# Patient Record
Sex: FEMALE | Race: WHITE | Employment: OTHER | URBAN - NONMETROPOLITAN AREA
[De-identification: names, ages, dates, MRNs, and addresses within clinical notes are randomized per-mention and may not be internally consistent; named-entity substitution may affect disease eponyms.]

---

## 2018-05-11 ENCOUNTER — HOSPITAL ENCOUNTER (OUTPATIENT)
Dept: GENERAL RADIOLOGY | Age: 83
Discharge: HOME | End: 2018-05-11
Payer: MEDICARE

## 2018-05-11 ENCOUNTER — HOSPITAL ENCOUNTER (OUTPATIENT)
Age: 83
Discharge: HOME | End: 2018-05-11
Payer: MEDICARE

## 2018-05-11 DIAGNOSIS — M54.5 LOW BACK PAIN, UNSPECIFIED BACK PAIN LATERALITY, UNSPECIFIED CHRONICITY, WITH SCIATICA PRESENCE UNSPECIFIED: ICD-10-CM

## 2018-05-11 DIAGNOSIS — M99.02 SEGMENTAL AND SOMATIC DYSFUNCTION OF THORACIC REGION: ICD-10-CM

## 2018-05-11 DIAGNOSIS — M99.01 CERVICAL (NECK) REGION SOMATIC DYSFUNCTION: ICD-10-CM

## 2018-05-11 PROCEDURE — 72072 X-RAY EXAM THORAC SPINE 3VWS: CPT

## 2018-05-11 PROCEDURE — 72040 X-RAY EXAM NECK SPINE 2-3 VW: CPT

## 2018-05-11 PROCEDURE — 72100 X-RAY EXAM L-S SPINE 2/3 VWS: CPT

## 2019-07-18 ENCOUNTER — HOSPITAL ENCOUNTER (OUTPATIENT)
Age: 84
Setting detail: SPECIMEN
Discharge: HOME | End: 2019-07-18
Payer: MEDICARE

## 2019-07-18 LAB
ABSOLUTE EOS #: 0.26 K/UL (ref 0–0.44)
ABSOLUTE IMMATURE GRANULOCYTE: <0.03 K/UL (ref 0–0.3)
ABSOLUTE LYMPH #: 1.54 K/UL (ref 1.1–3.7)
ABSOLUTE MONO #: 0.31 K/UL (ref 0.1–1.2)
ALBUMIN SERPL-MCNC: 4.3 G/DL (ref 3.5–5.2)
ALBUMIN/GLOBULIN RATIO: 1.4 (ref 1–2.5)
ALP BLD-CCNC: 47 U/L (ref 35–104)
ALT SERPL-CCNC: 9 U/L (ref 5–33)
ANION GAP SERPL CALCULATED.3IONS-SCNC: 16 MMOL/L (ref 9–17)
AST SERPL-CCNC: 20 U/L
BASOPHILS # BLD: 2 % (ref 0–2)
BASOPHILS ABSOLUTE: 0.07 K/UL (ref 0–0.2)
BILIRUB SERPL-MCNC: 0.61 MG/DL (ref 0.3–1.2)
BUN BLDV-MCNC: 23 MG/DL (ref 8–23)
BUN/CREAT BLD: ABNORMAL (ref 9–20)
CALCIUM SERPL-MCNC: 9.6 MG/DL (ref 8.6–10.4)
CHLORIDE BLD-SCNC: 106 MMOL/L (ref 98–107)
CHOLESTEROL, FASTING: 158 MG/DL
CHOLESTEROL/HDL RATIO: 3.4
CO2: 24 MMOL/L (ref 20–31)
CREAT SERPL-MCNC: 0.64 MG/DL (ref 0.5–0.9)
DIFFERENTIAL TYPE: ABNORMAL
EOSINOPHILS RELATIVE PERCENT: 6 % (ref 1–4)
GFR AFRICAN AMERICAN: >60 ML/MIN
GFR NON-AFRICAN AMERICAN: >60 ML/MIN
GFR SERPL CREATININE-BSD FRML MDRD: ABNORMAL ML/MIN/{1.73_M2}
GFR SERPL CREATININE-BSD FRML MDRD: ABNORMAL ML/MIN/{1.73_M2}
GLUCOSE BLD-MCNC: 63 MG/DL (ref 70–99)
HCT VFR BLD CALC: 47.4 % (ref 36.3–47.1)
HDLC SERPL-MCNC: 47 MG/DL
HEMOGLOBIN: 14.8 G/DL (ref 11.9–15.1)
IMMATURE GRANULOCYTES: 1 %
LDL CHOLESTEROL: 95 MG/DL (ref 0–130)
LYMPHOCYTES # BLD: 37 % (ref 24–43)
MCH RBC QN AUTO: 31 PG (ref 25.2–33.5)
MCHC RBC AUTO-ENTMCNC: 31.2 G/DL (ref 28.4–34.8)
MCV RBC AUTO: 99.4 FL (ref 82.6–102.9)
MONOCYTES # BLD: 7 % (ref 3–12)
NRBC AUTOMATED: 0 PER 100 WBC
PDW BLD-RTO: 14.2 % (ref 11.8–14.4)
PLATELET # BLD: 189 K/UL (ref 138–453)
PLATELET ESTIMATE: ABNORMAL
PMV BLD AUTO: 11.8 FL (ref 8.1–13.5)
POTASSIUM SERPL-SCNC: 4.7 MMOL/L (ref 3.7–5.3)
RBC # BLD: 4.77 M/UL (ref 3.95–5.11)
RBC # BLD: ABNORMAL 10*6/UL
SEG NEUTROPHILS: 47 % (ref 36–65)
SEGMENTED NEUTROPHILS ABSOLUTE COUNT: 2.01 K/UL (ref 1.5–8.1)
SODIUM BLD-SCNC: 146 MMOL/L (ref 135–144)
TOTAL PROTEIN: 7.4 G/DL (ref 6.4–8.3)
TRIGLYCERIDE, FASTING: 79 MG/DL
TSH SERPL DL<=0.05 MIU/L-ACNC: 3.9 MIU/L (ref 0.3–5)
VLDLC SERPL CALC-MCNC: NORMAL MG/DL (ref 1–30)
WBC # BLD: 4.2 K/UL (ref 3.5–11.3)
WBC # BLD: ABNORMAL 10*3/UL

## 2019-08-01 ENCOUNTER — HOSPITAL ENCOUNTER (OUTPATIENT)
Dept: WOMENS IMAGING | Age: 84
Discharge: HOME | End: 2019-08-01
Payer: MEDICARE

## 2019-08-01 DIAGNOSIS — Z12.31 VISIT FOR SCREENING MAMMOGRAM: ICD-10-CM

## 2019-08-01 DIAGNOSIS — N95.1 SYMPTOMATIC MENOPAUSAL OR FEMALE CLIMACTERIC STATES: ICD-10-CM

## 2019-08-01 PROCEDURE — 77080 DXA BONE DENSITY AXIAL: CPT

## 2019-08-01 PROCEDURE — 77063 BREAST TOMOSYNTHESIS BI: CPT

## 2019-09-05 ENCOUNTER — HOSPITAL ENCOUNTER (OUTPATIENT)
Dept: WOMENS IMAGING | Age: 84
Discharge: HOME | End: 2019-09-05
Payer: MEDICARE

## 2019-09-05 DIAGNOSIS — R92.1 CALCIFICATION OF RIGHT BREAST: ICD-10-CM

## 2019-09-05 PROCEDURE — G0279 TOMOSYNTHESIS, MAMMO: HCPCS

## 2020-08-06 ENCOUNTER — HOSPITAL ENCOUNTER (OUTPATIENT)
Dept: WOMENS IMAGING | Age: 85
Discharge: HOME | End: 2020-08-06
Payer: MEDICARE

## 2020-08-06 PROCEDURE — 77067 SCR MAMMO BI INCL CAD: CPT

## 2020-08-25 ENCOUNTER — HOSPITAL ENCOUNTER (OUTPATIENT)
Dept: WOMENS IMAGING | Age: 85
Discharge: HOME | End: 2020-08-25
Payer: MEDICARE

## 2020-08-25 PROCEDURE — 77065 DX MAMMO INCL CAD UNI: CPT

## 2021-01-23 ENCOUNTER — HOSPITAL ENCOUNTER (OUTPATIENT)
Dept: GENERAL RADIOLOGY | Age: 86
Discharge: HOME | End: 2021-01-23
Payer: MEDICARE

## 2021-01-23 ENCOUNTER — HOSPITAL ENCOUNTER (OUTPATIENT)
Age: 86
Discharge: HOME | End: 2021-01-23
Payer: MEDICARE

## 2021-01-23 DIAGNOSIS — L03.011 CELLULITIS OF RIGHT FINGER: ICD-10-CM

## 2021-01-23 PROCEDURE — 73130 X-RAY EXAM OF HAND: CPT

## 2021-07-14 ENCOUNTER — HOSPITAL ENCOUNTER (OUTPATIENT)
Dept: NON INVASIVE DIAGNOSTICS | Age: 86
Discharge: HOME | End: 2021-07-14
Payer: MEDICARE

## 2021-07-14 DIAGNOSIS — I48.0 PAROXYSMAL ATRIAL FIBRILLATION (HCC): ICD-10-CM

## 2021-07-14 PROCEDURE — 93226 XTRNL ECG REC<48 HR SCAN A/R: CPT

## 2021-07-14 PROCEDURE — 93225 XTRNL ECG REC<48 HRS REC: CPT

## 2021-07-19 LAB
ACQUISITION DURATION: NORMAL S
AVERAGE HEART RATE: 89 BPM
HOOKUP DATE: NORMAL
HOOKUP TIME: NORMAL
MAX HEART RATE TIME/DATE: NORMAL
MAX HEART RATE: 118 BPM
MIN HEART RATE TIME/DATE: NORMAL
MIN HEART RATE: 70 BPM
NUMBER OF QRS COMPLEXES: NORMAL
NUMBER OF SUPRAVENTRICULAR COUPLETS: 124
NUMBER OF SUPRAVENTRICULAR ECTOPICS: NORMAL
NUMBER OF SUPRAVENTRICULAR ISOLATED BEATS: NORMAL
NUMBER OF VENTRICULAR BIGEMINAL CYCLES: 25
NUMBER OF VENTRICULAR COUPLETS: 41
NUMBER OF VENTRICULAR ECTOPICS: 3596

## 2021-08-25 ENCOUNTER — HOSPITAL ENCOUNTER (OUTPATIENT)
Dept: WOMENS IMAGING | Age: 86
Discharge: HOME | End: 2021-08-25
Payer: MEDICARE

## 2021-08-25 DIAGNOSIS — Z12.31 VISIT FOR SCREENING MAMMOGRAM: ICD-10-CM

## 2021-08-25 PROCEDURE — 77063 BREAST TOMOSYNTHESIS BI: CPT

## 2021-09-08 ENCOUNTER — TELEPHONE (OUTPATIENT)
Dept: CARDIOLOGY CLINIC | Age: 86
End: 2021-09-08

## 2021-09-17 ENCOUNTER — HOSPITAL ENCOUNTER (OUTPATIENT)
Dept: INTERVENTIONAL RADIOLOGY/VASCULAR | Age: 86
Discharge: HOME | End: 2021-09-17
Payer: MEDICARE

## 2021-09-17 DIAGNOSIS — R52 PAIN: ICD-10-CM

## 2021-09-17 PROCEDURE — 93971 EXTREMITY STUDY: CPT

## 2021-10-05 ENCOUNTER — NURSE ONLY (OUTPATIENT)
Dept: FAMILY MEDICINE CLINIC | Age: 86
End: 2021-10-05
Payer: MEDICARE

## 2021-10-05 DIAGNOSIS — R06.02 SOB (SHORTNESS OF BREATH): ICD-10-CM

## 2021-10-05 DIAGNOSIS — R05.9 COUGH: Primary | ICD-10-CM

## 2021-10-05 LAB
Lab: NORMAL
PERFORMING INSTRUMENT: NORMAL
QC PASS/FAIL: NORMAL
SARS-COV-2, POC: NORMAL

## 2021-10-05 PROCEDURE — 87426 SARSCOV CORONAVIRUS AG IA: CPT | Performed by: FAMILY MEDICINE

## 2021-10-06 ENCOUNTER — TELEPHONE (OUTPATIENT)
Dept: CARDIOLOGY CLINIC | Age: 86
End: 2021-10-06

## 2021-10-06 NOTE — TELEPHONE ENCOUNTER
Patient's daughter Sergio Issa called about Kourtney's new pt appt on 10/07/2021. She said that she just took Madelinegarry Wilderer to urgent care and she has an upper respiratory infection.  No available appts until January with Gavin Weinberg, please advise  [phone]

## 2021-10-07 ENCOUNTER — OFFICE VISIT (OUTPATIENT)
Dept: CARDIOLOGY CLINIC | Age: 86
End: 2021-10-07
Payer: MEDICARE

## 2021-10-07 VITALS
HEIGHT: 60 IN | WEIGHT: 125 LBS | HEART RATE: 122 BPM | DIASTOLIC BLOOD PRESSURE: 80 MMHG | SYSTOLIC BLOOD PRESSURE: 126 MMHG | BODY MASS INDEX: 24.54 KG/M2

## 2021-10-07 DIAGNOSIS — R00.2 PALPITATIONS: Primary | ICD-10-CM

## 2021-10-07 DIAGNOSIS — I49.3 PVC (PREMATURE VENTRICULAR CONTRACTION): ICD-10-CM

## 2021-10-07 DIAGNOSIS — R06.02 SOB (SHORTNESS OF BREATH): ICD-10-CM

## 2021-10-07 LAB
SARS-COV-2: DETECTED
SOURCE: ABNORMAL

## 2021-10-07 PROCEDURE — 1036F TOBACCO NON-USER: CPT | Performed by: NUCLEAR MEDICINE

## 2021-10-07 PROCEDURE — 1090F PRES/ABSN URINE INCON ASSESS: CPT | Performed by: NUCLEAR MEDICINE

## 2021-10-07 PROCEDURE — 4040F PNEUMOC VAC/ADMIN/RCVD: CPT | Performed by: NUCLEAR MEDICINE

## 2021-10-07 PROCEDURE — G8420 CALC BMI NORM PARAMETERS: HCPCS | Performed by: NUCLEAR MEDICINE

## 2021-10-07 PROCEDURE — 93000 ELECTROCARDIOGRAM COMPLETE: CPT | Performed by: NUCLEAR MEDICINE

## 2021-10-07 PROCEDURE — 99204 OFFICE O/P NEW MOD 45 MIN: CPT | Performed by: NUCLEAR MEDICINE

## 2021-10-07 PROCEDURE — 1123F ACP DISCUSS/DSCN MKR DOCD: CPT | Performed by: NUCLEAR MEDICINE

## 2021-10-07 PROCEDURE — G8427 DOCREV CUR MEDS BY ELIG CLIN: HCPCS | Performed by: NUCLEAR MEDICINE

## 2021-10-07 PROCEDURE — G8484 FLU IMMUNIZE NO ADMIN: HCPCS | Performed by: NUCLEAR MEDICINE

## 2021-10-07 RX ORDER — METOPROLOL TARTRATE 50 MG/1
50 TABLET, FILM COATED ORAL 2 TIMES DAILY
Qty: 180 TABLET | Refills: 3 | Status: ON HOLD | OUTPATIENT
Start: 2021-10-07 | End: 2021-12-20 | Stop reason: DRUGHIGH

## 2021-10-07 RX ORDER — HYDROCHLOROTHIAZIDE 12.5 MG/1
12.5 TABLET ORAL DAILY
Status: ON HOLD | COMMUNITY
Start: 2021-09-10 | End: 2021-12-20

## 2021-10-07 RX ORDER — ASPIRIN 325 MG
325 TABLET ORAL DAILY
Status: ON HOLD | COMMUNITY
End: 2021-12-20

## 2021-10-07 RX ORDER — CETIRIZINE HYDROCHLORIDE 10 MG/1
TABLET ORAL
Status: ON HOLD | COMMUNITY
Start: 2021-09-16 | End: 2021-12-20

## 2021-10-07 ASSESSMENT — ENCOUNTER SYMPTOMS
BLOOD IN STOOL: 0
CHEST TIGHTNESS: 1
ABDOMINAL PAIN: 0
ANAL BLEEDING: 0
SHORTNESS OF BREATH: 1
DIARRHEA: 0
PHOTOPHOBIA: 0
RECTAL PAIN: 0
COLOR CHANGE: 0
BACK PAIN: 1
ABDOMINAL DISTENTION: 0
NAUSEA: 0
CONSTIPATION: 0
VOMITING: 0

## 2021-10-07 NOTE — PROGRESS NOTES
[address] [facility] .  [address] [address]  Dept: [phone]  Dept Fax: [phone]  Loc: [phone]    Visit Date: 10/7/2021    Raven Zayas is a 80 y.o. female who presents todayfor:  Chief Complaint   Patient presents with    New Patient     EKG done today    Establish Cardiologist    Palpitations    Tachycardia   here for the first time  Dealing with URI infection   Has had palpitation for few months  Intermittent in nature  holter with frequent PACs and PVCs  No obvious A fib   No known A fib before  No known CAD before  Does have baseline dyspnea  Some chest heaviness at times  Intermittent in nature  Exertional   No documented A fib before  Does have borderline HTN   No known Dm   No know hyperlipidemia  No smoking   Family history of CAD       HPI:  HPI  Past Medical History:   Diagnosis Date    Anxiety     Arthritis     Bronchitis     Diverticulitis of colon     Hypertension       Past Surgical History:   Procedure Laterality Date    APPENDECTOMY      CHOLECYSTECTOMY       History reviewed. No pertinent family history.   Social History     Tobacco Use    Smoking status: Former Smoker     Packs/day: 1.00     Years: 15.00     Pack years: 15.00     Types: Cigarettes     Quit date: 12/10/1977     Years since quittin.8    Smokeless tobacco: Never Used   Substance Use Topics    Alcohol use: No      Current Outpatient Medications   Medication Sig Dispense Refill    hydroCHLOROthiazide (HYDRODIURIL) 12.5 MG tablet Take 12.5 mg by mouth daily       NONFORMULARY Focus Factor      NONFORMULARY Drenamin      GLUCOSAMINE-CHONDROITIN PO Take by mouth      cetirizine (ZYRTEC) 10 MG tablet TAKE 1 TABLET BY MOUTH ONCE DAILY      HAWTHORN PO Take 425 mg by mouth daily      NONFORMULARY Allerplex      OLIVE LEAF EXTRACT PO Take by mouth      aspirin 325 MG tablet Take 325 mg by mouth daily      Fexofenadine-Pseudoephedrine (ALLEGRA-D 12 HOUR PO) Take by mouth       No current facility-administered medications for this visit. Allergies   Allergen Reactions    Sulfa Antibiotics      Health Maintenance   Topic Date Due    Pneumococcal 65+ years Vaccine (1 of 1 - PPSV23) Never done    Potassium monitoring  07/18/2020    Creatinine monitoring  07/18/2020    Flu vaccine (1) 09/01/2021    Annual Wellness Visit (AWV)  Never done    DTaP/Tdap/Td vaccine (2 - Td or Tdap) 01/23/2031    Shingles Vaccine  Completed    COVID-19 Vaccine  Completed    Hepatitis A vaccine  Aged Out    Hepatitis B vaccine  Aged Out    Hib vaccine  Aged Out    Meningococcal (ACWY) vaccine  Aged Out       Subjective:  Review of Systems   Constitutional: Positive for fatigue. Negative for chills and diaphoresis. HENT: Negative for ear pain and nosebleeds. Eyes: Negative for photophobia. Respiratory: Positive for chest tightness and shortness of breath. Cardiovascular: Positive for chest pain. Gastrointestinal: Negative for abdominal distention, abdominal pain, anal bleeding, blood in stool, constipation, diarrhea, nausea, rectal pain and vomiting. Endocrine: Negative for polyphagia. Genitourinary: Negative for dysuria, hematuria and urgency. Musculoskeletal: Positive for arthralgias and back pain. Negative for gait problem, joint swelling, myalgias, neck pain and neck stiffness. Skin: Negative for color change, pallor, rash and wound. Allergic/Immunologic: Negative for food allergies. Neurological: Positive for dizziness and light-headedness. Negative for syncope. Psychiatric/Behavioral: Negative for confusion, decreased concentration, dysphoric mood and hallucinations. The patient is not nervous/anxious and is not hyperactive. Objective:  Physical Exam  HENT:      Head: Normocephalic.       Right Ear: Tympanic membrane normal.      Nose: Nose normal.      Mouth/Throat:      Mouth: Mucous membranes are moist.   Eyes:      Pupils: Pupils are equal, round, and reactive to light. Cardiovascular:      Rate and Rhythm: Normal rate and regular rhythm. Heart sounds: Murmur heard. No gallop. Pulmonary:      Effort: No respiratory distress. Breath sounds: No stridor. No wheezing, rhonchi or rales. Chest:      Chest wall: No tenderness. Abdominal:      General: There is no distension. Palpations: There is no mass. Tenderness: There is no abdominal tenderness. There is no right CVA tenderness, left CVA tenderness, guarding or rebound. Hernia: No hernia is present. Musculoskeletal:         General: No swelling, tenderness, deformity or signs of injury. Cervical back: Normal range of motion. Right lower leg: No edema. Left lower leg: No edema. Skin:     Coloration: Skin is not jaundiced or pale. Findings: No bruising, erythema (.ty), lesion or rash. Neurological:      Mental Status: She is alert and oriented to person, place, and time. Cranial Nerves: No cranial nerve deficit. Sensory: No sensory deficit. Motor: No weakness. Coordination: Coordination normal.      Gait: Gait normal.      Deep Tendon Reflexes: Reflexes normal.   Psychiatric:         Mood and Affect: Mood normal.         Thought Content: Thought content normal.       /80   Pulse 122   Ht 5' (1.524 m)   Wt 125 lb (56.7 kg)   BMI 24.41 kg/m²     Assessment:      Diagnosis Orders   1. Palpitations  EKG 12 Lead   2. PVC (premature ventricular contraction)     I suspect A fib   Not documented   Risk for CAD  Symptoms as above  Now with URI   ECG in office was done today. I reviewed the ECG. No acute findings, PACs and PVcs       Plan:  No follow-ups on file. Start beta blockers  Non invasive cardiac testing will be ordered to further evaluate for any ischemic or structural heart disease as a cause of the patient symptoms.  We will proceed with a Stress Cardiolite test and echo soon. Consider event monitor   Continue risk factor modification and medical management  . helen    Orders Placed:  Orders Placed This Encounter   Procedures    EKG 12 Lead     Order Specific Question:   Reason for Exam?     Answer: Other       Medications Prescribed:  No orders of the defined types were placed in this encounter. Discussed use, benefit, and side effects of prescribed medications. All patient questions answered. Pt voicedunderstanding. Instructed to continue current medications, diet and exercise. Continue risk factor modification and medical management. Patient agreed with treatment plan. Follow up as directed.     Electronically signedby Demetrio Colon MD on 10/7/2021 at 9:59 AM

## 2021-10-11 ENCOUNTER — APPOINTMENT (OUTPATIENT)
Dept: GENERAL RADIOLOGY | Age: 86
DRG: 004 | End: 2021-10-11
Payer: MEDICARE

## 2021-10-11 ENCOUNTER — HOSPITAL ENCOUNTER (INPATIENT)
Age: 86
LOS: 38 days | Discharge: NURSING, REHAB OR LONG-TERM CARE | DRG: 004 | End: 2021-11-18
Attending: EMERGENCY MEDICINE | Admitting: INTERNAL MEDICINE
Payer: MEDICARE

## 2021-10-11 DIAGNOSIS — U07.1 COVID-19: Primary | ICD-10-CM

## 2021-10-11 DIAGNOSIS — I50.9 ACUTE CONGESTIVE HEART FAILURE, UNSPECIFIED HEART FAILURE TYPE (HCC): ICD-10-CM

## 2021-10-11 DIAGNOSIS — J96.01 ACUTE RESPIRATORY FAILURE WITH HYPOXIA (HCC): ICD-10-CM

## 2021-10-11 LAB
ALBUMIN SERPL-MCNC: 4 G/DL (ref 3.5–5.1)
ALP BLD-CCNC: 65 U/L (ref 38–126)
ALT SERPL-CCNC: 49 U/L (ref 11–66)
ANION GAP SERPL CALCULATED.3IONS-SCNC: 14 MEQ/L (ref 8–16)
AST SERPL-CCNC: 81 U/L (ref 5–40)
BASOPHILS # BLD: 0.4 %
BASOPHILS ABSOLUTE: 0 THOU/MM3 (ref 0–0.1)
BILIRUB SERPL-MCNC: 0.8 MG/DL (ref 0.3–1.2)
BILIRUBIN DIRECT: 0.3 MG/DL (ref 0–0.3)
BUN BLDV-MCNC: 29 MG/DL (ref 7–22)
CALCIUM SERPL-MCNC: 9.3 MG/DL (ref 8.5–10.5)
CHLORIDE BLD-SCNC: 94 MEQ/L (ref 98–111)
CO2: 26 MEQ/L (ref 23–33)
CREAT SERPL-MCNC: 0.9 MG/DL (ref 0.4–1.2)
EKG ATRIAL RATE: 111 BPM
EKG ATRIAL RATE: 122 BPM
EKG ATRIAL RATE: 174 BPM
EKG P AXIS: 59 DEGREES
EKG P AXIS: 64 DEGREES
EKG P-R INTERVAL: 170 MS
EKG P-R INTERVAL: 180 MS
EKG Q-T INTERVAL: 272 MS
EKG Q-T INTERVAL: 306 MS
EKG Q-T INTERVAL: 348 MS
EKG QRS DURATION: 146 MS
EKG QRS DURATION: 150 MS
EKG QRS DURATION: 152 MS
EKG QTC CALCULATION (BAZETT): 436 MS
EKG QTC CALCULATION (BAZETT): 466 MS
EKG QTC CALCULATION (BAZETT): 473 MS
EKG R AXIS: -49 DEGREES
EKG R AXIS: -49 DEGREES
EKG R AXIS: -51 DEGREES
EKG T AXIS: 83 DEGREES
EKG T AXIS: 86 DEGREES
EKG T AXIS: 89 DEGREES
EKG VENTRICULAR RATE: 111 BPM
EKG VENTRICULAR RATE: 122 BPM
EKG VENTRICULAR RATE: 177 BPM
EOSINOPHIL # BLD: 0.1 %
EOSINOPHILS ABSOLUTE: 0 THOU/MM3 (ref 0–0.4)
ERYTHROCYTE [DISTWIDTH] IN BLOOD BY AUTOMATED COUNT: 15.2 % (ref 11.5–14.5)
ERYTHROCYTE [DISTWIDTH] IN BLOOD BY AUTOMATED COUNT: 54.3 FL (ref 35–45)
FERRITIN: 406 NG/ML (ref 10–291)
GFR SERPL CREATININE-BSD FRML MDRD: 59 ML/MIN/1.73M2
GLUCOSE BLD-MCNC: 114 MG/DL (ref 70–108)
HCT VFR BLD CALC: 43.1 % (ref 37–47)
HEMOGLOBIN: 13.5 GM/DL (ref 12–16)
IMMATURE GRANS (ABS): 0.08 THOU/MM3 (ref 0–0.07)
IMMATURE GRANULOCYTES: 1.2 %
LACTIC ACID, SEPSIS: 1.2 MMOL/L (ref 0.5–1.9)
LACTIC ACID: 1.7 MMOL/L (ref 0.5–2)
LD: 312 U/L (ref 100–190)
LYMPHOCYTES # BLD: 6.6 %
LYMPHOCYTES ABSOLUTE: 0.4 THOU/MM3 (ref 1–4.8)
MAGNESIUM: 1.7 MG/DL (ref 1.6–2.4)
MCH RBC QN AUTO: 30.8 PG (ref 26–33)
MCHC RBC AUTO-ENTMCNC: 31.3 GM/DL (ref 32.2–35.5)
MCV RBC AUTO: 98.2 FL (ref 81–99)
MONOCYTES # BLD: 13.6 %
MONOCYTES ABSOLUTE: 0.9 THOU/MM3 (ref 0.4–1.3)
NUCLEATED RED BLOOD CELLS: 0 /100 WBC
OSMOLALITY CALCULATION: 274.9 MOSMOL/KG (ref 275–300)
PLATELET # BLD: 172 THOU/MM3 (ref 130–400)
PMV BLD AUTO: 12.7 FL (ref 9.4–12.4)
POTASSIUM REFLEX MAGNESIUM: 5 MEQ/L (ref 3.5–5.2)
PRO-BNP: ABNORMAL PG/ML (ref 0–1800)
PROCALCITONIN: 0.14 NG/ML (ref 0.01–0.09)
RBC # BLD: 4.39 MILL/MM3 (ref 4.2–5.4)
SEG NEUTROPHILS: 78.1 %
SEGMENTED NEUTROPHILS ABSOLUTE COUNT: 5.2 THOU/MM3 (ref 1.8–7.7)
SODIUM BLD-SCNC: 134 MEQ/L (ref 135–145)
TOTAL PROTEIN: 6.6 G/DL (ref 6.1–8)
TROPONIN T: 0.01 NG/ML
TROPONIN T: < 0.01 NG/ML
TSH SERPL DL<=0.05 MIU/L-ACNC: 2.52 UIU/ML (ref 0.4–4.2)
WBC # BLD: 6.7 THOU/MM3 (ref 4.8–10.8)

## 2021-10-11 PROCEDURE — 1200000000 HC SEMI PRIVATE

## 2021-10-11 PROCEDURE — 83735 ASSAY OF MAGNESIUM: CPT

## 2021-10-11 PROCEDURE — 96374 THER/PROPH/DIAG INJ IV PUSH: CPT

## 2021-10-11 PROCEDURE — 93010 ELECTROCARDIOGRAM REPORT: CPT | Performed by: INTERNAL MEDICINE

## 2021-10-11 PROCEDURE — 87040 BLOOD CULTURE FOR BACTERIA: CPT

## 2021-10-11 PROCEDURE — 84145 PROCALCITONIN (PCT): CPT

## 2021-10-11 PROCEDURE — 71045 X-RAY EXAM CHEST 1 VIEW: CPT

## 2021-10-11 PROCEDURE — 93005 ELECTROCARDIOGRAM TRACING: CPT | Performed by: STUDENT IN AN ORGANIZED HEALTH CARE EDUCATION/TRAINING PROGRAM

## 2021-10-11 PROCEDURE — 6360000002 HC RX W HCPCS: Performed by: STUDENT IN AN ORGANIZED HEALTH CARE EDUCATION/TRAINING PROGRAM

## 2021-10-11 PROCEDURE — 2500000003 HC RX 250 WO HCPCS: Performed by: NURSE PRACTITIONER

## 2021-10-11 PROCEDURE — 83605 ASSAY OF LACTIC ACID: CPT

## 2021-10-11 PROCEDURE — 84443 ASSAY THYROID STIM HORMONE: CPT

## 2021-10-11 PROCEDURE — 82728 ASSAY OF FERRITIN: CPT

## 2021-10-11 PROCEDURE — 83615 LACTATE (LD) (LDH) ENZYME: CPT

## 2021-10-11 PROCEDURE — 2580000003 HC RX 258: Performed by: STUDENT IN AN ORGANIZED HEALTH CARE EDUCATION/TRAINING PROGRAM

## 2021-10-11 PROCEDURE — 1200000003 HC TELEMETRY R&B

## 2021-10-11 PROCEDURE — 6360000002 HC RX W HCPCS: Performed by: PHYSICIAN ASSISTANT

## 2021-10-11 PROCEDURE — 84484 ASSAY OF TROPONIN QUANT: CPT

## 2021-10-11 PROCEDURE — 81003 URINALYSIS AUTO W/O SCOPE: CPT

## 2021-10-11 PROCEDURE — 99223 1ST HOSP IP/OBS HIGH 75: CPT | Performed by: HOSPITALIST

## 2021-10-11 PROCEDURE — 99284 EMERGENCY DEPT VISIT MOD MDM: CPT

## 2021-10-11 PROCEDURE — 96375 TX/PRO/DX INJ NEW DRUG ADDON: CPT

## 2021-10-11 PROCEDURE — 2580000003 HC RX 258: Performed by: PHYSICIAN ASSISTANT

## 2021-10-11 PROCEDURE — 2500000003 HC RX 250 WO HCPCS: Performed by: PHYSICIAN ASSISTANT

## 2021-10-11 PROCEDURE — 80076 HEPATIC FUNCTION PANEL: CPT

## 2021-10-11 PROCEDURE — 93005 ELECTROCARDIOGRAM TRACING: CPT | Performed by: PHYSICIAN ASSISTANT

## 2021-10-11 PROCEDURE — 85025 COMPLETE CBC W/AUTO DIFF WBC: CPT

## 2021-10-11 PROCEDURE — 6370000000 HC RX 637 (ALT 250 FOR IP): Performed by: STUDENT IN AN ORGANIZED HEALTH CARE EDUCATION/TRAINING PROGRAM

## 2021-10-11 PROCEDURE — 80048 BASIC METABOLIC PNL TOTAL CA: CPT

## 2021-10-11 PROCEDURE — 83880 ASSAY OF NATRIURETIC PEPTIDE: CPT

## 2021-10-11 PROCEDURE — 2500000003 HC RX 250 WO HCPCS: Performed by: STUDENT IN AN ORGANIZED HEALTH CARE EDUCATION/TRAINING PROGRAM

## 2021-10-11 PROCEDURE — 36415 COLL VENOUS BLD VENIPUNCTURE: CPT

## 2021-10-11 RX ORDER — METOPROLOL TARTRATE 50 MG/1
50 TABLET, FILM COATED ORAL 2 TIMES DAILY
Status: CANCELLED | OUTPATIENT
Start: 2021-10-11

## 2021-10-11 RX ORDER — DEXAMETHASONE 4 MG/1
6 TABLET ORAL DAILY
Status: CANCELLED | OUTPATIENT
Start: 2021-10-11 | End: 2021-10-21

## 2021-10-11 RX ORDER — ACETAMINOPHEN 325 MG/1
650 TABLET ORAL EVERY 6 HOURS PRN
Status: DISCONTINUED | OUTPATIENT
Start: 2021-10-11 | End: 2021-10-18 | Stop reason: SDUPTHER

## 2021-10-11 RX ORDER — 0.9 % SODIUM CHLORIDE 0.9 %
500 INTRAVENOUS SOLUTION INTRAVENOUS ONCE
Status: COMPLETED | OUTPATIENT
Start: 2021-10-11 | End: 2021-10-11

## 2021-10-11 RX ORDER — SODIUM CHLORIDE 9 MG/ML
25 INJECTION, SOLUTION INTRAVENOUS PRN
Status: DISCONTINUED | OUTPATIENT
Start: 2021-10-11 | End: 2021-11-09

## 2021-10-11 RX ORDER — ACETAMINOPHEN 325 MG/1
650 TABLET ORAL EVERY 6 HOURS PRN
Status: DISCONTINUED | OUTPATIENT
Start: 2021-10-11 | End: 2021-11-18 | Stop reason: HOSPADM

## 2021-10-11 RX ORDER — SODIUM CHLORIDE 0.9 % (FLUSH) 0.9 %
5-40 SYRINGE (ML) INJECTION PRN
Status: DISCONTINUED | OUTPATIENT
Start: 2021-10-11 | End: 2021-11-09

## 2021-10-11 RX ORDER — MAGNESIUM SULFATE IN WATER 40 MG/ML
2000 INJECTION, SOLUTION INTRAVENOUS ONCE
Status: COMPLETED | OUTPATIENT
Start: 2021-10-11 | End: 2021-10-12

## 2021-10-11 RX ORDER — DEXAMETHASONE SODIUM PHOSPHATE 4 MG/ML
10 INJECTION, SOLUTION INTRA-ARTICULAR; INTRALESIONAL; INTRAMUSCULAR; INTRAVENOUS; SOFT TISSUE ONCE
Status: COMPLETED | OUTPATIENT
Start: 2021-10-11 | End: 2021-10-11

## 2021-10-11 RX ORDER — SCOLOPAMINE TRANSDERMAL SYSTEM 1 MG/1
1 PATCH, EXTENDED RELEASE TRANSDERMAL
Status: DISCONTINUED | OUTPATIENT
Start: 2021-10-11 | End: 2021-10-19

## 2021-10-11 RX ORDER — ACETAMINOPHEN 650 MG/1
650 SUPPOSITORY RECTAL EVERY 6 HOURS PRN
Status: DISCONTINUED | OUTPATIENT
Start: 2021-10-11 | End: 2021-10-18 | Stop reason: SDUPTHER

## 2021-10-11 RX ORDER — BUMETANIDE 0.25 MG/ML
1 INJECTION, SOLUTION INTRAMUSCULAR; INTRAVENOUS ONCE
Status: COMPLETED | OUTPATIENT
Start: 2021-10-11 | End: 2021-10-11

## 2021-10-11 RX ORDER — DOCUSATE SODIUM 100 MG/1
100 CAPSULE, LIQUID FILLED ORAL DAILY PRN
Status: DISCONTINUED | OUTPATIENT
Start: 2021-10-11 | End: 2021-11-18 | Stop reason: HOSPADM

## 2021-10-11 RX ORDER — ZINC SULFATE 50(220)MG
50 CAPSULE ORAL DAILY
Status: DISCONTINUED | OUTPATIENT
Start: 2021-10-11 | End: 2021-10-20

## 2021-10-11 RX ORDER — ASCORBIC ACID 500 MG
500 TABLET ORAL DAILY
Status: DISCONTINUED | OUTPATIENT
Start: 2021-10-11 | End: 2021-10-20

## 2021-10-11 RX ORDER — POLYETHYLENE GLYCOL 3350 17 G/17G
17 POWDER, FOR SOLUTION ORAL DAILY PRN
Status: DISCONTINUED | OUTPATIENT
Start: 2021-10-11 | End: 2021-11-18 | Stop reason: HOSPADM

## 2021-10-11 RX ORDER — VITAMIN B COMPLEX
2000 TABLET ORAL DAILY
Status: DISCONTINUED | OUTPATIENT
Start: 2021-10-11 | End: 2021-10-20

## 2021-10-11 RX ORDER — BUMETANIDE 0.25 MG/ML
1 INJECTION, SOLUTION INTRAMUSCULAR; INTRAVENOUS 2 TIMES DAILY
Status: DISCONTINUED | OUTPATIENT
Start: 2021-10-11 | End: 2021-10-12

## 2021-10-11 RX ORDER — ONDANSETRON 2 MG/ML
4 INJECTION INTRAMUSCULAR; INTRAVENOUS ONCE
Status: COMPLETED | OUTPATIENT
Start: 2021-10-11 | End: 2021-10-11

## 2021-10-11 RX ORDER — SODIUM CHLORIDE 0.9 % (FLUSH) 0.9 %
5-40 SYRINGE (ML) INJECTION EVERY 12 HOURS SCHEDULED
Status: DISCONTINUED | OUTPATIENT
Start: 2021-10-11 | End: 2021-11-09

## 2021-10-11 RX ORDER — METOPROLOL TARTRATE 5 MG/5ML
INJECTION INTRAVENOUS
Status: DISCONTINUED
Start: 2021-10-11 | End: 2021-10-12

## 2021-10-11 RX ORDER — ACETAMINOPHEN 650 MG/1
650 SUPPOSITORY RECTAL EVERY 6 HOURS PRN
Status: DISCONTINUED | OUTPATIENT
Start: 2021-10-11 | End: 2021-11-18 | Stop reason: HOSPADM

## 2021-10-11 RX ORDER — ZINC GLUCONATE 50 MG
50 TABLET ORAL DAILY
Status: DISCONTINUED | OUTPATIENT
Start: 2021-10-11 | End: 2021-10-11

## 2021-10-11 RX ORDER — METOPROLOL TARTRATE 5 MG/5ML
5 INJECTION INTRAVENOUS ONCE
Status: COMPLETED | OUTPATIENT
Start: 2021-10-11 | End: 2021-10-11

## 2021-10-11 RX ADMIN — ONDANSETRON 4 MG: 2 INJECTION INTRAMUSCULAR; INTRAVENOUS at 13:26

## 2021-10-11 RX ADMIN — SODIUM CHLORIDE 500 ML: 9 INJECTION, SOLUTION INTRAVENOUS at 21:05

## 2021-10-11 RX ADMIN — Medication 2000 UNITS: at 21:09

## 2021-10-11 RX ADMIN — OXYCODONE HYDROCHLORIDE AND ACETAMINOPHEN 500 MG: 500 TABLET ORAL at 21:20

## 2021-10-11 RX ADMIN — MAGNESIUM SULFATE HEPTAHYDRATE 2000 MG: 40 INJECTION, SOLUTION INTRAVENOUS at 21:40

## 2021-10-11 RX ADMIN — ENOXAPARIN SODIUM 30 MG: 30 INJECTION SUBCUTANEOUS at 21:13

## 2021-10-11 RX ADMIN — METOPROLOL TARTRATE 5 MG: 5 INJECTION INTRAVENOUS at 21:08

## 2021-10-11 RX ADMIN — Medication 50 MG: at 21:16

## 2021-10-11 RX ADMIN — DEXAMETHASONE SODIUM PHOSPHATE 10 MG: 4 INJECTION, SOLUTION INTRAMUSCULAR; INTRAVENOUS at 13:27

## 2021-10-11 RX ADMIN — AMIODARONE HYDROCHLORIDE 150 MG: 1.5 INJECTION, SOLUTION INTRAVENOUS at 21:20

## 2021-10-11 RX ADMIN — BUMETANIDE 1 MG: 0.25 INJECTION, SOLUTION INTRAMUSCULAR; INTRAVENOUS at 13:27

## 2021-10-11 RX ADMIN — SODIUM CHLORIDE, PRESERVATIVE FREE 10 ML: 5 INJECTION INTRAVENOUS at 21:09

## 2021-10-11 ASSESSMENT — ENCOUNTER SYMPTOMS
SORE THROAT: 0
DIARRHEA: 1
SINUS PAIN: 0
BACK PAIN: 0
SHORTNESS OF BREATH: 1
COUGH: 1
ABDOMINAL PAIN: 0
NAUSEA: 0
EYE REDNESS: 0
VOMITING: 0
RHINORRHEA: 1

## 2021-10-11 ASSESSMENT — PAIN DESCRIPTION - DESCRIPTORS: DESCRIPTORS: ACHING;SHARP;PRESSURE

## 2021-10-11 ASSESSMENT — PAIN DESCRIPTION - PROGRESSION: CLINICAL_PROGRESSION: NOT CHANGED

## 2021-10-11 ASSESSMENT — PAIN DESCRIPTION - ONSET: ONSET: ON-GOING

## 2021-10-11 ASSESSMENT — PAIN DESCRIPTION - LOCATION: LOCATION: CHEST

## 2021-10-11 ASSESSMENT — PAIN SCALES - GENERAL: PAINLEVEL_OUTOF10: 10

## 2021-10-11 ASSESSMENT — PAIN DESCRIPTION - ORIENTATION: ORIENTATION: LEFT

## 2021-10-11 ASSESSMENT — PAIN - FUNCTIONAL ASSESSMENT: PAIN_FUNCTIONAL_ASSESSMENT: ACTIVITIES ARE NOT PREVENTED

## 2021-10-11 ASSESSMENT — PAIN DESCRIPTION - FREQUENCY: FREQUENCY: CONTINUOUS

## 2021-10-11 ASSESSMENT — PAIN DESCRIPTION - PAIN TYPE: TYPE: ACUTE PAIN

## 2021-10-11 NOTE — FLOWSHEET NOTE
Admission assessment complete. Pt complaining of nausea, team notified and meds pending. VSS. O2 between 1-2L. EKG complete and rhythm similar to ED rhythm.     Winston Coy, RN

## 2021-10-11 NOTE — Clinical Note
Patient Class: Inpatient [101]   REQUIRED: Diagnosis: Acute respiratory failure with hypoxia ([facility] [address] [639570]   Estimated Length of Stay: Estimated stay of more than 2 midnights   Admitting Provider: Regina Covarrubias [[id number]]   Telemetry/Cardiac Monitoring Required?: Yes

## 2021-10-11 NOTE — ED NOTES
Patient to ED for increased SOB and fatigued. Patient diagnosed with covid last Monday. Patient states she keeps getting more and more weak.      Susan Rich RN  10/11/21 4184

## 2021-10-11 NOTE — ED NOTES
Ice chips given. Denies pain or other needs at this time.   No urine noted     Geronimo Reed, ROLF  10/11/21 4679

## 2021-10-11 NOTE — ED NOTES
Updated plan of care &voices understanding.   Pure wick applied d/t bumex administered & PT voices understanding      Genevieve Ovalles RN  10/11/21 7220

## 2021-10-11 NOTE — ED NOTES
ED to inpatient nurses report    Chief Complaint   Patient presents with    Shortness of Breath    Fatigue      Present to ED from Home  LOC: alert and orientated to name, place, date  Vital signs   Vitals:    10/11/21 1101 10/11/21 1207 10/11/21 1331 10/11/21 1420   BP:  135/83 134/74 127/73   Pulse:  124 110 118   Resp: (!) 33 (!) 31 23 24   Temp:       TempSrc:       SpO2: 97% 94% 96% 94%   Weight:       Height:          Oxygen Baseline none    Current needs required NC3LPM Bipap/Cpap No  LDAs:   Peripheral IV 10/11/21 Left Wrist (Active)   Site Assessment Clean;Dry; Intact 10/11/21 1137   Line Status Capped;Flushed 10/11/21 1137   Dressing Status Clean;Dry; Intact 10/11/21 1137   Dressing Intervention New 10/11/21 1137     Mobility: Requires assistance * 1  Pending ED orders: none  Present condition: stable    Electronically signed by Linda Jimenez RN on 10/11/2021 at 2:23 PM     Linda Jimenez RN  10/11/21 1422

## 2021-10-11 NOTE — LETTER
Beneficiary Notification Letter  [employer]     Your Doctor or [address],    We wanted to let you know that your health care provider, Yumiko Olivera, has volunteered to take part in our Centers for Medicare & Medicaid Services (CMS) Bundled Payments for [facility] ([facility]). This doesnt change your Medicare rights or benefits and you dont need to do anything. What are bundled payments? A bundled payment combines, or bundles together, payments that Medicare makes to your health care providers for the many different kinds of medical services you might get in a specific time period. In [employer], this time period could include a hospital inpatient stay or outpatient procedure, plus 90 days. Why would Medicare bundle payments? Bundled payments are thought of as a value-based way to pay because health care providers are responsible for both the quality and cost of medical care they give. This is a relatively new way of paying health care providers compared to thefee-for-service way Medicare has traditionally paid, where providers are paid separately for each service they provide. Bundled payments encourage these providers to work together to provide better, more coordinated care during your hospital stay, or outpatient procedure, and through your recovery. What does BPCI Advance mean for me? Youre more likely to get even better care when hospitals, doctors, and other health care providers work together. In [employer], hospitals, doctors, and other health care providers may be rewarded for providing better, more coordinated health care. Medicare will watch B[employer] participants closely to make sure that you and other patients keep getting efficient, high quality care. What do I need to know about [employer]?   Whats most important for you to know is that a grouping of medical conditions or diagnoses that are included in the [id number] [place] [address] Avenue.

## 2021-10-11 NOTE — ED PROVIDER NOTES
Benjamin ENCOUNTER          Pt Name: Janet Irwin  MRN: [id number]  [place] 1934  Date of evaluation: 10/11/2021  Treating Resident Physician: Jabari Plata MD  Supervising Physician: Dr iQ Díaz       Chief Complaint   Patient presents with    Shortness of Breath    Fatigue     History obtained from the patient. HISTORY OF PRESENT ILLNESS    HPI  Janet Irwin is a 80 y.o. female with past medical history of anxiety, arthritis, hypertension who presents to the emergency department for evaluation of congestion rhinorrhea shortness of breath dyspnea on exertion fatigue over the past 7 days. Patient has a positive COVID-19, 7 days ago. She is Covid vaccinated with a 2 shot vaccine unsure the name at this time. She states her symptoms have worsened over the past few days. The patient has no other acute complaints at this time. REVIEW OF SYSTEMS   Review of Systems   Constitutional: Positive for chills and fatigue. Negative for fever. HENT: Positive for congestion and rhinorrhea. Negative for sinus pain and sore throat. Eyes: Negative for redness. Respiratory: Positive for cough and shortness of breath. Cardiovascular: Positive for chest pain. Gastrointestinal: Positive for diarrhea. Negative for abdominal pain, nausea and vomiting. Genitourinary: Negative for dysuria. Musculoskeletal: Negative for back pain. Skin: Negative for rash. Neurological: Negative for light-headedness and headaches. Psychiatric/Behavioral: Negative for agitation.          PAST MEDICAL AND SURGICAL HISTORY     Past Medical History:   Diagnosis Date    Anxiety     Arthritis     Bronchitis     Diverticulitis of colon     Hypertension      Past Surgical History:   Procedure Laterality Date    APPENDECTOMY      CHOLECYSTECTOMY           MEDICATIONS     Current Facility-Administered Medications:    Dexamethasone Sodium Phosphate injection 10 mg, 10 mg, IntraVENous, Once, Fabienne Reza MD    ondansetron [facility]) injection 4 mg, 4 mg, IntraVENous, Once, Fabienne Reza MD    [facility]) injection 1 mg, 1 mg, IntraVENous, Once, Fabienne Reza MD    Current Outpatient Medications:     hydroCHLOROthiazide (HYDRODIURIL) 12.5 MG tablet, Take 12.5 mg by mouth daily , Disp: , Rfl:     NONFORMULARY, Focus Factor, Disp: , Rfl:     GLUCOSAMINE-CHONDROITIN PO, Take by mouth, Disp: , Rfl:     cetirizine (ZYRTEC) 10 MG tablet, TAKE 1 TABLET BY MOUTH ONCE DAILY, Disp: , Rfl:     HAWTHORN PO, Take 425 mg by mouth daily, Disp: , Rfl:     OLIVE LEAF EXTRACT PO, Take by mouth, Disp: , Rfl:     Fexofenadine-Pseudoephedrine (ALLEGRA-D 12 HOUR PO), Take by mouth, Disp: , Rfl:     NONFORMULARY, Drenamin, Disp: , Rfl:     NONFORMULARY, Allerplex, Disp: , Rfl:     aspirin 325 MG tablet, Take 325 mg by mouth daily, Disp: , Rfl:     metoprolol tartrate (LOPRESSOR) 50 MG tablet, Take 1 tablet by mouth 2 times daily, Disp: 180 tablet, Rfl: 3      SOCIAL HISTORY     Social History     Social History Narrative    Not on file     Social History     Tobacco Use    Smoking status: Former Smoker     Packs/day: 1.00     Years: 15.00     Pack years: 15.00     Types: Cigarettes     Quit date: 12/10/1977     Years since quittin.8    Smokeless tobacco: Never Used   Substance Use Topics    Alcohol use: No    Drug use: No         ALLERGIES     Allergies   Allergen Reactions    Sulfa Antibiotics          FAMILY HISTORY   No family history on file. PREVIOUS RECORDS   Previous records reviewed: Patient was seen here on 3/15/2015 for diverticulitis.       PHYSICAL EXAM     ED Triage Vitals   BP Temp Temp src Pulse Resp SpO2 Height Weight   -- -- -- -- -- -- -- --   Blood pressure 126/82, pulse 119, respiratory rate 33, pulse ox 83% on room air, temperature 9 9  Initial vital signs and nursing assessment reviewed and abnormal from Tachypnea, tachycardia. Pulsoximetry is abnormal per my interpretation. Additional Vital Signs:  Vitals:    10/11/21 1207   BP: 135/83   Pulse: 124   Resp: (!) 31   Temp:    SpO2: 94%       Physical Exam  Vitals and nursing note reviewed. Constitutional:       General: She is in acute distress. Appearance: Normal appearance. She is ill-appearing. She is not toxic-appearing. HENT:      Head: Normocephalic and atraumatic. Right Ear: External ear normal.      Left Ear: External ear normal.      Nose: Nose normal.      Mouth/Throat:      Mouth: Mucous membranes are moist.      Pharynx: Oropharynx is clear. Eyes:      General: No scleral icterus. Conjunctiva/sclera: Conjunctivae normal.   Cardiovascular:      Rate and Rhythm: Regular rhythm. Tachycardia present. Pulses: Normal pulses. Heart sounds: Normal heart sounds. No murmur heard. Pulmonary:      Effort: Respiratory distress present. Comments: Tachypnea, diminished breath sounds bilaterally. Chest:      Chest wall: No tenderness. Abdominal:      General: Abdomen is flat. There is no distension. Palpations: Abdomen is soft. Tenderness: There is no abdominal tenderness. There is no guarding or rebound. Musculoskeletal:         General: Normal range of motion. Cervical back: Normal range of motion and neck supple. No rigidity. No muscular tenderness. Right lower leg: Edema present. Left lower leg: Edema present. Lymphadenopathy:      Cervical: No cervical adenopathy. Skin:     General: Skin is warm and dry. Capillary Refill: Capillary refill takes less than 2 seconds. Coloration: Skin is not jaundiced. Neurological:      General: No focal deficit present. Mental Status: She is alert and oriented to person, place, and time.    Psychiatric:         Mood and Affect: Mood normal.         Behavior: Behavior normal.         MEDICAL DECISION MAKING Initial Assessment: This is a 68-year-old female with past medical history of arthritis, hypertension who is vaccinated against COVID-19 with a 2 shot vaccine who was diagnosed with COVID-19, 7 days ago. Her symptoms have been progressively worsening with worsening dyspnea on exertion, shortness of breath, cough that is intermittently productive, congestion rhinorrhea. She was hypoxic upon arrival with a respiratory rate of 40 and pulse oximetry in the low 80s on room air. Differential Diagnosis Included but not limited to: COVID-19, superimposed back to pneumonia, viral illness, ACS, electrolyte treatment, pleural effusions    MDM:   As are consistent with COVID-19 infection however she also has some new pulmonary edema that is concerning for new onset CHF with this consistent with her new lower extremity edema. She will be given 1 mg Bumex she also received extra here as well. The hospitalist was contacted for admission.         ED RESULTS   Laboratory results:  Labs Reviewed   CBC WITH AUTO DIFFERENTIAL - Abnormal; Notable for the following components:       Result Value    MCHC 31.3 (*)     RDW-CV 15.2 (*)     RDW-SD 54.3 (*)     MPV 12.7 (*)     Lymphocytes Absolute 0.4 (*)     Immature Grans (Abs) 0.08 (*)     All other components within normal limits   BASIC METABOLIC PANEL W/ REFLEX TO MG FOR LOW K - Abnormal; Notable for the following components:    Sodium 134 (*)     Chloride 94 (*)     Glucose 114 (*)     BUN 29 (*)     All other components within normal limits   PROCALCITONIN - Abnormal; Notable for the following components:    Procalcitonin 0.14 (*)     All other components within normal limits   FERRITIN - Abnormal; Notable for the following components:    Ferritin 406 (*)     All other components within normal limits   LACTATE DEHYDROGENASE - Abnormal; Notable for the following components:     (*)     All other components within normal limits   HEPATIC FUNCTION PANEL - Abnormal; Notable for the following components:    AST 81 (*)     All other components within normal limits   BRAIN NATRIURETIC PEPTIDE - Abnormal; Notable for the following components:    Pro-BNP 95198.0 (*)     All other components within normal limits   GLOMERULAR FILTRATION RATE, ESTIMATED - Abnormal; Notable for the following components:    Est, Glom Filt Rate 59 (*)     All other components within normal limits   OSMOLALITY - Abnormal; Notable for the following components:    Osmolality Calc 274.9 (*)     All other components within normal limits   CULTURE, BLOOD 2   CULTURE, BLOOD 1   TROPONIN   LACTATE, SEPSIS   ANION GAP   URINE RT REFLEX TO CULTURE       Radiologic studies results:  XR CHEST PORTABLE   Final Result      1. Diffuse groundglass opacities in both lungs along with cardiomegaly. Findings likely suggest acute exacerbation of congestive heart failure versus pneumonia in the right clinical setting. Clinical correlation is recommended      2. Small left pleural effusion. 3. Other findings as described above. **This report has been created using voice recognition software. It may contain minor errors which are inherent in voice recognition technology. **      Final report electronically signed by Dr Chivo Harp on 10/11/2021 11:56 AM          ED Medications administered this visit:   Medications   Dexamethasone Sodium Phosphate injection 10 mg (has no administration in time range)   ondansetron (ZOFRAN) injection 4 mg (has no administration in time range)   bumetanide (BUMEX) injection 1 mg (has no administration in time range)         ED COURSE     ED Course as of Oct 11 1247   Mon Oct 11, 2021   1238 Osmolality Calc(!): 274.9 [AL]   1238 Anion Gap: 14.0 [AL]   1238 Est, Glom Filt Rate(!): 59 [AL]   1238 LD(!): 312 [AL]   1238 Elevated AST, the remainder is within normal limits.    Hepatic Function Panel(!):    Albumin 4.0   Bilirubin 0.8   Bilirubin, Direct 0.3   Alk Phos 65   AST 81(!)   ALT 49 Total Protein 6.6 [AL]   1238 Sodium(!): 134 [AL]   1238 Chloride(!): 94 [AL]   1238 Glucose(!): 114 [AL]   1238 BUN(!): 29 [AL]   1238 Pro-BNP(!): 73965.0 [AL]   1238 Ferritin(!): 406 [AL]   1238 Troponin T: < 0.010 [AL]   1238 Lactic Acid, Sepsis: 1.2 [AL]   1238 WBC: 6.7 [AL]   1238 Hemoglobin Quant: 13.5 [AL]   1238 Hematocrit: 43.1 [AL]   1238 Platelet Count: 979 [AL]   9138 \"IMPRESSION:     1. Diffuse groundglass opacities in both lungs along with cardiomegaly. Findings likely suggest acute exacerbation of congestive heart failure versus pneumonia in the right clinical setting. Clinical correlation is recommended     2. Small left pleural effusion.     3. Other findings as described above\"   XR CHEST PORTABLE [AL]      ED Course User Index  [AL] Peg Tan MD       MEDICATION CHANGES     New Prescriptions    No medications on file         FINAL DISPOSITION     Final diagnoses:   COVID-19   Acute congestive heart failure, unspecified heart failure type ([facility] [address]   Acute respiratory failure with hypoxia (HCC)     Condition: condition: fair  Dispo: Admit to telemetry      This transcription was electronically signed. Parts of this transcriptions may have been dictated by use of voice recognition software and electronically transcribed, and parts may have been transcribed with the assistance of an ED scribe. The transcription may contain errors not detected in proofreading. Please refer to my supervising physician's documentation if my documentation differs.     Electronically Signed: Peg Tan MD, 10/11/21, 12:47 PM         Peg Tan MD  Resident  10/11/21 8632

## 2021-10-11 NOTE — ACP (ADVANCE CARE PLANNING)
Advance Care Planning     Advance Care Planning Activator (Inpatient)  Conversation Note      Date of ACP Conversation: 10/11/2021     Conversation Conducted with: Patient with Decision Making Capacity    ACP Activator: Sisi Forde RN, BSN, [facility]    Health Care Decision Maker:     Current Designated Health Care Decision Maker: Rebecca Chapin, daughter, [phone]    Today we discussed the importance of Advance Directives. Patient wants Manda Zapien to be her HCPOA, but reluctant to complete a document, afraid of a fee. Care Preferences    Ventilation: \"If you were in your present state of health and suddenly became very ill and were unable to breathe on your own, what would your preference be about the use of a ventilator (breathing machine) if it were available to you? \"      Would the patient desire the use of ventilator (breathing machine)?: Unsure, she wishes to have her daughter present. Unable to reach her. \"If your health worsens and it becomes clear that your chance of recovery is unlikely, what would your preference be about the use of a ventilator (breathing machine) if it were available to you? \"     Would the patient desire the use of ventilator (breathing machine)?: Not answered      Resuscitation  \"CPR works best to restart the heart when there is a sudden event, like a heart attack, in someone who is otherwise healthy. Unfortunately, CPR does not typically restart the heart for people who have serious health conditions or who are very sick. \"    \"In the event your heart stopped as a result of an underlying serious health condition, would you want attempts to be made to restart your heart (answer \"yes\" for attempt to resuscitate) or would you prefer a natural death (answer \"no\" for do not attempt to resuscitate)? \" Not answered. Wishes to have her daughter Manda Zapien present.        [x] Yes   [] No   Educated Patient / Charley Woo regarding differences between Advance Directives and portable DNR orders. Length of ACP Conversation in minutes:  13  Conversation Outcomes:  [x] ACP discussion completed  [x] Existing advance directive reviewed with patient; no changes to patient's previously recorded wishes  [] New Advance Directive completed  [] Portable Do Not Rescitate prepared for Provider review and signature  [] POLST/POST/MOLST/MOST prepared for Provider review and signature      Follow-up plan:    [] Schedule follow-up conversation to continue planning  [] Referred individual to Provider for additional questions/concerns   [] Advised patient/agent/surrogate to review completed ACP document and update if needed with changes in condition, patient preferences or care setting    [] This note routed to one or more involved healthcare providers     Patient alert and oriented. She indicates she believes she has a Living Will but not a HCPOA. She only wants Audrey to be her agent, does have another child listed as emergency contact. I indicated that completing a document would help facilitate this. She is unwilling to do so without Audrey present. I attempted to call Rubio Andrade but had to leave a message. I left my work cell number. Patient in observation and concerned about cost of that. I expressed that we will not be charging her a fee to complete a document, as she asked that several times.

## 2021-10-11 NOTE — ED NOTES
Daughter states Mom has not started on any meds Dr Demetrio Woods prescribed her     Lyle Patel, RN  10/11/21 [id number]

## 2021-10-11 NOTE — H&P
Hospitalist Progress Note       Patient:  Aftab Dupree  YOB: 1934    MRN: [id number]     Acct: [de-identified]    PCP: SERGE Vergara CNP    Date of Admission: 10/11/2021    Date of Service: Pt seen/examined on 10/11/21  and Admitted to Inpatient with expected LOS greater than two midnights due to medical therapy. Chief Complaint: Shortness of breath    Assessment and Plan:    1.)  Acute hypoxic respiratory failure 2/2 new onset CHF: Patient with SPO2 83% on room air on admission. Requiring nasal cannula in order to maintain good oxygen saturation. Her current symptoms of fatigue, orthopnea, shortness of breath, and lower limb swelling as well as her wildly elevated BNP heavily suggestive CHF and not COVID-19 causing her current symptoms. Patient does not recall how long the symptoms have been occurring for however she does know that they have gotten spontaneously worse within the past 7 days.  -Patient received 1 mg Bumex in ED  -Continue with 1 mg Bumex twice daily IV  -Monitor strict I's and O's  -Complete echo ordered, patient does not have any prior echoes to compare  -Holding patient's homeopathic medications due to unknown side effect profile  -Continue to monitor O2 status and aggressively titrate down as able    2.)  COVID-19 infection: Patient states she started having symptoms on 10/4/2021 however was not diagnosed until after 10/7. Other than her current symptoms of shortness of breath, fatigue, and lower limb swelling she denies other symptoms of Covid. She denies loss of taste, smell, headaches, or insomnia. Her CXR does demonstrate groundglass opacities bilaterally. -Given Solu-Medrol in ED  -COVID-19 infection likely playing a minor role in patient's current symptomatology  -Continue to monitor with aggressive diuresis of CHF    3.)  Prolonged QTc interval: Unknown etiology, seen on telemetry strip in the ED.   On chart review, patient has a severely prolonged QTC from her EKG at her cardiologist office.  -Keep patient on telemetry due to prolonged QTc  -Rerunning EKG  -Avoid QTC prolonging medications such as Zofran  -Keep K >4 and Mg >2    4.)  Mild hyponatremia 2/2 acute CHF: Sodium 134, chloride 94, increased BUN to creatinine ratio. Continue to monitor with daily BMP.    5.)  Elevated AST 2/2 COVID-19 infection: AST elevated at 91. No known previous history of elevation. Most likely from COVID-19 infection however consider patient takes several homeopathic remedies for chronic conditions with an unknown side effect profile. 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 -Recheck LFTs prior to discharge    6.)  Hx of hypertension: Patient reports taking all of leaf extract outpatient for her hypertension. Reportedly she has not taken metoprolol or HCTZ yet though she was prescribed these medications by Dr. Uriel Hall.  -Start patient on metoprolol 25 mg twice daily with hold parameters  -Switching out HCTZ for Bumex IV at this point  -We will transition to oral HCTZ prior to discharge    7.)  Hx of arthritis: Patient takes glucosamine chondroitin at home. Holding inpatient due to unknown side effect profile. DVT Prophylaxis: Lovenox, dosed at 40 mg once a day. History Of Present Illness:       Ms. Grace Nixon is an 59-year-old female with a past medical history of anxiety, hypertension, arthritis, and a potentially new diagnosis of CHF. She states that she was vaccinated for Covid. She tested positive for Covid on 10/4/2021. Her shortness of breath has gotten progressively worse along with her fatigue. She states that in the past week she has noticed increased shortness of breath, increased swelling in her lower limbs, increased fatigue, and increasing orthopnea.   She states that especially for the past 4 days she has had to sit up in bed trying to catch her breath after she lays backward. She states that when she is standing upright or when the bed is an incline she does not have this issue. When asked, she states that she does not recall these issues months ago. In fact she states that she has been relatively active and still performs most of her ADLs as well as housework. She does state that she does not often go to a doctor but does see a chiropractor who does prescribe her homeopathic remedies. She states that her chiropractor was the first 1 to notice the limb swelling and prescribed her Dannie. She did recently see Dr. Garrett Armas on 10/7 who was concern for new onset CHF. He had ordered an echo and a stress test however neither of these have been performed prior to this admission. He did also prescribe her hydrochlorothiazide which she has not taken. She states no other symptoms such as chest pain, nausea, vomiting, abdominal pain, or palpitations. She does state that she has had frequent palpitations in the past however she takes all of leaf extract which she states helps. In the ED, /82, , temperature 99.0 °F, RR 33, SPO2 83% on room air. Sodium 134, chloride 94, creatinine 0.9, lactic acid 1.2, procalcitonin 0.14, , BNP 15,778.0, troponin <0.010, LFTs mostly normal aside from AST 81, WBC 6.7, Hgb 13.5, . Ferritin 406. Chest x-ray shows bilateral groundglass opacities with cardiomegaly. An outside EKG performed 4 days ago shows a prolonged QT as well as a bifascicular block. Past Medical History:          Diagnosis Date    Anxiety     Arthritis     Bronchitis     Diverticulitis of colon     Hypertension        Past Surgical History:          Procedure Laterality Date    APPENDECTOMY      CHOLECYSTECTOMY         Medications Prior to Admission:      Prior to Admission medications    Medication Sig Start Date End Date Taking?  Authorizing Provider   hydroCHLOROthiazide (HYDRODIURIL) 12.5 MG tablet Take 12.5 mg by mouth daily  9/10/21  Yes Historical Provider, MD   NONFORMULARY Focus Factor   Yes Historical Provider, MD   GLUCOSAMINE-CHONDROITIN PO Take by mouth   Yes Historical Provider, MD   cetirizine (ZYRTEC) 10 MG tablet TAKE 1 TABLET BY MOUTH ONCE DAILY 9/16/21  Yes Historical Provider, MD   HAWTHORN PO Take 425 mg by mouth daily   Yes Historical Provider, MD   OLIVE LEAF EXTRACT PO Take by mouth   Yes Historical Provider, MD   Fexofenadine-Pseudoephedrine (ALLEGRA-D 12 HOUR PO) Take by mouth   Yes Historical Provider, MD   NONFORMULARY Drenamin    Historical Provider, MD   NONFORMULARY Allerplex    Historical Provider, MD   aspirin 325 MG tablet Take 325 mg by mouth daily    Historical Provider, MD   metoprolol tartrate (LOPRESSOR) 50 MG tablet Take 1 tablet by mouth 2 times daily 10/7/21   Bonnie Garcia MD       Allergies:  Sulfa antibiotics    Social History:      The patient currently lives at home alone. 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  She denies any needle sticks that occurred. TOBACCO:   reports that she quit smoking about 43 years ago. Her smoking use included cigarettes. She has a 15.00 pack-year smoking history. She has never used smokeless tobacco.  ETOH:   reports no history of alcohol use. Family History:      Reviewed in detail and negative for DM, CAD, Cancer, CVA. Positive as follows:    No family history on file. Diet:  No diet orders on file    REVIEW OF SYSTEMS:   Pertinent positives as noted in the HPI. All other systems reviewed and negative. PHYSICAL EXAM:    /83   Pulse 124   Temp 99 °F (37.2 °C) (Oral)   Resp (!) 31   Ht 5' (1.524 m)   Wt 120 lb (54.4 kg)   SpO2 94%   BMI 23.44 kg/m²     General appearance:  No apparent distress but does appear to be tired, appears stated age and cooperative. HEENT:  Normal cephalic, atraumatic without obvious deformity.  Pupils equal, round, and reactive to light. Extra ocular muscles intact. Conjunctivae/corneas clear. Neck: Supple, with full range of motion. No jugular venous distention. Trachea midline. Respiratory: Patient currently on nasal cannula oxygen, no accessory muscle use noted. Lungs do have crackles and wheezes especially in the lower lobes. Patient is no longer tachypneic. Cardiovascular: Tachycardic with regular rhythm with normal S1/S2 without murmurs, rubs or gallops. Abdomen: Soft, non-tender, non-distended with normal bowel sounds, mild tenderness to palpation in the right lower quadrant (patient states his normal for her diverticulosis). Musculoskeletal:  No clubbing or cyanosis. Patient does have +2 pitting edema in bilateral lower limbs, limbs also are cool. Full range of motion without deformity. Skin: Skin color, texture, turgor normal.  No rashes or lesions. Neurologic:  Neurovascularly intact without any focal sensory/motor deficits. Cranial nerves: II-XII intact, grossly non-focal.  Psychiatric:  Alert and oriented, thought content appropriate, normal insight  Capillary Refill: Brisk,< 3 seconds   Peripheral Pulses: +2 palpable, equal bilaterally     Labs:     Recent Labs     10/11/21  1129   WBC 6.7   HGB 13.5   HCT 43.1        Recent Labs     10/11/21  1129   *   K 5.0   CL 94*   CO2 26   BUN 29*   CREATININE 0.9   CALCIUM 9.3     Recent Labs     10/11/21  1129   AST 81*   ALT 49   BILIDIR 0.3   BILITOT 0.8   ALKPHOS 65     No results for input(s): INR in the last 72 hours. No results for input(s): Paul Shackle in the last 72 hours. Urinalysis:      Lab Results   Component Value Date    NITRU NEGATIVE 03/15/2015    WBCUA 0-2 03/15/2015    BACTERIA NONE 03/15/2015    RBCUA 0-2 03/15/2015    BLOODU TRACE 03/15/2015    SPECGRAV 1.012 12/10/2012    GLUCOSEU NEGATIVE 03/15/2015       Intake & Output:  No intake/output data recorded. No intake/output data recorded.       Radiology: CXR: I have reviewed the CXR with the following interpretation: Cardiomegaly, groundglass appearances and diffuse bilateral lungs  EKG:  I have reviewed the EKG with the following interpretation: Prolonged QTC with bifascicular block    Code Status: Prior    PT/OT Eval Status: Not consulted    Disposition: To be determined on clinical course. There are no active hospital problems to display for this patient. Thank you SERGE Azar CNP for the opportunity to be involved in this patient's care.     Electronically signed by Kaylan Dominguez DO on 10/11/2021 at 1:22 PM

## 2021-10-12 LAB
ALBUMIN SERPL-MCNC: 3.7 G/DL (ref 3.5–5.1)
ALP BLD-CCNC: 63 U/L (ref 38–126)
ALT SERPL-CCNC: 46 U/L (ref 11–66)
ANION GAP SERPL CALCULATED.3IONS-SCNC: 19 MEQ/L (ref 8–16)
AST SERPL-CCNC: 70 U/L (ref 5–40)
BASOPHILS # BLD: 0 %
BASOPHILS ABSOLUTE: 0 THOU/MM3 (ref 0–0.1)
BILIRUB SERPL-MCNC: 0.5 MG/DL (ref 0.3–1.2)
BILIRUBIN URINE: NEGATIVE
BLOOD, URINE: NEGATIVE
BUN BLDV-MCNC: 36 MG/DL (ref 7–22)
CALCIUM SERPL-MCNC: 9.5 MG/DL (ref 8.5–10.5)
CHARACTER, URINE: CLEAR
CHLORIDE BLD-SCNC: 94 MEQ/L (ref 98–111)
CO2: 21 MEQ/L (ref 23–33)
COLOR: YELLOW
CREAT SERPL-MCNC: 1.3 MG/DL (ref 0.4–1.2)
DIFFERENTIAL, MANUAL: NORMAL
EOSINOPHIL # BLD: 2 %
EOSINOPHILS ABSOLUTE: 0.1 THOU/MM3 (ref 0–0.4)
ERYTHROCYTE [DISTWIDTH] IN BLOOD BY AUTOMATED COUNT: 15.6 % (ref 11.5–14.5)
ERYTHROCYTE [DISTWIDTH] IN BLOOD BY AUTOMATED COUNT: 57.1 FL (ref 35–45)
GFR SERPL CREATININE-BSD FRML MDRD: 39 ML/MIN/1.73M2
GLUCOSE BLD-MCNC: 118 MG/DL (ref 70–108)
GLUCOSE URINE: NEGATIVE MG/DL
HCT VFR BLD CALC: 43.1 % (ref 37–47)
HEMOGLOBIN: 13.2 GM/DL (ref 12–16)
KETONES, URINE: NEGATIVE
LEUKOCYTE ESTERASE, URINE: NEGATIVE
LV EF: 28 %
LVEF MODALITY: NORMAL
LYMPHOCYTES # BLD: 13 %
LYMPHOCYTES ABSOLUTE: 0.7 THOU/MM3 (ref 1–4.8)
MCH RBC QN AUTO: 30.6 PG (ref 26–33)
MCHC RBC AUTO-ENTMCNC: 30.6 GM/DL (ref 32.2–35.5)
MCV RBC AUTO: 100 FL (ref 81–99)
METAMYELOCYTES: 1 %
MONOCYTES # BLD: 9 %
MONOCYTES ABSOLUTE: 0.5 THOU/MM3 (ref 0.4–1.3)
NITRITE, URINE: NEGATIVE
NUCLEATED RED BLOOD CELLS: 0 /100 WBC
PH UA: 5 (ref 5–9)
PLATELET # BLD: 185 THOU/MM3 (ref 130–400)
PMV BLD AUTO: 12 FL (ref 9.4–12.4)
POTASSIUM REFLEX MAGNESIUM: 5.4 MEQ/L (ref 3.5–5.2)
PROTEIN UA: NEGATIVE
RBC # BLD: 4.31 MILL/MM3 (ref 4.2–5.4)
SCAN OF BLOOD SMEAR: NORMAL
SEG NEUTROPHILS: 75 %
SEGMENTED NEUTROPHILS ABSOLUTE COUNT: 4 THOU/MM3 (ref 1.8–7.7)
SODIUM BLD-SCNC: 134 MEQ/L (ref 135–145)
SPECIFIC GRAVITY, URINE: 1.01 (ref 1–1.03)
TOTAL PROTEIN: 6.3 G/DL (ref 6.1–8)
UROBILINOGEN, URINE: 0.2 EU/DL (ref 0–1)
WBC # BLD: 5.3 THOU/MM3 (ref 4.8–10.8)

## 2021-10-12 PROCEDURE — 85025 COMPLETE CBC W/AUTO DIFF WBC: CPT

## 2021-10-12 PROCEDURE — 2580000003 HC RX 258: Performed by: STUDENT IN AN ORGANIZED HEALTH CARE EDUCATION/TRAINING PROGRAM

## 2021-10-12 PROCEDURE — 80053 COMPREHEN METABOLIC PANEL: CPT

## 2021-10-12 PROCEDURE — 94669 MECHANICAL CHEST WALL OSCILL: CPT

## 2021-10-12 PROCEDURE — 97530 THERAPEUTIC ACTIVITIES: CPT

## 2021-10-12 PROCEDURE — 6360000002 HC RX W HCPCS: Performed by: PHYSICIAN ASSISTANT

## 2021-10-12 PROCEDURE — 6370000000 HC RX 637 (ALT 250 FOR IP): Performed by: STUDENT IN AN ORGANIZED HEALTH CARE EDUCATION/TRAINING PROGRAM

## 2021-10-12 PROCEDURE — 93306 TTE W/DOPPLER COMPLETE: CPT

## 2021-10-12 PROCEDURE — 1200000003 HC TELEMETRY R&B

## 2021-10-12 PROCEDURE — 97166 OT EVAL MOD COMPLEX 45 MIN: CPT

## 2021-10-12 PROCEDURE — 6370000000 HC RX 637 (ALT 250 FOR IP): Performed by: HOSPITALIST

## 2021-10-12 PROCEDURE — 36415 COLL VENOUS BLD VENIPUNCTURE: CPT

## 2021-10-12 PROCEDURE — 2500000003 HC RX 250 WO HCPCS: Performed by: HOSPITALIST

## 2021-10-12 PROCEDURE — 6360000002 HC RX W HCPCS: Performed by: STUDENT IN AN ORGANIZED HEALTH CARE EDUCATION/TRAINING PROGRAM

## 2021-10-12 PROCEDURE — 99233 SBSQ HOSP IP/OBS HIGH 50: CPT | Performed by: HOSPITALIST

## 2021-10-12 PROCEDURE — 97162 PT EVAL MOD COMPLEX 30 MIN: CPT

## 2021-10-12 PROCEDURE — 2500000003 HC RX 250 WO HCPCS: Performed by: PHYSICIAN ASSISTANT

## 2021-10-12 RX ORDER — DEXAMETHASONE 4 MG/1
6 TABLET ORAL DAILY
Status: DISCONTINUED | OUTPATIENT
Start: 2021-10-12 | End: 2021-10-12

## 2021-10-12 RX ORDER — BUMETANIDE 1 MG/1
1 TABLET ORAL DAILY
Status: DISCONTINUED | OUTPATIENT
Start: 2021-10-12 | End: 2021-10-15

## 2021-10-12 RX ADMIN — Medication 50 MG: at 08:42

## 2021-10-12 RX ADMIN — ENOXAPARIN SODIUM 30 MG: 30 INJECTION SUBCUTANEOUS at 20:27

## 2021-10-12 RX ADMIN — Medication 2000 UNITS: at 08:41

## 2021-10-12 RX ADMIN — AMIODARONE HYDROCHLORIDE 1 MG/MIN: 1.8 INJECTION, SOLUTION INTRAVENOUS at 00:30

## 2021-10-12 RX ADMIN — OXYCODONE HYDROCHLORIDE AND ACETAMINOPHEN 500 MG: 500 TABLET ORAL at 08:40

## 2021-10-12 RX ADMIN — ACETAMINOPHEN 650 MG: 325 TABLET ORAL at 20:28

## 2021-10-12 RX ADMIN — ENOXAPARIN SODIUM 30 MG: 30 INJECTION SUBCUTANEOUS at 08:31

## 2021-10-12 RX ADMIN — AMIODARONE HYDROCHLORIDE 0.5 MG/MIN: 1.8 INJECTION, SOLUTION INTRAVENOUS at 06:32

## 2021-10-12 RX ADMIN — METOPROLOL TARTRATE 25 MG: 25 TABLET, FILM COATED ORAL at 20:27

## 2021-10-12 RX ADMIN — DEXAMETHASONE 6 MG: 4 TABLET ORAL at 09:05

## 2021-10-12 RX ADMIN — SODIUM CHLORIDE, PRESERVATIVE FREE 10 ML: 5 INJECTION INTRAVENOUS at 08:42

## 2021-10-12 RX ADMIN — SODIUM CHLORIDE, PRESERVATIVE FREE 10 ML: 5 INJECTION INTRAVENOUS at 20:27

## 2021-10-12 ASSESSMENT — PAIN SCALES - GENERAL
PAINLEVEL_OUTOF10: 0
PAINLEVEL_OUTOF10: 0
PAINLEVEL_OUTOF10: 1
PAINLEVEL_OUTOF10: 0

## 2021-10-12 ASSESSMENT — PAIN DESCRIPTION - PAIN TYPE: TYPE: ACUTE PAIN

## 2021-10-12 ASSESSMENT — PAIN DESCRIPTION - LOCATION: LOCATION: GENERALIZED

## 2021-10-12 ASSESSMENT — PAIN DESCRIPTION - FREQUENCY: FREQUENCY: INTERMITTENT

## 2021-10-12 ASSESSMENT — PAIN DESCRIPTION - PROGRESSION
CLINICAL_PROGRESSION: NOT CHANGED
CLINICAL_PROGRESSION: NOT CHANGED

## 2021-10-12 ASSESSMENT — PAIN - FUNCTIONAL ASSESSMENT: PAIN_FUNCTIONAL_ASSESSMENT: ACTIVITIES ARE NOT PREVENTED

## 2021-10-12 ASSESSMENT — PAIN DESCRIPTION - ONSET: ONSET: ON-GOING

## 2021-10-12 ASSESSMENT — PAIN DESCRIPTION - DESCRIPTORS: DESCRIPTORS: ACHING

## 2021-10-12 NOTE — PROGRESS NOTES
[address] [id number]  INPATIENT PHYSICAL THERAPY  EVALUATION  STR MED SURG 8B - 8B-34/034-A    Time In: 8187  Time Out: 1300  Timed Code Treatment Minutes: 23 Minutes  Minutes: 32          Date: 10/12/2021  Patient Name: Malaika Rodriguez,  Gender:  female        MRN: [id number]  : 1934  (80 y.o.)      Referring Practitioner: Leta Montoya DO  Diagnosis: Acute respiratory failure with hypoxia  Additional Pertinent Hx: Per chart review: Ms. Jad Kemp is an 80-year-old female with a past medical history of anxiety, hypertension, arthritis, and a potentially new diagnosis of CHF. She states that she was vaccinated for Covid. She tested positive for Covid on 10/4/2021. Her shortness of breath has gotten progressively worse along with her fatigue. She states that in the past week she has noticed increased shortness of breath, increased swelling in her lower limbs, increased fatigue, and increasing orthopnea. Chest x-ray shows bilateral groundglass opacities with cardiomegaly. RR on 10/12 due to elevated HR with amnio started. Restrictions/Precautions:  Restrictions/Precautions: Fall Risk, Isolation, Contact Precautions  Position Activity Restriction  Other position/activity restrictions: droplet +    Subjective:  Chart Reviewed: Yes  Patient assessed for rehabilitation services?: Yes  Subjective: RN approved session. Pt pleasant and agreeable throughout. Pt requiring increased verbal cues for safe technique throughout, with increased verbal and manual feedback durign session for improved carryover.     General:  Overall Orientation Status: Within Functional Limits  Follows Commands: Within Functional Limits         Hearing: Within functional limits         Pain: denies    Vitals: Oxygen: 91% baseline, drop to 86% following coughing episode, quickly improved to 93%  Heart Rate: 80 bpm baseline,    *Pt on 1 L NC    Social/Functional History:    Lives With: Daughter  Type of Home: House  Home Layout: One level  Home Equipment: Cane     Bathroom Shower/Tub: Tub/Shower unit  Bathroom Toilet: Standard  Bathroom Equipment: Shower chair    Receives Help From: Family  ADL Assistance: Independent  Homemaking Assistance: Independent  Ambulation Assistance: Independent  Transfer Assistance: Independent          Additional Comments: Pt reported would use cane for mobility. Pt reported being very active prior, exercises daily. OBJECTIVE:  Range of Motion:  Bilateral Lower Extremity: WFL    Strength:  Bilateral Lower Extremity: WFL    Balance:  Static Sitting Balance:  Supervision  Static Standing Balance: Contact Guard Assistance  Dynamic Standing Balance: Contact Guard Assistance, Minimal Assistance   *Min A to recover one loss in stability with Tinetti    Bed Mobility:  Not Tested    Transfers:  Sit to Stand: [employer], cues for hand placement  Stand to Sit:Contact Guard Assistance, cues for hand placement   *Seated rest break between trials to recover any shortness in breath or decrease in O2    Ambulation:  Contact Guard Assistance, Minimal Assistance  Distance: 10 fwd, 10 retro  Surface: Level Tile  Device:Rolling Walker  Gait Deviations: Forward Flexed Posture, Decreased Step Length Bilaterally, Decreased Heel Strike Bilaterally and Mild Path Deviations    *Pt observed to have increased difficulty with change in direction, did not understand cues to turn to return to chair, so retro steps to return. Verbal cues to maintain RW close with ambulation. Minor report of nausea following ambulation that resolved with seated rest break.      Functional Outcome Measures: Completed  Balance Score: 8  Gait Score: 7  Tinetti Total Score: 15/28 with high fall risk  AM-PAC Inpatient Mobility Raw Score : 17  AM-PAC Inpatient T-Scale Score : 42.13    Risk Indicators:  Less than/equal to 18 = high risk  19-23 Moderate risk  Greater than/equal to 24 = low risk      ASSESSMENT:  Activity Tolerance:  Patient tolerance of  treatment: fair. One minor drop in O2 quickly recovered with rest break. Pt requiring mod-max cues for orientation to task at times. Treatment Initiated: Treatment and education initiated within context of evaluation. Evaluation time included review of current medical information, gathering information related to past medical, social and functional history, completion of standardized testing, formal and informal observation of tasks, assessment of data and development of plan of care and goals. Treatment time included skilled education and facilitation of tasks to increase safety and independence with functional mobility for improved independence and quality of life. Assessment: Body structures, Functions, Activity limitations: Decreased functional mobility , Decreased safe awareness, Decreased balance, Decreased coordination, Decreased endurance, Decreased strength, Decreased posture  Assessment: This patient is a 80 y.o. who presents with deconditioning and COVID-19. This is a decline from the patient's baseline status of ambulating with cane and living with her daughter. The patient is observed to have deficits in safety awareness, activity tolerance, strength, and balance and would benefit from skilled PT services to progress functional mobility, safety awareness, and to decrease overall risk of falls.      Prognosis: Good    REQUIRES PT FOLLOW UP: Yes    Discharge Recommendations:  Discharge Recommendations: Continue to assess pending progress, 24 hour supervision or assist, Patient would benefit from continued therapy after discharge    Patient Education:  PT Education: Goals, Transfer Training, Functional Mobility Training, PT Role, Plan of Care, Gait Training, General Safety   *Deep breathing     Equipment Recommendations:  Equipment Needed: Yes (continue to assess RW needs)    Plan:  Times per week: 5 x C  Current Treatment Recommendations: Strengthening, Transfer Training, Endurance Training, Neuromuscular Re-education, Patient/Caregiver Education & Training, Equipment Evaluation, Education, & procurement, Home Exercise Program, Gait Training, Balance Training, Functional Mobility Training, Stair training, Safety Education & Training    Goals:  Patient goals : To get home as soon as possible  Short term goals  Time Frame for Short term goals: By hospital d/c  Short term goal 1: Pt to demonstrate supine <-> sit transfer mod I for ease with getting out of bed. Short term goal 2: Pt to complete sit <->stand transfer mod I with LRAD for ability to transfer from surface to surface  Short term goal 3: Pt to ambulate 30' with LRAD and SBA to progress towards within home ambulation  Short term goal 4: Pt to increase Tinetti to a min of >=19/28  Long term goals  Time Frame for Long term goals : NA due to short ELOS    Following session, patient left in safe position with all fall risk precautions in place.

## 2021-10-12 NOTE — CARE COORDINATION
10/12/21, 7:42 AM EDT  DISCHARGE PLANNING EVALUATION:    Camille Erickson       Admitted: 10/11/2021/ [facility] day: 1   Location: [address] [place] [address] Reason for admit: Acute respiratory failure with hypoxia ([facility] [address] [J96.01]  Acute congestive heart failure, unspecified heart failure type ([facility] [address] [I50.9]  COVID-19 [U07.1]   PMH:  has a past medical history of Anxiety, Arthritis, Bronchitis, Diverticulitis of colon, and Hypertension. Procedure:   10/11 CXR:   Right lower lobe airspace infiltrates. Severe cardiomegaly.         Barriers to Discharge:  Presented to ED with sob, + covid 1 week ago. Currently on 1L NC. Amiodarone gtt for afib rvr. IV bumex. Vit C, D, zinc. Incentive spirometer. Acapella. PT/OT. Echo ordered. PCP: Geeta Ross, APRN - CNP  Readmission Risk Score: 11%    Patient Goals/Plan/Treatment Preferences: Lukas Beaulieu lives at home with her daughter, uses a cane and has shower chair per [facility]. Await therapy evals. Did reach out to daughter to discuss needs, no answer, will attempt to speak with family during visiting hours or attempt to call again. Transportation/Food Security/Housekeeping Addressed:  No issues identified.

## 2021-10-12 NOTE — PROGRESS NOTES
Subha Hogue 60  INPATIENT OCCUPATIONAL THERAPY  [facility] MED SURG 8B  EVALUATION    Time:   Time In: 0840  Time Out: 5825  Timed Code Treatment Minutes: 12 Minutes  Minutes: 27          Date: 10/12/2021  Patient Name: Silvestre Theodore,   Gender: female      MRN: [id number]  : 1934  (80 y.o.)  Referring Practitioner: Jami Ruiz MD  Diagnosis: acute respiratory failure with hypoxia  Additional Pertinent Hx: Per H&P:Ms. Demetra Copeland is an 30-year-old female with a past medical history of anxiety, hypertension, arthritis, and a potentially new diagnosis of CHF. She states that she was vaccinated for Covid. She tested positive for Covid on 10/4/2021. Her shortness of breath has gotten progressively worse along with her fatigue. She states that in the past week she has noticed increased shortness of breath, increased swelling in her lower limbs, increased fatigue, and increasing orthopnea. Chest x-ray shows bilateral groundglass opacities with cardiomegaly. RR on 10/12 due to elevated HR with amnio started. Restrictions/Precautions:  Restrictions/Precautions: General Precautions, Fall Risk, Isolation  Position Activity Restriction  Other position/activity restrictions: droplet +    Subjective  Chart Reviewed: Yes, Orders, Progress Notes, History and Physical  Patient assessed for rehabilitation services?: Yes  Family / Caregiver Present: No    Subjective: Pt seated in bed upon arrival, agreeable to OT session stating \"I'll do what I can. \"  Comments: RN ok'd session, reporting pt remains on amnio drip although HR has been stable since initiation. Pt c/o nausea X1 during session although quickly subsided. Pain:  Pain Assessment  Patient Currently in Pain: Denies    Vitals: Oxygen: 87-88% on 1L On arrival; Increased to 2L prior to activity with Sp02 remaining at 92-93%;  Once seated in chair, titrated back down 1L and remained at 91-92%  Heart Rate: 80 bpm on arrival, remained stable in 80s throughout    Social/Functional History:  Lives With: Daughter  Type of Home: House  Home Layout: One level  Home Equipment: Cane   Bathroom Shower/Tub: Tub/Shower unit  Bathroom Toilet: Standard  Bathroom Equipment: Shower chair       ADL Assistance: Independent  Homemaking Assistance: Independent  Ambulation Assistance: Independent  Transfer Assistance: Independent          Additional Comments: Pt reported would use cane for mobility. Pt reported being very active prior, exercises daily. VISION:WFL    HEARING:  WFL    COGNITION: WFL    RANGE OF MOTION:  Bilateral Upper Extremity:  WFL    STRENGTH:  Bilateral Upper Extremity:  Impaired - grossly deconditioned    ADL:   No ADL's completed this session. Nora Leung BALANCE:  Sitting Balance:  Stand By Assistance. Standing Balance: Contact Guard Assistance. BED MOBILITY:  Supine to Sit: Minimal Assistance    Scooting: Stand By Assistance ; advancing hips forward to EOB and scooting back into chair    TRANSFERS:  Sit to Stand:  [employer], with increased time for completion, cues for hand placement. Stand to Sit: [employer], with increased time for completion, cues for hand placement. FUNCTIONAL MOBILITY:  Assistive Device: hand held assist  Assist Level:  Contact Guard Assistance. Distance: EOB>bedside chair     Activity Tolerance:  Patient tolerance of  treatment: fair. Assessment:  Assessment: Pt presents requiring increased assistance for ADLs, transfers, and functional mobility compared to PLOF. Pt will continue to benefit from OT services to improve independence with these tasks, in addition to overall strength/endurance to facilitate return to PLOF.      Performance deficits / Impairments: Decreased functional mobility , Decreased ADL status, Decreased strength, Decreased endurance, Decreased high-level IADLs, Decreased balance  Prognosis: Fair  REQUIRES OT FOLLOW UP: Yes  Decision Making: Medium Complexity    Treatment Initiated: Treatment and education initiated within context of evaluation. Evaluation time included review of current medical information, gathering information related to past medical, social and functional history, completion of standardized testing, formal and informal observation of tasks, assessment of data and development of plan of care and goals. Treatment time included skilled education and facilitation of tasks to increase safety and independence with ADL's for improved functional independence and quality of life. Discharge Recommendations:  Home with assist PRN, Home with Home health OT, Continue to assess pending progress    Patient Education:  OT Education: OT Role, Plan of Care, Transfer Training (importance of increasing activity)    Equipment Recommendations: Other: will continue to assess pending progress    Plan:  Times per week: 5x  Current Treatment Recommendations: Strengthening, Balance Training, Functional Mobility Training, Endurance Training, Patient/Caregiver Education & Training, Self-Care / ADL, Home Management Training. See long-term goal time frame for expected duration of plan of care. If no long-term goals established, a short length of stay is anticipated. Goals:     Short term goals  Time Frame for Short term goals: by discharge  Short term goal 1: Pt will increase activity tolerance for functional mobility to/from bathroom and household distances with supervision in prep for ADL completion. Short term goal 2: Pt will complete BADL/light IADL tasks with supervision to increase independence with self care/homemaking tasks. Short term goal 3: Pt will tolerate dynamic standing >5 minutes with supervision in prep for sinkside grooming tasks. Following session, patient left in safe position with all fall risk precautions in place.

## 2021-10-12 NOTE — PROGRESS NOTES
Pt HR began to increase into 170's at 1930. Terri was notified and orders to check Rectal Temp and EKG were done. Rapid Response called at 2000. [employer]	[place] present at that time. 500ml bolus of . 9NS, & 5 mg of metoprolol ordered and given by 2040. Labs drawn, Chest Xray performed. Amio gtt ordered and started. Pt and Vitals stable.

## 2021-10-12 NOTE — PROGRESS NOTES
Called and updated patients daughter Christopher Chua. All questions and concerns answered and addressed.

## 2021-10-12 NOTE — PROGRESS NOTES
Hospitalist Progress Note      Patient:  Brianna Bal    Unit/Bed:8B-34/034-A  YOB: 1934  MRN: [id number]   Acct: [de-identified]     PCP: SERGE Coulter CNP  Date of Admission: 10/11/2021     Date of Service: Pt seen/examined on 10/12/21  and Admitted to Inpatient with expected LOS greater than two midnights due to medical therapy. Chief Complaint:  Shortness of Breath    Assessment and Plan:    1.)  Acute hypoxic respiratory failure 2/2 acute systolic/diastolic heart failure (EF 25 to 30% 10/2021), moderate mitral regurgitation: Patient with SPO2 83% on room air on admission. Requiring nasal cannula in order to maintain good oxygen saturation. Her current symptoms of fatigue, orthopnea, shortness of breath, and lower limb swelling as well as her wildly elevated BNP heavily suggestive CHF and not COVID-19 causing her current symptoms.   -Patient transition from IV Bumex 1 mg to p.o. Bumex 1 mg  -Monitor strict I's and O's  -Consult cardiology for discussion on benefits of left heart cath as well as LifeVest versus AICD placement in the future  -Holding patient's homeopathic medications due to unknown side effect profile  -Continue to monitor O2 status and aggressively titrate down as able  -Patient will need medical optimization including beta-blocker, spironolactone, and and ARNI at discharge    2.)  HERIBERTO 2/2 relative hypotension overnight: Patient had an episode of tachycardia/A. fib overnight on 10/12 which resulted in relative hypotension for the patient. Creatinine 1.3 from 0.9. Continue to monitor with BMP daily. 3.)  Mild metabolic anion gap acidosis 2/2 relative hypotension and HERIBERTO: CO2 21, anion gap 19 elevated from admission. Continue to monitor with daily BMP.     4.)  COVID-19 infection: Patient states she started having symptoms on 10/4/2021 however was not diagnosed until after 10/7.   Other than her current symptoms of shortness of breath, fatigue, and lower limb swelling she denies other symptoms of Covid. She denies loss of taste, smell, headaches, or insomnia. Her CXR does demonstrate groundglass opacities bilaterally. -Given Solu-Medrol in ED  -COVID-19 infection likely playing a minor role in patient's current symptomatology  -Continue to monitor with aggressive diuresis of CHF  -Holding Decadron     5.)  Prolonged QTc interval, improving: Unknown etiology, seen on telemetry strip in the ED. On chart review, patient has a severely prolonged QTC from her EKG at her cardiologist office.  -Keep patient on telemetry due to prolonged QTc  -Rerunning EKG  -EKG continues to show RBBB with bifascicular block highly concerning for ischemia affecting the bundle branch systems bilaterally  -Avoid QTC prolonging medications such as Zofran  -Keep K >4 and Mg >2     6.)  Mild hyponatremia 2/2 acute CHF: Sodium 134, chloride 94, increased BUN to creatinine ratio. Continue to monitor with daily BMP.    7.)  Mild hyperkalemia 2/2 HERIBERTO: Potassium 5.4 on recheck this morning. Patient is on Bumex which theoretically will continue to lower the potassium. Continue with Bumex. Recheck with BMP in a.m.    8.)  Macrocytosis without anemia, high RDW: Noted on redraw of CBC, RDW is exceptionally high. Continue to monitor with daily CBC. Likely due to vitamin deficiencies. Running iron studies as well as B12/folate.     9.)  Elevated AST 2/2 COVID-19 infection: AST elevated at 91. No known previous history of elevation. Most likely from COVID-19 infection however consider patient takes several homeopathic remedies for chronic conditions with an unknown side effect profile. 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 -Recheck LFTs prior to discharge     10. )  Hx of hypertension: Patient reports taking all of leaf extract outpatient for her hypertension.   Reportedly she has not taken metoprolol or HCTZ yet though she was prescribed these medications by Dr. Stephie Suresh.  -Start patient on metoprolol 25 mg twice daily with hold parameters  -Patient on Bumex 1 mg daily  -At discharge, consider adding spironolactone or switching to spironolactone due to mortality benefits in reduced ejection fraction heart failure     11. )  Hx of arthritis: Patient takes glucosamine chondroitin at home. Holding inpatient due to unknown side effect profile. Disposition Plan: Patient will need to be evaluated by cardiology prior to discharge. COVID-19 is likely noncontributory at this point to her symptoms however she does have systolic heart failure. History Of Present Illness:                  Ms. Tacho Guevara is an 51-year-old female with a past medical history of anxiety, hypertension, arthritis, and a potentially new diagnosis of CHF. She states that she was vaccinated for Covid. She tested positive for Covid on 10/4/2021. Her shortness of breath has gotten progressively worse along with her fatigue. She states that in the past week she has noticed increased shortness of breath, increased swelling in her lower limbs, increased fatigue, and increasing orthopnea. She states that especially for the past 4 days she has had to sit up in bed trying to catch her breath after she lays backward. She states that when she is standing upright or when the bed is an incline she does not have this issue. When asked, she states that she does not recall these issues months ago. In fact she states that she has been relatively active and still performs most of her ADLs as well as housework. She does state that she does not often go to a doctor but does see a chiropractor who does prescribe her homeopathic remedies. She states that her chiropractor was the first 1 to notice the limb swelling and prescribed her Whitt. She did recently see Dr. Stephie Suresh on 10/7 who was concern for new onset CHF.   He had ordered an echo and a stress test however neither of these have been performed prior to this admission. He did also prescribe her hydrochlorothiazide which she has not taken. She states no other symptoms such as chest pain, nausea, vomiting, abdominal pain, or palpitations. She does state that she has had frequent palpitations in the past however she takes all of leaf extract which she states helps. 10/12: Patient states no acute events overnight states she feels better than admission. She has been weaned down on oxygen to approximately 1 L. She denies chest pain, shortness of breath, fever, chills, diarrhea, nausea, vomiting, or pain in her lower limbs. Her lower limbs are still very swollen. Echo performed today showed an ejection fraction of 25 to 30%. Consulting cardiology. Past Medical History, Past Surgical History, Allergies, Medications, Social History, Family History reviewed in H&P and remain unchanged from admission. Diet:  ADULT DIET; Regular; Low Sodium (2 gm)    Review of Systems:   Pertinent positives as noted in the HPI. All other systems reviewed and negative. Physical exam:    /64   Pulse 88   Temp 98 °F (36.7 °C) (Oral)   Resp 20   Ht 5' (1.524 m)   Wt 124 lb (56.2 kg)   SpO2 95%   BMI 24.22 kg/m²     General appearance:  No apparent distress but does appear to be tired, appears stated age and cooperative. HEENT:  Normal cephalic, atraumatic without obvious deformity. Pupils equal, round, and reactive to light. Extra ocular muscles intact. Conjunctivae/corneas clear. Neck: Supple, with full range of motion. No jugular venous distention. Trachea midline. Respiratory: Patient currently on 1 L nasal cannula oxygen, no accessory muscle use noted. Crackles in lower lobes significantly improved from prior. Patient is no longer tachypneic. Cardiovascular: Tachycardic with regular rhythm with normal S1/S2 without murmurs, rubs or gallops.   Abdomen: Soft, non-tender, non-distended with normal bowel sounds, no tenderness on palpation  Musculoskeletal:  No clubbing or cyanosis. Patient does have +2 pitting edema in bilateral lower limbs, limbs also are cool. Full range of motion without deformity. Skin: Skin color, texture, turgor normal.  No rashes or lesions. Neurologic:  Neurovascularly intact without any focal sensory/motor deficits. Cranial nerves: II-XII intact, grossly non-focal.  Psychiatric:  Alert and oriented, thought content appropriate, normal insight  Capillary Refill: Brisk,< 3 seconds   Peripheral Pulses: +2 palpable, equal bilaterally     Labs:     Recent Labs     10/11/21  1129   WBC 6.7   HGB 13.5   HCT 43.1        Recent Labs     10/11/21  1129 10/12/21  0704   * 134*   K 5.0 5.4*   CL 94* 94*   CO2 26 21*   BUN 29* 36*   CREATININE 0.9 1.3*   CALCIUM 9.3 9.5     Recent Labs     10/11/21  1129 10/12/21  0704   AST 81* 70*   ALT 49 46   BILIDIR 0.3  --    BILITOT 0.8 0.5   ALKPHOS 65 63     No results for input(s): INR in the last 72 hours. No results for input(s): Adi Jakes in the last 72 hours. Urinalysis:      Lab Results   Component Value Date    NITRU NEGATIVE 10/11/2021    WBCUA 0-2 03/15/2015    BACTERIA NONE 03/15/2015    RBCUA 0-2 03/15/2015    BLOODU NEGATIVE 10/11/2021    SPECGRAV 1.012 12/10/2012    GLUCOSEU NEGATIVE 10/11/2021       Intake & Output:  I/O last 3 completed shifts: In: 200 [P.O.:200]  Out: 100 [Urine:100]  No intake/output data recorded.       Radiology:     CXR: I have reviewed the CXR with the following interpretation: Cardiomegaly, groundglass appearances and diffuse bilateral lungs  EKG:  I have reviewed the EKG with the following interpretation: Prolonged QTC with bifascicular block  Echo: I reviewed the echo with the following interpretation: EF 25 to 30%, moderately increased left ventricular size, myxomatous degeneration of mitral valve with moderate mitral regurgitation    XR CHEST PORTABLE Final Result   Right lower lobe airspace infiltrates. Severe cardiomegaly. **This report has been created using voice recognition software. It may contain minor errors which are inherent in voice recognition technology. **      Final report electronically signed by Dr. Joleen Arce on 10/11/2021 8:59 PM      XR CHEST PORTABLE   Final Result      1. Diffuse groundglass opacities in both lungs along with cardiomegaly. Findings likely suggest acute exacerbation of congestive heart failure versus pneumonia in the right clinical setting. Clinical correlation is recommended      2. Small left pleural effusion. 3. Other findings as described above. **This report has been created using voice recognition software. It may contain minor errors which are inherent in voice recognition technology. **      Final report electronically signed by Dr Reyes Panning on 10/11/2021 11:56 AM           DVT prophylaxis: Lovenox    Code Status: Full Code    PT/OT Eval Status: [address] [facility] [address] [facility] [address] Problems    Diagnosis Date Noted    Acute respiratory failure with hypoxia ([address] [J96.01] 10/11/2021       Thank you SERGE Huynh - JASEN for the opportunity to be involved in this patient's care.     Electronically signed by Desiree Gan DO on 10/12/2021 at 8:32 AM

## 2021-10-12 NOTE — FLOWSHEET NOTE
Pt is an 86y.o. female, propped up in bed in 8B-34.  was called to room due to a Rapid Response alert. Kourtney's heart rate was quite high; personnel were able to get it back to normal rhythm.  provided presence for staff primarily in the nurse's station outside the room. 10/11/21 2033   Encounter Summary   Services provided to: Patient not available   Referral/Consult From: Nursing Supervisor/Manager   Support System Unknown   Continue Visiting Yes  (10/11)   Complexity of Encounter Moderate   Length of Encounter 30 minutes   Crisis   Type Rapid response   Assessment Coping;Calm; Approachable   Intervention Sustaining presence/ Ministry of presence   Outcome Did not respond

## 2021-10-12 NOTE — PROCEDURES
EKG was handed to THE [facility] APRN-CNP.  Rapid Response

## 2021-10-13 LAB
ANION GAP SERPL CALCULATED.3IONS-SCNC: 13 MEQ/L (ref 8–16)
ANION GAP SERPL CALCULATED.3IONS-SCNC: 17 MEQ/L (ref 8–16)
BACTERIA: ABNORMAL
BILIRUBIN URINE: NEGATIVE
BLOOD, URINE: NEGATIVE
BUN BLDV-MCNC: 50 MG/DL (ref 7–22)
BUN BLDV-MCNC: 54 MG/DL (ref 7–22)
CALCIUM SERPL-MCNC: 10.3 MG/DL (ref 8.5–10.5)
CALCIUM SERPL-MCNC: 9.8 MG/DL (ref 8.5–10.5)
CASTS: ABNORMAL /LPF
CASTS: ABNORMAL /LPF
CHARACTER, URINE: ABNORMAL
CHLORIDE BLD-SCNC: 92 MEQ/L (ref 98–111)
CHLORIDE BLD-SCNC: 92 MEQ/L (ref 98–111)
CO2: 25 MEQ/L (ref 23–33)
CO2: 29 MEQ/L (ref 23–33)
COLOR: YELLOW
CREAT SERPL-MCNC: 1.9 MG/DL (ref 0.4–1.2)
CREAT SERPL-MCNC: 1.9 MG/DL (ref 0.4–1.2)
CREATININE URINE: 134.2 MG/DL
CRYSTALS: ABNORMAL
EKG Q-T INTERVAL: 366 MS
EKG QRS DURATION: 156 MS
EKG QTC CALCULATION (BAZETT): 479 MS
EKG R AXIS: -55 DEGREES
EKG T AXIS: 98 DEGREES
EKG VENTRICULAR RATE: 103 BPM
EPITHELIAL CELLS, UA: ABNORMAL /HPF
ERYTHROCYTE [DISTWIDTH] IN BLOOD BY AUTOMATED COUNT: 15.5 % (ref 11.5–14.5)
ERYTHROCYTE [DISTWIDTH] IN BLOOD BY AUTOMATED COUNT: 57.2 FL (ref 35–45)
FOLATE: 19 NG/ML (ref 4.8–24.2)
GFR SERPL CREATININE-BSD FRML MDRD: 25 ML/MIN/1.73M2
GFR SERPL CREATININE-BSD FRML MDRD: 25 ML/MIN/1.73M2
GLUCOSE BLD-MCNC: 100 MG/DL (ref 70–108)
GLUCOSE BLD-MCNC: 79 MG/DL (ref 70–108)
GLUCOSE, URINE: NEGATIVE MG/DL
HCT VFR BLD CALC: 44.1 % (ref 37–47)
HEMOGLOBIN: 13.8 GM/DL (ref 12–16)
IRON SATURATION: 14 % (ref 20–50)
IRON: 35 UG/DL (ref 50–170)
KETONES, URINE: NEGATIVE
LEUKOCYTE ESTERASE, URINE: NEGATIVE
MAGNESIUM: 2.5 MG/DL (ref 1.6–2.4)
MCH RBC QN AUTO: 31.3 PG (ref 26–33)
MCHC RBC AUTO-ENTMCNC: 31.3 GM/DL (ref 32.2–35.5)
MCV RBC AUTO: 100 FL (ref 81–99)
MISCELLANEOUS LAB TEST RESULT: ABNORMAL
NITRITE, URINE: NEGATIVE
OSMOLALITY URINE: 470 MOSMOL/KG (ref 250–750)
PH UA: 5 (ref 5–9)
PLATELET # BLD: 186 THOU/MM3 (ref 130–400)
PMV BLD AUTO: 12.3 FL (ref 9.4–12.4)
POTASSIUM SERPL-SCNC: 5.6 MEQ/L (ref 3.5–5.2)
POTASSIUM SERPL-SCNC: 6.1 MEQ/L (ref 3.5–5.2)
POTASSIUM SERPL-SCNC: 6.3 MEQ/L (ref 3.5–5.2)
PROTEIN UA: 30 MG/DL
RBC # BLD: 4.41 MILL/MM3 (ref 4.2–5.4)
RBC URINE: ABNORMAL /HPF
REASON FOR REJECTION: NORMAL
REJECTED TEST: NORMAL
RENAL EPITHELIAL, UA: ABNORMAL
SODIUM BLD-SCNC: 134 MEQ/L (ref 135–145)
SODIUM BLD-SCNC: 134 MEQ/L (ref 135–145)
SODIUM URINE: < 20 MEQ/L
SPECIFIC GRAVITY UA: 1.02 (ref 1–1.03)
TOTAL IRON BINDING CAPACITY: 255 UG/DL (ref 171–450)
UROBILINOGEN, URINE: 1 EU/DL (ref 0–1)
VITAMIN B-12: 1753 PG/ML (ref 211–911)
WBC # BLD: 7.3 THOU/MM3 (ref 4.8–10.8)
WBC UA: ABNORMAL /HPF
YEAST: ABNORMAL

## 2021-10-13 PROCEDURE — 84300 ASSAY OF URINE SODIUM: CPT

## 2021-10-13 PROCEDURE — 2580000003 HC RX 258: Performed by: STUDENT IN AN ORGANIZED HEALTH CARE EDUCATION/TRAINING PROGRAM

## 2021-10-13 PROCEDURE — 84132 ASSAY OF SERUM POTASSIUM: CPT

## 2021-10-13 PROCEDURE — 93005 ELECTROCARDIOGRAM TRACING: CPT | Performed by: PHYSICIAN ASSISTANT

## 2021-10-13 PROCEDURE — 83935 ASSAY OF URINE OSMOLALITY: CPT

## 2021-10-13 PROCEDURE — 36415 COLL VENOUS BLD VENIPUNCTURE: CPT

## 2021-10-13 PROCEDURE — 83550 IRON BINDING TEST: CPT

## 2021-10-13 PROCEDURE — 6370000000 HC RX 637 (ALT 250 FOR IP): Performed by: STUDENT IN AN ORGANIZED HEALTH CARE EDUCATION/TRAINING PROGRAM

## 2021-10-13 PROCEDURE — 83540 ASSAY OF IRON: CPT

## 2021-10-13 PROCEDURE — 82570 ASSAY OF URINE CREATININE: CPT

## 2021-10-13 PROCEDURE — 80048 BASIC METABOLIC PNL TOTAL CA: CPT

## 2021-10-13 PROCEDURE — 99233 SBSQ HOSP IP/OBS HIGH 50: CPT | Performed by: HOSPITALIST

## 2021-10-13 PROCEDURE — 97110 THERAPEUTIC EXERCISES: CPT

## 2021-10-13 PROCEDURE — 1200000003 HC TELEMETRY R&B

## 2021-10-13 PROCEDURE — 97535 SELF CARE MNGMENT TRAINING: CPT

## 2021-10-13 PROCEDURE — 51798 US URINE CAPACITY MEASURE: CPT

## 2021-10-13 PROCEDURE — 6360000002 HC RX W HCPCS: Performed by: HOSPITALIST

## 2021-10-13 PROCEDURE — 82607 VITAMIN B-12: CPT

## 2021-10-13 PROCEDURE — 85027 COMPLETE CBC AUTOMATED: CPT

## 2021-10-13 PROCEDURE — 6360000002 HC RX W HCPCS: Performed by: PHYSICIAN ASSISTANT

## 2021-10-13 PROCEDURE — 6370000000 HC RX 637 (ALT 250 FOR IP): Performed by: HOSPITALIST

## 2021-10-13 PROCEDURE — 94669 MECHANICAL CHEST WALL OSCILL: CPT

## 2021-10-13 PROCEDURE — 83735 ASSAY OF MAGNESIUM: CPT

## 2021-10-13 PROCEDURE — 97530 THERAPEUTIC ACTIVITIES: CPT

## 2021-10-13 PROCEDURE — 2580000003 HC RX 258: Performed by: HOSPITALIST

## 2021-10-13 PROCEDURE — 93010 ELECTROCARDIOGRAM REPORT: CPT | Performed by: INTERNAL MEDICINE

## 2021-10-13 PROCEDURE — 99223 1ST HOSP IP/OBS HIGH 75: CPT | Performed by: NUCLEAR MEDICINE

## 2021-10-13 PROCEDURE — 97116 GAIT TRAINING THERAPY: CPT

## 2021-10-13 PROCEDURE — 2500000003 HC RX 250 WO HCPCS: Performed by: HOSPITALIST

## 2021-10-13 PROCEDURE — 82746 ASSAY OF FOLIC ACID SERUM: CPT

## 2021-10-13 PROCEDURE — 81001 URINALYSIS AUTO W/SCOPE: CPT

## 2021-10-13 RX ORDER — DEXTROSE MONOHYDRATE 25 G/50ML
25 INJECTION, SOLUTION INTRAVENOUS ONCE
Status: COMPLETED | OUTPATIENT
Start: 2021-10-13 | End: 2021-10-13

## 2021-10-13 RX ORDER — SODIUM POLYSTYRENE SULFONATE 15 G/60ML
15 SUSPENSION ORAL; RECTAL ONCE
Status: COMPLETED | OUTPATIENT
Start: 2021-10-13 | End: 2021-10-13

## 2021-10-13 RX ORDER — METOPROLOL SUCCINATE 25 MG/1
25 TABLET, EXTENDED RELEASE ORAL DAILY
Status: DISCONTINUED | OUTPATIENT
Start: 2021-10-13 | End: 2021-10-13

## 2021-10-13 RX ORDER — MAGNESIUM SULFATE IN WATER 40 MG/ML
2000 INJECTION, SOLUTION INTRAVENOUS ONCE
Status: DISCONTINUED | OUTPATIENT
Start: 2021-10-13 | End: 2021-10-13

## 2021-10-13 RX ORDER — METOPROLOL SUCCINATE 50 MG/1
50 TABLET, EXTENDED RELEASE ORAL DAILY
Status: DISCONTINUED | OUTPATIENT
Start: 2021-10-13 | End: 2021-10-14

## 2021-10-13 RX ORDER — SODIUM CHLORIDE 9 MG/ML
INJECTION, SOLUTION INTRAVENOUS CONTINUOUS
Status: ACTIVE | OUTPATIENT
Start: 2021-10-13 | End: 2021-10-14

## 2021-10-13 RX ADMIN — DEXTROSE 50 % IN WATER (D50W) INTRAVENOUS SYRINGE 25 G: at 12:22

## 2021-10-13 RX ADMIN — SODIUM CHLORIDE, PRESERVATIVE FREE 10 ML: 5 INJECTION INTRAVENOUS at 09:32

## 2021-10-13 RX ADMIN — INSULIN HUMAN 10 UNITS: 100 INJECTION, SOLUTION PARENTERAL at 12:22

## 2021-10-13 RX ADMIN — Medication 50 MG: at 09:32

## 2021-10-13 RX ADMIN — SODIUM POLYSTYRENE SULFONATE 15 G: 15 SUSPENSION ORAL; RECTAL at 21:43

## 2021-10-13 RX ADMIN — METOPROLOL SUCCINATE 50 MG: 50 TABLET, FILM COATED, EXTENDED RELEASE ORAL at 16:30

## 2021-10-13 RX ADMIN — Medication 2000 UNITS: at 09:32

## 2021-10-13 RX ADMIN — SODIUM CHLORIDE: 9 INJECTION, SOLUTION INTRAVENOUS at 18:29

## 2021-10-13 RX ADMIN — OXYCODONE HYDROCHLORIDE AND ACETAMINOPHEN 500 MG: 500 TABLET ORAL at 09:32

## 2021-10-13 RX ADMIN — DEXTROSE MONOHYDRATE 25 G: 25 INJECTION, SOLUTION INTRAVENOUS at 18:47

## 2021-10-13 RX ADMIN — ENOXAPARIN SODIUM 30 MG: 30 INJECTION SUBCUTANEOUS at 16:30

## 2021-10-13 RX ADMIN — METOPROLOL TARTRATE 25 MG: 25 TABLET, FILM COATED ORAL at 06:42

## 2021-10-13 RX ADMIN — INSULIN HUMAN 10 UNITS: 100 INJECTION, SOLUTION PARENTERAL at 18:47

## 2021-10-13 RX ADMIN — ENOXAPARIN SODIUM 30 MG: 30 INJECTION SUBCUTANEOUS at 09:32

## 2021-10-13 ASSESSMENT — PAIN SCALES - GENERAL
PAINLEVEL_OUTOF10: 0
PAINLEVEL_OUTOF10: 0

## 2021-10-13 ASSESSMENT — PAIN DESCRIPTION - PROGRESSION
CLINICAL_PROGRESSION: NOT CHANGED
CLINICAL_PROGRESSION: NOT CHANGED

## 2021-10-13 NOTE — PROGRESS NOTES
and Difficulty Following Commands    ADL:   Grooming: Stand By Assistance. seated in chair to wash face  Bathing: Moderate Assistance. during sponge bathing in chair with max cues for sequencing of steps and to wash all parts as pt started to redo several areas and miss areas. Upper Extremity Dressing: Maximum Assistance. to don gown  Lower Extremity Dressing: Maximum Assistance. to don socks  Toileting: Stand By Assistance. for hygiene after command given to complete   Toilet Transfer: Minimal Assistance. form standard height toilet with cues for safety . BALANCE:  Standing Balance: Contact Guard Assistance. with bilatral UE support on walker    BED MOBILITY:  Sit to Supine: Stand By Assistance with cues for proper positioning    TRANSFERS:  Sit to Stand:  Minimal Assistance. from bedside chair with max cues for safety  Stand to Sit: Minimal Assistance. onto eOB with max cues for sfety    FUNCTIONAL MOBILITY:  Assistive Device: Rolling Walker  Assist Level:  Minimal Assistance. Distance: To and from bathroom  With max cues for safety including awareness of O2 tubing, awareness of objects in path, staying inside of walker during mobility and hand placement of walker        ASSESSMENT:     Activity Tolerance:  Patient tolerance of  treatment: fair. Pt reporting having increased nausea when returning to bed      Discharge Recommendations: Home with assist PRN, Home with Home health OT, Continue to assess pending progress    Equipment Recommendations:  Other: will continue to assess pending progress  Plan: Times per week: 5x  Current Treatment Recommendations: Strengthening, Balance Training, Functional Mobility Training, Endurance Training, Patient/Caregiver Education & Training, Self-Care / ADL, Home Management Training    Patient Education  Patient Education: safeyt with mobility     Goals  Short term goals  Time Frame for Short term goals: by discharge  Short term goal 1: Pt will increase activity tolerance for functional mobility to/from bathroom and household distances with supervision in prep for ADL completion. Short term goal 2: Pt will complete BADL/light IADL tasks with supervision to increase independence with self care/homemaking tasks. Short term goal 3: Pt will tolerate dynamic standing >5 minutes with supervision in prep for sinkside grooming tasks. Following session, patient left in safe position with all fall risk precautions in place.

## 2021-10-13 NOTE — CARE COORDINATION
DISCHARGE PLANNING UPDATE    Oxygen 1L/min sats 94%, hold diuretics creatinine 1.9, K+ 6.1. Repeat K+ 5.6 Vit C, D Zinc. Cardiology to see. IS. Mateo. Ace wrap to lars LE. Prolonged QTc on EKG. PT and OT. SpO2 remained between 85-89% throughout entire therapy session. From home with daughter; following for needs/HH and DME. (check if pt needs walker) and home O2.

## 2021-10-13 NOTE — PROGRESS NOTES
Hospitalist Progress Note      Patient:  Lisbet Fisher    Unit/Bed:8B-34/034-A  YOB: 1934  MRN: [id number]   Acct: [de-identified]     PCP: SERGE Bhakta CNP  Date of Admission: 10/11/2021     Date of Service: Pt seen/examined on 10/13/21  and Admitted to Inpatient with expected LOS greater than two midnights due to medical therapy. Chief Complaint:  Shortness of Breath    Assessment and Plan:    1.)  Acute hypoxic respiratory failure 2/2 acute systolic/diastolic heart failure (EF 25 to 30% 10/2021), moderate mitral regurgitation: Patient with SPO2 83% on room air on admission. Requiring nasal cannula in order to maintain good oxygen saturation. Her current symptoms of fatigue, orthopnea, shortness of breath, and lower limb swelling as well as her wildly elevated BNP heavily suggestive CHF and not COVID-19 causing her current symptoms.     - Improved; barely on 1 lpm via NC now; however, Cr trending up likely 2/2 aggressive diuresis and poor PO intake; held bumetanide for today; urine lytes/Osc sent; may consider gentle hydration if Cr further trending up. Avoid nephrotoxins.   -Monitor strict I's and O's  -Consult cardiology for discussion/consideration fo C as well as LifeVest versus AICD placement in the future  -Holding patient's homeopathic medications due to unknown side effect profile  -Continue to monitor O2 status and aggressively titrate down as able  - on BB, Patient will need medical optimization as indicated and possible at discharge    2.)  HERIBERTO likely multifactorial 2/2 aggressive diuresis; poor PO inatke and mild hypotension: Creatinine 1.9 rom 1.3 yesterday; held diuretics; W/U sent; bladder scan to r/o retention (pt has been incontinent); will consider gentle hydration from 0.9. Continue to monitor with BMP daily.     A fib: new-diagnosis; no RVR; stable; further adjust BB dose w/ hold parameters; also switched LMWH dose to therapeutic from BID SOC for COVID    4.) COVID-19 infection: Patient states she started having symptoms on 10/4/2021 however was not diagnosed until after 10/7. Other than her current symptoms of shortness of breath, fatigue, and lower limb swelling she denies other symptoms of Covid. She denies loss of taste, smell, headaches, or insomnia. Her CXR does demonstrate groundglass opacities bilaterally. -Given Solu-Medrol in ED  -COVID-19 infection likely playing a minor role in patient's current symptomatology  -Continue to monitor with aggressive diuresis of CHF  -Holding on further upstream COVID medical management     5.)  Prolonged QTc interval: likely 2/2 HFrEF;, seen on telemetry strip in the ED. Mag on admission 1.7. Recheck and if below 2, will giev Mg Sulfate 2 gr once and trend Mg levels  -Avoid QTC prolonging medications such as Zofran  -Keep K >4 and Mg >2     6.)  Mild hyponatremia likely 2/2 acute CHF +/- hypovolemia : Sodium 134, stable. trend    7.)  Hyperkalemia 2/2 HERIBERTO: Potassium 5.6. D50W and Insulin R 10 IV once and recheck. Avoid K supplements    8.)  Macrocytosis without anemia, high RDW: B12/folate WNLs. Iron studies c/w ELVA. Will start on iron at discharge     9.)  Elevated AST 2/2 COVID-19 infection: AST elevated at 91. No known previous history of elevation. trending down     10. )  Hx of hypertension: Patient reports taking all of leaf extract outpatient for her hypertension. Reportedly she has not taken metoprolol or HCTZ yet though   Controlled. CCM and monitor   11.)  Hx of arthritis: Patient takes glucosamine chondroitin at home. Holding inpatient     12. AMS: likely delirium superimposed on Dementia? Delirium precautions; frequent re-orientation. Avoid benzo, narcotics, Ach, antiemetics, antihistamines, antipsychotics as much as possible. Code status: Full code; palliative care to see re: code status and also goals of care    Disposition Plan: Patient will need to be evaluated by cardiology prior to discharge. COVID-19 is likely noncontributory at this point to her symptoms however she does have systolic heart failure. History Of Present Illness:      Admitted for management of Acute hypoxic respiratory failure secondary to acute decompensated CHF. Pt disengaged in conversation and confused. Past Medical History, Past Surgical History, Allergies, Medications, Social History, Family History reviewed in H&P and remain unchanged from admission. Diet:  ADULT DIET; Regular; Low Sodium (2 gm)    Review of Systems:   Pertinent positives as noted in the HPI. All other systems reviewed and negative. Physical exam:    /60   Pulse 99   Temp 98 °F (36.7 °C) (Oral)   Resp 19   Ht 5' (1.524 m)   Wt 124 lb (56.2 kg)   SpO2 93%   BMI 24.22 kg/m²     General appearance:  confused. No apparent distress but does appear to be tired, appears stated age and cooperative. HEENT:  Normal cephalic, atraumatic without obvious deformity. Pupils equal, round, and reactive to light. Extra ocular muscles intact. Conjunctivae/corneas clear. Neck: Supple, with full range of motion. No jugular venous distention. Trachea midline. Respiratory: Patient currently on 1 L nasal cannula oxygen, no accessory muscle use noted. Crackles in lower lobes significantly improved from prior. Patient is no longer tachypneic. Cardiovascular: Tachycardic with regular rhythm with normal S1/S2 without murmurs, rubs or gallops. Abdomen: Soft, non-tender, non-distended with normal bowel sounds, no tenderness on palpation  Musculoskeletal:  No clubbing or cyanosis. Patient does have +2 pitting edema in bilateral lower limbs, limbs also are cool. Full range of motion without deformity. Skin: Skin color, texture, turgor normal.  No rashes or lesions. ++ edema bilat LEs. Neurologic:  Neurovascularly intact without any focal sensory/motor deficits.  Cranial nerves: II-XII intact, grossly non-focal.  Psychiatric:  Alert and oriented, thought content appropriate, normal insight  Capillary Refill: Brisk,< 3 seconds   Peripheral Pulses: +2 palpable, equal bilaterally     Labs:     Recent Labs     10/11/21  1129 10/12/21  0704 10/13/21  0641   WBC 6.7 5.3 7.3   HGB 13.5 13.2 13.8   HCT 43.1 43.1 44.1    185 186     Recent Labs     10/11/21  1129 10/12/21  0704 10/13/21  0641   * 134* 134*   K 5.0 5.4* 6.1*   CL 94* 94* 92*   CO2 26 21* 25   BUN 29* 36* 50*   CREATININE 0.9 1.3* 1.9*   CALCIUM 9.3 9.5 9.8     Recent Labs     10/11/21  1129 10/12/21  0704   AST 81* 70*   ALT 49 46   BILIDIR 0.3  --    BILITOT 0.8 0.5   ALKPHOS 65 63     No results for input(s): INR in the last 72 hours. No results for input(s): Tosha Prost in the last 72 hours. Urinalysis:      Lab Results   Component Value Date    NITRU NEGATIVE 10/11/2021    WBCUA 0-2 03/15/2015    BACTERIA NONE 03/15/2015    RBCUA 0-2 03/15/2015    BLOODU NEGATIVE 10/11/2021    SPECGRAV 1.012 12/10/2012    GLUCOSEU NEGATIVE 10/11/2021       Intake & Output:  No intake/output data recorded. No intake/output data recorded. Radiology:     CXR: I have reviewed the CXR with the following interpretation: Cardiomegaly, groundglass appearances and diffuse bilateral lungs  EKG:  I have reviewed the EKG with the following interpretation: Prolonged QTC with bifascicular block  Echo: I reviewed the echo with the following interpretation: EF 25 to 30%, moderately increased left ventricular size, myxomatous degeneration of mitral valve with moderate mitral regurgitation    XR CHEST PORTABLE   Final Result   Right lower lobe airspace infiltrates. Severe cardiomegaly. **This report has been created using voice recognition software. It may contain minor errors which are inherent in voice recognition technology. **      Final report electronically signed by Dr. Kimo Reich on 10/11/2021 8:59 PM      XR CHEST PORTABLE   Final Result      1.  Diffuse groundglass opacities in both lungs along with cardiomegaly. Findings likely suggest acute exacerbation of congestive heart failure versus pneumonia in the right clinical setting. Clinical correlation is recommended      2. Small left pleural effusion. 3. Other findings as described above. **This report has been created using voice recognition software. It may contain minor errors which are inherent in voice recognition technology. **      Final report electronically signed by Dr Romana Romney on 10/11/2021 11:56 AM           DVT prophylaxis: Lovenox    Code Status: Full Code    PT/OT Eval Status: [address] [employer] Problems    Diagnosis Date Noted    Acute respiratory failure with hypoxia ([facility] [address] [J96.01] 10/11/2021       Thank you SERGE Tee CNP for the opportunity to be involved in this patient's care. With RN in room, patient was updated about and agreed upon the treatment plan, all the questions and concerns were addressed. Patient was seen in PPE (PERSONAL PROTECTIVE EQUIPMENT). Patient was interviewed in Strict Airborne and Droplet Precautions.     Electronically signed by Bo Edmondson MD on 10/13/2021 at 8:21 AM

## 2021-10-13 NOTE — PROGRESS NOTES
[facility]           Progress Note    Patient Name:  Sangeeta Young  Medical Record Number:  [id number]  YOB: 1934    Date:  10/13/2021  Time:  3:22 PM  Completed By:  Nathan Muñoz, RN, RN    Reason for Palliative Care Evaluation:  Code status    Advance Directives:none on file  [] [facility] DNR Form  [] Living Will  [] Medical POA    Current Code Status  [x] Full Resuscitation  [] DNR-Comfort Care-Arrest  [] DNR-Comfort Care  [] Limited   [] No CPR   [] No shock   [] No ET intubation/reintubation   [] No resuscitative medications   [] Other limitation:      Family/Patient Discussion:  Per nursing staff, patient is confused. Per Advance Care Planning note on 10/11/21, patient wants Aly Castillo, her daughter to make her medical decisions for her if she is unable. Patient did not give any clear answers regarding her wishes if resuscitative measures were needed. Plan/Follow-Up:  Phone call made to Aly Castillo at [phone]. Palliative care introduced. Discussion about code status levels and what each level entails. Discussed complications of rib fractures, brain and organ damage associated with resuscitative measures. Aly Land indicates that the patient would want all measures done to keep her alive. Did discuss that with covid, if intubated the likelihood of a very long recovery is anticipated. Aly Castillo indicates that she is a  and aware of this. Emotional support provided. Patient remains a full code after discussion with her daughter Aly Castillo. Please call palliative care if further needs arise.     Nathan Muñoz, RN, RN  10/13/2021,  3:22 PM

## 2021-10-13 NOTE — PROGRESS NOTES
Called and updated patients daughter Sharon Martinez. All questions and concerns answered and addressed.

## 2021-10-13 NOTE — PROGRESS NOTES
Discussed with primary RN and patient is confused. Phone call made to patient's daughter, Terese Juan and she is currently not available at the phone number provided. The person on the other end of the phone states that Terese Juan will be returning to the hospital.  Updated primary RN, Yanick Macias and she will call when family arrives.

## 2021-10-13 NOTE — PROGRESS NOTES
[address] [address]  INPATIENT PHYSICAL THERAPY  DAILY NOTE  [facility] SURG 8B - 8B-34/034-A    Time In: 1010  Time Out: 1038  Timed Code Treatment Minutes: 28 Minutes  Minutes: 28          Date: 10/13/2021  Patient Name: Raven Zayas,  Gender:  female        MRN: [id number]  : 1934  (80 y.o.)     Referring Practitioner: Lily Pyle DO  Diagnosis: Acute respiratory failure with hypoxia  Additional Pertinent Hx: Per chart review: Ms. Stuart Ridley is an 70-year-old female with a past medical history of anxiety, hypertension, arthritis, and a potentially new diagnosis of CHF. She states that she was vaccinated for Covid. She tested positive for Covid on 10/4/2021. Her shortness of breath has gotten progressively worse along with her fatigue. She states that in the past week she has noticed increased shortness of breath, increased swelling in her lower limbs, increased fatigue, and increasing orthopnea. Chest x-ray shows bilateral groundglass opacities with cardiomegaly. RR on 10/12 due to elevated HR with amnio started. Prior Level of Function:  Lives With: Daughter  Type of Home: House  Home Layout: One level  Home Equipment: Cane   Bathroom Shower/Tub: Tub/Shower unit  Bathroom Toilet: Standard  Bathroom Equipment: Shower chair    Receives Help From: Family  ADL Assistance: Independent  Homemaking Assistance: Independent  Ambulation Assistance: Independent  Transfer Assistance: Independent  Additional Comments: Pt reported would use cane for mobility. Pt reported being very active prior, exercises daily. Restrictions/Precautions:  Restrictions/Precautions: Fall Risk, Isolation, Contact Precautions  Position Activity Restriction  Other position/activity restrictions: droplet +; Hard of hearing     SUBJECTIVE: Patient awake in bed upon arrival, agreeable to therapy. RN approved session. Patient is on 1L Nasal cannula. PAIN: Denies.     Vitals: Oxygen:1L O2 nasal Cannula throughout session. SpO2 remained between 85-89% throughout entire therapy session, until final read sitting in bedside chair SpO2 90%. RN made aware. OBJECTIVE:  Bed Mobility:  Supine to Sit: Contact Guard Assistance   Maximal verbal cues required for completion of supine > sit transfer as patient was easily confused and extremely hard of hearing. Once seated upright on bed tactile cues and demonstration to patient were provided to scoot hips towrds the edge. Transfers:  Sit to Stand: [address], cues for hand placement  Stand to [facility], cues for hand placement    Ambulation:  Contact Guard Assistance, with cues for safety, with verbal cues , with increased time for completion  Distance: 5ft  Surface: Level Tile  Device:Rolling Walker  Gait Deviations: Forward Flexed Posture, Decreased Step Length Bilaterally, Decreased Gait Speed, Decreased Heel Strike Bilaterally and Mild Path Deviations  Patient very unsteady with ambulation, requiring maximal verbal cues for direction and AD management. Ambulated from edge of bed to bedside chair. Balance:  Dynamic Sitting Balance: Stand By Assistance- Patient attempted to stand multiple times while seated edge of bed for attempted exercises, therefore assistance required for safety to patient to return to surface. Exercise:   Exercises were attempted for increased independence with functional mobility, however patient unable to follow verbal and tactile cues for a good amount of time so exercises were terminated this session- while trial exercises again next session if patient demonstrates increased ability to follow verbal and/or tactile cues. Functional Outcome Measures: Completed  AM-PAC Inpatient Mobility Raw Score : 17  AM-PAC Inpatient T-Scale Score : 42.13    ASSESSMENT:  Assessment: Patient progressing toward established goals.  and Patient unable to follow simple one step commands 50% of the time, and requires maximal verbal and tactile cueing to complete functional activity. Patient would still benefit from continued therapy to improve overall strength, endurance, and functional independence. Activity Tolerance:  Patient tolerance of  treatment: fair. Equipment Recommendations:Equipment Needed: Yes (continue to assess RW needs)  Discharge Recommendations:  Continue to assess pending progress, 24 hour supervision or assist, Patient would benefit from continued therapy after discharge    Plan: Times per week: 5 x C  Current Treatment Recommendations: Strengthening, Transfer Training, Endurance Training, Neuromuscular Re-education, Patient/Caregiver Education & Training, Equipment Evaluation, Education, & procurement, Home Exercise Program, Gait Training, Balance Training, Functional Mobility Training, Stair training, Safety Education & Training    Patient Education  Patient Education: Plan of Care, [employer] Mobility, Transfers, Gait    Goals:  Patient goals : To get home as soon as possible  Short term goals  Time Frame for Short term goals: By hospital d/c  Short term goal 1: Pt to demonstrate supine <-> sit transfer mod I for ease with getting out of bed. Short term goal 2: Pt to complete sit <->stand transfer mod I with LRAD for ability to transfer from surface to surface  Short term goal 3: Pt to ambulate 30' with LRAD and SBA to progress towards within home ambulation  Short term goal 4: Pt to increase Tinetti to a min of >=19/28  Long term goals  Time Frame for Long term goals : NA due to short ELOS    Following session, patient left in safe position with all fall risk precautions in place.

## 2021-10-14 LAB
ANION GAP SERPL CALCULATED.3IONS-SCNC: 11 MEQ/L (ref 8–16)
ANION GAP SERPL CALCULATED.3IONS-SCNC: 12 MEQ/L (ref 8–16)
BUN BLDV-MCNC: 58 MG/DL (ref 7–22)
BUN BLDV-MCNC: 60 MG/DL (ref 7–22)
CALCIUM SERPL-MCNC: 9.6 MG/DL (ref 8.5–10.5)
CALCIUM SERPL-MCNC: 9.8 MG/DL (ref 8.5–10.5)
CHLORIDE BLD-SCNC: 95 MEQ/L (ref 98–111)
CHLORIDE BLD-SCNC: 95 MEQ/L (ref 98–111)
CO2: 25 MEQ/L (ref 23–33)
CO2: 30 MEQ/L (ref 23–33)
CREAT SERPL-MCNC: 1.8 MG/DL (ref 0.4–1.2)
CREAT SERPL-MCNC: 2 MG/DL (ref 0.4–1.2)
GFR SERPL CREATININE-BSD FRML MDRD: 24 ML/MIN/1.73M2
GFR SERPL CREATININE-BSD FRML MDRD: 27 ML/MIN/1.73M2
GLUCOSE BLD-MCNC: 110 MG/DL (ref 70–108)
GLUCOSE BLD-MCNC: 84 MG/DL (ref 70–108)
MAGNESIUM: 2.5 MG/DL (ref 1.6–2.4)
POTASSIUM SERPL-SCNC: 5.1 MEQ/L (ref 3.5–5.2)
POTASSIUM SERPL-SCNC: 5.3 MEQ/L (ref 3.5–5.2)
POTASSIUM SERPL-SCNC: 5.6 MEQ/L (ref 3.5–5.2)
SODIUM BLD-SCNC: 132 MEQ/L (ref 135–145)
SODIUM BLD-SCNC: 136 MEQ/L (ref 135–145)

## 2021-10-14 PROCEDURE — 6370000000 HC RX 637 (ALT 250 FOR IP): Performed by: STUDENT IN AN ORGANIZED HEALTH CARE EDUCATION/TRAINING PROGRAM

## 2021-10-14 PROCEDURE — 80048 BASIC METABOLIC PNL TOTAL CA: CPT

## 2021-10-14 PROCEDURE — 83735 ASSAY OF MAGNESIUM: CPT

## 2021-10-14 PROCEDURE — 36415 COLL VENOUS BLD VENIPUNCTURE: CPT

## 2021-10-14 PROCEDURE — 6370000000 HC RX 637 (ALT 250 FOR IP): Performed by: HOSPITALIST

## 2021-10-14 PROCEDURE — 97535 SELF CARE MNGMENT TRAINING: CPT

## 2021-10-14 PROCEDURE — 6360000002 HC RX W HCPCS: Performed by: HOSPITALIST

## 2021-10-14 PROCEDURE — 97530 THERAPEUTIC ACTIVITIES: CPT

## 2021-10-14 PROCEDURE — 1200000003 HC TELEMETRY R&B

## 2021-10-14 PROCEDURE — 99233 SBSQ HOSP IP/OBS HIGH 50: CPT | Performed by: HOSPITALIST

## 2021-10-14 PROCEDURE — 97110 THERAPEUTIC EXERCISES: CPT

## 2021-10-14 PROCEDURE — 84132 ASSAY OF SERUM POTASSIUM: CPT

## 2021-10-14 PROCEDURE — 2500000003 HC RX 250 WO HCPCS: Performed by: HOSPITALIST

## 2021-10-14 PROCEDURE — 97116 GAIT TRAINING THERAPY: CPT

## 2021-10-14 PROCEDURE — 2580000003 HC RX 258: Performed by: HOSPITALIST

## 2021-10-14 RX ORDER — METOPROLOL TARTRATE 50 MG/1
50 TABLET, FILM COATED ORAL 2 TIMES DAILY
Status: DISCONTINUED | OUTPATIENT
Start: 2021-10-14 | End: 2021-10-15

## 2021-10-14 RX ORDER — DEXTROSE MONOHYDRATE 25 G/50ML
25 INJECTION, SOLUTION INTRAVENOUS ONCE
Status: COMPLETED | OUTPATIENT
Start: 2021-10-14 | End: 2021-10-14

## 2021-10-14 RX ORDER — SODIUM CHLORIDE 9 MG/ML
INJECTION, SOLUTION INTRAVENOUS CONTINUOUS
Status: ACTIVE | OUTPATIENT
Start: 2021-10-14 | End: 2021-10-14

## 2021-10-14 RX ADMIN — Medication 2000 UNITS: at 07:56

## 2021-10-14 RX ADMIN — ENOXAPARIN SODIUM 60 MG: 60 INJECTION SUBCUTANEOUS at 08:04

## 2021-10-14 RX ADMIN — METOPROLOL TARTRATE 50 MG: 50 TABLET, FILM COATED ORAL at 14:04

## 2021-10-14 RX ADMIN — SODIUM CHLORIDE: 9 INJECTION, SOLUTION INTRAVENOUS at 12:06

## 2021-10-14 RX ADMIN — METOPROLOL TARTRATE 50 MG: 50 TABLET, FILM COATED ORAL at 20:29

## 2021-10-14 RX ADMIN — APIXABAN 2.5 MG: 2.5 TABLET, FILM COATED ORAL at 20:29

## 2021-10-14 RX ADMIN — APIXABAN 2.5 MG: 2.5 TABLET, FILM COATED ORAL at 14:04

## 2021-10-14 RX ADMIN — METOPROLOL SUCCINATE 50 MG: 50 TABLET, FILM COATED, EXTENDED RELEASE ORAL at 07:56

## 2021-10-14 RX ADMIN — Medication 50 MG: at 07:56

## 2021-10-14 RX ADMIN — SODIUM CHLORIDE: 9 INJECTION, SOLUTION INTRAVENOUS at 09:11

## 2021-10-14 RX ADMIN — OXYCODONE HYDROCHLORIDE AND ACETAMINOPHEN 500 MG: 500 TABLET ORAL at 07:56

## 2021-10-14 RX ADMIN — DEXTROSE MONOHYDRATE 25 G: 25 INJECTION, SOLUTION INTRAVENOUS at 20:30

## 2021-10-14 RX ADMIN — INSULIN HUMAN 10 UNITS: 100 INJECTION, SOLUTION PARENTERAL at 21:00

## 2021-10-14 ASSESSMENT — PAIN DESCRIPTION - PROGRESSION: CLINICAL_PROGRESSION: NOT CHANGED

## 2021-10-14 NOTE — PROGRESS NOTES
[facility]  INPATIENT PHYSICAL THERAPY  DAILY NOTE  [facility] MED SURG 8B - 8B-34/034-A      Time In: 0544  Time Out: 9542  Timed Code Treatment Minutes: 27 Minutes  Minutes: 27          Date: 10/14/2021  Patient Name: Erich Martinez,  Gender:  female        MRN: [id number]  : 1934  (80 y.o.)     Referring Practitioner: Thomas Harrison DO  Diagnosis: Acute respiratory failure with hypoxia  Additional Pertinent Hx: Per chart review: Ms. Gonzalo Campbell is an 66-year-old female with a past medical history of anxiety, hypertension, arthritis, and a potentially new diagnosis of CHF. She states that she was vaccinated for Covid. She tested positive for Covid on 10/4/2021. Her shortness of breath has gotten progressively worse along with her fatigue. She states that in the past week she has noticed increased shortness of breath, increased swelling in her lower limbs, increased fatigue, and increasing orthopnea. Chest x-ray shows bilateral groundglass opacities with cardiomegaly. RR on 10/12 due to elevated HR with amnio started. Prior Level of Function:  Lives With: Daughter  Type of Home: House  Home Layout: One level  Home Equipment: Cane   Bathroom Shower/Tub: Tub/Shower unit  Bathroom Toilet: Standard  Bathroom Equipment: Shower chair    Receives Help From: Family  ADL Assistance: Independent  Homemaking Assistance: Independent  Ambulation Assistance: Independent  Transfer Assistance: Independent  Additional Comments: Pt reported would use cane for mobility. Pt reported being very active prior, exercises daily.     Restrictions/Precautions:  Restrictions/Precautions: Fall Risk, Isolation, Contact Precautions  Position Activity Restriction  Other position/activity restrictions: droplet +; Hard of hearing       SUBJECTIVE: pt in bed on arrival - she was confused and hard to understand and redirect - she needed several verbal and tactile cues to follow along and education to keep her O2 in place throughout session     PAIN: 0/10:       Vitals: Oxygen: pt on 3L O2 on arrival she was 88% did bump to 4L with activity and was above 90% with mobility and at end bumped back down to 3L and was 91%    OBJECTIVE:  Bed Mobility:  Supine to Sit: Minimal Assistance, with extra time and HOB elevated- she lacked initiation to sit up   Sit to Supine: Moderate Assistance     Transfers:  Sit to Stand: Minimal Assistance from bed and mod assist from toilet with multi attempts to stand   Stand to [address]  With cues for hand placement and noted poor carryover   Ambulation:  Minimal Assistance  Distance: 10x2  Surface: Level Tile  Device:Rolling Walker  Gait Deviations:  Pt unable to guide the walker needing assist to push it forward and to prevent it from bumping into objects, noted decreased step length and heel strike- she needed max assist for line management and awareness she had IV and O2 lines     Balance:  standing to complete toileting task with CGA for balance however she was dependent for patel care and she didn't make an effort to assist with clothing management     Exercise:  Patient was guided in 1 set(s) 10 reps of exercise to both lower extremities. Seated marches, Long arc quads, Seated abduction/adduction and she needed demonstration to follow along with proper tedhnique of execises and CGA for balance while doing ex . Exercises were completed for increased independence with functional mobility. Functional Outcome Measures: Completed  -PAC Inpatient Mobility Raw Score : 17  AM-PAC Inpatient T-Scale Score : 42.13    ASSESSMENT:  Assessment: Pt demonstrated decreased safety awareness during session and required hands on assist with mobility transfers and gait. Pt would benefit from cont skilled therapy. Activity Tolerance:  Patient tolerance of  treatment: fair.         Equipment Recommendations:Equipment Needed: Yes (continue to assess RW needs- may need to check with family if pt uses a walker at home)  Discharge Recommendations:    Continue to assess pending progress, 24 hour hands on assist, Patient would benefit from continued therapy after discharge    Plan: Times per week: 5 x C  Current Treatment Recommendations: Strengthening, Transfer Training, Endurance Training, Neuromuscular Re-education, Patient/Caregiver Education & Training, Equipment Evaluation, Education, & procurement, Home Exercise Program, Gait Training, Balance Training, Functional Mobility Training, Stair training, Safety Education & Training    Patient Education  Patient Education: Plan of Care    Goals:  Patient goals : To get home as soon as possible  Short term goals  Time Frame for Short term goals: By hospital d/c  Short term goal 1: Pt to demonstrate supine <-> sit transfer mod I for ease with getting out of bed. Short term goal 2: Pt to complete sit <->stand transfer mod I with LRAD for ability to transfer from surface to surface  Short term goal 3: Pt to ambulate 30' with LRAD and SBA to progress towards within home ambulation  Short term goal 4: Pt to increase Tinetti to a min of >=19/28  Long term goals  Time Frame for Long term goals : NA due to short ELOS    Following session, patient left in safe position with all fall risk precautions in place.

## 2021-10-14 NOTE — CARE COORDINATION
Discharge Planning Update:    Continues on 3L NC, sats 90-93%. K+ 6.3, kaexylate given, down to 5.1. Acapella. Incentive spirometer. Spoke with daughter, Napoleon Massey is for Denise Gray to return home with her. She has a cane, does not have a walker. She has tried using a walker in past and had difficulties. Explained therapy is recommending and we can arrange if she still needs at discharge. Discussed HH, agreeable to RN visits. CM will continue to follow.

## 2021-10-14 NOTE — CONSULTS
[address] [place] [address]                                  CONSULTATION    PATIENT NAME: Timothy Reza                    :        1934  MED REC NO:   [id number]                           ROOM:       0034  ACCOUNT NO:   [de-identified]                           ADMIT DATE: 10/11/2021  PROVIDER:     Jai Maria M.D.    Sherry Noyola:  10/13/2021    CARDIOLOGY CONSULTATION    REASON FOR CONSULTATION:  Cardiomyopathy. REQUESTING PROVIDER:  Hospitalist Service. HISTORY OF PRESENT ILLNESS:  This is a pleasant 80year-old patient who  is a very poor historian, who was in my office few days ago complaining  of palpitation and some upper respiratory type symptoms, who  unfortunately ended up being positive for COVID-19 infection and is  admitted to the [facility] unit. The patient is being treated, was found to  have low ejection low ejection fraction when an echocardiogram was  ordered which is supposedly new for the patient. As mentioned above,  the patient is a very poor historian; however, she does have symptoms of  palpitation and has had some symptoms of shortness of breath which is  relatively chronic with denying any chest discomfort. She seems to be  recovering nicely from her infection and no obvious or acute congestive  heart failure. REVIEW OF SYSTEMS:  No obvious fever or chills. No hematuria or  dysuria. No abdominal pain, nausea, vomiting, or diarrhea. No obvious  suicidal ideation. The patient seems to have some underlying dementia. No skin rashes. No obvious dizziness, lightheadedness or loss of  consciousness. No recent trauma. No bleeding problem. No  HEENT-related problem. PAST MEDICAL HISTORY:  1. Hypertension. 2.  Palpitation. 3.  No known history of coronary artery disease. ALLERGIES:  GLUTEN, SULFA and METRONIDAZOLE. SOCIAL HISTORY:  No tobacco, no alcohol.   Previous history of smoking. FAMILY HISTORY:  Significant for hypertension. PHYSICAL EXAMINATION:  VITAL SIGNS:  Showed a blood pressure 130/70, heart rate of 70. GENERAL APPEARANCE:  Pleasant lady, elderly, in no acute distress. EYES AND EARS:  No discharge. NECK:  No JVD. No bruits. No masses. LUNGS:  Decreased air entry. No crackles. No wheezes. HEART:  Normal S1, S2. Systolic murmur grade 2/6. ABDOMEN:  Soft, nontender. Positive bowel sounds. No organomegaly. EXTREMITIES:  No significant edema. NEUROLOGIC:  Grossly intact. Awake and alert. No focal deficits. PSYCH:  No evidence of active psychosis. SKIN:  No rashes. LABORATORY DATA:  Showed sodium 134, potassium 6.1, BUN 50, creatinine  1.9. White count 7.3, hemoglobin 13.8, hematocrit 44.1, platelets 404. Troponin 0.014. EKG showed sinus rhythm with diffuse nonspecific ST-T  wave changes. IMPRESSION:  This is a patient who comes in with COVID-19 infection, who  has LV dysfunction which is unclear whether it is related to her  infection or if she has had it before given the fact that she has not  had any recent cardiac evaluation. RECOMMENDATIONS:  At this point, recommendation as follows:  1. Treat the COVID-19 infection as per standard therapy. 2.  Treat the cardiomyopathy with beta blockers and ACE inhibitors if  kidney function allows and blood pressure allows. 3.  The patient will have ischemia workup done after her COVID-19  infection recovers which would most likely require cardiac  catheterization depending on the overall progress of the patient. Thank you for allowing me to participate in care of this patient.           Leesa Rivera M.D.    D: 10/13/2021 17:20:49       T: 10/13/2021 17:23:19     [email or web address]  Job#: [id number]     Doc#: [id number]    CC:

## 2021-10-14 NOTE — PROGRESS NOTES
Hospitalist Progress Note      Patient:  Jose Raul Conroy    Unit/Bed:8B-34/034-A  YOB: 1934  MRN: [id number]   Acct: [de-identified]     PCP: SERGE Abdullahi CNP  Date of Admission: 10/11/2021     Date of Service: Pt seen/examined on 10/14/21  and Admitted to Inpatient with expected LOS greater than two midnights due to medical therapy. Chief Complaint:  Shortness of Breath    Assessment and Plan:    1.)  Acute hypoxic respiratory failure 2/2 acute systolic/diastolic heart failure (EF 25 to 30% 10/2021), moderate mitral regurgitation: Patient with SPO2 83% on room air on admission. Requiring nasal cannula in order to maintain good oxygen saturation. Her current symptoms of fatigue, orthopnea, shortness of breath, and lower limb swelling as well as her wildly elevated BNP heavily suggestive CHF and not COVID-19 causing her current symptoms.     - Improved; barely on 1 lpm via NC now; however, Cr trending up likely 2/2 aggressive diuresis and poor PO intake; held bumetanide for today; urine lytes/Osc sent; may consider gentle hydration if Cr further trending up. Avoid nephrotoxins.   -Monitor strict I's and O's  -Consult cardiology for discussion/consideration fo LHC as well as LifeVest versus AICD placement in the future  -Holding patient's homeopathic medications due to unknown side effect profile  -Continue to monitor O2 status and aggressively titrate down as able  - on BB, Patient will need medical optimization as indicated and possible at discharge    10/14: stable. Ccm. Seen by cardio w/ planned [facility] OP? Pt will need life vest; BB only for now given HERIBERTO    2.)  HERIBERTO likely multifactorial 2/2 aggressive diuresis; poor PO inatke and mild hypotension: Creatinine 1.9 rom 1.3 yesterday; held diuretics; W/U sent; bladder scan to r/o retention (pt has been incontinent); will consider gentle hydration from 0.9. Continue to monitor with BMP daily.     10/14: Cr stable at 2.0. urine Na c/w pre-renal etiology; likely severely intravascularly volume depleted 9U Na below 20 despite being on diuretics); d/t poor PO intake and previous diuretics; resumed NS at 75 cc.hr while monitoring volume status and BMP closely    A fib: new-diagnosis; no RVR; stable; further adjust BB dose w/ hold parameters; also switched LMWH dose to therapeutic from BID SOC for COVID    10/14: in Afib w/ VR in 90s; further optimized BB dose (consider switching t XL only when rate adequately controlled on well-titrated dose of IR); in lieu of no planned procedure; will place on NOAC    4.)  COVID-19 infection: Patient states she started having symptoms on 10/4/2021 however was not diagnosed until after 10/7. Other than her current symptoms of shortness of breath, fatigue, and lower limb swelling she denies other symptoms of Covid. She denies loss of taste, smell, headaches, or insomnia. Her CXR does demonstrate groundglass opacities bilaterally. -Given Solu-Medrol in ED  -COVID-19 infection likely playing a minor role in patient's current symptomatology  -Continue to monitor with aggressive diuresis of CHF  -Holding on further upstream COVID medical management     5.)  Prolonged QTc interval: likely 2/2 HFrEF;, seen on telemetry strip in the ED. Mag on admission 1.7. Recheck and if below 2, will giev Mg Sulfate 2 gr once and trend Mg levels  -Avoid QTC prolonging medications such as Zofran  -Keep K >4 and Mg >2     6.)  Mild hyponatremia likely 2/2 acute CHF +/- hypovolemia :resolved w/ IVF. 136, stable. trend    7.)  Hyperkalemia 2/2 HERIBERTO: Potassium 5.6. D50W and Insulin R 10 IV once and recheck. Avoid K supplements  10/14: resolved; K 5.1. will trend    8.)  Macrocytosis without anemia, high RDW: B12/folate WNLs. Iron studies c/w ELVA. Will start on iron at discharge     9.)  Elevated AST 2/2 COVID-19 infection: AST elevated at 91. No known previous history of elevation. trending down     10. )  Hx of hypertension: Patient reports taking all of leaf extract outpatient for her hypertension. Reportedly she has not taken metoprolol or HCTZ yet though   Controlled. CCM and monitor   11.)  Hx of arthritis: Patient takes glucosamine chondroitin at home. Holding inpatient     12. AMS: likely delirium superimposed on Dementia? Delirium precautions; frequent re-orientation. Avoid benzo, narcotics, Ach, antiemetics, antihistamines, antipsychotics as much as possible. Code status: Full code; per discussion between palliative care and daughters, pt remains Full code  Disposition Plan: Patient will need to be evaluated by cardiology prior to discharge. COVID-19 is likely noncontributory at this point to her symptoms however she does have systolic heart failure. History Of Present Illness:      Admitted for management of Acute hypoxic respiratory failure secondary to acute decompensated CHF. Pt disengaged in conversation and confused. Past Medical History, Past Surgical History, Allergies, Medications, Social History, Family History reviewed in H&P and remain unchanged from admission. Diet:  ADULT DIET; Regular; Low Sodium (2 gm)    Review of Systems:   Pertinent positives as noted in the HPI. All other systems reviewed and negative. Physical exam:    BP (!) 103/43   Pulse 94   Temp 98.2 °F (36.8 °C) (Oral)   Resp 18   Ht 5' (1.524 m)   Wt 124 lb (56.2 kg)   SpO2 90%   BMI 24.22 kg/m²     General appearance:  confused. No apparent distress but does appear to be tired, appears stated age and cooperative. HEENT:  Normal cephalic, atraumatic without obvious deformity. Pupils equal, round, and reactive to light. Extra ocular muscles intact. Conjunctivae/corneas clear. Neck: Supple, with full range of motion. No jugular venous distention. Trachea midline. Respiratory: Patient currently on 1 L nasal cannula oxygen, no accessory muscle use noted. Crackles in lower lobes significantly improved from prior.  Patient is no longer tachypneic. Cardiovascular: Tachycardic with regular rhythm with normal S1/S2 without murmurs, rubs or gallops. Abdomen: Soft, non-tender, non-distended with normal bowel sounds, no tenderness on palpation  Musculoskeletal:  No clubbing or cyanosis. Patient does have +2 pitting edema in bilateral lower limbs, limbs also are cool. Full range of motion without deformity. Skin: Skin color, texture, turgor normal.  No rashes or lesions. ++ edema bilat LEs. Neurologic:  Neurovascularly intact without any focal sensory/motor deficits. Cranial nerves: II-XII intact, grossly non-focal.  Psychiatric:  Alert and oriented, thought content appropriate, normal insight  Capillary Refill: Brisk,< 3 seconds   Peripheral Pulses: +2 palpable, equal bilaterally     Labs:     Recent Labs     10/11/21  1129 10/12/21  0704 10/13/21  0641   WBC 6.7 5.3 7.3   HGB 13.5 13.2 13.8   HCT 43.1 43.1 44.1    185 186     Recent Labs     10/13/21  0641 10/13/21  0641 10/13/21  0902 10/13/21  1651 10/14/21  0231   *  --   --  134* 136   K 6.1*   < > 5.6* 6.3* 5.1   CL 92*  --   --  92* 95*   CO2 25  --   --  29 30   BUN 50*  --   --  54* 58*   CREATININE 1.9*  --   --  1.9* 2.0*   CALCIUM 9.8  --   --  10.3 9.8    < > = values in this interval not displayed. Recent Labs     10/11/21  1129 10/12/21  0704   AST 81* 70*   ALT 49 46   BILIDIR 0.3  --    BILITOT 0.8 0.5   ALKPHOS 65 63     No results for input(s): INR in the last 72 hours. No results for input(s): Paul Shackle in the last 72 hours. Urinalysis:      Lab Results   Component Value Date    NITRU NEGATIVE 10/13/2021    WBCUA 0-2 10/13/2021    BACTERIA NONE SEEN 10/13/2021    RBCUA NONE SEEN 10/13/2021    BLOODU NEGATIVE 10/13/2021    SPECGRAV 1.019 10/13/2021    GLUCOSEU NEGATIVE 10/11/2021       Intake & Output:  I/O last 3 completed shifts: In: 674 [I.V.:674]  Out: -   No intake/output data recorded.       Radiology:     CXR: I have reviewed the CXR and Droplet Precautions.     Electronically signed by Edgar Browne MD on 10/14/2021 at 8:45 AM

## 2021-10-14 NOTE — FLOWSHEET NOTE
10/14/21 0745   Vital Signs   Temp 98.2 °F (36.8 °C)   Temp Source Oral   Pulse 94   Heart Rate Source Monitor   Resp 18   BP (!) 103/43   BP Location Right upper arm   MAP (mmHg) (!) 59   Patient Position Semi fowlers   Level of Consciousness Alert (0)   MEWS Score 1   Patient Currently in Pain Denies   Oxygen Therapy   SpO2 90 %   Pulse Oximeter Device Mode Intermittent   Pulse Oximeter Device Location Finger   O2 Device Nasal cannula   O2 Flow Rate (L/min) 3 L/min     Patient had removed oxygen and was stating 67% on room air. Nasal cannula applied at 3L and patient did come up to 90%.

## 2021-10-14 NOTE — PROGRESS NOTES
ADLs and homemaking tasks. ASSESSMENT:     Activity Tolerance:  Patient tolerance of  treatment: fair. Limited by confusion       Discharge Recommendations: Home with assist PRN, Home with Home health OT, Continue to assess pending progress   Equipment Recommendations: Other: will continue to assess pending progress  Plan: Times per week: 5x  Current Treatment Recommendations: Strengthening, Balance Training, Functional Mobility Training, Endurance Training, Patient/Caregiver Education & Training, Self-Care / ADL, Home Management Training    Patient Education  Patient Education: ADL's and Home Exercise Program    Goals  Short term goals  Time Frame for Short term goals: by discharge  Short term goal 1: Pt will increase activity tolerance for functional mobility to/from bathroom and household distances with supervision in prep for ADL completion. Short term goal 2: Pt will complete BADL/light IADL tasks with supervision to increase independence with self care/homemaking tasks. Short term goal 3: Pt will tolerate dynamic standing >5 minutes with supervision in prep for sinkside grooming tasks. Following session, patient left in safe position with all fall risk precautions in place.

## 2021-10-15 ENCOUNTER — APPOINTMENT (OUTPATIENT)
Dept: CT IMAGING | Age: 86
DRG: 004 | End: 2021-10-15
Payer: MEDICARE

## 2021-10-15 ENCOUNTER — APPOINTMENT (OUTPATIENT)
Dept: GENERAL RADIOLOGY | Age: 86
DRG: 004 | End: 2021-10-15
Payer: MEDICARE

## 2021-10-15 LAB
ALLEN TEST: POSITIVE
ANION GAP SERPL CALCULATED.3IONS-SCNC: 13 MEQ/L (ref 8–16)
BASE EXCESS (CALCULATED): -2.1 MMOL/L (ref -2.5–2.5)
BUN BLDV-MCNC: 70 MG/DL (ref 7–22)
CALCIUM SERPL-MCNC: 9.1 MG/DL (ref 8.5–10.5)
CHLORIDE BLD-SCNC: 95 MEQ/L (ref 98–111)
CO2: 28 MEQ/L (ref 23–33)
COLLECTED BY:: ABNORMAL
CREAT SERPL-MCNC: 2.4 MG/DL (ref 0.4–1.2)
DEVICE: ABNORMAL
GFR SERPL CREATININE-BSD FRML MDRD: 19 ML/MIN/1.73M2
GLUCOSE BLD-MCNC: 109 MG/DL (ref 70–108)
GLUCOSE BLD-MCNC: 86 MG/DL (ref 70–108)
GLUCOSE BLD-MCNC: 90 MG/DL (ref 70–108)
HCO3: 31 MMOL/L (ref 23–28)
IFIO2: 3
MAGNESIUM: 2.5 MG/DL (ref 1.6–2.4)
O2 SATURATION: 92 %
PCO2: 96 MMHG (ref 35–45)
PH BLOOD GAS: 7.11 (ref 7.35–7.45)
PO2: 88 MMHG (ref 71–104)
POTASSIUM SERPL-SCNC: 5.3 MEQ/L (ref 3.5–5.2)
SODIUM BLD-SCNC: 136 MEQ/L (ref 135–145)
SOURCE, BLOOD GAS: ABNORMAL

## 2021-10-15 PROCEDURE — 31500 INSERT EMERGENCY AIRWAY: CPT | Performed by: NURSE PRACTITIONER

## 2021-10-15 PROCEDURE — 2100000000 HC CCU R&B

## 2021-10-15 PROCEDURE — 2580000003 HC RX 258: Performed by: NURSE PRACTITIONER

## 2021-10-15 PROCEDURE — 6360000002 HC RX W HCPCS: Performed by: STUDENT IN AN ORGANIZED HEALTH CARE EDUCATION/TRAINING PROGRAM

## 2021-10-15 PROCEDURE — 2580000003 HC RX 258: Performed by: STUDENT IN AN ORGANIZED HEALTH CARE EDUCATION/TRAINING PROGRAM

## 2021-10-15 PROCEDURE — 0BH18EZ INSERTION OF ENDOTRACHEAL AIRWAY INTO TRACHEA, VIA NATURAL OR ARTIFICIAL OPENING ENDOSCOPIC: ICD-10-PCS | Performed by: STUDENT IN AN ORGANIZED HEALTH CARE EDUCATION/TRAINING PROGRAM

## 2021-10-15 PROCEDURE — 83605 ASSAY OF LACTIC ACID: CPT

## 2021-10-15 PROCEDURE — 83615 LACTATE (LD) (LDH) ENZYME: CPT

## 2021-10-15 PROCEDURE — 6370000000 HC RX 637 (ALT 250 FOR IP): Performed by: HOSPITALIST

## 2021-10-15 PROCEDURE — 94761 N-INVAS EAR/PLS OXIMETRY MLT: CPT

## 2021-10-15 PROCEDURE — 80053 COMPREHEN METABOLIC PANEL: CPT

## 2021-10-15 PROCEDURE — 2140000000 HC CCU INTERMEDIATE R&B

## 2021-10-15 PROCEDURE — 87500 VANOMYCIN DNA AMP PROBE: CPT

## 2021-10-15 PROCEDURE — 94660 CPAP INITIATION&MGMT: CPT

## 2021-10-15 PROCEDURE — 99222 1ST HOSP IP/OBS MODERATE 55: CPT | Performed by: NURSE PRACTITIONER

## 2021-10-15 PROCEDURE — 82803 BLOOD GASES ANY COMBINATION: CPT

## 2021-10-15 PROCEDURE — 86140 C-REACTIVE PROTEIN: CPT

## 2021-10-15 PROCEDURE — 36415 COLL VENOUS BLD VENIPUNCTURE: CPT

## 2021-10-15 PROCEDURE — 6370000000 HC RX 637 (ALT 250 FOR IP)

## 2021-10-15 PROCEDURE — 97530 THERAPEUTIC ACTIVITIES: CPT

## 2021-10-15 PROCEDURE — 5A1955Z RESPIRATORY VENTILATION, GREATER THAN 96 CONSECUTIVE HOURS: ICD-10-PCS | Performed by: STUDENT IN AN ORGANIZED HEALTH CARE EDUCATION/TRAINING PROGRAM

## 2021-10-15 PROCEDURE — 71045 X-RAY EXAM CHEST 1 VIEW: CPT

## 2021-10-15 PROCEDURE — 6360000002 HC RX W HCPCS: Performed by: NURSE PRACTITIONER

## 2021-10-15 PROCEDURE — 94002 VENT MGMT INPAT INIT DAY: CPT

## 2021-10-15 PROCEDURE — 87798 DETECT AGENT NOS DNA AMP: CPT

## 2021-10-15 PROCEDURE — 85025 COMPLETE CBC W/AUTO DIFF WBC: CPT

## 2021-10-15 PROCEDURE — 2500000003 HC RX 250 WO HCPCS

## 2021-10-15 PROCEDURE — 87641 MR-STAPH DNA AMP PROBE: CPT

## 2021-10-15 PROCEDURE — 82728 ASSAY OF FERRITIN: CPT

## 2021-10-15 PROCEDURE — 87581 M.PNEUMON DNA AMP PROBE: CPT

## 2021-10-15 PROCEDURE — 6360000002 HC RX W HCPCS

## 2021-10-15 PROCEDURE — 87631 RESP VIRUS 3-5 TARGETS: CPT

## 2021-10-15 PROCEDURE — 82948 REAGENT STRIP/BLOOD GLUCOSE: CPT

## 2021-10-15 PROCEDURE — 87541 LEGION PNEUMO DNA AMP PROB: CPT

## 2021-10-15 PROCEDURE — 83735 ASSAY OF MAGNESIUM: CPT

## 2021-10-15 PROCEDURE — 99291 CRITICAL CARE FIRST HOUR: CPT | Performed by: INTERNAL MEDICINE

## 2021-10-15 PROCEDURE — 80048 BASIC METABOLIC PNL TOTAL CA: CPT

## 2021-10-15 PROCEDURE — 87486 CHLMYD PNEUM DNA AMP PROBE: CPT

## 2021-10-15 PROCEDURE — 99233 SBSQ HOSP IP/OBS HIGH 50: CPT | Performed by: INTERNAL MEDICINE

## 2021-10-15 PROCEDURE — 6370000000 HC RX 637 (ALT 250 FOR IP): Performed by: STUDENT IN AN ORGANIZED HEALTH CARE EDUCATION/TRAINING PROGRAM

## 2021-10-15 PROCEDURE — 2700000000 HC OXYGEN THERAPY PER DAY

## 2021-10-15 PROCEDURE — 87081 CULTURE SCREEN ONLY: CPT

## 2021-10-15 PROCEDURE — 2500000003 HC RX 250 WO HCPCS: Performed by: INTERNAL MEDICINE

## 2021-10-15 PROCEDURE — 36600 WITHDRAWAL OF ARTERIAL BLOOD: CPT

## 2021-10-15 RX ORDER — DEXAMETHASONE SODIUM PHOSPHATE 10 MG/ML
10 INJECTION, EMULSION INTRAMUSCULAR; INTRAVENOUS DAILY
Status: DISCONTINUED | OUTPATIENT
Start: 2021-10-20 | End: 2021-10-20

## 2021-10-15 RX ORDER — PROPOFOL 10 MG/ML
INJECTION, EMULSION INTRAVENOUS
Status: COMPLETED
Start: 2021-10-15 | End: 2021-10-15

## 2021-10-15 RX ORDER — KETAMINE HCL IN NACL, ISO-OSM 100MG/10ML
SYRINGE (ML) INJECTION
Status: COMPLETED
Start: 2021-10-15 | End: 2021-10-15

## 2021-10-15 RX ORDER — KETAMINE HCL IN NACL, ISO-OSM 100MG/10ML
100 SYRINGE (ML) INJECTION ONCE
Status: COMPLETED | OUTPATIENT
Start: 2021-10-15 | End: 2021-10-15

## 2021-10-15 RX ORDER — SODIUM CHLORIDE 9 MG/ML
INJECTION, SOLUTION INTRAVENOUS CONTINUOUS
Status: DISCONTINUED | OUTPATIENT
Start: 2021-10-15 | End: 2021-11-18 | Stop reason: HOSPADM

## 2021-10-15 RX ORDER — DEXAMETHASONE SODIUM PHOSPHATE 4 MG/ML
10 INJECTION, SOLUTION INTRA-ARTICULAR; INTRALESIONAL; INTRAMUSCULAR; INTRAVENOUS; SOFT TISSUE EVERY 6 HOURS
Status: DISCONTINUED | OUTPATIENT
Start: 2021-10-15 | End: 2021-10-15 | Stop reason: DRUGHIGH

## 2021-10-15 RX ORDER — PROPOFOL 10 MG/ML
5-50 INJECTION, EMULSION INTRAVENOUS
Status: DISCONTINUED | OUTPATIENT
Start: 2021-10-15 | End: 2021-10-24

## 2021-10-15 RX ADMIN — SODIUM BICARBONATE 50 MEQ: 84 INJECTION INTRAVENOUS at 14:00

## 2021-10-15 RX ADMIN — SODIUM CHLORIDE: 9 INJECTION, SOLUTION INTRAVENOUS at 13:10

## 2021-10-15 RX ADMIN — SODIUM CHLORIDE: 9 INJECTION, SOLUTION INTRAVENOUS at 17:31

## 2021-10-15 RX ADMIN — PROPOFOL 40 MCG/KG/MIN: 10 INJECTION, EMULSION INTRAVENOUS at 14:57

## 2021-10-15 RX ADMIN — IRON SUCROSE 300 MG: 20 INJECTION, SOLUTION INTRAVENOUS at 17:31

## 2021-10-15 RX ADMIN — PROPOFOL 40 MCG/KG/MIN: 10 INJECTION, EMULSION INTRAVENOUS at 19:44

## 2021-10-15 RX ADMIN — CALCIUM GLUCONATE 1000 MG: 98 INJECTION, SOLUTION INTRAVENOUS at 18:23

## 2021-10-15 RX ADMIN — SODIUM CHLORIDE, PRESERVATIVE FREE 10 ML: 5 INJECTION INTRAVENOUS at 00:13

## 2021-10-15 RX ADMIN — MIDAZOLAM 2 MG/HR: 5 INJECTION INTRAMUSCULAR; INTRAVENOUS at 18:24

## 2021-10-15 RX ADMIN — BARICITINIB 4 MG: 2 TABLET, FILM COATED ORAL at 20:52

## 2021-10-15 RX ADMIN — Medication 100 MG: at 14:56

## 2021-10-15 RX ADMIN — APIXABAN 2.5 MG: 2.5 TABLET, FILM COATED ORAL at 20:14

## 2021-10-15 RX ADMIN — SODIUM ZIRCONIUM CYCLOSILICATE 5 G: 5 POWDER, FOR SUSPENSION ORAL at 20:15

## 2021-10-15 RX ADMIN — DEXAMETHASONE SODIUM PHOSPHATE 20 MG: 4 INJECTION, SOLUTION INTRAMUSCULAR; INTRAVENOUS at 20:15

## 2021-10-15 RX ADMIN — SODIUM CHLORIDE, PRESERVATIVE FREE 5 ML: 5 INJECTION INTRAVENOUS at 08:30

## 2021-10-15 ASSESSMENT — PULMONARY FUNCTION TESTS: PIF_VALUE: 29

## 2021-10-15 NOTE — PROGRESS NOTES
[address] [address]  INPATIENT PHYSICAL THERAPY  DAILY NOTE  [facility] MED SURG 8B - 8B-34/034-A      Time In: 1004  Time Out: 1020  Timed Code Treatment Minutes: 16 Minutes  Minutes: 16          Date: 10/15/2021  Patient Name: Silvestre Theodore,  Gender:  female        MRN: [id number]  : 1934  (80 y.o.)     Referring Practitioner: Edith Wiggins DO  Diagnosis: Acute respiratory failure with hypoxia  Additional Pertinent Hx: Per chart review: Ms. Demetra Coepland is an 51-year-old female with a past medical history of anxiety, hypertension, arthritis, and a potentially new diagnosis of CHF. She states that she was vaccinated for Covid. She tested positive for Covid on 10/4/2021. Her shortness of breath has gotten progressively worse along with her fatigue. She states that in the past week she has noticed increased shortness of breath, increased swelling in her lower limbs, increased fatigue, and increasing orthopnea. Chest x-ray shows bilateral groundglass opacities with cardiomegaly. RR on 10/12 due to elevated HR with amnio started. Prior Level of Function:  Lives With: Daughter  Type of Home: House  Home Layout: One level  Home Equipment: Cane   Bathroom Shower/Tub: Tub/Shower unit  Bathroom Toilet: Standard  Bathroom Equipment: Shower chair    Receives Help From: Family  ADL Assistance: Independent  Homemaking Assistance: Independent  Ambulation Assistance: Independent  Transfer Assistance: Independent  Additional Comments: Pt reported would use cane for mobility. Pt reported being very active prior, exercises daily.     Restrictions/Precautions:  Restrictions/Precautions: Fall Risk, Isolation, Contact Precautions  Position Activity Restriction  Other position/activity restrictions: droplet +; Hard of hearing       SUBJECTIVE: nursing ok'd to try therapy however stated that she hasn't been able to be aroused this date- pt kept eyes closed and not waking up did take a wash cloth to her face and sternal jeffrey garber pu rx/showed dl/hydrocodone-acetaminophen(norco0 5-325 mg per tablet [id number]   rub she would open eyes briefly but just with a gaze she didn't scan to look at me- pt needed repositioned in bed and nursing assisted with this     PAIN: pt didn't grimace at all during session     Vitals: Oxygen: Pt on 2L O2 - O2 sats 90 and greater     OBJECTIVE:  Bed Mobility:  Rolling to Left: Dependent, X 2, pt made no effort to assist    Rolling to Right: Dependent, X 2 pt made no effort to assist    Scooting: Dependent, X 2- after placing a lift sheet under her she was scooted up in bed   Placed pt on her opp side for pressure relief     Functional Outcome Measures: Completed  AM-PAC Inpatient Mobility Raw Score : 6  AM-PAC Inpatient T-Scale Score : 23.55    ASSESSMENT:  Assessment: Unable to wake pt up this date- she was dependent for repositioning and bed mobility this date- nursing aware of change in status   Activity Tolerance:  Patient tolerance of  treatment: poor. Equipment Recommendations:Equipment Needed: Yes (continue to assess RW needs)  Discharge Recommendations:    Continue to assess pending progress, 24 hour supervision or assist, Patient would benefit from continued therapy after discharge she may need a therapy stay prior to home going     Plan: Times per week: 5 x C  Current Treatment Recommendations: Strengthening, Transfer Training, Endurance Training, Neuromuscular Re-education, Patient/Caregiver Education & Training, Equipment Evaluation, Education, & procurement, Home Exercise Program, Gait Training, Balance Training, Functional Mobility Training, Stair training, Safety Education & Training    Patient Education  Patient Education: Plan of Care    Goals:  Patient goals : To get home as soon as possible  Short term goals  Time Frame for Short term goals: By hospital d/c  Short term goal 1: Pt to demonstrate supine <-> sit transfer mod I for ease with getting out of bed.   Short term goal 2: Pt to complete sit <->stand transfer mod I with LRAD for ability to transfer from surface to surface  Short term goal 3: Pt to ambulate 27' with LRAD and SBA to progress towards within home ambulation  Short term goal 4: Pt to increase Tinetti to a min of >=19/28  Long term goals  Time Frame for Long term goals : NA due to short ELOS    Following session, patient left in safe position with all fall risk precautions in place.

## 2021-10-15 NOTE — CARE COORDINATION
Discharge Planning Update:    Patient transferred to  from . K 5.3, Creatinine 2.4. Nephrology following. Patient was on NC and now on high flow oxygen at 100% FiO2-pulse ox 87%- they are getting ready to intubate her. ABG's drawn, chest x-ray, IV fluids, Eliquis, Venofer, Vitamin C, D, and Zinc, Propofol drip to start, BMP and CBC in a.m., daily Magnesium, CT of head, NPO, ace wrap to bilateral lower extremities, droplet plus isolation, acapella, incentive spirometry, telemetry, up with assistance.

## 2021-10-15 NOTE — PROGRESS NOTES
STAT Ct in for change in mentation at 1115. At 1pm CT calls to update there are 2 patients ahead of her.

## 2021-10-15 NOTE — PROGRESS NOTES
Report called to Dorothy Hernandez RN updated on status. Questions answered and verbalized understanding. Informed him of family wishes.

## 2021-10-15 NOTE — PROGRESS NOTES
Comprehensive Nutrition Assessment    Type and Reason for Visit:  Initial (vent; TF recommendations)    Nutrition Recommendations/Plan:   If remains on vent - recommend initiate TF. Suggest Nepro carb steady - start at 10 ml/hr; increase by 10 ml every 6 hours, as tolerated, to goal rate of 40 ml/hr. Free water flush per MD.  Will adjust goal rate as diprovan allows. Nutrition Assessment:    Pt. nutritionally compromised AEB NPO status d/t intubation this afternoon, poor po intake since admission. At risk for further nutrition compromise r/t COVID, new CHF, advanced age and underlying medical condition (hx diverticulitis, HTN). Nutrition recommendations/interventions as per above. Malnutrition Assessment:  Malnutrition Status:  Insufficient data    Context:  Acute Illness     Findings of the 6 clinical characteristics of malnutrition:  Energy Intake:  7 - 50% or less of estimated energy requirements for 5 or more days  Weight Loss:  No significant weight loss (however difficult to evaluate with edema)     Body Fat Loss:  Unable to assess (transfering for intubation)     Muscle Mass Loss:  Unable to assess    Fluid Accumulation:  Unable to assess     Strength:  Not Performed    Estimated Daily Nutrient Needs:  Energy (kcal):  0618-6347 kcals (25-35); Weight Used for Energy Requirements:   (56 kgm)     Protein (g):   gm (1.2-2) as renal function allows; Weight Used for Protein Requirements:   (56 kgm)        Fluid (ml/day):  per MD; Method Used for Fluid Requirements:  Other (Comment)      Nutrition Related Findings:   Pt. Transferring from 8B to 3A for intubation; +OGT; COVID+ (diagnosed 10/5); po 1-25% per graphics prior to intubation; 10/15: Glucose 86, BUN 70 Cr 2.4, Potassium 5.3;  Rx includes Colace, Glycolax, Vitamin C, D, Zinc; was noted to be lethargic and non-verbal today, not following commands; Diprovan started    Wounds:  None       Current Nutrition Therapies:    Diet NPO    Anthropometric Measures:  · Height: 5' (152.4 cm)  · Current Body Weight: 124 lb (56.2 kg) (10/11 +2 edema)   · Admission Body Weight: 124 lb (56.2 kg) (10/11 +2 edema)    · Usual Body Weight:  (per EMR: 10/7/21: 125#)     · Ideal Body Weight: 100 lbs;    · BMI: 24.2  · BMI Categories: Normal Weight (BMI 22.0 to 24.9) age over 72       Nutrition Diagnosis:   · Inadequate oral intake related to impaired respiratory function as evidenced by intubation      Nutrition Interventions:   Food and/or Nutrient Delivery:  Continue NPO  Nutrition Education/Counseling:  No recommendation at this time   Coordination of Nutrition Care:  Continue to monitor while inpatient    Goals:  TF to provide 75% or more of estimated nutrition needs while intubated. Nutrition Monitoring and Evaluation:   Behavioral-Environmental Outcomes:  None Identified   Food/Nutrient Intake Outcomes:  Diet Advancement/Tolerance, Enteral Nutrition Intake/Tolerance  Physical Signs/Symptoms Outcomes:  Biochemical Data, Chewing or Swallowing, GI Status, Fluid Status or Edema, Hemodynamic Status, Nutrition Focused Physical Findings, Skin, Weight     Discharge Planning:     Too soon to determine     Electronically signed by Melina Pozo RD, LD on 10/15/21 at 3:37 PM EDT    Contact: [phone]

## 2021-10-15 NOTE — PROGRESS NOTES
Pt is hard to arouse, opens eyes momentarily. Is not awake enough to answer questions to complete assessment. Held medications related to the fact she is unable to follow command. Will contact attending.

## 2021-10-15 NOTE — FLOWSHEET NOTE
10/15/21 1322   Provider Notification   Reason for Communication Evaluate; Review case   Provider Name Marsu    Provider Notification Physician   Method of Communication Secure Message   Response Waiting for response   Notification Time [id number]   provider updated on patient

## 2021-10-15 NOTE — PROGRESS NOTES
Hospitalist Progress Note      Patient:  Brianna Bal    Unit/Bed:3A-09/009-A  YOB: 1934  MRN: [id number]   Acct: [de-identified]     PCP: SERGE Coulter CNP  Date of Admission: 10/11/2021     Date of Service: Pt seen/examined on 10/15/21, earlier today   On 8B x 3 due to change in status. and Admitted to Inpatient with expected LOS greater than two midnights due to medical therapy. Chief Complaint:  Shortness of Breath    Assessment and Plan:    1.)  Acute hypoxic respiratory failure 2/2 acute systolic/diastolic heart failure (EF 25 to 30% 10/2021), moderate mitral regurgitation: Patient with SPO2 83% on room air on admission. Requiring nasal cannula in order to maintain good oxygen saturation. Her current symptoms of fatigue, orthopnea, shortness of breath, and lower limb swelling as well as her wildly elevated BNP heavily suggestive CHF and not COVID-19 causing her current symptoms.     - Improved; barely on 1 lpm via NC now; however, Cr trending up likely 2/2 aggressive diuresis and poor PO intake; held bumetanide for today; urine lytes/Osc sent; may consider gentle hydration if Cr further trending up. Avoid nephrotoxins.   -Monitor strict I's and O's  -Consult cardiology for discussion/consideration fo LHC as well as LifeVest versus AICD placement in the future  -Holding patient's homeopathic medications due to unknown side effect profile  -Continue to monitor O2 status and aggressively titrate down as able  - on BB, Patient will need medical optimization as indicated and possible at discharge    10/14: stable. Ccm. Seen by cardio w/ planned [facility] OP? Pt will need life vest; BB only for now given HERIBERTO    10/15  Was oxygenating well on NC on my initial evaluation in   Am, later status changed became hypoxic and increased   From 3 to 5/6 L NC oxygen. , and needs to be Tx to ICU ,   Called and spoke with dr. [place], and the daughter on the floor  Re the change in status.      2.)  HERIBERTO likely multifactorial 2/2 aggressive diuresis; poor PO inatke and mild hypotension: Creatinine 1.9 rom 1.3 yesterday; held diuretics; W/U sent; bladder scan to r/o retention (pt has been incontinent); will consider gentle hydration from 0.9. Continue to monitor with BMP daily. 10/14: Cr stable at 2.0. urine Na c/w pre-renal etiology; likely severely intravascularly volume depleted 9U Na below 20 despite being on diuretics); d/t poor PO intake and previous diuretics; resumed NS at 75 cc.hr while monitoring volume status and BMP closely    10/15 due to renal and overall change in status, neprhology was   Consulted , and appreciate their input. A fib: new-diagnosis; no RVR; stable; further adjust BB dose w/ hold parameters; also switched LMWH dose to therapeutic from BID SOC for COVID    10/14: in Afib w/ VR in 90s; further optimized BB dose (consider switching t XL only when rate adequately controlled on well-titrated dose of IR); in lieu of no planned procedure; will place on NOAC    4.)  COVID-19 infection: Patient states she started having symptoms on 10/4/2021 however was not diagnosed until after 10/7. Other than her current symptoms of shortness of breath, fatigue, and lower limb swelling she denies other symptoms of Covid. She denies loss of taste, smell, headaches, or insomnia. Her CXR does demonstrate groundglass opacities bilaterally. -Given Solu-Medrol in ED  -COVID-19 infection likely playing a minor role in patient's current symptomatology  -Continue to monitor with aggressive diuresis of CHF  -Holding on further upstream COVID medical management     5.)  Prolonged QTc interval: likely 2/2 HFrEF;, seen on telemetry strip in the ED. Mag on admission 1.7. Recheck and if below 2, will giev Mg Sulfate 2 gr once and trend Mg levels  -Avoid QTC prolonging medications such as Zofran  -Keep K >4 and Mg >2     6.)  Mild hyponatremia likely 2/2 acute CHF +/- hypovolemia :resolved w/ IVF. 136, stable. trend    7.)  Hyperkalemia 2/2 HERIBERTO: Potassium 5.6. D50W and Insulin R 10 IV once and recheck. Avoid K supplements  10/14: resolved; K 5.1. will trend    8.)  Macrocytosis without anemia, high RDW: B12/folate WNLs. Iron studies c/w ELVA. Will start on iron at discharge     9.)  Elevated AST 2/2 COVID-19 infection: AST elevated at 91. No known previous history of elevation. trending down     10. )  Hx of hypertension: Patient reports taking all of leaf extract outpatient for her hypertension. Reportedly she has not taken metoprolol or HCTZ yet though   Controlled. CCM and monitor   11.)  Hx of arthritis: Patient takes glucosamine chondroitin at home. Holding inpatient     12. AMS: likely delirium superimposed on Dementia? Delirium precautions; Avoid benzo, narcotics, Ach, antiemetics, antihistamines, antipsychotics as much as possible. This is day 1 for me with this patient, and due to worsening AMS   I requested an CT head and later added ABG due to hypoxemia. ABG grossly abnormal with Ph of 7.11 . So spoke with her daughter  Mar Amaya, requested her to be a full code, despite the co morbidities,   And I called and spoke with dr. Tabby Bello, from ICU/ Critical care, who was  Kind to come up to 8B and spoke with her daughters , one via phone. And they consented her to be a FULL CODE. SO Tx to ICU for intubation and close monitoring. Extensive time was spend today   Due to change in status, and d/w pt's daughter etc.     Code status: Full code; per discussion between palliative care and daughters, pt remains Full code  Disposition Plan: Patient will need to be evaluated by cardiology prior to discharge. COVID-19 is likely noncontributory at this point to her symptoms however she does have systolic heart failure. History Of Present Illness:      Admitted for management of Acute hypoxic respiratory failure secondary to acute decompensated CHF. Pt disengaged in conversation and confused.     Past Medical History, BILIDIR, BILITOT, ALKPHOS in the last 72 hours. No results for input(s): INR in the last 72 hours. No results for input(s): Delaney Risk in the last 72 hours. Urinalysis:      Lab Results   Component Value Date    NITRU NEGATIVE 10/13/2021    WBCUA 0-2 10/13/2021    BACTERIA NONE SEEN 10/13/2021    RBCUA NONE SEEN 10/13/2021    BLOODU NEGATIVE 10/13/2021    SPECGRAV 1.019 10/13/2021    GLUCOSEU NEGATIVE 10/11/2021       Intake & Output:  No intake/output data recorded. No intake/output data recorded. Radiology:     CXR: I have reviewed the CXR with the following interpretation: Cardiomegaly, groundglass appearances and diffuse bilateral lungs  EKG:  I have reviewed the EKG with the following interpretation: Prolonged QTC with bifascicular block  Echo: I reviewed the echo with the following interpretation: EF 25 to 30%, moderately increased left ventricular size, myxomatous degeneration of mitral valve with moderate mitral regurgitation    XR CHEST PORTABLE   Final Result   Somewhat low position of endotracheal tube 1.2 cm above the claire but improved aeration of the upper lungs. **This report has been created using voice recognition software. It may contain minor errors which are inherent in voice recognition technology. **      Final report electronically signed by Dr. Ktaelynn Gracia on 10/15/2021 3:34 PM      XR CHEST PORTABLE   Final Result   1. Bilateral pneumonia. 2. Small bilateral pleural effusions. 3. Stable cardiomegaly. **This report has been created using voice recognition software. It may contain minor errors which are inherent in voice recognition technology. **      Final report electronically signed by Dr. Herrera Luque on 10/15/2021 4:14 PM      XR CHEST PORTABLE   Final Result   Right lower lobe airspace infiltrates. Severe cardiomegaly. **This report has been created using voice recognition software.   It may contain minor errors which are inherent in voice recognition technology. **      Final report electronically signed by Dr. Nivia Braden on 10/11/2021 8:59 PM      XR CHEST PORTABLE   Final Result      1. Diffuse groundglass opacities in both lungs along with cardiomegaly. Findings likely suggest acute exacerbation of congestive heart failure versus pneumonia in the right clinical setting. Clinical correlation is recommended      2. Small left pleural effusion. 3. Other findings as described above. **This report has been created using voice recognition software. It may contain minor errors which are inherent in voice recognition technology. **      Final report electronically signed by Dr Jenni Shepherd on 10/11/2021 11:56 AM           DVT prophylaxis: Lovenox    Code Status: Full Code    PT/OT Eval Status: [address] [facility] [address] Problems    Diagnosis Date Noted    Acute congestive heart failure ([facility] [address] [address] [I50.9]      Priority: High    Acute respiratory failure with hypoxia ([facility] [J96.01] 10/11/2021       Pt was Transferred today  ICU/CCU due to acute respiratory failure  And worsening AMS. Thank you SERGE Leyva CNP for the opportunity to be involved in this patient's care. With RN in room, patient was updated about and agreed upon the treatment plan, all the questions and concerns were addressed. Patient was seen in PPE (PERSONAL PROTECTIVE EQUIPMENT). Patient was interviewed in Strict Airborne and Droplet Precautions.     Electronically signed by Maddy Markham MD on 10/15/2021 at 5:07 PM

## 2021-10-15 NOTE — PROGRESS NOTES
Hendrix placed pt did not respond, to stimulus during placement. 300mL immediate return dark yellow urine.

## 2021-10-15 NOTE — CONSULTS
Nephrology Consult Note    Patient - Marilu Swift   MRN -  [id number]      - 1934   80 y.o. Date of Admission -  10/11/2021 10:28 AM        Date of evaluation -  10/15/2021 11:20 AM    Reason for Consult  Renal insufficiency with CHF   Requesting Physician:  Dorma Collet, MD  History Information Obtained From: Nursing staff, Electronic medical records    [facility] / HPI:   The patient is a 80 y.o. female with past medical history of Atrial fib who presented to Emergency Department on 10/11/2021 with c/o of gradually worsening shortness of breath associated with fatigue that began few days prior to admission. She was unable to lie in bed due to shortness of breath, Per H&P, the pt was living at home and able to perform her ADLs and housework. She tested Positive for COVID 19 on 10/5. ECHO showed EF 25%. She was initially Diuresed with Bumex 1 mg IV 2x daily and transitioned to PO. Last dose 10/13  BP running as low as 83/53, generally running 91/-107/  She is afebrile  No SOB noted on 2 lpm  UOP not measured  Cr 0.9 on admission and increased to 2.4 today. BUN 29 on admission and up to 70 today. She has been hyperkalemic, up to 6.3. K 5.3 today. Baseline creatinine 1.0  Pt is lethargic and non verbal    REVIEW OF SYSTEMS:   Not available from pt as she is lethargic     EXAM:  VITALS:  BP (!) 112/55   Pulse 90   Temp 98.3 °F (36.8 °C) (Axillary)   Resp 18   Ht 5' (1.524 m)   Wt 124 lb (56.2 kg)   SpO2 92%   BMI 24.22 kg/m²      Constitutional:  No acute distress. Lethargic  Skin: No rash on exposed surfaces, No significant bruises  Heent:  Head is normocephalic, Extraocular movement intact. Non verbal  Neck: no jugular venous distention noted, Neck is supple  Cardiovascular:  S1, S2  regular rate and rhythm. Respiratory: Clear to ausculation bilaterally. Equal breath sounds, No crackles, No wheezes.  No shortness of breath at rest on 2 LPM.  Abdomen: Soft, apparently non tender  Ext: Distal lower extremity temp is warm, 2+ lower extremity edema. Musculoskeletal: Movement is coordinated. Psychiatric: mood and affect lethargic  Neurological: Patient is oriented to person. Recent and remote   memory is not intact,    Home Medications:  Medications Prior to Admission: hydroCHLOROthiazide (HYDRODIURIL) 12.5 MG tablet, Take 12.5 mg by mouth daily   NONFORMULARY, Focus Factor  NONFORMULARY, Drenamin  GLUCOSAMINE-CHONDROITIN PO, Take by mouth  cetirizine (ZYRTEC) 10 MG tablet, TAKE 1 TABLET BY MOUTH ONCE DAILY  HAWTHORN PO, Take 425 mg by mouth daily  NONFORMULARY, Allerplex  OLIVE LEAF EXTRACT PO, Take by mouth  Fexofenadine-Pseudoephedrine (ALLEGRA-D 12 HOUR PO), Take by mouth  aspirin 325 MG tablet, Take 325 mg by mouth daily  metoprolol tartrate (LOPRESSOR) 50 MG tablet, Take 1 tablet by mouth 2 times daily  Current Medications:     metoprolol tartrate  50 mg Oral BID    apixaban  2.5 mg Oral BID    [Held by provider] bumetanide  1 mg Oral Daily    Vitamin D  2,000 Units Oral Daily    ascorbic acid  500 mg Oral Daily    sodium chloride flush  5-40 mL IntraVENous 2 times per day    scopolamine  1 patch TransDERmal Q72H    zinc sulfate  50 mg Oral Daily     Continuous Infusions:   sodium chloride       Med and Labs reviewed  24HR INTAKE/OUTPUT:  No intake or output data in the 24 hours ending 10/15/21 1120    No intake/output data recorded. Patient Vitals for the past 96 hrs (Last 3 readings):   Weight   10/11/21 1810 124 lb (56.2 kg)     Body mass index is 24.22 kg/m². BP Readings from Last 5 Encounters:   10/15/21 (!) 112/55   10/07/21 126/80       Allergies:  Gluten meal, Milk-related compounds, Sulfa antibiotics, and Metronidazole    Past Medical, Family, Social History:   has a past medical history of Anxiety, Arthritis, Bronchitis, Diverticulitis of colon, and Hypertension. has a past surgical history that includes Appendectomy and Cholecystectomy.     family history is not on file.     reports that she quit smoking about 43 years ago. Her smoking use included cigarettes. She has a 15.00 pack-year smoking history. She has never used smokeless tobacco. She reports that she does not drink alcohol and does not use drugs. Diagnostic Data:  Recent Labs     10/14/21  0231 10/14/21  0231 10/14/21  1503 10/14/21  1503 10/14/21  2035 10/15/21  0642     --  132*  --   --  136   K 5.1   < > 5.6*  --  5.3* 5.3*   CL 95*  --  95*  --   --  95*   CO2 30  --  25  --   --  28   BUN 58*  --  60*  --   --  70*   CREATININE 2.0*  --  1.8*  --   --  2.4*   LABGLOM 24*   < > 27*   < >  --  19*   GLUCOSE 84  --  110*  --   --  86   CALCIUM 9.8  --  9.6  --   --  9.1    < > = values in this interval not displayed. Recent Labs     10/13/21  1446 10/14/21  0231 10/15/21  0642   MG 2.5* 2.5* 2.5*     Recent Labs     10/13/21  0641   WBC 7.3   RBC 4.41   HGB 13.8   HCT 44.1         XR CHEST PORTABLE  Result Date: 10/11/2021  Right lower lobe airspace infiltrates. Severe cardiomegaly. Summary 10/12/2021   Ejection fraction is visually estimated at 25-30%. There was severe global hypokinesis of the left ventricle. Left Ventricular size is Moderately increased . Aortic valve appears tricuspid. Aortic valve leaflets are somewhat thickened. Aortic valve leaflets are Mildly calcified. Trivial aortic regurgitation is noted. Myxomatotic degeneration of mitral valve. Mild to Moderate mitral regurgitation is present. Small circumferential pericardial effusion with no tamponade physiology. ASSESSMENT  1. Acute Kidney Injury 2nd to renal hypoperfusion from hypotension and volume depletion. Start NS cautiously at 50/hr. Place alaniz for accurate UOP. BMP in AM  2. Hypotension 2nd to volume depletion, Decrease Metoprolol from 50 to 25 mg 2x/d.   3. Hyperkalemia 2nd to Acute Kidney Injury and volume depletion with subsequent decreased Na delivery to distal tubule to facilitate ionic exchange with K.   4. Acute on chronic HFrEF 25-30%  5. Recent COVID 19 infection, 10/5/2021  6. Atrial fib  7. Iron deficiency anemia. Will give Venofer 300 mg IVPB q48 heart rate x 3.   8. Altered Mental Status, lethargy, TSH ok, Liver studies ok. Check ABGs    Active Problems:    Acute congestive heart failure (HCC)    Acute respiratory failure with hypoxia (HCC)  Resolved Problems:    * No resolved hospital problems. *    Discussed with Dr. Bere Braga. Patient was advised about impression and plan. Patient verbalizes understanding and agrees with plan of care.      Thank you Gege Watson MD  for allowing us to participate in care of [facility], APRN - CNP 10/15/2021 11:20 AM    CC: SERGE Pak CNP

## 2021-10-15 NOTE — H&P
CRITICAL CARE PROGRESS NOTE      Patient:  Felipeil Staff    Unit/Bed:3A-09/009-A  YOB: 1934  MRN: [id number]   PCP: SERGE Espino CNP  Date of Admission: 10/11/2021  Chief Complaint: Acute hypoxic respiratory failure High Flow    Assessment and Plan:    Acute hypoxic respiratory failure: Admitted on 10/11 and started HF. Progressive worsening hypoxemia. Intubation on 10/15. Current vent setting Pcontrol 20, PEEP 6, FiO2 100 %. Sedative with Propofol, Verse. Plan: Lung protective strategy with Peak < 35. PEEP < 30    ARDS:  Patient received oxygen with FiO2 100 with very low TV (250ml) on ventilation. Plan: ferritin, CRP, Lactic dehydrogenase. Start Decadron, Baricitinib     HFrEF: Echo (10/11): LV EF 25-30%, LV severe global hypokinesis. LV size moderate increase. Patient is hypotension with BP 97/50. Plan:     Hx of Prolong QTc interval:  On telemetry strip in ED. Seen by cardiogy for Prolong QTC. Plan: tele monitor, avoid QTC prolong medications, Keep K +> 4 and Mg > 2    New on set Afib without RVR: Cont Eliquis    HERIBERTO: BUN/Cr 70/2.4. Base line BUN/Cr 29/0.9. Likely secondary to Cardiorenal syndrome. Plan: avoid nephrotoxicity, cont gentle fluid hydration 50ml/hr. Nephrologist follow the case    Hyperkalemia: K+ 5.3. Plan: calcium gluconate, Lokelma    AMS: likely secondary to hypercapnic hypoxemia. Plan: CT head when patient is stable. Iron deficiency anemia: Iron replacement      INITIAL H AND P AND ICU COURSE:  Jonh Lr is 80years old female who presented in [address] [place] [address] for worsening SOB on 10/11. Patient reported received COVID vaccine. She was test positive for COVID in 10/4/21. Patient SOB progressive worsening that patient visit ED. In ED found to have O2 Sat 83% in room air with BNP 15.7k. Patient was admitted and manage for acute hypoxic respiratory failure likely due to combine CHF and COVID. Patient was placed on NC, Bumex, Dexamethasone.  On 10/15, patient was transferred to ICU and intubated due to progressive hypoxic respirator failure on 100% FiO2 on high flow.     Past Medical History      Diagnosis Date    Anxiety     Arthritis     Bronchitis     Diverticulitis of colon     Hypertension         Past Surgical History        Procedure Laterality Date    APPENDECTOMY      CHOLECYSTECTOMY         Current Medications    metoprolol tartrate  25 mg Oral BID    iron sucrose  300 mg IntraVENous Q48H    ketamine        propofol        apixaban  2.5 mg Oral BID    Vitamin D  2,000 Units Oral Daily    ascorbic acid  500 mg Oral Daily    sodium chloride flush  5-40 mL IntraVENous 2 times per day    scopolamine  1 patch TransDERmal Q72H    zinc sulfate  50 mg Oral Daily     acetaminophen **OR** acetaminophen, docusate sodium, sodium chloride flush, sodium chloride, polyethylene glycol, acetaminophen **OR** acetaminophen    IV Drips/Infusions   sodium chloride 50 mL/hr at 10/15/21 1310    norepinephrine      midazolam      sodium chloride         Home Medications  Medications Prior to Admission: hydroCHLOROthiazide (HYDRODIURIL) 12.5 MG tablet, Take 12.5 mg by mouth daily   NONFORMULARY, Focus Factor  NONFORMULARY, Drenamin  GLUCOSAMINE-CHONDROITIN PO, Take by mouth  cetirizine (ZYRTEC) 10 MG tablet, TAKE 1 TABLET BY MOUTH ONCE DAILY  HAWTHORN PO, Take 425 mg by mouth daily  NONFORMULARY, Allerplex  OLIVE LEAF EXTRACT PO, Take by mouth  Fexofenadine-Pseudoephedrine (ALLEGRA-D 12 HOUR PO), Take by mouth  aspirin 325 MG tablet, Take 325 mg by mouth daily  metoprolol tartrate (LOPRESSOR) 50 MG tablet, Take 1 tablet by mouth 2 times daily  ALLERGIES: Gluten meal, Milk-related compounds, Sulfa antibiotics, and Metronidazole    ROS   Can't obtain due to patient sedative on vent    Scheduled Meds:   metoprolol tartrate  25 mg Oral BID    iron sucrose  300 mg IntraVENous Q48H    ketamine        propofol        apixaban  2.5 mg Oral BID    Vitamin D  2,000 Units Oral Daily ascorbic acid  500 mg Oral Daily    sodium chloride flush  5-40 mL IntraVENous 2 times per day    scopolamine  1 patch TransDERmal Q72H    zinc sulfate  50 mg Oral Daily     Continuous Infusions:   sodium chloride 50 mL/hr at 10/15/21 1310    norepinephrine      midazolam      sodium chloride         PHYSICAL EXAMINATION:  T:  97.7.  P:  86. RR:  17. B/P:  97/59. FiO2: 100%. O2 Sat: 64.  I/O:  +774  Body mass index is 24.22 kg/m². GCS:   64  General: sedative on vent  HEENT:  normocephalic and atraumatic. No scleral icterus. PERR  Neck: supple. No Thyromegaly. Lungs: clear to auscultation. No retractions  Cardiac: RRR. No JVD. Abdomen: soft. Nontender. Extremities:  No clubbing, cyanosis, or edema x 4. Vasculature: capillary refill < 3 seconds. Palpable dorsalis pedis pulses. Skin:  warm and dry. Psych:  Alert and oriented x3. Affect appropriate  Lymph:  No supraclavicular adenopathy. Neurologic:  No focal deficit. No seizures. Data: (All radiographs, tracings, PFTs, and imaging are personally viewed and interpreted unless otherwise noted). Sodium 136, Potassium 5.3, Chloride 94, Bicarb 21, BUN/Cr 36/1.3  Last CBC done in 10/13: WBC 7.3, Hg 13.8, Hct 44.1, Platelet 111  Telemetry shows normal sinus rhythm          Seen with multidisciplinary ICU team.  Meets Continued ICU Level Care Criteria:    [x] Yes   [] No - Transfer Planned to listed location:  [] HOSPITALIST CONTACTED-      Case and plan discussed with Dr. Jenaro Borjas. Electronically signed by Florencio Angelucci, DO  Internal Medicine Resident Physician  Patient seen by me. Case discussed with family in detail. Case discussed with resident physician. Patient with hypoxemic hypercarbic respiratory failure. She will need pulmonary lavage for culture sensitivity and PCR. Patient was unresponsive prior to intubation and is sedated on mechanical ventilator. Documentation of alert and oriented x3 by the resident is inaccurate.   Italicized font represents changes to the note made by me. CC time 35 minutes. Time was discontiguous. Time does not include procedures. Time does include my direct assessment of the patient and coordination of care.   Electronically signed by Corry Grey MD on 10/16/2021 at 6:30 AM

## 2021-10-15 NOTE — PROGRESS NOTES
1445 To 3A 09. Report received from [facility]. Plan to intubate. Patient currently on BIPAP, minimally responsive. Does open eyes to verbal stimuli and moves all extremities but not following commands. 5 Confirmed with Dr. Ceci Toribio that he spoke with family. Will proceed with intubation. 1456 100 mg Ketamine given by Jade Wang CNP. [address]. Adrian Saul at bedside to intubate with assistance from Jade Wang CNP. 1457 Propofol gtt IV started at 40 mcg/kg/min by Jade Wang CNP.    1500 Patient intubated 7.5 mm at  24 at lips. Bilateral diminished breath sounds. 1505 OG placed at 65.     1530 patient Fighting/coughing on vent, restless, moving all extremities but nothing to command. Increased propofol to 50 mcg/min    1536 Patient following commands remains restless on ventilator. Message sent to Dr. Adrian Saul updating on patient mentation and restless state. BP also low at 81/56 was reported. Orders for Levophed. Head CT was discontinued.

## 2021-10-15 NOTE — PROCEDURES
ICU PROCEDURE - ENDOTRACHEAL INTUBATION    Roberta Cha     MRN#: [id number]  10/15/21      Accjuan luis [email or web address]     : 1934      INDICATION: Acute respiratory failure with hypoxia ([address]    TIME OUT: taken    Permission obtained, risks/benefits reviewed:    ANESTHESIA:   [x]Ketamine  []Ativan  [] Morphine  [x]Propofol  []Other medications:      ESTIMATED BLOOD LOSS:  None. COMPLICATIONS:  [x]N/A  [] Other:    LARYNGOSCOPIC AIRWAY GRADE (CORMACK-LEHANE):[]1  [x]2a  []2b []3  []4        INTUBATION EQUIPMENT USED:  [x] Direct laryngoscope only    OUTCOME: Successful placement of # 7.5  Taperguard Evac endotracheal via   [x]Oral route    INSERTION DEPTH:  24 cm from   [x]lip           CONFIRMATION OF TUBE POSITION:   [x]Capnography - Strong & repeatable exhaled CO2 detection   [x]Multiple point auscultation   [x]SpO2 response   [x]STAT X-ray   []Bronchoscopic assessment    UNUSUAL FINDINGS:    PROCEDURE:     Using direct laryngoscopy, the vocal cords were visualized and the endotracheal tube was placed through the cords under direct vision. Good breath sounds were auscultated bilaterally without sounds over abdomen. Appropriate strong & repeatable exhaled CO2 detection was confirmed.        Electronically signed by Tiny Bach DO on 10/15/2021 at 5:09 PM

## 2021-10-15 NOTE — PROGRESS NOTES
Called placed to family. Doc Saul, updated on current condition, family states she is on her way in.

## 2021-10-15 NOTE — PLAN OF CARE
[address]  OCCUPATIONAL THERAPY MISSED TREATMENT NOTE  [facility] SURG 8B  [id number]      Date: 10/15/2021  Patient Name: Griffin Jacobs        CSN: [id number]   : 1934  (80 y.o.)  Gender: female   Referring Practitioner: Artemio Johansen MD  Diagnosis: acute respiratory failure with hypoxia         REASON FOR MISSED TREATMENT: Patient Refused  Pt was asleep and unable to be awakened at this time. Nursing notified. ATTN x 2- pt had been transferred to intensive unit, per nursing. Will follow when pt is able to participate.

## 2021-10-15 NOTE — PROGRESS NOTES
Pulse ox alarming patient oxygen saturation at 85% on 1L. Patient increased to 3L and oxygen sats up to 93%. Patient non-responsive RN performed pain stimuli and patient had slight movement of arms. Primary RN notified. Primary physician updated. This RN perfectserve Dr. Marah Jarquin patient not responding to pain stimuli she is a full code I guess yesterday she was walking to bathroom and now she is hardly responsive. we just called ct to get that scan done now can we get an order to check an abg. she was 85% on 1L we bumped her up to 3L and she came up  ABG order placed. See bicarb results and updated Dr. Rachid Norton with results. Dr. Rachid Norton up to see patient.

## 2021-10-16 ENCOUNTER — APPOINTMENT (OUTPATIENT)
Dept: GENERAL RADIOLOGY | Age: 86
DRG: 004 | End: 2021-10-16
Payer: MEDICARE

## 2021-10-16 LAB
ACINETOBACTER CALCOACETICUS-BAUMANNII BY PCR: NOT DETECTED
ADENOVIRUS BY PCR: NOT DETECTED
ALBUMIN SERPL-MCNC: 2.7 G/DL (ref 3.5–5.1)
ALLEN TEST: ABNORMAL
ALP BLD-CCNC: 49 U/L (ref 38–126)
ALT SERPL-CCNC: 19 U/L (ref 11–66)
ANION GAP SERPL CALCULATED.3IONS-SCNC: 18 MEQ/L (ref 8–16)
ANION GAP SERPL CALCULATED.3IONS-SCNC: 19 MEQ/L (ref 8–16)
AST SERPL-CCNC: 30 U/L (ref 5–40)
BASE EXCESS (CALCULATED): 0 MMOL/L (ref -2.5–2.5)
BASOPHILIA: ABNORMAL
BASOPHILS # BLD: 0.1 %
BASOPHILS # BLD: 0.4 %
BASOPHILS ABSOLUTE: 0 THOU/MM3 (ref 0–0.1)
BASOPHILS ABSOLUTE: 0 THOU/MM3 (ref 0–0.1)
BILIRUB SERPL-MCNC: 1 MG/DL (ref 0.3–1.2)
BUN BLDV-MCNC: 73 MG/DL (ref 7–22)
BUN BLDV-MCNC: 75 MG/DL (ref 7–22)
C-REACTIVE PROTEIN: 5.03 MG/DL (ref 0–1)
CALCIUM SERPL-MCNC: 8.7 MG/DL (ref 8.5–10.5)
CALCIUM SERPL-MCNC: 9 MG/DL (ref 8.5–10.5)
CHLAMYDIA PNEUMONIAE BY PCR: NOT DETECTED
CHLORIDE BLD-SCNC: 97 MEQ/L (ref 98–111)
CHLORIDE BLD-SCNC: 98 MEQ/L (ref 98–111)
CO2: 22 MEQ/L (ref 23–33)
CO2: 25 MEQ/L (ref 23–33)
COLLECTED BY:: ABNORMAL
CREAT SERPL-MCNC: 2.3 MG/DL (ref 0.4–1.2)
CREAT SERPL-MCNC: 2.4 MG/DL (ref 0.4–1.2)
DEVICE: ABNORMAL
ENTEROBACTER CLOACAE COMPLEX BY PCR: NOT DETECTED
EOSINOPHIL # BLD: 0 %
EOSINOPHIL # BLD: 0 %
EOSINOPHILS ABSOLUTE: 0 THOU/MM3 (ref 0–0.4)
EOSINOPHILS ABSOLUTE: 0 THOU/MM3 (ref 0–0.4)
ERYTHROCYTE [DISTWIDTH] IN BLOOD BY AUTOMATED COUNT: 15.6 % (ref 11.5–14.5)
ERYTHROCYTE [DISTWIDTH] IN BLOOD BY AUTOMATED COUNT: 15.8 % (ref 11.5–14.5)
ERYTHROCYTE [DISTWIDTH] IN BLOOD BY AUTOMATED COUNT: 53.1 FL (ref 35–45)
ERYTHROCYTE [DISTWIDTH] IN BLOOD BY AUTOMATED COUNT: 55.9 FL (ref 35–45)
ESCHERICHIA COLI BY PCR: NOT DETECTED
FERRITIN: 604 NG/ML (ref 10–291)
GFR SERPL CREATININE-BSD FRML MDRD: 19 ML/MIN/1.73M2
GFR SERPL CREATININE-BSD FRML MDRD: 20 ML/MIN/1.73M2
GLUCOSE BLD-MCNC: 110 MG/DL (ref 70–108)
GLUCOSE BLD-MCNC: 113 MG/DL (ref 70–108)
GLUCOSE BLD-MCNC: 133 MG/DL (ref 70–108)
GLUCOSE BLD-MCNC: 88 MG/DL (ref 70–108)
GLUCOSE BLD-MCNC: 99 MG/DL (ref 70–108)
HAEMOPHILUS INFLUENZAE BY PCR: NOT DETECTED
HCO3: 23 MMOL/L (ref 23–28)
HCT VFR BLD CALC: 37.2 % (ref 37–47)
HCT VFR BLD CALC: 38.3 % (ref 37–47)
HEMOGLOBIN: 12.2 GM/DL (ref 12–16)
HEMOGLOBIN: 12.2 GM/DL (ref 12–16)
IFIO2: 50
IMMATURE GRANS (ABS): 0.36 THOU/MM3 (ref 0–0.07)
IMMATURE GRANS (ABS): 0.39 THOU/MM3 (ref 0–0.07)
IMMATURE GRANULOCYTES: 4.9 %
IMMATURE GRANULOCYTES: 5.1 %
INFLUENZA A BY PCR: NOT DETECTED
INFLUENZA B BY PCR: NOT DETECTED
KLEBSIELLA AEROGENES BY PCR: NOT DETECTED
KLEBSIELLA OXYTOCA BY PCR: NOT DETECTED
KLEBSIELLA PNEUMONIAE GROUP BY PCR: NOT DETECTED
LACTIC ACID: 1.8 MMOL/L (ref 0.5–2)
LD: 404 U/L (ref 100–190)
LEGIONELLA PNEUMOPHILIA BY PCR: NOT DETECTED
LYMPHOCYTES # BLD: 5.1 %
LYMPHOCYTES # BLD: 5.7 %
LYMPHOCYTES ABSOLUTE: 0.4 THOU/MM3 (ref 1–4.8)
LYMPHOCYTES ABSOLUTE: 0.4 THOU/MM3 (ref 1–4.8)
MAGNESIUM: 2.3 MG/DL (ref 1.6–2.4)
MCH RBC QN AUTO: 30.7 PG (ref 26–33)
MCH RBC QN AUTO: 31 PG (ref 26–33)
MCHC RBC AUTO-ENTMCNC: 31.9 GM/DL (ref 32.2–35.5)
MCHC RBC AUTO-ENTMCNC: 32.8 GM/DL (ref 32.2–35.5)
MCV RBC AUTO: 93.5 FL (ref 81–99)
MCV RBC AUTO: 97.5 FL (ref 81–99)
METAPNEUMOVIRUS BY PCR: NOT DETECTED
MODE: ABNORMAL
MONOCYTES # BLD: 3.4 %
MONOCYTES # BLD: 6.5 %
MONOCYTES ABSOLUTE: 0.3 THOU/MM3 (ref 0.4–1.3)
MONOCYTES ABSOLUTE: 0.5 THOU/MM3 (ref 0.4–1.3)
MORAXELLA CATARRHALIS BY PCR: NOT DETECTED
MRSA SCREEN RT-PCR: NEGATIVE
MYCOPLASMA PNEUMONIAE BY PCR: NOT DETECTED
NON-SARS CORONAVIRUS: NOT DETECTED
NUCLEATED RED BLOOD CELLS: 1 /100 WBC
NUCLEATED RED BLOOD CELLS: 1 /100 WBC
O2 SATURATION: 89 %
PARAINFLUENZA VIRUS BY PCR: NOT DETECTED
PCO2: 33 MMHG (ref 35–45)
PH BLOOD GAS: 7.46 (ref 7.35–7.45)
PLATELET # BLD: 119 THOU/MM3 (ref 130–400)
PLATELET # BLD: 167 THOU/MM3 (ref 130–400)
PLATELET ESTIMATE: ABNORMAL
PMV BLD AUTO: 12 FL (ref 9.4–12.4)
PMV BLD AUTO: 12.1 FL (ref 9.4–12.4)
PO2: 53 MMHG (ref 71–104)
POTASSIUM SERPL-SCNC: 4.6 MEQ/L (ref 3.5–5.2)
POTASSIUM SERPL-SCNC: 6 MEQ/L (ref 3.5–5.2)
PROTEUS SPECIES BY PCR: NOT DETECTED
PSEUDOMONAS AERUGINOSA BY PCR: NOT DETECTED
RBC # BLD: 3.93 MILL/MM3 (ref 4.2–5.4)
RBC # BLD: 3.98 MILL/MM3 (ref 4.2–5.4)
RESISTANT GENE CTX-M BY PCR: NORMAL
RESISTANT GENE IMP BY PCR: NORMAL
RESISTANT GENE KPC BY PCR: NORMAL
RESISTANT GENE MECA/C & MREJ BY PCR: NORMAL
RESISTANT GENE NDM BY PCR: NORMAL
RESISTANT GENE OXA-48-LIKE BY PCR: NORMAL
RESISTANT GENE VIM BY PCR: NORMAL
RESPIRATORY SYNCYTIAL VIRUS BY PCR: NOT DETECTED
RHINOVIRUS ENTEROVIRUS PCR: NOT DETECTED
SCAN OF BLOOD SMEAR: NORMAL
SEG NEUTROPHILS: 82.3 %
SEG NEUTROPHILS: 86.5 %
SEGMENTED NEUTROPHILS ABSOLUTE COUNT: 6.3 THOU/MM3 (ref 1.8–7.7)
SEGMENTED NEUTROPHILS ABSOLUTE COUNT: 6.4 THOU/MM3 (ref 1.8–7.7)
SERRATIA MARCESCENS BY PCR: NOT DETECTED
SET PEEP: 6 MMHG
SET PRESS SUPP: 30 CMH2O
SET RESPIRATORY RATE: 16 BPM
SODIUM BLD-SCNC: 139 MEQ/L (ref 135–145)
SODIUM BLD-SCNC: 140 MEQ/L (ref 135–145)
SOURCE, BLOOD GAS: ABNORMAL
SOURCE: NORMAL
SPECIMEN ACCEPTABILITY: NORMAL
STAPH AUREUS BY PCR: NOT DETECTED
STREP AGALACTIAE BY PCR: NOT DETECTED
STREP PNEUMONIAE BY PCR: NOT DETECTED
STREP PYOGENES BY PCR: NOT DETECTED
TOTAL PROTEIN: 4.9 G/DL (ref 6.1–8)
VANCOMYCIN RESISTANT ENTEROCOCCUS: NEGATIVE
WBC # BLD: 7.4 THOU/MM3 (ref 4.8–10.8)
WBC # BLD: 7.7 THOU/MM3 (ref 4.8–10.8)

## 2021-10-16 PROCEDURE — 85025 COMPLETE CBC W/AUTO DIFF WBC: CPT

## 2021-10-16 PROCEDURE — 6370000000 HC RX 637 (ALT 250 FOR IP): Performed by: HOSPITALIST

## 2021-10-16 PROCEDURE — 02HV33Z INSERTION OF INFUSION DEVICE INTO SUPERIOR VENA CAVA, PERCUTANEOUS APPROACH: ICD-10-PCS | Performed by: INTERNAL MEDICINE

## 2021-10-16 PROCEDURE — 82948 REAGENT STRIP/BLOOD GLUCOSE: CPT

## 2021-10-16 PROCEDURE — 99232 SBSQ HOSP IP/OBS MODERATE 35: CPT | Performed by: INTERNAL MEDICINE

## 2021-10-16 PROCEDURE — 2580000003 HC RX 258: Performed by: STUDENT IN AN ORGANIZED HEALTH CARE EDUCATION/TRAINING PROGRAM

## 2021-10-16 PROCEDURE — 2580000003 HC RX 258: Performed by: NURSE PRACTITIONER

## 2021-10-16 PROCEDURE — 94003 VENT MGMT INPAT SUBQ DAY: CPT

## 2021-10-16 PROCEDURE — 2100000000 HC CCU R&B

## 2021-10-16 PROCEDURE — 80048 BASIC METABOLIC PNL TOTAL CA: CPT

## 2021-10-16 PROCEDURE — 6360000002 HC RX W HCPCS: Performed by: STUDENT IN AN ORGANIZED HEALTH CARE EDUCATION/TRAINING PROGRAM

## 2021-10-16 PROCEDURE — 2500000003 HC RX 250 WO HCPCS: Performed by: STUDENT IN AN ORGANIZED HEALTH CARE EDUCATION/TRAINING PROGRAM

## 2021-10-16 PROCEDURE — 83735 ASSAY OF MAGNESIUM: CPT

## 2021-10-16 PROCEDURE — 36556 INSERT NON-TUNNEL CV CATH: CPT

## 2021-10-16 PROCEDURE — 71045 X-RAY EXAM CHEST 1 VIEW: CPT

## 2021-10-16 PROCEDURE — 36556 INSERT NON-TUNNEL CV CATH: CPT | Performed by: NURSE PRACTITIONER

## 2021-10-16 PROCEDURE — 36600 WITHDRAWAL OF ARTERIAL BLOOD: CPT

## 2021-10-16 PROCEDURE — 6370000000 HC RX 637 (ALT 250 FOR IP): Performed by: INTERNAL MEDICINE

## 2021-10-16 PROCEDURE — 99291 CRITICAL CARE FIRST HOUR: CPT | Performed by: INTERNAL MEDICINE

## 2021-10-16 PROCEDURE — 6370000000 HC RX 637 (ALT 250 FOR IP): Performed by: STUDENT IN AN ORGANIZED HEALTH CARE EDUCATION/TRAINING PROGRAM

## 2021-10-16 PROCEDURE — 36415 COLL VENOUS BLD VENIPUNCTURE: CPT

## 2021-10-16 PROCEDURE — 82803 BLOOD GASES ANY COMBINATION: CPT

## 2021-10-16 PROCEDURE — 2140000000 HC CCU INTERMEDIATE R&B

## 2021-10-16 RX ADMIN — PROPOFOL 15 MCG/KG/MIN: 10 INJECTION, EMULSION INTRAVENOUS at 20:21

## 2021-10-16 RX ADMIN — METOPROLOL TARTRATE 25 MG: 25 TABLET, FILM COATED ORAL at 08:01

## 2021-10-16 RX ADMIN — SODIUM CHLORIDE: 9 INJECTION, SOLUTION INTRAVENOUS at 15:37

## 2021-10-16 RX ADMIN — SODIUM CHLORIDE, PRESERVATIVE FREE 20 ML: 5 INJECTION INTRAVENOUS at 09:49

## 2021-10-16 RX ADMIN — BARICITINIB 1 MG: 2 TABLET, FILM COATED ORAL at 08:16

## 2021-10-16 RX ADMIN — PROPOFOL 20 MCG/KG/MIN: 10 INJECTION, EMULSION INTRAVENOUS at 05:33

## 2021-10-16 RX ADMIN — Medication 50 MG: at 08:01

## 2021-10-16 RX ADMIN — OXYCODONE HYDROCHLORIDE AND ACETAMINOPHEN 500 MG: 500 TABLET ORAL at 08:01

## 2021-10-16 RX ADMIN — DEXAMETHASONE SODIUM PHOSPHATE 20 MG: 4 INJECTION, SOLUTION INTRAMUSCULAR; INTRAVENOUS at 09:38

## 2021-10-16 RX ADMIN — Medication 2000 UNITS: at 08:01

## 2021-10-16 RX ADMIN — APIXABAN 2.5 MG: 2.5 TABLET, FILM COATED ORAL at 08:01

## 2021-10-16 RX ADMIN — APIXABAN 2.5 MG: 2.5 TABLET, FILM COATED ORAL at 20:20

## 2021-10-16 RX ADMIN — Medication 5 MCG/MIN: at 00:31

## 2021-10-16 RX ADMIN — SODIUM CHLORIDE, PRESERVATIVE FREE 10 ML: 5 INJECTION INTRAVENOUS at 20:20

## 2021-10-16 ASSESSMENT — PULMONARY FUNCTION TESTS
PIF_VALUE: 29
PIF_VALUE: 28
PIF_VALUE: 30
PIF_VALUE: 28
PIF_VALUE: 28
PIF_VALUE: 31

## 2021-10-16 ASSESSMENT — PAIN SCALES - GENERAL: PAINLEVEL_OUTOF10: 0

## 2021-10-16 NOTE — PROGRESS NOTES
Renal Progress Note    Assessment and Plan:   1. Acute kidney injury mostly prerenal azotemia with a serum creatinine very slightly improved today from yesterday. 2. SARS-CoV-2 pneumonia  3. Hypoxic respiratory failure mechanical support  4. Metabolic acidosis mild  5. Hypotension improved  6. BUN is out of proportion to serum creatinine may be due to steroid. PLAN:  1. Labs reviewed  2. Medications reviewed  3. No changes  4. Continue current intravenous fluid  5. Labs in the morning  6.  We will follow    Patient Active Problem List:     Sigmoid diverticulitis     Acute respiratory failure with hypoxia ([facility] [address]     COVID-19     Acute congestive heart failure ([facility] [address]      Subjective:   Admit Date: 10/11/2021    Interval History:   Seen for acute kidney injury  Remains on mechanical support  Updated by the staff  No new issues  Blood pressure is low but stable  Urine output is okay      Medications:   Scheduled Meds:   baricitinib  1 mg Oral Daily    metoprolol tartrate  25 mg Oral BID    iron sucrose  300 mg IntraVENous Q48H    dexamethasone (DECADRON) IVPB  20 mg IntraVENous Daily    Followed by   Verner Forest ON 10/20/2021] dexamethasone (PF)  10 mg IntraVENous Daily    apixaban  2.5 mg Oral BID    Vitamin D  2,000 Units Oral Daily    ascorbic acid  500 mg Oral Daily    sodium chloride flush  5-40 mL IntraVENous 2 times per day    scopolamine  1 patch TransDERmal Q72H    zinc sulfate  50 mg Oral Daily     Continuous Infusions:   sodium chloride 50 mL/hr at 10/15/21 1731    norepinephrine 1 mcg/min (10/16/21 0505)    midazolam 2 mg/hr (10/16/21 0038)    propofol 20 mcg/kg/min (10/16/21 0533)    sodium chloride         CBC:   Recent Labs     10/15/21  2341 10/16/21  0400   WBC 7.7 7.4   HGB 12.2 12.2   * 167     CMP:    Recent Labs     10/15/21  0642 10/15/21  2300 10/16/21  0400    140 139   K 5.3* 6.0* 4.6   CL 95* 97* 98   CO2 28 25 22*   BUN 70* 75* 73*   CREATININE 2.4* 2.4* 2.3* GLUCOSE 86 88 110*   CALCIUM 9.1 9.0 8.7   LABGLOM 19* 19* 20*     Troponin: No results for input(s): TROPONINI in the last 72 hours. BNP: No results for input(s): BNP in the last 72 hours. INR: No results for input(s): INR in the last 72 hours. Lipids: No results for input(s): CHOL, LDLDIRECT, TRIG, HDL, AMYLASE, LIPASE in the last 72 hours. Liver:   Recent Labs     10/15/21  2300   AST 30   ALT 19   ALKPHOS 49   PROT 4.9*   LABALBU 2.7*   BILITOT 1.0     Iron:    Recent Labs     10/15/21  2300   FERRITIN 604*     XR CHEST PORTABLE   Final Result   Impression:   1. Cardiomegaly with improvement of passive congestive changes and better    aeration of both lungs compared to the prior study. 2. The focal bilateral alveolar consolidations consistent with infectious    pulmonary disease are unchanged. Retrocardiac consolidation and small LEFT    pleural effusion obscuring the LEFT diaphragm is unchanged. This document has been electronically signed by: Vaishnavi Rankin MD on    10/16/2021 03:18 AM      XR CHEST PORTABLE   Final Result   Somewhat low position of endotracheal tube 1.2 cm above the claire but improved aeration of the upper lungs. **This report has been created using voice recognition software. It may contain minor errors which are inherent in voice recognition technology. **      Final report electronically signed by Dr. Dwayne Reynoso on 10/15/2021 3:34 PM      XR CHEST PORTABLE   Final Result   1. Bilateral pneumonia. 2. Small bilateral pleural effusions. 3. Stable cardiomegaly. **This report has been created using voice recognition software. It may contain minor errors which are inherent in voice recognition technology. **      Final report electronically signed by Dr. Salbador Kwok on 10/15/2021 4:14 PM      XR CHEST PORTABLE   Final Result   Right lower lobe airspace infiltrates. Severe cardiomegaly.             **This report has been created using voice recognition recognition technology. **

## 2021-10-16 NOTE — PROGRESS NOTES
Intensive Care Unit  Intensivist Progress Note    Patient: Sangeeta Young  : 1934  MRN#: [id number]  10/16/2021 3:33 PM  ADMISSION DAY:  10/11/2021 10:28 AM     Subjective:  Oxygenation improved, FiO2 weaned down from 100% to 55%. No fever or chills. Tolerated mechanical ventilation well. INITIAL H AND P AND ICU COURSE:  Gilma Tillman is 80years old female who presented in [address] [place] [address] for worsening SOB on 10/11. Patient reported received COVID vaccine. She was test positive for COVID in 10/4/21. Patient SOB progressive worsening that patient visit ED. In ED found to have O2 Sat 83% in room air with BNP 15.7k. Patient was admitted and manage for acute hypoxic respiratory failure likely due to combine CHF and COVID. Patient was placed on NC, Bumex, Dexamethasone. On 10/15, patient was transferred to ICU and intubated due to progressive hypoxic respirator failure on 100% FiO2 on high flow. Patient Vitals for the past 8 hrs:   BP Temp Temp src Pulse Resp SpO2   10/16/21 1400 (!) 110/57 -- -- 70 16 95 %   10/16/21 1300 106/60 -- -- 71 16 95 %   10/16/21 1200 (!) 107/57 -- -- 71 16 94 %   10/16/21 1140 -- -- -- -- -- 94 %   10/16/21 1000 113/65 -- -- 72 16 96 %   10/16/21 0945 -- 97.7 °F (36.5 °C) Axillary -- -- --   10/16/21 0930 113/61 -- -- 74 16 96 %   10/16/21 0900 114/64 -- -- 74 16 96 %   10/16/21 0847 -- -- -- -- -- 96 %   10/16/21 0830 116/67 -- -- 76 16 97 %   10/16/21 0800 117/69 -- -- 74 16 96 %       EXAM:  General: sedated on vent  HEENT:  normocephalic and atraumatic. No scleral icterus. PERR  Neck: supple. No Thyromegaly. Lungs:  Bilateral crackles rhonchi. Cardiac: RRR. No JVD. Abdomen: soft. Nontender. Extremities:  No clubbing, cyanosis, or edema x 4. Vasculature: capillary refill < 3 seconds. Palpable dorsalis pedis pulses. Skin:  warm and dry.   Lymph:  No supraclavicular adenopathy    Intake/Output Summary (Last 24 hours) at 10/16/2021 1533  Last data filed at 10/16/2021 1300  Gross per 24 hour   Intake 1552.64 ml   Output 500 ml   Net 1052.64 ml     I/O last 3 completed shifts: In: 1552.6 [I.V.:1482. 6; NG/GT:70]  Out: 500 [Urine:500]   Date 10/16/21 0000 - 10/16/21 2359   Shift 8404-7510 4485-0246 4690-6144 24 Hour Total   INTAKE   I.V.(mL/kg) 613. 4(10.2)   613. 4(10.2)   NG/GT(mL/kg)  20(0.3)  20(0.3)   Shift Total(mL/kg) 613. 4(10.2) 20(0.3)  633. 4(10.5)   OUTPUT   Urine(mL/kg/hr) 50(0.1)   50   Shift Total(mL/kg) 50(0.8)   50(0.8)   Weight (kg) 60.2 60.2 60.2 60.2     Wt Readings from Last 3 Encounters:   10/16/21 132 lb 11.5 oz (60.2 kg)   10/07/21 125 lb (56.7 kg)      Body mass index is 25.92 kg/m².          Scheduled Meds:   baricitinib  1 mg Oral Daily    metoprolol tartrate  25 mg Oral BID    iron sucrose  300 mg IntraVENous Q48H    dexamethasone (DECADRON) IVPB  20 mg IntraVENous Daily    Followed by   Avni Tobias ON 10/20/2021] dexamethasone (PF)  10 mg IntraVENous Daily    apixaban  2.5 mg Oral BID    Vitamin D  2,000 Units Oral Daily    ascorbic acid  500 mg Oral Daily    sodium chloride flush  5-40 mL IntraVENous 2 times per day    scopolamine  1 patch TransDERmal Q72H    zinc sulfate  50 mg Oral Daily     Continuous Infusions:   sodium chloride 50 mL/hr at 10/15/21 1731    norepinephrine Stopped (10/16/21 1114)    midazolam 2 mg/hr (10/16/21 0038)    propofol 15 mcg/kg/min (10/16/21 1023)    sodium chloride       PRN Meds:acetaminophen, 650 mg, Q6H PRN   Or  acetaminophen, 650 mg, Q6H PRN  docusate sodium, 100 mg, Daily PRN  sodium chloride flush, 5-40 mL, PRN  sodium chloride, 25 mL, PRN  polyethylene glycol, 17 g, Daily PRN  acetaminophen, 650 mg, Q6H PRN   Or  acetaminophen, 650 mg, Q6H PRN    NUTRITION SUPPORT:    SUPPORT DEVICES:    Oxygen Delivery - O2 Flow Rate (L/min): 15 L/min  VENT SETTINGS (Comprehensive)  Vent Information  $Ventilation: $Subsequent Day  Skin Assessment: Clean, dry, & intact  Suction Catheter Diameter: 14  Equipment ID: [id number]  Vent Type: C2G5 [place]  Vent Mode: PCV+  Pressure Ordered: 30 (p control 24)  Rate Set: 16 bmp  Peak Flow: 47 L/min  FiO2 : (S) 55 % (inceased post ABG results)  SpO2: 95 %  SpO2/FiO2 ratio: 188  Sensitivity: 3  PEEP/CPAP: (S) 8 (increased post ABG results)  I Time/ I Time %: 1 s  Humidification Source:  (BLOWBY)  Nitric Oxide/Epoprostenol In Use?: No  Mask Type: Full face mask  Mask Size: Medium  Additional Respiratory  Assessments  Pulse: 70  Resp: 16  SpO2: 95 %  Position: Semi-Boone's  Humidification Source:  (BLOWBY)  Oral Care: Lip moisturizer applied, Mouth swabbed, Mouth suctioned, Suction toothette  Subglottic Suction Done?: Yes  Cuff Pressure (cm H2O): 24 cm H2O      DATA:    CBC:   Recent Labs     10/15/21  2341 10/16/21  0400   WBC 7.7 7.4   RBC 3.98* 3.93*   HGB 12.2 12.2   HCT 37.2 38.3   MCV 93.5 97.5   MCH 30.7 31.0   MCHC 32.8 31.9*   * 167   MPV 12.1 12.0      BMP/CMP:   Recent Labs     10/15/21  0642 10/15/21  2300 10/16/21  0400    140 139   K 5.3* 6.0* 4.6   CL 95* 97* 98   CO2 28 25 22*   ANIONGAP 13.0 18.0* 19.0*   BUN 70* 75* 73*   CREATININE 2.4* 2.4* 2.3*   GLUCOSE 86 88 110*   CALCIUM 9.1 9.0 8.7   PROT  --  4.9*  --    LABALBU  --  2.7*  --    BILITOT  --  1.0  --    ALKPHOS  --  49  --    AST  --  30  --    ALT  --  19  --       Other Electrolytes:   Recent Labs     10/14/21  0231 10/15/21  0642 10/16/21  0400   MG 2.5* 2.5* 2.3     Serum Osmolality  No results for input(s): OSMOMEASER in the last 72 hours. Procalcitonin:  No results for input(s): PROCAL in the last 72 hours. Cardiac: No results for input(s): TROPONINT in the last 72 hours. Lipids: No results for input(s): CHOL, HDL in the last 72 hours. Invalid input(s): LDLCALCU  Coagulation: No results for input(s): INR in the last 72 hours.   Lactic Acid:   Recent Labs     10/15/21  2341   LACTA 1.8      ABGs:   Lab Results   Component Value Date    PH 7.46 10/16/2021    PCO2 33 10/16/2021    PO2 53 Bilateral pneumonia. 2. Small bilateral pleural effusions. 3. Stable cardiomegaly. **This report has been created using voice recognition software. It may contain minor errors which are inherent in voice recognition technology. **     Final report electronically signed by Dr. Rain Bates on 10/15/2021 4:14 PM     XR CHEST PORTABLE [[id number]] Resulted: 10/15/21 1534     Order Status: Completed Updated: 10/15/21 1536     Narrative:       PROCEDURE: XR CHEST PORTABLE     CLINICAL INFORMATION: Intubation, OG placement. .     TECHNIQUE: Portable supine     COMPARISON: 10/15/2021       FINDINGS: Endotracheal tube is 1.2 cm above the claire. OG tube is well the stomach. There is slightly improved aeration of the upper lungs. There are extensive bilateral infiltrates. Effusions are possible. Heart size is difficult to assess but is likely enlarged.      Impression:       Somewhat low position of endotracheal tube 1.2 cm above the claire but improved aeration of the upper lungs. **This report has been created using voice recognition software.  It may contain minor errors which are inherent in voice recognition technology. **     Final report electronically signed by Dr. Kimo Reich on 10/15/2021 3:34 PM     CT HEAD WO CONTRAST [[id number]]      Order Status: Canceled      XR CHEST PORTABLE [[id number]] Resulted: 10/11/21 2059     Order Status: Completed Updated: 10/11/21 2102     Narrative:       PROCEDURE: XR CHEST PORTABLE     CLINICAL INFORMATION: shortness of breath . TECHNIQUE: Portable upright     COMPARISON: 10/11/2021 at 1138       FINDINGS: Rather massive cardiomegaly. Right lower lobe airspace infiltrates. Pulmonary vessels are not congested. Left pleural effusion is not excludable. Central venous catheter from the left femoral vein approach tip in the right atrium     Impression:       Right lower lobe airspace infiltrates. Severe cardiomegaly.          **This report has been created using voice recognition software.  It may contain minor errors which are inherent in voice recognition technology. **     Final report electronically signed by Dr. Laxmi Rai on 10/11/2021 8:59 PM     XR CHEST PORTABLE [[id number]] Resulted: 10/11/21 1156     Order Status: Completed Updated: 10/11/21 1158     Narrative:       PROCEDURE: XR CHEST PORTABLE     CLINICAL INFORMATION: sob     COMPARISON: Chest radiograph 10/14/2020     TECHNIQUE: AP portable chest radiograph performed. FINDINGS:     Cardiac silhouette is enlarged. There is diffuse groundglass opacities in both lungs. Possible small left pleural effusion. Aortic arch calcifications. Left retrocardiac opacity is also seen. No pneumothorax. No acute bony abnormality. Mild dextroscoliosis.      Impression:         1. Diffuse groundglass opacities in both lungs along with cardiomegaly. Findings likely suggest acute exacerbation of congestive heart failure versus pneumonia in the right clinical setting. Clinical correlation is recommended     2. Small left pleural effusion. 3. Other findings as described above. **This report has been created using voice recognition software.  It may contain minor errors which are inherent in voice recognition technology. **     Final report electronically signed by Dr Shama Miller on 10/11/2021 11:56 AM           Patient Active Problem List   Diagnosis    Sigmoid diverticulitis    Acute respiratory failure with hypoxia (HCC)    COVID-19    Acute congestive heart failure ([facility] [address]       Assessment and Plan:  1. Acute hypoxic respiratory failure: Admitted on 10/11 Progressive worsening hypoxemia. Intubation on 10/15. FiO2 was weaned down to 55%. Sedative with Propofol and Versed. Continue lung protective strategy with Peak < 35. PEEP < 30     2. COVID-19 pneumonia:   Continue low tidal volume ventilation.   Requirement of oxygen improved significantly since yesterday, FiO2 weaned down from 1/2% to 55%. Decadron, Baricitinib      3. HFrEF: Echo (10/11): LV EF 25-30%, LV severe global hypokinesis. LV size moderate increase.       4. Hx of Prolong QTc interval:  On telemetry strip in ED. Seen by cardiogy for Prolong QTC. Plan: tele monitor, avoid QTC prolong medications, Keep K +> 4 and Mg > 2     5. New on set Afib without RVR: Cont Eliquis     6. HERIBERTO: BUN/Cr 70/2.4. Base line BUN/Cr 29/0.9. Likely secondary to Cardiorenal syndrome. Plan: avoid nephrotoxicity, cont gentle fluid hydration 50ml/hr. Nephrologist follow the case     7. Hyperkalemia: K+ 5.3. Plan: calcium gluconate, Lokelma     8. AMS: likely secondary to hypercapnic hypoxemia. Plan: CT head when patient is stable.     9. Iron deficiency anemia: Iron replacement      I spent over 30 mins of critical time involved in this patient care.       Alexia Brannon MD, MD

## 2021-10-16 NOTE — PROCEDURES
[facility]    Patient:  Malaika Rodriguez  YOB: 1934    MRN: [id number]   Acct:  [de-identified]      10/16/21  0200    PERFORMED BY: SERGE Donald CNP    PRE PROCEDURE DIAGNOSES: Acute respiratory failure,  Hypoxia, Severe COVID 19, ARDS, Acute kidney injury    INDICATIONS:vascular access, poor peripheral access, central venous monitoring and need for frequent blood draws. SITE: right  internal jugular vein    CONSENT:  Consent obtained from patient and/or next of kin after explaining indication/risks    TIME OUT: Completed. Hand washing completed prior to procedure. STERILE PREP: Full maximum sterile field/barrier technique was followed (with cap and mask, gloves and sterile gown, as well as broad field sterile drapes). Local disinfection was performed with broad field application of orange dyed chloraprep solution, which was allowed to dry fully prior to the initiation of the procedure. LOCAL ANESTHETIC: Numbed with 1 percent lidocaine, total estimated 5 ml. ESTIMATED BLOOD LOSS:  3 ml. PROCEDURE:  internal jugular vein  easily accessed with a 23-gauge needle and with an 18-gauge needle. Guidewire was passed without difficulty or resistance. Both needles removed, dilator over guidewire with skin snip, dilator removed. 3 lumen 16 CVC catheter advanced over the guidewire without difficulty or resistance to full extent. The guidewire was withdrawn and discarded and good return of dark venous blood. Positive blood aspiration from all lumens and flushed easily. Sutured at 15 cm, Biopatch applied. Chest x-ray is ordered. Chest x-ray confirms position, line in SVC and ready for use. Pneumothorax No, other complications No.      Procedure well tolerated.             Electronically signed by SERGE Donald CNP on 10/16/2021 at 2:58 AM

## 2021-10-16 NOTE — PROGRESS NOTES
Daughter Ashwin Taylor called for morning update. Plan of care and vitals discussed. All questions answered and daughter expecting update on day shift.

## 2021-10-17 LAB
ALLEN TEST: NORMAL
ANION GAP SERPL CALCULATED.3IONS-SCNC: 10 MEQ/L (ref 8–16)
ANION GAP SERPL CALCULATED.3IONS-SCNC: 10 MEQ/L (ref 8–16)
BASE EXCESS (CALCULATED): 1.6 MMOL/L (ref -2.5–2.5)
BASOPHILS # BLD: 0.3 %
BASOPHILS ABSOLUTE: 0 THOU/MM3 (ref 0–0.1)
BLOOD CULTURE, ROUTINE: NORMAL
BLOOD CULTURE, ROUTINE: NORMAL
BUN BLDV-MCNC: 72 MG/DL (ref 7–22)
BUN BLDV-MCNC: 74 MG/DL (ref 7–22)
CALCIUM SERPL-MCNC: 8.6 MG/DL (ref 8.5–10.5)
CALCIUM SERPL-MCNC: 8.7 MG/DL (ref 8.5–10.5)
CHLORIDE BLD-SCNC: 102 MEQ/L (ref 98–111)
CHLORIDE BLD-SCNC: 102 MEQ/L (ref 98–111)
CO2: 26 MEQ/L (ref 23–33)
CO2: 27 MEQ/L (ref 23–33)
COLLECTED BY:: NORMAL
CREAT SERPL-MCNC: 1.9 MG/DL (ref 0.4–1.2)
CREAT SERPL-MCNC: 2 MG/DL (ref 0.4–1.2)
DEVICE: NORMAL
EOSINOPHIL # BLD: 0 %
EOSINOPHILS ABSOLUTE: 0 THOU/MM3 (ref 0–0.4)
ERYTHROCYTE [DISTWIDTH] IN BLOOD BY AUTOMATED COUNT: 15.4 % (ref 11.5–14.5)
ERYTHROCYTE [DISTWIDTH] IN BLOOD BY AUTOMATED COUNT: 51.8 FL (ref 35–45)
GFR SERPL CREATININE-BSD FRML MDRD: 24 ML/MIN/1.73M2
GFR SERPL CREATININE-BSD FRML MDRD: 25 ML/MIN/1.73M2
GLUCOSE BLD-MCNC: 133 MG/DL (ref 70–108)
GLUCOSE BLD-MCNC: 152 MG/DL (ref 70–108)
GLUCOSE BLD-MCNC: 172 MG/DL (ref 70–108)
GLUCOSE BLD-MCNC: 174 MG/DL (ref 70–108)
GLUCOSE BLD-MCNC: 184 MG/DL (ref 70–108)
GLUCOSE BLD-MCNC: 221 MG/DL (ref 70–108)
HCO3: 26 MMOL/L (ref 23–28)
HCT VFR BLD CALC: 36.2 % (ref 37–47)
HEMOGLOBIN: 12.8 GM/DL (ref 12–16)
IFIO2: 55
IMMATURE GRANS (ABS): 0.22 THOU/MM3 (ref 0–0.07)
IMMATURE GRANULOCYTES: 3.4 %
LYMPHOCYTES # BLD: 4.7 %
LYMPHOCYTES ABSOLUTE: 0.3 THOU/MM3 (ref 1–4.8)
MAGNESIUM: 2.2 MG/DL (ref 1.6–2.4)
MCH RBC QN AUTO: 32.8 PG (ref 26–33)
MCHC RBC AUTO-ENTMCNC: 35.4 GM/DL (ref 32.2–35.5)
MCV RBC AUTO: 92.8 FL (ref 81–99)
MODE: NORMAL
MONOCYTES # BLD: 7.5 %
MONOCYTES ABSOLUTE: 0.5 THOU/MM3 (ref 0.4–1.3)
MRSA SCREEN: NORMAL
NUCLEATED RED BLOOD CELLS: 0 /100 WBC
O2 SATURATION: 98 %
PCO2: 40 MMHG (ref 35–45)
PH BLOOD GAS: 7.42 (ref 7.35–7.45)
PLATELET # BLD: 183 THOU/MM3 (ref 130–400)
PMV BLD AUTO: 11.6 FL (ref 9.4–12.4)
PO2: 95 MMHG (ref 71–104)
POTASSIUM REFLEX MAGNESIUM: 3.8 MEQ/L (ref 3.5–5.2)
POTASSIUM REFLEX MAGNESIUM: 4.4 MEQ/L (ref 3.5–5.2)
POTASSIUM SERPL-SCNC: 3.8 MEQ/L (ref 3.5–5.2)
RBC # BLD: 3.9 MILL/MM3 (ref 4.2–5.4)
SEG NEUTROPHILS: 84.1 %
SEGMENTED NEUTROPHILS ABSOLUTE COUNT: 5.4 THOU/MM3 (ref 1.8–7.7)
SET PEEP: 8 MMHG
SET PRESS SUPP: 32 CMH2O
SET RESPIRATORY RATE: 16 BPM
SODIUM BLD-SCNC: 138 MEQ/L (ref 135–145)
SODIUM BLD-SCNC: 139 MEQ/L (ref 135–145)
SOURCE, BLOOD GAS: NORMAL
WBC # BLD: 6.4 THOU/MM3 (ref 4.8–10.8)

## 2021-10-17 PROCEDURE — 94003 VENT MGMT INPAT SUBQ DAY: CPT

## 2021-10-17 PROCEDURE — 85025 COMPLETE CBC W/AUTO DIFF WBC: CPT

## 2021-10-17 PROCEDURE — 2100000000 HC CCU R&B

## 2021-10-17 PROCEDURE — 6370000000 HC RX 637 (ALT 250 FOR IP): Performed by: STUDENT IN AN ORGANIZED HEALTH CARE EDUCATION/TRAINING PROGRAM

## 2021-10-17 PROCEDURE — 2580000003 HC RX 258: Performed by: STUDENT IN AN ORGANIZED HEALTH CARE EDUCATION/TRAINING PROGRAM

## 2021-10-17 PROCEDURE — 36415 COLL VENOUS BLD VENIPUNCTURE: CPT

## 2021-10-17 PROCEDURE — 6360000002 HC RX W HCPCS: Performed by: STUDENT IN AN ORGANIZED HEALTH CARE EDUCATION/TRAINING PROGRAM

## 2021-10-17 PROCEDURE — 2580000003 HC RX 258: Performed by: NURSE PRACTITIONER

## 2021-10-17 PROCEDURE — 99291 CRITICAL CARE FIRST HOUR: CPT | Performed by: INTERNAL MEDICINE

## 2021-10-17 PROCEDURE — 99232 SBSQ HOSP IP/OBS MODERATE 35: CPT | Performed by: INTERNAL MEDICINE

## 2021-10-17 PROCEDURE — 83735 ASSAY OF MAGNESIUM: CPT

## 2021-10-17 PROCEDURE — 36600 WITHDRAWAL OF ARTERIAL BLOOD: CPT

## 2021-10-17 PROCEDURE — 82948 REAGENT STRIP/BLOOD GLUCOSE: CPT

## 2021-10-17 PROCEDURE — 6370000000 HC RX 637 (ALT 250 FOR IP): Performed by: INTERNAL MEDICINE

## 2021-10-17 PROCEDURE — 80048 BASIC METABOLIC PNL TOTAL CA: CPT

## 2021-10-17 PROCEDURE — 6370000000 HC RX 637 (ALT 250 FOR IP): Performed by: HOSPITALIST

## 2021-10-17 PROCEDURE — 82803 BLOOD GASES ANY COMBINATION: CPT

## 2021-10-17 PROCEDURE — 6360000002 HC RX W HCPCS: Performed by: NURSE PRACTITIONER

## 2021-10-17 RX ORDER — POTASSIUM CHLORIDE 7.45 MG/ML
10 INJECTION INTRAVENOUS
Status: COMPLETED | OUTPATIENT
Start: 2021-10-17 | End: 2021-10-17

## 2021-10-17 RX ORDER — POTASSIUM CHLORIDE 7.45 MG/ML
20 INJECTION INTRAVENOUS
Status: ACTIVE | OUTPATIENT
Start: 2021-10-17 | End: 2021-10-17

## 2021-10-17 RX ADMIN — APIXABAN 2.5 MG: 2.5 TABLET, FILM COATED ORAL at 07:57

## 2021-10-17 RX ADMIN — DEXAMETHASONE SODIUM PHOSPHATE 20 MG: 4 INJECTION, SOLUTION INTRAMUSCULAR; INTRAVENOUS at 07:58

## 2021-10-17 RX ADMIN — METOPROLOL TARTRATE 25 MG: 25 TABLET, FILM COATED ORAL at 07:57

## 2021-10-17 RX ADMIN — MIDAZOLAM 2 MG/HR: 5 INJECTION INTRAMUSCULAR; INTRAVENOUS at 04:43

## 2021-10-17 RX ADMIN — IRON SUCROSE 300 MG: 20 INJECTION, SOLUTION INTRAVENOUS at 14:12

## 2021-10-17 RX ADMIN — APIXABAN 2.5 MG: 2.5 TABLET, FILM COATED ORAL at 21:17

## 2021-10-17 RX ADMIN — Medication 2000 UNITS: at 07:57

## 2021-10-17 RX ADMIN — POTASSIUM CHLORIDE 10 MEQ: 7.46 INJECTION, SOLUTION INTRAVENOUS at 08:27

## 2021-10-17 RX ADMIN — Medication 50 MG: at 07:57

## 2021-10-17 RX ADMIN — SODIUM CHLORIDE, PRESERVATIVE FREE 20 ML: 5 INJECTION INTRAVENOUS at 09:04

## 2021-10-17 RX ADMIN — METOPROLOL TARTRATE 25 MG: 25 TABLET, FILM COATED ORAL at 21:16

## 2021-10-17 RX ADMIN — BARICITINIB 1 MG: 2 TABLET, FILM COATED ORAL at 07:57

## 2021-10-17 RX ADMIN — PROPOFOL 15 MCG/KG/MIN: 10 INJECTION, EMULSION INTRAVENOUS at 14:11

## 2021-10-17 RX ADMIN — OXYCODONE HYDROCHLORIDE AND ACETAMINOPHEN 500 MG: 500 TABLET ORAL at 07:57

## 2021-10-17 RX ADMIN — SODIUM CHLORIDE, PRESERVATIVE FREE 10 ML: 5 INJECTION INTRAVENOUS at 21:21

## 2021-10-17 RX ADMIN — POTASSIUM CHLORIDE 10 MEQ: 7.46 INJECTION, SOLUTION INTRAVENOUS at 09:30

## 2021-10-17 ASSESSMENT — PULMONARY FUNCTION TESTS
PIF_VALUE: 30
PIF_VALUE: 30
PIF_VALUE: 31
PIF_VALUE: 30
PIF_VALUE: 31

## 2021-10-17 ASSESSMENT — PAIN SCALES - GENERAL
PAINLEVEL_OUTOF10: 0
PAINLEVEL_OUTOF10: 0

## 2021-10-17 NOTE — PROGRESS NOTES
Renal Progress Note    Assessment and Plan:   1. Acute kidney injury mostly prerenal azotemia   2. SARS-CoV-2 pneumonia  3. Hypoxic respiratory failure mechanical support  4. Metabolic acidosis resolved  5. Hypotension stable  6. Deconditioning    PLAN:  1. Labs reviewed  2. Serum creatinine is improved today  3. Medications reviewed  4. No changes  5. Continue current intravenous fluid 0.9 saline 50 mill per hour. 6. Labs in the morning  7.  We will follow    Patient Active Problem List:     Sigmoid diverticulitis     Acute respiratory failure with hypoxia ([facility] [address]     COVID-19     Acute congestive heart failure ([facility] [address]      Subjective:   Admit Date: 10/11/2021    Interval History:   Seen for acute kidney injury  Remains on mechanical support  Updated by the staff  No new issues of concern today  Blood pressure is  stable  Okay urine output      Medications:   Scheduled Meds:   potassium chloride  10 mEq IntraVENous Q1H    baricitinib  1 mg Oral Daily    metoprolol tartrate  25 mg Oral BID    iron sucrose  300 mg IntraVENous Q48H    dexamethasone (DECADRON) IVPB  20 mg IntraVENous Daily    Followed by   Brayan Trent ON 10/20/2021] dexamethasone (PF)  10 mg IntraVENous Daily    apixaban  2.5 mg Oral BID    Vitamin D  2,000 Units Oral Daily    ascorbic acid  500 mg Oral Daily    sodium chloride flush  5-40 mL IntraVENous 2 times per day    scopolamine  1 patch TransDERmal Q72H    zinc sulfate  50 mg Oral Daily     Continuous Infusions:   sodium chloride 50 mL/hr at 10/16/21 1537    norepinephrine Stopped (10/16/21 1114)    midazolam 1 mg/hr (10/17/21 0839)    propofol 15 mcg/kg/min (10/16/21 2021)    sodium chloride         CBC:   Recent Labs     10/15/21  2341 10/16/21  0400 10/17/21  0450   WBC 7.7 7.4 6.4   HGB 12.2 12.2 12.8   * 167 183     CMP:    Recent Labs     10/15/21  2300 10/15/21  2300 10/16/21  0400 10/17/21  0450     --  139 139   K 6.0*   < > 4.6 3.8  3.8   CL 97*  --  98 102   CO2 25  --  22* 27   BUN 75*  --  73* 74*   CREATININE 2.4*  --  2.3* 1.9*   GLUCOSE 88  --  110* 174*   CALCIUM 9.0  --  8.7 8.7   LABGLOM 19*  --  20* 25*    < > = values in this interval not displayed. Troponin: No results for input(s): TROPONINI in the last 72 hours. BNP: No results for input(s): BNP in the last 72 hours. INR: No results for input(s): INR in the last 72 hours. Lipids: No results for input(s): CHOL, LDLDIRECT, TRIG, HDL, AMYLASE, LIPASE in the last 72 hours. Liver:   Recent Labs     10/15/21  2300   AST 30   ALT 19   ALKPHOS 49   PROT 4.9*   LABALBU 2.7*   BILITOT 1.0     Iron:    Recent Labs     10/15/21  2300   FERRITIN 604*     XR CHEST PORTABLE   Final Result   Impression:   1. Cardiomegaly with improvement of passive congestive changes and better    aeration of both lungs compared to the prior study. 2. The focal bilateral alveolar consolidations consistent with infectious    pulmonary disease are unchanged. Retrocardiac consolidation and small LEFT    pleural effusion obscuring the LEFT diaphragm is unchanged. This document has been electronically signed by: Bertram Salmon MD on    10/16/2021 03:18 AM      XR CHEST PORTABLE   Final Result   Somewhat low position of endotracheal tube 1.2 cm above the claire but improved aeration of the upper lungs. **This report has been created using voice recognition software. It may contain minor errors which are inherent in voice recognition technology. **      Final report electronically signed by Dr. Rica Trotter on 10/15/2021 3:34 PM      XR CHEST PORTABLE   Final Result   1. Bilateral pneumonia. 2. Small bilateral pleural effusions. 3. Stable cardiomegaly. **This report has been created using voice recognition software. It may contain minor errors which are inherent in voice recognition technology. **      Final report electronically signed by Dr. Zainab Quevedo on 10/15/2021 4:14 PM      XR CHEST PORTABLE Final Result   Right lower lobe airspace infiltrates. Severe cardiomegaly. **This report has been created using voice recognition software. It may contain minor errors which are inherent in voice recognition technology. **      Final report electronically signed by Dr. Nivia Braden on 10/11/2021 8:59 PM      XR CHEST PORTABLE   Final Result      1. Diffuse groundglass opacities in both lungs along with cardiomegaly. Findings likely suggest acute exacerbation of congestive heart failure versus pneumonia in the right clinical setting. Clinical correlation is recommended      2. Small left pleural effusion. 3. Other findings as described above. **This report has been created using voice recognition software. It may contain minor errors which are inherent in voice recognition technology. **      Final report electronically signed by Dr Jenni Shepherd on 10/11/2021 11:56 AM            Objective:   Vitals: /60   Pulse 70   Temp 97.7 °F (36.5 °C) (Axillary)   Resp 16   Ht 5' (1.524 m)   Wt 133 lb 9.6 oz (60.6 kg)   SpO2 99%   BMI 26.09 kg/m²    Wt Readings from Last 3 Encounters:   10/17/21 133 lb 9.6 oz (60.6 kg)   10/07/21 125 lb (56.7 kg)      24HR INTAKE/OUTPUT:      Intake/Output Summary (Last 24 hours) at 10/17/2021 0940  Last data filed at 10/17/2021 0300  Gross per 24 hour   Intake 1337.4 ml   Output 800 ml   Net 537.4 ml       Constitutional: Well-developed elderly lady on mechanical support  no apparent distress   Skin:normal with no rash or lesions. HEENT:Pupils are reactive. Endotracheal tube is noted. Orogastric tube is noted. .Oral mucosa is moist   Neck:supple with no  carotid bruit  Cardiovascular:  S1, S2 without murmur or rubs   Respiratory:  Clear to ausculation without wheezes, rhonchi or rales. Abdomen: Soft. Good bowel sounds.   Ext: 1+ bilateral LE edema  Musculoskeletal:Intact  Neuro: Deferred      Electronically signed by Lydia Huber MD on 10/17/2021 at 9:40 AM  **This report has been created using voice recognition software. It maycontain minor  errors which are inherent in voice recognition technology. **

## 2021-10-17 NOTE — PROGRESS NOTES
Patient's daughters called in for update, plan of care, and morning labs discussed. Family asked if clergy could accompany one visitor and I ensured them that was allowed.

## 2021-10-17 NOTE — PROGRESS NOTES
Spoke with daughter, Luci Chung at bedside. Pt weaned off versed, still at 15 mcg of propofol. ABG was normal today. Pt oxygen needs stable today.

## 2021-10-17 NOTE — PROGRESS NOTES
CRITICAL CARE PROGRESS NOTE      Patient:  Keshia Harper    Unit/Bed:3A-09/009-A  YOB: 1934  MRN: [id number]   PCP: Duane Hockey, APRN - CNP  Date of Admission: 10/11/2021  Chief Complaint:- worsening SOB    Assessment and Plan:    1. Acute hypoxic respiratory failure: Admitted on 10/11 and started HF. Progressive worsening hypoxemia. Intubation on 10/15. Current vent setting Pcontrol 20, PEEP 6, FiO2 100 %. Sedative with Propofol, Verse. Plan: Lung protective strategy with Peak < 35. PEEP < 30  10/17: current vent setting Pcontrol 24, PEEP 8, FiO2 55%. Sedative with Midazolam. CXR (10/16) improved passive congestive changed. Patient is afebrile over last 48 hrs. Plan: wean off O2 as SpO2 >90%     2. ARDS:  Patient received oxygen with FiO2 100 with very low TV (250ml) on ventilation. Plan: ferritin, CRP, Lactic dehydrogenase. Start Decadron, Baricitinib      3. HFrEF: Echo (10/11): LV EF 25-30%, LV severe global hypokinesis. LV size moderate increase. Patient is hypotension with BP 97/50.   10/17: pitting 1 edema bilateral lower extremities edema. Weight gain 13 lbs since admission. Plan: cont      4. Hx of Prolong QTc interval:  On telemetry strip in ED. Seen by cardiogy for Prolong QTC. Plan: tele monitor, avoid QTC prolong medications, Keep K +> 4 and Mg > 2     5. New on set Afib without RVR: Cont Eliquis     6. HERIBERTO: BUN/Cr 74/1.9 <-- 70/2.4. Base line BUN/Cr 29/0.9. Likely secondary to Cardiorenal syndrome. Plan: avoid nephrotoxicity, cont gentle fluid hydration 50ml/hr. Nephrologist follow the case.      7. Hyperkalemia: Resolve. K+ 5.3. Plan: calcium gluconate, Lokelma     8. AMS: likely secondary to hypercapnic hypoxemia. Plan: CT head when patient is stable.     9. Iron deficiency anemia: Iron replacement    INITIAL H AND P AND ICU COURSE:  Yue Hampton is 80years old female who presented in [facility]. [place] [id number] for worsening SOB on 10/11. Patient reported received COVID vaccine. She was test positive for COVID in 10/4/21. Patient SOB progressive worsening that patient visit ED. In ED found to have O2 Sat 83% in room air with BNP 15.7k. Patient was admitted and manage for acute hypoxic respiratory failure likely due to combine CHF and COVID. Patient was placed on NC, Bumex, Dexamethasone. On 10/15, patient was transferred to ICU and intubated due to progressive hypoxic respirator failure on 100% FiO2 on high flow. Repeated CXR on 10/26 show improved vascular congestion. Patient has been afebrile since 10/15.       Past Medical History      Diagnosis Date    Anxiety     Arthritis     Bronchitis     Diverticulitis of colon     Hypertension         Past Surgical History        Procedure Laterality Date    APPENDECTOMY      CHOLECYSTECTOMY         Current Medications    potassium chloride  20 mEq IntraVENous Once    baricitinib  1 mg Oral Daily    metoprolol tartrate  25 mg Oral BID    iron sucrose  300 mg IntraVENous Q48H    dexamethasone (DECADRON) IVPB  20 mg IntraVENous Daily    Followed by   Grecia Hardy ON 10/20/2021] dexamethasone (PF)  10 mg IntraVENous Daily    apixaban  2.5 mg Oral BID    Vitamin D  2,000 Units Oral Daily    ascorbic acid  500 mg Oral Daily    sodium chloride flush  5-40 mL IntraVENous 2 times per day    scopolamine  1 patch TransDERmal Q72H    zinc sulfate  50 mg Oral Daily     acetaminophen **OR** acetaminophen, docusate sodium, sodium chloride flush, sodium chloride, polyethylene glycol, acetaminophen **OR** acetaminophen    IV Drips/Infusions   sodium chloride 50 mL/hr at 10/16/21 1537    norepinephrine Stopped (10/16/21 1114)    midazolam 2 mg/hr (10/17/21 0443)    propofol 15 mcg/kg/min (10/16/21 2021)    sodium chloride         Home Medications  Medications Prior to Admission: hydroCHLOROthiazide (HYDRODIURIL) 12.5 MG tablet, Take 12.5 mg by mouth daily   NONFORMULARY, Focus Factor  NONFORMULARY, Drenamin  GLUCOSAMINE-CHONDROITIN PO, Take by tracings, PFTs, and imaging are personally viewed and interpreted unless otherwise noted).  Sodium 139, Potassium 3.8, Chloride 102, Bicarb 27, BUN/Cr 74/1.9   WBC 6.4, Hg 12.8, Hct 36.2, Platelet 073   Telemetry shows normal sinus rhythm   Blood culture 10/11 no growth x 2, Pneumonia panel 10/15 no growth  CXR (10/16): Improved vascular congestion with better aeration of both lung compared to CXR on 10/15                                    Seen with multidisciplinary ICU team.  Meets Continued ICU Level Care Criteria:    [x] Yes   [] No - Transfer Planned to listed location:  [] HOSPITALIST CONTACTED-      Case and plan discussed with Dr. Brett Jones.         Electronically signed by Tyrese Enriquez DO  Internal Medicine Resident Physician

## 2021-10-18 ENCOUNTER — APPOINTMENT (OUTPATIENT)
Dept: GENERAL RADIOLOGY | Age: 86
DRG: 004 | End: 2021-10-18
Payer: MEDICARE

## 2021-10-18 LAB
ANION GAP SERPL CALCULATED.3IONS-SCNC: 6 MEQ/L (ref 8–16)
BASOPHILS # BLD: 0.3 %
BASOPHILS ABSOLUTE: 0 THOU/MM3 (ref 0–0.1)
BUN BLDV-MCNC: 77 MG/DL (ref 7–22)
CALCIUM SERPL-MCNC: 9 MG/DL (ref 8.5–10.5)
CHLORIDE BLD-SCNC: 103 MEQ/L (ref 98–111)
CO2: 30 MEQ/L (ref 23–33)
CREAT SERPL-MCNC: 1.8 MG/DL (ref 0.4–1.2)
EOSINOPHIL # BLD: 0 %
EOSINOPHILS ABSOLUTE: 0 THOU/MM3 (ref 0–0.4)
ERYTHROCYTE [DISTWIDTH] IN BLOOD BY AUTOMATED COUNT: 15.7 % (ref 11.5–14.5)
ERYTHROCYTE [DISTWIDTH] IN BLOOD BY AUTOMATED COUNT: 56.5 FL (ref 35–45)
GFR SERPL CREATININE-BSD FRML MDRD: 27 ML/MIN/1.73M2
GLUCOSE BLD-MCNC: 109 MG/DL (ref 70–108)
GLUCOSE BLD-MCNC: 124 MG/DL (ref 70–108)
GLUCOSE BLD-MCNC: 142 MG/DL (ref 70–108)
GLUCOSE BLD-MCNC: 142 MG/DL (ref 70–108)
HCT VFR BLD CALC: 40.4 % (ref 37–47)
HEMOGLOBIN: 12.4 GM/DL (ref 12–16)
IMMATURE GRANS (ABS): 0.37 THOU/MM3 (ref 0–0.07)
IMMATURE GRANULOCYTES: 5.3 %
LYMPHOCYTES # BLD: 4 %
LYMPHOCYTES ABSOLUTE: 0.3 THOU/MM3 (ref 1–4.8)
MAGNESIUM: 2.1 MG/DL (ref 1.6–2.4)
MCH RBC QN AUTO: 30.3 PG (ref 26–33)
MCHC RBC AUTO-ENTMCNC: 30.7 GM/DL (ref 32.2–35.5)
MCV RBC AUTO: 98.8 FL (ref 81–99)
MONOCYTES # BLD: 9.1 %
MONOCYTES ABSOLUTE: 0.6 THOU/MM3 (ref 0.4–1.3)
NUCLEATED RED BLOOD CELLS: 0 /100 WBC
PLATELET # BLD: 167 THOU/MM3 (ref 130–400)
PMV BLD AUTO: 11.5 FL (ref 9.4–12.4)
POTASSIUM SERPL-SCNC: 4.6 MEQ/L (ref 3.5–5.2)
RBC # BLD: 4.09 MILL/MM3 (ref 4.2–5.4)
SEG NEUTROPHILS: 81.3 %
SEGMENTED NEUTROPHILS ABSOLUTE COUNT: 5.7 THOU/MM3 (ref 1.8–7.7)
SODIUM BLD-SCNC: 139 MEQ/L (ref 135–145)
WBC # BLD: 7 THOU/MM3 (ref 4.8–10.8)

## 2021-10-18 PROCEDURE — 6370000000 HC RX 637 (ALT 250 FOR IP): Performed by: STUDENT IN AN ORGANIZED HEALTH CARE EDUCATION/TRAINING PROGRAM

## 2021-10-18 PROCEDURE — 2100000000 HC CCU R&B

## 2021-10-18 PROCEDURE — 83735 ASSAY OF MAGNESIUM: CPT

## 2021-10-18 PROCEDURE — 36415 COLL VENOUS BLD VENIPUNCTURE: CPT

## 2021-10-18 PROCEDURE — 6370000000 HC RX 637 (ALT 250 FOR IP): Performed by: INTERNAL MEDICINE

## 2021-10-18 PROCEDURE — 6370000000 HC RX 637 (ALT 250 FOR IP): Performed by: HOSPITALIST

## 2021-10-18 PROCEDURE — 2580000003 HC RX 258: Performed by: STUDENT IN AN ORGANIZED HEALTH CARE EDUCATION/TRAINING PROGRAM

## 2021-10-18 PROCEDURE — 94003 VENT MGMT INPAT SUBQ DAY: CPT

## 2021-10-18 PROCEDURE — 71045 X-RAY EXAM CHEST 1 VIEW: CPT

## 2021-10-18 PROCEDURE — 2580000003 HC RX 258: Performed by: NURSE PRACTITIONER

## 2021-10-18 PROCEDURE — 6360000002 HC RX W HCPCS: Performed by: STUDENT IN AN ORGANIZED HEALTH CARE EDUCATION/TRAINING PROGRAM

## 2021-10-18 PROCEDURE — 80048 BASIC METABOLIC PNL TOTAL CA: CPT

## 2021-10-18 PROCEDURE — 85025 COMPLETE CBC W/AUTO DIFF WBC: CPT

## 2021-10-18 PROCEDURE — 2580000003 HC RX 258: Performed by: INTERNAL MEDICINE

## 2021-10-18 PROCEDURE — 99232 SBSQ HOSP IP/OBS MODERATE 35: CPT | Performed by: INTERNAL MEDICINE

## 2021-10-18 PROCEDURE — 82948 REAGENT STRIP/BLOOD GLUCOSE: CPT

## 2021-10-18 PROCEDURE — 99291 CRITICAL CARE FIRST HOUR: CPT | Performed by: INTERNAL MEDICINE

## 2021-10-18 RX ORDER — MIDODRINE HYDROCHLORIDE 2.5 MG/1
2.5 TABLET ORAL
Status: DISCONTINUED | OUTPATIENT
Start: 2021-10-18 | End: 2021-10-19

## 2021-10-18 RX ORDER — 0.9 % SODIUM CHLORIDE 0.9 %
500 INTRAVENOUS SOLUTION INTRAVENOUS ONCE
Status: COMPLETED | OUTPATIENT
Start: 2021-10-18 | End: 2021-10-18

## 2021-10-18 RX ADMIN — DEXAMETHASONE SODIUM PHOSPHATE 20 MG: 4 INJECTION, SOLUTION INTRAMUSCULAR; INTRAVENOUS at 08:19

## 2021-10-18 RX ADMIN — METOPROLOL TARTRATE 25 MG: 25 TABLET, FILM COATED ORAL at 19:50

## 2021-10-18 RX ADMIN — SODIUM CHLORIDE, PRESERVATIVE FREE 10 ML: 5 INJECTION INTRAVENOUS at 08:20

## 2021-10-18 RX ADMIN — OXYCODONE HYDROCHLORIDE AND ACETAMINOPHEN 500 MG: 500 TABLET ORAL at 08:19

## 2021-10-18 RX ADMIN — SODIUM CHLORIDE, PRESERVATIVE FREE 30 ML: 5 INJECTION INTRAVENOUS at 19:53

## 2021-10-18 RX ADMIN — BARICITINIB 1 MG: 2 TABLET, FILM COATED ORAL at 08:20

## 2021-10-18 RX ADMIN — POLYETHYLENE GLYCOL 3350 17 G: 17 POWDER, FOR SOLUTION ORAL at 15:46

## 2021-10-18 RX ADMIN — MIDODRINE HYDROCHLORIDE 2.5 MG: 2.5 TABLET ORAL at 15:45

## 2021-10-18 RX ADMIN — SODIUM CHLORIDE: 9 INJECTION, SOLUTION INTRAVENOUS at 19:43

## 2021-10-18 RX ADMIN — METOPROLOL TARTRATE 25 MG: 25 TABLET, FILM COATED ORAL at 08:19

## 2021-10-18 RX ADMIN — PROPOFOL 10 MCG/KG/MIN: 10 INJECTION, EMULSION INTRAVENOUS at 07:31

## 2021-10-18 RX ADMIN — APIXABAN 2.5 MG: 2.5 TABLET, FILM COATED ORAL at 19:50

## 2021-10-18 RX ADMIN — DOCUSATE SODIUM 100 MG: 100 CAPSULE ORAL at 15:45

## 2021-10-18 RX ADMIN — Medication 50 MG: at 08:19

## 2021-10-18 RX ADMIN — Medication 2000 UNITS: at 08:19

## 2021-10-18 RX ADMIN — APIXABAN 2.5 MG: 2.5 TABLET, FILM COATED ORAL at 08:19

## 2021-10-18 RX ADMIN — SODIUM CHLORIDE 500 ML: 9 INJECTION, SOLUTION INTRAVENOUS at 06:24

## 2021-10-18 ASSESSMENT — PULMONARY FUNCTION TESTS
PIF_VALUE: 30
PIF_VALUE: 34
PIF_VALUE: 35
PIF_VALUE: 30
PIF_VALUE: 35
PIF_VALUE: 35

## 2021-10-18 ASSESSMENT — PAIN SCALES - GENERAL
PAINLEVEL_OUTOF10: 0

## 2021-10-18 NOTE — PROGRESS NOTES
CRITICAL CARE PROGRESS NOTE      Patient:  [facility]    Unit/Bed:3A-09/009-A  YOB: 1934  MRN: [id number]   PCP: SERGE Walker CNP  Date of Admission: 10/11/2021  Chief Complaint:- worsening SOB    Assessment and Plan:    Acute hypoxic respiratory failure: Admitted on 10/11 and started HF. Progressive worsening hypoxemia. Intubation on 10/15. Current vent setting Pcontrol 20, PEEP 6, FiO2 100 %. Sedative with Propofol, Verse. Plan: Lung protective strategy with Peak < 35. PEEP < 30  10/17: current vent setting Pcontrol 24, PEEP 8, FiO2 55%, RR 28 with . Sedative with Midazolam. CXR (10/16) improved passive congestive changed. Patient is afebrile over last 48 hrs. Plan: wean off O2 as SpO2 >90%  10/18: Increase in FiO2 from 55 to 70% with current vent setting pressure control 26 PEEP 8, respiration rate 16, tidal volume 210s. P/F ratio 135. Plan: Prone with 8/16, cont wean off FiO2 as tolerate. ARDS:  Patient received oxygen with FiO2 100 with very low TV (250ml) on ventilation. Ferritin, CRP, Lactic dehydrogenase elevated. Start Decadron, Baricitinib      HFrEF: Echo (10/11): LV EF 25-30%, LV severe global hypokinesis. LV size moderate increase. Patient is hypotension with BP 97/50.   10/17: pitting 1 edema bilateral lower extremities edema. Weight gain 13 lbs since admission. 10/18: Net I/O last 25 hrs 1.6L. Weight gain 13 lbs since admission. Plan: Started Midodrine 10mg TID. Hx of Prolong QTc interval:  On telemetry strip in ED. Seen by cardiogy for Prolong QTC. Plan: tele monitor, avoid QTC prolong medications, Keep K +> 4 and Mg > 2     New on set Afib without RVR: Cont Eliquis     HERIBERTO: BUN/Cr 74/1.9 <-- 70/2.4. Base line BUN/Cr 29/0.9. Likely secondary to Cardiorenal syndrome. Plan: avoid nephrotoxicity, cont gentle fluid hydration 50ml/hr. Nephrologist follow the case. Hyperkalemia: Resolve. K+ 5.3.  Plan: calcium gluconate, Lokelma     AMS: likely secondary to hypercapnic hypoxemia. Plan: CT head when patient is stable. Iron deficiency anemia: Iron replacement    INITIAL H AND P AND ICU COURSE:  Tacho Guevara is 80years old female who [facility] SOB on 10/11. Patient reported received COVID vaccine. She was test positive for COVID in 10/4/21. Patient SOB progressive worsening that patient visit ED. In ED found to have O2 Sat 83% in room air with BNP 15.7k. Patient was admitted and manage for acute hypoxic respiratory failure likely due to combine CHF and COVID. Patient was placed on NC, Bumex, Dexamethasone. On 10/15, patient was transferred to ICU and intubated due to progressive hypoxic respirator failure on 100% FiO2 on high flow. Repeated CXR on 10/26 show improved vascular congestion. Patient has been afebrile since 10/15.       Past Medical History      Diagnosis Date    Anxiety     Arthritis     Bronchitis     Diverticulitis of colon     Hypertension         Past Surgical History        Procedure Laterality Date    APPENDECTOMY      CHOLECYSTECTOMY         Current Medications    baricitinib  1 mg Oral Daily    metoprolol tartrate  25 mg Oral BID    iron sucrose  300 mg IntraVENous Q48H    dexamethasone (DECADRON) IVPB  20 mg IntraVENous Daily    Followed by    Mariela Cisneros ON 10/20/2021] dexamethasone (PF)  10 mg IntraVENous Daily    apixaban  2.5 mg Oral BID    Vitamin D  2,000 Units Oral Daily    ascorbic acid  500 mg Oral Daily    sodium chloride flush  5-40 mL IntraVENous 2 times per day    scopolamine  1 patch TransDERmal Q72H    zinc sulfate  50 mg Oral Daily     acetaminophen **OR** acetaminophen, docusate sodium, sodium chloride flush, sodium chloride, polyethylene glycol, acetaminophen **OR** acetaminophen    IV Drips/Infusions   sodium chloride 50 mL/hr at 10/16/21 1537    norepinephrine Stopped (10/16/21 1114)    midazolam Stopped (10/17/21 1412)    propofol 14.828 mcg/kg/min (10/18/21 0731)    sodium chloride Home Medications  Medications Prior to Admission: hydroCHLOROthiazide (HYDRODIURIL) 12.5 MG tablet, Take 12.5 mg by mouth daily   NONFORMULARY, Focus Factor  NONFORMULARY, Drenamin  GLUCOSAMINE-CHONDROITIN PO, Take by mouth  cetirizine (ZYRTEC) 10 MG tablet, TAKE 1 TABLET BY MOUTH ONCE DAILY  HAWTHORN PO, Take 425 mg by mouth daily  NONFORMULARY, Allerplex  OLIVE LEAF EXTRACT PO, Take by mouth  Fexofenadine-Pseudoephedrine (ALLEGRA-D 12 HOUR PO), Take by mouth  aspirin 325 MG tablet, Take 325 mg by mouth daily  metoprolol tartrate (LOPRESSOR) 50 MG tablet, Take 1 tablet by mouth 2 times daily  ALLERGIES: Sulfa antibiotics, Gluten meal, Metronidazole, and Milk-related compounds    ROS   Can't obtain due to patient sedative on vent    Scheduled Meds:   baricitinib  1 mg Oral Daily    metoprolol tartrate  25 mg Oral BID    iron sucrose  300 mg IntraVENous Q48H    dexamethasone (DECADRON) IVPB  20 mg IntraVENous Daily    Followed by    Mariela Cisneros ON 10/20/2021] dexamethasone (PF)  10 mg IntraVENous Daily    apixaban  2.5 mg Oral BID    Vitamin D  2,000 Units Oral Daily    ascorbic acid  500 mg Oral Daily    sodium chloride flush  5-40 mL IntraVENous 2 times per day    scopolamine  1 patch TransDERmal Q72H    zinc sulfate  50 mg Oral Daily     Continuous Infusions:   sodium chloride 50 mL/hr at 10/16/21 1537    norepinephrine Stopped (10/16/21 1114)    midazolam Stopped (10/17/21 1412)    propofol 14.828 mcg/kg/min (10/18/21 0731)    sodium chloride         PHYSICAL EXAMINATION:  T:  97.9. P:  79. RR:  17. B/P:  119/64. FiO2: 70% . O2 Sat:  91.  I/O:  +2.6 L  Body mass index is 26.09 kg/m². GCS:   6  General: sedative on vent  HEENT:  normocephalic and atraumatic. No scleral icterus. PERR  Neck: supple. No Thyromegaly. Lungs: clear to auscultation. No retractions  Cardiac: RRR. No JVD. Abdomen: soft. Nontender. Extremities:  No clubbing, cyanosis, or edema x 4. Vasculature: capillary refill < 3 seconds. Palpable dorsalis pedis pulses. Skin:  warm and dry. Lymph:  No supraclavicular adenopathy. Neurologic:  No focal deficit. No seizures. Data: (All radiographs, tracings, PFTs, and imaging are personally viewed and interpreted unless otherwise noted). Sodium 139, Potassium 4.6, Chloride 103, Bicarb 30, BUN/Cr 77/1.8  WBC 7.0, Hg 12.4, Hct 40.4, Platelet 740  Telemetry shows normal sinus rhythm  Blood culture 10/11 no growth x 2, Pneumonia panel 10/15 no growth  CXR (10/16): Improved vascular congestion with better aeration of both lung compared to CXR on 10/15                                    Seen with multidisciplinary ICU team.  Meets Continued ICU Level Care Criteria:    [x] Yes   [] No - Transfer Planned to listed location:  [] HOSPITALIST CONTACTED-      Case and plan discussed with Dr. Savita Chamberlain. Electronically signed by Marco Javier DO  Internal Medicine Resident Physician  Patient seen by me. Case discussed with resident physician. Unable to wean from mechanical ventilator secondary to respiratory fatigue and PEEP requirements. Persistently critically ill. Italicized font represents changes to the note made by me. CC time 35 minutes. Time was discontiguous. Time does not include procedures. Time does include my direct assessment of the patient and coordination of care.   Electronically signed by Nati Sepulveda MD on 10/18/2021 at 6:39 PM

## 2021-10-18 NOTE — FLOWSHEET NOTE
10/18/21 2104   Encounter Summary   Services provided to: Family   Continue Visiting Yes  (10/18 Attempted to contact family x1)   Complexity of Encounter Low   Length of Encounter 15 minutes   The  attempted to contact the pts family x1 via telephone. There was no answer so I left my contact information.

## 2021-10-18 NOTE — PROGRESS NOTES
Renal Progress Note    Assessment and Plan:   1. Acute kidney injury with serum creatinine improved today. 2. SARS-CoV-2 pneumonia  3. Hypoxic respiratory failure on mechanical support  4. Hypotension improved  5. Deconditioning  6. Decreased urine output likely due to volume contraction intravascularly    PLAN:  1. Labs reviewed  2. Serum creatinine is improved again today  3. Medications reviewed  4. 0.9 saline bolus 500 ml IVPB once   5. Labs in the morning  6. We will follow    Patient Active Problem List:     Sigmoid diverticulitis     Acute respiratory failure with hypoxia ([facility] [address]     COVID-19     Acute congestive heart failure ([facility] [address]      Subjective:   Admit Date: 10/11/2021    Interval History:   Seen for acute kidney injury  Remains on mechanical support  Updated by the staff  Urine output is declining  Blood pressure is improved  Urine appears concentrated.       Medications:   Scheduled Meds:   baricitinib  1 mg Oral Daily    metoprolol tartrate  25 mg Oral BID    iron sucrose  300 mg IntraVENous Q48H    dexamethasone (DECADRON) IVPB  20 mg IntraVENous Daily    Followed by   Sigrid Wright ON 10/20/2021] dexamethasone (PF)  10 mg IntraVENous Daily    apixaban  2.5 mg Oral BID    Vitamin D  2,000 Units Oral Daily    ascorbic acid  500 mg Oral Daily    sodium chloride flush  5-40 mL IntraVENous 2 times per day    scopolamine  1 patch TransDERmal Q72H    zinc sulfate  50 mg Oral Daily     Continuous Infusions:   sodium chloride 50 mL/hr at 10/16/21 1537    norepinephrine Stopped (10/16/21 1114)    midazolam Stopped (10/17/21 1412)    propofol 10 mcg/kg/min (10/18/21 0731)    sodium chloride         CBC:   Recent Labs     10/16/21  0400 10/17/21  0450 10/18/21  0530   WBC 7.4 6.4 7.0   HGB 12.2 12.8 12.4    183 167     CMP:    Recent Labs     10/17/21  0450 10/17/21  1220 10/18/21  0530    138 139   K 3.8  3.8 4.4 4.6    102 103   CO2 27 26 30   BUN 74* 72* 77*   CREATININE 1.9* 2.0* 1.8*   GLUCOSE 174* 184* 142*   CALCIUM 8.7 8.6 9.0   LABGLOM 25* 24* 27*     Troponin: No results for input(s): TROPONINI in the last 72 hours. BNP: No results for input(s): BNP in the last 72 hours. INR: No results for input(s): INR in the last 72 hours. Lipids: No results for input(s): CHOL, LDLDIRECT, TRIG, HDL, AMYLASE, LIPASE in the last 72 hours. Liver:   Recent Labs     10/15/21  2300   AST 30   ALT 19   ALKPHOS 49   PROT 4.9*   LABALBU 2.7*   BILITOT 1.0     Iron:    Recent Labs     10/15/21  2300   FERRITIN 604*     XR CHEST PORTABLE   Final Result   1. Increased density in the left lung base. This can indicate partial left lower lobe collapse. 2. Persistent patchy bilateral pulmonary opacities. **This report has been created using voice recognition software. It may contain minor errors which are inherent in voice recognition technology. **      Final report electronically signed by Dr. Nima Lennon on 10/18/2021 7:58 AM      XR CHEST PORTABLE   Final Result   Impression:   1. Cardiomegaly with improvement of passive congestive changes and better    aeration of both lungs compared to the prior study. 2. The focal bilateral alveolar consolidations consistent with infectious    pulmonary disease are unchanged. Retrocardiac consolidation and small LEFT    pleural effusion obscuring the LEFT diaphragm is unchanged. This document has been electronically signed by: Vibha Ornelas MD on    10/16/2021 03:18 AM      XR CHEST PORTABLE   Final Result   Somewhat low position of endotracheal tube 1.2 cm above the claire but improved aeration of the upper lungs. **This report has been created using voice recognition software. It may contain minor errors which are inherent in voice recognition technology. **      Final report electronically signed by Dr. Laxmi Rai on 10/15/2021 3:34 PM      XR CHEST PORTABLE   Final Result   1. Bilateral pneumonia.    2. Small bilateral pleural effusions. 3. Stable cardiomegaly. **This report has been created using voice recognition software. It may contain minor errors which are inherent in voice recognition technology. **      Final report electronically signed by Dr. Heena Casarez on 10/15/2021 4:14 PM      XR CHEST PORTABLE   Final Result   Right lower lobe airspace infiltrates. Severe cardiomegaly. **This report has been created using voice recognition software. It may contain minor errors which are inherent in voice recognition technology. **      Final report electronically signed by Dr. Nivia Braden on 10/11/2021 8:59 PM      XR CHEST PORTABLE   Final Result      1. Diffuse groundglass opacities in both lungs along with cardiomegaly. Findings likely suggest acute exacerbation of congestive heart failure versus pneumonia in the right clinical setting. Clinical correlation is recommended      2. Small left pleural effusion. 3. Other findings as described above. **This report has been created using voice recognition software. It may contain minor errors which are inherent in voice recognition technology. **      Final report electronically signed by Dr Jenni Shepherd on 10/11/2021 11:56 AM            Objective:   Vitals: /63   Pulse 80   Temp 97.2 °F (36.2 °C) (Axillary)   Resp 10   Ht 5' (1.524 m)   Wt 133 lb 9.6 oz (60.6 kg)   SpO2 93%   BMI 26.09 kg/m²    Wt Readings from Last 3 Encounters:   10/17/21 133 lb 9.6 oz (60.6 kg)   10/07/21 125 lb (56.7 kg)      24HR INTAKE/OUTPUT:      Intake/Output Summary (Last 24 hours) at 10/18/2021 1110  Last data filed at 10/18/2021 0901  Gross per 24 hour   Intake 3276 ml   Output 700 ml   Net 2576 ml       Constitutional: Well-developed elderly lady on mechanical support  no apparent distress   Skin:normal with no rash or lesions. HEENT:Pupils are reactive. Endotracheal tube is noted. Orogastric tube is noted. .Oral mucosa is moist   Neck:supple with no carotid bruit  Cardiovascular:  S1, S2 without murmur or rubs   Respiratory:  Clear to ausculation without wheezes, rhonchi or rales. Abdomen: Soft. Good bowel sounds. Ext: 1+ bilateral LE edema  Musculoskeletal:Intact  Neuro: Deferred      Electronically signed by Vamsi Khalil MD on 10/18/2021 at 11:10 AM  **This report has been created using voice recognition software. It maycontain minor  errors which are inherent in voice recognition technology. **

## 2021-10-18 NOTE — CARE COORDINATION
10/18/21, 3:39 PM EDT    DISCHARGE ON GOING EVALUATION    [facility] day: 7  Location: 3A-09/009-A Reason for admit: Acute respiratory failure with hypoxia ([facility] [address] [J96.01]  Acute congestive heart failure, unspecified heart failure type ([facility] [address] [I50.9]  COVID-19 [U07.1]   Procedure:   10/12 Echo with EF 25-30%; Small circumferential pericardial effusion with no tamponade physiology; Myxomatotic degeneration of mitral valve;  Mild to Moderate mitral regurgitation is present  10/15 Intubated  10/15 CVC RIJ  10/18 CXR:   1. Increased density in the left lung base. This can indicate partial left lower lobe collapse. 2. Persistent patchy bilateral pulmonary opacities. Barriers to Discharge: Transferred to ICU on 10/15 and intubated d/t acute hypoxic respiratory failure. CVC placed. Low UO this morning and given 1L fld bolus. Required increased O2 and PS today. Pronation therapy continues. Remains on vent w/ETT on PCV, peep 10, PC 26, FIO2 60%, sats 96%. Afebrile. NSR. Unable to follow commands. Intensivist and Nephrology following. Palliative Care and Dietitian following. Telemetry, CVC, OG w/TF, alaniz. IVF, diprivan @ 10 mcg/kg/min, eliquis, vit C, baricitinib, IV decadron, IV venofer, lopressor, midodrine, scopolamine patch, vit D, zinc. BUN 77, Creat 1.8. PCP: SERGE Bhakta CNP  Readmission Risk Score: 15%  Patient Goals/Plan/Treatment Preferences: From home with daughter. Plan pending clinical course. Had agreed to minimum of [facility] at discharge. Has cane, may need new walker.

## 2021-10-19 LAB
ANION GAP SERPL CALCULATED.3IONS-SCNC: 7 MEQ/L (ref 8–16)
BACTERIA: ABNORMAL
BASOPHILS # BLD: 0.3 %
BASOPHILS ABSOLUTE: 0 THOU/MM3 (ref 0–0.1)
BILIRUBIN URINE: NEGATIVE
BLOOD, URINE: ABNORMAL
BUN BLDV-MCNC: 75 MG/DL (ref 7–22)
CALCIUM SERPL-MCNC: 9.4 MG/DL (ref 8.5–10.5)
CASTS: ABNORMAL /LPF
CASTS: ABNORMAL /LPF
CHARACTER, URINE: ABNORMAL
CHLORIDE BLD-SCNC: 104 MEQ/L (ref 98–111)
CO2: 29 MEQ/L (ref 23–33)
COLOR: YELLOW
CREAT SERPL-MCNC: 1.6 MG/DL (ref 0.4–1.2)
CRYSTALS: ABNORMAL
EKG ATRIAL RATE: 77 BPM
EKG P AXIS: 54 DEGREES
EKG P-R INTERVAL: 190 MS
EKG Q-T INTERVAL: 412 MS
EKG QRS DURATION: 152 MS
EKG QTC CALCULATION (BAZETT): 466 MS
EKG R AXIS: -43 DEGREES
EKG T AXIS: 76 DEGREES
EKG VENTRICULAR RATE: 77 BPM
EOSINOPHIL # BLD: 0 %
EOSINOPHILS ABSOLUTE: 0 THOU/MM3 (ref 0–0.4)
EPITHELIAL CELLS, UA: ABNORMAL /HPF
ERYTHROCYTE [DISTWIDTH] IN BLOOD BY AUTOMATED COUNT: 15.6 % (ref 11.5–14.5)
ERYTHROCYTE [DISTWIDTH] IN BLOOD BY AUTOMATED COUNT: 56.9 FL (ref 35–45)
GFR SERPL CREATININE-BSD FRML MDRD: 30 ML/MIN/1.73M2
GLUCOSE BLD-MCNC: 102 MG/DL (ref 70–108)
GLUCOSE BLD-MCNC: 86 MG/DL (ref 70–108)
GLUCOSE BLD-MCNC: 88 MG/DL (ref 70–108)
GLUCOSE BLD-MCNC: 89 MG/DL (ref 70–108)
GLUCOSE BLD-MCNC: 91 MG/DL (ref 70–108)
GLUCOSE, URINE: NEGATIVE MG/DL
HCT VFR BLD CALC: 39.3 % (ref 37–47)
HEMOGLOBIN: 12.3 GM/DL (ref 12–16)
IMMATURE GRANS (ABS): 0.54 THOU/MM3 (ref 0–0.07)
IMMATURE GRANULOCYTES: 4.5 %
KETONES, URINE: NEGATIVE
LEUKOCYTE ESTERASE, URINE: ABNORMAL
LYMPHOCYTES # BLD: 1.9 %
LYMPHOCYTES ABSOLUTE: 0.2 THOU/MM3 (ref 1–4.8)
MAGNESIUM: 2.2 MG/DL (ref 1.6–2.4)
MCH RBC QN AUTO: 31.1 PG (ref 26–33)
MCHC RBC AUTO-ENTMCNC: 31.3 GM/DL (ref 32.2–35.5)
MCV RBC AUTO: 99.5 FL (ref 81–99)
MISCELLANEOUS LAB TEST RESULT: ABNORMAL
MONOCYTES # BLD: 8.7 %
MONOCYTES ABSOLUTE: 1 THOU/MM3 (ref 0.4–1.3)
NITRITE, URINE: NEGATIVE
NUCLEATED RED BLOOD CELLS: 0 /100 WBC
PH UA: 6 (ref 5–9)
PLATELET # BLD: 149 THOU/MM3 (ref 130–400)
PLATELET ESTIMATE: ADEQUATE
PMV BLD AUTO: 10.9 FL (ref 9.4–12.4)
POTASSIUM REFLEX MAGNESIUM: 4.6 MEQ/L (ref 3.5–5.2)
POTASSIUM SERPL-SCNC: 4.6 MEQ/L (ref 3.5–5.2)
PROTEIN UA: 100 MG/DL
RBC # BLD: 3.95 MILL/MM3 (ref 4.2–5.4)
RBC URINE: ABNORMAL /HPF
RENAL EPITHELIAL, UA: ABNORMAL
SCAN OF BLOOD SMEAR: NORMAL
SEG NEUTROPHILS: 84.6 %
SEGMENTED NEUTROPHILS ABSOLUTE COUNT: 10.2 THOU/MM3 (ref 1.8–7.7)
SODIUM BLD-SCNC: 140 MEQ/L (ref 135–145)
SPECIFIC GRAVITY UA: 1.02 (ref 1–1.03)
TRIGL SERPL-MCNC: 211 MG/DL (ref 0–199)
UROBILINOGEN, URINE: 0.2 EU/DL (ref 0–1)
WBC # BLD: 12 THOU/MM3 (ref 4.8–10.8)
WBC UA: > 200 /HPF
YEAST: ABNORMAL

## 2021-10-19 PROCEDURE — 93005 ELECTROCARDIOGRAM TRACING: CPT | Performed by: INTERNAL MEDICINE

## 2021-10-19 PROCEDURE — 36415 COLL VENOUS BLD VENIPUNCTURE: CPT

## 2021-10-19 PROCEDURE — 83735 ASSAY OF MAGNESIUM: CPT

## 2021-10-19 PROCEDURE — 2580000003 HC RX 258: Performed by: STUDENT IN AN ORGANIZED HEALTH CARE EDUCATION/TRAINING PROGRAM

## 2021-10-19 PROCEDURE — 94003 VENT MGMT INPAT SUBQ DAY: CPT

## 2021-10-19 PROCEDURE — 80048 BASIC METABOLIC PNL TOTAL CA: CPT

## 2021-10-19 PROCEDURE — 6370000000 HC RX 637 (ALT 250 FOR IP): Performed by: INTERNAL MEDICINE

## 2021-10-19 PROCEDURE — 6360000002 HC RX W HCPCS: Performed by: NURSE PRACTITIONER

## 2021-10-19 PROCEDURE — 82948 REAGENT STRIP/BLOOD GLUCOSE: CPT

## 2021-10-19 PROCEDURE — 87205 SMEAR GRAM STAIN: CPT

## 2021-10-19 PROCEDURE — 2700000000 HC OXYGEN THERAPY PER DAY

## 2021-10-19 PROCEDURE — 84478 ASSAY OF TRIGLYCERIDES: CPT

## 2021-10-19 PROCEDURE — 6370000000 HC RX 637 (ALT 250 FOR IP): Performed by: STUDENT IN AN ORGANIZED HEALTH CARE EDUCATION/TRAINING PROGRAM

## 2021-10-19 PROCEDURE — 81001 URINALYSIS AUTO W/SCOPE: CPT

## 2021-10-19 PROCEDURE — 85025 COMPLETE CBC W/AUTO DIFF WBC: CPT

## 2021-10-19 PROCEDURE — 99291 CRITICAL CARE FIRST HOUR: CPT | Performed by: INTERNAL MEDICINE

## 2021-10-19 PROCEDURE — 87077 CULTURE AEROBIC IDENTIFY: CPT

## 2021-10-19 PROCEDURE — 2580000003 HC RX 258: Performed by: NURSE PRACTITIONER

## 2021-10-19 PROCEDURE — 89220 SPUTUM SPECIMEN COLLECTION: CPT

## 2021-10-19 PROCEDURE — 2500000003 HC RX 250 WO HCPCS: Performed by: STUDENT IN AN ORGANIZED HEALTH CARE EDUCATION/TRAINING PROGRAM

## 2021-10-19 PROCEDURE — 87070 CULTURE OTHR SPECIMN AEROBIC: CPT

## 2021-10-19 PROCEDURE — 6370000000 HC RX 637 (ALT 250 FOR IP): Performed by: HOSPITALIST

## 2021-10-19 PROCEDURE — 94761 N-INVAS EAR/PLS OXIMETRY MLT: CPT

## 2021-10-19 PROCEDURE — 99232 SBSQ HOSP IP/OBS MODERATE 35: CPT | Performed by: INTERNAL MEDICINE

## 2021-10-19 PROCEDURE — 6360000002 HC RX W HCPCS: Performed by: STUDENT IN AN ORGANIZED HEALTH CARE EDUCATION/TRAINING PROGRAM

## 2021-10-19 PROCEDURE — 93010 ELECTROCARDIOGRAM REPORT: CPT | Performed by: NUCLEAR MEDICINE

## 2021-10-19 PROCEDURE — 2100000000 HC CCU R&B

## 2021-10-19 RX ORDER — BUMETANIDE 1 MG/1
2 TABLET ORAL DAILY
Status: DISCONTINUED | OUTPATIENT
Start: 2021-10-19 | End: 2021-10-19

## 2021-10-19 RX ORDER — BUMETANIDE 0.25 MG/ML
2 INJECTION, SOLUTION INTRAMUSCULAR; INTRAVENOUS ONCE
Status: COMPLETED | OUTPATIENT
Start: 2021-10-19 | End: 2021-10-19

## 2021-10-19 RX ORDER — LACTULOSE 10 G/15ML
20 SOLUTION ORAL 3 TIMES DAILY
Status: DISCONTINUED | OUTPATIENT
Start: 2021-10-19 | End: 2021-11-14

## 2021-10-19 RX ADMIN — LACTULOSE 20 G: 20 SOLUTION ORAL at 09:07

## 2021-10-19 RX ADMIN — METOPROLOL TARTRATE 25 MG: 25 TABLET, FILM COATED ORAL at 09:06

## 2021-10-19 RX ADMIN — SODIUM CHLORIDE, PRESERVATIVE FREE 10 ML: 5 INJECTION INTRAVENOUS at 09:08

## 2021-10-19 RX ADMIN — APIXABAN 2.5 MG: 2.5 TABLET, FILM COATED ORAL at 20:03

## 2021-10-19 RX ADMIN — DEXAMETHASONE SODIUM PHOSPHATE 20 MG: 4 INJECTION, SOLUTION INTRAMUSCULAR; INTRAVENOUS at 10:48

## 2021-10-19 RX ADMIN — LACTULOSE 20 G: 20 SOLUTION ORAL at 20:14

## 2021-10-19 RX ADMIN — IRON SUCROSE 300 MG: 20 INJECTION, SOLUTION INTRAVENOUS at 15:26

## 2021-10-19 RX ADMIN — CEFTRIAXONE SODIUM 1000 MG: 1 INJECTION, POWDER, FOR SOLUTION INTRAMUSCULAR; INTRAVENOUS at 19:27

## 2021-10-19 RX ADMIN — BARICITINIB 1 MG: 2 TABLET, FILM COATED ORAL at 09:07

## 2021-10-19 RX ADMIN — Medication 50 MG: at 09:06

## 2021-10-19 RX ADMIN — BUMETANIDE 2 MG: 0.25 INJECTION, SOLUTION INTRAMUSCULAR; INTRAVENOUS at 20:02

## 2021-10-19 RX ADMIN — PROPOFOL 15 MCG/KG/MIN: 10 INJECTION, EMULSION INTRAVENOUS at 03:33

## 2021-10-19 RX ADMIN — OXYCODONE HYDROCHLORIDE AND ACETAMINOPHEN 500 MG: 500 TABLET ORAL at 09:06

## 2021-10-19 RX ADMIN — LACTULOSE 20 G: 20 SOLUTION ORAL at 15:26

## 2021-10-19 RX ADMIN — Medication 2000 UNITS: at 09:05

## 2021-10-19 RX ADMIN — APIXABAN 2.5 MG: 2.5 TABLET, FILM COATED ORAL at 09:06

## 2021-10-19 RX ADMIN — SODIUM CHLORIDE, PRESERVATIVE FREE 30 ML: 5 INJECTION INTRAVENOUS at 20:04

## 2021-10-19 ASSESSMENT — PULMONARY FUNCTION TESTS
PIF_VALUE: 33
PIF_VALUE: 33
PIF_VALUE: 32
PIF_VALUE: 32
PIF_VALUE: 33
PIF_VALUE: 32

## 2021-10-19 ASSESSMENT — PAIN SCALES - GENERAL: PAINLEVEL_OUTOF10: 0

## 2021-10-19 NOTE — PROGRESS NOTES
[address]  PHYSICAL THERAPY MISSED TREATMENT NOTE  [facility] 3A    Date: 10/19/2021  Patient Name: Doreen Taylor        MRN: [id number]   : 1934  (80 y.o.)  Gender: female   Referring Practitioner: Halima Richards DO  Diagnosis: Acute respiratory failure with hypoxia         REASON FOR MISSED TREATMENT:  Missed Treat. Pt intubated and not stable for early mobility. PT will sign off.

## 2021-10-19 NOTE — PROGRESS NOTES
[address] [facility] [address] THERAPY MISSED TREATMENT NOTE  [facility] CCU 3A  3A-/009-A      Date: 10/19/2021  Patient Name: Doraine Heimlich        CSN: [id number]   : 1934  (80 y.o.)  Gender: female   Referring Practitioner: Edgar Browne MD  Diagnosis: acute respiratory failure with hypoxia         REASON FOR MISSED TREATMENT: Patient Unavailable patient not appropriate as patient was reintubated

## 2021-10-19 NOTE — CARE COORDINATION
10/19/21, 2:28 PM EDT    DISCHARGE ON GOING EVALUATION    [facility] day: 8  Location: 3A-09/009-A Reason for admit: Acute respiratory failure with hypoxia ([facility] [address] [J96.01]  Acute congestive heart failure, unspecified heart failure type ([facility] [address] [I50.9]  COVID-19 [U07.1]   Procedure:   10/12 Echo with EF 25-30%; Small circumferential pericardial effusion with no tamponade physiology; Myxomatotic degeneration of mitral valve;  Mild to Moderate mitral regurgitation is present  10/15 Intubated  10/15 CVC RIJ    Barriers to Discharge: Did not tolerate TF overnight, had >300 ml residual, hooked to LIWS and had another 600 ml out. Also had run of VTach overnight. Still having low UO. Remains on vent w/ETT on PCV, peep 8, PC 26, FIO2 40%, sats 93%. Afebrile. NSR. Follows commands. Intensivist and Nephrology following. Palliative Care and Dietitian following. Telemetry, CVC, OG to corbin DUMONT. IVF, diprivan @ 10 mcg/kg/min, eliquis, vit C, baricitinib, IV decadron, IV venofer, lactulose, lopressor, vit D, zinc. BUN 75, Creat 1.6, triglycerides 211, wbc 12. PCP: Duane Hockey, APRN - CNP  Readmission Risk Score: 16%  Patient Goals/Plan/Treatment Preferences: From home with daughter. Plan pending clinical course. Had agreed to minimum of [place] at discharge. Has cane, may need new walker.

## 2021-10-19 NOTE — PROGRESS NOTES
Renal Progress Note    Assessment and Plan:   1. Acute kidney injury nonoliguric improving  2. SARS-CoV-2 pneumonia  3. Hypoxic respiratory failure mechanical support  4. Possible UTI  5. Deconditioning  6. Constipation    PLAN:  1. Labs reviewed  2. Medications reviewed  3. No changes  4. UA with culture sensitivity  5. Lactulose 20 g 3 times a day for constipation  6. Labs in the morning  7. We will follow     Patient Active Problem List:     Sigmoid diverticulitis     Acute respiratory failure with hypoxia ([address]     COVID-19     Acute congestive heart failure (HCC)      Subjective:   Admit Date: 10/11/2021    Interval History:   Seen for acute kidney injury  Awake and alert this morning actually  Updated by the staff  No new issues of concern except the urine appears malodorous. Blood pressure is stable  Urine output is okay. Tube feedings on hold due to high residuals. No bowel movement in 2 days.                 Medications:   Scheduled Meds:   lactulose  20 g Oral TID    midodrine  2.5 mg Oral TID WC    baricitinib  1 mg Oral Daily    metoprolol tartrate  25 mg Oral BID    iron sucrose  300 mg IntraVENous Q48H    dexamethasone (DECADRON) IVPB  20 mg IntraVENous Daily    Followed by   Kranthi Raman ON 10/20/2021] dexamethasone (PF)  10 mg IntraVENous Daily    apixaban  2.5 mg Oral BID    Vitamin D  2,000 Units Oral Daily    ascorbic acid  500 mg Oral Daily    sodium chloride flush  5-40 mL IntraVENous 2 times per day    scopolamine  1 patch TransDERmal Q72H    zinc sulfate  50 mg Oral Daily     Continuous Infusions:   sodium chloride 50 mL/hr at 10/18/21 1943    norepinephrine Stopped (10/16/21 1114)    midazolam Stopped (10/17/21 1412)    propofol 10 mcg/kg/min (10/19/21 0417)    sodium chloride         CBC:   Recent Labs     10/17/21  0450 10/18/21  0530 10/19/21  0340   WBC 6.4 7.0 12.0*   HGB 12.8 12.4 12.3    167 149     CMP:    Recent Labs     10/17/21  0450 10/17/21  1220 10/18/21  0530 10/19/21  0340   NA  --  138 139 140   K   < > 4.4 4.6 4.6  4.6   CL  --  102 103 104   CO2  --  26 30 29   BUN  --  72* 77* 75*   CREATININE  --  2.0* 1.8* 1.6*   GLUCOSE  --  184* 142* 88   CALCIUM  --  8.6 9.0 9.4   LABGLOM  --  24* 27* 30*    < > = values in this interval not displayed. Troponin: No results for input(s): TROPONINI in the last 72 hours. BNP: No results for input(s): BNP in the last 72 hours. INR: No results for input(s): INR in the last 72 hours. Lipids:   Recent Labs     10/19/21  0340   TRIG 211*     Liver: No results for input(s): AST, ALT, ALKPHOS, PROT, LABALBU, BILITOT in the last 72 hours. Invalid input(s): BILDIR  Iron:  No results for input(s): IRONS, FERRITIN in the last 72 hours. Invalid input(s): LABIRONS  XR CHEST PORTABLE   Final Result   1. Increased density in the left lung base. This can indicate partial left lower lobe collapse. 2. Persistent patchy bilateral pulmonary opacities. **This report has been created using voice recognition software. It may contain minor errors which are inherent in voice recognition technology. **      Final report electronically signed by Dr. Felton Salvador on 10/18/2021 7:58 AM      XR CHEST PORTABLE   Final Result   Impression:   1. Cardiomegaly with improvement of passive congestive changes and better    aeration of both lungs compared to the prior study. 2. The focal bilateral alveolar consolidations consistent with infectious    pulmonary disease are unchanged. Retrocardiac consolidation and small LEFT    pleural effusion obscuring the LEFT diaphragm is unchanged. This document has been electronically signed by: Bertram Salmon MD on    10/16/2021 03:18 AM      XR CHEST PORTABLE   Final Result   Somewhat low position of endotracheal tube 1.2 cm above the claire but improved aeration of the upper lungs. **This report has been created using voice recognition software.   It may contain minor errors which are inherent in voice recognition technology. **      Final report electronically signed by Dr. Birgit Samaniego on 10/15/2021 3:34 PM      XR CHEST PORTABLE   Final Result   1. Bilateral pneumonia. 2. Small bilateral pleural effusions. 3. Stable cardiomegaly. **This report has been created using voice recognition software. It may contain minor errors which are inherent in voice recognition technology. **      Final report electronically signed by Dr. Kelsy Romeo on 10/15/2021 4:14 PM      XR CHEST PORTABLE   Final Result   Right lower lobe airspace infiltrates. Severe cardiomegaly. **This report has been created using voice recognition software. It may contain minor errors which are inherent in voice recognition technology. **      Final report electronically signed by Dr. Birgit Samaniego on 10/11/2021 8:59 PM      XR CHEST PORTABLE   Final Result      1. Diffuse groundglass opacities in both lungs along with cardiomegaly. Findings likely suggest acute exacerbation of congestive heart failure versus pneumonia in the right clinical setting. Clinical correlation is recommended      2. Small left pleural effusion. 3. Other findings as described above. **This report has been created using voice recognition software. It may contain minor errors which are inherent in voice recognition technology. **      Final report electronically signed by Dr Gerard Shen on 10/11/2021 11:56 AM            Objective:   Vitals: BP (!) 114/58   Pulse 66   Temp 98.7 °F (37.1 °C) (Axillary)   Resp 16   Ht 5' (1.524 m)   Wt 138 lb 14.2 oz (63 kg)   SpO2 93%   BMI 27.13 kg/m²    Wt Readings from Last 3 Encounters:   10/19/21 138 lb 14.2 oz (63 kg)   10/07/21 125 lb (56.7 kg)      24HR INTAKE/OUTPUT:      Intake/Output Summary (Last 24 hours) at 10/19/2021 0842  Last data filed at 10/19/2021 0330  Gross per 24 hour   Intake 1943 ml   Output 1150 ml   Net 793 ml Constitutional: Well-developed elderly lady on mechanical support alert, awake, no apparent distress   Skin:normal with no rash or lesions. HEENT:Pupils are reactive . Throat is clear . Oral mucosa is moist.  Endotracheal tube is noted. Orogastric tube is intact. Neck:supple with no thyromegaly or carotid bruit  Cardiovascular:  S1, S2 without murmur or rubs   Respiratory:  Clear to ausculation without wheezes, rhonchi or rales  In the anterior  Abdomen: Soft. Decreased bowel sounds. Ext: No LE edema  Musculoskeletal:Intact  Neuro: Awake and alert. Obeys commands. Electronically signed by Lynn Salguero MD on 10/19/2021 at 8:42 AM  **This report has been created using voice recognition software. It maycontain minor  errors which are inherent in voice recognition technology. **

## 2021-10-19 NOTE — PROGRESS NOTES
Comprehensive Nutrition Assessment    Type and Reason for Visit:  Reassess (TF management)    Nutrition Recommendations/Plan:   As overall status allows recommend trophic EN. Suggest Nepro carb steady - start at 10 ml/hr; increase by 10 ml every 6 hours, as tolerated, to goal rate of 40 ml/hr. Free water flush per MD.  No BM x8d - lactulose started per MD    Nutrition Assessment:    Pt. nutritionally compromised AEB NPO status d/t intubation 10/15, poor po intake since admission, LOS day 8, constipation, OGT to LIWS d/t residuals over 600ml with TF at 10ml/hour. .  At risk for further nutrition compromise r/t COVID Dx 10/5, new CHF, advanced age and underlying medical condition (hx diverticulitis, HTN). Nutrition recommendations/interventions as per above.     Malnutrition Assessment:  Malnutrition Status:  Insufficient data    Context:  Acute Illness     Findings of the 6 clinical characteristics of malnutrition:  Energy Intake:  7 - 50% or less of estimated energy requirements for 5 or more days  Weight Loss:  No significant weight loss (however difficult to evaluate with edema)     Body Fat Loss:  Unable to assess (transfering for intubation)     Muscle Mass Loss:  Unable to assess    Fluid Accumulation:  Unable to assess     Strength:  Not Performed    Estimated Daily Nutrient Needs:  Energy (kcal):  2906-2726 (25-35/kgm) late phase; Weight Used for Energy Requirements:   (56 kgm)     Protein (g):   gm (1.2-2) as renal function allows; Weight Used for Protein Requirements:   (56 kgm)        Fluid (ml/day):  per MD;     Nutrition Related Findings:     Intubated; OGT to LIWS d/t residuals of 300 and 600ml with TF at only 10ml/hour; po 1-25% per graphics prior to intubation; Glucose 86, BUN 75 Cr 1.6, Potassium 4.6  MAp 81  Triglycerides 211; Rx includes Vitamin C, D, Zinc, venofer, lactulose, decadron, diprivan; allergies noted; Vtach and needs CC per RN; urine has foul odor per RN    Wounds: Pressure Injury, Stage I (sacrum 10/15)       Current Nutrition Therapies:    ADULT TUBE FEEDING; Nasogastric; Renal Formula; Continuous; 10; Yes; 10; Q 6 hours; 40; 40; Q 4 hours - on hold today  Additional Calorie Sources:   diprivan at 3.4ml/hour provides 90 lipid kcals    Anthropometric Measures:  · Height: 5' (152.4 cm)  · Current Body Weight: 138 lb (62.6 kg) (10/19 with +1 edema)   · Admission Body Weight: 124 lb (56.2 kg) (10/11 +2 edema)    · Usual Body Weight:  (per EMR: 10/7/21: 125#)     · Ideal Body Weight: 100 lbs;    · BMI: 27  · Adjusted Body Weight:  ; No Adjustment   · BMI Categories: Overweight (BMI 25.0-29. 9)       Nutrition Diagnosis:   · Inadequate oral intake related to impaired respiratory function as evidenced by NPO or clear liquid status due to medical condition, intubation, nutrition support - enteral nutrition      Nutrition Interventions:   Food and/or Nutrient Delivery:  Continue NPO (resume EN as status allows)  Nutrition Education/Counseling:  No recommendation at this time   Coordination of Nutrition Care:  Continue to monitor while inpatient    Goals:  TF to provide 75% or more of estimated nutrition needs while intubated. Nutrition Monitoring and Evaluation:   Behavioral-Environmental Outcomes:  None Identified   Food/Nutrient Intake Outcomes:  Diet Advancement/Tolerance, Enteral Nutrition Intake/Tolerance  Physical Signs/Symptoms Outcomes:  Biochemical Data, GI Status, Fluid Status or Edema, Hemodynamic Status, Skin, Weight     Discharge Planning:     Too soon to determine     Electronically signed by Coty Diaz RD, LD on 10/19/21 at 3:49 PM EDT    Contact: [phone]

## 2021-10-19 NOTE — PROGRESS NOTES
CRITICAL CARE PROGRESS NOTE      Patient:  Jose Raul Conroy    Unit/Bed:3A-09/009-A  YOB: 1934  MRN: [id number]   PCP: SERGE Abdullahi CNP  Date of Admission: 10/11/2021  Chief Complaint:- worsening SOB    Assessment and Plan:    Acute hypoxic respiratory failure: Admitted on 10/11 and started HF. Progressive worsening hypoxemia. Intubation on 10/15. Current vent setting Pcontrol 20, PEEP 6, FiO2 100 %. Sedative with Propofol, Verse. Plan: Lung protective strategy with Peak < 35. PEEP < 30  10/17: current vent setting Pcontrol 24, PEEP 8, FiO2 55%, RR 28 with . Sedative with Midazolam. CXR (10/16) improved passive congestive changed. Patient is afebrile over last 48 hrs. Plan: wean off O2 as SpO2 >90%  10/18: Increase in FiO2 from 55 to 70% with current vent setting pressure control 26 PEEP 8, respiration rate 16, tidal volume 210s. P/F ratio 135. Plan: Prone with 8/16, cont wean off FiO2 as tolerate. ARDS:  Patient received oxygen with FiO2 100 with very low TV (250ml) on ventilation. Ferritin, CRP, Lactic dehydrogenase elevated. Start Decadron, Baricitinib      HFrEF: Echo (10/11): LV EF 25-30%, LV severe global hypokinesis. LV size moderate increase. Patient is hypotension with BP 97/50.   10/17: pitting 1 edema bilateral lower extremities edema. Weight gain 13 lbs since admission. 10/18: Net I/O last 25 hrs 1.6L. Weight gain 13 (120--> 133) lbs since admission. Plan: Started Midodrine 10mg TID.  10/19: Net I/O last 24 hrs +0.7L. Weight gain 18 (120-138) since admission. Patient currently hypotension with /57. Plan: started BUMEX and Norepinephrine      Hx of Prolong QTc interval:  On telemetry strip in ED. Seen by cardiogy for Prolong QTC. Plan: tele monitor, avoid QTC prolong medications, Keep K +> 4 and Mg > 2     New on set Afib without RVR: Cont Eliquis     HERIBERTO: BUN/Cr 74/1.9 <-- 70/2.4. Base line BUN/Cr 29/0.9. Likely secondary to Cardiorenal syndrome.  Plan: avoid nephrotoxicity, cont gentle fluid hydration 50ml/hr. Nephrologist follow the case. Hyperkalemia: Resolve. K+ 5.3. Plan: calcium gluconate, Lokelma    UTI: UA with microscopic (10/18) positive leukocyte, and bacterial. Plan: Start Ceftriaxone     AMS: likely secondary to hypercapnic hypoxemia. Plan: CT head when patient is stable. Iron deficiency anemia: Iron replacement    INITIAL H AND P AND ICU COURSE:  Brian Maldonado is 80years old female who presented in [address] [place] [address] for worsening SOB on 10/11. Patient reported received COVID vaccine. She was test positive for COVID in 10/4/21. Patient SOB progressive worsening that patient visit ED. In ED found to have O2 Sat 83% in room air with BNP 15.7k. Patient was admitted and manage for acute hypoxic respiratory failure likely due to combine CHF and COVID. Patient was placed on NC, Bumex, Dexamethasone. On 10/15, patient was transferred to ICU and intubated due to progressive hypoxic respirator failure on 100% FiO2 on high flow. Repeated CXR on 10/26 show improved vascular congestion. Patient has been afebrile since 10/15.       Past Medical History      Diagnosis Date    Anxiety     Arthritis     Bronchitis     Diverticulitis of colon     Hypertension         Past Surgical History        Procedure Laterality Date    APPENDECTOMY      CHOLECYSTECTOMY         Current Medications    lactulose  20 g Oral TID    midodrine  2.5 mg Oral TID WC    baricitinib  1 mg Oral Daily    metoprolol tartrate  25 mg Oral BID    iron sucrose  300 mg IntraVENous Q48H    dexamethasone (DECADRON) IVPB  20 mg IntraVENous Daily    Followed by    Magdiel Glasgow ON 10/20/2021] dexamethasone (PF)  10 mg IntraVENous Daily    apixaban  2.5 mg Oral BID    Vitamin D  2,000 Units Oral Daily    ascorbic acid  500 mg Oral Daily    sodium chloride flush  5-40 mL IntraVENous 2 times per day    scopolamine  1 patch TransDERmal Q72H    zinc sulfate  50 mg Oral Daily     acetaminophen **OR** acetaminophen, docusate sodium, sodium chloride flush, sodium chloride, polyethylene glycol    IV Drips/Infusions   sodium chloride 50 mL/hr at 10/18/21 1943    norepinephrine Stopped (10/16/21 1114)    midazolam Stopped (10/17/21 1412)    propofol 10 mcg/kg/min (10/19/21 0417)    sodium chloride         Home Medications  Medications Prior to Admission: hydroCHLOROthiazide (HYDRODIURIL) 12.5 MG tablet, Take 12.5 mg by mouth daily   NONFORMULARY, Focus Factor  NONFORMULARY, Drenamin  GLUCOSAMINE-CHONDROITIN PO, Take by mouth  cetirizine (ZYRTEC) 10 MG tablet, TAKE 1 TABLET BY MOUTH ONCE DAILY  HAWTHORN PO, Take 425 mg by mouth daily  NONFORMULARY, Allerplex  OLIVE LEAF EXTRACT PO, Take by mouth  Fexofenadine-Pseudoephedrine (ALLEGRA-D 12 HOUR PO), Take by mouth  aspirin 325 MG tablet, Take 325 mg by mouth daily  metoprolol tartrate (LOPRESSOR) 50 MG tablet, Take 1 tablet by mouth 2 times daily  ALLERGIES: Sulfa antibiotics, Gluten meal, Metronidazole, and Milk-related compounds    ROS   Can't obtain due to patient sedative on vent    Scheduled Meds:   lactulose  20 g Oral TID    midodrine  2.5 mg Oral TID WC    baricitinib  1 mg Oral Daily    metoprolol tartrate  25 mg Oral BID    iron sucrose  300 mg IntraVENous Q48H    dexamethasone (DECADRON) IVPB  20 mg IntraVENous Daily    Followed by    Wilian Fink ON 10/20/2021] dexamethasone (PF)  10 mg IntraVENous Daily    apixaban  2.5 mg Oral BID    Vitamin D  2,000 Units Oral Daily    ascorbic acid  500 mg Oral Daily    sodium chloride flush  5-40 mL IntraVENous 2 times per day    scopolamine  1 patch TransDERmal Q72H    zinc sulfate  50 mg Oral Daily     Continuous Infusions:   sodium chloride 50 mL/hr at 10/18/21 1943    norepinephrine Stopped (10/16/21 1114)    midazolam Stopped (10/17/21 1412)    propofol 10 mcg/kg/min (10/19/21 0417)    sodium chloride         PHYSICAL EXAMINATION:  T:  96.9.  P:  59. RR:  18. B/P:  105/57. FiO2: 40% .  O2 Sat:  95.  I/O:  +0.8 L  Body patient and coordination of care.   Electronically signed by Lula Justin MD on 10/20/2021 at 6:54 AM

## 2021-10-20 LAB
ANION GAP SERPL CALCULATED.3IONS-SCNC: 8 MEQ/L (ref 8–16)
BASOPHILS # BLD: 0.4 %
BASOPHILS ABSOLUTE: 0.1 THOU/MM3 (ref 0–0.1)
BUN BLDV-MCNC: 76 MG/DL (ref 7–22)
CALCIUM SERPL-MCNC: 9.2 MG/DL (ref 8.5–10.5)
CHLORIDE BLD-SCNC: 104 MEQ/L (ref 98–111)
CO2: 30 MEQ/L (ref 23–33)
CREAT SERPL-MCNC: 1.7 MG/DL (ref 0.4–1.2)
EKG ATRIAL RATE: 60 BPM
EKG P AXIS: 19 DEGREES
EKG P-R INTERVAL: 178 MS
EKG Q-T INTERVAL: 462 MS
EKG QRS DURATION: 144 MS
EKG QTC CALCULATION (BAZETT): 462 MS
EKG R AXIS: -46 DEGREES
EKG T AXIS: -39 DEGREES
EKG VENTRICULAR RATE: 60 BPM
EOSINOPHIL # BLD: 0 %
EOSINOPHILS ABSOLUTE: 0 THOU/MM3 (ref 0–0.4)
ERYTHROCYTE [DISTWIDTH] IN BLOOD BY AUTOMATED COUNT: 15.3 % (ref 11.5–14.5)
ERYTHROCYTE [DISTWIDTH] IN BLOOD BY AUTOMATED COUNT: 54 FL (ref 35–45)
GFR SERPL CREATININE-BSD FRML MDRD: 28 ML/MIN/1.73M2
GLUCOSE BLD-MCNC: 81 MG/DL (ref 70–108)
GLUCOSE BLD-MCNC: 85 MG/DL (ref 70–108)
GLUCOSE BLD-MCNC: 85 MG/DL (ref 70–108)
GLUCOSE BLD-MCNC: 91 MG/DL (ref 70–108)
HCT VFR BLD CALC: 37.6 % (ref 37–47)
HEMOGLOBIN: 12.1 GM/DL (ref 12–16)
IMMATURE GRANS (ABS): 0.68 THOU/MM3 (ref 0–0.07)
IMMATURE GRANULOCYTES: 5.1 %
LYMPHOCYTES # BLD: 2.5 %
LYMPHOCYTES ABSOLUTE: 0.3 THOU/MM3 (ref 1–4.8)
MAGNESIUM: 1.9 MG/DL (ref 1.6–2.4)
MCH RBC QN AUTO: 31.2 PG (ref 26–33)
MCHC RBC AUTO-ENTMCNC: 32.2 GM/DL (ref 32.2–35.5)
MCV RBC AUTO: 96.9 FL (ref 81–99)
MONOCYTES # BLD: 9.1 %
MONOCYTES ABSOLUTE: 1.2 THOU/MM3 (ref 0.4–1.3)
NUCLEATED RED BLOOD CELLS: 0 /100 WBC
PLATELET # BLD: 140 THOU/MM3 (ref 130–400)
PMV BLD AUTO: 11.2 FL (ref 9.4–12.4)
POTASSIUM SERPL-SCNC: 4.4 MEQ/L (ref 3.5–5.2)
RBC # BLD: 3.88 MILL/MM3 (ref 4.2–5.4)
SEG NEUTROPHILS: 82.9 %
SEGMENTED NEUTROPHILS ABSOLUTE COUNT: 11.1 THOU/MM3 (ref 1.8–7.7)
SODIUM BLD-SCNC: 142 MEQ/L (ref 135–145)
WBC # BLD: 13.4 THOU/MM3 (ref 4.8–10.8)

## 2021-10-20 PROCEDURE — 99232 SBSQ HOSP IP/OBS MODERATE 35: CPT | Performed by: INTERNAL MEDICINE

## 2021-10-20 PROCEDURE — 94003 VENT MGMT INPAT SUBQ DAY: CPT

## 2021-10-20 PROCEDURE — 85025 COMPLETE CBC W/AUTO DIFF WBC: CPT

## 2021-10-20 PROCEDURE — 2580000003 HC RX 258: Performed by: INTERNAL MEDICINE

## 2021-10-20 PROCEDURE — 83735 ASSAY OF MAGNESIUM: CPT

## 2021-10-20 PROCEDURE — 93010 ELECTROCARDIOGRAM REPORT: CPT | Performed by: NUCLEAR MEDICINE

## 2021-10-20 PROCEDURE — 6370000000 HC RX 637 (ALT 250 FOR IP): Performed by: STUDENT IN AN ORGANIZED HEALTH CARE EDUCATION/TRAINING PROGRAM

## 2021-10-20 PROCEDURE — 6360000002 HC RX W HCPCS: Performed by: INTERNAL MEDICINE

## 2021-10-20 PROCEDURE — 2500000003 HC RX 250 WO HCPCS: Performed by: INTERNAL MEDICINE

## 2021-10-20 PROCEDURE — 82948 REAGENT STRIP/BLOOD GLUCOSE: CPT

## 2021-10-20 PROCEDURE — 2100000000 HC CCU R&B

## 2021-10-20 PROCEDURE — 2580000003 HC RX 258: Performed by: STUDENT IN AN ORGANIZED HEALTH CARE EDUCATION/TRAINING PROGRAM

## 2021-10-20 PROCEDURE — 6370000000 HC RX 637 (ALT 250 FOR IP): Performed by: INTERNAL MEDICINE

## 2021-10-20 PROCEDURE — 6370000000 HC RX 637 (ALT 250 FOR IP): Performed by: HOSPITALIST

## 2021-10-20 PROCEDURE — 80048 BASIC METABOLIC PNL TOTAL CA: CPT

## 2021-10-20 PROCEDURE — 6360000002 HC RX W HCPCS: Performed by: STUDENT IN AN ORGANIZED HEALTH CARE EDUCATION/TRAINING PROGRAM

## 2021-10-20 PROCEDURE — 99291 CRITICAL CARE FIRST HOUR: CPT | Performed by: INTERNAL MEDICINE

## 2021-10-20 PROCEDURE — 36415 COLL VENOUS BLD VENIPUNCTURE: CPT

## 2021-10-20 RX ORDER — SENNOSIDES 8.8 MG/5ML
10 LIQUID ORAL NIGHTLY
Status: DISCONTINUED | OUTPATIENT
Start: 2021-10-20 | End: 2021-11-18 | Stop reason: HOSPADM

## 2021-10-20 RX ORDER — BUMETANIDE 0.25 MG/ML
1 INJECTION, SOLUTION INTRAMUSCULAR; INTRAVENOUS EVERY 12 HOURS
Status: DISCONTINUED | OUTPATIENT
Start: 2021-10-20 | End: 2021-11-06

## 2021-10-20 RX ADMIN — Medication 17.6 MG: at 20:11

## 2021-10-20 RX ADMIN — METOPROLOL TARTRATE 25 MG: 25 TABLET, FILM COATED ORAL at 18:48

## 2021-10-20 RX ADMIN — LACTULOSE 20 G: 20 SOLUTION ORAL at 21:47

## 2021-10-20 RX ADMIN — MILRINONE LACTATE 0.12 MCG/KG/MIN: 1 INJECTION, SOLUTION INTRAVENOUS at 08:11

## 2021-10-20 RX ADMIN — APIXABAN 2.5 MG: 2.5 TABLET, FILM COATED ORAL at 08:19

## 2021-10-20 RX ADMIN — LACTULOSE 20 G: 20 SOLUTION ORAL at 14:36

## 2021-10-20 RX ADMIN — PROPOFOL 10 MCG/KG/MIN: 10 INJECTION, EMULSION INTRAVENOUS at 03:13

## 2021-10-20 RX ADMIN — APIXABAN 2.5 MG: 2.5 TABLET, FILM COATED ORAL at 21:47

## 2021-10-20 RX ADMIN — PROPOFOL 10 MCG/KG/MIN: 10 INJECTION, EMULSION INTRAVENOUS at 17:52

## 2021-10-20 RX ADMIN — SODIUM CHLORIDE, PRESERVATIVE FREE 10 ML: 5 INJECTION INTRAVENOUS at 20:19

## 2021-10-20 RX ADMIN — BUMETANIDE 1 MG: 0.25 INJECTION, SOLUTION INTRAMUSCULAR; INTRAVENOUS at 20:46

## 2021-10-20 RX ADMIN — CEFTRIAXONE SODIUM 1000 MG: 1 INJECTION, POWDER, FOR SOLUTION INTRAMUSCULAR; INTRAVENOUS at 14:36

## 2021-10-20 RX ADMIN — LACTULOSE 20 G: 20 SOLUTION ORAL at 09:29

## 2021-10-20 RX ADMIN — BARICITINIB 1 MG: 2 TABLET, FILM COATED ORAL at 09:29

## 2021-10-20 RX ADMIN — SODIUM CHLORIDE, PRESERVATIVE FREE 20 ML: 5 INJECTION INTRAVENOUS at 09:00

## 2021-10-20 RX ADMIN — BUMETANIDE 1 MG: 0.25 INJECTION, SOLUTION INTRAMUSCULAR; INTRAVENOUS at 09:51

## 2021-10-20 ASSESSMENT — PULMONARY FUNCTION TESTS
PIF_VALUE: 30
PIF_VALUE: 30
PIF_VALUE: 32
PIF_VALUE: 24
PIF_VALUE: 30
PIF_VALUE: 30

## 2021-10-20 NOTE — PROGRESS NOTES
CRITICAL CARE PROGRESS NOTE      Patient:  Adry Newsome    Unit/Bed:3A-09/009-A  YOB: 1934  MRN: [id number]   PCP: SERGE North CNP  Date of Admission: 10/11/2021  Chief Complaint:- worsening SOB    Assessment and Plan:    Acute hypoxic respiratory failure: Admitted on 10/11 and started HF. Progressive worsening hypoxemia. Intubation on 10/15. Still require high FiO2. Current vent setting Pcontrol 24, PEEP 8, FiO2 50, RR 14, . Sedative with Propofol, Verse. Plan: Lung protective strategy with Peak < 35. PEEP < 30, STB daily     ARDS:  Patient received oxygen with FiO2 100 with very low TV (250ml) on ventilation. Ferritin, CRP, Lactic dehydrogenase elevated. Start Decadron, Baricitinib      HFrEF: Echo (10/11): LV EF 25-30%, LV severe global hypokinesis. LV size moderate increase. Patient is hypotension with BP 97/50.   10/17: pitting 1 edema bilateral lower extremities edema. Weight gain 13 lbs since admission. 10/18: Net I/O last 25 hrs 1.6L. Weight gain 13 (120--> 133) lbs since admission. Plan: Started Midodrine 10mg TID.  10/19: Net I/O last 24 hrs +0.7L. Weight gain 18 (120-138) since admission. Patient currently hypotension with /57. Plan: started [address] and Norepinephrine   10/20: Net I/O last 24 hrs -2.4L. Weight 132 lbs (reduce 6 lbs since yesterday)     Hx of Prolong QTc interval:  On telemetry strip in ED. Seen by cardiogy for Prolong QTC. Plan: tele monitor, avoid QTC prolong medications, Keep K +> 4 and Mg > 2     New on set Afib without RVR: Cont Eliquis     HERIBERTO: BUN/Cr 74/1.9 <-- 70/2.4. Base line BUN/Cr 29/0.9. Likely secondary to Cardiorenal syndrome. Plan: avoid nephrotoxicity, cont gentle fluid hydration 50ml/hr. Nephrologist follow the case. Hyperkalemia: Resolve. K+ 5.3.  Plan: calcium gluconate, Lokelma    UTI: UA with microscopic (10/18) positive leukocyte, > 200 WBC and bacterial. Plan: Start Ceftriaxone     AMS: likely secondary to hypercapnic hypoxemia. Plan: CT head when patient is stable. Iron deficiency anemia: Iron replacement    INITIAL H AND P AND ICU COURSE:  India Vang is 80years old female who presented in [facility] for worsening SOB on 10/11. Patient reported received COVID vaccine. She was test positive for COVID in 10/4/21. Patient SOB progressive worsening that patient visit ED. In ED found to have O2 Sat 83% in room air with BNP 15.7k. Patient was admitted and manage for acute hypoxic respiratory failure likely due to combine CHF and COVID. Patient was placed on NC, Bumex, Dexamethasone. On 10/15, patient was transferred to ICU and intubated due to progressive hypoxic respirator failure on 100% FiO2 on high flow. Repeated CXR on 10/26 show improved vascular congestion. Patient has been afebrile since 10/15.  UA on 10/18 positive leukocyte with >200 WBC and many bacterial. Patient was started on Ceftriaxone on 10/19    Past Medical History      Diagnosis Date    Anxiety     Arthritis     Bronchitis     Diverticulitis of colon     Hypertension         Past Surgical History        Procedure Laterality Date    APPENDECTOMY      CHOLECYSTECTOMY         Current Medications    bumetanide  1 mg IntraVENous Q12H    lactulose  20 g Oral TID    baricitinib  1 mg Oral Daily    metoprolol tartrate  25 mg Oral BID    apixaban  2.5 mg Oral BID    sodium chloride flush  5-40 mL IntraVENous 2 times per day     acetaminophen **OR** acetaminophen, docusate sodium, sodium chloride flush, sodium chloride, polyethylene glycol    IV Drips/Infusions   milrinone 0.125 mcg/kg/min (10/20/21 0811)    [Held by provider] sodium chloride Stopped (10/19/21 1900)    propofol 10 mcg/kg/min (10/20/21 1030)    sodium chloride         Home Medications  Medications Prior to Admission: hydroCHLOROthiazide (HYDRODIURIL) 12.5 MG tablet, Take 12.5 mg by mouth daily   NONFORMULARY, Focus Factor  NONFORMULARY, Drenamin  GLUCOSAMINE-CHONDROITIN PO, Take by mouth  cetirizine (ZYRTEC) 10 MG tablet, TAKE 1 TABLET BY MOUTH ONCE DAILY  HAWTHORN PO, Take 425 mg by mouth daily  NONFORMULARY, Allerplex  OLIVE LEAF EXTRACT PO, Take by mouth  Fexofenadine-Pseudoephedrine (ALLEGRA-D 12 HOUR PO), Take by mouth  aspirin 325 MG tablet, Take 325 mg by mouth daily  metoprolol tartrate (LOPRESSOR) 50 MG tablet, Take 1 tablet by mouth 2 times daily  ALLERGIES: Sulfa antibiotics, Gluten meal, Metronidazole, and Milk-related compounds    ROS   Can't obtain due to patient sedative on vent    Scheduled Meds:   bumetanide  1 mg IntraVENous Q12H    lactulose  20 g Oral TID    baricitinib  1 mg Oral Daily    metoprolol tartrate  25 mg Oral BID    apixaban  2.5 mg Oral BID    sodium chloride flush  5-40 mL IntraVENous 2 times per day     Continuous Infusions:   milrinone 0.125 mcg/kg/min (10/20/21 0811)    [Held by provider] sodium chloride Stopped (10/19/21 1900)    propofol 10 mcg/kg/min (10/20/21 1030)    sodium chloride         PHYSICAL EXAMINATION:  T:  98.3.  P:  66. RR:  14. B/P:  97/54. FiO2: 50% . O2 Sat:  93.  I/O:  -2.4L L  Body mass index is 25.79 kg/m². GCS:   11  General: sedative on vent  HEENT:  normocephalic and atraumatic. No scleral icterus. PERR  Neck: supple. No Thyromegaly. Lungs: clear to auscultation. No retractions  Cardiac: RRR. No JVD. Abdomen: soft. Nontender. Extremities:  No clubbing, cyanosis, or edema x 4. Vasculature: capillary refill < 3 seconds. Palpable dorsalis pedis pulses. Skin:  warm and dry. Lymph:  No supraclavicular adenopathy. Neurologic:  No focal deficit. No seizures. Data: (All radiographs, tracings, PFTs, and imaging are personally viewed and interpreted unless otherwise noted). Sodium 142, Potassium 4.4, Chloride 104, Bicarb 30, BUN/Cr 76/1.7  WBC 13.4, Hg 12.1, Hct 37.6, Platelet 385  Telemetry shows normal sinus rhythm  Blood culture 10/11 no growth x 2, Pneumonia panel 10/15 no growth  CXR (10/18):  Increase density in left ling base (partial left lower lobe collapse) and persistent patchy bilateral pulmonary opacities                              Seen with multidisciplinary ICU team.  Meets Continued ICU Level Care Criteria:    [x] Yes   [] No - Transfer Planned to listed location:  [] HOSPITALIST CONTACTED-      Case and plan discussed with Dr. Figueroa Londono. Electronically signed by Tyrese Enriquez DO  Internal Medicine Resident Physician  Patient seen by me. Case discussed with resident physician. Patient started on diuretic and milrinone for progressive heart failure. Unable to wean mechanical ventilator secondary to poor tidal volumes despite adequate pressure control. .  Italicized font represents changes to the note made by me. CC time 35 minutes. Time was discontiguous. Time does not include procedures. Time does include my direct assessment of the patient and coordination of care.   Electronically signed by Bessie Gaytan MD on 10/22/2021 at 6:25 AM

## 2021-10-20 NOTE — PROGRESS NOTES
Renal Progress Note    Assessment and Plan:   1. Acute kidney injury pre-renal mostly with serum creatinine slightly increased today from diuretic given last night  2. Respiratory failure on vent  3. SARS-CoV-2 pneumonia  4. Leukocytosis mild    PLAN:  1. Labs reviewed  2. Serum creatinine very slightly increased today due to diuretic from last night most likely  3. Medications reviewed  4. No changes  5. We will stop intravenous fluid however due to lower extremity edema  6. Labs in the morning  7. We will follow    Patient Active Problem List:     Sigmoid diverticulitis     Acute respiratory failure with hypoxia ([facility] [address]     COVID-19     Acute congestive heart failure ([facility] [address]      Subjective:   Admit Date: 10/11/2021    Interval History:   Seen for acute kidney injury  Remains on mechanical support  Updated by the staff  No issues of concern  Blood pressure is good  Urine output is good      Medications:   Scheduled Meds:   bumetanide  1 mg IntraVENous Q12H    cefTRIAXone (ROCEPHIN) IV  1,000 mg IntraVENous Q24H    lactulose  20 g Oral TID    baricitinib  1 mg Oral Daily    metoprolol tartrate  25 mg Oral BID    apixaban  2.5 mg Oral BID    sodium chloride flush  5-40 mL IntraVENous 2 times per day     Continuous Infusions:   milrinone 0.125 mcg/kg/min (10/20/21 0811)    [Held by provider] sodium chloride Stopped (10/19/21 1900)    propofol 10 mcg/kg/min (10/20/21 1030)    sodium chloride         CBC:   Recent Labs     10/18/21  0530 10/19/21  0340 10/20/21  0445   WBC 7.0 12.0* 13.4*   HGB 12.4 12.3 12.1    149 140     CMP:    Recent Labs     10/18/21  0530 10/19/21  0340 10/20/21  0445    140 142   K 4.6 4.6  4.6 4.4    104 104   CO2 30 29 30   BUN 77* 75* 76*   CREATININE 1.8* 1.6* 1.7*   GLUCOSE 142* 88 85   CALCIUM 9.0 9.4 9.2   LABGLOM 27* 30* 28*     Troponin: No results for input(s): TROPONINI in the last 72 hours. BNP: No results for input(s): BNP in the last 72 hours.   INR: No results for input(s): INR in the last 72 hours. Lipids:   Recent Labs     10/19/21  0340   TRIG 211*     Liver: No results for input(s): AST, ALT, ALKPHOS, PROT, LABALBU, BILITOT in the last 72 hours. Invalid input(s): BILDIR  Iron:  No results for input(s): IRONS, FERRITIN in the last 72 hours. Invalid input(s): LABIRONS  XR CHEST PORTABLE   Final Result   1. Increased density in the left lung base. This can indicate partial left lower lobe collapse. 2. Persistent patchy bilateral pulmonary opacities. **This report has been created using voice recognition software. It may contain minor errors which are inherent in voice recognition technology. **      Final report electronically signed by Dr. Twin Ulloa on 10/18/2021 7:58 AM      XR CHEST PORTABLE   Final Result   Impression:   1. Cardiomegaly with improvement of passive congestive changes and better    aeration of both lungs compared to the prior study. 2. The focal bilateral alveolar consolidations consistent with infectious    pulmonary disease are unchanged. Retrocardiac consolidation and small LEFT    pleural effusion obscuring the LEFT diaphragm is unchanged. This document has been electronically signed by: Karine Felix MD on    10/16/2021 03:18 AM      XR CHEST PORTABLE   Final Result   Somewhat low position of endotracheal tube 1.2 cm above the claire but improved aeration of the upper lungs. **This report has been created using voice recognition software. It may contain minor errors which are inherent in voice recognition technology. **      Final report electronically signed by Dr. Ramone Shaw on 10/15/2021 3:34 PM      XR CHEST PORTABLE   Final Result   1. Bilateral pneumonia. 2. Small bilateral pleural effusions. 3. Stable cardiomegaly. **This report has been created using voice recognition software. It may contain minor errors which are inherent in voice recognition technology. **      Final report electronically signed by Dr. Cassidy Dumont on 10/15/2021 4:14 PM      XR CHEST PORTABLE   Final Result   Right lower lobe airspace infiltrates. Severe cardiomegaly. **This report has been created using voice recognition software. It may contain minor errors which are inherent in voice recognition technology. **      Final report electronically signed by Dr. Shaneka Bauer on 10/11/2021 8:59 PM      XR CHEST PORTABLE   Final Result      1. Diffuse groundglass opacities in both lungs along with cardiomegaly. Findings likely suggest acute exacerbation of congestive heart failure versus pneumonia in the right clinical setting. Clinical correlation is recommended      2. Small left pleural effusion. 3. Other findings as described above. **This report has been created using voice recognition software. It may contain minor errors which are inherent in voice recognition technology. **      Final report electronically signed by Dr Sang Cartagena on 10/11/2021 11:56 AM            Objective:   Vitals: BP (!) 101/58   Pulse 71   Temp 98.3 °F (36.8 °C) (Axillary)   Resp 14   Ht 5' (1.524 m)   Wt 132 lb 0.9 oz (59.9 kg)   SpO2 90%   BMI 25.79 kg/m²    Wt Readings from Last 3 Encounters:   10/20/21 132 lb 0.9 oz (59.9 kg)   10/07/21 125 lb (56.7 kg)      24HR INTAKE/OUTPUT:      Intake/Output Summary (Last 24 hours) at 10/20/2021 1352  Last data filed at 10/20/2021 1335  Gross per 24 hour   Intake 1549.29 ml   Output 4730 ml   Net -3180.71 ml       Constitutional: Well-developed elderly lady on mechanical support , no apparent distress   Skin:normal with no rash or lesions. HEENT:Pupils are reactive. Endotracheal tube is intact. Orogastric tube is noted. .Oral mucosa is moist   Neck:supple with no adenopathy or carotid bruit  Cardiovascular: Regular sinus rhythm  Respiratory:  Clear to ausculation without wheezes, rhonchi or rales in the anterior  Abdomen: Soft. Good bowel sounds.   Ext: No LE edema  Musculoskeletal:Intact  Neuro:Deferred      Electronically signed by Emmie Hay MD on 10/20/2021 at 1:52 PM  **This report has been created using voice recognition software. It maycontain minor  errors which are inherent in voice recognition technology. **

## 2021-10-20 NOTE — CARE COORDINATION
Patient/family seen: Yes       Informed patient/family of BPCI-A Medical Bundle Program with potential outreach by either Care Transitions Team or [facility]Health Team based on hospital admission and location. BPCI-A Notification Letter given: Yes; letter placed on counter in patient's room. Called and spoke with Kourtney's daughter, Tea Held [phone], informed of letter and where this CM left it in patient's room. Current discharge plan: From home with daughter. Plan pending clinical course as patient still on ventilator. Will likely need IPR vs SNF vs LTACH.

## 2021-10-20 NOTE — PROGRESS NOTES
Discussed case with primary RN, Sonya Odonnell. Phone call made to patient's daughter, Lian Fernandes. Did discuss concerns regarding the patient's heart. Lian Fernandes remains optimistic regarding her mother's diagnosis. Did re-address wishes if  the patient's heart were to stop and Lian Fernandes reiterates that full code is still desired. Did discuss likelihood of rib fractures, brain and organ damage associated with such measures. Elian Archibaldman understanding and states that she will discuss with her sister. Palliative care will remain available if needs arise. Please call prn.

## 2021-10-20 NOTE — CARE COORDINATION
10/20/21, 3:01 PM EDT    DISCHARGE ON GOING EVALUATION    [facility] day: 9  Location: 3A-09/009-A Reason for admit: Acute respiratory failure with hypoxia ([facility] [address] [J96.01]  Acute congestive heart failure, unspecified heart failure type ([facility] [address] [I50.9]  COVID-19 [U07.1]   Procedure:   10/12 Echo with EF 25-30%; Small circumferential pericardial effusion with no tamponade physiology; Myxomatotic degeneration of mitral valve;  Mild to Moderate mitral regurgitation is present  10/15 Intubated  10/15 CVC RIJ    Barriers to Discharge: Failed SBT with low tidal volumes, desat, and high HR. Opens eyes spontaneously today. Better UO. Primacor started. Needs BM. Remains on vent w/ETT on PCV, peep 8, PC 24, FIO2 50%, sats 93%. Afebrile. Junctional rhythym. Follows commands. Intensivist and Nephrology following. Palliative Care and Dietitian following. Telemetry, CVC, OG w/TF restarted @ 10 ml/hr, alaniz. Primacor @ 0.125 mcg/kg/min, diprivan @ 10 mcg/kg/min, eliquis, baricitinib, IV rocephin, lactulose, lopressor. Received 2 mg IV bumex x1 last evening. BUN 76, Creat 1.7, wbc 13. 4. PCP: SERGE Walker CNP  Readmission Risk Score: 18.2%  Patient Goals/Plan/Treatment Preferences: From home with daughter. Plan pending clinical course. Had agreed to minimum of [place] at discharge. Has cane, may need new walker.

## 2021-10-21 ENCOUNTER — APPOINTMENT (OUTPATIENT)
Dept: GENERAL RADIOLOGY | Age: 86
DRG: 004 | End: 2021-10-21
Payer: MEDICARE

## 2021-10-21 LAB
ANION GAP SERPL CALCULATED.3IONS-SCNC: 8 MEQ/L (ref 8–16)
BUN BLDV-MCNC: 75 MG/DL (ref 7–22)
CALCIUM SERPL-MCNC: 9.2 MG/DL (ref 8.5–10.5)
CHLORIDE BLD-SCNC: 102 MEQ/L (ref 98–111)
CO2: 33 MEQ/L (ref 23–33)
CREAT SERPL-MCNC: 1.9 MG/DL (ref 0.4–1.2)
ERYTHROCYTE [DISTWIDTH] IN BLOOD BY AUTOMATED COUNT: 15.5 % (ref 11.5–14.5)
ERYTHROCYTE [DISTWIDTH] IN BLOOD BY AUTOMATED COUNT: 56.1 FL (ref 35–45)
GFR SERPL CREATININE-BSD FRML MDRD: 25 ML/MIN/1.73M2
GLUCOSE BLD-MCNC: 100 MG/DL (ref 70–108)
GLUCOSE BLD-MCNC: 100 MG/DL (ref 70–108)
GLUCOSE BLD-MCNC: 111 MG/DL (ref 70–108)
GLUCOSE BLD-MCNC: 95 MG/DL (ref 70–108)
HCT VFR BLD CALC: 37.7 % (ref 37–47)
HEMOGLOBIN: 11.6 GM/DL (ref 12–16)
MAGNESIUM: 1.9 MG/DL (ref 1.6–2.4)
MCH RBC QN AUTO: 30.5 PG (ref 26–33)
MCHC RBC AUTO-ENTMCNC: 30.8 GM/DL (ref 32.2–35.5)
MCV RBC AUTO: 99.2 FL (ref 81–99)
PLATELET # BLD: 163 THOU/MM3 (ref 130–400)
PMV BLD AUTO: 12 FL (ref 9.4–12.4)
POTASSIUM REFLEX MAGNESIUM: 3.2 MEQ/L (ref 3.5–5.2)
RBC # BLD: 3.8 MILL/MM3 (ref 4.2–5.4)
SODIUM BLD-SCNC: 143 MEQ/L (ref 135–145)
WBC # BLD: 14.1 THOU/MM3 (ref 4.8–10.8)

## 2021-10-21 PROCEDURE — 6370000000 HC RX 637 (ALT 250 FOR IP): Performed by: STUDENT IN AN ORGANIZED HEALTH CARE EDUCATION/TRAINING PROGRAM

## 2021-10-21 PROCEDURE — 2100000000 HC CCU R&B

## 2021-10-21 PROCEDURE — 6370000000 HC RX 637 (ALT 250 FOR IP): Performed by: INTERNAL MEDICINE

## 2021-10-21 PROCEDURE — 99291 CRITICAL CARE FIRST HOUR: CPT | Performed by: INTERNAL MEDICINE

## 2021-10-21 PROCEDURE — 6370000000 HC RX 637 (ALT 250 FOR IP): Performed by: HOSPITALIST

## 2021-10-21 PROCEDURE — 2580000003 HC RX 258: Performed by: STUDENT IN AN ORGANIZED HEALTH CARE EDUCATION/TRAINING PROGRAM

## 2021-10-21 PROCEDURE — 80048 BASIC METABOLIC PNL TOTAL CA: CPT

## 2021-10-21 PROCEDURE — 71045 X-RAY EXAM CHEST 1 VIEW: CPT

## 2021-10-21 PROCEDURE — 2500000003 HC RX 250 WO HCPCS

## 2021-10-21 PROCEDURE — 6360000002 HC RX W HCPCS: Performed by: STUDENT IN AN ORGANIZED HEALTH CARE EDUCATION/TRAINING PROGRAM

## 2021-10-21 PROCEDURE — 99232 SBSQ HOSP IP/OBS MODERATE 35: CPT | Performed by: INTERNAL MEDICINE

## 2021-10-21 PROCEDURE — 83516 IMMUNOASSAY NONANTIBODY: CPT

## 2021-10-21 PROCEDURE — 83735 ASSAY OF MAGNESIUM: CPT

## 2021-10-21 PROCEDURE — 85027 COMPLETE CBC AUTOMATED: CPT

## 2021-10-21 PROCEDURE — 84238 ASSAY NONENDOCRINE RECEPTOR: CPT

## 2021-10-21 PROCEDURE — 82948 REAGENT STRIP/BLOOD GLUCOSE: CPT

## 2021-10-21 PROCEDURE — 36415 COLL VENOUS BLD VENIPUNCTURE: CPT

## 2021-10-21 PROCEDURE — 94003 VENT MGMT INPAT SUBQ DAY: CPT

## 2021-10-21 RX ORDER — NOREPINEPHRINE BIT/0.9 % NACL 16MG/250ML
2-100 INFUSION BOTTLE (ML) INTRAVENOUS CONTINUOUS
Status: DISCONTINUED | OUTPATIENT
Start: 2021-10-21 | End: 2021-11-14

## 2021-10-21 RX ORDER — NOREPINEPHRINE BIT/0.9 % NACL 16MG/250ML
INFUSION BOTTLE (ML) INTRAVENOUS
Status: COMPLETED
Start: 2021-10-21 | End: 2021-10-21

## 2021-10-21 RX ORDER — POTASSIUM CHLORIDE 7.45 MG/ML
10 INJECTION INTRAVENOUS
Status: COMPLETED | OUTPATIENT
Start: 2021-10-21 | End: 2021-10-21

## 2021-10-21 RX ADMIN — Medication 2 MCG/MIN: at 10:16

## 2021-10-21 RX ADMIN — APIXABAN 2.5 MG: 2.5 TABLET, FILM COATED ORAL at 09:14

## 2021-10-21 RX ADMIN — POTASSIUM CHLORIDE 10 MEQ: 7.46 INJECTION, SOLUTION INTRAVENOUS at 10:05

## 2021-10-21 RX ADMIN — PROPOFOL 30 MCG/KG/MIN: 10 INJECTION, EMULSION INTRAVENOUS at 11:06

## 2021-10-21 RX ADMIN — CEFTRIAXONE SODIUM 1000 MG: 1 INJECTION, POWDER, FOR SOLUTION INTRAMUSCULAR; INTRAVENOUS at 16:34

## 2021-10-21 RX ADMIN — LACTULOSE 20 G: 20 SOLUTION ORAL at 09:14

## 2021-10-21 RX ADMIN — SODIUM CHLORIDE, PRESERVATIVE FREE 10 ML: 5 INJECTION INTRAVENOUS at 20:13

## 2021-10-21 RX ADMIN — PROPOFOL 35 MCG/KG/MIN: 10 INJECTION, EMULSION INTRAVENOUS at 00:53

## 2021-10-21 RX ADMIN — SODIUM CHLORIDE, PRESERVATIVE FREE 10 ML: 5 INJECTION INTRAVENOUS at 09:14

## 2021-10-21 RX ADMIN — POTASSIUM CHLORIDE 10 MEQ: 7.46 INJECTION, SOLUTION INTRAVENOUS at 11:05

## 2021-10-21 RX ADMIN — BARICITINIB 1 MG: 2 TABLET, FILM COATED ORAL at 09:14

## 2021-10-21 RX ADMIN — POTASSIUM CHLORIDE 10 MEQ: 7.46 INJECTION, SOLUTION INTRAVENOUS at 09:14

## 2021-10-21 RX ADMIN — APIXABAN 2.5 MG: 2.5 TABLET, FILM COATED ORAL at 20:00

## 2021-10-21 RX ADMIN — PROPOFOL 30 MCG/KG/MIN: 10 INJECTION, EMULSION INTRAVENOUS at 21:22

## 2021-10-21 ASSESSMENT — PULMONARY FUNCTION TESTS
PIF_VALUE: 25
PIF_VALUE: 24

## 2021-10-21 ASSESSMENT — PAIN SCALES - GENERAL
PAINLEVEL_OUTOF10: 0
PAINLEVEL_OUTOF10: 0

## 2021-10-21 NOTE — CARE COORDINATION
10/21/21, 12:51 PM EDT    DISCHARGE ON GOING EVALUATION    [facility] day: 10  Location: 3A-09/009-A Reason for admit: Acute respiratory failure with hypoxia ([facility] [address] [J96.01]  Acute congestive heart failure, unspecified heart failure type ([facility] [address] [I50.9]  COVID-19 [U07.1]   Procedure:   10/12 Echo with EF 25-30%; Small circumferential pericardial effusion with no tamponade physiology; Myxomatotic degeneration of mitral valve;  Mild to Moderate mitral regurgitation is present  10/15 Intubated  10/15 CVC RIJ  10/21 CXR: Persistent diffuse patchy opacities but with improved aeration at the lung bases    Barriers to Discharge: Remains on vent w/ETT on PCV, peep 8, PC 24, FIO2 50%, sats 93%. Tmax 99.1. Afib 110's. Follows commands. PT/OT - early mobility. Intensivist and Nephrology following. Palliative Care and Dietitian following. Telemetry, CVC, OG w/TF, alaniz. Primacor @ 0.125 mcg/kg/min, diprivan @ 30 mcg/kg/min, eliquis, baricitinib, IV rocephin, lactulose, lopressor. K+ 3.2, BUN 75, Creat up to 1.9, wbc 14.1, hgb 11.6. PCP: SERGE Coulter CNP  Readmission Risk Score: 18.7%  Patient Goals/Plan/Treatment Preferences: From home with daughter. Plan pending clinical course. Had agreed to minimum of [facility] at discharge. Has cane, may need new walker.

## 2021-10-21 NOTE — PROGRESS NOTES
Renal Progress Note    Assessment and Plan:   1. Acute kidney injury with serum creatinine worsening. This is likely due to hypotension as well as diuretic  2. Respiratory failure on vent  3. SARS-CoV-2 pneumonia  4. Leukocytosis mild  5. Atrial fibrillation with controlled ventricular rate  6. Hypokalemia  7. Leukocytosis    PLAN:  1. Labs reviewed  2. Serum creatinine is increased again today  3. Medications reviewed  4. Okay to continue to hold bumetanide  5. Potassium replacement per protocol  6. Labs in the morning  7.  We will follow    Patient Active Problem List:     Sigmoid diverticulitis     Acute respiratory failure with hypoxia ([facility] [address]     COVID-19     Acute congestive heart failure ([facility] [address]      Subjective:   Admit Date: 10/11/2021    Interval History:   Seen for acute kidney injury  Remains on mechanical support  Updated by the staff  Went into atrial fibrillation last night  Blood pressure is low  Urine output is good      Medications:   Scheduled Meds:   [Held by provider] bumetanide  1 mg IntraVENous Q12H    cefTRIAXone (ROCEPHIN) IV  1,000 mg IntraVENous Q24H    senna  10 mL Per NG tube Nightly    lactulose  20 g Oral TID    baricitinib  1 mg Oral Daily    metoprolol tartrate  25 mg Oral BID    apixaban  2.5 mg Oral BID    sodium chloride flush  5-40 mL IntraVENous 2 times per day     Continuous Infusions:   norepinephrine Stopped (10/21/21 1047)    milrinone 0.125 mcg/kg/min (10/20/21 0811)    [Held by provider] sodium chloride Stopped (10/19/21 1900)    propofol 30 mcg/kg/min (10/21/21 0333)    sodium chloride         CBC:   Recent Labs     10/19/21  0340 10/20/21  0445   WBC 12.0* 13.4*   HGB 12.3 12.1    140     CMP:    Recent Labs     10/19/21  0340 10/20/21  0445 10/21/21  0422    142 143   K 4.6  4.6 4.4 3.2*    104 102   CO2 29 30 33   BUN 75* 76* 75*   CREATININE 1.6* 1.7* 1.9*   GLUCOSE 88 85 100   CALCIUM 9.4 9.2 9.2   LABGLOM 30* 28* 25*     Troponin: No results for input(s): TROPONINI in the last 72 hours. BNP: No results for input(s): BNP in the last 72 hours. INR: No results for input(s): INR in the last 72 hours. Lipids:   Recent Labs     10/19/21  0340   TRIG 211*     Liver: No results for input(s): AST, ALT, ALKPHOS, PROT, LABALBU, BILITOT in the last 72 hours. Invalid input(s): BILDIR  Iron:  No results for input(s): IRONS, FERRITIN in the last 72 hours. Invalid input(s): LABIRONS  XR CHEST PORTABLE   Final Result   Persistent diffuse patchy opacities but with improved aeration at the lung bases. **This report has been created using voice recognition software. It may contain minor errors which are inherent in voice recognition technology. **      Final report electronically signed by Dr. Jerris Gottron, MD on 10/21/2021 10:48 AM      XR CHEST PORTABLE   Final Result   1. Increased density in the left lung base. This can indicate partial left lower lobe collapse. 2. Persistent patchy bilateral pulmonary opacities. **This report has been created using voice recognition software. It may contain minor errors which are inherent in voice recognition technology. **      Final report electronically signed by Dr. Nima Lennon on 10/18/2021 7:58 AM      XR CHEST PORTABLE   Final Result   Impression:   1. Cardiomegaly with improvement of passive congestive changes and better    aeration of both lungs compared to the prior study. 2. The focal bilateral alveolar consolidations consistent with infectious    pulmonary disease are unchanged. Retrocardiac consolidation and small LEFT    pleural effusion obscuring the LEFT diaphragm is unchanged. This document has been electronically signed by: Vibha Ornelas MD on    10/16/2021 03:18 AM      XR CHEST PORTABLE   Final Result   Somewhat low position of endotracheal tube 1.2 cm above the claire but improved aeration of the upper lungs.             **This report has been created using voice recognition software. It may contain minor errors which are inherent in voice recognition technology. **      Final report electronically signed by Dr. Kimo Reich on 10/15/2021 3:34 PM      XR CHEST PORTABLE   Final Result   1. Bilateral pneumonia. 2. Small bilateral pleural effusions. 3. Stable cardiomegaly. **This report has been created using voice recognition software. It may contain minor errors which are inherent in voice recognition technology. **      Final report electronically signed by Dr. Rain Bates on 10/15/2021 4:14 PM      XR CHEST PORTABLE   Final Result   Right lower lobe airspace infiltrates. Severe cardiomegaly. **This report has been created using voice recognition software. It may contain minor errors which are inherent in voice recognition technology. **      Final report electronically signed by Dr. Kimo Reich on 10/11/2021 8:59 PM      XR CHEST PORTABLE   Final Result      1. Diffuse groundglass opacities in both lungs along with cardiomegaly. Findings likely suggest acute exacerbation of congestive heart failure versus pneumonia in the right clinical setting. Clinical correlation is recommended      2. Small left pleural effusion. 3. Other findings as described above. **This report has been created using voice recognition software. It may contain minor errors which are inherent in voice recognition technology. **      Final report electronically signed by Dr Dae Phelps on 10/11/2021 11:56 AM            Objective:   Vitals: BP (!) 105/47   Pulse 111   Temp 98.8 °F (37.1 °C) (Bladder)   Resp 22   Ht 5' (1.524 m)   Wt 127 lb 13.9 oz (58 kg)   SpO2 94%   BMI 24.97 kg/m²    Wt Readings from Last 3 Encounters:   10/21/21 127 lb 13.9 oz (58 kg)   10/07/21 125 lb (56.7 kg)      24HR INTAKE/OUTPUT:      Intake/Output Summary (Last 24 hours) at 10/21/2021 1102  Last data filed at 10/21/2021 0857  Gross per 24 hour   Intake 1284.41 ml Output 4150 ml   Net -2865.59 ml       Constitutional: Well-developed elderly lady on mechanical support , no apparent distress   Skin:normal with no rash or lesions. HEENT:Pupils are reactive. Endotracheal tube is intact. Orogastric tube is noted. .Oral mucosa is moist   Neck:supple with no adenopathy or carotid bruit  Cardiovascular:  irregular heart rhythm  Respiratory:  Clear to ausculation without wheezes, rhonchi or rales in the anterior  Abdomen: Soft. Good bowel sounds. Ext: 1+ bilateral LE edema  Musculoskeletal:Intact  Neuro:Deferred      Electronically signed by Sonya Candelaria MD on 10/21/2021 at 11:02 AM  **This report has been created using voice recognition software. It maycontain minor  errors which are inherent in voice recognition technology. **

## 2021-10-21 NOTE — PROGRESS NOTES
CRITICAL CARE PROGRESS NOTE      Patient:  Doreen Taylor    Unit/Bed:3A-09/009-A  YOB: 1934  MRN: [id number]   PCP: SERGE Luis CNP  Date of Admission: 10/11/2021  Chief Complaint:- worsening SOB    Assessment and Plan:    1. Acute hypoxic respiratory failure: Admitted on 10/11 and started HF. Progressive worsening hypoxemia. Intubation on 10/15. On 10/20, Family was discussed about trach and family could like to have few days for final decision on trach. Still require high FiO2. Current vent setting Pcontrol 18, PEEP 8, FiO2 40, RR 14, . Sedative with Propofol 30mg. Plan: Lung protective strategy with Peak < 35. PEEP < 30, STB daily     2. ARDS:  Patient received oxygen with FiO2 100 with very low TV (250ml) on ventilation. Ferritin, CRP, Lactic dehydrogenase elevated. Start Decadron, Baricitinib (10/12)     3. Possible Respiratory failure related to Neuromuscular disease: Persistent low tidal volume after diuretic. Patient received Milrinone and diuresis. Repeated CXR (10/21) show Persistent diffuse patchy opacities but with improved aeration at the lung bases. DTR in tact bilateral lower extremities. Less likely [place]. Plan: Acethycholine receptor clocking, modulating     4. HFrEF: Echo (10/11): LV EF 25-30%, LV severe global hypokinesis. LV size moderate increase. Patient is hypotension with BP 97/50. Weight on admission 120 lsb  10/20: Started Milrinone lactate 1/9ml/hr  10/21: Current weight 127 lbs. Net I/O over last 24 hrs -3.7 L.      4. Hx of Prolong QTc interval:  On telemetry strip in ED. Seen by cardiogy for Prolong QTC. Plan: tele monitor, avoid QTC prolong medications, Keep K +> 4 and Mg > 2     5. New on set Afib without RVR: Cont Eliquis     6. HERIBERTO: Improve. BUN/Cr  71/1.7< -- 74/1.9 <-- 70/2.4. Base line BUN/Cr 29/0.9. Likely secondary to Cardiorenal syndrome. Plan: avoid nephrotoxicity, cont gentle fluid hydration 50ml/hr. Nephrologist follow the case. 10/21: Good urine output Net I/O last 24 hrs -3.7L. Response to Bumex. Plan: cont avoid nephrotoxicity      7. Hyperkalemia: Resolve. K+ 5.3. Plan: calcium gluconate, Lokelma    8. UTI: UA with microscopic (10/18) positive leukocyte, > 200 WBC and bacterial. Plan: Start Ceftriaxone (10/19)     8. AMS: likely secondary to hypercapnic hypoxemia. Plan: CT head when patient is stable. 9. Iron deficiency anemia: Iron replacement    INITIAL H AND P AND ICU COURSE:  Moise Caballero is 80years old female who presented in [facility] for worsening SOB on 10/11. Patient reported received COVID vaccine. She was test positive for COVID in 10/4/21. Patient SOB progressive worsening that patient visit ED. In ED found to have O2 Sat 83% in room air with BNP 15.7k. Patient was admitted and manage for acute hypoxic respiratory failure likely due to combine CHF and COVID. Patient was placed on NC, Bumex, Dexamethasone. On 10/15, patient was transferred to ICU and intubated due to progressive hypoxic respirator failure on 100% FiO2 on high flow. Repeated CXR on 10/26 show improved vascular congestion. Patient has been afebrile since 10/15. UA on 10/18 positive leukocyte with >200 WBC and many bacterial. Patient was started on Ceftriaxone on 10/19 for UTI. Patient also started Milrinone for her HFrEF.  Patient remain significant low Tidal volume regardless diuretic and Milrinone      Past Medical History      Diagnosis Date    Anxiety     Arthritis     Bronchitis     Diverticulitis of colon     Hypertension         Past Surgical History        Procedure Laterality Date    APPENDECTOMY      CHOLECYSTECTOMY         Current Medications    potassium chloride  10 mEq IntraVENous Q1H    bumetanide  1 mg IntraVENous Q12H    cefTRIAXone (ROCEPHIN) IV  1,000 mg IntraVENous Q24H    senna  10 mL Per NG tube Nightly    lactulose  20 g Oral TID    baricitinib  1 mg Oral Daily    metoprolol tartrate  25 mg Oral BID    apixaban  2.5 mg Oral BID    sodium chloride flush  5-40 mL IntraVENous 2 times per day     acetaminophen **OR** acetaminophen, docusate sodium, sodium chloride flush, sodium chloride, polyethylene glycol    IV Drips/Infusions   milrinone 0.125 mcg/kg/min (10/20/21 0811)    [Held by provider] sodium chloride Stopped (10/19/21 1900)    propofol 30 mcg/kg/min (10/21/21 0333)    sodium chloride         Home Medications  Medications Prior to Admission: hydroCHLOROthiazide (HYDRODIURIL) 12.5 MG tablet, Take 12.5 mg by mouth daily   NONFORMULARY, Focus Factor  NONFORMULARY, Drenamin  GLUCOSAMINE-CHONDROITIN PO, Take by mouth  cetirizine (ZYRTEC) 10 MG tablet, TAKE 1 TABLET BY MOUTH ONCE DAILY  HAWTHORN PO, Take 425 mg by mouth daily  NONFORMULARY, Allerplex  OLIVE LEAF EXTRACT PO, Take by mouth  Fexofenadine-Pseudoephedrine (ALLEGRA-D 12 HOUR PO), Take by mouth  aspirin 325 MG tablet, Take 325 mg by mouth daily  metoprolol tartrate (LOPRESSOR) 50 MG tablet, Take 1 tablet by mouth 2 times daily  ALLERGIES: Sulfa antibiotics, Gluten meal, Metronidazole, and Milk-related compounds    ROS   Can't obtain due to patient sedative on vent    Scheduled Meds:   potassium chloride  10 mEq IntraVENous Q1H    bumetanide  1 mg IntraVENous Q12H    cefTRIAXone (ROCEPHIN) IV  1,000 mg IntraVENous Q24H    senna  10 mL Per NG tube Nightly    lactulose  20 g Oral TID    baricitinib  1 mg Oral Daily    metoprolol tartrate  25 mg Oral BID    apixaban  2.5 mg Oral BID    sodium chloride flush  5-40 mL IntraVENous 2 times per day     Continuous Infusions:   milrinone 0.125 mcg/kg/min (10/20/21 0811)    [Held by provider] sodium chloride Stopped (10/19/21 1900)    propofol 30 mcg/kg/min (10/21/21 0333)    sodium chloride         PHYSICAL EXAMINATION:  T:  99.  P:  91. RR:  19. B/P:  134/66. FiO2: 40% . O2 Sat:  97.  I/O:  +0.05 L  Body mass index is 25.79 kg/m².    GCS:   11  General: sedative on vent  HEENT:  normocephalic and atraumatic. No scleral icterus. PERR  Neck: supple. No Thyromegaly. Lungs: clear to auscultation. No retractions  Cardiac: RRR. No JVD. Abdomen: soft. Nontender. Extremities:  No clubbing, cyanosis, or edema x 4. Vasculature: capillary refill < 3 seconds. Palpable dorsalis pedis pulses. Skin:  warm and dry. Lymph:  No supraclavicular adenopathy. Neurologic:  No focal deficit. No seizures. Data: (All radiographs, tracings, PFTs, and imaging are personally viewed and interpreted unless otherwise noted).  Sodium 142, Potassium 3.4, Chloride 102, Bicarb 32, BUN/Cr 71/1.7   WBC 13.8, Hg 11.5, Hct 36.9, Platelet 396   Telemetry shows normal sinus rhythm    Blood culture 10/11 no growth x 2, Pneumonia panel 10/15 no growth   CXR (10/21): Persistent diffuse patchy opacities but with improved aeration at the lung bases. Seen with multidisciplinary ICU team.  Meets Continued ICU Level Care Criteria:    [x] Yes   [] No - Transfer Planned to listed location:  [] HOSPITALIST CONTACTED-      Case and plan discussed with Dr. Jenaro Borjas. Electronically signed by Florencio Angelucci, DO  Internal Medicine Resident Physician  Patient seen by me. Case discussed with resident physician. Concern for neuromuscular disease causing hypercarbia and respiratory failure. Patient has deep tendon reflexes which would suggest no [place] syndrome. Unable to wean from mechanical ventilator secondary to poor tidal volumes. Waiting work-up for possible myasthenia gravis. .  Italicized font represents changes to the note made by me. CC time 35 minutes. Time was discontiguous. Time does not include procedures. Time does include my direct assessment of the patient and coordination of care.   Electronically signed by Genesis Dickey MD on 10/22/2021 at 3:47 PM

## 2021-10-22 LAB
ANION GAP SERPL CALCULATED.3IONS-SCNC: 8 MEQ/L (ref 8–16)
BUN BLDV-MCNC: 71 MG/DL (ref 7–22)
CALCIUM SERPL-MCNC: 9.2 MG/DL (ref 8.5–10.5)
CHLORIDE BLD-SCNC: 102 MEQ/L (ref 98–111)
CO2: 32 MEQ/L (ref 23–33)
CREAT SERPL-MCNC: 1.7 MG/DL (ref 0.4–1.2)
ERYTHROCYTE [DISTWIDTH] IN BLOOD BY AUTOMATED COUNT: 15.4 % (ref 11.5–14.5)
ERYTHROCYTE [DISTWIDTH] IN BLOOD BY AUTOMATED COUNT: 56.4 FL (ref 35–45)
GFR SERPL CREATININE-BSD FRML MDRD: 28 ML/MIN/1.73M2
GLUCOSE BLD-MCNC: 101 MG/DL (ref 70–108)
GLUCOSE BLD-MCNC: 103 MG/DL (ref 70–108)
GLUCOSE BLD-MCNC: 116 MG/DL (ref 70–108)
HCT VFR BLD CALC: 36.9 % (ref 37–47)
HEMOGLOBIN: 11.5 GM/DL (ref 12–16)
MAGNESIUM: 1.9 MG/DL (ref 1.6–2.4)
MCH RBC QN AUTO: 31.1 PG (ref 26–33)
MCHC RBC AUTO-ENTMCNC: 31.2 GM/DL (ref 32.2–35.5)
MCV RBC AUTO: 99.7 FL (ref 81–99)
PLATELET # BLD: 163 THOU/MM3 (ref 130–400)
PMV BLD AUTO: 12.2 FL (ref 9.4–12.4)
POTASSIUM REFLEX MAGNESIUM: 3.4 MEQ/L (ref 3.5–5.2)
POTASSIUM SERPL-SCNC: 3.4 MEQ/L (ref 3.5–5.2)
RBC # BLD: 3.7 MILL/MM3 (ref 4.2–5.4)
SODIUM BLD-SCNC: 142 MEQ/L (ref 135–145)
TRIGL SERPL-MCNC: 173 MG/DL (ref 0–199)
WBC # BLD: 13.8 THOU/MM3 (ref 4.8–10.8)

## 2021-10-22 PROCEDURE — 99291 CRITICAL CARE FIRST HOUR: CPT | Performed by: INTERNAL MEDICINE

## 2021-10-22 PROCEDURE — 6370000000 HC RX 637 (ALT 250 FOR IP): Performed by: STUDENT IN AN ORGANIZED HEALTH CARE EDUCATION/TRAINING PROGRAM

## 2021-10-22 PROCEDURE — 80048 BASIC METABOLIC PNL TOTAL CA: CPT

## 2021-10-22 PROCEDURE — 6370000000 HC RX 637 (ALT 250 FOR IP): Performed by: HOSPITALIST

## 2021-10-22 PROCEDURE — 83735 ASSAY OF MAGNESIUM: CPT

## 2021-10-22 PROCEDURE — 36415 COLL VENOUS BLD VENIPUNCTURE: CPT

## 2021-10-22 PROCEDURE — 84478 ASSAY OF TRIGLYCERIDES: CPT

## 2021-10-22 PROCEDURE — 6360000002 HC RX W HCPCS: Performed by: STUDENT IN AN ORGANIZED HEALTH CARE EDUCATION/TRAINING PROGRAM

## 2021-10-22 PROCEDURE — 82948 REAGENT STRIP/BLOOD GLUCOSE: CPT

## 2021-10-22 PROCEDURE — 6370000000 HC RX 637 (ALT 250 FOR IP): Performed by: INTERNAL MEDICINE

## 2021-10-22 PROCEDURE — 2580000003 HC RX 258: Performed by: STUDENT IN AN ORGANIZED HEALTH CARE EDUCATION/TRAINING PROGRAM

## 2021-10-22 PROCEDURE — 2100000000 HC CCU R&B

## 2021-10-22 PROCEDURE — 6360000002 HC RX W HCPCS: Performed by: INTERNAL MEDICINE

## 2021-10-22 PROCEDURE — 94003 VENT MGMT INPAT SUBQ DAY: CPT

## 2021-10-22 PROCEDURE — 99232 SBSQ HOSP IP/OBS MODERATE 35: CPT | Performed by: INTERNAL MEDICINE

## 2021-10-22 PROCEDURE — 85027 COMPLETE CBC AUTOMATED: CPT

## 2021-10-22 PROCEDURE — 6370000000 HC RX 637 (ALT 250 FOR IP)

## 2021-10-22 RX ORDER — POTASSIUM CHLORIDE 29.8 MG/ML
20 INJECTION INTRAVENOUS PRN
Status: DISCONTINUED | OUTPATIENT
Start: 2021-10-22 | End: 2021-11-14

## 2021-10-22 RX ORDER — POTASSIUM CHLORIDE 20 MEQ/1
TABLET, EXTENDED RELEASE ORAL
Status: COMPLETED
Start: 2021-10-22 | End: 2021-10-22

## 2021-10-22 RX ADMIN — APIXABAN 2.5 MG: 2.5 TABLET, FILM COATED ORAL at 09:17

## 2021-10-22 RX ADMIN — POTASSIUM CHLORIDE 40 MEQ: 1500 TABLET, EXTENDED RELEASE ORAL at 06:37

## 2021-10-22 RX ADMIN — ACETAMINOPHEN 650 MG: 325 TABLET ORAL at 21:23

## 2021-10-22 RX ADMIN — SODIUM CHLORIDE, PRESERVATIVE FREE 10 ML: 5 INJECTION INTRAVENOUS at 21:23

## 2021-10-22 RX ADMIN — BARICITINIB 1 MG: 2 TABLET, FILM COATED ORAL at 09:17

## 2021-10-22 RX ADMIN — PROPOFOL 30 MCG/KG/MIN: 10 INJECTION, EMULSION INTRAVENOUS at 06:41

## 2021-10-22 RX ADMIN — POTASSIUM CHLORIDE 20 MEQ: 400 INJECTION, SOLUTION INTRAVENOUS at 09:17

## 2021-10-22 RX ADMIN — Medication 17.6 MG: at 21:22

## 2021-10-22 RX ADMIN — POTASSIUM CHLORIDE 20 MEQ: 400 INJECTION, SOLUTION INTRAVENOUS at 10:22

## 2021-10-22 RX ADMIN — PROPOFOL 35 MCG/KG/MIN: 10 INJECTION, EMULSION INTRAVENOUS at 17:51

## 2021-10-22 RX ADMIN — ACETAMINOPHEN 650 MG: 325 TABLET ORAL at 03:49

## 2021-10-22 RX ADMIN — APIXABAN 2.5 MG: 2.5 TABLET, FILM COATED ORAL at 21:23

## 2021-10-22 RX ADMIN — METOPROLOL TARTRATE 25 MG: 25 TABLET, FILM COATED ORAL at 09:17

## 2021-10-22 RX ADMIN — CEFTRIAXONE SODIUM 1000 MG: 1 INJECTION, POWDER, FOR SOLUTION INTRAMUSCULAR; INTRAVENOUS at 14:35

## 2021-10-22 ASSESSMENT — PAIN SCALES - GENERAL: PAINLEVEL_OUTOF10: 0

## 2021-10-22 ASSESSMENT — PULMONARY FUNCTION TESTS
PIF_VALUE: 19
PIF_VALUE: 23
PIF_VALUE: 21
PIF_VALUE: 23
PIF_VALUE: 24

## 2021-10-22 NOTE — PLAN OF CARE
[address] THERAPY MISSED TREATMENT NOTE  [facility] CCU 3A  3A-009-A      Date: 10/22/2021  Patient Name: Aftab Dupree        CSN: [id number]   : 1934  (80 y.o.)  Gender: female   Referring Practitioner: Lalita Winchester MD  Diagnosis: acute respiratory failure with hypoxia         REASON FOR MISSED TREATMENT: Pt held for therapy today. See pulmonologist note as pt having difficulty with tidal volumes on adequate pressure control while on the ventilator. Will assess when pt is able to tolerate therapy assessment.

## 2021-10-22 NOTE — SIGNIFICANT EVENT
Patient seen by me. Case discussed with resident physician. Patient continues to have very low tidal volumes despite adequate pressure control. Suspect patient has neuromuscular disease causing respiratory failure and inability to wean from mechanical ventilator. Awaiting work-up for myasthenia gravis. Family will have to decide regarding tracheostomy tube and PEG tube versus withdrawal of care. .  Italicized font represents changes to the note made by me. CC time 35 minutes. Time was discontiguous. Time does not include procedures. Time does include my direct assessment of the patient and coordination of care.   Electronically signed by Elisha Cantrell MD on 10/22/2021 at 3:49 PM

## 2021-10-22 NOTE — PROGRESS NOTES
Comprehensive Nutrition Assessment    Type and Reason for Visit:  Reassess (TF management)    Nutrition Recommendations/Plan:   Continue Nepro at goal rate of 40 ml/hr. Free water flush per MD.  Recommend culturelle as now with flexiseal after having no BM x8d and needed lactulose  Monitoring labs, status for EN adjustments as appropriate    Nutrition Assessment:    Pt. improving nutritionally AEB tolerating TF at goal now although has had OGT to LIWS d/t residuals over 600ml this admit, poor po intake first 5d of admit, intubated 10/15, LOS day 11.  At risk for further nutrition compromise r/t COVID Dx 10/5, new CHF, advanced age and underlying medical condition (hx diverticulitis, HTN). Malnutrition Assessment:  Malnutrition Status:   At risk for malnutrition (Comment)    Context:  Acute Illness     Findings of the 6 clinical characteristics of malnutrition:  Energy Intake:  7 - 50% or less of estimated energy requirements for 5 or more days  Weight Loss:  No significant weight loss (however difficult to evaluate with edema)     Body Fat Loss:  No significant body fat loss     Muscle Mass Loss:  No significant muscle mass loss    Fluid Accumulation:  Unable to assess     Strength:  Not Performed    Estimated Daily Nutrient Needs:  Energy (kcal):  8598-8839 (25-35/kgm) late phase; Weight Used for Energy Requirements:   (56 kgm)     Protein (g):   gm (1.2-2) as renal function allows; Weight Used for Protein Requirements:   (56 kgm)        Fluid (ml/day):  per MD;     Nutrition Related Findings:    Intubated; tolerating TF at 40ml/hour this am - improved from 2d ago when was at [facility] d/t residuals of 600ml - was constipated but after lactulose now with flexiseal with 200ml output; per RN abd soft; no proning; bumex held now Glucose 116, BUN 71 Cr 1.7 Potassium 3.4  MAP 76  Triglycerides 173; meds include ATB, senna, diprivan; allergies noted; Vtach and needs CC per RN    Wounds:  Pressure Injury, Stage II (sacrum 10/15)       Current Nutrition Therapies:    Current Tube Feeding (TF) Orders:  · Feeding Route: Orogastric  · Formula: Renal Formula (Nepro)  · Schedule: Continuous (10/22 at goal of 40ml/hour)  · Water Flushes: per physician - 100ml every 8h  · Goal TF & Flush Orders Provides:  Nepro 40ml/hour provides 1699 kcals (1876 with diprivan), 78gms protein, 154gms cho, 24gms fiber, 998 ml free H20 (698ml Nepro, 300 MD flush) in 1260ml total volume (960 Nepro, 300 MD flush)/24h    Additional Calorie Sources:   diprivan at 6.7ml/hour provides 177 lipid kcals    Anthropometric Measures:  · Height: 5' (152.4 cm)  · Current Body Weight: 127 lb (57.6 kg) (10/21 with +1 edema)   · Admission Body Weight: 124 lb (56.2 kg) (10/11 +2 edema)    · Usual Body Weight:  (per EMR: 10/7/21: 125#)     · Ideal Body Weight: 100 lbs;   · BMI: 24.8  · Adjusted Body Weight:  ; No Adjustment   · BMI Categories: Normal Weight (BMI 22.0 to 24.9) age over 72       Nutrition Diagnosis:   · Inadequate oral intake related to impaired respiratory function as evidenced by NPO or clear liquid status due to medical condition, intubation, nutrition support - enteral nutrition      Nutrition Interventions:   Food and/or Nutrient Delivery:  Continue NPO, Continue Current Tube Feeding  Nutrition Education/Counseling:  No recommendation at this time   Coordination of Nutrition Care:  Continue to monitor while inpatient    Goals:  EN to provide % nutrient needs while intubated for covid recovery process       Nutrition Monitoring and Evaluation:   Behavioral-Environmental Outcomes:  None Identified   Food/Nutrient Intake Outcomes:  Diet Advancement/Tolerance, Enteral Nutrition Intake/Tolerance  Physical Signs/Symptoms Outcomes:  Biochemical Data, GI Status, Fluid Status or Edema, Hemodynamic Status, Nutrition Focused Physical Findings, Skin, Weight     Discharge Planning:     Too soon to determine     Electronically signed by Anjelica Vila TACOS Collins, LD on 10/22/21 at 2:26 PM EDT    Contact: [phone]

## 2021-10-22 NOTE — PROGRESS NOTES
Renal Progress Note    Assessment and Plan:   1. Acute kidney injury mostly prerenal and improving  2. Hypokalemia  3. SARS-CoV-2 pneumonia  4. Respiratory failure mechanical support  5. Macrocytic anemia   6. Leukocytosis improved    PLAN:  1. Labs reviewed  2. Serum creatinine slightly improved today  3. Potassium replacement therapy  4. Labs in the morning  5. We'll follow    Patient Active Problem List:     Sigmoid diverticulitis     Acute respiratory failure with hypoxia ([facility] [address]     COVID-19     Acute congestive heart failure ([facility] [address]      Subjective:   Admit Date: 10/11/2021    Interval History:   Seen for acute kidney injury  Remains on mechanical support  Updated by the staff  No new issues  Stable blood pressure  Urine output is good      Medications:   Scheduled Meds:   potassium (CARDIAC) replacement protocol   Other RX Placeholder    [Held by provider] bumetanide  1 mg IntraVENous Q12H    cefTRIAXone (ROCEPHIN) IV  1,000 mg IntraVENous Q24H    senna  10 mL Per NG tube Nightly    [Held by provider] lactulose  20 g Oral TID    baricitinib  1 mg Oral Daily    metoprolol tartrate  25 mg Oral BID    apixaban  2.5 mg Oral BID    sodium chloride flush  5-40 mL IntraVENous 2 times per day     Continuous Infusions:   norepinephrine 0 mcg/min (10/22/21 0000)    milrinone 0.125 mcg/kg/min (10/20/21 0811)    [Held by provider] sodium chloride Stopped (10/19/21 1900)    propofol 30 mcg/kg/min (10/22/21 0641)    sodium chloride         CBC:   Recent Labs     10/20/21  0445 10/21/21  1115 10/22/21  0415   WBC 13.4* 14.1* 13.8*   HGB 12.1 11.6* 11.5*    163 163     CMP:    Recent Labs     10/20/21  0445 10/21/21  0422 10/22/21  0415    143 142   K 4.4 3.2* 3.4*  3.4*    102 102   CO2 30 33 32   BUN 76* 75* 71*   CREATININE 1.7* 1.9* 1.7*   GLUCOSE 85 100 116*   CALCIUM 9.2 9.2 9.2   LABGLOM 28* 25* 28*     Troponin: No results for input(s): TROPONINI in the last 72 hours.   BNP: No results for input(s): BNP in the last 72 hours. INR: No results for input(s): INR in the last 72 hours. Lipids:   Recent Labs     10/22/21  0415   TRIG 173     Liver: No results for input(s): AST, ALT, ALKPHOS, PROT, LABALBU, BILITOT in the last 72 hours. Invalid input(s): BILDIR  Iron:  No results for input(s): IRONS, FERRITIN in the last 72 hours. Invalid input(s): LABIRONS  XR CHEST PORTABLE   Final Result   Persistent diffuse patchy opacities but with improved aeration at the lung bases. **This report has been created using voice recognition software. It may contain minor errors which are inherent in voice recognition technology. **      Final report electronically signed by Dr. Madeleine Leahy MD on 10/21/2021 10:48 AM      XR CHEST PORTABLE   Final Result   1. Increased density in the left lung base. This can indicate partial left lower lobe collapse. 2. Persistent patchy bilateral pulmonary opacities. **This report has been created using voice recognition software. It may contain minor errors which are inherent in voice recognition technology. **      Final report electronically signed by Dr. Tiarra Ac on 10/18/2021 7:58 AM      XR CHEST PORTABLE   Final Result   Impression:   1. Cardiomegaly with improvement of passive congestive changes and better    aeration of both lungs compared to the prior study. 2. The focal bilateral alveolar consolidations consistent with infectious    pulmonary disease are unchanged. Retrocardiac consolidation and small LEFT    pleural effusion obscuring the LEFT diaphragm is unchanged. This document has been electronically signed by: Lila Roberto MD on    10/16/2021 03:18 AM      XR CHEST PORTABLE   Final Result   Somewhat low position of endotracheal tube 1.2 cm above the claire but improved aeration of the upper lungs. **This report has been created using voice recognition software.   It may contain minor errors which are inherent in voice recognition technology. **      Final report electronically signed by Dr. Kirti Mancilla on 10/15/2021 3:34 PM      XR CHEST PORTABLE   Final Result   1. Bilateral pneumonia. 2. Small bilateral pleural effusions. 3. Stable cardiomegaly. **This report has been created using voice recognition software. It may contain minor errors which are inherent in voice recognition technology. **      Final report electronically signed by  [facility] on 10/15/2021 4:14 PM      XR CHEST PORTABLE   Final Result   Right lower lobe airspace infiltrates. Severe cardiomegaly. **This report has been created using voice recognition software. It may contain minor errors which are inherent in voice recognition technology. **      Final report electronically signed by Dr. Kirti Mancilla on 10/11/2021 8:59 PM      XR CHEST PORTABLE   Final Result      1. Diffuse groundglass opacities in both lungs along with cardiomegaly. Findings likely suggest acute exacerbation of congestive heart failure versus pneumonia in the right clinical setting. Clinical correlation is recommended      2. Small left pleural effusion. 3. Other findings as described above. **This report has been created using voice recognition software. It may contain minor errors which are inherent in voice recognition technology. **      Final report electronically signed by Dr Chelsie Floyd on 10/11/2021 11:56 AM            Objective:   Vitals: /66   Pulse 91   Temp 99 °F (37.2 °C) (Bladder)   Resp 19   Ht 5' (1.524 m)   Wt 127 lb 13.9 oz (58 kg)   SpO2 97%   BMI 24.97 kg/m²    Wt Readings from Last 3 Encounters:   10/21/21 127 lb 13.9 oz (58 kg)   10/07/21 125 lb (56.7 kg)      24HR INTAKE/OUTPUT:      Intake/Output Summary (Last 24 hours) at 10/22/2021 1201  Last data filed at 10/22/2021 0917  Gross per 24 hour   Intake 948.38 ml   Output 775 ml   Net 173.38 ml       Constitutional: Well developed elderly lady on vent  no apparent distress   Skin:normal with no rash or lesions. HEENT:Pupils are reactive. Endotracheal tube is noted. Orogastric tube is noted as well. .Oral mucosa is moist   Neck:supple with no  carotid bruit  Cardiovascular:  S1, S2 without murmur or rubs   Respiratory:  Clear to ausculation without wheezes, rhonchi or rales in the anterior  Abdomen: Soft. Good bowel sounds. Ext: Trace bilateral LE edema  Musculoskeletal:Intact  Neuro: Deferred      Electronically signed by Luanne Francisco MD on 10/22/2021 at 12:01 PM  **This report has been created using voice recognition software. It maycontain minor  errors which are inherent in voice recognition technology. **

## 2021-10-22 NOTE — CARE COORDINATION
10/22/21, 2:32 PM EDT    DISCHARGE ON GOING EVALUATION    [facility] day: 11  Location: 3A-09/009-A Reason for admit: Acute respiratory failure with hypoxia ([facility] [address] [J96.01]  Acute congestive heart failure, unspecified heart failure type ([facility] [address] [I50.9]  COVID-19 [U07.1]   Procedure:   10/12 Echo with EF 25-30%; Small circumferential pericardial effusion with no tamponade physiology; Myxomatotic degeneration of mitral valve;  Mild to Moderate mitral regurgitation is present  10/15 Intubated  10/15 CVC RIJ    Barriers to Discharge: Remains on vent w/ETT on PCV, peep 6, PC 16, FIO2 40%, sats 95%. Tmax 99.3. Afib 90's. Follows commands. PT/OT - early mobility. Intensivist and Nephrology following. Palliative Care and Dietitian following. Telemetry, CVC, OG w/TF, flexiseal, alaniz. Primacor @ 0.125 mcg/kg/min, levo @ 1 mcg/min, diprivan @ 25 mcg/kg/min, eliquis, baricitinib, IV rocephin, lopressor, Electrolyte replacement protocols. PCP: SERGE Gifford CNP  Readmission Risk Score: 18.3%  Patient Goals/Plan/Treatment Preferences: From home with daughter. Plan pending clinical course. Had agreed to minimum of [facility] at discharge. Has cane, may need new walker.

## 2021-10-23 ENCOUNTER — APPOINTMENT (OUTPATIENT)
Dept: GENERAL RADIOLOGY | Age: 86
DRG: 004 | End: 2021-10-23
Payer: MEDICARE

## 2021-10-23 LAB
ANION GAP SERPL CALCULATED.3IONS-SCNC: 7 MEQ/L (ref 8–16)
BUN BLDV-MCNC: 68 MG/DL (ref 7–22)
CALCIUM SERPL-MCNC: 10 MG/DL (ref 8.5–10.5)
CHLORIDE BLD-SCNC: 103 MEQ/L (ref 98–111)
CO2: 31 MEQ/L (ref 23–33)
CREAT SERPL-MCNC: 1.6 MG/DL (ref 0.4–1.2)
ERYTHROCYTE [DISTWIDTH] IN BLOOD BY AUTOMATED COUNT: 15.4 % (ref 11.5–14.5)
ERYTHROCYTE [DISTWIDTH] IN BLOOD BY AUTOMATED COUNT: 57 FL (ref 35–45)
GFR SERPL CREATININE-BSD FRML MDRD: 30 ML/MIN/1.73M2
GLUCOSE BLD-MCNC: 103 MG/DL (ref 70–108)
GLUCOSE BLD-MCNC: 106 MG/DL (ref 70–108)
GLUCOSE BLD-MCNC: 111 MG/DL (ref 70–108)
GLUCOSE BLD-MCNC: 98 MG/DL (ref 70–108)
GRAM STAIN RESULT: ABNORMAL
HCT VFR BLD CALC: 39.1 % (ref 37–47)
HEMOGLOBIN: 12 GM/DL (ref 12–16)
MAGNESIUM: 1.9 MG/DL (ref 1.6–2.4)
MCH RBC QN AUTO: 31.1 PG (ref 26–33)
MCHC RBC AUTO-ENTMCNC: 30.7 GM/DL (ref 32.2–35.5)
MCV RBC AUTO: 101.3 FL (ref 81–99)
ORGANISM: ABNORMAL
PLATELET # BLD: 196 THOU/MM3 (ref 130–400)
PMV BLD AUTO: 11.8 FL (ref 9.4–12.4)
POTASSIUM REFLEX MAGNESIUM: 4.3 MEQ/L (ref 3.5–5.2)
POTASSIUM SERPL-SCNC: 4.3 MEQ/L (ref 3.5–5.2)
PROCALCITONIN: 0.27 NG/ML (ref 0.01–0.09)
RBC # BLD: 3.86 MILL/MM3 (ref 4.2–5.4)
RESPIRATORY CULTURE: ABNORMAL
RESPIRATORY CULTURE: ABNORMAL
SODIUM BLD-SCNC: 141 MEQ/L (ref 135–145)
WBC # BLD: 14.9 THOU/MM3 (ref 4.8–10.8)

## 2021-10-23 PROCEDURE — 36415 COLL VENOUS BLD VENIPUNCTURE: CPT

## 2021-10-23 PROCEDURE — 94003 VENT MGMT INPAT SUBQ DAY: CPT

## 2021-10-23 PROCEDURE — 6370000000 HC RX 637 (ALT 250 FOR IP): Performed by: HOSPITALIST

## 2021-10-23 PROCEDURE — 84145 PROCALCITONIN (PCT): CPT

## 2021-10-23 PROCEDURE — 99232 SBSQ HOSP IP/OBS MODERATE 35: CPT | Performed by: INTERNAL MEDICINE

## 2021-10-23 PROCEDURE — 71045 X-RAY EXAM CHEST 1 VIEW: CPT

## 2021-10-23 PROCEDURE — 2580000003 HC RX 258: Performed by: STUDENT IN AN ORGANIZED HEALTH CARE EDUCATION/TRAINING PROGRAM

## 2021-10-23 PROCEDURE — 99291 CRITICAL CARE FIRST HOUR: CPT | Performed by: INTERNAL MEDICINE

## 2021-10-23 PROCEDURE — 6370000000 HC RX 637 (ALT 250 FOR IP): Performed by: STUDENT IN AN ORGANIZED HEALTH CARE EDUCATION/TRAINING PROGRAM

## 2021-10-23 PROCEDURE — 6360000002 HC RX W HCPCS: Performed by: STUDENT IN AN ORGANIZED HEALTH CARE EDUCATION/TRAINING PROGRAM

## 2021-10-23 PROCEDURE — 82948 REAGENT STRIP/BLOOD GLUCOSE: CPT

## 2021-10-23 PROCEDURE — 6370000000 HC RX 637 (ALT 250 FOR IP): Performed by: INTERNAL MEDICINE

## 2021-10-23 PROCEDURE — 83735 ASSAY OF MAGNESIUM: CPT

## 2021-10-23 PROCEDURE — 2100000000 HC CCU R&B

## 2021-10-23 PROCEDURE — 80048 BASIC METABOLIC PNL TOTAL CA: CPT

## 2021-10-23 PROCEDURE — 85027 COMPLETE CBC AUTOMATED: CPT

## 2021-10-23 RX ADMIN — ACETAMINOPHEN 650 MG: 325 TABLET ORAL at 08:49

## 2021-10-23 RX ADMIN — APIXABAN 2.5 MG: 2.5 TABLET, FILM COATED ORAL at 08:49

## 2021-10-23 RX ADMIN — PROPOFOL 35 MCG/KG/MIN: 10 INJECTION, EMULSION INTRAVENOUS at 08:49

## 2021-10-23 RX ADMIN — BARICITINIB 1 MG: 2 TABLET, FILM COATED ORAL at 08:49

## 2021-10-23 RX ADMIN — METOPROLOL TARTRATE 25 MG: 25 TABLET, FILM COATED ORAL at 20:07

## 2021-10-23 RX ADMIN — PROPOFOL 35 MCG/KG/MIN: 10 INJECTION, EMULSION INTRAVENOUS at 18:10

## 2021-10-23 RX ADMIN — PROPOFOL 30 MCG/KG/MIN: 10 INJECTION, EMULSION INTRAVENOUS at 02:10

## 2021-10-23 RX ADMIN — ACETAMINOPHEN 650 MG: 325 TABLET ORAL at 20:07

## 2021-10-23 RX ADMIN — SODIUM CHLORIDE, PRESERVATIVE FREE 10 ML: 5 INJECTION INTRAVENOUS at 20:17

## 2021-10-23 RX ADMIN — APIXABAN 2.5 MG: 2.5 TABLET, FILM COATED ORAL at 20:07

## 2021-10-23 RX ADMIN — CEFTRIAXONE SODIUM 1000 MG: 1 INJECTION, POWDER, FOR SOLUTION INTRAMUSCULAR; INTRAVENOUS at 14:39

## 2021-10-23 RX ADMIN — SODIUM CHLORIDE, PRESERVATIVE FREE 10 ML: 5 INJECTION INTRAVENOUS at 08:50

## 2021-10-23 ASSESSMENT — PULMONARY FUNCTION TESTS
PIF_VALUE: 23
PIF_VALUE: 29
PIF_VALUE: 26
PIF_VALUE: 28

## 2021-10-23 ASSESSMENT — PAIN SCALES - GENERAL
PAINLEVEL_OUTOF10: 0

## 2021-10-23 NOTE — PROGRESS NOTES
Renal Progress Note  [facility]    Patient :  Lowell Ruiz; 80 y.o. MRN# [id number]  Location:  6G-44/421-W  Attending:  Keturah Moritz, MD  Admit Date:  10/11/2021   Hospital Day: 12      Subjective:     Nephrology is following the patient for HERIBERTO. Patient seen and examined. On vent @ 55% FiO2. Urine output 900 ml/24 hours. Had some lower Bps noted throughout the night. Currently on Milrinone drip.     Outpatient Medications:     Medications Prior to Admission: hydroCHLOROthiazide (HYDRODIURIL) 12.5 MG tablet, Take 12.5 mg by mouth daily   NONFORMULARY, Focus Factor  NONFORMULARY, Drenamin  GLUCOSAMINE-CHONDROITIN PO, Take by mouth  cetirizine (ZYRTEC) 10 MG tablet, TAKE 1 TABLET BY MOUTH ONCE DAILY  HAWTHORN PO, Take 425 mg by mouth daily  NONFORMULARY, Allerplex  OLIVE LEAF EXTRACT PO, Take by mouth  Fexofenadine-Pseudoephedrine (ALLEGRA-D 12 HOUR PO), Take by mouth  aspirin 325 MG tablet, Take 325 mg by mouth daily  metoprolol tartrate (LOPRESSOR) 50 MG tablet, Take 1 tablet by mouth 2 times daily    Current Medications:     Scheduled Meds:    potassium (CARDIAC) replacement protocol   Other RX Placeholder    [Held by provider] bumetanide  1 mg IntraVENous Q12H    cefTRIAXone (ROCEPHIN) IV  1,000 mg IntraVENous Q24H    senna  10 mL Per NG tube Nightly    [Held by provider] lactulose  20 g Oral TID    baricitinib  1 mg Oral Daily    metoprolol tartrate  25 mg Oral BID    apixaban  2.5 mg Oral BID    sodium chloride flush  5-40 mL IntraVENous 2 times per day     Continuous Infusions:    norepinephrine Stopped (10/22/21 1446)    milrinone 0.125 mcg/kg/min (10/20/21 0811)    [Held by provider] sodium chloride Stopped (10/19/21 1900)    propofol 35 mcg/kg/min (10/23/21 0657)    sodium chloride       PRN Meds:  potassium chloride, acetaminophen **OR** acetaminophen, docusate sodium, sodium chloride flush, sodium chloride, polyethylene glycol    Input/Output:       I/O last 3 completed shifts: In: 1516.7 [I.V.:488.7; NG/GT:1028]  Out: 1075 [Urine:900; Stool:175]. Patient Vitals for the past 96 hrs (Last 3 readings):   Weight   10/21/21 0600 127 lb 13.9 oz (58 kg)   10/20/21 0400 132 lb 0.9 oz (59.9 kg)       Vital Signs:   Temperature:  Temp: 99.1 °F (37.3 °C)  TMax:   Temp (24hrs), Av °F (37.2 °C), Min:98.8 °F (37.1 °C), Max:99.1 °F (37.3 °C)    Respirations:  Resp: 17  Pulse:   Pulse: 103  BP:    BP: (!) 110/91  BP Range: Systolic (76ETP), MDP:730 , Min:76 , EIH:538       Diastolic (27FHP), TYE:65, Min:48, Max:91      Physical Examination:     General:  Intubated, sedated  HEENT: NC/AT/ MMM +endotracheal tube  Chest:               Diminished breath sounds  Cardiac:  S1 S2 tachycardic  Abdomen: Soft, non-tender,  Neuro:  sedated  SKIN:  No rashes  Extremities:  1+ LE edema B/L    Labs:       Recent Labs     10/21/21  1115 10/22/21  0415 10/23/21  0450   WBC 14.1* 13.8* 14.9*   RBC 3.80* 3.70* 3.86*   HGB 11.6* 11.5* 12.0   HCT 37.7 36.9* 39.1   MCV 99.2* 99.7* 101.3*   MCH 30.5 31.1 31.1   MCHC 30.8* 31.2* 30.7*    163 196   MPV 12.0 12.2 11.8      BMP:   Recent Labs     10/21/21  0422 10/21/21  0422 10/22/21  0415 10/23/21  0450     --  142 141   K 3.2*   < > 3.4*  3.4* 4.3  4.3     --  102 103   CO2 33  --  32 31   BUN 75*  --  71* 68*   CREATININE 1.9*  --  1.7* 1.6*   GLUCOSE 100  --  116* 106   CALCIUM 9.2  --  9.2 10.0    < > = values in this interval not displayed. Phosphorus:   No results for input(s): PHOS in the last 72 hours. Magnesium:    Recent Labs     10/21/21  0422 10/22/21  0415 10/23/21  0450   MG 1.9 1.9 1.9     Albumin:  No results for input(s): LABALBU in the last 72 hours.   BNP:    No results found for: BNP  SHMUEL:    No results found for: SHMUEL  SPEP:  Lab Results   Component Value Date    PROT 4.9 10/15/2021     UPEP:   No results found for: LABPE  C3:   No results found for: C3  C4:   No results found for: C4  MPO ANCA:   No results found for: MPO  PR3 ANCA:   No results found for: PR3  Anti-GBM:   No results found for: GBMABIGG  Hep BsAg:       No results found for: HEPBSAG  Hep C AB:        No results found for: HEPCAB    Urinalysis/Chemistries:      Lab Results   Component Value Date    NITRU NEGATIVE 10/19/2021    COLORU YELLOW 10/19/2021    PHUR 6.0 10/19/2021    LABCAST 8-15 HYALINE 10/19/2021    LABCAST 4-8 FINE GRAN 10/19/2021    WBCUA > 200 10/19/2021    RBCUA 15-25 10/19/2021    YEAST NONE SEEN 10/19/2021    BACTERIA MANY 10/19/2021    SPECGRAV 1.018 10/19/2021    LEUKOCYTESUR LARGE 10/19/2021    UROBILINOGEN 0.2 10/19/2021    BILIRUBINUR NEGATIVE 10/19/2021    BILIRUBINUR NEGATIVE 08/09/2011    BLOODU LARGE 10/19/2021    GLUCOSEU NEGATIVE 10/11/2021    KETUA NEGATIVE 10/19/2021     Urine Sodium:   No results found for: TAMIA  Urine Potassium:  No results found for: KUR  Urine Chloride:  No results found for: CLUR  Urine Osmolarity:   Lab Results   Component Value Date    OSMOU 470 10/13/2021     Urine Protein:   No components found for: TOTALPROTEIN, URINE   Urine Creatinine:   No results found for: LABCREA  Urine Eosinophils:  No components found for: UEOS        Impression and Plan:  1. HERIBERTO: overall improved. Urine output marginal.  Bps borderline low overnight but improved now. Maintain MAP > 65, add levophed if needed. Avoid nephrotoxic agents. Diuretics PRN  2. Lytes: stable  3. Acute hypoxic resp failure  4. COVID-19 pneumonia  5. Cardiomyopathy with low EF. On Milrinone drip  6. Leukocytosis        Please don't hesitate to call with any questions.   Electronically signed by Donna Granados DO on 10/23/2021 at 7:54 AM

## 2021-10-23 NOTE — PROGRESS NOTES
Daughter, Dalila De La Rosa, called with update, all questions answered at this time. Fatoumata updated on plan of care as well.

## 2021-10-23 NOTE — PROGRESS NOTES
CRITICAL CARE PROGRESS NOTE      Patient:  Cuate Guthrie    Unit/Bed:3A-09/009-A  YOB: 1934  MRN: [id number]   PCP: SERGE Longoria CNP  Date of Admission: 10/11/2021  Chief Complaint: SOB    Assessment:   1. Acute hypoxic respiratory failure: secondary to covid ARDS s/o intubation 10/15 on MV.      2. COVID 19 ARDS s/p  Decadron, on Baricitinib (10/12)     3. Septic and Cardiogenic shock: on Milrinone currently     4. Acute on chronic systolic CHF : EF 28%  45/84: Started Milrinone lactate 1/9ml/hr     5. New on set Afib without RVR: on Eliquis     6. HERIBERTO: with ATN, secondary to sepsis      7. Hyperkalemia: Resolve. K+ 5.3. Plan: calcium gluconate, Lokelma    8. UTI: UA with microscopic (10/18) positive leukocyte, > 200 WBC and bacterial. Plan: Start Ceftriaxone (10/19)       Plan:  Continue vent support, currently on PEEP of 6 and FiO2 55%, adequate plateau and peak pressures. Continue inotropic agent and pressor support currently on milrinone at 0.125 and Levophed as needed to keep MAP more than 60 mmHg. Avoid nephrotoxic, monitor in and out, diuresis as needed. Eliquis, GI prophylaxis add pepcid   Check procalcitonin. Subjective:  No acute events overnight continue on mechanical ventilation. On milrinone at 0.125. Blood pressure soft. Adequate urine output. Kidney function stable. Chest x-ray with worsening bilateral airspace disease.     Current Medications    potassium (CARDIAC) replacement protocol   Other RX Placeholder    [Held by provider] bumetanide  1 mg IntraVENous Q12H    cefTRIAXone (ROCEPHIN) IV  1,000 mg IntraVENous Q24H    senna  10 mL Per NG tube Nightly    [Held by provider] lactulose  20 g Oral TID    baricitinib  1 mg Oral Daily    metoprolol tartrate  25 mg Oral BID    apixaban  2.5 mg Oral BID    sodium chloride flush  5-40 mL IntraVENous 2 times per day     potassium chloride, acetaminophen **OR** acetaminophen, docusate sodium, sodium chloride flush, sodium chloride, polyethylene glycol    IV Drips/Infusions   norepinephrine 2 mcg/min (10/23/21 0921)    milrinone 0.125 mcg/kg/min (10/20/21 0811)    [Held by provider] sodium chloride Stopped (10/19/21 1900)    propofol 35 mcg/kg/min (10/23/21 0849)    sodium chloride         Home Medications  Medications Prior to Admission: hydroCHLOROthiazide (HYDRODIURIL) 12.5 MG tablet, Take 12.5 mg by mouth daily   NONFORMULARY, Focus Factor  NONFORMULARY, Drenamin  GLUCOSAMINE-CHONDROITIN PO, Take by mouth  cetirizine (ZYRTEC) 10 MG tablet, TAKE 1 TABLET BY MOUTH ONCE DAILY  HAWTHORN PO, Take 425 mg by mouth daily  NONFORMULARY, Allerplex  OLIVE LEAF EXTRACT PO, Take by mouth  Fexofenadine-Pseudoephedrine (ALLEGRA-D 12 HOUR PO), Take by mouth  aspirin 325 MG tablet, Take 325 mg by mouth daily  metoprolol tartrate (LOPRESSOR) 50 MG tablet, Take 1 tablet by mouth 2 times daily  ALLERGIES: Sulfa antibiotics, Gluten meal, Metronidazole, and Milk-related compounds    ROS   Can't obtain due to patient sedative on vent    Scheduled Meds:   potassium (CARDIAC) replacement protocol   Other RX Placeholder    [Held by provider] bumetanide  1 mg IntraVENous Q12H    cefTRIAXone (ROCEPHIN) IV  1,000 mg IntraVENous Q24H    senna  10 mL Per NG tube Nightly    [Held by provider] lactulose  20 g Oral TID    baricitinib  1 mg Oral Daily    metoprolol tartrate  25 mg Oral BID    apixaban  2.5 mg Oral BID    sodium chloride flush  5-40 mL IntraVENous 2 times per day     Continuous Infusions:   norepinephrine 2 mcg/min (10/23/21 0921)    milrinone 0.125 mcg/kg/min (10/20/21 0811)    [Held by provider] sodium chloride Stopped (10/19/21 1900)    propofol 35 mcg/kg/min (10/23/21 0849)    sodium chloride         PHYSICAL EXAMINATION:  Vitals:    10/23/21 0851   BP: 108/70   Pulse: 99   Resp:    Temp:    SpO2:        Gen:  Moderate acute distress, sedated on MV  Eyes: PERRL. Anicteric sclera. No conjunctival injection.    ENT: No discharge. Posterior oropharynx clear. External appearance of ears and nose normal.  ET tube in place  Neck: Trachea midline. No mass   Resp: Diminished breath sounds bilaterally with scattered rhonchi no wheezing. CV: Regular rate. Regular rhythm. No murmur or rub. No edema. GI: Soft, Non-tender. Non-distended. +BS  Skin: Warm, dry, w/o erythema. Lymph: No cervical or supraclavicular LAD. M/S: No cyanosis. No clubbing. Neuro: Sedated on mechanical ventilation no focal deficit. Data: (All radiographs, tracings, PFTs, and imaging are personally viewed and interpreted unless otherwise noted).  Sodium 141, Potassium 4.3, Chloride 103, Bicarb 31, BUN/Cr 68/1.6   WBC 15.9, Hg 12.0, Hct 39.1, Platelet 984   Blood culture 10/11 no growth x 2, Pneumonia panel 10/15 no growth   CXR (10/23): Worsening bilateral airspace disease with left lower lobe atelectasis/consolidation. .         Seen with multidisciplinary ICU team.  Meets Continued ICU Level Care Criteria:    [x] Yes   [] No - Transfer Planned to listed location:  [] HOSPITALIST CONTACTED- DR         Electronically signed by Judi Carreon MD  Internal Medicine Resident Physician  Patient seen by me. Case discussed with resident physician. Concern for neuromuscular disease causing hypercarbia and respiratory failure. 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 Unable to wean from mechanical ventilator secondary to poor tidal volumes. Waiting work-up for possible myasthenia gravis. .  Italicized font represents changes to the note made by me. CC time 35 minutes. Time was discontiguous. Time does not include procedures. Time does include my direct assessment of the patient and coordination of care.   Electronically signed by Judi Carreon MD on 10/23/2021 at 10:34 AM

## 2021-10-24 ENCOUNTER — APPOINTMENT (OUTPATIENT)
Dept: GENERAL RADIOLOGY | Age: 86
DRG: 004 | End: 2021-10-24
Payer: MEDICARE

## 2021-10-24 LAB
ACETYLCHOLINE RECEPTOR: NORMAL
ANION GAP SERPL CALCULATED.3IONS-SCNC: 7 MEQ/L (ref 8–16)
BASE EXCESS (CALCULATED): 3.8 MMOL/L (ref -2.5–2.5)
BUN BLDV-MCNC: 74 MG/DL (ref 7–22)
CALCIUM SERPL-MCNC: 10.1 MG/DL (ref 8.5–10.5)
CHLORIDE BLD-SCNC: 103 MEQ/L (ref 98–111)
CO2: 31 MEQ/L (ref 23–33)
COLLECTED BY:: ABNORMAL
CREAT SERPL-MCNC: 1.7 MG/DL (ref 0.4–1.2)
DEVICE: ABNORMAL
EKG ATRIAL RATE: 71 BPM
EKG P AXIS: 44 DEGREES
EKG P-R INTERVAL: 158 MS
EKG Q-T INTERVAL: 374 MS
EKG Q-T INTERVAL: 386 MS
EKG QRS DURATION: 100 MS
EKG QRS DURATION: 138 MS
EKG QTC CALCULATION (BAZETT): 412 MS
EKG QTC CALCULATION (BAZETT): 419 MS
EKG R AXIS: -46 DEGREES
EKG R AXIS: 53 DEGREES
EKG T AXIS: -59 DEGREES
EKG T AXIS: 43 DEGREES
EKG VENTRICULAR RATE: 71 BPM
EKG VENTRICULAR RATE: 73 BPM
ERYTHROCYTE [DISTWIDTH] IN BLOOD BY AUTOMATED COUNT: 15.2 % (ref 11.5–14.5)
ERYTHROCYTE [DISTWIDTH] IN BLOOD BY AUTOMATED COUNT: 55.8 FL (ref 35–45)
GFR SERPL CREATININE-BSD FRML MDRD: 28 ML/MIN/1.73M2
GLUCOSE BLD-MCNC: 102 MG/DL (ref 70–108)
GLUCOSE BLD-MCNC: 105 MG/DL (ref 70–108)
GLUCOSE BLD-MCNC: 106 MG/DL (ref 70–108)
GLUCOSE BLD-MCNC: 116 MG/DL (ref 70–108)
HCO3: 29 MMOL/L (ref 23–28)
HCT VFR BLD CALC: 37.4 % (ref 37–47)
HEMOGLOBIN: 11.4 GM/DL (ref 12–16)
IFIO2: 35
MAGNESIUM: 1.8 MG/DL (ref 1.6–2.4)
MCH RBC QN AUTO: 30.7 PG (ref 26–33)
MCHC RBC AUTO-ENTMCNC: 30.5 GM/DL (ref 32.2–35.5)
MCV RBC AUTO: 100.8 FL (ref 81–99)
MODE: ABNORMAL
O2 SATURATION: 92 %
PCO2: 46 MMHG (ref 35–45)
PH BLOOD GAS: 7.41 (ref 7.35–7.45)
PLATELET # BLD: 226 THOU/MM3 (ref 130–400)
PMV BLD AUTO: 12 FL (ref 9.4–12.4)
PO2: 62 MMHG (ref 71–104)
POTASSIUM SERPL-SCNC: 4.4 MEQ/L (ref 3.5–5.2)
RBC # BLD: 3.71 MILL/MM3 (ref 4.2–5.4)
SET PEEP: 6 MMHG
SET PRESS SUPP: 24 CMH2O
SET RESPIRATORY RATE: 14 BPM
SODIUM BLD-SCNC: 141 MEQ/L (ref 135–145)
SOURCE, BLOOD GAS: ABNORMAL
TRIGL SERPL-MCNC: 102 MG/DL (ref 0–199)
TROPONIN T: 0.33 NG/ML
TROPONIN T: 0.35 NG/ML
TROPONIN T: 0.35 NG/ML
TROPONIN T: 0.37 NG/ML
WBC # BLD: 15.3 THOU/MM3 (ref 4.8–10.8)

## 2021-10-24 PROCEDURE — 6370000000 HC RX 637 (ALT 250 FOR IP): Performed by: STUDENT IN AN ORGANIZED HEALTH CARE EDUCATION/TRAINING PROGRAM

## 2021-10-24 PROCEDURE — 2580000003 HC RX 258: Performed by: STUDENT IN AN ORGANIZED HEALTH CARE EDUCATION/TRAINING PROGRAM

## 2021-10-24 PROCEDURE — 2500000003 HC RX 250 WO HCPCS: Performed by: STUDENT IN AN ORGANIZED HEALTH CARE EDUCATION/TRAINING PROGRAM

## 2021-10-24 PROCEDURE — 36415 COLL VENOUS BLD VENIPUNCTURE: CPT

## 2021-10-24 PROCEDURE — 84478 ASSAY OF TRIGLYCERIDES: CPT

## 2021-10-24 PROCEDURE — 84484 ASSAY OF TROPONIN QUANT: CPT

## 2021-10-24 PROCEDURE — 6360000002 HC RX W HCPCS: Performed by: STUDENT IN AN ORGANIZED HEALTH CARE EDUCATION/TRAINING PROGRAM

## 2021-10-24 PROCEDURE — 80048 BASIC METABOLIC PNL TOTAL CA: CPT

## 2021-10-24 PROCEDURE — 36600 WITHDRAWAL OF ARTERIAL BLOOD: CPT

## 2021-10-24 PROCEDURE — 99291 CRITICAL CARE FIRST HOUR: CPT | Performed by: INTERNAL MEDICINE

## 2021-10-24 PROCEDURE — 94003 VENT MGMT INPAT SUBQ DAY: CPT

## 2021-10-24 PROCEDURE — 93005 ELECTROCARDIOGRAM TRACING: CPT | Performed by: STUDENT IN AN ORGANIZED HEALTH CARE EDUCATION/TRAINING PROGRAM

## 2021-10-24 PROCEDURE — 82948 REAGENT STRIP/BLOOD GLUCOSE: CPT

## 2021-10-24 PROCEDURE — 83735 ASSAY OF MAGNESIUM: CPT

## 2021-10-24 PROCEDURE — 6370000000 HC RX 637 (ALT 250 FOR IP): Performed by: HOSPITALIST

## 2021-10-24 PROCEDURE — 2100000000 HC CCU R&B

## 2021-10-24 PROCEDURE — 99231 SBSQ HOSP IP/OBS SF/LOW 25: CPT | Performed by: INTERNAL MEDICINE

## 2021-10-24 PROCEDURE — 2580000003 HC RX 258: Performed by: INTERNAL MEDICINE

## 2021-10-24 PROCEDURE — 71045 X-RAY EXAM CHEST 1 VIEW: CPT

## 2021-10-24 PROCEDURE — 82803 BLOOD GASES ANY COMBINATION: CPT

## 2021-10-24 PROCEDURE — 93010 ELECTROCARDIOGRAM REPORT: CPT | Performed by: NUCLEAR MEDICINE

## 2021-10-24 PROCEDURE — 85027 COMPLETE CBC AUTOMATED: CPT

## 2021-10-24 PROCEDURE — 6360000002 HC RX W HCPCS: Performed by: INTERNAL MEDICINE

## 2021-10-24 RX ADMIN — Medication 1250 MG: at 09:05

## 2021-10-24 RX ADMIN — ACETAMINOPHEN 650 MG: 325 TABLET ORAL at 09:04

## 2021-10-24 RX ADMIN — MILRINONE LACTATE 0.12 MCG/KG/MIN: 1 INJECTION, SOLUTION INTRAVENOUS at 16:22

## 2021-10-24 RX ADMIN — PROPOFOL 35 MCG/KG/MIN: 10 INJECTION, EMULSION INTRAVENOUS at 01:31

## 2021-10-24 RX ADMIN — SODIUM CHLORIDE 0.2 MCG/KG/HR: 9 INJECTION, SOLUTION INTRAVENOUS at 23:00

## 2021-10-24 RX ADMIN — ACETAMINOPHEN 650 MG: 325 TABLET ORAL at 19:00

## 2021-10-24 RX ADMIN — PROPOFOL 40.04 MCG/KG/MIN: 10 INJECTION, EMULSION INTRAVENOUS at 08:17

## 2021-10-24 RX ADMIN — BARICITINIB 1 MG: 2 TABLET, FILM COATED ORAL at 09:06

## 2021-10-24 RX ADMIN — APIXABAN 2.5 MG: 2.5 TABLET, FILM COATED ORAL at 21:18

## 2021-10-24 RX ADMIN — VASOPRESSIN 0.01 UNITS/MIN: 20 INJECTION INTRAVENOUS at 15:14

## 2021-10-24 RX ADMIN — CEFTRIAXONE SODIUM 1000 MG: 1 INJECTION, POWDER, FOR SOLUTION INTRAMUSCULAR; INTRAVENOUS at 15:14

## 2021-10-24 RX ADMIN — SODIUM CHLORIDE, PRESERVATIVE FREE 10 ML: 5 INJECTION INTRAVENOUS at 20:19

## 2021-10-24 RX ADMIN — PROPOFOL 20 MCG/KG/MIN: 10 INJECTION, EMULSION INTRAVENOUS at 22:10

## 2021-10-24 RX ADMIN — APIXABAN 2.5 MG: 2.5 TABLET, FILM COATED ORAL at 09:05

## 2021-10-24 RX ADMIN — METOPROLOL TARTRATE 25 MG: 25 TABLET, FILM COATED ORAL at 09:04

## 2021-10-24 RX ADMIN — Medication 1 MCG/MIN: at 19:01

## 2021-10-24 RX ADMIN — SODIUM CHLORIDE, PRESERVATIVE FREE 10 ML: 5 INJECTION INTRAVENOUS at 09:05

## 2021-10-24 RX ADMIN — PROPOFOL 40 MCG/KG/MIN: 10 INJECTION, EMULSION INTRAVENOUS at 15:14

## 2021-10-24 ASSESSMENT — PAIN SCALES - GENERAL
PAINLEVEL_OUTOF10: 0

## 2021-10-24 ASSESSMENT — PULMONARY FUNCTION TESTS
PIF_VALUE: 27
PIF_VALUE: 28
PIF_VALUE: 25
PIF_VALUE: 29

## 2021-10-24 NOTE — PROGRESS NOTES
Renal Progress Note  [facility]    Patient :  Silvestre Theodore; 80 y.o. MRN# [id number]  Location:  [facility] [place]  Attending:  Osiris Briggs MD  Admit Date:  10/11/2021   Hospital Day: 15      Subjective:     Nephrology is following the patient for HERIBERTO. Patient seen and examined. FiO2 down to 35%. Remains on Milrinone drip. Urine output 850 ml/24 hours. Outpatient Medications:     Medications Prior to Admission: hydroCHLOROthiazide (HYDRODIURIL) 12.5 MG tablet, Take 12.5 mg by mouth daily   NONFORMULARY, Focus Factor  NONFORMULARY, Drenamin  GLUCOSAMINE-CHONDROITIN PO, Take by mouth  cetirizine (ZYRTEC) 10 MG tablet, TAKE 1 TABLET BY MOUTH ONCE DAILY  HAWTHORN PO, Take 425 mg by mouth daily  NONFORMULARY, Allerplex  OLIVE LEAF EXTRACT PO, Take by mouth  Fexofenadine-Pseudoephedrine (ALLEGRA-D 12 HOUR PO), Take by mouth  aspirin 325 MG tablet, Take 325 mg by mouth daily  metoprolol tartrate (LOPRESSOR) 50 MG tablet, Take 1 tablet by mouth 2 times daily    Current Medications:     Scheduled Meds:    potassium (CARDIAC) replacement protocol   Other RX Placeholder    [Held by provider] bumetanide  1 mg IntraVENous Q12H    cefTRIAXone (ROCEPHIN) IV  1,000 mg IntraVENous Q24H    senna  10 mL Per NG tube Nightly    [Held by provider] lactulose  20 g Oral TID    baricitinib  1 mg Oral Daily    metoprolol tartrate  25 mg Oral BID    apixaban  2.5 mg Oral BID    sodium chloride flush  5-40 mL IntraVENous 2 times per day     Continuous Infusions:    norepinephrine 2 mcg/min (10/24/21 0330)    milrinone 0.125 mcg/kg/min (10/20/21 0811)    [Held by provider] sodium chloride Stopped (10/19/21 1900)    propofol 40 mcg/kg/min (10/24/21 0132)    sodium chloride       PRN Meds:  potassium chloride, acetaminophen **OR** acetaminophen, docusate sodium, sodium chloride flush, sodium chloride, polyethylene glycol    Input/Output:       I/O last 3 completed shifts:   In: 1249.7 [I.V.:313.7; NG/GT:886; IV Piggyback:50]  Out: 850 [Urine:850]. Patient Vitals for the past 96 hrs (Last 3 readings):   Weight   10/24/21 0400 142 lb 13.7 oz (64.8 kg)   10/21/21 0600 127 lb 13.9 oz (58 kg)       Vital Signs:   Temperature:  Temp: 99.7 °F (37.6 °C)  TMax:   Temp (24hrs), Av.6 °F (37.6 °C), Min:99.1 °F (37.3 °C), Max:100.2 °F (37.9 °C)    Respirations:  Resp: 14  Pulse:   Pulse: 83  BP:    BP: (!) 109/59  BP Range: Systolic (23FQD), WJS:962 , Min:75 , WON:109       Diastolic (76PQY), ZKF:91, Min:44, Max:87      Physical Examination:     General:  Intubated, sedated  HEENT: NC/AT/ MMM +endotracheal tube  Chest:               Diminished breath sounds  Cardiac:  S1 S2  Abdomen: Soft, non-tender,  Neuro:  sedated  SKIN:  No rashes  Extremities:  No significant LE edema    Labs:       Recent Labs     10/22/21  0415 10/23/21  0450 10/24/21  0420   WBC 13.8* 14.9* 15.3*   RBC 3.70* 3.86* 3.71*   HGB 11.5* 12.0 11.4*   HCT 36.9* 39.1 37.4   MCV 99.7* 101.3* 100.8*   MCH 31.1 31.1 30.7   MCHC 31.2* 30.7* 30.5*    196 226   MPV 12.2 11.8 12.0      BMP:   Recent Labs     10/22/21  0415 10/22/21  0415 10/23/21  0450 10/24/21  042     --  141 141   K 3.4*  3.4*   < > 4.3  4.3 4.4     --  103 103   CO2 32  --  31 31   BUN 71*  --  68* 74*   CREATININE 1.7*  --  1.6* 1.7*   GLUCOSE 116*  --  106 105   CALCIUM 9.2  --  10.0 10.1    < > = values in this interval not displayed. Phosphorus:   No results for input(s): PHOS in the last 72 hours. Magnesium:    Recent Labs     10/22/21  0415 10/23/21  0450 10/24/21  0420   MG 1.9 1.9 1.8     Albumin:  No results for input(s): LABALBU in the last 72 hours.   BNP:    No results found for: BNP  SHMUEL:    No results found for: SHMUEL  SPEP:  Lab Results   Component Value Date    PROT 4.9 10/15/2021     UPEP:   No results found for: LABPE  C3:   No results found for: C3  C4:   No results found for: C4  MPO ANCA:   No results found for: MPO  PR3 ANCA:   No results found for: PR3  Anti-GBM:   No results found for: GBMABIGG  Hep BsAg:       No results found for: HEPBSAG  Hep C AB:        No results found for: HEPCAB    Urinalysis/Chemistries:      Lab Results   Component Value Date    NITRU NEGATIVE 10/19/2021    COLORU YELLOW 10/19/2021    PHUR 6.0 10/19/2021    LABCAST 8-15 HYALINE 10/19/2021    LABCAST 4-8 FINE GRAN 10/19/2021    WBCUA > 200 10/19/2021    RBCUA 15-25 10/19/2021    YEAST NONE SEEN 10/19/2021    BACTERIA MANY 10/19/2021    SPECGRAV 1.018 10/19/2021    LEUKOCYTESUR LARGE 10/19/2021    UROBILINOGEN 0.2 10/19/2021    BILIRUBINUR NEGATIVE 10/19/2021    BILIRUBINUR NEGATIVE 08/09/2011    BLOODU LARGE 10/19/2021    GLUCOSEU NEGATIVE 10/11/2021    KETUA NEGATIVE 10/19/2021     Urine Sodium:   No results found for: TAMIA  Urine Potassium:  No results found for: KUR  Urine Chloride:  No results found for: CLUR  Urine Osmolarity:   Lab Results   Component Value Date    OSMOU 470 10/13/2021     Urine Protein:   No components found for: TOTALPROTEIN, URINE   Urine Creatinine:   No results found for: LABCREA  Urine Eosinophils:  No components found for: UEOS        Impression and Plan:  1. HERIBERTO: overall improved. Avoid nephrotoxic agents, Maintain MAP > 65. Diuretics as needed  2. Lytes: stable  3. Acute hypoxic resp failure. Oxygenation requirements improving  4. COVID-19 pneumonia  5. Cardiomyopathy with low EF. On Milrinone drip  6. Leukocytosis        Please don't hesitate to call with any questions.   Electronically signed by Gerard Cristina DO on 10/24/2021 at 7:38 AM

## 2021-10-24 NOTE — PROGRESS NOTES
CRITICAL CARE PROGRESS NOTE      Patient:  Nelson Richmond    Unit/Bed:3A-09/009-A  YOB: 1934  MRN: [id number]   PCP: SERGE Davis CNP  Date of Admission: 10/11/2021  Chief Complaint:- worsening SOB    Assessment and Plan:    1. Acute hypoxic respiratory failure: Admitted on 10/11 and started HF. Progressive worsening hypoxemia. Intubation on 10/15. On 10/20, Family was discussed about trach and family could like to have few days for final decision on trach. Still require significant vent support with Pcontrol 22, PEEP 6, FiO2 40, RR 14, . Sedative with Propofol 40mg. Plan: Lung protective strategy with Peak < 35. PEEP < 30, STB daily  10/24: family meeting today will discuss about the plan of care and possible Trach tube. Family refuse trach tube for now. 2. ARDS:  Patient received oxygen with FiO2 100 with very low TV (250ml) on ventilation. Ferritin, CRP, Lactic dehydrogenase elevated. Start Decadron, Baricitinib (10/12)     3. Possible Respiratory failure related to Neuromuscular disease: Persistent low tidal volume after diuretic. Patient received Milrinone and diuresis. Repeated CXR (10/21) show Persistent diffuse patchy opacities but with improved aeration at the lung bases. DTR intact bilateral lower extremities which less likely Migel Heimlich. Plan: Acethycholine receptor blocking, modulating     4. HFrEF: Echo (10/11): LV EF 25-30%, LV severe global hypokinesis. LV size moderate increase. Patient is hypotension with BP 97/50. Weight on admission 120 lsb  10/20: Started Milrinone lactate 1/9ml/hr  10/24: Current weight 142 lbs. Gain 4 lbs since admission. Net I/O since admission (10/24): -4.6L. Tele showed A fib with RVR. EKG was ordered show A. Fib with premature ventricular conduction complex. Plan: Cont Milrinone, monitor I/O     4. Hx of Prolong QTc interval:  On telemetry strip in ED. Seen by cardiogy for Prolong QTC.  Plan: tele monitor, avoid QTC prolong medications, Keep K +> 4 and Mg > 2     5. New on set Afib without RVR: Cont Eliquis, Metoprolol 25 mg BID (Can't take Amiodarone due to QTC prolongation)     6. HERIBERTO: Improve. BUN/Cr  74/1.7 <-- 70/2.4. Base line BUN/Cr 29/0.9. Likely secondary to Cardiorenal syndrome. Plan: avoid nephrotoxicity, cont gentle fluid hydration 50ml/hr. Nephrologist follow the case. 10/24: Good urine output 850 ml last 24 hrs. Response to Bumex. Plan: cont avoid nephrotoxicity      7. Hyperkalemia: Resolve. K+ 5.3. Plan: calcium gluconate, Lokelma    8. UTI: UA with microscopic (10/18) positive leukocyte, > 200 WBC and bacterial. Plan: Start Ceftriaxone (10/19)     8. AMS: likely secondary to hypercapnic hypoxemia. Plan: CT head when patient is stable. 9. Iron deficiency anemia: Iron replacement    INITIAL H AND P AND ICU COURSE:  Yue Hampton is 80years old female who presented in [address] [place] [address] for worsening SOB on 10/11. Patient reported received COVID vaccine. She was test positive for COVID in 10/4/21. Patient SOB progressive worsening that patient visit ED. In ED found to have O2 Sat 83% in room air with BNP 15.7k. Patient was admitted and manage for acute hypoxic respiratory failure likely due to combine CHF and COVID. Patient was placed on NC, Bumex, Dexamethasone. On 10/15, patient was transferred to ICU and intubated due to progressive hypoxic respirator failure on 100% FiO2 on high flow. Repeated CXR on 10/26 show improved vascular congestion. Patient has been afebrile since 10/15. UA on 10/18 positive leukocyte with >200 WBC and many bacterial. Patient was started on Ceftriaxone on 10/19 for UTI. Patient also started Milrinone for her HFrEF. Patient remain significant low Tidal volume regardless diuretic and Milrinone. On October 24, eliminating well. Patient family were explained about patient heart failure condition, respiratory failure, acute kidney injury.   Patient family were explained that patient will need a trach tube due to severe respiratory muscle weakness. However patient will increase risk with pneumonia, hospitalization if family decides to pursue way trach tube. Family aware and would like to discuss with Dr. Halina Lema cardiologist possible pacemaker.      Past Medical History      Diagnosis Date    Anxiety     Arthritis     Bronchitis     Diverticulitis of colon     Hypertension         Past Surgical History        Procedure Laterality Date    APPENDECTOMY      CHOLECYSTECTOMY         Current Medications    potassium (CARDIAC) replacement protocol   Other RX Placeholder    [Held by provider] bumetanide  1 mg IntraVENous Q12H    cefTRIAXone (ROCEPHIN) IV  1,000 mg IntraVENous Q24H    senna  10 mL Per NG tube Nightly    [Held by provider] lactulose  20 g Oral TID    baricitinib  1 mg Oral Daily    metoprolol tartrate  25 mg Oral BID    apixaban  2.5 mg Oral BID    sodium chloride flush  5-40 mL IntraVENous 2 times per day     potassium chloride, acetaminophen **OR** acetaminophen, docusate sodium, sodium chloride flush, sodium chloride, polyethylene glycol    IV Drips/Infusions   norepinephrine 2 mcg/min (10/24/21 0330)    milrinone 0.125 mcg/kg/min (10/20/21 0811)    [Held by provider] sodium chloride Stopped (10/19/21 1900)    propofol 40 mcg/kg/min (10/24/21 0132)    sodium chloride         Home Medications  Medications Prior to Admission: hydroCHLOROthiazide (HYDRODIURIL) 12.5 MG tablet, Take 12.5 mg by mouth daily   NONFORMULARY, Focus Factor  NONFORMULARY, Drenamin  GLUCOSAMINE-CHONDROITIN PO, Take by mouth  cetirizine (ZYRTEC) 10 MG tablet, TAKE 1 TABLET BY MOUTH ONCE DAILY  HAWTHORN PO, Take 425 mg by mouth daily  NONFORMULARY, Allerplex  OLIVE LEAF EXTRACT PO, Take by mouth  Fexofenadine-Pseudoephedrine (ALLEGRA-D 12 HOUR PO), Take by mouth  aspirin 325 MG tablet, Take 325 mg by mouth daily  metoprolol tartrate (LOPRESSOR) 50 MG tablet, Take 1 tablet by mouth 2 times daily  ALLERGIES: Sulfa antibiotics, Gluten meal, Metronidazole, and Milk-related compounds    ROS   Can't obtain due to patient sedative on vent    Scheduled Meds:   potassium (CARDIAC) replacement protocol   Other RX Placeholder    [Held by provider] bumetanide  1 mg IntraVENous Q12H    cefTRIAXone (ROCEPHIN) IV  1,000 mg IntraVENous Q24H    senna  10 mL Per NG tube Nightly    [Held by provider] lactulose  20 g Oral TID    baricitinib  1 mg Oral Daily    metoprolol tartrate  25 mg Oral BID    apixaban  2.5 mg Oral BID    sodium chloride flush  5-40 mL IntraVENous 2 times per day     Continuous Infusions:   norepinephrine 2 mcg/min (10/24/21 0330)    milrinone 0.125 mcg/kg/min (10/20/21 0811)    [Held by provider] sodium chloride Stopped (10/19/21 1900)    propofol 40 mcg/kg/min (10/24/21 0132)    sodium chloride         PHYSICAL EXAMINATION:  T:  99.3.  P:  81. RR:  20. B/P:  101/82. FiO2: 40% . O2 Sat:  96.  I/O:  +0.4 L  Body mass index is 27.9 kg/m². GCS:   9  General: sedative on vent  HEENT:  normocephalic and atraumatic. No scleral icterus. PERR  Neck: supple. No Thyromegaly. Lungs: clear to auscultation. No retractions  Cardiac: RRR. No JVD. Abdomen: soft. Nontender. Extremities:  No clubbing, cyanosis, or edema x 4. Vasculature: capillary refill < 3 seconds. Palpable dorsalis pedis pulses. Skin:  warm and dry. Lymph:  No supraclavicular adenopathy. Neurologic:  No focal deficit. No seizures. Data: (All radiographs, tracings, PFTs, and imaging are personally viewed and interpreted unless otherwise noted).  Sodium 141, Potassium 4.4, Chloride 103, Bicarb 31, BUN/Cr 74/1.7   WBC 15.3, Hg 11.4, Hct 37.4, Platelet 437   Telemetry shows normal sinus rhythm    Blood culture 10/11 no growth x 2,    Pneumonia culture (10/19): positive Corynebacterium on 10/23 - Start Vancomycin   CXR (10/21): Persistent diffuse patchy opacities but with improved aeration at the lung bases. Seen with multidisciplinary ICU team.  Meets Continued ICU Level Care Criteria:    [x] Yes   [] No - Transfer Planned to listed location:  [] HOSPITALIST CONTACTED-      Case and plan discussed with Dr. Dave Dejesus        Electronically signed by Robyn Ozuna DO  Internal Medicine Resident Physician    Attending Supervising Physician's Attestation Statement  I performed a history and physical examination on the patient and discussed the management with the resident physician. I reviewed and agree with the findings and plan as documented in her note .   Vent setting reviewed, pressures adequate, cuff leak noted positional,   Continue current vent settings, ABG adequate  Titrate O2 to keep sat > 92%  Overall guarded prognosis,   Family meeting today  CC time 35 min     Electronically signed by Omkar Tejeda MD on  10/24/2021 at 4:00 PM

## 2021-10-24 NOTE — PROGRESS NOTES
Call placed to patients daughter Yossi Vargas. Updated on patient status and current plan of care, all questions answered at this time.

## 2021-10-24 NOTE — PROGRESS NOTES
Patient had multiple episodes of rapid heart rate this shift. Dr. Gem Kumar notified along the way. EKG early in the day showed changes. Family arrived at 56 for meeting with provider and was made aware of patient statues. Order to stop levo and start vasopressin and also troponins every two hours. The first one was 0.333 and the second was 0.352. Dr. Gem Kumar aware. NNO. Secure message with Alejandro Hard PA-cardio  This nurse [phone]: Family here for a meeting with critical care and have some heart related questions. Are you here? This nurse: For whatever reason, this patient went tachy so Dr. Gem Kumar ordered an EKG and it was changed from previous. So she ordered, troponins q 2 hrs. Karina: [id number]: Not there anymore. If any concerns for stemi then that needs sent to interventionalist     Dr. Gem Kumar aware that cardiology was not here.      Troponin 1800 0.369 called  No answer

## 2021-10-24 NOTE — PROGRESS NOTES
Pharmacy Note  Vancomycin Consult    Erich Martinez is a 80 y.o. female started on Vancomycin for HAP; consult received from Dr. Ailyn Anderson to manage therapy. Also receiving the following antibiotics: ceftriaxone. Patient Active Problem List   Diagnosis    Sigmoid diverticulitis    Acute respiratory failure with hypoxia (HCC)    COVID-19    Acute congestive heart failure (HCC)     Allergies:  Sulfa antibiotics, Gluten meal, Metronidazole, and Milk-related compounds     Temp max: 100.2    Recent Labs     10/23/21  0450 10/24/21  0420   BUN 68* 74*   CREATININE 1.6* 1.7*   WBC 14.9* 15.3*       Intake/Output Summary (Last 24 hours) at 10/24/2021 0735  Last data filed at 10/24/2021 0400  Gross per 24 hour   Intake 1249.66 ml   Output 850 ml   Net 399.66 ml     Culture Date Source Results   10/19/23 respiratory corynebacterium   10/11/21 Blood x 2 ngtd              Ht Readings from Last 1 Encounters:   10/17/21 5' (1.524 m)        Wt Readings from Last 1 Encounters:   10/24/21 142 lb 13.7 oz (64.8 kg)       Body mass index is 27.9 kg/m². Estimated Creatinine Clearance: 20 mL/min (A) (based on SCr of 1.7 mg/dL (H)). Goal trough: 10-20 mcg/mL    Assessment/Plan:  Will initiate Vancomycin with a one time loading dose of 1250 mg x1. Patient admitted with HERIBERTO, although improving. Will order a random level tomorrow AM.    Thank you for the consult. Will continue to follow.     Elver Caicedo, BhavnaD, BCPS  10/24/2021  8:10 AM

## 2021-10-25 LAB
ACETYLCHOL MODUL AB: NORMAL
ANION GAP SERPL CALCULATED.3IONS-SCNC: 8 MEQ/L (ref 8–16)
ANION GAP SERPL CALCULATED.3IONS-SCNC: 9 MEQ/L (ref 8–16)
BACTERIA: ABNORMAL /HPF
BILIRUBIN URINE: NEGATIVE
BLOOD, URINE: ABNORMAL
BUN BLDV-MCNC: 76 MG/DL (ref 7–22)
BUN BLDV-MCNC: 76 MG/DL (ref 7–22)
CALCIUM SERPL-MCNC: 10 MG/DL (ref 8.5–10.5)
CALCIUM SERPL-MCNC: 10 MG/DL (ref 8.5–10.5)
CASTS 2: ABNORMAL /LPF
CASTS UA: ABNORMAL /LPF
CHARACTER, URINE: ABNORMAL
CHLORIDE BLD-SCNC: 101 MEQ/L (ref 98–111)
CHLORIDE BLD-SCNC: 101 MEQ/L (ref 98–111)
CO2: 29 MEQ/L (ref 23–33)
CO2: 29 MEQ/L (ref 23–33)
COLOR: ABNORMAL
CREAT SERPL-MCNC: 1.5 MG/DL (ref 0.4–1.2)
CREAT SERPL-MCNC: 1.5 MG/DL (ref 0.4–1.2)
CRYSTALS, UA: ABNORMAL
EPITHELIAL CELLS, UA: ABNORMAL /HPF
ERYTHROCYTE [DISTWIDTH] IN BLOOD BY AUTOMATED COUNT: 14.9 % (ref 11.5–14.5)
ERYTHROCYTE [DISTWIDTH] IN BLOOD BY AUTOMATED COUNT: 54.3 FL (ref 35–45)
GFR SERPL CREATININE-BSD FRML MDRD: 33 ML/MIN/1.73M2
GFR SERPL CREATININE-BSD FRML MDRD: 33 ML/MIN/1.73M2
GLUCOSE BLD-MCNC: 141 MG/DL (ref 70–108)
GLUCOSE BLD-MCNC: 172 MG/DL (ref 70–108)
GLUCOSE URINE: 100 MG/DL
HCT VFR BLD CALC: 37.4 % (ref 37–47)
HEMOGLOBIN: 11.2 GM/DL (ref 12–16)
KETONES, URINE: NEGATIVE
LACTIC ACID: 1.5 MMOL/L (ref 0.5–2)
LEUKOCYTE ESTERASE, URINE: ABNORMAL
MAGNESIUM: 1.8 MG/DL (ref 1.6–2.4)
MAGNESIUM: 1.8 MG/DL (ref 1.6–2.4)
MCH RBC QN AUTO: 30.1 PG (ref 26–33)
MCHC RBC AUTO-ENTMCNC: 29.9 GM/DL (ref 32.2–35.5)
MCV RBC AUTO: 100.5 FL (ref 81–99)
MISCELLANEOUS 2: ABNORMAL
NITRITE, URINE: NEGATIVE
PH UA: 5 (ref 5–9)
PHOSPHORUS: 3.2 MG/DL (ref 2.4–4.7)
PLATELET # BLD: 214 THOU/MM3 (ref 130–400)
PMV BLD AUTO: 11.9 FL (ref 9.4–12.4)
POTASSIUM SERPL-SCNC: 3.9 MEQ/L (ref 3.5–5.2)
POTASSIUM SERPL-SCNC: 4.4 MEQ/L (ref 3.5–5.2)
PROCALCITONIN: 0.27 NG/ML (ref 0.01–0.09)
PROTEIN UA: 100
RBC # BLD: 3.72 MILL/MM3 (ref 4.2–5.4)
RBC URINE: ABNORMAL /HPF
RENAL EPITHELIAL, UA: ABNORMAL
SODIUM BLD-SCNC: 138 MEQ/L (ref 135–145)
SODIUM BLD-SCNC: 139 MEQ/L (ref 135–145)
SPECIFIC GRAVITY, URINE: 1.02 (ref 1–1.03)
UROBILINOGEN, URINE: 0.2 EU/DL (ref 0–1)
VANCOMYCIN RANDOM: 11.8 UG/ML (ref 0.1–39.9)
WBC # BLD: 16.3 THOU/MM3 (ref 4.8–10.8)
WBC UA: ABNORMAL /HPF
YEAST: ABNORMAL

## 2021-10-25 PROCEDURE — 97110 THERAPEUTIC EXERCISES: CPT

## 2021-10-25 PROCEDURE — 99291 CRITICAL CARE FIRST HOUR: CPT | Performed by: INTERNAL MEDICINE

## 2021-10-25 PROCEDURE — 80202 ASSAY OF VANCOMYCIN: CPT

## 2021-10-25 PROCEDURE — 80048 BASIC METABOLIC PNL TOTAL CA: CPT

## 2021-10-25 PROCEDURE — 83605 ASSAY OF LACTIC ACID: CPT

## 2021-10-25 PROCEDURE — 84100 ASSAY OF PHOSPHORUS: CPT

## 2021-10-25 PROCEDURE — 97164 PT RE-EVAL EST PLAN CARE: CPT

## 2021-10-25 PROCEDURE — 6370000000 HC RX 637 (ALT 250 FOR IP): Performed by: STUDENT IN AN ORGANIZED HEALTH CARE EDUCATION/TRAINING PROGRAM

## 2021-10-25 PROCEDURE — 97530 THERAPEUTIC ACTIVITIES: CPT

## 2021-10-25 PROCEDURE — 6360000002 HC RX W HCPCS: Performed by: PHARMACIST

## 2021-10-25 PROCEDURE — 2580000003 HC RX 258: Performed by: STUDENT IN AN ORGANIZED HEALTH CARE EDUCATION/TRAINING PROGRAM

## 2021-10-25 PROCEDURE — 36415 COLL VENOUS BLD VENIPUNCTURE: CPT

## 2021-10-25 PROCEDURE — 6370000000 HC RX 637 (ALT 250 FOR IP): Performed by: HOSPITALIST

## 2021-10-25 PROCEDURE — 2500000003 HC RX 250 WO HCPCS: Performed by: STUDENT IN AN ORGANIZED HEALTH CARE EDUCATION/TRAINING PROGRAM

## 2021-10-25 PROCEDURE — 99232 SBSQ HOSP IP/OBS MODERATE 35: CPT | Performed by: INTERNAL MEDICINE

## 2021-10-25 PROCEDURE — 2580000003 HC RX 258: Performed by: PHARMACIST

## 2021-10-25 PROCEDURE — 93005 ELECTROCARDIOGRAM TRACING: CPT | Performed by: NURSE PRACTITIONER

## 2021-10-25 PROCEDURE — 83735 ASSAY OF MAGNESIUM: CPT

## 2021-10-25 PROCEDURE — 6360000002 HC RX W HCPCS: Performed by: STUDENT IN AN ORGANIZED HEALTH CARE EDUCATION/TRAINING PROGRAM

## 2021-10-25 PROCEDURE — 2100000000 HC CCU R&B

## 2021-10-25 PROCEDURE — 81001 URINALYSIS AUTO W/SCOPE: CPT

## 2021-10-25 PROCEDURE — 84145 PROCALCITONIN (PCT): CPT

## 2021-10-25 PROCEDURE — 87086 URINE CULTURE/COLONY COUNT: CPT

## 2021-10-25 PROCEDURE — 97168 OT RE-EVAL EST PLAN CARE: CPT

## 2021-10-25 PROCEDURE — 94003 VENT MGMT INPAT SUBQ DAY: CPT

## 2021-10-25 PROCEDURE — 85027 COMPLETE CBC AUTOMATED: CPT

## 2021-10-25 RX ORDER — POTASSIUM CHLORIDE 29.8 MG/ML
20 INJECTION INTRAVENOUS ONCE
Status: COMPLETED | OUTPATIENT
Start: 2021-10-25 | End: 2021-10-26

## 2021-10-25 RX ADMIN — SODIUM CHLORIDE 0.8 MCG/KG/HR: 9 INJECTION, SOLUTION INTRAVENOUS at 18:05

## 2021-10-25 RX ADMIN — APIXABAN 2.5 MG: 2.5 TABLET, FILM COATED ORAL at 21:30

## 2021-10-25 RX ADMIN — SODIUM CHLORIDE, PRESERVATIVE FREE 10 ML: 5 INJECTION INTRAVENOUS at 21:00

## 2021-10-25 RX ADMIN — BARICITINIB 1 MG: 2 TABLET, FILM COATED ORAL at 07:33

## 2021-10-25 RX ADMIN — VANCOMYCIN HYDROCHLORIDE 750 MG: 1 INJECTION, POWDER, LYOPHILIZED, FOR SOLUTION INTRAVENOUS at 11:23

## 2021-10-25 RX ADMIN — METOPROLOL TARTRATE 25 MG: 25 TABLET, FILM COATED ORAL at 07:33

## 2021-10-25 RX ADMIN — APIXABAN 2.5 MG: 2.5 TABLET, FILM COATED ORAL at 07:33

## 2021-10-25 RX ADMIN — ACETAMINOPHEN 650 MG: 325 TABLET ORAL at 21:00

## 2021-10-25 RX ADMIN — VASOPRESSIN 0.03 UNITS/MIN: 20 INJECTION INTRAVENOUS at 01:30

## 2021-10-25 RX ADMIN — VASOPRESSIN 0.03 UNITS/MIN: 20 INJECTION INTRAVENOUS at 14:11

## 2021-10-25 RX ADMIN — SODIUM CHLORIDE, PRESERVATIVE FREE 10 ML: 5 INJECTION INTRAVENOUS at 07:33

## 2021-10-25 RX ADMIN — CEFTRIAXONE SODIUM 1000 MG: 1 INJECTION, POWDER, FOR SOLUTION INTRAMUSCULAR; INTRAVENOUS at 14:11

## 2021-10-25 RX ADMIN — SODIUM CHLORIDE 0.8 MCG/KG/HR: 9 INJECTION, SOLUTION INTRAVENOUS at 07:35

## 2021-10-25 ASSESSMENT — PAIN SCALES - GENERAL
PAINLEVEL_OUTOF10: 0

## 2021-10-25 ASSESSMENT — PULMONARY FUNCTION TESTS
PIF_VALUE: 23
PIF_VALUE: 24
PIF_VALUE: 23
PIF_VALUE: 25
PIF_VALUE: 23
PIF_VALUE: 23

## 2021-10-25 NOTE — CARE COORDINATION
10/25/21, 1:17 PM EDT    DISCHARGE ON GOING EVALUATION    [facility] day: 14  Location: 3A-09/009-A Reason for admit: Acute respiratory failure with hypoxia ([facility] [address] [J96.01]  Acute congestive heart failure, unspecified heart failure type ([facility] [address] [I50.9]  COVID-19 [U07.1]   Procedure:   10/12 Echo with EF 25-30%; Small circumferential pericardial effusion with no tamponade physiology; Myxomatotic degeneration of mitral valve;  Mild to Moderate mitral regurgitation is present  10/15 Intubated  10/15 CVC RIJ  10/25 CXR: There has been no significant interval change in the radiographic appearance of the chest  from 10/23/2021 including diffuse airspace disease    Barriers to Discharge:  Family meeting yesterday, discussed plan of care and possible trach. Family refused trach for now. Remains on vent w/ETT on PCV, peep 6, PC 18, FIO2 35%, sats 95%. Tmax 100.6. NSR. Follows commands. PT/OT - early mobility. Intensivist and Nephrology following. Palliative Care and Dietitian following. Telemetry, CVC, OG w/TF, flexiseal, alaniz. Precedex @ 0.7 mcg/kg/hr, Primacor @ 0.125 mcg/kg/min, levo @ 1 mcg/min, vasopressin @ 0.03 units/min, eliquis, baricitinib, IV rocephin, lopressor, IV vancomycin, Electrolyte replacement protocols.   PCP: SERGE Bartlett CNP  Readmission Risk Score: 18.7%  Patient Goals/Plan/Treatment Preferences: From home with daughter. Plan pending clinical course. Had agreed to minimum of [place] at discharge.

## 2021-10-25 NOTE — PROGRESS NOTES
CRITICAL CARE PROGRESS NOTE      Patient:  Doreen Taylor    Unit/Bed:3A-09/009-A  YOB: 1934  MRN: [id number]   PCP: SERGE Luis CNP  Date of Admission: 10/11/2021  Chief Complaint:- worsening SOB    Assessment and Plan:    Acute hypoxic respiratory failure: Admitted on 10/11 and started HF. Progressive worsening hypoxemia. Intubation on 10/15. On 10/20, Family was discussed about trach and family could like to have few days for final decision on trach. Still require significant vent support with Pcontrol 22, PEEP 6, FiO2 40, RR 14, . Sedative with Propofol 40mg. Plan: Lung protective strategy with Peak < 35. PEEP < 30, STB daily  10/24: family meeting today will discuss about the plan of care and possible Trach tube. Family refuse trach tube for now. ARDS:  Patient received oxygen with FiO2 100 with very low TV (250ml) on ventilation. Ferritin, CRP, Lactic dehydrogenase elevated. Start Decadron, Baricitinib (10/12)     Possible Respiratory failure related to Neuromuscular disease: Persistent low tidal volume after diuretic. Patient received Milrinone and diuresis. Repeated CXR (10/21) show Persistent diffuse patchy opacities but with improved aeration at the lung bases. DTR intact bilateral lower extremities which less likely [place]. Plan: Acethycholine receptor blocking, modulating     HFrEF: Echo (10/11): LV EF 25-30%, LV severe global hypokinesis. LV size moderate increase. Patient is hypotension with BP 97/50. Weight on admission 120 lsb  10/20: Started Milrinone lactate 1/9ml/hr  10/24: Current weight 142 lbs. Gain 4 lbs since admission. Net I/O since admission (10/24): -4.6L. Tele showed A fib with RVR. EKG was ordered show A. Fib with premature ventricular conduction complex. Plan: Cont Milrinone, monitor I/O   10/25: discuss with Hazel Patel that patient will be consider for AICD at this time.  Can consider lifevest if patient able to operate Hx of Prolong QTc interval:  On telemetry strip in ED. Seen by cardiogy for Prolong QTC. Plan: tele monitor, avoid QTC prolong medications, Keep K +> 4 and Mg > 2     New on set Afib without RVR: Cont Eliquis, Metoprolol 25 mg BID (Can't take Amiodarone due to QTC prolongation)     HERIBERTO: Improve. BUN/Cr  74/1.7 <-- 70/2.4. Base line BUN/Cr 29/0.9. Likely secondary to Cardiorenal syndrome. Plan: avoid nephrotoxicity, cont gentle fluid hydration 50ml/hr. Nephrologist follow the case. 10/24: Good urine output 850 ml last 24 hrs. Response to Bumex. Plan: cont avoid nephrotoxicity      Hyperkalemia: Resolve. K+ 5.3. Plan: calcium gluconate, Lokelma    UTI: UA with microscopic (10/18) positive leukocyte, > 200 WBC and bacterial. Plan: Start Ceftriaxone (10/19)     AMS: likely secondary to hypercapnic hypoxemia. Plan: CT head when patient is stable. Iron deficiency anemia: Iron replacement    INITIAL H AND P AND ICU COURSE:  Gonzalo Campbell is 80years old female who presented in [facility] for worsening SOB on 10/11. Patient reported received COVID vaccine. She was test positive for COVID in 10/4/21. Patient SOB progressive worsening that patient visit ED. In ED found to have O2 Sat 83% in room air with BNP 15.7k. Patient was admitted and manage for acute hypoxic respiratory failure likely due to combine CHF and COVID. Patient was placed on NC, Bumex, Dexamethasone. On 10/15, patient was transferred to ICU and intubated due to progressive hypoxic respirator failure on 100% FiO2 on high flow. Repeated CXR on 10/26 show improved vascular congestion. Patient has been afebrile since 10/15. UA on 10/18 positive leukocyte with >200 WBC and many bacterial. Patient was started on Ceftriaxone on 10/19 for UTI. Patient also started Milrinone for her HFrEF. Patient remain significant low Tidal volume regardless diuretic and Milrinone. On October 24, eliminating well.   Patient family were explained about patient heart failure condition, respiratory failure, acute kidney injury. Patient family were explained that patient will need a trach tube due to severe respiratory muscle weakness. However patient will increase risk with pneumonia, hospitalization if family decides to pursue way trach tube. Family aware and would like to discuss with Dr. Loc Hdz cardiologist possible AICD.  Family will be inform about the plan of care moving forward    Past Medical History      Diagnosis Date    Anxiety     Arthritis     Bronchitis     Diverticulitis of colon     Hypertension         Past Surgical History        Procedure Laterality Date    APPENDECTOMY      CHOLECYSTECTOMY         Current Medications    vancomycin (VANCOCIN) intermittent dosing (placeholder)   Other RX Placeholder    potassium (CARDIAC) replacement protocol   Other RX Placeholder    [Held by provider] bumetanide  1 mg IntraVENous Q12H    cefTRIAXone (ROCEPHIN) IV  1,000 mg IntraVENous Q24H    senna  10 mL Per NG tube Nightly    [Held by provider] lactulose  20 g Oral TID    baricitinib  1 mg Oral Daily    metoprolol tartrate  25 mg Oral BID    apixaban  2.5 mg Oral BID    sodium chloride flush  5-40 mL IntraVENous 2 times per day     potassium chloride, acetaminophen **OR** acetaminophen, docusate sodium, sodium chloride flush, sodium chloride, polyethylene glycol    IV Drips/Infusions   vasopressin (Septic Shock) infusion 0.03 Units/min (10/24/21 1620)    dexmedetomidine 0.8 mcg/kg/hr (10/25/21 0047)    norepinephrine 1 mcg/min (10/24/21 1901)    milrinone 0.125 mcg/kg/min (10/24/21 1622)    [Held by provider] sodium chloride Stopped (10/19/21 1900)    sodium chloride         Home Medications  Medications Prior to Admission: hydroCHLOROthiazide (HYDRODIURIL) 12.5 MG tablet, Take 12.5 mg by mouth daily   NONFORMULARY, Focus Factor  NONFORMULARY, Drenamin  GLUCOSAMINE-CHONDROITIN PO, Take by mouth  cetirizine (ZYRTEC) 10 MG tablet, TAKE 1 TABLET BY MOUTH ONCE DAILY  HAWTHORN PO, Take 425 mg by mouth daily  NONFORMULARY, Allerplex  OLIVE LEAF EXTRACT PO, Take by mouth  Fexofenadine-Pseudoephedrine (ALLEGRA-D 12 HOUR PO), Take by mouth  aspirin 325 MG tablet, Take 325 mg by mouth daily  metoprolol tartrate (LOPRESSOR) 50 MG tablet, Take 1 tablet by mouth 2 times daily  ALLERGIES: Sulfa antibiotics, Gluten meal, Metronidazole, and Milk-related compounds    ROS   Can't obtain due to patient sedative on vent    Scheduled Meds:   vancomycin (VANCOCIN) intermittent dosing (placeholder)   Other RX Placeholder    potassium (CARDIAC) replacement protocol   Other RX Placeholder    [Held by provider] bumetanide  1 mg IntraVENous Q12H    cefTRIAXone (ROCEPHIN) IV  1,000 mg IntraVENous Q24H    senna  10 mL Per NG tube Nightly    [Held by provider] lactulose  20 g Oral TID    baricitinib  1 mg Oral Daily    metoprolol tartrate  25 mg Oral BID    apixaban  2.5 mg Oral BID    sodium chloride flush  5-40 mL IntraVENous 2 times per day     Continuous Infusions:   vasopressin (Septic Shock) infusion 0.03 Units/min (10/24/21 1620)    dexmedetomidine 0.8 mcg/kg/hr (10/25/21 0047)    norepinephrine 1 mcg/min (10/24/21 1901)    milrinone 0.125 mcg/kg/min (10/24/21 1622)    [Held by provider] sodium chloride Stopped (10/19/21 1900)    sodium chloride         PHYSICAL EXAMINATION:  T:  99.3. P:  75. RR:  23. B/P:  111/60. FiO2: 35% . O2 Sat:  95.  I/O:  -0.4 L  Body mass index is 27.9 kg/m². GCS: 10  General: sedative on vent  HEENT:  normocephalic and atraumatic. No scleral icterus. PERR  Neck: supple. No Thyromegaly. Lungs: clear to auscultation. No retractions  Cardiac: RRR. No JVD. Abdomen: soft. Nontender. Extremities:  No clubbing, cyanosis, or edema x 4. Vasculature: capillary refill < 3 seconds. Palpable dorsalis pedis pulses. Skin:  warm and dry. Lymph:  No supraclavicular adenopathy. Neurologic:  No focal deficit. No seizures.     Data: (All radiographs, tracings, PFTs,

## 2021-10-25 NOTE — PROGRESS NOTES
Renal Progress Note    Assessment and Plan:   1. Acute kidney injury much improved  2. Hypoxic respiratory failure mechanical support  3. SARS-CoV-2 pneumonia  4. Deconditioned state  5. Leukocytosis  6. Macrocytic anemia    PLAN:  1. Labs reviewed  2. Serum creatinine is progressively improving  3. Medications reviewed  4. No changes. 5.  labs in the morning  6. We will follow    Patient Active Problem List:     Sigmoid diverticulitis     Acute respiratory failure with hypoxia ([facility] [address]     COVID-19     Acute congestive heart failure (HCC)      Subjective:   Admit Date: 10/11/2021    Interval History:   Seen for acute kidney injury  Awake and alert this morning  Updated by the staff  No new issues  Remains on mechanical support. Blood pressure is mostly stable  Urine output is good.         Medications:   Scheduled Meds:   vancomycin  750 mg IntraVENous Once    vancomycin (VANCOCIN) intermittent dosing (placeholder)   Other RX Placeholder    potassium (CARDIAC) replacement protocol   Other RX Placeholder    [Held by provider] bumetanide  1 mg IntraVENous Q12H    cefTRIAXone (ROCEPHIN) IV  1,000 mg IntraVENous Q24H    senna  10 mL Per NG tube Nightly    [Held by provider] lactulose  20 g Oral TID    baricitinib  1 mg Oral Daily    metoprolol tartrate  25 mg Oral BID    apixaban  2.5 mg Oral BID    sodium chloride flush  5-40 mL IntraVENous 2 times per day     Continuous Infusions:   vasopressin (Septic Shock) infusion 0.03 Units/min (10/24/21 1620)    dexmedetomidine 0.8 mcg/kg/hr (10/25/21 0735)    norepinephrine 1 mcg/min (10/24/21 1901)    milrinone 0.125 mcg/kg/min (10/24/21 1622)    [Held by provider] sodium chloride Stopped (10/19/21 1900)    sodium chloride         CBC:   Recent Labs     10/23/21  0450 10/24/21  0420 10/25/21  0405   WBC 14.9* 15.3* 16.3*   HGB 12.0 11.4* 11.2*    226 214     CMP:    Recent Labs     10/23/21  0450 10/24/21  0420 10/25/21  0405    141 139   K 4.3 PORTABLE   Final Result   1. Increased density in the left lung base. This can indicate partial left lower lobe collapse. 2. Persistent patchy bilateral pulmonary opacities. **This report has been created using voice recognition software. It may contain minor errors which are inherent in voice recognition technology. **      Final report electronically signed by Dr. Kendall Martinez on 10/18/2021 7:58 AM      XR CHEST PORTABLE   Final Result   Impression:   1. Cardiomegaly with improvement of passive congestive changes and better    aeration of both lungs compared to the prior study. 2. The focal bilateral alveolar consolidations consistent with infectious    pulmonary disease are unchanged. Retrocardiac consolidation and small LEFT    pleural effusion obscuring the LEFT diaphragm is unchanged. This document has been electronically signed by: Wes Mejia MD on    10/16/2021 03:18 AM      XR CHEST PORTABLE   Final Result   Somewhat low position of endotracheal tube 1.2 cm above the claire but improved aeration of the upper lungs. **This report has been created using voice recognition software. It may contain minor errors which are inherent in voice recognition technology. **      Final report electronically signed by Dr. Tyler Johnson on 10/15/2021 3:34 PM      XR CHEST PORTABLE   Final Result   1. Bilateral pneumonia. 2. Small bilateral pleural effusions. 3. Stable cardiomegaly. **This report has been created using voice recognition software. It may contain minor errors which are inherent in voice recognition technology. **      Final report electronically signed by Dr. Alex Levi on 10/15/2021 4:14 PM      XR CHEST PORTABLE   Final Result   Right lower lobe airspace infiltrates. Severe cardiomegaly. **This report has been created using voice recognition software. It may contain minor errors which are inherent in voice recognition technology. ** Final report electronically signed by Dr. Lety Blunt on 10/11/2021 8:59 PM      XR CHEST PORTABLE   Final Result      1. Diffuse groundglass opacities in both lungs along with cardiomegaly. Findings likely suggest acute exacerbation of congestive heart failure versus pneumonia in the right clinical setting. Clinical correlation is recommended      2. Small left pleural effusion. 3. Other findings as described above. **This report has been created using voice recognition software. It may contain minor errors which are inherent in voice recognition technology. **      Final report electronically signed by Dr Kee Sorenson on 10/11/2021 11:56 AM            Objective:   Vitals: /67   Pulse 75   Temp 98.4 °F (36.9 °C) (Bladder)   Resp 22   Ht 5' (1.524 m)   Wt 142 lb 13.7 oz (64.8 kg)   SpO2 96%   BMI 27.90 kg/m²    Wt Readings from Last 3 Encounters:   10/24/21 142 lb 13.7 oz (64.8 kg)   10/07/21 125 lb (56.7 kg)      24HR INTAKE/OUTPUT:      Intake/Output Summary (Last 24 hours) at 10/25/2021 0815  Last data filed at 10/25/2021 [id number]  Gross per 24 hour   Intake 1308.47 ml   Output 1725 ml   Net -416.53 ml       Constitutional: Well-developed elderly lady on mechanical support but awake alert,, no apparent distress   Skin:normal with no rash or lesions. HEENT:Pupils are reactive . Throat is clear . Oral mucosa is moist.  Endotracheal tube is noted. Orogastric tube is noted. Neck:supple with no  carotid bruit  Cardiovascular: Regular sinus rhythm without any murmur or rubs. Respiratory:  Clear to ausculation without wheezes, rhonchi or rales in the anterior  Abdomen: Soft. Good bowel sounds. Ext: Trace bilateral LE edema  Musculoskeletal:Intact  Neuro: Awake and alert. Obeys commands. Electronically signed by Maria D Khanna MD on 10/25/2021 at 8:15 AM  **This report has been created using voice recognition software.  It maycontain minor  errors which are inherent in voice

## 2021-10-25 NOTE — PROGRESS NOTES
Patient's daughter at bedside. Current plan of care and patient status discussed with daughter. She expressed concerns, regarding cardiologys input, which was also at this time discussed with Dr. Anitra Weller during rounding. All questions answered at this time.

## 2021-10-25 NOTE — PROGRESS NOTES
[address] 60  INPATIENT OCCUPATIONAL THERAPY  [facility] CCU 3A  Reassessment    Time:   Time In: 1125  Time Out: 1153  Timed Code Treatment Minutes: 13 Minutes  Minutes: 28   CoTx with PT secondary to medically complex pt and able to work on her goals for PT and OT with session. Date: 10/25/2021  Patient Name: Cuate Guthrie,   Gender: female      MRN: [id number]  : 1934  (80 y.o.)  Referring Practitioner: Ac Wiggins MD  Diagnosis: acute respiratory failure with hypoxia  Additional Pertinent Hx: Per H&P:Ms. India Vang is an 79-year-old female with a past medical history of anxiety, hypertension, arthritis, and a potentially new diagnosis of CHF. She states that she was vaccinated for Covid. She tested positive for Covid on 10/4/2021. Her shortness of breath has gotten progressively worse along with her fatigue. She states that in the past week she has noticed increased shortness of breath, increased swelling in her lower limbs, increased fatigue, and increasing orthopnea. Chest x-ray shows bilateral groundglass opacities with cardiomegaly. RR on 10/12 due to elevated HR with amnio started. Restrictions/Precautions:  Restrictions/Precautions: Fall Risk, Isolation, Contact Precautions  Position Activity Restriction  Other position/activity restrictions: droplet +; Hard of hearing    Subjective  Chart Reviewed: Yes, Orders, Progress Notes, History and Physical  Patient assessed for rehabilitation services?: Yes  Family / Caregiver Present: No    Subjective: Pleasant. Willing to work with therapy. Comments: RN ok'd session as pt is a candidate for early mobility. She was weaned off of sedation. Nursing and RT available to assist throughout sitting trial. She is intubated and nodded her head or attempted to mouth words to communicate.   Nursing and RT available to assist throughout sitting trial.     Pain:  Pain Assessment  Patient Currently in Pain: Denies  Response to Pain completed to increase pt's strength or endurance for ease of doing self care and functional mobility. Tolerated fair. Activity Tolerance:  Patient tolerance of  treatment: fair. Pt sat at the edge of bed for 9-10 minutes. She was getting progressively more tired. She did participate in leg exercises and also used her upper body to help with keeping her balance. Cues needed for posture as she had a forward head and slouched back with posterior pelvic tilt while in sitting. Assessment:  Assessment: Pt is progressing slowly secondary to the need for ventilator support. She participated in sitting activities at the edge of bed at this time with assist needed for balance. Transfers were not attempted. Pt needed assist of 2 for bed mobility and for cleaning her perineum after some leakage from around the rectal tube. Pt also has a alaniz catheter in place. She showed good following of commands with delayed responses noted. Performance deficits / Impairments: Decreased functional mobility , Decreased ADL status, Decreased strength, Decreased endurance, Decreased high-level IADLs, Decreased balance, Decreased cognition, Decreased posture  Prognosis: Fair  REQUIRES OT FOLLOW UP: No  Decision Making: High Complexity    Treatment Initiated: Treatment and education initiated within context of evaluation. Evaluation time included review of current medical information, gathering information related to past medical, social and functional history, completion of standardized testing, formal and informal observation of tasks, assessment of data and development of plan of care and goals. Treatment time included skilled education and facilitation of tasks to increase safety and independence with ADL's for improved functional independence and quality of life. Reviewed with pt her current situation. She was motivated to participate in therapy.   Encouraged pt to take it a day at a time so that she keeps getting better. She nodded her head. Discharge Recommendations:  Continue to assess pending progress, Subacute/Skilled Nursing Facility, Patient would benefit from continued therapy after discharge    Patient Education:  OT Education: OT Role, Plan of Care, Orientation  Patient Education: Yulissa Osei of going slowly and making dialy progress toward her larger goal    Equipment Recommendations: Other: will continue to assess pending progress    Plan:  Times per week: 5x  Current Treatment Recommendations: Strengthening, Balance Training, Functional Mobility Training, Endurance Training, Patient/Caregiver Education & Training, Self-Care / ADL, Home Management Training, Safety Education & Training, Cognitive Reorientation  Plan Comment: Pt would benefit from continued skilled OT services when medically stable and discharged from Acute. OT recommended while pt is at [facility]. Specific instructions for Next Treatment: Functional transfers as able; ADLs and sitting balance; UE ROM exercises and endurance training; edema control. See long-term goal time frame for expected duration of plan of care. If no long-term goals established, a short length of stay is anticipated. Goals:  Patient goals : Get better so she can go home. Pt unable to verbalized but pt agreed. Short term goals  Time Frame for Short term goals: by discharge  Short term goal 1: Pt will demonstrate functional transfers with OTR in prep for ADL completion while out of bed. Rebecca Crumb term goal 2: Pt will complete upper body ADL tasks with MIN A to increase independence with self care. Short term goal 3: Pt will tolerate dynamic sitting >10 minutes with MIN A to increase her endurnace for ease of doing self care. Short term goal 4: Pt will be oriented to the day her goals and progress daily with cues as needed to increase her safety and facilitate progress.          Following session, patient left in safe position with all fall risk precautions in

## 2021-10-25 NOTE — PROGRESS NOTES
Pharmacy Vancomycin Consult     Vancomycin Day: 2  Current Dosing: intermittent, due to HERIBERTO. Received 1250 mg 10/24 0905  Current indication: HAP     Temp max:  100.6    Recent Labs     10/24/21  0420 10/25/21  0405   BUN 74* 76*   CREATININE 1.7* 1.5*   WBC 15.3* 16.3*       Intake/Output Summary (Last 24 hours) at 10/25/2021 0731  Last data filed at 10/25/2021 [id number]  Gross per 24 hour   Intake 1308.47 ml   Output 1725 ml   Net -416.53 ml     Cultures/Sensitivities:  Date Source Result   10/19/2021 Sputum  Corynebacterium pseudodiptheriticum     Ht Readings from Last 1 Encounters:   10/17/21 5' (1.524 m)        Wt Readings from Last 1 Encounters:   10/24/21 142 lb 13.7 oz (64.8 kg)       Body mass index is 27.9 kg/m². Estimated Creatinine Clearance: 23 mL/min (A) (based on SCr of 1.5 mg/dL (H)). Random vancomycin level: 11.8 mcg/ml 10/25/2021 0405    Assessment/Plan:  Rocephin started 10/19, day #7   Stable sCr and urine output   Vancomycin 750 mg (11.5 mg/kg) IV X 1   Check random level 10/26/21 to assess if Q24H appropriate.        Asia Galaviz PharmD 10/25/2021 7:48 AM

## 2021-10-25 NOTE — PROGRESS NOTES
[address] [address]  INPATIENT PHYSICAL THERAPY  Re-Assessment NOTE  [facility] CCU 3A - [id number]     Time In: 1120  Time Out: 1152  Timed Code Treatment Minutes: 16 Minutes  Minutes: 28      Co-treatment with OT for early mobility due to pt being medically complex including oral intubation and requires +2 assistance    Date: 10/25/2021  Patient Name: Rona Beyer,  Gender:  female        MRN: [id number]  : 1934  (80 y.o.)     Referring Practitioner: Fabio Llamas DO  Diagnosis: Acute respiratory failure with hypoxia  Additional Pertinent Hx: Per chart review: Ms. Ezequiel Mckeon is an 41-year-old female with a past medical history of anxiety, hypertension, arthritis, and a potentially new diagnosis of CHF. She states that she was vaccinated for Covid. She tested positive for Covid on 10/4/2021. Her shortness of breath has gotten progressively worse along with her fatigue. She states that in the past week she has noticed increased shortness of breath, increased swelling in her lower limbs, increased fatigue, and increasing orthopnea. Chest x-ray shows bilateral groundglass opacities with cardiomegaly. RR on 10/12 due to elevated HR with amnio started. Prior Level of Function:  Lives With: Daughter  Type of Home: House  Home Layout: One level  Home Equipment: Cane   Bathroom Shower/Tub: Tub/Shower unit  Bathroom Toilet: Standard  Bathroom Equipment: Shower chair    Receives Help From: Family  ADL Assistance: Independent  Homemaking Assistance: Independent  Ambulation Assistance: Independent  Transfer Assistance: Independent  Additional Comments: Pt reported would use cane for mobility. Pt reported being very active prior, exercises daily.     Restrictions/Precautions:  Restrictions/Precautions: Fall Risk, Isolation, Contact Precautions  Position Activity Restriction  Other position/activity restrictions: droplet +; Hard of hearing      SUBJECTIVE: Pt awakened from sedation and she agreed to try sitting up with therapy. PAIN: not reported      Vitals: generally stable througout per ICU style monitors. O2 sats >91% throughout while on ventilator. FiO2 35% and PEEP 6    OBJECTIVE:  Bed Mobility:  Rolling to Left: Maximum Assistance, X 2   Rolling to Right: Maximum Assistance, X 2   Supine to Sit: Maximum Assistance, X 2, with head of bed flat, without rail, with verbal cues   Sit to Supine: Maximum Assistance, X 2, with head of bed flat, without rail, with verbal cues      Transfers:  Not Tested    Ambulation:  Not tested    Balance:  Static Sitting Balance:  Contact Guard Assistance, to Mod A due to posterior lean and required verbal and tactile cues to engage core muscles at times. Dynamic Sitting Balance: Contact Guard Assistance, to Mod A as noted with static sitting     Pt sat for about 9-10 mins total at edge of bed with cues for posture and completion of LE activity. Exercise:  Patient was guided in 1 set(s) 3-5 reps of exercise to both lower extremities. Ankle pumps, Heelslides, Seated heel/toe raises and Long arc quads. Exercises were completed for increased independence with functional mobility. Functional Outcome Measures: Completed  AM-PAC Inpatient Mobility Raw Score : 6  AM-PAC Inpatient T-Scale Score : 23.55    ASSESSMENT:  Assessment: Patient progressing toward established goals. and participated well with edge of bed sitting activity. Pt did have some delayed responses throughout. Pt fatigues easily and would not likely tolerate separate sessions at this time. Activity Tolerance:  Patient tolerance of  treatment: good.  minus     Equipment Recommendations:Equipment Needed: Yes (continue to assess RW needs)  Discharge Recommendations: Continue to assess pending progress and Patient would benefit from continued PT at discharge  Plan: Times per week: decrease to 3x C  Current Treatment Recommendations: Strengthening, Transfer Training, Endurance Training, Neuromuscular Re-education, Patient/Caregiver Education & Training, Equipment Evaluation, Education, & procurement, Home Exercise Program, Gait Training, Balance Training, Functional Mobility Training, Stair training, Safety Education & Training    Patient Education  Patient Education: Plan of Care, Home Exercise Program    Goals:  Patient goals : To get home as soon as possible  Short term goals  See modified goals due to decline in status since these goals were prepared. Time Frame for Short term goals: By hospital d/c  Short term goal 1: Pt to demonstrate supine <-> sit transfer mod I for ease with getting out of bed. Short term goal 2: Pt to complete sit <->stand transfer mod I with LRAD for ability to transfer from surface to surface  Short term goal 3: Pt to ambulate 30' with LRAD and SBA to progress towards within home ambulation  Short term goal 4: Pt to increase Tinetti to a min of >=19/28  Revised Short-Term Goals:    Short term goals  Time Frame for Short term goals: By hospital d/c  Short term goal 1: Pt to demonstrate supine <-> sit transfer min A for ease with getting out of bed. Short term goal 2: Pt to sit edge of bed with SBA for > 5 min for static and dynamic activity  Short term goal 3: Transfers and ambulation to be reassessed when appropriate. Short term goal 4: Pt to increase Tinetti to a min of >=19/28      Long term goals  Time Frame for Long term goals : NA due to short ELOS    Following session, patient left in safe position with all fall risk precautions in place. Guera Bishop.  Almaz Ortega, [place] [address]

## 2021-10-26 LAB
ACINETOBACTER CALCOACETICUS-BAUMANNII BY PCR: NOT DETECTED
ADENOVIRUS BY PCR: NOT DETECTED
ANION GAP SERPL CALCULATED.3IONS-SCNC: 6 MEQ/L (ref 8–16)
BUN BLDV-MCNC: 74 MG/DL (ref 7–22)
C3 COMPLEMENT: 122 MG/DL (ref 90–180)
CALCIUM SERPL-MCNC: 9.8 MG/DL (ref 8.5–10.5)
CHLAMYDIA PNEUMONIAE BY PCR: NOT DETECTED
CHLORIDE BLD-SCNC: 101 MEQ/L (ref 98–111)
CO2: 30 MEQ/L (ref 23–33)
CREAT SERPL-MCNC: 1.4 MG/DL (ref 0.4–1.2)
EKG ATRIAL RATE: 79 BPM
EKG ATRIAL RATE: 84 BPM
EKG P AXIS: 3 DEGREES
EKG P AXIS: 7 DEGREES
EKG P-R INTERVAL: 220 MS
EKG P-R INTERVAL: 222 MS
EKG Q-T INTERVAL: 380 MS
EKG Q-T INTERVAL: 408 MS
EKG QRS DURATION: 152 MS
EKG QRS DURATION: 152 MS
EKG QTC CALCULATION (BAZETT): 435 MS
EKG QTC CALCULATION (BAZETT): 482 MS
EKG R AXIS: -43 DEGREES
EKG R AXIS: -45 DEGREES
EKG T AXIS: -39 DEGREES
EKG T AXIS: -53 DEGREES
EKG VENTRICULAR RATE: 79 BPM
EKG VENTRICULAR RATE: 84 BPM
ENTEROBACTER CLOACAE COMPLEX BY PCR: NOT DETECTED
ERYTHROCYTE [DISTWIDTH] IN BLOOD BY AUTOMATED COUNT: 14.7 % (ref 11.5–14.5)
ERYTHROCYTE [DISTWIDTH] IN BLOOD BY AUTOMATED COUNT: 53.4 FL (ref 35–45)
ESCHERICHIA COLI BY PCR: NOT DETECTED
GFR SERPL CREATININE-BSD FRML MDRD: 36 ML/MIN/1.73M2
GLUCOSE BLD-MCNC: 121 MG/DL (ref 70–108)
GLUCOSE BLD-MCNC: 134 MG/DL (ref 70–108)
GLUCOSE BLD-MCNC: 153 MG/DL (ref 70–108)
GLUCOSE BLD-MCNC: 181 MG/DL (ref 70–108)
GLUCOSE BLD-MCNC: 60 MG/DL (ref 70–108)
HAEMOPHILUS INFLUENZAE BY PCR: NOT DETECTED
HCT VFR BLD CALC: 31.7 % (ref 37–47)
HEMOGLOBIN: 9.9 GM/DL (ref 12–16)
INFLUENZA A BY PCR: NOT DETECTED
INFLUENZA B BY PCR: NOT DETECTED
KLEBSIELLA AEROGENES BY PCR: NOT DETECTED
KLEBSIELLA OXYTOCA BY PCR: NOT DETECTED
KLEBSIELLA PNEUMONIAE GROUP BY PCR: NOT DETECTED
LACTIC ACID: 1.2 MMOL/L (ref 0.5–2)
LEGIONELLA PNEUMOPHILIA BY PCR: NOT DETECTED
MAGNESIUM: 2.1 MG/DL (ref 1.6–2.4)
MCH RBC QN AUTO: 31.1 PG (ref 26–33)
MCHC RBC AUTO-ENTMCNC: 31.2 GM/DL (ref 32.2–35.5)
MCV RBC AUTO: 99.7 FL (ref 81–99)
METAPNEUMOVIRUS BY PCR: NOT DETECTED
MORAXELLA CATARRHALIS BY PCR: NOT DETECTED
MYCOPLASMA PNEUMONIAE BY PCR: NOT DETECTED
NON-SARS CORONAVIRUS: NOT DETECTED
PARAINFLUENZA VIRUS BY PCR: NOT DETECTED
PLATELET # BLD: 199 THOU/MM3 (ref 130–400)
PMV BLD AUTO: 11.7 FL (ref 9.4–12.4)
POTASSIUM SERPL-SCNC: 4.1 MEQ/L (ref 3.5–5.2)
PROCALCITONIN: 0.28 NG/ML (ref 0.01–0.09)
PROTEUS SPECIES BY PCR: NOT DETECTED
PSEUDOMONAS AERUGINOSA BY PCR: NOT DETECTED
RBC # BLD: 3.18 MILL/MM3 (ref 4.2–5.4)
RESISTANT GENE CTX-M BY PCR: NORMAL
RESISTANT GENE IMP BY PCR: NORMAL
RESISTANT GENE KPC BY PCR: NORMAL
RESISTANT GENE MECA/C & MREJ BY PCR: NORMAL
RESISTANT GENE NDM BY PCR: NORMAL
RESISTANT GENE OXA-48-LIKE BY PCR: NORMAL
RESISTANT GENE VIM BY PCR: NORMAL
RESPIRATORY SYNCYTIAL VIRUS BY PCR: NOT DETECTED
RHINOVIRUS ENTEROVIRUS PCR: NOT DETECTED
SERRATIA MARCESCENS BY PCR: NOT DETECTED
SODIUM BLD-SCNC: 137 MEQ/L (ref 135–145)
SOURCE: NORMAL
SPECIMEN ACCEPTABILITY: NORMAL
STAPH AUREUS BY PCR: NOT DETECTED
STREP AGALACTIAE BY PCR: NOT DETECTED
STREP PNEUMONIAE BY PCR: NOT DETECTED
STREP PYOGENES BY PCR: NOT DETECTED
VANCOMYCIN RANDOM: 12.3 UG/ML (ref 0.1–39.9)
WBC # BLD: 9.4 THOU/MM3 (ref 4.8–10.8)

## 2021-10-26 PROCEDURE — 6370000000 HC RX 637 (ALT 250 FOR IP): Performed by: HOSPITALIST

## 2021-10-26 PROCEDURE — 84145 PROCALCITONIN (PCT): CPT

## 2021-10-26 PROCEDURE — 83605 ASSAY OF LACTIC ACID: CPT

## 2021-10-26 PROCEDURE — 89220 SPUTUM SPECIMEN COLLECTION: CPT

## 2021-10-26 PROCEDURE — 87631 RESP VIRUS 3-5 TARGETS: CPT

## 2021-10-26 PROCEDURE — 87541 LEGION PNEUMO DNA AMP PROB: CPT

## 2021-10-26 PROCEDURE — 83735 ASSAY OF MAGNESIUM: CPT

## 2021-10-26 PROCEDURE — 36415 COLL VENOUS BLD VENIPUNCTURE: CPT

## 2021-10-26 PROCEDURE — 87486 CHLMYD PNEUM DNA AMP PROBE: CPT

## 2021-10-26 PROCEDURE — 87798 DETECT AGENT NOS DNA AMP: CPT

## 2021-10-26 PROCEDURE — 99232 SBSQ HOSP IP/OBS MODERATE 35: CPT | Performed by: INTERNAL MEDICINE

## 2021-10-26 PROCEDURE — 99291 CRITICAL CARE FIRST HOUR: CPT | Performed by: INTERNAL MEDICINE

## 2021-10-26 PROCEDURE — 94003 VENT MGMT INPAT SUBQ DAY: CPT

## 2021-10-26 PROCEDURE — 2500000003 HC RX 250 WO HCPCS: Performed by: STUDENT IN AN ORGANIZED HEALTH CARE EDUCATION/TRAINING PROGRAM

## 2021-10-26 PROCEDURE — 85027 COMPLETE CBC AUTOMATED: CPT

## 2021-10-26 PROCEDURE — 2580000003 HC RX 258: Performed by: STUDENT IN AN ORGANIZED HEALTH CARE EDUCATION/TRAINING PROGRAM

## 2021-10-26 PROCEDURE — 6370000000 HC RX 637 (ALT 250 FOR IP): Performed by: STUDENT IN AN ORGANIZED HEALTH CARE EDUCATION/TRAINING PROGRAM

## 2021-10-26 PROCEDURE — APPNB30 APP NON BILLABLE TIME 0-30 MINS: Performed by: STUDENT IN AN ORGANIZED HEALTH CARE EDUCATION/TRAINING PROGRAM

## 2021-10-26 PROCEDURE — 82948 REAGENT STRIP/BLOOD GLUCOSE: CPT

## 2021-10-26 PROCEDURE — 80048 BASIC METABOLIC PNL TOTAL CA: CPT

## 2021-10-26 PROCEDURE — 6370000000 HC RX 637 (ALT 250 FOR IP): Performed by: INTERNAL MEDICINE

## 2021-10-26 PROCEDURE — 6360000002 HC RX W HCPCS: Performed by: STUDENT IN AN ORGANIZED HEALTH CARE EDUCATION/TRAINING PROGRAM

## 2021-10-26 PROCEDURE — 2100000000 HC CCU R&B

## 2021-10-26 PROCEDURE — 80202 ASSAY OF VANCOMYCIN: CPT

## 2021-10-26 PROCEDURE — 87581 M.PNEUMON DNA AMP PROBE: CPT

## 2021-10-26 PROCEDURE — 6360000002 HC RX W HCPCS: Performed by: FAMILY MEDICINE

## 2021-10-26 PROCEDURE — 86160 COMPLEMENT ANTIGEN: CPT

## 2021-10-26 RX ORDER — DEXTROSE MONOHYDRATE 50 MG/ML
100 INJECTION, SOLUTION INTRAVENOUS PRN
Status: DISCONTINUED | OUTPATIENT
Start: 2021-10-26 | End: 2021-11-18 | Stop reason: HOSPADM

## 2021-10-26 RX ORDER — NICOTINE POLACRILEX 4 MG
15 LOZENGE BUCCAL PRN
Status: DISCONTINUED | OUTPATIENT
Start: 2021-10-26 | End: 2021-11-18 | Stop reason: HOSPADM

## 2021-10-26 RX ORDER — DEXTROSE MONOHYDRATE 25 G/50ML
12.5 INJECTION, SOLUTION INTRAVENOUS PRN
Status: DISCONTINUED | OUTPATIENT
Start: 2021-10-26 | End: 2021-11-18 | Stop reason: HOSPADM

## 2021-10-26 RX ORDER — MAGNESIUM SULFATE 1 G/100ML
1000 INJECTION INTRAVENOUS ONCE
Status: COMPLETED | OUTPATIENT
Start: 2021-10-26 | End: 2021-10-26

## 2021-10-26 RX ADMIN — CEFTRIAXONE SODIUM 1000 MG: 1 INJECTION, POWDER, FOR SOLUTION INTRAMUSCULAR; INTRAVENOUS at 14:32

## 2021-10-26 RX ADMIN — SODIUM CHLORIDE 1.1 MCG/KG/HR: 9 INJECTION, SOLUTION INTRAVENOUS at 13:28

## 2021-10-26 RX ADMIN — BARICITINIB 1 MG: 2 TABLET, FILM COATED ORAL at 08:25

## 2021-10-26 RX ADMIN — APIXABAN 2.5 MG: 2.5 TABLET, FILM COATED ORAL at 08:27

## 2021-10-26 RX ADMIN — SODIUM CHLORIDE, PRESERVATIVE FREE 30 ML: 5 INJECTION INTRAVENOUS at 19:54

## 2021-10-26 RX ADMIN — VASOPRESSIN 0.03 UNITS/MIN: 20 INJECTION INTRAVENOUS at 16:40

## 2021-10-26 RX ADMIN — SODIUM CHLORIDE 0.8 MCG/KG/HR: 9 INJECTION, SOLUTION INTRAVENOUS at 03:43

## 2021-10-26 RX ADMIN — DEXTROSE MONOHYDRATE 100 ML/HR: 50 INJECTION, SOLUTION INTRAVENOUS at 12:53

## 2021-10-26 RX ADMIN — Medication 17.6 MG: at 19:54

## 2021-10-26 RX ADMIN — POTASSIUM CHLORIDE 20 MEQ: 400 INJECTION, SOLUTION INTRAVENOUS at 00:12

## 2021-10-26 RX ADMIN — METOPROLOL TARTRATE 25 MG: 25 TABLET, FILM COATED ORAL at 19:54

## 2021-10-26 RX ADMIN — SODIUM CHLORIDE 1.3 MCG/KG/HR: 9 INJECTION, SOLUTION INTRAVENOUS at 21:42

## 2021-10-26 RX ADMIN — VASOPRESSIN 0.03 UNITS/MIN: 20 INJECTION INTRAVENOUS at 02:27

## 2021-10-26 RX ADMIN — APIXABAN 2.5 MG: 2.5 TABLET, FILM COATED ORAL at 19:54

## 2021-10-26 RX ADMIN — MAGNESIUM SULFATE HEPTAHYDRATE 1000 MG: 1 INJECTION, SOLUTION INTRAVENOUS at 01:25

## 2021-10-26 RX ADMIN — VANCOMYCIN HYDROCHLORIDE 750 MG: 1 INJECTION, POWDER, LYOPHILIZED, FOR SOLUTION INTRAVENOUS at 18:00

## 2021-10-26 RX ADMIN — DEXTROSE MONOHYDRATE 12.5 G: 25 INJECTION, SOLUTION INTRAVENOUS at 12:48

## 2021-10-26 ASSESSMENT — PULMONARY FUNCTION TESTS
PIF_VALUE: 23

## 2021-10-26 NOTE — PROGRESS NOTES
CRITICAL CARE PROGRESS NOTE      Patient:  [facility]    Unit/Bed:3A-09/009-A  YOB: 1934  MRN: [id number]   PCP: SERGE Torres CNP  Date of Admission: 10/11/2021  Chief Complaint:- worsening SOB    Assessment and Plan:    Acute hypoxic respiratory failure: Admitted on 10/11 and started HF. Progressive worsening hypoxemia. Intubation on 10/15. On 10/20, Family was discussed about trach and family could like to have few days for final decision on trach. Patient remain critical ill and require significant vent support. On 10/24, family meeting was held and family deferred trach tube for now. Family is aware that to be able to survive, patient required a trach and a peg. 10/26: Pcontrol 18, PEEP 6 FiO2 35, , RR 20. Plan: dicusse with family again about final decision for trach and ALTEC     ARDS: Patient received oxygen with FiO2 100 with very low TV (250ml) on ventilation. Ferritin, CRP, Lactic dehydrogenase elevated. Start Decadron, Baricitinib (10/12)     Possible Respiratory failure related to Neuromuscular disease: Persistent low tidal volume after diuretic. Patient received Milrinone and diuresis. Repeated CXR (10/21) show Persistent diffuse patchy opacities but with improved aeration at the lung bases. DTR intact bilateral lower extremities which less likely Mike Paxton. Plan: Acethycholine receptor blocking, modulating     HFrEF: Echo (10/11): LV EF 25-30%, LV severe global hypokinesis. LV size moderate increase. Patient is hypotension with BP 97/50. Weight on admission 120 lsb  10/20: Started Milrinone lactate 1/9ml/hr  10/24: Current weight 142 lbs. Gain 4 lbs since admission. Net I/O since admission (10/24): -4.6L. Tele showed A fib with RVR. EKG was ordered show A. Fib with premature ventricular conduction complex. Plan: Cont Milrinone, monitor I/O   10/25: discuss with Hazel Graham that patient will be consider for AICD at this time.  Can consider lifevest if patient able to operate. 10/26: discuss with family last night about AICD vs Melvena Favors in which patient may not be a candidate for AICD. Family would like to discuss with Card directly. COVID pneumonia: Respiratory culture (10/19) Positive Corynebacterium - Vancomycin started 10/24    Hx of Prolong QTc interval:  On telemetry strip in ED. Seen by cardiogy for Prolong QTC. Plan: tele monitor, avoid QTC prolong medications, Keep K +> 4 and Mg > 2     New on set Afib without RVR: Cont Eliquis, Metoprolol 25 mg BID (Can't take Amiodarone due to QTC prolongation)     HERIBERTO: Improve. BUN/Cr  74/1.7 <-- 70/2.4. Base line BUN/Cr 29/0.9. Likely secondary to Cardiorenal syndrome. Plan: avoid nephrotoxicity, cont gentle fluid hydration 50ml/hr. Nephrologist follow the case. 10/24: Good urine output 850 ml last 24 hrs. Response to Bumex. Plan: cont avoid nephrotoxicity      Hyperkalemia: Resolve. K+ 5.3. Plan: calcium gluconate, Lokelma    UTI: UA with microscopic (10/18) positive leukocyte, > 200 WBC and bacterial. Plan: Start Ceftriaxone (10/19)  10/26: UA (10/25): positive leuko and moderate bacterial, protein, glucose. Plan: cont Ceftriaxone, C3, C4, 24 hrs urine protein     AMS: likely secondary to hypercapnic hypoxemia. Plan: CT head when patient is stable. Iron deficiency anemia: Iron replacement    INITIAL H AND P AND ICU COURSE:  Mariam Serna is 80years old female who presented in [address] [place] [address] for worsening SOB on 10/11. Patient reported received COVID vaccine. She was test positive for COVID in 10/4/21. Patient SOB progressive worsening that patient visit ED. In ED found to have O2 Sat 83% in room air with BNP 15.7k. Patient was admitted and manage for acute hypoxic respiratory failure likely due to combine CHF and COVID. Patient was placed on NC, Bumex, Dexamethasone.  On 10/15, patient was transferred to ICU and intubated due to progressive hypoxic respirator failure on 100% FiO2 on high flow. Repeated CXR on 10/26 show improved vascular congestion. Patient has been afebrile since 10/15. UA on 10/18 positive leukocyte with >200 WBC and many bacterial. Patient was started on Ceftriaxone on 10/19 for UTI. Patient also started Milrinone for her HFrEF. Patient remain significant low Tidal volume regardless diuretic and Milrinone. On October 24, eliminating well. Patient family were explained about patient heart failure condition, respiratory failure, acute kidney injury. Patient family were explained that patient will need a trach tube due to severe respiratory muscle weakness. However patient will increase risk with pneumonia, hospitalization if family decides to pursue way trach tube. Family aware and would like to discuss with Dr. Gabi Maria cardiologist possible AICD. Family will be inform about the plan of care moving forward. On 10/25 Card team was contacted with plan for her HFrEF in which AICD will be placed when patient is clinically improved and stable. At now, patient wouldn't be candidate for the procedure. Thus LifeVest will be a option. Family is aware that to be able to survive patient need a Trach and a PEG in which it my again patient will per patient daughter POA Cordie Riedel). Family need time before final decision will be make.     Past Medical History      Diagnosis Date    Anxiety     Arthritis     Bronchitis     Diverticulitis of colon     Hypertension         Past Surgical History        Procedure Laterality Date    APPENDECTOMY      CHOLECYSTECTOMY         Current Medications    vancomycin (VANCOCIN) intermittent dosing (placeholder)   Other RX Placeholder    potassium (CARDIAC) replacement protocol   Other RX Placeholder    [Held by provider] bumetanide  1 mg IntraVENous Q12H    cefTRIAXone (ROCEPHIN) IV  1,000 mg IntraVENous Q24H    senna  10 mL Per NG tube Nightly    [Held by provider] lactulose  20 g Oral TID    baricitinib  1 mg Oral Daily    metoprolol tartrate  25 mg Oral BID    apixaban  2.5 mg Oral BID    sodium chloride flush  5-40 mL IntraVENous 2 times per day     potassium chloride, acetaminophen **OR** acetaminophen, docusate sodium, sodium chloride flush, sodium chloride, polyethylene glycol    IV Drips/Infusions   vasopressin (Septic Shock) infusion 0.03 Units/min (10/26/21 0227)    dexmedetomidine 0.8 mcg/kg/hr (10/26/21 0343)    norepinephrine 0.96 mcg/min (10/25/21 2200)    milrinone 0.125 mcg/kg/min (10/25/21 2200)    [Held by provider] sodium chloride Stopped (10/19/21 1900)    sodium chloride         Home Medications  Medications Prior to Admission: hydroCHLOROthiazide (HYDRODIURIL) 12.5 MG tablet, Take 12.5 mg by mouth daily   NONFORMULARY, Focus Factor  NONFORMULARY, Drenamin  GLUCOSAMINE-CHONDROITIN PO, Take by mouth  cetirizine (ZYRTEC) 10 MG tablet, TAKE 1 TABLET BY MOUTH ONCE DAILY  HAWTHORN PO, Take 425 mg by mouth daily  NONFORMULARY, Allerplex  OLIVE LEAF EXTRACT PO, Take by mouth  Fexofenadine-Pseudoephedrine (ALLEGRA-D 12 HOUR PO), Take by mouth  aspirin 325 MG tablet, Take 325 mg by mouth daily  metoprolol tartrate (LOPRESSOR) 50 MG tablet, Take 1 tablet by mouth 2 times daily  ALLERGIES: Sulfa antibiotics, Gluten meal, Metronidazole, and Milk-related compounds    ROS   Can't obtain due to patient sedative on vent    Scheduled Meds:   vancomycin (VANCOCIN) intermittent dosing (placeholder)   Other RX Placeholder    potassium (CARDIAC) replacement protocol   Other RX Placeholder    [Held by provider] bumetanide  1 mg IntraVENous Q12H    cefTRIAXone (ROCEPHIN) IV  1,000 mg IntraVENous Q24H    senna  10 mL Per NG tube Nightly    [Held by provider] lactulose  20 g Oral TID    baricitinib  1 mg Oral Daily    metoprolol tartrate  25 mg Oral BID    apixaban  2.5 mg Oral BID    sodium chloride flush  5-40 mL IntraVENous 2 times per day     Continuous Infusions:   vasopressin (Septic Shock) infusion 0.03 Units/min (10/26/21 0227)    dexmedetomidine 0.8 mcg/kg/hr (10/26/21 0343)    norepinephrine 0.96 mcg/min (10/25/21 2200)    milrinone 0.125 mcg/kg/min (10/25/21 2200)    [Held by provider] sodium chloride Stopped (10/19/21 1900)    sodium chloride         PHYSICAL EXAMINATION:  T:  98.6. P:  67. RR:  20. B/P:  108/63. FiO2: 35% . O2 Sat:  96.  I/O:  + 56ml  Body mass index is 28.42 kg/m². GCS: 10  General: sedative on vent  HEENT:  normocephalic and atraumatic. No scleral icterus. PERR  Neck: supple. No Thyromegaly. Lungs: clear to auscultation. No retractions  Cardiac: RRR. No JVD. Abdomen: soft. Nontender. Extremities:  No clubbing, cyanosis, or edema x 4. Vasculature: capillary refill < 3 seconds. Palpable dorsalis pedis pulses. Skin:  warm and dry. Lymph:  No supraclavicular adenopathy. Neurologic:  No focal deficit. No seizures. Data: (All radiographs, tracings, PFTs, and imaging are personally viewed and interpreted unless otherwise noted). Sodium 137, Potassium 4.1, Chloride 101, Bicarb 30, BUN/Cr 74/1.4  WBC 9.4, Hg 9.9, Hct 31.7, Platelet 070  Telemetry shows normal sinus rhythm   Blood culture 10/11 no growth x 2,   Pneumonia culture (10/19): positive Corynebacterium on 10/23 - Start Vancomycin  Pnuemonia panel (10/  CXR (10/24): No significant interval change with patchy consolidative opacities in both lung. Cardiac silhouette appears enlarged. Seen with multidisciplinary ICU team.  Meets Continued ICU Level Care Criteria:    [x] Yes   [] No - Transfer Planned to listed location:  [] HOSPITALIST CONTACTED-      Case and plan discussed with Dr. Tabby Bello        Electronically signed by Moni Bills DO  Internal Medicine Resident Physician  Patient seen by me. Case discussed with resident physician. Patient persistently critically ill. Unable to generate tidal volumes adequate to maintain adequate ventilation and oxygenation. Patient will require time to recover.   It is unclear if the patient can recover sufficiently to remove tracheostomy tube. Suspect tracheostomy tube would be permanent. Patient will also require a feeding tube. Awaiting family decision regarding parameters of care. Italicized font represents changes to the note made by me. CC time 35 minutes. Time was discontiguous. Time does not include procedures. Time does include my direct assessment of the patient and coordination of care.   Electronically signed by Genesis Dickey MD on 10/26/2021 at 5:22 PM

## 2021-10-26 NOTE — PROGRESS NOTES
Comprehensive Nutrition Assessment    Type and Reason for Visit:  Reassess (TF management)    Nutrition Recommendations/Plan:   Continue Nepro at goal rate of 40 ml/hr. Free water flush per MD.  Lachelle Harrison with PharmD - recommend culturelle as continues with flexiseal after having no BM x8d and needed lactulose  Monitoring labs, wts., GI status for EN adjustments as appropriate    Nutrition Assessment:    Pt. improving nutritionally AEB tolerating TF at goal now although has had OGT to LIWS d/t residuals over 600ml this admit, poor po intake first 5d of admit, intubated 10/15, LOS day 15.  At risk for further nutrition compromise r/t COVID Dx 10/5, new CHF, advanced age and underlying medical condition (hx diverticulitis, HTN). Malnutrition Assessment:  Malnutrition Status:   At risk for malnutrition (Comment)    Context:  Acute Illness     Findings of the 6 clinical characteristics of malnutrition:  Energy Intake:  7 - 50% or less of estimated energy requirements for 5 or more days  Weight Loss:  No significant weight loss (however difficult to evaluate with edema)     Body Fat Loss:  No significant body fat loss     Muscle Mass Loss:  No significant muscle mass loss    Fluid Accumulation:  Unable to assess     Strength:  Not Performed    Estimated Daily Nutrient Needs:  Energy (kcal):  8576-3617 (25-35/kgm) late phase; Weight Used for Energy Requirements:   (56 kgm)     Protein (g):   gm (1.2-2) as renal function allows; Weight Used for Protein Requirements:   (56 kgm)        Fluid (ml/day):  per MD;     Nutrition Related Findings:    Intubated; tolerating TF at 40ml/hour this am; no further residual issues since having (+) BMs; UO 1975ml; flexiseal 300ml; Glucose 134, BUN 74 Cr 1.4 Potassium 4.1  Phosphorus 3.2  MAP 80; meds include ATB, precedex, levophed, vasopresin;  allergies noted    Wounds:  Pressure Injury, Stage II (sacrum 10/15)       Current Nutrition Therapies:    Current Tube Feeding (TF) Orders:  · Feeding Route: Orogastric  · Formula: Renal Formula (Nepro)  · Schedule: Continuous (10/26 at goal of 40ml/hour)  · Water Flushes: per physician - 100ml every 8h  · Goal TF & Flush Orders Provides:   Nepro 40ml/hour provides 1699 kcals, 78gms protein, 154gms cho, 24gms fiber, 998 ml free H20 (698ml Nepro, 300 MD flush) in 1260ml total volume (960 Nepro, 300 MD flush)/24h    Additional Calorie Sources:   10/26 diprivan discontinued    Anthropometric Measures:  · Height: 5' (152.4 cm)  · Current Body Weight: 145 lb (65.8 kg) (10/26 with +2 edema - increase of 18# in 4d - ? accuracy vs edema)   · Admission Body Weight: 124 lb (56.2 kg) (10/11 +2 edema)    · Usual Body Weight:  (per EMR: 10/7/21: 125#)     · Ideal Body Weight: 100 lbs;   · BMI: 28.3  · Adjusted Body Weight:  ; No Adjustment   · BMI Categories: Normal Weight (BMI 22.0 to 24.9) age over 72       Nutrition Diagnosis:   · Inadequate oral intake related to impaired respiratory function as evidenced by NPO or clear liquid status due to medical condition, intubation, nutrition support - enteral nutrition      Nutrition Interventions:   Food and/or Nutrient Delivery:  Continue NPO, Continue Current Tube Feeding  Nutrition Education/Counseling:  No recommendation at this time   Coordination of Nutrition Care:  Continue to monitor while inpatient    Goals:  EN to provide % nutrient needs while intubated for covid recovery process       Nutrition Monitoring and Evaluation:   Behavioral-Environmental Outcomes:  None Identified   Food/Nutrient Intake Outcomes:  Diet Advancement/Tolerance, Enteral Nutrition Intake/Tolerance  Physical Signs/Symptoms Outcomes:  Biochemical Data, GI Status, Fluid Status or Edema, Hemodynamic Status, Nutrition Focused Physical Findings, Skin, Weight     Discharge Planning:     Too soon to determine     Electronically signed by Alber Rolle RD, LD on 10/26/21 at 3:26 PM EDT    Contact: [phone]

## 2021-10-26 NOTE — PROGRESS NOTES
Renal Progress Note    Assessment and Plan:   1. Acute kidney injury much improved  2. SARS-CoV-2 pneumonia  3. Hypoxic respiratory failure on mechanical support  4. Macrocytic anemia  5. Deconditioning    PLAN:  1. Labs reviewed  2. Serum creatinine slightly improved today from yesterday  3. Medications reviewed  4. No changes  5. Will still not give diuretic despite lower extremity edema as she appears to be mostly prerenal  6. Labs in the morning  7.  We will follow    Patient Active Problem List:     Sigmoid diverticulitis     Acute respiratory failure with hypoxia ([facility] [address]     COVID-19     Acute congestive heart failure ([facility] [address]      Subjective:   Admit Date: 10/11/2021    Interval History:   Seen for acute kidney injury  Awake and alert this morning despite mechanical support  Updated by the staff  No issues  Blood pressure is mostly good  Urine output is good      Medications:   Scheduled Meds:   vancomycin (VANCOCIN) intermittent dosing (placeholder)   Other RX Placeholder    potassium (CARDIAC) replacement protocol   Other RX Placeholder    [Held by provider] bumetanide  1 mg IntraVENous Q12H    cefTRIAXone (ROCEPHIN) IV  1,000 mg IntraVENous Q24H    senna  10 mL Per NG tube Nightly    [Held by provider] lactulose  20 g Oral TID    baricitinib  1 mg Oral Daily    metoprolol tartrate  25 mg Oral BID    apixaban  2.5 mg Oral BID    sodium chloride flush  5-40 mL IntraVENous 2 times per day     Continuous Infusions:   vasopressin (Septic Shock) infusion 0.03 Units/min (10/26/21 0227)    dexmedetomidine 0.8 mcg/kg/hr (10/26/21 0343)    norepinephrine 0.96 mcg/min (10/25/21 2200)    milrinone 0.125 mcg/kg/min (10/25/21 2200)    [Held by provider] sodium chloride Stopped (10/19/21 1900)    sodium chloride         CBC:   Recent Labs     10/24/21  0420 10/25/21  0405 10/26/21  0415   WBC 15.3* 16.3* 9.4   HGB 11.4* 11.2* 9.9*    214 199     CMP:    Recent Labs     10/25/21  0405 10/25/21  2215 10/26/21  0415    138 137   K 4.4 3.9 4.1    101 101   CO2 29 29 30   BUN 76* 76* 74*   CREATININE 1.5* 1.5* 1.4*   GLUCOSE 172* 141* 134*   CALCIUM 10.0 10.0 9.8   LABGLOM 33* 33* 36*     Troponin: No results for input(s): TROPONINI in the last 72 hours. BNP: No results for input(s): BNP in the last 72 hours. INR: No results for input(s): INR in the last 72 hours. Lipids:   Recent Labs     10/24/21  0420   TRIG 102     Liver: No results for input(s): AST, ALT, ALKPHOS, PROT, LABALBU, BILITOT in the last 72 hours. Invalid input(s): BILDIR  Iron:  No results for input(s): IRONS, FERRITIN in the last 72 hours. Invalid input(s): LABIRONS  XR CHEST PORTABLE   Final Result   1. There has been no significant interval change in the radiographic appearance of the chest  from 10/23/2021 including diffuse airspace disease. .            **This report has been created using voice recognition software. It may contain minor errors which are inherent in voice recognition technology. **      Final report electronically signed by Dr Princess Man on 10/24/2021 8:54 PM      XR CHEST PORTABLE   Final Result   1. Support lines and tubes in unchanged position when compared to prior. 2. Interval worsening of parenchymal densities within the right    central/right lower lobe. Additionally, worsening of confluent    consolidation within the left midlung with development of left basilar    pleural/parenchymal opacities likely consistent with moderate left pleural    effusion. This document has been electronically signed by: Marie Dennis DO on    10/23/2021 05:00 AM      XR CHEST PORTABLE   Final Result   Persistent diffuse patchy opacities but with improved aeration at the lung bases. **This report has been created using voice recognition software. It may contain minor errors which are inherent in voice recognition technology. **      Final report electronically signed by Dr. Gavi Naqvi MD on recognition technology. **      Final report electronically signed by Dr. Laxmi Rai on 10/11/2021 8:59 PM      XR CHEST PORTABLE   Final Result      1. Diffuse groundglass opacities in both lungs along with cardiomegaly. Findings likely suggest acute exacerbation of congestive heart failure versus pneumonia in the right clinical setting. Clinical correlation is recommended      2. Small left pleural effusion. 3. Other findings as described above. **This report has been created using voice recognition software. It may contain minor errors which are inherent in voice recognition technology. **      Final report electronically signed by Dr Shama Miller on 10/11/2021 11:56 AM            Objective:   Vitals: BP (!) 111/55   Pulse 63   Temp 98.6 °F (37 °C) (Bladder)   Resp 21   Ht 5' (1.524 m)   Wt 145 lb 8.1 oz (66 kg)   SpO2 95%   BMI 28.42 kg/m²    Wt Readings from Last 3 Encounters:   10/26/21 145 lb 8.1 oz (66 kg)   10/07/21 125 lb (56.7 kg)      24HR INTAKE/OUTPUT:      Intake/Output Summary (Last 24 hours) at 10/26/2021 6523  Last data filed at 10/26/2021 0600  Gross per 24 hour   Intake 4187.06 ml   Output 2275 ml   Net 1912.06 ml       Constitutional: Well-developed elderly lady on mechanical support alert, awake, no apparent distress   Skin:normal with no rash or lesions. HEENT:Pupils are reactive . Throat is clear . Oral mucosa is moist.  Endotracheal tube is noted. Orogastric tube is noted. Neck:supple with no adenopathy or carotid bruit  Cardiovascular: Regular sinus rhythm without murmur or rubs  Respiratory:  Clear to ausculation without wheezes, rhonchi or rales in the anterior  Abdomen: Soft. Good bowel sounds. Ext: 1+ bilateral LE edema  Musculoskeletal:Intact  Neuro:Alert and awake. Obeying commands. Electronically signed by Parul Walker MD on 10/26/2021 at 7:13 AM  **This report has been created using voice recognition software.  It maycontain minor  errors which are inherent in voice recognition technology. **

## 2021-10-26 NOTE — PROGRESS NOTES
Baricitinib Renal Dosing Follow-Up    Current Dose: 1 mg once daily    Recent Labs     10/24/21  0420 10/25/21  0405 10/26/21  0415   WBC 15.3* 16.3* 9.4     eGFR: 36 mL/min/1.73m2     Plan:  Increase baricitinib to 2 mg once daily    EUA Dosing Reference for COVID-19 (FDA 2021):  eGFR ? 60: No dosage adjustment necessary. eGFR 30 to < 60: 2 mg once daily. eGFR 15 to < 30: 1 mg once daily. eGFR < 15: Use is not recommended.     Consider stopping baricitinib if:  Absolute lymphocyte count (ALC) is < 200   Absolute neutrophil count (ANC) is < 500  ALT > 5 x ULN  AST > 10 x ULN  Acute DVT or PE

## 2021-10-26 NOTE — PROGRESS NOTES
Phone call made to patient's daughter, Savanah Bishop. Savanah Bishop indicates that she and her sister are wishing to speak with the patient's cardiologist (Dr. Kamini Francisco) prior to making any decision regarding trach. Savanah Bishop denies any questions regarding trach/PEG at this time. Encouraged Savanah Bishop and her sister to always keep the patient's wishes at the forefront of any decision made as based on any previous discussions and/or patient's personality/traits. Much emotional support provided. Please call palliative care if further needs arise.

## 2021-10-26 NOTE — PROGRESS NOTES
Pharmacy Vancomycin Consult     Vancomycin Day: 3  Current Dosing: intermittent  Current indication: HAP    Temp max:  98.6    Recent Labs     10/25/21  0405 10/25/21  0405 10/25/21  2215 10/26/21  0415   BUN 76*   < > 76* 74*   CREATININE 1.5*   < > 1.5* 1.4*   WBC 16.3*  --   --  9.4    < > = values in this interval not displayed. Intake/Output Summary (Last 24 hours) at 10/26/2021 1300  Last data filed at 10/26/2021 0820  Gross per 24 hour   Intake 4242.7 ml   Output 2275 ml   Net 1967.7 ml     Cultures/Sensitivities:  Date Source Result   10/26/2021 Pneumonia Panel Sent   10/19/2021 Sputum  Corynebacterium pseudodiptheriticum       Ht Readings from Last 1 Encounters:   10/17/21 5' (1.524 m)        Wt Readings from Last 1 Encounters:   10/26/21 145 lb 8.1 oz (66 kg)       Body mass index is 28.42 kg/m². Estimated Creatinine Clearance: 24 mL/min (A) (based on SCr of 1.4 mg/dL (H)). Trough: 12.3    Assessment/Plan:  Will continue with vancomycin 750 mg IV every 24 hours at this time. If serum creatinine starts to trend up will need to re-evaluate dosing. No levels ordered at this time.     Cirilo Lizama, BhavnaD, BCPS  10/26/2021  1:03 PM

## 2021-10-26 NOTE — FLOWSHEET NOTE
Pt was unresponsive but was blessed.       10/26/21 1516   Encounter Summary   Services provided to: Patient   Referral/Consult From: Rounding   Continue Visiting Yes  (10/26 NR)   Complexity of Encounter Low   Length of Encounter 15 minutes   Routine   Type Follow up   Assessment Unable to respond   Intervention [place]

## 2021-10-26 NOTE — CARE COORDINATION
10/26/21, 2:44 PM EDT    DISCHARGE ON GOING EVALUATION    [facility] day: 15  Location: 3A-09/009-A Reason for admit: Acute respiratory failure with hypoxia ([facility] [address] [J96.01]  Acute congestive heart failure, unspecified heart failure type ([facility] [address] [I50.9]  COVID-19 [U07.1]   Procedure:   10/12 Echo with EF 25-30%; Small circumferential pericardial effusion with no tamponade physiology; Myxomatotic degeneration of mitral valve;  Mild to Moderate mitral regurgitation is present  10/15 Intubated  10/15 CVC RIJ    Barriers to Discharge: Physician planning to talk to family again about final decision for trach and PEG. Remains on vent w/ETT on PCV, peep 6, PC 18, FIO2 35%, sats 96%. Tmax 100.4. NSR. Follows commands. PT/OT - early mobility. Intensivist and Nephrology following. Palliative Care and Dietitian following. Telemetry, CVC, OG w/TF, flexiseal, alaniz. Precedex @ 1.1 mcg/kg/hr, D5 @ 100 ml/hr, Primacor @ 0.125 mcg/kg/min, vasopressin @ 0.03 units/min, baricitinib, eliquis, IV rocephin, lopressor, IV vancomycin, Electrolyte replacement protocols.   PCP: SERGE Boyer CNP  Readmission Risk Score: 19.3%  Patient Goals/Plan/Treatment Preferences: From home with daughter. Plan pending clinical course. Had agreed to minimum of [place] at discharge.

## 2021-10-27 ENCOUNTER — APPOINTMENT (OUTPATIENT)
Dept: GENERAL RADIOLOGY | Age: 86
DRG: 004 | End: 2021-10-27
Payer: MEDICARE

## 2021-10-27 LAB
ACETYLCHOLINE BLOCKING AB: 26 % (ref 0–26)
ANION GAP SERPL CALCULATED.3IONS-SCNC: 9 MEQ/L (ref 8–16)
BUN BLDV-MCNC: 71 MG/DL (ref 7–22)
CALCIUM SERPL-MCNC: 9.8 MG/DL (ref 8.5–10.5)
CHLORIDE BLD-SCNC: 101 MEQ/L (ref 98–111)
CO2: 28 MEQ/L (ref 23–33)
COMPLEMENT C4: 20 MG/DL (ref 10–40)
CREAT SERPL-MCNC: 1.3 MG/DL (ref 0.4–1.2)
ERYTHROCYTE [DISTWIDTH] IN BLOOD BY AUTOMATED COUNT: 14.8 % (ref 11.5–14.5)
ERYTHROCYTE [DISTWIDTH] IN BLOOD BY AUTOMATED COUNT: 53.6 FL (ref 35–45)
GFR SERPL CREATININE-BSD FRML MDRD: 39 ML/MIN/1.73M2
GLUCOSE BLD-MCNC: 103 MG/DL (ref 70–108)
GLUCOSE BLD-MCNC: 115 MG/DL (ref 70–108)
HCT VFR BLD CALC: 32.1 % (ref 37–47)
HEMOGLOBIN: 9.8 GM/DL (ref 12–16)
MAGNESIUM: 1.8 MG/DL (ref 1.6–2.4)
MCH RBC QN AUTO: 30.1 PG (ref 26–33)
MCHC RBC AUTO-ENTMCNC: 30.5 GM/DL (ref 32.2–35.5)
MCV RBC AUTO: 98.5 FL (ref 81–99)
PLATELET # BLD: 205 THOU/MM3 (ref 130–400)
PMV BLD AUTO: 12.2 FL (ref 9.4–12.4)
POTASSIUM SERPL-SCNC: 3.6 MEQ/L (ref 3.5–5.2)
RBC # BLD: 3.26 MILL/MM3 (ref 4.2–5.4)
SODIUM BLD-SCNC: 138 MEQ/L (ref 135–145)
URINE CULTURE REFLEX: NORMAL
WBC # BLD: 10.4 THOU/MM3 (ref 4.8–10.8)

## 2021-10-27 PROCEDURE — 85027 COMPLETE CBC AUTOMATED: CPT

## 2021-10-27 PROCEDURE — 2100000000 HC CCU R&B

## 2021-10-27 PROCEDURE — 99232 SBSQ HOSP IP/OBS MODERATE 35: CPT | Performed by: INTERNAL MEDICINE

## 2021-10-27 PROCEDURE — 82948 REAGENT STRIP/BLOOD GLUCOSE: CPT

## 2021-10-27 PROCEDURE — 80048 BASIC METABOLIC PNL TOTAL CA: CPT

## 2021-10-27 PROCEDURE — 6370000000 HC RX 637 (ALT 250 FOR IP): Performed by: HOSPITALIST

## 2021-10-27 PROCEDURE — 6360000002 HC RX W HCPCS: Performed by: STUDENT IN AN ORGANIZED HEALTH CARE EDUCATION/TRAINING PROGRAM

## 2021-10-27 PROCEDURE — 2580000003 HC RX 258: Performed by: STUDENT IN AN ORGANIZED HEALTH CARE EDUCATION/TRAINING PROGRAM

## 2021-10-27 PROCEDURE — 2500000003 HC RX 250 WO HCPCS: Performed by: STUDENT IN AN ORGANIZED HEALTH CARE EDUCATION/TRAINING PROGRAM

## 2021-10-27 PROCEDURE — 99291 CRITICAL CARE FIRST HOUR: CPT | Performed by: INTERNAL MEDICINE

## 2021-10-27 PROCEDURE — 36415 COLL VENOUS BLD VENIPUNCTURE: CPT

## 2021-10-27 PROCEDURE — 94761 N-INVAS EAR/PLS OXIMETRY MLT: CPT

## 2021-10-27 PROCEDURE — 83735 ASSAY OF MAGNESIUM: CPT

## 2021-10-27 PROCEDURE — 74018 RADEX ABDOMEN 1 VIEW: CPT

## 2021-10-27 PROCEDURE — 86160 COMPLEMENT ANTIGEN: CPT

## 2021-10-27 PROCEDURE — 6360000002 HC RX W HCPCS: Performed by: FAMILY MEDICINE

## 2021-10-27 PROCEDURE — 94003 VENT MGMT INPAT SUBQ DAY: CPT

## 2021-10-27 PROCEDURE — 2700000000 HC OXYGEN THERAPY PER DAY

## 2021-10-27 PROCEDURE — 6370000000 HC RX 637 (ALT 250 FOR IP): Performed by: STUDENT IN AN ORGANIZED HEALTH CARE EDUCATION/TRAINING PROGRAM

## 2021-10-27 PROCEDURE — 6370000000 HC RX 637 (ALT 250 FOR IP): Performed by: INTERNAL MEDICINE

## 2021-10-27 RX ORDER — LORAZEPAM 2 MG/ML
1 INJECTION INTRAMUSCULAR EVERY 4 HOURS PRN
Status: DISCONTINUED | OUTPATIENT
Start: 2021-10-27 | End: 2021-11-18 | Stop reason: HOSPADM

## 2021-10-27 RX ADMIN — SODIUM CHLORIDE 1.4 MCG/KG/HR: 9 INJECTION, SOLUTION INTRAVENOUS at 08:18

## 2021-10-27 RX ADMIN — SODIUM CHLORIDE 1.4 MCG/KG/HR: 9 INJECTION, SOLUTION INTRAVENOUS at 14:01

## 2021-10-27 RX ADMIN — APIXABAN 2.5 MG: 2.5 TABLET, FILM COATED ORAL at 08:03

## 2021-10-27 RX ADMIN — VASOPRESSIN 0.02 UNITS/MIN: 20 INJECTION INTRAVENOUS at 02:09

## 2021-10-27 RX ADMIN — SODIUM CHLORIDE, PRESERVATIVE FREE 10 ML: 5 INJECTION INTRAVENOUS at 07:58

## 2021-10-27 RX ADMIN — CEFTRIAXONE SODIUM 1000 MG: 1 INJECTION, POWDER, FOR SOLUTION INTRAMUSCULAR; INTRAVENOUS at 16:59

## 2021-10-27 RX ADMIN — APIXABAN 2.5 MG: 2.5 TABLET, FILM COATED ORAL at 20:12

## 2021-10-27 RX ADMIN — VANCOMYCIN HYDROCHLORIDE 750 MG: 1 INJECTION, POWDER, LYOPHILIZED, FOR SOLUTION INTRAVENOUS at 14:33

## 2021-10-27 RX ADMIN — SODIUM CHLORIDE, PRESERVATIVE FREE 10 ML: 5 INJECTION INTRAVENOUS at 20:12

## 2021-10-27 RX ADMIN — BARICITINIB 2 MG: 2 TABLET, FILM COATED ORAL at 07:58

## 2021-10-27 RX ADMIN — VASOPRESSIN 0.02 UNITS/MIN: 20 INJECTION INTRAVENOUS at 21:32

## 2021-10-27 RX ADMIN — SODIUM CHLORIDE 1.4 MCG/KG/HR: 9 INJECTION, SOLUTION INTRAVENOUS at 20:03

## 2021-10-27 RX ADMIN — Medication 17.6 MG: at 20:12

## 2021-10-27 RX ADMIN — LORAZEPAM 1 MG: 2 INJECTION INTRAMUSCULAR; INTRAVENOUS at 21:12

## 2021-10-27 ASSESSMENT — PULMONARY FUNCTION TESTS
PIF_VALUE: 23
PIF_VALUE: 24
PIF_VALUE: 25

## 2021-10-27 NOTE — CARE COORDINATION
10/27/21, 2:38 PM EDT    DISCHARGE ON GOING EVALUATION    [facility] day: 16  Location: -09/009-A Reason for admit: Acute respiratory failure with hypoxia ([facility] [address] [J96.01]  Acute congestive heart failure, unspecified heart failure type ([facility] [address] [I50.9]  COVID-19 [U07.1]   Procedure:   10/12 Echo with EF 25-30%; Small circumferential pericardial effusion with no tamponade physiology; Myxomatotic degeneration of mitral valve;  Mild to Moderate mitral regurgitation is present  10/15 Intubated  10/15 CVC RIJ    Barriers to Discharge: Awaiting family decision about trach/peg. Remains on vent w/ETT on PCV, peep 6, PC 20, FIO2 30%, sats 93%. Tmax 99.9. NSR. Follows commands. PT/OT - early mobility. Intensivist and Nephrology following. Palliative Care and Dietitian following. Telemetry, CVC, OG w/TF, flexiseal, alaniz. Precedex @ 1.4 mcg/kg/hr, Primacor @ 0.125 mcg/kg/min, vasopressin @ 0.02 units/min, baricitinib, eliquis, IV rocephin, IV vancomycin, Electrolyte replacement protocols.  BUN 71, Creat 1.3, hgb 9.8. PCP: SERGE Coulter CNP  Readmission Risk Score: 19.3%  Patient Goals/Plan/Treatment Preferences: From home with daughter. Plan pending clinical course.  Had agreed to minimum of [place] at discharge

## 2021-10-27 NOTE — PROGRESS NOTES
CRITICAL CARE PROGRESS NOTE      Patient:  Rona Beyer    Unit/Bed:3A-09/009-A  YOB: 1934  MRN: [id number]   PCP: SERGE Hu CNP  Date of Admission: 10/11/2021  Chief Complaint:- worsening SOB    Assessment and Plan:    Acute hypoxic respiratory failure: Admitted on 10/11 and started HF. Progressive worsening hypoxemia. Intubation on 10/15. On 10/20, Family was discussed about trach and family could like to have few days for final decision on trach. Patient remain critical ill and require significant vent support. On 10/24, family meeting was held and family deferred trach tube for now. Family is aware that to be able to survive, patient required a trach and a peg. 10/27: Pcontrol 18, PEEP 6 FiO2 35, , RR 24. Plan: dicusse with family again about final decision for trach and ALTEC vs hospice care. I discuss with patient's daughter Cleaster Moment about family meeting which is possible plan for tomorrow in which all patient daughters will see patient and sedation will be reduce so that patient can communicate with her daughters for whether patient want to proceed with trach. ARDS: Patient received oxygen with FiO2 100 with very low TV (250ml) on ventilation. Ferritin, CRP, Lactic dehydrogenase elevated. Start Decadron, Baricitinib (10/12)     Possible Respiratory failure related to Neuromuscular disease: Persistent low tidal volume after diuretic. Patient received Milrinone and diuresis. Repeated CXR (10/21) show Persistent diffuse patchy opacities but with improved aeration at the lung bases. DTR intact bilateral lower extremities which less likely Giuseppe Ron. Plan: Acethycholine receptor blocking, modulating     HFrEF: Echo (10/11): LV EF 25-30%, LV severe global hypokinesis. LV size moderate increase. Patient is hypotension with BP 97/50. Weight on admission 120 lsb  10/20: Started Milrinone lactate 1/9ml/hr  10/24: Current weight 142 lbs. Gain 4 lbs since admission.  Net I/O since admission (10/24): -4.6L. Tele showed A fib with RVR. EKG was ordered show A. Fib with premature ventricular conduction complex. Plan: Cont Milrinone, monitor I/O   10/25: discuss with Hazel Cary that patient will be consider for AICD at this time. Can consider lifevest if patient able to operate. 10/26: discuss with family last night about AICD vs Davie Naranjo in which patient may not be a candidate for AICD. Family would like to discuss with Hazel directly. 10/27: Cardiologist consult appreciated - discussed with patient family which addressed that no obvious cardiac cause reduced heart function. Thus cardiology will wait for recovery prior to considering any intervention such as LifeVest versus AICD. Patient also required to be on appropriate medication for 2 to 3 months prior to recheck ORI and making decision about AICD. For now patient is not a candidate for LifeVest since she is sedated and intubated. Therefore she cannot operate LifeVest by herself with a general requirement. COVID pneumonia: Respiratory culture (10/19) Positive Corynebacterium - Vancomycin started 10/24  10/27: Pro-calcitonin has been stable 0.28, afebrile last 24-hour, no increased mucus color secretion    Hx of Prolong QTc interval:  On telemetry strip in ED. Seen by cardiogy for Prolong QTC. Plan: tele monitor, avoid QTC prolong medications, Keep K +> 4 and Mg > 2     New on set Afib without RVR: Cont Eliquis, Metoprolol 25 mg BID (Can't take Amiodarone due to QTC prolongation)   10/27: Patient develop sinus bradycardia. Plan: hold Metoprolol      HERIBERTO: Improve. BUN/Cr  74/1.7 <-- 70/2.4. Base line BUN/Cr 29/0.9. Likely secondary to Cardiorenal syndrome. Plan: avoid nephrotoxicity, cont gentle fluid hydration 50ml/hr. Nephrologist follow the case. 10/24: Good urine output 850 ml last 24 hrs. Response to Bumex. Plan: cont avoid nephrotoxicity      Hyperkalemia: Resolve. K+ 5.3.  Plan: calcium gluconate, HFrEF in which AICD will be placed when patient is clinically improved and stable. At now, patient wouldn't be candidate for the procedure. Thus LifeVest will be a option. Family is aware that to be able to survive patient need a Trach and a PEG in which is may again patient wish per patient daughter Gene Herrera. Family need time before final decision will be make.     Past Medical History      Diagnosis Date    Anxiety     Arthritis     Bronchitis     Diverticulitis of colon     Hypertension         Past Surgical History        Procedure Laterality Date    APPENDECTOMY      CHOLECYSTECTOMY         Current Medications    vancomycin  750 mg IntraVENous Q24H    baricitinib  2 mg Oral Daily    vancomycin (VANCOCIN) intermittent dosing (placeholder)   Other RX Placeholder    potassium (CARDIAC) replacement protocol   Other RX Placeholder    [Held by provider] bumetanide  1 mg IntraVENous Q12H    cefTRIAXone (ROCEPHIN) IV  1,000 mg IntraVENous Q24H    senna  10 mL Per NG tube Nightly    [Held by provider] lactulose  20 g Oral TID    metoprolol tartrate  25 mg Oral BID    apixaban  2.5 mg Oral BID    sodium chloride flush  5-40 mL IntraVENous 2 times per day     glucose, dextrose, glucagon (rDNA), dextrose, potassium chloride, acetaminophen **OR** acetaminophen, docusate sodium, sodium chloride flush, sodium chloride, polyethylene glycol    IV Drips/Infusions   dextrose 100 mL/hr (10/26/21 1253)    vasopressin (Septic Shock) infusion 0.02 Units/min (10/27/21 0209)    dexmedetomidine 1.4 mcg/kg/hr (10/26/21 2320)    norepinephrine Stopped (10/26/21 0820)    milrinone 0.125 mcg/kg/min (10/25/21 2200)    [Held by provider] sodium chloride Stopped (10/19/21 1900)    sodium chloride         Home Medications  Medications Prior to Admission: hydroCHLOROthiazide (HYDRODIURIL) 12.5 MG tablet, Take 12.5 mg by mouth daily   NONFORMULARY, Focus Factor  NONFORMULARY, Drenamin  GLUCOSAMINE-CHONDROITIN PO, Take by mouth  cetirizine (ZYRTEC) 10 MG tablet, TAKE 1 TABLET BY MOUTH ONCE DAILY  HAWTHORN PO, Take 425 mg by mouth daily  NONFORMULARY, Allerplex  OLIVE LEAF EXTRACT PO, Take by mouth  Fexofenadine-Pseudoephedrine (ALLEGRA-D 12 HOUR PO), Take by mouth  aspirin 325 MG tablet, Take 325 mg by mouth daily  metoprolol tartrate (LOPRESSOR) 50 MG tablet, Take 1 tablet by mouth 2 times daily  ALLERGIES: Sulfa antibiotics, Gluten meal, Metronidazole, and Milk-related compounds    ROS   Can't obtain due to patient sedative on vent    Scheduled Meds:   vancomycin  750 mg IntraVENous Q24H    baricitinib  2 mg Oral Daily    vancomycin (VANCOCIN) intermittent dosing (placeholder)   Other RX Placeholder    potassium (CARDIAC) replacement protocol   Other RX Placeholder    [Held by provider] bumetanide  1 mg IntraVENous Q12H    cefTRIAXone (ROCEPHIN) IV  1,000 mg IntraVENous Q24H    senna  10 mL Per NG tube Nightly    [Held by provider] lactulose  20 g Oral TID    metoprolol tartrate  25 mg Oral BID    apixaban  2.5 mg Oral BID    sodium chloride flush  5-40 mL IntraVENous 2 times per day     Continuous Infusions:   dextrose 100 mL/hr (10/26/21 1253)    vasopressin (Septic Shock) infusion 0.02 Units/min (10/27/21 0209)    dexmedetomidine 1.4 mcg/kg/hr (10/26/21 2320)    norepinephrine Stopped (10/26/21 0820)    milrinone 0.125 mcg/kg/min (10/25/21 2200)    [Held by provider] sodium chloride Stopped (10/19/21 1900)    sodium chloride         PHYSICAL EXAMINATION:  T:  99.1.  P:  68. RR:  23. B/P:  112/58. FiO2: 30% . O2 Sat:  97.  I/O:  + 1.2L  Body mass index is 27.21 kg/m². GCS: 11  General: sedative on vent  HEENT:  normocephalic and atraumatic. No scleral icterus. PERR  Neck: supple. No Thyromegaly. Lungs: clear to auscultation. No retractions  Cardiac: RRR. No JVD. Abdomen: soft. Nontender. Extremities:  No clubbing, cyanosis, or edema x 4. Vasculature: capillary refill < 3 seconds. Palpable dorsalis pedis pulses.   Skin:  warm and dry.  Lymph:  No supraclavicular adenopathy. Neurologic:  No focal deficit. No seizures. Data: (All radiographs, tracings, PFTs, and imaging are personally viewed and interpreted unless otherwise noted). Sodium 138, Potassium 3.6, Chloride 101, Bicarb 28, BUN/Cr 71/1.3  WBC 10.4, Hg 9.8, Hct 32.1, Platelet 344  Telemetry shows normal sinus rhythm   Blood culture 10/11 no growth x 2,   Pneumonia culture (10/19): positive Corynebacterium on 10/23 - Start Vancomycin 10/24  Pnuemonia panel (10/26): Negative finding  CXR (10/24): No significant interval change with patchy consolidative opacities in both lung. Cardiac silhouette appears enlarged. Seen with multidisciplinary ICU team.  Meets Continued ICU Level Care Criteria:    [x] Yes   [] No - Transfer Planned to listed location:  [] HOSPITALIST CONTACTED-      Case and plan discussed with Dr. Parvez Zamudio        Electronically signed by Mendel Newton DO  Internal Medicine Resident Physician  Patient seen by me. Case discussed with resident physician. Unable to wean due to neuromuscular weakness and inability of patient to generate adequate tidal volumes. Awaiting decision on tracheostomy vs hospice. Italicized font represents changes to the note made by me. CC time 35 minutes. Time was discontiguous. Time does not include procedures. Time does include my direct assessment of the patient and coordination of care.   Electronically signed by Home Overton MD on 10/28/2021 at 5:45 AM

## 2021-10-27 NOTE — PROGRESS NOTES
Renal Progress Note    Assessment and Plan:   1. Acute kidney injury improved  2. SARS-CoV-2 pneumonia  3. Hypoxic respiratory failure mechanical support  4. Normocytic anemia    PLAN:  1. Labs reviewed  2. Serum creatinine is progressively improving  3. Medications reviewed  4. No changes   5. labs in the morning.   6. We will continue to follow    Patient Active Problem List:     Sigmoid diverticulitis     Acute respiratory failure with hypoxia ([facility] [address]     COVID-19     Acute congestive heart failure ([facility] [address]      Subjective:   Admit Date: 10/11/2021    Interval History:   Seen for acute kidney injury  Awake and alert this morning  Updated by the staff  Blood pressure is mostly good  Urine output is okay        Medications:   Scheduled Meds:   vancomycin  750 mg IntraVENous Q24H    baricitinib  2 mg Oral Daily    vancomycin (VANCOCIN) intermittent dosing (placeholder)   Other RX Placeholder    potassium (CARDIAC) replacement protocol   Other RX Placeholder    [Held by provider] bumetanide  1 mg IntraVENous Q12H    cefTRIAXone (ROCEPHIN) IV  1,000 mg IntraVENous Q24H    senna  10 mL Per NG tube Nightly    [Held by provider] lactulose  20 g Oral TID    [Held by provider] metoprolol tartrate  25 mg Oral BID    apixaban  2.5 mg Oral BID    sodium chloride flush  5-40 mL IntraVENous 2 times per day     Continuous Infusions:   dextrose 100 mL/hr (10/26/21 1253)    vasopressin (Septic Shock) infusion Stopped (10/27/21 0918)    dexmedetomidine 1.4 mcg/kg/hr (10/27/21 0818)    norepinephrine Stopped (10/26/21 0820)    milrinone 0.125 mcg/kg/min (10/25/21 2200)    [Held by provider] sodium chloride Stopped (10/19/21 1900)    sodium chloride         CBC:   Recent Labs     10/25/21  0405 10/26/21  0415 10/27/21  0439   WBC 16.3* 9.4 10.4   HGB 11.2* 9.9* 9.8*    199 205     CMP:    Recent Labs     10/25/21  2215 10/26/21  0415 10/27/21  0439    137 138   K 3.9 4.1 3.6    101 101   CO2 29 30 28 BUN 76* 74* 71*   CREATININE 1.5* 1.4* 1.3*   GLUCOSE 141* 134* 115*   CALCIUM 10.0 9.8 9.8   LABGLOM 33* 36* 39*     Troponin: No results for input(s): TROPONINI in the last 72 hours. BNP: No results for input(s): BNP in the last 72 hours. INR: No results for input(s): INR in the last 72 hours. Lipids: No results for input(s): CHOL, LDLDIRECT, TRIG, HDL, AMYLASE, LIPASE in the last 72 hours. Liver: No results for input(s): AST, ALT, ALKPHOS, PROT, LABALBU, BILITOT in the last 72 hours. Invalid input(s): BILDIR  Iron:  No results for input(s): IRONS, FERRITIN in the last 72 hours. Invalid input(s): LABIRONS  XR CHEST PORTABLE   Final Result   1. There has been no significant interval change in the radiographic appearance of the chest  from 10/23/2021 including diffuse airspace disease. .            **This report has been created using voice recognition software. It may contain minor errors which are inherent in voice recognition technology. **      Final report electronically signed by Dr Marcela Farris on 10/24/2021 8:54 PM      XR CHEST PORTABLE   Final Result   1. Support lines and tubes in unchanged position when compared to prior. 2. Interval worsening of parenchymal densities within the right    central/right lower lobe. Additionally, worsening of confluent    consolidation within the left midlung with development of left basilar    pleural/parenchymal opacities likely consistent with moderate left pleural    effusion. This document has been electronically signed by: Sd Peña DO on    10/23/2021 05:00 AM      XR CHEST PORTABLE   Final Result   Persistent diffuse patchy opacities but with improved aeration at the lung bases. **This report has been created using voice recognition software. It may contain minor errors which are inherent in voice recognition technology. **      Final report electronically signed by Dr. Katie Cabezas MD on 10/21/2021 10:48 AM      XR CHEST PORTABLE   Final Result   1. Increased density in the left lung base. This can indicate partial left lower lobe collapse. 2. Persistent patchy bilateral pulmonary opacities. **This report has been created using voice recognition software. It may contain minor errors which are inherent in voice recognition technology. **      Final report electronically signed by Dr. Link Been on 10/18/2021 7:58 AM      XR CHEST PORTABLE   Final Result   Impression:   1. Cardiomegaly with improvement of passive congestive changes and better    aeration of both lungs compared to the prior study. 2. The focal bilateral alveolar consolidations consistent with infectious    pulmonary disease are unchanged. Retrocardiac consolidation and small LEFT    pleural effusion obscuring the LEFT diaphragm is unchanged. This document has been electronically signed by: Kari Ormond, MD on    10/16/2021 03:18 AM      XR CHEST PORTABLE   Final Result   Somewhat low position of endotracheal tube 1.2 cm above the claire but improved aeration of the upper lungs. **This report has been created using voice recognition software. It may contain minor errors which are inherent in voice recognition technology. **      Final report electronically signed by Dr. Birgit Samaniego on 10/15/2021 3:34 PM      XR CHEST PORTABLE   Final Result   1. Bilateral pneumonia. 2. Small bilateral pleural effusions. 3. Stable cardiomegaly. **This report has been created using voice recognition software. It may contain minor errors which are inherent in voice recognition technology. **      Final report electronically signed by Dr. Kelsy Romeo on 10/15/2021 4:14 PM      XR CHEST PORTABLE   Final Result   Right lower lobe airspace infiltrates. Severe cardiomegaly. **This report has been created using voice recognition software. It may contain minor errors which are inherent in voice recognition technology. ** Final report electronically signed by Dr. Gail Rdz on 10/11/2021 8:59 PM      XR CHEST PORTABLE   Final Result      1. Diffuse groundglass opacities in both lungs along with cardiomegaly. Findings likely suggest acute exacerbation of congestive heart failure versus pneumonia in the right clinical setting. Clinical correlation is recommended      2. Small left pleural effusion. 3. Other findings as described above. **This report has been created using voice recognition software. It may contain minor errors which are inherent in voice recognition technology. **      Final report electronically signed by Dr Everardo Smart on 10/11/2021 11:56 AM            Objective:   Vitals: BP (!) 115/58   Pulse 75   Temp 99.1 °F (37.3 °C) (Bladder)   Resp 30   Ht 5' (1.524 m)   Wt 139 lb 5.3 oz (63.2 kg)   SpO2 91%   BMI 27.21 kg/m²    Wt Readings from Last 3 Encounters:   10/27/21 139 lb 5.3 oz (63.2 kg)   10/07/21 125 lb (56.7 kg)      24HR INTAKE/OUTPUT:      Intake/Output Summary (Last 24 hours) at 10/27/2021 1015  Last data filed at 10/27/2021 0415  Gross per 24 hour   Intake 2288.12 ml   Output 1050 ml   Net 1238.12 ml       Constitutional: Well-developed elderly lady on mechanical support alert, awake, no apparent distress   Skin:normal with no rash or lesions  HEENT:Pupils are reactive . Throat is clear . Oral mucosa is moist.  Endotracheal tube is noted. Orogastric tube is noted. Neck:supple with no  carotid bruit  Cardiovascular:  S1, S2 without murmur or rubs   Respiratory:  Clear to ausculation without wheezes, rhonchi or rales. Abdomen: +bs, soft, no tenderness to palpation and no palpable mass. No abdominal bruit   Ext: 1+ bilateral LE edema  Musculoskeletal:Intact  Neuro:Alert and awake. Obeys commands. Electronically signed by Orville Leslie MD on 10/27/2021 at 10:15 AM  **This report has been created using voice recognition software.  It maycontain minor  errors which are inherent in voice recognition technology. **

## 2021-10-27 NOTE — PROGRESS NOTES
Palliative care to unit per floor request to meet with pt's family to review goals of care. Writer met with pt's family, Luis M Loja and on the phone, Sang Ortega. DR Rex Kumar gave pt's family full update on pt condition, including medical indication for trach and PEG tube. Family had multiple questions regarding options for care if decision is made to pursue trach, as well as options for care if they would decide to transition care to focus on comfort only and allow natural death. Risks and benefits of each options explained by Dr Rex Kumar. Time spent answering questions and discussing different scenarios based on what family decides. Family states full understanding of issues discussed and no further questions. Family requests to discuss overnight, family will return tomorrow and request pt's sedation be lightened so that they may discuss with her. Emotional support and encouragement given. Pt's nurse,ROLF Sandoval was in conference room during discussion. Palliative care will continue to follow.

## 2021-10-27 NOTE — PROGRESS NOTES
Physician Progress Note      Jacob Welch  CSN #:                  [id number]  :                       1934  ADMIT DATE:       10/11/2021 10:28 AM  DISCH DATE:  RESPONDING  PROVIDER #:        Mushtaq Polk          QUERY TEXT:    Pt admitted with Covid and acute respiratory failure. Pt noted to have change   in mental status 10/15. If possible, please document in the progress notes and   discharge summary if you are evaluating and / or treating any of the   following: The medical record reflects the following:    Risk Factors: hypercapnia  Clinical Indicators: on 10/15 patient had change in mentation, hard to arouse,   unable to communicate or follow commands, ABG done pH 7.11, CO2 96, PO2 88,   HCO3 31  Treatment: transferred to ICU and intubated, ventilator support    Thank you! Mesha Roman, ALEA MccoyR  RN Clinical   P: [phone]  Options provided:  -- Metabolic encephalopathy  -- Toxic encephalopathy  -- Other - I will add my own diagnosis  -- Disagree - Not applicable / Not valid  -- Disagree - Clinically unable to determine / Unknown  -- Refer to Clinical Documentation Reviewer    PROVIDER RESPONSE TEXT:    Provider is clinically unable to determine a response to this query.     Query created by: Salbador Chen on 10/27/2021 12:07 PM      Electronically signed by:  Ginny Anderson 10/27/2021 12:22 PM

## 2021-10-27 NOTE — PROGRESS NOTES
Was asked to talk to family from cardiology standpoint for patient's current plan. Did discuss with 2 of patient's daughters, 1 in person and 1 over the phone. Discussed that, at present, patient is not a candidate for a lifevest while being sedated/intubated, as she cannot operate Lifevest by herself which is generally required. We also must be on appropriate medications for 2-3 months prior to rechecking a TTE and making a decision about an ICD, which means patient would need to make a meaningful recovery from her current medical condition. Without obvious cardiac causes of reduced heart function, cardiology will await meaningful recovery prior to considering any intervention. All of the family's questions were answered from cardiology standpoint and the family expressed understanding of the key points of the conversation and plan.      Aung Augustin PA-C  Cardiology

## 2021-10-28 ENCOUNTER — APPOINTMENT (OUTPATIENT)
Dept: ULTRASOUND IMAGING | Age: 86
DRG: 004 | End: 2021-10-28
Payer: MEDICARE

## 2021-10-28 LAB
ANION GAP SERPL CALCULATED.3IONS-SCNC: 10 MEQ/L (ref 8–16)
BACTERIA: ABNORMAL
BILIRUBIN URINE: NEGATIVE
BLOOD, URINE: ABNORMAL
BUN BLDV-MCNC: 71 MG/DL (ref 7–22)
CALCIUM SERPL-MCNC: 8.9 MG/DL (ref 8.5–10.5)
CASTS: ABNORMAL /LPF
CHARACTER, URINE: ABNORMAL
CHLORIDE BLD-SCNC: 99 MEQ/L (ref 98–111)
CO2: 26 MEQ/L (ref 23–33)
COLOR: ABNORMAL
CREAT SERPL-MCNC: 1.2 MG/DL (ref 0.4–1.2)
CRYSTALS: ABNORMAL
EPITHELIAL CELLS, UA: ABNORMAL /HPF
ERYTHROCYTE [DISTWIDTH] IN BLOOD BY AUTOMATED COUNT: 15 % (ref 11.5–14.5)
ERYTHROCYTE [DISTWIDTH] IN BLOOD BY AUTOMATED COUNT: 53 FL (ref 35–45)
GFR SERPL CREATININE-BSD FRML MDRD: 43 ML/MIN/1.73M2
GLUCOSE BLD-MCNC: 96 MG/DL (ref 70–108)
GLUCOSE, URINE: 100 MG/DL
HCT VFR BLD CALC: 26.7 % (ref 37–47)
HCT VFR BLD CALC: 27.6 % (ref 37–47)
HEMOGLOBIN: 8.4 GM/DL (ref 12–16)
HEMOGLOBIN: 9 GM/DL (ref 12–16)
KETONES, URINE: NEGATIVE
LEUKOCYTE ESTERASE, URINE: ABNORMAL
MAGNESIUM: 1.9 MG/DL (ref 1.6–2.4)
MCH RBC QN AUTO: 30.9 PG (ref 26–33)
MCHC RBC AUTO-ENTMCNC: 31.5 GM/DL (ref 32.2–35.5)
MCV RBC AUTO: 98.2 FL (ref 81–99)
NITRITE, URINE: NEGATIVE
PH UA: 5 (ref 5–9)
PLATELET # BLD: 174 THOU/MM3 (ref 130–400)
PMV BLD AUTO: 12.1 FL (ref 9.4–12.4)
POTASSIUM REFLEX MAGNESIUM: 3.4 MEQ/L (ref 3.5–5.2)
POTASSIUM SERPL-SCNC: 3.4 MEQ/L (ref 3.5–5.2)
PROTEIN UA: 300 MG/DL
RBC # BLD: 2.72 MILL/MM3 (ref 4.2–5.4)
RBC URINE: > 200 /HPF
SODIUM BLD-SCNC: 135 MEQ/L (ref 135–145)
SPECIFIC GRAVITY UA: 1.02 (ref 1–1.03)
UROBILINOGEN, URINE: 0.2 EU/DL (ref 0–1)
WBC # BLD: 6.2 THOU/MM3 (ref 4.8–10.8)
WBC UA: ABNORMAL /HPF

## 2021-10-28 PROCEDURE — 2500000003 HC RX 250 WO HCPCS: Performed by: STUDENT IN AN ORGANIZED HEALTH CARE EDUCATION/TRAINING PROGRAM

## 2021-10-28 PROCEDURE — 94003 VENT MGMT INPAT SUBQ DAY: CPT

## 2021-10-28 PROCEDURE — 6370000000 HC RX 637 (ALT 250 FOR IP): Performed by: HOSPITALIST

## 2021-10-28 PROCEDURE — 81001 URINALYSIS AUTO W/SCOPE: CPT

## 2021-10-28 PROCEDURE — 36415 COLL VENOUS BLD VENIPUNCTURE: CPT

## 2021-10-28 PROCEDURE — 80048 BASIC METABOLIC PNL TOTAL CA: CPT

## 2021-10-28 PROCEDURE — 2100000000 HC CCU R&B

## 2021-10-28 PROCEDURE — 85018 HEMOGLOBIN: CPT

## 2021-10-28 PROCEDURE — 6360000002 HC RX W HCPCS: Performed by: FAMILY MEDICINE

## 2021-10-28 PROCEDURE — 6360000002 HC RX W HCPCS: Performed by: STUDENT IN AN ORGANIZED HEALTH CARE EDUCATION/TRAINING PROGRAM

## 2021-10-28 PROCEDURE — 99291 CRITICAL CARE FIRST HOUR: CPT | Performed by: SURGERY

## 2021-10-28 PROCEDURE — 6370000000 HC RX 637 (ALT 250 FOR IP): Performed by: INTERNAL MEDICINE

## 2021-10-28 PROCEDURE — 99232 SBSQ HOSP IP/OBS MODERATE 35: CPT | Performed by: INTERNAL MEDICINE

## 2021-10-28 PROCEDURE — 83735 ASSAY OF MAGNESIUM: CPT

## 2021-10-28 PROCEDURE — 85014 HEMATOCRIT: CPT

## 2021-10-28 PROCEDURE — 2580000003 HC RX 258: Performed by: STUDENT IN AN ORGANIZED HEALTH CARE EDUCATION/TRAINING PROGRAM

## 2021-10-28 PROCEDURE — 6370000000 HC RX 637 (ALT 250 FOR IP): Performed by: STUDENT IN AN ORGANIZED HEALTH CARE EDUCATION/TRAINING PROGRAM

## 2021-10-28 PROCEDURE — 76770 US EXAM ABDO BACK WALL COMP: CPT

## 2021-10-28 PROCEDURE — 2700000000 HC OXYGEN THERAPY PER DAY

## 2021-10-28 PROCEDURE — 85027 COMPLETE CBC AUTOMATED: CPT

## 2021-10-28 PROCEDURE — 94761 N-INVAS EAR/PLS OXIMETRY MLT: CPT

## 2021-10-28 RX ADMIN — VANCOMYCIN HYDROCHLORIDE 750 MG: 1 INJECTION, POWDER, LYOPHILIZED, FOR SOLUTION INTRAVENOUS at 13:24

## 2021-10-28 RX ADMIN — SODIUM CHLORIDE 1.4 MCG/KG/HR: 9 INJECTION, SOLUTION INTRAVENOUS at 12:48

## 2021-10-28 RX ADMIN — SODIUM CHLORIDE 1.4 MCG/KG/HR: 9 INJECTION, SOLUTION INTRAVENOUS at 18:26

## 2021-10-28 RX ADMIN — ACETAMINOPHEN 650 MG: 325 TABLET ORAL at 13:24

## 2021-10-28 RX ADMIN — CEFTRIAXONE SODIUM 1000 MG: 1 INJECTION, POWDER, FOR SOLUTION INTRAMUSCULAR; INTRAVENOUS at 14:40

## 2021-10-28 RX ADMIN — LORAZEPAM 1 MG: 2 INJECTION INTRAMUSCULAR; INTRAVENOUS at 01:32

## 2021-10-28 RX ADMIN — VASOPRESSIN 0.03 UNITS/MIN: 20 INJECTION INTRAVENOUS at 23:00

## 2021-10-28 RX ADMIN — SODIUM CHLORIDE 1.4 MCG/KG/HR: 9 INJECTION, SOLUTION INTRAVENOUS at 07:32

## 2021-10-28 RX ADMIN — APIXABAN 2.5 MG: 2.5 TABLET, FILM COATED ORAL at 08:08

## 2021-10-28 RX ADMIN — SODIUM CHLORIDE 1.4 MCG/KG/HR: 9 INJECTION, SOLUTION INTRAVENOUS at 01:31

## 2021-10-28 RX ADMIN — BARICITINIB 2 MG: 2 TABLET, FILM COATED ORAL at 08:08

## 2021-10-28 RX ADMIN — LORAZEPAM 1 MG: 2 INJECTION INTRAMUSCULAR; INTRAVENOUS at 20:08

## 2021-10-28 RX ADMIN — SODIUM CHLORIDE, PRESERVATIVE FREE 10 ML: 5 INJECTION INTRAVENOUS at 08:13

## 2021-10-28 RX ADMIN — POTASSIUM BICARBONATE 40 MEQ: 782 TABLET, EFFERVESCENT ORAL at 06:36

## 2021-10-28 ASSESSMENT — PAIN SCALES - GENERAL
PAINLEVEL_OUTOF10: 0

## 2021-10-28 ASSESSMENT — PULMONARY FUNCTION TESTS
PIF_VALUE: 25

## 2021-10-28 NOTE — PROGRESS NOTES
CRITICAL CARE PROGRESS NOTE      Patient:  [facility]    Unit/Bed:3A-09/009-A  YOB: 1934  MRN: [id number]   PCP: SERGE Link CNP  Date of Admission: 10/11/2021  Chief Complaint:- worsening SOB    Assessment and Plan:    1. Acute hypoxic respiratory failure: Admitted on 10/11 and started HF. Progressive worsening hypoxemia. Intubation on 10/15. On 10/20, Family was discussed about trach and family could like to have few days for final decision on trach. Patient remain critical ill and require significant vent support. On 10/24, family meeting was held and family deferred trach tube for now. Family is aware that to be able to survive, patient required a trach and a peg. 10/27: Pcontrol 18, PEEP 6 FiO2 35, , RR 24. Plan: dicusse with family again about final decision for trach and ALTEC vs hospice care. I discuss with patient's daughter Cindy Cheek about family meeting which is possible plan for tomorrow in which all patient daughters will see patient and sedation will be reduce so that patient can communicate with her daughters for whether patient want to proceed with trach. 10/28: remain critical illness require ventilation support. Plan: family meeting today to discuss about final decision for trach tube      2. Covid Pneumonia: Res. Culture (10/19) positive Corynebacterium. Plan: Vancomycin day 5  10/27: Pro-calcitonin has been stable 0.28, afebrile last 24-hour, no increased mucus color secretion    3. Hematuria: HAS-BLED score 4 high risk of major bleeding, patient was on eliquis for A.fiv with [id number] 5, giving patient develop flank hematuria in which Hg 11.2 --> 8.4 over last 3 day. The risk of bleeding is out weight stroke. Plan: UA, Renal US, hold Eliquis 3 days, Restarted Eliquis back on 10/30 if patient H & H stable , Started SCD     4. ARDS: Patient received oxygen with FiO2 100 with very low TV (250ml) on ventilation. Ferritin, CRP, Lactic dehydrogenase elevated. Start Decadron, Baricitinib (10/12). Require pressor support with Vasopressin 0.02. Plan: cont vent support. 5. Hypotension: like combination of severe HFrEF and sedation medication side affect. Require pressor support with Vasopressin. Plan: cont wean off pressor support. 6. Possible Respiratory failure related to Neuromuscular disease: Persistent low tidal volume after diuretic. Patient received Milrinone and diuresis. Repeated CXR (10/21) show Persistent diffuse patchy opacities but with improved aeration at the lung bases. DTR intact bilateral lower extremities which less likely Karel Fitch. Plan: Acethycholine receptor blocking, modulating     7. HFrEF: Echo (10/11): LV EF 25-30%, LV severe global hypokinesis. LV size moderate increase. Patient is hypotension with BP 97/50. Weight on admission 120 lsb  10/20: Started Milrinone lactate 1/9ml/hr  10/24: Current weight 142 lbs. Gain 4 lbs since admission. Net I/O since admission (10/24): -4.6L. Tele showed A fib with RVR. EKG was ordered show A. Fib with premature ventricular conduction complex. Plan: Cont Milrinone, monitor I/O   10/25: discuss with Hazel Corona that patient will be consider for AICD at this time. Can consider lifevest if patient able to operate. 10/26: discuss with family last night about AICD vs Melvena Favors in which patient may not be a candidate for AICD. Family would like to discuss with Hazel directly. 10/27: Cardiologist consult appreciated - discussed with patient family which addressed that no obvious cardiac cause reduced heart function. Thus cardiology will wait for recovery prior to considering any intervention such as LifeVest versus AICD. Patient also required to be on appropriate medication for 2 to 3 months prior to recheck ORI and making decision about AICD. For now patient is not a candidate for LifeVest since she is sedated and intubated.   Therefore she cannot operate LifeVest by herself with a general requirement. 8. Hx of Prolong QTc interval: On telemetry strip in ED. Seen by cardiogy for Prolong QTC. Plan: tele monitor, avoid QTC prolong medications, Keep K +> 4 and Mg > 2    9. New on set Afib without RVR: [id number] 5. Cont Eliquis, Metoprolol 25 mg BID (Can't take Amiodarone due to QTC prolongation)   10/27: Patient develop sinus bradycardia. Plan: hold Metoprolol    10. HERIBERTO: Improve. BUN/Cr  74/1.7 <-- 70/2.4. Base line BUN/Cr 29/0.9. Likely secondary to Cardiorenal syndrome. Plan: avoid nephrotoxicity, cont gentle fluid hydration 50ml/hr. Nephrologist follow the case. 10/24: Good urine output 850 ml last 24 hrs. Response to Bumex. Plan: cont avoid nephrotoxicity     11. Hyperkalemia: Resolve. K+ 5.3. Plan: calcium gluconate, Lokelma    12. UTI: UA with microscopic (10/18) positive leukocyte, > 200 WBC and bacterial. Plan: Start Ceftriaxone (10/19)    13. AMS: likely secondary to hypercapnic hypoxemia. Plan: CT head when patient is stable, Neuro exam when patient is extubate    14. Iron deficiency anemia: Iron replacement    INITIAL H AND P AND ICU COURSE:  Diana Castañeda is 80years old female who presented in [address] [place] [address] for worsening SOB on 10/11. Patient reported received COVID vaccine. She was test positive for COVID in 10/4/21. Patient SOB progressive worsening that patient visit ED. In ED found to have O2 Sat 83% in room air with BNP 15.7k. Patient was admitted and manage for acute hypoxic respiratory failure likely due to combine CHF and COVID. Patient was placed on NC, Bumex, Dexamethasone. On 10/15, patient was transferred to ICU and intubated due to progressive hypoxic respirator failure on 100% FiO2 on high flow. Repeated CXR on 10/26 show improved vascular congestion. Patient has been afebrile since 10/15. UA on 10/18 positive leukocyte with >200 WBC and many bacterial. Patient was started on Ceftriaxone on 10/19 for UTI. Patient also started Milrinone for her HFrEF.  Patient remain significant low Tidal volume regardless diuretic and Milrinone. On October 24, eliminating well. Patient family were explained about patient heart failure condition, respiratory failure, acute kidney injury. Patient family were explained that patient will need a trach tube due to severe respiratory muscle weakness. However patient will increase risk with pneumonia, hospitalization if family decides to pursue way trach tube. Family aware and would like to discuss with Dr. Monty Milner cardiologist possible AICD. Family will be inform about the plan of care moving forward. On 10/25 Card team was contacted with plan for her HFrEF in which AICD will be placed when patient is clinically improved and stable. At now, patient wouldn't be candidate for the procedure. Thus LifeVest will be a option. Family is aware that to be able to survive patient need a Trach and a PEG in which is may again patient wish per patient daughter Jacques Rose. Family need time before final decision will be make.     Past Medical History      Diagnosis Date    Anxiety     Arthritis     Bronchitis     Diverticulitis of colon     Hypertension         Past Surgical History        Procedure Laterality Date    APPENDECTOMY      CHOLECYSTECTOMY         Current Medications    vancomycin  750 mg IntraVENous Q24H    baricitinib  2 mg Oral Daily    vancomycin (VANCOCIN) intermittent dosing (placeholder)   Other RX Placeholder    potassium (CARDIAC) replacement protocol   Other RX Placeholder    [Held by provider] bumetanide  1 mg IntraVENous Q12H    cefTRIAXone (ROCEPHIN) IV  1,000 mg IntraVENous Q24H    senna  10 mL Per NG tube Nightly    [Held by provider] lactulose  20 g Oral TID    [Held by provider] metoprolol tartrate  25 mg Oral BID    apixaban  2.5 mg Oral BID    sodium chloride flush  5-40 mL IntraVENous 2 times per day     LORazepam, glucose, dextrose, glucagon (rDNA), dextrose, potassium chloride, acetaminophen **OR** acetaminophen, docusate sodium, sodium chloride flush, sodium chloride, polyethylene glycol    IV Drips/Infusions   dextrose 100 mL/hr (10/26/21 1253)    vasopressin (Septic Shock) infusion 0.02 Units/min (10/27/21 2132)    dexmedetomidine 1.4 mcg/kg/hr (10/28/21 0131)    norepinephrine Stopped (10/26/21 0820)    milrinone 0.125 mcg/kg/min (10/25/21 2200)    [Held by provider] sodium chloride Stopped (10/19/21 1900)    sodium chloride         Home Medications  Medications Prior to Admission: hydroCHLOROthiazide (HYDRODIURIL) 12.5 MG tablet, Take 12.5 mg by mouth daily   NONFORMULARY, Focus Factor  NONFORMULARY, Drenamin  GLUCOSAMINE-CHONDROITIN PO, Take by mouth  cetirizine (ZYRTEC) 10 MG tablet, TAKE 1 TABLET BY MOUTH ONCE DAILY  HAWTHORN PO, Take 425 mg by mouth daily  NONFORMULARY, Allerplex  OLIVE LEAF EXTRACT PO, Take by mouth  Fexofenadine-Pseudoephedrine (ALLEGRA-D 12 HOUR PO), Take by mouth  aspirin 325 MG tablet, Take 325 mg by mouth daily  metoprolol tartrate (LOPRESSOR) 50 MG tablet, Take 1 tablet by mouth 2 times daily  ALLERGIES: Sulfa antibiotics, Gluten meal, Metronidazole, and Milk-related compounds    ROS   Can't obtain due to patient sedative on vent    Scheduled Meds:   vancomycin  750 mg IntraVENous Q24H    baricitinib  2 mg Oral Daily    vancomycin (VANCOCIN) intermittent dosing (placeholder)   Other RX Placeholder    potassium (CARDIAC) replacement protocol   Other RX Placeholder    [Held by provider] bumetanide  1 mg IntraVENous Q12H    cefTRIAXone (ROCEPHIN) IV  1,000 mg IntraVENous Q24H    senna  10 mL Per NG tube Nightly    [Held by provider] lactulose  20 g Oral TID    [Held by provider] metoprolol tartrate  25 mg Oral BID    apixaban  2.5 mg Oral BID    sodium chloride flush  5-40 mL IntraVENous 2 times per day     Continuous Infusions:   dextrose 100 mL/hr (10/26/21 1253)    vasopressin (Septic Shock) infusion 0.02 Units/min (10/27/21 2132)    dexmedetomidine 1.4 mcg/kg/hr (10/28/21 0131)    norepinephrine Stopped (10/26/21 0820)    milrinone 0.125 mcg/kg/min (10/25/21 2200)    [Held by provider] sodium chloride Stopped (10/19/21 1900)    sodium chloride         PHYSICAL EXAMINATION:  T:  99.3. P:  72. RR:  21. B/P:  105/55. FiO2: 30% . O2 Sat:  95.  I/O:  + 1.5L  Body mass index is 28.16 kg/m². GCS: 11  General: sedative on vent  HEENT:  normocephalic and atraumatic. No scleral icterus. PERR  Neck: supple. No Thyromegaly. Lungs: clear to auscultation. No retractions  Cardiac: RRR. No JVD. Abdomen: soft. Nontender. Extremities:  No clubbing, cyanosis, or edema x 4. Vasculature: capillary refill < 3 seconds. Palpable dorsalis pedis pulses. Skin:  warm and dry. Lymph:  No supraclavicular adenopathy. Neurologic:  No focal deficit. No seizures. Data: (All radiographs, tracings, PFTs, and imaging are personally viewed and interpreted unless otherwise noted).  Sodium 135, Potassium 3.4, Chloride 99, Bicarb 26, BUN/Cr 71/1.2   WBC 6.2, Hg 8.4, Hct 26.7, Platelet 338   Telemetry shows normal sinus rhythm    Blood culture 10/11 no growth x 2,    Pneumonia culture (10/19): positive Corynebacterium on 10/23 - Start Vancomycin 10/24   Pnuemonia panel (10/26): Negative finding   CXR (10/24): No significant interval change with patchy consolidative opacities in both lung. Cardiac silhouette appears enlarged. Seen with multidisciplinary ICU team.  Meets Continued ICU Level Care Criteria:    [x] Yes   [] No - Transfer Planned to listed location:  [] HOSPITALIST CONTACTED-           Electronically signed by Estefania Thomas DO  Internal Medicine Resident Physician    [address] [place] [address]    Patient seen by me. Case discussed with resident physician. CC time 35 minutes. Time was discontiguous. Time does not include procedures. Time does include my direct assessment of the patient and coordination of care.     Family bedside, discussed care directly with them. Having hematuria, elliquis for A.fib held due to hematuria and Hemaglobin gtt, discussed risks and benefits with family. Continue tube feeds .

## 2021-10-28 NOTE — PROGRESS NOTES
71* 71*   CREATININE 1.4*  --  1.3* 1.2   GLUCOSE 134*  --  115* 96   CALCIUM 9.8  --  9.8 8.9   LABGLOM 36*  --  39* 43*    < > = values in this interval not displayed. Troponin: No results for input(s): TROPONINI in the last 72 hours. BNP: No results for input(s): BNP in the last 72 hours. INR: No results for input(s): INR in the last 72 hours. Lipids: No results for input(s): CHOL, LDLDIRECT, TRIG, HDL, AMYLASE, LIPASE in the last 72 hours. Liver: No results for input(s): AST, ALT, ALKPHOS, PROT, LABALBU, BILITOT in the last 72 hours. Invalid input(s): BILDIR  Iron:  No results for input(s): IRONS, FERRITIN in the last 72 hours. Invalid input(s): LABIRONS  XR ABDOMEN FOR NG/OG/NE TUBE PLACEMENT   Final Result   1. The tip of the nasogastric tube is below the diaphragms within the stomach. 2. Partially seen is left pleural effusion and left retrocardiac opacity. Opacities are also seen in the partially seen right lower lobe. **This report has been created using voice recognition software. It may contain minor errors which are inherent in voice recognition technology. **      Final report electronically signed by Dr Ruslan Zhou on 10/27/2021 6:54 PM      XR CHEST PORTABLE   Final Result   1. There has been no significant interval change in the radiographic appearance of the chest  from 10/23/2021 including diffuse airspace disease. .            **This report has been created using voice recognition software. It may contain minor errors which are inherent in voice recognition technology. **      Final report electronically signed by Dr Ruslan Zhou on 10/24/2021 8:54 PM      XR CHEST PORTABLE   Final Result   1. Support lines and tubes in unchanged position when compared to prior. 2. Interval worsening of parenchymal densities within the right    central/right lower lobe.  Additionally, worsening of confluent    consolidation within the left midlung with development of left basilar pleural/parenchymal opacities likely consistent with moderate left pleural    effusion. This document has been electronically signed by: Giles Carbajal DO on    10/23/2021 05:00 AM      XR CHEST PORTABLE   Final Result   Persistent diffuse patchy opacities but with improved aeration at the lung bases. **This report has been created using voice recognition software. It may contain minor errors which are inherent in voice recognition technology. **      Final report electronically signed by Dr. Tai Verdugo MD on 10/21/2021 10:48 AM      XR CHEST PORTABLE   Final Result   1. Increased density in the left lung base. This can indicate partial left lower lobe collapse. 2. Persistent patchy bilateral pulmonary opacities. **This report has been created using voice recognition software. It may contain minor errors which are inherent in voice recognition technology. **      Final report electronically signed by Dr. Josh Valdovinos on 10/18/2021 7:58 AM      XR CHEST PORTABLE   Final Result   Impression:   1. Cardiomegaly with improvement of passive congestive changes and better    aeration of both lungs compared to the prior study. 2. The focal bilateral alveolar consolidations consistent with infectious    pulmonary disease are unchanged. Retrocardiac consolidation and small LEFT    pleural effusion obscuring the LEFT diaphragm is unchanged. This document has been electronically signed by: Mello Lipscomb MD on    10/16/2021 03:18 AM      XR CHEST PORTABLE   Final Result   Somewhat low position of endotracheal tube 1.2 cm above the claire but improved aeration of the upper lungs. **This report has been created using voice recognition software. It may contain minor errors which are inherent in voice recognition technology. **      Final report electronically signed by Dr. Parul Levy on 10/15/2021 3:34 PM      XR CHEST PORTABLE   Final Result   1. Bilateral pneumonia.    2. noted.  Neck:supple with no thyromegaly or carotid bruit  Cardiovascular:  S1, S2 without murmur or rubs   Respiratory:  Clear to ausculation without wheezes, rhonchi or rales. Abdomen: Soft. Good bowel sounds. Ext: Trace to 1+ bilateral LE edema  Musculoskeletal:Intact  Neuro: Deferred      Electronically signed by Marek Castro MD on 10/28/2021 at 9:03 AM  **This report has been created using voice recognition software. It maycontain minor  errors which are inherent in voice recognition technology. **

## 2021-10-29 ENCOUNTER — APPOINTMENT (OUTPATIENT)
Dept: GENERAL RADIOLOGY | Age: 86
DRG: 004 | End: 2021-10-29
Payer: MEDICARE

## 2021-10-29 LAB
ANION GAP SERPL CALCULATED.3IONS-SCNC: 10 MEQ/L (ref 8–16)
BASE EXCESS (CALCULATED): 1.6 MMOL/L (ref -2.5–2.5)
BUN BLDV-MCNC: 70 MG/DL (ref 7–22)
CALCIUM SERPL-MCNC: 8.9 MG/DL (ref 8.5–10.5)
CHLORIDE BLD-SCNC: 100 MEQ/L (ref 98–111)
CO2: 25 MEQ/L (ref 23–33)
COLLECTED BY:: NORMAL
CREAT SERPL-MCNC: 1.2 MG/DL (ref 0.4–1.2)
DEVICE: NORMAL
ERYTHROCYTE [DISTWIDTH] IN BLOOD BY AUTOMATED COUNT: 14.8 % (ref 11.5–14.5)
ERYTHROCYTE [DISTWIDTH] IN BLOOD BY AUTOMATED COUNT: 53.2 FL (ref 35–45)
GFR SERPL CREATININE-BSD FRML MDRD: 43 ML/MIN/1.73M2
GLUCOSE BLD-MCNC: 105 MG/DL (ref 70–108)
GLUCOSE BLD-MCNC: 123 MG/DL (ref 70–108)
GLUCOSE BLD-MCNC: 141 MG/DL (ref 70–108)
HCO3: 26 MMOL/L (ref 23–28)
HCT VFR BLD CALC: 28.6 % (ref 37–47)
HEMOGLOBIN: 8.9 GM/DL (ref 12–16)
IFIO2: 30
LACTIC ACID: 1.1 MMOL/L (ref 0.5–2)
MAGNESIUM: 1.7 MG/DL (ref 1.6–2.4)
MCH RBC QN AUTO: 30.8 PG (ref 26–33)
MCHC RBC AUTO-ENTMCNC: 31.1 GM/DL (ref 32.2–35.5)
MCV RBC AUTO: 99 FL (ref 81–99)
O2 SATURATION: 96 %
PCO2: 40 MMHG (ref 35–45)
PH BLOOD GAS: 7.42 (ref 7.35–7.45)
PLATELET # BLD: 160 THOU/MM3 (ref 130–400)
PMV BLD AUTO: 12.2 FL (ref 9.4–12.4)
PO2: 77 MMHG (ref 71–104)
POTASSIUM REFLEX MAGNESIUM: 3.4 MEQ/L (ref 3.5–5.2)
POTASSIUM SERPL-SCNC: 3.4 MEQ/L (ref 3.5–5.2)
POTASSIUM SERPL-SCNC: 4.4 MEQ/L (ref 3.5–5.2)
PROCALCITONIN: 0.24 NG/ML (ref 0.01–0.09)
RBC # BLD: 2.89 MILL/MM3 (ref 4.2–5.4)
SODIUM BLD-SCNC: 135 MEQ/L (ref 135–145)
SOURCE, BLOOD GAS: NORMAL
VANCOMYCIN TROUGH: 17 UG/ML (ref 10–20)
WBC # BLD: 8 THOU/MM3 (ref 4.8–10.8)

## 2021-10-29 PROCEDURE — 82803 BLOOD GASES ANY COMBINATION: CPT

## 2021-10-29 PROCEDURE — 6360000002 HC RX W HCPCS: Performed by: STUDENT IN AN ORGANIZED HEALTH CARE EDUCATION/TRAINING PROGRAM

## 2021-10-29 PROCEDURE — 99291 CRITICAL CARE FIRST HOUR: CPT | Performed by: INTERNAL MEDICINE

## 2021-10-29 PROCEDURE — 36600 WITHDRAWAL OF ARTERIAL BLOOD: CPT

## 2021-10-29 PROCEDURE — 71045 X-RAY EXAM CHEST 1 VIEW: CPT

## 2021-10-29 PROCEDURE — 6360000002 HC RX W HCPCS: Performed by: FAMILY MEDICINE

## 2021-10-29 PROCEDURE — 6370000000 HC RX 637 (ALT 250 FOR IP): Performed by: INTERNAL MEDICINE

## 2021-10-29 PROCEDURE — 99221 1ST HOSP IP/OBS SF/LOW 40: CPT | Performed by: UROLOGY

## 2021-10-29 PROCEDURE — 2580000003 HC RX 258: Performed by: STUDENT IN AN ORGANIZED HEALTH CARE EDUCATION/TRAINING PROGRAM

## 2021-10-29 PROCEDURE — 80048 BASIC METABOLIC PNL TOTAL CA: CPT

## 2021-10-29 PROCEDURE — 84132 ASSAY OF SERUM POTASSIUM: CPT

## 2021-10-29 PROCEDURE — 2500000003 HC RX 250 WO HCPCS: Performed by: STUDENT IN AN ORGANIZED HEALTH CARE EDUCATION/TRAINING PROGRAM

## 2021-10-29 PROCEDURE — 2100000000 HC CCU R&B

## 2021-10-29 PROCEDURE — 85027 COMPLETE CBC AUTOMATED: CPT

## 2021-10-29 PROCEDURE — 6360000002 HC RX W HCPCS: Performed by: INTERNAL MEDICINE

## 2021-10-29 PROCEDURE — 83605 ASSAY OF LACTIC ACID: CPT

## 2021-10-29 PROCEDURE — 94003 VENT MGMT INPAT SUBQ DAY: CPT

## 2021-10-29 PROCEDURE — 99232 SBSQ HOSP IP/OBS MODERATE 35: CPT | Performed by: INTERNAL MEDICINE

## 2021-10-29 PROCEDURE — 83735 ASSAY OF MAGNESIUM: CPT

## 2021-10-29 PROCEDURE — 80202 ASSAY OF VANCOMYCIN: CPT

## 2021-10-29 PROCEDURE — 84145 PROCALCITONIN (PCT): CPT

## 2021-10-29 PROCEDURE — 82948 REAGENT STRIP/BLOOD GLUCOSE: CPT

## 2021-10-29 PROCEDURE — 36415 COLL VENOUS BLD VENIPUNCTURE: CPT

## 2021-10-29 RX ORDER — POTASSIUM CHLORIDE 7.45 MG/ML
10 INJECTION INTRAVENOUS ONCE
Status: DISCONTINUED | OUTPATIENT
Start: 2021-10-29 | End: 2021-10-29 | Stop reason: CLARIF

## 2021-10-29 RX ADMIN — SODIUM CHLORIDE 1.4 MCG/KG/HR: 9 INJECTION, SOLUTION INTRAVENOUS at 12:13

## 2021-10-29 RX ADMIN — SODIUM CHLORIDE, PRESERVATIVE FREE 10 ML: 5 INJECTION INTRAVENOUS at 12:21

## 2021-10-29 RX ADMIN — LORAZEPAM 1 MG: 2 INJECTION INTRAMUSCULAR; INTRAVENOUS at 04:08

## 2021-10-29 RX ADMIN — SODIUM CHLORIDE, PRESERVATIVE FREE 10 ML: 5 INJECTION INTRAVENOUS at 00:12

## 2021-10-29 RX ADMIN — SODIUM CHLORIDE 1.4 MCG/KG/HR: 9 INJECTION, SOLUTION INTRAVENOUS at 00:06

## 2021-10-29 RX ADMIN — VANCOMYCIN HYDROCHLORIDE 750 MG: 1 INJECTION, POWDER, LYOPHILIZED, FOR SOLUTION INTRAVENOUS at 15:00

## 2021-10-29 RX ADMIN — SODIUM CHLORIDE 1.4 MCG/KG/HR: 9 INJECTION, SOLUTION INTRAVENOUS at 17:00

## 2021-10-29 RX ADMIN — LORAZEPAM 1 MG: 2 INJECTION INTRAMUSCULAR; INTRAVENOUS at 22:14

## 2021-10-29 RX ADMIN — LORAZEPAM 1 MG: 2 INJECTION INTRAMUSCULAR; INTRAVENOUS at 00:08

## 2021-10-29 RX ADMIN — Medication 17.6 MG: at 22:14

## 2021-10-29 RX ADMIN — POTASSIUM CHLORIDE 20 MEQ: 400 INJECTION, SOLUTION INTRAVENOUS at 04:32

## 2021-10-29 RX ADMIN — SODIUM CHLORIDE 1.4 MCG/KG/HR: 9 INJECTION, SOLUTION INTRAVENOUS at 22:58

## 2021-10-29 RX ADMIN — SODIUM CHLORIDE, PRESERVATIVE FREE 10 ML: 5 INJECTION INTRAVENOUS at 22:15

## 2021-10-29 RX ADMIN — SODIUM CHLORIDE 1.4 MCG/KG/HR: 9 INJECTION, SOLUTION INTRAVENOUS at 04:55

## 2021-10-29 RX ADMIN — Medication 17.6 MG: at 00:06

## 2021-10-29 RX ADMIN — POTASSIUM CHLORIDE 20 MEQ: 400 INJECTION, SOLUTION INTRAVENOUS at 05:30

## 2021-10-29 RX ADMIN — CEFTRIAXONE SODIUM 1000 MG: 1 INJECTION, POWDER, FOR SOLUTION INTRAMUSCULAR; INTRAVENOUS at 16:30

## 2021-10-29 RX ADMIN — LORAZEPAM 1 MG: 2 INJECTION INTRAMUSCULAR; INTRAVENOUS at 15:36

## 2021-10-29 ASSESSMENT — PAIN SCALES - GENERAL
PAINLEVEL_OUTOF10: 0
PAINLEVEL_OUTOF10: 0

## 2021-10-29 ASSESSMENT — PULMONARY FUNCTION TESTS
PIF_VALUE: 23

## 2021-10-29 NOTE — PROGRESS NOTES
Comprehensive Nutrition Assessment    Type and Reason for Visit:  Reassess (TF management)    Nutrition Recommendations/Plan:   Continue Nepro at goal rate of 40 ml/hr. Free water flush per MD - 100ml every 8h  Recommend culturelle as continues with flexiseal after having no BM x8d and needed lactulose  Monitoring labs, wts., GI status for EN adjustments as appropriate    Nutrition Assessment:    Pt. improving nutritionally AEB tolerating TF at goal now although has had OGT to LIWS d/t residuals over 600ml this admit, poor po intake first 5d of admit, intubated 10/15, LOS day 18.  At risk for further nutrition compromise r/t COVID Dx 10/5, new CHF, advanced age and underlying medical condition (hx diverticulitis, HTN).      Malnutrition Assessment:  Malnutrition Status:   At risk for malnutrition (Comment)    Context:  Acute Illness     Findings of the 6 clinical characteristics of malnutrition:  Energy Intake:  7 - 50% or less of estimated energy requirements for 5 or more days  Weight Loss:  No significant weight loss (however difficult to evaluate with edema)     Body Fat Loss:  No significant body fat loss     Muscle Mass Loss:  No significant muscle mass loss    Fluid Accumulation:  Unable to assess     Strength:  Not Performed    Estimated Daily Nutrient Needs:  Energy (kcal):  3314-4009 (25-35/kgm) late phase; Weight Used for Energy Requirements:   (56 kgm)     Protein (g):   gm (1.2-2) as renal function allows; Weight Used for Protein Requirements:   (56 kgm)        Fluid (ml/day):  per MD;     Nutrition Related Findings:     Intubated; tolerating TF at 40ml/hour this am; no further residual issues since having (+) BMs; UO 1240ml; flexiseal continues with output per RN but last recorded on I/0 was 100ml 10/27; Glucose 123, BUN 70 Cr 1.2 Potassium 4.4  MAP 69; meds include ATB, precedex, vasopresin, senna;  allergies noted; received 88% of Rx TF volume past 24h; family deciding re: trach vs TACOS Collins, LD on 10/29/21 at 2:29 PM EDT    Contact: [phone]

## 2021-10-29 NOTE — PROGRESS NOTES
CRITICAL CARE PROGRESS NOTE      Patient:  Keshia Harper    Unit/Bed:3A-09/009-A  YOB: 1934  MRN: [id number]   PCP: Duane Hockey, APRN - CNP  Date of Admission: 10/11/2021  Chief Complaint:- worsening SOB    Assessment and Plan:    1. Acute hypoxic respiratory failure: Admitted on 10/11 and started HF. Progressive worsening hypoxemia. Intubation on 10/15. On 10/20, Family was discussed about trach and family could like to have few days for final decision on trach. Patient remain critical ill and require significant vent support. On 10/24, family meeting was held and family deferred trach tube for now. Family is aware that to be able placement survive, patient required a trach and a peg. On October 27, patient family will discuss up and about final decision on trach and LTAC versus hospice care. I discussed with patient daughter Ramin Ann about hunting family meeting for plan of care moving forward. On October 28 patient family were in the room and discussed with patient about patient wish seen 1 of patient daughter believe that patient may not want trach tube placement. Family still defer final decision. 10/29: remain critical illness require ventilation support. Current vent setting pressure control 18, PEEP of 6, FiO2 30, tidal volume 300, RR 20. Plan: family need to make final decision whether trach or hospice. 2. Covid Pneumonia: Res. Culture (10/19) positive Corynebacterium. Plan: Vancomycin day 6 (Started date 10/24)  CXR (10/29): Moderate sized pleural effusion on the left, moderate mixed infiltrate scattered throughout both lung. Overall appearance slightly worsened than prior. Episode of fever with temperature 100.4. Plan Procal, lactic acid, pneumonia panel    3. Hematuria: Secondary to nephrolithiasis.  HAS-BLED score 4 high risk of major bleeding, patient was on eliquis for A.fiv with [id number] 5, giving patient develop flank hematuria in which Hg 11.2 --> 8.4 over last 3 day. The risk of bleeding is out weight stroke. Plan: UA, Renal US, hold Eliquis 3 days, Restarted Eliquis back on 10/30 if patient H & H stable , Started SCD  10/28: Renal US (10/28) show mildly increased echogenicity of the kidneys that may suggest underlying medical renal disease. no hydronephrosis. A 7 mm left renal calculus is seen. Small ascites. Bilateral pleural effusions. Bilateral renal cysts. 10/29: urologist consult for nephrolithiasis. 4. ARDS: Patient received oxygen with FiO2 100 with very low TV (250ml) on ventilation. Ferritin, CRP, Lactic dehydrogenase elevated. Start Decadron, Baricitinib (10/12). Require pressor support with Vasopressin 0.02. Plan: cont vent support. 5. Hypotension: like combination of severe HFrEF and sedation medication side affect. Require pressor support with Vasopressin. Plan: cont wean off pressor support. 6. Possible Respiratory failure related to Neuromuscular disease: Persistent low tidal volume after diuretic. Patient received Milrinone and diuresis. Repeated CXR (10/21) show Persistent diffuse patchy opacities but with improved aeration at the lung bases. DTR intact bilateral lower extremities which less likely Vanessa Lints. Acethycholine receptor blocking, modulating all negative. Plan: continue conservative care    7. HFrEF: Echo (10/11): LV EF 25-30%, LV severe global hypokinesis. LV size moderate increase. Patient is hypotension with BP 97/50. Weight on admission 120 lsb  10/20: Started Milrinone lactate 1/9ml/hr  10/24: Current weight 142 lbs. Gain 4 lbs since admission. Net I/O since admission (10/24): -4.6L. Tele showed A fib with RVR. EKG was ordered show A. Fib with premature ventricular conduction complex. Plan: Cont Milrinone, monitor I/O   10/25: discuss with Hazel Whitfield that patient will be consider for AICD at this time. Can consider lifevest if patient able to operate.    10/26: discuss with family last night about AICD vs Naty Men in which patient may not be a candidate for AICD. Family would like to discuss with Card directly. 10/27: Cardiologist consult appreciated - discussed with patient family which addressed that no obvious cardiac cause reduced heart function. Thus cardiology will wait for recovery prior to considering any intervention such as LifeVest versus AICD. Patient also required to be on appropriate medication for 2 to 3 months prior to recheck ORI and making decision about AICD. For now patient is not a candidate for LifeVest since she is sedated and intubated. Therefore she cannot operate LifeVest by herself with a general requirement. 8. Hx of Prolong QTc interval: On telemetry strip in ED. Seen by cardiogy for Prolong QTC. Plan: tele monitor, avoid QTC prolong medications, Keep K +> 4 and Mg > 2    9. New on set Afib without RVR: [id number] 5. Cont Eliquis, Metoprolol 25 mg BID (Can't take Amiodarone due to QTC prolongation)   10/27: Patient develop sinus bradycardia. Plan: hold Metoprolol    10. HERIBERTO: Improve. BUN/Cr  74/1.7 <-- 70/2.4. Base line BUN/Cr 29/0.9. Likely secondary to Cardiorenal syndrome. Plan: avoid nephrotoxicity, cont gentle fluid hydration 50ml/hr. Nephrologist follow the case. 10/24: Good urine output 850 ml last 24 hrs. Response to Bumex. Plan: cont avoid nephrotoxicity     11. Hyperkalemia: Resolve. K+ 5.3. Plan: calcium gluconate, Lokelma    12. UTI: UA with microscopic (10/18) positive leukocyte, > 200 WBC and bacterial. Plan: Start Ceftriaxone (10/19)    13. AMS: likely secondary to hypercapnic hypoxemia. Plan: CT head when patient is stable, Neuro exam when patient is extubate    14. Iron deficiency anemia: Iron replacement    INITIAL H AND P AND ICU COURSE:  Tacho Guevara is 80years old female who presented in [facility] for worsening SOB on 10/11. Patient reported received COVID vaccine. She was test positive for COVID in 10/4/21.  Patient SOB progressive worsening that patient visit ED. In ED found to have O2 Sat 83% in room air with BNP 15.7k. Patient was admitted and manage for acute hypoxic respiratory failure likely due to combine CHF and COVID. Patient was placed on NC, Bumex, Dexamethasone. On 10/15, patient was transferred to ICU and intubated due to progressive hypoxic respirator failure on 100% FiO2 on high flow. Repeated CXR on 10/26 show improved vascular congestion. Patient has been afebrile since 10/15. UA on 10/18 positive leukocyte with >200 WBC and many bacterial. Patient was started on Ceftriaxone on 10/19 for UTI. Patient also started Milrinone for her HFrEF. Patient remain significant low Tidal volume regardless diuretic and Milrinone. On October 24, eliminating well. Patient family were explained about patient heart failure condition, respiratory failure, acute kidney injury. Patient family were explained that patient will need a trach tube due to severe respiratory muscle weakness. However patient will increase risk with pneumonia, hospitalization if family decides to pursue way trach tube. Family aware and would like to discuss with Dr. Jaimee Smith cardiologist possible AICD. Family will be inform about the plan of care moving forward. On 10/25 Card team was contacted with plan for her HFrEF in which AICD will be placed when patient is clinically improved and stable. At now, patient wouldn't be candidate for the procedure. Thus LifeVest will be a option. Family is aware that to be able to survive patient need a Trach and a PEG in which is may again patient wish per patient daughter Johan Cha. Family need time before final decision will be make.     Past Medical History      Diagnosis Date    Anxiety     Arthritis     Bronchitis     Diverticulitis of colon     Hypertension         Past Surgical History        Procedure Laterality Date    APPENDECTOMY      CHOLECYSTECTOMY         Current Medications    potassium chloride  10 mEq IntraVENous Once    vancomycin  750 mg IntraVENous Q24H    vancomycin (VANCOCIN) intermittent dosing (placeholder)   Other RX Placeholder    potassium (CARDIAC) replacement protocol   Other RX Placeholder    [Held by provider] bumetanide  1 mg IntraVENous Q12H    cefTRIAXone (ROCEPHIN) IV  1,000 mg IntraVENous Q24H    senna  10 mL Per NG tube Nightly    [Held by provider] lactulose  20 g Oral TID    [Held by provider] metoprolol tartrate  25 mg Oral BID    [Held by provider] apixaban  2.5 mg Oral BID    sodium chloride flush  5-40 mL IntraVENous 2 times per day     LORazepam, glucose, dextrose, glucagon (rDNA), dextrose, potassium chloride, acetaminophen **OR** acetaminophen, docusate sodium, sodium chloride flush, sodium chloride, polyethylene glycol    IV Drips/Infusions   dextrose 100 mL/hr (10/26/21 1253)    vasopressin (Septic Shock) infusion 0.02 Units/min (10/29/21 0500)    dexmedetomidine 1.4 mcg/kg/hr (10/29/21 0455)    norepinephrine Stopped (10/26/21 0820)    milrinone 0.125 mcg/kg/min (10/25/21 2200)    [Held by provider] sodium chloride Stopped (10/19/21 1900)    sodium chloride         Home Medications  Medications Prior to Admission: hydroCHLOROthiazide (HYDRODIURIL) 12.5 MG tablet, Take 12.5 mg by mouth daily   NONFORMULARY, Focus Factor  NONFORMULARY, Drenamin  GLUCOSAMINE-CHONDROITIN PO, Take by mouth  cetirizine (ZYRTEC) 10 MG tablet, TAKE 1 TABLET BY MOUTH ONCE DAILY  HAWTHORN PO, Take 425 mg by mouth daily  NONFORMULARY, Allerplex  OLIVE LEAF EXTRACT PO, Take by mouth  Fexofenadine-Pseudoephedrine (ALLEGRA-D 12 HOUR PO), Take by mouth  aspirin 325 MG tablet, Take 325 mg by mouth daily  metoprolol tartrate (LOPRESSOR) 50 MG tablet, Take 1 tablet by mouth 2 times daily  ALLERGIES: Sulfa antibiotics, Gluten meal, Metronidazole, and Milk-related compounds    ROS   Can't obtain due to patient sedative on vent    Scheduled Meds:   potassium chloride  10 mEq IntraVENous Once    vancomycin  750 mg IntraVENous Q24H    vancomycin (VANCOCIN) intermittent dosing (placeholder)   Other RX Placeholder    potassium (CARDIAC) replacement protocol   Other RX Placeholder    [Held by provider] bumetanide  1 mg IntraVENous Q12H    cefTRIAXone (ROCEPHIN) IV  1,000 mg IntraVENous Q24H    senna  10 mL Per NG tube Nightly    [Held by provider] lactulose  20 g Oral TID    [Held by provider] metoprolol tartrate  25 mg Oral BID    [Held by provider] apixaban  2.5 mg Oral BID    sodium chloride flush  5-40 mL IntraVENous 2 times per day     Continuous Infusions:   dextrose 100 mL/hr (10/26/21 1253)    vasopressin (Septic Shock) infusion 0.02 Units/min (10/29/21 0500)    dexmedetomidine 1.4 mcg/kg/hr (10/29/21 0455)    norepinephrine Stopped (10/26/21 0820)    milrinone 0.125 mcg/kg/min (10/25/21 2200)    [Held by provider] sodium chloride Stopped (10/19/21 1900)    sodium chloride         PHYSICAL EXAMINATION:  T:  99.3.  P:  66. RR:  23. B/P:  107/58. FiO2: 30% . O2 Sat:  99.  I/O:  + 0.6 L  Body mass index is 28.16 kg/m². GCS: 11  General: sedative on vent  HEENT:  normocephalic and atraumatic. No scleral icterus. PERR  Neck: supple. No Thyromegaly. Lungs: clear to auscultation. No retractions  Cardiac: RRR. No JVD. Abdomen: soft. Nontender. Extremities:  No clubbing, cyanosis, or edema x 4. Vasculature: capillary refill < 3 seconds. Palpable dorsalis pedis pulses. Skin:  warm and dry. Lymph:  No supraclavicular adenopathy. Neurologic:  No focal deficit. No seizures. Data: (All radiographs, tracings, PFTs, and imaging are personally viewed and interpreted unless otherwise noted).     Sodium 135, Potassium 3.4, Chloride 100, Bicarb 25, BUN/Cr 70/1.2   WBC 8, Hg 8.9, Hct 28.6, Platelet 351   Telemetry shows normal sinus rhythm    Blood culture 10/11 no growth x 2   Pneumonia culture (10/19): positive Corynebacterium on 10/23 - Start Vancomycin 10/24   Pnuemonia panel (10/26): Negative finding   CXR (10/29): Mildly megaly. ET tube and NG tube remain in good position. Right jugular line with catheter tip in SVC. Moderate-sized pleural effusion left side. Moderate mixed infiltrates scattered throughout both lungs. Overall appearance slightly worse than prior.  Dr. Gabirelle Augustine           Seen with multidisciplinary ICU team.  [employer] Continued ICU Level Care Criteria:    [x] Yes   [] No - Transfer Planned to listed location:  [] HOSPITALIST CONTACTED-           Electronically signed by Nelda Martell DO  Internal Medicine Resident Physician

## 2021-10-29 NOTE — CARE COORDINATION
10/29/21, 2:48 PM EDT    DISCHARGE ON GOING EVALUATION    [facility] day: 18  Location: -09/009-A Reason for admit: Acute respiratory failure with hypoxia ([facility] [address] [J96.01]  Acute congestive heart failure, unspecified heart failure type ([facility] [facility] [I50.9]  COVID-19 [U07.1]   Procedure:   10/12 Echo with EF 25-30%; Small circumferential pericardial effusion with no tamponade physiology; Myxomatotic degeneration of mitral valve;  Mild to Moderate mitral regurgitation is present  10/15 Intubated  10/15 CVC RIJ  10/28 Renal US: Mildly increased echogenicity of the kidneys that may suggest underlying medical renal disease; no hydronephrosis; 7mm left renal calculus is seen; small ascites. Bilateral pleural effusions; bilateral renal cysts  10/29 CXR:   1. Mildly megaly. ET tube and NG tube remain in good position. Right jugular line with catheter tip in SVC. 2. Moderate-sized pleural effusion left side. 3. Moderate mixed infiltrates scattered throughout both lungs. Overall appearance slightly worse than prior     Barriers to Discharge: Resident reports family planning for trach and PEG. Urology consulted for kidney stone and hematuria. CT abd/pelvis ordered. Hand irrigate alaniz PRN for clots. Hgb stable. Remains on vent w/ETT on PCV, peep 6, PC 18, FIO2 30%, sats 94%. Tmax 100.4. NSR. Follows commands. PT/OT - early mobility. Intensivist and Nephrology following. Palliative Care and Dietitian following. Telemetry, CVC, OG w/TF, flexiseal, alaniz. Precedex @ 1.4 mcg/kg/hr, Primacor @ 0.125 mcg/kg/min, vasopressin @ 0.02 units/min, IV rocephin, prn IV ativan, IV vancomycin, Electrolyte replacement protocols.   PCP: SERGE Longoria - CNP  Readmission Risk Score: 19.4%  Patient Goals/Plan/Treatment Preferences: From home with daughter. Plan for trach/PEG.  Received LTACH eval.

## 2021-10-29 NOTE — CONSULTS
[address] [facility] [address]  [facility]  Dept: Jael: [phone]  Visit Date: 10/11/2021    Urology Consult Note    Reason for Consult:  Kidney stone + hematouria (Hold Eliquis 10/28)  Requesting Physician:  Kash Perez    History Obtained From: nurse, electronic medical record    Chief Complaint: worsening SOB    HISTORY OF PRESENT ILLNESS:                The patient is a 80 y.o. female with significant past medical history of see below who presented with for worsening SOB on 10/11. Patient reported received COVID vaccine. She was test positive for COVID in 10/4/21. Patient SOB progressive worsening that patient visit ED. In ED found to have O2 Sat 83% in room air with BNP 15.7k. Patient was admitted and manage for acute hypoxic respiratory failure likely due to combine CHF and COVID. Patient was placed on NC, Bumex, Dexamethasone. On 10/15, patient was transferred to ICU and intubated due to progressive hypoxic respirator failure on 100% FiO2 on high flow. Repeated CXR on 10/26 show improved vascular congestion. Patient has been afebrile since 10/15. UA on 10/18 positive leukocyte with >200 WBC and many bacterial. Patient was started on Ceftriaxone on 10/19 for UTI. Patient also started Milrinone for her HFrEF. Patient remain significant low Tidal volume regardless diuretic and Milrinone. On October 24, eliminating well. Patient family were explained about patient heart failure condition, respiratory failure, acute kidney injury. Patient family were explained that patient will need a trach tube due to severe respiratory muscle weakness. However patient will increase risk with pneumonia, hospitalization if family decides to pursue way trach tube. Family aware and would like to discuss with Dr. [facility] cardiologist possible AICD. Family will be inform about the plan of care moving forward.  On 10/25 Card team was contacted with plan for her HFrEF in which AICD will be placed when patient is clinically improved and stable. At now, patient wouldn't be candidate for the procedure. Thus LifeVest will be a option. Family is aware that to be able to survive patient need a Trach and a PEG in which is may again patient wish per patient daughter Vanessa Moffett. Family need time before final decision will be make. Urology was consulted for Kidney stone + hematouria (Hold Eliquis 10/28)  BEL shows:    Impression   1. Mildly increased echogenicity of the kidneys that may suggest underlying medical renal disease. 2. No hydronephrosis. 3. A 7 mm left renal calculus is seen. 4. Small ascites. Bilateral pleural effusions. 5. Bilateral renal cysts. Ua with small leuk, large blood. previous Ux with no growth    Past Medical History:        Diagnosis Date    Anxiety     Arthritis     Bronchitis     Diverticulitis of colon     Hypertension      Past Surgical History:        Procedure Laterality Date    APPENDECTOMY      CHOLECYSTECTOMY         Social History     Socioeconomic History    Marital status:       Spouse name: Not on file    Number of children: Not on file    Years of education: Not on file    Highest education level: Not on file   Occupational History    Not on file   Tobacco Use    Smoking status: Former Smoker     Packs/day: 1.00     Years: 15.00     Pack years: 15.00     Types: Cigarettes     Quit date: 12/10/1977     Years since quittin.9    Smokeless tobacco: Never Used   Substance and Sexual Activity    Alcohol use: No    Drug use: No    Sexual activity: Not on file   Other Topics Concern    Not on file   Social History Narrative    Not on file     Social Determinants of Health     Financial Resource Strain:     Difficulty of Paying Living Expenses:    Food Insecurity:     Worried About Running Out of Food in the Last Year:     [address] in the Last Year:    Transportation Needs:     Lack of Transportation (Medical):  Lack of Transportation (Non-Medical):    Physical Activity:     Days of Exercise per Week:     Minutes of Exercise per Session:    Stress:     Feeling of Stress :    Social Connections:     Frequency of Communication with Friends and Family:     Frequency of Social Gatherings with Friends and Family:     Attends L[employer] Services:     Active Member of Clubs or Organizations:     Attends Club or Organization Meetings:     Marital Status:    Intimate Partner Violence:     Fear of Current or Ex-Partner:     Emotionally Abused:     Physically Abused:     Sexually Abused:        Xiomara family history on file. Allergies: Allergies   Allergen Reactions    Sulfa Antibiotics     Gluten Meal Diarrhea    Metronidazole Rash and Hives    Milk-Related Compounds Diarrhea       ROS:  Unable to perform as she is intubated, off sedation. Able to shake head yes/no    PHYSICAL EXAM:  VITALS:  BP (!) 108/59   Pulse 73   Temp 99.1 °F (37.3 °C) (Bladder)   Resp 23   Ht 5' (1.524 m)   Wt 144 lb 2.9 oz (65.4 kg)   SpO2 97%   BMI 28.16 kg/m² . Nursing note and vitals reviewed. Constitutional:    Alert, no acute distress and cooperative to examination with appropriate mood and affect. HEENT:   Head:         Normocephalic and atraumatic. Mouth/Throat:          Mucous membranes are normal.   Eyes:         EOM are normal. No scleral icterus. Nose:    The external appearance of the nose is normal  Ears: The ears appear normal to external inspection   Neck:         Supple, symmetrical, trachea midline, no adenopathy, thyroid symmetric, not enlarged and no tenderness. Cardiovascular:        Normal rate, regular rhythm, S1 S2 heart sounds. Pulmonary/Chest:       Chest symmetric with normal A/P diameter, no wheezes, rales, or rhonchi noted. Normal respiratory rate and rhthym. No use of accessory muscles. Abdominal:          Soft. No tenderness. Active bowel sounds.      Genitalia: Hendrix catheter draining pink tinged urine  Extremities:    ++BLL edema. Neurological:    Alert, eyes open, trying to form words, but intubated  DATA:  CBC:   Lab Results   Component Value Date    WBC 8.0 10/29/2021    RBC 2.89 10/29/2021    RBC 4.02 08/10/2011    HGB 8.9 10/29/2021    HCT 28.6 10/29/2021    MCV 99.0 10/29/2021    MCH 30.8 10/29/2021    MCHC 31.1 10/29/2021    RDW 14.2 07/18/2019     10/29/2021    MPV 12.2 10/29/2021     BMP:    Lab Results   Component Value Date     10/29/2021    K 4.4 10/29/2021    K 3.4 10/29/2021     10/29/2021    CO2 25 10/29/2021    BUN 70 10/29/2021    CREATININE 1.2 10/29/2021    CALCIUM 8.9 10/29/2021    GFRAA >60 07/18/2019    LABGLOM 43 10/29/2021    GLUCOSE 123 10/29/2021    GLUCOSE 118 08/10/2011     BUN/Creatinine:    Lab Results   Component Value Date    BUN 70 10/29/2021    CREATININE 1.2 10/29/2021     Magnesium:    Lab Results   Component Value Date    MG 1.7 10/29/2021     Phosphorus:    Lab Results   Component Value Date    PHOS 3.2 10/25/2021     PT/INR:  No results found for: PROTIME, INR  U/A:    Lab Results   Component Value Date    COLORU RED 10/28/2021    PHUR 5.0 10/28/2021    LABCAST NONE SEEN 10/28/2021    WBCUA 5-9 10/28/2021    RBCUA > 200 10/28/2021    YEAST NONE SEEN 10/25/2021    BACTERIA FEW 10/28/2021    SPECGRAV 1.020 10/28/2021    LEUKOCYTESUR SMALL 10/28/2021    UROBILINOGEN 0.2 10/28/2021    BILIRUBINUR NEGATIVE 10/28/2021    BILIRUBINUR NEGATIVE 08/09/2011    BLOODU LARGE 10/28/2021    GLUCOSEU 100 10/25/2021       Imaging: The patient has had a Renal Ultrasound which I have independently reviewed along with its accompanying report.   The study demonstrates   Narrative   PROCEDURE: US RENAL COMPLETE       CLINICAL INFORMATION: hematuria       COMPARISON: None       TECHNIQUE: Grayscale and color sonographic imaging of the kidneys performed in longitudinal and transverse planes.               FINDINGS:    RIGHT KIDNEY - 11.4 x 5.5 x 4.9 cm   Resistive Index - 0.77   Cortical Thickness - 1.3 cm       LEFT KIDNEY - 11.2 x 3.9 x 5.4 cm   Resistive Index - 0.74   Cortical Thickness - 0.90 cm       No hydronephrosis. The kidneys appear mildly echogenic. There is a 2.2 cm cyst in the midpole of the right kidney. A 2.1 cm cyst in the midpole of the left kidney. Another 2.9 cm cyst is seen in the midpole of the left kidney.       Small ascites is seen. Bilateral pleural effusions are noted. A 7 mm left renal calculus is seen in the midpole. The bladder is decompressed by a Hendrix catheter.               Impression   1. Mildly increased echogenicity of the kidneys that may suggest underlying medical renal disease. 2. No hydronephrosis. 3. A 7 mm left renal calculus is seen. 4. Small ascites. Bilateral pleural effusions. 5. Bilateral renal cysts. IMPRESSION/PLAN:  BEL shows 7mm renal calculus, she is also having hematuria. Will obtain CT abd/pelvis to complete hematuria workup and evaluate possible stone. No hydronephrosis seen  Ux with no growth 10/25/21  Hematuria -  Hand irrigated, no clots. Urine is translucent pink. Can hand irrigate as needed for clots.   HGB stable    Will continue to follow    Thank you for including us in the care of [address] [place] [address] Larry, SERGE - CNP, SERGE  10/29/21 12:33 PM  Urology

## 2021-10-29 NOTE — PROGRESS NOTES
Renal Progress Note    Assessment and Plan:   1. Acute kidney injury resolving predominantly prerenal azotemia  2. SARS-CoV-2 pneumonia  3. Respiratory failure mechanical support  4. Hypokalemia  5. Normocytic anemia    PLAN:  1. Labs reviewed  2. Serum potassium is low  3. Potassium replacement with protocol  4. Medications reviewed  5. No changes otherwise  6. Labs in the morning  7.  We will follow    Patient Active Problem List:     Sigmoid diverticulitis     Acute respiratory failure with hypoxia ([facility] [address]     COVID-19     Acute congestive heart failure ([facility] [address]      Subjective:   Admit Date: 10/11/2021    Interval History:   Seen for acute kidney injury  On mechanical support  No issues from staff  Blood pressure is on the low end of normal but stable  Urine output is good      Medications:   Scheduled Meds:   vancomycin  750 mg IntraVENous Q24H    vancomycin (VANCOCIN) intermittent dosing (placeholder)   Other RX Placeholder    potassium (CARDIAC) replacement protocol   Other RX Placeholder    [Held by provider] bumetanide  1 mg IntraVENous Q12H    cefTRIAXone (ROCEPHIN) IV  1,000 mg IntraVENous Q24H    senna  10 mL Per NG tube Nightly    [Held by provider] lactulose  20 g Oral TID    [Held by provider] metoprolol tartrate  25 mg Oral BID    [Held by provider] apixaban  2.5 mg Oral BID    sodium chloride flush  5-40 mL IntraVENous 2 times per day     Continuous Infusions:   dextrose 100 mL/hr (10/26/21 1253)    vasopressin (Septic Shock) infusion 0.02 Units/min (10/29/21 0500)    dexmedetomidine 1.4 mcg/kg/hr (10/29/21 0455)    norepinephrine Stopped (10/26/21 0820)    milrinone 0.125 mcg/kg/min (10/25/21 2200)    [Held by provider] sodium chloride Stopped (10/19/21 1900)    sodium chloride         CBC:   Recent Labs     10/27/21  0439 10/27/21  0439 10/28/21  0345 10/28/21  1700 10/29/21  0351   WBC 10.4  --  6.2  --  8.0   HGB 9.8*   < > 8.4* 9.0* 8.9*     --  174  --  160    < > = values in this interval not displayed. CMP:    Recent Labs     10/27/21  0439 10/27/21  0439 10/28/21  0345 10/29/21  0351     --  135 135   K 3.6   < > 3.4*  3.4* 3.4*  3.4*     --  99 100   CO2 28  --  26 25   BUN 71*  --  71* 70*   CREATININE 1.3*  --  1.2 1.2   GLUCOSE 115*  --  96 123*   CALCIUM 9.8  --  8.9 8.9   LABGLOM 39*  --  43* 43*    < > = values in this interval not displayed. Troponin: No results for input(s): TROPONINI in the last 72 hours. BNP: No results for input(s): BNP in the last 72 hours. INR: No results for input(s): INR in the last 72 hours. Lipids: No results for input(s): CHOL, LDLDIRECT, TRIG, HDL, AMYLASE, LIPASE in the last 72 hours. Liver: No results for input(s): AST, ALT, ALKPHOS, PROT, LABALBU, BILITOT in the last 72 hours. Invalid input(s): BILDIR  Iron:  No results for input(s): IRONS, FERRITIN in the last 72 hours. Invalid input(s): LABIRONS  XR CHEST PORTABLE   Final Result   1. Mildly megaly. ET tube and NG tube remain in good position. Right jugular line with catheter tip in SVC. 2. Moderate-sized pleural effusion left side. 3. Moderate mixed infiltrates scattered throughout both lungs. Overall appearance slightly worse than prior. **This report has been created using voice recognition software. It may contain minor errors which are inherent in voice recognition technology. **      Final report electronically signed by Dr. Tyler Staples on 10/29/2021 8:10 AM      US RENAL COMPLETE   Final Result   1. Mildly increased echogenicity of the kidneys that may suggest underlying medical renal disease. 2. No hydronephrosis. 3. A 7 mm left renal calculus is seen. 4. Small ascites. Bilateral pleural effusions. 5. Bilateral renal cysts. **This report has been created using voice recognition software. It may contain minor errors which are inherent in voice recognition technology. **      Final report electronically signed by  Venus Pisano on 10/29/2021 3:55 AM      XR ABDOMEN FOR NG/OG/NE TUBE PLACEMENT   Final Result   1. The tip of the nasogastric tube is below the diaphragms within the stomach. 2. Partially seen is left pleural effusion and left retrocardiac opacity. Opacities are also seen in the partially seen right lower lobe. **This report has been created using voice recognition software. It may contain minor errors which are inherent in voice recognition technology. **      Final report electronically signed by Dr Venus Pisano on 10/27/2021 6:54 PM      XR CHEST PORTABLE   Final Result   1. There has been no significant interval change in the radiographic appearance of the chest  from 10/23/2021 including diffuse airspace disease. .            **This report has been created using voice recognition software. It may contain minor errors which are inherent in voice recognition technology. **      Final report electronically signed by Dr Venus Pisano on 10/24/2021 8:54 PM      XR CHEST PORTABLE   Final Result   1. Support lines and tubes in unchanged position when compared to prior. 2. Interval worsening of parenchymal densities within the right    central/right lower lobe. Additionally, worsening of confluent    consolidation within the left midlung with development of left basilar    pleural/parenchymal opacities likely consistent with moderate left pleural    effusion. This document has been electronically signed by: Anna Woods DO on    10/23/2021 05:00 AM      XR CHEST PORTABLE   Final Result   Persistent diffuse patchy opacities but with improved aeration at the lung bases. **This report has been created using voice recognition software. It may contain minor errors which are inherent in voice recognition technology. **      Final report electronically signed by Dr. Gin Hernandez MD on 10/21/2021 10:48 AM      XR CHEST PORTABLE   Final Result   1.  Increased density in the left lung base. This can indicate partial left lower lobe collapse. 2. Persistent patchy bilateral pulmonary opacities. **This report has been created using voice recognition software. It may contain minor errors which are inherent in voice recognition technology. **      Final report electronically signed by Dr. Nelda Paul on 10/18/2021 7:58 AM      XR CHEST PORTABLE   Final Result   Impression:   1. Cardiomegaly with improvement of passive congestive changes and better    aeration of both lungs compared to the prior study. 2. The focal bilateral alveolar consolidations consistent with infectious    pulmonary disease are unchanged. Retrocardiac consolidation and small LEFT    pleural effusion obscuring the LEFT diaphragm is unchanged. This document has been electronically signed by: Lynsey Paul MD on    10/16/2021 03:18 AM      XR CHEST PORTABLE   Final Result   Somewhat low position of endotracheal tube 1.2 cm above the claire but improved aeration of the upper lungs. **This report has been created using voice recognition software. It may contain minor errors which are inherent in voice recognition technology. **      Final report electronically signed by Dr. Lizeth Jacobs on 10/15/2021 3:34 PM      XR CHEST PORTABLE   Final Result   1. Bilateral pneumonia. 2. Small bilateral pleural effusions. 3. Stable cardiomegaly. **This report has been created using voice recognition software. It may contain minor errors which are inherent in voice recognition technology. **      Final report electronically signed by Dr. Diego Hassan on 10/15/2021 4:14 PM      XR CHEST PORTABLE   Final Result   Right lower lobe airspace infiltrates. Severe cardiomegaly. **This report has been created using voice recognition software. It may contain minor errors which are inherent in voice recognition technology. **      Final report electronically signed by Dr. Lizeth Jacobs on 10/11/2021 8:59 PM      XR CHEST PORTABLE   Final Result      1. Diffuse groundglass opacities in both lungs along with cardiomegaly. Findings likely suggest acute exacerbation of congestive heart failure versus pneumonia in the right clinical setting. Clinical correlation is recommended      2. Small left pleural effusion. 3. Other findings as described above. **This report has been created using voice recognition software. It may contain minor errors which are inherent in voice recognition technology. **      Final report electronically signed by Dr Socorro White on 10/11/2021 11:56 AM            Objective:   Vitals: BP (!) 114/58   Pulse 82   Temp 99.3 °F (37.4 °C) (Bladder)   Resp 21   Ht 5' (1.524 m)   Wt 144 lb 2.9 oz (65.4 kg)   SpO2 99%   BMI 28.16 kg/m²    Wt Readings from Last 3 Encounters:   10/27/21 144 lb 2.9 oz (65.4 kg)   10/07/21 125 lb (56.7 kg)      24HR INTAKE/OUTPUT:      Intake/Output Summary (Last 24 hours) at 10/29/2021 0820  Last data filed at 10/29/2021 0401  Gross per 24 hour   Intake 1886 ml   Output 1240 ml   Net 646 ml       Constitutional: Well-developed elderly lady on mechanical.  Skin:normal with no rash or lesions. HEENT:Pupils are reactive . Throat is clear . Oral mucosa is moist.  Endotracheal tube is noted. Orogastric tube is noted. Neck:supple with no adenopathy, thyromegaly or carotid bruit  Cardiovascular:  S1, S2 without murmur or rubs   Respiratory:  Clear to ausculation without wheezes, rhonchi or rales in the anterior  Abdomen: Soft. Good bowel sounds. Ext: 1+ bilateral LE edema  Musculoskeletal:Intact  Neuro: On mechanical support      Electronically signed by Dedrick Guadarrama MD on 10/29/2021 at 8:20 AM  **This report has been created using voice recognition software. It maycontain minor  errors which are inherent in voice recognition technology. **

## 2021-10-30 ENCOUNTER — APPOINTMENT (OUTPATIENT)
Dept: CT IMAGING | Age: 86
DRG: 004 | End: 2021-10-30
Payer: MEDICARE

## 2021-10-30 LAB
ALLEN TEST: POSITIVE
ANION GAP SERPL CALCULATED.3IONS-SCNC: 11 MEQ/L (ref 8–16)
BASE EXCESS (CALCULATED): 0.9 MMOL/L (ref -2.5–2.5)
BUN BLDV-MCNC: 66 MG/DL (ref 7–22)
CALCIUM SERPL-MCNC: 8.9 MG/DL (ref 8.5–10.5)
CHLORIDE BLD-SCNC: 102 MEQ/L (ref 98–111)
CO2: 26 MEQ/L (ref 23–33)
COLLECTED BY:: ABNORMAL
CREAT SERPL-MCNC: 1.2 MG/DL (ref 0.4–1.2)
DEVICE: ABNORMAL
ERYTHROCYTE [DISTWIDTH] IN BLOOD BY AUTOMATED COUNT: 14.9 % (ref 11.5–14.5)
ERYTHROCYTE [DISTWIDTH] IN BLOOD BY AUTOMATED COUNT: 55.1 FL (ref 35–45)
GFR SERPL CREATININE-BSD FRML MDRD: 43 ML/MIN/1.73M2
GLUCOSE BLD-MCNC: 108 MG/DL (ref 70–108)
HCO3: 25 MMOL/L (ref 23–28)
HCT VFR BLD CALC: 28 % (ref 37–47)
HEMOGLOBIN: 8.4 GM/DL (ref 12–16)
IFIO2: 30
MAGNESIUM: 1.6 MG/DL (ref 1.6–2.4)
MCH RBC QN AUTO: 30.3 PG (ref 26–33)
MCHC RBC AUTO-ENTMCNC: 30 GM/DL (ref 32.2–35.5)
MCV RBC AUTO: 101.1 FL (ref 81–99)
MODE: ABNORMAL
O2 SATURATION: 94 %
PCO2: 38 MMHG (ref 35–45)
PH BLOOD GAS: 7.44 (ref 7.35–7.45)
PHOSPHORUS: 3.1 MG/DL (ref 2.4–4.7)
PLATELET # BLD: 150 THOU/MM3 (ref 130–400)
PMV BLD AUTO: 12.7 FL (ref 9.4–12.4)
PO2: 69 MMHG (ref 71–104)
POTASSIUM REFLEX MAGNESIUM: 4 MEQ/L (ref 3.5–5.2)
POTASSIUM SERPL-SCNC: 4 MEQ/L (ref 3.5–5.2)
RBC # BLD: 2.77 MILL/MM3 (ref 4.2–5.4)
SET PEEP: 6 MMHG
SODIUM BLD-SCNC: 139 MEQ/L (ref 135–145)
SOURCE, BLOOD GAS: ABNORMAL
VANCOMYCIN TROUGH: 17.5 UG/ML (ref 10–20)
WBC # BLD: 6.9 THOU/MM3 (ref 4.8–10.8)

## 2021-10-30 PROCEDURE — 82803 BLOOD GASES ANY COMBINATION: CPT

## 2021-10-30 PROCEDURE — 36600 WITHDRAWAL OF ARTERIAL BLOOD: CPT

## 2021-10-30 PROCEDURE — 83735 ASSAY OF MAGNESIUM: CPT

## 2021-10-30 PROCEDURE — 2580000003 HC RX 258: Performed by: INTERNAL MEDICINE

## 2021-10-30 PROCEDURE — 99232 SBSQ HOSP IP/OBS MODERATE 35: CPT | Performed by: UROLOGY

## 2021-10-30 PROCEDURE — 71270 CT THORAX DX C-/C+: CPT

## 2021-10-30 PROCEDURE — 2100000000 HC CCU R&B

## 2021-10-30 PROCEDURE — 36415 COLL VENOUS BLD VENIPUNCTURE: CPT

## 2021-10-30 PROCEDURE — 6360000002 HC RX W HCPCS: Performed by: INTERNAL MEDICINE

## 2021-10-30 PROCEDURE — 6360000004 HC RX CONTRAST MEDICATION: Performed by: UROLOGY

## 2021-10-30 PROCEDURE — 99232 SBSQ HOSP IP/OBS MODERATE 35: CPT | Performed by: INTERNAL MEDICINE

## 2021-10-30 PROCEDURE — 2500000003 HC RX 250 WO HCPCS: Performed by: STUDENT IN AN ORGANIZED HEALTH CARE EDUCATION/TRAINING PROGRAM

## 2021-10-30 PROCEDURE — 74176 CT ABD & PELVIS W/O CONTRAST: CPT

## 2021-10-30 PROCEDURE — 85027 COMPLETE CBC AUTOMATED: CPT

## 2021-10-30 PROCEDURE — 2580000003 HC RX 258: Performed by: STUDENT IN AN ORGANIZED HEALTH CARE EDUCATION/TRAINING PROGRAM

## 2021-10-30 PROCEDURE — 94003 VENT MGMT INPAT SUBQ DAY: CPT

## 2021-10-30 PROCEDURE — 6360000002 HC RX W HCPCS: Performed by: STUDENT IN AN ORGANIZED HEALTH CARE EDUCATION/TRAINING PROGRAM

## 2021-10-30 PROCEDURE — 6360000002 HC RX W HCPCS: Performed by: FAMILY MEDICINE

## 2021-10-30 PROCEDURE — 6370000000 HC RX 637 (ALT 250 FOR IP): Performed by: INTERNAL MEDICINE

## 2021-10-30 PROCEDURE — 99291 CRITICAL CARE FIRST HOUR: CPT | Performed by: INTERNAL MEDICINE

## 2021-10-30 PROCEDURE — 84100 ASSAY OF PHOSPHORUS: CPT

## 2021-10-30 PROCEDURE — 6370000000 HC RX 637 (ALT 250 FOR IP): Performed by: STUDENT IN AN ORGANIZED HEALTH CARE EDUCATION/TRAINING PROGRAM

## 2021-10-30 PROCEDURE — 80202 ASSAY OF VANCOMYCIN: CPT

## 2021-10-30 PROCEDURE — 80048 BASIC METABOLIC PNL TOTAL CA: CPT

## 2021-10-30 RX ADMIN — Medication 17.6 MG: at 20:41

## 2021-10-30 RX ADMIN — SODIUM CHLORIDE 1.4 MCG/KG/HR: 9 INJECTION, SOLUTION INTRAVENOUS at 09:39

## 2021-10-30 RX ADMIN — CEFTRIAXONE SODIUM 1000 MG: 1 INJECTION, POWDER, FOR SOLUTION INTRAMUSCULAR; INTRAVENOUS at 16:24

## 2021-10-30 RX ADMIN — LORAZEPAM 1 MG: 2 INJECTION INTRAMUSCULAR; INTRAVENOUS at 20:05

## 2021-10-30 RX ADMIN — SODIUM CHLORIDE, PRESERVATIVE FREE 20 ML: 5 INJECTION INTRAVENOUS at 20:41

## 2021-10-30 RX ADMIN — SODIUM CHLORIDE 1.4 MCG/KG/HR: 9 INJECTION, SOLUTION INTRAVENOUS at 03:06

## 2021-10-30 RX ADMIN — LORAZEPAM 1 MG: 2 INJECTION INTRAMUSCULAR; INTRAVENOUS at 09:40

## 2021-10-30 RX ADMIN — IOPAMIDOL 80 ML: 755 INJECTION, SOLUTION INTRAVENOUS at 12:26

## 2021-10-30 RX ADMIN — MILRINONE LACTATE 0.12 MCG/KG/MIN: 1 INJECTION, SOLUTION INTRAVENOUS at 16:28

## 2021-10-30 RX ADMIN — SODIUM CHLORIDE, PRESERVATIVE FREE 10 ML: 5 INJECTION INTRAVENOUS at 09:44

## 2021-10-30 RX ADMIN — LORAZEPAM 1 MG: 2 INJECTION INTRAMUSCULAR; INTRAVENOUS at 04:01

## 2021-10-30 RX ADMIN — ACETAMINOPHEN 650 MG: 325 TABLET ORAL at 20:41

## 2021-10-30 RX ADMIN — SODIUM CHLORIDE 1.4 MCG/KG/HR: 9 INJECTION, SOLUTION INTRAVENOUS at 14:48

## 2021-10-30 RX ADMIN — SODIUM CHLORIDE 1.4 MCG/KG/HR: 9 INJECTION, SOLUTION INTRAVENOUS at 20:46

## 2021-10-30 RX ADMIN — VASOPRESSIN 0.02 UNITS/MIN: 20 INJECTION INTRAVENOUS at 09:39

## 2021-10-30 ASSESSMENT — PULMONARY FUNCTION TESTS
PIF_VALUE: 23
PIF_VALUE: 24

## 2021-10-30 NOTE — CARE COORDINATION
10/30/21 12:07 PM    Called and spoke with Kourtney's daughter, Yossi Vargas. Reported that would like to discuss what discharge options would look like if they choose to move forward with tracheostomy tube placement for her mother. She states she would like to meet with this CM in person along with her sister, [facility]. She states she would like to meet on Monday if possible, she has to be to work by 4:00 PM. She states she would like this CM to call her sister [facility] at [phone] to set up a time Monday to meet. This CM called and spoke with [facility]; she states she would very much like to discuss what these options would look like. She states she will be leaving to go back to [place] to drop some papers off for work, but could be back early afternoon. She asked to meet at 2:00 PM Monday; this CM confirmed 2:00 PM Monday and we will meet in the 3A waiting area off the elevators. [facility] states she will call Yossi Vargas and update her with the time/place.

## 2021-10-30 NOTE — PROGRESS NOTES
1200 pt transported to ct scan per bed     1230 pt back from ct scan    1500 family up to see pt updated on condition questions answered

## 2021-10-30 NOTE — PROGRESS NOTES
[facility]         Renal Inpatient Follow-Up note         10/30/2021 11:24 AM    Pt Name:   Marilu Swift  YOB: 1934  Attending:   Faustino Fothergill, MD    Chief Complaint : Marilu Swift is a 80 y.o. female being followed by nephrology for acute kidney injury    Interval History :   Patient seen and examined by me. No distress  Intubated and cannot accurately assess history or review of systems  She is on pressors. She is only on 35% FiO2     Scheduled Medications :    vancomycin (VANCOCIN) intermittent dosing (placeholder)   Other RX Placeholder    potassium (CARDIAC) replacement protocol   Other RX Placeholder    [Held by provider] bumetanide  1 mg IntraVENous Q12H    cefTRIAXone (ROCEPHIN) IV  1,000 mg IntraVENous Q24H    senna  10 mL Per NG tube Nightly    [Held by provider] lactulose  20 g Oral TID    [Held by provider] metoprolol tartrate  25 mg Oral BID    [Held by provider] apixaban  2.5 mg Oral BID    sodium chloride flush  5-40 mL IntraVENous 2 times per day      dextrose 100 mL/hr (10/26/21 1253)    vasopressin (Septic Shock) infusion 0.02 Units/min (10/30/21 0939)    dexmedetomidine 1.4 mcg/kg/hr (10/30/21 0939)    norepinephrine Stopped (10/26/21 0820)    milrinone 0.125 mcg/kg/min (10/25/21 2200)    [Held by provider] sodium chloride Stopped (10/19/21 1900)    sodium chloride         Vitals :  BP (!) 108/58   Pulse 70   Temp 99.9 °F (37.7 °C) (Bladder)   Resp 21   Ht 5' (1.524 m)   Wt 144 lb 2.9 oz (65.4 kg)   SpO2 97%   BMI 28.16 kg/m²     24HR INTAKE/OUTPUT:      Intake/Output Summary (Last 24 hours) at 10/30/2021 1124  Last data filed at 10/30/2021 0800  Gross per 24 hour   Intake 1940 ml   Output 1290 ml   Net 650 ml     Last 3 weights  Wt Readings from Last 3 Encounters:   10/27/21 144 lb 2.9 oz (65.4 kg)   10/07/21 125 lb (56.7 kg)           Physical Exam :  General Appearance:  Well developed.  No distress  Mouth/Throat: ET tube in place  Neck: No accessory muscle use  CNS-not responding to verbal commands  Psych not agitated  Musculoskeletal:  Edema -mild edema noted         Last 3 CBC   Recent Labs     10/28/21  0345 10/28/21  0345 10/28/21  1700 10/29/21  0351 10/30/21  0410   WBC 6.2  --   --  8.0 6.9   RBC 2.72*  --   --  2.89* 2.77*   HGB 8.4*   < > 9.0* 8.9* 8.4*   HCT 26.7*   < > 27.6* 28.6* 28.0*     --   --  160 150    < > = values in this interval not displayed. Last 3 CMP  Recent Labs     10/28/21  0345 10/28/21  0345 10/29/21  0351 10/29/21  0351 10/29/21  0815 10/30/21  0410     --  135  --   --  139   K 3.4*  3.4*   < > 3.4*  3.4*   < > 4.4 4.0  4.0   CL 99  --  100  --   --  102   CO2 26  --  25  --   --  26   BUN 71*  --  70*  --   --  66*   CREATININE 1.2  --  1.2  --   --  1.2   CALCIUM 8.9  --  8.9  --   --  8.9    < > = values in this interval not displayed. ASSESSMENT / Plan   1 Renal -acute kidney injury appears to be resolved  ? However he still has elevated BUN  ? Not on any IV fluids follow for now    2 Electrolytes -hypokalemia appears to be better  3 Hypotension on pressors wean to MAP greater than 65  4 Ventilator dependent respiratory failure due to COVID-19 pneumonia  5 Nephrolithiasis awaiting a CT scan of the abdomen and pelvis  6 Meds reviewed and discussed with nursing staff    Dr. Sedrick Reese MD, M,D.  [facility].

## 2021-10-30 NOTE — PROGRESS NOTES
Intensive Care Unit  Medical Intensive Care Unit  Attending Progress Note      Subjective  Patient awake respond to verbal commands but weak secondary to profound weakness upper lower extremity from deconditioning chronic illnesses. Team members present on rounds:  Nursing, Patient and Respiratory Therapist    ICU guidelines/prophylaxis active for the following:    head above bed above 30 degrees, insulin guidelines, DVT prophylaxis, ulcer prophylaxis, analgesia/sedation guidelines and ARDS protocol    Patient Examined? yes    Additions/differences/highlights to the resident's/fellow's exam:  VITALS:  /62   Pulse 82   Temp 99.5 °F (37.5 °C) (Bladder)   Resp 29   Ht 5' (1.524 m)   Wt 144 lb 2.9 oz (65.4 kg)   SpO2 97%   BMI 28.16 kg/m²   VENT SETTINGS:    Vent Information  $Ventilation: $Subsequent Day  Skin Assessment: Clean, dry, & intact  Suction Catheter Diameter: 14  Equipment ID: [id number]  Equipment Changed: Humidification  Vent Type: C2G5 [place]  Vent Mode: PCV+  Vt Ordered: 0 mL  Pressure Ordered: 24 (Pcontrol 18)  Rate Set: 20 bmp  Peak Flow: 35 L/min  FiO2 : 35 %  SpO2: 97 %  SpO2/FiO2 ratio: 277.14  Sensitivity: 3  PEEP/CPAP: 6  I Time/ I Time %: 1 s  Humidification Source: Heated wire  Humidification Temp: 0.37  Humidification Temp Measured: 37  Circuit Condensation: Not drained  Nitric Oxide/Epoprostenol In Use?: No  Mask Type: Full face mask  Mask Size: Medium  Additional Respiratory  Assessments  Pulse: 82  Resp: 29  SpO2: 97 %  Position: Semi-Boone's  Humidification Source: Heated wire  Humidification Temp: 0.37  Circuit Condensation: Not drained  Oral Care: Lip moisturizer applied, Mouth swabbed, Mouth moisturizer, Mouth suctioned  Subglottic Suction Done?: Yes  Cuff Pressure (cm H2O): 25 cm H2O    CONSTITUTIONAL:  sedative on vent  HEENT:  normocephalic and atraumatic. No scleral icterus. PERR  Neck: supple. No Thyromegaly. Lungs: clear to auscultation. No retractions  Cardiac: RRR. No JVD. Abdomen: soft. Nontender. Extremities:  No clubbing, cyanosis, or edema x 4. Vasculature: capillary refill < 3 seconds. Palpable dorsalis pedis pulses. Skin:  warm and dry. Lymph:  No supraclavicular adenopathy. Neurologic:  No focal deficit. No seizures. DATA:  CBC:   Lab Results   Component Value Date    WBC 6.9 10/30/2021    RBC 2.77 10/30/2021    RBC 4.02 08/10/2011    HGB 8.4 10/30/2021    HCT 28.0 10/30/2021    .1 10/30/2021    RDW 14.2 07/18/2019     10/30/2021     CMP:    Lab Results   Component Value Date     10/30/2021    K 4.0 10/30/2021    K 4.0 10/30/2021     10/30/2021    CO2 26 10/30/2021    BUN 66 10/30/2021    CREATININE 1.2 10/30/2021    GFRAA >60 07/18/2019    LABGLOM 43 10/30/2021    GLUCOSE 108 10/30/2021    GLUCOSE 118 08/10/2011    PROT 4.9 10/15/2021    CALCIUM 8.9 10/30/2021    BILITOT 1.0 10/15/2021    ALKPHOS 49 10/15/2021    AST 30 10/15/2021    ALT 19 10/15/2021       KEY ISSUES/FINDINGS/ASSESSMENT AND PLAN:    India Vang is 80years old female who presented in [facility] for worsening SOB on 10/11. Patient reported received COVID vaccine. She was test positive for COVID in 10/4/21. Patient SOB progressive worsening that patient visit ED. In ED found to have O2 Sat 83% in room air with BNP 15.7k. Patient was admitted and manage for acute hypoxic respiratory failure likely due to combine CHF and COVID. Patient was placed on NC, Bumex, Dexamethasone. On 10/15, patient was transferred to ICU and intubated due to progressive hypoxic respirator failure on 100% FiO2 on high flow. Repeated CXR on 10/26 show improved vascular congestion. Patient has been afebrile since 10/15. UA on 10/18 positive leukocyte with >200 WBC and many bacterial. Patient was started on Ceftriaxone on 10/19 for UTI. Patient also started Milrinone for her HFrEF. Patient remain significant low Tidal volume regardless diuretic and Milrinone.   On October 24, eliminating well. Patient family were explained about patient heart failure condition, respiratory failure, acute kidney injury. Patient family were explained that patient will need a trach tube due to severe respiratory muscle weakness. However patient will increase risk with pneumonia, hospitalization if family decides to pursue way trach tube. Family aware and would like to discuss with Dr. Amanda Reynolds cardiologist possible AICD. Family will be inform about the plan of care moving forward. On 10/25 Card team was contacted with plan for her HFrEF in which AICD will be placed when patient is clinically improved and stable. At now, patient wouldn't be candidate for the procedure. Thus LifeVest will be a option. Family is aware that to be able to survive patient need a Trach and a PEG in which is may again patient wish per patient daughter Gisela Wilkinson. Family need time before final decision will be make    1. Acute hypoxic respiratory failure: Admitted on 10/11 and started HF. Progressive worsening hypoxemia. Intubation on 10/15. On 10/20, Family was discussed about trach and family could like to have few days for final decision on trach. Patient remain critical ill and require significant vent support. On 10/24, family meeting was held and family deferred trach tube for now. Family is aware that to be able placement survive, patient required a trach and a peg. On October 27, patient family will discuss up and about final decision on trach and LTAC versus hospice care. I discussed with patient daughter Gisela Wilkinson about hunting family meeting for plan of care moving forward. On October 28 patient family were in the room and discussed with patient about patient wish seen 1 of patient daughter believe that patient may not want trach tube placement. Family still defer final decision. 10/29: remain critical illness require ventilation support.   Current vent setting pressure control 18, PEEP of 6, FiO2 30, tidal volume 300, RR 20. Plan: family need to make final decision whether trach or hospice.     2. Covid Pneumonia: Res. Culture (10/19) positive Corynebacterium. Plan: Vancomycin day 6 (Started date 10/24)  CXR (10/29): Moderate sized pleural effusion on the left, moderate mixed infiltrate scattered throughout both lung. Overall appearance slightly worsened than prior. Continue COVID pneumonia protocol.     3. Hematuria: Secondary to nephrolithiasis. HAS-BLED score 4 high risk of major bleeding, patient was on eliquis for A.fiv with [id number] 5, giving patient develop flank hematuria in which Hg 11.2 --> 8.4 over last 3 day. The risk of bleeding is out weight stroke. Plan: UA, Renal US, hold Eliquis 3 days, Restarted Eliquis back on 10/30 if patient H & H stable , Started SCD  10/28: Renal US (10/28) show mildly increased echogenicity of the kidneys that may suggest underlying medical renal disease. no hydronephrosis. A 7 mm left renal calculus is seen. Small ascites. Bilateral pleural effusions. Bilateral renal cysts. 10/29: urologist consult for nephrolithiasis.      4. ARDS: Patient received oxygen with FiO2 100 with very low TV (250ml) on ventilation. Ferritin, CRP, Lactic dehydrogenase elevated. Start Decadron, Baricitinib (10/12). Require pressor support with Vasopressin 0.02. Plan: cont vent support.      5. Hypotension: like combination of severe HFrEF and sedation medication side affect. Require pressor support with Vasopressin. Plan: cont wean off pressor support.     6. Possible Respiratory failure related to Neuromuscular disease: Persistent low tidal volume after diuretic. Patient received Milrinone and diuresis. Repeated CXR (10/21) show Persistent diffuse patchy opacities but with improved aeration at the lung bases. DTR intact bilateral lower extremities which less likely Elio Beryl. Acethycholine receptor blocking, modulating all negative.  Plan: continue conservative care     7. HFrEF: Echo (10/11): LV EF 25-30%, LV severe global hypokinesis. LV size moderate increase. Patient is hypotension with BP 97/50. Weight on admission 120 lsb  10/20: Started Milrinone lactate 1/9ml/hr  10/24: Current weight 142 lbs. Gain 4 lbs since admission. Net I/O since admission (10/24): -4.6L. Tele showed A fib with RVR. EKG was ordered show A. Fib with premature ventricular conduction complex. Plan: Cont Milrinone, monitor I/O   10/25: discuss with Hazel Degroot that patient will be consider for AICD at this time. Can consider lifevest if patient able to operate. 10/26: discuss with family last night about AICD vs Cande Manual in which patient may not be a candidate for AICD. Family would like to discuss with Hazel directly. 10/27: Cardiologist consult appreciated - discussed with patient family which addressed that no obvious cardiac cause reduced heart function. Thus cardiology will wait for recovery prior to considering any intervention such as LifeVest versus AICD. Patient also required to be on appropriate medication for 2 to 3 months prior to recheck ORI and making decision about AICD. For now patient is not a candidate for LifeVest since she is sedated and intubated. Therefore she cannot operate LifeVest by herself with a general requirement.      8. Hx of Prolong QTc interval: On telemetry strip in ED. Seen by cardiogy for Prolong QTC. Plan: tele monitor, avoid QTC prolong medications, Keep K +> 4 and Mg > 2     9. New on set Afib without RVR: [id number] 5. Cont Eliquis, Metoprolol 25 mg BID (Can't take Amiodarone due to QTC prolongation)   10/27: Patient develop sinus bradycardia. Plan: hold Metoprolol     10. HERIBERTO: Improve. BUN/Cr  74/1.7 <-- 70/2.4. Base line BUN/Cr 29/0.9. Likely secondary to Cardiorenal syndrome. Plan: avoid nephrotoxicity, cont gentle fluid hydration 50ml/hr. Nephrologist follow the case. 10/24: Good urine output 850 ml last 24 hrs. Response to Bumex.  Plan: cont avoid nephrotoxicity    11. Hyperkalemia: Resolve. K+ 5.3. Plan: calcium gluconate, Lokelma     12. UTI: UA with microscopic (10/18) positive leukocyte, > 200 WBC and bacterial. Plan: Start Ceftriaxone (10/19)     13. AMS: likely secondary to hypercapnic hypoxemia. Plan: CT head when patient is stable, Neuro exam when patient is extubate     14. Iron deficiency anemia: Iron replacement      Critical condition, guarded prognosis  Critical care time 37 min apart from procedure.

## 2021-10-30 NOTE — PROGRESS NOTES
[address]  [id number] [place] [id number]  Dept: [phone]  Loc: [phone]  Visit Date: 10/11/2021  Urology Progress Note    Reason for Consult:  Kidney stone + hematouria (Hold Eliquis 10/28)  Requesting Physician:  George Perkins     History Obtained From: nurse, electronic medical record     Chief Complaint: worsening SOB    Subjective: \"  Intubated, on pressor. Urine is pink tinged, draining well, [id number]    Will get CT today        Vitals:  BP (!) 120/57   Pulse 76   Temp 100.2 °F (37.9 °C) (Bladder)   Resp 25   Ht 5' (1.524 m)   Wt 144 lb 2.9 oz (65.4 kg)   SpO2 97%   BMI 28.16 kg/m²   Temp  Av.5 °F (37.5 °C)  Min: 98.8 °F (37.1 °C)  Max: 100.2 °F (37.9 °C)    Intake/Output Summary (Last 24 hours) at 10/30/2021 0949  Last data filed at 10/30/2021 0442  Gross per 24 hour   Intake 1940 ml   Output 970 ml   Net 970 ml       Social History     Socioeconomic History    Marital status:      Spouse name: Not on file    Number of children: Not on file    Years of education: Not on file    Highest education level: Not on file   Occupational History    Not on file   Tobacco Use    Smoking status: Former Smoker     Packs/day: 1.00     Years: 15.00     Pack years: 15.00     Types: Cigarettes     Quit date: 12/10/1977     Years since quittin.9    Smokeless tobacco: Never Used   Substance and Sexual Activity    Alcohol use: No    Drug use: No    Sexual activity: Not on file   Other Topics Concern    Not on file   Social History Narrative    Not on file     Social Determinants of Health     Financial Resource Strain:     Difficulty of Paying Living Expenses:    Food Insecurity:     Worried About Running Out of Food in the Last Year:     [address] in the Last Year:    Transportation Needs:     Lack of Transportation (Medical):      Lack of Transportation (Non-Medical):    Physical Activity:     Days of Exercise per Week:     Minutes of Exercise per Session:    Stress:     Feeling of Stress :    Social Connections:     Frequency of Communication with Friends and Family:     Frequency of Social Gatherings with Friends and Family:     Attends [employer] Services:     Active Member of Clubs or Organizations:     Attends Club or Organization Meetings:     Marital Status:    Intimate Partner Violence:     Fear of Current or Ex-Partner:     Emotionally Abused:     Physically Abused:     Sexually Abused:      No family history on file. Allergies   Allergen Reactions    Sulfa Antibiotics     Gluten Meal Diarrhea    Metronidazole Rash and Hives    Milk-Related Compounds Diarrhea       Objective:    Constitutional:               Alert, no acute distress and cooperative to examination with appropriate mood and affect. HEENT:   Head:               Normocephalic and atraumatic. Mouth/Throat:                Mucous membranes are normal.   Eyes:               EOM are normal. No scleral icterus. Nose:               The external appearance of the nose is normal  Ears: The ears appear normal to external inspection   Neck:               Supple, symmetrical, trachea midline, no adenopathy, thyroid symmetric, not enlarged and no tenderness. Cardiovascular:               Normal rate, regular rhythm, S1 S2 heart sounds. Pulmonary/Chest:              Chest symmetric with normal A/P diameter, no wheezes, rales, or rhonchi noted. Normal respiratory rate and rhthym. No use of accessory muscles. Abdominal:               Soft. No tenderness. Active bowel sounds. Genitalia:               Hendrix catheter draining pink tinged urine  Extremities:               ++BLL edema.   Neurological:               Alert, eyes open, trying to form words, but intubated    Labs:  WBC:    Lab Results   Component Value Date    WBC 6.9 10/30/2021     Hemoglobin/Hematocrit:    Lab Results   Component Value Date    HGB 8.4 10/30/2021    HCT 28.0 10/30/2021     BMP:    Lab Results   Component Value Date     10/30/2021    K 4.0 10/30/2021    K 4.0 10/30/2021     10/30/2021    CO2 26 10/30/2021    BUN 66 10/30/2021    LABALBU 2.7 10/15/2021    LABALBU 3.9 08/10/2011    CREATININE 1.2 10/30/2021    CALCIUM 8.9 10/30/2021    GFRAA >60 07/18/2019    LABGLOM 43 10/30/2021       IMPRESSION/PLAN:  Discussed with nurse, plan for CT today  BEL shows 7mm renal calculus, she is also having hematuria. Will obtain CT abd/pelvis to complete hematuria workup and evaluate possible stone. No hydronephrosis seen    Ux with no growth 10/25/21    Hematuria -  Hand irrigated, no clots. Urine is translucent pink. Can hand irrigate as needed for clots.   HGB stable     Will continue to follow  SERGE Vega - CNP, APRN  10/30/21 9:49 AM  Urology

## 2021-10-31 ENCOUNTER — APPOINTMENT (OUTPATIENT)
Dept: GENERAL RADIOLOGY | Age: 86
DRG: 004 | End: 2021-10-31
Payer: MEDICARE

## 2021-10-31 LAB
ANION GAP SERPL CALCULATED.3IONS-SCNC: 9 MEQ/L (ref 8–16)
BUN BLDV-MCNC: 63 MG/DL (ref 7–22)
CALCIUM SERPL-MCNC: 9.1 MG/DL (ref 8.5–10.5)
CHLORIDE BLD-SCNC: 102 MEQ/L (ref 98–111)
CO2: 27 MEQ/L (ref 23–33)
CREAT SERPL-MCNC: 1.1 MG/DL (ref 0.4–1.2)
ERYTHROCYTE [DISTWIDTH] IN BLOOD BY AUTOMATED COUNT: 15.2 % (ref 11.5–14.5)
ERYTHROCYTE [DISTWIDTH] IN BLOOD BY AUTOMATED COUNT: 54.4 FL (ref 35–45)
GFR SERPL CREATININE-BSD FRML MDRD: 47 ML/MIN/1.73M2
GLUCOSE BLD-MCNC: 101 MG/DL (ref 70–108)
GLUCOSE BLD-MCNC: 117 MG/DL (ref 70–108)
HCT VFR BLD CALC: 27.9 % (ref 37–47)
HEMOGLOBIN: 8.6 GM/DL (ref 12–16)
MCH RBC QN AUTO: 30.5 PG (ref 26–33)
MCHC RBC AUTO-ENTMCNC: 30.8 GM/DL (ref 32.2–35.5)
MCV RBC AUTO: 98.9 FL (ref 81–99)
PLATELET # BLD: 141 THOU/MM3 (ref 130–400)
PMV BLD AUTO: 12.5 FL (ref 9.4–12.4)
POTASSIUM SERPL-SCNC: 4.1 MEQ/L (ref 3.5–5.2)
RBC # BLD: 2.82 MILL/MM3 (ref 4.2–5.4)
SODIUM BLD-SCNC: 138 MEQ/L (ref 135–145)
WBC # BLD: 6.4 THOU/MM3 (ref 4.8–10.8)

## 2021-10-31 PROCEDURE — 2580000003 HC RX 258: Performed by: STUDENT IN AN ORGANIZED HEALTH CARE EDUCATION/TRAINING PROGRAM

## 2021-10-31 PROCEDURE — 6370000000 HC RX 637 (ALT 250 FOR IP): Performed by: INTERNAL MEDICINE

## 2021-10-31 PROCEDURE — 2500000003 HC RX 250 WO HCPCS: Performed by: NURSE PRACTITIONER

## 2021-10-31 PROCEDURE — 36415 COLL VENOUS BLD VENIPUNCTURE: CPT

## 2021-10-31 PROCEDURE — 82948 REAGENT STRIP/BLOOD GLUCOSE: CPT

## 2021-10-31 PROCEDURE — 6360000002 HC RX W HCPCS: Performed by: FAMILY MEDICINE

## 2021-10-31 PROCEDURE — 2100000000 HC CCU R&B

## 2021-10-31 PROCEDURE — 71045 X-RAY EXAM CHEST 1 VIEW: CPT

## 2021-10-31 PROCEDURE — 94003 VENT MGMT INPAT SUBQ DAY: CPT

## 2021-10-31 PROCEDURE — 80048 BASIC METABOLIC PNL TOTAL CA: CPT

## 2021-10-31 PROCEDURE — 99232 SBSQ HOSP IP/OBS MODERATE 35: CPT | Performed by: UROLOGY

## 2021-10-31 PROCEDURE — 6360000002 HC RX W HCPCS: Performed by: STUDENT IN AN ORGANIZED HEALTH CARE EDUCATION/TRAINING PROGRAM

## 2021-10-31 PROCEDURE — 99232 SBSQ HOSP IP/OBS MODERATE 35: CPT | Performed by: INTERNAL MEDICINE

## 2021-10-31 PROCEDURE — 85027 COMPLETE CBC AUTOMATED: CPT

## 2021-10-31 PROCEDURE — 2500000003 HC RX 250 WO HCPCS: Performed by: STUDENT IN AN ORGANIZED HEALTH CARE EDUCATION/TRAINING PROGRAM

## 2021-10-31 PROCEDURE — 99291 CRITICAL CARE FIRST HOUR: CPT | Performed by: INTERNAL MEDICINE

## 2021-10-31 RX ORDER — BUMETANIDE 0.25 MG/ML
1 INJECTION, SOLUTION INTRAMUSCULAR; INTRAVENOUS ONCE
Status: COMPLETED | OUTPATIENT
Start: 2021-10-31 | End: 2021-10-31

## 2021-10-31 RX ADMIN — Medication 17.6 MG: at 22:11

## 2021-10-31 RX ADMIN — LORAZEPAM 1 MG: 2 INJECTION INTRAMUSCULAR; INTRAVENOUS at 01:21

## 2021-10-31 RX ADMIN — SODIUM CHLORIDE 1.4 MCG/HR: 9 INJECTION, SOLUTION INTRAVENOUS at 08:43

## 2021-10-31 RX ADMIN — BUMETANIDE 1 MG: 0.25 INJECTION, SOLUTION INTRAMUSCULAR; INTRAVENOUS at 02:17

## 2021-10-31 RX ADMIN — SODIUM CHLORIDE 1 MCG/KG/HR: 9 INJECTION, SOLUTION INTRAVENOUS at 14:37

## 2021-10-31 RX ADMIN — SODIUM CHLORIDE, PRESERVATIVE FREE 10 ML: 5 INJECTION INTRAVENOUS at 09:20

## 2021-10-31 RX ADMIN — SODIUM CHLORIDE, PRESERVATIVE FREE 10 ML: 5 INJECTION INTRAVENOUS at 22:11

## 2021-10-31 RX ADMIN — SODIUM CHLORIDE 1.4 MCG/KG/HR: 9 INJECTION, SOLUTION INTRAVENOUS at 02:16

## 2021-10-31 RX ADMIN — SODIUM CHLORIDE 1.1 MCG/KG/HR: 9 INJECTION, SOLUTION INTRAVENOUS at 23:17

## 2021-10-31 RX ADMIN — LORAZEPAM 1 MG: 2 INJECTION INTRAMUSCULAR; INTRAVENOUS at 05:53

## 2021-10-31 RX ADMIN — LORAZEPAM 1 MG: 2 INJECTION INTRAMUSCULAR; INTRAVENOUS at 22:29

## 2021-10-31 RX ADMIN — LORAZEPAM 1 MG: 2 INJECTION INTRAMUSCULAR; INTRAVENOUS at 16:43

## 2021-10-31 RX ADMIN — CEFTRIAXONE SODIUM 1000 MG: 1 INJECTION, POWDER, FOR SOLUTION INTRAMUSCULAR; INTRAVENOUS at 16:55

## 2021-10-31 ASSESSMENT — PULMONARY FUNCTION TESTS
PIF_VALUE: 23
PIF_VALUE: 28
PIF_VALUE: 28
PIF_VALUE: 23
PIF_VALUE: 25

## 2021-10-31 NOTE — PROGRESS NOTES
SBT attempted at this time per Dr. Mary Anne Badillo. Patient instantly had increased RR of 30 and decreased tidal volumes. Patient placed back on PCV settings at this time.

## 2021-10-31 NOTE — PLAN OF CARE
Problem: Non-Violent Restraints  Goal: Removal from restraints as soon as assessed to be safe  Outcome: Ongoing  Note: Patient still requiring restraints due to puling at lines and tubes     Care plan reviewed with patient. Patient unable to  verbalize understanding of the plan of care due to critical status.

## 2021-10-31 NOTE — PROGRESS NOTES
[facility]         Renal Inpatient Follow-Up note         10/31/2021 11:50 AM    Pt Name:   Edita Moran  YOB: 1934  Attending:   Lily iMddleton MD    Chief Complaint : Edita Moran is a 80 y.o. female being followed by nephrology for acute kidney injury    Interval History :   Patient seen and examined by me. No distress  Intubated and cannot accurately assess history or review of systems  She is off pressors. She is only on 30% FiO2     Scheduled Medications :    potassium (CARDIAC) replacement protocol   Other RX Placeholder    [Held by provider] bumetanide  1 mg IntraVENous Q12H    cefTRIAXone (ROCEPHIN) IV  1,000 mg IntraVENous Q24H    senna  10 mL Per NG tube Nightly    [Held by provider] lactulose  20 g Oral TID    [Held by provider] metoprolol tartrate  25 mg Oral BID    [Held by provider] apixaban  2.5 mg Oral BID    sodium chloride flush  5-40 mL IntraVENous 2 times per day      dextrose 100 mL/hr (10/26/21 1253)    vasopressin (Septic Shock) infusion Stopped (10/31/21 0243)    dexmedetomidine 1.3 mcg/kg/hr (10/31/21 0958)    norepinephrine Stopped (10/26/21 0820)    milrinone 0.125 mcg/kg/min (10/30/21 1628)    [Held by provider] sodium chloride Stopped (10/19/21 1900)    sodium chloride         Vitals :  BP (!) 99/51   Pulse 63   Temp 98.1 °F (36.7 °C)   Resp 20   Ht 5' (1.524 m)   Wt 145 lb 4.5 oz (65.9 kg)   SpO2 95%   BMI 28.37 kg/m²     24HR INTAKE/OUTPUT:      Intake/Output Summary (Last 24 hours) at 10/31/2021 1150  Last data filed at 10/31/2021 0432  Gross per 24 hour   Intake 2153 ml   Output 1975 ml   Net 178 ml     Last 3 weights  Wt Readings from Last 3 Encounters:   10/31/21 145 lb 4.5 oz (65.9 kg)   10/07/21 125 lb (56.7 kg)           Physical Exam : Limited due to COVID-19 isolation status  General Appearance:  Well developed.  No distress  Mouth/Throat: ET tube in place  Neck: No accessory muscle use  CNS-not responding to verbal commands  Psych not agitated  Musculoskeletal:  Edema -mild edema noted         Last 3 CBC   Recent Labs     10/29/21  0351 10/30/21  0410 10/31/21  0440   WBC 8.0 6.9 6.4   RBC 2.89* 2.77* 2.82*   HGB 8.9* 8.4* 8.6*   HCT 28.6* 28.0* 27.9*    150 141     Last 3 CMP  Recent Labs     10/29/21  0351 10/29/21  0815 10/30/21  0410 10/31/21  0440     --  139 138   K 3.4*  3.4*   < > 4.0  4.0 4.1     --  102 102   CO2 25  --  26 27   BUN 70*  --  66* 63*   CREATININE 1.2  --  1.2 1.1   CALCIUM 8.9  --  8.9 9.1    < > = values in this interval not displayed. ASSESSMENT / Plan   1 Renal -acute kidney injury appears to be resolved  ? However he still has elevated BUN  ? Not on any IV fluids follow for now  ? Creatinine stable around 1.1    2 Electrolytes -limits hypokalemia improved  3 Hypotension still borderline but currently off pressors  4 Ventilator dependent respiratory failure due to COVID-19 pneumonia  5 Nephrolithiasis awaiting a CT scan of the abdomen and pelvis  6 Meds reviewed     Dr. Marisol Sarah MD, M,D.  [facility].

## 2021-10-31 NOTE — PROGRESS NOTES
Substance and Sexual Activity    Alcohol use: No    Drug use: No    Sexual activity: Not on file   Other Topics Concern    Not on file   Social History Narrative    Not on file     Social Determinants of Health     Financial Resource Strain:     Difficulty of Paying Living Expenses:    Food Insecurity:     Worried About Running Out of Food in the Last Year:     [address] in the Last Year:    Transportation Needs:     Lack of Transportation (Medical):  Lack of Transportation (Non-Medical):    Physical Activity:     Days of Exercise per Week:     Minutes of Exercise per Session:    Stress:     Feeling of Stress :    Social Connections:     Frequency of Communication with Friends and Family:     Frequency of Social Gatherings with Friends and Family:     Attends Mandaen Services:     Active Member of Clubs or Organizations:     Attends Club or Organization Meetings:     Marital Status:    Intimate Partner Violence:     Fear of Current or Ex-Partner:     Emotionally Abused:     Physically Abused:     Sexually Abused:      No family history on file. Allergies   Allergen Reactions    Sulfa Antibiotics     Gluten Meal Diarrhea    Metronidazole Rash and Hives    Milk-Related Compounds Diarrhea       Objective:    Constitutional:               Alert, no acute distress and cooperative to examination with appropriate mood and affect. HEENT:   Head:               Normocephalic and atraumatic. Mouth/Throat:                Mucous membranes are normal.   Eyes:               EOM are normal. No scleral icterus. Nose:               The external appearance of the nose is normal  Ears: The ears appear normal to external inspection   Neck:               Supple, symmetrical, trachea midline, no adenopathy, thyroid symmetric, not enlarged and no tenderness. Cardiovascular:               Normal rate, regular rhythm, S1 S2 heart sounds.     Pulmonary/Chest:              Chest symmetric with normal A/P diameter, no wheezes, rales, or rhonchi noted. Normal respiratory rate and rhthym. No use of accessory muscles. Abdominal:               Soft. No tenderness. Active bowel sounds. Genitalia:               Hendrix catheter draining pink tinged urine  Extremities:               ++BLL edema. Neurological:               Alert, eyes open, trying to form words, but intubated    Labs:  WBC:    Lab Results   Component Value Date    WBC 6.4 10/31/2021     Hemoglobin/Hematocrit:    Lab Results   Component Value Date    HGB 8.6 10/31/2021    HCT 27.9 10/31/2021     BMP:    Lab Results   Component Value Date     10/31/2021    K 4.1 10/31/2021    K 4.0 10/30/2021     10/31/2021    CO2 27 10/31/2021    BUN 63 10/31/2021    LABALBU 2.7 10/15/2021    LABALBU 3.9 08/10/2011    CREATININE 1.1 10/31/2021    CALCIUM 9.1 10/31/2021    GFRAA >60 07/18/2019    LABGLOM 47 10/31/2021       IMPRESSION/PLAN:  No stone seen on CT, no hydronephrosis, no mass no signs of bleeding. Only cyst on left kidney    Ux with no growth 10/25/21    Hematuria - resolved.   Ok to restart anticoagulants     Urology signing off, please contact us with any questions or concerns    SERGE Irizarry - JASEN, SERGE  10/31/21 9:09 AM  Urology

## 2021-11-01 ENCOUNTER — APPOINTMENT (OUTPATIENT)
Dept: GENERAL RADIOLOGY | Age: 86
DRG: 004 | End: 2021-11-01
Payer: MEDICARE

## 2021-11-01 ENCOUNTER — APPOINTMENT (OUTPATIENT)
Dept: INTERVENTIONAL RADIOLOGY/VASCULAR | Age: 86
DRG: 004 | End: 2021-11-01
Payer: MEDICARE

## 2021-11-01 LAB
ANION GAP SERPL CALCULATED.3IONS-SCNC: 7 MEQ/L (ref 8–16)
ANION GAP SERPL CALCULATED.3IONS-SCNC: 7 MEQ/L (ref 8–16)
BASE EXCESS MIXED: 2.1 MMOL/L (ref -2–3)
BUN BLDV-MCNC: 55 MG/DL (ref 7–22)
BUN BLDV-MCNC: 57 MG/DL (ref 7–22)
CALCIUM SERPL-MCNC: 8.9 MG/DL (ref 8.5–10.5)
CALCIUM SERPL-MCNC: 9.2 MG/DL (ref 8.5–10.5)
CHLORIDE BLD-SCNC: 106 MEQ/L (ref 98–111)
CHLORIDE BLD-SCNC: 108 MEQ/L (ref 98–111)
CO2: 28 MEQ/L (ref 23–33)
CO2: 30 MEQ/L (ref 23–33)
COLLECTED BY:: ABNORMAL
CREAT SERPL-MCNC: 1.1 MG/DL (ref 0.4–1.2)
CREAT SERPL-MCNC: 1.2 MG/DL (ref 0.4–1.2)
DEVICE: ABNORMAL
ERYTHROCYTE [DISTWIDTH] IN BLOOD BY AUTOMATED COUNT: 15.3 % (ref 11.5–14.5)
ERYTHROCYTE [DISTWIDTH] IN BLOOD BY AUTOMATED COUNT: 54.8 FL (ref 35–45)
FIO2, MIXED VENOUS: 30
GFR SERPL CREATININE-BSD FRML MDRD: 43 ML/MIN/1.73M2
GFR SERPL CREATININE-BSD FRML MDRD: 47 ML/MIN/1.73M2
GLUCOSE BLD-MCNC: 110 MG/DL (ref 70–108)
GLUCOSE BLD-MCNC: 111 MG/DL (ref 70–108)
GLUCOSE, WHOLE BLOOD: 97 MG/DL (ref 70–108)
HCO3, MIXED: 26 MMOL/L (ref 23–28)
HCT VFR BLD CALC: 27.6 % (ref 37–47)
HEMOGLOBIN: 8.5 GM/DL (ref 12–16)
MAGNESIUM: 1.7 MG/DL (ref 1.6–2.4)
MAGNESIUM: 2.1 MG/DL (ref 1.6–2.4)
MCH RBC QN AUTO: 30.7 PG (ref 26–33)
MCHC RBC AUTO-ENTMCNC: 30.8 GM/DL (ref 32.2–35.5)
MCV RBC AUTO: 99.6 FL (ref 81–99)
O2 SAT, MIXED: 59 %
PCO2, MIXED VENOUS: 38 MMHG (ref 41–51)
PH, MIXED: 7.44 (ref 7.31–7.41)
PHOSPHORUS: 2.5 MG/DL (ref 2.4–4.7)
PHOSPHORUS: 2.8 MG/DL (ref 2.4–4.7)
PLATELET # BLD: 150 THOU/MM3 (ref 130–400)
PMV BLD AUTO: 12.6 FL (ref 9.4–12.4)
PO2 MIXED: 30 MMHG (ref 25–40)
POTASSIUM SERPL-SCNC: 3.7 MEQ/L (ref 3.5–5.2)
POTASSIUM SERPL-SCNC: 3.9 MEQ/L (ref 3.5–5.2)
PRO-BNP: ABNORMAL PG/ML (ref 0–1800)
PROCALCITONIN: 0.21 NG/ML (ref 0.01–0.09)
RBC # BLD: 2.77 MILL/MM3 (ref 4.2–5.4)
SITE: ABNORMAL
SODIUM BLD-SCNC: 143 MEQ/L (ref 135–145)
SODIUM BLD-SCNC: 143 MEQ/L (ref 135–145)
WBC # BLD: 5.4 THOU/MM3 (ref 4.8–10.8)

## 2021-11-01 PROCEDURE — 82803 BLOOD GASES ANY COMBINATION: CPT

## 2021-11-01 PROCEDURE — 94003 VENT MGMT INPAT SUBQ DAY: CPT

## 2021-11-01 PROCEDURE — 84100 ASSAY OF PHOSPHORUS: CPT

## 2021-11-01 PROCEDURE — 82947 ASSAY GLUCOSE BLOOD QUANT: CPT

## 2021-11-01 PROCEDURE — 6360000002 HC RX W HCPCS: Performed by: INTERNAL MEDICINE

## 2021-11-01 PROCEDURE — 6370000000 HC RX 637 (ALT 250 FOR IP): Performed by: INTERNAL MEDICINE

## 2021-11-01 PROCEDURE — 97530 THERAPEUTIC ACTIVITIES: CPT

## 2021-11-01 PROCEDURE — 83735 ASSAY OF MAGNESIUM: CPT

## 2021-11-01 PROCEDURE — 80048 BASIC METABOLIC PNL TOTAL CA: CPT

## 2021-11-01 PROCEDURE — 71045 X-RAY EXAM CHEST 1 VIEW: CPT

## 2021-11-01 PROCEDURE — 2580000003 HC RX 258: Performed by: STUDENT IN AN ORGANIZED HEALTH CARE EDUCATION/TRAINING PROGRAM

## 2021-11-01 PROCEDURE — 83880 ASSAY OF NATRIURETIC PEPTIDE: CPT

## 2021-11-01 PROCEDURE — 99232 SBSQ HOSP IP/OBS MODERATE 35: CPT | Performed by: INTERNAL MEDICINE

## 2021-11-01 PROCEDURE — 84145 PROCALCITONIN (PCT): CPT

## 2021-11-01 PROCEDURE — 2500000003 HC RX 250 WO HCPCS: Performed by: STUDENT IN AN ORGANIZED HEALTH CARE EDUCATION/TRAINING PROGRAM

## 2021-11-01 PROCEDURE — 85027 COMPLETE CBC AUTOMATED: CPT

## 2021-11-01 PROCEDURE — 2100000000 HC CCU R&B

## 2021-11-01 PROCEDURE — 93970 EXTREMITY STUDY: CPT

## 2021-11-01 PROCEDURE — 36415 COLL VENOUS BLD VENIPUNCTURE: CPT

## 2021-11-01 PROCEDURE — 97110 THERAPEUTIC EXERCISES: CPT

## 2021-11-01 PROCEDURE — 6360000002 HC RX W HCPCS: Performed by: FAMILY MEDICINE

## 2021-11-01 PROCEDURE — 36600 WITHDRAWAL OF ARTERIAL BLOOD: CPT

## 2021-11-01 PROCEDURE — 6370000000 HC RX 637 (ALT 250 FOR IP): Performed by: STUDENT IN AN ORGANIZED HEALTH CARE EDUCATION/TRAINING PROGRAM

## 2021-11-01 RX ORDER — MAGNESIUM SULFATE 1 G/100ML
1000 INJECTION INTRAVENOUS ONCE
Status: COMPLETED | OUTPATIENT
Start: 2021-11-01 | End: 2021-11-01

## 2021-11-01 RX ADMIN — POTASSIUM CHLORIDE 20 MEQ: 400 INJECTION, SOLUTION INTRAVENOUS at 17:01

## 2021-11-01 RX ADMIN — POTASSIUM CHLORIDE 20 MEQ: 400 INJECTION, SOLUTION INTRAVENOUS at 08:03

## 2021-11-01 RX ADMIN — LORAZEPAM 1 MG: 2 INJECTION INTRAMUSCULAR; INTRAVENOUS at 05:16

## 2021-11-01 RX ADMIN — SODIUM CHLORIDE 1.1 MCG/KG/HR: 9 INJECTION, SOLUTION INTRAVENOUS at 05:15

## 2021-11-01 RX ADMIN — MAGNESIUM SULFATE HEPTAHYDRATE 1000 MG: 1 INJECTION, SOLUTION INTRAVENOUS at 13:39

## 2021-11-01 RX ADMIN — LORAZEPAM 1 MG: 2 INJECTION INTRAMUSCULAR; INTRAVENOUS at 21:26

## 2021-11-01 RX ADMIN — Medication 17.6 MG: at 21:34

## 2021-11-01 RX ADMIN — SODIUM CHLORIDE 1.2 MCG/KG/HR: 9 INJECTION, SOLUTION INTRAVENOUS at 11:09

## 2021-11-01 RX ADMIN — SODIUM CHLORIDE 1.4 MCG/KG/HR: 9 INJECTION, SOLUTION INTRAVENOUS at 22:27

## 2021-11-01 RX ADMIN — ACETAMINOPHEN 650 MG: 325 TABLET ORAL at 08:03

## 2021-11-01 RX ADMIN — SODIUM CHLORIDE 1.3 MCG/KG/HR: 9 INJECTION, SOLUTION INTRAVENOUS at 17:01

## 2021-11-01 RX ADMIN — SODIUM CHLORIDE, PRESERVATIVE FREE 10 ML: 5 INJECTION INTRAVENOUS at 20:20

## 2021-11-01 RX ADMIN — SODIUM CHLORIDE, PRESERVATIVE FREE 10 ML: 5 INJECTION INTRAVENOUS at 08:03

## 2021-11-01 RX ADMIN — LORAZEPAM 1 MG: 2 INJECTION INTRAMUSCULAR; INTRAVENOUS at 14:21

## 2021-11-01 ASSESSMENT — PULMONARY FUNCTION TESTS
PIF_VALUE: 27
PIF_VALUE: 28
PIF_VALUE: 27
PIF_VALUE: 27

## 2021-11-01 ASSESSMENT — PAIN SCALES - GENERAL: PAINLEVEL_OUTOF10: 2

## 2021-11-01 ASSESSMENT — PAIN DESCRIPTION - PROGRESSION: CLINICAL_PROGRESSION: NOT CHANGED

## 2021-11-01 NOTE — PROGRESS NOTES
[facility]  INPATIENT PHYSICAL THERAPY  DAILY NOTE  [facility] CCU 3A - [id number]    Time In: 7338  Time Out: 1248  Timed Code Treatment Minutes: 25 Minutes  Minutes: 25   *Co-treatment with OT secondary to pt's high medical complexity, requiring x2 assist for all mobility tasks, and is unable to tolerate two separate therapy sessions at this time. Date: 2021  Patient Name: Aftab Dupree,  Gender:  female        MRN: [id number]  : 1934  (80 y.o.)     Referring Practitioner: Eulalio Albert DO  Diagnosis: Acute respiratory failure with hypoxia  Additional Pertinent Hx: Per chart review: Ms. Loren Mclaughlin is an 68-year-old female with a past medical history of anxiety, hypertension, arthritis, and a potentially new diagnosis of CHF. She states that she was vaccinated for Covid. She tested positive for Covid on 10/4/2021. Her shortness of breath has gotten progressively worse along with her fatigue. She states that in the past week she has noticed increased shortness of breath, increased swelling in her lower limbs, increased fatigue, and increasing orthopnea. Chest x-ray shows bilateral groundglass opacities with cardiomegaly. RR on 10/12 due to elevated HR with amnio started. Prior Level of Function:  Lives With: Daughter  Type of Home: House  Home Layout: One level  Home Equipment: Cane   Bathroom Shower/Tub: Tub/Shower unit  Bathroom Toilet: Standard  Bathroom Equipment: Shower chair    Receives Help From: Family  ADL Assistance: Independent  Homemaking Assistance: Independent  Ambulation Assistance: Independent  Transfer Assistance: Independent  Additional Comments: Pt reported would use cane for mobility. Pt reported being very active prior, exercises daily.     Restrictions/Precautions:  Restrictions/Precautions: Fall Risk, Isolation, Contact Precautions  Position Activity Restriction  Other position/activity restrictions: droplet +; Hard of hearing     SUBJECTIVE: RN

## 2021-11-01 NOTE — CARE COORDINATION
11/1/21, 2:57 PM EDT    DISCHARGE ON GOING EVALUATION    [facility] day: 21  Location: 3A-09/009-A Reason for admit: Acute respiratory failure with hypoxia ([facility] [address] [J96.01]  Acute congestive heart failure, unspecified heart failure type ([facility] [address] [I50.9]  COVID-19 [U07.1]   Procedure:   10/12 Echo with EF 25-30%; Small circumferential pericardial effusion with no tamponade physiology; Myxomatotic degeneration of mitral valve;  Mild to Moderate mitral regurgitation is present  10/15 Intubated  10/15 CVC RIJ  10/28 Renal US: Mildly increased echogenicity of the kidneys that may suggest underlying medical renal disease; no hydronephrosis; 7mm left renal calculus is seen; small ascites. Bilateral pleural effusions; bilateral renal cysts  10/30 CT Chest: Extensive bilateral pneumonia; Large bilateral pleural effusions, right greater than left; Large pericardial effusion; Marked cardiomegaly  10/30 CT Abd/pelvis: Diffusely thickened appearance of the sigmoid colon. There are diverticula in this region. This could be on the basis of acute diverticulitis, however given the eccentric appearance of the thickening, neoplastic process can't entirely be excluded; Haziness throughout the superficial subcutaneous soft tissues of the abdomen and pelvis consistent with anasarca; No evidence of hydronephrosis. No obstructive renal stones are identified; Diffuse haziness throughout the mesentery and a small amount of ascites, findings likely related to fluid overload  11/1 BLE venous doppler: Neg for DVT  11/1 CXR:   1. Moderate cardiomegaly. ET tube, NG tube, and right jugular line remain in good position. 2. Tiny bilateral pleural effusions. Findings of relatively severe pneumonia/edema involving both lungs diffusely.  Overall appearance not appreciably changed from yesterday     Barriers to Discharge: Remains on vent w/ETT on PCV, peep 6, PC 22, FIO2 30%, sats 97%. Tmax 99.9. NSR. Follows commands. PT/OT - early mobility. Intensivist and Nephrology following. Palliative Care and Dietitian following. Telemetry, CVC, OG w/TF, flexiseal, alaniz. Precedex @ 1.2 mcg/kg/hr, Primacor @ 0.0625 mcg/kg/min, prn IV ativan, Electrolyte replacement protocols. Pro-bnp [id number], procal 0.21, hgb 8.5. PCP: SERGE Davis CNP  Readmission Risk Score: 19.3%  Patient Goals/Plan/Treatment Preferences: From home w/daughter. Family deciding if want to proceed with tracheostomy placement. Met with Kourtney's three daughters and grandson. Discusses discharge options if they would decide to proceed with trach. Explained what LTACH offers vs SNF. Explained closest [facility] options including [place], [place], [facility], and [place]. Also explained that patient may require SNF after LTACH and if so, what those options are depending if she would require ventilator facility vs if no ventilator support needed at that time. Answered questions. Family states that if they decide to proceed with trach, they would want their mother to stay here at [address] in [place]. This CM reports we would do our best; however, it will all depend on bed availability at the time that their mother is stable for discharge to next level of care. Explained that they would need to give 1st and 2nd choice for LTACH in case [place] did not have a bed; they verbalize understanding. Family thanked this CM for the information. They then stated they would like to talk to the attending physician to get some questions answered from him. They state they have talked to the residents, but would prefer to speak directly with the attending at this time. This CM went to get Dr. Neri Patel, he was not present in the office, but NP Arnie Izquierdo was there. She states she will find Dr. Neri Patel and have him go to  to speak with the family. This CM went back to 3A to let them know he would be with them shortly.      1506 Received call from Mol, [address] daughter; states they spoke with Dr. Neri Patel and have decided to proceed with tracheostomy placement. Saman Randhawa states they would prefer [facility] in [address] [place] [address]. She states they will discuss and come up with a 2nd choice in case [place] [address] [place] [address] does not have beds. 1509 Received call from Torrie, [address] [facility] oldest daughter; states they have decided they would prefer [place] for backup plan, but are hoping she can stay here in [address] [place] [address]. She states they don't really have a preference for which facility in [place], as they are not familiar with either one there. Referral called to [facility], [address].

## 2021-11-01 NOTE — PROGRESS NOTES
increase her endurance and challenge balance for ADL tasks max cues to bring head up     BED MOBILITY:  Supine to Sit: Maximum Assistance, X 2    Sit to Supine: Maximum Assistance, X 2      TRANSFERS:  Not appropriate       FUNCTIONAL MOBILITY:  Not approprite      ASSESSMENT:     Activity Tolerance:  Patient tolerance of  treatment: poor. Discharge Recommendations: Recommend subacute/skilled nursing facility  Equipment Recommendations: Other: will continue to assess pending progress  Plan: Times per week: 3-5x  Specific instructions for Next Treatment: Functional transfers as able; ADLs and sitting balance; UE ROM exercises and endurance training; edema control  Current Treatment Recommendations: Strengthening, Balance Training, Functional Mobility Training, Endurance Training, Patient/Caregiver Education & Training, Self-Care / ADL, Home Management Training, Safety Education & Training, Cognitive Reorientation  Plan Comment: Pt would benefit from continued skilled OT services when medically stable and discharged from Acute. OT recommended while pt is at [facility]. Patient Education  Patient Education: increasing activity    Goals  Short term goals  Time Frame for Short term goals: by discharge  Short term goal 1: Pt will demonstrate functional transfers with OTR in prep for ADL completion while out of bed. Angelica Neer term goal 2: Pt will complete upper body ADL tasks with MIN A to increase independence with self care. Short term goal 3: Pt will tolerate dynamic sitting >10 minutes with MIN A to increase her endurnace for ease of doing self care. Short term goal 4: Pt will be oriented to the day her goals and progress daily with cues as needed to increase her safety and facilitate progress. Following session, patient left in safe position with all fall risk precautions in place.

## 2021-11-01 NOTE — PROGRESS NOTES
Renal Progress Note    Assessment and Plan:   1. Acute kidney injury resolving and  predominantly prerenal  2. SARS-CoV-2 pneumonia  3. Respiratory failure on mechanical support  4. Deconditioning  5. Normocytic anemia    PLAN:  1. Labs reviewed  2. Serum creatinine is okay  3. BUN remains relatively high  4. Medications reviewed  5. No changes  6. Labs in the morning  7.  We will follow    Patient Active Problem List:     Sigmoid diverticulitis     Acute respiratory failure with hypoxia ([facility] [address]     COVID-19     Acute congestive heart failure ([facility] [address]      Subjective:   Admit Date: 10/11/2021    Interval History:   Seen for acute kidney injury  Remains on mechanical support  No issues from staff  Stable but low end of normal blood pressure  Good urine output      Medications:   Scheduled Meds:   potassium (CARDIAC) replacement protocol   Other RX Placeholder    [Held by provider] bumetanide  1 mg IntraVENous Q12H    senna  10 mL Per NG tube Nightly    [Held by provider] lactulose  20 g Oral TID    [Held by provider] metoprolol tartrate  25 mg Oral BID    [Held by provider] apixaban  2.5 mg Oral BID    sodium chloride flush  5-40 mL IntraVENous 2 times per day     Continuous Infusions:   dextrose 100 mL/hr (10/26/21 1253)    vasopressin (Septic Shock) infusion Stopped (10/31/21 0243)    dexmedetomidine 1.2 mcg/kg/hr (11/01/21 1109)    norepinephrine Stopped (10/26/21 0820)    milrinone 0.0625 mcg/kg/min (11/01/21 0103)    [Held by provider] sodium chloride Stopped (10/19/21 1900)    sodium chloride         CBC:   Recent Labs     10/30/21  0410 10/31/21  0440 11/01/21  0620   WBC 6.9 6.4 5.4   HGB 8.4* 8.6* 8.5*    141 150     CMP:    Recent Labs     10/30/21  0410 10/31/21  0440 11/01/21  0620    138 143   K 4.0  4.0 4.1 3.7    102 106   CO2 26 27 30   BUN 66* 63* 57*   CREATININE 1.2 1.1 1.1   GLUCOSE 108 117* 110*   CALCIUM 8.9 9.1 9.2   LABGLOM 43* 47* 47*     Troponin: No results for input(s): TROPONINI in the last 72 hours. BNP: No results for input(s): BNP in the last 72 hours. INR: No results for input(s): INR in the last 72 hours. Lipids: No results for input(s): CHOL, LDLDIRECT, TRIG, HDL, AMYLASE, LIPASE in the last 72 hours. Liver: No results for input(s): AST, ALT, ALKPHOS, PROT, LABALBU, BILITOT in the last 72 hours. Invalid input(s): BILDIR  Iron:  No results for input(s): IRONS, FERRITIN in the last 72 hours. Invalid input(s): LABIRONS  XR CHEST PORTABLE   Final Result   1. Moderate cardiomegaly. ET tube, NG tube, and right jugular line remain in good position. 2. Tiny bilateral pleural effusions. Findings of relatively severe pneumonia/edema involving both lungs diffusely. Overall appearance not appreciably changed from yesterday. **This report has been created using voice recognition software. It may contain minor errors which are inherent in voice recognition technology. **      Final report electronically signed by Dr. Carolina Becerra on 11/1/2021 9:36 AM      VL DUP LOWER EXTREMITY VENOUS BILATERAL   Final Result   No evidence of a DVT. **This report has been created using voice recognition software. It may contain minor errors which are inherent in voice recognition technology. **      Final report electronically signed by Dr. Twin Ulloa on 11/1/2021 8:07 AM      XR CHEST PORTABLE   Final Result   No significant interval change since previous study dated 29th of October 2021. Sherryle Moder **This report has been created using voice recognition software. It may contain minor errors which are inherent in voice recognition technology. **      Final report electronically signed by DR Deepti Beasley on 10/31/2021 12:43 PM      CT ABDOMEN PELVIS WO CONTRAST   Final Result      1. Diffusely thickened appearance of the sigmoid colon. There are diverticula in this region.  This could be on the basis of acute diverticulitis, however given the eccentric appearance of the thickening, neoplastic process can't entirely be excluded. 2. Haziness throughout the superficial subcutaneous soft tissues of the abdomen and pelvis consistent with anasarca. 3. No evidence of hydronephrosis. No obstructive renal stones are identified. 4. Diffuse haziness throughout the mesentery and a small amount of ascites, findings likely related to fluid overload. **This report has been created using voice recognition software. It may contain minor errors which are inherent in voice recognition technology. **      Final report electronically signed by Dr Dre Holden on 10/30/2021 2:23 PM      CT CHEST W WO CONTRAST   Final Result       1. Extensive bilateral pneumonia. 2. Large bilateral pleural effusions, right greater than left. 3. Large pericardial effusion. 4. Marked cardiomegaly. **This report has been created using voice recognition software. It may contain minor errors which are inherent in voice recognition technology. **      Final report electronically signed by Dr Dre Holden on 10/30/2021 1:54 PM      XR CHEST PORTABLE   Final Result   1. Mildly megaly. ET tube and NG tube remain in good position. Right jugular line with catheter tip in SVC. 2. Moderate-sized pleural effusion left side. 3. Moderate mixed infiltrates scattered throughout both lungs. Overall appearance slightly worse than prior. **This report has been created using voice recognition software. It may contain minor errors which are inherent in voice recognition technology. **      Final report electronically signed by Dr. Juan Carlos Allen on 10/29/2021 8:10 AM      US RENAL COMPLETE   Final Result   1. Mildly increased echogenicity of the kidneys that may suggest underlying medical renal disease. 2. No hydronephrosis. 3. A 7 mm left renal calculus is seen. 4. Small ascites. Bilateral pleural effusions. 5. Bilateral renal cysts.             **This report has been created using voice recognition software. It may contain minor errors which are inherent in voice recognition technology. **      Final report electronically signed by Dr Jenni Shepherd on 10/29/2021 3:55 AM      XR ABDOMEN FOR NG/OG/NE TUBE PLACEMENT   Final Result   1. The tip of the nasogastric tube is below the diaphragms within the stomach. 2. Partially seen is left pleural effusion and left retrocardiac opacity. Opacities are also seen in the partially seen right lower lobe. **This report has been created using voice recognition software. It may contain minor errors which are inherent in voice recognition technology. **      Final report electronically signed by Dr Jenni Shepherd on 10/27/2021 6:54 PM      XR CHEST PORTABLE   Final Result   1. There has been no significant interval change in the radiographic appearance of the chest  from 10/23/2021 including diffuse airspace disease. .            **This report has been created using voice recognition software. It may contain minor errors which are inherent in voice recognition technology. **      Final report electronically signed by Dr Jenni Shepherd on 10/24/2021 8:54 PM      XR CHEST PORTABLE   Final Result   1. Support lines and tubes in unchanged position when compared to prior. 2. Interval worsening of parenchymal densities within the right    central/right lower lobe. Additionally, worsening of confluent    consolidation within the left midlung with development of left basilar    pleural/parenchymal opacities likely consistent with moderate left pleural    effusion. This document has been electronically signed by: Rachid Evans DO on    10/23/2021 05:00 AM      XR CHEST PORTABLE   Final Result   Persistent diffuse patchy opacities but with improved aeration at the lung bases. **This report has been created using voice recognition software.  It may contain minor errors which are inherent in voice recognition using voice recognition software. It may contain minor errors which are inherent in voice recognition technology. **      Final report electronically signed by Dr. Kristian Rivera on 10/11/2021 8:59 PM      XR CHEST PORTABLE   Final Result      1. Diffuse groundglass opacities in both lungs along with cardiomegaly. Findings likely suggest acute exacerbation of congestive heart failure versus pneumonia in the right clinical setting. Clinical correlation is recommended      2. Small left pleural effusion. 3. Other findings as described above. **This report has been created using voice recognition software. It may contain minor errors which are inherent in voice recognition technology. **      Final report electronically signed by Dr Hunter Holm on 10/11/2021 11:56 AM            Objective:   Vitals: BP (!) 114/55   Pulse 61   Temp 98.8 °F (37.1 °C) (Bladder)   Resp 20   Ht 5' (1.524 m)   Wt 145 lb 4.5 oz (65.9 kg)   SpO2 97%   BMI 28.37 kg/m²    Wt Readings from Last 3 Encounters:   10/31/21 145 lb 4.5 oz (65.9 kg)   10/07/21 125 lb (56.7 kg)      24HR INTAKE/OUTPUT:      Intake/Output Summary (Last 24 hours) at 11/1/2021 1151  Last data filed at 11/1/2021 1010  Gross per 24 hour   Intake 1800 ml   Output 3255 ml   Net -1455 ml       Constitutional: Well-developed elderly lady on mechanical support , no apparent distress   Skin:normal with no rash or lesions. HEENT:Pupils are reactive . Throat is clear . Oral mucosa is moist.  Endotracheal tube is noted. Orogastric tube is intact. Neck:supple with no adenopathy or carotid bruit  Cardiovascular:  S1, S2 without murmur or rubs   Respiratory:  Clear to ausculation without wheezes, rhonchi or rales in the anterior  Abdomen: Soft. Good bowel sounds.   Ext: Trace to 1+ bilateral LE edema  Musculoskeletal:Intact  Neuro: Deferred      Electronically signed by Nadeem Rolle MD on 11/1/2021 at 11:51 AM  **This report has been created using voice recognition software. It maycontain minor  errors which are inherent in voice recognition technology. **

## 2021-11-01 NOTE — PROGRESS NOTES
CRITICAL CARE PROGRESS NOTE      Patient:  Omid Singh    Unit/Bed:3A-09/009-A  YOB: 1934  MRN: [id number]   PCP: SERGE Cole - CNP  Date of Admission: 10/11/2021  Chief Complaint:- Worsening shortness of breath    Assessment and Plan:    1. Acute hypoxic respiratory failure: Patient was admitted to the hospital through the ED on 10/11/2021 and started on high flow. Patient's hypoxemia progressively worsened. 10/15 -intubated and admitted to ICU  10/31 - patient failed SBT dt severe respiratory muscle weakness  11/1 - there has been ongoing talks with the patient's family regarding need for trach and PEG tube for continuation of care vs hospice. Family requested to speak the attending physician today. Dr. Jackee Primrose has spoken with the patient and they are agreeable to trach and PEG placement. 2. Covid Pneumonia with superimposed bacterial pneumonia:    10/19 -respiratory culture positive for Corynebacterium. Plan vancomycin 6 days. Chest x-ray shows moderate sized pleural effusionon the left, moderate mixed infiltrate scattered throughout both lung. Overall appearance slightly worsened than prior. Episode of fever with temperature 100.4. Plan: Procal, lactic acid, pneumonia panel  10/29 - lactic acid 1.1, procal 0.24   11/1 - pending pneumonia panel ordered on 10/29    3. Hematuria: Secondary to nephrolithiasis. HAS-BLED score 4 high risk of major bleeding, patient was on eliquis for A.fib with [id number] 5, patient developed flank hematuria in which Hg 11.2 --> 8.4 over 3 day. The risk of bleeding outweight stroke. Plan: UA, Renal US, hold Eliquis 3 days, Restarted Eliquis back on 10/30 if patient H & H stable , Started SCD  10/28: Renal US (10/28) show mildly increased echogenicity of the kidneys that may suggest underlying medical renal disease. no hydronephrosis. A 7 mm left renal calculus is seen. Small ascites. Bilateral pleural effusions. Bilateral renal cysts.     10/29: urologist consult for nephrolithiasis.      4. ARDS: Patient received oxygen with FiO2 100 with very low TV (250ml) on ventilation. Ferritin, CRP, Lactic dehydrogenase elevated. Start Decadron, Baricitinib (10/12). Require pressor support with Vasopressin 0.02. Plan: cont vent support. 11/1 - currently Fi02 = 30, spo2 = 95% on PCV: rate 20, PIP 27, PEEP 6    5. Hypotension - improved. 11/1 - /62     6. ? Respiratory failure related to neuromuscular disease - DTR intact bilateral lower extremities which is less likely for Guillian Barré. Acetylcholine receptor blocking, modulating all negative. Plan: Continue conservative care    7.  HFrEF: Echo (10/11): LV EF 25-30%, LV severe global hypokinesis. LV size moderate increase. Patient is hypotension with BP 97/50. Weight on admission 120 lbs  10/20: Started Milrinone lactate 1/9ml/hr   10/24: Current weight 142 lbs. Gain 4 lbs since admission. Net I/O since admission (10/24): -4.6L. Tele showed A fib with RVR. EKG was ordered show A. Fib with premature ventricular conduction complex. Plan: Cont Milrinone, monitor I/O   10/27: Cardiologist consult appreciated -cardiology will wait for recovery prior to considering any intervention such as LifeVest versus AICD. 11/1 - patient continues to be on milrinone 0.0625     8. Hx of Prolong QTc interval: On telemetry strip in ED. Seen by cardiogy for Prolong QTC. Plan: tele monitor, avoid QTC prolong medications, Keep K +> 4 and Mg > 2     9. New on set Afib without RVR: [id number]. Cont Eliquis, Metoprolol 25 mg BID   10/27: Patient develop sinus bradycardia. Plan: hold Metoprolol     10. HERIBERTO: Improve. Nephrology following    11/1 - BUN/ Cr - 57/ 1.1     11. Hyperkalemia: Resolve. 11/1 - K+ 3.9     12. UTI: UA with microscopic (10/18) positive leukocyte, > 200 WBC and bacterial. Plan: Start Ceftriaxone (10/19)     13. AMS: likely secondary to hypercapnic hypoxemia.  Plan: CT head when patient is stable, Neuro exam when patient is extubate     14. Iron deficiency anemia: Iron replacement    15. Bilateral lower extremity swelling: r/o DVT - US negative on 11/1    INITIAL H AND P AND ICU COURSE:   57-year-old female presents to the ED on 10/11/2021 due to worsening shortness of breath. Patient tested positive for Covid on 10/4/2021. In the emergency room patient was found to have a SPO2 of 83% on room air with a BNP of the 15 point 7K. Patient was admitted and managed for acute hypoxic respiratory failure secondary to Covid with combination of congestive heart failure. Patient was placed on nasal cannula, Bumex and dexamethasone in the ED. On 10/15 patient was transferred to the ICU and intubated for acute hypoxic respiratory failure on 100% FiO2 on high flow. Patient had a UTI on 10/18 and was started on ceftriaxone on 10/19. Patient was also started on milrinone for her HFrEF. Patient's family has been consulted regarding trach and PEG tube. Cardiology is also on board regarding LifeVest versus AICD.     Past Medical History: HPI  Family History:  See EMR  Social History:  See EMR    ROS   Cannot obtain d/t patient sedated and on mechanical ventilation    Scheduled Meds:   potassium (CARDIAC) replacement protocol   Other RX Placeholder    [Held by provider] bumetanide  1 mg IntraVENous Q12H    senna  10 mL Per NG tube Nightly    [Held by provider] lactulose  20 g Oral TID    [Held by provider] metoprolol tartrate  25 mg Oral BID    [Held by provider] apixaban  2.5 mg Oral BID    sodium chloride flush  5-40 mL IntraVENous 2 times per day     Continuous Infusions:   dextrose 100 mL/hr (10/26/21 1253)    vasopressin (Septic Shock) infusion Stopped (10/31/21 0243)    dexmedetomidine 1.3 mcg/kg/hr (11/01/21 1701)    norepinephrine Stopped (10/26/21 0820)    milrinone 0.0625 mcg/kg/min (11/01/21 0103)    [Held by provider] sodium chloride Stopped (10/19/21 1900)    sodium chloride         PHYSICAL EXAMINATION:  T:  98.6F.  P:  76. RR:  25. B/P:  106/59  FiO2:  30. O2 Sat:  95.  I/O:  1028/ 865. Net = 163  Body mass index is 28.37 kg/m². GCS:   Unable to accurately obtain   PCV: Rate 20, PIP 27, PEEP 6    General:   Sedated on vent  HEENT:  normocephalic and atraumatic. No scleral icterus. PERR  Neck: supple. No Thyromegaly. Lungs: clear to auscultation. No retractions  Cardiac: RRR. No JVD. Abdomen: soft. Nontender. Extremities:  No clubbing, cyanosis, or edema x 4. Vasculature: capillary refill < 3 seconds. Palpable dorsalis pedis pulses. Skin:  warm and dry. Psych:  Patient sedated  Lymph:  No supraclavicular adenopathy. Neurologic:  No focal deficit. No seizures. Data: (All radiographs, tracings, PFTs, and imaging are personally viewed and interpreted unless otherwise noted).  Sodium 143, potassium 3.7, chloride 106, CO2 30, BUN 5.7, creatinine 1.1   Glucose 110   WBC 5.4, hemoglobin 8.5, hematocrit 27.6, platelets 315   Telemetry shows NSR    Seen with multidisciplinary ICU team   Meets Continued ICU Level Care Criteria:    [x] Yes   [] No - Transfer Planned to listed location:  [] HOSPITALIST CONTACTED- DR Ramos and plan discussed with Dr. Juma French.      Electronically signed by Deo Ball MD  CRITICAL CARE SPECIALIST

## 2021-11-01 NOTE — PROGRESS NOTES
ICU Progress Note  10/31/2021 8:17 PM  Subjective:   Admit Date: 10/11/2021  PCP: Cecy Pablo, SERGE - CNP  Interval History: Awake and alert but profoundly weak upper and lower extremities and respiratory muscles, SBT failed today. Diet: ADULT TUBE FEEDING; Nasogastric; Renal Formula; Continuous; 10; Yes; 10; Q 8 hours; 40; 100; Q 8 hours  Medications:   Scheduled Meds:   potassium (CARDIAC) replacement protocol   Other RX Placeholder    [Held by provider] bumetanide  1 mg IntraVENous Q12H    cefTRIAXone (ROCEPHIN) IV  1,000 mg IntraVENous Q24H    senna  10 mL Per NG tube Nightly    [Held by provider] lactulose  20 g Oral TID    [Held by provider] metoprolol tartrate  25 mg Oral BID    [Held by provider] apixaban  2.5 mg Oral BID    sodium chloride flush  5-40 mL IntraVENous 2 times per day     Continuous Infusions:   dextrose 100 mL/hr (10/26/21 1253)    vasopressin (Septic Shock) infusion Stopped (10/31/21 0243)    dexmedetomidine 1 mcg/kg/hr (10/31/21 1437)    norepinephrine Stopped (10/26/21 0820)    milrinone 0.125 mcg/kg/min (10/30/21 1628)    [Held by provider] sodium chloride Stopped (10/19/21 1900)    sodium chloride       CBC:   Recent Labs     10/29/21  0351 10/30/21  0410 10/31/21  0440   WBC 8.0 6.9 6.4   HGB 8.9* 8.4* 8.6*    150 141     BMP:    Recent Labs     10/29/21  0351 10/29/21  0815 10/30/21  0410 10/31/21  0440     --  139 138   K 3.4*  3.4*   < > 4.0  4.0 4.1     --  102 102   CO2 25  --  26 27   BUN 70*  --  66* 63*   CREATININE 1.2  --  1.2 1.1   GLUCOSE 123*  --  108 117*    < > = values in this interval not displayed. Hepatic: No results for input(s): AST, ALT, ALB, BILITOT, ALKPHOS in the last 72 hours. Troponin: No results for input(s): TROPONINI in the last 72 hours. BNP: No results for input(s): BNP in the last 72 hours. Lipids: No results for input(s): CHOL, HDL in the last 72 hours.     Invalid input(s): LDLCALCU  INR: No results for input(s): INR in the last 72 hours. Objective:   Vitals: /81   Pulse 93   Temp 98.6 °F (37 °C) (Bladder)   Resp 19   Ht 5' (1.524 m)   Wt 145 lb 4.5 oz (65.9 kg)   SpO2 95%   BMI 28.37 kg/m²   CONSTITUTIONAL:  sedative on vent  HEENT:  normocephalic and atraumatic.  No scleral icterus. PERR  Neck: supple.  No Thyromegaly. Lungs: clear to auscultation.  No retractions  Cardiac: RRR.  No JVD. Abdomen: soft.  Nontender. Extremities:  No clubbing, cyanosis, or edema x 4.    Vasculature: capillary refill < 3 seconds. Palpable dorsalis pedis pulses. Skin:  warm and dry. Lymph:  No supraclavicular adenopathy. Neurologic:  No focal deficit. No seizures    Assessment and Plan:       Lotus Reynaga is 80years old female who presented in [facility] for worsening SOB on 10/11. Patient reported received COVID vaccine. She was test positive for COVID in 10/4/21. Patient SOB progressive worsening that patient visit ED. In ED found to have O2 Sat 83% in room air with BNP 15.7k. Patient was admitted and manage for acute hypoxic respiratory failure likely due to combine CHF and COVID. Patient was placed on NC, Bumex, Dexamethasone. On 10/15, patient was transferred to ICU and intubated due to progressive hypoxic respirator failure on 100% FiO2 on high flow. Repeated CXR on 10/26 show improved vascular congestion. Patient has been afebrile since 10/15. UA on 10/18 positive leukocyte with >200 WBC and many bacterial. Patient was started on Ceftriaxone on 10/19 for UTI. Patient also started Milrinone for her HFrEF.  Patient remain significant low Tidal volume regardless diuretic and Milrinone.  On October 24, eliminating well.  Patient family were explained about patient heart failure condition, respiratory failure, acute kidney injury.  Patient family were explained that patient will need a trach tube due to severe respiratory muscle weakness.  However patient will increase risk with pneumonia, hospitalization if family decides to pursue way trach tube.  Family aware and would like to discuss with Dr. Cardenas Glasco cardiologist possible AICD. Family will be inform about the plan of care moving forward. On 10/25 Card team was contacted with plan for her HFrEF in which AICD will be placed when patient is clinically improved and stable. At now, patient wouldn't be candidate for the procedure. Thus LifeVest will be a option. Family is aware that to be able to survive patient need a Trach and a PEG in which is may again patient wish per patient daughter Yaritza Fritz. Family need time before final decision will be make  1. Acute hypoxic respiratory failure: Admitted on 10/11 and started HF. Progressive worsening hypoxemia. Intubation on 10/15. On 10/20, Family was discussed about trach and family could like to have few days for final decision on trach. Patient remain critical ill and require significant vent support. On 10/24, family meeting was held and family deferred trach tube for now. Family is aware that to be able placement survive, patient required a trach and a peg.  On October 27, patient family will discuss up and about final decision on trach and LTAC versus hospice care.  I discussed with patient daughter Yaritza Fritz about hunting family meeting for plan of care moving forward.  On October 28 patient family were in the room and discussed with patient about patient wish seen 1 of patient daughter believe that patient may not want trach tube placement.  Family still defer final decision. 10/29: remain critical illness require ventilation support.  Current vent setting pressure control 18, PEEP of 6, FiO2 30, tidal volume 300, RR 20. Plan: family need to make final decision whether trach or hospice. 10.30/31: Failing weaning process because of severe deconditioning.  Noted to have severe lower extremity edema but no edema upper extremities so ultrasound duplex of lower extremities venous rule out DVT ordered a.m.    2. Covid Pneumonia: Res. Culture (10/19) positive Corynebacterium. Plan: Vancomycin day 6 (Started date 10/24)  CXR (10/29): Moderate sized pleural effusion on the left, moderate mixed infiltrate scattered throughout both lung. Overall appearance slightly worsened than prior.  Continue COVID pneumonia protocol.     3. Hematuria: Secondary to nephrolithiasis.  HAS-BLED score 4 high risk of major bleeding, patient was on eliquis for A.fiv with [id number] 5, giving patient develop flank hematuria in which Hg 11.2 --> 8.4 over last 3 day. The risk of bleeding is out weight stroke. Plan: UA, Renal US, hold Eliquis 3 days, Restarted Eliquis back on 10/30 if patient H & H stable , Started SCD  10/28: Renal US (10/28) show mildly increased echogenicity of the kidneys that may suggest underlying medical renal disease. no hydronephrosis. A 7 mm left renal calculus is seen. Small ascites. Bilateral pleural effusions. Bilateral renal cysts.     10/29: urologist consult for nephrolithiasis.       4. ARDS: Patient received oxygen with FiO2 100 with very low TV (250ml) on ventilation. Ferritin, CRP, Lactic dehydrogenase elevated. Start Decadron, Baricitinib (10/12). Require pressor support with Vasopressin 0.02. Plan: cont vent support.      5. Hypotension: like combination of severe HFrEF and sedation medication side affect. Require pressor support with Vasopressin. Plan: cont wean off pressor support. 7. Possible Respiratory failure related to Neuromuscular disease: Given albuterol without most commonly patient had  critical illness poorly neuromyopathy, with the treatment nutrition and physical therapy. 8. HFrEF: Echo (10/11): LV EF 25-30%, LV severe global hypokinesis. LV size moderate increase. Patient is hypotension with BP 97/50. Weight on admission 120 lsb  10/20: Started Milrinone lactate 1/9ml/hr  10/24: Current weight 142 lbs.  Gain 4 lbs since admission. Net I/O since admission (10/24): -4.6L.  Tele showed A fib with RVR. EKG was ordered show A. Fib with premature ventricular conduction complex. Plan: Cont Milrinone, monitor I/O   10/25: discuss with Hazel Patel that patient will be consider for AICD at this time. Can consider lifevest if patient able to operate. 10/26: discuss with family last night about AICD vs Nellie Monroe in which patient may not be a candidate for AICD. Family would like to discuss with Hazel directly. 10/27: Cardiologist consult appreciated - discussed with patient family which addressed that no obvious cardiac cause reduced heart function. Thus cardiology will wait for recovery prior to considering any intervention such as LifeVest versus AICD.  Patient also required to be on appropriate medication for 2 to 3 months prior to recheck ORI and making decision about AICD.  For now patient is not a candidate for LifeVest since she is sedated and intubated.  Therefore she cannot operate LifeVest by herself with a general requirement. 8. Hx of Prolong QTc interval: On telemetry strip in ED. Seen by cardiogy for Prolong QTC. Plan: tele monitor, avoid QTC prolong medications, Keep K +> 4 and Mg > 2     9. New on set Afib without RVR: [id number] 5. Cont Eliquis, Metoprolol 25 mg BID (Can't take Amiodarone due to QTC prolongation)   10/27: Patient develop sinus bradycardia. Plan: hold Metoprolol     10. HERIBERTO: Improve. BUN/Cr  74/1.7 <-- 70/2.4. Base line BUN/Cr 29/0.9. Likely secondary to Cardiorenal syndrome. Plan: avoid nephrotoxicity, cont gentle fluid hydration 50ml/hr. Nephrologist follow the case. 10/24: Good urine output 850 ml last 24 hrs. Response to Bumex. Plan: cont avoid nephrotoxicity      11. Hyperkalemia: Resolve. K+ 5.3. Plan: calcium gluconate, Lokelma     13. UTI: UA with microscopic (10/18) positive leukocyte, > 200 WBC and bacterial. Plan: Received ceftriaxone course. 14.   15. AMS: Resolving, likely secondary to hypercapnic hypoxemia.  Plan: CT head when patient is stable, Neuro exam when patient is extubate     14.  Iron deficiency anemia: Iron replacement        Critical condition, guarded prognosis  Critical care time 37 min apart from procedure

## 2021-11-02 LAB
ANION GAP SERPL CALCULATED.3IONS-SCNC: 7 MEQ/L (ref 8–16)
BUN BLDV-MCNC: 54 MG/DL (ref 7–22)
CALCIUM SERPL-MCNC: 9.2 MG/DL (ref 8.5–10.5)
CHLORIDE BLD-SCNC: 107 MEQ/L (ref 98–111)
CO2: 29 MEQ/L (ref 23–33)
CREAT SERPL-MCNC: 1.1 MG/DL (ref 0.4–1.2)
ERYTHROCYTE [DISTWIDTH] IN BLOOD BY AUTOMATED COUNT: 15.8 % (ref 11.5–14.5)
ERYTHROCYTE [DISTWIDTH] IN BLOOD BY AUTOMATED COUNT: 58.4 FL (ref 35–45)
GFR SERPL CREATININE-BSD FRML MDRD: 47 ML/MIN/1.73M2
GLUCOSE BLD-MCNC: 123 MG/DL (ref 70–108)
HCT VFR BLD CALC: 27.5 % (ref 37–47)
HEMOGLOBIN: 8.3 GM/DL (ref 12–16)
MAGNESIUM: 1.9 MG/DL (ref 1.6–2.4)
MCH RBC QN AUTO: 30.7 PG (ref 26–33)
MCHC RBC AUTO-ENTMCNC: 30.2 GM/DL (ref 32.2–35.5)
MCV RBC AUTO: 101.9 FL (ref 81–99)
PHOSPHORUS: 2.7 MG/DL (ref 2.4–4.7)
PLATELET # BLD: 140 THOU/MM3 (ref 130–400)
PMV BLD AUTO: 12.6 FL (ref 9.4–12.4)
POTASSIUM SERPL-SCNC: 4.2 MEQ/L (ref 3.5–5.2)
RBC # BLD: 2.7 MILL/MM3 (ref 4.2–5.4)
SODIUM BLD-SCNC: 143 MEQ/L (ref 135–145)
WBC # BLD: 5.3 THOU/MM3 (ref 4.8–10.8)

## 2021-11-02 PROCEDURE — 84100 ASSAY OF PHOSPHORUS: CPT

## 2021-11-02 PROCEDURE — 2100000000 HC CCU R&B

## 2021-11-02 PROCEDURE — 99291 CRITICAL CARE FIRST HOUR: CPT | Performed by: INTERNAL MEDICINE

## 2021-11-02 PROCEDURE — 94003 VENT MGMT INPAT SUBQ DAY: CPT

## 2021-11-02 PROCEDURE — 89220 SPUTUM SPECIMEN COLLECTION: CPT

## 2021-11-02 PROCEDURE — 2580000003 HC RX 258: Performed by: STUDENT IN AN ORGANIZED HEALTH CARE EDUCATION/TRAINING PROGRAM

## 2021-11-02 PROCEDURE — 2500000003 HC RX 250 WO HCPCS: Performed by: STUDENT IN AN ORGANIZED HEALTH CARE EDUCATION/TRAINING PROGRAM

## 2021-11-02 PROCEDURE — 83735 ASSAY OF MAGNESIUM: CPT

## 2021-11-02 PROCEDURE — 6360000002 HC RX W HCPCS: Performed by: FAMILY MEDICINE

## 2021-11-02 PROCEDURE — 36415 COLL VENOUS BLD VENIPUNCTURE: CPT

## 2021-11-02 PROCEDURE — 80048 BASIC METABOLIC PNL TOTAL CA: CPT

## 2021-11-02 PROCEDURE — 6370000000 HC RX 637 (ALT 250 FOR IP): Performed by: INTERNAL MEDICINE

## 2021-11-02 PROCEDURE — 99232 SBSQ HOSP IP/OBS MODERATE 35: CPT | Performed by: INTERNAL MEDICINE

## 2021-11-02 PROCEDURE — 85027 COMPLETE CBC AUTOMATED: CPT

## 2021-11-02 PROCEDURE — 6370000000 HC RX 637 (ALT 250 FOR IP): Performed by: STUDENT IN AN ORGANIZED HEALTH CARE EDUCATION/TRAINING PROGRAM

## 2021-11-02 RX ORDER — BUMETANIDE 0.25 MG/ML
1 INJECTION, SOLUTION INTRAMUSCULAR; INTRAVENOUS ONCE
Status: COMPLETED | OUTPATIENT
Start: 2021-11-02 | End: 2021-11-02

## 2021-11-02 RX ORDER — LACTOBACILLUS RHAMNOSUS GG 10B CELL
1 CAPSULE ORAL
Status: DISCONTINUED | OUTPATIENT
Start: 2021-11-03 | End: 2021-11-18 | Stop reason: HOSPADM

## 2021-11-02 RX ADMIN — SODIUM CHLORIDE, PRESERVATIVE FREE 10 ML: 5 INJECTION INTRAVENOUS at 08:22

## 2021-11-02 RX ADMIN — ACETAMINOPHEN 650 MG: 325 TABLET ORAL at 08:22

## 2021-11-02 RX ADMIN — LORAZEPAM 1 MG: 2 INJECTION INTRAMUSCULAR; INTRAVENOUS at 08:43

## 2021-11-02 RX ADMIN — LORAZEPAM 1 MG: 2 INJECTION INTRAMUSCULAR; INTRAVENOUS at 03:38

## 2021-11-02 RX ADMIN — LORAZEPAM 1 MG: 2 INJECTION INTRAMUSCULAR; INTRAVENOUS at 13:38

## 2021-11-02 RX ADMIN — SODIUM CHLORIDE 1.4 MCG/KG/HR: 9 INJECTION, SOLUTION INTRAVENOUS at 23:28

## 2021-11-02 RX ADMIN — BUMETANIDE 1 MG: 0.25 INJECTION, SOLUTION INTRAMUSCULAR; INTRAVENOUS at 15:27

## 2021-11-02 RX ADMIN — Medication 17.6 MG: at 20:19

## 2021-11-02 RX ADMIN — SODIUM CHLORIDE, PRESERVATIVE FREE 10 ML: 5 INJECTION INTRAVENOUS at 20:19

## 2021-11-02 RX ADMIN — SODIUM CHLORIDE 1.4 MCG/KG/HR: 9 INJECTION, SOLUTION INTRAVENOUS at 17:49

## 2021-11-02 RX ADMIN — ACETAMINOPHEN 650 MG: 325 TABLET ORAL at 15:27

## 2021-11-02 RX ADMIN — SODIUM CHLORIDE 1.4 MCG/KG/HR: 9 INJECTION, SOLUTION INTRAVENOUS at 13:38

## 2021-11-02 RX ADMIN — SODIUM CHLORIDE 1.4 MCG/KG/HR: 9 INJECTION, SOLUTION INTRAVENOUS at 03:36

## 2021-11-02 RX ADMIN — SODIUM CHLORIDE 1.4 MCG/KG/HR: 9 INJECTION, SOLUTION INTRAVENOUS at 08:43

## 2021-11-02 ASSESSMENT — PAIN SCALES - GENERAL
PAINLEVEL_OUTOF10: 3
PAINLEVEL_OUTOF10: 0
PAINLEVEL_OUTOF10: 3

## 2021-11-02 ASSESSMENT — PAIN DESCRIPTION - PROGRESSION: CLINICAL_PROGRESSION: NOT CHANGED

## 2021-11-02 ASSESSMENT — PAIN DESCRIPTION - FREQUENCY: FREQUENCY: INTERMITTENT

## 2021-11-02 ASSESSMENT — PULMONARY FUNCTION TESTS
PIF_VALUE: 27
PIF_VALUE: 27
PIF_VALUE: 25
PIF_VALUE: 27
PIF_VALUE: 25
PIF_VALUE: 25

## 2021-11-02 NOTE — PROGRESS NOTES
CRITICAL CARE PROGRESS NOTE      Patient:  Janet Left    Unit/Bed:3A-09/009-A  YOB: 1934  MRN: [id number]   PCP: SERGE Castillo - CNP  Date of Admission: 10/11/2021  Chief Complaint:- Worsening shortness of breath    Assessment and Plan:    Acute hypoxic respiratory failure: Patient was admitted to the hospital through the ED on 10/11/2021 and started on high flow. Patient's hypoxemia progressively worsened. 10/15 -intubated and admitted to ICU  10/31 - patient failed SBT dt severe respiratory muscle weakness  11/1 - there has been ongoing talks with the patient's family regarding need for trach and PEG tube for continuation of care vs hospice. Family requested to speak the attending physician today. Dr. Yvonne Cummings has spoken with the patient and they are agreeable to trach and PEG placement. 11/2: patient has been tolerating SBT all day, with RR borderline at 22. Will continue to monitor and see how patient does overnight and reassess need for trach tomorrow. Covid Pneumonia with superimposed bacterial pneumonia:    10/19 -respiratory culture positive for Corynebacterium. Plan vancomycin 6 days. Chest x-ray shows moderate sized pleural effusionon the left, moderate mixed infiltrate scattered throughout both lung. Overall appearance slightly worsened than prior. Episode of fever with temperature 100.4. Plan: Procal, lactic acid, pneumonia panel  10/29 - lactic acid 1.1, procal 0.24   11/2 - pending pneumonia panel ordered on 10/29    3. Hematuria: Secondary to nephrolithiasis. HAS-BLED score 4 high risk of major bleeding, patient was on eliquis for A.fib with [id number] 5, patient developed flank hematuria in which Hg 11.2 --> 8.4 over 3 day. The risk of bleeding outweight stroke.  Plan: UA, Renal US, hold Eliquis 3 days, Restarted Eliquis back on 10/30 if patient H & H stable , Started SCD  10/28: Renal US (10/28) show mildly increased echogenicity of the kidneys that may suggest underlying medical renal disease. no hydronephrosis. A 7 mm left renal calculus is seen. Small ascites. Bilateral pleural effusions. Bilateral renal cysts. 10/29: urologist consult for nephrolithiasis. ARDS: Patient received oxygen with FiO2 100 with very low TV (250ml) on ventilation. Ferritin, CRP, Lactic dehydrogenase elevated. Start Decadron, Baricitinib (10/12). Require pressor support with Vasopressin 0.02. Plan: cont vent support. 11/1 - currently Fi02 = 30, spo2 = 95% on PCV: rate 20, PIP 27, PEEP 6    Hypotension - resolved  11/2 - /62 - continue to hold antihypertensives and monitor     6. ? Respiratory failure related to neuromuscular disease - Resolved   DTR intact bilateral lower extremities which is less likely for Guillian Barré. Acetylcholine receptor blocking, modulating all negative. Plan: Continue conservative care     HFrEF: Echo (10/11): LV EF 25-30%, LV severe global hypokinesis. LV size moderate increase. Patient is hypotension with BP 97/50. Weight on admission 120 lbs  10/20: Started Milrinone lactate 1/9ml/hr   10/24: Current weight 142 lbs. Gain 4 lbs since admission. Net I/O since admission (10/24): -4.6L. Tele showed A fib with RVR. EKG was ordered show A. Fib with premature ventricular conduction complex. Plan: Cont Milrinone, monitor I/O   10/27: Cardiologist consult appreciated -cardiology will wait for recovery prior to considering any intervention such as LifeVest versus AICD. 11/1 - patient continues to be on milrinone 0.0625   11/2 - pro-BNP 41,133. Give 1mg of Bumex     Hx of Prolong QTc interval: On telemetry strip in ED. Seen by cardiogy for Prolong QTC. Plan: tele monitor, avoid QTC prolong medications, Keep K +> 4 and Mg > 2     New on set Afib without RVR: [id number] 5. Cont Eliquis, Metoprolol 25 mg BID   10/27: Patient develop sinus bradycardia. Plan: hold Metoprolol     HERIBERTO: Improving.  Nephrology following    11/1 - BUN/ Cr - 57/ 1.1    11/2 - BUN/Cr - 55/1.2 - BUN remains high     Hyperkalemia: Resolved. 11/2 - K+ 3.9     UTI: UA with microscopic (10/18) positive leukocyte, > 200 WBC and bacterial. Plan: Start Ceftriaxone (10/19)     AMS: likely secondary to hypercapnic hypoxemia. Plan: CT head when patient is stable, Neuro exam when patient is extubate     Iron deficiency anemia: Iron replacement    15. Bilateral lower extremity swelling: r/o DVT - US negative on 11/1    INITIAL H AND P AND ICU COURSE:   51-year-old female presents to the ED on 10/11/2021 due to worsening shortness of breath. Patient tested positive for Covid on 10/4/2021. In the emergency room patient was found to have a SPO2 of 83% on room air with a BNP of the 15 point 7K. Patient was admitted and managed for acute hypoxic respiratory failure secondary to Covid with combination of congestive heart failure. Patient was placed on nasal cannula, Bumex and dexamethasone in the ED. On 10/15 patient was transferred to the ICU and intubated for acute hypoxic respiratory failure on 100% FiO2 on high flow. Patient had a UTI on 10/18 and was started on ceftriaxone on 10/19. Patient was also started on milrinone for her HFrEF. Patient's family has been consulted regarding trach and PEG tube. Cardiology is also on board regarding LifeVest versus AICD.     Past Medical History: HPI  Family History:  See EMR  Social History:  See EMR    ROS   Cannot obtain d/t patient sedated and on mechanical ventilation    Scheduled Meds:   potassium (CARDIAC) replacement protocol   Other RX Placeholder    [Held by provider] bumetanide  1 mg IntraVENous Q12H    senna  10 mL Per NG tube Nightly    [Held by provider] lactulose  20 g Oral TID    [Held by provider] metoprolol tartrate  25 mg Oral BID    [Held by provider] apixaban  2.5 mg Oral BID    sodium chloride flush  5-40 mL IntraVENous 2 times per day     Continuous Infusions:   dextrose 100 mL/hr (10/26/21 1253)    vasopressin (Septic Shock) infusion Stopped (10/31/21 0243)    dexmedetomidine 1.4 mcg/kg/hr (11/02/21 0336)    norepinephrine Stopped (10/26/21 0820)    milrinone 0.0625 mcg/kg/min (11/01/21 0103)    [Held by provider] sodium chloride Stopped (10/19/21 1900)    sodium chloride         PHYSICAL EXAMINATION:  T:  99.3F. P:  86. RR:  25. B/P:  120/62  FiO2:  30. O2 Sat:  97  I/O:  750/400 net = + 350  Body mass index is 28.37 kg/m². GCS:   Unable to accurately obtain   SPONT: Psupport 20, Rate 22, PIP 25, PEEP 6      General: Sedated on vent  HEENT:  normocephalic and atraumatic. No scleral icterus. PERR  Neck: supple. No Thyromegaly. Lungs: clear to auscultation. No retractions  Cardiac: RRR. No JVD. Abdomen: soft. Nontender. Extremities:  No clubbing, cyanosis, or edema x 4. Vasculature: capillary refill < 3 seconds. Palpable dorsalis pedis pulses. Skin:  warm and dry. Psych:  Patient sedated  Lymph:  No supraclavicular adenopathy. Neurologic:  No focal deficit. No seizures. Data: (All radiographs, tracings, PFTs, and imaging are personally viewed and interpreted unless otherwise noted). Sodium 143, potassium 3.9, chloride 108, CO2 28, BUN 55, creatinine 1.2  Glucose 111  WBC 5.3, hemoglobin 8.3, hematocrit 27.5, platelets 360  Telemetry shows NSR    Seen with multidisciplinary ICU team   Meets Continued ICU Level Care Criteria:    [x] Yes   [] No - Transfer Planned to listed location:  [] HOSPITALIST CONTACTED-      Case and plan discussed with Dr. Luis Breath    Electronically signed by Judy Bocanegra MD  [address]  Patient seen by me. Case discussed with resident physician. Remains on mechanical ventilator. Eventual tracheostomy tube. .  Italicized font represents changes to the note made by me. CC time 35 minutes. Time was discontiguous. Time does not include procedures. Time does include my direct assessment of the patient and coordination of care.   Electronically signed by Evi Zeng MD on 11/3/2021 at 8:15 AM

## 2021-11-02 NOTE — PROGRESS NOTES
Renal Progress Note    Assessment and Plan:   1. Acute kidney injury improved and stable. 2. SARS-CoV-2 pneumonia  3. Hypoxic respiratory failure mechanical support  4. Macrocytic anemia  5. Deconditioning    PLAN:  1. Labs reviewed  2. Serum creatinine is stable  3. Medications reviewed  4. No changes  5. Labs in the morning  6.  We will continue to follow    Patient Active Problem List:     Sigmoid diverticulitis     Acute respiratory failure with hypoxia ([facility] [address]     COVID-19     Acute congestive heart failure ([facility] [address]      Subjective:   Admit Date: 10/11/2021    Interval History:   Seen for acute kidney injury  Awake and alert this morning  Remains on mechanical support  No issues from staff  Good blood pressure  Good urine output      Medications:   Scheduled Meds:   potassium (CARDIAC) replacement protocol   Other RX Placeholder    [Held by provider] bumetanide  1 mg IntraVENous Q12H    senna  10 mL Per NG tube Nightly    [Held by provider] lactulose  20 g Oral TID    [Held by provider] metoprolol tartrate  25 mg Oral BID    [Held by provider] apixaban  2.5 mg Oral BID    sodium chloride flush  5-40 mL IntraVENous 2 times per day     Continuous Infusions:   dextrose 100 mL/hr (10/26/21 1253)    vasopressin (Septic Shock) infusion Stopped (10/31/21 0243)    dexmedetomidine 1.4 mcg/kg/hr (11/02/21 0336)    norepinephrine Stopped (10/26/21 0820)    milrinone 0.0625 mcg/kg/min (11/01/21 0103)    [Held by provider] sodium chloride Stopped (10/19/21 1900)    sodium chloride         CBC:   Recent Labs     10/31/21  0440 11/01/21  0620 11/02/21  0600   WBC 6.4 5.4 5.3   HGB 8.6* 8.5* 8.3*    150 140     CMP:    Recent Labs     11/01/21  0620 11/01/21  1530 11/02/21  0600    143 143   K 3.7 3.9 4.2    108 107   CO2 30 28 29   BUN 57* 55* 54*   CREATININE 1.1 1.2 1.1   GLUCOSE 110* 111* 123*   CALCIUM 9.2 8.9 9.2   LABGLOM 47* 43* 47*     Troponin: No results for input(s): TROPONINI in the last 72 hours. BNP: No results for input(s): BNP in the last 72 hours. INR: No results for input(s): INR in the last 72 hours. Lipids: No results for input(s): CHOL, LDLDIRECT, TRIG, HDL, AMYLASE, LIPASE in the last 72 hours. Liver: No results for input(s): AST, ALT, ALKPHOS, PROT, LABALBU, BILITOT in the last 72 hours. Invalid input(s): BILDIR  Iron:  No results for input(s): IRONS, FERRITIN in the last 72 hours. Invalid input(s): LABIRONS  XR CHEST PORTABLE   Final Result   1. Moderate cardiomegaly. ET tube, NG tube, and right jugular line remain in good position. 2. Tiny bilateral pleural effusions. Findings of relatively severe pneumonia/edema involving both lungs diffusely. Overall appearance not appreciably changed from yesterday. **This report has been created using voice recognition software. It may contain minor errors which are inherent in voice recognition technology. **      Final report electronically signed by Dr. Viji Benedict on 11/1/2021 9:36 AM      VL DUP LOWER EXTREMITY VENOUS BILATERAL   Final Result   No evidence of a DVT. **This report has been created using voice recognition software. It may contain minor errors which are inherent in voice recognition technology. **      Final report electronically signed by Dr. Eduardo Barahona on 11/1/2021 8:07 AM      XR CHEST PORTABLE   Final Result   No significant interval change since previous study dated 29th of October 2021. Mulugeta Milner **This report has been created using voice recognition software. It may contain minor errors which are inherent in voice recognition technology. **      Final report electronically signed by DR Shakira Mello on 10/31/2021 12:43 PM      CT ABDOMEN PELVIS WO CONTRAST   Final Result      1. Diffusely thickened appearance of the sigmoid colon. There are diverticula in this region.  This could be on the basis of acute diverticulitis, however given the eccentric appearance of the thickening, neoplastic process can't entirely be excluded. 2. Haziness throughout the superficial subcutaneous soft tissues of the abdomen and pelvis consistent with anasarca. 3. No evidence of hydronephrosis. No obstructive renal stones are identified. 4. Diffuse haziness throughout the mesentery and a small amount of ascites, findings likely related to fluid overload. **This report has been created using voice recognition software. It may contain minor errors which are inherent in voice recognition technology. **      Final report electronically signed by Dr Marina Medley on 10/30/2021 2:23 PM      CT CHEST W WO CONTRAST   Final Result       1. Extensive bilateral pneumonia. 2. Large bilateral pleural effusions, right greater than left. 3. Large pericardial effusion. 4. Marked cardiomegaly. **This report has been created using voice recognition software. It may contain minor errors which are inherent in voice recognition technology. **      Final report electronically signed by Dr Marina Medley on 10/30/2021 1:54 PM      XR CHEST PORTABLE   Final Result   1. Mildly megaly. ET tube and NG tube remain in good position. Right jugular line with catheter tip in SVC. 2. Moderate-sized pleural effusion left side. 3. Moderate mixed infiltrates scattered throughout both lungs. Overall appearance slightly worse than prior. **This report has been created using voice recognition software. It may contain minor errors which are inherent in voice recognition technology. **      Final report electronically signed by Dr. Worley Dakin on 10/29/2021 8:10 AM      US RENAL COMPLETE   Final Result   1. Mildly increased echogenicity of the kidneys that may suggest underlying medical renal disease. 2. No hydronephrosis. 3. A 7 mm left renal calculus is seen. 4. Small ascites. Bilateral pleural effusions. 5. Bilateral renal cysts.             **This report has been created using voice recognition software. It may contain minor errors which are inherent in voice recognition technology. **      Final report electronically signed by Dr Marcos Santiago on 10/29/2021 3:55 AM      XR ABDOMEN FOR NG/OG/NE TUBE PLACEMENT   Final Result   1. The tip of the nasogastric tube is below the diaphragms within the stomach. 2. Partially seen is left pleural effusion and left retrocardiac opacity. Opacities are also seen in the partially seen right lower lobe. **This report has been created using voice recognition software. It may contain minor errors which are inherent in voice recognition technology. **      Final report electronically signed by Dr Marcos Santiago on 10/27/2021 6:54 PM      XR CHEST PORTABLE   Final Result   1. There has been no significant interval change in the radiographic appearance of the chest  from 10/23/2021 including diffuse airspace disease. .            **This report has been created using voice recognition software. It may contain minor errors which are inherent in voice recognition technology. **      Final report electronically signed by Dr Marcos Santiago on 10/24/2021 8:54 PM      XR CHEST PORTABLE   Final Result   1. Support lines and tubes in unchanged position when compared to prior. 2. Interval worsening of parenchymal densities within the right    central/right lower lobe. Additionally, worsening of confluent    consolidation within the left midlung with development of left basilar    pleural/parenchymal opacities likely consistent with moderate left pleural    effusion. This document has been electronically signed by: Galina Diaz DO on    10/23/2021 05:00 AM      XR CHEST PORTABLE   Final Result   Persistent diffuse patchy opacities but with improved aeration at the lung bases. **This report has been created using voice recognition software. It may contain minor errors which are inherent in voice recognition technology. **      Final report electronically signed by Dr. Brennon Pendleton MD on 10/21/2021 10:48 AM      XR CHEST PORTABLE   Final Result   1. Increased density in the left lung base. This can indicate partial left lower lobe collapse. 2. Persistent patchy bilateral pulmonary opacities. **This report has been created using voice recognition software. It may contain minor errors which are inherent in voice recognition technology. **      Final report electronically signed by Dr. Alisia Moore on 10/18/2021 7:58 AM      XR CHEST PORTABLE   Final Result   Impression:   1. Cardiomegaly with improvement of passive congestive changes and better    aeration of both lungs compared to the prior study. 2. The focal bilateral alveolar consolidations consistent with infectious    pulmonary disease are unchanged. Retrocardiac consolidation and small LEFT    pleural effusion obscuring the LEFT diaphragm is unchanged. This document has been electronically signed by: Starr Morales MD on    10/16/2021 03:18 AM      XR CHEST PORTABLE   Final Result   Somewhat low position of endotracheal tube 1.2 cm above the claire but improved aeration of the upper lungs. **This report has been created using voice recognition software. It may contain minor errors which are inherent in voice recognition technology. **      Final report electronically signed by Dr. Tito Gallagher on 10/15/2021 3:34 PM      XR CHEST PORTABLE   Final Result   1. Bilateral pneumonia. 2. Small bilateral pleural effusions. 3. Stable cardiomegaly. **This report has been created using voice recognition software. It may contain minor errors which are inherent in voice recognition technology. **      Final report electronically signed by Dr. Bee Patterson on 10/15/2021 4:14 PM      XR CHEST PORTABLE   Final Result   Right lower lobe airspace infiltrates. Severe cardiomegaly. **This report has been created using voice recognition software. It may contain minor errors which are inherent in voice recognition technology. **      Final report electronically signed by Dr. Marva Chairez on 10/11/2021 8:59 PM      XR CHEST PORTABLE   Final Result      1. Diffuse groundglass opacities in both lungs along with cardiomegaly. Findings likely suggest acute exacerbation of congestive heart failure versus pneumonia in the right clinical setting. Clinical correlation is recommended      2. Small left pleural effusion. 3. Other findings as described above. **This report has been created using voice recognition software. It may contain minor errors which are inherent in voice recognition technology. **      Final report electronically signed by Dr Venus Pisano on 10/11/2021 11:56 AM            Objective:   Vitals: /74   Pulse 105   Temp 99.3 °F (37.4 °C)   Resp (!) 33   Ht 5' (1.524 m)   Wt 145 lb 4.5 oz (65.9 kg)   SpO2 93%   BMI 28.37 kg/m²    Wt Readings from Last 3 Encounters:   10/31/21 145 lb 4.5 oz (65.9 kg)   10/07/21 125 lb (56.7 kg)      24HR INTAKE/OUTPUT:      Intake/Output Summary (Last 24 hours) at 11/2/2021 0802  Last data filed at 11/2/2021 0600  Gross per 24 hour   Intake 1986.6 ml   Output 815 ml   Net 1171.6 ml       Constitutional: Well-developed elderly lady on mechanical support   Skin:normal with no rash or lesions. HEENT:Pupils are reactive . Throat is clear . Oral mucosa is moist.  Endotracheal tube is intact. Orogastric tube is intact. Neck:supple with no thyromegaly or carotid bruit  Cardiovascular:  S1, S2 without murmur or rubs   Respiratory: Decreased breath sound in the anterior  Abdomen: Soft. Good bowel sounds. Ext: Trace bilateral LE edema  Musculoskeletal:Intact  Neuro:Alert and awake. Obeys commands. Electronically signed by Lynn Salguero MD on 11/2/2021 at 8:02 AM  **This report has been created using voice recognition software.  It maycontain minor  errors which are inherent in voice recognition technology. **

## 2021-11-02 NOTE — CARE COORDINATION
11/2/21, 3:41 PM EDT    DISCHARGE ON GOING EVALUATION    [facility] day: 22  Location: 3A-09/009-A Reason for admit: Acute respiratory failure with hypoxia ([facility] [address] [J96.01]  Acute congestive heart failure, unspecified heart failure type ([facility] [address] [I50.9]  COVID-19 [U07.1]   Procedure:   10/12 Echo with EF 25-30%; Small circumferential pericardial effusion with no tamponade physiology; Myxomatotic degeneration of mitral valve;  Mild to Moderate mitral regurgitation is present  10/15 Intubated  10/15 CVC RIJ  10/28 Renal US: Mildly increased echogenicity of the kidneys that may suggest underlying medical renal disease; no hydronephrosis; 7mm left renal calculus is seen; small ascites. Bilateral pleural effusions; bilateral renal cysts  10/30 CT Chest: Extensive bilateral pneumonia; Large bilateral pleural effusions, right greater than left; Large pericardial effusion; Marked cardiomegaly  10/30 CT Abd/pelvis: Diffusely thickened appearance of the sigmoid colon. There are diverticula in this region. This could be on the basis of acute diverticulitis, however given the eccentric appearance of the thickening, neoplastic process can't entirely be excluded; Haziness throughout the superficial subcutaneous soft tissues of the abdomen and pelvis consistent with anasarca; No evidence of hydronephrosis. No obstructive renal stones are identified; Diffuse haziness throughout the mesentery and a small amount of ascites, findings likely related to fluid overload  11/1 BLE venous doppler: Neg for DVT    Barriers to Discharge: On SBT today and tolerating well so far. BNP 41,000 - gave IV bumex x1. Plan for trach and peg. Remains on vent w/ETT on SBT, FIO2 30%, sats 94%. Tmax 99.3. NSR. Follows commands. PT/OT - early mobility. Intensivist and Nephrology following. Palliative Care and Dietitian following.  Telemetry, CVC, OG w/TF, alaniz. Precedex @ 1.4 mcg/kg/hr, Primacor @ 0.0625 mcg/kg/min, lactobacillus, prn IV ativan, Electrolyte replacement protocols.   PCP: SERGE Davis CNP  Readmission Risk Score: 19.6%  Patient Goals/Plan/Treatment Preferences: From home with daughter. Plan for [address] as 1st choice and 2nd choice is [facility] in [place]. No precert required.

## 2021-11-02 NOTE — FLOWSHEET NOTE
11/02/21 0843 11/02/21 0900   Vitals   Pulse  --  75   Resp (!) 32 26   BP  --  (!) 114/49   MAP (mmHg)  --  67   Patient now on SBT per RT. RR stable 24-28 while resting, noted increased rate 28-32 while patient awake. Attempted to encourage slow deep breathing but RR remained elevated. Precedex at max rate. Will trial PRN Ativan and monitor. Increased RR appears due to anxiety and not due to patient not tolerating SBT.  Axel Ascencio RN

## 2021-11-02 NOTE — PROGRESS NOTES
[address] [address]  PHYSICAL THERAPY MISSED TREATMENT NOTE  [facility] 3A    Date: 2021  Patient Name: Omid Singh        MRN: [id number]   : 1934  (80 y.o.)  Gender: female   Referring Practitioner: Cr Enamorado DO  Diagnosis: Acute respiratory failure with hypoxia         REASON FOR MISSED TREATMENT:  Hold treatment per nursing request. Pt on max sedation for spontaneous breathing trial. Hold PT treatment this date secondary to this. Continue to follow pt for early mobility as pt medically appropriate.      Alyson Foster PT, DPT

## 2021-11-02 NOTE — PROGRESS NOTES
Comprehensive Nutrition Assessment    Type and Reason for Visit:  Reassess (TF management)    Nutrition Recommendations/Plan:   Continue Nepro at goal rate of 40 ml/hr. Free water flush per MD - 100ml every 8h  Recommend culturelle as continues with flexiseal after having no BM x8d and needed lactulose  Monitoring labs, wts., GI status for EN adjustments as appropriate     Nutrition Assessment:    Pt. improving nutritionally AEB tolerating TF at goal now although has had OGT to LIWS d/t residuals over 600ml this admit, poor po intake first 5d of admit, intubated 10/15, LOS day 22.  At risk for further nutrition compromise r/t COVID Dx 10/5, new CHF, advanced age and underlying medical condition (hx diverticulitis, HTN).      Malnutrition Assessment:  Malnutrition Status:   At risk for malnutrition (Comment)    Context:  Acute Illness     Findings of the 6 clinical characteristics of malnutrition:  Energy Intake:  7 - 50% or less of estimated energy requirements for 5 or more days  Weight Loss:  No significant weight loss (however difficult to evaluate with edema)     Body Fat Loss:  No significant body fat loss     Muscle Mass Loss:  No significant muscle mass loss    Fluid Accumulation:  Unable to assess     Strength:  Not Performed    Estimated Daily Nutrient Needs:  Energy (kcal):  3441-2713 (25-35/kgm) late phase; Weight Used for Energy Requirements:   (56 kgm)     Protein (g):   gm (1.2-2) as renal function allows; Weight Used for Protein Requirements:   (56 kgm)        Fluid (ml/day):  per MD;     Nutrition Related Findings:     Intubated but on SBT today - anxious per RN; tolerating TF at 40ml/hour; no further residual issues since having (+) BMs; flexiseal continues with output per RN but last recorded on I/0 was 300ml 10/30 and abd appears distended per RN; Glucose 123, BUN 54 Cr 1.1 Potassium 4.2 MAP 67  Phosphorus 2.7; meds include precedex, senna;  allergies noted; received 97% of Rx TF volume past 24h; +1-2 edema    Wounds:  Pressure Injury, Stage II (sacrum 10/15)       Current Nutrition Therapies:    Current Tube Feeding (TF) Orders:  · Feeding Route: Orogastric  · Formula: Renal Formula (Nepro)  · Schedule: Continuous (10/26 at goal of 40ml/hour)  · Water Flushes: per physician - 100ml every 8h  · Goal TF & Flush Orders Provides:   Nepro 40ml/hour provides 1699 kcals, 78gms protein, 154gms cho, 24gms fiber, 998 ml free H20 (698ml Nepro, 300 MD flush) in 1260ml total volume (960 Nepro, 300 MD flush)/24h    Additional Calorie Sources:   10/26 diprivan discontinued    Anthropometric Measures:  · Height: 5' (152.4 cm)  · Current Body Weight: 145 lb (65.8 kg) (10/31)   · Admission Body Weight: 124 lb (56.2 kg) (10/11 +2 edema)    · Usual Body Weight:  (per EMR: 10/7/21: 125#)     · Ideal Body Weight: 100 lbs;   · BMI: 28.3  · Adjusted Body Weight:  ; No Adjustment   · BMI Categories: Overweight (BMI 25.0-29. 9)       Nutrition Diagnosis:   · Inadequate oral intake related to impaired respiratory function as evidenced by NPO or clear liquid status due to medical condition, intubation, nutrition support - enteral nutrition      Nutrition Interventions:   Food and/or Nutrient Delivery:  Continue NPO, Continue Current Tube Feeding  Nutrition Education/Counseling:  No recommendation at this time   Coordination of Nutrition Care:  Continue to monitor while inpatient    Goals:  EN to provide % nutrient needs while intubated for covid recovery process       Nutrition Monitoring and Evaluation:   Behavioral-Environmental Outcomes:  None Identified   Food/Nutrient Intake Outcomes:  Enteral Nutrition Intake/Tolerance  Physical Signs/Symptoms Outcomes:  Biochemical Data, GI Status, Fluid Status or Edema, Hemodynamic Status, Nutrition Focused Physical Findings, Skin, Weight     Discharge Planning:     Too soon to determine     Electronically signed by Bucky Cheadle, RD, LD on 11/2/21 at 2:34 PM

## 2021-11-03 LAB
ANION GAP SERPL CALCULATED.3IONS-SCNC: 7 MEQ/L (ref 8–16)
BUN BLDV-MCNC: 48 MG/DL (ref 7–22)
CALCIUM SERPL-MCNC: 9.3 MG/DL (ref 8.5–10.5)
CHLORIDE BLD-SCNC: 105 MEQ/L (ref 98–111)
CO2: 30 MEQ/L (ref 23–33)
CREAT SERPL-MCNC: 1.1 MG/DL (ref 0.4–1.2)
ERYTHROCYTE [DISTWIDTH] IN BLOOD BY AUTOMATED COUNT: 15.6 % (ref 11.5–14.5)
ERYTHROCYTE [DISTWIDTH] IN BLOOD BY AUTOMATED COUNT: 57.1 FL (ref 35–45)
GFR SERPL CREATININE-BSD FRML MDRD: 47 ML/MIN/1.73M2
GLUCOSE BLD-MCNC: 122 MG/DL (ref 70–108)
HCT VFR BLD CALC: 29.4 % (ref 37–47)
HEMOGLOBIN: 9.2 GM/DL (ref 12–16)
MAGNESIUM: 1.8 MG/DL (ref 1.6–2.4)
MCH RBC QN AUTO: 31.6 PG (ref 26–33)
MCHC RBC AUTO-ENTMCNC: 31.3 GM/DL (ref 32.2–35.5)
MCV RBC AUTO: 101 FL (ref 81–99)
PHOSPHORUS: 2.7 MG/DL (ref 2.4–4.7)
PLATELET # BLD: 154 THOU/MM3 (ref 130–400)
PMV BLD AUTO: 11.7 FL (ref 9.4–12.4)
POTASSIUM SERPL-SCNC: 3.9 MEQ/L (ref 3.5–5.2)
RBC # BLD: 2.91 MILL/MM3 (ref 4.2–5.4)
SODIUM BLD-SCNC: 142 MEQ/L (ref 135–145)
WBC # BLD: 5.9 THOU/MM3 (ref 4.8–10.8)

## 2021-11-03 PROCEDURE — 99291 CRITICAL CARE FIRST HOUR: CPT | Performed by: INTERNAL MEDICINE

## 2021-11-03 PROCEDURE — 2580000003 HC RX 258: Performed by: INTERNAL MEDICINE

## 2021-11-03 PROCEDURE — 85027 COMPLETE CBC AUTOMATED: CPT

## 2021-11-03 PROCEDURE — 2500000003 HC RX 250 WO HCPCS: Performed by: STUDENT IN AN ORGANIZED HEALTH CARE EDUCATION/TRAINING PROGRAM

## 2021-11-03 PROCEDURE — 6370000000 HC RX 637 (ALT 250 FOR IP): Performed by: INTERNAL MEDICINE

## 2021-11-03 PROCEDURE — 2100000000 HC CCU R&B

## 2021-11-03 PROCEDURE — 99232 SBSQ HOSP IP/OBS MODERATE 35: CPT | Performed by: INTERNAL MEDICINE

## 2021-11-03 PROCEDURE — 6360000002 HC RX W HCPCS: Performed by: INTERNAL MEDICINE

## 2021-11-03 PROCEDURE — 83735 ASSAY OF MAGNESIUM: CPT

## 2021-11-03 PROCEDURE — 36415 COLL VENOUS BLD VENIPUNCTURE: CPT

## 2021-11-03 PROCEDURE — 94003 VENT MGMT INPAT SUBQ DAY: CPT

## 2021-11-03 PROCEDURE — 80048 BASIC METABOLIC PNL TOTAL CA: CPT

## 2021-11-03 PROCEDURE — 84100 ASSAY OF PHOSPHORUS: CPT

## 2021-11-03 PROCEDURE — 6360000002 HC RX W HCPCS: Performed by: FAMILY MEDICINE

## 2021-11-03 PROCEDURE — 2580000003 HC RX 258: Performed by: STUDENT IN AN ORGANIZED HEALTH CARE EDUCATION/TRAINING PROGRAM

## 2021-11-03 RX ADMIN — SODIUM CHLORIDE, PRESERVATIVE FREE 40 ML: 5 INJECTION INTRAVENOUS at 09:00

## 2021-11-03 RX ADMIN — Medication 17.6 MG: at 20:09

## 2021-11-03 RX ADMIN — SODIUM CHLORIDE, PRESERVATIVE FREE 10 ML: 5 INJECTION INTRAVENOUS at 19:46

## 2021-11-03 RX ADMIN — POTASSIUM CHLORIDE 20 MEQ: 400 INJECTION, SOLUTION INTRAVENOUS at 06:31

## 2021-11-03 RX ADMIN — LORAZEPAM 1 MG: 2 INJECTION INTRAMUSCULAR; INTRAVENOUS at 03:26

## 2021-11-03 RX ADMIN — SODIUM CHLORIDE 1.4 MCG/KG/HR: 9 INJECTION, SOLUTION INTRAVENOUS at 10:00

## 2021-11-03 RX ADMIN — SODIUM CHLORIDE 1.4 MCG/KG/HR: 9 INJECTION, SOLUTION INTRAVENOUS at 19:37

## 2021-11-03 RX ADMIN — MIDAZOLAM 5 MG/HR: 5 INJECTION, SOLUTION INTRAMUSCULAR; INTRAVENOUS at 15:33

## 2021-11-03 RX ADMIN — SODIUM CHLORIDE 1.4 MCG/KG/HR: 9 INJECTION, SOLUTION INTRAVENOUS at 14:59

## 2021-11-03 RX ADMIN — Medication 1 CAPSULE: at 08:00

## 2021-11-03 RX ADMIN — SODIUM CHLORIDE 1.4 MCG/KG/HR: 9 INJECTION, SOLUTION INTRAVENOUS at 05:16

## 2021-11-03 ASSESSMENT — PAIN SCALES - GENERAL
PAINLEVEL_OUTOF10: 0

## 2021-11-03 ASSESSMENT — PAIN SCALES - WONG BAKER
WONGBAKER_NUMERICALRESPONSE: 0

## 2021-11-03 ASSESSMENT — PULMONARY FUNCTION TESTS
PIF_VALUE: 29
PIF_VALUE: 26
PIF_VALUE: 27
PIF_VALUE: 28

## 2021-11-03 NOTE — PROGRESS NOTES
Renal Progress Note    Assessment and Plan:   1. Acute kidney injury much improved  2. SARS-CoV-2 pneumonia  3. Respiratory failure mechanical support  4. Deconditioning  5. Macrocytic anemia  6. Lower extremity edema bilaterally    PLAN:  1. Labs reviewed  2. Serum creatinine stable  3. BUN is slowly improving  4. Medications reviewed  5. No changes  6. We will still hold bumetanide for now  7. Labs in the morning  8.  We will follow    Patient Active Problem List:     Sigmoid diverticulitis     Acute respiratory failure with hypoxia ([facility] [address]     COVID-19     Acute congestive heart failure ([facility] [address]      Subjective:   Admit Date: 10/11/2021    Interval History:   Seen for acute kidney injury  Sleeping this morning  On mechanical support  No issues from staff  Good blood pressure  Urine output is good      Medications:   Scheduled Meds:   lactobacillus  1 capsule Per NG tube Daily with breakfast    potassium (CARDIAC) replacement protocol   Other RX Placeholder    [Held by provider] bumetanide  1 mg IntraVENous Q12H    senna  10 mL Per NG tube Nightly    [Held by provider] lactulose  20 g Oral TID    [Held by provider] metoprolol tartrate  25 mg Oral BID    [Held by provider] apixaban  2.5 mg Oral BID    sodium chloride flush  5-40 mL IntraVENous 2 times per day     Continuous Infusions:   dextrose 100 mL/hr (10/26/21 1253)    vasopressin (Septic Shock) infusion Stopped (10/31/21 0243)    dexmedetomidine 1.4 mcg/kg/hr (11/03/21 0516)    norepinephrine Stopped (10/26/21 0820)    milrinone 0.0625 mcg/kg/min (11/01/21 0103)    [Held by provider] sodium chloride Stopped (10/19/21 1900)    sodium chloride         CBC:   Recent Labs     11/01/21  0620 11/02/21  0600 11/03/21  0330   WBC 5.4 5.3 5.9   HGB 8.5* 8.3* 9.2*    140 154     CMP:    Recent Labs     11/01/21  1530 11/02/21  0600 11/03/21  0330    143 142   K 3.9 4.2 3.9    107 105   CO2 28 29 30   BUN 55* 54* 48*   CREATININE 1.2 1.1 1.1 GLUCOSE 111* 123* 122*   CALCIUM 8.9 9.2 9.3   LABGLOM 43* 47* 47*     Troponin: No results for input(s): TROPONINI in the last 72 hours. BNP: No results for input(s): BNP in the last 72 hours. INR: No results for input(s): INR in the last 72 hours. Lipids: No results for input(s): CHOL, LDLDIRECT, TRIG, HDL, AMYLASE, LIPASE in the last 72 hours. Liver: No results for input(s): AST, ALT, ALKPHOS, PROT, LABALBU, BILITOT in the last 72 hours. Invalid input(s): BILDIR  Iron:  No results for input(s): IRONS, FERRITIN in the last 72 hours. Invalid input(s): LABIRONS  XR CHEST PORTABLE   Final Result   1. Moderate cardiomegaly. ET tube, NG tube, and right jugular line remain in good position. 2. Tiny bilateral pleural effusions. Findings of relatively severe pneumonia/edema involving both lungs diffusely. Overall appearance not appreciably changed from yesterday. **This report has been created using voice recognition software. It may contain minor errors which are inherent in voice recognition technology. **      Final report electronically signed by Dr. Dorothea Cherry on 11/1/2021 9:36 AM      VL DUP LOWER EXTREMITY VENOUS BILATERAL   Final Result   No evidence of a DVT. **This report has been created using voice recognition software. It may contain minor errors which are inherent in voice recognition technology. **      Final report electronically signed by Dr. Kendall Martinez on 11/1/2021 8:07 AM      XR CHEST PORTABLE   Final Result   No significant interval change since previous study dated 29th of October 2021. Jaimee Frausto **This report has been created using voice recognition software. It may contain minor errors which are inherent in voice recognition technology. **      Final report electronically signed by DR Charmayne Ferdinand on 10/31/2021 12:43 PM      CT ABDOMEN PELVIS WO CONTRAST   Final Result      1. Diffusely thickened appearance of the sigmoid colon.  There are diverticula in this region. This could be on the basis of acute diverticulitis, however given the eccentric appearance of the thickening, neoplastic process can't entirely be excluded. 2. Haziness throughout the superficial subcutaneous soft tissues of the abdomen and pelvis consistent with anasarca. 3. No evidence of hydronephrosis. No obstructive renal stones are identified. 4. Diffuse haziness throughout the mesentery and a small amount of ascites, findings likely related to fluid overload. **This report has been created using voice recognition software. It may contain minor errors which are inherent in voice recognition technology. **      Final report electronically signed by Dr Marina Medley on 10/30/2021 2:23 PM      CT CHEST W WO CONTRAST   Final Result       1. Extensive bilateral pneumonia. 2. Large bilateral pleural effusions, right greater than left. 3. Large pericardial effusion. 4. Marked cardiomegaly. **This report has been created using voice recognition software. It may contain minor errors which are inherent in voice recognition technology. **      Final report electronically signed by Dr Marina Medley on 10/30/2021 1:54 PM      XR CHEST PORTABLE   Final Result   1. Mildly megaly. ET tube and NG tube remain in good position. Right jugular line with catheter tip in SVC. 2. Moderate-sized pleural effusion left side. 3. Moderate mixed infiltrates scattered throughout both lungs. Overall appearance slightly worse than prior. **This report has been created using voice recognition software. It may contain minor errors which are inherent in voice recognition technology. **      Final report electronically signed by Dr. Worley Dakin on 10/29/2021 8:10 AM      US RENAL COMPLETE   Final Result   1. Mildly increased echogenicity of the kidneys that may suggest underlying medical renal disease. 2. No hydronephrosis. 3. A 7 mm left renal calculus is seen.    4. Small ascites. Bilateral pleural effusions. 5. Bilateral renal cysts. **This report has been created using voice recognition software. It may contain minor errors which are inherent in voice recognition technology. **      Final report electronically signed by Dr Venus Pisano on 10/29/2021 3:55 AM      XR ABDOMEN FOR NG/OG/NE TUBE PLACEMENT   Final Result   1. The tip of the nasogastric tube is below the diaphragms within the stomach. 2. Partially seen is left pleural effusion and left retrocardiac opacity. Opacities are also seen in the partially seen right lower lobe. **This report has been created using voice recognition software. It may contain minor errors which are inherent in voice recognition technology. **      Final report electronically signed by Dr Venus Pisano on 10/27/2021 6:54 PM      XR CHEST PORTABLE   Final Result   1. There has been no significant interval change in the radiographic appearance of the chest  from 10/23/2021 including diffuse airspace disease. .            **This report has been created using voice recognition software. It may contain minor errors which are inherent in voice recognition technology. **      Final report electronically signed by Dr Venus Pisano on 10/24/2021 8:54 PM      XR CHEST PORTABLE   Final Result   1. Support lines and tubes in unchanged position when compared to prior. 2. Interval worsening of parenchymal densities within the right    central/right lower lobe. Additionally, worsening of confluent    consolidation within the left midlung with development of left basilar    pleural/parenchymal opacities likely consistent with moderate left pleural    effusion. This document has been electronically signed by: Anna Woods DO on    10/23/2021 05:00 AM      XR CHEST PORTABLE   Final Result   Persistent diffuse patchy opacities but with improved aeration at the lung bases.          **This report has been created using voice recognition software. It may contain minor errors which are inherent in voice recognition technology. **      Final report electronically signed by Dr. Dewey Saravia MD on 10/21/2021 10:48 AM      XR CHEST PORTABLE   Final Result   1. Increased density in the left lung base. This can indicate partial left lower lobe collapse. 2. Persistent patchy bilateral pulmonary opacities. **This report has been created using voice recognition software. It may contain minor errors which are inherent in voice recognition technology. **      Final report electronically signed by Dr. Maritza Osorio on 10/18/2021 7:58 AM      XR CHEST PORTABLE   Final Result   Impression:   1. Cardiomegaly with improvement of passive congestive changes and better    aeration of both lungs compared to the prior study. 2. The focal bilateral alveolar consolidations consistent with infectious    pulmonary disease are unchanged. Retrocardiac consolidation and small LEFT    pleural effusion obscuring the LEFT diaphragm is unchanged. This document has been electronically signed by: Carlos Butt MD on    10/16/2021 03:18 AM      XR CHEST PORTABLE   Final Result   Somewhat low position of endotracheal tube 1.2 cm above the claire but improved aeration of the upper lungs. **This report has been created using voice recognition software. It may contain minor errors which are inherent in voice recognition technology. **      Final report electronically signed by Dr. Chidi Arias on 10/15/2021 3:34 PM      XR CHEST PORTABLE   Final Result   1. Bilateral pneumonia. 2. Small bilateral pleural effusions. 3. Stable cardiomegaly. **This report has been created using voice recognition software. It may contain minor errors which are inherent in voice recognition technology. **      Final report electronically signed by Dr. Nael Lockett on 10/15/2021 4:14 PM      XR CHEST PORTABLE   Final Result   Right lower lobe airspace infiltrates. Severe cardiomegaly. **This report has been created using voice recognition software. It may contain minor errors which are inherent in voice recognition technology. **      Final report electronically signed by Dr. Kristian Rivera on 10/11/2021 8:59 PM      XR CHEST PORTABLE   Final Result      1. Diffuse groundglass opacities in both lungs along with cardiomegaly. Findings likely suggest acute exacerbation of congestive heart failure versus pneumonia in the right clinical setting. Clinical correlation is recommended      2. Small left pleural effusion. 3. Other findings as described above. **This report has been created using voice recognition software. It may contain minor errors which are inherent in voice recognition technology. **      Final report electronically signed by Dr Hunter Holm on 10/11/2021 11:56 AM            Objective:   Vitals: BP (!) 122/53   Pulse 81   Temp 100.4 °F (38 °C) (Bladder)   Resp 24   Ht 5' (1.524 m)   Wt 145 lb 4.5 oz (65.9 kg)   SpO2 94%   BMI 28.37 kg/m²    Wt Readings from Last 3 Encounters:   10/31/21 145 lb 4.5 oz (65.9 kg)   10/07/21 125 lb (56.7 kg)      24HR INTAKE/OUTPUT:      Intake/Output Summary (Last 24 hours) at 11/3/2021 9272  Last data filed at 11/3/2021 0300  Gross per 24 hour   Intake 2138.03 ml   Output 2140 ml   Net -1.97 ml       Constitutional: Well-developed elderly lady on mechanical support , no apparent distress   Skin:normal with no rash or lesions. HEENT:Pupils are reactive. Endotracheal tube is noted. Orogastric tube is intact. .Oral mucosa is moist   Neck:supple with no adenopathy or carotid bruit  Cardiovascular:  S1, S2 without murmur or rubs   Respiratory:  Clear to ausculation without wheezes, rhonchi or rales in the anterior  Abdomen: Soft. Good bowel sounds.   Ext: 2+ bilateral LE edema  Musculoskeletal:Intact  Neuro: Deferred      Electronically signed by Nadeem Rolle MD on 11/3/2021 at 8:08 AM  **This report has been created using voice recognition software. It maycontain minor  errors which are inherent in voice recognition technology. **

## 2021-11-03 NOTE — PROGRESS NOTES
Patient:  Marlen Tai    Unit/Bed:3A-09/009-A  MRN: [id number]   PCP: SERGE Wilkerson CNP  Date of Admission: 10/11/2021    Assessment and Plan(All pulmonary edema, renal failure, PE, and respiratory failure diagnoses are acute in nature unless otherwise specified):        1. Acute hypoxemic and hypercarbic respiratory failure: Patient intubated 10/15/2021. Unable to wean mechanical ventilator secondary to persistent hypercarbia despite aggressive mechanical ventilator support. Suspicion for neuromuscular disease present with persistent hypercarbia despite mechanical ventilator support. Work-up for myasthenia gravis was negative. This may represent myositis or polyneuropathy such as critical illness polyneuropathy. Family aware of very poor ventilatory capacity. In order to provide time for patient to recover, they desire tracheostomy tube placement. 2. COVID-19: Status post Decadron. 3. Cardiomyopathy: Ejection fraction 25%. Diurese as tolerated. Currently nephrology holding diuretics. Once renal function stabilizes, consider afterload reduction. Currently on milrinone. 4. Nephrolithiasis: Appreciate urology assistance. 5. ARDS: Mild. Secondary to Covid. Hypoxemia minimal.  Barrier to extubation is ventilatory capacity. 6. Hypotension: Metoprolol discontinued secondary to bradycardia and hypotension. 7. Atrial fibrillation: On anticoagulation therapy. Paroxysmal.  Likely related to Covid. 8. Acute renal failure: Nephrology following. 9. Iron deficiency anemia: Iron supplementation. CC: Respiratory failure  HPI: Patient is an 59-year-old white female reformed smoker. She has a history of diverticulosis. She has hypertension. She has cardiomyopathy with an ejection fraction of 25%. Patient presented to the emergency room on 10/11/2021 with increasing shortness of breath. She was known positive Covid since 10/4/2021. She was severely hypoxemic with 83% on room air.   She was placed on Covid isolation and was started on supplemental oxygen and admitted. She received Decadron for Covid. She received Bumex for cardiomyopathy. She continued to deteriorate with progressive hypercarbia. She had very poor tidal volumes on BiPAP and was admitted to the ICU and intubated on 10/15/2021. She received Rocephin for urinary tract infection. Pulmonary lavage was negative for bacterial coinfection. Despite mechanical ventilator support, she had ongoing low tidal volume and hypercarbia. Given her debilitated state, and ongoing cardiomyopathy, and failed response to mechanical ventilator support, it was apparent that patient could not sustain without mechanical ventilator support. With this information, the family opted to proceed with tracheostomy tube placement despite her poor prognosis. For further details, please review the assessment and plan. ROS: Sedated on mechanical ventilator. PMH:  Per HPI  SHX: Reformed smoker since 0. 15-pack-year history prior to cessation. FHX: No known premature coronary artery disease.   Allergies: Sulfa  Medications:     dextrose 100 mL/hr (10/26/21 1253)    vasopressin (Septic Shock) infusion Stopped (10/31/21 0243)    dexmedetomidine 1.4 mcg/kg/hr (11/03/21 1000)    norepinephrine Stopped (10/26/21 0820)    milrinone 0.0625 mcg/kg/min (11/01/21 0103)    [Held by provider] sodium chloride Stopped (10/19/21 1900)    sodium chloride        lactobacillus  1 capsule Per NG tube Daily with breakfast    potassium (CARDIAC) replacement protocol   Other RX Placeholder    [Held by provider] bumetanide  1 mg IntraVENous Q12H    senna  10 mL Per NG tube Nightly    [Held by provider] lactulose  20 g Oral TID    [Held by provider] metoprolol tartrate  25 mg Oral BID    [Held by provider] apixaban  2.5 mg Oral BID    sodium chloride flush  5-40 mL IntraVENous 2 times per day       Vital Signs:   T: 100.4: P: 93 RR: 23 B/P: 136/66: FiO2: 30: O2 Sat:95: I/O: 3973/5278 GCS: 11  Body mass index is 28.37 kg/m². [place] PC: 20/6: TV: 220: RRTotal: 32: General:   Acutely ill-appearing on chronically ill-appearing debilitated white female. HEENT:  normocephalic and atraumatic. No scleral icterus. PERR  Neck: supple. No Thyromegaly. Lungs: Poor air exchange with bilateral crackles. No retractions  Cardiac: RRR. Positive JVD. Abdomen: soft. Nontender. Extremities:  No clubbing, cyanosis x 4. Trace lower extremity edema bilaterally. Vasculature: capillary refill < 3 seconds. Palpable dorsalis pedis pulses. Skin:  warm and dry. Psych: Sedated on mechanical ventilator. Lymph:  No supraclavicular adenopathy. Neurologic:  No focal deficit. No seizures. Muscle atrophy. Data: (All radiographs, tracings, PFTs, and imaging are personally viewed and interpreted unless otherwise noted).  Telemetry shows sinus rhythm.  Echocardiogram report 10/12/2021: Ejection fraction 25%. Severe global hypokinesis of the left ventricle. Eczematous changes and degenerative changes to the mitral valve with moderate regurgitation.  Sodium 142, potassium 3.9, chloride 105, bicarb 30, BUN 48, creatinine 1.1, glucose 122. White blood cell count 5.9, hemoglobin 9.2, platelets 532. CC time 35 minutes. Time was discontiguous and does not include procedures. Electronically signed by Pipe Levy M.D.

## 2021-11-03 NOTE — CARE COORDINATION
11/3/21, 3:41 PM EDT    DISCHARGE ON GOING EVALUATION    [facility] day: 23  Location: 3A-09/009-A Reason for admit: Acute respiratory failure with hypoxia ([facility] [address] [J96.01]  Acute congestive heart failure, unspecified heart failure type ([facility] [address] [I50.9]  COVID-19 [U07.1]   Procedure:   10/12 Echo with EF 25-30%; Small circumferential pericardial effusion with no tamponade physiology; Myxomatotic degeneration of mitral valve;  Mild to Moderate mitral regurgitation is present  10/15 Intubated  10/15 CVC RIJ  10/28 Renal US: Mildly increased echogenicity of the kidneys that may suggest underlying medical renal disease; no hydronephrosis; 7mm left renal calculus is seen; small ascites. Bilateral pleural effusions; bilateral renal cysts  10/30 CT Chest: Extensive bilateral pneumonia; Large bilateral pleural effusions, right greater than left; Large pericardial effusion; Marked cardiomegaly  10/30 CT Abd/pelvis: Diffusely thickened appearance of the sigmoid colon. There are diverticula in this region. This could be on the basis of acute diverticulitis, however given the eccentric appearance of the thickening, neoplastic process can't entirely be excluded; Haziness throughout the superficial subcutaneous soft tissues of the abdomen and pelvis consistent with anasarca; No evidence of hydronephrosis. No obstructive renal stones are identified; Diffuse haziness throughout the mesentery and a small amount of ascites, findings likely related to fluid overload  11/1 BLE venous doppler: Neg for DVT    Barriers to Discharge: Remains on vent w/ETT on PCV, peep 6, FIO2 30%, sats 94%. Tmax 100.4. NSR. Follows commands. PT/OT - early mobility. Intensivist and Nephrology following. Palliative Care and Dietitian following.  Telemetry, CVC, OG w/TF, alaniz. Precedex @ 1.4 mcg/kg/hr, versed @ 5 mg/hr, Primacor @ 0.0625 mcg/kg/min, lactobacillus, prn IV ativan, Electrolyte replacement protocols.     PCP: SERGE Brennan - CNP  Readmission Risk Score: 19.1%  Patient Goals/Plan/Treatment Preferences: From home with daughter. Plan for [address] as 1st choice and 2nd choice is [facility] in [place]. No precert required.

## 2021-11-03 NOTE — PROGRESS NOTES
Updated [facility] regarding patient update overnight. Patient has been on spont. breathing setting on ventilator all night. No new questions at this time.

## 2021-11-03 NOTE — PROGRESS NOTES
[address]  PHYSICAL THERAPY MISSED TREATMENT NOTE  [facility] CCU 3A    Date: 11/3/2021  Patient Name: Doraine Heimlich        MRN: [id number]   : 1934  (80 y.o.)  Gender: female   Referring Practitioner: Kaylan Dominguez DO  Diagnosis: Acute respiratory failure with hypoxia         REASON FOR MISSED TREATMENT:  Missed treatment this date as pt continues to participate in a spontaneous breathing trial. PT to treat as able when pt appropriate.  Delfina Juarez PT, DPT

## 2021-11-03 NOTE — PROGRESS NOTES
Received verbal order to sedate patient until trach is placed from Dr. Karmen Levy. Received verbal order for versed drip to be started at 5mg/hr. Verbal order read back.

## 2021-11-04 ENCOUNTER — APPOINTMENT (OUTPATIENT)
Dept: GENERAL RADIOLOGY | Age: 86
DRG: 004 | End: 2021-11-04
Payer: MEDICARE

## 2021-11-04 LAB
ANION GAP SERPL CALCULATED.3IONS-SCNC: 9 MEQ/L (ref 8–16)
BUN BLDV-MCNC: 49 MG/DL (ref 7–22)
CALCIUM SERPL-MCNC: 9 MG/DL (ref 8.5–10.5)
CHLORIDE BLD-SCNC: 107 MEQ/L (ref 98–111)
CO2: 29 MEQ/L (ref 23–33)
CREAT SERPL-MCNC: 1.1 MG/DL (ref 0.4–1.2)
ERYTHROCYTE [DISTWIDTH] IN BLOOD BY AUTOMATED COUNT: 15.7 % (ref 11.5–14.5)
ERYTHROCYTE [DISTWIDTH] IN BLOOD BY AUTOMATED COUNT: 57.5 FL (ref 35–45)
GFR SERPL CREATININE-BSD FRML MDRD: 47 ML/MIN/1.73M2
GLUCOSE BLD-MCNC: 112 MG/DL (ref 70–108)
HCT VFR BLD CALC: 26 % (ref 37–47)
HEMOGLOBIN: 8 GM/DL (ref 12–16)
MAGNESIUM: 1.8 MG/DL (ref 1.6–2.4)
MCH RBC QN AUTO: 31.1 PG (ref 26–33)
MCHC RBC AUTO-ENTMCNC: 30.8 GM/DL (ref 32.2–35.5)
MCV RBC AUTO: 101.2 FL (ref 81–99)
PHOSPHORUS: 2.8 MG/DL (ref 2.4–4.7)
PLATELET # BLD: 148 THOU/MM3 (ref 130–400)
PMV BLD AUTO: 11.7 FL (ref 9.4–12.4)
POTASSIUM REFLEX MAGNESIUM: 4 MEQ/L (ref 3.5–5.2)
POTASSIUM SERPL-SCNC: 4 MEQ/L (ref 3.5–5.2)
RBC # BLD: 2.57 MILL/MM3 (ref 4.2–5.4)
SODIUM BLD-SCNC: 145 MEQ/L (ref 135–145)
WBC # BLD: 4.2 THOU/MM3 (ref 4.8–10.8)

## 2021-11-04 PROCEDURE — 71045 X-RAY EXAM CHEST 1 VIEW: CPT

## 2021-11-04 PROCEDURE — 2100000000 HC CCU R&B

## 2021-11-04 PROCEDURE — 2580000003 HC RX 258: Performed by: STUDENT IN AN ORGANIZED HEALTH CARE EDUCATION/TRAINING PROGRAM

## 2021-11-04 PROCEDURE — 6370000000 HC RX 637 (ALT 250 FOR IP): Performed by: INTERNAL MEDICINE

## 2021-11-04 PROCEDURE — 94003 VENT MGMT INPAT SUBQ DAY: CPT

## 2021-11-04 PROCEDURE — 99232 SBSQ HOSP IP/OBS MODERATE 35: CPT | Performed by: INTERNAL MEDICINE

## 2021-11-04 PROCEDURE — 36415 COLL VENOUS BLD VENIPUNCTURE: CPT

## 2021-11-04 PROCEDURE — 6360000002 HC RX W HCPCS: Performed by: INTERNAL MEDICINE

## 2021-11-04 PROCEDURE — 2500000003 HC RX 250 WO HCPCS: Performed by: STUDENT IN AN ORGANIZED HEALTH CARE EDUCATION/TRAINING PROGRAM

## 2021-11-04 PROCEDURE — 83735 ASSAY OF MAGNESIUM: CPT

## 2021-11-04 PROCEDURE — 84100 ASSAY OF PHOSPHORUS: CPT

## 2021-11-04 PROCEDURE — 99291 CRITICAL CARE FIRST HOUR: CPT | Performed by: INTERNAL MEDICINE

## 2021-11-04 PROCEDURE — 80048 BASIC METABOLIC PNL TOTAL CA: CPT

## 2021-11-04 PROCEDURE — 2580000003 HC RX 258: Performed by: INTERNAL MEDICINE

## 2021-11-04 PROCEDURE — 85027 COMPLETE CBC AUTOMATED: CPT

## 2021-11-04 RX ADMIN — SODIUM CHLORIDE 1.4 MCG/KG/HR: 9 INJECTION, SOLUTION INTRAVENOUS at 04:52

## 2021-11-04 RX ADMIN — SODIUM CHLORIDE 1.4 MCG/KG/HR: 9 INJECTION, SOLUTION INTRAVENOUS at 09:04

## 2021-11-04 RX ADMIN — Medication 1 CAPSULE: at 07:50

## 2021-11-04 RX ADMIN — Medication 17.6 MG: at 21:24

## 2021-11-04 RX ADMIN — MIDAZOLAM 5 MG/HR: 5 INJECTION, SOLUTION INTRAMUSCULAR; INTRAVENOUS at 09:04

## 2021-11-04 RX ADMIN — SODIUM CHLORIDE 1.4 MCG/KG/HR: 9 INJECTION, SOLUTION INTRAVENOUS at 00:09

## 2021-11-04 RX ADMIN — SODIUM CHLORIDE, PRESERVATIVE FREE 10 ML: 5 INJECTION INTRAVENOUS at 07:50

## 2021-11-04 RX ADMIN — SODIUM CHLORIDE 1.4 MCG/KG/HR: 9 INJECTION, SOLUTION INTRAVENOUS at 14:33

## 2021-11-04 RX ADMIN — SODIUM CHLORIDE, PRESERVATIVE FREE 10 ML: 5 INJECTION INTRAVENOUS at 21:24

## 2021-11-04 RX ADMIN — SODIUM CHLORIDE 1.4 MCG/KG/HR: 9 INJECTION, SOLUTION INTRAVENOUS at 20:21

## 2021-11-04 ASSESSMENT — PAIN SCALES - WONG BAKER
WONGBAKER_NUMERICALRESPONSE: 0

## 2021-11-04 ASSESSMENT — PAIN SCALES - GENERAL
PAINLEVEL_OUTOF10: 0
PAINLEVEL_OUTOF10: 0

## 2021-11-04 ASSESSMENT — PULMONARY FUNCTION TESTS
PIF_VALUE: 28
PIF_VALUE: 28
PIF_VALUE: 26
PIF_VALUE: 27
PIF_VALUE: 2

## 2021-11-04 NOTE — PROGRESS NOTES
CRITICAL CARE PROGRESS NOTE      Patient:  Rhea Teixeira    Unit/Bed:3A-09/009-A  YOB: 1934  MRN: [id number]   PCP: SERGE Johnson CNP  Date of Admission: 10/11/2021  Chief Complaint:- Worsening shortness of breath    Assessment and Plan:    Acute hypoxic respiratory failure: Patient was admitted to the hospital through the ED on 10/11/2021 and started on high flow. Patient's hypoxemia progressively worsened. Intubated and admitted to ICU on 10/15.  - Patient has had multiple failed breathing trials. There was some suspicion for neuromuscular disease present dt persistent hypercarbia despite mechanical ventilatory support. Work up negative for Myasthenia Gravis. Family is agreeable to trach and peg placement d/t severe respiratory muscle weakness making ventilatory capacity a barrier for extubation. Trach scheduled for Monday. Covid Pneumonia with superimposed bacterial pneumonia:  S/p baricitinib   10/19 -respiratory culture positive for Corynebacterium. Plan vancomycin 6 days. Chest x-ray shows moderate sized pleural effusionon the left, moderate mixed infiltrate scattered throughout both lung. Overall appearance slightly worsened than prior. Episode of fever with temperature 100.4. Plan: Procal, lactic acid, pneumonia panel  10/29 - lactic acid 1.1, procal 0.24   11/4 - pending pneumonia panel ordered on 10/29    3. Hematuria: Secondary to nephrolithiasis. resolved     ARDS: Mild. Patient received oxygen with FiO2 100 with very low TV (250ml) on ventilation. Ferritin, CRP, Lactic dehydrogenase elevated. Start Decadron, Baricitinib (10/12). Require pressor support with Vasopressin 0.02.   11/1 - currently Fi02 = 30, spo2 = 95% on PCV: rate 20, PIP 27, PEEP 6  11/4 - patient on SBT, barrier to extubation is ventilatory capacity     Hypotension - continue to hold antihypertensives and monitor     6. HFrEF: Echo (10/11): LV EF 25-30%, LV severe global hypokinesis.  LV size moderate increase. Patient is hypotension with BP 97/50. Weight on admission 120 lbs  10/20: Started Milrinone lactate 1/9ml/hr   10/24: Current weight 142 lbs. Gain 4 lbs since admission. Net I/O since admission (10/24): -4.6L. Tele showed A fib with RVR. EKG was ordered show A. Fib with premature ventricular conduction complex. Plan: Cont Milrinone, monitor I/O   10/27: Cardiologist consult appreciated -cardiology will wait for recovery prior to considering any intervention such as LifeVest versus AICD. 11/1 - patient continues to be on milrinone 0.0625   11/2 - pro-BNP 41,133. Give 1mg of Bumex   11/4 - currently nephrology holding diuretics, patient on milrinone     Hx of Prolong QTc interval: On telemetry strip in ED. Seen by cardiogy for Prolong QTC. Plan: tele monitor, avoid QTC prolong medications, Keep K +> 4 and Mg > 2     8. New on set Afib without RVR: paroxysmal, most likely d/t covid. Patient is on anticoagulation    9. Bilateral lower extremity swelling: r/o DVT - US negative on 11/1     HERIBERTO: Bun/Cr Improving. Nephrology holding bumex currently. Hyperkalemia: Resolved. 11/4 - K+ 4     12. AMS: likely secondary to hypercapnic hypoxemia. Plan: CT head when patient is stable, Neuro exam when patient is extubate     13. Iron deficiency anemia: Iron supplementation      INITIAL H AND P AND ICU COURSE:   80-year-old female presents to the ED on 10/11/2021 due to worsening shortness of breath. Patient tested positive for Covid on 10/4/2021. In the emergency room patient was found to have a SPO2 of 83% on room air with a BNP of the 15 point 7K. Patient was admitted and managed for acute hypoxic respiratory failure secondary to Covid with combination of congestive heart failure. Patient was placed on nasal cannula, Bumex and dexamethasone in the ED. On 10/15 patient was transferred to the ICU and intubated for acute hypoxic respiratory failure on 100% FiO2 on high flow.   Patient had a UTI on 10/18 and was started on ceftriaxone on 10/19. Patient was also started on milrinone for her HFrEF. Patient's family has been consulted regarding trach and PEG tube. Cardiology is also on board regarding LifeVest versus AICD. Past Medical History: HPI  Family History:  See EMR  Social History: Former smoker since 1977. 15 pack year hx prior to cessation    ROS   Cannot obtain d/t patient sedated and on mechanical ventilation    Scheduled Meds:   lactobacillus  1 capsule Per NG tube Daily with breakfast    potassium (CARDIAC) replacement protocol   Other RX Placeholder    [Held by provider] bumetanide  1 mg IntraVENous Q12H    senna  10 mL Per NG tube Nightly    [Held by provider] lactulose  20 g Oral TID    [Held by provider] metoprolol tartrate  25 mg Oral BID    [Held by provider] apixaban  2.5 mg Oral BID    sodium chloride flush  5-40 mL IntraVENous 2 times per day     Continuous Infusions:   midazolam 5 mg/hr (11/03/21 1533)    dextrose 100 mL/hr (10/26/21 1253)    vasopressin (Septic Shock) infusion Stopped (10/31/21 0243)    dexmedetomidine 1.4 mcg/kg/hr (11/04/21 0452)    norepinephrine Stopped (10/26/21 0820)    milrinone 0.0625 mcg/kg/min (11/01/21 0103)    [Held by provider] sodium chloride Stopped (10/19/21 1900)    sodium chloride         PHYSICAL EXAMINATION:  T:  97.9F.  P:  59. RR:  18. B/P:  112/52  FiO2:  30. O2 Sat:  96  I/O:  486/200 = net +286   Body mass index is 28.37 kg/m². GCS:   9  PCV+: Psupport 20, Rate 16, PIP 26, PEEP 6       General: Sedated on vent  HEENT:  normocephalic and atraumatic. No scleral icterus. PERR  Neck: supple. No Thyromegaly. Lungs: diminished breath sounds bilaterally on auscultation. No retractions  Cardiac: RRR. No JVD. Abdomen: soft. Nontender. Extremities:  No clubbing, cyanosis, mild pitting edema x 4. Vasculature: capillary refill < 3 seconds. Palpable dorsalis pedis pulses. Skin:  warm and dry.   Psych:  Patient sedated  Lymph:  No supraclavicular adenopathy. Neurologic: No seizures. Data: (All radiographs, tracings, PFTs, and imaging are personally viewed and interpreted unless otherwise noted). Sodium 145, potassium 4, chloride 107, CO2 29, BUN 49, creatinine 1.1  Glucose 112  WBC 4.2, hemoglobin 8, hematocrit 26, platelets 730  Telemetry shows NSR     Seen with multidisciplinary ICU team   Meets Continued ICU Level Care Criteria:    [x] Yes   [] No - Transfer Planned to listed location:  [] HOSPITALIST CONTACTED-      Case and plan discussed with Dr. Cuate Lombardo    Electronically signed by Ida Hernandez MD  [address] [place] [address] Way  Patient seen by me. Case discussed with resident physician. Unable to wean. Family authorized tracheostomy tube. Italicized font represents changes to the note made by me. CC time 35 minutes. Time was discontiguous. Time does not include procedures. Time does include my direct assessment of the patient and coordination of care.   Electronically signed by Lula Justin MD on 11/5/2021 at 6:06 AM

## 2021-11-04 NOTE — PROGRESS NOTES
1441: Manda Zapien, daughter and CLARK, at bedside - updated on care for today and patient status    1845: Manda Zapien, daughter, called and updated on patient's status

## 2021-11-04 NOTE — PROGRESS NOTES
[address]  PHYSICAL THERAPY MISSED TREATMENT NOTE  [facility] CCU 3A    Date: 2021  Patient Name: Keshia Harper        MRN: [id number]   : 1934  (80 y.o.)  Gender: female   Referring Practitioner: Shikha Pollack DO  Diagnosis: Acute respiratory failure with hypoxia         REASON FOR MISSED TREATMENT:  Spoke with nursing and respiratory therapy this am, poss plan for trach today and pt currently on sedation due to anxiety and elevated HR. Will plan to hold early mobility today and check back again tomorrow if pt appropriate for therapy.

## 2021-11-04 NOTE — CARE COORDINATION
11/4/21, 3:25 PM EDT    DISCHARGE ON GOING EVALUATION    [facility] day: 24  Location: 3A-09/009-A Reason for admit: Acute respiratory failure with hypoxia ([facility] [address] [J96.01]  Acute congestive heart failure, unspecified heart failure type ([facility] [address] [I50.9]  COVID-19 [U07.1]   Procedure:   10/12 Echo with EF 25-30%; Small circumferential pericardial effusion with no tamponade physiology; Myxomatotic degeneration of mitral valve;  Mild to Moderate mitral regurgitation is present  10/15 Intubated  10/15 CVC RIJ  10/28 Renal US: Mildly increased echogenicity of the kidneys that may suggest underlying medical renal disease; no hydronephrosis; 7mm left renal calculus is seen; small ascites. Bilateral pleural effusions; bilateral renal cysts  10/30 CT Chest: Extensive bilateral pneumonia; Large bilateral pleural effusions, right greater than left; Large pericardial effusion; Marked cardiomegaly  10/30 CT Abd/pelvis: Diffusely thickened appearance of the sigmoid colon. There are diverticula in this region. This could be on the basis of acute diverticulitis, however given the eccentric appearance of the thickening, neoplastic process can't entirely be excluded; Haziness throughout the superficial subcutaneous soft tissues of the abdomen and pelvis consistent with anasarca; No evidence of hydronephrosis. No obstructive renal stones are identified; Diffuse haziness throughout the mesentery and a small amount of ascites, findings likely related to fluid overload  11/1 BLE venous doppler: Neg for DVT    Barriers to Discharge:  Plan for trach on Monday. Remains on vent w/ETT on PCV, peep 6, FIO2 30%, sats 96%. Tmax 99.3. NSR. Unable to follow commands. PT/OT - early mobility. Intensivist and Nephrology following. Palliative Care and Dietitian following.  Telemetry, CVC, OG w/TF, alaniz. Precedex @ 1.4 mcg/kg/hr, versed @ 5 mg/hr, Primacor @ 0.0625 mcg/kg/min, lactobacillus, prn IV ativan, Electrolyte replacement

## 2021-11-04 NOTE — FLOWSHEET NOTE
Pt was unresponsive but was blessed.       11/04/21 1507   Encounter Summary   Services provided to: Patient   Referral/Consult From: Rounding   Continue Visiting Yes  (11/4 NR)   Complexity of Encounter Low   Length of Encounter 15 minutes   Routine   Type Follow up   Assessment Unable to respond   Intervention Emmet

## 2021-11-04 NOTE — PROGRESS NOTES
[address] THERAPY MISSED TREATMENT NOTE  [facility] CCU 3A  3A-009-A      Date: 2021  Patient Name: Juhi Harris        CSN: [id number]   : 1934  (80 y.o.)  Gender: female   Referring Practitioner: John Thurman MD  Diagnosis: acute respiratory failure with hypoxia         REASON FOR MISSED TREATMENT: Hold Treatment per Nursing plan for possible trach today.   Will check back

## 2021-11-04 NOTE — PROGRESS NOTES
Renal Progress Note    Assessment and Plan:   1. Acute kidney injury improved and stable and predominantly prerenal azotemia  2. SARS-CoV-2 pneumonia. 3. Hypoxic respiratory failure mechanical support  4. Leukopenia  5. Macrocytic anemia    PLAN:  1. Labs reviewed  2. Serum creatinine remains stable  3. BUN is stable as well. 4. Medications reviewed  5. No changes. 6. Labs in the morning. 7. We will continue to follow.     Patient Active Problem List:     Sigmoid diverticulitis     Acute respiratory failure with hypoxia ([facility] [address]     COVID-19     Acute congestive heart failure ([facility] [address]      Subjective:   Admit Date: 10/11/2021    Interval History:   Seen for acute kidney injury  Remains on mechanical support  Updated by the staff  Blood pressure is stable  Urine output is good      Medications:   Scheduled Meds:   lactobacillus  1 capsule Per NG tube Daily with breakfast    potassium (CARDIAC) replacement protocol   Other RX Placeholder    [Held by provider] bumetanide  1 mg IntraVENous Q12H    senna  10 mL Per NG tube Nightly    [Held by provider] lactulose  20 g Oral TID    [Held by provider] metoprolol tartrate  25 mg Oral BID    [Held by provider] apixaban  2.5 mg Oral BID    sodium chloride flush  5-40 mL IntraVENous 2 times per day     Continuous Infusions:   midazolam 5 mg/hr (11/04/21 0904)    dextrose 100 mL/hr (10/26/21 1253)    vasopressin (Septic Shock) infusion Stopped (10/31/21 0243)    dexmedetomidine 1.4 mcg/kg/hr (11/04/21 0904)    norepinephrine Stopped (10/26/21 0820)    milrinone 0.0625 mcg/kg/min (11/01/21 0103)    [Held by provider] sodium chloride Stopped (10/19/21 1900)    sodium chloride         CBC:   Recent Labs     11/02/21  0600 11/03/21  0330 11/04/21  0500   WBC 5.3 5.9 4.2*   HGB 8.3* 9.2* 8.0*    154 148     CMP:    Recent Labs     11/02/21  0600 11/02/21  0600 11/03/21  0330 11/04/21  0500     --  142 145   K 4.2   < > 3.9 4.0  4.0     --  105 107 CO2 29  --  30 29   BUN 54*  --  48* 49*   CREATININE 1.1  --  1.1 1.1   GLUCOSE 123*  --  122* 112*   CALCIUM 9.2  --  9.3 9.0   LABGLOM 47*  --  47* 47*    < > = values in this interval not displayed. Troponin: No results for input(s): TROPONINI in the last 72 hours. BNP: No results for input(s): BNP in the last 72 hours. INR: No results for input(s): INR in the last 72 hours. Lipids: No results for input(s): CHOL, LDLDIRECT, TRIG, HDL, AMYLASE, LIPASE in the last 72 hours. Liver: No results for input(s): AST, ALT, ALKPHOS, PROT, LABALBU, BILITOT in the last 72 hours. Invalid input(s): BILDIR  Iron:  No results for input(s): IRONS, FERRITIN in the last 72 hours. Invalid input(s): LABIRONS  XR CHEST PORTABLE   Final Result   1. Moderate cardiomegaly. ET tube, NG tube, and right jugular line remain in good position. 2. Tiny bilateral pleural effusions. Findings of relatively severe pneumonia/edema involving both lungs diffusely. Overall appearance not appreciably changed from yesterday. **This report has been created using voice recognition software. It may contain minor errors which are inherent in voice recognition technology. **      Final report electronically signed by Dr. Vickey Babin on 11/1/2021 9:36 AM      VL DUP LOWER EXTREMITY VENOUS BILATERAL   Final Result   No evidence of a DVT. **This report has been created using voice recognition software. It may contain minor errors which are inherent in voice recognition technology. **      Final report electronically signed by Dr. Addy Kendrick on 11/1/2021 8:07 AM      XR CHEST PORTABLE   Final Result   No significant interval change since previous study dated 29th of October 2021. Sravani Estevez **This report has been created using voice recognition software. It may contain minor errors which are inherent in voice recognition technology. **      Final report electronically signed by DR Maricarmen Dowd on 10/31/2021 12:43 PM      CT ABDOMEN PELVIS WO CONTRAST   Final Result      1. Diffusely thickened appearance of the sigmoid colon. There are diverticula in this region. This could be on the basis of acute diverticulitis, however given the eccentric appearance of the thickening, neoplastic process can't entirely be excluded. 2. Haziness throughout the superficial subcutaneous soft tissues of the abdomen and pelvis consistent with anasarca. 3. No evidence of hydronephrosis. No obstructive renal stones are identified. 4. Diffuse haziness throughout the mesentery and a small amount of ascites, findings likely related to fluid overload. **This report has been created using voice recognition software. It may contain minor errors which are inherent in voice recognition technology. **      Final report electronically signed by Dr Sheri Grijalva on 10/30/2021 2:23 PM      CT CHEST W WO CONTRAST   Final Result       1. Extensive bilateral pneumonia. 2. Large bilateral pleural effusions, right greater than left. 3. Large pericardial effusion. 4. Marked cardiomegaly. **This report has been created using voice recognition software. It may contain minor errors which are inherent in voice recognition technology. **      Final report electronically signed by Dr Sheri Grijalva on 10/30/2021 1:54 PM      XR CHEST PORTABLE   Final Result   1. Mildly megaly. ET tube and NG tube remain in good position. Right jugular line with catheter tip in SVC. 2. Moderate-sized pleural effusion left side. 3. Moderate mixed infiltrates scattered throughout both lungs. Overall appearance slightly worse than prior. **This report has been created using voice recognition software. It may contain minor errors which are inherent in voice recognition technology. **      Final report electronically signed by Dr. Carolina Becerra on 10/29/2021 8:10 AM      US RENAL COMPLETE   Final Result   1.  Mildly increased echogenicity of the kidneys that may suggest underlying medical renal disease. 2. No hydronephrosis. 3. A 7 mm left renal calculus is seen. 4. Small ascites. Bilateral pleural effusions. 5. Bilateral renal cysts. **This report has been created using voice recognition software. It may contain minor errors which are inherent in voice recognition technology. **      Final report electronically signed by Dr Joey Marie on 10/29/2021 3:55 AM      XR ABDOMEN FOR NG/OG/NE TUBE PLACEMENT   Final Result   1. The tip of the nasogastric tube is below the diaphragms within the stomach. 2. Partially seen is left pleural effusion and left retrocardiac opacity. Opacities are also seen in the partially seen right lower lobe. **This report has been created using voice recognition software. It may contain minor errors which are inherent in voice recognition technology. **      Final report electronically signed by Dr Joey Marie on 10/27/2021 6:54 PM      XR CHEST PORTABLE   Final Result   1. There has been no significant interval change in the radiographic appearance of the chest  from 10/23/2021 including diffuse airspace disease. .            **This report has been created using voice recognition software. It may contain minor errors which are inherent in voice recognition technology. **      Final report electronically signed by Dr Joey Marie on 10/24/2021 8:54 PM      XR CHEST PORTABLE   Final Result   1. Support lines and tubes in unchanged position when compared to prior. 2. Interval worsening of parenchymal densities within the right    central/right lower lobe. Additionally, worsening of confluent    consolidation within the left midlung with development of left basilar    pleural/parenchymal opacities likely consistent with moderate left pleural    effusion.       This document has been electronically signed by: Martha Plascencia DO on    10/23/2021 05:00 AM      XR CHEST PORTABLE   Final Result Persistent diffuse patchy opacities but with improved aeration at the lung bases. **This report has been created using voice recognition software. It may contain minor errors which are inherent in voice recognition technology. **      Final report electronically signed by Dr. Al Kay MD on 10/21/2021 10:48 AM      XR CHEST PORTABLE   Final Result   1. Increased density in the left lung base. This can indicate partial left lower lobe collapse. 2. Persistent patchy bilateral pulmonary opacities. **This report has been created using voice recognition software. It may contain minor errors which are inherent in voice recognition technology. **      Final report electronically signed by Dr. Constanza Nieto on 10/18/2021 7:58 AM      XR CHEST PORTABLE   Final Result   Impression:   1. Cardiomegaly with improvement of passive congestive changes and better    aeration of both lungs compared to the prior study. 2. The focal bilateral alveolar consolidations consistent with infectious    pulmonary disease are unchanged. Retrocardiac consolidation and small LEFT    pleural effusion obscuring the LEFT diaphragm is unchanged. This document has been electronically signed by: Sherin Nunez MD on    10/16/2021 03:18 AM      XR CHEST PORTABLE   Final Result   Somewhat low position of endotracheal tube 1.2 cm above the claire but improved aeration of the upper lungs. **This report has been created using voice recognition software. It may contain minor errors which are inherent in voice recognition technology. **      Final report electronically signed by Dr. Kimo Reich on 10/15/2021 3:34 PM      XR CHEST PORTABLE   Final Result   1. Bilateral pneumonia. 2. Small bilateral pleural effusions. 3. Stable cardiomegaly. **This report has been created using voice recognition software. It may contain minor errors which are inherent in voice recognition technology. ** Final report electronically signed by Dr. Angely Padron on 10/15/2021 4:14 PM      XR CHEST PORTABLE   Final Result   Right lower lobe airspace infiltrates. Severe cardiomegaly. **This report has been created using voice recognition software. It may contain minor errors which are inherent in voice recognition technology. **      Final report electronically signed by Dr. Slime Day on 10/11/2021 8:59 PM      XR CHEST PORTABLE   Final Result      1. Diffuse groundglass opacities in both lungs along with cardiomegaly. Findings likely suggest acute exacerbation of congestive heart failure versus pneumonia in the right clinical setting. Clinical correlation is recommended      2. Small left pleural effusion. 3. Other findings as described above. **This report has been created using voice recognition software. It may contain minor errors which are inherent in voice recognition technology. **      Final report electronically signed by Dr Marcos Santiago on 10/11/2021 11:56 AM            Objective:   Vitals: BP (!) 113/51   Pulse 60   Temp 97.9 °F (36.6 °C) (Bladder)   Resp 17   Ht 5' (1.524 m)   Wt 145 lb 4.5 oz (65.9 kg)   SpO2 95%   BMI 28.37 kg/m²    Wt Readings from Last 3 Encounters:   10/31/21 145 lb 4.5 oz (65.9 kg)   10/07/21 125 lb (56.7 kg)      24HR INTAKE/OUTPUT:      Intake/Output Summary (Last 24 hours) at 11/4/2021 1026  Last data filed at 11/4/2021 0741  Gross per 24 hour   Intake 2350.02 ml   Output 1350 ml   Net 1000.02 ml       Constitutional: Well-developed elderly lady on mechanical support , no apparent distress   Skin:normal with no rash or lesions. HEENT:Pupils are reactive. Endotracheal tube is intact. Orogastric tube is intact. .Oral mucosa is moist   Neck:supple with no carotid bruit  Cardiovascular:  S1, S2 without murmur or rubs   Respiratory: Decreased breath sound. Abdomen: Soft. Good bowel sounds.   Ext: Trace bilateral LE edema  Musculoskeletal:Intact  Neuro: Deferred      Electronically signed by Yu Alcaraz MD on 11/4/2021 at 10:26 AM  **This report has been created using voice recognition software. It maycontain minor  errors which are inherent in voice recognition technology. **

## 2021-11-05 LAB
ANION GAP SERPL CALCULATED.3IONS-SCNC: 9 MEQ/L (ref 8–16)
BUN BLDV-MCNC: 47 MG/DL (ref 7–22)
CALCIUM SERPL-MCNC: 8.7 MG/DL (ref 8.5–10.5)
CHLORIDE BLD-SCNC: 105 MEQ/L (ref 98–111)
CO2: 29 MEQ/L (ref 23–33)
CREAT SERPL-MCNC: 1 MG/DL (ref 0.4–1.2)
ERYTHROCYTE [DISTWIDTH] IN BLOOD BY AUTOMATED COUNT: 15.9 % (ref 11.5–14.5)
ERYTHROCYTE [DISTWIDTH] IN BLOOD BY AUTOMATED COUNT: 58.9 FL (ref 35–45)
GFR SERPL CREATININE-BSD FRML MDRD: 52 ML/MIN/1.73M2
GLUCOSE BLD-MCNC: 109 MG/DL (ref 70–108)
HCT VFR BLD CALC: 26.5 % (ref 37–47)
HEMOGLOBIN: 8 GM/DL (ref 12–16)
MAGNESIUM: 1.7 MG/DL (ref 1.6–2.4)
MCH RBC QN AUTO: 30.5 PG (ref 26–33)
MCHC RBC AUTO-ENTMCNC: 30.2 GM/DL (ref 32.2–35.5)
MCV RBC AUTO: 101.1 FL (ref 81–99)
PHOSPHORUS: 3 MG/DL (ref 2.4–4.7)
PLATELET # BLD: 162 THOU/MM3 (ref 130–400)
PMV BLD AUTO: 11.6 FL (ref 9.4–12.4)
POTASSIUM REFLEX MAGNESIUM: 4.2 MEQ/L (ref 3.5–5.2)
POTASSIUM SERPL-SCNC: 4.2 MEQ/L (ref 3.5–5.2)
RBC # BLD: 2.62 MILL/MM3 (ref 4.2–5.4)
SODIUM BLD-SCNC: 143 MEQ/L (ref 135–145)
WBC # BLD: 4 THOU/MM3 (ref 4.8–10.8)

## 2021-11-05 PROCEDURE — 2100000000 HC CCU R&B

## 2021-11-05 PROCEDURE — 84100 ASSAY OF PHOSPHORUS: CPT

## 2021-11-05 PROCEDURE — 6370000000 HC RX 637 (ALT 250 FOR IP): Performed by: INTERNAL MEDICINE

## 2021-11-05 PROCEDURE — 94003 VENT MGMT INPAT SUBQ DAY: CPT

## 2021-11-05 PROCEDURE — 85027 COMPLETE CBC AUTOMATED: CPT

## 2021-11-05 PROCEDURE — 2580000003 HC RX 258: Performed by: STUDENT IN AN ORGANIZED HEALTH CARE EDUCATION/TRAINING PROGRAM

## 2021-11-05 PROCEDURE — 6360000002 HC RX W HCPCS: Performed by: INTERNAL MEDICINE

## 2021-11-05 PROCEDURE — 2580000003 HC RX 258: Performed by: INTERNAL MEDICINE

## 2021-11-05 PROCEDURE — 2500000003 HC RX 250 WO HCPCS: Performed by: STUDENT IN AN ORGANIZED HEALTH CARE EDUCATION/TRAINING PROGRAM

## 2021-11-05 PROCEDURE — 83735 ASSAY OF MAGNESIUM: CPT

## 2021-11-05 PROCEDURE — 36415 COLL VENOUS BLD VENIPUNCTURE: CPT

## 2021-11-05 PROCEDURE — 99291 CRITICAL CARE FIRST HOUR: CPT | Performed by: INTERNAL MEDICINE

## 2021-11-05 PROCEDURE — 99232 SBSQ HOSP IP/OBS MODERATE 35: CPT | Performed by: INTERNAL MEDICINE

## 2021-11-05 PROCEDURE — 6370000000 HC RX 637 (ALT 250 FOR IP): Performed by: STUDENT IN AN ORGANIZED HEALTH CARE EDUCATION/TRAINING PROGRAM

## 2021-11-05 PROCEDURE — 80048 BASIC METABOLIC PNL TOTAL CA: CPT

## 2021-11-05 RX ADMIN — SODIUM CHLORIDE, PRESERVATIVE FREE 10 ML: 5 INJECTION INTRAVENOUS at 08:36

## 2021-11-05 RX ADMIN — SODIUM CHLORIDE, PRESERVATIVE FREE 10 ML: 5 INJECTION INTRAVENOUS at 20:24

## 2021-11-05 RX ADMIN — ACETAMINOPHEN 650 MG: 325 TABLET ORAL at 22:08

## 2021-11-05 RX ADMIN — SODIUM CHLORIDE 1.4 MCG/KG/HR: 9 INJECTION, SOLUTION INTRAVENOUS at 05:22

## 2021-11-05 RX ADMIN — Medication 1 CAPSULE: at 10:02

## 2021-11-05 RX ADMIN — MIDAZOLAM 10 MG/HR: 5 INJECTION, SOLUTION INTRAMUSCULAR; INTRAVENOUS at 03:51

## 2021-11-05 RX ADMIN — SODIUM CHLORIDE 1.2 MCG/KG/HR: 9 INJECTION, SOLUTION INTRAVENOUS at 18:12

## 2021-11-05 RX ADMIN — Medication 17.6 MG: at 20:24

## 2021-11-05 RX ADMIN — SODIUM CHLORIDE 1.4 MCG/KG/HR: 9 INJECTION, SOLUTION INTRAVENOUS at 00:35

## 2021-11-05 RX ADMIN — SODIUM CHLORIDE 1.2 MCG/KG/HR: 9 INJECTION, SOLUTION INTRAVENOUS at 11:09

## 2021-11-05 ASSESSMENT — PAIN SCALES - GENERAL
PAINLEVEL_OUTOF10: 0

## 2021-11-05 ASSESSMENT — PAIN SCALES - WONG BAKER
WONGBAKER_NUMERICALRESPONSE: 0

## 2021-11-05 ASSESSMENT — PULMONARY FUNCTION TESTS
PIF_VALUE: 27
PIF_VALUE: 26

## 2021-11-05 NOTE — SIGNIFICANT EVENT
Patient seen by me. Case discussed with resident physician. Chest x-ray shows improvement. Patient is not on antibiotics. Patient undergoing diuresis. Continue with milrinone. Eventual tracheostomy tube. Very poor rehabilitation candidate. Very debilitated. .  Italicized font represents changes to the note made by me. CC time 35 minutes. Time was discontiguous. Time does not include procedures. Time does include my direct assessment of the patient and coordination of care.   Electronically signed by Osiris Briggs MD on 11/5/2021 at 3:43 PM

## 2021-11-05 NOTE — PROGRESS NOTES
Comprehensive Nutrition Assessment    Type and Reason for Visit:  Reassess (TF management)    Nutrition Recommendations/Plan:   Continue Nepro at goal rate of 40 ml/hr. Free water flush per MD - 100ml every 8h  Monitoring labs, wts., GI status for EN adjustments as appropriate    Nutrition Assessment:    Pt. improving nutritionally AEB tolerating TF at goal now although has had OGT to LIWS d/t residuals over 600ml this admit, poor po intake first 5d of admit,constipation then need for flexiseal,  intubated 10/15, LOS day 25.  At risk for further nutrition compromise r/t COVID Dx 10/5, new CHF, advanced age and underlying medical condition (hx diverticulitis, HTN).      Malnutrition Assessment:  Malnutrition Status:   At risk for malnutrition (Comment)    Context:  Acute Illness     Findings of the 6 clinical characteristics of malnutrition:  Energy Intake:  7 - 50% or less of estimated energy requirements for 5 or more days  Weight Loss:  No significant weight loss (however difficult to evaluate with edema)     Body Fat Loss:  No significant body fat loss     Muscle Mass Loss:  No significant muscle mass loss    Fluid Accumulation:  Unable to assess     Strength:  Not Performed    Estimated Daily Nutrient Needs:  Energy (kcal):  0614-8889 (25-35/kgm) late phase; Weight Used for Energy Requirements:   (56 kgm)     Protein (g):   gm (1.2-2) as renal function allows; Weight Used for Protein Requirements:   (56 kgm)        Fluid (ml/day):  per MD;      Nutrition Related Findings:    Intubated; tolerating TF at 40ml/hour; no further residual issues since having (+) BMs; flexiseal removed; last BM 11/2; plan trach 11/8; Glucose 109, BUN 47 Cr 1 Potassium 4.2 MAP 69  Phosphorus 3; meds include precedex, senna, culturelle;  allergies noted; received 100% of Rx TF volume past 24h    Wounds:  Pressure Injury, Stage II (sacrum 10/15)       Current Nutrition Therapies:    Current Tube Feeding (TF) Orders:  · Feeding Route: Orogastric  · Formula: Renal Formula (Nepro)  · Schedule: Continuous (10/26 at goal of 40ml/hour)  · Water Flushes: per physician - 100ml every 8h  · Goal TF & Flush Orders Provides:   Nepro 40ml/hour provides 1699 kcals, 78gms protein, 154gms cho, 24gms fiber, 998 ml free H20 (698ml Nepro, 300 MD flush) in 1260ml total volume (960 Nepro, 300 MD flush)/24h    Additional Calorie Sources:   10/26 diprivan discontinued    Anthropometric Measures:  · Height: 5' (152.4 cm)  · Current Body Weight: 146 lb (66.2 kg) (11/5 with +1-2 edema)   · Admission Body Weight: 124 lb (56.2 kg) (10/11 +2 edema)    · Usual Body Weight:  (per EMR: 10/7/21: 125#)     · Ideal Body Weight: 100 lbs;    · BMI: 28.5  · Adjusted Body Weight:  ; No Adjustment   · BMI Categories: Overweight (BMI 25.0-29. 9)       Nutrition Diagnosis:   · Inadequate oral intake related to impaired respiratory function as evidenced by NPO or clear liquid status due to medical condition, intubation, nutrition support - enteral nutrition      Nutrition Interventions:   Food and/or Nutrient Delivery:  Continue NPO, Continue Current Tube Feeding  Nutrition Education/Counseling:  No recommendation at this time   Coordination of Nutrition Care:  Continue to monitor while inpatient    Goals:  EN to provide % nutrient needs while intubated for covid recovery process       Nutrition Monitoring and Evaluation:   Behavioral-Environmental Outcomes:  None Identified   Food/Nutrient Intake Outcomes:  Enteral Nutrition Intake/Tolerance  Physical Signs/Symptoms Outcomes:  Biochemical Data, GI Status, Fluid Status or Edema, Hemodynamic Status, Nutrition Focused Physical Findings, Skin, Weight     Discharge Planning:     Too soon to determine     Electronically signed by Carter Barrett RD, LD on 11/5/21 at 3:14 PM EDT    Contact: [phone]

## 2021-11-05 NOTE — PROGRESS NOTES
[address] THERAPY MISSED TREATMENT NOTE  [facility] 3A  3A-009-A      Date: 2021  Patient Name: Edita Moran        CSN: [id number]   : 1934  (80 y.o.)  Gender: female   Referring Practitioner: Grant Tripp MD  Diagnosis: acute respiratory failure with hypoxia         REASON FOR MISSED TREATMENT: Hold Treatment per Nursing.   Patient is to possible be trached on Monday

## 2021-11-05 NOTE — PROGRESS NOTES
[address]  PHYSICAL THERAPY MISSED TREATMENT NOTE  [facility]A    Date: 2021  Patient Name: Rhea Teixeira        MRN: [id number]   : 1934  (80 y.o.)  Gender: female   Referring Practitioner: Noris Guzman DO  Diagnosis: Acute respiratory failure with hypoxia         REASON FOR MISSED TREATMENT:  Hold treatment per nursing request. RN states pt planning for trach soon, hold PT treatment until that time. PT to continue to follow and treat as pt medically appropriate.      Felipa Duvall PT, DPT

## 2021-11-05 NOTE — CARE COORDINATION
11/5/21, 2:43 PM EDT    DISCHARGE ON GOING EVALUATION    [facility] day: 25  Location: 3A-09/009-A Reason for admit: Acute respiratory failure with hypoxia ([facility] [address] [J96.01]  Acute congestive heart failure, unspecified heart failure type ([facility] [address] [I50.9]  COVID-19 [U07.1]   Procedure:   10/12 Echo with EF 25-30%; Small circumferential pericardial effusion with no tamponade physiology; Myxomatotic degeneration of mitral valve;  Mild to Moderate mitral regurgitation is present  10/15 Intubated  10/15 CVC RIJ  10/28 Renal US: Mildly increased echogenicity of the kidneys that may suggest underlying medical renal disease; no hydronephrosis; 7mm left renal calculus is seen; small ascites. Bilateral pleural effusions; bilateral renal cysts  10/30 CT Chest: Extensive bilateral pneumonia; Large bilateral pleural effusions, right greater than left; Large pericardial effusion; Marked cardiomegaly  10/30 CT Abd/pelvis: Diffusely thickened appearance of the sigmoid colon. There are diverticula in this region. This could be on the basis of acute diverticulitis, however given the eccentric appearance of the thickening, neoplastic process can't entirely be excluded; Haziness throughout the superficial subcutaneous soft tissues of the abdomen and pelvis consistent with anasarca; No evidence of hydronephrosis. No obstructive renal stones are identified; Diffuse haziness throughout the mesentery and a small amount of ascites, findings likely related to fluid overload  11/1 BLE venous doppler: Neg for DVT    Barriers to Discharge: Plan for trach on Monday. Remains on vent w/ETT on PCV, peep 6, FIO2 30%, sats 95%. Tmax 99. NSR. Follows commands. PT/OT - early mobility. Intensivist and Nephrology following. Palliative Care and Dietitian following.  Telemetry, CVC, OG w/TF, alaniz. Precedex @ 1 mcg/kg/hr, versed @ 10 mg/hr, Primacor @ 0.0625 mcg/kg/min, lactobacillus, prn IV ativan, Electrolyte replacement protocols.   PCP: Girish Rodriguez, APRN - CNP  Readmission Risk Score: 19.4%  Patient Goals/Plan/Treatment Preferences: From home with daughter. Plan for [facility] as 1st choice and 2nd choice is [employer].  No precert required.

## 2021-11-05 NOTE — PROGRESS NOTES
Renal Progress Note    Assessment and Plan:   1. Acute kidney injury stable  2. Hypoxic respiratory failure on mechanical support  3. SARS-CoV-2 pneumonia  4. Macrocytic anemia  5. Deconditioning    PLAN:  1. Reviewed labs  2. Kidney function remained stable for the most part  3. Medications reviewed  4. No changes  5.  labs in the morning  6.  We will continue to follow    Patient Active Problem List:     Sigmoid diverticulitis     Acute respiratory failure with hypoxia ([facility] [address]     COVID-19     Acute congestive heart failure ([facility] [address]      Subjective:   Admit Date: 10/11/2021    Interval History:   Seen for acute kidney injury  Remains on mechanical support  Updated by the staff  No issues of concern  Blood pressure remains on the low end of normal but stable  Urine output is reasonable      Medications:   Scheduled Meds:   lactobacillus  1 capsule Per NG tube Daily with breakfast    potassium (CARDIAC) replacement protocol   Other RX Placeholder    [Held by provider] bumetanide  1 mg IntraVENous Q12H    senna  10 mL Per NG tube Nightly    [Held by provider] lactulose  20 g Oral TID    [Held by provider] metoprolol tartrate  25 mg Oral BID    [Held by provider] apixaban  2.5 mg Oral BID    sodium chloride flush  5-40 mL IntraVENous 2 times per day     Continuous Infusions:   midazolam 10 mg/hr (11/05/21 0351)    dextrose 100 mL/hr (10/26/21 1253)    vasopressin (Septic Shock) infusion Stopped (10/31/21 0243)    dexmedetomidine 1.2 mcg/kg/hr (11/05/21 1109)    norepinephrine Stopped (10/26/21 0820)    milrinone 0.0625 mcg/kg/min (11/01/21 0103)    [Held by provider] sodium chloride Stopped (10/19/21 1900)    sodium chloride         CBC:   Recent Labs     11/03/21 0330 11/04/21  0500 11/05/21  0420   WBC 5.9 4.2* 4.0*   HGB 9.2* 8.0* 8.0*    148 162     CMP:    Recent Labs     11/03/21 0330 11/03/21 0330 11/04/21  0500 11/05/21  0420     --  145 143   K 3.9   < > 4.0  4.0 4.2  4.2    --  107 105   CO2 30  --  29 29   BUN 48*  --  49* 47*   CREATININE 1.1  --  1.1 1.0   GLUCOSE 122*  --  112* 109*   CALCIUM 9.3  --  9.0 8.7   LABGLOM 47*  --  47* 52*    < > = values in this interval not displayed. Troponin: No results for input(s): TROPONINI in the last 72 hours. BNP: No results for input(s): BNP in the last 72 hours. INR: No results for input(s): INR in the last 72 hours. Lipids: No results for input(s): CHOL, LDLDIRECT, TRIG, HDL, AMYLASE, LIPASE in the last 72 hours. Liver: No results for input(s): AST, ALT, ALKPHOS, PROT, LABALBU, BILITOT in the last 72 hours. Invalid input(s): BILDIR  Iron:  No results for input(s): IRONS, FERRITIN in the last 72 hours. Invalid input(s): LABIRONS  XR CHEST PORTABLE   Final Result   1. Slightly improving bilateral pneumonia. 2. Moderate stable cardiomegaly. **This report has been created using voice recognition software. It may contain minor errors which are inherent in voice recognition technology. **      Final report electronically signed by Dr. Heena Casarez on 11/4/2021 6:34 PM      XR CHEST PORTABLE   Final Result   1. Moderate cardiomegaly. ET tube, NG tube, and right jugular line remain in good position. 2. Tiny bilateral pleural effusions. Findings of relatively severe pneumonia/edema involving both lungs diffusely. Overall appearance not appreciably changed from yesterday. **This report has been created using voice recognition software. It may contain minor errors which are inherent in voice recognition technology. **      Final report electronically signed by Dr. Vickey Babin on 11/1/2021 9:36 AM      VL DUP LOWER EXTREMITY VENOUS BILATERAL   Final Result   No evidence of a DVT. **This report has been created using voice recognition software. It may contain minor errors which are inherent in voice recognition technology. **      Final report electronically signed by Dr. Addy Kendrick on 11/1/2021 8:07 AM      XR CHEST PORTABLE   Final Result   No significant interval change since previous study dated 29th of October 2021. Nora Leung **This report has been created using voice recognition software. It may contain minor errors which are inherent in voice recognition technology. **      Final report electronically signed by DR Christine Segundo on 10/31/2021 12:43 PM      CT ABDOMEN PELVIS WO CONTRAST   Final Result      1. Diffusely thickened appearance of the sigmoid colon. There are diverticula in this region. This could be on the basis of acute diverticulitis, however given the eccentric appearance of the thickening, neoplastic process can't entirely be excluded. 2. Haziness throughout the superficial subcutaneous soft tissues of the abdomen and pelvis consistent with anasarca. 3. No evidence of hydronephrosis. No obstructive renal stones are identified. 4. Diffuse haziness throughout the mesentery and a small amount of ascites, findings likely related to fluid overload. **This report has been created using voice recognition software. It may contain minor errors which are inherent in voice recognition technology. **      Final report electronically signed by Dr Lalita Tesfaye on 10/30/2021 2:23 PM      CT CHEST W WO CONTRAST   Final Result       1. Extensive bilateral pneumonia. 2. Large bilateral pleural effusions, right greater than left. 3. Large pericardial effusion. 4. Marked cardiomegaly. **This report has been created using voice recognition software. It may contain minor errors which are inherent in voice recognition technology. **      Final report electronically signed by Dr Lalita Tesfaye on 10/30/2021 1:54 PM      XR CHEST PORTABLE   Final Result   1. Mildly megaly. ET tube and NG tube remain in good position. Right jugular line with catheter tip in SVC. 2. Moderate-sized pleural effusion left side.    3. Moderate mixed infiltrates scattered throughout both lungs. Overall appearance slightly worse than prior. **This report has been created using voice recognition software. It may contain minor errors which are inherent in voice recognition technology. **      Final report electronically signed by Dr. Melvin Amaral on 10/29/2021 8:10 AM      US RENAL COMPLETE   Final Result   1. Mildly increased echogenicity of the kidneys that may suggest underlying medical renal disease. 2. No hydronephrosis. 3. A 7 mm left renal calculus is seen. 4. Small ascites. Bilateral pleural effusions. 5. Bilateral renal cysts. **This report has been created using voice recognition software. It may contain minor errors which are inherent in voice recognition technology. **      Final report electronically signed by Dr Reyes Panning on 10/29/2021 3:55 AM      XR ABDOMEN FOR NG/OG/NE TUBE PLACEMENT   Final Result   1. The tip of the nasogastric tube is below the diaphragms within the stomach. 2. Partially seen is left pleural effusion and left retrocardiac opacity. Opacities are also seen in the partially seen right lower lobe. **This report has been created using voice recognition software. It may contain minor errors which are inherent in voice recognition technology. **      Final report electronically signed by Dr Reyes Panning on 10/27/2021 6:54 PM      XR CHEST PORTABLE   Final Result   1. There has been no significant interval change in the radiographic appearance of the chest  from 10/23/2021 including diffuse airspace disease. .            **This report has been created using voice recognition software. It may contain minor errors which are inherent in voice recognition technology. **      Final report electronically signed by Dr Reyes Panning on 10/24/2021 8:54 PM      XR CHEST PORTABLE   Final Result   1. Support lines and tubes in unchanged position when compared to prior.    2. Interval worsening of parenchymal densities within the Final report electronically signed by Dr. Tyler Johnson on 10/15/2021 3:34 PM      XR CHEST PORTABLE   Final Result   1. Bilateral pneumonia. 2. Small bilateral pleural effusions. 3. Stable cardiomegaly. **This report has been created using voice recognition software. It may contain minor errors which are inherent in voice recognition technology. **      Final report electronically signed by Dr. Alex Levi on 10/15/2021 4:14 PM      XR CHEST PORTABLE   Final Result   Right lower lobe airspace infiltrates. Severe cardiomegaly. **This report has been created using voice recognition software. It may contain minor errors which are inherent in voice recognition technology. **      Final report electronically signed by Dr. Tyler Johnson on 10/11/2021 8:59 PM      XR CHEST PORTABLE   Final Result      1. Diffuse groundglass opacities in both lungs along with cardiomegaly. Findings likely suggest acute exacerbation of congestive heart failure versus pneumonia in the right clinical setting. Clinical correlation is recommended      2. Small left pleural effusion. 3. Other findings as described above. **This report has been created using voice recognition software. It may contain minor errors which are inherent in voice recognition technology. **      Final report electronically signed by Dr Levy Aparicio on 10/11/2021 11:56 AM            Objective:   Vitals: BP (!) 112/52   Pulse 67   Temp 98.6 °F (37 °C) (Bladder)   Resp 18   Ht 5' (1.524 m)   Wt 146 lb 2.6 oz (66.3 kg)   SpO2 94%   BMI 28.55 kg/m²    Wt Readings from Last 3 Encounters:   11/05/21 146 lb 2.6 oz (66.3 kg)   10/07/21 125 lb (56.7 kg)      24HR INTAKE/OUTPUT:      Intake/Output Summary (Last 24 hours) at 11/5/2021 1129  Last data filed at 11/5/2021 0420  Gross per 24 hour   Intake 1758 ml   Output 850 ml   Net 908 ml       Constitutional: Well-developed elderly lady on mechanical support , no apparent distress   Skin:normal with no rash or lesions. HEENT:Pupils are reactive. Endotracheal tube is noted. Orogastric tube is intact. .Oral mucosa is moist   Neck:supple with no  carotid bruit  Cardiovascular:  S1, S2 without murmur or rubs   Respiratory:  Clear to ausculation without wheezes, rhonchi or rales. Abdomen: Soft. Good bowel sounds. Ext: 1+ bilateral LE edema  Neuro: Deferred      Electronically signed by Yu Alcaraz MD on 11/5/2021 at 11:29 AM  **This report has been created using voice recognition software. It maycontain minor  errors which are inherent in voice recognition technology. **

## 2021-11-05 NOTE — PROGRESS NOTES
[id number]: \" Patient with increased ectopy, would you like me to draw any labs?  \" perfect serve sent Frederick Ca MD

## 2021-11-05 NOTE — PROGRESS NOTES
Patient continues to be on milrinone 0.0625   11/2 - pro-BNP 41,133. Currently nephrology holding diuretics. 7. Hx of Prolong QTc interval: On telemetry strip in ED. Seen by cardiogy for Prolong QTC. Plan: tele monitor, avoid QTC prolong medications, Keep K +> 4 and Mg > 2     8. New on set Afib without RVR: paroxysmal, most likely d/t covid. Patient is on anticoagulation    9. Bilateral lower extremity swelling: r/o DVT - US negative on 11/1    10. HERIBERTO: Bun/Cr Improving. Nephrology holding bumex currently. 11. Hyperkalemia: Resolved. 11/4 - K+ 4     12. AMS: likely secondary to hypercapnic hypoxemia. Neuro exam when patient is extubate     13. Iron deficiency anemia: Iron supplementation      INITIAL H AND P AND ICU COURSE:   49-year-old female presents to the ED on 10/11/2021 due to worsening shortness of breath. Patient tested positive for Covid on 10/4/2021. In the emergency room patient was found to have a SPO2 of 83% on room air with a BNP of the 15 point 7K. Patient was admitted and managed for acute hypoxic respiratory failure secondary to Covid with combination of congestive heart failure. Patient was placed on nasal cannula, Bumex and dexamethasone in the ED. On 10/15 patient was transferred to the ICU and intubated for acute hypoxic respiratory failure on 100% FiO2 on high flow. Patient had a UTI on 10/18 and was started on ceftriaxone on 10/19. Patient was also started on milrinone for her HFrEF. Patient's family has been consulted regarding trach and PEG tube in which they are agreeable. Cardiology is on board regarding LifeVest versus AICD. 11/5 - patient HERIBERTO improving, continue to be followed by nephro. Repeat cxr shows improving of bilateral pna. Pending pneumonia panel results. Trach and peg set for Monday. Past Medical History: HPI  Family History:  See EMR  Social History:   Former smoker since 1977. 15 pack year hx prior to cessation    ROS   Cannot obtain d/t patient sedated and on mechanical ventilation    Scheduled Meds:   lactobacillus  1 capsule Per NG tube Daily with breakfast    potassium (CARDIAC) replacement protocol   Other RX Placeholder    [Held by provider] bumetanide  1 mg IntraVENous Q12H    senna  10 mL Per NG tube Nightly    [Held by provider] lactulose  20 g Oral TID    [Held by provider] metoprolol tartrate  25 mg Oral BID    [Held by provider] apixaban  2.5 mg Oral BID    sodium chloride flush  5-40 mL IntraVENous 2 times per day     Continuous Infusions:   midazolam 10 mg/hr (11/05/21 0351)    dextrose 100 mL/hr (10/26/21 1253)    vasopressin (Septic Shock) infusion Stopped (10/31/21 0243)    dexmedetomidine 1.4 mcg/kg/hr (11/05/21 0035)    norepinephrine Stopped (10/26/21 0820)    milrinone 0.0625 mcg/kg/min (11/01/21 0103)    [Held by provider] sodium chloride Stopped (10/19/21 1900)    sodium chloride         PHYSICAL EXAMINATION:  T:  99F.  P:  64. RR:  17  B/P:  109/57  FiO2:  40. O2 Sat:  97  I/O:  608/350 = net +258   Body mass index is 28.55 kg/m². GCS:   10  PCV+: Psupport 20, Rate 16, PIP 27, PEEP 6       General: Sedated on vent  HEENT:  normocephalic and atraumatic. No scleral icterus. PERR  Neck: supple. No Thyromegaly. Lungs: diminished breath sounds bilaterally on auscultation. No retractions  Cardiac: RRR. No JVD. Abdomen: soft. Nontender. Extremities:  No clubbing, cyanosis, mild pitting edema x 4. Vasculature: capillary refill < 3 seconds. Palpable dorsalis pedis pulses. Skin:  warm and dry. Psych:  Patient sedated  Lymph:  No supraclavicular adenopathy. Neurologic: No seizures. Data: (All radiographs, tracings, PFTs, and imaging are personally viewed and interpreted unless otherwise noted).  Sodium 143, potassium 4.2, chloride 105, CO2 29, BUN 47, creatinine 1      .spotGlucose 109   WBC 4, hemoglobin 8, hematocrit 26.5, platelets 972   Telemetry shows NSR   CXR on 11/4:   1.  Slightly improving bilateral pneumonia. 2. Moderate stable cardiomegaly.             Seen with multidisciplinary ICU team   Meets Continued ICU Level Care Criteria:    [x] Yes   [] No - Transfer Planned to listed location:  [] HOSPITALIST CONTACTED-      Case and plan discussed with Dr. Vicente Ennis    Electronically signed by Helena Hodgkin,  [facility]  Patient seen by me. Case discussed with resident physician. Please see significant event. Italicized font represents changes to the note made by me. CC time 35 minutes. Time was discontiguous. Time does not include procedures. Time does include my direct assessment of the patient and coordination of care.   Electronically signed by Annette Ron MD on 11/5/2021 at 3:45 PM

## 2021-11-06 LAB
ANION GAP SERPL CALCULATED.3IONS-SCNC: 9 MEQ/L (ref 8–16)
BUN BLDV-MCNC: 49 MG/DL (ref 7–22)
CALCIUM SERPL-MCNC: 8.3 MG/DL (ref 8.5–10.5)
CHLORIDE BLD-SCNC: 107 MEQ/L (ref 98–111)
CO2: 26 MEQ/L (ref 23–33)
CREAT SERPL-MCNC: 1 MG/DL (ref 0.4–1.2)
ERYTHROCYTE [DISTWIDTH] IN BLOOD BY AUTOMATED COUNT: 15.9 % (ref 11.5–14.5)
ERYTHROCYTE [DISTWIDTH] IN BLOOD BY AUTOMATED COUNT: 59.4 FL (ref 35–45)
FOLATE: 10.3 NG/ML (ref 4.8–24.2)
GFR SERPL CREATININE-BSD FRML MDRD: 52 ML/MIN/1.73M2
GLUCOSE BLD-MCNC: 110 MG/DL (ref 70–108)
HCT VFR BLD CALC: 26.1 % (ref 37–47)
HEMOGLOBIN: 7.9 GM/DL (ref 12–16)
MCH RBC QN AUTO: 31 PG (ref 26–33)
MCHC RBC AUTO-ENTMCNC: 30.3 GM/DL (ref 32.2–35.5)
MCV RBC AUTO: 102.4 FL (ref 81–99)
PLATELET # BLD: 168 THOU/MM3 (ref 130–400)
PMV BLD AUTO: 11.1 FL (ref 9.4–12.4)
POTASSIUM REFLEX MAGNESIUM: 4.1 MEQ/L (ref 3.5–5.2)
POTASSIUM SERPL-SCNC: 4.1 MEQ/L (ref 3.5–5.2)
RBC # BLD: 2.55 MILL/MM3 (ref 4.2–5.4)
SODIUM BLD-SCNC: 142 MEQ/L (ref 135–145)
VITAMIN B-12: 865 PG/ML (ref 211–911)
WBC # BLD: 4.5 THOU/MM3 (ref 4.8–10.8)

## 2021-11-06 PROCEDURE — 6360000002 HC RX W HCPCS: Performed by: NURSE PRACTITIONER

## 2021-11-06 PROCEDURE — 2580000003 HC RX 258: Performed by: INTERNAL MEDICINE

## 2021-11-06 PROCEDURE — 94003 VENT MGMT INPAT SUBQ DAY: CPT

## 2021-11-06 PROCEDURE — 99291 CRITICAL CARE FIRST HOUR: CPT | Performed by: SURGERY

## 2021-11-06 PROCEDURE — 2700000000 HC OXYGEN THERAPY PER DAY

## 2021-11-06 PROCEDURE — 36415 COLL VENOUS BLD VENIPUNCTURE: CPT

## 2021-11-06 PROCEDURE — 2580000003 HC RX 258: Performed by: STUDENT IN AN ORGANIZED HEALTH CARE EDUCATION/TRAINING PROGRAM

## 2021-11-06 PROCEDURE — 2100000000 HC CCU R&B

## 2021-11-06 PROCEDURE — 2580000003 HC RX 258: Performed by: NURSE PRACTITIONER

## 2021-11-06 PROCEDURE — 80048 BASIC METABOLIC PNL TOTAL CA: CPT

## 2021-11-06 PROCEDURE — 6360000002 HC RX W HCPCS: Performed by: INTERNAL MEDICINE

## 2021-11-06 PROCEDURE — 99232 SBSQ HOSP IP/OBS MODERATE 35: CPT | Performed by: INTERNAL MEDICINE

## 2021-11-06 PROCEDURE — 2500000003 HC RX 250 WO HCPCS: Performed by: STUDENT IN AN ORGANIZED HEALTH CARE EDUCATION/TRAINING PROGRAM

## 2021-11-06 PROCEDURE — 2500000003 HC RX 250 WO HCPCS: Performed by: INTERNAL MEDICINE

## 2021-11-06 PROCEDURE — 6370000000 HC RX 637 (ALT 250 FOR IP): Performed by: STUDENT IN AN ORGANIZED HEALTH CARE EDUCATION/TRAINING PROGRAM

## 2021-11-06 PROCEDURE — 6370000000 HC RX 637 (ALT 250 FOR IP): Performed by: INTERNAL MEDICINE

## 2021-11-06 PROCEDURE — 82607 VITAMIN B-12: CPT

## 2021-11-06 PROCEDURE — 94761 N-INVAS EAR/PLS OXIMETRY MLT: CPT

## 2021-11-06 PROCEDURE — 85027 COMPLETE CBC AUTOMATED: CPT

## 2021-11-06 PROCEDURE — 82746 ASSAY OF FOLIC ACID SERUM: CPT

## 2021-11-06 RX ORDER — BUMETANIDE 0.25 MG/ML
1 INJECTION, SOLUTION INTRAMUSCULAR; INTRAVENOUS DAILY
Status: DISCONTINUED | OUTPATIENT
Start: 2021-11-06 | End: 2021-11-18 | Stop reason: HOSPADM

## 2021-11-06 RX ADMIN — MIDAZOLAM 4 MG/HR: 5 INJECTION, SOLUTION INTRAMUSCULAR; INTRAVENOUS at 00:17

## 2021-11-06 RX ADMIN — MIDAZOLAM 5 MG/HR: 5 INJECTION, SOLUTION INTRAMUSCULAR; INTRAVENOUS at 23:25

## 2021-11-06 RX ADMIN — SODIUM CHLORIDE, PRESERVATIVE FREE 10 ML: 5 INJECTION INTRAVENOUS at 20:37

## 2021-11-06 RX ADMIN — SODIUM CHLORIDE 1.2 MCG/KG/HR: 9 INJECTION, SOLUTION INTRAVENOUS at 00:43

## 2021-11-06 RX ADMIN — Medication 17.6 MG: at 20:36

## 2021-11-06 RX ADMIN — Medication 1 CAPSULE: at 08:26

## 2021-11-06 RX ADMIN — SODIUM CHLORIDE 1.2 MCG/KG/HR: 9 INJECTION, SOLUTION INTRAVENOUS at 19:08

## 2021-11-06 RX ADMIN — SODIUM CHLORIDE, PRESERVATIVE FREE 10 ML: 5 INJECTION INTRAVENOUS at 08:19

## 2021-11-06 RX ADMIN — BUMETANIDE 1 MG: 0.25 INJECTION INTRAMUSCULAR; INTRAVENOUS at 13:48

## 2021-11-06 RX ADMIN — SODIUM CHLORIDE 1.2 MCG/KG/HR: 9 INJECTION, SOLUTION INTRAVENOUS at 12:20

## 2021-11-06 RX ADMIN — SODIUM CHLORIDE 1.2 MCG/KG/HR: 9 INJECTION, SOLUTION INTRAVENOUS at 06:48

## 2021-11-06 RX ADMIN — ACETAMINOPHEN 650 MG: 325 TABLET ORAL at 20:39

## 2021-11-06 RX ADMIN — Medication 2 MCG/MIN: at 00:57

## 2021-11-06 RX ADMIN — MILRINONE LACTATE 0.06 MCG/KG/MIN: 1 INJECTION, SOLUTION INTRAVENOUS at 10:02

## 2021-11-06 ASSESSMENT — PULMONARY FUNCTION TESTS
PIF_VALUE: 26
PIF_VALUE: 27
PIF_VALUE: 26
PIF_VALUE: 27

## 2021-11-06 ASSESSMENT — PAIN SCALES - WONG BAKER
WONGBAKER_NUMERICALRESPONSE: 0

## 2021-11-06 NOTE — PROGRESS NOTES
[facility]   Nephrology progress note  11/6/2021, 11:53 AM      Pt Name:    Saray Herron  MRN:     [id number]     [place]:    1934  Admit Date:    10/11/2021 10:28 AM  Primary Care Physician:  SERGE Álvarez CNP   Room number  2K-98/353-F    Chief Complaint: Nephrology following for HERIBERTO    Subjective:  Patient seen and examined  Seen earlier today during rounds  Patient is intubated  Currently on 30% FiO2  On Levophed  Good urine output      Objective:  24HR INTAKE/OUTPUT:    Intake/Output Summary (Last 24 hours) at 11/6/2021 1153  Last data filed at 11/6/2021 0830  Gross per 24 hour   Intake 2888 ml   Output 1450 ml   Net 1438 ml     I/O last 3 completed shifts: In: 1889 [I.V.:1248; NG/GT:1610]  Out: 1450 [Urine:1450]  I/O this shift:  In: 30 [NG/GT:30]  Out: -   Admission weight: 120 lb (54.4 kg)  Wt Readings from Last 3 Encounters:   11/06/21 155 lb 3.3 oz (70.4 kg)   10/07/21 125 lb (56.7 kg)     Body mass index is 30.31 kg/m².     Physical examination  VITALS:     Vitals:    11/06/21 0845 11/06/21 0900 11/06/21 1000 11/06/21 1100   BP:  (!) 115/54 (!) 107/49 (!) 122/57   Pulse:  83 75 82   Resp:  23 19 29   Temp:       TempSrc:       SpO2: 96% 95% 95% 95%   Weight:       Height:         Limited examination secondary to COVID-19 positivity and isolation status  General Appearance: Intubated  Mouth/Throat: ET tube in place  Lungs: Air entry diminished  Extremities: B/L LE edema      Lab Data  CBC:   Recent Labs     11/04/21  0500 11/05/21  0420 11/06/21  0300   WBC 4.2* 4.0* 4.5*   HGB 8.0* 8.0* 7.9*   HCT 26.0* 26.5* 26.1*    162 168     BMP:  Recent Labs     11/04/21  0500 11/04/21  0500 11/05/21  0420 11/06/21  0300     --  143 142   K 4.0  4.0   < > 4.2  4.2 4.1  4.1     --  105 107   CO2 29  --  29 26   BUN 49*  --  47* 49*   CREATININE 1.1  --  1.0 1.0   GLUCOSE 112*  --  109* 110*   CALCIUM 9.0  --  8.7 8.3*   MG 1.8  --  1.7  --    PHOS 2.8  -- 3.0  --     < > = values in this interval not displayed. Hepatic: No results for input(s): LABALBU, AST, ALT, ALB, BILITOT, ALKPHOS in the last 72 hours. Meds:  Infusion:    midazolam 5 mg/hr (11/06/21 0820)    dextrose 100 mL/hr (10/26/21 1253)    vasopressin (Septic Shock) infusion Stopped (10/31/21 0243)    dexmedetomidine 1.2 mcg/kg/hr (11/06/21 0648)    norepinephrine 1 mcg/min (11/06/21 0658)    milrinone 0.0625 mcg/kg/min (11/06/21 1002)    [Held by provider] sodium chloride Stopped (10/19/21 1900)    sodium chloride       Meds:    lactobacillus  1 capsule Per NG tube Daily with breakfast    potassium (CARDIAC) replacement protocol   Other RX Placeholder    [Held by provider] bumetanide  1 mg IntraVENous Q12H    senna  10 mL Per NG tube Nightly    [Held by provider] lactulose  20 g Oral TID    [Held by provider] metoprolol tartrate  25 mg Oral BID    [Held by provider] apixaban  2.5 mg Oral BID    sodium chloride flush  5-40 mL IntraVENous 2 times per day     Meds prn: LORazepam, glucose, dextrose, glucagon (rDNA), dextrose, potassium chloride, acetaminophen **OR** acetaminophen, docusate sodium, sodium chloride flush, sodium chloride, polyethylene glycol       Impression and Plan:  1. HERIBERTO secondary to ATN  Patient is nonoliguric at this time  Overall stable renal function at this time  Electrolytes are stable    2. Acute hypoxic respiratory failure secondary to COVID-19 pneumonia  3. Sepsis with shock. Currently on vasopressors at minimal dose  4. Superimposed bacterial pneumonia  5. Nephrolithiasis  6.  HFrEF with peripheral edema.   Will resume Bumex and adjust as clinically appropriate      Tianna Breen MD  [facility]

## 2021-11-06 NOTE — PROGRESS NOTES
CRITICAL CARE PROGRESS NOTE      Patient:  Doraine Heimlich    Unit/Bed:3A-09/009-A  YOB: 1934  MRN: [id number]   PCP: SERGE Azar - CNP  Date of Admission: 10/11/2021  Chief Complaint:- Worsening shortness of breath    Assessment and Plan:    1. Acute hypoxic respiratory failure: Patient was admitted to the hospital through the ED on 10/11/2021 and started on high flow. Patient's hypoxemia progressively worsened. Intubated and admitted to ICU on 10/15.  - Patient has had multiple failed breathing trials. There was some suspicion for neuromuscular disease present dt persistent hypercarbia despite mechanical ventilatory support. Work up negative for Myasthenia Gravis. Family is agreeable to trach and peg placement d/t severe respiratory muscle weakness making ventilatory capacity a barrier for extubation. Trach scheduled for Monday. 2. Covid Pneumonia with superimposed bacterial pneumonia:  S/p baricitinib   10/19 -respiratory culture positive for Corynebacterium. Vancomycin 6 days. Currently pending pneumonia panel ordered on 10/29, the one on 10/26 was negative. . Repeat CXR shows improving bilateral pna. Patient currently not on any abx. Not showing any signs of overt infection. 3. Hematuria: Secondary to nephrolithiasis. resolved     ARDS: Mild. Patient received oxygen with FiO2 100 with very low TV (250ml) on ventilation. Ferritin, CRP, Lactic dehydrogenase elevated. Start Decadron, Baricitinib (10/12). Require pressor support with Vasopressin. Patient currently PCV+ Psupport 20, PIP 27 PEEP 6 rate 16. Barrier to extubation is ventilatory capacity     4. Hypotension - continue to hold antihypertensives and monitor     6. HFrEF: Echo (10/11): LV EF 25-30%, LV severe global hypokinesis. LV size moderate increase. Weight on admission 120 lbs. Cardiologist consult appreciated - cardiology will wait for recovery prior to considering any intervention such as LifeVest versus AICD. Regarding: FW: Medication Question  Contact: [phone]      ----- Message -----  From: Edin Brand  Sent: 3/31/2019   7:08 PM  To: Jeferson Masters Np Nurse Mesg Pool  Subject: Medication Question                              ----- Message from Patient Portal,  sent at 3/31/2019  7:08 PM CDT -----    Hi , need to get a renewal on my levothyroxin and and atorvastatin. I'm on my way home and need refills. The [facility] in [place] states it needs renewal in order to fill. We are on our way to [facility] this week and I will need to refill . I'm using the [address] system throughout the states. So my next refill will come from [address] Can you authorize so I can    do so? thanks . We plan to be home end of April. .. Patient continues to be on milrinone 0.0625. Nephrology resume bumex     7. Hx of Prolong QTc interval: On telemetry strip in ED. Seen by cardiogy for Prolong QTC. Plan: tele monitor, avoid QTC prolong medications, Keep K +> 4 and Mg > 2     8. New on set Afib without RVR: paroxysmal, most likely d/t covid. Patient is on anticoagulation    9. Bilateral lower extremity swelling: r/o DVT - US negative on 11/1    10. HERIBERTO: Bun/Cr Improving. Patient having good urine output. Nephrology to resume Bumex and adjust as clinically appropriate. 11. Hyperkalemia: Resolved. 11/4 - K+ 4     12. AMS: likely secondary to hypercapnic hypoxemia. Neuro exam when patient is extubate     13. Macrocytic Anemia: H/H 7.9/26.1 .4. Ordered folate and b12 levels. INITIAL H AND P AND ICU COURSE:   66-year-old female presents to the ED on 10/11/2021 due to worsening shortness of breath. Patient tested positive for Covid on 10/4/2021. In the emergency room patient was found to have a SPO2 of 83% on room air with a BNP of the 15 point 7K. Patient was admitted and managed for acute hypoxic respiratory failure secondary to Covid with combination of congestive heart failure. Patient was placed on nasal cannula, Bumex and dexamethasone in the ED. On 10/15 patient was transferred to the ICU and intubated for acute hypoxic respiratory failure on 100% FiO2 on high flow. Patient had a UTI on 10/18 and was started on ceftriaxone on 10/19. Patient was also started on milrinone for her HFrEF. Patient's family has been consulted regarding trach and PEG tube in which they are agreeable. Cardiology is on board regarding LifeVest versus AICD. 11/5 - patient HERIBERTO improving, continue to be followed by nephro. Repeat cxr shows improving of bilateral pna. Pending pneumonia panel results. Trach and peg set for Monday. 11/6 - patient HERIBERTO improving and seen by nephro. Resume bumex.  Patient otherwise stable on vent pending trach Monday. Past Medical History: HPI  Family History:  See EMR  Social History: Former smoker since 1977. 15 pack year hx prior to cessation    ROS   Cannot obtain d/t patient sedated and on mechanical ventilation    Scheduled Meds:   bumetanide  1 mg IntraVENous Daily    lactobacillus  1 capsule Per NG tube Daily with breakfast    potassium (CARDIAC) replacement protocol   Other RX Placeholder    senna  10 mL Per NG tube Nightly    [Held by provider] lactulose  20 g Oral TID    [Held by provider] metoprolol tartrate  25 mg Oral BID    [Held by provider] apixaban  2.5 mg Oral BID    sodium chloride flush  5-40 mL IntraVENous 2 times per day     Continuous Infusions:   midazolam 5 mg/hr (11/06/21 0820)    dextrose 100 mL/hr (10/26/21 1253)    vasopressin (Septic Shock) infusion Stopped (10/31/21 0243)    dexmedetomidine 1.2 mcg/kg/hr (11/06/21 1220)    norepinephrine 1 mcg/min (11/06/21 0658)    milrinone 0.0625 mcg/kg/min (11/06/21 1002)    [Held by provider] sodium chloride Stopped (10/19/21 1900)    sodium chloride         PHYSICAL EXAMINATION:  T:  99.3F.  P:  76. RR:  21  B/P:  118/56  FiO2:  30. O2 Sat:  96  I/O:  1685/1050 net + 635  Body mass index is 30.31 kg/m². GCS:   9  PCV+: Psupport 20, Rate 16, PIP 27, PEEP 6       General: Sedated on vent  HEENT:  normocephalic and atraumatic. No scleral icterus. PERR  Neck: supple. No Thyromegaly. Lungs: diminished breath sounds bilaterally on auscultation. No retractions  Cardiac: RRR. No JVD. Abdomen: soft. Nontender. Extremities:  No clubbing, cyanosis, mild pitting edema x 4. Vasculature: capillary refill < 3 seconds. Palpable dorsalis pedis pulses. Skin:  warm and dry. Psych:  Patient sedated  Lymph:  No supraclavicular adenopathy. Neurologic: No seizures. Data: (All radiographs, tracings, PFTs, and imaging are personally viewed and interpreted unless otherwise noted).     Sodium 142, potassium 4.1, chloride 107, CO2 26, BUN 49, creatinine 1   Glucose 110   WBC 4.5, hemoglobin 7.9, hematocrit 26.1, platelet 150   Telemetry shows NSR - nurse notified me yesterday of PVC's, however when I reassessed the patient, rhythm strip on telemetry did not show any PVC's. CXR on 11/4:   1. Slightly improving bilateral pneumonia. 2. Moderate stable cardiomegaly.             Seen with multidisciplinary ICU team   Meets Continued ICU Level Care Criteria:    [x] Yes   [] No - Transfer Planned to listed location:  [] HOSPITALIST CONTACTED-      Case and plan discussed with Dr. Odalys Ray    Electronically signed by Bernardo Lovell MD  [address]    Patient seen by me. Case discussed with resident physician. CC time 35 minutes. Time was discontiguous. Time does not include procedures. Time does include my direct assessment of the patient and coordination of care. Levophed off. Plan for trach Monday.   Continue low-dose milrinone for persistent reduced EF

## 2021-11-07 LAB
ANION GAP SERPL CALCULATED.3IONS-SCNC: 10 MEQ/L (ref 8–16)
BUN BLDV-MCNC: 45 MG/DL (ref 7–22)
CALCIUM SERPL-MCNC: 8.5 MG/DL (ref 8.5–10.5)
CHLORIDE BLD-SCNC: 106 MEQ/L (ref 98–111)
CO2: 27 MEQ/L (ref 23–33)
CREAT SERPL-MCNC: 1 MG/DL (ref 0.4–1.2)
ERYTHROCYTE [DISTWIDTH] IN BLOOD BY AUTOMATED COUNT: 16 % (ref 11.5–14.5)
ERYTHROCYTE [DISTWIDTH] IN BLOOD BY AUTOMATED COUNT: 61.2 FL (ref 35–45)
GFR SERPL CREATININE-BSD FRML MDRD: 52 ML/MIN/1.73M2
GLUCOSE BLD-MCNC: 125 MG/DL (ref 70–108)
HCT VFR BLD CALC: 28.1 % (ref 37–47)
HEMOGLOBIN: 8.3 GM/DL (ref 12–16)
MAGNESIUM: 1.6 MG/DL (ref 1.6–2.4)
MCH RBC QN AUTO: 30.7 PG (ref 26–33)
MCHC RBC AUTO-ENTMCNC: 29.5 GM/DL (ref 32.2–35.5)
MCV RBC AUTO: 104.1 FL (ref 81–99)
PLATELET # BLD: 184 THOU/MM3 (ref 130–400)
PMV BLD AUTO: 11.2 FL (ref 9.4–12.4)
POTASSIUM SERPL-SCNC: 4.1 MEQ/L (ref 3.5–5.2)
RBC # BLD: 2.7 MILL/MM3 (ref 4.2–5.4)
SODIUM BLD-SCNC: 143 MEQ/L (ref 135–145)
WBC # BLD: 5.1 THOU/MM3 (ref 4.8–10.8)

## 2021-11-07 PROCEDURE — 99291 CRITICAL CARE FIRST HOUR: CPT | Performed by: SURGERY

## 2021-11-07 PROCEDURE — 2580000003 HC RX 258: Performed by: STUDENT IN AN ORGANIZED HEALTH CARE EDUCATION/TRAINING PROGRAM

## 2021-11-07 PROCEDURE — 6360000002 HC RX W HCPCS: Performed by: INTERNAL MEDICINE

## 2021-11-07 PROCEDURE — 99233 SBSQ HOSP IP/OBS HIGH 50: CPT | Performed by: INTERNAL MEDICINE

## 2021-11-07 PROCEDURE — 6370000000 HC RX 637 (ALT 250 FOR IP): Performed by: INTERNAL MEDICINE

## 2021-11-07 PROCEDURE — 2580000003 HC RX 258: Performed by: INTERNAL MEDICINE

## 2021-11-07 PROCEDURE — 2100000000 HC CCU R&B

## 2021-11-07 PROCEDURE — 6360000002 HC RX W HCPCS: Performed by: STUDENT IN AN ORGANIZED HEALTH CARE EDUCATION/TRAINING PROGRAM

## 2021-11-07 PROCEDURE — 2500000003 HC RX 250 WO HCPCS: Performed by: STUDENT IN AN ORGANIZED HEALTH CARE EDUCATION/TRAINING PROGRAM

## 2021-11-07 PROCEDURE — 83735 ASSAY OF MAGNESIUM: CPT

## 2021-11-07 PROCEDURE — 85027 COMPLETE CBC AUTOMATED: CPT

## 2021-11-07 PROCEDURE — 80048 BASIC METABOLIC PNL TOTAL CA: CPT

## 2021-11-07 PROCEDURE — 2500000003 HC RX 250 WO HCPCS: Performed by: INTERNAL MEDICINE

## 2021-11-07 PROCEDURE — 94761 N-INVAS EAR/PLS OXIMETRY MLT: CPT

## 2021-11-07 PROCEDURE — 36415 COLL VENOUS BLD VENIPUNCTURE: CPT

## 2021-11-07 PROCEDURE — 2700000000 HC OXYGEN THERAPY PER DAY

## 2021-11-07 RX ORDER — MAGNESIUM SULFATE IN WATER 40 MG/ML
2000 INJECTION, SOLUTION INTRAVENOUS PRN
Status: DISCONTINUED | OUTPATIENT
Start: 2021-11-07 | End: 2021-11-18 | Stop reason: HOSPADM

## 2021-11-07 RX ADMIN — Medication 1 CAPSULE: at 08:20

## 2021-11-07 RX ADMIN — SODIUM CHLORIDE 1.2 MCG/KG/HR: 9 INJECTION, SOLUTION INTRAVENOUS at 16:30

## 2021-11-07 RX ADMIN — SODIUM CHLORIDE, PRESERVATIVE FREE 10 ML: 5 INJECTION INTRAVENOUS at 08:20

## 2021-11-07 RX ADMIN — Medication 17.6 MG: at 20:48

## 2021-11-07 RX ADMIN — SODIUM CHLORIDE 1.2 MCG/KG/HR: 9 INJECTION, SOLUTION INTRAVENOUS at 04:49

## 2021-11-07 RX ADMIN — SODIUM CHLORIDE 1.2 MCG/KG/HR: 9 INJECTION, SOLUTION INTRAVENOUS at 00:55

## 2021-11-07 RX ADMIN — SODIUM CHLORIDE 1.2 MCG/KG/HR: 9 INJECTION, SOLUTION INTRAVENOUS at 21:21

## 2021-11-07 RX ADMIN — FAMOTIDINE 20 MG: 10 INJECTION, SOLUTION INTRAVENOUS at 08:20

## 2021-11-07 RX ADMIN — SODIUM CHLORIDE 1.2 MCG/KG/HR: 9 INJECTION, SOLUTION INTRAVENOUS at 10:45

## 2021-11-07 RX ADMIN — BUMETANIDE 1 MG: 0.25 INJECTION INTRAMUSCULAR; INTRAVENOUS at 08:20

## 2021-11-07 RX ADMIN — MIDAZOLAM 5 MG/HR: 5 INJECTION, SOLUTION INTRAMUSCULAR; INTRAVENOUS at 18:23

## 2021-11-07 RX ADMIN — MAGNESIUM SULFATE HEPTAHYDRATE 2000 MG: 40 INJECTION, SOLUTION INTRAVENOUS at 18:33

## 2021-11-07 ASSESSMENT — PULMONARY FUNCTION TESTS
PIF_VALUE: 27
PIF_VALUE: 27
PIF_VALUE: 26

## 2021-11-07 NOTE — PLAN OF CARE
Problem: OXYGENATION/RESPIRATORY FUNCTION  Goal: Patient will maintain patent airway  Outcome: Ongoing  Goal: Patient will achieve/maintain normal respiratory rate/effort  Description: Respiratory rate and effort will be within normal limits for the patient  Outcome: Ongoing     Problem: MECHANICAL VENTILATION  Goal: Patient will maintain patent airway  Outcome: Ongoing  Goal: Oral health is maintained or improved  Outcome: Ongoing  Goal: ET tube will be managed safely  Outcome: Ongoing

## 2021-11-07 NOTE — PROGRESS NOTES
CRITICAL CARE PROGRESS NOTE      Patient:  Edita Moran    Unit/Bed:3A-09/009-A  YOB: 1934  MRN: [id number]   PCP: SERGE Parsons CNP  Date of Admission: 10/11/2021  Chief Complaint:- Worsening shortness of breath    Assessment and Plan:    1. Acute hypoxic respiratory failure: Patient was admitted to the hospital through the ED on 10/11/2021 and started on high flow. Patient's hypoxemia progressively worsened. Intubated and admitted to ICU on 10/15.  - Patient has had multiple failed breathing trials. There was some suspicion for neuromuscular disease present dt persistent hypercarbia despite mechanical ventilatory support. Work up negative for Myasthenia Gravis. Family is agreeable to trach and peg placement d/t severe respiratory muscle weakness making ventilatory capacity a barrier for extubation. Trach scheduled for Monday. 2. Covid Pneumonia with superimposed bacterial pneumonia:  S/p baricitinib   10/19 -respiratory culture positive for Corynebacterium. Vancomycin 6 days. Currently pending pneumonia panel ordered on 10/29, as 10/26 panel was negative. . Repeat CXR 11/4 shows improving bilateral pna. Patient currently not on any abx. Not showing any signs of overt infection. Patient afebrile no leukocytosis present. 3. Hematuria: Secondary to nephrolithiasis. resolved     ARDS: Mild. Patient received oxygen with FiO2 100 with very low TV (250ml) on ventilation. Ferritin, CRP, Lactic dehydrogenase elevated. Start Decadron, Baricitinib (10/12). Patient currently PCV+ Psupport 20,  PEEP 6 rate 16. Barrier to extubation is ventilatory capacity. Trach Monday. 4. Hypotension - continue to hold antihypertensives and monitor. Patient still requiring Levophed want to maintain blood pressure. This could likely be due to sedation. Reevaluate tomorrow as patient will awake for tracheostomy    6. HFrEF: Echo (10/11): LV EF 25-30%, LV severe global hypokinesis.  LV size moderate increase. Weight on admission 120 lbs. cardiology will reevaluate patient once cleared from COVID-19 infection with potential need for cardiac catheterization. Patient continues to be on milrinone 0.0625. Nephrology resume bumex. 7. Hx of Prolong QTc interval: On telemetry strip in ED. Seen by cardiogy for Prolong QTC. Plan: tele monitor, avoid QTC prolong medications, Keep K +> 4 and Mg > 2. Replacement protocols in place. 8. New on set Afib without RVR: paroxysmal, most likely d/t covid. Patient is on anticoagulation    9. Bilateral lower extremity swelling: r/o DVT - US negative on 11/1    10. HERIBERTO: Bun/Cr Improving. Patient having good urine output. Nephrology to resume Bumex and adjust as clinically appropriate. 11. Hyperkalemia: Resolved. K 4.1 11/7     12. AMS: likely secondary to hypercapnic hypoxemia. Neuro exam when patient is extubated     15. Macrocytic Anemia: H/H 8.3/28.1 .1 Folate 10.3, vitamin B12 865      INITIAL H AND P AND ICU COURSE:   30-year-old female presents to the ED on 10/11/2021 due to worsening shortness of breath. Patient tested positive for Covid on 10/4/2021. In the emergency room patient was found to have a SPO2 of 83% on room air with a BNP of the 15 point 7K. Patient was admitted and managed for acute hypoxic respiratory failure secondary to Covid with combination of congestive heart failure. Patient was placed on nasal cannula, Bumex and dexamethasone in the ED. On 10/15 patient was transferred to the ICU and intubated for acute hypoxic respiratory failure on 100% FiO2 on high flow. Patient had a UTI on 10/18 and was started on ceftriaxone on 10/19. Patient was also started on milrinone for her HFrEF. Patient's family has been consulted regarding trach and PEG tube in which they are agreeable. Cardiology is on board regarding LifeVest versus AICD. 11/5 - patient HERIBERTO improving, continue to be followed by nephro.  Repeat cxr shows improving of bilateral pna. Pending pneumonia panel results. Trach and peg set for Monday. 11/6 - patient HERIBERTO improving and seen by nephro. Resume bumex. Patient otherwise stable on vent pending trach Monday. 11/7 - no acute events overnight. Patient has trach planned for Monday. Add Pepcid for GI prophylaxis. Past Medical History: HTN, Diverticulitis, bronchitis, Anxiety, arthritis. Family History:  No known family history  Social History: Former smoker since 1977. 15 pack year hx prior to cessation    ROS   Cannot obtain d/t patient sedated and on mechanical ventilation    Scheduled Meds:   bumetanide  1 mg IntraVENous Daily    lactobacillus  1 capsule Per NG tube Daily with breakfast    potassium (CARDIAC) replacement protocol   Other RX Placeholder    senna  10 mL Per NG tube Nightly    [Held by provider] lactulose  20 g Oral TID    [Held by provider] metoprolol tartrate  25 mg Oral BID    [Held by provider] apixaban  2.5 mg Oral BID    sodium chloride flush  5-40 mL IntraVENous 2 times per day     Continuous Infusions:   midazolam 5 mg/hr (11/06/21 2325)    dextrose 100 mL/hr (10/26/21 1253)    vasopressin (Septic Shock) infusion Stopped (10/31/21 0243)    dexmedetomidine 1.2 mcg/kg/hr (11/07/21 0449)    norepinephrine 1 mcg/min (11/06/21 1835)    milrinone 0.0625 mcg/kg/min (11/06/21 1002)    [Held by provider] sodium chloride Stopped (10/19/21 1900)    sodium chloride         PHYSICAL EXAMINATION:  T:  99.9F.  P:  75. RR:  17  B/P:  112/57  FiO2:  30. O2 Sat:  96  I/O:  2.1/3.2 net -1L past 24hr  Body mass index is 30.31 kg/m². GCS:   9  PCV+: Psupport 20, Rate 16, PEEP 6       General: Sedated on vent   HEENT:  normocephalic and atraumatic. No scleral icterus. PERR  Neck: supple. No Thyromegaly. Lungs: diminished breath sounds bilaterally on auscultation. No retractions  Cardiac: RRR. No JVD. Abdomen: soft. Nontender. Extremities:  No clubbing, cyanosis, mild pitting edema x 4. Vasculature: capillary refill < 3 seconds. Palpable dorsalis pedis pulses. Skin:  warm and dry. Psych:  Patient sedated  Lymph:  No supraclavicular adenopathy. Neurologic: No seizures. Data: (All radiographs, tracings, PFTs, and imaging are personally viewed and interpreted unless otherwise noted).  Sodium 143, potassium 4.1, chloride 106, CO2 27, 45, CR 1.0   Calcium 8.5. Magnesium 1.6   Glucose 125   WBC 5.1, Hgb 8.3, Htc 28.1, PLT 84   Telemetry shows NSR -reported episodes of PVCs on 11/6 per report. Patient was assessed by physician at time and appears to spontaneously resolve. Will monitor. CXR on 11/4:   1. Slightly improving bilateral pneumonia. 2. Moderate stable cardiomegaly.               Meets Continued ICU Level Care Criteria:    [x] Yes   [] No - Transfer Planned to listed location:  [] HOSPITALIST CONTACTED-      Case and plan discussed with Dr. Stuart Haynes    Electronically signed by Audrey Sánchez, DO     [address]  Patient seen by me. Case discussed with resident physician. Italicized font represents changes to the note made by me. CC time 35 minutes. Time was discontiguous. Time does not include procedures. Time does include my direct assessment of the patient and coordination of care. Chest x-ray improved aeration. Levophed remains at 1 unable to wean anticipate this will improve with decrease sedation. Trach tomorrow.

## 2021-11-07 NOTE — PROGRESS NOTES
Pharmacy Renal Adjustment    Edita Moran is a 80 y.o. female. Pharmacy renally adjust the following medications per P&T approved policy: Pepcid    Recent Labs     11/06/21  0300 11/07/21  0340   BUN 49* 45*   CREATININE 1.0 1.0     Estimated Creatinine Clearance: 35 mL/min (based on SCr of 1 mg/dL).   Calculated CrCl:    Height:   Ht Readings from Last 1 Encounters:   10/17/21 5' (1.524 m)     Weight:  Wt Readings from Last 1 Encounters:   11/06/21 155 lb 3.3 oz (70.4 kg)         Plan: Adjustments based on renal function:          Decrease Pepcid to 20mg daily

## 2021-11-07 NOTE — PROGRESS NOTES
[facility]   Nephrology progress note  11/7/2021, 11:33 AM      Pt Name:    Brianna Bal  MRN:     [id number]     [place]:    1934  Admit Date:    10/11/2021 10:28 AM  Primary Care Physician:  SERGE Coulter CNP   Room number  1K-56/905-X    Chief Complaint: Nephrology following for HERIBERTO    Subjective:  Patient seen and examined  Seen earlier today during rounds  Intubated on 30% FiO2  On Levophed  On Primacor  Good urine output      Objective:  24HR INTAKE/OUTPUT:      Intake/Output Summary (Last 24 hours) at 11/7/2021 1133  Last data filed at 11/7/2021 0820  Gross per 24 hour   Intake 2177 ml   Output 3250 ml   Net -1073 ml     I/O last 3 completed shifts: In: 2187 [I.V.:882; NG/GT:1305]  Out: 8654 [Urine:3250]  I/O this shift:  In: 20 [I.V.:20]  Out: -   Admission weight: 120 lb (54.4 kg)  Wt Readings from Last 3 Encounters:   11/06/21 155 lb 3.3 oz (70.4 kg)   10/07/21 125 lb (56.7 kg)     Body mass index is 30.31 kg/m².     Physical examination  VITALS:     Vitals:    11/07/21 0840 11/07/21 0900 11/07/21 1000 11/07/21 1100   BP:  (!) 109/55 (!) 112/57 105/67   Pulse:  94 90 87   Resp:  23 19 24   Temp:       TempSrc:       SpO2: 93% 94% 95% 96%   Weight:       Height:         Limited examination secondary to COVID-19 positivity and isolation status  General Appearance: Intubated  Mouth/Throat: ET tube in place  Lungs: Air entry diminished  Extremities: bilateral upper and lower extremity edema      Lab Data  CBC:   Recent Labs     11/05/21 0420 11/06/21  0300 11/07/21  0340   WBC 4.0* 4.5* 5.1   HGB 8.0* 7.9* 8.3*   HCT 26.5* 26.1* 28.1*    168 184     BMP:  Recent Labs     11/05/21  0420 11/05/21  0420 11/06/21  0300 11/07/21  0340     --  142 143   K 4.2  4.2   < > 4.1  4.1 4.1     --  107 106   CO2 29  --  26 27   BUN 47*  --  49* 45*   CREATININE 1.0  --  1.0 1.0   GLUCOSE 109*  --  110* 125*   CALCIUM 8.7  --  8.3* 8.5   MG 1.7  --   --  1.6 PHOS 3.0  --   --   --     < > = values in this interval not displayed. Hepatic: No results for input(s): LABALBU, AST, ALT, ALB, BILITOT, ALKPHOS in the last 72 hours. Meds:  Infusion:    midazolam 5 mg/hr (11/06/21 2325)    dextrose 100 mL/hr (10/26/21 1253)    vasopressin (Septic Shock) infusion Stopped (10/31/21 0243)    dexmedetomidine 1.2 mcg/kg/hr (11/07/21 1045)    norepinephrine 1 mcg/min (11/06/21 1835)    milrinone 0.0625 mcg/kg/min (11/06/21 1002)    [Held by provider] sodium chloride Stopped (10/19/21 1900)    sodium chloride       Meds:    famotidine (PEPCID) injection  20 mg IntraVENous Daily    bumetanide  1 mg IntraVENous Daily    lactobacillus  1 capsule Per NG tube Daily with breakfast    potassium (CARDIAC) replacement protocol   Other RX Placeholder    senna  10 mL Per NG tube Nightly    [Held by provider] lactulose  20 g Oral TID    [Held by provider] metoprolol tartrate  25 mg Oral BID    [Held by provider] apixaban  2.5 mg Oral BID    sodium chloride flush  5-40 mL IntraVENous 2 times per day     Meds prn: LORazepam, glucose, dextrose, glucagon (rDNA), dextrose, potassium chloride, acetaminophen **OR** acetaminophen, docusate sodium, sodium chloride flush, sodium chloride, polyethylene glycol       Impression and Plan:  1. HERIBERTO secondary to ATN  Patient is nonoliguric at this time  Stable renal function at this time  We will continue with current management  Excellent urine output with Bumex  Continue with Bumex    2. Acute hypoxic respiratory failure secondary to COVID-19 pneumonia  3. Sepsis with shock. Currently on vasopressors at minimal dose  4. Superimposed bacterial pneumonia  5. Nephrolithiasis  6.  HFrEF with peripheral edema. Continue with Bumex.  Excellent diuresis overnight      Earl Martinez MD  [facility]

## 2021-11-08 ENCOUNTER — APPOINTMENT (OUTPATIENT)
Dept: GENERAL RADIOLOGY | Age: 86
DRG: 004 | End: 2021-11-08
Payer: MEDICARE

## 2021-11-08 LAB
ALBUMIN SERPL-MCNC: 2.3 G/DL (ref 3.5–5.1)
ALP BLD-CCNC: 73 U/L (ref 38–126)
ALT SERPL-CCNC: 9 U/L (ref 11–66)
ANION GAP SERPL CALCULATED.3IONS-SCNC: 9 MEQ/L (ref 8–16)
AST SERPL-CCNC: 23 U/L (ref 5–40)
BILIRUB SERPL-MCNC: 0.3 MG/DL (ref 0.3–1.2)
BILIRUBIN DIRECT: < 0.2 MG/DL (ref 0–0.3)
BUN BLDV-MCNC: 46 MG/DL (ref 7–22)
CALCIUM IONIZED: 1.24 MMOL/L (ref 1.12–1.32)
CALCIUM SERPL-MCNC: 8.4 MG/DL (ref 8.5–10.5)
CHLORIDE BLD-SCNC: 105 MEQ/L (ref 98–111)
CO2: 28 MEQ/L (ref 23–33)
CREAT SERPL-MCNC: 1.1 MG/DL (ref 0.4–1.2)
ERYTHROCYTE [DISTWIDTH] IN BLOOD BY AUTOMATED COUNT: 16.2 % (ref 11.5–14.5)
ERYTHROCYTE [DISTWIDTH] IN BLOOD BY AUTOMATED COUNT: 60.1 FL (ref 35–45)
GFR SERPL CREATININE-BSD FRML MDRD: 47 ML/MIN/1.73M2
GLUCOSE BLD-MCNC: 133 MG/DL (ref 70–108)
GLUCOSE BLD-MCNC: 146 MG/DL (ref 70–108)
HCT VFR BLD CALC: 27.3 % (ref 37–47)
HEMOGLOBIN: 8.4 GM/DL (ref 12–16)
MAGNESIUM: 2.1 MG/DL (ref 1.6–2.4)
MCH RBC QN AUTO: 31.3 PG (ref 26–33)
MCHC RBC AUTO-ENTMCNC: 30.8 GM/DL (ref 32.2–35.5)
MCV RBC AUTO: 101.9 FL (ref 81–99)
PHOSPHORUS: 2.7 MG/DL (ref 2.4–4.7)
PLATELET # BLD: 202 THOU/MM3 (ref 130–400)
PMV BLD AUTO: 10.3 FL (ref 9.4–12.4)
POTASSIUM SERPL-SCNC: 3.7 MEQ/L (ref 3.5–5.2)
RBC # BLD: 2.68 MILL/MM3 (ref 4.2–5.4)
SODIUM BLD-SCNC: 142 MEQ/L (ref 135–145)
T4 FREE: 0.85 NG/DL (ref 0.93–1.76)
TOTAL PROTEIN: 5.3 G/DL (ref 6.1–8)
TSH SERPL DL<=0.05 MIU/L-ACNC: 2.91 UIU/ML (ref 0.4–4.2)
WBC # BLD: 5.4 THOU/MM3 (ref 4.8–10.8)

## 2021-11-08 PROCEDURE — 2580000003 HC RX 258: Performed by: STUDENT IN AN ORGANIZED HEALTH CARE EDUCATION/TRAINING PROGRAM

## 2021-11-08 PROCEDURE — 83735 ASSAY OF MAGNESIUM: CPT

## 2021-11-08 PROCEDURE — 2100000000 HC CCU R&B

## 2021-11-08 PROCEDURE — 36415 COLL VENOUS BLD VENIPUNCTURE: CPT

## 2021-11-08 PROCEDURE — 82948 REAGENT STRIP/BLOOD GLUCOSE: CPT

## 2021-11-08 PROCEDURE — 2500000003 HC RX 250 WO HCPCS: Performed by: STUDENT IN AN ORGANIZED HEALTH CARE EDUCATION/TRAINING PROGRAM

## 2021-11-08 PROCEDURE — 74018 RADEX ABDOMEN 1 VIEW: CPT

## 2021-11-08 PROCEDURE — 80053 COMPREHEN METABOLIC PANEL: CPT

## 2021-11-08 PROCEDURE — 99291 CRITICAL CARE FIRST HOUR: CPT | Performed by: INTERNAL MEDICINE

## 2021-11-08 PROCEDURE — 99232 SBSQ HOSP IP/OBS MODERATE 35: CPT | Performed by: INTERNAL MEDICINE

## 2021-11-08 PROCEDURE — 94003 VENT MGMT INPAT SUBQ DAY: CPT

## 2021-11-08 PROCEDURE — 84100 ASSAY OF PHOSPHORUS: CPT

## 2021-11-08 PROCEDURE — 85027 COMPLETE CBC AUTOMATED: CPT

## 2021-11-08 PROCEDURE — 6370000000 HC RX 637 (ALT 250 FOR IP): Performed by: INTERNAL MEDICINE

## 2021-11-08 PROCEDURE — 6360000002 HC RX W HCPCS: Performed by: INTERNAL MEDICINE

## 2021-11-08 PROCEDURE — 2500000003 HC RX 250 WO HCPCS: Performed by: INTERNAL MEDICINE

## 2021-11-08 PROCEDURE — 2700000000 HC OXYGEN THERAPY PER DAY

## 2021-11-08 PROCEDURE — 82248 BILIRUBIN DIRECT: CPT

## 2021-11-08 PROCEDURE — 84439 ASSAY OF FREE THYROXINE: CPT

## 2021-11-08 PROCEDURE — 94761 N-INVAS EAR/PLS OXIMETRY MLT: CPT

## 2021-11-08 PROCEDURE — 2580000003 HC RX 258: Performed by: INTERNAL MEDICINE

## 2021-11-08 PROCEDURE — APPNB30 APP NON BILLABLE TIME 0-30 MINS: Performed by: NURSE PRACTITIONER

## 2021-11-08 PROCEDURE — 84443 ASSAY THYROID STIM HORMONE: CPT

## 2021-11-08 PROCEDURE — 82330 ASSAY OF CALCIUM: CPT

## 2021-11-08 RX ORDER — HEPARIN SODIUM 10000 [USP'U]/100ML
5-30 INJECTION, SOLUTION INTRAVENOUS CONTINUOUS
Status: DISCONTINUED | OUTPATIENT
Start: 2021-11-09 | End: 2021-11-09

## 2021-11-08 RX ORDER — HEPARIN SODIUM 1000 [USP'U]/ML
80 INJECTION, SOLUTION INTRAVENOUS; SUBCUTANEOUS PRN
Status: DISCONTINUED | OUTPATIENT
Start: 2021-11-08 | End: 2021-11-09

## 2021-11-08 RX ORDER — HEPARIN SODIUM 1000 [USP'U]/ML
40 INJECTION, SOLUTION INTRAVENOUS; SUBCUTANEOUS PRN
Status: DISCONTINUED | OUTPATIENT
Start: 2021-11-08 | End: 2021-11-09

## 2021-11-08 RX ADMIN — SODIUM CHLORIDE 1.2 MCG/KG/HR: 9 INJECTION, SOLUTION INTRAVENOUS at 08:26

## 2021-11-08 RX ADMIN — SODIUM CHLORIDE, PRESERVATIVE FREE 10 ML: 5 INJECTION INTRAVENOUS at 08:32

## 2021-11-08 RX ADMIN — Medication 17.6 MG: at 23:07

## 2021-11-08 RX ADMIN — SODIUM CHLORIDE 1.2 MCG/KG/HR: 9 INJECTION, SOLUTION INTRAVENOUS at 14:24

## 2021-11-08 RX ADMIN — SODIUM CHLORIDE 1.2 MCG/KG/HR: 9 INJECTION, SOLUTION INTRAVENOUS at 21:07

## 2021-11-08 RX ADMIN — Medication 1 CAPSULE: at 08:27

## 2021-11-08 RX ADMIN — MIDAZOLAM 5 MG/HR: 5 INJECTION, SOLUTION INTRAMUSCULAR; INTRAVENOUS at 18:41

## 2021-11-08 RX ADMIN — BUMETANIDE 1 MG: 0.25 INJECTION INTRAMUSCULAR; INTRAVENOUS at 08:27

## 2021-11-08 RX ADMIN — SODIUM CHLORIDE 1.2 MCG/KG/HR: 9 INJECTION, SOLUTION INTRAVENOUS at 03:05

## 2021-11-08 RX ADMIN — FAMOTIDINE 20 MG: 10 INJECTION, SOLUTION INTRAVENOUS at 08:27

## 2021-11-08 ASSESSMENT — PULMONARY FUNCTION TESTS
PIF_VALUE: 22
PIF_VALUE: 24
PIF_VALUE: 27
PIF_VALUE: 26
PIF_VALUE: 22
PIF_VALUE: 27

## 2021-11-08 ASSESSMENT — PAIN SCALES - GENERAL: PAINLEVEL_OUTOF10: 0

## 2021-11-08 NOTE — PROGRESS NOTES
[address] THERAPY MISSED TREATMENT NOTE  [facility] CCU 3A  3A-009-A      Date: 2021  Patient Name: Nelson Richmond        CSN: [id number]   : 1934  (80 y.o.)  Gender: female   Referring Practitioner: Frandy De Jesus MD  Diagnosis: acute respiratory failure with hypoxia         REASON FOR MISSED TREATMENT: Hold Treatment per Nursing Pt not appropriate for early mobility this date and resp therapy reported planning a trach sometime today, will check back next available date

## 2021-11-08 NOTE — CARE COORDINATION
11/8/21, 3:21 PM EST    DISCHARGE ON GOING EVALUATION    [facility] day: 28  Location: -09/009-A Reason for admit: Acute respiratory failure with hypoxia ([facility] [J96.01]  Acute congestive heart failure, unspecified heart failure type ([facility] [I50.9]  COVID-19 [U07.1]   Procedure:   10/12 Echo with EF 25-30%; Small circumferential pericardial effusion with no tamponade physiology; Myxomatotic degeneration of mitral valve;  Mild to Moderate mitral regurgitation is present  10/15 Intubated  10/15 CVC RIJ  10/28 Renal US: Mildly increased echogenicity of the kidneys that may suggest underlying medical renal disease; no hydronephrosis; 7mm left renal calculus is seen; small ascites. Bilateral pleural effusions; bilateral renal cysts  10/30 CT Chest: Extensive bilateral pneumonia; Large bilateral pleural effusions, right greater than left; Large pericardial effusion; Marked cardiomegaly  10/30 CT Abd/pelvis: Diffusely thickened appearance of the sigmoid colon. There are diverticula in this region. This could be on the basis of acute diverticulitis, however given the eccentric appearance of the thickening, neoplastic process can't entirely be excluded; Haziness throughout the superficial subcutaneous soft tissues of the abdomen and pelvis consistent with anasarca; No evidence of hydronephrosis. No obstructive renal stones are identified; Diffuse haziness throughout the mesentery and a small amount of ascites, findings likely related to fluid overload  11/1 BLE venous doppler: Neg for DVT    Barriers to Discharge: Armando Flake postponed until tomorrow. Remains on vent w/ETT on PCV, peep 6, FIO2 30%, sats 97%. Tmax 100.8. NSR. Unable to follow commands; GARIBAY x4 to painful stim. PT/OT - early mobility. Intensivist and Nephrology following. Palliative Care and Dietitian following.  Telemetry, CVC, OG w/TF, alaniz. Precedex @ 1.2 mcg/kg/hr, versed @ 5 mg/hr, Primacor @ 0.0625 mcg/kg/min, levo @ 2 mcg/min, IV bumex 1 mg daily, IV pepcid, Electrolyte replacement protocols.     PCP: SERGE Longoria CNP  Readmission Risk Score: 19.9 ( )%  Patient Goals/Plan/Treatment Preferences: From home with daughter. Plan for [facility] as 1st choice and 2nd choice is [employer].  No precert required.

## 2021-11-08 NOTE — PROGRESS NOTES
Renal Progress Note    Assessment and Plan:   1. Acute kidney injury stable and mostly prerenal  2. SARS-CoV-2 pneumonia  3. Hypoxic respiratory failure mechanical support  4. Deconditioning  5. Hypocalcemia corrects for low serum albumin level  6. Macrocytic anemia  7. Hypoalbuminemia    PLAN:  1. Labs reviewed  2. Serum creatinine is very slightly increased today from yesterday  3. BUN remains mostly stable  4. Medications reviewed  5. No changes  6. Labs in the morning  7.  We will continue to follow    Patient Active Problem List:     Sigmoid diverticulitis     Acute respiratory failure with hypoxia ([facility] [address]     COVID-19     Acute congestive heart failure ([facility] [address]      Subjective:   Admit Date: 10/11/2021    Interval History:   Seen for acute kidney injury  Remains on mechanical support  No issues from staff  Blood pressure is good  Urine output is good      Medications:   Scheduled Meds:   famotidine (PEPCID) injection  20 mg IntraVENous Daily    bumetanide  1 mg IntraVENous Daily    lactobacillus  1 capsule Per NG tube Daily with breakfast    potassium (CARDIAC) replacement protocol   Other RX Placeholder    senna  10 mL Per NG tube Nightly    [Held by provider] lactulose  20 g Oral TID    [Held by provider] metoprolol tartrate  25 mg Oral BID    [Held by provider] apixaban  2.5 mg Oral BID    sodium chloride flush  5-40 mL IntraVENous 2 times per day     Continuous Infusions:   midazolam 5 mg/hr (11/07/21 1823)    dextrose 100 mL/hr (10/26/21 1253)    vasopressin (Septic Shock) infusion Stopped (10/31/21 0243)    dexmedetomidine 1.2 mcg/kg/hr (11/08/21 0305)    norepinephrine 2 mcg/min (11/07/21 2121)    milrinone 0.0625 mcg/kg/min (11/06/21 1002)    [Held by provider] sodium chloride Stopped (10/19/21 1900)    sodium chloride         CBC:   Recent Labs     11/06/21  0300 11/07/21  0340 11/08/21  0510   WBC 4.5* 5.1 5.4   HGB 7.9* 8.3* 8.4*    184 202     CMP:    Recent Labs 11/06/21  0300 11/07/21  0340 11/08/21  0510    143 142   K 4.1  4.1 4.1 3.7    106 105   CO2 26 27 28   BUN 49* 45* 46*   CREATININE 1.0 1.0 1.1   GLUCOSE 110* 125* 146*   CALCIUM 8.3* 8.5 8.4*   LABGLOM 52* 52* 47*     Troponin: No results for input(s): TROPONINI in the last 72 hours. BNP: No results for input(s): BNP in the last 72 hours. INR: No results for input(s): INR in the last 72 hours. Lipids: No results for input(s): CHOL, LDLDIRECT, TRIG, HDL, AMYLASE, LIPASE in the last 72 hours. Liver:   Recent Labs     11/08/21  0510   AST 23   ALT 9*   ALKPHOS 73   PROT 5.3*   LABALBU 2.3*   BILITOT 0.3     Iron:  No results for input(s): IRONS, FERRITIN in the last 72 hours. Invalid input(s): LABIRONS  XR CHEST PORTABLE   Final Result   1. Slightly improving bilateral pneumonia. 2. Moderate stable cardiomegaly. **This report has been created using voice recognition software. It may contain minor errors which are inherent in voice recognition technology. **      Final report electronically signed by Dr. Octavia Carpenter on 11/4/2021 6:34 PM      XR CHEST PORTABLE   Final Result   1. Moderate cardiomegaly. ET tube, NG tube, and right jugular line remain in good position. 2. Tiny bilateral pleural effusions. Findings of relatively severe pneumonia/edema involving both lungs diffusely. Overall appearance not appreciably changed from yesterday. **This report has been created using voice recognition software. It may contain minor errors which are inherent in voice recognition technology. **      Final report electronically signed by Dr. Conor Christensen on 11/1/2021 9:36 AM      VL DUP LOWER EXTREMITY VENOUS BILATERAL   Final Result   No evidence of a DVT. **This report has been created using voice recognition software. It may contain minor errors which are inherent in voice recognition technology. **      Final report electronically signed by Dr. Bernice Velazquez on 11/1/2021 8:07 AM      XR CHEST PORTABLE   Final Result   No significant interval change since previous study dated 29th of October 2021. Jaimee Frausto **This report has been created using voice recognition software. It may contain minor errors which are inherent in voice recognition technology. **      Final report electronically signed by DR Charmayne Ferdinand on 10/31/2021 12:43 PM      CT ABDOMEN PELVIS WO CONTRAST   Final Result      1. Diffusely thickened appearance of the sigmoid colon. There are diverticula in this region. This could be on the basis of acute diverticulitis, however given the eccentric appearance of the thickening, neoplastic process can't entirely be excluded. 2. Haziness throughout the superficial subcutaneous soft tissues of the abdomen and pelvis consistent with anasarca. 3. No evidence of hydronephrosis. No obstructive renal stones are identified. 4. Diffuse haziness throughout the mesentery and a small amount of ascites, findings likely related to fluid overload. **This report has been created using voice recognition software. It may contain minor errors which are inherent in voice recognition technology. **      Final report electronically signed by Dr Willi Florez on 10/30/2021 2:23 PM      CT CHEST W WO CONTRAST   Final Result       1. Extensive bilateral pneumonia. 2. Large bilateral pleural effusions, right greater than left. 3. Large pericardial effusion. 4. Marked cardiomegaly. **This report has been created using voice recognition software. It may contain minor errors which are inherent in voice recognition technology. **      Final report electronically signed by Dr Willi Florez on 10/30/2021 1:54 PM      XR CHEST PORTABLE   Final Result   1. Mildly megaly. ET tube and NG tube remain in good position. Right jugular line with catheter tip in SVC. 2. Moderate-sized pleural effusion left side.    3. Moderate mixed infiltrates scattered throughout both lungs. Overall appearance slightly worse than prior. **This report has been created using voice recognition software. It may contain minor errors which are inherent in voice recognition technology. **      Final report electronically signed by Dr. Marina West on 10/29/2021 8:10 AM      US RENAL COMPLETE   Final Result   1. Mildly increased echogenicity of the kidneys that may suggest underlying medical renal disease. 2. No hydronephrosis. 3. A 7 mm left renal calculus is seen. 4. Small ascites. Bilateral pleural effusions. 5. Bilateral renal cysts. **This report has been created using voice recognition software. It may contain minor errors which are inherent in voice recognition technology. **      Final report electronically signed by Dr Shama Miller on 10/29/2021 3:55 AM      XR ABDOMEN FOR NG/OG/NE TUBE PLACEMENT   Final Result   1. The tip of the nasogastric tube is below the diaphragms within the stomach. 2. Partially seen is left pleural effusion and left retrocardiac opacity. Opacities are also seen in the partially seen right lower lobe. **This report has been created using voice recognition software. It may contain minor errors which are inherent in voice recognition technology. **      Final report electronically signed by Dr Shama Miller on 10/27/2021 6:54 PM      XR CHEST PORTABLE   Final Result   1. There has been no significant interval change in the radiographic appearance of the chest  from 10/23/2021 including diffuse airspace disease. .            **This report has been created using voice recognition software. It may contain minor errors which are inherent in voice recognition technology. **      Final report electronically signed by Dr Shama Miller on 10/24/2021 8:54 PM      XR CHEST PORTABLE   Final Result   1. Support lines and tubes in unchanged position when compared to prior.    2. Interval worsening of parenchymal densities within the right    central/right lower lobe. Additionally, worsening of confluent    consolidation within the left midlung with development of left basilar    pleural/parenchymal opacities likely consistent with moderate left pleural    effusion. This document has been electronically signed by: Grecia Chen DO on    10/23/2021 05:00 AM      XR CHEST PORTABLE   Final Result   Persistent diffuse patchy opacities but with improved aeration at the lung bases. **This report has been created using voice recognition software. It may contain minor errors which are inherent in voice recognition technology. **      Final report electronically signed by Dr. Idania Diaz MD on 10/21/2021 10:48 AM      XR CHEST PORTABLE   Final Result   1. Increased density in the left lung base. This can indicate partial left lower lobe collapse. 2. Persistent patchy bilateral pulmonary opacities. **This report has been created using voice recognition software. It may contain minor errors which are inherent in voice recognition technology. **      Final report electronically signed by Dr. Snehal Norris on 10/18/2021 7:58 AM      XR CHEST PORTABLE   Final Result   Impression:   1. Cardiomegaly with improvement of passive congestive changes and better    aeration of both lungs compared to the prior study. 2. The focal bilateral alveolar consolidations consistent with infectious    pulmonary disease are unchanged. Retrocardiac consolidation and small LEFT    pleural effusion obscuring the LEFT diaphragm is unchanged. This document has been electronically signed by: Chanel Medina MD on    10/16/2021 03:18 AM      XR CHEST PORTABLE   Final Result   Somewhat low position of endotracheal tube 1.2 cm above the claire but improved aeration of the upper lungs. **This report has been created using voice recognition software. It may contain minor errors which are inherent in voice recognition technology. ** Final report electronically signed by Dr. Kristian Rivera on 10/15/2021 3:34 PM      XR CHEST PORTABLE   Final Result   1. Bilateral pneumonia. 2. Small bilateral pleural effusions. 3. Stable cardiomegaly. **This report has been created using voice recognition software. It may contain minor errors which are inherent in voice recognition technology. **      Final report electronically signed by Dr. Lachelle Mills on 10/15/2021 4:14 PM      XR CHEST PORTABLE   Final Result   Right lower lobe airspace infiltrates. Severe cardiomegaly. **This report has been created using voice recognition software. It may contain minor errors which are inherent in voice recognition technology. **      Final report electronically signed by Dr. Kristian Rivera on 10/11/2021 8:59 PM      XR CHEST PORTABLE   Final Result      1. Diffuse groundglass opacities in both lungs along with cardiomegaly. Findings likely suggest acute exacerbation of congestive heart failure versus pneumonia in the right clinical setting. Clinical correlation is recommended      2. Small left pleural effusion. 3. Other findings as described above. **This report has been created using voice recognition software. It may contain minor errors which are inherent in voice recognition technology. **      Final report electronically signed by Dr Hunter Holm on 10/11/2021 11:56 AM            Objective:   Vitals: BP (!) 113/57   Pulse 108   Temp 100.6 °F (38.1 °C) (Bladder)   Resp 23   Ht 5' (1.524 m)   Wt 155 lb 3.3 oz (70.4 kg)   SpO2 93%   BMI 30.31 kg/m²    Wt Readings from Last 3 Encounters:   11/06/21 155 lb 3.3 oz (70.4 kg)   10/07/21 125 lb (56.7 kg)      24HR INTAKE/OUTPUT:      Intake/Output Summary (Last 24 hours) at 11/8/2021 4529  Last data filed at 11/7/2021 2335  Gross per 24 hour   Intake 1396 ml   Output 2175 ml   Net -779 ml       Constitutional: Well-developed elderly lady on mechanical support , no apparent distress   Skin:normal with no rash or lesions  HEENT:Pupils are reactive. Endotracheal tube is intact. Orogastric tube noted. .Oral mucosa is moist   Neck:supple with no JVD carotid bruit  Cardiovascular:  S1, S2 without murmur or rubs   Respiratory:  Clear to ausculation without wheezes, rhonchi or rales in the anterior  Abdomen: Soft. Good bowel sounds. Ext: 1+ bilateral LE edema  Musculoskeletal:Intact  Neuro: On mechanical support      Electronically signed by Mervin Jimenez MD on 11/8/2021 at 7:23 AM  **This report has been created using voice recognition software. It maycontain minor  errors which are inherent in voice recognition technology. **

## 2021-11-08 NOTE — FLOWSHEET NOTE
[place] [address] PROGRESS NOTE      Patient: Saray Herron  Room #: 8K-56/354-L            YOB: 1934  Age: 80 y.o. Gender: female            Admit Date & Time: 10/11/2021 10:28 AM    Assessment:   was rounding on the unit. Pt's daughter was present. Pt was intubated, but able to acknowledge or presence by minimal hand movements and her eyes opening. Pt's daughter shared that the patient has 5 children and two of them are currently facing their own health issues. The daughter who is present is a . Pt is a member of [facility] and has been quite active over the years. Interventions:   provided a listening and supportive presence.  prayed with the pt and her daughter. Outcomes:  Pt's daughter expressed gratitude for the encounter. Plan:  1. Provide Spiritual care and support. 2.  Reassess pt and family for spiritual needs if stay become protracted. Electronically signed by Mere Bowles Intern, on 11/8/2021 at 3:09 PM.  [address] [facility]  [phone]       11/08/21 1425   Encounter Summary   Services provided to: Patient and family together   Referral/Consult From: [address] [place] [address]; [employer]/rajinder community   Place of Jain   ([facility])   Contact [employer] Completed   Continue Visiting Yes  (11/8)   Complexity of Encounter Moderate   Length of Encounter 30 minutes   Spiritual Assessment Completed Yes   Spiritual/Religion   Type Spiritual support   Assessment Approachable; Anxious; Coping   Intervention Active listening; Explored feelings, thoughts, concerns; Explored coping resources; Prayer; Sustaining presence/ Ministry of presence;  Discussed belief system/Jehovah's witness practices/rajinder   Outcome Connection/belonging; Comfort; Engaged in conversation; Expressed feelings/needs/concerns

## 2021-11-08 NOTE — PROGRESS NOTES
1102: \"Is there anyway you can talk to family regarding the tracheostomy procedure being held off again? I feel like family is not understanding why we are holding off. I would appreciate it. \" - perfect serve sent to Frederick Ca MD     1500: Frederick Ca MD called family to update them on tracheostomy     1805: Hunter Hancock, daughter, and updated family on patient status

## 2021-11-08 NOTE — PROGRESS NOTES
cardiology will wait for recovery prior to considering any intervention such as LifeVest versus AICD. Patient continues to be on milrinone 0.0625. Nephrology resume bumex     7. Hx of Prolong QTc interval: On telemetry strip in ED. Seen by cardiogy for Prolong QTC. Plan: tele monitor, avoid QTC prolong medications, Keep K +> 4 and Mg > 2     8. New on set Afib without RVR: paroxysmal, most likely d/t covid. Patient is on anticoagulation    9. Bilateral lower extremity swelling: r/o DVT - US negative on 11/1    10. HERIBERTO: Bun/Cr Improving. Patient having good urine output. Nephrology continue Bumex and adjust as clinically appropriate. 11. Hyperkalemia: Resolved. 12. AMS: likely secondary to hypercapnic hypoxemia. Neuro exam when patient is extubate     13. Macrocytic Anemia: H/H 7.9/26.1 .4. Ordered folate and b12 levels, came back WNL. Most likely dt covid/pulmonary dz. INITIAL H AND P AND ICU COURSE:   80-year-old female presents to the ED on 10/11/2021 due to worsening shortness of breath. Patient tested positive for Covid on 10/4/2021. In the emergency room patient was found to have a SPO2 of 83% on room air with a BNP of 15.7K. Patient was admitted and managed for acute hypoxic respiratory failure secondary to Covid with combination of congestive heart failure. Patient was placed on nasal cannula, Bumex and dexamethasone in the ED. On 10/15 patient was transferred to the ICU and intubated for acute hypoxic respiratory failure on 100% FiO2 on high flow. Patient had a UTI on 10/18 and was started on ceftriaxone on 10/19. Patient was also started on milrinone for her HFrEF. Patient's family has been consulted regarding trach and PEG tube in which they are agreeable. Cardiology is on board regarding LifeVest versus AICD. 11/5 - patient HERIBERTO improving, continue to be followed by nephro. Repeat cxr shows improving of bilateral pna. Pending pneumonia panel results.  Trach and peg set for Monday. 11/6 - patient HERIBERTO improving and seen by nephro. Resume bumex. Patient otherwise stable on vent pending trach Monday. 11/7 - no acute events overnight. Pt trach planned for tomorrow. Added pepcid for GI prophylaxis      11/8 - trach scheduled today. Pt bp soft with map 63. D/t patient HFrEF, hold fluids for now. nephro on board. Past Medical History: HPI  Family History:  See EMR  Social History: Former smoker since 1977. 15 pack year hx prior to cessation    ROS   Cannot obtain d/t patient sedated and on mechanical ventilation    Scheduled Meds:   famotidine (PEPCID) injection  20 mg IntraVENous Daily    bumetanide  1 mg IntraVENous Daily    lactobacillus  1 capsule Per NG tube Daily with breakfast    potassium (CARDIAC) replacement protocol   Other RX Placeholder    senna  10 mL Per NG tube Nightly    [Held by provider] lactulose  20 g Oral TID    [Held by provider] metoprolol tartrate  25 mg Oral BID    [Held by provider] apixaban  2.5 mg Oral BID    sodium chloride flush  5-40 mL IntraVENous 2 times per day     Continuous Infusions:   midazolam 5 mg/hr (11/07/21 1823)    dextrose 100 mL/hr (10/26/21 1253)    vasopressin (Septic Shock) infusion Stopped (10/31/21 0243)    dexmedetomidine 1.2 mcg/kg/hr (11/08/21 0305)    norepinephrine 2 mcg/min (11/07/21 2121)    milrinone 0.0625 mcg/kg/min (11/06/21 1002)    [Held by provider] sodium chloride Stopped (10/19/21 1900)    sodium chloride         PHYSICAL EXAMINATION:  T:  100.8F. P:  89. RR:  25  B/P:  97/46  FiO2:  30. O2 Sat:  96  I/O:  620/825 net -205   Body mass index is 30.31 kg/m². GCS:   11  PCV+: Psupport 20, Rate 16, PIP 27, PEEP 6    Sedation: precedex 1.2     General: Sedated on vent  HEENT:  normocephalic and atraumatic. No scleral icterus. PERR  Neck: supple. No Thyromegaly. Lungs: diminished breath sounds bilaterally on auscultation. No retractions  Cardiac: RRR. No JVD. Abdomen: soft. Nontender. Extremities:  No clubbing, cyanosis, mild pitting edema x 4. Vasculature: capillary refill < 3 seconds. Palpable dorsalis pedis pulses. Skin:  warm and dry. Psych:  Patient sedated  Lymph:  No supraclavicular adenopathy. Neurologic: No seizures. Data: (All radiographs, tracings, PFTs, and imaging are personally viewed and interpreted unless otherwise noted).  Sodium 142, potassium 3.7, chloride 105, CO2 28, BUN 46, creatinine 1.1   Glucose 146   WBC 5.4, hemoglobin 8.4, hematocrit 27.3 platelets 112   Telemetry shows NSR      CXR on 11/4:   1. Slightly improving bilateral pneumonia. 2. Moderate stable cardiomegaly.             Seen with multidisciplinary ICU team   Meets Continued ICU Level Care Criteria:    [x] Yes   [] No - Transfer Planned to listed location:  [] HOSPITALIST CONTACTED-      Case and plan discussed with Dr. Ben Marion    Electronically signed by Dann Silva MD  [address]  Patient seen by me. Case discussed with resident physician. Stop milrinone today. Eventual tracheostomy tube. Currently does not require antibiotics. Await TSH. Italicized font represents changes to the note made by me. CC time 35 minutes. Time was discontiguous. Time does not include procedures. Time does include my direct assessment of the patient and coordination of care.   Electronically signed by Noel Crawford MD on 11/8/2021 at 2:37 PM

## 2021-11-09 LAB
ANION GAP SERPL CALCULATED.3IONS-SCNC: 9 MEQ/L (ref 8–16)
ANION GAP SERPL CALCULATED.3IONS-SCNC: 9 MEQ/L (ref 8–16)
APTT: 31 SECONDS (ref 22–38)
BUN BLDV-MCNC: 40 MG/DL (ref 7–22)
BUN BLDV-MCNC: 43 MG/DL (ref 7–22)
CALCIUM IONIZED: 1.22 MMOL/L (ref 1.12–1.32)
CALCIUM SERPL-MCNC: 8.3 MG/DL (ref 8.5–10.5)
CALCIUM SERPL-MCNC: 8.5 MG/DL (ref 8.5–10.5)
CHLORIDE BLD-SCNC: 104 MEQ/L (ref 98–111)
CHLORIDE BLD-SCNC: 104 MEQ/L (ref 98–111)
CO2: 29 MEQ/L (ref 23–33)
CO2: 30 MEQ/L (ref 23–33)
CREAT SERPL-MCNC: 1.1 MG/DL (ref 0.4–1.2)
CREAT SERPL-MCNC: 1.1 MG/DL (ref 0.4–1.2)
ERYTHROCYTE [DISTWIDTH] IN BLOOD BY AUTOMATED COUNT: 15.9 % (ref 11.5–14.5)
ERYTHROCYTE [DISTWIDTH] IN BLOOD BY AUTOMATED COUNT: 15.9 % (ref 11.5–14.5)
ERYTHROCYTE [DISTWIDTH] IN BLOOD BY AUTOMATED COUNT: 59.2 FL (ref 35–45)
ERYTHROCYTE [DISTWIDTH] IN BLOOD BY AUTOMATED COUNT: 60 FL (ref 35–45)
GFR SERPL CREATININE-BSD FRML MDRD: 47 ML/MIN/1.73M2
GFR SERPL CREATININE-BSD FRML MDRD: 47 ML/MIN/1.73M2
GLUCOSE BLD-MCNC: 110 MG/DL (ref 70–108)
GLUCOSE BLD-MCNC: 113 MG/DL (ref 70–108)
HCT VFR BLD CALC: 28 % (ref 37–47)
HCT VFR BLD CALC: 28.4 % (ref 37–47)
HEMOGLOBIN: 8.6 GM/DL (ref 12–16)
HEMOGLOBIN: 8.6 GM/DL (ref 12–16)
HEPARIN UNFRACTIONATED: 0.28 U/ML (ref 0.3–0.7)
HEPARIN UNFRACTIONATED: 0.55 U/ML (ref 0.3–0.7)
HEPARIN UNFRACTIONATED: 0.55 U/ML (ref 0.3–0.7)
INR BLD: 1.24 (ref 0.85–1.13)
MAGNESIUM: 2.1 MG/DL (ref 1.6–2.4)
MCH RBC QN AUTO: 30.7 PG (ref 26–33)
MCH RBC QN AUTO: 31 PG (ref 26–33)
MCHC RBC AUTO-ENTMCNC: 30.3 GM/DL (ref 32.2–35.5)
MCHC RBC AUTO-ENTMCNC: 30.7 GM/DL (ref 32.2–35.5)
MCV RBC AUTO: 101.1 FL (ref 81–99)
MCV RBC AUTO: 101.4 FL (ref 81–99)
PHOSPHORUS: 2.9 MG/DL (ref 2.4–4.7)
PLATELET # BLD: 198 THOU/MM3 (ref 130–400)
PLATELET # BLD: 212 THOU/MM3 (ref 130–400)
PMV BLD AUTO: 10.3 FL (ref 9.4–12.4)
PMV BLD AUTO: 10.4 FL (ref 9.4–12.4)
POTASSIUM REFLEX MAGNESIUM: 3.8 MEQ/L (ref 3.5–5.2)
POTASSIUM SERPL-SCNC: 3.8 MEQ/L (ref 3.5–5.2)
POTASSIUM SERPL-SCNC: 4 MEQ/L (ref 3.5–5.2)
RBC # BLD: 2.77 MILL/MM3 (ref 4.2–5.4)
RBC # BLD: 2.8 MILL/MM3 (ref 4.2–5.4)
SODIUM BLD-SCNC: 142 MEQ/L (ref 135–145)
SODIUM BLD-SCNC: 143 MEQ/L (ref 135–145)
WBC # BLD: 5.3 THOU/MM3 (ref 4.8–10.8)
WBC # BLD: 5.6 THOU/MM3 (ref 4.8–10.8)

## 2021-11-09 PROCEDURE — 6360000002 HC RX W HCPCS: Performed by: INTERNAL MEDICINE

## 2021-11-09 PROCEDURE — 2500000003 HC RX 250 WO HCPCS: Performed by: STUDENT IN AN ORGANIZED HEALTH CARE EDUCATION/TRAINING PROGRAM

## 2021-11-09 PROCEDURE — 99291 CRITICAL CARE FIRST HOUR: CPT | Performed by: INTERNAL MEDICINE

## 2021-11-09 PROCEDURE — 2580000003 HC RX 258: Performed by: STUDENT IN AN ORGANIZED HEALTH CARE EDUCATION/TRAINING PROGRAM

## 2021-11-09 PROCEDURE — 6370000000 HC RX 637 (ALT 250 FOR IP): Performed by: INTERNAL MEDICINE

## 2021-11-09 PROCEDURE — 83735 ASSAY OF MAGNESIUM: CPT

## 2021-11-09 PROCEDURE — 6370000000 HC RX 637 (ALT 250 FOR IP): Performed by: STUDENT IN AN ORGANIZED HEALTH CARE EDUCATION/TRAINING PROGRAM

## 2021-11-09 PROCEDURE — 85520 HEPARIN ASSAY: CPT

## 2021-11-09 PROCEDURE — 99232 SBSQ HOSP IP/OBS MODERATE 35: CPT | Performed by: INTERNAL MEDICINE

## 2021-11-09 PROCEDURE — 80048 BASIC METABOLIC PNL TOTAL CA: CPT

## 2021-11-09 PROCEDURE — 82330 ASSAY OF CALCIUM: CPT

## 2021-11-09 PROCEDURE — 6360000002 HC RX W HCPCS: Performed by: NURSE PRACTITIONER

## 2021-11-09 PROCEDURE — 36415 COLL VENOUS BLD VENIPUNCTURE: CPT

## 2021-11-09 PROCEDURE — 2500000003 HC RX 250 WO HCPCS: Performed by: INTERNAL MEDICINE

## 2021-11-09 PROCEDURE — 94003 VENT MGMT INPAT SUBQ DAY: CPT

## 2021-11-09 PROCEDURE — 2580000003 HC RX 258: Performed by: INTERNAL MEDICINE

## 2021-11-09 PROCEDURE — 85610 PROTHROMBIN TIME: CPT

## 2021-11-09 PROCEDURE — 85730 THROMBOPLASTIN TIME PARTIAL: CPT

## 2021-11-09 PROCEDURE — 84100 ASSAY OF PHOSPHORUS: CPT

## 2021-11-09 PROCEDURE — 85027 COMPLETE CBC AUTOMATED: CPT

## 2021-11-09 PROCEDURE — 2100000000 HC CCU R&B

## 2021-11-09 RX ORDER — HEPARIN SODIUM 10000 [USP'U]/100ML
5-30 INJECTION, SOLUTION INTRAVENOUS CONTINUOUS
Status: DISPENSED | OUTPATIENT
Start: 2021-11-09 | End: 2021-11-17

## 2021-11-09 RX ORDER — LIDOCAINE HYDROCHLORIDE 10 MG/ML
5 INJECTION, SOLUTION EPIDURAL; INFILTRATION; INTRACAUDAL; PERINEURAL ONCE
Status: DISCONTINUED | OUTPATIENT
Start: 2021-11-09 | End: 2021-11-18 | Stop reason: HOSPADM

## 2021-11-09 RX ORDER — HEPARIN SODIUM 1000 [USP'U]/ML
4000 INJECTION, SOLUTION INTRAVENOUS; SUBCUTANEOUS PRN
Status: DISCONTINUED | OUTPATIENT
Start: 2021-11-09 | End: 2021-11-18 | Stop reason: HOSPADM

## 2021-11-09 RX ORDER — SODIUM CHLORIDE 9 MG/ML
25 INJECTION, SOLUTION INTRAVENOUS PRN
Status: DISCONTINUED | OUTPATIENT
Start: 2021-11-09 | End: 2021-11-18 | Stop reason: HOSPADM

## 2021-11-09 RX ORDER — SODIUM CHLORIDE 0.9 % (FLUSH) 0.9 %
5-40 SYRINGE (ML) INJECTION PRN
Status: DISCONTINUED | OUTPATIENT
Start: 2021-11-09 | End: 2021-11-17 | Stop reason: SDUPTHER

## 2021-11-09 RX ORDER — SODIUM CHLORIDE 0.9 % (FLUSH) 0.9 %
5-40 SYRINGE (ML) INJECTION EVERY 12 HOURS SCHEDULED
Status: DISCONTINUED | OUTPATIENT
Start: 2021-11-09 | End: 2021-11-17 | Stop reason: SDUPTHER

## 2021-11-09 RX ORDER — HEPARIN SODIUM 1000 [USP'U]/ML
2000 INJECTION, SOLUTION INTRAVENOUS; SUBCUTANEOUS PRN
Status: DISCONTINUED | OUTPATIENT
Start: 2021-11-09 | End: 2021-11-18 | Stop reason: HOSPADM

## 2021-11-09 RX ADMIN — BUMETANIDE 1 MG: 0.25 INJECTION INTRAMUSCULAR; INTRAVENOUS at 10:35

## 2021-11-09 RX ADMIN — ACETAMINOPHEN 650 MG: 325 TABLET ORAL at 03:25

## 2021-11-09 RX ADMIN — HEPARIN SODIUM 2000 UNITS: 1000 INJECTION, SOLUTION INTRAVENOUS; SUBCUTANEOUS at 07:40

## 2021-11-09 RX ADMIN — SODIUM CHLORIDE 1 MCG/KG/HR: 9 INJECTION, SOLUTION INTRAVENOUS at 08:55

## 2021-11-09 RX ADMIN — Medication 17.6 MG: at 22:31

## 2021-11-09 RX ADMIN — SODIUM CHLORIDE 0.9 MCG/KG/HR: 9 INJECTION, SOLUTION INTRAVENOUS at 16:56

## 2021-11-09 RX ADMIN — ACETAMINOPHEN 650 MG: 325 TABLET ORAL at 22:31

## 2021-11-09 RX ADMIN — SODIUM CHLORIDE 1.2 MCG/KG/HR: 9 INJECTION, SOLUTION INTRAVENOUS at 03:23

## 2021-11-09 RX ADMIN — FAMOTIDINE 20 MG: 10 INJECTION, SOLUTION INTRAVENOUS at 08:51

## 2021-11-09 RX ADMIN — ACETAMINOPHEN 650 MG: 325 TABLET ORAL at 08:50

## 2021-11-09 RX ADMIN — HEPARIN SODIUM 12 UNITS/KG/HR: 10000 INJECTION, SOLUTION INTRAVENOUS at 01:28

## 2021-11-09 RX ADMIN — MIDAZOLAM 5 MG/HR: 5 INJECTION, SOLUTION INTRAMUSCULAR; INTRAVENOUS at 15:45

## 2021-11-09 RX ADMIN — Medication 1 CAPSULE: at 08:50

## 2021-11-09 RX ADMIN — SODIUM CHLORIDE, PRESERVATIVE FREE 10 ML: 5 INJECTION INTRAVENOUS at 08:51

## 2021-11-09 ASSESSMENT — PULMONARY FUNCTION TESTS
PIF_VALUE: 24
PIF_VALUE: 23
PIF_VALUE: 25
PIF_VALUE: 23

## 2021-11-09 ASSESSMENT — PAIN SCALES - GENERAL
PAINLEVEL_OUTOF10: 0

## 2021-11-09 NOTE — PROGRESS NOTES
[address]  PHYSICAL THERAPY MISSED TREATMENT NOTE  [facility]    Date: 2021  Patient Name: Silvestre Theodore        MRN: [id number]   : 1934  (80 y.o.)  Gender: female   Referring Practitioner: Edith Wiggins DO  Diagnosis: Acute respiratory failure with hypoxia         REASON FOR MISSED TREATMENT:  Nursing asking to hold early mobility today. She cont to be on sedation due to anxiety and is poss having a trach later today. Will try again tomorrow to see if she is appropriate for early mobility .

## 2021-11-09 NOTE — PROGRESS NOTES
Comprehensive Nutrition Assessment    Type and Reason for Visit:  Reassess (TF management)    Nutrition Recommendations/Plan:   Continue Nepro at goal rate of 40 ml/hr. Free water flush per MD - 100ml every 8h  Monitoring labs, wts., GI status for EN adjustments as appropriate    Nutrition Assessment:    Pt. improving nutritionally AEB tolerating TF at goal now although has had OGT to LIWS d/t residuals over 600ml this admit, poor po intake first 5d of admit,constipation then need for flexiseal,  intubated 10/15, LOS day 29.  At risk for further nutrition compromise r/t COVID Dx 10/5, new CHF, advanced age, wounds and underlying medical condition (hx diverticulitis, HTN).      Malnutrition Assessment:  Malnutrition Status:   At risk for malnutrition (Comment)    Context:  Acute Illness     Findings of the 6 clinical characteristics of malnutrition:  Energy Intake:  7 - 50% or less of estimated energy requirements for 5 or more days  Weight Loss:  No significant weight loss (however difficult to evaluate with edema)     Body Fat Loss:  No significant body fat loss     Muscle Mass Loss:  No significant muscle mass loss    Fluid Accumulation:  Unable to assess     Strength:  Not Performed    Estimated Daily Nutrient Needs:  Energy (kcal):  9091-5376 (25-35/kgm) late phase; Weight Used for Energy Requirements:   (56 kgm)     Protein (g):   gm (1.2-2) as renal function allows; Weight Used for Protein Requirements:   (56 kgm)        Fluid (ml/day):  per MD; Method Used for Fluid Requirements:  Other (Comment)      Nutrition Related Findings:     Intubated, tolerating TF at 40ml/ hour, no further residual issues since having (+)BMs, BM x 1 today, plan for possible trach later today, medication includes bumex, culturelle, senokot, precedex, versed, levophed; potassium 3.8, BUN 43, Creatinine 1.1, glucose 113, phosphorus 2.9, MAP 60    Wounds:   (stage II sacrum 10/15, stage II coccyx 11/6, scabbed area below lip 11/6, perineum open wounds on rectum 11/8)       Current Nutrition Therapies:    ADULT TUBE FEEDING; Nasogastric; Renal Formula; Continuous; 10; Yes; 10; Q 8 hours; 40; 100; Q 8 hours  Current Tube Feeding (TF) Orders:  · Feeding Route: Orogastric  · Formula: Renal Formula (Nepro)  · Schedule: Continuous (10/26 at goal of 40ml/hour)  · Water Flushes: per physician - 100ml every 8h  · Goal TF & Flush Orders Provides:   Nepro 40ml/hour provides 1699 kcals, 78gms protein, 154gms cho, 24gms fiber, 998 ml free H20 (698ml Nepro, 300 MD flush) in 1260ml total volume (960 Nepro, 300 MD flush)/24h      Additional Calorie Sources:   10/26 diprivan discontinued    Anthropometric Measures:  · Height: 5' (152.4 cm)  · Current Body Weight: 155 lb 3.3 oz (70.4 kg) (11/6; +1 pitting edema)   · Admission Body Weight: 124 lb (56.2 kg) (10/11 +2 edema)    · Usual Body Weight:  (per EMR: 10/7/21: 125#)     · Ideal Body Weight: 100 lbs;   · BMI: 30.3  · Adjusted Body Weight:  ; No Adjustment   · BMI Categories: Obese Class 1 (BMI 30.0-34. 9)       Nutrition Diagnosis:   · Inadequate oral intake related to impaired respiratory function as evidenced by NPO or clear liquid status due to medical condition, intubation, nutrition support - enteral nutrition      Nutrition Interventions:   Food and/or Nutrient Delivery:  Continue NPO, Continue Current Tube Feeding  Nutrition Education/Counseling:  No recommendation at this time   Coordination of Nutrition Care:  Continue to monitor while inpatient    Goals:  EN to provide % nutrient needs while intubated for covid recovery process       Nutrition Monitoring and Evaluation:   Behavioral-Environmental Outcomes:  None Identified   Food/Nutrient Intake Outcomes:  Enteral Nutrition Intake/Tolerance  Physical Signs/Symptoms Outcomes:  Biochemical Data, GI Status, Fluid Status or Edema, Hemodynamic Status, Nutrition Focused Physical Findings, Skin, Weight     Discharge Planning:     Too soon to determine     Electronically signed by Brenda Gibson RD, LD on 11/9/21 at 1:30 PM EST    Contact: [phone]

## 2021-11-09 NOTE — PROGRESS NOTES
Renal Progress Note    Assessment and Plan:   1. Prerenal azotemia with a BUN progressively improving  2. Respiratory failure mechanical support  3. SARS-CoV-2 pneumonia  4. Macrocytic anemia    PLAN:  1. Labs reviewed  2. BUN is progressively improving  3. Medications reviewed  4. No changes  5. Labs in the morning  6.  We will continue to follow    Patient Active Problem List:     Sigmoid diverticulitis     Acute respiratory failure with hypoxia ([facility] [address]     COVID-19     Acute congestive heart failure ([facility] [address]      Subjective:   Admit Date: 10/11/2021    Interval History:   Seen for acute kidney injury  Remains on mechanical support  Updated by the staff  No issues  Variable blood pressure  Good urine output      Medications:   Scheduled Meds:   famotidine (PEPCID) injection  20 mg IntraVENous Daily    bumetanide  1 mg IntraVENous Daily    lactobacillus  1 capsule Per NG tube Daily with breakfast    potassium (CARDIAC) replacement protocol   Other RX Placeholder    senna  10 mL Per NG tube Nightly    [Held by provider] lactulose  20 g Oral TID    [Held by provider] metoprolol tartrate  25 mg Oral BID    [Held by provider] apixaban  2.5 mg Oral BID    sodium chloride flush  5-40 mL IntraVENous 2 times per day     Continuous Infusions:   heparin (PORCINE) Infusion 12 Units/kg/hr (11/09/21 0629)    midazolam 5 mg/hr (11/09/21 0629)    dextrose 100 mL/hr (10/26/21 1253)    vasopressin (Septic Shock) infusion Stopped (10/31/21 0243)    dexmedetomidine 1.2 mcg/kg/hr (11/09/21 0629)    norepinephrine 3 mcg/min (11/09/21 0629)    [Held by provider] sodium chloride Stopped (10/19/21 1900)    sodium chloride         CBC:   Recent Labs     11/08/21  0510 11/09/21  0100 11/09/21  0700   WBC 5.4 5.3 5.6   HGB 8.4* 8.6* 8.6*    198 212     CMP:    Recent Labs     11/07/21  0340 11/08/21  0510 11/09/21  0100    142 143   K 4.1 3.7 4.0    105 104   CO2 27 28 30   BUN 45* 46* 40*   CREATININE 1.0 1.1 1.1   GLUCOSE 125* 146* 110*   CALCIUM 8.5 8.4* 8.3*   LABGLOM 52* 47* 47*     Troponin: No results for input(s): TROPONINI in the last 72 hours. BNP: No results for input(s): BNP in the last 72 hours. INR:   Recent Labs     11/09/21  0100   INR 1.24*     Lipids: No results for input(s): CHOL, LDLDIRECT, TRIG, HDL, AMYLASE, LIPASE in the last 72 hours. Liver:   Recent Labs     11/08/21  0510   AST 23   ALT 9*   ALKPHOS 73   PROT 5.3*   LABALBU 2.3*   BILITOT 0.3     Iron:  No results for input(s): IRONS, FERRITIN in the last 72 hours. Invalid input(s): LABIRONS  XR ABDOMEN (KUB) (SINGLE AP VIEW)   Final Result   NG tube is at the 1st portion of duodenum. This document has been electronically signed by: Brayden Snowden MD on 11/08/2021 06:40 PM      XR CHEST PORTABLE   Final Result   1. Slightly improving bilateral pneumonia. 2. Moderate stable cardiomegaly. **This report has been created using voice recognition software. It may contain minor errors which are inherent in voice recognition technology. **      Final report electronically signed by Dr. Katharine Sousa on 11/4/2021 6:34 PM      XR CHEST PORTABLE   Final Result   1. Moderate cardiomegaly. ET tube, NG tube, and right jugular line remain in good position. 2. Tiny bilateral pleural effusions. Findings of relatively severe pneumonia/edema involving both lungs diffusely. Overall appearance not appreciably changed from yesterday. **This report has been created using voice recognition software. It may contain minor errors which are inherent in voice recognition technology. **      Final report electronically signed by Dr. Ramón Conway on 11/1/2021 9:36 AM      VL DUP LOWER EXTREMITY VENOUS BILATERAL   Final Result   No evidence of a DVT. **This report has been created using voice recognition software. It may contain minor errors which are inherent in voice recognition technology. **      Final report electronically signed by Dr. Daniella Bray on 11/1/2021 8:07 AM      XR CHEST PORTABLE   Final Result   No significant interval change since previous study dated 29th of October 2021. January Arteaga **This report has been created using voice recognition software. It may contain minor errors which are inherent in voice recognition technology. **      Final report electronically signed by DR Jose Carlos Reyez on 10/31/2021 12:43 PM      CT ABDOMEN PELVIS WO CONTRAST   Final Result      1. Diffusely thickened appearance of the sigmoid colon. There are diverticula in this region. This could be on the basis of acute diverticulitis, however given the eccentric appearance of the thickening, neoplastic process can't entirely be excluded. 2. Haziness throughout the superficial subcutaneous soft tissues of the abdomen and pelvis consistent with anasarca. 3. No evidence of hydronephrosis. No obstructive renal stones are identified. 4. Diffuse haziness throughout the mesentery and a small amount of ascites, findings likely related to fluid overload. **This report has been created using voice recognition software. It may contain minor errors which are inherent in voice recognition technology. **      Final report electronically signed by Dr Gio Pugh on 10/30/2021 2:23 PM      CT CHEST W WO CONTRAST   Final Result       1. Extensive bilateral pneumonia. 2. Large bilateral pleural effusions, right greater than left. 3. Large pericardial effusion. 4. Marked cardiomegaly. **This report has been created using voice recognition software. It may contain minor errors which are inherent in voice recognition technology. **      Final report electronically signed by Dr Gio Pugh on 10/30/2021 1:54 PM      XR CHEST PORTABLE   Final Result   1. Mildly megaly. ET tube and NG tube remain in good position. Right jugular line with catheter tip in SVC. 2. Moderate-sized pleural effusion left side. 3. Moderate mixed infiltrates scattered throughout both lungs. Overall appearance slightly worse than prior. **This report has been created using voice recognition software. It may contain minor errors which are inherent in voice recognition technology. **      Final report electronically signed by Dr. Ladonna Oropeza on 10/29/2021 8:10 AM      US RENAL COMPLETE   Final Result   1. Mildly increased echogenicity of the kidneys that may suggest underlying medical renal disease. 2. No hydronephrosis. 3. A 7 mm left renal calculus is seen. 4. Small ascites. Bilateral pleural effusions. 5. Bilateral renal cysts. **This report has been created using voice recognition software. It may contain minor errors which are inherent in voice recognition technology. **      Final report electronically signed by Dr Princess Man on 10/29/2021 3:55 AM      XR ABDOMEN FOR NG/OG/NE TUBE PLACEMENT   Final Result   1. The tip of the nasogastric tube is below the diaphragms within the stomach. 2. Partially seen is left pleural effusion and left retrocardiac opacity. Opacities are also seen in the partially seen right lower lobe. **This report has been created using voice recognition software. It may contain minor errors which are inherent in voice recognition technology. **      Final report electronically signed by Dr Princess Man on 10/27/2021 6:54 PM      XR CHEST PORTABLE   Final Result   1. There has been no significant interval change in the radiographic appearance of the chest  from 10/23/2021 including diffuse airspace disease. .            **This report has been created using voice recognition software. It may contain minor errors which are inherent in voice recognition technology. **      Final report electronically signed by Dr Princess Man on 10/24/2021 8:54 PM      XR CHEST PORTABLE   Final Result   1. Support lines and tubes in unchanged position when compared to prior.    2. Interval worsening of parenchymal densities within the right    central/right lower lobe. Additionally, worsening of confluent    consolidation within the left midlung with development of left basilar    pleural/parenchymal opacities likely consistent with moderate left pleural    effusion. This document has been electronically signed by: Zuri Carney DO on    10/23/2021 05:00 AM      XR CHEST PORTABLE   Final Result   Persistent diffuse patchy opacities but with improved aeration at the lung bases. **This report has been created using voice recognition software. It may contain minor errors which are inherent in voice recognition technology. **      Final report electronically signed by Dr. Jerris Gottron, MD on 10/21/2021 10:48 AM      XR CHEST PORTABLE   Final Result   1. Increased density in the left lung base. This can indicate partial left lower lobe collapse. 2. Persistent patchy bilateral pulmonary opacities. **This report has been created using voice recognition software. It may contain minor errors which are inherent in voice recognition technology. **      Final report electronically signed by Dr. Nima Lennon on 10/18/2021 7:58 AM      XR CHEST PORTABLE   Final Result   Impression:   1. Cardiomegaly with improvement of passive congestive changes and better    aeration of both lungs compared to the prior study. 2. The focal bilateral alveolar consolidations consistent with infectious    pulmonary disease are unchanged. Retrocardiac consolidation and small LEFT    pleural effusion obscuring the LEFT diaphragm is unchanged. This document has been electronically signed by: Vibha Ornelas MD on    10/16/2021 03:18 AM      XR CHEST PORTABLE   Final Result   Somewhat low position of endotracheal tube 1.2 cm above the claire but improved aeration of the upper lungs. **This report has been created using voice recognition software.   It may contain minor errors which are inherent in voice recognition technology. **      Final report electronically signed by Dr. Riley Reed on 10/15/2021 3:34 PM      XR CHEST PORTABLE   Final Result   1. Bilateral pneumonia. 2. Small bilateral pleural effusions. 3. Stable cardiomegaly. **This report has been created using voice recognition software. It may contain minor errors which are inherent in voice recognition technology. **      Final report electronically signed by Dr. Sharif Vivar on 10/15/2021 4:14 PM      XR CHEST PORTABLE   Final Result   Right lower lobe airspace infiltrates. Severe cardiomegaly. **This report has been created using voice recognition software. It may contain minor errors which are inherent in voice recognition technology. **      Final report electronically signed by Dr. Riley Reed on 10/11/2021 8:59 PM      XR CHEST PORTABLE   Final Result      1. Diffuse groundglass opacities in both lungs along with cardiomegaly. Findings likely suggest acute exacerbation of congestive heart failure versus pneumonia in the right clinical setting. Clinical correlation is recommended      2. Small left pleural effusion. 3. Other findings as described above. **This report has been created using voice recognition software. It may contain minor errors which are inherent in voice recognition technology. **      Final report electronically signed by Dr Milton Huizar on 10/11/2021 11:56 AM            Objective:   Vitals: BP (!) 140/73   Pulse 87   Temp 100.8 °F (38.2 °C)   Resp 16   Ht 5' (1.524 m)   Wt 155 lb 3.3 oz (70.4 kg)   SpO2 97%   BMI 30.31 kg/m²    Wt Readings from Last 3 Encounters:   11/06/21 155 lb 3.3 oz (70.4 kg)   10/07/21 125 lb (56.7 kg)      24HR INTAKE/OUTPUT:      Intake/Output Summary (Last 24 hours) at 11/9/2021 0730  Last data filed at 11/9/2021 2982  Gross per 24 hour   Intake 1230.78 ml   Output 2600 ml   Net -1369.22 ml       Constitutional: Well-developed elderly lady on mechanical support  Skin:normal with no rash or lesions. HEENT:Pupils are small but reactive to light. Oral mucosa is moist.  Endotracheal tube is noted. Orogastric tube is intact. Neck:supple with no JVD carotid bruit  Cardiovascular:  S1, S2 without murmur or rubs   Respiratory:  Clear to ausculation without wheezes, rhonchi or rales in the anterior  Abdomen: Soft. Good bowel sounds. Ext: Trace to 1+ bilateral LE edema  Musculoskeletal:Intact  Neuro: Deferred      Electronically signed by Rufina Schrader MD on 11/9/2021 at 7:30 AM  **This report has been created using voice recognition software. It maycontain minor  errors which are inherent in voice recognition technology. **

## 2021-11-09 NOTE — PROGRESS NOTES
CRITICAL CARE PROGRESS NOTE      Patient:  Ben Dinh    Unit/Bed:3A-09/009-A  YOB: 1934  MRN: [id number]   PCP: SERGE Leyva CNP  Date of Admission: 10/11/2021  Chief Complaint:- Worsening shortness of breath    Assessment and Plan:    1. Acute hypoxic respiratory failure: Patient was admitted to the hospital through the ED on 10/11/2021 and started on high flow. Patient's hypoxemia progressively worsened. Intubated and admitted to ICU on 10/15.  - Patient has had multiple failed breathing trials. There was some suspicion for neuromuscular disease present dt persistent hypercarbia despite mechanical ventilatory support. Work up negative for Myasthenia Gravis. Family is agreeable to trach and peg placement d/t severe respiratory muscle weakness making ventilatory capacity a barrier for extubation. Eventual trach. Current vent settings: PCV peep 6, Fio2 30% spo2 97%    2. Covid Pneumonia with superimposed bacterial pneumonia:  S/p baricitinib   10/19 -respiratory culture positive for Corynebacterium. Vancomycin 6 days. Currently pending pneumonia panel ordered on 10/29, the one on 10/26 was negative. . Repeat CXR shows improving bilateral pna. Patient currently not on any abx. Not showing any signs of overt infection. Patient afebrile, no leukocytosis present. 3. ARDS: Mild. Patient received oxygen with FiO2 100 with very low TV (250ml) on ventilation. Ferritin, CRP, Lactic dehydrogenase elevated. Started Decadron, Baricitinib (10/12). Eventual trach. 4. Hypotension - patient requiring 2-3mcg levo for bp support overnight. Currently on 1 levo and /65. 6. HFrEF: Echo (10/11): LV EF 25-30%, LV severe global hypokinesis. LV size moderate increase. Weight on admission 120 lbs. Cardiologist consult appreciated - cardiology will wait for recovery prior to considering any intervention such as LifeVest versus AICD. D/c milrinone on 11/8      7.  Hx of Prolong QTc interval: On telemetry strip in ED. Seen by cardiogy for Prolong QTC. Plan: tele monitor, avoid QTC prolong medications, Keep K +> 4 and Mg > 2     8. New on set Afib without RVR: paroxysmal, most likely d/t covid. 11/9 - overnight patient had an episode of A fib, anticoag was held [employer] for trach. Placed on heparin drip dt ability to dc hours prior to trach procedure with ease. RN notified this am that patient is no longer in a fib, rather NSR with ventricular bigeminy. Normal rate. Continue on anticoag and monitor rate and rhythm. 9. Bilateral lower extremity swelling: r/o DVT - US negative on 11/1    10. HERIBERTO: Bun/Cr Improving. Patient having good urine output. Nephrology continue Bumex and adjust as clinically appropriate. 11. Hyperkalemia: Resolved. 12. AMS: likely secondary to hypercapnic hypoxemia. Neuro exam when patient is extubate     13. Macrocytic Anemia: H/H 7.9/26.1 .4. Ordered folate and b12 levels, came back WNL. TSH WNL. Most likely dt covid/pulmonary dz. 14. Hematuria: Secondary to nephrolithiasis. resolved      INITIAL H AND P AND ICU COURSE:   66-year-old female presents to the ED on 10/11/2021 due to worsening shortness of breath. Patient tested positive for Covid on 10/4/2021. In the emergency room patient was found to have a SPO2 of 83% on room air with a BNP of 15.7K. Patient was admitted and managed for acute hypoxic respiratory failure secondary to Covid with combination of congestive heart failure. Patient was placed on nasal cannula, Bumex and dexamethasone in the ED. On 10/15 patient was transferred to the ICU and intubated for acute hypoxic respiratory failure on 100% FiO2 on high flow. Patient had a UTI on 10/18 and was started on ceftriaxone on 10/19. Patient was also started on milrinone for her HFrEF. Patient's family has been consulted regarding trach and PEG tube in which they are agreeable. Cardiology is on board regarding LifeVest versus AICD.      11/5 - patient HERIBERTO improving, continue to be followed by nephro. Repeat cxr shows improving of bilateral pna. Pending pneumonia panel results. Trach and peg set for Monday. 11/6 - patient HERIBERTO improving and seen by nephro. Resume bumex. Patient otherwise stable on vent pending trach Monday. 11/7 - no acute events overnight. Pt trach planned for tomorrow. Added pepcid for GI prophylaxis      11/8 - trach scheduled today. Pt bp soft with map 63. D/t patient HFrEF, hold fluids for now. nephro on board. 11/9 - eventual trach. Requiring 3 levo over night, now currently on 1 levo. Episode of afib without rvr over night, currently nsr with ventricular bigeminy. Night team started on heparin drip for anticoagulation. Continue to monitor rate / rhythm. Past Medical History: HPI  Family History:  See EMR  Social History: Former smoker since 1977. 15 pack year hx prior to cessation    ROS   Cannot obtain d/t patient sedated and on mechanical ventilation    Scheduled Meds:   famotidine (PEPCID) injection  20 mg IntraVENous Daily    bumetanide  1 mg IntraVENous Daily    lactobacillus  1 capsule Per NG tube Daily with breakfast    potassium (CARDIAC) replacement protocol   Other RX Placeholder    senna  10 mL Per NG tube Nightly    [Held by provider] lactulose  20 g Oral TID    [Held by provider] metoprolol tartrate  25 mg Oral BID    [Held by provider] apixaban  2.5 mg Oral BID    sodium chloride flush  5-40 mL IntraVENous 2 times per day     Continuous Infusions:   heparin (PORCINE) Infusion 12 Units/kg/hr (11/09/21 0629)    midazolam 5 mg/hr (11/09/21 0629)    dextrose 100 mL/hr (10/26/21 1253)    vasopressin (Septic Shock) infusion Stopped (10/31/21 0243)    dexmedetomidine 1.2 mcg/kg/hr (11/09/21 0629)    norepinephrine 3 mcg/min (11/09/21 0629)    [Held by provider] sodium chloride Stopped (10/19/21 1900)    sodium chloride         PHYSICAL EXAMINATION:  T:  100.8F.   P:  87. RR:  16  B/P:  140/73 FiO2:  30. O2 Sat:  97  I/O:  39.2/250 net -210.8   Body mass index is 30.31 kg/m². GCS:   7  PCV+: Rate 16, PIP 25, PEEP 6    Sedation: precedex 1mcg, midazolam 5 mcg      General: Sedated on vent  HEENT:  normocephalic and atraumatic. No scleral icterus. PERR  Neck: supple. No Thyromegaly. Lungs: diminished breath sounds bilaterally on auscultation. No retractions  Cardiac: RRR. No JVD. Abdomen: soft. Nontender. Extremities:  No clubbing, cyanosis, mild pitting edema x 4. Vasculature: capillary refill < 3 seconds. Palpable dorsalis pedis pulses. Skin:  warm and dry. Psych:  Patient sedated  Lymph:  No supraclavicular adenopathy. Neurologic: No seizures. Data: (All radiographs, tracings, PFTs, and imaging are personally viewed and interpreted unless otherwise noted).  Sodium 143, potassium 4.0, chloride 104, CO2 30, BUN 40, creatinine 1.1   Glucose 110   WBC 5.3, hemoglobin 8.6, hematocrit 28.0 platelets 331   Telemetry shows NSR with ventri     CXR on 11/4:   1. Slightly improving bilateral pneumonia. 2. Moderate stable cardiomegaly.             Seen with multidisciplinary ICU team   Meets Continued ICU Level Care Criteria:    [x] Yes   [] No - Transfer Planned to listed location:  [] HOSPITALIST CONTACTED-      Case and plan discussed with Dr. Demetria Monte    Electronically signed by Deo Ball MD  [address] [place], [place] [address] [address]  Patient seen by me. Case discussed with resident physician. Remains mechanically ventilator dependent. .  Italicized font represents changes to the note made by me. CC time 35 minutes. Time was discontiguous. Time does not include procedures. Time does include my direct assessment of the patient and coordination of care.   Electronically signed by Shavonne Woods MD on 11/9/2021 at 4:28 PM

## 2021-11-09 NOTE — CARE COORDINATION
bumex 1 mg daily, IV pepcid, lactobacillus, prn IV ativan, Electrolyte replacement protocols.   PCP: SERGE Álvarez CNP  Readmission Risk Score: 19.6 ( )%  Patient Goals/Plan/Treatment Preferences: From home with daughter. Plan for [facility] as 1st choice and 2nd choice is [employer].  No precert required.

## 2021-11-10 ENCOUNTER — APPOINTMENT (OUTPATIENT)
Dept: GENERAL RADIOLOGY | Age: 86
DRG: 004 | End: 2021-11-10
Payer: MEDICARE

## 2021-11-10 LAB
ACINETOBACTER CALCOACETICUS-BAUMANNII BY PCR: NOT DETECTED
ADENOVIRUS BY PCR: NOT DETECTED
ANION GAP SERPL CALCULATED.3IONS-SCNC: 9 MEQ/L (ref 8–16)
BUN BLDV-MCNC: 42 MG/DL (ref 7–22)
CALCIUM SERPL-MCNC: 8 MG/DL (ref 8.5–10.5)
CHLAMYDIA PNEUMONIAE BY PCR: NOT DETECTED
CHLORIDE BLD-SCNC: 104 MEQ/L (ref 98–111)
CO2: 29 MEQ/L (ref 23–33)
CREAT SERPL-MCNC: 1.1 MG/DL (ref 0.4–1.2)
ENTEROBACTER CLOACAE COMPLEX BY PCR: NOT DETECTED
ERYTHROCYTE [DISTWIDTH] IN BLOOD BY AUTOMATED COUNT: 16.2 % (ref 11.5–14.5)
ERYTHROCYTE [DISTWIDTH] IN BLOOD BY AUTOMATED COUNT: 60.9 FL (ref 35–45)
ESCHERICHIA COLI BY PCR: NOT DETECTED
GFR SERPL CREATININE-BSD FRML MDRD: 47 ML/MIN/1.73M2
GLUCOSE BLD-MCNC: 130 MG/DL (ref 70–108)
HAEMOPHILUS INFLUENZAE BY PCR: NOT DETECTED
HCT VFR BLD CALC: 25.3 % (ref 37–47)
HEMOGLOBIN: 7.7 GM/DL (ref 12–16)
HEPARIN UNFRACTIONATED: 0.38 U/ML (ref 0.3–0.7)
INFLUENZA A BY PCR: NOT DETECTED
INFLUENZA B BY PCR: NOT DETECTED
KLEBSIELLA AEROGENES BY PCR: NOT DETECTED
KLEBSIELLA OXYTOCA BY PCR: NOT DETECTED
KLEBSIELLA PNEUMONIAE GROUP BY PCR: NOT DETECTED
LEGIONELLA PNEUMOPHILIA BY PCR: NOT DETECTED
MAGNESIUM: 2.3 MG/DL (ref 1.6–2.4)
MCH RBC QN AUTO: 30.9 PG (ref 26–33)
MCHC RBC AUTO-ENTMCNC: 30.4 GM/DL (ref 32.2–35.5)
MCV RBC AUTO: 101.6 FL (ref 81–99)
METAPNEUMOVIRUS BY PCR: NOT DETECTED
MORAXELLA CATARRHALIS BY PCR: NOT DETECTED
MYCOPLASMA PNEUMONIAE BY PCR: NOT DETECTED
NON-SARS CORONAVIRUS: NOT DETECTED
PARAINFLUENZA VIRUS BY PCR: NOT DETECTED
PLATELET # BLD: 196 THOU/MM3 (ref 130–400)
PMV BLD AUTO: 10.3 FL (ref 9.4–12.4)
POTASSIUM REFLEX MAGNESIUM: 3.4 MEQ/L (ref 3.5–5.2)
POTASSIUM SERPL-SCNC: 3.4 MEQ/L (ref 3.5–5.2)
PROTEUS SPECIES BY PCR: NOT DETECTED
PSEUDOMONAS AERUGINOSA BY PCR: NOT DETECTED
RBC # BLD: 2.49 MILL/MM3 (ref 4.2–5.4)
RESISTANT GENE CTX-M BY PCR: NORMAL
RESISTANT GENE IMP BY PCR: NORMAL
RESISTANT GENE KPC BY PCR: NORMAL
RESISTANT GENE MECA/C & MREJ BY PCR: NORMAL
RESISTANT GENE NDM BY PCR: NORMAL
RESISTANT GENE OXA-48-LIKE BY PCR: NORMAL
RESISTANT GENE VIM BY PCR: NORMAL
RESPIRATORY SYNCYTIAL VIRUS BY PCR: NOT DETECTED
RHINOVIRUS ENTEROVIRUS PCR: NOT DETECTED
SERRATIA MARCESCENS BY PCR: NOT DETECTED
SODIUM BLD-SCNC: 142 MEQ/L (ref 135–145)
SOURCE: NORMAL
SPECIMEN ACCEPTABILITY: NORMAL
STAPH AUREUS BY PCR: NOT DETECTED
STREP AGALACTIAE BY PCR: NOT DETECTED
STREP PNEUMONIAE BY PCR: NOT DETECTED
STREP PYOGENES BY PCR: NOT DETECTED
WBC # BLD: 5.8 THOU/MM3 (ref 4.8–10.8)

## 2021-11-10 PROCEDURE — C1769 GUIDE WIRE: HCPCS

## 2021-11-10 PROCEDURE — 87040 BLOOD CULTURE FOR BACTERIA: CPT

## 2021-11-10 PROCEDURE — 5A2204Z RESTORATION OF CARDIAC RHYTHM, SINGLE: ICD-10-PCS | Performed by: INTERNAL MEDICINE

## 2021-11-10 PROCEDURE — 2500000003 HC RX 250 WO HCPCS

## 2021-11-10 PROCEDURE — 6360000002 HC RX W HCPCS: Performed by: NURSE PRACTITIONER

## 2021-11-10 PROCEDURE — 2720000010 HC SURG SUPPLY STERILE

## 2021-11-10 PROCEDURE — 71045 X-RAY EXAM CHEST 1 VIEW: CPT

## 2021-11-10 PROCEDURE — 2500000003 HC RX 250 WO HCPCS: Performed by: STUDENT IN AN ORGANIZED HEALTH CARE EDUCATION/TRAINING PROGRAM

## 2021-11-10 PROCEDURE — 6370000000 HC RX 637 (ALT 250 FOR IP): Performed by: INTERNAL MEDICINE

## 2021-11-10 PROCEDURE — 87631 RESP VIRUS 3-5 TARGETS: CPT

## 2021-11-10 PROCEDURE — 99291 CRITICAL CARE FIRST HOUR: CPT | Performed by: INTERNAL MEDICINE

## 2021-11-10 PROCEDURE — 2580000003 HC RX 258: Performed by: NURSE PRACTITIONER

## 2021-11-10 PROCEDURE — 87070 CULTURE OTHR SPECIMN AEROBIC: CPT

## 2021-11-10 PROCEDURE — 94003 VENT MGMT INPAT SUBQ DAY: CPT

## 2021-11-10 PROCEDURE — 87798 DETECT AGENT NOS DNA AMP: CPT

## 2021-11-10 PROCEDURE — 31600 PLANNED TRACHEOSTOMY: CPT

## 2021-11-10 PROCEDURE — 2500000003 HC RX 250 WO HCPCS: Performed by: NURSE PRACTITIONER

## 2021-11-10 PROCEDURE — 87486 CHLMYD PNEUM DNA AMP PROBE: CPT

## 2021-11-10 PROCEDURE — 87541 LEGION PNEUMO DNA AMP PROB: CPT

## 2021-11-10 PROCEDURE — 3609027000 HC BRONCHOSCOPY

## 2021-11-10 PROCEDURE — 85520 HEPARIN ASSAY: CPT

## 2021-11-10 PROCEDURE — 85027 COMPLETE CBC AUTOMATED: CPT

## 2021-11-10 PROCEDURE — 87581 M.PNEUMON DNA AMP PROBE: CPT

## 2021-11-10 PROCEDURE — 2580000003 HC RX 258: Performed by: STUDENT IN AN ORGANIZED HEALTH CARE EDUCATION/TRAINING PROGRAM

## 2021-11-10 PROCEDURE — 87205 SMEAR GRAM STAIN: CPT

## 2021-11-10 PROCEDURE — 6370000000 HC RX 637 (ALT 250 FOR IP): Performed by: STUDENT IN AN ORGANIZED HEALTH CARE EDUCATION/TRAINING PROGRAM

## 2021-11-10 PROCEDURE — 92960 CARDIOVERSION ELECTRIC EXT: CPT | Performed by: INTERNAL MEDICINE

## 2021-11-10 PROCEDURE — 87086 URINE CULTURE/COLONY COUNT: CPT

## 2021-11-10 PROCEDURE — 6360000002 HC RX W HCPCS: Performed by: STUDENT IN AN ORGANIZED HEALTH CARE EDUCATION/TRAINING PROGRAM

## 2021-11-10 PROCEDURE — 2500000003 HC RX 250 WO HCPCS: Performed by: INTERNAL MEDICINE

## 2021-11-10 PROCEDURE — 80048 BASIC METABOLIC PNL TOTAL CA: CPT

## 2021-11-10 PROCEDURE — 2100000000 HC CCU R&B

## 2021-11-10 PROCEDURE — 0B113F4 BYPASS TRACHEA TO CUTANEOUS WITH TRACHEOSTOMY DEVICE, PERCUTANEOUS APPROACH: ICD-10-PCS | Performed by: INTERNAL MEDICINE

## 2021-11-10 PROCEDURE — 0B9F8ZX DRAINAGE OF RIGHT LOWER LUNG LOBE, VIA NATURAL OR ARTIFICIAL OPENING ENDOSCOPIC, DIAGNOSTIC: ICD-10-PCS | Performed by: INTERNAL MEDICINE

## 2021-11-10 PROCEDURE — 83735 ASSAY OF MAGNESIUM: CPT

## 2021-11-10 PROCEDURE — 31624 DX BRONCHOSCOPE/LAVAGE: CPT | Performed by: INTERNAL MEDICINE

## 2021-11-10 PROCEDURE — 99232 SBSQ HOSP IP/OBS MODERATE 35: CPT | Performed by: NURSE PRACTITIONER

## 2021-11-10 PROCEDURE — 31600 PLANNED TRACHEOSTOMY: CPT | Performed by: INTERNAL MEDICINE

## 2021-11-10 PROCEDURE — 6360000002 HC RX W HCPCS: Performed by: INTERNAL MEDICINE

## 2021-11-10 PROCEDURE — 36415 COLL VENOUS BLD VENIPUNCTURE: CPT

## 2021-11-10 PROCEDURE — 89220 SPUTUM SPECIMEN COLLECTION: CPT

## 2021-11-10 RX ORDER — LIDOCAINE HYDROCHLORIDE AND EPINEPHRINE 10; 10 MG/ML; UG/ML
20 INJECTION, SOLUTION INFILTRATION; PERINEURAL ONCE
Status: DISCONTINUED | OUTPATIENT
Start: 2021-11-10 | End: 2021-11-18 | Stop reason: HOSPADM

## 2021-11-10 RX ORDER — MORPHINE SULFATE 2 MG/ML
INJECTION, SOLUTION INTRAMUSCULAR; INTRAVENOUS
Status: COMPLETED | OUTPATIENT
Start: 2021-11-10 | End: 2021-11-10

## 2021-11-10 RX ORDER — KETAMINE HCL IN NACL, ISO-OSM 100MG/10ML
100 SYRINGE (ML) INJECTION ONCE
Status: COMPLETED | OUTPATIENT
Start: 2021-11-10 | End: 2021-11-10

## 2021-11-10 RX ORDER — MORPHINE SULFATE 4 MG/ML
INJECTION, SOLUTION INTRAMUSCULAR; INTRAVENOUS
Status: DISPENSED
Start: 2021-11-10 | End: 2021-11-11

## 2021-11-10 RX ORDER — CISATRACURIUM BESYLATE 2 MG/ML
10 INJECTION, SOLUTION INTRAVENOUS ONCE
Status: COMPLETED | OUTPATIENT
Start: 2021-11-10 | End: 2021-11-10

## 2021-11-10 RX ORDER — FENTANYL CITRATE 50 UG/ML
100 INJECTION, SOLUTION INTRAMUSCULAR; INTRAVENOUS ONCE
Status: COMPLETED | OUTPATIENT
Start: 2021-11-10 | End: 2021-11-10

## 2021-11-10 RX ORDER — MIDAZOLAM HYDROCHLORIDE 1 MG/ML
INJECTION INTRAMUSCULAR; INTRAVENOUS
Status: DISPENSED
Start: 2021-11-10 | End: 2021-11-11

## 2021-11-10 RX ORDER — MIDAZOLAM HYDROCHLORIDE 1 MG/ML
INJECTION INTRAMUSCULAR; INTRAVENOUS
Status: COMPLETED | OUTPATIENT
Start: 2021-11-10 | End: 2021-11-10

## 2021-11-10 RX ORDER — LORAZEPAM 2 MG/ML
4 INJECTION INTRAMUSCULAR ONCE
Status: COMPLETED | OUTPATIENT
Start: 2021-11-10 | End: 2021-11-10

## 2021-11-10 RX ADMIN — SODIUM CHLORIDE 1 MCG/KG/HR: 9 INJECTION, SOLUTION INTRAVENOUS at 00:16

## 2021-11-10 RX ADMIN — MIDAZOLAM 4 MG: 1 INJECTION INTRAMUSCULAR; INTRAVENOUS at 14:59

## 2021-11-10 RX ADMIN — CISATRACURIUM BESYLATE 10 MG: 2 INJECTION INTRAVENOUS at 10:04

## 2021-11-10 RX ADMIN — BUMETANIDE 1 MG: 0.25 INJECTION INTRAMUSCULAR; INTRAVENOUS at 08:37

## 2021-11-10 RX ADMIN — PIPERACILLIN AND TAZOBACTAM 3375 MG: 3; .375 INJECTION, POWDER, LYOPHILIZED, FOR SOLUTION INTRAVENOUS at 20:49

## 2021-11-10 RX ADMIN — FAMOTIDINE 20 MG: 10 INJECTION, SOLUTION INTRAVENOUS at 08:38

## 2021-11-10 RX ADMIN — AMIODARONE HYDROCHLORIDE 150 MG: 1.5 INJECTION, SOLUTION INTRAVENOUS at 14:23

## 2021-11-10 RX ADMIN — SODIUM CHLORIDE 1 MCG/KG/HR: 9 INJECTION, SOLUTION INTRAVENOUS at 06:52

## 2021-11-10 RX ADMIN — Medication 1 CAPSULE: at 08:38

## 2021-11-10 RX ADMIN — HEPARIN SODIUM 14 UNITS/KG/HR: 10000 INJECTION, SOLUTION INTRAVENOUS at 00:14

## 2021-11-10 RX ADMIN — AMIODARONE HYDROCHLORIDE 1 MG/MIN: 1.8 INJECTION, SOLUTION INTRAVENOUS at 16:08

## 2021-11-10 RX ADMIN — POTASSIUM BICARBONATE 40 MEQ: 782 TABLET, EFFERVESCENT ORAL at 06:41

## 2021-11-10 RX ADMIN — SODIUM CHLORIDE 0.5 MCG/KG/HR: 9 INJECTION, SOLUTION INTRAVENOUS at 14:50

## 2021-11-10 RX ADMIN — MORPHINE SULFATE 4 MG: 2 INJECTION, SOLUTION INTRAMUSCULAR; INTRAVENOUS at 15:05

## 2021-11-10 RX ADMIN — ACETAMINOPHEN 650 MG: 325 TABLET ORAL at 04:43

## 2021-11-10 RX ADMIN — ACETAMINOPHEN 650 MG: 650 SUPPOSITORY RECTAL at 14:43

## 2021-11-10 RX ADMIN — FENTANYL CITRATE 100 MCG: 50 INJECTION, SOLUTION INTRAMUSCULAR; INTRAVENOUS at 10:04

## 2021-11-10 RX ADMIN — LORAZEPAM 4 MG: 2 INJECTION INTRAMUSCULAR; INTRAVENOUS at 10:04

## 2021-11-10 RX ADMIN — SODIUM CHLORIDE, PRESERVATIVE FREE 10 ML: 5 INJECTION INTRAVENOUS at 08:38

## 2021-11-10 RX ADMIN — Medication 100 MG: at 10:03

## 2021-11-10 ASSESSMENT — PAIN SCALES - GENERAL
PAINLEVEL_OUTOF10: 0

## 2021-11-10 ASSESSMENT — PULMONARY FUNCTION TESTS
PIF_VALUE: 25
PIF_VALUE: 25
PIF_VALUE: 24
PIF_VALUE: 25
PIF_VALUE: 24

## 2021-11-10 NOTE — PROGRESS NOTES
Present to pt room for consult of coccyx/lowerback wounds. Pt laying in bed. Primary nurse assist nursing to roll pt to left side for assessment. Wound photos below. 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 Cleansed wound with normal saline and gauze. Pat dry with clean gauze. Pt is incontinent of stool. Applied triad to wund areas. Changed moisture wicking chux pad. Pt on [place] DENISE support surface. Pillows placed under left side. Pillows under all extremities. Will continue to follow pt. Bed in low, call light in reach.         Wound type: evolving DTPI sacrum  Wound size: 5cm x 8cm x 0.05cm  Undermining or Tunneling: none  Wound assessment/color: purple/maroon/yellow  Drainage amount: small  Drainage description: serosanguinous  Odor: none  Margins: attached  Nida wound: erythema  Exposed structure: none      Wound type: evolving DTPI sacrum  Wound size: 3cm x 4cm x 0.05cm  Undermining or Tunneling: none  Wound assessment/color: purple/black/red  Drainage amount: moderate  Drainage description: serosanguinous  Odor: none  Margins: attached  Nida wound: erythema  Exposed structure: none

## 2021-11-10 NOTE — PROCEDURES
PRE-OP DIAGNOSIS:  Atrial fibrillation with RVR    POST-OP DIAGNOSIS:  Normal Sinus Rhythm, rate 100    :  Kenna Dance, MD     ANESTHESIA:  4 morphine, 4 versed     COMPLICATIONS:  None. OPERATIVE TERM:  DC cardioversion. The nature of the procedure, risks and alternatives were discussed with the patient's daughter who gave informed consent. OPERATIVE TECHNIQUE:  The patient was sedated with 4 morphine, 4 versed. When the patient was no longer responsive to quiet voice, DC cardioversion was performed with 200 J biphasically and synchronously. Total of one 200 J shock delivered and able to achieve cardioversion. The patient was then monitored until patient entered normal sinus rhythm. IMPRESSION:  DC cardioversion supervised by Dr. Phyllis Nichols. Electronically signed by Manohar Nichols MD.

## 2021-11-10 NOTE — PROCEDURES
BRONCHOSCOPY    Indication:Pneumonia  Risks and benefits to the procedure were discussed. Alternatives and their risks were discussed as well. Sedation:Fentanyl      EBL:  none. Findings:   Pitting airway disease   Bilateral lower lobe mucous plugging. Samples:   RLL BAL with 120 ml lavage and 40 ml returned. Mucous plug included. Complications:  None    Procedure:  After informed consent was obtained, patient received sedation as detailed above. Utilizing the endotracheal tube, the bronchoscope was advanced to the level of the trachea and subsequently the claire. The claire was noted to be crisp. Scope was taken to the left and right lung fields. Samples taken as noted above. Bronchoscope was withdrawn. Electronically signed by Kota Myers M.D.

## 2021-11-10 NOTE — PROCEDURES
PERCUTANEOUS TRACHEOSTOMY PLACEMENT    Risks and benefits to the procedure were discussed. Alternatives and their risks were discussed as well. INDICATION:Respiratory Failure    SEDATION:  100 mg Fentanyl, 100 mg Ketamine, 4 mg Ativan, 10 mg Nimbex. EBL:less than 5 ml    COMPLICATIONS:none    PROCEDURE:   After informed consent was obtained, patient received sedation as detailed above. Utilizing contact precautions, patient was prepped using chlorhexidine prep, and draped utilizing sterile gloves, sterile gown, hair neck, and mask. Procedure was performed under direct bronchoscopy. Endotracheal tube was pulled back using blotting technique and area secured. Patient received a total of 8 cc of lidocaine with epinephrine. Utilizing a scalpel, a 1/2  inch incision was made in the skin vertically. Introducer needle was placed into the trachea under direct bronchoscopic visualization. Guidewire was placed through the introducer needle. Introducer needle was withdrawn leaving the guidewire in place. Punch dilator was placed over the guidewire and subsequently removed. Guiding sheath was placed over the guidewire. Expansion dilator was placed onto the guiding sheath. The unit was subsequently advanced into the trachea under direct bronchoscopic visualization until 28 Ecuadorean was visualized within the airway. Dilator was removed leaving the guiding sheath and guidewire in position. A number 7.5 mm Bivona was loaded onto dilator and placed on the guiding sheath and guidewire. The unit was advanced into the trachea under direct supervision via bronchoscopy. Tracheostomy tube was successfully placed. Bronchoscopy was placed into tracheostomy and verified position. Tracheostomy cuff was inflated, and then hooked up to the tracheostomy tube. Protective gauze was placed at the insertion site. Tracheostomy was secured to the skin utilizing  2.0 silk.  There were no immediate issue, and the procedure was completed.   Electronically signed by Evi Zeng MD on 11/10/2021 at 10:23 AM

## 2021-11-10 NOTE — PROGRESS NOTES
Nephrology Progress Note    Patient - Brisa Reyes   MRN -  [id number]   Acct # - [de-identified]      - 1934    80 y.o.   Admit Date: 10/11/2021  Hospital Day: 30  Location: --A  Date of evaluation -  11/10/2021    Subjective:   CC: shortness of breath   No apparent shortness of breath on Vent Fio2 30%  Planned trach today  UOP 1600/24  Sedated,  BP Range: Systolic (21JBL), QOY:477 , Min:86 , DWC:680      Diastolic (43HMW), NNV:50, Min:39, Max:74      Objective:   VITALS:  /71   Pulse 83   Temp 99.5 °F (37.5 °C) (Bladder)   Resp 19   Ht 5' (1.524 m)   Wt 155 lb 3.3 oz (70.4 kg)   SpO2 99%   BMI 30.31 kg/m²    Patient Vitals for the past 24 hrs:   BP Temp Temp src Pulse Resp SpO2   11/10/21 0734 119/71 99.5 °F (37.5 °C) Bladder 83 19 99 %   11/10/21 0645 (!) 119/46 -- -- 73 18 98 %   11/10/21 0630 (!) 104/58 -- -- 64 16 98 %   11/10/21 0615 (!) 123/57 -- -- 66 13 98 %   11/10/21 0600 111/68 -- -- 72 20 98 %   11/10/21 0545 (!) 107/57 -- -- 74 16 97 %   11/10/21 0530 (!) 120/45 -- -- 75 16 98 %   11/10/21 0515 (!) 104/47 -- -- 72 16 97 %   11/10/21 0500 (!) 138/52 -- -- 80 19 97 %   11/10/21 0445 (!) 121/51 -- -- 77 16 96 %   11/10/21 0430 (!) 108/56 -- -- 73 16 97 %   11/10/21 0415 108/65 -- -- 86 17 96 %   11/10/21 0400 112/61 100.4 °F (38 °C) Bladder 85 19 96 %   11/10/21 0345 (!) 119/57 -- -- 103 20 96 %   11/10/21 0330 (!) 141/66 -- -- 101 21 97 %   11/10/21 0315 120/65 -- -- 96 23 96 %   11/10/21 0300 (!) 111/54 -- -- 70 17 97 %   11/10/21 0245 116/62 -- -- 80 16 96 %   11/10/21 0230 115/61 -- -- 75 16 97 %   11/10/21 0215 (!) 123/45 -- -- 75 16 97 %   11/10/21 0200 (!) 116/51 -- -- 81 20 95 %   11/10/21 0146 (!) 109/43 -- -- 87 20 95 %   11/10/21 0145 -- -- -- 74 24 95 %   11/10/21 0139 -- -- -- 68 16 97 %   11/10/21 0131 (!) 109/56 -- -- 74 16 97 %   11/10/21 0130 -- -- -- 77 16 97 %   11/10/21 0116 (!) 94/46 -- -- 60 16 96 %   11/10/21 0115 -- -- -- 62 17 96 %   11/10/21 0101 (!) %   11/09/21 1700 (!) 121/53 -- -- 87 23 91 %   11/09/21 1654 -- -- -- 84 26 96 %   11/09/21 1600 (!) 102/45 100.2 °F (37.9 °C) Bladder 85 25 96 %   11/09/21 1500 (!) 130/45 -- -- 87 25 93 %   11/09/21 1400 (!) 101/39 -- -- 78 21 100 %   11/09/21 1353 -- -- -- 80 17 96 %   11/09/21 1249 -- 99.5 °F (37.5 °C) Bladder -- -- --   11/09/21 1200 (!) 86/44 -- -- 77 19 95 %   11/09/21 1100 (!) 99/48 99.7 °F (37.6 °C) Bladder 73 16 95 %   11/09/21 0934 -- -- -- 78 16 96 %     15 L/min    Intake/Output Summary (Last 24 hours) at 11/10/2021 0753  Last data filed at 11/10/2021 0735  Gross per 24 hour   Intake 2171.34 ml   Output 1375 ml   Net 796.34 ml       Admission weight: 120 lb (54.4 kg) Body mass index is 30.31 kg/m². No data found. EXAM:  CONSTITUTIONAL:  No apparent acute distress. Sedated  HEENT:  Head is normocephalic,   CARDIOVASCULAR:  S1, S2  regular rate and rhythm. RESPIRATORY: Clear to ausculation bilaterally. Equal breath sounds. No wheezes.  No shortness of breath noted at rest.  ABDOMEN: soft,  NEUROLOGICAL: Patient is sedate  SKIN: no rash, No significant bruises on exposed surfaces  MUSCULOSKELETAL: Minimal movement   EXTREMITIES: Distal lower extremity temp is warm, No lower extremity edema  PSYCHIATRIC: Sedated    Medications:   Med reviewed  Scheduled Meds:   lidocaine 1 % injection  5 mL IntraDERmal Once    sodium chloride flush  5-40 mL IntraVENous 2 times per day    famotidine (PEPCID) injection  20 mg IntraVENous Daily    bumetanide  1 mg IntraVENous Daily    lactobacillus  1 capsule Per NG tube Daily with breakfast    potassium (CARDIAC) replacement protocol   Other RX Placeholder    senna  10 mL Per NG tube Nightly    [Held by provider] lactulose  20 g Oral TID    [Held by provider] metoprolol tartrate  25 mg Oral BID    [Held by provider] apixaban  2.5 mg Oral BID     Continuous Infusions:   [Held by provider] heparin (PORCINE) Infusion 14 Units/kg/hr (11/10/21 0724)    sodium chloride      midazolam 5 mg/hr (11/10/21 0724)    dextrose 100 mL/hr (10/26/21 1253)    dexmedetomidine 1 mcg/kg/hr (11/10/21 0724)    norepinephrine 2 mcg/min (11/10/21 0724)    [Held by provider] sodium chloride Stopped (10/19/21 1900)     Labs:   Labs reviewed    Recent Labs     11/09/21  0100 11/09/21  0700 11/10/21  0450   WBC 5.3 5.6 5.8   HGB 8.6* 8.6* 7.7*   HCT 28.0* 28.4* 25.3*    212 196     Recent Labs     11/08/21  0510 11/08/21  0510 11/09/21  0100 11/09/21  0100 11/09/21  0700 11/10/21  0450      < > 143  --  142 142   K 3.7   < > 4.0   < > 3.8  3.8 3.4*  3.4*      < > 104  --  104 104   CO2 28   < > 30  --  29 29   BUN 46*   < > 40*  --  43* 42*   CREATININE 1.1   < > 1.1  --  1.1 1.1   CALCIUM 8.4*   < > 8.3*  --  8.5 8.0*   LABALBU 2.3*  --   --   --   --   --    PHOS 2.7  --  2.9  --   --   --    ANIONGAP 9.0   < > 9.0  --  9.0 9.0    < > = values in this interval not displayed. ASSESSMENT:  1. Acute Kidney Injury 2nd to renal hypoperfusion from hypotension and volume depletion. Resolved  2. Hypotension on pressor support  3. Hypokalemia, replaced  4. 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 Ok to continue Bumex  5. Covid 19 pneumonia, 10/5/2021 with acute hypoxic resp failure. Planned Trach placement today  6. Atrial fib  7. Macrocytic anemia, Iron deficiency anemia. s/p Venofer 300 mg x 3. BMP in AM  Active Problems:    Acute congestive heart failure (HCC)    Acute respiratory failure with hypoxia (HCC)  Resolved Problems:    * No resolved hospital problems.  SERGE Isaacs CNP 7:53 AM 11/10/2021

## 2021-11-10 NOTE — PROGRESS NOTES
[address] THERAPY MISSED TREATMENT NOTE  [facility] 3A  3A-009-A      Date: 11/10/2021  Patient Name: Malaika Rodriguez        CSN: [id number]   : 1934  (80 y.o.)  Gender: female   Referring Practitioner: Bo Edmondson MD  Diagnosis: acute respiratory failure with hypoxia         REASON FOR MISSED TREATMENT: Per conversation with PT, pt is planning for trach placement this date. Will hold today and check back tomorrow.

## 2021-11-10 NOTE — SIGNIFICANT EVENT
Patient seen by me. Case discussed with resident physician. Family updated. Patient post trach 11/10/21. Needs PEG. Initiate ventilator education for prolonged mechanical ventilator support. .  Italicized font represents changes to the note made by me. CC time 35 minutes. Time was discontiguous. Time does not include procedures. Time does include my direct assessment of the patient and coordination of care.   Electronically signed by Max Terrazas MD on 11/10/2021 at 10:21 AM  \

## 2021-11-10 NOTE — CARE COORDINATION
11/10/21, 4:10 PM EST    DISCHARGE ON GOING EVALUATION    [facility] day: 30  Location: 3A-09/009-A Reason for admit: Acute respiratory failure with hypoxia ([facility] [address] [J96.01]  Acute congestive heart failure, unspecified heart failure type ([address] [I50.9]  COVID-19 [U07.1]   Procedure:   10/12 Echo with EF 25-30%; Small circumferential pericardial effusion with no tamponade physiology; Myxomatotic degeneration of mitral valve;  Mild to Moderate mitral regurgitation is present  10/15 Intubated  10/15 CVC RIJ  10/28 Renal US: Mildly increased echogenicity of the kidneys that may suggest underlying medical renal disease; no hydronephrosis; 7mm left renal calculus is seen; small ascites. Bilateral pleural effusions; bilateral renal cysts  10/30 CT Chest: Extensive bilateral pneumonia; Large bilateral pleural effusions, right greater than left; Large pericardial effusion; Marked cardiomegaly  10/30 CT Abd/pelvis: Diffusely thickened appearance of the sigmoid colon. There are diverticula in this region. This could be on the basis of acute diverticulitis, however given the eccentric appearance of the thickening, neoplastic process can't entirely be excluded; Haziness throughout the superficial subcutaneous soft tissues of the abdomen and pelvis consistent with anasarca; No evidence of hydronephrosis. No obstructive renal stones are identified; Diffuse haziness throughout the mesentery and a small amount of ascites, findings likely related to fluid overload  11/1 BLE venous doppler: Neg for DVT  11/10 Bronch w/washings sent: Pitting airways disease; BLL mucous plugging  11/10 Trach placed: Bivona 7.5 mm  11/10 Cardioverted x1 to NSR  11/10 CXR:   1. There is a new tracheotomy tube. There has been removal of the enteric tube and endotracheal tube. 2.. There is no change in the right internal jugular line with the tip in the superior vena cava. 3. There is cardiomegaly.    4. There is bilateral pulmonary opacification, slightly worse than on previous study     Barriers to Discharge: Bronch with Trach placement today. Afib RVR today; Cardioverted x1 to NSR. PICC ordered. Versed stopped today. Remains on vent to trach, on PCV, peep 6, FIO2 30%, sats 92%. Tmax 102. NSR. Unable to follow commands; no movement to painful stim x4. PT/OT - early mobility. Intensivist and Nephrology following. Palliative Care, Wound Care, and Dietitian following. Telemetry, CVC, alaniz. Amio @ 1 mg/min, Precedex @ 0.5 mcg/kg/hr, heparin drip, levo @ 4 mcg/min, IV bumex 1 mg daily, IV pepcid, lactobacillus, prn IV ativan, Electrolyte replacement protocols. K+ 3.4, hgb 7.7. PCP: SERGE Longoria CNP  Readmission Risk Score: 20 ( )%  Patient Goals/Plan/Treatment Preferences: From home with daughter. Plan for [facility] as 1st choice and 2nd choice is [employer].  No precert required.

## 2021-11-10 NOTE — PROGRESS NOTES
Order was in the chart for bronchoscopy procedure. Verified that consent was signed. Time-out was done. ICU disposable  mobile scope  was used. Assisted Dr. Karmen Levy with bronchoscopy procedure. Bronchial washings were obtained. Patient tolerated the procedure well. The bronchoscope was disposed of in a red bag and placed in dirty utility room. Assisted Dr. Karmen Levy with a percutaneous tracheostomy insertion. A time out was performed. Respiratory care practitioners assisting was Jason Christy RRT, Cynthia Grajeda RRT. The type and size of tracheostomy tube was 7.5 Bivona. After the procedure vitals were assessed. Vital signs were reviewed and were stable after the procedure (see flow sheet for vitals). Breath sounds were diminished breath sounds- throughout. The patient was returned to the ventilator at settings of: pc 20, respiratory rate 20 breaths per minute,  PEEP of 6 cmH2O, and FiO2 of 100 . Patient tolerated the procedure well. Lot number of tracheostomy inserted [id number].

## 2021-11-10 NOTE — PROGRESS NOTES
[address]  PHYSICAL THERAPY MISSED TREATMENT NOTE  [facility] CCU 3A    Date: 11/10/2021  Patient Name: Aftab Dupree        MRN: [id number]   : 1934  (80 y.o.)  Gender: female   Referring Practitioner: Eulalio Albert DO  Diagnosis: Acute respiratory failure with hypoxia         REASON FOR MISSED TREATMENT:  Nursing reported to hold early mobility this am as she is scheduled for trach today. Will check back tomorrow .

## 2021-11-11 ENCOUNTER — APPOINTMENT (OUTPATIENT)
Dept: GENERAL RADIOLOGY | Age: 86
DRG: 004 | End: 2021-11-11
Payer: MEDICARE

## 2021-11-11 LAB
ANION GAP SERPL CALCULATED.3IONS-SCNC: 12 MEQ/L (ref 8–16)
BUN BLDV-MCNC: 42 MG/DL (ref 7–22)
CALCIUM SERPL-MCNC: 7.8 MG/DL (ref 8.5–10.5)
CHLORIDE BLD-SCNC: 102 MEQ/L (ref 98–111)
CO2: 28 MEQ/L (ref 23–33)
CREAT SERPL-MCNC: 1.3 MG/DL (ref 0.4–1.2)
ERYTHROCYTE [DISTWIDTH] IN BLOOD BY AUTOMATED COUNT: 16.3 % (ref 11.5–14.5)
ERYTHROCYTE [DISTWIDTH] IN BLOOD BY AUTOMATED COUNT: 60.6 FL (ref 35–45)
GFR SERPL CREATININE-BSD FRML MDRD: 39 ML/MIN/1.73M2
GLUCOSE BLD-MCNC: 103 MG/DL (ref 70–108)
HCT VFR BLD CALC: 24.2 % (ref 37–47)
HEMOGLOBIN: 7.4 GM/DL (ref 12–16)
HEPARIN UNFRACTIONATED: 0.52 U/ML (ref 0.3–0.7)
MCH RBC QN AUTO: 31 PG (ref 26–33)
MCHC RBC AUTO-ENTMCNC: 30.6 GM/DL (ref 32.2–35.5)
MCV RBC AUTO: 101.3 FL (ref 81–99)
PLATELET # BLD: 179 THOU/MM3 (ref 130–400)
PMV BLD AUTO: 10.3 FL (ref 9.4–12.4)
POTASSIUM REFLEX MAGNESIUM: 4.2 MEQ/L (ref 3.5–5.2)
POTASSIUM SERPL-SCNC: 4.2 MEQ/L (ref 3.5–5.2)
RBC # BLD: 2.39 MILL/MM3 (ref 4.2–5.4)
SODIUM BLD-SCNC: 142 MEQ/L (ref 135–145)
WBC # BLD: 7.1 THOU/MM3 (ref 4.8–10.8)

## 2021-11-11 PROCEDURE — 99291 CRITICAL CARE FIRST HOUR: CPT | Performed by: INTERNAL MEDICINE

## 2021-11-11 PROCEDURE — 80048 BASIC METABOLIC PNL TOTAL CA: CPT

## 2021-11-11 PROCEDURE — 2500000003 HC RX 250 WO HCPCS: Performed by: STUDENT IN AN ORGANIZED HEALTH CARE EDUCATION/TRAINING PROGRAM

## 2021-11-11 PROCEDURE — C1751 CATH, INF, PER/CENT/MIDLINE: HCPCS

## 2021-11-11 PROCEDURE — 97530 THERAPEUTIC ACTIVITIES: CPT

## 2021-11-11 PROCEDURE — 6360000002 HC RX W HCPCS: Performed by: FAMILY MEDICINE

## 2021-11-11 PROCEDURE — 6360000002 HC RX W HCPCS: Performed by: STUDENT IN AN ORGANIZED HEALTH CARE EDUCATION/TRAINING PROGRAM

## 2021-11-11 PROCEDURE — 2500000003 HC RX 250 WO HCPCS: Performed by: INTERNAL MEDICINE

## 2021-11-11 PROCEDURE — 36415 COLL VENOUS BLD VENIPUNCTURE: CPT

## 2021-11-11 PROCEDURE — 2580000003 HC RX 258: Performed by: NURSE PRACTITIONER

## 2021-11-11 PROCEDURE — 2580000003 HC RX 258: Performed by: STUDENT IN AN ORGANIZED HEALTH CARE EDUCATION/TRAINING PROGRAM

## 2021-11-11 PROCEDURE — 76937 US GUIDE VASCULAR ACCESS: CPT

## 2021-11-11 PROCEDURE — 99232 SBSQ HOSP IP/OBS MODERATE 35: CPT | Performed by: INTERNAL MEDICINE

## 2021-11-11 PROCEDURE — 36569 INSJ PICC 5 YR+ W/O IMAGING: CPT

## 2021-11-11 PROCEDURE — 71045 X-RAY EXAM CHEST 1 VIEW: CPT

## 2021-11-11 PROCEDURE — 85520 HEPARIN ASSAY: CPT

## 2021-11-11 PROCEDURE — 6370000000 HC RX 637 (ALT 250 FOR IP): Performed by: STUDENT IN AN ORGANIZED HEALTH CARE EDUCATION/TRAINING PROGRAM

## 2021-11-11 PROCEDURE — 2100000000 HC CCU R&B

## 2021-11-11 PROCEDURE — 6360000002 HC RX W HCPCS: Performed by: NURSE PRACTITIONER

## 2021-11-11 PROCEDURE — 85027 COMPLETE CBC AUTOMATED: CPT

## 2021-11-11 PROCEDURE — 94003 VENT MGMT INPAT SUBQ DAY: CPT

## 2021-11-11 RX ORDER — POTASSIUM CHLORIDE 29.8 MG/ML
20 INJECTION INTRAVENOUS
Status: DISCONTINUED | OUTPATIENT
Start: 2021-11-11 | End: 2021-11-11 | Stop reason: DRUGHIGH

## 2021-11-11 RX ORDER — POTASSIUM CHLORIDE 7.45 MG/ML
40 INJECTION INTRAVENOUS ONCE
Status: DISCONTINUED | OUTPATIENT
Start: 2021-11-11 | End: 2021-11-11 | Stop reason: SDUPTHER

## 2021-11-11 RX ADMIN — FAMOTIDINE 20 MG: 10 INJECTION, SOLUTION INTRAVENOUS at 08:09

## 2021-11-11 RX ADMIN — ACETAMINOPHEN 650 MG: 650 SUPPOSITORY RECTAL at 02:54

## 2021-11-11 RX ADMIN — SODIUM CHLORIDE 1 MCG/KG/HR: 9 INJECTION, SOLUTION INTRAVENOUS at 22:03

## 2021-11-11 RX ADMIN — AMIODARONE HYDROCHLORIDE 0.5 MG/MIN: 1.8 INJECTION, SOLUTION INTRAVENOUS at 12:05

## 2021-11-11 RX ADMIN — PIPERACILLIN AND TAZOBACTAM 3375 MG: 3; .375 INJECTION, POWDER, LYOPHILIZED, FOR SOLUTION INTRAVENOUS at 04:24

## 2021-11-11 RX ADMIN — SODIUM CHLORIDE, PRESERVATIVE FREE 20 ML: 5 INJECTION INTRAVENOUS at 20:33

## 2021-11-11 RX ADMIN — SODIUM CHLORIDE, PRESERVATIVE FREE 10 ML: 5 INJECTION INTRAVENOUS at 08:16

## 2021-11-11 RX ADMIN — AMIODARONE HYDROCHLORIDE 0.5 MG/MIN: 1.8 INJECTION, SOLUTION INTRAVENOUS at 00:02

## 2021-11-11 RX ADMIN — SODIUM CHLORIDE 0.6 MCG/KG/HR: 9 INJECTION, SOLUTION INTRAVENOUS at 14:14

## 2021-11-11 RX ADMIN — PIPERACILLIN AND TAZOBACTAM 3375 MG: 3; .375 INJECTION, POWDER, LYOPHILIZED, FOR SOLUTION INTRAVENOUS at 11:58

## 2021-11-11 RX ADMIN — SODIUM CHLORIDE 25 ML: 9 INJECTION, SOLUTION INTRAVENOUS at 11:54

## 2021-11-11 RX ADMIN — ACETAMINOPHEN 650 MG: 650 SUPPOSITORY RECTAL at 23:59

## 2021-11-11 RX ADMIN — AMIODARONE HYDROCHLORIDE 0.5 MG/MIN: 1.8 INJECTION, SOLUTION INTRAVENOUS at 22:03

## 2021-11-11 RX ADMIN — PIPERACILLIN AND TAZOBACTAM 3375 MG: 3; .375 INJECTION, POWDER, LYOPHILIZED, FOR SOLUTION INTRAVENOUS at 20:47

## 2021-11-11 RX ADMIN — BUMETANIDE 1 MG: 0.25 INJECTION INTRAMUSCULAR; INTRAVENOUS at 08:09

## 2021-11-11 RX ADMIN — LORAZEPAM 1 MG: 2 INJECTION INTRAMUSCULAR; INTRAVENOUS at 20:47

## 2021-11-11 RX ADMIN — SODIUM CHLORIDE 0.6 MCG/KG/HR: 9 INJECTION, SOLUTION INTRAVENOUS at 04:19

## 2021-11-11 RX ADMIN — HEPARIN SODIUM 14 UNITS/KG/HR: 10000 INJECTION, SOLUTION INTRAVENOUS at 08:04

## 2021-11-11 ASSESSMENT — PULMONARY FUNCTION TESTS
PIF_VALUE: 24
PIF_VALUE: 25

## 2021-11-11 ASSESSMENT — PAIN SCALES - GENERAL: PAINLEVEL_OUTOF10: 0

## 2021-11-11 NOTE — PROGRESS NOTES
[address] CCU 3A  Occupational Therapy  Daily Note  Time:   Time In: 1107  Time Out: 1145  Timed Code Treatment Minutes: 38 Minutes  Minutes: 38    COtx completed with PT and respiratory due to medical complexity and unable to tolerate 2 sessions       Date: 2021  Patient Name: Doraine Heimlich,   Gender: female      Room: [facility]/009-A  MRN: [id number]  : 1934  (80 y.o.)  Referring Practitioner: Edgar Browne MD  Diagnosis: acute respiratory failure with hypoxia  Additional Pertinent Hx: Per H&P:Ms. Maximilian Monreal is an 80-year-old female with a past medical history of anxiety, hypertension, arthritis, and a potentially new diagnosis of CHF. She states that she was vaccinated for Covid. She tested positive for Covid on 10/4/2021. Her shortness of breath has gotten progressively worse along with her fatigue. She states that in the past week she has noticed increased shortness of breath, increased swelling in her lower limbs, increased fatigue, and increasing orthopnea. Chest x-ray shows bilateral groundglass opacities with cardiomegaly. RR on 10/12 due to elevated HR with amnio started. Restrictions/Precautions:  Restrictions/Precautions: Fall Risk, Isolation, Contact Precautions  Position Activity Restriction  Other position/activity restrictions: droplet +; Hard of hearing,- 11/10 trach     SUBJECTIVE: Nurse Suzen Runner ok'd session, In bed upon arrival, RT present during session    PAIN:  Unable to rate    Vitals: Patient on PEEP 6 Fi02 30%via Vent to trach  upon arrival to room. Patient O2 sats at 96 %. No adjustments during session Hr 80s-90's. RR 20's      COGNITION: followed approx 10% of commands, unable to read patients lips    ADL:   Grooming: Dependent. required hand of hand movement to wash face. BALANCE:  Sitting Balance:  Maximum Assistance.  At EOB approx 15 minutes with posterior lean    BED MOBILITY:  Supine to Sit: Dependent, X 2 HOB elevated  Sit to Supine: Dependent x 2  Rolling side to side Dep x 2    ADDITIONAL ACTIVITIES:  Guided patient through B UE AAROM, x5 reps x1 set at EOB, in order to improve UE ROM & challenge activity tolerance required for UB dressing skills. Edema noted in B UE      ASSESSMENT:     Activity Tolerance:  Patient tolerance of  treatment: good. Discharge Recommendations: Subacute/skilled nursing facility  Equipment Recommendations: Other: will continue to assess pending progress  Plan: Times per week: 3-5x  Specific instructions for Next Treatment: Functional transfers as able; ADLs and sitting balance; UE ROM exercises and endurance training; edema control  Current Treatment Recommendations: Strengthening, Balance Training, Functional Mobility Training, Endurance Training, Patient/Caregiver Education & Training, Self-Care / ADL, Home Management Training, Safety Education & Training, Cognitive Reorientation  Plan Comment: Pt would benefit from continued skilled OT services when medically stable and discharged from Acute. OT recommended while pt is at [facility]. Patient Education  Patient Education: Importance of Increasing Activity    Goals  Short term goals  Time Frame for Short term goals: by discharge  Short term goal 1: Pt will demonstrate functional transfers with OTR in prep for ADL completion while out of bed. [place] term goal 2: Pt will complete upper body ADL tasks with MIN A to increase independence with self care. Short term goal 3: Pt will tolerate dynamic sitting >10 minutes with MIN A to increase her endurnace for ease of doing self care. Short term goal 4: Pt will be oriented to the day her goals and progress daily with cues as needed to increase her safety and facilitate progress. Following session, patient left in safe position with all fall risk precautions in place.

## 2021-11-11 NOTE — PROGRESS NOTES
CRITICAL CARE PROGRESS NOTE      Patient:  Juhi Harris    Unit/Bed:3A-09/009-A  YOB: 1934  MRN: [id number]   PCP: SERGE Zhang CNP  Date of Admission: 10/11/2021  Chief Complaint:- Worsening shortness of breath    Assessment and Plan:    1. Acute hypoxic respiratory failure: Patient was admitted to the hospital through the ED on 10/11/2021 and started on high flow. Patient's hypoxemia progressively worsened. Intubated and admitted to ICU on 10/15.  - Patient has had multiple failed breathing trials. There was some suspicion for neuromuscular disease present dt persistent hypercarbia despite mechanical ventilatory support. Work up negative for Myasthenia Gravis. Trach in place. Current vent settings: PCV rate 16, PIP 25, peep 6, Fio2 30% spo2 95%    2. Covid Pneumonia with superimposed bacterial pneumonia:  S/p baricitinib   10/19 -respiratory culture positive for Corynebacterium. Vancomycin 6 days. 11/10 - pna panel negative, resp culture pending. CXR shows bilateral pulmonary opacifications slightly worse than previous. Empiric zosyn   11/11 - day 2 zosyn     3. ARDS: Mild. Patient received oxygen with FiO2 100 with very low TV (250ml) on ventilation. Ferritin, CRP, Lactic dehydrogenase elevated. Started Decadron, Baricitinib (10/12). Trach in place. Continue vent settings. Wean as tolerated     4. Hypotension - currently off levo. Continue to monitor. 6. HFrEF: Echo (10/11): LV EF 25-30%, LV severe global hypokinesis. LV size moderate increase. Weight on admission 120 lbs. Cardiologist consult appreciated - cardiology will wait for recovery prior to considering any intervention such as LifeVest versus AICD. D/c milrinone on 11/8       7. Hx of Prolong QTc interval: On telemetry strip in ED. Seen by cardiogy for Prolong QTC. Plan: tele monitor, avoid QTC prolong medications, Keep K +> 4 and Mg > 2     8.  New on set Afib with RVR: since admission patient had episodes of afib without rvr. 11/10 patient developed afib with RVR. After amio bolus given, patient sustained rate of 150s. Patient underwent synchronized cardioversion at 200J in which patient returned to NSR rate 100. Continue on amio drip, heparin drip. Continue to monitor. 9. Bilateral lower extremity swelling: r/o DVT - US negative on 11/1    10. HERIBERTO: Bun/Cr Improving. Patient having good urine output. Nephrology continue Bumex and adjust as clinically appropriate. 11. Hyperkalemia: Resolved. 12. AMS: likely secondary to hypercapnic hypoxemia. Neuro exam when patient is extubate     13. Macrocytic Anemia: H/H 7.4/24.2 .3. Ordered folate and b12 levels, came back WNL. TSH WNL. Most likely dt covid/pulmonary dz. 14. Hematuria: Secondary to nephrolithiasis. resolved      INITIAL H AND P AND ICU COURSE:   80-year-old female presents to the ED on 10/11/2021 due to worsening shortness of breath. Patient tested positive for Covid on 10/4/2021. In the emergency room patient was found to have a SPO2 of 83% on room air with a BNP of 15.7K. Patient was admitted and managed for acute hypoxic respiratory failure secondary to Covid with combination of congestive heart failure. Patient was placed on nasal cannula, Bumex and dexamethasone in the ED. On 10/15 patient was transferred to the ICU and intubated for acute hypoxic respiratory failure on 100% FiO2 on high flow. Patient had a UTI on 10/18 and was started on ceftriaxone on 10/19. Patient was also started on milrinone for her HFrEF. Patient's family has been consulted regarding trach and PEG tube in which they are agreeable. Cardiology is on board regarding LifeVest versus AICD. 11/5 - patient HERIBERTO improving, continue to be followed by nephro. Repeat cxr shows improving of bilateral pna. Pending pneumonia panel results. Trach and peg set for Monday. 11/6 - patient HERIBERTO improving and seen by nephro. Resume bumex.  Patient otherwise stable on vent pending trach Monday. 11/7 - no acute events overnight. Pt trach planned for tomorrow. Added pepcid for GI prophylaxis      11/8 - trach scheduled today. Pt bp soft with map 63. D/t patient HFrEF, hold fluids for now. nephro on board. 11/9 - eventual trach. Requiring 3 levo over night, now currently on 1 levo. Episode of afib without rvr over night, currently nsr with ventricular bigeminy. Night team started on heparin drip for anticoagulation. Continue to monitor rate / rhythm. 11/10 - bronchoscopy and percutaneous trach placement today. Patient entered Afib with RVR. Given amio bolus. After bolus, patient sustained rate of 150s. Patient underwent synchronized cardioversion at 200J in which patient returned to NSR rate 100. Continue on amio drip, heparin drip. Continue to monitor. Patient developing fever. Hypotensive needing levo. No leukocytosis WBC 5.8. PNA panel ordered today negative. Pending resp cultures. 11/11 - removal of subclavian line, PICC line placed, resp culture and pna panel negative, gram stain shows moderate seg neutrophils with moderate GNB. Continue on amio drip. Zosyn day 2. Past Medical History: HPI  Family History:  See EMR  Social History:   Former smoker since 1977. 15 pack year hx prior to cessation    ROS   Cannot obtain d/t patient sedated and on mechanical ventilation    Scheduled Meds:   potassium chloride  20 mEq IntraVENous Q2H    lidocaine-EPINEPHrine  20 mL IntraDERmal Once    piperacillin-tazobactam  3,375 mg IntraVENous Q8H    lidocaine 1 % injection  5 mL IntraDERmal Once    sodium chloride flush  5-40 mL IntraVENous 2 times per day    famotidine (PEPCID) injection  20 mg IntraVENous Daily    bumetanide  1 mg IntraVENous Daily    lactobacillus  1 capsule Per NG tube Daily with breakfast    potassium (CARDIAC) replacement protocol   Other RX Placeholder    senna  10 mL Per NG tube Nightly    [Held by provider] lactulose  20 g Oral TID    [Held by provider] metoprolol tartrate  25 mg Oral BID    [Held by provider] apixaban  2.5 mg Oral BID     Continuous Infusions:   amiodarone 0.5 mg/min (11/11/21 0717)    heparin (PORCINE) Infusion 14 Units/kg/hr (11/11/21 0717)    sodium chloride      midazolam Stopped (11/10/21 1146)    dextrose 100 mL/hr (10/26/21 1253)    dexmedetomidine 0.6 mcg/kg/hr (11/11/21 0717)    norepinephrine Stopped (11/11/21 0246)    [Held by provider] sodium chloride Stopped (10/19/21 1900)       PHYSICAL EXAMINATION:  T:  101.3 F. P:  80  RR:  21 B/P:  115/64 FiO2:  30. O2 Sat:  95  I/O:  944.85/1195 net -250.15  Body mass index is 30.31 kg/m². GCS:   11  PCV+: Rate 16, PIP 25, PEEP 6    Sedation: precedex 0.6 mcg  Drips: heparin 14 units/kg/hr , amiodarone 30mg/hr     General: Sedated on vent  HEENT:  normocephalic and atraumatic. No scleral icterus. PERR  Neck: supple. No Thyromegaly. Lungs: diminished breath sounds bilaterally on auscultation. No retractions  Cardiac: RRR. No JVD. Abdomen: soft. Nontender. Extremities:  No clubbing, cyanosis, mild pitting edema x 4, more prominent in bilateral lower extremities    Vasculature: capillary refill < 3 seconds. Palpable dorsalis pedis pulses. Skin:  warm and dry. Sacral decubitus ulcer  Psych:  Patient sedated  Lymph:  No supraclavicular adenopathy. Neurologic: No seizures. Data: (All radiographs, tracings, PFTs, and imaging are personally viewed and interpreted unless otherwise noted).  Sodium 142, potassium 4.2, chloride 102, CO2 28, BUN 42, creatinine 1.3   Glucose 103   WBC 7.1, hemoglobin 7.4, hematocrit 24.2, platelet 429   Telemetry shows NSR     CXR on 11/11:     Impression   1. The left PICC line terminates in the superior vena cava. 2. Otherwise, there has been no other significant interval change in the radiographic appearance of the chest [facility] 11/10/2021.      Seen with multidisciplinary ICU team   Meets Continued ICU Level Care Criteria:    [x] Yes   [] No - Transfer Planned to listed location:  [] HOSPITALIST CONTACTED-      Case and plan discussed with Dr. Kat De Anda    Electronically signed by Nellie Monroe MD  [address]  Patient seen by me. Case discussed with resident physician. Awaiting culture results. PCR is negative. Possible Corynbacteria or anaerobes. Italicized font represents changes to the note made by me. CC time 35 minutes. Time was discontiguous. Time does not include procedures. Time does include my direct assessment of the patient and coordination of care.   Electronically signed by Huma Michelle MD on 11/11/2021 at 6:03 PM

## 2021-11-11 NOTE — PROGRESS NOTES
PICC Procedure Note    Jose Raul Conroy   Admitted- 10/11/2021 10:28 AM  Admission diagnosis- Acute respiratory failure with hypoxia ([facility] [address] [J96.01]  Acute congestive heart failure, unspecified heart failure type ([facility] [address] [I50.9]  COVID-19 [U07.1]      Attending Physician- Heena Garcia MD  Ordering Physician-VINNY Phillip CNP  Indication for Insertion: Poor Vascular Access    Catheter Insertion Date- 11/11/2021   Lot Number- [id number]  Gauge-6  Lumen-triple    Insertion Site- ZHAO Basilic  Vein Diameter- 5 mm  Catheter Length- 40 cm  Internal Length- 39 cm  Exposed Catheter Length- 1cm   Upper Arm Circumference- 34.5cm  Tip Confirmation System Bundle met- No:   If X-ray required, Tip Location- SVC  Radiologist- Dr Nitin Randolph    PICC insertion successful- Yes  Ultrasound- yes    Okay To Use PICC- Yes    Electronically signed by John Llamas, RN, RN on 11/11/2021 at 9:25 AM

## 2021-11-11 NOTE — PROGRESS NOTES
[address] [place] [address]  INPATIENT PHYSICAL THERAPY  DAILY NOTE  [facility] 3A - 3A-009-A      Time In: 1107  Time Out: 1145  Timed Code Treatment Minutes: 38 Minutes  Minutes: 38      co-tx with OT and Resp. Therapy due to multiple complexity and decreased endurance she wouldn't be able to tolerate 2 sessions a day     Date: 2021  Patient Name: Nelson Richmond,  Gender:  female        MRN: [id number]  : 1934  (80 y.o.)     Referring Practitioner: Kole Tatum DO  Diagnosis: Acute respiratory failure with hypoxia  Additional Pertinent Hx: Per chart review: Ms. Lev Donovan is an 71-year-old female with a past medical history of anxiety, hypertension, arthritis, and a potentially new diagnosis of CHF. She states that she was vaccinated for Covid. She tested positive for Covid on 10/4/2021. Her shortness of breath has gotten progressively worse along with her fatigue. She states that in the past week she has noticed increased shortness of breath, increased swelling in her lower limbs, increased fatigue, and increasing orthopnea. Chest x-ray shows bilateral groundglass opacities with cardiomegaly. RR on 10/12 due to elevated HR with amnio started. Prior Level of Function:  Lives With: Daughter  Type of Home: House  Home Layout: One level  Home Equipment: Cane   Bathroom Shower/Tub: Tub/Shower unit  Bathroom Toilet: Standard  Bathroom Equipment: Shower chair    Receives Help From: Family  ADL Assistance: Independent  Homemaking Assistance: Independent  Ambulation Assistance: Independent  Transfer Assistance: Independent  Additional Comments: Pt reported would use cane for mobility. Pt reported being very active prior, exercises daily.     Restrictions/Precautions:  Restrictions/Precautions: Fall Risk, Isolation, Contact Precautions  Position Activity Restriction  Other position/activity restrictions: droplet +; Hard of hearing,- 11/10 trach     SUBJECTIVE:  Nursing ok'd early mobility but to monitor her HR- pt awake entire session however only followed ~ 10% of commands     PAIN: she did grimace at times mostly when trach tubing being managed     Vitals:    Vent to trach with PEEP of 6 Fi02 30% via upon arrival to room. Patient O2 sats at 96 %. No adjustments during session  HR- 80s-90's. RR 20's  OBJECTIVE:  Bed Mobility:  Rolling to Left: Dependent, X 2,completed multiple rolls for change of sheets  Rolling to Right: Dependent, X 2 completed multiple rolls for change of sheets  Supine to Sit: Dependent, X 2  Sit to Supine: Dependent, X 2   Scooting: Dependent, X 2    and assist of 3rd person to manage vent lines    BALANCE: pt sat edge of bed for 15 min- she required max assist of one and assist of another to complete activity- pt demonstrated little to no righting reaction for balance awareness and needed faciliation and tactile cues along with assist for posture and to hold head up- she does initiate lifting head up but doesn't maintain this posture for > 5 sec- OT completed hand over hand activity while sitting edge of bed     Functional Outcome Measures: Completed  AM-PAC Inpatient Mobility Raw Score : 6  AM-PAC Inpatient T-Scale Score : 23.55    ASSESSMENT:  Assessment: Pt vitals were all tristin while sitting edge of bed however she cont to require much assist of 2 persons to complete bed mobility and sitting balance- pt would benefit from cont skilled therapy   Activity Tolerance:  Patient tolerance of  treatment: fair.         Equipment Recommendations:Equipment Needed: Yes (continue to assess RW needs)  Discharge Recommendations: Subacte/Skilled Nursing Facility vs LTACH  Plan: Times per week: 3 x C/EM  Current Treatment Recommendations: Strengthening, Transfer Training, Endurance Training, Neuromuscular Re-education, Patient/Caregiver Education & Training, Equipment Evaluation, Education, & procurement, Home Exercise Program, Gait Training, Balance Training, Functional Mobility Training, Stair training, Safety Education & Training    Patient Education  Patient Education: Plan of Care    Goals:  Patient goals : To get home as soon as possible  Short term goals  Time Frame for Short term goals: By hospital d/c  Short term goal 1: Pt to demonstrate supine <-> sit transfer min A for ease with getting out of bed. Short term goal 2: Pt to sit edge of bed with SBA for > 5 min for static and dynamic activity  Short term goal 3: Transfers and ambulation to be reassessed when appropriate. Short term goal 4: . Long term goals  Time Frame for Long term goals : NA due to short ELOS    Following session, patient left in safe position with all fall risk precautions in place.

## 2021-11-11 NOTE — CARE COORDINATION
11/11/21, 2:25 PM EST    DISCHARGE ON GOING EVALUATION    [facility] day: 31  Location: 3A-09/009-A Reason for admit: Acute respiratory failure with hypoxia ([facility] [address] [J96.01]  Acute congestive heart failure, unspecified heart failure type ([facility] [address] [I50.9]  COVID-19 [U07.1]   Procedure:   10/12 Echo with EF 25-30%; Small circumferential pericardial effusion with no tamponade physiology; Myxomatotic degeneration of mitral valve;  Mild to Moderate mitral regurgitation is present  10/15 Intubated  10/15 CVC RIJ - 11/11 removed  10/28 Renal US: Mildly increased echogenicity of the kidneys that may suggest underlying medical renal disease; no hydronephrosis; 7mm left renal calculus is seen; small ascites. Bilateral pleural effusions; bilateral renal cysts  10/30 CT Chest: Extensive bilateral pneumonia; Large bilateral pleural effusions, right greater than left; Large pericardial effusion; Marked cardiomegaly  10/30 CT Abd/pelvis: Diffusely thickened appearance of the sigmoid colon. There are diverticula in this region. This could be on the basis of acute diverticulitis, however given the eccentric appearance of the thickening, neoplastic process can't entirely be excluded; Haziness throughout the superficial subcutaneous soft tissues of the abdomen and pelvis consistent with anasarca; No evidence of hydronephrosis. No obstructive renal stones are identified; Diffuse haziness throughout the mesentery and a small amount of ascites, findings likely related to fluid overload  11/1 BLE venous doppler: Neg for DVT  11/10 Bronch w/washings sent: Pitting airways disease; BLL mucous plugging  11/10 Trach placed: Bivona 7.5 mm  11/10 Cardioverted x1 to NSR  13/18 PICC left basilic    Barriers to Discharge: PICC placed today and CVC removed. Continues to have fever.      Remains on vent to trach, on PCV, peep 6, FIO2 30%, sats 96%. Tmax 102. NSR. Follows commands. PT/OT - early mobility. Intensivist and Nephrology following. Palliative Care, Wound Care, and Dietitian following. Telemetry, PICC, alaniz. Amio @ 0.5 mg/min, Precedex @ 0.6 mcg/kg/hr, heparin drip, IV bumex 1 mg daily, IV pepcid, lactobacillus, prn IV ativan, IV zosyn, Electrolyte replacement protocols.  BUN 42, Creat 1.3, hgb 7.4. PCP: SERGE Tee CNP  Readmission Risk Score: 20.3 ( )%  Patient Goals/Plan/Treatment Preferences: From home with daughter. Plan for [facility] as [facility]st choice and 2nd choice is [facility].  No precert required.

## 2021-11-11 NOTE — PROGRESS NOTES
Comprehensive Nutrition Assessment    Type and Reason for Visit:  Reassess    Nutrition Recommendations/Plan:   Once FT access obtained - recommend resume TF Nepro carb steady at 40 ml/hr (goal). Free water flush per MD (currently 100 ml every 8 hours). Nutrition Assessment:    Pt. improving nutritionally AEB tolerating TF at goal prior to trach placement although has had OGT to LIWS d/t residuals over 600ml this admit, poor po intake first 5d of admit,constipation then need for flexiseal,  intubated 10/15, LOS day 31.  At risk for further nutrition compromise r/t COVID Dx 10/5, new CHF, advanced age, wounds and underlying medical condition (hx diverticulitis, HTN).      Malnutrition Assessment:  Malnutrition Status: At risk for malnutrition (Comment)    Context:  Acute Illness     Findings of the 6 clinical characteristics of malnutrition:  Energy Intake:  7 - 50% or less of estimated energy requirements for 5 or more days  Weight Loss:  No significant weight loss (however difficult to evaluate with edema)     Body Fat Loss:  No significant body fat loss     Muscle Mass Loss:  No significant muscle mass loss    Fluid Accumulation:  Unable to assess     Strength:  Not Performed    Estimated Daily Nutrient Needs:  Energy (kcal):  7059-6698 (25-35/kgm) late phase; Weight Used for Energy Requirements:   (56 kgm)     Protein (g):   gm (1.2-2) as renal function allows; Weight Used for Protein Requirements:   (56 kgm)        Fluid (ml/day):  per MD; Method Used for Fluid Requirements:  Other (Comment)      Nutrition Related Findings:    s/p trach placement 11/10; 3 BMs noted past 48 hours; RN reports MD did not want NGT/OGT reinserted after trach; plans GI c/s for PEG tube placement;  Rx includes ATB, Culturelle, Bumex, Versed, Precedex, Levophed, Glycolax, Colace, Lactulose; 11/11: Glucose 103, BUN 42, Cr 1.3, Potassium 4.2, Sodium 142     Wounds:   (stage II sacrum 10/15, stage II coccyx 11/6, scabbed area below lip 11/6, perineum open wounds on rectum 11/8)       Current Nutrition Therapies:    ADULT TUBE FEEDING; Nasogastric; Renal Formula; Continuous; 10; Yes; 10; Q 8 hours; 40; 100; Q 8 hours  Current Tube Feeding (TF) Orders:  · Feeding Route:  (none)  · Formula: Renal Formula (Nepro)  · Schedule: Continuous (10/26 at goal of 40ml/hour) - currently off  · Additives/Modulars:  (none)  · Water Flushes: per physician - 100ml every 8h  · Goal TF & Flush Orders Provides: Nepro 40ml/hour provides 1699 kcals, 78gms protein, 154gms cho, 24gms fiber, 998 ml free H20 (698ml Nepro, 300 MD flush) in 1260ml total volume (960 Nepro, 300 MD flush)/24h      Anthropometric Measures:  · Height: 5' (152.4 cm)  · Current Body Weight: 155 lb 3.3 oz (70.4 kg) (11/6; +1 pitting edema)   · Admission Body Weight: 124 lb (56.2 kg) (10/11 +2 edema)    · Usual Body Weight:  (per EMR: 10/7/21: 125#)     · Ideal Body Weight: 100 lbs;      · BMI: 30.3  · BMI Categories: Obese Class 1 (BMI 30.0-34. 9)       Nutrition Diagnosis:   · Inadequate oral intake related to impaired respiratory function as evidenced by NPO or clear liquid status due to medical condition, intubation      Nutrition Interventions:   Food and/or Nutrient Delivery:  Continue NPO (Recommend resume TF once FT placed/inserted.)  Nutrition Education/Counseling:  No recommendation at this time   Coordination of Nutrition Care:  Continue to monitor while inpatient    Goals:  EN to provide % nutrient needs while intubated for covid recovery process       Nutrition Monitoring and Evaluation:   Behavioral-Environmental Outcomes:  None Identified   Food/Nutrient Intake Outcomes:  Diet Advancement/Tolerance, Enteral Nutrition Intake/Tolerance  Physical Signs/Symptoms Outcomes:  Biochemical Data, GI Status, Fluid Status or Edema, Hemodynamic Status, Nutrition Focused Physical Findings, Skin, Weight     Discharge Planning:     Too soon to determine     Electronically signed by Adrianna Christy RD, LD on 11/11/21 at 11:50 AM EST    Contact: [phone]

## 2021-11-11 NOTE — PROGRESS NOTES
Renal Progress Note    Assessment and Plan:   1. Stable acute kidney injury. 2. Hypokalemia resolved. 3. SARS-CoV-2 pneumonia. 4. Respiratory failure on mechanical support. 5. Status post tracheostomy postoperative day #1.  6. S/P cardioversion for atrial fibrillation  7. Macrocytic anemia. 8. Bilateral lower extremity edema    PLAN:  1. Labs reviewed  2. Serum potassium is now normal.    3. Medications reviewed  4. No changes  5. Continue intravenous bumetanide for now follow extremity edema. 6. Labs in the morning  7. We will follow    Patient Active Problem List:     Sigmoid diverticulitis     Acute respiratory failure with hypoxia ([facility] [address]     COVID-19     Acute congestive heart failure (HCC)      Subjective:   Admit Date: 10/11/2021    Interval History:   Seen for acute kidney injury. Awake and alert this morning. Updated by the staff. Had a tracheostomy done yesterday. Blood pressure is mostly stable.   Okay urine output        Medications:   Scheduled Meds:   lidocaine-EPINEPHrine  20 mL IntraDERmal Once    piperacillin-tazobactam  3,375 mg IntraVENous Q8H    lidocaine 1 % injection  5 mL IntraDERmal Once    sodium chloride flush  5-40 mL IntraVENous 2 times per day    famotidine (PEPCID) injection  20 mg IntraVENous Daily    bumetanide  1 mg IntraVENous Daily    lactobacillus  1 capsule Per NG tube Daily with breakfast    potassium (CARDIAC) replacement protocol   Other RX Placeholder    senna  10 mL Per NG tube Nightly    [Held by provider] lactulose  20 g Oral TID    [Held by provider] metoprolol tartrate  25 mg Oral BID    [Held by provider] apixaban  2.5 mg Oral BID     Continuous Infusions:   amiodarone 0.5 mg/min (11/11/21 0717)    heparin (PORCINE) Infusion 14 Units/kg/hr (11/11/21 0804)    sodium chloride      midazolam Stopped (11/10/21 1146)    dextrose 100 mL/hr (10/26/21 1253)    dexmedetomidine 0.6 mcg/kg/hr (11/11/21 0717)    norepinephrine Stopped (11/11/21 0246)  [Held by provider] sodium chloride Stopped (10/19/21 1900)       CBC:   Recent Labs     11/09/21  0700 11/10/21  0450 11/11/21  0630   WBC 5.6 5.8 7.1   HGB 8.6* 7.7* 7.4*    196 179     CMP:    Recent Labs     11/09/21  0700 11/09/21  0700 11/10/21  0450 11/11/21  0630     --  142 142   K 3.8  3.8   < > 3.4*  3.4* 4.2     --  104 102   CO2 29  --  29 28   BUN 43*  --  42* 42*   CREATININE 1.1  --  1.1 1.3*   GLUCOSE 113*  --  130* 103   CALCIUM 8.5  --  8.0* 7.8*   LABGLOM 47*  --  47* 39*    < > = values in this interval not displayed. Troponin: No results for input(s): TROPONINI in the last 72 hours. BNP: No results for input(s): BNP in the last 72 hours. INR:   Recent Labs     11/09/21  0100   INR 1.24*     Lipids: No results for input(s): CHOL, LDLDIRECT, TRIG, HDL, AMYLASE, LIPASE in the last 72 hours. Liver: No results for input(s): AST, ALT, ALKPHOS, PROT, LABALBU, BILITOT in the last 72 hours. Invalid input(s): BILDIR  Iron:  No results for input(s): IRONS, FERRITIN in the last 72 hours. Invalid input(s): LABIRONS  XR CHEST PORTABLE   Final Result   1. There is a new tracheotomy tube. There has been removal of the enteric tube and endotracheal tube. 2.. There is no change in the right internal jugular line with the tip in the superior vena cava. 3. There is cardiomegaly. 4. There is bilateral pulmonary opacification, slightly worse than on previous study. **This report has been created using voice recognition software. It may contain minor errors which are inherent in voice recognition technology. **      Final report electronically signed by DR Holly Levy on 11/10/2021 11:06 AM      XR CHEST PORTABLE   Final Result   Stable chest.               **This report has been created using voice recognition software. It may contain minor errors which are inherent in voice recognition technology. **      Final report electronically signed by Dr. Caraballo Course Wade Ware MD on 11/10/2021 8:28 AM      XR ABDOMEN (KUB) (SINGLE AP VIEW)   Final Result   NG tube is at the 1st portion of duodenum. This document has been electronically signed by: Kathie Rod MD on 11/08/2021 06:40 PM      XR CHEST PORTABLE   Final Result   1. Slightly improving bilateral pneumonia. 2. Moderate stable cardiomegaly. **This report has been created using voice recognition software. It may contain minor errors which are inherent in voice recognition technology. **      Final report electronically signed by Dr. Cooper Sales on 11/4/2021 6:34 PM      XR CHEST PORTABLE   Final Result   1. Moderate cardiomegaly. ET tube, NG tube, and right jugular line remain in good position. 2. Tiny bilateral pleural effusions. Findings of relatively severe pneumonia/edema involving both lungs diffusely. Overall appearance not appreciably changed from yesterday. **This report has been created using voice recognition software. It may contain minor errors which are inherent in voice recognition technology. **      Final report electronically signed by Dr. Irena Boland on 11/1/2021 9:36 AM      VL DUP LOWER EXTREMITY VENOUS BILATERAL   Final Result   No evidence of a DVT. **This report has been created using voice recognition software. It may contain minor errors which are inherent in voice recognition technology. **      Final report electronically signed by Dr. Ha Jeter on 11/1/2021 8:07 AM      XR CHEST PORTABLE   Final Result   No significant interval change since previous study dated 29th of October 2021. Nora Fanvirgil **This report has been created using voice recognition software. It may contain minor errors which are inherent in voice recognition technology. **      Final report electronically signed by DR Christine Segundo on 10/31/2021 12:43 PM      CT ABDOMEN PELVIS WO CONTRAST   Final Result      1.  Diffusely thickened appearance of the sigmoid colon. There are diverticula in this region. This could be on the basis of acute diverticulitis, however given the eccentric appearance of the thickening, neoplastic process can't entirely be excluded. 2. Haziness throughout the superficial subcutaneous soft tissues of the abdomen and pelvis consistent with anasarca. 3. No evidence of hydronephrosis. No obstructive renal stones are identified. 4. Diffuse haziness throughout the mesentery and a small amount of ascites, findings likely related to fluid overload. **This report has been created using voice recognition software. It may contain minor errors which are inherent in voice recognition technology. **      Final report electronically signed by Dr Peri Bingham on 10/30/2021 2:23 PM      CT CHEST W WO CONTRAST   Final Result       1. Extensive bilateral pneumonia. 2. Large bilateral pleural effusions, right greater than left. 3. Large pericardial effusion. 4. Marked cardiomegaly. **This report has been created using voice recognition software. It may contain minor errors which are inherent in voice recognition technology. **      Final report electronically signed by Dr Peri Bingham on 10/30/2021 1:54 PM      XR CHEST PORTABLE   Final Result   1. Mildly megaly. ET tube and NG tube remain in good position. Right jugular line with catheter tip in SVC. 2. Moderate-sized pleural effusion left side. 3. Moderate mixed infiltrates scattered throughout both lungs. Overall appearance slightly worse than prior. **This report has been created using voice recognition software. It may contain minor errors which are inherent in voice recognition technology. **      Final report electronically signed by Dr. Marina West on 10/29/2021 8:10 AM      US RENAL COMPLETE   Final Result   1. Mildly increased echogenicity of the kidneys that may suggest underlying medical renal disease. 2. No hydronephrosis.    3. A 7 mm left renal calculus is seen. 4. Small ascites. Bilateral pleural effusions. 5. Bilateral renal cysts. **This report has been created using voice recognition software. It may contain minor errors which are inherent in voice recognition technology. **      Final report electronically signed by Dr Kee Sorenson on 10/29/2021 3:55 AM      XR ABDOMEN FOR NG/OG/NE TUBE PLACEMENT   Final Result   1. The tip of the nasogastric tube is below the diaphragms within the stomach. 2. Partially seen is left pleural effusion and left retrocardiac opacity. Opacities are also seen in the partially seen right lower lobe. **This report has been created using voice recognition software. It may contain minor errors which are inherent in voice recognition technology. **      Final report electronically signed by Dr Kee Sorenson on 10/27/2021 6:54 PM      XR CHEST PORTABLE   Final Result   1. There has been no significant interval change in the radiographic appearance of the chest  from 10/23/2021 including diffuse airspace disease. .            **This report has been created using voice recognition software. It may contain minor errors which are inherent in voice recognition technology. **      Final report electronically signed by Dr Kee Sorenson on 10/24/2021 8:54 PM      XR CHEST PORTABLE   Final Result   1. Support lines and tubes in unchanged position when compared to prior. 2. Interval worsening of parenchymal densities within the right    central/right lower lobe. Additionally, worsening of confluent    consolidation within the left midlung with development of left basilar    pleural/parenchymal opacities likely consistent with moderate left pleural    effusion. This document has been electronically signed by: Hector Francis DO on    10/23/2021 05:00 AM      XR CHEST PORTABLE   Final Result   Persistent diffuse patchy opacities but with improved aeration at the lung bases.          **This report has been created using voice recognition software. It may contain minor errors which are inherent in voice recognition technology. **      Final report electronically signed by Dr. Madeleine Leahy MD on 10/21/2021 10:48 AM      XR CHEST PORTABLE   Final Result   1. Increased density in the left lung base. This can indicate partial left lower lobe collapse. 2. Persistent patchy bilateral pulmonary opacities. **This report has been created using voice recognition software. It may contain minor errors which are inherent in voice recognition technology. **      Final report electronically signed by Dr. Tiarra Ac on 10/18/2021 7:58 AM      XR CHEST PORTABLE   Final Result   Impression:   1. Cardiomegaly with improvement of passive congestive changes and better    aeration of both lungs compared to the prior study. 2. The focal bilateral alveolar consolidations consistent with infectious    pulmonary disease are unchanged. Retrocardiac consolidation and small LEFT    pleural effusion obscuring the LEFT diaphragm is unchanged. This document has been electronically signed by: Lila Roberto MD on    10/16/2021 03:18 AM      XR CHEST PORTABLE   Final Result   Somewhat low position of endotracheal tube 1.2 cm above the claire but improved aeration of the upper lungs. **This report has been created using voice recognition software. It may contain minor errors which are inherent in voice recognition technology. **      Final report electronically signed by Dr. Riley Reed on 10/15/2021 3:34 PM      XR CHEST PORTABLE   Final Result   1. Bilateral pneumonia. 2. Small bilateral pleural effusions. 3. Stable cardiomegaly. **This report has been created using voice recognition software. It may contain minor errors which are inherent in voice recognition technology. **      Final report electronically signed by Dr. Sharif Vivar on 10/15/2021 4:14 PM      XR CHEST PORTABLE   Final Result   Right lower lobe airspace infiltrates. Severe cardiomegaly. **This report has been created using voice recognition software. It may contain minor errors which are inherent in voice recognition technology. **      Final report electronically signed by Dr. Riley Reed on 10/11/2021 8:59 PM      XR CHEST PORTABLE   Final Result      1. Diffuse groundglass opacities in both lungs along with cardiomegaly. Findings likely suggest acute exacerbation of congestive heart failure versus pneumonia in the right clinical setting. Clinical correlation is recommended      2. Small left pleural effusion. 3. Other findings as described above. **This report has been created using voice recognition software. It may contain minor errors which are inherent in voice recognition technology. **      Final report electronically signed by Dr Milton Huizar on 10/11/2021 11:56 AM      XR CHEST PORTABLE    (Results Pending)         Objective:   Vitals: BP (!) 109/53   Pulse 79   Temp 101.3 °F (38.5 °C) (Bladder)   Resp 21   Ht 5' (1.524 m)   Wt 155 lb 3.3 oz (70.4 kg)   SpO2 97%   BMI 30.31 kg/m²    Wt Readings from Last 3 Encounters:   11/06/21 155 lb 3.3 oz (70.4 kg)   10/07/21 125 lb (56.7 kg)      24HR INTAKE/OUTPUT:      Intake/Output Summary (Last 24 hours) at 11/11/2021 0933  Last data filed at 11/11/2021 0933  Gross per 24 hour   Intake 944.85 ml   Output 1195 ml   Net -250.15 ml       Constitutional: Well-developed elderly lady on mechanical support via tracheostomy alert, awake, no apparent distress   Skin:normal with no rash or lesions. HEENT:Pupils are reactive . Alberto Guerrero Oral mucosa is moist.  Throat is clear. Neck:supple. Tracheostomy attached to mechanical support is noted  Cardiovascular:  S1, S2 without murmur or rubs   Respiratory:  Clear to ausculation without wheezes, rhonchi or rales in the anterior  Abdomen: Soft. Good bowel sounds. Nontender.   Ext: 2+ bilateral LE edema  Musculoskeletal:Intact  Neuro:Alert and awake. Obeys command. Electronically signed by Jack Medina MD on 11/11/2021 at 9:33 AM  **This report has been created using voice recognition software. It maycontain minor  errors which are inherent in voice recognition technology. **

## 2021-11-12 LAB
ABO: NORMAL
ANION GAP SERPL CALCULATED.3IONS-SCNC: 11 MEQ/L (ref 8–16)
ANTIBODY SCREEN: NORMAL
BUN BLDV-MCNC: 39 MG/DL (ref 7–22)
CALCIUM IONIZED: 1.02 MMOL/L (ref 1.12–1.32)
CALCIUM SERPL-MCNC: 7.7 MG/DL (ref 8.5–10.5)
CHLORIDE BLD-SCNC: 104 MEQ/L (ref 98–111)
CO2: 30 MEQ/L (ref 23–33)
CREAT SERPL-MCNC: 1.3 MG/DL (ref 0.4–1.2)
ERYTHROCYTE [DISTWIDTH] IN BLOOD BY AUTOMATED COUNT: 16.4 % (ref 11.5–14.5)
ERYTHROCYTE [DISTWIDTH] IN BLOOD BY AUTOMATED COUNT: 60.2 FL (ref 35–45)
GFR SERPL CREATININE-BSD FRML MDRD: 39 ML/MIN/1.73M2
GLUCOSE BLD-MCNC: 107 MG/DL (ref 70–108)
GLUCOSE BLD-MCNC: 125 MG/DL (ref 70–108)
GRAM STAIN RESULT: ABNORMAL
HCT VFR BLD CALC: 23.1 % (ref 37–47)
HEMOGLOBIN: 6.9 GM/DL (ref 12–16)
HEPARIN UNFRACTIONATED: 0.42 U/ML (ref 0.3–0.7)
MAGNESIUM: 2 MG/DL (ref 1.6–2.4)
MCH RBC QN AUTO: 30.5 PG (ref 26–33)
MCHC RBC AUTO-ENTMCNC: 29.9 GM/DL (ref 32.2–35.5)
MCV RBC AUTO: 102.2 FL (ref 81–99)
ORGANISM: ABNORMAL
ORGANISM: ABNORMAL
PATHOLOGIST REVIEW: ABNORMAL
PLATELET # BLD: 149 THOU/MM3 (ref 130–400)
PMV BLD AUTO: 11 FL (ref 9.4–12.4)
POTASSIUM REFLEX MAGNESIUM: 3.4 MEQ/L (ref 3.5–5.2)
POTASSIUM SERPL-SCNC: 3.2 MEQ/L (ref 3.5–5.2)
POTASSIUM SERPL-SCNC: 3.4 MEQ/L (ref 3.5–5.2)
RBC # BLD: 2.26 MILL/MM3 (ref 4.2–5.4)
RESPIRATORY CULTURE: ABNORMAL
RESPIRATORY CULTURE: ABNORMAL
RH FACTOR: NORMAL
SCAN OF BLOOD SMEAR: NORMAL
SODIUM BLD-SCNC: 145 MEQ/L (ref 135–145)
URINE CULTURE, ROUTINE: ABNORMAL
URINE CULTURE, ROUTINE: ABNORMAL
WBC # BLD: 6 THOU/MM3 (ref 4.8–10.8)

## 2021-11-12 PROCEDURE — 86900 BLOOD TYPING SEROLOGIC ABO: CPT

## 2021-11-12 PROCEDURE — 82330 ASSAY OF CALCIUM: CPT

## 2021-11-12 PROCEDURE — 86923 COMPATIBILITY TEST ELECTRIC: CPT

## 2021-11-12 PROCEDURE — 85520 HEPARIN ASSAY: CPT

## 2021-11-12 PROCEDURE — 2580000003 HC RX 258: Performed by: STUDENT IN AN ORGANIZED HEALTH CARE EDUCATION/TRAINING PROGRAM

## 2021-11-12 PROCEDURE — 6360000002 HC RX W HCPCS: Performed by: NURSE PRACTITIONER

## 2021-11-12 PROCEDURE — 99291 CRITICAL CARE FIRST HOUR: CPT | Performed by: INTERNAL MEDICINE

## 2021-11-12 PROCEDURE — 2100000000 HC CCU R&B

## 2021-11-12 PROCEDURE — 2580000003 HC RX 258: Performed by: NURSE PRACTITIONER

## 2021-11-12 PROCEDURE — 94003 VENT MGMT INPAT SUBQ DAY: CPT

## 2021-11-12 PROCEDURE — 84132 ASSAY OF SERUM POTASSIUM: CPT

## 2021-11-12 PROCEDURE — 83735 ASSAY OF MAGNESIUM: CPT

## 2021-11-12 PROCEDURE — 2500000003 HC RX 250 WO HCPCS: Performed by: STUDENT IN AN ORGANIZED HEALTH CARE EDUCATION/TRAINING PROGRAM

## 2021-11-12 PROCEDURE — 82948 REAGENT STRIP/BLOOD GLUCOSE: CPT

## 2021-11-12 PROCEDURE — 36430 TRANSFUSION BLD/BLD COMPNT: CPT

## 2021-11-12 PROCEDURE — P9016 RBC LEUKOCYTES REDUCED: HCPCS

## 2021-11-12 PROCEDURE — 2700000000 HC OXYGEN THERAPY PER DAY

## 2021-11-12 PROCEDURE — 36415 COLL VENOUS BLD VENIPUNCTURE: CPT

## 2021-11-12 PROCEDURE — 80048 BASIC METABOLIC PNL TOTAL CA: CPT

## 2021-11-12 PROCEDURE — 6360000002 HC RX W HCPCS: Performed by: STUDENT IN AN ORGANIZED HEALTH CARE EDUCATION/TRAINING PROGRAM

## 2021-11-12 PROCEDURE — 6370000000 HC RX 637 (ALT 250 FOR IP): Performed by: INTERNAL MEDICINE

## 2021-11-12 PROCEDURE — 2500000003 HC RX 250 WO HCPCS: Performed by: INTERNAL MEDICINE

## 2021-11-12 PROCEDURE — 85027 COMPLETE CBC AUTOMATED: CPT

## 2021-11-12 PROCEDURE — 6360000002 HC RX W HCPCS: Performed by: INTERNAL MEDICINE

## 2021-11-12 PROCEDURE — 99232 SBSQ HOSP IP/OBS MODERATE 35: CPT | Performed by: INTERNAL MEDICINE

## 2021-11-12 PROCEDURE — 94761 N-INVAS EAR/PLS OXIMETRY MLT: CPT

## 2021-11-12 PROCEDURE — 86901 BLOOD TYPING SEROLOGIC RH(D): CPT

## 2021-11-12 PROCEDURE — 86850 RBC ANTIBODY SCREEN: CPT

## 2021-11-12 RX ORDER — SODIUM CHLORIDE 9 MG/ML
INJECTION, SOLUTION INTRAVENOUS PRN
Status: DISCONTINUED | OUTPATIENT
Start: 2021-11-12 | End: 2021-11-18 | Stop reason: HOSPADM

## 2021-11-12 RX ORDER — FLUCONAZOLE 100 MG/1
100 TABLET ORAL DAILY
Status: DISCONTINUED | OUTPATIENT
Start: 2021-11-12 | End: 2021-11-12 | Stop reason: ALTCHOICE

## 2021-11-12 RX ADMIN — POTASSIUM CHLORIDE 20 MEQ: 400 INJECTION, SOLUTION INTRAVENOUS at 06:26

## 2021-11-12 RX ADMIN — FAMOTIDINE 20 MG: 10 INJECTION, SOLUTION INTRAVENOUS at 09:58

## 2021-11-12 RX ADMIN — SODIUM CHLORIDE 0.8 MCG/KG/HR: 9 INJECTION, SOLUTION INTRAVENOUS at 20:47

## 2021-11-12 RX ADMIN — HEPARIN SODIUM 14 UNITS/KG/HR: 10000 INJECTION, SOLUTION INTRAVENOUS at 07:55

## 2021-11-12 RX ADMIN — AMIODARONE HYDROCHLORIDE 0.5 MG/MIN: 1.8 INJECTION, SOLUTION INTRAVENOUS at 20:50

## 2021-11-12 RX ADMIN — PIPERACILLIN AND TAZOBACTAM 3375 MG: 3; .375 INJECTION, POWDER, LYOPHILIZED, FOR SOLUTION INTRAVENOUS at 22:00

## 2021-11-12 RX ADMIN — POTASSIUM CHLORIDE 20 MEQ: 400 INJECTION, SOLUTION INTRAVENOUS at 07:03

## 2021-11-12 RX ADMIN — SODIUM CHLORIDE 0.8 MCG/KG/HR: 9 INJECTION, SOLUTION INTRAVENOUS at 09:53

## 2021-11-12 RX ADMIN — AMIODARONE HYDROCHLORIDE 0.5 MG/MIN: 1.8 INJECTION, SOLUTION INTRAVENOUS at 09:33

## 2021-11-12 RX ADMIN — SODIUM CHLORIDE, PRESERVATIVE FREE 10 ML: 5 INJECTION INTRAVENOUS at 21:00

## 2021-11-12 RX ADMIN — BUMETANIDE 1 MG: 0.25 INJECTION INTRAMUSCULAR; INTRAVENOUS at 09:58

## 2021-11-12 RX ADMIN — PIPERACILLIN AND TAZOBACTAM 3375 MG: 3; .375 INJECTION, POWDER, LYOPHILIZED, FOR SOLUTION INTRAVENOUS at 12:06

## 2021-11-12 RX ADMIN — SODIUM CHLORIDE, PRESERVATIVE FREE 10 ML: 5 INJECTION INTRAVENOUS at 09:37

## 2021-11-12 RX ADMIN — PIPERACILLIN AND TAZOBACTAM 3375 MG: 3; .375 INJECTION, POWDER, LYOPHILIZED, FOR SOLUTION INTRAVENOUS at 03:59

## 2021-11-12 RX ADMIN — SODIUM CHLORIDE 1 MCG/KG/HR: 9 INJECTION, SOLUTION INTRAVENOUS at 03:58

## 2021-11-12 ASSESSMENT — PULMONARY FUNCTION TESTS
PIF_VALUE: 2
PIF_VALUE: 24
PIF_VALUE: 24
PIF_VALUE: 27

## 2021-11-12 ASSESSMENT — PAIN SCALES - GENERAL: PAINLEVEL_OUTOF10: 0

## 2021-11-12 NOTE — PROGRESS NOTES
[address] THERAPY MISSED TREATMENT NOTE  [facility] 3A  3A-009-A      Date: 2021  Patient Name: Marlen Tai        CSN: [id number]   : 1934  (80 y.o.)  Gender: female   Referring Practitioner: Syeda Argueta MD  Diagnosis: acute respiratory failure with hypoxia         REASON FOR MISSED TREATMENT: Unable to coordinate co-tx this date d/t scheduling constraints. Will check back next available date.

## 2021-11-12 NOTE — CARE COORDINATION
11/12/21, 3:54 PM EST    DISCHARGE ON GOING EVALUATION    [address] [facility] day: 32  Location: 3A-09/009-A Reason for admit: Acute respiratory failure with hypoxia ([facility] [address] [J96.01]  Acute congestive heart failure, unspecified heart failure type ([address] [I50.9]  COVID-19 [U07.1]   Procedure:   10/12 Echo with EF 25-30%; Small circumferential pericardial effusion with no tamponade physiology; Myxomatotic degeneration of mitral valve;  Mild to Moderate mitral regurgitation is present  10/15 Intubated  10/15 CVC RIJ - 11/11 removed  10/28 Renal US: Mildly increased echogenicity of the kidneys that may suggest underlying medical renal disease; no hydronephrosis; 7mm left renal calculus is seen; small ascites. Bilateral pleural effusions; bilateral renal cysts  10/30 CT Chest: Extensive bilateral pneumonia; Large bilateral pleural effusions, right greater than left; Large pericardial effusion; Marked cardiomegaly  10/30 CT Abd/pelvis: Diffusely thickened appearance of the sigmoid colon. There are diverticula in this region. This could be on the basis of acute diverticulitis, however given the eccentric appearance of the thickening, neoplastic process can't entirely be excluded; Haziness throughout the superficial subcutaneous soft tissues of the abdomen and pelvis consistent with anasarca; No evidence of hydronephrosis. No obstructive renal stones are identified; Diffuse haziness throughout the mesentery and a small amount of ascites, findings likely related to fluid overload  11/1 BLE venous doppler: Neg for DVT  11/10 Bronch w/washings sent: Pitting airways disease; BLL mucous plugging  11/10 Trach placed: Bivona 7.5 mm  11/10 Cardioverted x1 to NSR  58/08 PICC left basilic    Barriers to Discharge: To receive 2 PRBC today for hgb 6.9. Will need out of COVID isolation to go to [facility].     Remains on vent to trach, on PCV, peep 6, FIO2 30%, sats 96%. Tmax 101.1. NSR. Follows commands. PT/OT - early mobility. Intensivist and Nephrology following. Palliative Care, Wound Care, and Dietitian following. Telemetry, PICC, alaniz. Amio @ 0.5 mg/min,  heparin drip, IV bumex 1 mg daily, IV pepcid, lactobacillus, prn IV ativan, IV zosyn, Electrolyte replacement protocols.   PCP: [facility] - CNP  Readmission Risk Score: 20.2 ( )%  Patient Goals/Plan/Treatment Preferences: From home with daughter. Plan for [facility] as [facility]st choice and 2nd choice is [facility].  No precert required.

## 2021-11-12 NOTE — PROGRESS NOTES
Renal Progress Note    Assessment and Plan:   1. Acute kidney injury mostly stable  2. Hypokalemia   3. Hypoxic respiratory failure on mechanical support  4. SARS-CoV-2 pneumonia   5. Lower extremity edema improved    PLAN:  1. Labs reviewed  2. Serum potassium is low  3. Medications reviewed  4. Potassium chloride 40 mEq IV piggyback once  5. Labs in the morning  6. We will follow  7.  Discussed with the staff    Patient Active Problem List:     Sigmoid diverticulitis     Acute respiratory failure with hypoxia ([facility] [address]     COVID-19     Acute congestive heart failure ([facility] [address]      Subjective:   Admit Date: 10/11/2021    Interval History:   Seen for acute kidney injury and electrolyte imbalances  Awake and alert despite mechanical support  Updated by the staff  No new issues of concern  Blood pressure is mostly stable  Urine output is okay      Medications:   Scheduled Meds:   lidocaine-EPINEPHrine  20 mL IntraDERmal Once    piperacillin-tazobactam  3,375 mg IntraVENous Q8H    lidocaine 1 % injection  5 mL IntraDERmal Once    sodium chloride flush  5-40 mL IntraVENous 2 times per day    famotidine (PEPCID) injection  20 mg IntraVENous Daily    bumetanide  1 mg IntraVENous Daily    lactobacillus  1 capsule Per NG tube Daily with breakfast    potassium (CARDIAC) replacement protocol   Other RX Placeholder    senna  10 mL Per NG tube Nightly    [Held by provider] lactulose  20 g Oral TID    [Held by provider] metoprolol tartrate  25 mg Oral BID    [Held by provider] apixaban  2.5 mg Oral BID     Continuous Infusions:   sodium chloride      amiodarone 0.5 mg/min (11/12/21 0933)    heparin (PORCINE) Infusion 14 Units/kg/hr (11/12/21 0755)    sodium chloride 10 mL/hr at 11/12/21 0400    midazolam Stopped (11/10/21 1146)    dextrose 100 mL/hr (10/26/21 1253)    dexmedetomidine Stopped (11/12/21 0954)    norepinephrine Stopped (11/11/21 0246)    sodium chloride Stopped (10/19/21 1900)       CBC:   Recent Labs 11/10/21  0450 11/11/21  0630 11/12/21  0430   WBC 5.8 7.1 6.0   HGB 7.7* 7.4* 6.9*    179 149     CMP:    Recent Labs     11/10/21  0450 11/10/21  0450 11/11/21  0630 11/12/21  0430     --  142 145   K 3.4*  3.4*   < > 4.2  4.2 3.4*     --  102 104   CO2 29  --  28 30   BUN 42*  --  42* 39*   CREATININE 1.1  --  1.3* 1.3*   GLUCOSE 130*  --  103 107   CALCIUM 8.0*  --  7.8* 7.7*   LABGLOM 47*  --  39* 39*    < > = values in this interval not displayed. Troponin: No results for input(s): TROPONINI in the last 72 hours. BNP: No results for input(s): BNP in the last 72 hours. INR: No results for input(s): INR in the last 72 hours. Lipids: No results for input(s): CHOL, LDLDIRECT, TRIG, HDL, AMYLASE, LIPASE in the last 72 hours. Liver: No results for input(s): AST, ALT, ALKPHOS, PROT, LABALBU, BILITOT in the last 72 hours. Invalid input(s): BILDIR  Iron:  No results for input(s): IRONS, FERRITIN in the last 72 hours. Invalid input(s): LABIRONS  XR CHEST PORTABLE   Final Result   1. The left PICC line terminates in the superior vena cava. 2. Otherwise, there has been no other significant interval change in the radiographic appearance of the chest  from 11/10/2021. **This report has been created using voice recognition software. It may contain minor errors which are inherent in voice recognition technology. **      Final report electronically signed by Dr Binh Waldrop on 2021 11:32 AM      XR CHEST PORTABLE   Final Result   1. There is a new tracheotomy tube. There has been removal of the enteric tube and endotracheal tube. 2.. There is no change in the right internal jugular line with the tip in the superior vena cava. 3. There is cardiomegaly. 4. There is bilateral pulmonary opacification, slightly worse than on previous study. **This report has been created using voice recognition software.  It may contain minor errors which are inherent in voice recognition technology. **      Final report electronically signed by DR Holly Levy on 11/10/2021 11:06 AM      XR CHEST PORTABLE   Final Result   Stable chest.               **This report has been created using voice recognition software. It may contain minor errors which are inherent in voice recognition technology. **      Final report electronically signed by Dr. Tra Crisostomo MD on 11/10/2021 8:28 AM      XR ABDOMEN (KUB) (SINGLE AP VIEW)   Final Result   NG tube is at the 1st portion of duodenum. This document has been electronically signed by: Jessenia An MD on 11/08/2021 06:40 PM      XR CHEST PORTABLE   Final Result   1. Slightly improving bilateral pneumonia. 2. Moderate stable cardiomegaly. **This report has been created using voice recognition software. It may contain minor errors which are inherent in voice recognition technology. **      Final report electronically signed by Dr. Cassidy Dumont on 11/4/2021 6:34 PM      XR CHEST PORTABLE   Final Result   1. Moderate cardiomegaly. ET tube, NG tube, and right jugular line remain in good position. 2. Tiny bilateral pleural effusions. Findings of relatively severe pneumonia/edema involving both lungs diffusely. Overall appearance not appreciably changed from yesterday. **This report has been created using voice recognition software. It may contain minor errors which are inherent in voice recognition technology. **      Final report electronically signed by Dr. Porfirio Iniguez on 11/1/2021 9:36 AM      VL DUP LOWER EXTREMITY VENOUS BILATERAL   Final Result   No evidence of a DVT. **This report has been created using voice recognition software. It may contain minor errors which are inherent in voice recognition technology. **      Final report electronically signed by Dr. Joyce Tracy on 11/1/2021 8:07 AM      XR CHEST PORTABLE   Final Result   No significant interval change since previous study dated 29th of October 2021. Michelle Mccullough **This report has been created using voice recognition software. It may contain minor errors which are inherent in voice recognition technology. **      Final report electronically signed by DR Marla Jimenez on 10/31/2021 12:43 PM      CT ABDOMEN PELVIS WO CONTRAST   Final Result      1. Diffusely thickened appearance of the sigmoid colon. There are diverticula in this region. This could be on the basis of acute diverticulitis, however given the eccentric appearance of the thickening, neoplastic process can't entirely be excluded. 2. Haziness throughout the superficial subcutaneous soft tissues of the abdomen and pelvis consistent with anasarca. 3. No evidence of hydronephrosis. No obstructive renal stones are identified. 4. Diffuse haziness throughout the mesentery and a small amount of ascites, findings likely related to fluid overload. **This report has been created using voice recognition software. It may contain minor errors which are inherent in voice recognition technology. **      Final report electronically signed by Dr Heena Li on 10/30/2021 2:23 PM      CT CHEST W WO CONTRAST   Final Result       1. Extensive bilateral pneumonia. 2. Large bilateral pleural effusions, right greater than left. 3. Large pericardial effusion. 4. Marked cardiomegaly. **This report has been created using voice recognition software. It may contain minor errors which are inherent in voice recognition technology. **      Final report electronically signed by Dr Heena Li on 10/30/2021 1:54 PM      XR CHEST PORTABLE   Final Result   1. Mildly megaly. ET tube and NG tube remain in good position. Right jugular line with catheter tip in SVC. 2. Moderate-sized pleural effusion left side. 3. Moderate mixed infiltrates scattered throughout both lungs. Overall appearance slightly worse than prior.             **This report has been created using voice recognition software. It may contain minor errors which are inherent in voice recognition technology. **      Final report electronically signed by Dr. Galileo Pinzon on 10/29/2021 8:10 AM      US RENAL COMPLETE   Final Result   1. Mildly increased echogenicity of the kidneys that may suggest underlying medical renal disease. 2. No hydronephrosis. 3. A 7 mm left renal calculus is seen. 4. Small ascites. Bilateral pleural effusions. 5. Bilateral renal cysts. **This report has been created using voice recognition software. It may contain minor errors which are inherent in voice recognition technology. **      Final report electronically signed by Dr Tyler Blackwell on 10/29/2021 3:55 AM      XR ABDOMEN FOR NG/OG/NE TUBE PLACEMENT   Final Result   1. The tip of the nasogastric tube is below the diaphragms within the stomach. 2. Partially seen is left pleural effusion and left retrocardiac opacity. Opacities are also seen in the partially seen right lower lobe. **This report has been created using voice recognition software. It may contain minor errors which are inherent in voice recognition technology. **      Final report electronically signed by Dr Tyler Blackwell on 10/27/2021 6:54 PM      XR CHEST PORTABLE   Final Result   1. There has been no significant interval change in the radiographic appearance of the chest  from 10/23/2021 including diffuse airspace disease. .            **This report has been created using voice recognition software. It may contain minor errors which are inherent in voice recognition technology. **      Final report electronically signed by Dr Tyler Blackwell on 10/24/2021 8:54 PM      XR CHEST PORTABLE   Final Result   1. Support lines and tubes in unchanged position when compared to prior. 2. Interval worsening of parenchymal densities within the right    central/right lower lobe.  Additionally, worsening of confluent    consolidation within the left midlung with development of left basilar    pleural/parenchymal opacities likely consistent with moderate left pleural    effusion. This document has been electronically signed by: Giles Carbajal DO on    10/23/2021 05:00 AM      XR CHEST PORTABLE   Final Result   Persistent diffuse patchy opacities but with improved aeration at the lung bases. **This report has been created using voice recognition software. It may contain minor errors which are inherent in voice recognition technology. **      Final report electronically signed by Dr. Tai Verdugo MD on 10/21/2021 10:48 AM      XR CHEST PORTABLE   Final Result   1. Increased density in the left lung base. This can indicate partial left lower lobe collapse. 2. Persistent patchy bilateral pulmonary opacities. **This report has been created using voice recognition software. It may contain minor errors which are inherent in voice recognition technology. **      Final report electronically signed by Dr. Josh Valdovinos on 10/18/2021 7:58 AM      XR CHEST PORTABLE   Final Result   Impression:   1. Cardiomegaly with improvement of passive congestive changes and better    aeration of both lungs compared to the prior study. 2. The focal bilateral alveolar consolidations consistent with infectious    pulmonary disease are unchanged. Retrocardiac consolidation and small LEFT    pleural effusion obscuring the LEFT diaphragm is unchanged. This document has been electronically signed by: Mello Lipscomb MD on    10/16/2021 03:18 AM      XR CHEST PORTABLE   Final Result   Somewhat low position of endotracheal tube 1.2 cm above the claire but improved aeration of the upper lungs. **This report has been created using voice recognition software. It may contain minor errors which are inherent in voice recognition technology. **      Final report electronically signed by Dr. Parul Levy on 10/15/2021 3:34 PM      XR CHEST PORTABLE   Final Result   1. Bilateral pneumonia. 2. Small bilateral pleural effusions. 3. Stable cardiomegaly. **This report has been created using voice recognition software. It may contain minor errors which are inherent in voice recognition technology. **      Final report electronically signed by Dr. Cooper Sales on 10/15/2021 4:14 PM      XR CHEST PORTABLE   Final Result   Right lower lobe airspace infiltrates. Severe cardiomegaly. **This report has been created using voice recognition software. It may contain minor errors which are inherent in voice recognition technology. **      Final report electronically signed by Dr. Marva Chairez on 10/11/2021 8:59 PM      XR CHEST PORTABLE   Final Result      1. Diffuse groundglass opacities in both lungs along with cardiomegaly. Findings likely suggest acute exacerbation of congestive heart failure versus pneumonia in the right clinical setting. Clinical correlation is recommended      2. Small left pleural effusion. 3. Other findings as described above. **This report has been created using voice recognition software. It may contain minor errors which are inherent in voice recognition technology. **      Final report electronically signed by Dr Venus Pisano on 10/11/2021 11:56 AM            Objective:   Vitals: BP (!) 109/59   Pulse 61   Temp 98.8 °F (37.1 °C)   Resp 18   Ht 5' (1.524 m)   Wt 140 lb 14 oz (63.9 kg)   SpO2 98%   BMI 27.51 kg/m²    Wt Readings from Last 3 Encounters:   11/12/21 140 lb 14 oz (63.9 kg)   10/07/21 125 lb (56.7 kg)      24HR INTAKE/OUTPUT:      Intake/Output Summary (Last 24 hours) at 11/12/2021 1042  Last data filed at 11/12/2021 0400  Gross per 24 hour   Intake 1059.75 ml   Output 1275 ml   Net -215.25 ml       Constitutional: Well-developed elderly lady on mechanical support alert, awake, no apparent distress   Skin:normal with no rash or lesions.   HEENT:Pupils are reactive

## 2021-11-12 NOTE — PROGRESS NOTES
CRITICAL CARE PROGRESS NOTE      Patient:  Rona Beyer    Unit/Bed:3A-09/009-A  YOB: 1934  MRN: [id number]   PCP: SERGE Hu CNP  Date of Admission: 10/11/2021  Chief Complaint:- Worsening shortness of breath    Assessment and Plan:    Acute hypoxic respiratory failure: Patient was admitted to the hospital through the ED on 10/11/2021 and started on high flow. Patient's hypoxemia progressively worsened. Intubated and admitted to ICU on 10/15.  - Patient has had multiple failed breathing trials. There was some suspicion for neuromuscular disease present dt persistent hypercarbia despite mechanical ventilatory support. Work up negative for Myasthenia Gravis. Trach in place. Current vent settings: PCV rate 20, PIP 24, peep 6, Fio2 30% spo2 96%    Covid Pneumonia with superimposed bacterial pneumonia:  S/p baricitinib   10/19 -respiratory culture positive for Corynebacterium. Vancomycin 6 days. 11/10 - pna panel negative, resp culture pending. CXR shows bilateral pulmonary opacifications slightly worse than previous. Empiric zosyn   11/11 - day 2 zosyn  11/12 - Zosyn day 3     3. ARDS: Mild. Patient received oxygen with FiO2 100 with very low TV (250ml) on ventilation. Ferritin, CRP, Lactic dehydrogenase elevated. Started Decadron, Baricitinib (10/12). Trach in place. Continue vent settings. Wean as tolerated     Hypotension - currently off levo. Continue to monitor. 6. HFrEF: Echo (10/11): LV EF 25-30%, LV severe global hypokinesis. LV size moderate increase. Weight on admission 120 lbs. Cardiologist consult appreciated - cardiology will wait for recovery prior to considering any intervention such as LifeVest versus AICD. D/c milrinone on 11/8       Hx of Prolong QTc interval: On telemetry strip in ED. Seen by cardiogy for Prolong QTC. Plan: tele monitor, avoid QTC prolong medications, Keep K +> 4 and Mg > 2     8.  New on set Afib with RVR: since admission patient had episodes of afib without rvr. 11/10 patient developed afib with RVR. After amio bolus given, patient sustained rate of 150s. Patient underwent synchronized cardioversion at 200J in which patient returned to NSR rate 100. Continue on amio drip, heparin drip. Continue to monitor. 9. Bilateral lower extremity swelling: r/o DVT - US negative on 11/1     HERIBERTO: Bun/Cr Improving. Patient having good urine output. Nephrology continue Bumex and adjust as clinically appropriate. Hyperkalemia: Resolved. 12. AMS: likely secondary to hypercapnic hypoxemia. Neuro exam when patient is extubated     15. Macrocytic Anemia: H/H 6.9/23.1 .2. Ordered folate and b12 levels, came back WNL. TSH WNL. Most likely dt covid/pulmonary dz. 14.  Oral Candidiasis: Identitified on respiratory culture. Holding on diflucan due to prolonged QTc.    15. Hematuria: Secondary to nephrolithiasis. resolved      INITIAL H AND P AND ICU COURSE:   61-year-old female presents to the ED on 10/11/2021 due to worsening shortness of breath. Patient tested positive for Covid on 10/4/2021. In the emergency room patient was found to have a SPO2 of 83% on room air with a BNP of 15.7K. Patient was admitted and managed for acute hypoxic respiratory failure secondary to Covid with combination of congestive heart failure. Patient was placed on nasal cannula, Bumex and dexamethasone in the ED. On 10/15 patient was transferred to the ICU and intubated for acute hypoxic respiratory failure on 100% FiO2 on high flow. Patient had a UTI on 10/18 and was started on ceftriaxone on 10/19. Patient was also started on milrinone for her HFrEF. Patient's family has been consulted regarding trach and PEG tube in which they are agreeable. Cardiology is on board regarding LifeVest versus AICD. 11/5 - patient HERIBERTO improving, continue to be followed by nephro. Repeat cxr shows improving of bilateral pna. Pending pneumonia panel results.  Trach and peg set for sodium chloride flush  5-40 mL IntraVENous 2 times per day    famotidine (PEPCID) injection  20 mg IntraVENous Daily    bumetanide  1 mg IntraVENous Daily    lactobacillus  1 capsule Per NG tube Daily with breakfast    potassium (CARDIAC) replacement protocol   Other RX Placeholder    senna  10 mL Per NG tube Nightly    [Held by provider] lactulose  20 g Oral TID    [Held by provider] metoprolol tartrate  25 mg Oral BID    [Held by provider] apixaban  2.5 mg Oral BID     Continuous Infusions:   sodium chloride      amiodarone 0.5 mg/min (11/12/21 0933)    heparin (PORCINE) Infusion 14 Units/kg/hr (11/12/21 0755)    sodium chloride 10 mL/hr at 11/12/21 0400    midazolam Stopped (11/10/21 1146)    dextrose 100 mL/hr (10/26/21 1253)    dexmedetomidine Stopped (11/12/21 0954)    norepinephrine Stopped (11/11/21 0246)    sodium chloride Stopped (10/19/21 1900)       PHYSICAL EXAMINATION:  T:  101.3 F. P:  80  RR:  21 B/P:  115/64 FiO2:  30. O2 Sat:  95  I/O:  944.85/1195 net -250.15  Body mass index is 27.51 kg/m². GCS:   11  PCV+: Rate 16, PIP 25, PEEP 6    Sedation: precedex 0.6 mcg  Drips: heparin 14 units/kg/hr , amiodarone 30mg/hr     General: Sedated on vent  HEENT:  normocephalic and atraumatic. No scleral icterus. PERR  Neck: supple. No Thyromegaly. Lungs: diminished breath sounds bilaterally on auscultation. No retractions  Cardiac: RRR. No JVD. Abdomen: soft. Nontender. Extremities:  No clubbing, cyanosis, mild pitting edema x 4, more prominent in bilateral lower extremities    Vasculature: capillary refill < 3 seconds. Palpable dorsalis pedis pulses. Skin:  warm and dry. Sacral decubitus ulcer  Psych:  Patient sedated  Lymph:  No supraclavicular adenopathy. Neurologic: No seizures. Data: (All radiographs, tracings, PFTs, and imaging are personally viewed and interpreted unless otherwise noted).    Sodium 145, potassium 3.4, chloride 104, CO2 30, BUN 39, creatinine 1.3  WBC 6.0, Hgb 6.9, HCT 22.1, PLTs 149  Telemetry shows NSR     CXR on 11/11:     Impression   1. The left PICC line terminates in the superior vena cava. 2. Otherwise, there has been no other significant interval change in the radiographic appearance of the chest  from 11/10/2021. Seen with multidisciplinary ICU team   Meets Continued ICU Level Care Criteria:    [x] Yes   [] No - Transfer Planned to listed location:  [] HOSPITALIST CONTACTED-      Case and plan discussed with Dr. Christiane Monreal    Electronically signed by DO Dary Mac    Patient seen by me. Case discussed with resident physician. Patient continues to require mechanical ventilator support. Continue with Zosyn. Italicized font represents changes to the note made by me. CC time 35 minutes. Time was discontiguous. Time does not include procedures. Time does include my direct assessment of the patient and coordination of care.   Electronically signed by Gerald Romero MD on 11/12/2021 at 6:24 PM

## 2021-11-13 ENCOUNTER — APPOINTMENT (OUTPATIENT)
Dept: GENERAL RADIOLOGY | Age: 86
DRG: 004 | End: 2021-11-13
Payer: MEDICARE

## 2021-11-13 LAB
ANION GAP SERPL CALCULATED.3IONS-SCNC: 11 MEQ/L (ref 8–16)
BUN BLDV-MCNC: 36 MG/DL (ref 7–22)
CALCIUM IONIZED: 1 MMOL/L (ref 1.12–1.32)
CALCIUM IONIZED: 1.03 MMOL/L (ref 1.12–1.32)
CALCIUM SERPL-MCNC: 7.3 MG/DL (ref 8.5–10.5)
CHLORIDE BLD-SCNC: 104 MEQ/L (ref 98–111)
CO2: 26 MEQ/L (ref 23–33)
CREAT SERPL-MCNC: 1.4 MG/DL (ref 0.4–1.2)
ERYTHROCYTE [DISTWIDTH] IN BLOOD BY AUTOMATED COUNT: 16.3 % (ref 11.5–14.5)
ERYTHROCYTE [DISTWIDTH] IN BLOOD BY AUTOMATED COUNT: 58.7 FL (ref 35–45)
GFR SERPL CREATININE-BSD FRML MDRD: 36 ML/MIN/1.73M2
GLUCOSE BLD-MCNC: 145 MG/DL (ref 70–108)
HCT VFR BLD CALC: 29 % (ref 37–47)
HCT VFR BLD CALC: 29.3 % (ref 37–47)
HCT VFR BLD CALC: 30 % (ref 37–47)
HEMOGLOBIN: 8.7 GM/DL (ref 12–16)
HEMOGLOBIN: 9.2 GM/DL (ref 12–16)
HEMOGLOBIN: 9.5 GM/DL (ref 12–16)
HEPARIN UNFRACTIONATED: < 0.04 U/ML (ref 0.3–0.7)
MAGNESIUM: 1.9 MG/DL (ref 1.6–2.4)
MCH RBC QN AUTO: 29.4 PG (ref 26–33)
MCHC RBC AUTO-ENTMCNC: 29.7 GM/DL (ref 32.2–35.5)
MCV RBC AUTO: 99 FL (ref 81–99)
PLATELET # BLD: 144 THOU/MM3 (ref 130–400)
PMV BLD AUTO: 11 FL (ref 9.4–12.4)
POTASSIUM SERPL-SCNC: 4.2 MEQ/L (ref 3.5–5.2)
POTASSIUM SERPL-SCNC: 5.9 MEQ/L (ref 3.5–5.2)
RBC # BLD: 2.96 MILL/MM3 (ref 4.2–5.4)
SODIUM BLD-SCNC: 141 MEQ/L (ref 135–145)
WBC # BLD: 6.4 THOU/MM3 (ref 4.8–10.8)

## 2021-11-13 PROCEDURE — 85520 HEPARIN ASSAY: CPT

## 2021-11-13 PROCEDURE — 2580000003 HC RX 258: Performed by: STUDENT IN AN ORGANIZED HEALTH CARE EDUCATION/TRAINING PROGRAM

## 2021-11-13 PROCEDURE — 6370000000 HC RX 637 (ALT 250 FOR IP): Performed by: STUDENT IN AN ORGANIZED HEALTH CARE EDUCATION/TRAINING PROGRAM

## 2021-11-13 PROCEDURE — 2580000003 HC RX 258: Performed by: NURSE PRACTITIONER

## 2021-11-13 PROCEDURE — 2500000003 HC RX 250 WO HCPCS: Performed by: STUDENT IN AN ORGANIZED HEALTH CARE EDUCATION/TRAINING PROGRAM

## 2021-11-13 PROCEDURE — 2700000000 HC OXYGEN THERAPY PER DAY

## 2021-11-13 PROCEDURE — 2100000000 HC CCU R&B

## 2021-11-13 PROCEDURE — 85027 COMPLETE CBC AUTOMATED: CPT

## 2021-11-13 PROCEDURE — 82330 ASSAY OF CALCIUM: CPT

## 2021-11-13 PROCEDURE — 94761 N-INVAS EAR/PLS OXIMETRY MLT: CPT

## 2021-11-13 PROCEDURE — 2500000003 HC RX 250 WO HCPCS: Performed by: INTERNAL MEDICINE

## 2021-11-13 PROCEDURE — 36415 COLL VENOUS BLD VENIPUNCTURE: CPT

## 2021-11-13 PROCEDURE — 99291 CRITICAL CARE FIRST HOUR: CPT | Performed by: INTERNAL MEDICINE

## 2021-11-13 PROCEDURE — 74018 RADEX ABDOMEN 1 VIEW: CPT

## 2021-11-13 PROCEDURE — 6360000002 HC RX W HCPCS: Performed by: NURSE PRACTITIONER

## 2021-11-13 PROCEDURE — 83735 ASSAY OF MAGNESIUM: CPT

## 2021-11-13 PROCEDURE — 85018 HEMOGLOBIN: CPT

## 2021-11-13 PROCEDURE — 6360000002 HC RX W HCPCS: Performed by: INTERNAL MEDICINE

## 2021-11-13 PROCEDURE — 85014 HEMATOCRIT: CPT

## 2021-11-13 PROCEDURE — 94003 VENT MGMT INPAT SUBQ DAY: CPT

## 2021-11-13 PROCEDURE — 84132 ASSAY OF SERUM POTASSIUM: CPT

## 2021-11-13 PROCEDURE — 6360000002 HC RX W HCPCS: Performed by: STUDENT IN AN ORGANIZED HEALTH CARE EDUCATION/TRAINING PROGRAM

## 2021-11-13 PROCEDURE — 99232 SBSQ HOSP IP/OBS MODERATE 35: CPT | Performed by: INTERNAL MEDICINE

## 2021-11-13 PROCEDURE — 80048 BASIC METABOLIC PNL TOTAL CA: CPT

## 2021-11-13 RX ORDER — DEXTROSE MONOHYDRATE 25 G/50ML
12.5 INJECTION, SOLUTION INTRAVENOUS PRN
Status: DISCONTINUED | OUTPATIENT
Start: 2021-11-13 | End: 2021-11-18 | Stop reason: HOSPADM

## 2021-11-13 RX ORDER — MAGNESIUM SULFATE IN WATER 40 MG/ML
2000 INJECTION, SOLUTION INTRAVENOUS ONCE
Status: COMPLETED | OUTPATIENT
Start: 2021-11-13 | End: 2021-11-13

## 2021-11-13 RX ORDER — LORAZEPAM 1 MG/1
1 TABLET ORAL 3 TIMES DAILY
Status: DISCONTINUED | OUTPATIENT
Start: 2021-11-13 | End: 2021-11-18 | Stop reason: HOSPADM

## 2021-11-13 RX ORDER — AMIODARONE HYDROCHLORIDE 200 MG/1
200 TABLET ORAL DAILY
Status: DISCONTINUED | OUTPATIENT
Start: 2021-11-13 | End: 2021-11-18 | Stop reason: HOSPADM

## 2021-11-13 RX ORDER — DEXTROSE MONOHYDRATE 25 G/50ML
25 INJECTION, SOLUTION INTRAVENOUS ONCE
Status: DISCONTINUED | OUTPATIENT
Start: 2021-11-13 | End: 2021-11-18 | Stop reason: HOSPADM

## 2021-11-13 RX ORDER — LORAZEPAM 2 MG/ML
2 INJECTION INTRAMUSCULAR ONCE
Status: COMPLETED | OUTPATIENT
Start: 2021-11-13 | End: 2021-11-13

## 2021-11-13 RX ORDER — OXYCODONE HCL 10 MG/1
10 TABLET, FILM COATED, EXTENDED RELEASE ORAL EVERY 12 HOURS SCHEDULED
Status: DISCONTINUED | OUTPATIENT
Start: 2021-11-13 | End: 2021-11-14

## 2021-11-13 RX ORDER — DEXTROSE MONOHYDRATE 50 MG/ML
100 INJECTION, SOLUTION INTRAVENOUS PRN
Status: DISCONTINUED | OUTPATIENT
Start: 2021-11-13 | End: 2021-11-18 | Stop reason: HOSPADM

## 2021-11-13 RX ORDER — NICOTINE POLACRILEX 4 MG
15 LOZENGE BUCCAL PRN
Status: DISCONTINUED | OUTPATIENT
Start: 2021-11-13 | End: 2021-11-18 | Stop reason: HOSPADM

## 2021-11-13 RX ADMIN — PIPERACILLIN AND TAZOBACTAM 3375 MG: 3; .375 INJECTION, POWDER, LYOPHILIZED, FOR SOLUTION INTRAVENOUS at 12:54

## 2021-11-13 RX ADMIN — LORAZEPAM 1 MG: 1 TABLET ORAL at 14:55

## 2021-11-13 RX ADMIN — LORAZEPAM 1 MG: 1 TABLET ORAL at 21:40

## 2021-11-13 RX ADMIN — AMIODARONE HYDROCHLORIDE 200 MG: 200 TABLET ORAL at 14:55

## 2021-11-13 RX ADMIN — CALCIUM GLUCONATE 1000 MG: 98 INJECTION, SOLUTION INTRAVENOUS at 00:41

## 2021-11-13 RX ADMIN — AMIODARONE HYDROCHLORIDE 0.5 MG/MIN: 1.8 INJECTION, SOLUTION INTRAVENOUS at 09:55

## 2021-11-13 RX ADMIN — SODIUM CHLORIDE 1.4 MCG/KG/HR: 9 INJECTION, SOLUTION INTRAVENOUS at 12:28

## 2021-11-13 RX ADMIN — OXYCODONE HYDROCHLORIDE 10 MG: 10 TABLET, FILM COATED, EXTENDED RELEASE ORAL at 17:47

## 2021-11-13 RX ADMIN — SODIUM CHLORIDE 1.4 MCG/KG/HR: 9 INJECTION, SOLUTION INTRAVENOUS at 21:53

## 2021-11-13 RX ADMIN — LORAZEPAM 2 MG: 2 INJECTION INTRAMUSCULAR; INTRAVENOUS at 11:54

## 2021-11-13 RX ADMIN — PIPERACILLIN AND TAZOBACTAM 3375 MG: 3; .375 INJECTION, POWDER, LYOPHILIZED, FOR SOLUTION INTRAVENOUS at 19:55

## 2021-11-13 RX ADMIN — CALCIUM GLUCONATE 1000 MG: 98 INJECTION, SOLUTION INTRAVENOUS at 10:47

## 2021-11-13 RX ADMIN — POTASSIUM CHLORIDE 20 MEQ: 400 INJECTION, SOLUTION INTRAVENOUS at 01:56

## 2021-11-13 RX ADMIN — MAGNESIUM SULFATE HEPTAHYDRATE 2000 MG: 40 INJECTION, SOLUTION INTRAVENOUS at 10:42

## 2021-11-13 RX ADMIN — SODIUM CHLORIDE, PRESERVATIVE FREE 10 ML: 5 INJECTION INTRAVENOUS at 21:56

## 2021-11-13 RX ADMIN — HEPARIN SODIUM 14 UNITS/KG/HR: 10000 INJECTION, SOLUTION INTRAVENOUS at 05:06

## 2021-11-13 RX ADMIN — POTASSIUM CHLORIDE 20 MEQ: 400 INJECTION, SOLUTION INTRAVENOUS at 03:02

## 2021-11-13 RX ADMIN — SODIUM CHLORIDE 0.8 MCG/KG/HR: 9 INJECTION, SOLUTION INTRAVENOUS at 04:54

## 2021-11-13 RX ADMIN — PIPERACILLIN AND TAZOBACTAM 3375 MG: 3; .375 INJECTION, POWDER, LYOPHILIZED, FOR SOLUTION INTRAVENOUS at 05:00

## 2021-11-13 RX ADMIN — FAMOTIDINE 20 MG: 10 INJECTION, SOLUTION INTRAVENOUS at 08:31

## 2021-11-13 RX ADMIN — SODIUM CHLORIDE 1.4 MCG/KG/HR: 9 INJECTION, SOLUTION INTRAVENOUS at 18:09

## 2021-11-13 RX ADMIN — SODIUM CHLORIDE, PRESERVATIVE FREE 10 ML: 5 INJECTION INTRAVENOUS at 08:35

## 2021-11-13 RX ADMIN — POTASSIUM CHLORIDE 20 MEQ: 400 INJECTION, SOLUTION INTRAVENOUS at 04:10

## 2021-11-13 RX ADMIN — BUMETANIDE 1 MG: 0.25 INJECTION INTRAMUSCULAR; INTRAVENOUS at 08:31

## 2021-11-13 ASSESSMENT — PULMONARY FUNCTION TESTS
PIF_VALUE: 26
PIF_VALUE: 27
PIF_VALUE: 26
PIF_VALUE: 27

## 2021-11-13 ASSESSMENT — PAIN SCALES - GENERAL
PAINLEVEL_OUTOF10: 0
PAINLEVEL_OUTOF10: 0
PAINLEVEL_OUTOF10: 1
PAINLEVEL_OUTOF10: 0
PAINLEVEL_OUTOF10: 0

## 2021-11-13 ASSESSMENT — PAIN SCALES - WONG BAKER
WONGBAKER_NUMERICALRESPONSE: 0

## 2021-11-13 NOTE — PROGRESS NOTES
Renal Progress Note  [facility]    Patient :  Griffin Jacobs; 80 y.o. MRN# [id number]  Location:  [facility]99/575-I  Attending:  Christian Valenzuela MD  Admit Date:  10/11/2021   Hospital Day: 35      Subjective:     Nephrology is following the patient for HERIBERTO. Patient seen and examined. Received blood transfusion yesterday. Received K yesterday , potassium now high. On vent 30% Fio2. Urine output noted.     Outpatient Medications:     Medications Prior to Admission: hydroCHLOROthiazide (HYDRODIURIL) 12.5 MG tablet, Take 12.5 mg by mouth daily   NONFORMULARY, Focus Factor  NONFORMULARY, Drenamin  GLUCOSAMINE-CHONDROITIN PO, Take by mouth  cetirizine (ZYRTEC) 10 MG tablet, TAKE 1 TABLET BY MOUTH ONCE DAILY  HAWTHORN PO, Take 425 mg by mouth daily  NONFORMULARY, Allerplex  OLIVE LEAF EXTRACT PO, Take by mouth  Fexofenadine-Pseudoephedrine (ALLEGRA-D 12 HOUR PO), Take by mouth  aspirin 325 MG tablet, Take 325 mg by mouth daily  metoprolol tartrate (LOPRESSOR) 50 MG tablet, Take 1 tablet by mouth 2 times daily    Current Medications:     Scheduled Meds:    insulin regular  10 Units IntraVENous Once    And    dextrose  25 g IntraVENous Once    magnesium sulfate  2,000 mg IntraVENous Once    calcium gluconate IVPB  1,000 mg IntraVENous Once    calcium replacement protocol   Other RX Placeholder    lidocaine-EPINEPHrine  20 mL IntraDERmal Once    piperacillin-tazobactam  3,375 mg IntraVENous Q8H    lidocaine 1 % injection  5 mL IntraDERmal Once    sodium chloride flush  5-40 mL IntraVENous 2 times per day    famotidine (PEPCID) injection  20 mg IntraVENous Daily    bumetanide  1 mg IntraVENous Daily    lactobacillus  1 capsule Per NG tube Daily with breakfast    potassium (CARDIAC) replacement protocol   Other RX Placeholder    senna  10 mL Per NG tube Nightly    [Held by provider] lactulose  20 g Oral TID    [Held by provider] metoprolol tartrate  25 mg Oral BID    [Held by provider] apixaban  2.5 mg Oral BID     Continuous Infusions:    dextrose      sodium chloride      amiodarone 0.5 mg/min (21 08)    [Held by provider] heparin (PORCINE) Infusion Stopped (21 0523)    sodium chloride 10 mL/hr at 21 0400    midazolam Stopped (11/10/21 1146)    dextrose 100 mL/hr (10/26/21 1253)    dexmedetomidine 1 mcg/kg/hr (21 0845)    norepinephrine Stopped (21 0246)    sodium chloride Stopped (10/19/21 1900)     PRN Meds:  glucose, dextrose, glucagon (rDNA), dextrose, sodium chloride, heparin (porcine), heparin (porcine), sodium chloride flush, sodium chloride, magnesium sulfate, LORazepam, glucose, dextrose, glucagon (rDNA), dextrose, potassium chloride, acetaminophen **OR** acetaminophen, docusate sodium, polyethylene glycol    Input/Output:       I/O last 3 completed shifts: In: 518.8 [Blood:518.8]  Out: 4294 [Urine:1670]. Patient Vitals for the past 96 hrs (Last 3 readings):   Weight   21 140 lb 14 oz (63.9 kg)       Vital Signs:   Temperature:  Temp: 99.5 °F (37.5 °C)  TMax:   Temp (24hrs), Av.7 °F (37.6 °C), Min:99 °F (37.2 °C), Max:100.6 °F (38.1 °C)    Respirations:  Resp: 20  Pulse:   Pulse: 65  BP:    BP: (!) 98/54  BP Range: Systolic (94JGK), KGX:730 , Min:94 , HMR:952       Diastolic (43KKS), LET:03, Min:50, Max:75      Physical Examination:     General:  Awake on ventilator  HEENT: NC/AT + trach  Chest:               diminished  Cardiac:  S1 S2   Abdomen: Soft, non-tender,  Neuro:  No facial droop,  SKIN:  No rashes, good skin turgor.   Extremities:  2+ LE edema B/L    Labs:       Recent Labs     21  0630 21  0430 21  0325   WBC 7.1 6.0 6.4   RBC 2.39* 2.26* 2.96*   HGB 7.4* 6.9* 8.7*   HCT 24.2* 23.1* 29.3*   .3* 102.2* 99.0   MCH 31.0 30.5 29.4   MCHC 30.6* 29.9* 29.7*    149 144   MPV 10.3 11.0 11.0      BMP:   Recent Labs     21  0630 21  0630 21  0430 21  1552 11/13/21  0600     --  145  --  141   K 4.2  4.2   < > 3.4*  3.4* 3.2* 5.9*     --  104  --  104   CO2 28  --  30  --  26   BUN 42*  --  39*  --  36*   CREATININE 1.3*  --  1.3*  --  1.4*   GLUCOSE 103  --  107  --  145*   CALCIUM 7.8*  --  7.7*  --  7.3*    < > = values in this interval not displayed. Phosphorus:   No results for input(s): PHOS in the last 72 hours. Magnesium:    Recent Labs     11/12/21  0430 11/13/21  0600   MG 2.0 1.9     Albumin:  No results for input(s): LABALBU in the last 72 hours.   BNP:    No results found for: BNP  SHMUEL:    No results found for: SHMUEL  SPEP:  Lab Results   Component Value Date    PROT 5.3 11/08/2021     UPEP:   No results found for: LABPE  C3:     Lab Results   Component Value Date    C3 122 10/26/2021     C4:     Lab Results   Component Value Date    C4 20 10/27/2021     MPO ANCA:   No results found for: MPO  PR3 ANCA:   No results found for: PR3  Anti-GBM:   No results found for: GBMABIGG  Hep BsAg:       No results found for: HEPBSAG  Hep C AB:        No results found for: HEPCAB    Urinalysis/Chemistries:      Lab Results   Component Value Date    NITRU NEGATIVE 10/28/2021    COLORU RED 10/28/2021    PHUR 5.0 10/28/2021    LABCAST NONE SEEN 10/28/2021    WBCUA 5-9 10/28/2021    RBCUA > 200 10/28/2021    YEAST NONE SEEN 10/25/2021    BACTERIA FEW 10/28/2021    SPECGRAV 1.020 10/28/2021    LEUKOCYTESUR SMALL 10/28/2021    UROBILINOGEN 0.2 10/28/2021    BILIRUBINUR NEGATIVE 10/28/2021    BILIRUBINUR NEGATIVE 08/09/2011    BLOODU LARGE 10/28/2021    GLUCOSEU 100 10/25/2021    KETUA NEGATIVE 10/28/2021     Urine Sodium:   No results found for: TAMIA  Urine Potassium:  No results found for: KUR  Urine Chloride:  No results found for: CLUR  Urine Osmolarity:   Lab Results   Component Value Date    OSMOU 470 10/13/2021     Urine Protein:   No components found for: TOTALPROTEIN, URINE   Urine Creatinine:   No results found for: LABCREA  Urine Eosinophils:  No components found for: CARLA        Impression and Plan:  1. HERIBERTO: creat creeping up slightly likely from diuretics. She is volume overloaded however so we will continue on bumex once daily  2. Lytes: s/p hypokalemia. Now hyperkalemic, will check stat repeat  3. Anemia s/p blood transfusion  4. Respiratory failure s/p trach  5. COVID 19   6. Systolic CHF        Please don't hesitate to call with any questions.   Electronically signed by Earl Lea DO on 11/13/2021 at 9:17 AM

## 2021-11-13 NOTE — PROGRESS NOTES
0719: \"Patient had heparin stopped last night due to hgb 6.9 - 2 units PRBC given. Would you like to continue to hold the heparin? Patient was on for Afib RVR. If you would like to stop would you mind placing d/c heparin order? Dr. Robert Portillo placed stop order last night per night RN\" - perfect serve sent to Krystle Rodrigez, DO     1138: \"Patient maxed on precedex - pulled out NG and vent from trach. Can I have something else to help calm her down?  Shes banging the remote on the bed and breathing in the 30's\" -perfect serve sent to Krystle Rodrigez, [address]: Linda Almonte, daughter, and updated on patient status

## 2021-11-14 LAB
ANION GAP SERPL CALCULATED.3IONS-SCNC: 12 MEQ/L (ref 8–16)
ANION GAP SERPL CALCULATED.3IONS-SCNC: 14 MEQ/L (ref 8–16)
ANION GAP SERPL CALCULATED.3IONS-SCNC: 15 MEQ/L (ref 8–16)
BUN BLDV-MCNC: 31 MG/DL (ref 7–22)
BUN BLDV-MCNC: 32 MG/DL (ref 7–22)
BUN BLDV-MCNC: 33 MG/DL (ref 7–22)
CALCIUM IONIZED: 1.09 MMOL/L (ref 1.12–1.32)
CALCIUM IONIZED: 1.14 MMOL/L (ref 1.12–1.32)
CALCIUM SERPL-MCNC: 7.5 MG/DL (ref 8.5–10.5)
CALCIUM SERPL-MCNC: 8 MG/DL (ref 8.5–10.5)
CALCIUM SERPL-MCNC: 8.1 MG/DL (ref 8.5–10.5)
CHLORIDE BLD-SCNC: 102 MEQ/L (ref 98–111)
CHLORIDE BLD-SCNC: 103 MEQ/L (ref 98–111)
CHLORIDE BLD-SCNC: 104 MEQ/L (ref 98–111)
CO2: 24 MEQ/L (ref 23–33)
CO2: 25 MEQ/L (ref 23–33)
CO2: 25 MEQ/L (ref 23–33)
CREAT SERPL-MCNC: 1.3 MG/DL (ref 0.4–1.2)
CREAT SERPL-MCNC: 1.3 MG/DL (ref 0.4–1.2)
CREAT SERPL-MCNC: 1.4 MG/DL (ref 0.4–1.2)
ERYTHROCYTE [DISTWIDTH] IN BLOOD BY AUTOMATED COUNT: 16.8 % (ref 11.5–14.5)
ERYTHROCYTE [DISTWIDTH] IN BLOOD BY AUTOMATED COUNT: 59.3 FL (ref 35–45)
GFR SERPL CREATININE-BSD FRML MDRD: 36 ML/MIN/1.73M2
GFR SERPL CREATININE-BSD FRML MDRD: 39 ML/MIN/1.73M2
GFR SERPL CREATININE-BSD FRML MDRD: 39 ML/MIN/1.73M2
GLUCOSE BLD-MCNC: 113 MG/DL (ref 70–108)
GLUCOSE BLD-MCNC: 125 MG/DL (ref 70–108)
GLUCOSE BLD-MCNC: 133 MG/DL (ref 70–108)
HCT VFR BLD CALC: 28.1 % (ref 37–47)
HCT VFR BLD CALC: 29.2 % (ref 37–47)
HCT VFR BLD CALC: 31.5 % (ref 37–47)
HEMOCCULT STL QL: POSITIVE
HEMOGLOBIN: 8.8 GM/DL (ref 12–16)
HEMOGLOBIN: 9.1 GM/DL (ref 12–16)
HEMOGLOBIN: 9.6 GM/DL (ref 12–16)
MCH RBC QN AUTO: 30.3 PG (ref 26–33)
MCHC RBC AUTO-ENTMCNC: 31.3 GM/DL (ref 32.2–35.5)
MCV RBC AUTO: 96.9 FL (ref 81–99)
PLATELET # BLD: 131 THOU/MM3 (ref 130–400)
PMV BLD AUTO: 11.3 FL (ref 9.4–12.4)
POTASSIUM REFLEX MAGNESIUM: 3.8 MEQ/L (ref 3.5–5.2)
POTASSIUM SERPL-SCNC: 3.1 MEQ/L (ref 3.5–5.2)
POTASSIUM SERPL-SCNC: 3.1 MEQ/L (ref 3.5–5.2)
RBC # BLD: 2.9 MILL/MM3 (ref 4.2–5.4)
SODIUM BLD-SCNC: 141 MEQ/L (ref 135–145)
SODIUM BLD-SCNC: 141 MEQ/L (ref 135–145)
SODIUM BLD-SCNC: 142 MEQ/L (ref 135–145)
WBC # BLD: 6.1 THOU/MM3 (ref 4.8–10.8)

## 2021-11-14 PROCEDURE — 6360000002 HC RX W HCPCS: Performed by: STUDENT IN AN ORGANIZED HEALTH CARE EDUCATION/TRAINING PROGRAM

## 2021-11-14 PROCEDURE — 6370000000 HC RX 637 (ALT 250 FOR IP): Performed by: NURSE PRACTITIONER

## 2021-11-14 PROCEDURE — 99291 CRITICAL CARE FIRST HOUR: CPT | Performed by: INTERNAL MEDICINE

## 2021-11-14 PROCEDURE — 2580000003 HC RX 258: Performed by: NURSE PRACTITIONER

## 2021-11-14 PROCEDURE — 85014 HEMATOCRIT: CPT

## 2021-11-14 PROCEDURE — 6370000000 HC RX 637 (ALT 250 FOR IP): Performed by: STUDENT IN AN ORGANIZED HEALTH CARE EDUCATION/TRAINING PROGRAM

## 2021-11-14 PROCEDURE — 80048 BASIC METABOLIC PNL TOTAL CA: CPT

## 2021-11-14 PROCEDURE — 85018 HEMOGLOBIN: CPT

## 2021-11-14 PROCEDURE — 2500000003 HC RX 250 WO HCPCS: Performed by: STUDENT IN AN ORGANIZED HEALTH CARE EDUCATION/TRAINING PROGRAM

## 2021-11-14 PROCEDURE — 82272 OCCULT BLD FECES 1-3 TESTS: CPT

## 2021-11-14 PROCEDURE — 2580000003 HC RX 258: Performed by: STUDENT IN AN ORGANIZED HEALTH CARE EDUCATION/TRAINING PROGRAM

## 2021-11-14 PROCEDURE — 36415 COLL VENOUS BLD VENIPUNCTURE: CPT

## 2021-11-14 PROCEDURE — 99232 SBSQ HOSP IP/OBS MODERATE 35: CPT | Performed by: INTERNAL MEDICINE

## 2021-11-14 PROCEDURE — 82330 ASSAY OF CALCIUM: CPT

## 2021-11-14 PROCEDURE — 6360000002 HC RX W HCPCS: Performed by: NURSE PRACTITIONER

## 2021-11-14 PROCEDURE — 94003 VENT MGMT INPAT SUBQ DAY: CPT

## 2021-11-14 PROCEDURE — 2100000000 HC CCU R&B

## 2021-11-14 PROCEDURE — 2500000003 HC RX 250 WO HCPCS: Performed by: INTERNAL MEDICINE

## 2021-11-14 PROCEDURE — 6370000000 HC RX 637 (ALT 250 FOR IP): Performed by: INTERNAL MEDICINE

## 2021-11-14 PROCEDURE — APPSS180 APP SPLIT SHARED TIME > 60 MINUTES: Performed by: NURSE PRACTITIONER

## 2021-11-14 PROCEDURE — 85027 COMPLETE CBC AUTOMATED: CPT

## 2021-11-14 RX ORDER — OLANZAPINE 5 MG/1
5 TABLET ORAL NIGHTLY
Status: DISCONTINUED | OUTPATIENT
Start: 2021-11-14 | End: 2021-11-18 | Stop reason: HOSPADM

## 2021-11-14 RX ORDER — POTASSIUM CHLORIDE 29.8 MG/ML
20 INJECTION INTRAVENOUS ONCE
Status: COMPLETED | OUTPATIENT
Start: 2021-11-14 | End: 2021-11-14

## 2021-11-14 RX ORDER — OXYCODONE HYDROCHLORIDE 5 MG/1
5 TABLET ORAL EVERY 6 HOURS
Status: DISCONTINUED | OUTPATIENT
Start: 2021-11-14 | End: 2021-11-18 | Stop reason: HOSPADM

## 2021-11-14 RX ADMIN — POTASSIUM CHLORIDE 20 MEQ: 29.8 INJECTION, SOLUTION INTRAVENOUS at 09:00

## 2021-11-14 RX ADMIN — POTASSIUM BICARBONATE 40 MEQ: 782 TABLET, EFFERVESCENT ORAL at 20:31

## 2021-11-14 RX ADMIN — SODIUM CHLORIDE 1.4 MCG/KG/HR: 9 INJECTION, SOLUTION INTRAVENOUS at 06:50

## 2021-11-14 RX ADMIN — OXYCODONE HYDROCHLORIDE 5 MG: 5 TABLET ORAL at 23:53

## 2021-11-14 RX ADMIN — BUMETANIDE 1 MG: 0.25 INJECTION INTRAMUSCULAR; INTRAVENOUS at 09:01

## 2021-11-14 RX ADMIN — PIPERACILLIN AND TAZOBACTAM 3375 MG: 3; .375 INJECTION, POWDER, LYOPHILIZED, FOR SOLUTION INTRAVENOUS at 04:57

## 2021-11-14 RX ADMIN — SODIUM CHLORIDE 0.6 MCG/KG/HR: 9 INJECTION, SOLUTION INTRAVENOUS at 11:59

## 2021-11-14 RX ADMIN — SODIUM CHLORIDE, PRESERVATIVE FREE 10 ML: 5 INJECTION INTRAVENOUS at 09:01

## 2021-11-14 RX ADMIN — POTASSIUM CHLORIDE 20 MEQ: 29.8 INJECTION, SOLUTION INTRAVENOUS at 10:19

## 2021-11-14 RX ADMIN — CALCIUM GLUCONATE 1000 MG: 98 INJECTION, SOLUTION INTRAVENOUS at 06:45

## 2021-11-14 RX ADMIN — AMIODARONE HYDROCHLORIDE 200 MG: 200 TABLET ORAL at 09:01

## 2021-11-14 RX ADMIN — OLANZAPINE 5 MG: 5 TABLET, FILM COATED ORAL at 20:37

## 2021-11-14 RX ADMIN — OXYCODONE HYDROCHLORIDE 5 MG: 5 TABLET ORAL at 12:00

## 2021-11-14 RX ADMIN — CALCIUM GLUCONATE 1000 MG: 98 INJECTION, SOLUTION INTRAVENOUS at 00:13

## 2021-11-14 RX ADMIN — LORAZEPAM 1 MG: 1 TABLET ORAL at 20:31

## 2021-11-14 RX ADMIN — Medication 1 CAPSULE: at 09:01

## 2021-11-14 RX ADMIN — OXYCODONE HYDROCHLORIDE 5 MG: 5 TABLET ORAL at 17:39

## 2021-11-14 RX ADMIN — HEPARIN SODIUM 12 UNITS/KG/HR: 10000 INJECTION, SOLUTION INTRAVENOUS at 18:07

## 2021-11-14 RX ADMIN — LORAZEPAM 1 MG: 1 TABLET ORAL at 14:41

## 2021-11-14 RX ADMIN — PIPERACILLIN AND TAZOBACTAM 3375 MG: 3; .375 INJECTION, POWDER, LYOPHILIZED, FOR SOLUTION INTRAVENOUS at 20:27

## 2021-11-14 RX ADMIN — SODIUM CHLORIDE 1.4 MCG/KG/HR: 9 INJECTION, SOLUTION INTRAVENOUS at 02:34

## 2021-11-14 RX ADMIN — FAMOTIDINE 20 MG: 10 INJECTION, SOLUTION INTRAVENOUS at 09:01

## 2021-11-14 RX ADMIN — SODIUM CHLORIDE, PRESERVATIVE FREE 10 ML: 5 INJECTION INTRAVENOUS at 20:39

## 2021-11-14 RX ADMIN — PIPERACILLIN AND TAZOBACTAM 3375 MG: 3; .375 INJECTION, POWDER, LYOPHILIZED, FOR SOLUTION INTRAVENOUS at 12:01

## 2021-11-14 RX ADMIN — LORAZEPAM 1 MG: 1 TABLET ORAL at 09:01

## 2021-11-14 ASSESSMENT — PAIN SCALES - WONG BAKER
WONGBAKER_NUMERICALRESPONSE: 0

## 2021-11-14 ASSESSMENT — PAIN SCALES - GENERAL
PAINLEVEL_OUTOF10: 0
PAINLEVEL_OUTOF10: 2
PAINLEVEL_OUTOF10: 0
PAINLEVEL_OUTOF10: 0

## 2021-11-14 ASSESSMENT — PULMONARY FUNCTION TESTS
PIF_VALUE: 27
PIF_VALUE: 26
PIF_VALUE: 26
PIF_VALUE: 27
PIF_VALUE: 27

## 2021-11-14 NOTE — PROGRESS NOTES
Renal Progress Note  [facility]    Patient :  Aftab Dupree; 80 y.o. MRN# [id number]  Location:  [facility]44/762-D  Attending:  Max Terrazas MD  Admit Date:  10/11/2021   Hospital Day: 29      Subjective:     Nephrology is following the patient for HERIBERTO. Patient seen and examined. Has NGT with trickle tube feeds. KUB showed possible ileus vs SBO  Urine output noted.     Outpatient Medications:     Medications Prior to Admission: hydroCHLOROthiazide (HYDRODIURIL) 12.5 MG tablet, Take 12.5 mg by mouth daily   NONFORMULARY, Focus Factor  NONFORMULARY, Drenamin  GLUCOSAMINE-CHONDROITIN PO, Take by mouth  cetirizine (ZYRTEC) 10 MG tablet, TAKE 1 TABLET BY MOUTH ONCE DAILY  HAWTHORN PO, Take 425 mg by mouth daily  NONFORMULARY, Allerplex  OLIVE LEAF EXTRACT PO, Take by mouth  Fexofenadine-Pseudoephedrine (ALLEGRA-D 12 HOUR PO), Take by mouth  aspirin 325 MG tablet, Take 325 mg by mouth daily  metoprolol tartrate (LOPRESSOR) 50 MG tablet, Take 1 tablet by mouth 2 times daily    Current Medications:     Scheduled Meds:    calcium gluconate IVPB  1,000 mg IntraVENous Once    potassium chloride  20 mEq IntraVENous Once    potassium chloride  20 mEq IntraVENous Once    insulin regular  10 Units IntraVENous Once    And    dextrose  25 g IntraVENous Once    oxyCODONE  10 mg Oral 2 times per day    LORazepam  1 mg Oral TID    amiodarone  200 mg Oral Daily    calcium replacement protocol   Other RX Placeholder    lidocaine-EPINEPHrine  20 mL IntraDERmal Once    piperacillin-tazobactam  3,375 mg IntraVENous Q8H    lidocaine 1 % injection  5 mL IntraDERmal Once    sodium chloride flush  5-40 mL IntraVENous 2 times per day    famotidine (PEPCID) injection  20 mg IntraVENous Daily    bumetanide  1 mg IntraVENous Daily    lactobacillus  1 capsule Per NG tube Daily with breakfast    senna  10 mL Per NG tube Nightly    [Held by provider] lactulose  20 g Oral TID    [Held by provider] metoprolol tartrate  25 mg Oral BID    [Held by provider] apixaban  2.5 mg Oral BID     Continuous Infusions:    dextrose      sodium chloride      [Held by provider] heparin (PORCINE) Infusion Stopped (21 0523)    sodium chloride 10 mL/hr at 21 0400    midazolam Stopped (11/10/21 1146)    dextrose 100 mL/hr (10/26/21 1253)    dexmedetomidine 1.4 mcg/kg/hr (21 0650)    norepinephrine Stopped (21 0246)    sodium chloride Stopped (10/19/21 1900)     PRN Meds:  glucose, dextrose, glucagon (rDNA), dextrose, sodium chloride, heparin (porcine), heparin (porcine), sodium chloride flush, sodium chloride, magnesium sulfate, LORazepam, glucose, dextrose, glucagon (rDNA), dextrose, acetaminophen **OR** acetaminophen, docusate sodium, polyethylene glycol    Input/Output:       I/O last 3 completed shifts: In: 2069.7 [I.V.:1407.8; NG/GT:150; IV Piggyback:511.9]  Out: 1070 [Urine:1070]. Patient Vitals for the past 96 hrs (Last 3 readings):   Weight   21 040 140 lb 14 oz (63.9 kg)       Vital Signs:   Temperature:  Temp: 97.5 °F (36.4 °C)  TMax:   Temp (24hrs), Av.6 °F (37 °C), Min:97.2 °F (36.2 °C), Max:99.9 °F (37.7 °C)    Respirations:  Resp: 22  Pulse:   Pulse: 59  BP:    BP: (!) 129/93  BP Range: Systolic (82MXO), ZSH:382 , Min:108 , BBZ:370       Diastolic (12NAN), ZGI:21, Min:55, Max:145      Physical Examination:     General:  Awake on ventilator  HEENT: NC/AT + trach +NG tube  Chest:               diminished  Cardiac:  S1 S2   Abdomen: Soft, non-tender,  Neuro:  No facial droop,  SKIN:  No rashes, good skin turgor.   Extremities:  2+ LE edema B/L    Labs:       Recent Labs     21  0430 21  0430 21  0325 21  0325 21  1320 21  2020 21  0430   WBC 6.0  --  6.4  --   --   --  6.1   RBC 2.26*  --  2.96*  --   --   --  2.90*   HGB 6.9*   < > 8.7*   < > 9.2* 9.5* 8.8*   HCT 23.1*   < > 29.3*   < > 29.0* 30.0* 28.1*   .2*  --  99.0  --   -- --  96.9   MCH 30.5  --  29.4  --   --   --  30.3   MCHC 29.9*  --  29.7*  --   --   --  31.3*     --  144  --   --   --  131   MPV 11.0  --  11.0  --   --   --  11.3    < > = values in this interval not displayed. BMP:   Recent Labs     11/12/21  0430 11/12/21  1552 11/13/21  0600 11/13/21  0815 11/14/21  0430     --  141  --  142   K 3.4*  3.4*   < > 5.9* 4.2 3.1*     --  104  --  103   CO2 30  --  26  --  25   BUN 39*  --  36*  --  33*   CREATININE 1.3*  --  1.4*  --  1.4*   GLUCOSE 107  --  145*  --  113*   CALCIUM 7.7*  --  7.3*  --  7.5*    < > = values in this interval not displayed. Phosphorus:   No results for input(s): PHOS in the last 72 hours. Magnesium:    Recent Labs     11/12/21  0430 11/13/21  0600   MG 2.0 1.9     Albumin:  No results for input(s): LABALBU in the last 72 hours.   BNP:    No results found for: BNP  SHMUEL:    No results found for: SHMUEL  SPEP:  Lab Results   Component Value Date    PROT 5.3 11/08/2021     UPEP:   No results found for: LABPE  C3:     Lab Results   Component Value Date    C3 122 10/26/2021     C4:     Lab Results   Component Value Date    C4 20 10/27/2021     MPO ANCA:   No results found for: MPO  PR3 ANCA:   No results found for: PR3  Anti-GBM:   No results found for: GBMABIGG  Hep BsAg:       No results found for: HEPBSAG  Hep C AB:        No results found for: HEPCAB    Urinalysis/Chemistries:      Lab Results   Component Value Date    NITRU NEGATIVE 10/28/2021    COLORU RED 10/28/2021    PHUR 5.0 10/28/2021    LABCAST NONE SEEN 10/28/2021    WBCUA 5-9 10/28/2021    RBCUA > 200 10/28/2021    YEAST NONE SEEN 10/25/2021    BACTERIA FEW 10/28/2021    SPECGRAV 1.020 10/28/2021    LEUKOCYTESUR SMALL 10/28/2021    UROBILINOGEN 0.2 10/28/2021    BILIRUBINUR NEGATIVE 10/28/2021    BILIRUBINUR NEGATIVE 08/09/2011    BLOODU LARGE 10/28/2021    GLUCOSEU 100 10/25/2021    KETUA NEGATIVE 10/28/2021     Urine Sodium:   No results found for: TAMIA  Urine Potassium:  No results found for: KUR  Urine Chloride:  No results found for: CLUR  Urine Osmolarity:   Lab Results   Component Value Date    OSMOU 470 10/13/2021     Urine Protein:   No components found for: TOTALPROTEIN, URINE   Urine Creatinine:   No results found for: LABCREA  Urine Eosinophils:  No components found for: UEOS        Impression and Plan:  1. HERIBERTO: overall stable with diuretics. Ok to continue  2. Hypokalemia: 40 meq kcl, recheck after  3. Anemia s/p blood transfusion  4. Respiratory failure s/p trach  5. COVID 19   6. Systolic CHF        Please don't hesitate to call with any questions.   Electronically signed by Dedra Avitia DO on 11/14/2021 at 7:12 AM

## 2021-11-14 NOTE — PROGRESS NOTES
CRITICAL CARE PROGRESS NOTE      Patient:  Fletcher Yeh    Unit/Bed:3A-09/009-A  YOB: 1934  MRN: [id number]   PCP: SERGE Pak CNP  Date of Admission: 10/11/2021  Chief Complaint:- COVID 19    Assessment and Plan:      1. Acute hypoxic respiratory failure: Patient required intubation patient required intubation 10/15. Tracheostomy tube placed 11/10.  2. COVID-19: Status post baricitinib Decadron. 3. Pneumonia: Pneumonia panel 10/19 showed Corynebacterium species. Status post 6 days of vancomycin. Patient was started on empiric Zosyn 11/10.  4. ARDS: Improved. 5. Heart failure with reduced ejection fraction: Ejection fraction 25 to 30%, LV severe global hypokinesis. Status post milrinone which was discontinued on 11/8.  6. New onset atrial fib with RVR: Heparin was stopped secondary to acute blood loss. Fecal occult stool was positive. Patient currently in sinus rhythm. [id number] 4. Recommendations for anticoagulation. Hemoglobin has been stable. Will resume heparin drip 11/14. No initial bolus. Stool did come back positive for fecal occult although stool is brown and shows no signs of GI bleed. Hemoglobin every 8 hours if drop in hemoglobin will stop at that time. 7. Bilateral lower extremity swelling: DVT ruled out 11/1. Daily Bumex 1 mg.  8. HERIBERTO: Nephrology following. Improving. Creatinine 1.4.  9. Encephalopathy: Likely secondary to sedation. Patient does awaken and track when speaking although follows no commands. Monitor. 10. Hypokalemia: Replacement as needed. Creatinine clearance too low for replacement protocol. 11. Normocytic anemia: Hemoglobin 9.1, status post transfusion 11/12. Heparin drip was stopped. Hemoglobin continues to be stable. No active signs of bleeding. Fecal occult was positive although patient having stools which show no sign of bleeding.     INITIAL H AND P AND ICU COURSE:  25-year-old female presents to the ED on 10/11/2021 due to worsening shortness of breath. Patient tested positive for Covid on 10/4/2021. In the emergency room patient was found to have a SPO2 of 83% on room air with a BNP of 15.7K. Patient was admitted and managed for acute hypoxic respiratory failure secondary to Covid with combination of congestive heart failure. Patient was placed on nasal cannula, Bumex and dexamethasone in the ED. On 10/15 patient was transferred to the ICU and intubated for acute hypoxic respiratory failure on 100% FiO2 on high flow. Patient had a UTI on 10/18 and was started on ceftriaxone on 10/19. Patient was also started on milrinone for her HFrEF. Patient's family has been consulted regarding trach and PEG tube in which they are agreeable. Cardiology is on board regarding LifeVest versus AICD.      11/5 - patient HERIBERTO improving, continue to be followed by nephro. Repeat cxr shows improving of bilateral pna. Pending pneumonia panel results. Trach and peg set for Monday.       11/6 - patient HERIBERTO improving and seen by nephro. Resume bumex. Patient otherwise stable on vent pending trach Monday.     11/7 - no acute events overnight. Pt trach planned for tomorrow. Added pepcid for GI prophylaxis       11/8 - trach scheduled today. Pt bp soft with map 63. D/t patient HFrEF, hold fluids for now. nephro on board.     11/9 - eventual trach. Requiring 3 levo over night, now currently on 1 levo. Episode of afib without rvr over night, currently nsr with ventricular bigeminy. Night team started on heparin drip for anticoagulation. Continue to monitor rate / rhythm.     11/10 - bronchoscopy and percutaneous trach placement today. Patient entered Afib with RVR. Given amio bolus. After bolus, patient sustained rate of 150s. Patient underwent synchronized cardioversion at 200J in which patient returned to NSR rate 100. Continue on amio drip, heparin drip. Continue to monitor. Patient developing fever. Hypotensive needing levo. No leukocytosis WBC 5.8.  PNA panel ordered today negative. Pending resp cultures.      11/11 - removal of subclavian line, PICC line placed, resp culture and pna panel negative, gram stain shows moderate seg neutrophils with moderate GNB. Continue on amio drip. Zosyn day 3.      11/12 - Hgb noted to be 6.9 2u Ordered for transfusion. Urine culture shows mixed growth of at least 3 different enteric gram-negative bacilli including swarming Proteus species. Continue Zosyn day 3. Respiratory culture was positive for Candida albicans. Patient has history of QT prolongation. Will hold on Diflucan.     11/13 -Hgb post transfusion of 2 units PRBCs is 8.7. Heparin was hold last night in the setting of this anemia requiring transfusion. FOBT ordered. No obvious signs of acute bleeding. Potassium was 3.2 11/12. 5.9 this morning. Recheck K levels if still elevated will order insulin with D5 amp.  2 g of mag ordered as patient has history of prolonged QT with magnesium level 1.9. Goal >2. Reinsert NG tube as it was removed post trach. Restart tube feeds. Transition from amiodarone drip to p.o. Patient noted to have increasing agitation today. Gave dose of 2 mg Ativan and patient noticeably more comfortable. Ativan 1 mg 3 times daily and OxyContin 10 mg twice daily. Is currently on hold due to anemia that required 2 units PRBCs transfusion. FOBT ordered positive. 11/14-patient stable on trach vent. She does awaken but does not follow commands. She does look at you when you speak to her and opens her eyes.     Past Medical History:  Per HPI  Family History: No known family history  Social History: Reformed smoker, denies alcohol use, denies drug use    ROS   Mental status precludes review of systems    Scheduled Meds:   potassium chloride  20 mEq IntraVENous Once    potassium chloride  20 mEq IntraVENous Once    OLANZapine  5 mg Oral Nightly    insulin regular  10 Units IntraVENous Once    And    dextrose  25 g IntraVENous Once    oxyCODONE 10 mg Oral 2 times per day    LORazepam  1 mg Oral TID    amiodarone  200 mg Oral Daily    calcium replacement protocol   Other RX Placeholder    lidocaine-EPINEPHrine  20 mL IntraDERmal Once    piperacillin-tazobactam  3,375 mg IntraVENous Q8H    lidocaine 1 % injection  5 mL IntraDERmal Once    sodium chloride flush  5-40 mL IntraVENous 2 times per day    famotidine (PEPCID) injection  20 mg IntraVENous Daily    bumetanide  1 mg IntraVENous Daily    lactobacillus  1 capsule Per NG tube Daily with breakfast    senna  10 mL Per NG tube Nightly    [Held by provider] lactulose  20 g Oral TID    [Held by provider] metoprolol tartrate  25 mg Oral BID    [Held by provider] apixaban  2.5 mg Oral BID     Continuous Infusions:   dextrose      sodium chloride      [Held by provider] heparin (PORCINE) Infusion Stopped (11/13/21 0523)    sodium chloride 10 mL/hr at 11/12/21 0400    dextrose 100 mL/hr (10/26/21 1253)    dexmedetomidine 1.4 mcg/kg/hr (11/14/21 0700)    sodium chloride Stopped (10/19/21 1900)       PHYSICAL EXAMINATION:  T:  98.1. P:  49. RR:  20. B/P:  96/46. FiO2:  30. O2 Sat:  98.  I/O:  2760/1070  Body mass index is 27.51 kg/m². PC: 22/6: TV: 30: RRTotal: 20: Ti:1 sec  General: Acute on chronically ill-appearing white female  HEENT:  normocephalic and atraumatic. No scleral icterus. PERR  Neck: supple. No Thyromegaly. Lungs: clear to auscultation. No retractions  Cardiac: RRR. No JVD. Abdomen: soft. Nontender. Extremities:  No clubbing, cyanosis. + 2 pitting edema bilateral LE   Vasculature: capillary refill < 3 seconds. Palpable dorsalis pedis pulses. Skin:  warm and dry. Psych: Awakens but follows no commands. Lymph:  No supraclavicular adenopathy. Neurologic:  No focal deficit. No seizures. Data: (All radiographs, tracings, PFTs, and imaging are personally viewed and interpreted unless otherwise noted).     Sodium 142, potassium 3.1, chloride 103, CO2 25, BUN 33, creatinine 1.4, anion gap 14.0, glucose 113.  WBC 6.1, hemoglobin 8.8, hematocrit 28.1, platelet count 437   Telemetry shows normal sinus rhythm   Urine culture 11/10/2021 negative   Pneumonia panel 11/10/2021 negative   Chest x-ray 11/11/2021 reports small left pleural effusion diffuse airspace opacities bilateral   Echocardiogram 10/12/2021 ejection fraction 25 to 30%, global hypokinesis of left ventricle. Meets Continued ICU Level Care Criteria:    [x] Yes   [] No - Transfer Planned to listed location:  [] HOSPITALIST CONTACTED- DR     Case and plan discussed with Dr. Cecy Salgado. Electronically signed by Apolonai Waters. SERGE Early - CNP  CRITICAL CARE SPECIALIST    Patient seen and examined independently by me. Above discussed and I agree with Lila, and the abovenote except where indicated in the EMR revision history. Also see my additional comments and changes indicated by discrete font, text color, italics, and/or initials. Labs, cultures, and radiographs where available were reviewed. Changes were made in the orders as necessary. I discussed patient concerns with Alex ALMANZAR and instructions were given. Respiratory care issues addressed. Please see our orders for the updated patient care plan.   Ccm:32  Electronically signed by     Opal Evans MD on 11/14/2021 at 8:35 PM

## 2021-11-14 NOTE — PROGRESS NOTES
8445: \"The oxycodone ER we placed yesterday is not a crushable dose. Pharmacy is asking if we could switch it to oxycodone IR 5mg Q6H? Patient is maxed on precedex and continues to be restless/banging TV remote on bed. Night RN also said heart rate was dropping into the 40's - transitioning off precedex would help this as well. Yesterday we attempted to transition off precedex with Ativan 1mg TID and oxycodone but this was unsuccessful. Do you think we could try something like Xanax? Also - patient did not sleep at all last night, can we try something to help her do this as well? \" - perfect serve sent to Timothy Mccabe DO     0809: \"The oxycodone ER we placed yesterday is not a crushable dose. Pharmacy is asking if we could switch it to oxycodone IR 5mg Q6H? Patient is maxed on precedex and continues to be restless/banging TV remote on bed. Night RN also said heart rate was dropping into the 40's - transitioning off precedex would help this as well. Yesterday we attempted to transition off precedex with Ativan 1mg TID and oxycodone but this was unsuccessful. Do you think we could try something like Xanax? Also - patient did not sleep at all last night, can we try something to help her do this as well? \" -  perfect serve sent to [facility]: Guilherme Moreland NP states she will place orders     [phone]: Updated Ronnie Gutierres, daughter, on patient status and today's events =  heparin restarted, rectal tube placed, precedex minimal dose, ativan and oxy given PO throughout day, tube feed at goal with minimal residuals

## 2021-11-15 LAB
ANION GAP SERPL CALCULATED.3IONS-SCNC: 11 MEQ/L (ref 8–16)
BUN BLDV-MCNC: 31 MG/DL (ref 7–22)
CALCIUM SERPL-MCNC: 8.1 MG/DL (ref 8.5–10.5)
CHLORIDE BLD-SCNC: 103 MEQ/L (ref 98–111)
CO2: 27 MEQ/L (ref 23–33)
CREAT SERPL-MCNC: 1.3 MG/DL (ref 0.4–1.2)
ERYTHROCYTE [DISTWIDTH] IN BLOOD BY AUTOMATED COUNT: 16.8 % (ref 11.5–14.5)
ERYTHROCYTE [DISTWIDTH] IN BLOOD BY AUTOMATED COUNT: 60.2 FL (ref 35–45)
GFR SERPL CREATININE-BSD FRML MDRD: 39 ML/MIN/1.73M2
GLUCOSE BLD-MCNC: 109 MG/DL (ref 70–108)
GLUCOSE BLD-MCNC: 110 MG/DL (ref 70–108)
GLUCOSE BLD-MCNC: 118 MG/DL (ref 70–108)
GLUCOSE BLD-MCNC: 123 MG/DL (ref 70–108)
HCT VFR BLD CALC: 26 % (ref 37–47)
HCT VFR BLD CALC: 29.6 % (ref 37–47)
HCT VFR BLD CALC: 31.1 % (ref 37–47)
HEMOGLOBIN: 8 GM/DL (ref 12–16)
HEMOGLOBIN: 9.1 GM/DL (ref 12–16)
HEMOGLOBIN: 9.4 GM/DL (ref 12–16)
HEPARIN UNFRACTIONATED: 0.19 U/ML (ref 0.3–0.7)
HEPARIN UNFRACTIONATED: 0.44 U/ML (ref 0.3–0.7)
HEPARIN UNFRACTIONATED: 0.49 U/ML (ref 0.3–0.7)
MCH RBC QN AUTO: 29.8 PG (ref 26–33)
MCHC RBC AUTO-ENTMCNC: 30.2 GM/DL (ref 32.2–35.5)
MCV RBC AUTO: 98.7 FL (ref 81–99)
PLATELET # BLD: 164 THOU/MM3 (ref 130–400)
PMV BLD AUTO: 11.3 FL (ref 9.4–12.4)
POTASSIUM SERPL-SCNC: 3.3 MEQ/L (ref 3.5–5.2)
RBC # BLD: 3.15 MILL/MM3 (ref 4.2–5.4)
SODIUM BLD-SCNC: 141 MEQ/L (ref 135–145)
WBC # BLD: 9.7 THOU/MM3 (ref 4.8–10.8)

## 2021-11-15 PROCEDURE — 36415 COLL VENOUS BLD VENIPUNCTURE: CPT

## 2021-11-15 PROCEDURE — 2500000003 HC RX 250 WO HCPCS: Performed by: STUDENT IN AN ORGANIZED HEALTH CARE EDUCATION/TRAINING PROGRAM

## 2021-11-15 PROCEDURE — 6360000002 HC RX W HCPCS: Performed by: STUDENT IN AN ORGANIZED HEALTH CARE EDUCATION/TRAINING PROGRAM

## 2021-11-15 PROCEDURE — 2580000003 HC RX 258: Performed by: NURSE PRACTITIONER

## 2021-11-15 PROCEDURE — 6370000000 HC RX 637 (ALT 250 FOR IP): Performed by: STUDENT IN AN ORGANIZED HEALTH CARE EDUCATION/TRAINING PROGRAM

## 2021-11-15 PROCEDURE — 6360000002 HC RX W HCPCS: Performed by: FAMILY MEDICINE

## 2021-11-15 PROCEDURE — 85520 HEPARIN ASSAY: CPT

## 2021-11-15 PROCEDURE — 85014 HEMATOCRIT: CPT

## 2021-11-15 PROCEDURE — 2500000003 HC RX 250 WO HCPCS: Performed by: INTERNAL MEDICINE

## 2021-11-15 PROCEDURE — 99291 CRITICAL CARE FIRST HOUR: CPT | Performed by: INTERNAL MEDICINE

## 2021-11-15 PROCEDURE — 82948 REAGENT STRIP/BLOOD GLUCOSE: CPT

## 2021-11-15 PROCEDURE — 6370000000 HC RX 637 (ALT 250 FOR IP): Performed by: INTERNAL MEDICINE

## 2021-11-15 PROCEDURE — 6360000002 HC RX W HCPCS: Performed by: NURSE PRACTITIONER

## 2021-11-15 PROCEDURE — 99232 SBSQ HOSP IP/OBS MODERATE 35: CPT | Performed by: INTERNAL MEDICINE

## 2021-11-15 PROCEDURE — 80048 BASIC METABOLIC PNL TOTAL CA: CPT

## 2021-11-15 PROCEDURE — 2580000003 HC RX 258: Performed by: STUDENT IN AN ORGANIZED HEALTH CARE EDUCATION/TRAINING PROGRAM

## 2021-11-15 PROCEDURE — 2100000000 HC CCU R&B

## 2021-11-15 PROCEDURE — 85018 HEMOGLOBIN: CPT

## 2021-11-15 PROCEDURE — 85027 COMPLETE CBC AUTOMATED: CPT

## 2021-11-15 PROCEDURE — 6370000000 HC RX 637 (ALT 250 FOR IP): Performed by: NURSE PRACTITIONER

## 2021-11-15 PROCEDURE — 94003 VENT MGMT INPAT SUBQ DAY: CPT

## 2021-11-15 RX ORDER — CHLORHEXIDINE GLUCONATE 0.12 MG/ML
15 RINSE ORAL 2 TIMES DAILY
Status: DISCONTINUED | OUTPATIENT
Start: 2021-11-15 | End: 2021-11-18 | Stop reason: HOSPADM

## 2021-11-15 RX ADMIN — LORAZEPAM 1 MG: 1 TABLET ORAL at 07:55

## 2021-11-15 RX ADMIN — OXYCODONE HYDROCHLORIDE 5 MG: 5 TABLET ORAL at 23:14

## 2021-11-15 RX ADMIN — OXYCODONE HYDROCHLORIDE 5 MG: 5 TABLET ORAL at 12:05

## 2021-11-15 RX ADMIN — LORAZEPAM 1 MG: 2 INJECTION INTRAMUSCULAR; INTRAVENOUS at 03:19

## 2021-11-15 RX ADMIN — LORAZEPAM 1 MG: 1 TABLET ORAL at 14:12

## 2021-11-15 RX ADMIN — OXYCODONE HYDROCHLORIDE 5 MG: 5 TABLET ORAL at 06:14

## 2021-11-15 RX ADMIN — POTASSIUM BICARBONATE 40 MEQ: 782 TABLET, EFFERVESCENT ORAL at 07:55

## 2021-11-15 RX ADMIN — PIPERACILLIN AND TAZOBACTAM 3375 MG: 3; .375 INJECTION, POWDER, LYOPHILIZED, FOR SOLUTION INTRAVENOUS at 12:05

## 2021-11-15 RX ADMIN — Medication 1 CAPSULE: at 07:56

## 2021-11-15 RX ADMIN — OLANZAPINE 5 MG: 5 TABLET, FILM COATED ORAL at 20:19

## 2021-11-15 RX ADMIN — CHLORHEXIDINE GLUCONATE 15 ML: 1.2 RINSE ORAL at 20:17

## 2021-11-15 RX ADMIN — Medication 17.6 MG: at 20:13

## 2021-11-15 RX ADMIN — LORAZEPAM 1 MG: 1 TABLET ORAL at 20:11

## 2021-11-15 RX ADMIN — HEPARIN SODIUM 14 UNITS/KG/HR: 10000 INJECTION, SOLUTION INTRAVENOUS at 16:51

## 2021-11-15 RX ADMIN — LORAZEPAM 1 MG: 2 INJECTION INTRAMUSCULAR; INTRAVENOUS at 23:41

## 2021-11-15 RX ADMIN — AMIODARONE HYDROCHLORIDE 200 MG: 200 TABLET ORAL at 08:02

## 2021-11-15 RX ADMIN — ACETAMINOPHEN 650 MG: 325 TABLET ORAL at 07:55

## 2021-11-15 RX ADMIN — BUMETANIDE 1 MG: 0.25 INJECTION INTRAMUSCULAR; INTRAVENOUS at 07:55

## 2021-11-15 RX ADMIN — FAMOTIDINE 20 MG: 10 INJECTION, SOLUTION INTRAVENOUS at 07:55

## 2021-11-15 RX ADMIN — SODIUM CHLORIDE, PRESERVATIVE FREE 10 ML: 5 INJECTION INTRAVENOUS at 07:56

## 2021-11-15 RX ADMIN — HEPARIN SODIUM 14 UNITS/KG/HR: 10000 INJECTION, SOLUTION INTRAVENOUS at 01:38

## 2021-11-15 RX ADMIN — Medication: at 16:15

## 2021-11-15 RX ADMIN — SODIUM CHLORIDE 0.2 MCG/KG/HR: 9 INJECTION, SOLUTION INTRAVENOUS at 16:09

## 2021-11-15 RX ADMIN — HEPARIN SODIUM 2000 UNITS: 1000 INJECTION, SOLUTION INTRAVENOUS; SUBCUTANEOUS at 01:33

## 2021-11-15 RX ADMIN — POTASSIUM BICARBONATE 40 MEQ: 782 TABLET, EFFERVESCENT ORAL at 20:11

## 2021-11-15 RX ADMIN — SODIUM CHLORIDE, PRESERVATIVE FREE 10 ML: 5 INJECTION INTRAVENOUS at 20:12

## 2021-11-15 RX ADMIN — SODIUM CHLORIDE: 9 INJECTION, SOLUTION INTRAVENOUS at 13:49

## 2021-11-15 RX ADMIN — PIPERACILLIN AND TAZOBACTAM 3375 MG: 3; .375 INJECTION, POWDER, LYOPHILIZED, FOR SOLUTION INTRAVENOUS at 04:00

## 2021-11-15 RX ADMIN — OXYCODONE HYDROCHLORIDE 5 MG: 5 TABLET ORAL at 18:17

## 2021-11-15 ASSESSMENT — PAIN SCALES - GENERAL
PAINLEVEL_OUTOF10: 0
PAINLEVEL_OUTOF10: 1
PAINLEVEL_OUTOF10: 5
PAINLEVEL_OUTOF10: 0

## 2021-11-15 ASSESSMENT — PULMONARY FUNCTION TESTS
PIF_VALUE: 28
PIF_VALUE: 27
PIF_VALUE: 25

## 2021-11-15 NOTE — PROGRESS NOTES
Comprehensive Nutrition Assessment    Type and Reason for Visit:  Reassess (TF management)    Nutrition Recommendations/Plan:   Continue  Nepro carb steady at 40 ml/hr (goal). Free water flush per MD (currently 100 ml every 8 hours). ? PEG - monitoring labs, wts, status for EN adjustments as appropriate. Nutrition Assessment:    Pt. improving nutritionally AEB tolerating TF at goal again s/p trach placement 11/10 although has had OGT to LIWS d/t residuals over 600ml this admit, poor po intake first 5d of admit,constipation then need for flexiseal,  intubated 10/15, LOS day 35.  At risk for further nutrition compromise r/t COVID Dx 10/5, new CHF, advanced age, wounds and underlying medical condition (hx diverticulitis, HTN).      Malnutrition Assessment:  Malnutrition Status:   At risk for malnutrition (Comment)    Context:  Acute Illness     Findings of the 6 clinical characteristics of malnutrition:  Energy Intake:  7 - 50% or less of estimated energy requirements for 5 or more days  Weight Loss:  No significant weight loss (however difficult to evaluate with edema)     Body Fat Loss:  No significant body fat loss     Muscle Mass Loss:  No significant muscle mass loss    Fluid Accumulation:  Unable to assess     Strength:  Not Performed    Estimated Daily Nutrient Needs:  Energy (kcal):  2921-0427 (25-35/kgm) late phase; Weight Used for Energy Requirements:   (56 kgm)     Protein (g):   gm (1.2-2) as renal function allows; Weight Used for Protein Requirements:   (56 kgm)        Fluid (ml/day):  per MD;     Nutrition Related Findings:     Vent via trach;  tolerating TF at goal via NGT at 40ml/hour;  Rx includes ATB, Culturelle, Bumex, Precedex;  Glucose 123, BUN 31, Cr 1.3, Potassium 3.3, Sodium 141  MAP 88; not following commands but did look at RD while in room; flexiseal;     Wounds:   (stage II sacrum 10/15, stage II coccyx 11/6, scabbed area below lip 11/6, perineum open wounds on rectum 11/8) Current Nutrition Therapies:    Current Tube Feeding (TF) Orders:  · Feeding Route: Nasogastric (placed post trach)  · Formula: Renal Formula (Nepro)  · Schedule: Continuous (10/26 at goal of 40ml/hour)  · Water Flushes: per physician - 100ml every 8h  · Goal TF & Flush Orders Provides: Nepro 40ml/hour provides 1699 kcals, 78gms protein, 154gms cho, 24gms fiber, 998 ml free H20 (698ml Nepro, 300 MD flush) in 1260ml total volume (960 Nepro, 300 MD flush)/24h    Additional Calorie Sources:   10/26 diprivan discontinued    Anthropometric Measures:  · Height: 5' (152.4 cm)  · Current Body Weight: 140 lb (63.5 kg) (11/12 with +2 edema)   · Admission Body Weight: 124 lb (56.2 kg) (10/11 +2 edema)    · Usual Body Weight:  (per EMR: 10/7/21: 125#)     · Ideal Body Weight: 100 lbs;    · BMI: 27.3    · BMI Categories: Overweight (BMI 25.0-29. 9)       Nutrition Diagnosis:   · Inadequate oral intake related to impaired respiratory function as evidenced by NPO or clear liquid status due to medical condition, intubation, nutrition support - enteral nutrition      Nutrition Interventions:   Food and/or Nutrient Delivery:  Continue NPO, Continue Current Tube Feeding  Nutrition Education/Counseling:  No recommendation at this time   Coordination of Nutrition Care:  Continue to monitor while inpatient    Goals:  EN to provide % nutrient needs while intubated for covid recovery process       Nutrition Monitoring and Evaluation:   Behavioral-Environmental Outcomes:  None Identified   Food/Nutrient Intake Outcomes:  Enteral Nutrition Intake/Tolerance  Physical Signs/Symptoms Outcomes:  Biochemical Data, GI Status, Fluid Status or Edema, Hemodynamic Status, Nutrition Focused Physical Findings, Skin, Weight     Discharge Planning:     Too soon to determine     Electronically signed by Shana Banda RD, LD on 11/15/21 at 3:42 PM EST    Contact: [phone]

## 2021-11-15 NOTE — FLOWSHEET NOTE
11/15/21 1800   Provider Notification   Reason for Communication Review case   Provider Name Dr Kat Hayes   Provider Notification Physician   Method of Communication Secure Message   Response See orders   Notification Time 1800   Nursing communication order says to removed alaniz catheter but I mention her wounds on coccyx and sacrum, she said to hold and she will re evaluate tomorrow.

## 2021-11-15 NOTE — CARE COORDINATION
11/15/21, 4:30 PM EST    DISCHARGE ON GOING EVALUATION    [facility] day: 35  Location: 3A-09/009-A Reason for admit: Acute respiratory failure with hypoxia ([facility] [address] [J96.01]  Acute congestive heart failure, unspecified heart failure type ([address] [I50.9]  COVID-19 [U07.1]   Procedure:  10/12 Echo with EF 25-30%; Small circumferential pericardial effusion with no tamponade physiology; Myxomatotic degeneration of mitral valve;  Mild to Moderate mitral regurgitation is present  10/15 Intubated  10/15 CVC RIJ - 11/11 removed  10/28 Renal US: Mildly increased echogenicity of the kidneys that may suggest underlying medical renal disease; no hydronephrosis; 7mm left renal calculus is seen; small ascites. Bilateral pleural effusions; bilateral renal cysts  10/30 CT Chest: Extensive bilateral pneumonia; Large bilateral pleural effusions, right greater than left; Large pericardial effusion; Marked cardiomegaly  10/30 CT Abd/pelvis: Diffusely thickened appearance of the sigmoid colon. There are diverticula in this region. This could be on the basis of acute diverticulitis, however given the eccentric appearance of the thickening, neoplastic process can't entirely be excluded; Haziness throughout the superficial subcutaneous soft tissues of the abdomen and pelvis consistent with anasarca; No evidence of hydronephrosis. No obstructive renal stones are identified; Diffuse haziness throughout the mesentery and a small amount of ascites, findings likely related to fluid overload  11/1 BLE venous doppler: Neg for DVT  11/10 Bronch w/washings sent: Pitting airways disease; BLL mucous plugging  11/10 Trach placed: Bivona 7.5 mm  11/10 Cardioverted x1 to NSR  23/59 PICC left basilic    Barriers to Discharge: Positive respiratory and urine cultures; considered likely colonization. +FOB, no signs of acute bleeding.      Remains on vent to trach, on PCV, peep 6, FIO2 30%, sats 97%. Tmax 100.4. NSR. Follows commands. PT/OT - early mobility. Intensivist and Nephrology following. Palliative Care, Wound Care, and Dietitian following. Telemetry, PICC, NG/TF, flexiseal, alaniz. IVF, precedex @ 0.2 mcg/kg/hr, heparin drip, amio, IV bumex 1 mg daily, IV pepcid, lactobacillus, prn IV ativan, po ativan tid, zyprexa, roxicodone, IV zosyn, K+, Electrolyte replacement protocols. K+ 3.3, BUN 31, Creat 1.3, hgb 9.1. PCP: SERGE Parsons CNP  Readmission Risk Score: 19.3 ( )%  Patient Goals/Plan/Treatment Preferences: From home with daughter. Plan for [facility] as 1st choice and 2nd choice is [facility]. No precert required. Will need to be out of COVID isolation prior to going to [place].

## 2021-11-15 NOTE — PROGRESS NOTES
Renal Progress Note    Assessment and Plan:   1. Acute kidney injury mostly stable and  predominantly prerenal azotemia  2. Hypokalemia  3. Respiratory failure on mechanical support  4. SARS-CoV-2 pneumonia  5. Normocytic anemia    PLAN:  1. Labs reviewed  2. Serum creatinine is stable  3. Serum potassium is low  4. Medications reviewed  5. Potassium replacement  6. Labs in the morning.     7. We will follow      Patient Active Problem List:     Sigmoid diverticulitis     Acute respiratory failure with hypoxia ([facility] [address]     COVID-19     Acute congestive heart failure ([facility] [address]      Subjective:   Admit Date: 10/11/2021    Interval History:   Seen for acute kidney injury  Awake and alert this morning  No issues from staff  Blood pressure is stable  Urine output is good      Medications:   Scheduled Meds:   OLANZapine  5 mg Oral Nightly    oxyCODONE  5 mg Oral Q6H    potassium bicarb-citric acid  40 mEq Oral Daily    insulin regular  10 Units IntraVENous Once    And    dextrose  25 g IntraVENous Once    LORazepam  1 mg Oral TID    amiodarone  200 mg Oral Daily    calcium replacement protocol   Other RX Placeholder    lidocaine-EPINEPHrine  20 mL IntraDERmal Once    piperacillin-tazobactam  3,375 mg IntraVENous Q8H    lidocaine 1 % injection  5 mL IntraDERmal Once    sodium chloride flush  5-40 mL IntraVENous 2 times per day    famotidine (PEPCID) injection  20 mg IntraVENous Daily    bumetanide  1 mg IntraVENous Daily    lactobacillus  1 capsule Per NG tube Daily with breakfast    senna  10 mL Per NG tube Nightly    [Held by provider] metoprolol tartrate  25 mg Oral BID     Continuous Infusions:   dextrose      sodium chloride      heparin (PORCINE) Infusion 14 Units/kg/hr (11/15/21 0600)    sodium chloride 10 mL/hr at 11/15/21 0138    dextrose 100 mL/hr (10/26/21 1253)    dexmedetomidine 0.2 mcg/kg/hr (11/15/21 0600)    sodium chloride Stopped (10/19/21 1900)       CBC:   Recent Labs     11/13/21  3477 11/13/21  1320 11/14/21  0430 11/14/21  0430 11/14/21  1200 11/14/21  2100 11/15/21  0440   WBC 6.4  --  6.1  --   --   --  9.7   HGB 8.7*   < > 8.8*   < > 9.1* 9.6* 9.4*     --  131  --   --   --  164    < > = values in this interval not displayed. CMP:    Recent Labs     11/14/21  0430 11/14/21  1753 11/15/21  0404    141 141   K 3.1* 3.1* 3.3*    102 103   CO2 25 24 27   BUN 33* 31* 31*   CREATININE 1.4* 1.3* 1.3*   GLUCOSE 113* 125* 110*   CALCIUM 7.5* 8.1* 8.1*   LABGLOM 36* 39* 39*     Troponin: No results for input(s): TROPONINI in the last 72 hours. BNP: No results for input(s): BNP in the last 72 hours. INR: No results for input(s): INR in the last 72 hours. Lipids: No results for input(s): CHOL, LDLDIRECT, TRIG, HDL, AMYLASE, LIPASE in the last 72 hours. Liver: No results for input(s): AST, ALT, ALKPHOS, PROT, LABALBU, BILITOT in the last 72 hours. Invalid input(s): BILDIR  Iron:  No results for input(s): IRONS, FERRITIN in the last 72 hours. Invalid input(s): LABIRONS  XR ABDOMEN FOR NG/OG/NE TUBE PLACEMENT   Final Result   NG tube in good position. **This report has been created using voice recognition software. It may contain minor errors which are inherent in voice recognition technology. **      Final report electronically signed by Dr. Marlee Temple on 11/13/2021 5:21 PM      XR ABDOMEN (KUB) (SINGLE AP VIEW)   Final Result   1. The nasogastric tube terminates below the diaphragms in the left upper quadrant. 2. Nonspecific distention of small bowel loops centrally. Findings may relate to ileus versus partial small bowel obstruction. Clinical correlation is recommended. **This report has been created using voice recognition software. It may contain minor errors which are inherent in voice recognition technology. **      Final report electronically signed by Dr Marifer Schmitz on 11/13/2021 12:10 PM      XR CHEST PORTABLE   Final Result   1.  The left PICC line terminates in the superior vena cava. 2. Otherwise, there has been no other significant interval change in the radiographic appearance of the chest  from 11/10/2021. **This report has been created using voice recognition software. It may contain minor errors which are inherent in voice recognition technology. **      Final report electronically signed by Dr Jenni Shepherd on 11/11/2021 11:32 AM      XR CHEST PORTABLE   Final Result   1. There is a new tracheotomy tube. There has been removal of the enteric tube and endotracheal tube. 2.. There is no change in the right internal jugular line with the tip in the superior vena cava. 3. There is cardiomegaly. 4. There is bilateral pulmonary opacification, slightly worse than on previous study. **This report has been created using voice recognition software. It may contain minor errors which are inherent in voice recognition technology. **      Final report electronically signed by DR Maricarmen Dowd on 11/10/2021 11:06 AM      XR CHEST PORTABLE   Final Result   Stable chest.               **This report has been created using voice recognition software. It may contain minor errors which are inherent in voice recognition technology. **      Final report electronically signed by Dr. Jennifer Zapata MD on 11/10/2021 8:28 AM      XR ABDOMEN (KUB) (SINGLE AP VIEW)   Final Result   NG tube is at the 1st portion of duodenum. This document has been electronically signed by: Neto Blue MD on 11/08/2021 06:40 PM      XR CHEST PORTABLE   Final Result   1. Slightly improving bilateral pneumonia. 2. Moderate stable cardiomegaly. **This report has been created using voice recognition software. It may contain minor errors which are inherent in voice recognition technology. **      Final report electronically signed by Dr. Heena Casarez on 11/4/2021 6:34 PM      XR CHEST PORTABLE   Final Result   1. voice recognition technology. **      Final report electronically signed by Dr Gwen Coronado on 10/30/2021 2:23 PM      CT CHEST W WO CONTRAST   Final Result       1. Extensive bilateral pneumonia. 2. Large bilateral pleural effusions, right greater than left. 3. Large pericardial effusion. 4. Marked cardiomegaly. **This report has been created using voice recognition software. It may contain minor errors which are inherent in voice recognition technology. **      Final report electronically signed by Dr Gwen Coronado on 10/30/2021 1:54 PM      XR CHEST PORTABLE   Final Result   1. Mildly megaly. ET tube and NG tube remain in good position. Right jugular line with catheter tip in SVC. 2. Moderate-sized pleural effusion left side. 3. Moderate mixed infiltrates scattered throughout both lungs. Overall appearance slightly worse than prior. **This report has been created using voice recognition software. It may contain minor errors which are inherent in voice recognition technology. **      Final report electronically signed by Dr. Clementina Brown on 10/29/2021 8:10 AM      US RENAL COMPLETE   Final Result   1. Mildly increased echogenicity of the kidneys that may suggest underlying medical renal disease. 2. No hydronephrosis. 3. A 7 mm left renal calculus is seen. 4. Small ascites. Bilateral pleural effusions. 5. Bilateral renal cysts. **This report has been created using voice recognition software. It may contain minor errors which are inherent in voice recognition technology. **      Final report electronically signed by Dr Mele Morales on 10/29/2021 3:55 AM      XR ABDOMEN FOR NG/OG/NE TUBE PLACEMENT   Final Result   1. The tip of the nasogastric tube is below the diaphragms within the stomach. 2. Partially seen is left pleural effusion and left retrocardiac opacity. Opacities are also seen in the partially seen right lower lobe.             **This report has been created using voice recognition software. It may contain minor errors which are inherent in voice recognition technology. **      Final report electronically signed by Dr Ruslan Zhou on 10/27/2021 6:54 PM      XR CHEST PORTABLE   Final Result   1. There has been no significant interval change in the radiographic appearance of the chest  from 10/23/2021 including diffuse airspace disease. .            **This report has been created using voice recognition software. It may contain minor errors which are inherent in voice recognition technology. **      Final report electronically signed by Dr Ruslan Zhou on 10/24/2021 8:54 PM      XR CHEST PORTABLE   Final Result   1. Support lines and tubes in unchanged position when compared to prior. 2. Interval worsening of parenchymal densities within the right    central/right lower lobe. Additionally, worsening of confluent    consolidation within the left midlung with development of left basilar    pleural/parenchymal opacities likely consistent with moderate left pleural    effusion. This document has been electronically signed by: Luis Eduardo Valverde DO on    10/23/2021 05:00 AM      XR CHEST PORTABLE   Final Result   Persistent diffuse patchy opacities but with improved aeration at the lung bases. **This report has been created using voice recognition software. It may contain minor errors which are inherent in voice recognition technology. **      Final report electronically signed by Dr. Catrina Oliveros MD on 10/21/2021 10:48 AM      XR CHEST PORTABLE   Final Result   1. Increased density in the left lung base. This can indicate partial left lower lobe collapse. 2. Persistent patchy bilateral pulmonary opacities. **This report has been created using voice recognition software. It may contain minor errors which are inherent in voice recognition technology. **      Final report electronically signed by Dr. Mike Hubbard on 10/18/2021 7:58 AM      XR CHEST PORTABLE   Final Result   Impression:   1. Cardiomegaly with improvement of passive congestive changes and better    aeration of both lungs compared to the prior study. 2. The focal bilateral alveolar consolidations consistent with infectious    pulmonary disease are unchanged. Retrocardiac consolidation and small LEFT    pleural effusion obscuring the LEFT diaphragm is unchanged. This document has been electronically signed by: Vibha Ornelas MD on    10/16/2021 03:18 AM      XR CHEST PORTABLE   Final Result   Somewhat low position of endotracheal tube 1.2 cm above the claire but improved aeration of the upper lungs. **This report has been created using voice recognition software. It may contain minor errors which are inherent in voice recognition technology. **      Final report electronically signed by Dr. Laxmi Rai on 10/15/2021 3:34 PM      XR CHEST PORTABLE   Final Result   1. Bilateral pneumonia. 2. Small bilateral pleural effusions. 3. Stable cardiomegaly. **This report has been created using voice recognition software. It may contain minor errors which are inherent in voice recognition technology. **      Final report electronically signed by Dr. Temo Roach on 10/15/2021 4:14 PM      XR CHEST PORTABLE   Final Result   Right lower lobe airspace infiltrates. Severe cardiomegaly. **This report has been created using voice recognition software. It may contain minor errors which are inherent in voice recognition technology. **      Final report electronically signed by Dr. Laxmi Rai on 10/11/2021 8:59 PM      XR CHEST PORTABLE   Final Result      1. Diffuse groundglass opacities in both lungs along with cardiomegaly. Findings likely suggest acute exacerbation of congestive heart failure versus pneumonia in the right clinical setting. Clinical correlation is recommended      2. Small left pleural effusion.       3. Other

## 2021-11-15 NOTE — PROGRESS NOTES
[address] [address]  PHYSICAL THERAPY MISSED TREATMENT NOTE  [facility]A    Date: 11/15/2021  Patient Name: Jimena Leo        MRN: [id number]   : 1934  (80 y.o.)  Gender: female   Referring Practitioner: Car Olivo DO  Diagnosis: Acute respiratory failure with hypoxia         REASON FOR MISSED TREATMENT:  Missed Treat. Pt not appropriate for PT this date, will hold.

## 2021-11-15 NOTE — PROGRESS NOTES
CRITICAL CARE PROGRESS NOTE      Patient:  Camille Erickson    Unit/Bed:3A-09/009-A  YOB: 1934  MRN: [id number]   PCP: SERGE Sparks CNP  Date of Admission: 10/11/2021  Chief Complaint:- Worsening shortness of breath    Assessment and Plan:    1. Acute hypoxic respiratory failure: Patient was admitted to the hospital through the ED on 10/11/2021 and started on high flow. Patient's hypoxemia progressively worsened. Intubated and admitted to ICU on 10/15. Patient failed multiple breathing trials. There was some suspicion for neuromuscular disease present dt persistent hypercarbia despite mechanical ventilatory support. Work up negative for Myasthenia Gravis. Tracheostomy tube placed 11/10. Current vent settings: FiO2 30 PCV rate 20 PIP 26 PEEP 6    2. Covid 19: Status post baricitinib    3. Pneumonia:  respiratory culture positive for Corynebacterium. Vancomycin 6 days. Pneumonia panel negative, respiratory culture revealed Candida albicans. No diflucan, patient has history of QT prolongation and positive culture most likely d/t colonization. 4. ARDS: Mild. Started Decadron, Baricitinib (10/12). Trach in place. Continue vent settings. Wean as tolerated     5. Hypotension - currently off levo. Continue to monitor. 6. HFrEF: Echo (10/11): LV EF 25-30%, LV severe global hypokinesis. LV size moderate increase. Weight on admission 120 lbs. Cardiologist consult appreciated - cardiology will wait for recovery prior to considering any intervention such as LifeVest versus AICD. D/c milrinone on 11/8       7. Hx of Prolong QTc interval: On telemetry strip in ED. Seen by cardiogy for Prolong QTC. Plan: tele monitor, avoid QTC prolong medications, Keep K +> 4 and Mg > 2     8. New on set Afib with RVR: resolved - since admission patient had episodes of afib without rvr. 11/10 patient developed afib with RVR. After amio bolus given, patient sustained rate of 150s.  Patient underwent synchronized cardioversion at 200 J in which patient returned to NSR rate 100. Currently on heparin drip. 9. Acute blood loss - on 11/12 hemoglobin noted to be 6.9.  2 units ordered for transfusion. Heparin was held. Hemoglobin posttransfusion was 8.7. Fecal occult positive. Patient has brown stools and no signs of GI bleeding. Stable H/H. Resume heparin at this time. Hemoglobin checks every 8 hours. If drop in hemoglobin we will stop heparin drip. 10. Bilateral lower extremity swelling: r/o DVT - US negative on 11/1. Daily Bumex 1 mg    11. HERIBERTO: Bun/Cr Improving. Creatinine 1.3. Patient having good urine output. Nephrology continue Bumex and adjust as clinically appropriate. 12. Hypokalemia: replacement protocol in place. 13.  Encephalopathy: likely secondary to hypercapnic hypoxemia. Patient is able to awaken and track when speaking although follows no commands. Monitor. Neuro exam when patient is extubated     15. Normocytic anemia: Hemoglobin 9.4, hematocrit 31.1. Patient is status post 2 unit infusion. Fecal occult positive however patient is having stools with no signs of acute bleeding. Hemoglobin has remained stable. Continue to monitor. 15. Hematuria: Secondary to nephrolithiasis. resolved    16. UTI -urine cultures positive for mixed growth with greater than 3 different enteric gram-negative bacillus with swarming Proteus. Possible colonization. Patient is on empiric Zosyn day 6. 17. Coccyx wound: pinxav ointment    INITIAL H AND P AND ICU COURSE:   42-year-old female presents to the ED on 10/11/2021 due to worsening shortness of breath. Patient tested positive for Covid on 10/4/2021. In the emergency room patient was found to have a SPO2 of 83% on room air with a BNP of 15.7K. Patient was admitted and managed for acute hypoxic respiratory failure secondary to Covid with combination of congestive heart failure.   Patient was placed on nasal cannula, Bumex and dexamethasone in the ED. On 10/15 patient was transferred to the ICU and intubated for acute hypoxic respiratory failure on 100% FiO2 on high flow. Patient had a UTI on 10/18 and was started on ceftriaxone on 10/19. Patient was also started on milrinone for her HFrEF. Patient's family has been consulted regarding trach and PEG tube in which they are agreeable. Cardiology is on board regarding LifeVest versus AICD. 11/5 - patient HERIBERTO improving, continue to be followed by nephro. Repeat cxr shows improving of bilateral pna. Pending pneumonia panel results. Trach and peg set for Monday. 11/6 - patient HERIBERTO improving and seen by nephro. Resume bumex. Patient otherwise stable on vent pending trach Monday. 11/7 - no acute events overnight. Pt trach planned for tomorrow. Added pepcid for GI prophylaxis      11/8 - trach scheduled today. Pt bp soft with map 63. D/t patient HFrEF, hold fluids for now. nephro on board. 11/9 - eventual trach. Requiring 3 levo over night, now currently on 1 levo. Episode of afib without rvr over night, currently nsr with ventricular bigeminy. Night team started on heparin drip for anticoagulation. Continue to monitor rate / rhythm. 11/10 - bronchoscopy and percutaneous trach placement today. Patient entered Afib with RVR. Given amio bolus. After bolus, patient sustained rate of 150s. Patient underwent synchronized cardioversion at 200J in which patient returned to NSR rate 100. Continue on amio drip, heparin drip. Continue to monitor. Patient developing fever. Hypotensive needing levo. No leukocytosis WBC 5.8. PNA panel ordered today negative. Pending resp cultures. 11/11 - removal of subclavian line, PICC line placed, resp culture and pna panel negative, gram stain shows moderate seg neutrophils with moderate GNB. Continue on amio drip.  Zosyn day 2.     11/12 - Hgb noted to be 6.9 2u Ordered for transfusion.  Urine culture shows mixed growth of at least 3 different enteric gram-negative bacilli including swarming Proteus species.  Continue Zosyn day 3.  Respiratory culture was positive for Candida albicans.  Patient has history of QT prolongation.  Will hold on Diflucan.     11/13 - Hgb post transfusion of 2 units PRBCs is 8.7.  Heparin was hold last night in the setting of this anemia requiring transfusion.  FOBT ordered.  No obvious signs of acute bleeding.  Potassium was 3.2 11/12.  5.9 this morning.  Recheck K levels if still elevated will order insulin with D5 amp.  2 g of mag ordered as patient has history of prolonged QT with magnesium level 1.9.  Goal >2.  Reinsert NG tube as it was removed post trach.  Restart tube feeds.  Transition from amiodarone drip to p.o.  Patient noted to have increasing agitation today.  Gave dose of 2 mg Ativan and patient noticeably more comfortable.  Ativan 1 mg 3 times daily and OxyContin 10 mg twice daily.  Is currently on hold due to anemia that required 2 units PRBCs transfusion.  FOBT ordered positive.       11/14 - patient stable on trach vent. She does awaken but does not follow commands. She does look at you when you speak to her and opens her eyes. 11/5 - stable H&H. On heparin drip. If hemoglobin drops will hold. Potassium remains low, replacement protocol in place. Empiric Zosyn day 6    Past Medical History: HPI  Family History:  See EMR  Social History:   Former smoker since 1977. 15 pack year hx prior to cessation    ROS   Cannot obtain d/t patient sedated and on mechanical ventilation    Scheduled Meds:   potassium bicarb-citric acid  40 mEq Oral BID    OLANZapine  5 mg Oral Nightly    oxyCODONE  5 mg Oral Q6H    insulin regular  10 Units IntraVENous Once    And    dextrose  25 g IntraVENous Once    LORazepam  1 mg Oral TID    amiodarone  200 mg Oral Daily    calcium replacement protocol   Other RX Placeholder    lidocaine-EPINEPHrine  20 mL IntraDERmal Once    piperacillin-tazobactam  3,375 mg IntraVENous Q8H  lidocaine 1 % injection  5 mL IntraDERmal Once    sodium chloride flush  5-40 mL IntraVENous 2 times per day    famotidine (PEPCID) injection  20 mg IntraVENous Daily    bumetanide  1 mg IntraVENous Daily    lactobacillus  1 capsule Per NG tube Daily with breakfast    senna  10 mL Per NG tube Nightly    [Held by provider] metoprolol tartrate  25 mg Oral BID     Continuous Infusions:   dextrose      sodium chloride      heparin (PORCINE) Infusion 14 Units/kg/hr (11/15/21 0808)    sodium chloride 10 mL/hr at 11/15/21 0138    dextrose 100 mL/hr (10/26/21 1253)    dexmedetomidine 0.2 mcg/kg/hr (11/15/21 1649)    sodium chloride Stopped (10/19/21 1900)       PHYSICAL EXAMINATION:  T:  100 F.  P:  84  RR:  21 B/P:  121/67 FiO2:  30. O2 Sat:  98  I/O: 169.2/650 net - 480.8  Body mass index is 27.51 kg/m². GCS:   11  PCV+: Rate 20, PIP 26, PEEP 6    Sedation: precedex 0.2 mcg  Drips: heparin 14 units/kg/hr     General: Sedated on vent  HEENT:  normocephalic and atraumatic. No scleral icterus. PERR  Neck: supple. No Thyromegaly. Lungs: diminished breath sounds bilaterally on auscultation. No retractions  Cardiac: RRR. No JVD. Abdomen: soft. Nontender. Extremities:  No clubbing, cyanosis, mild pitting edema x 4, more prominent in bilateral lower extremities    Vasculature: capillary refill < 3 seconds. Palpable dorsalis pedis pulses. Skin:  warm and dry. Sacral decubitus ulcer  Psych:  Patient sedated  Lymph:  No supraclavicular adenopathy. Neurologic: No seizures,  able to open eyes and track    Data: (All radiographs, tracings, PFTs, and imaging are personally viewed and interpreted unless otherwise noted).     Sodium 141, potassium 3.3, chloride 103, CO2 27, BUN 31, creatinine 1.3   Glucose 110   WBC 8.7, hemoglobin 9.4, hematocrit 31.1, platelets 714   Telemetry shows NSR    Seen with multidisciplinary ICU team   Meets Continued ICU Level Care Criteria:    [x] Yes   [] No - Transfer Planned to listed location:  [] HOSPITALIST CONTACTED-      Case and plan discussed with Dr. Janelle Trujillo    Electronically signed by Judy Bocanegra MD  [address]    Patient seen by me. Case discussed with resident physician. Aileen Hendrix. Atrial fibrillation. Anticoagulate. Stop Precedex. Eventual transition to Eliquis. Patient with Kreg Rumple and Vent. Still with low TV. Unable to wean. Doubt can wean from ventilator anytime soon. Recommend NH placement. .  Italicized font represents changes to the note made by me. CC time 35 minutes. Time was discontiguous. Time does not include procedures. Time does include my direct assessment of the patient and coordination of care.   Electronically signed by Evi Zeng MD on 11/15/2021 at 4:49 PM

## 2021-11-16 LAB
ANION GAP SERPL CALCULATED.3IONS-SCNC: 9 MEQ/L (ref 8–16)
BLOOD CULTURE, ROUTINE: NORMAL
BLOOD CULTURE, ROUTINE: NORMAL
BUN BLDV-MCNC: 27 MG/DL (ref 7–22)
CALCIUM SERPL-MCNC: 7.6 MG/DL (ref 8.5–10.5)
CHLORIDE BLD-SCNC: 102 MEQ/L (ref 98–111)
CO2: 29 MEQ/L (ref 23–33)
CREAT SERPL-MCNC: 1.2 MG/DL (ref 0.4–1.2)
ERYTHROCYTE [DISTWIDTH] IN BLOOD BY AUTOMATED COUNT: 16.7 % (ref 11.5–14.5)
ERYTHROCYTE [DISTWIDTH] IN BLOOD BY AUTOMATED COUNT: 61 FL (ref 35–45)
GFR SERPL CREATININE-BSD FRML MDRD: 42 ML/MIN/1.73M2
GLUCOSE BLD-MCNC: 103 MG/DL (ref 70–108)
GLUCOSE BLD-MCNC: 149 MG/DL (ref 70–108)
HCT VFR BLD CALC: 28.4 % (ref 37–47)
HCT VFR BLD CALC: 28.5 % (ref 37–47)
HCT VFR BLD CALC: 28.9 % (ref 37–47)
HEMOGLOBIN: 8.4 GM/DL (ref 12–16)
HEMOGLOBIN: 8.6 GM/DL (ref 12–16)
HEMOGLOBIN: 8.6 GM/DL (ref 12–16)
INR BLD: 1.32 (ref 0.85–1.13)
MAGNESIUM: 1.7 MG/DL (ref 1.6–2.4)
MCH RBC QN AUTO: 30.1 PG (ref 26–33)
MCHC RBC AUTO-ENTMCNC: 29.6 GM/DL (ref 32.2–35.5)
MCV RBC AUTO: 101.8 FL (ref 81–99)
PLATELET # BLD: 149 THOU/MM3 (ref 130–400)
PMV BLD AUTO: 11.3 FL (ref 9.4–12.4)
POTASSIUM REFLEX MAGNESIUM: 3.7 MEQ/L (ref 3.5–5.2)
POTASSIUM SERPL-SCNC: 3.7 MEQ/L (ref 3.5–5.2)
RBC # BLD: 2.79 MILL/MM3 (ref 4.2–5.4)
SODIUM BLD-SCNC: 140 MEQ/L (ref 135–145)
WBC # BLD: 8.2 THOU/MM3 (ref 4.8–10.8)

## 2021-11-16 PROCEDURE — 36592 COLLECT BLOOD FROM PICC: CPT

## 2021-11-16 PROCEDURE — 6360000002 HC RX W HCPCS: Performed by: NURSE PRACTITIONER

## 2021-11-16 PROCEDURE — 85014 HEMATOCRIT: CPT

## 2021-11-16 PROCEDURE — 6370000000 HC RX 637 (ALT 250 FOR IP): Performed by: INTERNAL MEDICINE

## 2021-11-16 PROCEDURE — 2580000003 HC RX 258: Performed by: NURSE PRACTITIONER

## 2021-11-16 PROCEDURE — 6370000000 HC RX 637 (ALT 250 FOR IP): Performed by: NURSE PRACTITIONER

## 2021-11-16 PROCEDURE — 6370000000 HC RX 637 (ALT 250 FOR IP): Performed by: STUDENT IN AN ORGANIZED HEALTH CARE EDUCATION/TRAINING PROGRAM

## 2021-11-16 PROCEDURE — 2000000000 HC ICU R&B

## 2021-11-16 PROCEDURE — 94761 N-INVAS EAR/PLS OXIMETRY MLT: CPT

## 2021-11-16 PROCEDURE — 85027 COMPLETE CBC AUTOMATED: CPT

## 2021-11-16 PROCEDURE — 94003 VENT MGMT INPAT SUBQ DAY: CPT

## 2021-11-16 PROCEDURE — 2500000003 HC RX 250 WO HCPCS: Performed by: STUDENT IN AN ORGANIZED HEALTH CARE EDUCATION/TRAINING PROGRAM

## 2021-11-16 PROCEDURE — 6360000002 HC RX W HCPCS: Performed by: INTERNAL MEDICINE

## 2021-11-16 PROCEDURE — 80048 BASIC METABOLIC PNL TOTAL CA: CPT

## 2021-11-16 PROCEDURE — 82948 REAGENT STRIP/BLOOD GLUCOSE: CPT

## 2021-11-16 PROCEDURE — 83735 ASSAY OF MAGNESIUM: CPT

## 2021-11-16 PROCEDURE — 6360000002 HC RX W HCPCS: Performed by: FAMILY MEDICINE

## 2021-11-16 PROCEDURE — C9113 INJ PANTOPRAZOLE SODIUM, VIA: HCPCS | Performed by: NURSE PRACTITIONER

## 2021-11-16 PROCEDURE — 36415 COLL VENOUS BLD VENIPUNCTURE: CPT

## 2021-11-16 PROCEDURE — 2700000000 HC OXYGEN THERAPY PER DAY

## 2021-11-16 PROCEDURE — 99291 CRITICAL CARE FIRST HOUR: CPT | Performed by: INTERNAL MEDICINE

## 2021-11-16 PROCEDURE — 2580000003 HC RX 258: Performed by: INTERNAL MEDICINE

## 2021-11-16 PROCEDURE — 85610 PROTHROMBIN TIME: CPT

## 2021-11-16 PROCEDURE — 85018 HEMOGLOBIN: CPT

## 2021-11-16 PROCEDURE — 99232 SBSQ HOSP IP/OBS MODERATE 35: CPT | Performed by: INTERNAL MEDICINE

## 2021-11-16 PROCEDURE — 2500000003 HC RX 250 WO HCPCS: Performed by: INTERNAL MEDICINE

## 2021-11-16 RX ORDER — CEFAZOLIN SODIUM 2 G/100ML
2000 INJECTION, SOLUTION INTRAVENOUS ONCE
Status: COMPLETED | OUTPATIENT
Start: 2021-11-17 | End: 2021-11-17

## 2021-11-16 RX ORDER — MORPHINE SULFATE 2 MG/ML
2 INJECTION, SOLUTION INTRAMUSCULAR; INTRAVENOUS EVERY 4 HOURS PRN
Status: DISCONTINUED | OUTPATIENT
Start: 2021-11-16 | End: 2021-11-18 | Stop reason: HOSPADM

## 2021-11-16 RX ORDER — PANTOPRAZOLE SODIUM 40 MG/10ML
40 INJECTION, POWDER, LYOPHILIZED, FOR SOLUTION INTRAVENOUS DAILY
Status: DISCONTINUED | OUTPATIENT
Start: 2021-11-16 | End: 2021-11-17

## 2021-11-16 RX ADMIN — ACETAMINOPHEN 650 MG: 325 TABLET ORAL at 05:45

## 2021-11-16 RX ADMIN — SODIUM CHLORIDE: 9 INJECTION, SOLUTION INTRAVENOUS at 00:06

## 2021-11-16 RX ADMIN — SODIUM CHLORIDE: 9 INJECTION, SOLUTION INTRAVENOUS at 22:21

## 2021-11-16 RX ADMIN — SODIUM CHLORIDE, PRESERVATIVE FREE 10 ML: 5 INJECTION INTRAVENOUS at 08:24

## 2021-11-16 RX ADMIN — LORAZEPAM 1 MG: 1 TABLET ORAL at 08:24

## 2021-11-16 RX ADMIN — Medication 1 CAPSULE: at 08:24

## 2021-11-16 RX ADMIN — LORAZEPAM 1 MG: 2 INJECTION INTRAMUSCULAR; INTRAVENOUS at 20:20

## 2021-11-16 RX ADMIN — FAMOTIDINE 20 MG: 10 INJECTION, SOLUTION INTRAVENOUS at 08:24

## 2021-11-16 RX ADMIN — AMIODARONE HYDROCHLORIDE 200 MG: 200 TABLET ORAL at 08:24

## 2021-11-16 RX ADMIN — LORAZEPAM 1 MG: 2 INJECTION INTRAMUSCULAR; INTRAVENOUS at 15:58

## 2021-11-16 RX ADMIN — CHLOROTHIAZIDE SODIUM 500 MG: 500 INJECTION, POWDER, LYOPHILIZED, FOR SOLUTION INTRAVENOUS at 08:25

## 2021-11-16 RX ADMIN — LORAZEPAM 1 MG: 2 INJECTION INTRAMUSCULAR; INTRAVENOUS at 11:42

## 2021-11-16 RX ADMIN — OXYCODONE HYDROCHLORIDE 5 MG: 5 TABLET ORAL at 11:41

## 2021-11-16 RX ADMIN — CHLORHEXIDINE GLUCONATE 15 ML: 1.2 RINSE ORAL at 08:24

## 2021-11-16 RX ADMIN — BUMETANIDE 1 MG: 0.25 INJECTION INTRAMUSCULAR; INTRAVENOUS at 08:24

## 2021-11-16 RX ADMIN — MORPHINE SULFATE 2 MG: 2 INJECTION, SOLUTION INTRAMUSCULAR; INTRAVENOUS at 23:52

## 2021-11-16 RX ADMIN — OXYCODONE HYDROCHLORIDE 5 MG: 5 TABLET ORAL at 05:20

## 2021-11-16 RX ADMIN — POTASSIUM BICARBONATE 40 MEQ: 782 TABLET, EFFERVESCENT ORAL at 08:25

## 2021-11-16 RX ADMIN — PANTOPRAZOLE SODIUM 40 MG: 40 INJECTION, POWDER, FOR SOLUTION INTRAVENOUS at 14:58

## 2021-11-16 ASSESSMENT — PAIN SCALES - GENERAL
PAINLEVEL_OUTOF10: 0
PAINLEVEL_OUTOF10: 7
PAINLEVEL_OUTOF10: 0

## 2021-11-16 ASSESSMENT — PAIN SCALES - WONG BAKER: WONGBAKER_NUMERICALRESPONSE: 0

## 2021-11-16 ASSESSMENT — PULMONARY FUNCTION TESTS
PIF_VALUE: 31
PIF_VALUE: 29
PIF_VALUE: 25
PIF_VALUE: 31.4
PIF_VALUE: 31.3

## 2021-11-16 NOTE — PROGRESS NOTES
Patient daughter Adam Blackman and son Can Flood arrived to visiting, I gave update on patient care. They will update rest of famly.

## 2021-11-16 NOTE — CARE COORDINATION
11/16/21, 1:24 PM EST    DISCHARGE ON GOING EVALUATION    [address] [facility] day: 36  Location: [facility]13/013-A Reason for admit: Acute respiratory failure with hypoxia ([facility] [address] [J96.01]  Acute congestive heart failure, unspecified heart failure type ([address] [I50.9]  COVID-19 [U07.1]   Procedure:   10/12 Echo with EF 25-30%; Small circumferential pericardial effusion with no tamponade physiology; Myxomatotic degeneration of mitral valve;  Mild to Moderate mitral regurgitation is present  10/15 Intubated  10/15 CVC RIJ - 11/11 removed  10/28 Renal US: Mildly increased echogenicity of the kidneys that may suggest underlying medical renal disease; no hydronephrosis; 7mm left renal calculus is seen; small ascites. Bilateral pleural effusions; bilateral renal cysts  10/30 CT Chest: Extensive bilateral pneumonia; Large bilateral pleural effusions, right greater than left; Large pericardial effusion; Marked cardiomegaly  10/30 CT Abd/pelvis: Diffusely thickened appearance of the sigmoid colon. There are diverticula in this region. This could be on the basis of acute diverticulitis, however given the eccentric appearance of the thickening, neoplastic process can't entirely be excluded; Haziness throughout the superficial subcutaneous soft tissues of the abdomen and pelvis consistent with anasarca; No evidence of hydronephrosis. No obstructive renal stones are identified; Diffuse haziness throughout the mesentery and a small amount of ascites, findings likely related to fluid overload  11/1 BLE venous doppler: Neg for DVT  11/10 Bronch w/washings sent: Pitting airways disease; BLL mucous plugging  11/10 Trach placed: Bivona 7.5 mm  11/10 Cardioverted x1 to NSR  15/32 PICC left basilic    Barriers to Discharge: Spoke with Dr. Cuate Lombardo this morning; ok to remove from COVID isolation. Asked when patient can go to [facility]. Dr. Cuate Lombardo states he does not want patient to go to [facility] as he feels she is not rehabable.  He states need to find nursing home placement for her. GI consulted for PEG. He states once she has the PEG he will switch her off heparin drip. Once she has PEG and tolerating tube feeds he states she can go to nursing home. He states he will change ATB to oral. Precedex weaned off. Transferred to [phone], regular (non-covid) ICU. Remains on vent to trach, on AC/PC, peep 6, FIO2 30%, sats 100%. Tmax 100.2. 's. Follows commands. PT/OT - early mobility. Intensivist and Nephrology following. Palliative Care, Wound Care, and Dietitian following. Telemetry, PICC, NG/TF, flexiseal, alaniz. IVF,  heparin drip, amio, IV bumex 1 mg daily, IV ancef, IV pepcid, lactobacillus, prn IV ativan, po ativan tid, zyprexa, roxicodone, K+, Electrolyte replacement protocols. Received 500 mg IV diuril x1 today. PCP: Duane Hockey, APRN - CNP  Readmission Risk Score: 20.2 ( )%  Patient Goals/Plan/Treatment Preferences: From home with daughter. Plan was for [facility] as 1st choice and 2nd choice is [facility]. No precert required. SW to speak with family regarding SNF.

## 2021-11-16 NOTE — PROGRESS NOTES
Renal Progress Note    Assessment and Plan:   1. Acute kidney injury mostly prerenal with serum creatinine slightly improved today  2. Bilateral lower extremity edema  3. SARS-CoV-2 pneumonia  4. Respiratory failure on mechanical support  5. Deconditioning  6. Macrocytic anemia  7. Hypotension    PLAN:  1. Labs reviewed  2. Medications reviewed  3. Chlorothiazide 500 mg IV piggyback once  4. Labs in the morning we will follow  5. Patient Active Problem List:     Sigmoid diverticulitis     Acute respiratory failure with hypoxia ([address]     COVID-19     Acute congestive heart failure (HCC)      Subjective:   Admit Date: 10/11/2021    Interval History:   Seen for acute kidney injury  Remains on mechanical support  Awake and alert   Updated by the staff. No new lesions  . Blood pressures on the low end of normal  .            Medications:   Scheduled Meds:   potassium bicarb-citric acid  40 mEq Oral BID    chlorhexidine  15 mL Mouth/Throat BID    OLANZapine  5 mg Oral Nightly    oxyCODONE  5 mg Oral Q6H    insulin regular  10 Units IntraVENous Once    And    dextrose  25 g IntraVENous Once    LORazepam  1 mg Oral TID    amiodarone  200 mg Oral Daily    calcium replacement protocol   Other RX Placeholder    lidocaine-EPINEPHrine  20 mL IntraDERmal Once    lidocaine 1 % injection  5 mL IntraDERmal Once    sodium chloride flush  5-40 mL IntraVENous 2 times per day    famotidine (PEPCID) injection  20 mg IntraVENous Daily    bumetanide  1 mg IntraVENous Daily    lactobacillus  1 capsule Per NG tube Daily with breakfast    senna  10 mL Per NG tube Nightly    [Held by provider] metoprolol tartrate  25 mg Oral BID     Continuous Infusions:   dextrose      sodium chloride      heparin (PORCINE) Infusion 14 Units/kg/hr (11/16/21 0200)    sodium chloride Stopped (11/15/21 1206)    dextrose 100 mL/hr (10/26/21 1253)    dexmedetomidine Stopped (11/15/21 1801)    sodium chloride 50 mL/hr at 11/16/21 0006 CBC:   Recent Labs     11/14/21  0430 11/14/21  1200 11/15/21  0440 11/15/21  0440 11/15/21  1217 11/15/21  2000 11/16/21  0515   WBC 6.1  --  9.7  --   --   --  8.2   HGB 8.8*   < > 9.4*   < > 9.1* 8.0* 8.4*     --  164  --   --   --  149    < > = values in this interval not displayed. CMP:    Recent Labs     11/14/21  1753 11/14/21  1753 11/15/21  0404 11/16/21  0515     --  141 140   K 3.1*   < > 3.3* 3.7  3.7     --  103 102   CO2 24  --  27 29   BUN 31*  --  31* 27*   CREATININE 1.3*  --  1.3* 1.2   GLUCOSE 125*  --  110* 149*   CALCIUM 8.1*  --  8.1* 7.6*   LABGLOM 39*  --  39* 42*    < > = values in this interval not displayed. Troponin: No results for input(s): TROPONINI in the last 72 hours. BNP: No results for input(s): BNP in the last 72 hours. INR: No results for input(s): INR in the last 72 hours. Lipids: No results for input(s): CHOL, LDLDIRECT, TRIG, HDL, AMYLASE, LIPASE in the last 72 hours. Liver: No results for input(s): AST, ALT, ALKPHOS, PROT, LABALBU, BILITOT in the last 72 hours. Invalid input(s): BILDIR  Iron:  No results for input(s): IRONS, FERRITIN in the last 72 hours. Invalid input(s): LABIRONS  XR ABDOMEN FOR NG/OG/NE TUBE PLACEMENT   Final Result   NG tube in good position. **This report has been created using voice recognition software. It may contain minor errors which are inherent in voice recognition technology. **      Final report electronically signed by Dr. Jonatan Schuster on 11/13/2021 5:21 PM      XR ABDOMEN (KUB) (SINGLE AP VIEW)   Final Result   1. The nasogastric tube terminates below the diaphragms in the left upper quadrant. 2. Nonspecific distention of small bowel loops centrally. Findings may relate to ileus versus partial small bowel obstruction. Clinical correlation is recommended. **This report has been created using voice recognition software.   It may contain minor errors which are inherent in voice recognition technology. **      Final report electronically signed by Dr Hunter Holm on 11/13/2021 12:10 PM      XR CHEST PORTABLE   Final Result   1. The left PICC line terminates in the superior vena cava. 2. Otherwise, there has been no other significant interval change in the radiographic appearance of the chest  from 11/10/2021. **This report has been created using voice recognition software. It may contain minor errors which are inherent in voice recognition technology. **      Final report electronically signed by Dr Hunetr Holm on 11/11/2021 11:32 AM      XR CHEST PORTABLE   Final Result   1. There is a new tracheotomy tube. There has been removal of the enteric tube and endotracheal tube. 2.. There is no change in the right internal jugular line with the tip in the superior vena cava. 3. There is cardiomegaly. 4. There is bilateral pulmonary opacification, slightly worse than on previous study. **This report has been created using voice recognition software. It may contain minor errors which are inherent in voice recognition technology. **      Final report electronically signed by DR Shereen Pope on 11/10/2021 11:06 AM      XR CHEST PORTABLE   Final Result   Stable chest.               **This report has been created using voice recognition software. It may contain minor errors which are inherent in voice recognition technology. **      Final report electronically signed by Dr. Josue Gallagher MD on 11/10/2021 8:28 AM      XR ABDOMEN (KUB) (SINGLE AP VIEW)   Final Result   NG tube is at the 1st portion of duodenum. This document has been electronically signed by: Fabienne Amador MD on 11/08/2021 06:40 PM      XR CHEST PORTABLE   Final Result   1. Slightly improving bilateral pneumonia. 2. Moderate stable cardiomegaly. **This report has been created using voice recognition software.  It may contain minor errors which are inherent in voice recognition technology. **      Final report electronically signed by Dr. Cat Melo on 11/4/2021 6:34 PM      XR CHEST PORTABLE   Final Result   1. Moderate cardiomegaly. ET tube, NG tube, and right jugular line remain in good position. 2. Tiny bilateral pleural effusions. Findings of relatively severe pneumonia/edema involving both lungs diffusely. Overall appearance not appreciably changed from yesterday. **This report has been created using voice recognition software. It may contain minor errors which are inherent in voice recognition technology. **      Final report electronically signed by Dr. Melvin Amaral on 11/1/2021 9:36 AM      VL DUP LOWER EXTREMITY VENOUS BILATERAL   Final Result   No evidence of a DVT. **This report has been created using voice recognition software. It may contain minor errors which are inherent in voice recognition technology. **      Final report electronically signed by Dr. Snehal Norris on 11/1/2021 8:07 AM      XR CHEST PORTABLE   Final Result   No significant interval change since previous study dated 29th of October 2021. Lori Clifford **This report has been created using voice recognition software. It may contain minor errors which are inherent in voice recognition technology. **      Final report electronically signed by DR Glenys Engle on 10/31/2021 12:43 PM      CT ABDOMEN PELVIS WO CONTRAST   Final Result      1. Diffusely thickened appearance of the sigmoid colon. There are diverticula in this region. This could be on the basis of acute diverticulitis, however given the eccentric appearance of the thickening, neoplastic process can't entirely be excluded. 2. Haziness throughout the superficial subcutaneous soft tissues of the abdomen and pelvis consistent with anasarca. 3. No evidence of hydronephrosis. No obstructive renal stones are identified.    4. Diffuse haziness throughout the mesentery and a small amount of ascites, findings likely related to fluid overload. **This report has been created using voice recognition software. It may contain minor errors which are inherent in voice recognition technology. **      Final report electronically signed by Dr Yessica Quiroz on 10/30/2021 2:23 PM      CT CHEST W WO CONTRAST   Final Result       1. Extensive bilateral pneumonia. 2. Large bilateral pleural effusions, right greater than left. 3. Large pericardial effusion. 4. Marked cardiomegaly. **This report has been created using voice recognition software. It may contain minor errors which are inherent in voice recognition technology. **      Final report electronically signed by Dr Yessica Quiroz on 10/30/2021 1:54 PM      XR CHEST PORTABLE   Final Result   1. Mildly megaly. ET tube and NG tube remain in good position. Right jugular line with catheter tip in SVC. 2. Moderate-sized pleural effusion left side. 3. Moderate mixed infiltrates scattered throughout both lungs. Overall appearance slightly worse than prior. **This report has been created using voice recognition software. It may contain minor errors which are inherent in voice recognition technology. **      Final report electronically signed by Dr. Vickey Babin on 10/29/2021 8:10 AM      US RENAL COMPLETE   Final Result   1. Mildly increased echogenicity of the kidneys that may suggest underlying medical renal disease. 2. No hydronephrosis. 3. A 7 mm left renal calculus is seen. 4. Small ascites. Bilateral pleural effusions. 5. Bilateral renal cysts. **This report has been created using voice recognition software. It may contain minor errors which are inherent in voice recognition technology. **      Final report electronically signed by Dr Jenni Shepherd on 10/29/2021 3:55 AM      XR ABDOMEN FOR NG/OG/NE TUBE PLACEMENT   Final Result   1. The tip of the nasogastric tube is below the diaphragms within the stomach.    2. Partially seen is left pleural effusion and left retrocardiac opacity. Opacities are also seen in the partially seen right lower lobe. **This report has been created using voice recognition software. It may contain minor errors which are inherent in voice recognition technology. **      Final report electronically signed by Dr Sonja Tomlinson on 10/27/2021 6:54 PM      XR CHEST PORTABLE   Final Result   1. There has been no significant interval change in the radiographic appearance of the chest  from 10/23/2021 including diffuse airspace disease. .            **This report has been created using voice recognition software. It may contain minor errors which are inherent in voice recognition technology. **      Final report electronically signed by Dr Sonja Tomlinson on 10/24/2021 8:54 PM      XR CHEST PORTABLE   Final Result   1. Support lines and tubes in unchanged position when compared to prior. 2. Interval worsening of parenchymal densities within the right    central/right lower lobe. Additionally, worsening of confluent    consolidation within the left midlung with development of left basilar    pleural/parenchymal opacities likely consistent with moderate left pleural    effusion. This document has been electronically signed by: Marvin Estrella DO on    10/23/2021 05:00 AM      XR CHEST PORTABLE   Final Result   Persistent diffuse patchy opacities but with improved aeration at the lung bases. **This report has been created using voice recognition software. It may contain minor errors which are inherent in voice recognition technology. **      Final report electronically signed by Dr. Sheilah Pallas, MD on 10/21/2021 10:48 AM      XR CHEST PORTABLE   Final Result   1. Increased density in the left lung base. This can indicate partial left lower lobe collapse. 2. Persistent patchy bilateral pulmonary opacities.                **This report has been created using voice recognition software. It may contain minor errors which are inherent in voice recognition technology. **      Final report electronically signed by Dr. Felton Salvador on 10/18/2021 7:58 AM      XR CHEST PORTABLE   Final Result   Impression:   1. Cardiomegaly with improvement of passive congestive changes and better    aeration of both lungs compared to the prior study. 2. The focal bilateral alveolar consolidations consistent with infectious    pulmonary disease are unchanged. Retrocardiac consolidation and small LEFT    pleural effusion obscuring the LEFT diaphragm is unchanged. This document has been electronically signed by: Bertram Salmon MD on    10/16/2021 03:18 AM      XR CHEST PORTABLE   Final Result   Somewhat low position of endotracheal tube 1.2 cm above the claire but improved aeration of the upper lungs. **This report has been created using voice recognition software. It may contain minor errors which are inherent in voice recognition technology. **      Final report electronically signed by Dr. Rica Trotter on 10/15/2021 3:34 PM      XR CHEST PORTABLE   Final Result   1. Bilateral pneumonia. 2. Small bilateral pleural effusions. 3. Stable cardiomegaly. **This report has been created using voice recognition software. It may contain minor errors which are inherent in voice recognition technology. **      Final report electronically signed by Dr. Zainab Quevedo on 10/15/2021 4:14 PM      XR CHEST PORTABLE   Final Result   Right lower lobe airspace infiltrates. Severe cardiomegaly. **This report has been created using voice recognition software. It may contain minor errors which are inherent in voice recognition technology. **      Final report electronically signed by Dr. Rica Trotter on 10/11/2021 8:59 PM      XR CHEST PORTABLE   Final Result      1. Diffuse groundglass opacities in both lungs along with cardiomegaly.  Findings likely suggest acute exacerbation of congestive heart failure versus pneumonia in the right clinical setting. Clinical correlation is recommended      2. Small left pleural effusion. 3. Other findings as described above. **This report has been created using voice recognition software. It may contain minor errors which are inherent in voice recognition technology. **      Final report electronically signed by Dr Tyler Blackwell on 10/11/2021 11:56 AM            Objective:   Vitals: BP (!) 147/81   Pulse 117   Temp 99.9 °F (37.7 °C)   Resp 27   Ht 5' (1.524 m)   Wt 140 lb 14 oz (63.9 kg)   SpO2 97%   BMI 27.51 kg/m²    Wt Readings from Last 3 Encounters:   11/12/21 140 lb 14 oz (63.9 kg)   10/07/21 125 lb (56.7 kg)      24HR INTAKE/OUTPUT:      Intake/Output Summary (Last 24 hours) at 11/16/2021 1016  Last data filed at 11/16/2021 4192  Gross per 24 hour   Intake 1570.02 ml   Output 1550 ml   Net 20.02 ml       Constitutional: Well-developed elderly lady mechanical support alert, awake, no apparent distress   Skin:normal with no rash or lesions. HEENT:Pupils are reactive . Throat is clear . Oral mucosa is moist .  Neck:supple. Tracheostomy is noted. Cardiovascular:  S1, S2 without murmur or rubs   Respiratory: Bilateral crackles  Abdomen: +bs, soft, no tenderness to palpation and no palpable mass. No abdominal bruit   Ext: 2-3+ bilateral LE edema  Musculoskeletal:Intact  Neuro:Alert and awake. Obeys commands. Electronically signed by Parrish Trimble MD on 11/16/2021 at 9:38 AM  **This report has been created using voice recognition software. It maycontain minor  errors which are inherent in voice recognition technology. **

## 2021-11-16 NOTE — FLOWSHEET NOTE
1400-  [place] at bedside to look at trach. Patient's site bleeding and he ordered to hold heparin gtt for 48 hours and not to pull sutures out of patient's trach. Also stated patient is okay to get peg tube placed tomorrow. Normal vision: sees adequately in most situations; can see medication labels, newsprint

## 2021-11-16 NOTE — PROGRESS NOTES
Per Dr. Christiane Monreal, no concern regarding pt's temperature in relation to planned procedure tomorrow unless febrile (100.4 or greater).

## 2021-11-16 NOTE — PROGRESS NOTES
This nurse arrived to patient room to administer ativan tablet via NG tube. Found pt's NG tube pulled out and laying on pt, her RUE free from restraint, with restraint still fastened and intact. Patient's brow furrowed and mouthing unidentifiable words, followed by tearful eyes. Patient's restraints reinforced, emotional support provided. Crushed ativan tablet wasted with Calvin Sears RN.

## 2021-11-16 NOTE — PROGRESS NOTES
Infectious control called and said that someone called them yesterday regarding getting Ozella Cogan out of isolation. Not able to at this time and will continue to be in touch with Dr. Massimo Cooper.

## 2021-11-16 NOTE — PROGRESS NOTES
Pt removed from [facility] isolation per [facility] guidelines as approved by Dr. Figueroa Londono Warm

## 2021-11-16 NOTE — CARE COORDINATION
11/16/21, 3:16 PM EST    DISCHARGE PLANNING EVALUATION    Order received for possible placement. Leesa Hailey states pts family was planning on LTACH and now may need to arrange for ECF per physician. GADIEL spoke called pts daughter Noris Carter , pts POA, voicemail is full and not able to leave a message. GADIEL called pts other daughter Rajendra Drummond, discussed the possibility of pt not going to [facility] and need for plan B. Daughter very concerned about pt not going to [facility] as that was the plan prior to trach being placed. GADIEL informed daughter that I would discuss with Ludivina VALDES to see if LTACH can be revisited with physician.

## 2021-11-16 NOTE — CONSULTS
Consult History & Physical      Patient:  Cuate Guthrie  YOB: 1934  MRN: [id number]     Acct: [de-identified]    Chief Complaint:    Chief Complaint   Patient presents with    Shortness of Breath    Fatigue       Date of Service: Pt seen/examined in consultation on 11/16/2021    History Of Present Illness:      80 y.o. female who we are asked to see/evaluate by Mary Coronado MD for medical management of PEG placement. HPI from RN and chart review. She was admitted 10/11/21 for SOB. She tested positive for COVID 10/04/21. She was noted to be hypoxic. She was treated for COVID. She was intubated 10/15/21. Noted to have a UTI 10/18/21 for which she was treated. Noted to have HERIBERTO therefore nephrology was consulted. Cardiology consulted for LifeVest vs AICD. She went into new onset Afib with RVR 11/10/21, started on an Amio and Heparin gtt. She underwent bronchoscopy and tracheostomy placement 11/10/21. Urine culture positive for mixed growth of at least 3 different enteric gram-negative bacilli including swarming Proteus species. Respiratory culture positive for Candida Albicans. Noted to have anemia this admission requiring PRBC, no evidence of GI bleeding. NGT placed and started on TF. She was transferred out of COVID isolation 11/16/21. She remains on a Heparin gtt. Last colonoscopy 2015 demonstrated extensive left-sided diverticulosis with sigmoid stricture, recurrent diverticulitis, internal hemorrhoids. Past Medical History:    Past Medical History:   Diagnosis Date    Anxiety     Arthritis     Bronchitis     Diverticulitis of colon     Hypertension        Home Medications:  Prior to Admission medications    Medication Sig Start Date End Date Taking?  Authorizing Provider   hydroCHLOROthiazide (HYDRODIURIL) 12.5 MG tablet Take 12.5 mg by mouth daily  9/10/21  Yes Historical Provider, MD   NONFORMULARY Focus Factor   Yes Historical Provider, MD   NONFORMULARY Drenamin   Yes Historical Provider, MD   GLUCOSAMINE-CHONDROITIN PO Take by mouth   Yes Historical Provider, MD   cetirizine (ZYRTEC) 10 MG tablet TAKE 1 TABLET BY MOUTH ONCE DAILY 9/16/21  Yes Historical Provider, MD   HAWTHORN PO Take 425 mg by mouth daily   Yes Historical Provider, MD   NONFORMULARY Allerplex   Yes Historical Provider, MD   OLIVE LEAF EXTRACT PO Take by mouth   Yes Historical Provider, MD   Fexofenadine-Pseudoephedrine (ALLEGRA-D 12 HOUR PO) Take by mouth   Yes Historical Provider, MD   aspirin 325 MG tablet Take 325 mg by mouth daily    Historical Provider, MD   metoprolol tartrate (LOPRESSOR) 50 MG tablet Take 1 tablet by mouth 2 times daily 10/7/21   Brook Sena MD       Surgical History:  Past Surgical History:   Procedure Laterality Date    APPENDECTOMY      CHOLECYSTECTOMY         Family History:  No family history on file. Past GI History:  Colonoscopy, extensive left-sided diverticulosis, internal hemorrhoids, sigmoid stricture    Allergies:  Sulfa antibiotics, Gluten meal, Metronidazole, and Milk-related compounds    Social History:   TOBACCO:   reports that she quit smoking about 43 years ago. Her smoking use included cigarettes. She has a 15.00 pack-year smoking history. She has never used smokeless tobacco.  ETOH:   reports no history of alcohol use. Review Of Systems  GENERAL: No fever  EYES:  MACKENZIE  CARDIOVASCULAR: MACKENZIE    RESPIRATORY:  +s/p trach  GI:  See HPI  MUSCULOSKELETAL: MACKENZIE  :   No dysuria or hematuria. SKIN:  No rashes or jaundice. NEUROLOGIC:  No seizures    PSYCH:  No anxiety    ENDOCRINE:  No polyuria or polydipsia. BLOOD:  +anemia    PHYSICAL EXAM:  /61   Pulse 107   Temp 99.5 °F (37.5 °C) (Bladder)   Resp 21   Ht 5' (1.524 m)   Wt 140 lb 14 oz (63.9 kg)   SpO2 93%   BMI 27.51 kg/m²     General appearance: Acute on chronically ill appearing female  HEENT: Normal cephalic, atraumatic without obvious deformity. Pupils equal, round, and reactive to light.  NGT in place. Trach intact. Copious amount of sanguinous drainage noted around trach site. Neck: Supple, with full range of motion. No jugular venous distention. Trachea midline. Respiratory:  Normal respiratory effort. Clear to auscultation, bilaterally without Rales/Wheezes/Rhonchi. Cardiovascular: Regular rate and rhythm without murmurs, rubs or gallops. Abdomen: Soft, obese, non-tender, non-distended with active bowel sounds. Musculoskeletal: No clubbing, cyanosis bilaterally. BLE edema. Skin: Pink, warm, dry. No rashes or lesions. Psychiatric: Alert, does not answer questions or follow commands  Rectal: rectal tube with loose brown stool    Labs:   Recent Labs     11/16/21  0515   WBC 8.2   HGB 8.4*   HCT 28.4*        Recent Labs     11/16/21  0515      K 3.7  3.7      CO2 29   BUN 27*   CREATININE 1.2   CALCIUM 7.6*     Radiology:   CXR 10/11/21  FINDINGS:        The tracheostomy tube terminates 3.6 cm above the claire. The right internal jugular venous catheter terminates in the superior vena cava. The left PICC line terminates in the superior vena cava. Aortic arch calcifications.       A small left pleural effusion is seen. Left retrocardiac opacity that may relate to atelectasis and pneumonia is noted.       There are diffuse airspace opacities bilaterally. Mild scoliosis. Degenerative changes of the thoracic spine.       Cardiac silhouette is enlarged.        No pneumothorax.        No acute bony abnormality.               Impression   1. The left PICC line terminates in the superior vena cava. 2. Otherwise, there has been no other significant interval change in the radiographic appearance of the chest [facility] 11/10/2021. Code Status: Full    ASSESSMENT:  1. Acute hypoxic respiratory failure s/p trach 11/10/21  2. COVID-19 pneumonia  3. ARDs  4. Hypotension- now off pressors  5. CHF  6. New afib with RVR- on Heparin gtt, s/p cardioversion  7. HERIBERTO- resolved  8.  UTI- s/p ATBs, resolved  9. Coccyx wound  10. Dysphagia- NGT with TF  11. Acute blood loss anemia- s/p 2 units PRBC  12. Bilateral lower extremity edema  13. Encephalopathy    PLAN:     Monitor H & H, transfuse prn   Stop Pepcid, start IVP PPI daily for prophylaxis   NPO at midnight, stop TF at midnight   Ancef once tomorrow prior to PEG   Stop Heparin gtt 8 hours prior to PEG   EGD with PEG placement 11/17/21 at 1300 with Dr. Connor Ann to have primary evaluate bleeding from trach site   Abdominal binder for post PEG   Nephrology on board   RN updated   ICU supportive care  Will follow       Case reviewed and impression/plan reviewed in collaboration with Dr. Tiana De La Rosa  Electronically signed by SERGE Cornelius - CNP on 11/16/2021 at 12:12 PM    [facility]  Thank you for the consultation.     Very acutely ill patient requiring an ICU bed  High complexity decision making

## 2021-11-16 NOTE — PROGRESS NOTES
CRITICAL CARE PROGRESS NOTE      Patient:  [facility]    Unit/Bed:4D-13/013-A  YOB: 1934  MRN: [id number]   PCP: SERGE Brennan CNP  Date of Admission: 10/11/2021  Chief Complaint:- Worsening shortness of breath    Assessment and Plan:    Acute hypoxic respiratory failure: Patient was admitted to the hospital through the ED on 10/11/2021 and started on high flow. Patient's hypoxemia progressively worsened. Intubated and admitted to ICU on 10/15. Patient failed multiple breathing trials. There was some suspicion for neuromuscular disease present dt persistent hypercarbia despite mechanical ventilatory support. Work up negative for Myasthenia Gravis. Tracheostomy tube placed 11/10. Current vent settings: FiO2 50 PCV Pcontrol 26 rate 16 PIP 29 PEEP 6. Increasing Fio2 requirements from previous. Still low tidal volumes, unable to wean. GI consult for peg placement and eventual nursing home placement. Covid 19: Status post baricitinib, removed from covid isolation 11/16     Pneumonia:  respiratory culture positive for Corynebacterium. Vancomycin 6 days. Pneumonia panel negative, respiratory culture revealed Candida albicans. No diflucan, patient has history of QT prolongation and positive culture most likely d/t colonization. Continue to monitor. 4. ARDS: Mild. Started Decadron, Baricitinib (10/12). Trach in place. Continue vent settings. Wean as tolerated     5. Hypotension - currently off levo. Continue to monitor. 6. HFrEF: Echo (10/11): LV EF 25-30%, LV severe global hypokinesis. LV size moderate increase. Weight on admission 120 lbs. Cardiologist consult appreciated - cardiology will wait for recovery prior to considering any intervention such as LifeVest versus AICD. D/c milrinone on 11/8. Patient on bumex       Hx of Prolong QTc interval: On telemetry strip in ED. Seen by cardiogy for Prolong QTC.  Plan: tele monitor, avoid QTC prolong medications, Keep K +> 4 and Mg > 2     8. New on set Afib with RVR: - since admission patient had episodes of afib without rvr. 11/10 patient developed afib with RVR. After amio bolus given, patient sustained rate of 150s. Patient underwent synchronized cardioversion at 200 J in which patient returned to NSR rate 100. Heparin drip and oral amiodarone. 9. Bleeding - improving - on 11/12 hemoglobin noted to be 6.9.  2 units ordered for transfusion. Heparin was held. Hemoglobin posttransfusion was 8.7. Fecal occult positive. Patient has brown stools and no signs of GI bleeding. Stable H/H. Currently on heparin. Hemoglobin checks every 8 hours. If drop in hemoglobin we will stop heparin drip. 11/16 - Held heparin drip dt bleeding at trach site. Hold for 48 hours. 10. Bilateral lower extremity swelling: r/o DVT - US negative on 11/1. Daily Bumex 1 mg    11. HERIBERTO: Bun/Cr Improving. Creatinine 1.2. Nephrology to continue Bumex and adjust as clinically appropriate. 12. Hypokalemia: resolved     13. Encephalopathy: likely secondary to hypercapnic hypoxemia. Patient is able to awaken and track when speaking although follows some commands. Monitor. Neuro exam when patient is extubated     15. Macrocytic anemia: chronic. H/H 8.4/28. 4. . 8. assess for signs of active bleeding necessitating infusion. Continue to monitor. 15. Hematuria: Secondary to nephrolithiasis. resolved    16. UTI - urine cultures positive for mixed growth with greater than 3 different enteric gram-negative bacillus with swarming Proteus. Most likely colonization. Remove alaniz and insert female external cath. 17. Coccyx wound: unstageable. pinxav ointment    INITIAL H AND P AND ICU COURSE:   51-year-old female presents to the ED on 10/11/2021 due to worsening shortness of breath. Patient tested positive for Covid on 10/4/2021. In the emergency room patient was found to have a SPO2 of 83% on room air with a BNP of 15.7K.   Patient was admitted and GNB. Continue on amio drip. Zosyn day 2.     11/12 - Hgb noted to be 6.9 2u Ordered for transfusion. Urine culture shows mixed growth of at least 3 different enteric gram-negative bacilli including swarming Proteus species. Continue Zosyn day 3. Respiratory culture was positive for Candida albicans. Patient has history of QT prolongation. Will hold on Diflucan.     11/13 - Hgb post transfusion of 2 units PRBCs is 8.7. Heparin was hold last night in the setting of this anemia requiring transfusion. FOBT ordered. No obvious signs of acute bleeding. Potassium was 3.2 11/12. 5.9 this morning. Recheck K levels if still elevated will order insulin with D5 amp.  2 g of mag ordered as patient has history of prolonged QT with magnesium level 1.9. Goal >2. Reinsert NG tube as it was removed post trach. Restart tube feeds. Transition from amiodarone drip to p.o. Patient noted to have increasing agitation today. Gave dose of 2 mg Ativan and patient noticeably more comfortable. Ativan 1 mg 3 times daily and OxyContin 10 mg twice daily. Is currently on hold due to anemia that required 2 units PRBCs transfusion. FOBT ordered positive. 11/14 - patient stable on trach vent. She does awaken but does not follow commands. She does look at you when you speak to her and opens her eyes. 11/15 - stable H&H. On heparin drip. If hemoglobin drops will hold. Potassium remains low, replacement protocol in place. Empiric Zosyn day 6    11/16 - stable H&H, dc heparin drip dt excessive bleeding at trach site. Hold heparin for 48 hours. D/c abx yesterday dt no signs of active infection, positive cultures most likely 2/2 colonization. Patient off precedex. More alert today. Continue to try and wean off mechanical ventilation as tolerated. GI consult for PEG placement and eventual nursing home. Patient pulling at lines and needing soft restraints.  Patient is tachypneic and tachycardiac most likely 2/2 agitation and/or pain. Will reassess after appropriate pain/agitation control. Currently afebrile. Remove alaniz and insert female external cath. Past Medical History: HPI  Family History:  See EMR  Social History: Former smoker since 1977. 15 pack year hx prior to cessation    ROS   Cannot obtain d/t patient sedated and on mechanical ventilation    Scheduled Meds:   potassium bicarb-citric acid  40 mEq Oral BID    chlorhexidine  15 mL Mouth/Throat BID    OLANZapine  5 mg Oral Nightly    oxyCODONE  5 mg Oral Q6H    insulin regular  10 Units IntraVENous Once    And    dextrose  25 g IntraVENous Once    LORazepam  1 mg Oral TID    amiodarone  200 mg Oral Daily    calcium replacement protocol   Other RX Placeholder    lidocaine-EPINEPHrine  20 mL IntraDERmal Once    lidocaine 1 % injection  5 mL IntraDERmal Once    sodium chloride flush  5-40 mL IntraVENous 2 times per day    famotidine (PEPCID) injection  20 mg IntraVENous Daily    bumetanide  1 mg IntraVENous Daily    lactobacillus  1 capsule Per NG tube Daily with breakfast    senna  10 mL Per NG tube Nightly    [Held by provider] metoprolol tartrate  25 mg Oral BID     Continuous Infusions:   dextrose      sodium chloride      heparin (PORCINE) Infusion 14 Units/kg/hr (11/16/21 0200)    sodium chloride Stopped (11/15/21 1206)    dextrose 100 mL/hr (10/26/21 1253)    dexmedetomidine Stopped (11/15/21 1801)    sodium chloride 50 mL/hr at 11/16/21 0006       PHYSICAL EXAMINATION:  T:  99.5 F. P:  107  RR:  21 B/P:  127/61 FiO2:  50. O2 Sat:  93  I/O: 649/450 net +199  Body mass index is 27.51 kg/m². GCS:   11  PCV+: Pcontrol 26 Rate 16 PIP 29 PEEP 6  Sedation: none  Drips: heparin 14 units/kg/hr     General: awake and agitated 81 yo Female, attempting to communicate by mouthing words  HEENT:  normocephalic and atraumatic. No scleral icterus. PERR. Bleeding noted from tracheostomy site  Neck: supple. No Thyromegaly. Lungs: coarse bilateral breath sounds.   No retractions  Cardiac: . No JVD. Abdomen: soft. Nontender. Extremities:  No clubbing, cyanosis, mild/moderate pitting edema x 4  Vasculature: capillary refill < 3 seconds. Palpable dorsalis pedis pulses. Skin:  warm and dry. Sacral decubitus ulcer  Psych:  Alert. Cannot assess further, on trach  Lymph:  No supraclavicular adenopathy. Neurologic: No seizures,  able to open eyes and track, mouthing words    Data: (All radiographs, tracings, PFTs, and imaging are personally viewed and interpreted unless otherwise noted). Sodium 140, potassium 3.7, chloride 102, CO2 29, BUN 27, creatinine 1.2  Glucose 149  WBC 8.2, hemoglobin 8.4, hematocrit 28.4, platelet 466  Telemetry shows NSR     Seen with multidisciplinary ICU team   Meets Continued ICU Level Care Criteria:    [x] Yes   [] No - Transfer Planned to listed location:  [] HOSPITALIST CONTACTED-      Case and plan discussed with Dr. Errol Hernandez    Electronically signed by Vale Mcfadden MD  [address]  Patient seen by me. Case discussed with resident physician. Patient with oozing around the tracheostomy site secondary to anticoagulation. Hold heparin for 48 hours. .  Italicized font represents changes to the note made by me. CC time 35 minutes. Time was discontiguous. Time does not include procedures. Time does include my direct assessment of the patient and coordination of care.   Electronically signed by Osiris Briggs MD on 11/17/2021 at 8:05 AM

## 2021-11-17 LAB
ANION GAP SERPL CALCULATED.3IONS-SCNC: 11 MEQ/L (ref 8–16)
BUN BLDV-MCNC: 28 MG/DL (ref 7–22)
CALCIUM SERPL-MCNC: 8.4 MG/DL (ref 8.5–10.5)
CHLORIDE BLD-SCNC: 101 MEQ/L (ref 98–111)
CO2: 28 MEQ/L (ref 23–33)
CREAT SERPL-MCNC: 1.3 MG/DL (ref 0.4–1.2)
ERYTHROCYTE [DISTWIDTH] IN BLOOD BY AUTOMATED COUNT: 17 % (ref 11.5–14.5)
ERYTHROCYTE [DISTWIDTH] IN BLOOD BY AUTOMATED COUNT: 62.4 FL (ref 35–45)
GFR SERPL CREATININE-BSD FRML MDRD: 39 ML/MIN/1.73M2
GLUCOSE BLD-MCNC: 119 MG/DL (ref 70–108)
HCT VFR BLD CALC: 28 % (ref 37–47)
HCT VFR BLD CALC: 30.4 % (ref 37–47)
HCT VFR BLD CALC: 31.4 % (ref 37–47)
HEMOGLOBIN: 8.4 GM/DL (ref 12–16)
HEMOGLOBIN: 9.1 GM/DL (ref 12–16)
HEMOGLOBIN: 9.4 GM/DL (ref 12–16)
INR BLD: 1.22 (ref 0.85–1.13)
MAGNESIUM: 2 MG/DL (ref 1.6–2.4)
MCH RBC QN AUTO: 30.4 PG (ref 26–33)
MCHC RBC AUTO-ENTMCNC: 29.9 GM/DL (ref 32.2–35.5)
MCV RBC AUTO: 101.6 FL (ref 81–99)
PLATELET # BLD: 223 THOU/MM3 (ref 130–400)
PMV BLD AUTO: 11 FL (ref 9.4–12.4)
POTASSIUM REFLEX MAGNESIUM: 4.4 MEQ/L (ref 3.5–5.2)
POTASSIUM SERPL-SCNC: 4.4 MEQ/L (ref 3.5–5.2)
RBC # BLD: 3.09 MILL/MM3 (ref 4.2–5.4)
SODIUM BLD-SCNC: 140 MEQ/L (ref 135–145)
WBC # BLD: 12.2 THOU/MM3 (ref 4.8–10.8)

## 2021-11-17 PROCEDURE — 6360000002 HC RX W HCPCS

## 2021-11-17 PROCEDURE — 85610 PROTHROMBIN TIME: CPT

## 2021-11-17 PROCEDURE — 2580000003 HC RX 258: Performed by: INTERNAL MEDICINE

## 2021-11-17 PROCEDURE — 6360000002 HC RX W HCPCS: Performed by: NURSE PRACTITIONER

## 2021-11-17 PROCEDURE — 85018 HEMOGLOBIN: CPT

## 2021-11-17 PROCEDURE — 85014 HEMATOCRIT: CPT

## 2021-11-17 PROCEDURE — 80048 BASIC METABOLIC PNL TOTAL CA: CPT

## 2021-11-17 PROCEDURE — 2000000000 HC ICU R&B

## 2021-11-17 PROCEDURE — 6370000000 HC RX 637 (ALT 250 FOR IP): Performed by: STUDENT IN AN ORGANIZED HEALTH CARE EDUCATION/TRAINING PROGRAM

## 2021-11-17 PROCEDURE — 6360000002 HC RX W HCPCS: Performed by: FAMILY MEDICINE

## 2021-11-17 PROCEDURE — 2580000003 HC RX 258: Performed by: NURSE PRACTITIONER

## 2021-11-17 PROCEDURE — 36415 COLL VENOUS BLD VENIPUNCTURE: CPT

## 2021-11-17 PROCEDURE — 85027 COMPLETE CBC AUTOMATED: CPT

## 2021-11-17 PROCEDURE — 99232 SBSQ HOSP IP/OBS MODERATE 35: CPT | Performed by: INTERNAL MEDICINE

## 2021-11-17 PROCEDURE — 94003 VENT MGMT INPAT SUBQ DAY: CPT

## 2021-11-17 PROCEDURE — 3609013300 HC EGD TUBE PLACEMENT: Performed by: INTERNAL MEDICINE

## 2021-11-17 PROCEDURE — 3E0G76Z INTRODUCTION OF NUTRITIONAL SUBSTANCE INTO UPPER GI, VIA NATURAL OR ARTIFICIAL OPENING: ICD-10-PCS | Performed by: INTERNAL MEDICINE

## 2021-11-17 PROCEDURE — 0DH63UZ INSERTION OF FEEDING DEVICE INTO STOMACH, PERCUTANEOUS APPROACH: ICD-10-PCS | Performed by: INTERNAL MEDICINE

## 2021-11-17 PROCEDURE — 6370000000 HC RX 637 (ALT 250 FOR IP): Performed by: INTERNAL MEDICINE

## 2021-11-17 PROCEDURE — 83735 ASSAY OF MAGNESIUM: CPT

## 2021-11-17 PROCEDURE — C9113 INJ PANTOPRAZOLE SODIUM, VIA: HCPCS | Performed by: NURSE PRACTITIONER

## 2021-11-17 RX ORDER — SODIUM CHLORIDE 0.9 % (FLUSH) 0.9 %
5-40 SYRINGE (ML) INJECTION PRN
Status: DISCONTINUED | OUTPATIENT
Start: 2021-11-17 | End: 2021-11-18 | Stop reason: HOSPADM

## 2021-11-17 RX ORDER — SODIUM CHLORIDE 0.9 % (FLUSH) 0.9 %
5-40 SYRINGE (ML) INJECTION EVERY 12 HOURS SCHEDULED
Status: DISCONTINUED | OUTPATIENT
Start: 2021-11-17 | End: 2021-11-18 | Stop reason: HOSPADM

## 2021-11-17 RX ORDER — 0.9 % SODIUM CHLORIDE 0.9 %
1000 INTRAVENOUS SOLUTION INTRAVENOUS ONCE
Status: COMPLETED | OUTPATIENT
Start: 2021-11-17 | End: 2021-11-17

## 2021-11-17 RX ORDER — MIDAZOLAM HYDROCHLORIDE 1 MG/ML
4 INJECTION INTRAMUSCULAR; INTRAVENOUS PRN
Status: DISCONTINUED | OUTPATIENT
Start: 2021-11-17 | End: 2021-11-18 | Stop reason: HOSPADM

## 2021-11-17 RX ORDER — SODIUM CHLORIDE 9 MG/ML
25 INJECTION, SOLUTION INTRAVENOUS PRN
Status: DISCONTINUED | OUTPATIENT
Start: 2021-11-17 | End: 2021-11-18 | Stop reason: HOSPADM

## 2021-11-17 RX ORDER — LANSOPRAZOLE
30 KIT
Status: DISCONTINUED | OUTPATIENT
Start: 2021-11-17 | End: 2021-11-18 | Stop reason: HOSPADM

## 2021-11-17 RX ORDER — FENTANYL CITRATE 50 UG/ML
INJECTION, SOLUTION INTRAMUSCULAR; INTRAVENOUS
Status: COMPLETED
Start: 2021-11-17 | End: 2021-11-17

## 2021-11-17 RX ORDER — MIDAZOLAM HYDROCHLORIDE 1 MG/ML
INJECTION INTRAMUSCULAR; INTRAVENOUS
Status: COMPLETED
Start: 2021-11-17 | End: 2021-11-17

## 2021-11-17 RX ORDER — SODIUM CHLORIDE 0.9 % (FLUSH) 0.9 %
5-40 SYRINGE (ML) INJECTION PRN
Status: DISCONTINUED | OUTPATIENT
Start: 2021-11-17 | End: 2021-11-17 | Stop reason: SDUPTHER

## 2021-11-17 RX ORDER — SODIUM CHLORIDE 0.9 % (FLUSH) 0.9 %
5-40 SYRINGE (ML) INJECTION EVERY 12 HOURS SCHEDULED
Status: DISCONTINUED | OUTPATIENT
Start: 2021-11-17 | End: 2021-11-17 | Stop reason: SDUPTHER

## 2021-11-17 RX ORDER — SODIUM CHLORIDE 9 MG/ML
25 INJECTION, SOLUTION INTRAVENOUS PRN
Status: DISCONTINUED | OUTPATIENT
Start: 2021-11-17 | End: 2021-11-17 | Stop reason: SDUPTHER

## 2021-11-17 RX ORDER — FENTANYL CITRATE 50 UG/ML
100 INJECTION, SOLUTION INTRAMUSCULAR; INTRAVENOUS PRN
Status: DISCONTINUED | OUTPATIENT
Start: 2021-11-17 | End: 2021-11-18 | Stop reason: HOSPADM

## 2021-11-17 RX ADMIN — MORPHINE SULFATE 2 MG: 2 INJECTION, SOLUTION INTRAMUSCULAR; INTRAVENOUS at 09:42

## 2021-11-17 RX ADMIN — SODIUM CHLORIDE: 9 INJECTION, SOLUTION INTRAVENOUS at 15:08

## 2021-11-17 RX ADMIN — LORAZEPAM 1 MG: 2 INJECTION INTRAMUSCULAR; INTRAVENOUS at 07:06

## 2021-11-17 RX ADMIN — LORAZEPAM 1 MG: 1 TABLET ORAL at 20:23

## 2021-11-17 RX ADMIN — CHLORHEXIDINE GLUCONATE 15 ML: 1.2 RINSE ORAL at 09:37

## 2021-11-17 RX ADMIN — CEFAZOLIN SODIUM 2000 MG: 2 INJECTION, SOLUTION INTRAVENOUS at 15:45

## 2021-11-17 RX ADMIN — POTASSIUM BICARBONATE 40 MEQ: 782 TABLET, EFFERVESCENT ORAL at 20:23

## 2021-11-17 RX ADMIN — SODIUM CHLORIDE 1000 ML: 9 INJECTION, SOLUTION INTRAVENOUS at 08:55

## 2021-11-17 RX ADMIN — Medication 30 MG: at 18:23

## 2021-11-17 RX ADMIN — FENTANYL CITRATE: 50 INJECTION, SOLUTION INTRAMUSCULAR; INTRAVENOUS at 15:33

## 2021-11-17 RX ADMIN — MIDAZOLAM: 1 INJECTION INTRAMUSCULAR; INTRAVENOUS at 15:33

## 2021-11-17 RX ADMIN — SODIUM CHLORIDE, PRESERVATIVE FREE 10 ML: 5 INJECTION INTRAVENOUS at 09:31

## 2021-11-17 RX ADMIN — PANTOPRAZOLE SODIUM 40 MG: 40 INJECTION, POWDER, FOR SOLUTION INTRAVENOUS at 09:31

## 2021-11-17 ASSESSMENT — PAIN SCALES - GENERAL: PAINLEVEL_OUTOF10: 4

## 2021-11-17 ASSESSMENT — PAIN SCALES - WONG BAKER
WONGBAKER_NUMERICALRESPONSE: 2
WONGBAKER_NUMERICALRESPONSE: 4
WONGBAKER_NUMERICALRESPONSE: 2

## 2021-11-17 ASSESSMENT — PULMONARY FUNCTION TESTS
PIF_VALUE: 28
PIF_VALUE: 27.5
PIF_VALUE: 31.5
PIF_VALUE: 27.6
PIF_VALUE: 30
PIF_VALUE: 31.4

## 2021-11-17 NOTE — BRIEF OP NOTE
Brief Postoperative Note      Patient: Silvestre Theodore  YOB: 1934  MRN: [id number]    Date of Procedure: 11/17/2021    Pre-Op Diagnosis: SOB, FATIGUE    Post-Op Diagnosis: Same       Procedure(s):  EGD PEG TUBE PLACEMENT    Surgeon(s): Alberto Castaneda MD    Assistant:  * No surgical staff found *    Anesthesia: Monitor Anesthesia Care    Estimated Blood Loss (mL): Minimal    Complications: None    Specimens:   * No specimens in log *    Implants:  * No implants in log *      Drains:   Rectal Tube With balloon (Active)   Stool Appearance Watery 11/16/21 1800   Stool Color Brown 11/16/21 1800   Stool Amount Medium 11/15/21 1344   Rectal Tube Output 100 ml 11/17/21 1428       External Urinary Catheter (Active)   Urine Color Yellow 11/17/21 0400   Urine Appearance Clear 11/17/21 0400   Output (mL) 250 mL 11/17/21 1236   Placement Replaced 11/17/21 0400   Skin Assessment No Injury 11/17/21 0400       [REMOVED] NG/OG/NJ/NE Tube Orogastric Center mouth (Removed)   Surrounding Skin Dry;  Intact 11/08/21 0800   Securement device Yes 11/08/21 0800   Status Other (Comment) 11/08/21 0800   Placement Verified by External Catheter Length 11/08/21 0800   NG/OG/NJ/NE External Measurement (cm) 60 cm 11/08/21 0800   Drainage Appearance Bile; Brown 11/06/21 2000   Tube Feeding Renal Formula 11/08/21 0800   Tube Feeding Status Continuous 11/08/21 0800   Rate/Schedule 40 mL/hr 11/08/21 0800   Tube Feeding Supplement (Product) Protein Modular 11/06/21 2000   Tube Feeding Supplement Amount (mL) 75 10/29/21 0802   Tube Feeding Intake (mL) 288 ml 11/08/21 1600   Free Water Flush (mL) 100 mL 11/08/21 1600   Free Water Rate 100ml q8h 11/04/21 0741   Residual Volume (ml) 5 ml 11/07/21 1615   Output (mL) 0 ml 11/02/21 1620       [REMOVED] NG/OG/NJ/NE Tube Orogastric 16 fr Center mouth (Removed)   Securement device Yes 11/09/21 0654   Placement Verified by X-Ray (Initial) 11/09/21 0654   Rate/Schedule 0 mL/hr 11/10/21 5769 Tube Feeding Intake (mL) 103 ml 11/10/21 0735   Free Water Flush (mL) 40 mL 11/10/21 0846   Residual Volume (ml) 15 ml 11/09/21 1117       [REMOVED] NG/OG/NJ/NE Tube Nasogastric Right nostril (Removed)       [REMOVED] NG/OG/NJ/NE Tube Nasogastric Right nostril (Removed)   Surrounding Skin Dry; Intact 11/14/21 0730   Status Other (Comment) 11/15/21 1630   Placement Verified by Gastric Contents 11/15/21 1630   Tube Feeding Renal Formula 11/15/21 1630   Tube Feeding Status Continuous 11/15/21 1630   Rate/Schedule 40 mL/hr 11/15/21 1630   Tube Feeding Intake (mL) 379 ml 11/16/21 0520   Free Water Flush (mL) 201 mL 11/16/21 0520   Free Water Rate 100 q8 hr 11/15/21 1630   Residual Volume (ml) 0 ml 11/15/21 1630   Output (mL) 0 ml 11/15/21 1239       [REMOVED] Rectal Tube With balloon (Removed)   Stool Appearance Watery 11/02/21 0830   Stool Color Brown 11/02/21 0830   Stool Amount Medium 11/02/21 0830   Rectal Tube Output 100 ml 11/02/21 1200       [REMOVED] Urethral Catheter 16 fr (Removed)   Catheter Indications Need for fluid volume management of the critically ill patient in a critical care setting 11/10/21 1515   Securement Device Date Changed 10/21/21 11/02/21 1620   Site Assessment Pink;  No urethral drainage 11/10/21 1515   Urine Color Yellow 11/10/21 1515   Urine Appearance Sediment 11/10/21 1515   Urine Odor Malodorous 11/09/21 2000   Output (mL) 700 mL 11/10/21 1400       [REMOVED] Urethral Catheter Temperature probe (Removed)   Catheter Indications Need for fluid volume management of the critically ill patient in a critical care setting 11/16/21 1604   Site Assessment [place] 11/16/21 1800   Urine Color Yellow 11/16/21 1800   Urine Appearance Cloudy 11/16/21 1800   Output (mL) 2500 mL 11/16/21 1800       [REMOVED] External Urinary Catheter (Removed)       Findings: above    Electronically signed by Barry Maxwell MD on 11/17/2021 at 3:35 PM

## 2021-11-17 NOTE — PROGRESS NOTES
This nurse spoke with pt's daughter Wilber Bautista and received verbal consent for PEG placement procedure tomorrow with Dr. Richmond Connelly. Edd Cuevas RN witnessed authorization. All questions answered at this time regarding patient's status and plan. Wilber Bautista stated that pt's family very much wants her to go to rehab for discharge/[place] and that it was confirmed that her insurance will cover it.

## 2021-11-17 NOTE — CARE COORDINATION
11/17/21, 3:26 PM EST    DISCHARGE ON GOING EVALUATION    [facility] day: 40  Location: [facility]13/013-A Reason for admit: Acute respiratory failure with hypoxia ([facility] [address] [J96.01]  Acute congestive heart failure, unspecified heart failure type ([address] [I50.9]  COVID-19 [U07.1]   Procedure:  10/12 Echo with EF 25-30%; Small circumferential pericardial effusion with no tamponade physiology; Myxomatotic degeneration of mitral valve;  Mild to Moderate mitral regurgitation is present  10/15 Intubated  10/15 CVC RIJ - 11/11 removed  10/28 Renal US: Mildly increased echogenicity of the kidneys that may suggest underlying medical renal disease; no hydronephrosis; 7mm left renal calculus is seen; small ascites. Bilateral pleural effusions; bilateral renal cysts  10/30 CT Chest: Extensive bilateral pneumonia; Large bilateral pleural effusions, right greater than left; Large pericardial effusion; Marked cardiomegaly  10/30 CT Abd/pelvis: Diffusely thickened appearance of the sigmoid colon. There are diverticula in this region. This could be on the basis of acute diverticulitis, however given the eccentric appearance of the thickening, neoplastic process can't entirely be excluded; Haziness throughout the superficial subcutaneous soft tissues of the abdomen and pelvis consistent with anasarca; No evidence of hydronephrosis. No obstructive renal stones are identified; Diffuse haziness throughout the mesentery and a small amount of ascites, findings likely related to fluid overload  11/1 BLE venous doppler: Neg for DVT  11/10 Bronch w/washings sent: Pitting airways disease; BLL mucous plugging  11/10 Trach placed: Bivona 7.5 mm  11/10 Cardioverted x1 to NSR  03/42 PICC left basilic  90/08 PEG placed    Barriers to Discharge: PEG placed today. If patient remains stable and bed available, possible discharge to [place] tomorrow.      Remains on vent to trach, on AC/PC, peep 6, FIO2 60%, sats 98%. Tmax 100. NSR. Follows commands. SLP. PT/OT - early mobility. Intensivist and Nephrology following. Palliative Care, Wound Care, and Dietitian following. Telemetry, PICC, PEG, flexiseal, external urinary catheter. IVF, amio, IV bumex 1 mg daily, IV ancef, lactobacillus, lansoprazole, prn IV ativan, po ativan tid, prn IV morphine, K+, Electrolyte replacement protocols. Received 1L fld bolus today. BUN 28, Creat 1.3, wbc 12.2, hgb 9.1, INR 1.22. PCP: SERGE Walker CNP  Readmission Risk Score: 19.5 ( )%  Patient Goals/Plan/Treatment Preferences: From home with daughter. Plan was for [facility] as 1st choice and 2nd choice is [facility]. No precert required. SW to speak with family regarding SNF.

## 2021-11-17 NOTE — PROGRESS NOTES
Renal Progress Note    Assessment and Plan:   1. Acute kidney injury mostly stable  2. Respiratory failure hypoxic on mechanical support via tracheostomy  3. SARS-CoV-2 pneumonia  4. ARDS  5. Macrocytic anemia  6. Leukocytosis  7. Lower extremity edema much improved  8. Hypotension resolving  9. Atrial fibrillation on and off  10. Hypokalemia resolving      PLAN:  1. Labs reviewed  2. Serum creatinine is increased today from yesterday and likely due to chlorothiazide given yesterday for lower extremity edema  3. Medications reviewed  4. No changes otherwise  5. Labs in the morning  6. We will continue to follow    Patient Active Problem List:     Sigmoid diverticulitis     Acute respiratory failure with hypoxia ([facility] [address]     COVID-19     Acute congestive heart failure (HCC)      Subjective:   Admit Date: 10/11/2021    Interval History:   Seen for acute kidney injury and electrolyte imbalances  Remains on mechanical support  Updated by the staff  No new concerns  Blood pressure is good  Urine output is good  PEG tube is being planned  Reviewed the GI note  She apparently pulled out her nasogastric tube.       Medications:   Scheduled Meds:   ceFAZolin  2,000 mg IntraVENous Once    pantoprazole  40 mg IntraVENous Daily    potassium bicarb-citric acid  40 mEq Oral BID    chlorhexidine  15 mL Mouth/Throat BID    [Held by provider] OLANZapine  5 mg Oral Nightly    [Held by provider] oxyCODONE  5 mg Oral Q6H    insulin regular  10 Units IntraVENous Once    And    dextrose  25 g IntraVENous Once    LORazepam  1 mg Oral TID    amiodarone  200 mg Oral Daily    calcium replacement protocol   Other RX Placeholder    lidocaine-EPINEPHrine  20 mL IntraDERmal Once    lidocaine 1 % injection  5 mL IntraDERmal Once    sodium chloride flush  5-40 mL IntraVENous 2 times per day    bumetanide  1 mg IntraVENous Daily    lactobacillus  1 capsule Per NG tube Daily with breakfast    senna  10 mL Per NG tube Nightly    [Held by provider] metoprolol tartrate  25 mg Oral BID     Continuous Infusions:   dextrose      sodium chloride      sodium chloride Stopped (11/15/21 1206)    dextrose 100 mL/hr (10/26/21 1253)    dexmedetomidine Stopped (11/15/21 1801)    sodium chloride 50 mL/hr at 11/16/21 2221       CBC:   Recent Labs     11/15/21  0440 11/15/21  1217 11/16/21  0515 11/16/21  0515 11/16/21  1245 11/16/21  2125 11/17/21  0540   WBC 9.7  --  8.2  --   --   --  12.2*   HGB 9.4*   < > 8.4*   < > 8.6* 8.6* 9.4*     --  149  --   --   --  223    < > = values in this interval not displayed. CMP:    Recent Labs     11/15/21  0404 11/16/21  0515 11/17/21  0540    140 140   K 3.3* 3.7  3.7 4.4    102 101   CO2 27 29 28   BUN 31* 27* 28*   CREATININE 1.3* 1.2 1.3*   GLUCOSE 110* 149* 119*   CALCIUM 8.1* 7.6* 8.4*   LABGLOM 39* 42* 39*     Troponin: No results for input(s): TROPONINI in the last 72 hours. BNP: No results for input(s): BNP in the last 72 hours. INR:   Recent Labs     11/16/21  1245 11/17/21  0540   INR 1.32* 1.22*     Lipids: No results for input(s): CHOL, LDLDIRECT, TRIG, HDL, AMYLASE, LIPASE in the last 72 hours. Liver: No results for input(s): AST, ALT, ALKPHOS, PROT, LABALBU, BILITOT in the last 72 hours. Invalid input(s): BILDIR  Iron:  No results for input(s): IRONS, FERRITIN in the last 72 hours. Invalid input(s): LABIRONS  XR ABDOMEN FOR NG/OG/NE TUBE PLACEMENT   Final Result   NG tube in good position. **This report has been created using voice recognition software. It may contain minor errors which are inherent in voice recognition technology. **      Final report electronically signed by Dr. Jessica Grajeda on 11/13/2021 5:21 PM      XR ABDOMEN (KUB) (SINGLE AP VIEW)   Final Result   1. The nasogastric tube terminates below the diaphragms in the left upper quadrant. 2. Nonspecific distention of small bowel loops centrally.  Findings may relate to ileus versus partial small bowel obstruction. Clinical correlation is recommended. **This report has been created using voice recognition software. It may contain minor errors which are inherent in voice recognition technology. **      Final report electronically signed by Dr Abiel Saxena on 11/13/2021 12:10 PM      XR CHEST PORTABLE   Final Result   1. The left PICC line terminates in the superior vena cava. 2. Otherwise, there has been no other significant interval change in the radiographic appearance of the chest  from 11/10/2021. **This report has been created using voice recognition software. It may contain minor errors which are inherent in voice recognition technology. **      Final report electronically signed by Dr Abiel Saxena on 11/11/2021 11:32 AM      XR CHEST PORTABLE   Final Result   1. There is a new tracheotomy tube. There has been removal of the enteric tube and endotracheal tube. 2.. There is no change in the right internal jugular line with the tip in the superior vena cava. 3. There is cardiomegaly. 4. There is bilateral pulmonary opacification, slightly worse than on previous study. **This report has been created using voice recognition software. It may contain minor errors which are inherent in voice recognition technology. **      Final report electronically signed by DR Shakira Mello on 11/10/2021 11:06 AM      XR CHEST PORTABLE   Final Result   Stable chest.               **This report has been created using voice recognition software. It may contain minor errors which are inherent in voice recognition technology. **      Final report electronically signed by Dr. Connor Greene MD on 11/10/2021 8:28 AM      XR ABDOMEN (KUB) (SINGLE AP VIEW)   Final Result   NG tube is at the 1st portion of duodenum. This document has been electronically signed by: Francisco Wilkins MD on 11/08/2021 06:40 PM      XR CHEST PORTABLE   Final Result   1.  Slightly improving bilateral pneumonia. 2. Moderate stable cardiomegaly. **This report has been created using voice recognition software. It may contain minor errors which are inherent in voice recognition technology. **      Final report electronically signed by Dr. Cecilio Gandhi on 11/4/2021 6:34 PM      XR CHEST PORTABLE   Final Result   1. Moderate cardiomegaly. ET tube, NG tube, and right jugular line remain in good position. 2. Tiny bilateral pleural effusions. Findings of relatively severe pneumonia/edema involving both lungs diffusely. Overall appearance not appreciably changed from yesterday. **This report has been created using voice recognition software. It may contain minor errors which are inherent in voice recognition technology. **      Final report electronically signed by Dr. Tyler Staples on 11/1/2021 9:36 AM      VL DUP LOWER EXTREMITY VENOUS BILATERAL   Final Result   No evidence of a DVT. **This report has been created using voice recognition software. It may contain minor errors which are inherent in voice recognition technology. **      Final report electronically signed by Dr. Shabnam Marks on 11/1/2021 8:07 AM      XR CHEST PORTABLE   Final Result   No significant interval change since previous study dated 29th of October 2021. Geneva Cummings **This report has been created using voice recognition software. It may contain minor errors which are inherent in voice recognition technology. **      Final report electronically signed by DR Ancelmo Lopez on 10/31/2021 12:43 PM      CT ABDOMEN PELVIS WO CONTRAST   Final Result      1. Diffusely thickened appearance of the sigmoid colon. There are diverticula in this region. This could be on the basis of acute diverticulitis, however given the eccentric appearance of the thickening, neoplastic process can't entirely be excluded.    2. Haziness throughout the superficial subcutaneous soft tissues of the abdomen and pelvis consistent with anasarca. 3. No evidence of hydronephrosis. No obstructive renal stones are identified. 4. Diffuse haziness throughout the mesentery and a small amount of ascites, findings likely related to fluid overload. **This report has been created using voice recognition software. It may contain minor errors which are inherent in voice recognition technology. **      Final report electronically signed by Dr Chantell Chow on 10/30/2021 2:23 PM      CT CHEST W WO CONTRAST   Final Result       1. Extensive bilateral pneumonia. 2. Large bilateral pleural effusions, right greater than left. 3. Large pericardial effusion. 4. Marked cardiomegaly. **This report has been created using voice recognition software. It may contain minor errors which are inherent in voice recognition technology. **      Final report electronically signed by Dr Chantell Chow on 10/30/2021 1:54 PM      XR CHEST PORTABLE   Final Result   1. Mildly megaly. ET tube and NG tube remain in good position. Right jugular line with catheter tip in SVC. 2. Moderate-sized pleural effusion left side. 3. Moderate mixed infiltrates scattered throughout both lungs. Overall appearance slightly worse than prior. **This report has been created using voice recognition software. It may contain minor errors which are inherent in voice recognition technology. **      Final report electronically signed by Dr. Braeden Segura on 10/29/2021 8:10 AM      US RENAL COMPLETE   Final Result   1. Mildly increased echogenicity of the kidneys that may suggest underlying medical renal disease. 2. No hydronephrosis. 3. A 7 mm left renal calculus is seen. 4. Small ascites. Bilateral pleural effusions. 5. Bilateral renal cysts. **This report has been created using voice recognition software. It may contain minor errors which are inherent in voice recognition technology. **      Final report electronically signed by Dr Dae Phelps on 10/29/2021 3:55 AM      XR ABDOMEN FOR NG/OG/NE TUBE PLACEMENT   Final Result   1. The tip of the nasogastric tube is below the diaphragms within the stomach. 2. Partially seen is left pleural effusion and left retrocardiac opacity. Opacities are also seen in the partially seen right lower lobe. **This report has been created using voice recognition software. It may contain minor errors which are inherent in voice recognition technology. **      Final report electronically signed by Dr Dae Phelps on 10/27/2021 6:54 PM      XR CHEST PORTABLE   Final Result   1. There has been no significant interval change in the radiographic appearance of the chest  from 10/23/2021 including diffuse airspace disease. .            **This report has been created using voice recognition software. It may contain minor errors which are inherent in voice recognition technology. **      Final report electronically signed by Dr Dae Phelps on 10/24/2021 8:54 PM      XR CHEST PORTABLE   Final Result   1. Support lines and tubes in unchanged position when compared to prior. 2. Interval worsening of parenchymal densities within the right    central/right lower lobe. Additionally, worsening of confluent    consolidation within the left midlung with development of left basilar    pleural/parenchymal opacities likely consistent with moderate left pleural    effusion. This document has been electronically signed by: Irina Vargas DO on    10/23/2021 05:00 AM      XR CHEST PORTABLE   Final Result   Persistent diffuse patchy opacities but with improved aeration at the lung bases. **This report has been created using voice recognition software. It may contain minor errors which are inherent in voice recognition technology. **      Final report electronically signed by Dr. Al Kay MD on 10/21/2021 10:48 AM      XR CHEST PORTABLE   Final Result   1.  Increased density in the left lung base. This can indicate partial left lower lobe collapse. 2. Persistent patchy bilateral pulmonary opacities. **This report has been created using voice recognition software. It may contain minor errors which are inherent in voice recognition technology. **      Final report electronically signed by Dr. Raymundo Culp on 10/18/2021 7:58 AM      XR CHEST PORTABLE   Final Result   Impression:   1. Cardiomegaly with improvement of passive congestive changes and better    aeration of both lungs compared to the prior study. 2. The focal bilateral alveolar consolidations consistent with infectious    pulmonary disease are unchanged. Retrocardiac consolidation and small LEFT    pleural effusion obscuring the LEFT diaphragm is unchanged. This document has been electronically signed by: Wes Castanon MD on    10/16/2021 03:18 AM      XR CHEST PORTABLE   Final Result   Somewhat low position of endotracheal tube 1.2 cm above the claire but improved aeration of the upper lungs. **This report has been created using voice recognition software. It may contain minor errors which are inherent in voice recognition technology. **      Final report electronically signed by Dr. Carrie Patterson on 10/15/2021 3:34 PM      XR CHEST PORTABLE   Final Result   1. Bilateral pneumonia. 2. Small bilateral pleural effusions. 3. Stable cardiomegaly. **This report has been created using voice recognition software. It may contain minor errors which are inherent in voice recognition technology. **      Final report electronically signed by Dr. Aaron Ervin on 10/15/2021 4:14 PM      XR CHEST PORTABLE   Final Result   Right lower lobe airspace infiltrates. Severe cardiomegaly. **This report has been created using voice recognition software. It may contain minor errors which are inherent in voice recognition technology. **      Final report electronically signed by Dr. Carrie Patterson on 10/11/2021 8:59 PM      XR CHEST PORTABLE   Final Result      1. Diffuse groundglass opacities in both lungs along with cardiomegaly. Findings likely suggest acute exacerbation of congestive heart failure versus pneumonia in the right clinical setting. Clinical correlation is recommended      2. Small left pleural effusion. 3. Other findings as described above. **This report has been created using voice recognition software. It may contain minor errors which are inherent in voice recognition technology. **      Final report electronically signed by Dr Mele Morales on 10/11/2021 11:56 AM            Objective:   Vitals: BP (!) 141/70   Pulse 101   Temp 98.3 °F (36.8 °C) (Oral)   Resp 21   Ht 5' (1.524 m)   Wt 140 lb 14 oz (63.9 kg)   SpO2 98%   BMI 27.51 kg/m²    Wt Readings from Last 3 Encounters:   11/12/21 140 lb 14 oz (63.9 kg)   10/07/21 125 lb (56.7 kg)      24HR INTAKE/OUTPUT:      Intake/Output Summary (Last 24 hours) at 11/17/2021 7137  Last data filed at 11/17/2021 0229  Gross per 24 hour   Intake --   Output 3000 ml   Net -3000 ml       Constitutional: Well-developed elderly lady on mechanical support via tracheostomy , no apparent distress   Skin:normal with no rash or lesions. HEENT:Pupils are reactive . Throat is clear . Oral mucosa is moist.  Neck:supple with no thyromegaly or carotid bruit  Cardiovascular:  S1, S2 without murmur or rubs   Respiratory: Bilateral rhonchi  Abdomen: Soft. Good bowel sounds. Ext: 1+ bilateral LE edema  Musculoskeletal:Intact  Neuro: Deferred      Electronically signed by Rufina Schrader MD on 11/17/2021 at 6:53 AM  **This report has been created using voice recognition software. It maycontain minor  errors which are inherent in voice recognition technology. **

## 2021-11-17 NOTE — PROGRESS NOTES
[facility] THERAPY MISSED TREATMENT NOTE  [facility] ICU 4D      Date: 2021  Patient Name: Silvestre Theodore        MRN: [id number]    : 1934  (80 y.o.)    REASON FOR MISSED TREATMENT:  ROLF Cannon reports patient not appropriate to complete clinical swallow evaluation due to plans for PEG tube placement and EGD. RN reports patient nonverbal with increased frustration and confusion. ST to re-attempt  as schedule permits.     LAMAR Escalante, Student Intern  Berenice Rosenbaum M.A., [address] [place] [address]

## 2021-11-17 NOTE — PLAN OF CARE
Problem: OXYGENATION/RESPIRATORY FUNCTION  Goal: Patient will maintain patent airway  Outcome: Ongoing     Problem: MECHANICAL VENTILATION  Goal: Patient will maintain patent airway  Outcome: Ongoing                                                Patient Weaning Progress    The patient's vent settings was not able to be weaned this shift. Ventilator settings that were weaned              [] Mode   [] Pressure support weaned   [] Fio2 weaned   [] Peep weaned             Spontaneous weaning trial  was not attempted. Evac tube was not  hooked up with continuous low suction(20-30mmHg)  Patient has trach. Cuff was not  deflated to determine cuff leak.    *Specific details of weaning located in Ventilator documentation flowsheets*

## 2021-11-17 NOTE — PROGRESS NOTES
Photos taken, scope used GIF SER#582,  20 fr, gastrostomy tube placed at 2 cm skin level without difficulty. EGD completed, pt tolerated well. ICU nurses gave all sedation medications as ordered per Dr. Dafne Yates.

## 2021-11-17 NOTE — OP NOTE
Operative Note      Patient: Roberta Cha  YOB: 1934  MRN: [id number]    Date of Procedure: 11/17/2021    Pre-Op Diagnosis: SOB, FATIGUE    Post-Op Diagnosis: Same:  Distal esophagitis; sl. Eroded;  Gastritis. Peg placed. May resume tube feedings. Will use Prevacid via PEG tube. Abdominal binder. Procedure(s):  EGD PEG TUBE PLACEMENT    Surgeon(s): Ankush Loja MD    Assistant:   * No surgical staff found *    Anesthesia: Monitor Anesthesia Care    Estimated Blood Loss (mL): Minimal    Complications: None    Specimens:   * No specimens in log *    Implants:  * No implants in log *      Drains:   Rectal Tube With balloon (Active)   Stool Appearance Watery 11/16/21 1800   Stool Color Brown 11/16/21 1800   Stool Amount Medium 11/15/21 1344   Rectal Tube Output 100 ml 11/17/21 1428       External Urinary Catheter (Active)   Urine Color Yellow 11/17/21 0400   Urine Appearance Clear 11/17/21 0400   Output (mL) 250 mL 11/17/21 1236   Placement Replaced 11/17/21 0400   Skin Assessment No Injury 11/17/21 0400       [REMOVED] NG/OG/NJ/NE Tube Orogastric Center mouth (Removed)   Surrounding Skin Dry;  Intact 11/08/21 0800   Securement device Yes 11/08/21 0800   Status Other (Comment) 11/08/21 0800   Placement Verified by External Catheter Length 11/08/21 0800   NG/OG/NJ/NE External Measurement (cm) 60 cm 11/08/21 0800   Drainage Appearance Bile; Brown 11/06/21 2000   Tube Feeding Renal Formula 11/08/21 0800   Tube Feeding Status Continuous 11/08/21 0800   Rate/Schedule 40 mL/hr 11/08/21 0800   Tube Feeding Supplement (Product) Protein Modular 11/06/21 2000   Tube Feeding Supplement Amount (mL) 75 10/29/21 0802   Tube Feeding Intake (mL) 288 ml 11/08/21 1600   Free Water Flush (mL) 100 mL 11/08/21 1600   Free Water Rate 100ml q8h 11/04/21 0741   Residual Volume (ml) 5 ml 11/07/21 1615   Output (mL) 0 ml 11/02/21 1620       [REMOVED] NG/OG/NJ/NE Tube Orogastric 16 fr Center mouth (Removed) Securement device Yes 11/09/21 0654   Placement Verified by X-Ray (Initial) 11/09/21 0654   Rate/Schedule 0 mL/hr 11/10/21 0735   Tube Feeding Intake (mL) 103 ml 11/10/21 0735   Free Water Flush (mL) 40 mL 11/10/21 0846   Residual Volume (ml) 15 ml 11/09/21 1117       [REMOVED] NG/OG/NJ/NE Tube Nasogastric Right nostril (Removed)       [REMOVED] NG/OG/NJ/NE Tube Nasogastric Right nostril (Removed)   Surrounding Skin Dry; Intact 11/14/21 0730   Status Other (Comment) 11/15/21 1630   Placement Verified by Gastric Contents 11/15/21 1630   Tube Feeding Renal Formula 11/15/21 1630   Tube Feeding Status Continuous 11/15/21 1630   Rate/Schedule 40 mL/hr 11/15/21 1630   Tube Feeding Intake (mL) 379 ml 11/16/21 0520   Free Water Flush (mL) 201 mL 11/16/21 0520   Free Water Rate 100 q8 hr 11/15/21 1630   Residual Volume (ml) 0 ml 11/15/21 1630   Output (mL) 0 ml 11/15/21 1239       [REMOVED] Rectal Tube With balloon (Removed)   Stool Appearance Watery 11/02/21 0830   Stool Color Brown 11/02/21 0830   Stool Amount Medium 11/02/21 0830   Rectal Tube Output 100 ml 11/02/21 1200       [REMOVED] Urethral Catheter 16 fr (Removed)   Catheter Indications Need for fluid volume management of the critically ill patient in a critical care setting 11/10/21 1515   Securement Device Date Changed 10/21/21 11/02/21 1620   Site Assessment Pink;  No urethral drainage 11/10/21 1515   Urine Color Yellow 11/10/21 1515   Urine Appearance Sediment 11/10/21 1515   Urine Odor Malodorous 11/09/21 2000   Output (mL) 700 mL 11/10/21 1400       [REMOVED] Urethral Catheter Temperature probe (Removed)   Catheter Indications Need for fluid volume management of the critically ill patient in a critical care setting 11/16/21 1604   Site Assessment [place] 11/16/21 1800   Urine Color Yellow 11/16/21 1800   Urine Appearance Cloudy 11/16/21 1800   Output (mL) 2500 mL 11/16/21 1800       [REMOVED] External Urinary Catheter (Removed)       Findings: above    Detailed Description of Procedure:   dictated    Electronically signed by Tao Akers MD on 11/17/2021 at 3:35 PM

## 2021-11-18 ENCOUNTER — HOSPITAL ENCOUNTER (OUTPATIENT)
Age: 86
Discharge: HOME | End: 2021-12-17
Attending: INTERNAL MEDICINE | Admitting: INTERNAL MEDICINE
Payer: COMMERCIAL

## 2021-11-18 VITALS
DIASTOLIC BLOOD PRESSURE: 96 MMHG | SYSTOLIC BLOOD PRESSURE: 127 MMHG | HEART RATE: 100 BPM | BODY MASS INDEX: 27.18 KG/M2 | HEIGHT: 60 IN | OXYGEN SATURATION: 94 % | WEIGHT: 138.45 LBS | TEMPERATURE: 98.1 F | RESPIRATION RATE: 17 BRPM

## 2021-11-18 DIAGNOSIS — R00.2 PALPITATIONS: ICD-10-CM

## 2021-11-18 DIAGNOSIS — R06.02 SOB (SHORTNESS OF BREATH): ICD-10-CM

## 2021-11-18 DIAGNOSIS — I49.3 PVC (PREMATURE VENTRICULAR CONTRACTION): ICD-10-CM

## 2021-11-18 LAB
ABO: NORMAL
ANION GAP SERPL CALCULATED.3IONS-SCNC: 8 MEQ/L (ref 8–16)
ANTIBODY SCREEN: NORMAL
BUN BLDV-MCNC: 29 MG/DL (ref 7–22)
CALCIUM SERPL-MCNC: 7.3 MG/DL (ref 8.5–10.5)
CHLORIDE BLD-SCNC: 106 MEQ/L (ref 98–111)
CO2: 28 MEQ/L (ref 23–33)
CREAT SERPL-MCNC: 1.2 MG/DL (ref 0.4–1.2)
ERYTHROCYTE [DISTWIDTH] IN BLOOD BY AUTOMATED COUNT: 16.9 % (ref 11.5–14.5)
ERYTHROCYTE [DISTWIDTH] IN BLOOD BY AUTOMATED COUNT: 61.1 FL (ref 35–45)
GFR SERPL CREATININE-BSD FRML MDRD: 42 ML/MIN/1.73M2
GLUCOSE BLD-MCNC: 78 MG/DL (ref 70–108)
HCT VFR BLD CALC: 24.1 % (ref 37–47)
HCT VFR BLD CALC: 38.1 % (ref 37–47)
HEMOGLOBIN: 11.7 GM/DL (ref 12–16)
HEMOGLOBIN: 7.3 GM/DL (ref 12–16)
HEPARIN UNFRACTIONATED: < 0.04 U/ML (ref 0.3–0.7)
MCH RBC QN AUTO: 30.7 PG (ref 26–33)
MCHC RBC AUTO-ENTMCNC: 30.3 GM/DL (ref 32.2–35.5)
MCV RBC AUTO: 101.3 FL (ref 81–99)
PLATELET # BLD: 167 THOU/MM3 (ref 130–400)
PMV BLD AUTO: 10.8 FL (ref 9.4–12.4)
POTASSIUM REFLEX MAGNESIUM: 4.6 MEQ/L (ref 3.5–5.2)
POTASSIUM SERPL-SCNC: 4.6 MEQ/L (ref 3.5–5.2)
RBC # BLD: 2.38 MILL/MM3 (ref 4.2–5.4)
RH FACTOR: NORMAL
SODIUM BLD-SCNC: 142 MEQ/L (ref 135–145)
WBC # BLD: 5.8 THOU/MM3 (ref 4.8–10.8)

## 2021-11-18 PROCEDURE — 36592 COLLECT BLOOD FROM PICC: CPT

## 2021-11-18 PROCEDURE — 2500000003 HC RX 250 WO HCPCS: Performed by: INTERNAL MEDICINE

## 2021-11-18 PROCEDURE — 2580000003 HC RX 258: Performed by: INTERNAL MEDICINE

## 2021-11-18 PROCEDURE — 86850 RBC ANTIBODY SCREEN: CPT

## 2021-11-18 PROCEDURE — 99232 SBSQ HOSP IP/OBS MODERATE 35: CPT | Performed by: INTERNAL MEDICINE

## 2021-11-18 PROCEDURE — 6370000000 HC RX 637 (ALT 250 FOR IP): Performed by: STUDENT IN AN ORGANIZED HEALTH CARE EDUCATION/TRAINING PROGRAM

## 2021-11-18 PROCEDURE — 6370000000 HC RX 637 (ALT 250 FOR IP): Performed by: INTERNAL MEDICINE

## 2021-11-18 PROCEDURE — 97110 THERAPEUTIC EXERCISES: CPT

## 2021-11-18 PROCEDURE — 6360000002 HC RX W HCPCS: Performed by: NURSE PRACTITIONER

## 2021-11-18 PROCEDURE — 85018 HEMOGLOBIN: CPT

## 2021-11-18 PROCEDURE — 6370000000 HC RX 637 (ALT 250 FOR IP): Performed by: NURSE PRACTITIONER

## 2021-11-18 PROCEDURE — 6360000002 HC RX W HCPCS: Performed by: FAMILY MEDICINE

## 2021-11-18 PROCEDURE — 86923 COMPATIBILITY TEST ELECTRIC: CPT

## 2021-11-18 PROCEDURE — 85014 HEMATOCRIT: CPT

## 2021-11-18 PROCEDURE — 85027 COMPLETE CBC AUTOMATED: CPT

## 2021-11-18 PROCEDURE — 2580000003 HC RX 258: Performed by: STUDENT IN AN ORGANIZED HEALTH CARE EDUCATION/TRAINING PROGRAM

## 2021-11-18 PROCEDURE — P9016 RBC LEUKOCYTES REDUCED: HCPCS

## 2021-11-18 PROCEDURE — 36415 COLL VENOUS BLD VENIPUNCTURE: CPT

## 2021-11-18 PROCEDURE — 99238 HOSP IP/OBS DSCHRG MGMT 30/<: CPT | Performed by: INTERNAL MEDICINE

## 2021-11-18 PROCEDURE — 85520 HEPARIN ASSAY: CPT

## 2021-11-18 PROCEDURE — 97530 THERAPEUTIC ACTIVITIES: CPT

## 2021-11-18 PROCEDURE — 80048 BASIC METABOLIC PNL TOTAL CA: CPT

## 2021-11-18 PROCEDURE — 36430 TRANSFUSION BLD/BLD COMPNT: CPT

## 2021-11-18 PROCEDURE — 6360000002 HC RX W HCPCS: Performed by: INTERNAL MEDICINE

## 2021-11-18 PROCEDURE — 86901 BLOOD TYPING SEROLOGIC RH(D): CPT

## 2021-11-18 PROCEDURE — 94003 VENT MGMT INPAT SUBQ DAY: CPT

## 2021-11-18 PROCEDURE — 36593 DECLOT VASCULAR DEVICE: CPT

## 2021-11-18 PROCEDURE — 92610 EVALUATE SWALLOWING FUNCTION: CPT

## 2021-11-18 PROCEDURE — 86900 BLOOD TYPING SEROLOGIC ABO: CPT

## 2021-11-18 RX ORDER — CEFAZOLIN SODIUM 1 G/50ML
1000 INJECTION, SOLUTION INTRAVENOUS EVERY 12 HOURS
Status: COMPLETED | OUTPATIENT
Start: 2021-11-18 | End: 2021-11-18

## 2021-11-18 RX ORDER — SODIUM CHLORIDE 9 MG/ML
INJECTION, SOLUTION INTRAVENOUS PRN
Status: COMPLETED | OUTPATIENT
Start: 2021-11-18 | End: 2021-11-18

## 2021-11-18 RX ADMIN — MORPHINE SULFATE 2 MG: 2 INJECTION, SOLUTION INTRAMUSCULAR; INTRAVENOUS at 02:30

## 2021-11-18 RX ADMIN — SODIUM CHLORIDE: 9 INJECTION, SOLUTION INTRAVENOUS at 02:02

## 2021-11-18 RX ADMIN — CHLORHEXIDINE GLUCONATE 15 ML: 1.2 RINSE ORAL at 08:20

## 2021-11-18 RX ADMIN — LORAZEPAM 1 MG: 1 TABLET ORAL at 17:28

## 2021-11-18 RX ADMIN — SODIUM CHLORIDE, PRESERVATIVE FREE 10 ML: 5 INJECTION INTRAVENOUS at 08:20

## 2021-11-18 RX ADMIN — OXYCODONE HYDROCHLORIDE 5 MG: 5 TABLET ORAL at 12:00

## 2021-11-18 RX ADMIN — POTASSIUM BICARBONATE 40 MEQ: 782 TABLET, EFFERVESCENT ORAL at 12:00

## 2021-11-18 RX ADMIN — SODIUM CHLORIDE: 9 INJECTION, SOLUTION INTRAVENOUS at 08:45

## 2021-11-18 RX ADMIN — BUMETANIDE 1 MG: 0.25 INJECTION INTRAMUSCULAR; INTRAVENOUS at 08:47

## 2021-11-18 RX ADMIN — Medication: at 08:20

## 2021-11-18 RX ADMIN — AMIODARONE HYDROCHLORIDE 200 MG: 200 TABLET ORAL at 08:47

## 2021-11-18 RX ADMIN — ALTEPLASE 1 MG: 2.2 INJECTION, POWDER, LYOPHILIZED, FOR SOLUTION INTRAVENOUS at 12:03

## 2021-11-18 RX ADMIN — LORAZEPAM 1 MG: 2 INJECTION INTRAMUSCULAR; INTRAVENOUS at 01:48

## 2021-11-18 RX ADMIN — CEFAZOLIN SODIUM 1000 MG: 1 INJECTION, SOLUTION INTRAVENOUS at 04:08

## 2021-11-18 RX ADMIN — LORAZEPAM 1 MG: 1 TABLET ORAL at 08:46

## 2021-11-18 RX ADMIN — Medication 30 MG: at 17:28

## 2021-11-18 ASSESSMENT — PAIN SCALES - GENERAL
PAINLEVEL_OUTOF10: 7
PAINLEVEL_OUTOF10: 0

## 2021-11-18 ASSESSMENT — PULMONARY FUNCTION TESTS
PIF_VALUE: 17.6
PIF_VALUE: 17.7
PIF_VALUE: 27.6
PIF_VALUE: 27.5
PIF_VALUE: 25.6

## 2021-11-18 ASSESSMENT — PAIN SCALES - WONG BAKER
WONGBAKER_NUMERICALRESPONSE: 0
WONGBAKER_NUMERICALRESPONSE: 6
WONGBAKER_NUMERICALRESPONSE: 8

## 2021-11-18 NOTE — DISCHARGE INSTR - COC
Continuity of Care Form    Patient Name: Brisa Reyes   :  1934  MRN:  [id number]    Admit date:  10/11/2021  Discharge date:  2021      Code Status Order: Full Code   Advance Directives:      Admitting Physician:  Declan Orozco MD  PCP: SERGE Anton CNP    Discharging Nurse: [facility] Unit/Room#: 4D-13/013-A  Discharging Unit Phone Number: ***    Emergency Contact:   Extended Emergency Contact Information  Primary Emergency Contact: Audrey Llamas  Address: [facility]  [id number] [place] [address] Phone: [phone]  Mobile Phone: [phone]  Relation: Child  Hearing or visual needs: None  Other needs: None  Preferred language: English   needed? No  Secondary Emergency Contact: Fatoumata Grajeda  Address: WORKS AT [facility] [address]           [facility] [address]  Phone: [phone]  Relation: Child  Hearing or visual needs: None  Other needs: None  Preferred language: English   needed?  No    Past Surgical History:  Past Surgical History:   Procedure Laterality Date    APPENDECTOMY      CHOLECYSTECTOMY         Immunization History:   Immunization History   Administered Date(s) Administered    COVID-19, NIKI Lewis, 30mcg/0.3mL 2021, 2021       Active Problems:  Patient Active Problem List   Diagnosis Code    Sigmoid diverticulitis K57.32    Acute respiratory failure with hypoxia (HCC) J96.01    COVID-19 U07.1    Acute congestive heart failure ([facility] [address] I50.9       Isolation/Infection:   Isolation            No Isolation          Patient Infection Status       Infection Onset Added Last Indicated Last Indicated By Review Planned Expiration Resolved Resolved By    None active    Resolved    C-diff Rule Out 10/21/21 10/21/21 10/21/21 Gastrointestinal Panel, Molecular (Ordered)   10/25/21 75 Josh Sorto RN    COVID-19 10/05/21 10/07/21 10/05/21 COVID-19   21 75 Josh Sorto RN    +10/5, admit 10/11, hypoxic, severe    COVID-19 (Rule Out) 10/05/21 10/05/21 10/05/21 COVID-19 (Ordered)   10/07/21 Rule-Out Test Resulted            Nurse Assessment:  Last Vital Signs: /78   Pulse 109   Temp 98.6 °F (37 °C) (Oral)   Resp 28   Ht 5' (1.524 m)   Wt 138 lb 7.2 oz (62.8 kg)   SpO2 97%   BMI 27.04 kg/m²     Last documented pain score (0-10 scale): Pain Level: 6  Last Weight:   Wt Readings from Last 1 Encounters:   11/17/21 138 lb 7.2 oz (62.8 kg)     Mental Status:  Alert - unable to assess cognition    IV Access:  - PICC - site  L Basilic, insertion date: 11    Nursing Mobility/ADLs:  Walking   {CHP DME XUIB:870841374}  Transfer  Dependent  Bathing  Dependent  Dressing  Dependent  Toileting  {CHP DME ILYX:007999294}  Feeding  Dependent  Med Admin  Dependent  Med Delivery    via PEG    Wound Care Documentation and Therapy:  Wound 10/15/21 Sacrum Mid (Active)   Wound Etiology Pressure Unstageable 11/18/21 0730   Dressing Status Other (Comment) 11/16/21 0815   Wound Cleansed Soap and water 11/18/21 0730   Dressing/Treatment Zinc paste 11/18/21 0730   Wound Assessment Pink/red; Superficial; Purple/maroon; Dry 11/18/21 0730   Drainage Amount None 11/18/21 0730   Odor None 11/18/21 0730   Nida-wound Assessment Non-blanchable erythema 11/18/21 0730   Number of days: 33       Wound 11/06/21 Coccyx (Active)   Wound Etiology Pressure Unstageable 11/18/21 0730   Dressing Status Other (Comment) 11/16/21 0815   Wound Cleansed Soap and water 11/18/21 0730   Dressing/Treatment Pharmaceutical agent (see MAR) 11/18/21 0730   Wound Assessment [place]/red; Eschar dry; [facility] 11/18/21 0730   Drainage Amount Small 11/18/21 0730   Drainage Description Serosanguinous 11/18/21 0730   Odor None 11/18/21 0730   Nida-wound Assessment Non-blanchable erythema 11/18/21 0730   Number of days: 12       Wound 11/08/21 Perineum open wounds on rectum (Active)   Wound Etiology Other 11/18/21 0730   Dressing Status Clean; Dry; Intact 11/17/21 2000   Wound Cleansed Soap and water 11/18/21 0730   Dressing/Treatment Protective barrier; Zinc paste 11/18/21 0730   Wound Assessment Superficial; Pink/red; Bleeding 11/18/21 0730   Drainage Amount Small 11/18/21 0730   Drainage Description Serosanguinous 11/18/21 0730   Odor None 11/18/21 0730   Nida-wound Assessment Maceration; Non-blanchable erythema 11/18/21 0730   Number of days: 10        Elimination:  Continence: Bowel: No  Bladder: No  Urinary Catheter:  External Catheter to suction    Colostomy/Ileostomy/Ileal Conduit: No  [REMOVED] Rectal Tube With balloon-Stool Appearance:  (no stool noted)  Rectal Tube With balloon-Stool Appearance: Loose  [REMOVED] Rectal Tube With balloon-Stool Color: Brown  Rectal Tube With balloon-Stool Color: Mervin Kathie  [REMOVED] Rectal Tube With balloon-Stool Amount: Medium  Rectal Tube With balloon-Stool Amount: Medium    Date of Last BM: 11/18/21    Intake/Output Summary (Last 24 hours) at 11/18/2021 1212  Last data filed at 11/18/2021 0845  Gross per 24 hour   Intake 2084.19 ml   Output 700 ml   Net 1384.19 ml     I/O last 3 completed shifts: In: 1984.2 [I.V.:1035; NG/GT:100; IV Piggyback:849.2]  Out: 700 [Urine:600; Stool:100]    Safety Concerns:   Fall risk, bleeding risk    Impairments/Disabilities:      None    Nutrition Therapy:  Current Nutrition Therapy:   - Tube Feedings:  Renal    Routes of Feeding: Jejunal Tube  Liquids: No Restrictions  Daily Fluid Restriction: no  Last Modified Barium Swallow with Video (Video Swallowing Test): not done    Treatments at the Time of Hospital Discharge:   Respiratory Treatments: ***  Oxygen Therapy:  is not on home oxygen therapy.   Ventilator:    - Ventilator Settings:    Vt Ordered: 0 mL  Rate Set: 16 bmp  FiO2 : 40 %    PEEP/CPAP: 6  Pressure Support: 12 cmH20    Rehab Therapies: Physical Therapy, Occupational Therapy, and Speech/Language Therapy  Weight Bearing Status/Restrictions: No weight bearing restirctions  Other Medical Equipment (for information only, NOT a DME order):  {EQUIPMENT:[id number]}  Other Treatments: ***    Patient's personal belongings (please select all that are sent with patient):  {P DME Belongings:572944944}    RN SIGNATURE:  Electronically signed by Ana Perdomo RN on 11/18/21 at 3:58 PM EST    CASE MANAGEMENT/SOCIAL WORK SECTION    Inpatient Status Date: 10/11/2021    Readmission Risk Assessment Score:  Readmission Risk              Risk of Unplanned Readmission:  37           Discharging to Facility/ Agency   Name: [facility]  Address: [address]  Phone:  Fax:    Dialysis Facility (if applicable)   Name:  Address:  Dialysis Schedule:  Phone:  Fax:    / signature: Electronically signed by Alica Pallas, RN on 11/18/21 at 12:12 PM EST    PHYSICIAN SECTION    Prognosis: Fair    Condition at Discharge: Stable    Rehab Potential (if transferring to Rehab): Fair    Recommended Labs or Other Treatments After Discharge: monitor H&H    Physician Certification: I certify the above information and transfer of Rona Beyer  is necessary for the continuing treatment of the diagnosis listed and that she requires LTAC for greater 30 days.      Update Admission H&P: No change in H&P    PHYSICIAN SIGNATURE:  Electronically signed by Warden Adiel MD on 11/18/21 at 2:51 PM EST

## 2021-11-18 NOTE — PROGRESS NOTES
[facility]  INPATIENT PHYSICAL THERAPY  DAILY NOTE  [facility] ICU 4D - 4D-13/013-A      Time In: 1301  Time Out: 1337  Timed Code Treatment Minutes: 36 Minutes  Minutes: 36   co-tx with OT and RT due to multiple complexity and decreased endurance - she is unable to tolerate 2 therapy sessions a day        Date: 2021  Patient Name: Jose Raul Conroy,  Gender:  female        MRN: [id number]  : 1934  (80 y.o.)     Referring Practitioner: Sher Sim DO  Diagnosis: Acute respiratory failure with hypoxia  Additional Pertinent Hx: Per chart review: Ms. Te Flores is an 59-year-old female with a past medical history of anxiety, hypertension, arthritis, and a potentially new diagnosis of CHF. She states that she was vaccinated for Covid. She tested positive for Covid on 10/4/2021. Her shortness of breath has gotten progressively worse along with her fatigue. She states that in the past week she has noticed increased shortness of breath, increased swelling in her lower limbs, increased fatigue, and increasing orthopnea. Chest x-ray shows bilateral groundglass opacities with cardiomegaly. RR on 10/12 due to elevated HR with amnio started. Prior Level of Function:  Lives With: Daughter  Type of Home: House  Home Layout: One level  Home Equipment: Cane   Bathroom Shower/Tub: Tub/Shower unit  Bathroom Toilet: Standard  Bathroom Equipment: Shower chair    Receives Help From: Family  ADL Assistance: Independent  Homemaking Assistance: Independent  Ambulation Assistance: Independent  Transfer Assistance: Independent  Additional Comments: Pt reported would use cane for mobility. Pt reported being very active prior, exercises daily.     Restrictions/Precautions:  Restrictions/Precautions: Fall Risk, Isolation, Contact Precautions  Position Activity Restriction  Other position/activity restrictions: post COVID- Hard of hearing,- 11/10 trach       SUBJECTIVE: nursing ok'd therapy- RT assisted with trach throughout session - pt awake entire session followed > 50 < 75% of commands - did place back in restraints at end of session as she did try to pull on her trach while sitting edge of bed       PAIN: pt did nod head yes but didn't state where pain was    Vitals: Oxygen: pt on SBT (vent to trach PEEP of 6 FiO2 at 40%) - O2 sats > 92%  RR- in upper 20's with activity    OBJECTIVE:  Bed Mobility:  Rolling to Left: Dependent, X 2   Rolling to Right: Dependent, X 2   Supine to Sit: Maximum Assistance, and min assist of another along with RT managing trach and lines   Sit to Supine: Maximum Assistance, X 2, and dependent x 2 to boost up in bed    Scooting: Maximum Assistance      Balance:  pt sat edge of bed for 13 min   Noted improved sitting balance as compared to last session pt required min assist to CGA of one and CGA to mod assist of another- there were some occ where she was holding balance with CGA x 2 but for only ~ 5 sec at a time- she tended to lean post needing cues for wt shifting forward- pt also needed cues for holding her head up which she did better this date- while sitting edge of bed pt completed hand over hand activity to complete task     Exercise:  Patient was guided in 1 set(s) 7-8 reps of exercise to both lower extremities. Ankle pumps, Heelslides, Hip abduction/adduction and and a couple reps of long arc quads- she did require assist with ex however she was initiating them . Exercises were completed for increased independence with functional mobility. Functional Outcome Measures: Completed  AM-PAC Inpatient Mobility Raw Score : 6  AM-PAC Inpatient T-Scale Score : 23.55    ASSESSMENT:  Assessment: Patient progressing toward established goals. and However pt cont to demonstrate generalized weakness and required much assist of 2 persons for sitting edge of bed and for balance. Pt would benefit from cont skilled therapy   Activity Tolerance:  Patient tolerance of  treatment: fair. Equipment Recommendations:Equipment Needed: Yes (continue to assess RW needs)  Discharge Recommendations: LTACH and vs SNF   Plan: Times per week: 3 x C/EM  Current Treatment Recommendations: Strengthening, Transfer Training, Endurance Training, Neuromuscular Re-education, Patient/Caregiver Education & Training, Equipment Evaluation, Education, & procurement, Home Exercise Program, Gait Training, Balance Training, Functional Mobility Training, Stair training, Safety Education & Training    Patient Education  Patient Education: Plan of Care    Goals:  Patient goals : To get home as soon as possible  Short term goals  Time Frame for Short term goals: By hospital d/c  Short term goal 1: Pt to demonstrate supine <-> sit transfer min A for ease with getting out of bed. Short term goal 2: Pt to sit edge of bed with SBA for > 5 min for static and dynamic activity  Short term goal 3: Transfers and ambulation to be reassessed when appropriate. Short term goal 4: . Long term goals  Time Frame for Long term goals : NA due to short ELOS    Following session, patient left in safe position with all fall risk precautions in place.

## 2021-11-18 NOTE — PROGRESS NOTES
Gastroenterology Progress Note:     Patient Name:  Marilu Swift   MRN: [id number]  [id number]  YOB: 1934  Admit Date: 10/11/2021 10:28 AM  Primary Care Physician: SERGE Brennan - CNP   4D-13/013-A     Patient seen and examined. 24 hours events and chart reviewed. Subjective: Patient alert, but does not answer questions or follow commands. Per RN, PEG is working adequately. Patient is tolerating TF. Hgb slowly trending down, receiving 1 unit PRBC. No GI bleeding noted. Rectal tube with loose, brown stool noted. She is being discharged to [facility] today. Objective:  /78   Pulse 109   Temp 98.6 °F (37 °C) (Oral)   Resp 28   Ht 5' (1.524 m)   Wt 138 lb 7.2 oz (62.8 kg)   SpO2 97%   BMI 27.04 kg/m²     Physical Exam:    General:  Acute on chronically ill appearing female  HEENT: Atraumatic, normocephalic. Moist oral mucous membranes. Neck: Supple without adenopathy, JVD, thyromegaly or masses. Trachea midline. Trach intact. CV: Heart RRR, no murmurs, rubs, gallops. Resp: Even, easy without cough or accessory use. Lungs clear to ascultation bilaterally. Abd: Round, soft, obese, appears tender near PEG site. No hepatosplenomegaly or mass present. Active bowel sounds heard. No distention noted. PEG intact & patent. Abdominal binder in place. Ext:  Without cyanosis, clubbing. BLE edema. Skin: Pink, warm, dry  Neuro:  Alert, oriented x 3 with no obvious deficits.        Rectal: rectal tube with loose, brown stool    Labs:   CBC:   Lab Results   Component Value Date    WBC 5.8 11/18/2021    HGB 7.3 11/18/2021    HCT 24.1 11/18/2021    .3 11/18/2021     11/18/2021     BMP:   Lab Results   Component Value Date     11/18/2021    K 4.6 11/18/2021    K 4.6 11/18/2021     11/18/2021    CO2 28 11/18/2021    PHOS 2.9 11/09/2021    BUN 29 11/18/2021    CREATININE 1.2 11/18/2021    CALCIUM 7.3 11/18/2021     PT/INR:   Lab Results   Component Value Date INR 1.22 11/17/2021     Lipids:   Lab Results   Component Value Date    ALKPHOS 73 11/08/2021    ALT 9 11/08/2021    AST 23 11/08/2021    BILITOT 0.3 11/08/2021    BILIDIR <0.2 11/08/2021    LABALBU 2.3 11/08/2021    LABALBU 3.9 08/10/2011    LIPASE 63.6 03/15/2015     Significant Diagnostic Studies:   EGD with PEG placement 11/17/21 by Dr. Aidan Bah: distal esophagitis, gastritis, PEG placed successfully    Current Meds:  Scheduled Meds:   sodium chloride flush  5-40 mL IntraVENous 2 times per day    lansoprazole  30 mg Per G Tube BID AC    potassium bicarb-citric acid  40 mEq Oral BID    chlorhexidine  15 mL Mouth/Throat BID    OLANZapine  5 mg Oral Nightly    oxyCODONE  5 mg Oral Q6H    insulin regular  10 Units IntraVENous Once    And    dextrose  25 g IntraVENous Once    LORazepam  1 mg Oral TID    amiodarone  200 mg Oral Daily    calcium replacement protocol   Other RX Placeholder    lidocaine-EPINEPHrine  20 mL IntraDERmal Once    lidocaine 1 % injection  5 mL IntraDERmal Once    bumetanide  1 mg IntraVENous Daily    lactobacillus  1 capsule Per NG tube Daily with breakfast    senna  10 mL Per NG tube Nightly    [Held by provider] metoprolol tartrate  25 mg Oral BID     Continuous Infusions:   sodium chloride      dextrose      sodium chloride      sodium chloride Stopped (11/15/21 1206)    dextrose 100 mL/hr (10/26/21 1253)    dexmedetomidine Stopped (11/15/21 1801)    sodium chloride 100 mL/hr at 11/18/21 0730       Assessment:  79 yo F admitted 10/11/21 for SOB. COVID-19 positive. New onset afib with RVR 11/10/21, on Heparin. Urine culture & respiratory culture positive, treated. Tracheostomy 11/10/21. Anemia noted, no GI bleeding noted. She did have significant bleeding from the trach site for several days. Heparin stopped due to excessive bleeding from trach site.  EGD with PEG placement 11/17/21 demonstrated distal esophagitis, gastritis, PEG placed successfully. 1. Acute hypoxic respiratory failure s/p trach 11/10/21  2. COVID-19 pneumonia  3. ARDs  4. Hypotension- now off pressors  5. CHF  6. New afib with RVR- on Heparin gtt, s/p cardioversion, Heparin stopped 11/16/21  7. HERIBERTO- resolved  8. UTI- s/p ATBs, resolved  9. Coccyx wound  10. Dysphagia- s/p PEG placement 11/17/21  11. Acute blood loss anemia- s/p 3 units PRBC  12. Bilateral lower extremity edema  13. Encephalopathy    Plan:    · Monitor H & H, transfuse prn  · Continue lansoprazole BID via PEG  · TF per dietary recs  · Abdominal binder for post PEG  · Nephrology on board  · RN updated  · ICU supportive care  · Do not need to follow at [address]  · Will need a 6 week follow-up with [facility]  GI signing off    Case reviewed and impression/plan reviewed in collaboration with Dr. Aaron Holden  Electronically signed by SERGE Luis - CNP on 11/18/2021 at 12:07 PM    [facility]     - Stool Guaiac is a colon cancer screening tool and not test for acute GI bleeding. It is an extremely insensitive test and false positives can be due to trauma (rectal exam), extraintestinal blood loss (epistaxis/hemotysis), medications (ASA/NSAIDs), and exogenous peroxidase activity from red meat consumption, fruits, and uncooked vegetables. - Rec obsevering patient for any hematochezia or melena and contacting GI if any active GI bleeding is noted.

## 2021-11-18 NOTE — CARE COORDINATION
11/18/21 12:09 PM    Spoke with Dr. Savita Chamberlain this morning; states patient ok to discharge to [facility] today. Notified [facility]. Received call from Geraldine Ng, [address]; reports they can accept patient to [facility] room 7 between 7:15-7:30 PM today. Dr. Gloria Velasquez will be attending. Perfect Serve message sent to Dr. Alfonso Perry to notify and she will call reports. Primary RN, Davie Olivas, and charge RN, Angie Zarate, updated. 11/18/21, 12:12 PM EST    Patient goals/plan/ treatment preferences discussed by  and . Patient goals/plan/ treatment preferences reviewed with patient/ family. Patient/ family verbalize understanding of discharge plan and are in agreement with goal/plan/treatment preferences. Understanding was demonstrated using the teach back method. AVS provided by RN at time of discharge, which includes all necessary medical information pertaining to the patients current course of illness, treatment, post-discharge goals of care, and treatment preferences. Services After Discharge  Services At/After Discharge:  ([facility])   IMM Letter  IMM Letter given to Patient/Family/Significant other/Guardian/POA/by[de-identified] admitting  IMM Letter date given[de-identified] 10/11/21  IMM Letter time given[de-identified] [id number]     11/18/21 2:20 PM    [facility] Liaison called; requested they would like to [facility] the [facility]. Al[facility]so Perry, Primary RN, Davie Olivas, and charge RN notified. [address]  Department and notified of COVID + patient discharging to [facility] in [facility] today.

## 2021-11-18 NOTE — PROGRESS NOTES
[address] ICU 4D  Clinical Swallow Evaluation      SLP Individual Minutes  Time In: 8101  Time Out: 7485  Minutes: 8  Timed Code Treatment Minutes: 0 Minutes     Date: 2021  Patient Name: Adry Newsome      CSN: [id number]   : 1934  (80 y.o.)  Gender: female   Referring Physician:  SERGE Padilla CNP  Diagnosis: Acute respiratory failure with hypoxia   Secondary Diagnosis: Dysphagia   Precautions: fall risk, aspiration precautions  History of Present Illness/Injury: Patient admitted to [facility] with above medical dx. Per chart review, \"Ms. Brian Maldonado is an 80-year-old female with a past medical history of anxiety, hypertension, arthritis, and a potentially new diagnosis of CHF. She states that she was vaccinated for Covid. She tested positive for Covid on 10/4/2021. Her shortness of breath has gotten progressively worse along with her fatigue. She states that in the past week she has noticed increased shortness of breath, increased swelling in her lower limbs, increased fatigue, and increasing orthopnea. She states that especially for the past 4 days she has had to sit up in bed trying to catch her breath after she lays backward. She states that when she is standing upright or when the bed is an incline she does not have this issue. When asked, she states that she does not recall these issues months ago. In fact she states that she has been relatively active and still performs most of her ADLs as well as housework. She does state that she does not often go to a doctor but does see a chiropractor who does prescribe her homeopathic remedies. She states that her chiropractor was the first 1 to notice the limb swelling and prescribed her Dolphin. She did recently see Dr. Siena Villa on 10/7 who was concern for new onset CHF. He had ordered an echo and a stress test however neither of these have been performed prior to this admission.   He did also prescribe her hydrochlorothiazide which she has not taken. She states no other symptoms such as chest pain, nausea, vomiting, abdominal pain, or palpitations. She does state that she has had frequent palpitations in the past however she takes all of leaf extract which she states helps. \" Patient with complex medical course, intubated and extubated several times. Patient with hospital stay 10/11-current date 11/18. ST consulted to completed clinical swallow evaluation to establish POC. Past Medical History:   Diagnosis Date    Anxiety     Arthritis     Bronchitis     Diverticulitis of colon     Hypertension        SUBJECTIVE:  RN Rachael with approval to proceed with evaluation, however reported patient with inconsistent direction following. Upon arrival patient resting in bed with RN present. Patient required encouragement to complete conservative and limited PO trials. OBJECTIVE:    Pain:  No pain reported. Current Diet: NPO; PEG Tube in place    Respiratory Status:  Tracheostomy and Ventilator Settings: PEEP: 6, FiO2: 30 [place] 7.5    Behavioral Observation:  Alert and Limited Direction Following    Oral Mechanism Evaluation:  Unable to complete formal OME given poor patient direction following and concerns for noncompliance. The following obtained informally in conjunction with PO intake. Facial / Labial Impaired Generalized weakness   Lingual Impaired Generalized weakness   Dentition WFL    Velum Not Tested    Vocal Quality Impaired Aphonic d/t trach + vent   Sensation Not Tested    Cough Not Tested      Patient Evaluated Using:   Thin liquid via tsp x2, puree x1/4 tsp    Oral Phase:  Impaired:  Loss of Bolus from Lips, Impaired AP Movement and Impaired Mastication    Pharyngeal Phase: Impaired:  Decreased Hyolaryngeal Elevation  *not able to fully validate presence of pharyngeal phase deficits without formal instrumentation/supportive imaging     Signs and Symptoms of Laryngeal Penetration/Aspiration: No signs/symptoms of aspiration evident in this evaluation, but cannot rule out silent aspiration. and Wet Vocal Quality    Impressions: Patient displayed oral aversion evidenced by trying to turn head away from liquid/puree presentations with concerns for patient noncompliance; limited/conservative PO trials were consumed (<1/4 teaspoon of both liquids and pureed intake). ST attempted to provide patient with ice chips and thin liquid by cup, however patient with refusal. Patient presents with mild-moderate oral dysphagia evidenced by lack of full labial seal with mod anterior spillage with thin liquid by tsp presentations. Patient consistently triggered swallows with limited PO presentations, however concerns for decreased hyolaryngeal elevation upon tactile palpation. Certainly cannot r/o airway invasion events without copletion of formal instrumentation. Patient with unorganized formation/manipulation pattern with 1/4 tsp puree trial likely s/t poor patient acceptance of intake. ST recommends patient remain NPO, utilizing PEG tube as alternate source of nutrition. Patient not appropriate to complete instrumental assessment due to limited PO trials and patient refusal.  ST to f/u with skilled dysphagia management to determine readiness for completion of instrumental assessment. *post evaluation, patient without respiratory distress upon leaving room; RN Rachael notified re: clinical findings and recommendations from the assessment; verbal receptiveness noted     RECOMMENDATIONS/ASSESSMENT:  Instrumental Evaluation: Instrumental evaluation not indicated at this time.   Diet Recommendations:  NPO; PEG Tube   Strategies:  Recommend NPO   Rehabilitation Potential: Fair    EDUCATION:  Learner: Patient  Education:  Reviewed ST goals and Plan of Care and Reviewed recommendations for follow-up  Evaluation of Education: Needs further instruction, No evidence of learning and Family not present    PLAN:  Skilled SLP intervention on acute care 3-5 x per week or until goals met and/or pt plateaus in function. Specific interventions for next session may include: dysphagia management. PATIENT GOAL:    Did not state. Will further assess during treatment. SHORT TERM GOALS:  Short-term Goals  Timeframe for Short-term Goals: 2 weeks  Goal 1: Patient will complete conservative PO trials (ice chips, thin liquids, puree) at bedside to determine readiness for completion of instrumental assessment. Goal 2: Staff will complete comprehensive oral care analysis to prevent bacterial colonization. Goal 3: Considerations for cognition evaluation if clinically indicated. LONG TERM GOALS:  No LTG established due to short ELOS.     SAKINA Hinds., Student Intern  Uzma Odom M.S. Khari Granados 11/18/2021

## 2021-11-18 NOTE — PROGRESS NOTES
Comprehensive Nutrition Assessment    Type and Reason for Visit:  Jannette Overton (Okay per Dr. Moise Murguia to resume tube feed via peg tube)    Nutrition Recommendations/Plan:   PEG placed 11/17 . Spoke with [place] Cole, plan restart Nepro TF with goal of 40 ml/hr via PEG as tolerated. Free water flush per Dr  (currently 100 ml every 8 hours). Note 0.9 infusing at 100 ml/hr. Pt is NPO. SLP following.   monitoring labs, wts, status for EN adjustments as appropriate. Nutrition Assessment:     Pt. compromised  nutritionally AEB NPO status, dysphagia, need for PEG placement 11/17, but do plan Nepro TF restart today. Pt is s/p trach placement 11/10 although has had OGT to LIWS d/t residuals over 600ml this admit, poor po intake first 5d of admit,constipation then need for flexiseal,  intubated 10/15,  At risk for further nutrition compromise r/t COVID Dx 10/5, new CHF, advanced age, wounds and underlying medical condition (hx diverticulitis, HTN).      Malnutrition Assessment:  Malnutrition Status:   At risk for malnutrition (Comment)    Context:  Acute Illness     Findings of the 6 clinical characteristics of malnutrition:  Energy Intake:  7 - 50% or less of estimated energy requirements for 5 or more days  Weight Loss:  No significant weight loss (however difficult to evaluate with edema)     Body Fat Loss:  No significant body fat loss     Muscle Mass Loss:  No significant muscle mass loss    Fluid Accumulation:  Unable to assess     Strength:  Not Performed    Estimated Daily Nutrient Needs:  Energy (kcal):  3171-2501 (25-35/kgm) late phase; Weight Used for Energy Requirements:   (56 kgm)     Protein (g):   gm (1.2-2) as renal function allows; Weight Used for Protein Requirements:   (56 kgm)        Fluid (ml/day):  per MD; Method Used for Fluid Requirements:  Other (Comment)      Nutrition Related Findings:     Pt vent via trach, COVID dx 10/5, s/p PEG placement 11/17, NPO, seen with eyes open & per Rachael RN pt inconsistent with following commands, & does do at times. Discussed with Jose Mann ordered to resume TF via PEG & will continue with Nepro TF. Pt seen 11/15 by RD & pt tolerating Nepro TF at goal of 40 ml/hr. 100 ml output  per fexiseal documented yesterday. Pt is NPO. SLP following. MAP 91. BUN 29, Cr 1.2, Hgb 7. 3. meds include Culturelle, Bumex, prn colace, prn ativan. Discussion of possible Chelsie. Wounds:   (stage II sacrum 10/15, stage II coccyx 11/6, scabbed area below lip 11/6, perineum open wounds on rectum 11/8)       Current Nutrition Therapies:    Current Tube Feeding (TF) Orders:  · Feeding Route: PEG (placed 11/17/21 PEG)  · Formula: Renal Formula (Nepro)  · Schedule: Continuous (10/26 at goal of 40ml/hour)  · Additives/Modulars:  (none)  · Water Flushes: per physician - 100ml every 8h  · Current TF & Flush Orders Provides: at goal  · Goal TF & Flush Orders Provides: Nepro 40ml/hour provides 1699 kcals, 78gms protein, 154gms cho, 24gms fiber, 998 ml free H20 (698ml Nepro, 300 MD flush) in 1260ml total volume (960 Nepro, 300 MD flush)/24h      Anthropometric Measures:  · Height: 5' (152.4 cm)  · Current Body Weight: 138 lb 7.2 oz (62.8 kg) (+1+2 edema)   · Admission Body Weight: 124 lb (56.2 kg) (10/11 +2 edema)    · Usual Body Weight:  (per EMR: 10/7/21: 125#)     · Ideal Body Weight: 100 lbs; % Ideal Body Weight     · BMI: 27.3  · Adjusted Body Weight:  ; No Adjustment   · Adjusted BMI:      · BMI Categories: Overweight (BMI 25.0-29. 9)       Nutrition Diagnosis:   · Inadequate oral intake related to impaired respiratory function as evidenced by NPO or clear liquid status due to medical condition, intubation, nutrition support - enteral nutrition      Nutrition Interventions:   Food and/or Nutrient Delivery:  Start Tube Feeding, Continue NPO  Nutrition Education/Counseling:  No recommendation at this time   Coordination of Nutrition Care:  Continue to monitor while inpatient    Goals:  EN to provide % nutrient needs while intubated for covid recovery process       Nutrition Monitoring and Evaluation:   Behavioral-Environmental Outcomes:  None Identified   Food/Nutrient Intake Outcomes:  Enteral Nutrition Intake/Tolerance  Physical Signs/Symptoms Outcomes:  Biochemical Data, GI Status, Fluid Status or Edema, Hemodynamic Status, Nutrition Focused Physical Findings, Skin, Weight     Discharge Planning:     Too soon to determine     Electronically signed by Josue Craft RD, REBECA on 11/18/21 at 11:09 AM EST    Contact: [phone]

## 2021-11-18 NOTE — PROGRESS NOTES
Patient failed t piece trial, SpO2 decreased, RR increased, and patient with increased WOB. Patient placed back on PC settings, will attempt t piece trial daily.

## 2021-11-18 NOTE — PROGRESS NOTES
Cathflo instilled in white port of PICC line due to inability to flush or aspirate blood. Port capped off. Primary RN notified.

## 2021-11-18 NOTE — PROGRESS NOTES
Renal Progress Note    Assessment and Plan:   1. Acute kidney injury improved and stable  2. Respiratory failure hypoxic on mechanical support  3. Macrocytic anemia with hemoglobin declining  4. SARS-CoV-2 pneumonia  5. ARDS  6. Deconditioning  7. Hypocalcemia corrects for low serum albumin level    PLAN:  1. Labs reviewed  2. Kidney function is stable  3. Hemoglobin is declining  4. Monitor hemoglobin   5. Stool for occult blood? 6. Continue bumetanide for now  7. Labs in the morning  8.  We will follow    Patient Active Problem List:     Sigmoid diverticulitis     Acute respiratory failure with hypoxia ([facility] [address]     COVID-19     Acute congestive heart failure ([facility] [address]      Subjective:   Admit Date: 10/11/2021    Interval History:   Seen for acute kidney injury  Remains on mechanical support  Updated by the staff  No new issues  Blood pressure is stable  Urine output is not completely documented  Had a PEG tube placed yesterday successfully      Medications:   Scheduled Meds:   sodium chloride flush  5-40 mL IntraVENous 2 times per day    lansoprazole  30 mg Per G Tube BID AC    potassium bicarb-citric acid  40 mEq Oral BID    chlorhexidine  15 mL Mouth/Throat BID    [Held by provider] OLANZapine  5 mg Oral Nightly    [Held by provider] oxyCODONE  5 mg Oral Q6H    insulin regular  10 Units IntraVENous Once    And    dextrose  25 g IntraVENous Once    LORazepam  1 mg Oral TID    amiodarone  200 mg Oral Daily    calcium replacement protocol   Other RX Placeholder    lidocaine-EPINEPHrine  20 mL IntraDERmal Once    lidocaine 1 % injection  5 mL IntraDERmal Once    bumetanide  1 mg IntraVENous Daily    lactobacillus  1 capsule Per NG tube Daily with breakfast    senna  10 mL Per NG tube Nightly    [Held by provider] metoprolol tartrate  25 mg Oral BID     Continuous Infusions:   sodium chloride      dextrose      sodium chloride      sodium chloride Stopped (11/15/21 1206)    dextrose 100 mL/hr (10/26/21 1253)    dexmedetomidine Stopped (11/15/21 1801)    sodium chloride 100 mL/hr at 11/18/21 0202       CBC:   Recent Labs     11/16/21  0515 11/16/21  1245 11/17/21  0540 11/17/21  0540 11/17/21  1300 11/17/21  2043 11/18/21  0427   WBC 8.2  --  12.2*  --   --   --  5.8   HGB 8.4*   < > 9.4*   < > 9.1* 8.4* 7.3*     --  223  --   --   --  167    < > = values in this interval not displayed. CMP:    Recent Labs     11/16/21  0515 11/16/21  0515 11/17/21  0540 11/18/21  0435     --  140 142   K 3.7  3.7   < > 4.4  4.4 4.6  4.6     --  101 106   CO2 29  --  28 28   BUN 27*  --  28* 29*   CREATININE 1.2  --  1.3* 1.2   GLUCOSE 149*  --  119* 78   CALCIUM 7.6*  --  8.4* 7.3*   LABGLOM 42*  --  39* 42*    < > = values in this interval not displayed. Troponin: No results for input(s): TROPONINI in the last 72 hours. BNP: No results for input(s): BNP in the last 72 hours. INR:   Recent Labs     11/16/21  1245 11/17/21  0540   INR 1.32* 1.22*     Lipids: No results for input(s): CHOL, LDLDIRECT, TRIG, HDL, AMYLASE, LIPASE in the last 72 hours. Liver: No results for input(s): AST, ALT, ALKPHOS, PROT, LABALBU, BILITOT in the last 72 hours. Invalid input(s): BILDIR  Iron:  No results for input(s): IRONS, FERRITIN in the last 72 hours. Invalid input(s): LABIRONS  XR ABDOMEN FOR NG/OG/NE TUBE PLACEMENT   Final Result   NG tube in good position. **This report has been created using voice recognition software. It may contain minor errors which are inherent in voice recognition technology. **      Final report electronically signed by Dr. Jessica Grajeda on 11/13/2021 5:21 PM      XR ABDOMEN (KUB) (SINGLE AP VIEW)   Final Result   1. The nasogastric tube terminates below the diaphragms in the left upper quadrant. 2. Nonspecific distention of small bowel loops centrally. Findings may relate to ileus versus partial small bowel obstruction. Clinical correlation is recommended. **This report has been created using voice recognition software. It may contain minor errors which are inherent in voice recognition technology. **      Final report electronically signed by Dr Hunter Holm on 11/13/2021 12:10 PM      XR CHEST PORTABLE   Final Result   1. The left PICC line terminates in the superior vena cava. 2. Otherwise, there has been no other significant interval change in the radiographic appearance of the chest  from 11/10/2021. **This report has been created using voice recognition software. It may contain minor errors which are inherent in voice recognition technology. **      Final report electronically signed by Dr Hunter Holm on 11/11/2021 11:32 AM      XR CHEST PORTABLE   Final Result   1. There is a new tracheotomy tube. There has been removal of the enteric tube and endotracheal tube. 2.. There is no change in the right internal jugular line with the tip in the superior vena cava. 3. There is cardiomegaly. 4. There is bilateral pulmonary opacification, slightly worse than on previous study. **This report has been created using voice recognition software. It may contain minor errors which are inherent in voice recognition technology. **      Final report electronically signed by DR Shereen Pope on 11/10/2021 11:06 AM      XR CHEST PORTABLE   Final Result   Stable chest.               **This report has been created using voice recognition software. It may contain minor errors which are inherent in voice recognition technology. **      Final report electronically signed by Dr. Josue Gallagher MD on 11/10/2021 8:28 AM      XR ABDOMEN (KUB) (SINGLE AP VIEW)   Final Result   NG tube is at the 1st portion of duodenum. This document has been electronically signed by: Fabienne Amador MD on 11/08/2021 06:40 PM      XR CHEST PORTABLE   Final Result   1. Slightly improving bilateral pneumonia. 2. Moderate stable cardiomegaly. **This report has been created using voice recognition software. It may contain minor errors which are inherent in voice recognition technology. **      Final report electronically signed by Dr. Alex Levi on 11/4/2021 6:34 PM      XR CHEST PORTABLE   Final Result   1. Moderate cardiomegaly. ET tube, NG tube, and right jugular line remain in good position. 2. Tiny bilateral pleural effusions. Findings of relatively severe pneumonia/edema involving both lungs diffusely. Overall appearance not appreciably changed from yesterday. **This report has been created using voice recognition software. It may contain minor errors which are inherent in voice recognition technology. **      Final report electronically signed by Dr. Dorothea Cherry on 11/1/2021 9:36 AM      VL DUP LOWER EXTREMITY VENOUS BILATERAL   Final Result   No evidence of a DVT. **This report has been created using voice recognition software. It may contain minor errors which are inherent in voice recognition technology. **      Final report electronically signed by Dr. Kendall aMrtinez on 11/1/2021 8:07 AM      XR CHEST PORTABLE   Final Result   No significant interval change since previous study dated 29th of October 2021. Jaimee Frausto **This report has been created using voice recognition software. It may contain minor errors which are inherent in voice recognition technology. **      Final report electronically signed by DR Charmayne Ferdinand on 10/31/2021 12:43 PM      CT ABDOMEN PELVIS WO CONTRAST   Final Result      1. Diffusely thickened appearance of the sigmoid colon. There are diverticula in this region. This could be on the basis of acute diverticulitis, however given the eccentric appearance of the thickening, neoplastic process can't entirely be excluded. 2. Haziness throughout the superficial subcutaneous soft tissues of the abdomen and pelvis consistent with anasarca. 3. No evidence of hydronephrosis.  No obstructive renal stones are identified. 4. Diffuse haziness throughout the mesentery and a small amount of ascites, findings likely related to fluid overload. **This report has been created using voice recognition software. It may contain minor errors which are inherent in voice recognition technology. **      Final report electronically signed by Dr Herndon Found on 10/30/2021 2:23 PM      CT CHEST W WO CONTRAST   Final Result       1. Extensive bilateral pneumonia. 2. Large bilateral pleural effusions, right greater than left. 3. Large pericardial effusion. 4. Marked cardiomegaly. **This report has been created using voice recognition software. It may contain minor errors which are inherent in voice recognition technology. **      Final report electronically signed by Dr Herndon Found on 10/30/2021 1:54 PM      XR CHEST PORTABLE   Final Result   1. Mildly megaly. ET tube and NG tube remain in good position. Right jugular line with catheter tip in SVC. 2. Moderate-sized pleural effusion left side. 3. Moderate mixed infiltrates scattered throughout both lungs. Overall appearance slightly worse than prior. **This report has been created using voice recognition software. It may contain minor errors which are inherent in voice recognition technology. **      Final report electronically signed by Dr. Richmond Francisco on 10/29/2021 8:10 AM      US RENAL COMPLETE   Final Result   1. Mildly increased echogenicity of the kidneys that may suggest underlying medical renal disease. 2. No hydronephrosis. 3. A 7 mm left renal calculus is seen. 4. Small ascites. Bilateral pleural effusions. 5. Bilateral renal cysts. **This report has been created using voice recognition software. It may contain minor errors which are inherent in voice recognition technology. **      Final report electronically signed by Dr Milton Huizar on 10/29/2021 3:55 AM      XR ABDOMEN FOR NG/OG/NE TUBE PLACEMENT   Final Result   1. The tip of the nasogastric tube is below the diaphragms within the stomach. 2. Partially seen is left pleural effusion and left retrocardiac opacity. Opacities are also seen in the partially seen right lower lobe. **This report has been created using voice recognition software. It may contain minor errors which are inherent in voice recognition technology. **      Final report electronically signed by Dr Reyes Panning on 10/27/2021 6:54 PM      XR CHEST PORTABLE   Final Result   1. There has been no significant interval change in the radiographic appearance of the chest  from 10/23/2021 including diffuse airspace disease. .            **This report has been created using voice recognition software. It may contain minor errors which are inherent in voice recognition technology. **      Final report electronically signed by Dr Reyes Panning on 10/24/2021 8:54 PM      XR CHEST PORTABLE   Final Result   1. Support lines and tubes in unchanged position when compared to prior. 2. Interval worsening of parenchymal densities within the right    central/right lower lobe. Additionally, worsening of confluent    consolidation within the left midlung with development of left basilar    pleural/parenchymal opacities likely consistent with moderate left pleural    effusion. This document has been electronically signed by: Grecia Chen DO on    10/23/2021 05:00 AM      XR CHEST PORTABLE   Final Result   Persistent diffuse patchy opacities but with improved aeration at the lung bases. **This report has been created using voice recognition software. It may contain minor errors which are inherent in voice recognition technology. **      Final report electronically signed by Dr. Idania Diaz MD on 10/21/2021 10:48 AM      XR CHEST PORTABLE   Final Result   1. Increased density in the left lung base. This can indicate partial left lower lobe collapse. 2. Persistent patchy bilateral pulmonary opacities. **This report has been created using voice recognition software. It may contain minor errors which are inherent in voice recognition technology. **      Final report electronically signed by Dr. Kendall Martinez on 10/18/2021 7:58 AM      XR CHEST PORTABLE   Final Result   Impression:   1. Cardiomegaly with improvement of passive congestive changes and better    aeration of both lungs compared to the prior study. 2. The focal bilateral alveolar consolidations consistent with infectious    pulmonary disease are unchanged. Retrocardiac consolidation and small LEFT    pleural effusion obscuring the LEFT diaphragm is unchanged. This document has been electronically signed by: Wes Mejia MD on    10/16/2021 03:18 AM      XR CHEST PORTABLE   Final Result   Somewhat low position of endotracheal tube 1.2 cm above the claire but improved aeration of the upper lungs. **This report has been created using voice recognition software. It may contain minor errors which are inherent in voice recognition technology. **      Final report electronically signed by Dr. Tyler Johnson on 10/15/2021 3:34 PM      XR CHEST PORTABLE   Final Result   1. Bilateral pneumonia. 2. Small bilateral pleural effusions. 3. Stable cardiomegaly. **This report has been created using voice recognition software. It may contain minor errors which are inherent in voice recognition technology. **      Final report electronically signed by Dr. Alex Levi on 10/15/2021 4:14 PM      XR CHEST PORTABLE   Final Result   Right lower lobe airspace infiltrates. Severe cardiomegaly. **This report has been created using voice recognition software. It may contain minor errors which are inherent in voice recognition technology. **      Final report electronically signed by Dr. Tyler Johnson on 10/11/2021 8:59 PM      XR CHEST PORTABLE   Final Result      1. Diffuse groundglass opacities in both lungs along with cardiomegaly. Findings likely suggest acute exacerbation of congestive heart failure versus pneumonia in the right clinical setting. Clinical correlation is recommended      2. Small left pleural effusion. 3. Other findings as described above. **This report has been created using voice recognition software. It may contain minor errors which are inherent in voice recognition technology. **      Final report electronically signed by Dr Marcos Santiago on 10/11/2021 11:56 AM            Objective:   Vitals: /81   Pulse 77   Temp 98.2 °F (36.8 °C) (Axillary)   Resp 16   Ht 5' (1.524 m)   Wt 138 lb 7.2 oz (62.8 kg)   SpO2 96%   BMI 27.04 kg/m²    Wt Readings from Last 3 Encounters:   11/17/21 138 lb 7.2 oz (62.8 kg)   10/07/21 125 lb (56.7 kg)      24HR INTAKE/OUTPUT:      Intake/Output Summary (Last 24 hours) at 11/18/2021 7897  Last data filed at 11/18/2021 0411  Gross per 24 hour   Intake 1984.19 ml   Output 700 ml   Net 1284.19 ml       Constitutional: Well-developed elderly lady on mechanical support   Skin:normal with no rash or lesions. HEENT:Pupils are reactive . Throat is clear . Oral mucosa is moist   Neck: Tracheostomy is noted attached to mechanical support  Cardiovascular:  S1, S2 without murmur or rubs   Respiratory:  Clear to ausculation without wheezes, rhonchi or rales in the anterior  Abdomen: Soft. Good bowel sounds. .  PEG tube is noted. Abdominal binder is noted. Ext: Trace to 1+ bilateral LE edema  Musculoskeletal:Intact  Neuro: Deferred      Electronically signed by Damaso Rizvi MD on 11/18/2021 at 6:25 AM  **This report has been created using voice recognition software. It maycontain minor  errors which are inherent in voice recognition technology. **

## 2021-11-18 NOTE — PROGRESS NOTES
CRITICAL CARE DISCHARGE SUMMARY      Patient:  Nelson Richmond    Unit/Bed:4D-13/013-A  YOB: 1934  MRN: [id number]   PCP: SERGE Davis CNP  Date of Admission: 10/11/2021  Chief Complaint:- Worsening shortness of breath    Assessment and Plan:    Acute hypoxic respiratory failure: Patient was admitted to the hospital through the ED on 10/11/2021 and started on high flow. Patient's hypoxemia progressively worsened. Intubated and admitted to ICU on 10/15. Patient failed multiple breathing trials. There was some suspicion for neuromuscular disease present dt persistent hypercarbia despite mechanical ventilatory support. Work up negative for Myasthenia Gravis. Tracheostomy tube placed 11/10. Patient currently trialing T-piece. Covid 19: Status post baricitinib, removed from covid isolation 11/16     Pneumonia:  respiratory culture positive for Corynebacterium. Vancomycin 6 days. Pneumonia panel negative, respiratory culture revealed Candida albicans. No diflucan, patient has history of QT prolongation and positive culture most likely d/t colonization. Continue to monitor. 4. ARDS: Mild. Started Decadron, Baricitinib (10/12). Trach in place. Continue vent settings. Wean as tolerated     5. Hypotension - currently off levo. Continue to monitor. 6. HFrEF: Echo (10/11): LV EF 25-30%, LV severe global hypokinesis. LV size moderate increase. Weight on admission 120 lbs. Cardiologist consult appreciated - cardiology will wait for recovery prior to considering any intervention such as LifeVest versus AICD. D/c milrinone on 11/8. Patient on bumex       Hx of Prolong QTc interval: On telemetry strip in ED. Seen by cardiogy for Prolong QTC. Plan: tele monitor, avoid QTC prolong medications, Keep K +> 4 and Mg > 2     8. New on set Afib with RVR: - since admission patient had episodes of afib without rvr. 11/10 patient developed afib with RVR.  After amio bolus given, patient sustained rate of 150s. Patient underwent synchronized cardioversion at 200 J in which patient returned to NSR rate 100. oral amiodarone. Heparin held for 48 hours due to bleeding. 9. Bleeding - 11/18 patient's hemoglobin steadily declining, today 7.3. With previous positive fecal occult, transfuse 2 units. Continue to hold heparin. 10. Bilateral lower extremity swelling: r/o DVT - US negative on 11/1. Daily Bumex 1 mg    11. HERIBERTO: Nephrology following    12. Hypokalemia: resolved     13. Encephalopathy: likely secondary to hypercapnic hypoxemia. Patient is able to awaken and track when speaking. Follows some commands. Monitor. Neuro exam when patient is extubated     15. Macrocytic anemia: chronic. H/H 8.4/28. 4. . 8. assess for signs of active bleeding necessitating infusion. Continue to monitor. 15. Hematuria: Secondary to nephrolithiasis. resolved    16. UTI - urine cultures positive for mixed growth with greater than 3 different enteric gram-negative bacillus with swarming Proteus. Most likely colonization. Remove alaniz and insert female external cath. 17. Coccyx wound: unstageable. pinxav ointment    INITIAL H AND P AND ICU COURSE:   49-year-old female presents to the ED on 10/11/2021 due to worsening shortness of breath. Patient tested positive for Covid on 10/4/2021. In the emergency room patient was found to have a SPO2 of 83% on room air with a BNP of 15.7K. Patient was admitted and managed for acute hypoxic respiratory failure secondary to Covid with combination of congestive heart failure. Patient was placed on nasal cannula, Bumex and dexamethasone in the ED. On 10/15 patient was transferred to the ICU and intubated for acute hypoxic respiratory failure on 100% FiO2 on high flow. Patient had a UTI on 10/18 and was started on ceftriaxone on 10/19. Patient was also started on milrinone for her HFrEF.   Patient's family has been consulted regarding trach and PEG tube in which they are agreeable. Cardiology is on board regarding LifeVest versus AICD. 11/5 - patient HERIBERTO improving, continue to be followed by nephro. Repeat cxr shows improving of bilateral pna. Pending pneumonia panel results. Trach and peg set for Monday. 11/6 - patient HERIBERTO improving and seen by nephro. Resume bumex. Patient otherwise stable on vent pending trach Monday. 11/7 - no acute events overnight. Pt trach planned for tomorrow. Added pepcid for GI prophylaxis      11/8 - trach scheduled today. Pt bp soft with map 63. D/t patient HFrEF, hold fluids for now. nephro on board. 11/9 - eventual trach. Requiring 3 levo over night, now currently on 1 levo. Episode of afib without rvr over night, currently nsr with ventricular bigeminy. Night team started on heparin drip for anticoagulation. Continue to monitor rate / rhythm. 11/10 - bronchoscopy and percutaneous trach placement today. Patient entered Afib with RVR. Given amio bolus. After bolus, patient sustained rate of 150s. Patient underwent synchronized cardioversion at 200J in which patient returned to NSR rate 100. Continue on amio drip, heparin drip. Continue to monitor. Patient developing fever. Hypotensive needing levo. No leukocytosis WBC 5.8. PNA panel ordered today negative. Pending resp cultures. 11/11 - removal of subclavian line, PICC line placed, resp culture and pna panel negative, gram stain shows moderate seg neutrophils with moderate GNB. Continue on amio drip. Zosyn day 2.     11/12 - Hgb noted to be 6.9 2u Ordered for transfusion. Urine culture shows mixed growth of at least 3 different enteric gram-negative bacilli including swarming Proteus species. Continue Zosyn day 3. Respiratory culture was positive for Candida albicans. Patient has history of QT prolongation. Will hold on Diflucan.     11/13 - Hgb post transfusion of 2 units PRBCs is 8.7. Heparin was hold last night in the setting of this anemia requiring transfusion. 15 pack year hx prior to cessation    ROS   Cannot obtain d/t patient sedated and on mechanical ventilation    Scheduled Meds:   sodium chloride flush  5-40 mL IntraVENous 2 times per day    lansoprazole  30 mg Per G Tube BID AC    potassium bicarb-citric acid  40 mEq Oral BID    chlorhexidine  15 mL Mouth/Throat BID    [Held by provider] OLANZapine  5 mg Oral Nightly    [Held by provider] oxyCODONE  5 mg Oral Q6H    insulin regular  10 Units IntraVENous Once    And    dextrose  25 g IntraVENous Once    LORazepam  1 mg Oral TID    amiodarone  200 mg Oral Daily    calcium replacement protocol   Other RX Placeholder    lidocaine-EPINEPHrine  20 mL IntraDERmal Once    lidocaine 1 % injection  5 mL IntraDERmal Once    bumetanide  1 mg IntraVENous Daily    lactobacillus  1 capsule Per NG tube Daily with breakfast    senna  10 mL Per NG tube Nightly    [Held by provider] metoprolol tartrate  25 mg Oral BID     Continuous Infusions:   sodium chloride      dextrose      sodium chloride      sodium chloride Stopped (11/15/21 1206)    dextrose 100 mL/hr (10/26/21 1253)    dexmedetomidine Stopped (11/15/21 1801)    sodium chloride 100 mL/hr at 11/18/21 0202       PHYSICAL EXAMINATION:  T: 98.2 F. P: 85 RR: 16 B/P: 122/68 FiO2:  30. O2 Sat:  96  I/O: 1035/350 net +684  Body mass index is 27.04 kg/m². GCS:   11  AC/PC: Rate 16, PIP 26.6, PEEP 6  Sedation: none  Drips: none     General: Sleeping 81 yo Female, easily agitated, frequently mouths words  HEENT:  normocephalic and atraumatic. No scleral icterus. PERR. Bleeding from tracheostomy site stabilized  Neck: supple. No Thyromegaly. Lungs: coarse bilateral breath sounds. No retractions  Cardiac: . No JVD. Abdomen: soft. Nontender. Extremities:  No clubbing, cyanosis, mild/moderate pitting edema x 4  Vasculature: capillary refill < 3 seconds. Palpable dorsalis pedis pulses. Skin:  warm and dry. Sacral decubitus ulcer  Psych:  Alert.  Cannot assess further, on trach Lymph:  No supraclavicular adenopathy. Neurologic: No seizures,  able to open eyes and track, mouthing words    Data: (All radiographs, tracings, PFTs, and imaging are personally viewed and interpreted unless otherwise noted). Sodium 142, potassium 4.6, chloride 106, CO2 28, BUN 29, creatinine 1.2  Glucose 78  WBC 5.2, hemoglobin 7.3, hematocrit 24.1, platelet 144  Telemetry shows NSR     Seen with multidisciplinary ICU team   Meets Continued ICU Level Care Criteria:    [] Yes   [x] No - Transfer Planned to listed location: [place]  [x] HOSPITALIST CONTACTED- DR Deana Alexander    Case and plan discussed with Dr. Jenaro Borjas    Electronically signed by Devan Xiao MD  [address]  Patient seen by me. Case discussed with resident physician. Patient transferred to [facility] facility in stable and improved condition. Italicized font represents changes to the note made by me.     Electronically signed by Genesis Dickey MD on 11/19/2021 at 7:40 AM

## 2021-11-18 NOTE — FLOWSHEET NOTE
[facility] 88 PROGRESS NOTE      Patient: Malaika Rodriguez  Room #: 3B-33/790-B            YOB: 1934  Age: 80 y.o. Gender: female            Admit Date & Time: 10/11/2021 10:28 AM    Assessment:  Bennie Albert is an 80year old female who is in bed on 4d. ICU. I met one of her daughters in the past.  No family is in the room at this time. Her nurse stated she will have a peg tube and we are waiting on a bed in [facility]. It looks like Palliative care will be consulted for her. Interventions:  Prayers for her healing and peace    Outcomes:  Not sure as she is intubated and did not talk. (her mouth was moving but there was not sound). Plan: 1. Care Plan:  Continue spiritual and emotional care for patient and family. Including prayers.      Electronically signed by Sergio Monahan on 11/18/2021 at 8:01 AM.  3 [facility] [place] [facility]  [phone]

## 2021-11-18 NOTE — PROGRESS NOTES
[address] [address]  STR ICU 4D  Occupational Therapy  Daily Note  Time:   Time In: 3978  Time Out: 1337  Timed Code Treatment Minutes: 32 Minutes  Minutes: 32    Co-tx completed with PT due to medically complex, requiring assist X2 to safely complete and would not tolerate 2 separate therapy sessions. Date: 2021  Patient Name: Rhea Teixeira,   Gender: female      [facility]: [facility]  MRN: [id number]  : 1934  (80 y.o.)  Referring Practitioner: Antonia Contreras MD  Diagnosis: acute respiratory failure with hypoxia  Additional Pertinent Hx: Per H&P:Ms. Joleen Lynch is an 72-year-old female with a past medical history of anxiety, hypertension, arthritis, and a potentially new diagnosis of CHF. She states that she was vaccinated for Covid. She tested positive for Covid on 10/4/2021. Her shortness of breath has gotten progressively worse along with her fatigue. She states that in the past week she has noticed increased shortness of breath, increased swelling in her lower limbs, increased fatigue, and increasing orthopnea. Chest x-ray shows bilateral groundglass opacities with cardiomegaly. RR on 10/12 due to elevated HR with amnio started. Restrictions/Precautions:  Restrictions/Precautions: Fall Risk, Isolation, Contact Precautions  Position Activity Restriction  Other position/activity restrictions: post COVID- Hard of hearing,- 11/10 trach      SUBJECTIVE: Pt supine in bed upon arrival, agreeable to OT/PT session. RT present to assist throughout. Pt alert throughout although does become more drowsy as fatigues. Pt able to mouth words to communicate this date. PAIN: c/o pain although unable to state where.     Vitals: Oxygen: >90% throughout; trach to vent on Spontaneous mode with PEEP of 6 and Fio2 of 40%  Heart Rate:  100s throughout  RR: 20-28 throughout    COGNITION: Slow Processing, Impaired Memory, Decreased Problem Solving, Decreased Safety Awareness and Difficulty Following Commands  Pt able to follow 50-75% of simple 1 step commands throughout session. ADL:   Grooming: Maximum Assistance.  -total assist to attempt wiping face; hand over hand assist required. Lower Extremity Dressing: Dependent. socks. BALANCE:  Sitting Balance:  Contact Guard AssistanceX1+ Minimal AssistanceX1 majority of time. Pt was able to briefly sustain with CGA X2 for ~5 seconds X2 trials. When completing more dynamic activity (I.e. reaching/kicking) required moderate assistance X1. Pt tended to have more posterior lean requiring cues to correct although was able to initiate correcting balance. Pt noted to have rounded shoulders/downward head position with cues to correct. Physical assist required to stretch/turn head laterally in addition to stretch at pec to more improved upright posture. BED MOBILITY:  Rolling to Left: Dependent, X 2    Rolling to Right: Dependent, X 2    Supine to Sit: Minimal Assistance X1+Maximum AssistanceX1;  with head of bed raised, with verbal cues , with increased time for completion    Sit to Supine: Maximum Assistance, X 2      Pt did attempt X1 occasion to pull at trach, therefore bilateral wrist restraints secured at end of session. ASSESSMENT:     Activity Tolerance:  Patient tolerance of  treatment: fair. Improved tolerance and participation compared to last session. Frequency increased this date. Discharge Recommendations: ECF with OT and LTACH (pending oxygen needs)  Equipment Recommendations:  Other: will continue to assess pending progress  Plan: Times per week: 5x  Specific instructions for Next Treatment: Functional transfers as able; ADLs and sitting balance; UE ROM exercises and endurance training; edema control  Current Treatment Recommendations: Strengthening, Balance Training, Functional Mobility Training, Endurance Training, Patient/Caregiver Education & Training, Self-Care / ADL, Home Management Training, Safety Education & Training, Cognitive Reorientation    Patient Education  Patient Education: Plan of Care, ADL's and Importance of Increasing Activity    Goals  Short term goals  Time Frame for Short term goals: by discharge  Short term goal 1: Pt will demonstrate functional transfers with OTR in prep for ADL completion while out of bed. Renzo Escalantemb term goal 2: Pt will complete upper body ADL tasks with MIN A to increase independence with self care. Short term goal 3: Pt will tolerate dynamic sitting >10 minutes with MIN A to increase her endurnace for ease of doing self care. Short term goal 4: Pt will be oriented to the day her goals and progress daily with cues as needed to increase her safety and facilitate progress. Following session, patient left in safe position with all fall risk precautions in place.

## 2021-11-19 ENCOUNTER — APPOINTMENT (OUTPATIENT)
Dept: GENERAL RADIOLOGY | Age: 86
End: 2021-11-19
Attending: INTERNAL MEDICINE
Payer: COMMERCIAL

## 2021-11-19 LAB
ALBUMIN SERPL-MCNC: 2.8 G/DL (ref 3.5–5.1)
ALP BLD-CCNC: 116 U/L (ref 38–126)
ALT SERPL-CCNC: 16 U/L (ref 11–66)
AMMONIA: 32 UMOL/L (ref 11–60)
ANION GAP SERPL CALCULATED.3IONS-SCNC: 12 MEQ/L (ref 8–16)
AST SERPL-CCNC: 62 U/L (ref 5–40)
BASOPHILS # BLD: 0.8 %
BASOPHILS ABSOLUTE: 0.1 THOU/MM3 (ref 0–0.1)
BILIRUB SERPL-MCNC: 0.5 MG/DL (ref 0.3–1.2)
BUN BLDV-MCNC: 35 MG/DL (ref 7–22)
CALCIUM SERPL-MCNC: 7.5 MG/DL (ref 8.5–10.5)
CHLORIDE BLD-SCNC: 105 MEQ/L (ref 98–111)
CO2: 27 MEQ/L (ref 23–33)
CREAT SERPL-MCNC: 1.3 MG/DL (ref 0.4–1.2)
EOSINOPHIL # BLD: 0.3 %
EOSINOPHILS ABSOLUTE: 0 THOU/MM3 (ref 0–0.4)
ERYTHROCYTE [DISTWIDTH] IN BLOOD BY AUTOMATED COUNT: 17.2 % (ref 11.5–14.5)
ERYTHROCYTE [DISTWIDTH] IN BLOOD BY AUTOMATED COUNT: 61 FL (ref 35–45)
GFR SERPL CREATININE-BSD FRML MDRD: 39 ML/MIN/1.73M2
GLUCOSE BLD-MCNC: 114 MG/DL (ref 70–108)
HCT VFR BLD CALC: 39.4 % (ref 37–47)
HEMOGLOBIN: 12.1 GM/DL (ref 12–16)
IMMATURE GRANS (ABS): 0.46 THOU/MM3 (ref 0–0.07)
IMMATURE GRANULOCYTES: 6 %
LYMPHOCYTES # BLD: 8.5 %
LYMPHOCYTES ABSOLUTE: 0.6 THOU/MM3 (ref 1–4.8)
MCH RBC QN AUTO: 30.3 PG (ref 26–33)
MCHC RBC AUTO-ENTMCNC: 30.7 GM/DL (ref 32.2–35.5)
MCV RBC AUTO: 98.7 FL (ref 81–99)
MONOCYTES # BLD: 8.6 %
MONOCYTES ABSOLUTE: 0.7 THOU/MM3 (ref 0.4–1.3)
NUCLEATED RED BLOOD CELLS: 3 /100 WBC
PLATELET # BLD: 203 THOU/MM3 (ref 130–400)
PMV BLD AUTO: 11.1 FL (ref 9.4–12.4)
POTASSIUM SERPL-SCNC: 5.4 MEQ/L (ref 3.5–5.2)
PREALBUMIN: 12.1 MG/DL (ref 20–40)
RBC # BLD: 3.99 MILL/MM3 (ref 4.2–5.4)
SEG NEUTROPHILS: 75.8 %
SEGMENTED NEUTROPHILS ABSOLUTE COUNT: 5.8 THOU/MM3 (ref 1.8–7.7)
SODIUM BLD-SCNC: 144 MEQ/L (ref 135–145)
TOTAL PROTEIN: 5.9 G/DL (ref 6.1–8)
WBC # BLD: 7.6 THOU/MM3 (ref 4.8–10.8)

## 2021-11-19 PROCEDURE — 71045 X-RAY EXAM CHEST 1 VIEW: CPT

## 2021-11-20 ENCOUNTER — APPOINTMENT (OUTPATIENT)
Dept: GENERAL RADIOLOGY | Age: 86
End: 2021-11-20
Attending: INTERNAL MEDICINE
Payer: COMMERCIAL

## 2021-11-20 LAB
ALBUMIN SERPL-MCNC: 3.1 G/DL (ref 3.5–5.1)
ALP BLD-CCNC: 120 U/L (ref 38–126)
ALT SERPL-CCNC: 13 U/L (ref 11–66)
ANION GAP SERPL CALCULATED.3IONS-SCNC: 16 MEQ/L (ref 8–16)
AST SERPL-CCNC: 53 U/L (ref 5–40)
BASOPHILS # BLD: 0.6 %
BASOPHILS ABSOLUTE: 0.1 THOU/MM3 (ref 0–0.1)
BILIRUB SERPL-MCNC: 0.4 MG/DL (ref 0.3–1.2)
BUN BLDV-MCNC: 42 MG/DL (ref 7–22)
CALCIUM SERPL-MCNC: 7.7 MG/DL (ref 8.5–10.5)
CHLORIDE BLD-SCNC: 105 MEQ/L (ref 98–111)
CO2: 24 MEQ/L (ref 23–33)
CREAT SERPL-MCNC: 1.4 MG/DL (ref 0.4–1.2)
EOSINOPHIL # BLD: 0.1 %
EOSINOPHILS ABSOLUTE: 0 THOU/MM3 (ref 0–0.4)
ERYTHROCYTE [DISTWIDTH] IN BLOOD BY AUTOMATED COUNT: 17.2 % (ref 11.5–14.5)
ERYTHROCYTE [DISTWIDTH] IN BLOOD BY AUTOMATED COUNT: 62.4 FL (ref 35–45)
GFR SERPL CREATININE-BSD FRML MDRD: 36 ML/MIN/1.73M2
GLUCOSE BLD-MCNC: 138 MG/DL (ref 70–108)
HCT VFR BLD CALC: 38.5 % (ref 37–47)
HEMOGLOBIN: 11.5 GM/DL (ref 12–16)
IMMATURE GRANS (ABS): 0.45 THOU/MM3 (ref 0–0.07)
IMMATURE GRANULOCYTES: 4.5 %
LYMPHOCYTES # BLD: 8.5 %
LYMPHOCYTES ABSOLUTE: 0.9 THOU/MM3 (ref 1–4.8)
MAGNESIUM: 1.6 MG/DL (ref 1.6–2.4)
MCH RBC QN AUTO: 30.1 PG (ref 26–33)
MCHC RBC AUTO-ENTMCNC: 29.9 GM/DL (ref 32.2–35.5)
MCV RBC AUTO: 100.8 FL (ref 81–99)
MONOCYTES # BLD: 9 %
MONOCYTES ABSOLUTE: 0.9 THOU/MM3 (ref 0.4–1.3)
NUCLEATED RED BLOOD CELLS: 4 /100 WBC
PLATELET # BLD: 191 THOU/MM3 (ref 130–400)
PMV BLD AUTO: 11 FL (ref 9.4–12.4)
POTASSIUM SERPL-SCNC: 4.6 MEQ/L (ref 3.5–5.2)
PRO-BNP: ABNORMAL PG/ML (ref 0–1800)
RBC # BLD: 3.82 MILL/MM3 (ref 4.2–5.4)
SEG NEUTROPHILS: 77.3 %
SEGMENTED NEUTROPHILS ABSOLUTE COUNT: 7.7 THOU/MM3 (ref 1.8–7.7)
SODIUM BLD-SCNC: 145 MEQ/L (ref 135–145)
TOTAL PROTEIN: 5.8 G/DL (ref 6.1–8)
WBC # BLD: 10 THOU/MM3 (ref 4.8–10.8)

## 2021-11-20 PROCEDURE — 71045 X-RAY EXAM CHEST 1 VIEW: CPT

## 2021-11-21 ENCOUNTER — APPOINTMENT (OUTPATIENT)
Dept: GENERAL RADIOLOGY | Age: 86
End: 2021-11-21
Attending: INTERNAL MEDICINE
Payer: COMMERCIAL

## 2021-11-21 LAB
ANION GAP SERPL CALCULATED.3IONS-SCNC: 12 MEQ/L (ref 8–16)
BUN BLDV-MCNC: 45 MG/DL (ref 7–22)
CALCIUM SERPL-MCNC: 8 MG/DL (ref 8.5–10.5)
CHLORIDE BLD-SCNC: 105 MEQ/L (ref 98–111)
CO2: 27 MEQ/L (ref 23–33)
CREAT SERPL-MCNC: 1.5 MG/DL (ref 0.4–1.2)
GFR SERPL CREATININE-BSD FRML MDRD: 33 ML/MIN/1.73M2
GLUCOSE BLD-MCNC: 105 MG/DL (ref 70–108)
POTASSIUM SERPL-SCNC: 3.8 MEQ/L (ref 3.5–5.2)
SODIUM BLD-SCNC: 144 MEQ/L (ref 135–145)

## 2021-11-21 PROCEDURE — 74018 RADEX ABDOMEN 1 VIEW: CPT

## 2021-11-22 LAB
ANION GAP SERPL CALCULATED.3IONS-SCNC: 11 MEQ/L (ref 8–16)
BASOPHILS # BLD: 0.2 %
BASOPHILS ABSOLUTE: 0 THOU/MM3 (ref 0–0.1)
BUN BLDV-MCNC: 48 MG/DL (ref 7–22)
CALCIUM SERPL-MCNC: 8 MG/DL (ref 8.5–10.5)
CHLORIDE BLD-SCNC: 106 MEQ/L (ref 98–111)
CO2: 30 MEQ/L (ref 23–33)
CREAT SERPL-MCNC: 1.4 MG/DL (ref 0.4–1.2)
EOSINOPHIL # BLD: 0.3 %
EOSINOPHILS ABSOLUTE: 0 THOU/MM3 (ref 0–0.4)
ERYTHROCYTE [DISTWIDTH] IN BLOOD BY AUTOMATED COUNT: 17.2 % (ref 11.5–14.5)
ERYTHROCYTE [DISTWIDTH] IN BLOOD BY AUTOMATED COUNT: 61.2 FL (ref 35–45)
GFR SERPL CREATININE-BSD FRML MDRD: 36 ML/MIN/1.73M2
GLUCOSE BLD-MCNC: 107 MG/DL (ref 70–108)
HCT VFR BLD CALC: 32.8 % (ref 37–47)
HEMOGLOBIN: 9.9 GM/DL (ref 12–16)
IMMATURE GRANS (ABS): 0.24 THOU/MM3 (ref 0–0.07)
IMMATURE GRANULOCYTES: 2.7 %
LYMPHOCYTES # BLD: 4.8 %
LYMPHOCYTES ABSOLUTE: 0.4 THOU/MM3 (ref 1–4.8)
MCH RBC QN AUTO: 30.2 PG (ref 26–33)
MCHC RBC AUTO-ENTMCNC: 30.2 GM/DL (ref 32.2–35.5)
MCV RBC AUTO: 100 FL (ref 81–99)
MONOCYTES # BLD: 8.4 %
MONOCYTES ABSOLUTE: 0.7 THOU/MM3 (ref 0.4–1.3)
NUCLEATED RED BLOOD CELLS: 1 /100 WBC
PLATELET # BLD: 139 THOU/MM3 (ref 130–400)
PMV BLD AUTO: 10.8 FL (ref 9.4–12.4)
POTASSIUM SERPL-SCNC: 3 MEQ/L (ref 3.5–5.2)
POTASSIUM SERPL-SCNC: 4.2 MEQ/L (ref 3.5–5.2)
RBC # BLD: 3.28 MILL/MM3 (ref 4.2–5.4)
SEG NEUTROPHILS: 83.6 %
SEGMENTED NEUTROPHILS ABSOLUTE COUNT: 7.4 THOU/MM3 (ref 1.8–7.7)
SODIUM BLD-SCNC: 147 MEQ/L (ref 135–145)
WBC # BLD: 8.9 THOU/MM3 (ref 4.8–10.8)

## 2021-11-22 PROCEDURE — 93307 TTE W/O DOPPLER COMPLETE: CPT

## 2021-11-23 LAB
ANION GAP SERPL CALCULATED.3IONS-SCNC: 13 MEQ/L (ref 8–16)
BUN BLDV-MCNC: 47 MG/DL (ref 7–22)
CALCIUM SERPL-MCNC: 8.6 MG/DL (ref 8.5–10.5)
CHLORIDE BLD-SCNC: 102 MEQ/L (ref 98–111)
CO2: 30 MEQ/L (ref 23–33)
CREAT SERPL-MCNC: 1.2 MG/DL (ref 0.4–1.2)
GFR SERPL CREATININE-BSD FRML MDRD: 42 ML/MIN/1.73M2
GLUCOSE BLD-MCNC: 121 MG/DL (ref 70–108)
MAGNESIUM: 1.6 MG/DL (ref 1.6–2.4)
POTASSIUM SERPL-SCNC: 3.2 MEQ/L (ref 3.5–5.2)
SODIUM BLD-SCNC: 145 MEQ/L (ref 135–145)

## 2021-11-24 LAB
ANION GAP SERPL CALCULATED.3IONS-SCNC: 13 MEQ/L (ref 8–16)
BUN BLDV-MCNC: 46 MG/DL (ref 7–22)
CALCIUM SERPL-MCNC: 9.1 MG/DL (ref 8.5–10.5)
CHLORIDE BLD-SCNC: 102 MEQ/L (ref 98–111)
CO2: 31 MEQ/L (ref 23–33)
CREAT SERPL-MCNC: 1.2 MG/DL (ref 0.4–1.2)
GFR SERPL CREATININE-BSD FRML MDRD: 42 ML/MIN/1.73M2
GLUCOSE BLD-MCNC: 106 MG/DL (ref 70–108)
MAGNESIUM: 1.6 MG/DL (ref 1.6–2.4)
PHOSPHORUS: 2 MG/DL (ref 2.4–4.7)
POTASSIUM SERPL-SCNC: 3.1 MEQ/L (ref 3.5–5.2)
SODIUM BLD-SCNC: 146 MEQ/L (ref 135–145)

## 2021-11-25 LAB
ANION GAP SERPL CALCULATED.3IONS-SCNC: 12 MEQ/L (ref 8–16)
BASOPHILS # BLD: 0.7 %
BASOPHILS ABSOLUTE: 0.1 THOU/MM3 (ref 0–0.1)
BUN BLDV-MCNC: 48 MG/DL (ref 7–22)
CALCIUM SERPL-MCNC: 9.4 MG/DL (ref 8.5–10.5)
CHLORIDE BLD-SCNC: 100 MEQ/L (ref 98–111)
CO2: 34 MEQ/L (ref 23–33)
CREAT SERPL-MCNC: 1.1 MG/DL (ref 0.4–1.2)
EOSINOPHIL # BLD: 1.9 %
EOSINOPHILS ABSOLUTE: 0.2 THOU/MM3 (ref 0–0.4)
ERYTHROCYTE [DISTWIDTH] IN BLOOD BY AUTOMATED COUNT: 17.5 % (ref 11.5–14.5)
ERYTHROCYTE [DISTWIDTH] IN BLOOD BY AUTOMATED COUNT: 62.3 FL (ref 35–45)
GFR SERPL CREATININE-BSD FRML MDRD: 47 ML/MIN/1.73M2
GLUCOSE BLD-MCNC: 138 MG/DL (ref 70–108)
HCT VFR BLD CALC: 40.1 % (ref 37–47)
HEMOGLOBIN: 11.9 GM/DL (ref 12–16)
IMMATURE GRANS (ABS): 0.19 THOU/MM3 (ref 0–0.07)
IMMATURE GRANULOCYTES: 2.3 %
LYMPHOCYTES # BLD: 9.2 %
LYMPHOCYTES ABSOLUTE: 0.8 THOU/MM3 (ref 1–4.8)
MAGNESIUM: 1.8 MG/DL (ref 1.6–2.4)
MCH RBC QN AUTO: 30 PG (ref 26–33)
MCHC RBC AUTO-ENTMCNC: 29.7 GM/DL (ref 32.2–35.5)
MCV RBC AUTO: 101 FL (ref 81–99)
MONOCYTES # BLD: 9 %
MONOCYTES ABSOLUTE: 0.8 THOU/MM3 (ref 0.4–1.3)
NUCLEATED RED BLOOD CELLS: 0 /100 WBC
PHOSPHORUS: 3.1 MG/DL (ref 2.4–4.7)
PLATELET # BLD: 176 THOU/MM3 (ref 130–400)
PMV BLD AUTO: 11.3 FL (ref 9.4–12.4)
POTASSIUM SERPL-SCNC: 3.7 MEQ/L (ref 3.5–5.2)
RBC # BLD: 3.97 MILL/MM3 (ref 4.2–5.4)
SEG NEUTROPHILS: 76.9 %
SEGMENTED NEUTROPHILS ABSOLUTE COUNT: 6.5 THOU/MM3 (ref 1.8–7.7)
SODIUM BLD-SCNC: 146 MEQ/L (ref 135–145)
WBC # BLD: 8.4 THOU/MM3 (ref 4.8–10.8)

## 2021-11-26 ENCOUNTER — APPOINTMENT (OUTPATIENT)
Dept: GENERAL RADIOLOGY | Age: 86
End: 2021-11-26
Attending: INTERNAL MEDICINE
Payer: COMMERCIAL

## 2021-11-26 LAB
ANION GAP SERPL CALCULATED.3IONS-SCNC: 10 MEQ/L (ref 8–16)
BASOPHILS # BLD: 0.5 %
BASOPHILS ABSOLUTE: 0 THOU/MM3 (ref 0–0.1)
BUN BLDV-MCNC: 49 MG/DL (ref 7–22)
CALCIUM SERPL-MCNC: 9.7 MG/DL (ref 8.5–10.5)
CHLORIDE BLD-SCNC: 99 MEQ/L (ref 98–111)
CO2: 36 MEQ/L (ref 23–33)
CREAT SERPL-MCNC: 1.1 MG/DL (ref 0.4–1.2)
EOSINOPHIL # BLD: 1.6 %
EOSINOPHILS ABSOLUTE: 0.1 THOU/MM3 (ref 0–0.4)
ERYTHROCYTE [DISTWIDTH] IN BLOOD BY AUTOMATED COUNT: 17.4 % (ref 11.5–14.5)
ERYTHROCYTE [DISTWIDTH] IN BLOOD BY AUTOMATED COUNT: 62.3 FL (ref 35–45)
GFR SERPL CREATININE-BSD FRML MDRD: 47 ML/MIN/1.73M2
GLUCOSE BLD-MCNC: 123 MG/DL (ref 70–108)
HCT VFR BLD CALC: 39.3 % (ref 37–47)
HEMOGLOBIN: 11.7 GM/DL (ref 12–16)
IMMATURE GRANS (ABS): 0.22 THOU/MM3 (ref 0–0.07)
IMMATURE GRANULOCYTES: 2.9 %
LYMPHOCYTES # BLD: 9.8 %
LYMPHOCYTES ABSOLUTE: 0.8 THOU/MM3 (ref 1–4.8)
MAGNESIUM: 1.9 MG/DL (ref 1.6–2.4)
MCH RBC QN AUTO: 30.2 PG (ref 26–33)
MCHC RBC AUTO-ENTMCNC: 29.8 GM/DL (ref 32.2–35.5)
MCV RBC AUTO: 101.6 FL (ref 81–99)
MONOCYTES # BLD: 8.5 %
MONOCYTES ABSOLUTE: 0.7 THOU/MM3 (ref 0.4–1.3)
NUCLEATED RED BLOOD CELLS: 0 /100 WBC
PLATELET # BLD: 162 THOU/MM3 (ref 130–400)
PMV BLD AUTO: 11.6 FL (ref 9.4–12.4)
POTASSIUM SERPL-SCNC: 3.6 MEQ/L (ref 3.5–5.2)
RBC # BLD: 3.87 MILL/MM3 (ref 4.2–5.4)
SEG NEUTROPHILS: 76.7 %
SEGMENTED NEUTROPHILS ABSOLUTE COUNT: 5.9 THOU/MM3 (ref 1.8–7.7)
SODIUM BLD-SCNC: 145 MEQ/L (ref 135–145)
WBC # BLD: 7.7 THOU/MM3 (ref 4.8–10.8)

## 2021-11-26 PROCEDURE — 71045 X-RAY EXAM CHEST 1 VIEW: CPT

## 2021-11-27 LAB
ANION GAP SERPL CALCULATED.3IONS-SCNC: 8 MEQ/L (ref 8–16)
BASOPHILS # BLD: 0.4 %
BASOPHILS ABSOLUTE: 0 THOU/MM3 (ref 0–0.1)
BUN BLDV-MCNC: 52 MG/DL (ref 7–22)
C DIFF TOXIN/ANTIGEN: POSITIVE
CALCIUM SERPL-MCNC: 9.4 MG/DL (ref 8.5–10.5)
CHLORIDE BLD-SCNC: 99 MEQ/L (ref 98–111)
CO2: 36 MEQ/L (ref 23–33)
CREAT SERPL-MCNC: 1 MG/DL (ref 0.4–1.2)
EOSINOPHIL # BLD: 1.5 %
EOSINOPHILS ABSOLUTE: 0.1 THOU/MM3 (ref 0–0.4)
ERYTHROCYTE [DISTWIDTH] IN BLOOD BY AUTOMATED COUNT: 17.1 % (ref 11.5–14.5)
ERYTHROCYTE [DISTWIDTH] IN BLOOD BY AUTOMATED COUNT: 59.3 FL (ref 35–45)
GFR SERPL CREATININE-BSD FRML MDRD: 52 ML/MIN/1.73M2
GLUCOSE BLD-MCNC: 108 MG/DL (ref 70–108)
HCT VFR BLD CALC: 34.4 % (ref 37–47)
HEMOGLOBIN: 10.6 GM/DL (ref 12–16)
IMMATURE GRANS (ABS): 0.29 THOU/MM3 (ref 0–0.07)
IMMATURE GRANULOCYTES: 3.9 %
LYMPHOCYTES # BLD: 7.6 %
LYMPHOCYTES ABSOLUTE: 0.6 THOU/MM3 (ref 1–4.8)
MAGNESIUM: 1.7 MG/DL (ref 1.6–2.4)
MCH RBC QN AUTO: 30.1 PG (ref 26–33)
MCHC RBC AUTO-ENTMCNC: 30.8 GM/DL (ref 32.2–35.5)
MCV RBC AUTO: 97.7 FL (ref 81–99)
MONOCYTES # BLD: 8.3 %
MONOCYTES ABSOLUTE: 0.6 THOU/MM3 (ref 0.4–1.3)
NUCLEATED RED BLOOD CELLS: 0 /100 WBC
PLATELET # BLD: 147 THOU/MM3 (ref 130–400)
PMV BLD AUTO: 11.7 FL (ref 9.4–12.4)
POTASSIUM SERPL-SCNC: 4.4 MEQ/L (ref 3.5–5.2)
RBC # BLD: 3.52 MILL/MM3 (ref 4.2–5.4)
SEG NEUTROPHILS: 78.3 %
SEGMENTED NEUTROPHILS ABSOLUTE COUNT: 5.9 THOU/MM3 (ref 1.8–7.7)
SODIUM BLD-SCNC: 143 MEQ/L (ref 135–145)
WBC # BLD: 7.5 THOU/MM3 (ref 4.8–10.8)

## 2021-11-28 LAB
ANION GAP SERPL CALCULATED.3IONS-SCNC: 9 MEQ/L (ref 8–16)
BASOPHILS # BLD: 0.8 %
BASOPHILS ABSOLUTE: 0.1 THOU/MM3 (ref 0–0.1)
BUN BLDV-MCNC: 51 MG/DL (ref 7–22)
CALCIUM SERPL-MCNC: 9.4 MG/DL (ref 8.5–10.5)
CHLORIDE BLD-SCNC: 99 MEQ/L (ref 98–111)
CO2: 36 MEQ/L (ref 23–33)
CREAT SERPL-MCNC: 1 MG/DL (ref 0.4–1.2)
EOSINOPHIL # BLD: 1.6 %
EOSINOPHILS ABSOLUTE: 0.1 THOU/MM3 (ref 0–0.4)
ERYTHROCYTE [DISTWIDTH] IN BLOOD BY AUTOMATED COUNT: 17 % (ref 11.5–14.5)
ERYTHROCYTE [DISTWIDTH] IN BLOOD BY AUTOMATED COUNT: 61 FL (ref 35–45)
GFR SERPL CREATININE-BSD FRML MDRD: 52 ML/MIN/1.73M2
GLUCOSE BLD-MCNC: 107 MG/DL (ref 70–108)
HCT VFR BLD CALC: 36.6 % (ref 37–47)
HEMOGLOBIN: 10.9 GM/DL (ref 12–16)
IMMATURE GRANS (ABS): 0.24 THOU/MM3 (ref 0–0.07)
IMMATURE GRANULOCYTES: 3 %
LYMPHOCYTES # BLD: 7 %
LYMPHOCYTES ABSOLUTE: 0.6 THOU/MM3 (ref 1–4.8)
MAGNESIUM: 1.8 MG/DL (ref 1.6–2.4)
MCH RBC QN AUTO: 29.6 PG (ref 26–33)
MCHC RBC AUTO-ENTMCNC: 29.8 GM/DL (ref 32.2–35.5)
MCV RBC AUTO: 99.5 FL (ref 81–99)
MONOCYTES # BLD: 7.6 %
MONOCYTES ABSOLUTE: 0.6 THOU/MM3 (ref 0.4–1.3)
NUCLEATED RED BLOOD CELLS: 0 /100 WBC
PLATELET # BLD: 157 THOU/MM3 (ref 130–400)
PMV BLD AUTO: 11.4 FL (ref 9.4–12.4)
POTASSIUM SERPL-SCNC: 3.5 MEQ/L (ref 3.5–5.2)
RBC # BLD: 3.68 MILL/MM3 (ref 4.2–5.4)
SEG NEUTROPHILS: 80 %
SEGMENTED NEUTROPHILS ABSOLUTE COUNT: 6.3 THOU/MM3 (ref 1.8–7.7)
SODIUM BLD-SCNC: 144 MEQ/L (ref 135–145)
WBC # BLD: 7.9 THOU/MM3 (ref 4.8–10.8)

## 2021-11-29 LAB
ANION GAP SERPL CALCULATED.3IONS-SCNC: 8 MEQ/L (ref 8–16)
BUN BLDV-MCNC: 51 MG/DL (ref 7–22)
CALCIUM SERPL-MCNC: 9.4 MG/DL (ref 8.5–10.5)
CHLORIDE BLD-SCNC: 99 MEQ/L (ref 98–111)
CO2: 37 MEQ/L (ref 23–33)
CREAT SERPL-MCNC: 1.1 MG/DL (ref 0.4–1.2)
GFR SERPL CREATININE-BSD FRML MDRD: 47 ML/MIN/1.73M2
GLUCOSE BLD-MCNC: 105 MG/DL (ref 70–108)
MAGNESIUM: 1.7 MG/DL (ref 1.6–2.4)
PHOSPHORUS: 3 MG/DL (ref 2.4–4.7)
POTASSIUM SERPL-SCNC: 3.6 MEQ/L (ref 3.5–5.2)
SODIUM BLD-SCNC: 144 MEQ/L (ref 135–145)

## 2021-11-30 LAB
ANION GAP SERPL CALCULATED.3IONS-SCNC: 8 MEQ/L (ref 8–16)
BUN BLDV-MCNC: 51 MG/DL (ref 7–22)
CALCIUM SERPL-MCNC: 9.7 MG/DL (ref 8.5–10.5)
CHLORIDE BLD-SCNC: 98 MEQ/L (ref 98–111)
CO2: 37 MEQ/L (ref 23–33)
CREAT SERPL-MCNC: 1 MG/DL (ref 0.4–1.2)
GFR SERPL CREATININE-BSD FRML MDRD: 52 ML/MIN/1.73M2
GLUCOSE BLD-MCNC: 114 MG/DL (ref 70–108)
POTASSIUM SERPL-SCNC: 4.3 MEQ/L (ref 3.5–5.2)
SODIUM BLD-SCNC: 143 MEQ/L (ref 135–145)

## 2021-12-01 LAB
ANION GAP SERPL CALCULATED.3IONS-SCNC: 12 MEQ/L (ref 8–16)
BASOPHILS # BLD: 0.6 %
BASOPHILS ABSOLUTE: 0.1 THOU/MM3 (ref 0–0.1)
BUN BLDV-MCNC: 51 MG/DL (ref 7–22)
CALCIUM SERPL-MCNC: 9.6 MG/DL (ref 8.5–10.5)
CHLORIDE BLD-SCNC: 98 MEQ/L (ref 98–111)
CO2: 34 MEQ/L (ref 23–33)
CREAT SERPL-MCNC: 1 MG/DL (ref 0.4–1.2)
EOSINOPHIL # BLD: 1.5 %
EOSINOPHILS ABSOLUTE: 0.1 THOU/MM3 (ref 0–0.4)
ERYTHROCYTE [DISTWIDTH] IN BLOOD BY AUTOMATED COUNT: 17.2 % (ref 11.5–14.5)
ERYTHROCYTE [DISTWIDTH] IN BLOOD BY AUTOMATED COUNT: 60.6 FL (ref 35–45)
GFR SERPL CREATININE-BSD FRML MDRD: 52 ML/MIN/1.73M2
GLUCOSE BLD-MCNC: 113 MG/DL (ref 70–108)
HCT VFR BLD CALC: 33.6 % (ref 37–47)
HEMOGLOBIN: 10.1 GM/DL (ref 12–16)
IMMATURE GRANS (ABS): 0.44 THOU/MM3 (ref 0–0.07)
IMMATURE GRANULOCYTES: 4.7 %
LYMPHOCYTES # BLD: 9.6 %
LYMPHOCYTES ABSOLUTE: 0.9 THOU/MM3 (ref 1–4.8)
MCH RBC QN AUTO: 29.6 PG (ref 26–33)
MCHC RBC AUTO-ENTMCNC: 30.1 GM/DL (ref 32.2–35.5)
MCV RBC AUTO: 98.5 FL (ref 81–99)
MONOCYTES # BLD: 8.2 %
MONOCYTES ABSOLUTE: 0.8 THOU/MM3 (ref 0.4–1.3)
NUCLEATED RED BLOOD CELLS: 0 /100 WBC
PLATELET # BLD: 145 THOU/MM3 (ref 130–400)
PMV BLD AUTO: 11.9 FL (ref 9.4–12.4)
POTASSIUM SERPL-SCNC: 4.6 MEQ/L (ref 3.5–5.2)
RBC # BLD: 3.41 MILL/MM3 (ref 4.2–5.4)
SEG NEUTROPHILS: 75.4 %
SEGMENTED NEUTROPHILS ABSOLUTE COUNT: 7.1 THOU/MM3 (ref 1.8–7.7)
SODIUM BLD-SCNC: 144 MEQ/L (ref 135–145)
WBC # BLD: 9.4 THOU/MM3 (ref 4.8–10.8)

## 2021-12-02 LAB
ANION GAP SERPL CALCULATED.3IONS-SCNC: 15 MEQ/L (ref 8–16)
BUN BLDV-MCNC: 55 MG/DL (ref 7–22)
CALCIUM SERPL-MCNC: 10.1 MG/DL (ref 8.5–10.5)
CHLORIDE BLD-SCNC: 98 MEQ/L (ref 98–111)
CO2: 30 MEQ/L (ref 23–33)
CREAT SERPL-MCNC: 1.1 MG/DL (ref 0.4–1.2)
GFR SERPL CREATININE-BSD FRML MDRD: 47 ML/MIN/1.73M2
GLUCOSE BLD-MCNC: 122 MG/DL (ref 70–108)
POTASSIUM SERPL-SCNC: 4.9 MEQ/L (ref 3.5–5.2)
SODIUM BLD-SCNC: 143 MEQ/L (ref 135–145)

## 2021-12-03 LAB
ANION GAP SERPL CALCULATED.3IONS-SCNC: 12 MEQ/L (ref 8–16)
BUN BLDV-MCNC: 54 MG/DL (ref 7–22)
CALCIUM IONIZED: 1.1 MMOL/L (ref 1.12–1.32)
CALCIUM SERPL-MCNC: 9.8 MG/DL (ref 8.5–10.5)
CHLORIDE BLD-SCNC: 100 MEQ/L (ref 98–111)
CO2: 31 MEQ/L (ref 23–33)
CREAT SERPL-MCNC: 1 MG/DL (ref 0.4–1.2)
GFR SERPL CREATININE-BSD FRML MDRD: 52 ML/MIN/1.73M2
GLUCOSE BLD-MCNC: 114 MG/DL (ref 70–108)
PHOSPHORUS: 3 MG/DL (ref 2.4–4.7)
POTASSIUM SERPL-SCNC: 4.2 MEQ/L (ref 3.5–5.2)
SODIUM BLD-SCNC: 143 MEQ/L (ref 135–145)

## 2021-12-04 LAB
ALBUMIN SERPL-MCNC: 2.9 G/DL (ref 3.5–5.1)
ANION GAP SERPL CALCULATED.3IONS-SCNC: 9 MEQ/L (ref 8–16)
BASOPHILS # BLD: 0.6 %
BASOPHILS ABSOLUTE: 0.1 THOU/MM3 (ref 0–0.1)
BUN BLDV-MCNC: 52 MG/DL (ref 7–22)
CALCIUM SERPL-MCNC: 9.5 MG/DL (ref 8.5–10.5)
CHLORIDE BLD-SCNC: 100 MEQ/L (ref 98–111)
CO2: 35 MEQ/L (ref 23–33)
CREAT SERPL-MCNC: 0.9 MG/DL (ref 0.4–1.2)
EOSINOPHIL # BLD: 2.4 %
EOSINOPHILS ABSOLUTE: 0.2 THOU/MM3 (ref 0–0.4)
ERYTHROCYTE [DISTWIDTH] IN BLOOD BY AUTOMATED COUNT: 16.8 % (ref 11.5–14.5)
ERYTHROCYTE [DISTWIDTH] IN BLOOD BY AUTOMATED COUNT: 59.8 FL (ref 35–45)
GFR SERPL CREATININE-BSD FRML MDRD: 59 ML/MIN/1.73M2
GLUCOSE BLD-MCNC: 100 MG/DL (ref 70–108)
HCT VFR BLD CALC: 30.8 % (ref 37–47)
HEMOGLOBIN: 9.4 GM/DL (ref 12–16)
IMMATURE GRANS (ABS): 0.35 THOU/MM3 (ref 0–0.07)
IMMATURE GRANULOCYTES: 3.8 %
LYMPHOCYTES # BLD: 10.7 %
LYMPHOCYTES ABSOLUTE: 1 THOU/MM3 (ref 1–4.8)
MCH RBC QN AUTO: 30.1 PG (ref 26–33)
MCHC RBC AUTO-ENTMCNC: 30.5 GM/DL (ref 32.2–35.5)
MCV RBC AUTO: 98.7 FL (ref 81–99)
MONOCYTES # BLD: 8.1 %
MONOCYTES ABSOLUTE: 0.8 THOU/MM3 (ref 0.4–1.3)
NUCLEATED RED BLOOD CELLS: 0 /100 WBC
PLATELET # BLD: 174 THOU/MM3 (ref 130–400)
PMV BLD AUTO: 12.2 FL (ref 9.4–12.4)
POTASSIUM SERPL-SCNC: 4 MEQ/L (ref 3.5–5.2)
PREALBUMIN: 13 MG/DL (ref 20–40)
RBC # BLD: 3.12 MILL/MM3 (ref 4.2–5.4)
SEG NEUTROPHILS: 74.4 %
SEGMENTED NEUTROPHILS ABSOLUTE COUNT: 6.9 THOU/MM3 (ref 1.8–7.7)
SODIUM BLD-SCNC: 144 MEQ/L (ref 135–145)
WBC # BLD: 9.3 THOU/MM3 (ref 4.8–10.8)

## 2021-12-05 LAB
ALBUMIN SERPL-MCNC: 2.5 G/DL (ref 3.5–5.1)
ALP BLD-CCNC: 92 U/L (ref 38–126)
ALT SERPL-CCNC: 10 U/L (ref 11–66)
ANION GAP SERPL CALCULATED.3IONS-SCNC: 9 MEQ/L (ref 8–16)
AST SERPL-CCNC: 29 U/L (ref 5–40)
BASOPHILS # BLD: 0.6 %
BASOPHILS ABSOLUTE: 0.1 THOU/MM3 (ref 0–0.1)
BILIRUB SERPL-MCNC: 0.6 MG/DL (ref 0.3–1.2)
BUN BLDV-MCNC: 50 MG/DL (ref 7–22)
CALCIUM SERPL-MCNC: 9.6 MG/DL (ref 8.5–10.5)
CHLORIDE BLD-SCNC: 98 MEQ/L (ref 98–111)
CO2: 33 MEQ/L (ref 23–33)
CREAT SERPL-MCNC: 0.9 MG/DL (ref 0.4–1.2)
EOSINOPHIL # BLD: 2 %
EOSINOPHILS ABSOLUTE: 0.2 THOU/MM3 (ref 0–0.4)
ERYTHROCYTE [DISTWIDTH] IN BLOOD BY AUTOMATED COUNT: 17 % (ref 11.5–14.5)
ERYTHROCYTE [DISTWIDTH] IN BLOOD BY AUTOMATED COUNT: 58.8 FL (ref 35–45)
GFR SERPL CREATININE-BSD FRML MDRD: 59 ML/MIN/1.73M2
GLUCOSE BLD-MCNC: 109 MG/DL (ref 70–108)
HCT VFR BLD CALC: 30.3 % (ref 37–47)
HEMOGLOBIN: 9.4 GM/DL (ref 12–16)
IMMATURE GRANS (ABS): 0.31 THOU/MM3 (ref 0–0.07)
IMMATURE GRANULOCYTES: 3.3 %
LYMPHOCYTES # BLD: 11.5 %
LYMPHOCYTES ABSOLUTE: 1.1 THOU/MM3 (ref 1–4.8)
MAGNESIUM: 1.8 MG/DL (ref 1.6–2.4)
MCH RBC QN AUTO: 30.2 PG (ref 26–33)
MCHC RBC AUTO-ENTMCNC: 31 GM/DL (ref 32.2–35.5)
MCV RBC AUTO: 97.4 FL (ref 81–99)
MONOCYTES # BLD: 7.8 %
MONOCYTES ABSOLUTE: 0.7 THOU/MM3 (ref 0.4–1.3)
NUCLEATED RED BLOOD CELLS: 0 /100 WBC
PLATELET # BLD: 185 THOU/MM3 (ref 130–400)
PMV BLD AUTO: 12 FL (ref 9.4–12.4)
POTASSIUM SERPL-SCNC: 3.6 MEQ/L (ref 3.5–5.2)
RBC # BLD: 3.11 MILL/MM3 (ref 4.2–5.4)
SEG NEUTROPHILS: 74.8 %
SEGMENTED NEUTROPHILS ABSOLUTE COUNT: 7 THOU/MM3 (ref 1.8–7.7)
SODIUM BLD-SCNC: 140 MEQ/L (ref 135–145)
TOTAL PROTEIN: 5.9 G/DL (ref 6.1–8)
WBC # BLD: 9.4 THOU/MM3 (ref 4.8–10.8)

## 2021-12-06 LAB
ALBUMIN SERPL-MCNC: 2.4 G/DL (ref 3.5–5.1)
ANION GAP SERPL CALCULATED.3IONS-SCNC: 9 MEQ/L (ref 8–16)
BASOPHILS # BLD: 0.7 %
BASOPHILS ABSOLUTE: 0.1 THOU/MM3 (ref 0–0.1)
BUN BLDV-MCNC: 48 MG/DL (ref 7–22)
CALCIUM SERPL-MCNC: 9.7 MG/DL (ref 8.5–10.5)
CHLORIDE BLD-SCNC: 97 MEQ/L (ref 98–111)
CO2: 32 MEQ/L (ref 23–33)
CREAT SERPL-MCNC: 0.9 MG/DL (ref 0.4–1.2)
EOSINOPHIL # BLD: 2 %
EOSINOPHILS ABSOLUTE: 0.2 THOU/MM3 (ref 0–0.4)
ERYTHROCYTE [DISTWIDTH] IN BLOOD BY AUTOMATED COUNT: 17 % (ref 11.5–14.5)
ERYTHROCYTE [DISTWIDTH] IN BLOOD BY AUTOMATED COUNT: 59.4 FL (ref 35–45)
GFR SERPL CREATININE-BSD FRML MDRD: 59 ML/MIN/1.73M2
GLUCOSE BLD-MCNC: 110 MG/DL (ref 70–108)
HCT VFR BLD CALC: 30.5 % (ref 37–47)
HEMOGLOBIN: 9.3 GM/DL (ref 12–16)
IMMATURE GRANS (ABS): 0.26 THOU/MM3 (ref 0–0.07)
IMMATURE GRANULOCYTES: 2.9 %
LYMPHOCYTES # BLD: 11.2 %
LYMPHOCYTES ABSOLUTE: 1 THOU/MM3 (ref 1–4.8)
MAGNESIUM: 1.9 MG/DL (ref 1.6–2.4)
MCH RBC QN AUTO: 30.1 PG (ref 26–33)
MCHC RBC AUTO-ENTMCNC: 30.5 GM/DL (ref 32.2–35.5)
MCV RBC AUTO: 98.7 FL (ref 81–99)
MONOCYTES # BLD: 7.4 %
MONOCYTES ABSOLUTE: 0.7 THOU/MM3 (ref 0.4–1.3)
NUCLEATED RED BLOOD CELLS: 0 /100 WBC
PLATELET # BLD: 188 THOU/MM3 (ref 130–400)
PMV BLD AUTO: 11.7 FL (ref 9.4–12.4)
POTASSIUM SERPL-SCNC: 3.7 MEQ/L (ref 3.5–5.2)
RBC # BLD: 3.09 MILL/MM3 (ref 4.2–5.4)
SEG NEUTROPHILS: 75.8 %
SEGMENTED NEUTROPHILS ABSOLUTE COUNT: 6.8 THOU/MM3 (ref 1.8–7.7)
SODIUM BLD-SCNC: 138 MEQ/L (ref 135–145)
WBC # BLD: 9 THOU/MM3 (ref 4.8–10.8)

## 2021-12-07 LAB
ANION GAP SERPL CALCULATED.3IONS-SCNC: 10 MEQ/L (ref 8–16)
BUN BLDV-MCNC: 45 MG/DL (ref 7–22)
CALCIUM SERPL-MCNC: 9.5 MG/DL (ref 8.5–10.5)
CHLORIDE BLD-SCNC: 97 MEQ/L (ref 98–111)
CO2: 32 MEQ/L (ref 23–33)
CREAT SERPL-MCNC: 0.8 MG/DL (ref 0.4–1.2)
GFR SERPL CREATININE-BSD FRML MDRD: 68 ML/MIN/1.73M2
GLUCOSE BLD-MCNC: 111 MG/DL (ref 70–108)
POTASSIUM SERPL-SCNC: 3.5 MEQ/L (ref 3.5–5.2)
SODIUM BLD-SCNC: 139 MEQ/L (ref 135–145)

## 2021-12-08 ENCOUNTER — APPOINTMENT (OUTPATIENT)
Dept: GENERAL RADIOLOGY | Age: 86
End: 2021-12-08
Attending: INTERNAL MEDICINE
Payer: COMMERCIAL

## 2021-12-08 PROCEDURE — 71045 X-RAY EXAM CHEST 1 VIEW: CPT

## 2021-12-11 LAB
ANION GAP SERPL CALCULATED.3IONS-SCNC: 8 MEQ/L (ref 8–16)
BUN BLDV-MCNC: 49 MG/DL (ref 7–22)
CALCIUM SERPL-MCNC: 9.6 MG/DL (ref 8.5–10.5)
CHLORIDE BLD-SCNC: 98 MEQ/L (ref 98–111)
CO2: 33 MEQ/L (ref 23–33)
CREAT SERPL-MCNC: 0.9 MG/DL (ref 0.4–1.2)
GFR SERPL CREATININE-BSD FRML MDRD: 59 ML/MIN/1.73M2
GLUCOSE BLD-MCNC: 123 MG/DL (ref 70–108)
MAGNESIUM: 2 MG/DL (ref 1.6–2.4)
PHOSPHORUS: 3.1 MG/DL (ref 2.4–4.7)
POTASSIUM SERPL-SCNC: 3 MEQ/L (ref 3.5–5.2)
SODIUM BLD-SCNC: 139 MEQ/L (ref 135–145)

## 2021-12-12 LAB
ANION GAP SERPL CALCULATED.3IONS-SCNC: 7 MEQ/L (ref 8–16)
BASOPHILS # BLD: 0.5 %
BASOPHILS ABSOLUTE: 0 THOU/MM3 (ref 0–0.1)
BUN BLDV-MCNC: 49 MG/DL (ref 7–22)
CALCIUM SERPL-MCNC: 9.8 MG/DL (ref 8.5–10.5)
CHLORIDE BLD-SCNC: 96 MEQ/L (ref 98–111)
CO2: 36 MEQ/L (ref 23–33)
CREAT SERPL-MCNC: 0.9 MG/DL (ref 0.4–1.2)
EOSINOPHIL # BLD: 2.3 %
EOSINOPHILS ABSOLUTE: 0.2 THOU/MM3 (ref 0–0.4)
ERYTHROCYTE [DISTWIDTH] IN BLOOD BY AUTOMATED COUNT: 16.8 % (ref 11.5–14.5)
ERYTHROCYTE [DISTWIDTH] IN BLOOD BY AUTOMATED COUNT: 59.9 FL (ref 35–45)
GFR SERPL CREATININE-BSD FRML MDRD: 59 ML/MIN/1.73M2
GLUCOSE BLD-MCNC: 108 MG/DL (ref 70–108)
HCT VFR BLD CALC: 28.9 % (ref 37–47)
HEMOGLOBIN: 8.6 GM/DL (ref 12–16)
IMMATURE GRANS (ABS): 0.23 THOU/MM3 (ref 0–0.07)
IMMATURE GRANULOCYTES: 2.3 %
LYMPHOCYTES # BLD: 12.4 %
LYMPHOCYTES ABSOLUTE: 1.2 THOU/MM3 (ref 1–4.8)
MAGNESIUM: 2.1 MG/DL (ref 1.6–2.4)
MCH RBC QN AUTO: 29.3 PG (ref 26–33)
MCHC RBC AUTO-ENTMCNC: 29.8 GM/DL (ref 32.2–35.5)
MCV RBC AUTO: 98.3 FL (ref 81–99)
MONOCYTES # BLD: 7.7 %
MONOCYTES ABSOLUTE: 0.8 THOU/MM3 (ref 0.4–1.3)
NUCLEATED RED BLOOD CELLS: 0 /100 WBC
PHOSPHORUS: 3 MG/DL (ref 2.4–4.7)
PLATELET # BLD: 146 THOU/MM3 (ref 130–400)
PMV BLD AUTO: 11.4 FL (ref 9.4–12.4)
POTASSIUM SERPL-SCNC: 3.8 MEQ/L (ref 3.5–5.2)
RBC # BLD: 2.94 MILL/MM3 (ref 4.2–5.4)
SEG NEUTROPHILS: 74.8 %
SEGMENTED NEUTROPHILS ABSOLUTE COUNT: 7.3 THOU/MM3 (ref 1.8–7.7)
SODIUM BLD-SCNC: 139 MEQ/L (ref 135–145)
WBC # BLD: 9.8 THOU/MM3 (ref 4.8–10.8)

## 2021-12-13 LAB
ANION GAP SERPL CALCULATED.3IONS-SCNC: 8 MEQ/L (ref 8–16)
BUN BLDV-MCNC: 45 MG/DL (ref 7–22)
CALCIUM SERPL-MCNC: 9.6 MG/DL (ref 8.5–10.5)
CHLORIDE BLD-SCNC: 99 MEQ/L (ref 98–111)
CO2: 35 MEQ/L (ref 23–33)
CREAT SERPL-MCNC: 0.9 MG/DL (ref 0.4–1.2)
GFR SERPL CREATININE-BSD FRML MDRD: 59 ML/MIN/1.73M2
GLUCOSE BLD-MCNC: 115 MG/DL (ref 70–108)
POTASSIUM SERPL-SCNC: 3.9 MEQ/L (ref 3.5–5.2)
SODIUM BLD-SCNC: 142 MEQ/L (ref 135–145)

## 2021-12-15 LAB
ANION GAP SERPL CALCULATED.3IONS-SCNC: 8 MEQ/L (ref 8–16)
BUN BLDV-MCNC: 47 MG/DL (ref 7–22)
CALCIUM SERPL-MCNC: 9.8 MG/DL (ref 8.5–10.5)
CHLORIDE BLD-SCNC: 99 MEQ/L (ref 98–111)
CO2: 35 MEQ/L (ref 23–33)
CREAT SERPL-MCNC: 0.9 MG/DL (ref 0.4–1.2)
GFR SERPL CREATININE-BSD FRML MDRD: 59 ML/MIN/1.73M2
GLUCOSE BLD-MCNC: 119 MG/DL (ref 70–108)
PHOSPHORUS: 3 MG/DL (ref 2.4–4.7)
POTASSIUM SERPL-SCNC: 3.5 MEQ/L (ref 3.5–5.2)
SODIUM BLD-SCNC: 142 MEQ/L (ref 135–145)

## 2021-12-16 LAB
ANION GAP SERPL CALCULATED.3IONS-SCNC: 8 MEQ/L (ref 8–16)
BUN BLDV-MCNC: 48 MG/DL (ref 7–22)
CALCIUM SERPL-MCNC: 9.6 MG/DL (ref 8.5–10.5)
CHLORIDE BLD-SCNC: 97 MEQ/L (ref 98–111)
CO2: 36 MEQ/L (ref 23–33)
CREAT SERPL-MCNC: 0.8 MG/DL (ref 0.4–1.2)
GFR SERPL CREATININE-BSD FRML MDRD: 68 ML/MIN/1.73M2
GLUCOSE BLD-MCNC: 108 MG/DL (ref 70–108)
POTASSIUM SERPL-SCNC: 3.6 MEQ/L (ref 3.5–5.2)
SODIUM BLD-SCNC: 141 MEQ/L (ref 135–145)

## 2021-12-18 ENCOUNTER — APPOINTMENT (OUTPATIENT)
Dept: CT IMAGING | Age: 86
DRG: 853 | End: 2021-12-18
Payer: MEDICARE

## 2021-12-18 ENCOUNTER — APPOINTMENT (OUTPATIENT)
Dept: GENERAL RADIOLOGY | Age: 86
DRG: 853 | End: 2021-12-18
Payer: MEDICARE

## 2021-12-18 ENCOUNTER — HOSPITAL ENCOUNTER (INPATIENT)
Age: 86
LOS: 11 days | Discharge: NURSING, REHAB OR LONG-TERM CARE | DRG: 853 | End: 2021-12-29
Attending: EMERGENCY MEDICINE | Admitting: INTERNAL MEDICINE
Payer: MEDICARE

## 2021-12-18 DIAGNOSIS — N39.0 URINARY TRACT INFECTION WITHOUT HEMATURIA, SITE UNSPECIFIED: ICD-10-CM

## 2021-12-18 DIAGNOSIS — A04.72 C. DIFFICILE COLITIS: ICD-10-CM

## 2021-12-18 DIAGNOSIS — Z99.11 VENTILATOR DEPENDENCE (HCC): Primary | ICD-10-CM

## 2021-12-18 DIAGNOSIS — L89.45: ICD-10-CM

## 2021-12-18 DIAGNOSIS — R41.82 ALTERED MENTAL STATUS, UNSPECIFIED ALTERED MENTAL STATUS TYPE: ICD-10-CM

## 2021-12-18 PROBLEM — J96.10 CHRONIC RESPIRATORY FAILURE (HCC): Status: ACTIVE | Noted: 2021-12-18

## 2021-12-18 PROBLEM — I50.9 CHF (CONGESTIVE HEART FAILURE) (HCC): Status: ACTIVE | Noted: 2021-12-18

## 2021-12-18 PROBLEM — I42.9 CARDIOMYOPATHY (HCC): Status: ACTIVE | Noted: 2021-12-18

## 2021-12-18 PROBLEM — E87.5 HYPERKALEMIA: Status: ACTIVE | Noted: 2021-12-18

## 2021-12-18 LAB
ALBUMIN SERPL-MCNC: 3 G/DL (ref 3.5–5.1)
ALLEN TEST: ABNORMAL
ALP BLD-CCNC: 168 U/L (ref 38–126)
ALT SERPL-CCNC: 19 U/L (ref 11–66)
AMORPHOUS: ABNORMAL
ANION GAP SERPL CALCULATED.3IONS-SCNC: 11 MEQ/L (ref 8–16)
ANISOCYTOSIS: PRESENT
AST SERPL-CCNC: 51 U/L (ref 5–40)
BACTERIA: ABNORMAL
BASE EXCESS (CALCULATED): 9.8 MMOL/L (ref -2.5–2.5)
BASOPHILIC STIPPLING: ABNORMAL
BASOPHILS # BLD: 0.4 %
BASOPHILS ABSOLUTE: 0.1 THOU/MM3 (ref 0–0.1)
BILIRUB SERPL-MCNC: 0.5 MG/DL (ref 0.3–1.2)
BILIRUBIN URINE: NEGATIVE
BLOOD, URINE: ABNORMAL
BUN BLDV-MCNC: 62 MG/DL (ref 7–22)
C DIFF TOXIN/ANTIGEN: POSITIVE
CALCIUM SERPL-MCNC: 10.6 MG/DL (ref 8.5–10.5)
CASTS: ABNORMAL /LPF
CASTS: ABNORMAL /LPF
CHARACTER, URINE: ABNORMAL
CHLORIDE BLD-SCNC: 99 MEQ/L (ref 98–111)
CO2: 34 MEQ/L (ref 23–33)
COLLECTED BY:: ABNORMAL
COLOR: YELLOW
CREAT SERPL-MCNC: 1.1 MG/DL (ref 0.4–1.2)
CRYSTALS: ABNORMAL
CRYSTALS: ABNORMAL
DEVICE: ABNORMAL
EOSINOPHIL # BLD: 0 %
EOSINOPHILS ABSOLUTE: 0 THOU/MM3 (ref 0–0.4)
EPITHELIAL CELLS, UA: ABNORMAL /HPF
ERYTHROCYTE [DISTWIDTH] IN BLOOD BY AUTOMATED COUNT: 16.7 % (ref 11.5–14.5)
ERYTHROCYTE [DISTWIDTH] IN BLOOD BY AUTOMATED COUNT: 62.4 FL (ref 35–45)
GFR SERPL CREATININE-BSD FRML MDRD: 47 ML/MIN/1.73M2
GLUCOSE BLD-MCNC: 103 MG/DL (ref 70–108)
GLUCOSE BLD-MCNC: 97 MG/DL (ref 70–108)
GLUCOSE, URINE: NEGATIVE MG/DL
HCO3: 37 MMOL/L (ref 23–28)
HCT VFR BLD CALC: 32.2 % (ref 37–47)
HEMOGLOBIN: 9.3 GM/DL (ref 12–16)
HYPOCHROMIA: PRESENT
IFIO2: 6
IMMATURE GRANS (ABS): 0.39 THOU/MM3 (ref 0–0.07)
IMMATURE GRANULOCYTES: 2.8 %
INR BLD: 2.06 (ref 0.85–1.13)
KETONES, URINE: NEGATIVE
LACTIC ACID, SEPSIS: 1.2 MMOL/L (ref 0.5–1.9)
LACTIC ACID, SEPSIS: 1.6 MMOL/L (ref 0.5–1.9)
LACTIC ACID, SEPSIS: 2.2 MMOL/L (ref 0.5–1.9)
LACTIC ACID, SEPSIS: 2.7 MMOL/L (ref 0.5–1.9)
LEUKOCYTE EST, POC: ABNORMAL
LYMPHOCYTES # BLD: 3.7 %
LYMPHOCYTES ABSOLUTE: 0.5 THOU/MM3 (ref 1–4.8)
MCH RBC QN AUTO: 29.6 PG (ref 26–33)
MCHC RBC AUTO-ENTMCNC: 28.9 GM/DL (ref 32.2–35.5)
MCV RBC AUTO: 102.5 FL (ref 81–99)
MISCELLANEOUS LAB TEST RESULT: ABNORMAL
MISCELLANEOUS LAB TEST RESULT: ABNORMAL
MONOCYTES # BLD: 2.3 %
MONOCYTES ABSOLUTE: 0.3 THOU/MM3 (ref 0.4–1.3)
NITRITE, URINE: NEGATIVE
NUCLEATED RED BLOOD CELLS: 0 /100 WBC
O2 SATURATION: 90 %
OSMOLALITY CALCULATION: 304.7 MOSMOL/KG (ref 275–300)
PCO2: 62 MMHG (ref 35–45)
PH BLOOD GAS: 7.38 (ref 7.35–7.45)
PH UA: 7 (ref 5–9)
PLATELET # BLD: 227 THOU/MM3 (ref 130–400)
PMV BLD AUTO: 10.5 FL (ref 9.4–12.4)
PO2: 62 MMHG (ref 71–104)
POTASSIUM REFLEX MAGNESIUM: 4.7 MEQ/L (ref 3.5–5.2)
POTASSIUM REFLEX MAGNESIUM: 5.6 MEQ/L (ref 3.5–5.2)
POTASSIUM SERPL-SCNC: 4.1 MEQ/L (ref 3.5–5.2)
PRO-BNP: ABNORMAL PG/ML (ref 0–1800)
PROTEIN UA: 100 MG/DL
RBC # BLD: 3.14 MILL/MM3 (ref 4.2–5.4)
RBC URINE: ABNORMAL /HPF
REASON FOR REJECTION: NORMAL
REJECTED TEST: NORMAL
RENAL EPITHELIAL, UA: ABNORMAL
ROULEAUX: SLIGHT
SCAN OF BLOOD SMEAR: NORMAL
SEG NEUTROPHILS: 90.8 %
SEGMENTED NEUTROPHILS ABSOLUTE COUNT: 12.5 THOU/MM3 (ref 1.8–7.7)
SET PEEP: 5 MMHG
SET RESPIRATORY RATE: 18 BPM
SET TIDAL VOLUME: 320 ML
SODIUM BLD-SCNC: 144 MEQ/L (ref 135–145)
SOURCE, BLOOD GAS: ABNORMAL
SPECIFIC GRAVITY UA: 1.02 (ref 1–1.03)
TOTAL PROTEIN: 8.1 G/DL (ref 6.1–8)
TOXIC GRANULATION: PRESENT
TROPONIN T: 0.02 NG/ML
UROBILINOGEN, URINE: 0.2 EU/DL (ref 0–1)
WBC # BLD: 13.8 THOU/MM3 (ref 4.8–10.8)
WBC UA: ABNORMAL /HPF
YEAST: ABNORMAL

## 2021-12-18 PROCEDURE — 2700000000 HC OXYGEN THERAPY PER DAY

## 2021-12-18 PROCEDURE — 2060000000 HC ICU INTERMEDIATE R&B

## 2021-12-18 PROCEDURE — 87147 CULTURE TYPE IMMUNOLOGIC: CPT

## 2021-12-18 PROCEDURE — 2580000003 HC RX 258: Performed by: EMERGENCY MEDICINE

## 2021-12-18 PROCEDURE — 5A1955Z RESPIRATORY VENTILATION, GREATER THAN 96 CONSECUTIVE HOURS: ICD-10-PCS | Performed by: INTERNAL MEDICINE

## 2021-12-18 PROCEDURE — 87449 NOS EACH ORGANISM AG IA: CPT

## 2021-12-18 PROCEDURE — 87186 SC STD MICRODIL/AGAR DIL: CPT

## 2021-12-18 PROCEDURE — 85610 PROTHROMBIN TIME: CPT

## 2021-12-18 PROCEDURE — 82803 BLOOD GASES ANY COMBINATION: CPT

## 2021-12-18 PROCEDURE — 6360000002 HC RX W HCPCS: Performed by: EMERGENCY MEDICINE

## 2021-12-18 PROCEDURE — 87086 URINE CULTURE/COLONY COUNT: CPT

## 2021-12-18 PROCEDURE — 82948 REAGENT STRIP/BLOOD GLUCOSE: CPT

## 2021-12-18 PROCEDURE — 84484 ASSAY OF TROPONIN QUANT: CPT

## 2021-12-18 PROCEDURE — 70450 CT HEAD/BRAIN W/O DYE: CPT

## 2021-12-18 PROCEDURE — 71045 X-RAY EXAM CHEST 1 VIEW: CPT

## 2021-12-18 PROCEDURE — 87045 FECES CULTURE AEROBIC BACT: CPT

## 2021-12-18 PROCEDURE — 87427 SHIGA-LIKE TOXIN AG IA: CPT

## 2021-12-18 PROCEDURE — 93005 ELECTROCARDIOGRAM TRACING: CPT | Performed by: EMERGENCY MEDICINE

## 2021-12-18 PROCEDURE — 80053 COMPREHEN METABOLIC PANEL: CPT

## 2021-12-18 PROCEDURE — 83605 ASSAY OF LACTIC ACID: CPT

## 2021-12-18 PROCEDURE — 84132 ASSAY OF SERUM POTASSIUM: CPT

## 2021-12-18 PROCEDURE — 96365 THER/PROPH/DIAG IV INF INIT: CPT

## 2021-12-18 PROCEDURE — 94002 VENT MGMT INPAT INIT DAY: CPT

## 2021-12-18 PROCEDURE — 81001 URINALYSIS AUTO W/SCOPE: CPT

## 2021-12-18 PROCEDURE — 36600 WITHDRAWAL OF ARTERIAL BLOOD: CPT

## 2021-12-18 PROCEDURE — 36415 COLL VENOUS BLD VENIPUNCTURE: CPT

## 2021-12-18 PROCEDURE — 87801 DETECT AGNT MULT DNA AMPLI: CPT

## 2021-12-18 PROCEDURE — 85025 COMPLETE CBC W/AUTO DIFF WBC: CPT

## 2021-12-18 PROCEDURE — 83880 ASSAY OF NATRIURETIC PEPTIDE: CPT

## 2021-12-18 PROCEDURE — 99285 EMERGENCY DEPT VISIT HI MDM: CPT

## 2021-12-18 PROCEDURE — 87040 BLOOD CULTURE FOR BACTERIA: CPT

## 2021-12-18 PROCEDURE — 87077 CULTURE AEROBIC IDENTIFY: CPT

## 2021-12-18 RX ORDER — METOPROLOL TARTRATE 50 MG/1
50 TABLET, FILM COATED ORAL 2 TIMES DAILY
Status: DISCONTINUED | OUTPATIENT
Start: 2021-12-18 | End: 2021-12-25

## 2021-12-18 RX ORDER — SODIUM CHLORIDE 0.9 % (FLUSH) 0.9 %
5-40 SYRINGE (ML) INJECTION EVERY 12 HOURS SCHEDULED
Status: DISCONTINUED | OUTPATIENT
Start: 2021-12-18 | End: 2021-12-29 | Stop reason: HOSPADM

## 2021-12-18 RX ORDER — SODIUM CHLORIDE 9 MG/ML
25 INJECTION, SOLUTION INTRAVENOUS PRN
Status: DISCONTINUED | OUTPATIENT
Start: 2021-12-18 | End: 2021-12-29 | Stop reason: HOSPADM

## 2021-12-18 RX ORDER — CETIRIZINE HYDROCHLORIDE 10 MG/1
10 TABLET ORAL DAILY
Status: DISCONTINUED | OUTPATIENT
Start: 2021-12-19 | End: 2021-12-29 | Stop reason: HOSPADM

## 2021-12-18 RX ORDER — ACETAMINOPHEN 650 MG/1
650 SUPPOSITORY RECTAL EVERY 6 HOURS PRN
Status: DISCONTINUED | OUTPATIENT
Start: 2021-12-18 | End: 2021-12-29 | Stop reason: HOSPADM

## 2021-12-18 RX ORDER — 0.9 % SODIUM CHLORIDE 0.9 %
1000 INTRAVENOUS SOLUTION INTRAVENOUS ONCE
Status: COMPLETED | OUTPATIENT
Start: 2021-12-18 | End: 2021-12-18

## 2021-12-18 RX ORDER — SODIUM CHLORIDE 0.9 % (FLUSH) 0.9 %
5-40 SYRINGE (ML) INJECTION PRN
Status: DISCONTINUED | OUTPATIENT
Start: 2021-12-18 | End: 2021-12-29 | Stop reason: HOSPADM

## 2021-12-18 RX ORDER — ACETAMINOPHEN 325 MG/1
650 TABLET ORAL EVERY 6 HOURS PRN
Status: DISCONTINUED | OUTPATIENT
Start: 2021-12-18 | End: 2021-12-29 | Stop reason: HOSPADM

## 2021-12-18 RX ORDER — LORAZEPAM 2 MG/ML
1 INJECTION INTRAMUSCULAR ONCE
Status: DISCONTINUED | OUTPATIENT
Start: 2021-12-18 | End: 2021-12-28 | Stop reason: ALTCHOICE

## 2021-12-18 RX ORDER — ASPIRIN 325 MG
325 TABLET ORAL DAILY
Status: DISCONTINUED | OUTPATIENT
Start: 2021-12-19 | End: 2021-12-29 | Stop reason: HOSPADM

## 2021-12-18 RX ORDER — ONDANSETRON 4 MG/1
4 TABLET, ORALLY DISINTEGRATING ORAL EVERY 8 HOURS PRN
Status: DISCONTINUED | OUTPATIENT
Start: 2021-12-18 | End: 2021-12-29 | Stop reason: HOSPADM

## 2021-12-18 RX ORDER — LORAZEPAM 2 MG/ML
INJECTION INTRAMUSCULAR
Status: DISPENSED
Start: 2021-12-18 | End: 2021-12-19

## 2021-12-18 RX ORDER — ONDANSETRON 2 MG/ML
4 INJECTION INTRAMUSCULAR; INTRAVENOUS EVERY 6 HOURS PRN
Status: DISCONTINUED | OUTPATIENT
Start: 2021-12-18 | End: 2021-12-29 | Stop reason: HOSPADM

## 2021-12-18 RX ORDER — FUROSEMIDE 10 MG/ML
20 INJECTION INTRAMUSCULAR; INTRAVENOUS EVERY 8 HOURS
Status: DISCONTINUED | OUTPATIENT
Start: 2021-12-18 | End: 2021-12-25

## 2021-12-18 RX ADMIN — CEFTRIAXONE SODIUM 1000 MG: 1 INJECTION, POWDER, FOR SOLUTION INTRAMUSCULAR; INTRAVENOUS at 19:09

## 2021-12-18 RX ADMIN — SODIUM CHLORIDE 1000 ML: 9 INJECTION, SOLUTION INTRAVENOUS at 19:10

## 2021-12-18 ASSESSMENT — PULMONARY FUNCTION TESTS
PIF_VALUE: 23
PIF_VALUE: 25.4

## 2021-12-18 NOTE — ED NOTES
Patient resting in bed. Respirations easy and unlabored. No distress noted. Call light within reach.        Ezekiel Gandhi RN  12/18/21 4997

## 2021-12-18 NOTE — PROGRESS NOTES
The transport originated from ER. Pt. was transported to CT scan. Assisting with the transport was Sulma BANSAL. A defibrillator was brought along on transport. Appropriate devices were applied to monitor the patient's condition during transport. A transport backpack was brought on transport, to be used in case of emergency. Patient transported  via 6 liters/min via ventilator. Patient tolerated procedure well.    PT left on vent for CT scan and then transported back to ER and remains on vent as ordered

## 2021-12-18 NOTE — ED NOTES
POCT 80, family states the pt has not had tube feeding since yesterday per NH nurse, they forgot to feed pt last night and today     Kavya Mata RN  12/18/21 1491

## 2021-12-18 NOTE — ED NOTES
Patient resting in bed. Respirations easy and unlabored. No distress noted. Call light within reach.        Alicia Lazaro RN  12/18/21 3420

## 2021-12-18 NOTE — ED PROVIDER NOTES
Sign out received from Dr. Stuart Huertas. Pt presents to the ER from [facility] for decreased responsiveness, ams. Pt has been vent dependent, trach placed 11/10 this year. She reportedly was awake and alert and communicative yesterday, but today was difficult to arouse. She has also been having increased diarrhea. Upon my exam, patient is awake and alert (family reports she still seems more confused than she was yesterday), moving all extremities equally, able to follow simple commands. Breathing comfortably on vent. Will add IVF despite elevated bnp given elevated lactic acid, diarrhea, leukocytosis, clinical appearance of dehydration, recheck lactic and K. Will admit. I spoke with Dr. Piedad Bellamy, accepts admit. Diagnosis: C Diff Colitis, UTI, elevated lactic acid, ventilator dependent respiratory failure  Dispo:  Suyapa Maynard MD  12/18/21 9183

## 2021-12-18 NOTE — ED PROVIDER NOTES
Burak Gerardo 13 COMPLAINT       Chief Complaint   Patient presents with    Altered Mental Status       Nurses Notes reviewed and I agree except as noted in the HPI. HISTORY OF PRESENT ILLNESS    Juhi Harris is a 80 y.o. female. This patient was sent in from an extended care facility. She had a prolonged stay at [facility] following Covid. She has a tracheostomy and a feeding tube but nonetheless the family states that she is normally communicative. They described her as being unresponsive today. Is not communicating at all. She has remained on a ventilator at the nursing home where she has been for the last day or so. They are also concerned that she may have recontracted C. difficile which she has been treated for previously  As there is been increased diarrhea. They are also concerned for possible urinary infection. No falls or injuries reported. REVIEW OF SYSTEMS         Review of systems are not obtainable from this patient due to altered mental status    Remainder of review of systems is otherwise reviewed as negative. PAST MEDICAL HISTORY    has a past medical history of Anxiety, Arthritis, Bronchitis, Diverticulitis of colon, and Hypertension. SURGICAL HISTORY      has a past surgical history that includes Appendectomy; Cholecystectomy; and Gastrostomy tube placement (N/A, 11/17/2021).     CURRENT MEDICATIONS       Previous Medications    ASPIRIN 325 MG TABLET    Take 325 mg by mouth daily    CETIRIZINE (ZYRTEC) 10 MG TABLET    TAKE 1 TABLET BY MOUTH ONCE DAILY    FEXOFENADINE-PSEUDOEPHEDRINE (ALLEGRA-D 12 HOUR PO)    Take by mouth    GLUCOSAMINE-CHONDROITIN PO    Take by mouth    HAWTHORN PO    Take 425 mg by mouth daily    HYDROCHLOROTHIAZIDE (HYDRODIURIL) 12.5 MG TABLET    Take 12.5 mg by mouth daily     METOPROLOL TARTRATE (LOPRESSOR) 50 MG TABLET    Take 1 tablet by mouth 2 times daily    NONFORMULARY    Focus Abnormal; Notable for the following components:    Est, Glom Filt Rate 47 (*)     All other components within normal limits   OSMOLALITY - Abnormal; Notable for the following components:    Osmolality Calc 304.7 (*)     All other components within normal limits   CULTURE, BLOOD 1   CULTURE, STOOL   ANION GAP   SCAN OF BLOOD SMEAR   POCT GLUCOSE       EMERGENCY DEPARTMENT COURSE:   Vitals:    Vitals:    12/18/21 1328 12/18/21 1402 12/18/21 1425 12/18/21 1551   BP:  (!) 151/77  110/68   Pulse: 68 81  71   Resp: 18 18 15 19   Temp:  98.6 °F (37 °C)  98.6 °F (37 °C)   TempSrc:  Axillary  Oral   SpO2: 98% 100% 97% 100%   Weight:       Height:         The family is pretty insistent this is a big change from yesterday about yesterday the patient was communicative and today she is not. She only really response to painful stimuli. She continues to be on the ventilator. She does not appear to have any evidence of CO2 narcosis. We are getting a CT of the head right now the results of which are still pending. She does have a urinary tract infection which we can start treating and she is also tested positive for C. difficile today. I am anticipating hospital admission. Case has been checked out to the next physician.         (Please note that portions of this note were completed with a voice recognition program.  Efforts were made to edit the dictations but occasionally words are mis-transcribed.)    Rand Altman, [address] [place] [address],   12/18/21 6032

## 2021-12-19 ENCOUNTER — APPOINTMENT (OUTPATIENT)
Dept: GENERAL RADIOLOGY | Age: 86
DRG: 853 | End: 2021-12-19
Payer: MEDICARE

## 2021-12-19 LAB
ACINETOBACTER BAUMANNII FILM ARRAY: NOT DETECTED
ACINETOBACTER BAUMANNII FILM ARRAY: NOT DETECTED
ANION GAP SERPL CALCULATED.3IONS-SCNC: 12 MEQ/L (ref 8–16)
BASOPHILS # BLD: 0.2 %
BASOPHILS ABSOLUTE: 0 THOU/MM3 (ref 0–0.1)
BOTTLE TYPE: ABNORMAL
BOTTLE TYPE: ABNORMAL
BUN BLDV-MCNC: 62 MG/DL (ref 7–22)
CALCIUM SERPL-MCNC: 9.6 MG/DL (ref 8.5–10.5)
CANDIDA ALBICANS FILM ARRAY: NOT DETECTED
CANDIDA ALBICANS FILM ARRAY: NOT DETECTED
CANDIDA GLABRATA FILM ARRAY: NOT DETECTED
CANDIDA GLABRATA FILM ARRAY: NOT DETECTED
CANDIDA KRUSEI FILM ARRAY: NOT DETECTED
CANDIDA KRUSEI FILM ARRAY: NOT DETECTED
CANDIDA PARAPSILOSIS FILM ARRAY: NOT DETECTED
CANDIDA PARAPSILOSIS FILM ARRAY: NOT DETECTED
CANDIDA TROPICALIS FILM ARRAY: NOT DETECTED
CANDIDA TROPICALIS FILM ARRAY: NOT DETECTED
CARBAPENEM RESITANT FILM ARRAY: ABNORMAL
CARBAPENEM RESITANT FILM ARRAY: ABNORMAL
CHLORIDE BLD-SCNC: 103 MEQ/L (ref 98–111)
CO2: 34 MEQ/L (ref 23–33)
CREAT SERPL-MCNC: 1 MG/DL (ref 0.4–1.2)
EKG ATRIAL RATE: 82 BPM
EKG P AXIS: 66 DEGREES
EKG P-R INTERVAL: 182 MS
EKG Q-T INTERVAL: 430 MS
EKG QRS DURATION: 158 MS
EKG QTC CALCULATION (BAZETT): 502 MS
EKG R AXIS: -47 DEGREES
EKG T AXIS: 82 DEGREES
EKG VENTRICULAR RATE: 82 BPM
ENTERBACTER CLOACAE FILM ARRAY: NOT DETECTED
ENTERBACTER CLOACAE FILM ARRAY: NOT DETECTED
ENTERBACTERIACEAE FILM ARRAY: NOT DETECTED
ENTERBACTERIACEAE FILM ARRAY: NOT DETECTED
ENTEROCOCCUS FILM ARRAY: NOT DETECTED
ENTEROCOCCUS FILM ARRAY: NOT DETECTED
EOSINOPHIL # BLD: 0 %
EOSINOPHILS ABSOLUTE: 0 THOU/MM3 (ref 0–0.4)
ERYTHROCYTE [DISTWIDTH] IN BLOOD BY AUTOMATED COUNT: 16.4 % (ref 11.5–14.5)
ERYTHROCYTE [DISTWIDTH] IN BLOOD BY AUTOMATED COUNT: 59.3 FL (ref 35–45)
ESCHERICHIA COLI FILM ARRAY: NOT DETECTED
ESCHERICHIA COLI FILM ARRAY: NOT DETECTED
GFR SERPL CREATININE-BSD FRML MDRD: 52 ML/MIN/1.73M2
GLUCOSE BLD-MCNC: 102 MG/DL (ref 70–108)
GLUCOSE BLD-MCNC: 84 MG/DL (ref 70–108)
GLUCOSE BLD-MCNC: 85 MG/DL (ref 70–108)
HAEMOPHILUS INFLUENZA FILM ARRAY: NOT DETECTED
HAEMOPHILUS INFLUENZA FILM ARRAY: NOT DETECTED
HCT VFR BLD CALC: 27.3 % (ref 37–47)
HEMOGLOBIN: 8.1 GM/DL (ref 12–16)
IMMATURE GRANS (ABS): 0.22 THOU/MM3 (ref 0–0.07)
IMMATURE GRANULOCYTES: 1.6 %
INR BLD: 2.16 (ref 0.85–1.13)
KLEBSIELLA OXYTOCA FILM ARRAY: NOT DETECTED
KLEBSIELLA OXYTOCA FILM ARRAY: NOT DETECTED
KLEBSIELLA PNEUMONIAE FILM ARRAY: NOT DETECTED
KLEBSIELLA PNEUMONIAE FILM ARRAY: NOT DETECTED
LACTIC ACID: 1 MMOL/L (ref 0.5–2)
LISTERIA MONOCYTOGENES FILM ARRAY: NOT DETECTED
LISTERIA MONOCYTOGENES FILM ARRAY: NOT DETECTED
LYMPHOCYTES # BLD: 5.2 %
LYMPHOCYTES ABSOLUTE: 0.7 THOU/MM3 (ref 1–4.8)
MCH RBC QN AUTO: 29.3 PG (ref 26–33)
MCHC RBC AUTO-ENTMCNC: 29.7 GM/DL (ref 32.2–35.5)
MCV RBC AUTO: 98.9 FL (ref 81–99)
METHICILLIN RESISTANT FILM ARRAY: DETECTED
METHICILLIN RESISTANT FILM ARRAY: DETECTED
MONOCYTES # BLD: 4.7 %
MONOCYTES ABSOLUTE: 0.7 THOU/MM3 (ref 0.4–1.3)
MRSA SCREEN RT-PCR: NEGATIVE
NEISSERIA MENIGITIDIS FILM ARRAY: NOT DETECTED
NEISSERIA MENIGITIDIS FILM ARRAY: NOT DETECTED
NUCLEATED RED BLOOD CELLS: 0 /100 WBC
OSMOLALITY CALCULATION: 312.9 MOSMOL/KG (ref 275–300)
PLATELET # BLD: 234 THOU/MM3 (ref 130–400)
PMV BLD AUTO: 10.7 FL (ref 9.4–12.4)
POTASSIUM SERPL-SCNC: 3.7 MEQ/L (ref 3.5–5.2)
PROTEUS FILM ARRAY: NOT DETECTED
PROTEUS FILM ARRAY: NOT DETECTED
PSEUDOMONAS AERUGINOSA FILM ARRAY: NOT DETECTED
PSEUDOMONAS AERUGINOSA FILM ARRAY: NOT DETECTED
RBC # BLD: 2.76 MILL/MM3 (ref 4.2–5.4)
SEG NEUTROPHILS: 88.3 %
SEGMENTED NEUTROPHILS ABSOLUTE COUNT: 12.5 THOU/MM3 (ref 1.8–7.7)
SERRATIA MARCESCENS FILM ARRAY: NOT DETECTED
SERRATIA MARCESCENS FILM ARRAY: NOT DETECTED
SODIUM BLD-SCNC: 149 MEQ/L (ref 135–145)
SOURCE OF BLOOD CULTURE: ABNORMAL
SOURCE OF BLOOD CULTURE: ABNORMAL
STAPH AUREUS FILM ARRAY: NOT DETECTED
STAPH AUREUS FILM ARRAY: NOT DETECTED
STAPHYLOCOCCUS FILM ARRAY: DETECTED
STAPHYLOCOCCUS FILM ARRAY: DETECTED
STREP AGALACTIAE FILM ARRAY: NOT DETECTED
STREP AGALACTIAE FILM ARRAY: NOT DETECTED
STREP PNEUMONIAE FILM ARRAY: NOT DETECTED
STREP PNEUMONIAE FILM ARRAY: NOT DETECTED
STREP PYOCGENES FILM ARRAY: NOT DETECTED
STREP PYOCGENES FILM ARRAY: NOT DETECTED
STREPTOCOCCUS FILM ARRAY: NOT DETECTED
STREPTOCOCCUS FILM ARRAY: NOT DETECTED
TROPONIN T: 0.02 NG/ML
VANCOMYCIN RESISTANT FILM ARRAY: ABNORMAL
VANCOMYCIN RESISTANT FILM ARRAY: ABNORMAL
WBC # BLD: 14.1 THOU/MM3 (ref 4.8–10.8)

## 2021-12-19 PROCEDURE — 6370000000 HC RX 637 (ALT 250 FOR IP): Performed by: INTERNAL MEDICINE

## 2021-12-19 PROCEDURE — 71045 X-RAY EXAM CHEST 1 VIEW: CPT

## 2021-12-19 PROCEDURE — 87641 MR-STAPH DNA AMP PROBE: CPT

## 2021-12-19 PROCEDURE — 6360000002 HC RX W HCPCS: Performed by: INTERNAL MEDICINE

## 2021-12-19 PROCEDURE — 94761 N-INVAS EAR/PLS OXIMETRY MLT: CPT

## 2021-12-19 PROCEDURE — 84484 ASSAY OF TROPONIN QUANT: CPT

## 2021-12-19 PROCEDURE — 6360000002 HC RX W HCPCS

## 2021-12-19 PROCEDURE — 83605 ASSAY OF LACTIC ACID: CPT

## 2021-12-19 PROCEDURE — 80048 BASIC METABOLIC PNL TOTAL CA: CPT

## 2021-12-19 PROCEDURE — 99221 1ST HOSP IP/OBS SF/LOW 40: CPT | Performed by: INTERNAL MEDICINE

## 2021-12-19 PROCEDURE — 2060000000 HC ICU INTERMEDIATE R&B

## 2021-12-19 PROCEDURE — 2580000003 HC RX 258: Performed by: INTERNAL MEDICINE

## 2021-12-19 PROCEDURE — 85025 COMPLETE CBC W/AUTO DIFF WBC: CPT

## 2021-12-19 PROCEDURE — 99223 1ST HOSP IP/OBS HIGH 75: CPT | Performed by: INTERNAL MEDICINE

## 2021-12-19 PROCEDURE — 2700000000 HC OXYGEN THERAPY PER DAY

## 2021-12-19 PROCEDURE — 36415 COLL VENOUS BLD VENIPUNCTURE: CPT

## 2021-12-19 PROCEDURE — 85610 PROTHROMBIN TIME: CPT

## 2021-12-19 PROCEDURE — 82948 REAGENT STRIP/BLOOD GLUCOSE: CPT

## 2021-12-19 PROCEDURE — 94003 VENT MGMT INPAT SUBQ DAY: CPT

## 2021-12-19 RX ORDER — MORPHINE SULFATE 2 MG/ML
INJECTION, SOLUTION INTRAMUSCULAR; INTRAVENOUS
Status: COMPLETED
Start: 2021-12-19 | End: 2021-12-19

## 2021-12-19 RX ORDER — MORPHINE SULFATE 2 MG/ML
2 INJECTION, SOLUTION INTRAMUSCULAR; INTRAVENOUS EVERY 4 HOURS PRN
Status: DISCONTINUED | OUTPATIENT
Start: 2021-12-19 | End: 2021-12-29 | Stop reason: HOSPADM

## 2021-12-19 RX ORDER — DIAZEPAM 5 MG/ML
2.5 INJECTION, SOLUTION INTRAMUSCULAR; INTRAVENOUS EVERY 6 HOURS PRN
Status: DISCONTINUED | OUTPATIENT
Start: 2021-12-19 | End: 2021-12-29 | Stop reason: HOSPADM

## 2021-12-19 RX ADMIN — METOPROLOL TARTRATE 50 MG: 50 TABLET, FILM COATED ORAL at 09:39

## 2021-12-19 RX ADMIN — Medication 125 MG: at 06:20

## 2021-12-19 RX ADMIN — Medication 125 MG: at 18:50

## 2021-12-19 RX ADMIN — MORPHINE SULFATE 2 MG: 2 INJECTION, SOLUTION INTRAMUSCULAR; INTRAVENOUS at 16:42

## 2021-12-19 RX ADMIN — Medication 125 MG: at 13:19

## 2021-12-19 RX ADMIN — SODIUM CHLORIDE, PRESERVATIVE FREE 10 ML: 5 INJECTION INTRAVENOUS at 22:15

## 2021-12-19 RX ADMIN — SODIUM CHLORIDE, PRESERVATIVE FREE 10 ML: 5 INJECTION INTRAVENOUS at 09:39

## 2021-12-19 RX ADMIN — MORPHINE SULFATE 2 MG: 2 INJECTION, SOLUTION INTRAMUSCULAR; INTRAVENOUS at 22:40

## 2021-12-19 RX ADMIN — METOPROLOL TARTRATE 50 MG: 50 TABLET, FILM COATED ORAL at 22:14

## 2021-12-19 RX ADMIN — FUROSEMIDE 20 MG: 10 INJECTION, SOLUTION INTRAMUSCULAR; INTRAVENOUS at 00:17

## 2021-12-19 RX ADMIN — CEFTRIAXONE SODIUM 1000 MG: 1 INJECTION, POWDER, FOR SOLUTION INTRAMUSCULAR; INTRAVENOUS at 19:24

## 2021-12-19 RX ADMIN — APIXABAN 2.5 MG: 2.5 TABLET, FILM COATED ORAL at 22:14

## 2021-12-19 RX ADMIN — APIXABAN 2.5 MG: 2.5 TABLET, FILM COATED ORAL at 09:39

## 2021-12-19 RX ADMIN — CETIRIZINE HYDROCHLORIDE 10 MG: 10 TABLET, FILM COATED ORAL at 09:39

## 2021-12-19 RX ADMIN — METOPROLOL TARTRATE 50 MG: 50 TABLET, FILM COATED ORAL at 00:17

## 2021-12-19 RX ADMIN — Medication 125 MG: at 00:17

## 2021-12-19 RX ADMIN — ASPIRIN 325 MG: 325 TABLET ORAL at 09:44

## 2021-12-19 RX ADMIN — ACETAMINOPHEN 650 MG: 325 TABLET ORAL at 13:19

## 2021-12-19 ASSESSMENT — PAIN SCALES - GENERAL
PAINLEVEL_OUTOF10: 6
PAINLEVEL_OUTOF10: 3
PAINLEVEL_OUTOF10: 0
PAINLEVEL_OUTOF10: 0
PAINLEVEL_OUTOF10: 4

## 2021-12-19 ASSESSMENT — PAIN DESCRIPTION - PAIN TYPE: TYPE: ACUTE PAIN

## 2021-12-19 ASSESSMENT — PULMONARY FUNCTION TESTS
PIF_VALUE: 28
PIF_VALUE: 34.9
PIF_VALUE: 28.3
PIF_VALUE: 24.7
PIF_VALUE: 37.1
PIF_VALUE: 37

## 2021-12-19 ASSESSMENT — PAIN DESCRIPTION - PROGRESSION: CLINICAL_PROGRESSION: NOT CHANGED

## 2021-12-19 ASSESSMENT — PAIN - FUNCTIONAL ASSESSMENT: PAIN_FUNCTIONAL_ASSESSMENT: ACTIVITIES ARE NOT PREVENTED

## 2021-12-19 ASSESSMENT — PAIN DESCRIPTION - LOCATION: LOCATION: BUTTOCKS

## 2021-12-19 ASSESSMENT — PAIN DESCRIPTION - ONSET: ONSET: ON-GOING

## 2021-12-19 ASSESSMENT — PAIN DESCRIPTION - ORIENTATION: ORIENTATION: MID

## 2021-12-19 ASSESSMENT — PAIN DESCRIPTION - DESCRIPTORS: DESCRIPTORS: ACHING;SHARP;PRESSURE

## 2021-12-19 ASSESSMENT — PAIN DESCRIPTION - FREQUENCY: FREQUENCY: CONTINUOUS

## 2021-12-19 NOTE — CONSULTS
[facility] [address] [place] [address]  Cardiology Consult        Patient:  Josseline Paul  YOB: 1934  MRN: [id number]     Acct: [de-identified]    PCP: Karina Armijo MD    Date of Admission: 12/18/2021      Reason for Consultation: Elevated troponins      History Of Present Illness:    80 y.o. pleasant female with history of cardiomyopathy with ejection fraction of 25%, hypertension, atrial fibrillation, recent COVID-19 pneumonia, ARDS who was sent from long-term care [facility] for decreased responsiveness and altered mentation. Patient apparently has been having increased diarrhea. Patient was recently in ICU for COVID-19 pneumonia and her course was complicated with ARDS and was unable to be weaned off the ventilator, subsequently requiring tracheostomy, managed in [facility], also had HERIBERTO and protein calorie malnutrition, C. difficile colitis and GI bleed. On this admission her troponins are mildly elevated with a peak of 0.023. Troponins have been chronically elevated since October 2021. EKGs show sinus rhythm with right bundle branch block and left anterior fascicular block. Echocardiogram on 11/22/2021 showed ejection fraction of 25% with LV size moderately increased. Her H&H is 8.1/27    Past Medical History:          Diagnosis Date    Anxiety     Arthritis     Bronchitis     Diverticulitis of colon     Hypertension        Past Surgical History:          Procedure Laterality Date    APPENDECTOMY      CHOLECYSTECTOMY      GASTROSTOMY TUBE PLACEMENT N/A 11/17/2021    EGD PEG TUBE PLACEMENT performed by Gregory Reece MD at [facility]       Medications Prior to Admission:      Prior to Admission medications    Medication Sig Start Date End Date Taking?  Authorizing Provider   hydroCHLOROthiazide (HYDRODIURIL) 12.5 MG tablet Take 12.5 mg by mouth daily  9/10/21   Historical Provider, MD   NONFORMULARY Focus Factor    Historical MD MARCK Santos Historical Provider, MD   GLUCOSAMINE-CHONDROITIN PO Take by mouth    Historical Provider, MD   cetirizine (ZYRTEC) 10 MG tablet TAKE 1 TABLET BY MOUTH ONCE DAILY 9/16/21   Historical Provider, MD   HAWTHORN PO Take 425 mg by mouth daily    Historical Provider, MD   NONFORMULARY Allerplex    Historical Provider, MD   OLIVE LEAF EXTRACT PO Take by mouth    Historical Provider, MD   aspirin 325 MG tablet Take 325 mg by mouth daily    Historical Provider, MD   Fexofenadine-Pseudoephedrine (ALLEGRA-D 12 HOUR PO) Take by mouth    Historical Provider, MD   metoprolol tartrate (LOPRESSOR) 50 MG tablet Take 1 tablet by mouth 2 times daily 10/7/21   Brook Sena MD       Current Facility-Administered Medications   Medication Dose Route Frequency Provider Last Rate Last Admin    apixaban (ELIQUIS) tablet 2.5 mg  2.5 mg Oral BID Marshall Harden MD   2.5 mg at 12/19/21 0939    vancomycin (VANCOCIN) oral solution 125 mg  125 mg Per G Tube 4 times per day Marshall Hadren MD   125 mg at 12/19/21 0620    LORazepam (ATIVAN) injection 1 mg  1 mg IntraVENous Once Hans Randhawa MD        aspirin tablet 325 mg  325 mg Oral Daily Marshall Harden MD   325 mg at 12/19/21 0944    cetirizine (ZYRTEC) tablet 10 mg  10 mg Oral Daily Marshall Harden MD   10 mg at 12/19/21 0939    metoprolol tartrate (LOPRESSOR) tablet 50 mg  50 mg Oral BID Marshall Harden MD   50 mg at 12/19/21 0939    sodium chloride flush 0.9 % injection 5-40 mL  5-40 mL IntraVENous 2 times per day Marshall Harden MD   10 mL at 12/19/21 0939    sodium chloride flush 0.9 % injection 5-40 mL  5-40 mL IntraVENous PRN Marshall Harden MD        0.9 % sodium chloride infusion  25 mL IntraVENous PRN Marshall Harden MD        acetaminophen (TYLENOL) tablet 650 mg  650 mg Oral Q6H PRN Marshall Harden MD        Or    acetaminophen (TYLENOL) suppository 650 mg  650 mg Rectal Q6H PRN MD Shannon Fitzpatrick.Harry ondansetron (ZOFRAN-ODT) disintegrating tablet 4 mg  4 mg Oral Q8H PRN Denny Finley MD        Or    ondansetron (ZOFRAN) injection 4 mg  4 mg IntraVENous Q6H PRN Denny Finley MD        cefTRIAXone (ROCEPHIN) 1000 mg IVPB in 50 mL D5W minibag  1,000 mg IntraVENous Q24H Denny Finley MD        [Held by provider] furosemide (LASIX) injection 20 mg  20 mg IntraVENous Q8H Denny Finley MD   20 mg at 12/19/21 0017       Allergies:  Sulfa antibiotics, Gluten meal, Metronidazole, and Milk-related compounds    Social History:    TOBACCO:   reports that she quit smoking about 44 years ago. Her smoking use included cigarettes. She has a 15.00 pack-year smoking history. She has never used smokeless tobacco.  ETOH:   reports no history of alcohol use. Family History:    History reviewed. No pertinent family history. Review of Systems -   Unable to obtain    All others reviewed and are negative.        BP (!) 119/53   Pulse 73   Temp 97.3 °F (36.3 °C) (Axillary)   Resp 18   Ht 5' (1.524 m)   Wt 128 lb (58.1 kg)   SpO2 96%   BMI 25.00 kg/m²       Physical Examination:   General appearance -trach on vent  Mental status -trach on vent  Neck - supple, no significant adenopathy, no JVD, or carotid bruits  Chest - clear to auscultation, no wheezes, rales or rhonchi, symmetric air entry  Heart - normal rate, regular rhythm, normal S1, S2, no murmurs, rubs, clicks or gallops  Abdomen - soft, nontender, nondistended, no masses or organomegaly  Neurological - no focal findings or movement disorder noted  Musculoskeletal - no joint tenderness, deformity or swelling  Extremities - peripheral pulses normal, no pedal edema, no clubbing or cyanosis  Skin - normal coloration and turgor, no rashes, no suspicious skin lesions noted      LABS:    Recent Labs     12/19/21  0105 12/19/21  0504 12/19/21  0905   TROPONINT 0.018* 0.021* 0.023*     CBC:   Lab Results   Component Value Date    WBC 14.1 12/19/2021    RBC 2.76 12/19/2021    RBC 4.02 08/10/2011    HGB 8.1 12/19/2021    HCT 27.3 12/19/2021    MCV 98.9 12/19/2021    MCH 29.3 12/19/2021    MCHC 29.7 12/19/2021    RDW 14.2 07/18/2019     12/19/2021    MPV 10.7 12/19/2021     BMP:    Lab Results   Component Value Date     12/19/2021    K 3.7 12/19/2021    K 4.7 12/18/2021     12/19/2021    CO2 34 12/19/2021    BUN 62 12/19/2021    LABALBU 3.0 12/18/2021    LABALBU 3.9 08/10/2011    CREATININE 1.0 12/19/2021    CALCIUM 9.6 12/19/2021    GFRAA >60 07/18/2019    LABGLOM 52 12/19/2021    GLUCOSE 84 12/19/2021    GLUCOSE 118 08/10/2011     Hepatic Function Panel:    Lab Results   Component Value Date    ALKPHOS 168 12/18/2021    ALT 19 12/18/2021    AST 51 12/18/2021    PROT 8.1 12/18/2021    BILITOT 0.5 12/18/2021    BILIDIR <0.2 11/08/2021    LABALBU 3.0 12/18/2021    LABALBU 3.9 08/10/2011     Magnesium:    Lab Results   Component Value Date    MG 2.1 12/12/2021     Warfarin PT/INR:  No components found for: PTPATWAR, PTINRWAR  HgBA1c:  No results found for: LABA1C  FLP:    Lab Results   Component Value Date    TRIG 102 10/24/2021    HDL 47 07/18/2019     TSH:    Lab Results   Component Value Date    TSH 2.910 11/08/2021     BNP: No components found for: PRO-BNP      Assessment/Plan:    Patient Active Problem List   Diagnosis    Sigmoid diverticulitis    Acute respiratory failure with hypoxia (HCC)    COVID-19    Acute congestive heart failure (HCC)    C. difficile colitis    [facility]est[facility]) [facility]    Cardiomyopathy [facility]    UTI (urinary tract infection)    Hyperkalemia    Chronic respiratory failure (HCC)     Briefly this is an unfortunate 51-year-old female with history of cardiomyopathy with ejection fraction of 25%, atrial fibrillation, hypertension, recent TOHAY-66 pneumonia complicated by ARDS and had a prolonged course in the hospital requiring tracheostomy and ventilation.   She also had GI bleed, HERIBERTO, C. difficile colitis. Has been evaluated by GI team and was left on Eliquis. She was sent to the hospital for altered mentation and diarrhea. She has chronically elevated troponins since October 2021. Telemetry  Patient is currently being retreated with vancomycin for recurrent C. difficile colitis. Suggest to treat mildly elevated troponins medically  Continue aspirin, beta-blocker, Eliquis  Vent management per primary care and pulmonology team  Lasix on hold due to hypernatremia  No further cardiac work-up at present  Monitor H&H  Keep potassium around 4 magnesium around 2  Continue rest of management      Please do note hesitate to contact me for any further questions. Thank you for the opportunity to be involved in this patient's care.     Code Status: Full Code    Electronically signed by Elaine Lezama MD on 12/19/2021 at 11:50 AM

## 2021-12-19 NOTE — CONSULTS
[facility]    Patient - Ben Dinh   MRN -  [id number]   [facility] # - [de-identified]   - 1934      Date of Admission -  2021  1:15 PM  Date of evaluation -  2021  Room - -001-A   Hospital Day - [address] Funes MD Primary Care Physician - Cailin Smith MD   Chief Complaint   Acute on chronic hypoxic respiratory failure  Altered mental status  Active Hospital Problem List      Active Hospital Problems    Diagnosis Date Noted    C. difficile colitis [A04.72] 2021    CHF (congestive heart failure) ([address] [I50.9] 2021    Cardiomyopathy ([address] [I42.9] 2021    UTI (urinary tract infection) [N39.0] 2021    Hyperkalemia [E87.5] 2021    Chronic respiratory failure ([facility] [J96.10] 2021     HPI   Ben Dinh is a 80 y.o. female with a past medical history of chronic hypoxic respiratory failures status post tracheostomy on chronic mechanical ventilation secondary to recent Covid 19 pneumonia with ARDS, cardiomyopathy with ejection fraction 25%, hypertension, atrial fibrillation, acute kidney injury, C. difficile colitis and GI bleed. Patient was discharged from the intensive care unit to long-term acute care facility/[facility] where she completed her therapy but was unable to wean off the ventilator and was later discharged to a chronic vent facility on . She was sent back from the chronic vent facility due to altered mental status and agitation. Pulmonary consulted for ventilator management. She is currently on [facility] with a PEEP of 5 and FiO2 30%. Secretions are moderate.   X-ray showed worsened left lower lobe atelectasis/effusion  Past Medical History         Diagnosis Date    Anxiety     Arthritis     Bronchitis     Diverticulitis of colon     Hypertension       Past Surgical History           Procedure Laterality Date    APPENDECTOMY      CHOLECYSTECTOMY      GASTROSTOMY TUBE Current or Ex-Partner: Not on file    Emotionally Abused: Not on file    Physically Abused: Not on file    Sexually Abused: Not on file   Housing Stability:     Unable to Pay for Housing in the Last Year: Not on file    Number of Places Lived in the Last Year: Not on file    Unstable Housing in the Last Year: Not on file     Family History    History reviewed. No pertinent family history. Sleep History    No History  ROS    Unobtainable due to trach and vent  Meds    Current Medications    apixaban  2.5 mg Oral BID    vancomycin  125 mg Per G Tube 4 times per day    LORazepam  1 mg IntraVENous Once    aspirin  325 mg Oral Daily    cetirizine  10 mg Oral Daily    metoprolol tartrate  50 mg Oral BID    sodium chloride flush  5-40 mL IntraVENous 2 times per day    cefTRIAXone (ROCEPHIN) IV  1,000 mg IntraVENous Q24H    [Held by provider] furosemide  20 mg IntraVENous Q8H     sodium chloride flush, sodium chloride, acetaminophen **OR** acetaminophen, ondansetron **OR** ondansetron  IV Drips/Infusions   sodium chloride       Vitals    Vitals    height is 5' (1.524 m) and weight is 128 lb (58.1 kg). Her axillary temperature is 97.3 °F (36.3 °C). Her blood pressure is 119/53 (abnormal) and her pulse is 100. Her respiration is 24 and oxygen saturation is 96%. O2 Flow Rate (L/min): 6 L/min  I/O    Intake/Output Summary (Last 24 hours) at 12/19/2021 1548  Last data filed at 12/19/2021 1200  Gross per 24 hour   Intake 1770.67 ml   Output 1950 ml   Net -179.33 ml     Patient Vitals for the past 96 hrs (Last 3 readings):   Weight   12/18/21 1315 128 lb (58.1 kg)     Exam   Constitutional: Patient appears chronically ill, agitated  Head: Normocephalic and atraumatic. Mouth/Throat: Oropharynx is clear and moist.  No oral thrush. Eyes: Conjunctivae are normal. Pupils are equal, round, and reactive to light. No scleral icterus.    Neck: Trach in place  Cardiovascular: Regular rate, regular rhythm, S1 and S2 with no murmur. No peripheral edema  Pulmonary/Chest: Minutes breath sounds bilateral with diffuse rhonchi  Abdominal: Soft. Bowel sounds audible. 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 Neurological: Patient is confused and agitated, she occasionally respond to commands  Skin: Skin is warm and dry. Labs   ABG  Lab Results   Component Value Date    PH 7.38 12/18/2021    PO2 62 12/18/2021    PCO2 62 12/18/2021    HCO3 37 12/18/2021    O2SAT 90 12/18/2021     Lab Results   Component Value Date    IFIO2 6 12/18/2021    MODE PC 10/30/2021    SETTIDVOL 320 12/18/2021    SETPEEP 5.0 12/18/2021     CBC  Recent Labs     12/18/21  1433 12/19/21  0504   WBC 13.8* 14.1*   RBC 3.14* 2.76*   HGB 9.3* 8.1*   HCT 32.2* 27.3*   .5* 98.9   MCH 29.6 29.3   MCHC 28.9* 29.7*    234   MPV 10.5 10.7      BMP  Recent Labs     12/18/21  1403 12/18/21  1403 12/18/21  1911 12/18/21  2205 12/19/21  0504     --   --   --  149*   K 5.6*   < > 4.7 4.1 3.7   CL 99  --   --   --  103   CO2 34*  --   --   --  34*   BUN 62*  --   --   --  62*   CREATININE 1.1  --   --   --  1.0   GLUCOSE 103  --   --   --  84   CALCIUM 10.6*  --   --   --  9.6    < > = values in this interval not displayed. LFT  Recent Labs     12/18/21  1403   AST 51*   ALT 19   BILITOT 0.5   ALKPHOS 168*     TROP  Lab Results   Component Value Date    TROPONINT 0.023 12/19/2021    TROPONINT 0.021 12/19/2021    TROPONINT 0.018 12/19/2021     BNP  Lab Results   Component Value Date    PROBNP 82957.0 12/18/2021    PROBNP > 40649.0 11/20/2021    PROBNP 57944.0 11/01/2021     D-Dimer  No results found for: DDIMER  Lactic Acid  Recent Labs     12/19/21  0504   LACTA 1.0     INR  Recent Labs     12/18/21  1403 12/19/21  0504   INR 2.06* 2.16*     PTT  No results for input(s): APTT in the last 72 hours.   Glucose  Recent Labs     12/18/21  1356 12/19/21  0133 12/19/21  0609   POCGLU 97 85 102     UA   Recent Labs     12/18/21  1415   SPECGRAV 1.017   PHUR 7.0   COLORU YELLOW   PROTEINU 100*   BLOODU LARGE*   RBCUA 3-5   WBCUA 25-50   BACTERIA MANY   NITRU NEGATIVE   BILIRUBINUR NEGATIVE   UROBILINOGEN 0.2   KETUA NEGATIVE   LABCAST 8-15 C. GRAN  NONE SEEN   . PFTs   No records  Echo     Summary   Ejection fraction is visually estimated at 25%. There was severe global hypokinesis of the left ventricle. Left Ventricular size is Moderately increased . The aortic valve leaflets were not well visualized. Aortic valve appears tricuspid. Thickened aortic valve leaflets noted. Aortic valve leaflets are Moderately calcified. Cultures    Procalcitonin  Lab Results   Component Value Date    PROCAL 0.21 11/01/2021    PROCAL 0.24 10/29/2021    PROCAL 0.28 10/26/2021       Radiology    CXR    CT Scans    (See actual reports for details)    Assessment   Altered mental status/metabolic encephalopathy -multifactorial   Acute on chronic hypoxic respiratory failure on mechanical ventilation  Left-sided atelectasis suspect due to mucous plug/pleural effusion  Severe acute critical illness weakness/deconditioning  Cardiomyopathy with ejection fraction 25%, atrial fibrillation  Recent COVID-19 pneumonia with ARDS  Recent HERIBERTO hyperkalemia  History of GI bleeding  Recommendations     Continue current mechanical ventilation, unable to wean due to weakness  Patient will most likely require bronchoscopy and possible thoracentesis for left-sided atelectasis and effusion. We need to discuss with the family about overall prognosis and doing such interventions that may not help the overall prognosis on the long-term  Continue supportive care, family asked about comfort regarding agitation and pain  Suggest palliative care consult    Thank you for the consult and allowing us to participate in the care of your patient.      Case discussed with nurse and patient/family. Questions and concerns addressed. Meds and Orders reviewed.     Electronically signed by     Carmen Guaman MD on 12/19/2021 at 3:48 PM

## 2021-12-19 NOTE — H&P
Anxiety     Arthritis     Bronchitis     Diverticulitis of colon     Hypertension        Past Surgical History:        Procedure Laterality Date    APPENDECTOMY      CHOLECYSTECTOMY      GASTROSTOMY TUBE PLACEMENT N/A 11/17/2021    EGD PEG TUBE PLACEMENT performed by Nayely Sanders MD at [facility]       Home Medications:   No current facility-administered medications on file prior to encounter. Current Outpatient Medications on File Prior to Encounter   Medication Sig Dispense Refill    hydroCHLOROthiazide (HYDRODIURIL) 12.5 MG tablet Take 12.5 mg by mouth daily       NONFORMULARY Focus Factor      NONFORMULARY Drenamin      GLUCOSAMINE-CHONDROITIN PO Take by mouth      cetirizine (ZYRTEC) 10 MG tablet TAKE 1 TABLET BY MOUTH ONCE DAILY      HAWTHORN PO Take 425 mg by mouth daily      NONFORMULARY Allerplex      OLIVE LEAF EXTRACT PO Take by mouth      aspirin 325 MG tablet Take 325 mg by mouth daily      Fexofenadine-Pseudoephedrine (ALLEGRA-D 12 HOUR PO) Take by mouth      metoprolol tartrate (LOPRESSOR) 50 MG tablet Take 1 tablet by mouth 2 times daily 180 tablet 3       Allergies:  Sulfa antibiotics, Gluten meal, Metronidazole, and Milk-related compounds    Social History:    reports that she quit smoking about 44 years ago. Her smoking use included cigarettes. She has a 15.00 pack-year smoking history. She has never used smokeless tobacco. She reports that she does not drink alcohol and does not use drugs. Family History:   History reviewed. No pertinent family history. Review of systems:    Unobtainable as the patient is on the ventilator        Vitals:   Vitals:    12/19/21 0419   BP:    Pulse: 80   Resp: 18   Temp:    SpO2: 95%      BMI: Body mass index is 25 kg/m². PHYSICAL EXAMINATION:            General appearance:  No apparent distress, appears stated age and cooperative. HEENT:  Normal cephalic, atraumatic without obvious deformity.  Pupils equal, round, and reactive to light. Extra ocular muscles intact. Conjunctivae/corneas clear. Neck: Supple, tracheostomy in place connected to the ventilator  Respiratory:   diminished entry bibasilarly  Cardiovascular:  Regular rhythm with normal S1/S2 without rubs or gallops. Abdomen:  Full, peg tube in place,soft, non-tender, non-distended with normal bowel sounds. Musculoskeletal:  No clubbing, cyanosis  or edema bilaterally .    Neurologic: Alert and able to follow simple commands    Review of Labs and Diagnostic Testing:    Recent Results (from the past 24 hour(s))   POCT Glucose    Collection Time: 12/18/21  1:56 PM   Result Value Ref Range    POC Glucose 97 70 - 108 mg/dl   Clostridium Difficile Toxin/Antigen    Collection Time: 12/18/21  2:00 PM    Specimen: Stool   Result Value Ref Range    C.diff Toxin/Antigen POSITIVE (A)    Comprehensive Metabolic Panel w/ Reflex to MG    Collection Time: 12/18/21  2:03 PM   Result Value Ref Range    Glucose 103 70 - 108 mg/dL    CREATININE 1.1 0.4 - 1.2 mg/dL    BUN 62 (H) 7 - 22 mg/dL    Sodium 144 135 - 145 meq/L    Potassium reflex Magnesium 5.6 (H) 3.5 - 5.2 meq/L    Chloride 99 98 - 111 meq/L    CO2 34 (H) 23 - 33 meq/L    Calcium 10.6 (H) 8.5 - 10.5 mg/dL    AST 51 (H) 5 - 40 U/L    Alkaline Phosphatase 168 (H) 38 - 126 U/L    Total Protein 8.1 (H) 6.1 - 8.0 g/dL    Albumin 3.0 (L) 3.5 - 5.1 g/dL    Total Bilirubin 0.5 0.3 - 1.2 mg/dL    ALT 19 11 - 66 U/L   Troponin    Collection Time: 12/18/21  2:03 PM   Result Value Ref Range    Troponin T 0.017 (A) ng/ml   Brain Natriuretic Peptide    Collection Time: 12/18/21  2:03 PM   Result Value Ref Range    Pro-BNP 19986.0 (H) 0.0 - 1800.0 pg/mL   Protime-INR    Collection Time: 12/18/21  2:03 PM   Result Value Ref Range    INR 2.06 (H) 0.85 - 1.13   Lactate, Sepsis    Collection Time: 12/18/21  2:03 PM   Result Value Ref Range    Lactic Acid, Sepsis 2.2 (H) 0.5 - 1.9 mmol/L   Anion Gap    Collection Time: 12/18/21  2:03 PM   Result Value Ref Range    Anion Gap 11.0 8.0 - 16.0 meq/L   Glomerular Filtration Rate, Estimated    Collection Time: 12/18/21  2:03 PM   Result Value Ref Range    Est, Glom Filt Rate 47 (A) ml/min/1.73m2   Osmolality    Collection Time: 12/18/21  2:03 PM   Result Value Ref Range    Osmolality Calc 304.7 (H) 275.0 - 300.0 mOsmol/kg   Culture, Blood 1    Collection Time: 12/18/21  2:15 PM    Specimen: Blood   Result Value Ref Range    Blood Culture, Routine No growth-preliminary     Microscopic Urinalysis    Collection Time: 12/18/21  2:15 PM   Result Value Ref Range    Glucose, Urine NEGATIVE NEGATIVE mg/dl    Bilirubin Urine NEGATIVE NEGATIVE    Ketones, Urine NEGATIVE NEGATIVE    Specific Gravity, UA 1.017 1.002 - 1.030    Blood, Urine LARGE (A) NEGATIVE    pH, UA 7.0 5.0 - 9.0    Protein,  (A) NEGATIVE mg/dl    Urobilinogen, Urine 0.2 0.0 - 1.0 eu/dl    Nitrite, Urine NEGATIVE NEGATIVE    Leukocytes, UA LARGE (A) NEGATIVE    Color, UA YELLOW YELLOW-STRAW    Character, Urine CLOUDY (A) [id number]    RBC, UA 3-5 0-2/hpf /hpf    WBC, UA 25-50 0-4/hpf /hpf    Epithelial Cells, UA 3-5 3-5/hpf /hpf    Amorphous, UA URATES NONE SEEN    Bacteria, UA MANY FEW/NONE SEEN    Casts 8-15 C. GRAN NONE SEEN /lpf    Crystals TRIPLE PHOS NONE SEEN    Renal Epithelial, UA NONE SEEN NONE SEEN    Yeast, UA FEW NONE SEEN    Miscellaneous Lab Test Result see below     Casts NONE SEEN /lpf    Crystals OXALATE     Miscellaneous Lab Test Result NONE SEEN    Blood gas, arterial    Collection Time: 12/18/21  2:27 PM   Result Value Ref Range    pH, Blood Gas 7.38 7.35 - 7.45    PCO2 62 (H) 35 - 45 mmhg    PO2 62 (L) 71 - 104 mmhg    HCO3 37 (H) 23 - 28 mmol/l    Base Excess (Calculated) 9.8 (H) -2.5 - 2.5 mmol/l    O2 Sat 90 %    IFIO2 6     DEVICE Adult Vent     SET RESPIRATORY RATE 18 bpm    SET TIDAL VOLUME 320 ml    SET PEEP 5.0 mmhg    Levon Test N/A     Source: L Brach     COLLECTED BY: [id number]    CBC Auto Differential    Collection Time: 12/18/21  2:33 PM   Result Value Ref Range    WBC 13.8 (H) 4.8 - 10.8 thou/mm3    RBC 3.14 (L) 4.20 - 5.40 mill/mm3    Hemoglobin 9.3 (L) 12.0 - 16.0 gm/dl    Hematocrit 32.2 (L) 37.0 - 47.0 %    .5 (H) 81.0 - 99.0 fL    MCH 29.6 26.0 - 33.0 pg    MCHC 28.9 (L) 32.2 - 35.5 gm/dl    RDW-CV 16.7 (H) 11.5 - 14.5 %    RDW-SD 62.4 (H) 35.0 - 45.0 fL    Platelets 878 780 - 838 thou/mm3    MPV 10.5 9.4 - 12.4 fL    Seg Neutrophils 90.8 %    Lymphocytes 3.7 %    Monocytes 2.3 %    Eosinophils 0.0 %    Basophils 0.4 %    Immature Granulocytes 2.8 %    Segs Absolute 12.5 (H) 1.8 - 7.7 thou/mm3    Lymphocytes Absolute 0.5 (L) 1.0 - 4.8 thou/mm3    Monocytes Absolute 0.3 (L) 0.4 - 1.3 thou/mm3    Eosinophils Absolute 0.0 0.0 - 0.4 thou/mm3    Basophils Absolute 0.1 0.0 - 0.1 thou/mm3    Immature Grans (Abs) 0.39 (H) 0.00 - 0.07 thou/mm3    nRBC 0 /100 wbc    Anisocytosis Present Absent    Basophilic Stippling 1+ Absent    Hypochromia PRESENT Absent    Rouleaux SLIGHT Absent    Toxic Granulation Present Absent   Lactate, Sepsis    Collection Time: 12/18/21  2:33 PM   Result Value Ref Range    Lactic Acid, Sepsis 2.7 (H) 0.5 - 1.9 mmol/L   Scan of Blood Smear    Collection Time: 12/18/21  2:33 PM   Result Value Ref Range    SCAN OF BLOOD SMEAR see below    EKG 12 Lead    Collection Time: 12/18/21  2:51 PM   Result Value Ref Range    Ventricular Rate 82 BPM    Atrial Rate 82 BPM    P-R Interval 182 ms    QRS Duration 158 ms    Q-T Interval 430 ms    QTc Calculation (Bazett) 502 ms    P Axis 66 degrees    R Axis -47 degrees    T Axis 82 degrees   Lactate, Sepsis    Collection Time: 12/18/21  7:11 PM   Result Value Ref Range    Lactic Acid, Sepsis 1.6 0.5 - 1.9 mmol/L   Potassium w/ Reflex to Magnesium    Collection Time: 12/18/21  7:11 PM   Result Value Ref Range    Potassium reflex Magnesium 4.7 3.5 - 5.2 meq/L   Lactate, Sepsis    Collection Time: 12/18/21  9:23 PM   Result Value Ref Range    Lactic Acid, Sepsis 1.2 0.5 - 1.9 mmol/L   Potassium    Collection Time: 12/18/21 10:05 PM   Result Value Ref Range    Potassium 4.1 3.5 - 5.2 meq/L   SPECIMEN REJECTION    Collection Time: 12/18/21 11:14 PM   Result Value Ref Range    Rejected Test mrsp     Reason for Rejection see below    Troponin    Collection Time: 12/19/21  1:05 AM   Result Value Ref Range    Troponin T 0.018 (A) ng/ml   POCT Glucose    Collection Time: 12/19/21  1:33 AM   Result Value Ref Range    POC Glucose 85 70 - 108 mg/dl   CBC auto differential    Collection Time: 12/19/21  5:04 AM   Result Value Ref Range    WBC 14.1 (H) 4.8 - 10.8 thou/mm3    RBC 2.76 (L) 4.20 - 5.40 mill/mm3    Hemoglobin 8.1 (L) 12.0 - 16.0 gm/dl    Hematocrit 27.3 (L) 37.0 - 47.0 %    MCV 98.9 81.0 - 99.0 fL    MCH 29.3 26.0 - 33.0 pg    MCHC 29.7 (L) 32.2 - 35.5 gm/dl    RDW-CV 16.4 (H) 11.5 - 14.5 %    RDW-SD 59.3 (H) 35.0 - 45.0 fL    Platelets 870 212 - 200 thou/mm3    MPV 10.7 9.4 - 12.4 fL    Seg Neutrophils 88.3 %    Lymphocytes 5.2 %    Monocytes 4.7 %    Eosinophils 0.0 %    Basophils 0.2 %    Immature Granulocytes 1.6 %    Segs Absolute 12.5 (H) 1.8 - 7.7 thou/mm3    Lymphocytes Absolute 0.7 (L) 1.0 - 4.8 thou/mm3    Monocytes Absolute 0.7 0.4 - 1.3 thou/mm3    Eosinophils Absolute 0.0 0.0 - 0.4 thou/mm3    Basophils Absolute 0.0 0.0 - 0.1 thou/mm3    Immature Grans (Abs) 0.22 (H) 0.00 - 0.07 thou/mm3    nRBC 0 /100 wbc   Protime-INR    Collection Time: 12/19/21  5:04 AM   Result Value Ref Range    INR 2.16 (H) 0.85 - 1.13       Radiology:     CT HEAD WO CONTRAST    Result Date: 12/18/2021  PROCEDURE: CT HEAD WO CONTRAST CLINICAL INFORMATION:ams COMPARISON: None TECHNIQUE: 5 mm axial imaging through the head without IV contrast. All CT scans at this facility use dose modulation, iterative reconstruction, and/or weight based dosing when appropriate to reduce the radiation dose to as low as reasonably achievable. FINDINGS: Mild cerebral volume loss.  Periventricular and subcortical white matter hypodensities are seen. No ventriculomegaly. No midline shift or mass effect. No acute intracranial hemorrhage. No intracranial collection. Gray-white differentiation is unremarkable. The posterior fossa is unremarkable. The craniocervical junction is unremarkable. No acute bony abnormality. Polyp or mucosal retention cyst is seen in the left sphenoid sinus. Otherwise, the remaining visualized paranasal sinuses are clear. Marked mastoid effusions bilaterally. The orbits are unremarkable. Mild atrophy of the pituitary gland. 1. No acute intracranial hemorrhage. 2. White matter hypodensities that likely relate to chronic microvascular changes. 3. Mild cerebral volume loss. 4. Marked mastoid effusions bilaterally. **This report has been created using voice recognition software. It may contain minor errors which are inherent in voice recognition technology. ** Final report electronically signed by Dr Evaristo Howard on 12/18/2021 6:39 PM    XR CHEST PORTABLE    Result Date: 12/19/2021  AP chest Comparison: CR,SR - XR CHEST PORTABLE - 12/08/2021 01:00 AM EST Findings: Bilateral perihilar lung opacity similar to the prior exam. Large left pleural effusion has increased significantly. Cardiomegaly. Tracheostomy unchanged. Left subclavian central venous catheter has been removed. Percutaneous gastrostomy tube is likely in the upper body of stomach. No acute fracture. Impression: 1. Significant increase in left pleural effusion. Follow-up to clearing recommended. This document has been electronically signed by: Qasim Neely MD on 12/19/2021 05:14 AM    XR CHEST PORTABLE    Result Date: 12/18/2021  PROCEDURE: XR CHEST PORTABLE CLINICAL INFORMATION: ams. COMPARISON: Chest x-ray dated December 2021. TECHNIQUE: AP upright view of the chest. FINDINGS: There is a tracheotomy tube in place. There is moderate cardiomegaly. . Is atherosclerotic calcification aortic arch. .  There is increased density throughout both lung fields. There is a probable left pleural effusion. The pulmonary vascularity is increased. There is thoracic spondylosis. 1. Moderate cardiomegaly. Increased pulmonary vascularity. 2. Increased density throughout both lung fields. 3. Probable left pleural effusion. 4.. Tracheotomy tube in place. **This report has been created using voice recognition software. It may contain minor errors which are inherent in voice recognition technology. ** Final report electronically signed by DR Home Ferraro on 12/18/2021 3:10 PM        EKG: Normal sinus rhythm with sinus arrhythmia  Right bundle branch block  Left anterior fascicular block   Bifascicular block   Abnormal ECG  When compared with ECG of 25-OCT-2021 20:02,  Premature ventricular complexes are no longer Present  Nonspecific T wave abnormality no longer evident in Inferior leads  T wave inversion no longer evident in Anterior leads    Active Problems:    C. difficile colitis    CHF (congestive heart failure) (HCC)    Cardiomyopathy (HCC)    UTI (urinary tract infection)    Hyperkalemia    Chronic respiratory failure (HCC)  Resolved Problems:    * No resolved hospital problems. *   other problems:    Hypertension  Anxiety  Atrial fibrillation  Recently treated for COVID-19 PNA/ARDS CHF / cardiomyopathy ( EF 25%)      Plan:   patient be re-treated with vancomycin solution via the PEG tube for recurrent C difficile colitis. Will also have ID service see on account of concurrent UTI. Follow-up on Blood and urine cultures,  She is also on IV Rocephin. Hold diuretics for now on account of hypernatremia. Patient with elevated troponins,   Continue beta-blockers and aspirin and resume her Eliquis at an adjusted dose. The Eliquis she had been on prior accounts for the elevated INR. will have Cardiology Service Also see on account of the elevated troponins. Pulmonary Service to assist with vent management.   Continue the bronchodilators pulmonary toileting and wean as able.      Mental status better in the ED        DVT prophylaxis: [] Lovenox                                 [] SCDs                                 [] SQ Heparin                                 [] Encourage ambulation, low risk for DVT, no chemical or mechanical prophylaxis necessary              [x] Already on Anticoagulation                Anticipated Disposition upon discharge: [] Home                                                                         [x] Home with Home Health                                                                         [] [facility]                                                                         [] [address]          Electronically signed by Addie Cota MD on 12/19/2021 at 6:05 AM

## 2021-12-19 NOTE — ED NOTES
ED to inpatient nurses report    Chief Complaint   Patient presents with    Altered Mental Status      Present to ED from home  LOC: alert and orientated to name, place, date  Vital signs   Vitals:    12/18/21 1851 12/18/21 1911 12/18/21 2008 12/18/21 2009   BP: 129/82   (!) 119/104   Pulse: 87 91  82   Resp: 18 25 18    Temp: 98.7 °F (37.1 °C)      TempSrc: Oral      SpO2: 100% 100%     Weight:       Height:          Oxygen Baseline Trach/Vent    Current needs required vent/trach Bipap/Cpap No  LDAs:   Peripheral IV 12/18/21 Right Forearm (Active)   Site Assessment Clean;Dry; Intact 12/18/21 1429   Line Status Normal saline locked;Sluggish blood return;Flushed 12/18/21 1429   Dressing Status Clean;Dry; Intact 12/18/21 1429     Mobility: Fully dependent  Pending ED orders: vanc/admit orders   Present condition: Stable  Person of contract, phone number   Our promise was given to patient    Electronically signed by Jeanette Webb RN on 12/18/2021 at 9:04 PM       Jeanette Webb RN  12/18/21 [id number]

## 2021-12-19 NOTE — CARE COORDINATION
12/19/21, 2:37 PM EST  DISCHARGE PLANNING EVALUATION:    Brisa Reyes       Admitted: 12/18/2021/ [address] day: 1   Location: [facility]01/001-A Reason for admit: Ventilator dependence ([facility] [address] [Z99.11]  C. difficile colitis [A04.72]  Urinary tract infection without hematuria, site unspecified [N39.0]  Altered mental status, unspecified altered mental status type [R41.82]   PMH:  has a past medical history of Anxiety, Arthritis, Bronchitis, Diverticulitis of colon, and Hypertension. Procedure:   12/19 CXR: increased left pleural effusion  Barriers to Discharge:  UTI/AMS/C-Diff. History: Diverticulitis, Covid-19 Pneumonia/ARDS. Await final blood cultures, WBC 14.1, H 8.1, elevated BNP; monitor. Trach/Vent Settings: AC/VC 18 +5 Peep 30% FIO2, IV AB continued  PCP: Anna Pedraza MD  Readmission Risk Score: 21 ( )%    Patient Goals/Plan/Treatment Preferences: [facility] from [address] [place] [address] trach (11/10)/vent/PEG (11/17) VAHID, [place]; [place] in past; daughter/POA is Audrey  Transportation/Food Security/Housekeeping Addressed:  No issues identified.

## 2021-12-19 NOTE — PROGRESS NOTES
Pharmacy Note  BioFire Result    Edita Moran is a 80 y.o. female, with a positive blood culture result    PerfectServe message received from Lab , laboratory employee on 12/19/2021 at 4:04 PM    First Gram stain result: gram positive cocci in clusters    BioFire BCID result: Staph NOT aureus mecA +    BioFire BCID and gram stain congruent? Yes    Suspected source? contaminant    Patient chart reviewed for signs/symptoms of infection: Yes  BP (!) 119/53   Pulse 100   Temp 97.3 °F (36.3 °C) (Axillary)   Resp 24   Ht 5' (1.524 m)   Wt 128 lb (58.1 kg)   SpO2 96%   BMI 25.00 kg/m²   Lab Results   Component Value Date    WBC 14.1 (H) 12/19/2021       Allergies reviewed  Sulfa antibiotics, Gluten meal, Metronidazole, and Milk-related compounds    Renal function reviewed  Estimated Creatinine Clearance: 32 mL/min (based on SCr of 1 mg/dL). Current antibiotic regimen: ceftriaxone    Intervention needed: No    Individual contacted: Provider Dr. Ivin Simmonds    Recommendations: none    Recommendations accepted?  SOLEDAD Morgan [facility]  12/19/2021 4:04 PM

## 2021-12-19 NOTE — ED NOTES
Family refusing ativan. States \"patient does not react good to it\" dr malhotra notified. States will wait for hospitalist for additional orders. Patient swatting at nurse while in room. Wont keep pulse ox on finger or bp cuff. Family remains at bedside.       William Arrieta RN  12/18/21 2014       William Arrieta RN  12/18/21 2016

## 2021-12-19 NOTE — ED NOTES
Patient flaying arms in bed. Seems uncomfortable, spoke with dr Yesy Camacho. New orders received.       Isaac Hoff RN  12/18/21 2015

## 2021-12-20 PROBLEM — L89.90 DECUBITUS ULCER: Status: ACTIVE | Noted: 2021-12-20

## 2021-12-20 LAB
ANION GAP SERPL CALCULATED.3IONS-SCNC: 8 MEQ/L (ref 8–16)
BASOPHILS # BLD: 0.2 %
BASOPHILS ABSOLUTE: 0 THOU/MM3 (ref 0–0.1)
BUN BLDV-MCNC: 62 MG/DL (ref 7–22)
CALCIUM SERPL-MCNC: 9.7 MG/DL (ref 8.5–10.5)
CHLORIDE BLD-SCNC: 106 MEQ/L (ref 98–111)
CO2: 37 MEQ/L (ref 23–33)
CREAT SERPL-MCNC: 1 MG/DL (ref 0.4–1.2)
CULTURE, STOOL: NORMAL
EOSINOPHIL # BLD: 0.5 %
EOSINOPHILS ABSOLUTE: 0.1 THOU/MM3 (ref 0–0.4)
ERYTHROCYTE [DISTWIDTH] IN BLOOD BY AUTOMATED COUNT: 17 % (ref 11.5–14.5)
ERYTHROCYTE [DISTWIDTH] IN BLOOD BY AUTOMATED COUNT: 61.1 FL (ref 35–45)
GFR SERPL CREATININE-BSD FRML MDRD: 52 ML/MIN/1.73M2
GLUCOSE BLD-MCNC: 106 MG/DL (ref 70–108)
GLUCOSE BLD-MCNC: 120 MG/DL (ref 70–108)
GLUCOSE BLD-MCNC: 126 MG/DL (ref 70–108)
GLUCOSE BLD-MCNC: 129 MG/DL (ref 70–108)
HCT VFR BLD CALC: 26.8 % (ref 37–47)
HEMOGLOBIN: 7.9 GM/DL (ref 12–16)
IMMATURE GRANS (ABS): 0.17 THOU/MM3 (ref 0–0.07)
IMMATURE GRANULOCYTES: 1.4 %
LYMPHOCYTES # BLD: 10.3 %
LYMPHOCYTES ABSOLUTE: 1.2 THOU/MM3 (ref 1–4.8)
MCH RBC QN AUTO: 29.5 PG (ref 26–33)
MCHC RBC AUTO-ENTMCNC: 29.5 GM/DL (ref 32.2–35.5)
MCV RBC AUTO: 100 FL (ref 81–99)
MONOCYTES # BLD: 6.5 %
MONOCYTES ABSOLUTE: 0.8 THOU/MM3 (ref 0.4–1.3)
NUCLEATED RED BLOOD CELLS: 0 /100 WBC
PLATELET # BLD: 238 THOU/MM3 (ref 130–400)
PMV BLD AUTO: 11.1 FL (ref 9.4–12.4)
POTASSIUM SERPL-SCNC: 3.2 MEQ/L (ref 3.5–5.2)
RBC # BLD: 2.68 MILL/MM3 (ref 4.2–5.4)
SEG NEUTROPHILS: 81.1 %
SEGMENTED NEUTROPHILS ABSOLUTE COUNT: 9.8 THOU/MM3 (ref 1.8–7.7)
SODIUM BLD-SCNC: 151 MEQ/L (ref 135–145)
WBC # BLD: 12.1 THOU/MM3 (ref 4.8–10.8)

## 2021-12-20 PROCEDURE — 6360000002 HC RX W HCPCS: Performed by: INTERNAL MEDICINE

## 2021-12-20 PROCEDURE — 82948 REAGENT STRIP/BLOOD GLUCOSE: CPT

## 2021-12-20 PROCEDURE — 6370000000 HC RX 637 (ALT 250 FOR IP): Performed by: INTERNAL MEDICINE

## 2021-12-20 PROCEDURE — 2060000000 HC ICU INTERMEDIATE R&B

## 2021-12-20 PROCEDURE — 80048 BASIC METABOLIC PNL TOTAL CA: CPT

## 2021-12-20 PROCEDURE — 2580000003 HC RX 258: Performed by: INTERNAL MEDICINE

## 2021-12-20 PROCEDURE — 85025 COMPLETE CBC W/AUTO DIFF WBC: CPT

## 2021-12-20 PROCEDURE — 2700000000 HC OXYGEN THERAPY PER DAY

## 2021-12-20 PROCEDURE — 94003 VENT MGMT INPAT SUBQ DAY: CPT

## 2021-12-20 PROCEDURE — APPSS30 APP SPLIT SHARED TIME 16-30 MINUTES: Performed by: NURSE PRACTITIONER

## 2021-12-20 PROCEDURE — 99233 SBSQ HOSP IP/OBS HIGH 50: CPT | Performed by: INTERNAL MEDICINE

## 2021-12-20 PROCEDURE — 94761 N-INVAS EAR/PLS OXIMETRY MLT: CPT

## 2021-12-20 PROCEDURE — 36415 COLL VENOUS BLD VENIPUNCTURE: CPT

## 2021-12-20 RX ORDER — DEXTROSE MONOHYDRATE 50 MG/ML
INJECTION, SOLUTION INTRAVENOUS CONTINUOUS
Status: DISCONTINUED | OUTPATIENT
Start: 2021-12-20 | End: 2021-12-29 | Stop reason: HOSPADM

## 2021-12-20 RX ORDER — SENNA PLUS 8.6 MG/1
1 TABLET ORAL DAILY PRN
Status: ON HOLD | COMMUNITY
End: 2021-12-29 | Stop reason: HOSPADM

## 2021-12-20 RX ORDER — POLYETHYLENE GLYCOL 3350 17 G/17G
17 POWDER, FOR SOLUTION ORAL DAILY PRN
Status: ON HOLD | COMMUNITY
End: 2021-12-29 | Stop reason: HOSPADM

## 2021-12-20 RX ORDER — POLYVINYL ALCOHOL 14 MG/ML
2 SOLUTION/ DROPS OPHTHALMIC EVERY 4 HOURS PRN
Status: ON HOLD | COMMUNITY
End: 2021-12-29 | Stop reason: HOSPADM

## 2021-12-20 RX ORDER — OLANZAPINE 5 MG/1
5 TABLET ORAL NIGHTLY
Status: ON HOLD | COMMUNITY
End: 2021-12-29 | Stop reason: HOSPADM

## 2021-12-20 RX ORDER — ACETIC ACID 0.25 G/100ML
150 IRRIGANT IRRIGATION SEE ADMIN INSTRUCTIONS
Status: ON HOLD | COMMUNITY
End: 2021-12-29 | Stop reason: HOSPADM

## 2021-12-20 RX ORDER — SODIUM HYPOCHLORITE 1.25 MG/ML
SOLUTION TOPICAL DAILY
Status: DISCONTINUED | OUTPATIENT
Start: 2021-12-20 | End: 2021-12-25

## 2021-12-20 RX ORDER — ACETAMINOPHEN 325 MG/1
650 TABLET ORAL EVERY 6 HOURS PRN
COMMUNITY

## 2021-12-20 RX ORDER — SELENIUM 50 MCG
1 TABLET ORAL DAILY
Status: ON HOLD | COMMUNITY
End: 2021-12-29 | Stop reason: HOSPADM

## 2021-12-20 RX ORDER — POTASSIUM CHLORIDE 7.45 MG/ML
10 INJECTION INTRAVENOUS PRN
Status: DISCONTINUED | OUTPATIENT
Start: 2021-12-20 | End: 2021-12-25

## 2021-12-20 RX ORDER — METOPROLOL TARTRATE 50 MG/1
50 TABLET, FILM COATED ORAL 3 TIMES DAILY
Status: ON HOLD | COMMUNITY
End: 2021-12-29 | Stop reason: HOSPADM

## 2021-12-20 RX ORDER — AMIODARONE HYDROCHLORIDE 100 MG/1
100 TABLET ORAL DAILY
Status: ON HOLD | COMMUNITY
End: 2021-12-29 | Stop reason: HOSPADM

## 2021-12-20 RX ORDER — HEPARIN SODIUM 5000 [USP'U]/ML
60 INJECTION, SOLUTION INTRAVENOUS; SUBCUTANEOUS CONTINUOUS
Status: DISCONTINUED | OUTPATIENT
Start: 2021-12-20 | End: 2021-12-21 | Stop reason: ALTCHOICE

## 2021-12-20 RX ORDER — FAMOTIDINE 20 MG/1
20 TABLET, FILM COATED ORAL DAILY
Status: ON HOLD | COMMUNITY
End: 2021-12-29 | Stop reason: HOSPADM

## 2021-12-20 RX ORDER — SODIUM HYPOCHLORITE 1.25 MG/ML
SOLUTION TOPICAL SEE ADMIN INSTRUCTIONS
COMMUNITY
End: 2022-06-20

## 2021-12-20 RX ORDER — POTASSIUM BICARBONATE 782 MG/1
2 TABLET, EFFERVESCENT ORAL EVERY 4 HOURS
Status: ON HOLD | COMMUNITY
End: 2021-12-29 | Stop reason: HOSPADM

## 2021-12-20 RX ORDER — POTASSIUM CHLORIDE 20 MEQ/1
40 TABLET, EXTENDED RELEASE ORAL PRN
Status: DISCONTINUED | OUTPATIENT
Start: 2021-12-20 | End: 2021-12-25

## 2021-12-20 RX ADMIN — MORPHINE SULFATE 2 MG: 2 INJECTION, SOLUTION INTRAMUSCULAR; INTRAVENOUS at 22:03

## 2021-12-20 RX ADMIN — APIXABAN 2.5 MG: 2.5 TABLET, FILM COATED ORAL at 08:35

## 2021-12-20 RX ADMIN — METOPROLOL TARTRATE 50 MG: 50 TABLET, FILM COATED ORAL at 08:35

## 2021-12-20 RX ADMIN — APIXABAN 2.5 MG: 2.5 TABLET, FILM COATED ORAL at 22:56

## 2021-12-20 RX ADMIN — Medication 125 MG: at 17:57

## 2021-12-20 RX ADMIN — DEXTROSE MONOHYDRATE: 50 INJECTION, SOLUTION INTRAVENOUS at 07:05

## 2021-12-20 RX ADMIN — MORPHINE SULFATE 2 MG: 2 INJECTION, SOLUTION INTRAMUSCULAR; INTRAVENOUS at 08:36

## 2021-12-20 RX ADMIN — CEFTRIAXONE SODIUM 1000 MG: 1 INJECTION, POWDER, FOR SOLUTION INTRAMUSCULAR; INTRAVENOUS at 18:56

## 2021-12-20 RX ADMIN — POTASSIUM BICARBONATE 40 MEQ: 782 TABLET, EFFERVESCENT ORAL at 08:35

## 2021-12-20 RX ADMIN — Medication 125 MG: at 22:57

## 2021-12-20 RX ADMIN — MORPHINE SULFATE 2 MG: 2 INJECTION, SOLUTION INTRAMUSCULAR; INTRAVENOUS at 04:38

## 2021-12-20 RX ADMIN — ASPIRIN 325 MG: 325 TABLET ORAL at 08:35

## 2021-12-20 RX ADMIN — METOPROLOL TARTRATE 50 MG: 50 TABLET, FILM COATED ORAL at 22:56

## 2021-12-20 RX ADMIN — Medication 125 MG: at 12:37

## 2021-12-20 RX ADMIN — Medication 125 MG: at 05:19

## 2021-12-20 RX ADMIN — MORPHINE SULFATE 2 MG: 2 INJECTION, SOLUTION INTRAMUSCULAR; INTRAVENOUS at 14:42

## 2021-12-20 RX ADMIN — SODIUM HYPOCHLORITE: 1.25 SOLUTION TOPICAL at 15:47

## 2021-12-20 RX ADMIN — DIAZEPAM 2.5 MG: 5 INJECTION, SOLUTION INTRAMUSCULAR; INTRAVENOUS at 17:27

## 2021-12-20 RX ADMIN — Medication 125 MG: at 00:15

## 2021-12-20 RX ADMIN — CETIRIZINE HYDROCHLORIDE 10 MG: 10 TABLET, FILM COATED ORAL at 08:35

## 2021-12-20 ASSESSMENT — PAIN DESCRIPTION - ONSET
ONSET: ON-GOING

## 2021-12-20 ASSESSMENT — PAIN SCALES - WONG BAKER
WONGBAKER_NUMERICALRESPONSE: 0

## 2021-12-20 ASSESSMENT — PAIN SCALES - GENERAL
PAINLEVEL_OUTOF10: 0
PAINLEVEL_OUTOF10: 0
PAINLEVEL_OUTOF10: 4
PAINLEVEL_OUTOF10: 0
PAINLEVEL_OUTOF10: 5
PAINLEVEL_OUTOF10: 0
PAINLEVEL_OUTOF10: 5
PAINLEVEL_OUTOF10: 0
PAINLEVEL_OUTOF10: 0
PAINLEVEL_OUTOF10: 6
PAINLEVEL_OUTOF10: 0

## 2021-12-20 ASSESSMENT — PULMONARY FUNCTION TESTS
PIF_VALUE: 29.8
PIF_VALUE: 27
PIF_VALUE: 31
PIF_VALUE: 27.6
PIF_VALUE: 20.8
PIF_VALUE: 34.3

## 2021-12-20 ASSESSMENT — PAIN DESCRIPTION - FREQUENCY
FREQUENCY: CONTINUOUS

## 2021-12-20 ASSESSMENT — PAIN DESCRIPTION - PROGRESSION
CLINICAL_PROGRESSION: NOT CHANGED

## 2021-12-20 ASSESSMENT — PAIN DESCRIPTION - ORIENTATION
ORIENTATION: MID

## 2021-12-20 ASSESSMENT — PAIN - FUNCTIONAL ASSESSMENT
PAIN_FUNCTIONAL_ASSESSMENT: ACTIVITIES ARE NOT PREVENTED

## 2021-12-20 ASSESSMENT — PAIN DESCRIPTION - DESCRIPTORS
DESCRIPTORS: ACHING
DESCRIPTORS: ACHING
DESCRIPTORS: ACHING;SHARP;PRESSURE
DESCRIPTORS: ACHING;PRESSURE

## 2021-12-20 ASSESSMENT — PAIN DESCRIPTION - LOCATION
LOCATION: BUTTOCKS

## 2021-12-20 ASSESSMENT — PAIN DESCRIPTION - DIRECTION: RADIATING_TOWARDS: N

## 2021-12-20 ASSESSMENT — PAIN DESCRIPTION - PAIN TYPE
TYPE: ACUTE PAIN

## 2021-12-20 NOTE — FLOWSHEET NOTE
12/20/21 0352   Provider Notification   Reason for Communication Evaluate   Provider Name [email or web address]   Provider Notification Physician   Method of Communication Secure Message   Response See orders   Notification Time [phone]     Notified of needing renewed restraint order. Patient still attempts to pull at lines/tubes when released from restraints. Order receiving to continue bilateral soft wrist restraints.

## 2021-12-20 NOTE — PROGRESS NOTES
[address] [id number] Continence Nurse  Consult Note       Aftba Dupree  AGE: 80 y.o. 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 female referred by:   [] Physician/PA/APRN  [x] Nursing  [] Other:     Wound Identification:  Wound Type: pressure  Contributing Factors: chronic pressure, decreased mobility and malnutrition    Objective:     Darío Risk Score:      Assessment:     Encounter: Present to patient room. Patient in bed upon arrival. Assessment and photo to follow. Hendrix and rectal tube in place. Assisted patient onto right side for evaluation. Old dressing removed. Cleansed wound with normal saline and gauze. Pat dry with clean gauze. Patient noted to have large, unstageable pressure injury to coccyx, POA. Normal saline moistened gauze applied to wound, covered with ABD pad, secured with tape. Recommend Dakins 0.125% wet to dry dressing changes BID. Staff to obtain order for Dakins solution. Depending on how aggressive patient/family wound like to be with treatment, would recommend consult to general surgery for debridement of wound with possible need for diverting ostomy. Pillow placed under left hip. Wrist restraints reapplied. HOB at 30 degrees. Continue to turn every 2 hours and PRN, offload with pillows, ensure patient is on low air loss support surface [employer], SPR+, Jayy, Bariatric bed), utilize moisture wicking underpads, limit use of depends, and if applicable, use waffle cushion when in chair. Patient in bed, call light beside pt. Wound ostomy to continue to follow and assess wound weekly. Call with concerns. Findings reviewed with primary RN.       Wound type: Unstageable coccyx wound, POA  Wound size: 13cm x 5.2cm x 3.1cm  Undermining or Tunneling: None  Wound assessment/color: 90% black, 10% pink  Drainage amount: Large  Drainage description: Purulent  Odor: Foul  Margins: Attached  Nida wound: Intact, fragile, painful  Exposed structure: MACKENZIE, concern for osteomyelitis due to wound depth    Response to treatment:  Well tolerated by patient., With complaints of pain. Plan:     Treatment Recommendations:   Recommend Dakins 0.125% wet to dry dressing changes BID. Staff to obtain order for Dakins solution.     -Depending on how aggressive patient/family wound like to be with treatment, would recommend consult to general surgery for debridement of wound with possible need for diverting ostomy.     Specialty Bed Required :   [x] Low Air Loss   [x] Pressure Redistribution  [] Fluid Immersion- Dolphin  [] Bariatric  [] RotoProne   [] Other:     Discharge Plan:  Placement for patient upon discharge: ECF  Patient appropriate for Outpatient [address]: Yes vs general surgeon office

## 2021-12-20 NOTE — DISCHARGE INSTR - COC
Continuity of Care Form    Patient Name: Larry Heard   :  1934  MRN:  [id number]    Admit date:  2021  Discharge date:  2021      Code Status Order: Full Code   Advance Directives:      Admitting Physician:  Rigo Muñoz MD  PCP: Nino Hernandez MD    Discharging Nurse: [facility] Unit/Room#: 4K-01/001-A  Discharging Unit Phone Number: [phone]    Emergency Contact:   Extended Emergency Contact Information  Primary Emergency Contact: Audrey Llamas  Address: [place]  [id number] [place] [address] Phone: [phone]  Mobile Phone: [phone]  Relation: Child  Hearing or visual needs: None  Other needs: None  Preferred language: [place]   needed? No  Secondary Emergency Contact: Fatoumata Grajeda  Address: [place] [address] Phone: [phone]  Relation: Child  Hearing or visual needs: None  Other needs: None  Preferred language: English   needed?  No    Past Surgical History:  Past Surgical History:   Procedure Laterality Date    APPENDECTOMY      CHOLECYSTECTOMY      GASTROSTOMY TUBE PLACEMENT N/A 2021    EGD PEG TUBE PLACEMENT performed by Betina Quintana MD at [facility]       Immunization History:   Immunization History   Administered Date(s) Administered    COVID-19, NIKI Lewis, 30mcg/0.3mL 2021, 2021       Active Problems:  Patient Active Problem List   Diagnosis Code    Sigmoid diverticulitis K57.32    Acute respiratory failure with hypoxia (HCC) J96.01    COVID-19 U07.1    Acute congestive heart failure (HCC) I50.9    C. difficile colitis A04.72    CHF (congestive heart failure) (HCC) I50.9    Cardiomyopathy ([address] I42.9    UTI (urinary tract infection) N39.0    Hyperkalemia E87.5    Chronic respiratory failure ([address] J96.10    Decubitus ulcer L89.90       Isolation/Infection:   Isolation            Contact          Patient Infection Status       Infection Onset Added Last Indicated Last Indicated By Review Planned Expiration Resolved Resolved By    C-diff (Clostridium difficile) 12/18/21 12/18/21 12/18/21 Clostridium Difficile Toxin/Antigen 12/25/21       Resolved    C-diff Rule Out 12/18/21 12/18/21 12/18/21 Clostridium Difficile Toxin/Antigen (Ordered)   12/18/21 Rule-Out Test Resulted    C-diff Rule Out 11/27/21 11/27/21 11/27/21 Clostridium Difficile Toxin/Antigen (Ordered)   11/27/21 Rule-Out Test Resulted    C-diff Rule Out 10/21/21 10/21/21 10/21/21 Gastrointestinal Panel, Molecular (Ordered)   10/25/21 Kip Collado RN    COVID-19 10/05/21 10/07/21 10/05/21 COVID-19   11/16/21 Kip Collado RN    +10/5, admit 10/11, hypoxic, severe    COVID-19 (Rule Out) 10/05/21 10/05/21 10/05/21 COVID-19 (Ordered)   10/07/21 Rule-Out Test Resulted            Nurse Assessment:  Last Vital Signs: BP (!) 125/49   Pulse 70   Temp 98.3 °F (36.8 °C) (Oral)   Resp 20   Ht 5' (1.524 m)   Wt 138 lb (62.6 kg)   SpO2 97%   BMI 26.95 kg/m²     Last documented pain score (0-10 scale): Pain Level: 5  Last Weight:   Wt Readings from Last 1 Encounters:   12/20/21 138 lb (62.6 kg)     Mental Status:   Disoriented, very hard of hearing    IV Access:  - Peripheral IV - site  right forearm, insertion date: 12/18/2021    Nursing Mobility/ADLs:  Walking   Dependent  Transfer  Dependent  Bathing  Dependent  Dressing  Dependent  Toileting  Dependent  Feeding  Dependent  Med Admin  Dependent  Med Delivery    PEG tube    Wound Care Documentation and Therapy:  Wound 10/15/21 Sacrum Mid (Active)   Wound Etiology Pressure Unstageable 12/19/21 0930   Wound Cleansed Soap and water 12/20/21 0405   Dressing/Treatment Zinc paste 12/20/21 0405   Wound Assessment Pink/red;Superficial;Purple/maroon;Dry 12/20/21 0405   Drainage Amount None 12/20/21 0405   Odor None 12/20/21 0405   Nida-wound Assessment Non-blanchable erythema 12/20/21 0405   Number of days: 65 Wound 11/06/21 Coccyx (Active)   Wound Etiology Pressure Stage  3 12/20/21 0405   Dressing Status New dressing applied 12/20/21 0405   Wound Cleansed Irrigated with saline 12/20/21 0405   Dressing/Treatment ABD; Moist to dry 12/20/21 0405   Wound Assessment Pink/red;Eschar dry;Slough;Eschar moist 12/20/21 0405   Drainage Amount Moderate 12/20/21 0405   Drainage Description Serosanguinous; Serous; Black 12/20/21 0405   Odor Malodorous/putrid 12/20/21 0405   Nida-wound Assessment Ecchymosis;Edematous; Excoriated;Non-blanchable erythema;Fragile 12/20/21 0405   Number of days: 44       Wound 11/08/21 Perineum open wounds on rectum (Active)   Number of days: 42        Elimination:  Continence: Bowel: flexiseal  Bladder: alaniz catheter  Urinary Catheter: For wound healing  Colostomy/Ileostomy/Ileal Conduit: No  Rectal Tube With balloon-Stool Appearance: Loose  Rectal Tube With balloon-Stool Color: Brown,Green  Rectal Tube With balloon-Stool Amount: Medium    Date of Last BM: 12/29/2021      Intake/Output Summary (Last 24 hours) at 12/20/2021 0839  Last data filed at 12/20/2021 0349  Gross per 24 hour   Intake 1303 ml   Output 1625 ml   Net -322 ml     I/O last 3 completed shifts:   In: 9320 [I.V.:75; NG/GT:1228]  Out: 1625 [Urine:1025; Stool:600]    Safety Concerns:     Aspiration Risk    Impairments/Disabilities:      Hearing    Nutrition Therapy:  Current Nutrition Therapy:   - Tube Feedings:  LETSGROOP 50 ml/hr     Routes of Feeding: None  Liquids: No Liquids  Daily Fluid Restriction: no  Last Modified Barium Swallow with Video (Video Swallowing Test): not done    Treatments at the Time of Hospital Discharge:   Respiratory Treatments: see MAR  Oxygen Therapy:   ventilator at night, trach mask during the day  Ventilator:    - Ventilator Settings:    Vt Ordered: 320 mL  Rate Set: 18 bmp  FiO2 : 25 %    PEEP/CPAP: 5  Pressure Support: 12 cmH20    Rehab Therapies: Physical Therapy, Occupational Therapy, and Speech/Language Therapy  Weight Bearing Status/Restrictions: No weight bearing restirctions  Other Medical Equipment (for information only, NOT a DME order):  turn every 2 hours with pillow support   Other Treatments: n/a    Patient's personal belongings (please select all that are sent with patient):  None    RN SIGNATURE:  Electronically signed by Edgar Sweeney RN on 12/29/21 at 4:59 PM EST    CASE MANAGEMENT/SOCIAL WORK SECTION    Inpatient Status Date: 12/18/2021    Readmission Risk Assessment Score:  Readmission Risk              Risk of Unplanned Readmission:  23           Discharging to Facility/ Agency   Name: [facility]  Address: [address] [facility], [place]. [address]  Phone: [phone]  Fax: [phone]    Dialysis Facility (if applicable)   Name:  Address:  Dialysis Schedule:  Phone:  Fax:    / signature: Electronically signed by BELEM Mckeon on 12/20/21 at 8:40 AM EST    PHYSICIAN SECTION    Prognosis: {Prognosis:2083096334}    Condition at Discharge: [facility] [place] Patient Condition:[id number]}    Rehab Potential (if transferring to Rehab): {Prognosis:4416932005}    Recommended Labs or Other Treatments After Discharge: ***    Physician Certification: I certify the above information and transfer of Adry Newsome  is necessary for the continuing treatment of the diagnosis listed and that she requires {Admit to Appropriate Level of Care:69723} for {GREATER/LESS:219588394} 30 days.      Update Admission H&P: No change in H&P    PHYSICIAN SIGNATURE:  Electronically signed by Dallin Degroot MD on 12/29/21 at 2:36 PM EST

## 2021-12-20 NOTE — PROGRESS NOTES
[facility]    Patient - Doraine Heimlich   MRN -  [id number]   [facility] # - [de-identified]   - 1934      Date of Admission -  2021  1:15 PM  Date of evaluation -  2021  Room - --A   Hospital Day - [address] Beti Monroe MD Primary Care Physician - Kiara Cerrato MD   Chief Complaint   Acute on chronic respiratory failure with hypoxia  Active Hospital Problem List      Active Hospital Problems    Diagnosis Date Noted    Decubitus ulcer [L89.90] 2021    C. difficile colitis [A04.72] 2021    CHF (congestive heart failure) ([address] [I50.9] 2021    Cardiomyopathy ([address] [I42.9] 2021    UTI (urinary tract infection) [N39.0] 2021    Hyperkalemia [E87.5] 2021    Chronic respiratory failure ([address] [J96.10] 2021     HPI   Doraine Heimlich is a 80 y.o. female with a past medical history of chronic hypoxic respiratory failures status post tracheostomy on chronic mechanical ventilation secondary to recent Covid 19 pneumonia with ARDS, cardiomyopathy with ejection fraction 25%, hypertension, atrial fibrillation, acute kidney injury, C. difficile colitis and GI bleed. Patient was discharged from the intensive care unit to long-term acute care facility/[place] where she completed her therapy but was unable to wean off the ventilator and was later discharged to a chronic vent facility on . She was sent back from the chronic vent facility due to altered mental status and agitation. Pulmonary consulted for ventilator management. She is currently on [facility] with a PEEP of 5 and FiO2 30%. Secretions are moderate.   X-ray showed worsened left lower lobe atelectasis/effusion      Past 24 hrs   -On vent via trach  -Tolerating current settings On AC Vt 320 cc PEEP 5 Rate 18 FiO2 30%  -Noted confusion not following commands, restrained due to pulling at medical devices   ROS limited 2/2 AMS    Past Medical History         Diagnosis Date    Anxiety     Arthritis     Bronchitis     Diverticulitis of colon     Hypertension       Past Surgical History           Procedure Laterality Date    APPENDECTOMY      CHOLECYSTECTOMY      GASTROSTOMY TUBE PLACEMENT N/A 2021    EGD PEG TUBE PLACEMENT performed by Debra Mcfadden MD at [address] [place] [address] DIET; Regular; Low Microbial  ADULT TUBE FEEDING; PEG; Other Tube Feeding (specify); nepro; Continuous; 40; No; 30; Q 4 hours  Allergies    Sulfa antibiotics, Gluten meal, Metronidazole, and Milk-related compounds  Social History     Social History     Socioeconomic History    Marital status:      Spouse name: Not on file    Number of children: Not on file    Years of education: Not on file    Highest education level: Not on file   Occupational History    Not on file   Tobacco Use    Smoking status: Former Smoker     Packs/day: 1.00     Years: 15.00     Pack years: 15.00     Types: Cigarettes     Quit date: 12/10/1977     Years since quittin.0    Smokeless tobacco: Never Used   Substance and Sexual Activity    Alcohol use: No    Drug use: No    Sexual activity: Not on file   Other Topics Concern    Not on file   Social History Narrative    Not on file     Social Determinants of Health     Financial Resource Strain:     Difficulty of Paying Living Expenses: Not on file   Food Insecurity:     Worried About Running Out of Food in the Last Year: Not on file    Caitlin of Food in the Last Year: Not on file   Transportation Needs:     Lack of Transportation (Medical): Not on file    Lack of Transportation (Non-Medical):  Not on file   Physical Activity:     Days of Exercise per Week: Not on file    Minutes of Exercise per Session: Not on file   Stress:     Feeling of Stress : Not on file   Social Connections:     Frequency of Communication with Friends and Family: Not on file    Frequency of Social Gatherings with Friends and Weight   12/20/21 0520 138 lb (62.6 kg)   12/18/21 1315 128 lb (58.1 kg)     Exam   Physical Exam   Constitutional: No distress on vent via trach. Patient appears elderly and chronically ill  Head: Normocephalic and atraumatic. Mouth/Throat: Oropharynx is clear and moist.  No oral thrush. Eyes: Conjunctivae are normal. Pupils are equal, round. No scleral icterus. Neck: Neck supple. Noted trach in place Covidien ID 7.5 mm   Cardiovascular: S1 and S2 with no murmur. No peripheral edema  Pulmonary/Chest: Normal effort with bilateral air entry, diminished to absent on Left lung field. No stridor. No respiratory distress. Patient exhibits no tenderness. Abdominal: Soft. Bowel sounds audible. No distension or tenderness to palp. Musculoskeletal: Moves all extremities  Neurological: Patient is alert but not mouthing words appropriately or answering yes/no questions accurately during my interview      Labs   ABG  Lab Results   Component Value Date    PH 7.38 12/18/2021    PO2 62 12/18/2021    PCO2 62 12/18/2021    HCO3 37 12/18/2021    O2SAT 90 12/18/2021     Lab Results   Component Value Date    IFIO2 6 12/18/2021    MODE PC 10/30/2021    SETTIDVOL 320 12/18/2021    SETPEEP 5.0 12/18/2021     CBC  Recent Labs     12/18/21  1433 12/19/21  0504 12/20/21  0455   WBC 13.8* 14.1* 12.1*   RBC 3.14* 2.76* 2.68*   HGB 9.3* 8.1* 7.9*   HCT 32.2* 27.3* 26.8*   .5* 98.9 100.0*   MCH 29.6 29.3 29.5   MCHC 28.9* 29.7* 29.5*    234 238   MPV 10.5 10.7 11.1      BMP  Recent Labs     12/18/21  1403 12/18/21  1911 12/18/21  2205 12/19/21  0504 12/20/21  0455     --   --  149* 151*   K 5.6*   < > 4.1 3.7 3.2*   CL 99  --   --  103 106   CO2 34*  --   --  34* 37*   BUN 62*  --   --  62* 62*   CREATININE 1.1  --   --  1.0 1.0   GLUCOSE 103  --   --  84 106   CALCIUM 10.6*  --   --  9.6 9.7    < > = values in this interval not displayed.      LFT  Recent Labs     12/18/21  1403   AST 51*   ALT 19   BILITOT 0.5 ALKPHOS 168*     TROP  Lab Results   Component Value Date    TROPONINT 0.023 12/19/2021    TROPONINT 0.021 12/19/2021    TROPONINT 0.018 12/19/2021     BNP  Lab Results   Component Value Date    PROBNP 73721.0 12/18/2021    PROBNP > 88878.0 11/20/2021    PROBNP 38701.0 11/01/2021     D-Dimer  No results found for: DDIMER  Lactic Acid  Recent Labs     12/19/21  0504   LACTA 1.0     INR  Recent Labs     12/18/21  1403 12/19/21  0504   INR 2.06* 2.16*     PTT  No results for input(s): APTT in the last 72 hours. Glucose  Recent Labs     12/19/21  0133 12/19/21  0609 12/20/21  0738   POCGLU 85 102 126*     UA   Recent Labs     12/18/21  1415   SPECGRAV 1.017   PHUR 7.0   COLORU YELLOW   PROTEINU 100*   BLOODU LARGE*   RBCUA 3-5   WBCUA 25-50   BACTERIA MANY   NITRU NEGATIVE   BILIRUBINUR NEGATIVE   UROBILINOGEN 0.2   KETUA NEGATIVE   LABCAST 8-15 C. GRAN  NONE SEEN   . PFTs   No records  Echo     Summary   Ejection fraction is visually estimated at 25%. There was severe global hypokinesis of the left ventricle. Left Ventricular size is Moderately increased . The aortic valve leaflets were not well visualized. Aortic valve appears tricuspid. Thickened aortic valve leaflets noted. Aortic valve leaflets are Moderately calcified. Cultures    Procalcitonin  Lab Results   Component Value Date    PROCAL 0.21 11/01/2021    PROCAL 0.24 10/29/2021    PROCAL 0.28 10/26/2021       Radiology    CXR    XR CHEST PORTABLE   12/19/2021   Findings: Bilateral perihilar lung opacity similar to the prior exam. Large left pleural effusion has increased significantly. Cardiomegaly. Tracheostomy unchanged. Left subclavian central venous catheter has been removed. Percutaneous gastrostomy tube is likely in the upper body of stomach. No acute fracture. Impression:   1. Significant increase in left pleural effusion. Follow-up to clearing recommended.      CT Scans    (See actual reports for details)    Assessment   -Altered mental status/metabolic encephalopathy -multifactorial   -Acute on chronic hypoxic respiratory failure on mechanical ventilation  -Left-sided atelectasis suspect due to mucous plug/pleural effusion  -Severe acute critical illness weakness/deconditioning  -Cardiomyopathy with ejection fraction 25%, atrial fibrillation  -Recent COVID-19 pneumonia with ARDS  -UTI  -C Diff infection  -Recent HERIBERTO hyperkalemia  -History of GI bleeding  Recommendations   -Continue current mechanical ventilation, unable to wean due to weakness  -Recommend thoracentesis for diagnostic value of pleural effusion etiology currently stable respiratory requirements and not able to follow commands very unlikely to be able to complete thoracentesis at this time   -Recommend optimization of volume status-Pro BNP 17588  -Will continue on Mech Vent with current settings AC Vt 320 cc PEEP 5 rate 18 adjust FiO2 to maintain SpO2 >88%    -Primary service following AMS-possibly 2/2 UTI  -ID service following       Case discussed with nurse and patient/family. Questions and concerns addressed. Meds and Orders reviewed. Electronically signed by     SERGE Koehler CNP on 12/20/2021 at 12:02 PM    Addendum by Dr. Patricia Pantoja MD:  I have seen and examined the patient independently. Face to face evaluation and examination was performed. The above evaluation and note has been reviewed. Labs and radiographs were reviewed. I Have discussed with Mr. Jessi Crandall CNP about this patient in detail. The above assessment and plan has been reviewed. Please see my modifications mentioned below. My modifications:  Patient remain on the ventilator  She is having episodes of agitation requiring restraints. She is not in distress. Will order a bedside thoracentesis by radiology in a.m. We will send pleural fluid for testing.   We will hold Eliquis for planned thoracentesis   We will start patient on heparin drip at the next Eliquis dosing to facilitate thoracentesis.     Elvis Mtz MD 12/20/2021 7:48 PM

## 2021-12-20 NOTE — CONSULTS
[address] [place] [address]                                  CONSULTATION    PATIENT NAME: Daniele Zarate                    :        1934  MED REC NO:   [id number]                           ROOM:       0001  ACCOUNT NO:   [de-identified]                           ADMIT DATE: 2021  PROVIDER:     Mundo Chacon M.D.    Jeanluzmaria Mortimer:  2021    REASON FOR CONSULTATION:  She is an 70-year-old female patient well  known to me from her recent hospitalization at [facility].  She  was discharged to the nursing home on ventilator, was re-admitted due to  unresponsive episode and shortness of breath. HISTORY OF PRESENT ILLNESS:  She is an 70-year-old female patient with  past medical history significant for congestive heart failure with  cardiomyopathy, ejection fraction of 25%, history of ARDS, history of C.  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 Family  wanted to do everything despite recommendation for palliative care. She  came back after stay of one day at the nursing home due to low  oxygenation and change in mental state. She is on a vent. She is a  poor historian. Most of the history was obtained by asking her daughter  and my previous encounter. She has Flexi-Seal, continues to have  diarrhea and was started on antibiotic for possible UTI. She has  unstageable coccyx wound; however, it is starting to soften, it is  black, was packed with moist cloth. She had history of acute kidney  injury with hyperkalemia, severe malnutrition. We will start pacing  congestive heart failure among other medical problems. She had also  anxiety, osteoarthritis, history of diverticulitis. PAST SURGICAL HISTORY:  Appendectomy, cholecystectomy, history of  gastrostomy tube placement.     CURRENT MEDICATIONS:  Include Tylenol, apixaban, aspirin, ceftriaxone,  Zyrtec, Valium, Lasix, lorazepam, and oral vancomycin. REVIEW OF SYSTEMS:  Could not be obtained. PHYSICAL EXAMINATION:  GENERAL:  The patient looks chronically sick. She is on vent. VITAL SIGNS:  Temperature 98.1, respirations 18, pulse 72, blood  pressure 124/70. HEENT:  She has slightly pale conjunctivae. CHEST:  Bilateral air entry diminished with absent on the left lateral  chest.  CARDIOVASCULAR SYSTEM:  Tachycardic. ABDOMEN:  Soft. PEG tube in place. Coccyx has 8 x 10 cm necrotic  coccyx wound. EXTREMITIES:  Bilateral leg edema was noted. CNS:  She is awake, but does not follow much command. She is hard of  hearing. DIAGNOSTICS:  WBC 12.1, hemoglobin 7.9, hematocrit 26.8, platelets of  489. Sodium 151, potassium 3.2, chloride 106, bicarb 37, BUN 62,  creatinine 1. IMPRESSION:  An 20-year-old female patient admitted with respiratory  failure. She has cardiomyopathy with ejection fraction of 25%,  left-sided significant pleural effusion, ongoing C. diff, UTI, and  necrotic coccyx wound. I spoke with her daughter and son about her  other prognosis and they want to do everything and we will ask surgical  consent for debridement of the coccyx wound. Again, I discussed with  the family about her long-term prognosis, potential for poor healing and  further complications.         Keven Simms M.D.    D: 12/20/2021 13:45:16       T: 12/20/2021 16:24:25     [email or web address]  Job#: [id number]     Doc#: [id number]    CC:

## 2021-12-20 NOTE — PLAN OF CARE
12/20/21, 8:19 AM EST  Discharge Planning Evaluation  Social work consult received, patient from [facility]. Patient/Family preference is to return to [facility]. The patient's current payor source at the facility is Medicare, [employer]. Medicare skilled days available: Yes  Insurance precert:  No  Spoke with [address] [place] [address] at the facility. Patient bed hold: No  Anticipated transport plan: Unsure  Do they require COVID 19 test to return to ECF: Yes  Is there a required time frame which which COVID test needs done: 24 hours      GADIEL Mayo called patient's daughter, Lian Fernandes to confirm that Tamiko Garcia would be returning to facility. Lian Fernandes expressed concern about patient being discharged before she was medically stable, GADIEL student assured her this would not happen. GADIEL mayo spoke with [address] [place] [address] at the facility, she stated because patient was not a bed hold, a new referral would have to be made. GADIEL mayo made referral and told [address] [place] [address] that materials would be faxed over to facility. Asked her to call GADIEL Sams back with questions or decision.     -Social work student Namrata Albert        12/20/21, 12:09 PM EST  DISCHARGE PLANNING EVALUATION    GADIEL received a call from [facility], and spoke with [address] [place] [address] and they are able to accept back at discharge.

## 2021-12-20 NOTE — PROGRESS NOTES
[facility]  Progress Note   Dr Geeta Duran Covering for Dr. Juancarlos Adams      Patient:  Jose Raul Conroy  YOB: 1934  Date of Service: 12/20/2021  MRN: [id number]   Acct:  [de-identified]   Primary Care Physician: Tyra Reyes MD    SUBJECTIVE:Patient was met awake today and smiling indicating she feels better    Home Medications:   No current facility-administered medications on file prior to encounter.      Current Outpatient Medications on File Prior to Encounter   Medication Sig Dispense Refill    hydroCHLOROthiazide (HYDRODIURIL) 12.5 MG tablet Take 12.5 mg by mouth daily       NONFORMULARY Focus Factor      NONFORMULARY Drenamin      GLUCOSAMINE-CHONDROITIN PO Take by mouth      cetirizine (ZYRTEC) 10 MG tablet TAKE 1 TABLET BY MOUTH ONCE DAILY      HAWTHORN PO Take 425 mg by mouth daily      NONFORMULARY Allerplex      OLIVE LEAF EXTRACT PO Take by mouth      aspirin 325 MG tablet Take 325 mg by mouth daily      Fexofenadine-Pseudoephedrine (ALLEGRA-D 12 HOUR PO) Take by mouth      metoprolol tartrate (LOPRESSOR) 50 MG tablet Take 1 tablet by mouth 2 times daily 180 tablet 3         Scheduled Meds:   apixaban  2.5 mg Oral BID    vancomycin  125 mg Per G Tube 4 times per day    LORazepam  1 mg IntraVENous Once    aspirin  325 mg Oral Daily    cetirizine  10 mg Oral Daily    metoprolol tartrate  50 mg Oral BID    sodium chloride flush  5-40 mL IntraVENous 2 times per day    cefTRIAXone (ROCEPHIN) IV  1,000 mg IntraVENous Q24H    [Held by provider] furosemide  20 mg IntraVENous Q8H     Continuous Infusions:   sodium chloride       PRN Meds:diazePAM, morphine, sodium chloride flush, sodium chloride, acetaminophen **OR** acetaminophen, ondansetron **OR** ondansetron        Allergies:  Sulfa antibiotics, Gluten meal, Metronidazole, and Milk-related compounds    OBJECTIVE:    Vitals:   Vitals:    12/20/21 0437   BP:    Pulse: 85   Resp: 20   Temp:    SpO2: 97%      BMI: Body mass index is 26.95 kg/m². PHYSICAL EXAMINATION:            General appearance:  No apparent distress, appears stated age and cooperative. HEENT:  Normal cephalic, atraumatic without obvious deformity. Pupils equal, round, and reactive to light.  Extra ocular muscles intact. Conjunctivae/corneas clear. Neck: Supple, tracheostomy in place connected to the ventilator  Respiratory:   diminished entry bibasilarly  Cardiovascular:  Regular rhythm with normal S1/S2 without rubs or gallops. Abdomen:  Full, peg tube in place,soft, non-tender, non-distended with normal bowel sounds.  Flexi Seal in place  Musculoskeletal:  No clubbing, cyanosis  or edema bilaterally .   Neurologic: Alert and able to follow simple commands    Review of Labs and Diagnostic Testing:    Recent Results (from the past 24 hour(s))   Troponin    Collection Time: 12/19/21  9:05 AM   Result Value Ref Range    Troponin T 0.023 (A) ng/ml   CBC auto differential    Collection Time: 12/20/21  4:55 AM   Result Value Ref Range    WBC 12.1 (H) 4.8 - 10.8 thou/mm3    RBC 2.68 (L) 4.20 - 5.40 mill/mm3    Hemoglobin 7.9 (L) 12.0 - 16.0 gm/dl    Hematocrit 26.8 (L) 37.0 - 47.0 %    .0 (H) 81.0 - 99.0 fL    MCH 29.5 26.0 - 33.0 pg    MCHC 29.5 (L) 32.2 - 35.5 gm/dl    RDW-CV 17.0 (H) 11.5 - 14.5 %    RDW-SD 61.1 (H) 35.0 - 45.0 fL    Platelets 550 716 - 994 thou/mm3    MPV 11.1 9.4 - 12.4 fL    Seg Neutrophils 81.1 %    Lymphocytes 10.3 %    Monocytes 6.5 %    Eosinophils 0.5 %    Basophils 0.2 %    Immature Granulocytes 1.4 %    Segs Absolute 9.8 (H) 1.8 - 7.7 thou/mm3    Lymphocytes Absolute 1.2 1.0 - 4.8 thou/mm3    Monocytes Absolute 0.8 0.4 - 1.3 thou/mm3    Eosinophils Absolute 0.1 0.0 - 0.4 thou/mm3    Basophils Absolute 0.0 0.0 - 0.1 thou/mm3    Immature Grans (Abs) 0.17 (H) 0.00 - 0.07 thou/mm3    nRBC 0 /100 wbc   Basic Metabolic Panel    Collection Time: 12/20/21  4:55 AM   Result Value Ref Range    Sodium 151 (H) 135 - 145 meq/L Potassium 3.2 (L) 3.5 - 5.2 meq/L    Chloride 106 98 - 111 meq/L    CO2 37 (H) 23 - 33 meq/L    Glucose 106 70 - 108 mg/dL    BUN 62 (H) 7 - 22 mg/dL    CREATININE 1.0 0.4 - 1.2 mg/dL    Calcium 9.7 8.5 - 10.5 mg/dL   Anion Gap    Collection Time: 12/20/21  4:55 AM   Result Value Ref Range    Anion Gap 8.0 8.0 - 16.0 meq/L   Glomerular Filtration Rate, Estimated    Collection Time: 12/20/21  4:55 AM   Result Value Ref Range    Est, Glom Filt Rate 52 (A) ml/min/1.73m2       Radiology:     CT HEAD WO CONTRAST    Result Date: 12/18/2021  PROCEDURE: CT HEAD WO CONTRAST CLINICAL INFORMATION:ams COMPARISON: None TECHNIQUE: 5 mm axial imaging through the head without IV contrast. All CT scans at this facility use dose modulation, iterative reconstruction, and/or weight based dosing when appropriate to reduce the radiation dose to as low as reasonably achievable. FINDINGS: Mild cerebral volume loss. Periventricular and subcortical white matter hypodensities are seen. No ventriculomegaly. No midline shift or mass effect. No acute intracranial hemorrhage. No intracranial collection. Gray-white differentiation is unremarkable. The posterior fossa is unremarkable. The craniocervical junction is unremarkable. No acute bony abnormality. Polyp or mucosal retention cyst is seen in the left sphenoid sinus. Otherwise, the remaining visualized paranasal sinuses are clear. Marked mastoid effusions bilaterally. The orbits are unremarkable. Mild atrophy of the pituitary gland. 1. No acute intracranial hemorrhage. 2. White matter hypodensities that likely relate to chronic microvascular changes. 3. Mild cerebral volume loss. 4. Marked mastoid effusions bilaterally. **This report has been created using voice recognition software. It may contain minor errors which are inherent in voice recognition technology. ** Final report electronically signed by Dr De Verdugo on 12/18/2021 6:39 PM    XR CHEST PORTABLE    Result Date: 12/19/2021  AP chest Comparison: CR,SR - XR CHEST PORTABLE - 12/08/2021 01:00 AM EST Findings: Bilateral perihilar lung opacity similar to the prior exam. Large left pleural effusion has increased significantly. Cardiomegaly. Tracheostomy unchanged. Left subclavian central venous catheter has been removed. Percutaneous gastrostomy tube is likely in the upper body of stomach. No acute fracture. Impression: 1. Significant increase in left pleural effusion. Follow-up to clearing recommended. This document has been electronically signed by: Valentina Charles MD on 12/19/2021 05:14 AM    XR CHEST PORTABLE    Result Date: 12/18/2021  PROCEDURE: XR CHEST PORTABLE CLINICAL INFORMATION: ams. COMPARISON: Chest x-ray dated December 2021. TECHNIQUE: AP upright view of the chest. FINDINGS: There is a tracheotomy tube in place. There is moderate cardiomegaly. . Is atherosclerotic calcification aortic arch. .  There is increased density throughout both lung fields. There is a probable left pleural effusion. The pulmonary vascularity is increased. There is thoracic spondylosis. 1. Moderate cardiomegaly. Increased pulmonary vascularity. 2. Increased density throughout both lung fields. 3. Probable left pleural effusion. 4.. Tracheotomy tube in place. **This report has been created using voice recognition software. It may contain minor errors which are inherent in voice recognition technology. ** Final report electronically signed by DR Shan Jung on 12/18/2021 3:10 PM        ASSESMENT:      Active Problems:    C. difficile colitis    CHF (congestive heart failure) ([address]    Cardiomyopathy ([address]    UTI (urinary tract infection)    Hyperkalemia    Chronic respiratory failure ([address]  Resolved Problems:    * No resolved hospital problems. 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 awaiting ID service  to see on account of concurrent UTI.     Follow-up on

## 2021-12-21 ENCOUNTER — APPOINTMENT (OUTPATIENT)
Dept: GENERAL RADIOLOGY | Age: 86
DRG: 853 | End: 2021-12-21
Payer: MEDICARE

## 2021-12-21 ENCOUNTER — APPOINTMENT (OUTPATIENT)
Dept: ULTRASOUND IMAGING | Age: 86
DRG: 853 | End: 2021-12-21
Payer: MEDICARE

## 2021-12-21 PROBLEM — E43 SEVERE MALNUTRITION (HCC): Status: ACTIVE | Noted: 2021-12-21

## 2021-12-21 LAB
ALBUMIN FLUID: 1.3 GM/DL
ALBUMIN SERPL-MCNC: 2.1 G/DL (ref 3.5–5.1)
ANION GAP SERPL CALCULATED.3IONS-SCNC: 8 MEQ/L (ref 8–16)
BASOPHILS # BLD: 0.3 %
BASOPHILS ABSOLUTE: 0 THOU/MM3 (ref 0–0.1)
BLOOD CULTURE, ROUTINE: ABNORMAL
BODY FLUID RBC: ABNORMAL /CUMM
BUN BLDV-MCNC: 56 MG/DL (ref 7–22)
CALCIUM SERPL-MCNC: 9.5 MG/DL (ref 8.5–10.5)
CHARACTER, BODY FLUID: ABNORMAL
CHLORIDE BLD-SCNC: 104 MEQ/L (ref 98–111)
CO2: 35 MEQ/L (ref 23–33)
COLOR: ABNORMAL
CREAT SERPL-MCNC: 0.9 MG/DL (ref 0.4–1.2)
EOSINOPHIL # BLD: 0.5 %
EOSINOPHILS ABSOLUTE: 0.1 THOU/MM3 (ref 0–0.4)
ERYTHROCYTE [DISTWIDTH] IN BLOOD BY AUTOMATED COUNT: 17.2 % (ref 11.5–14.5)
ERYTHROCYTE [DISTWIDTH] IN BLOOD BY AUTOMATED COUNT: 63.2 FL (ref 35–45)
GFR SERPL CREATININE-BSD FRML MDRD: 59 ML/MIN/1.73M2
GLUCOSE BLD-MCNC: 128 MG/DL (ref 70–108)
GLUCOSE BLD-MCNC: 134 MG/DL (ref 70–108)
GLUCOSE BLD-MCNC: 134 MG/DL (ref 70–108)
GLUCOSE BLD-MCNC: 149 MG/DL (ref 70–108)
HCT VFR BLD CALC: 25.4 % (ref 37–47)
HEMOGLOBIN: 7.4 GM/DL (ref 12–16)
IMMATURE GRANS (ABS): 0.41 THOU/MM3 (ref 0–0.07)
IMMATURE GRANULOCYTES: 2.8 %
LD, FLUID: 124 U/L
LD: 306 U/L (ref 100–190)
LYMPHOCYTES # BLD: 7.2 %
LYMPHOCYTES ABSOLUTE: 1.1 THOU/MM3 (ref 1–4.8)
MCH RBC QN AUTO: 29.1 PG (ref 26–33)
MCHC RBC AUTO-ENTMCNC: 29.1 GM/DL (ref 32.2–35.5)
MCV RBC AUTO: 100 FL (ref 81–99)
MONOCYTES # BLD: 5.5 %
MONOCYTES ABSOLUTE: 0.8 THOU/MM3 (ref 0.4–1.3)
MONONUCLEAR CELLS BODY FLUID: 72 %
NUCLEATED RED BLOOD CELLS: 0 /100 WBC
ORGANISM: ABNORMAL
ORGANISM: ABNORMAL
PATHOLOGIST REVIEW: ABNORMAL
PH FLUID: 7.63
PLATELET # BLD: 235 THOU/MM3 (ref 130–400)
PMV BLD AUTO: 10.6 FL (ref 9.4–12.4)
POLYMORPHONUCLEAR CELLS BODY FLUID: 28 %
POTASSIUM SERPL-SCNC: 3.6 MEQ/L (ref 3.5–5.2)
PROTEIN FLUID: 2.6 GM/DL
RBC # BLD: 2.54 MILL/MM3 (ref 4.2–5.4)
SEG NEUTROPHILS: 83.7 %
SEGMENTED NEUTROPHILS ABSOLUTE COUNT: 12.3 THOU/MM3 (ref 1.8–7.7)
SODIUM BLD-SCNC: 147 MEQ/L (ref 135–145)
SPECIMEN: ABNORMAL
TOTAL NUCLEATED CELLS BODY FLUID: 773 /CUMM (ref 0–500)
TOTAL PROTEIN: 5.9 G/DL (ref 6.1–8)
TOTAL VOLUME RECEIVED BODY FLUID: 43 ML
WBC # BLD: 14.7 THOU/MM3 (ref 4.8–10.8)

## 2021-12-21 PROCEDURE — 82042 OTHER SOURCE ALBUMIN QUAN EA: CPT

## 2021-12-21 PROCEDURE — 71045 X-RAY EXAM CHEST 1 VIEW: CPT

## 2021-12-21 PROCEDURE — 32555 ASPIRATE PLEURA W/ IMAGING: CPT

## 2021-12-21 PROCEDURE — 89050 BODY FLUID CELL COUNT: CPT

## 2021-12-21 PROCEDURE — 83986 ASSAY PH BODY FLUID NOS: CPT

## 2021-12-21 PROCEDURE — 85025 COMPLETE CBC W/AUTO DIFF WBC: CPT

## 2021-12-21 PROCEDURE — 94761 N-INVAS EAR/PLS OXIMETRY MLT: CPT

## 2021-12-21 PROCEDURE — 2700000000 HC OXYGEN THERAPY PER DAY

## 2021-12-21 PROCEDURE — 2580000003 HC RX 258: Performed by: INTERNAL MEDICINE

## 2021-12-21 PROCEDURE — 82948 REAGENT STRIP/BLOOD GLUCOSE: CPT

## 2021-12-21 PROCEDURE — APPSS30 APP SPLIT SHARED TIME 16-30 MINUTES: Performed by: NURSE PRACTITIONER

## 2021-12-21 PROCEDURE — 2500000003 HC RX 250 WO HCPCS: Performed by: RADIOLOGY

## 2021-12-21 PROCEDURE — 87205 SMEAR GRAM STAIN: CPT

## 2021-12-21 PROCEDURE — 2060000000 HC ICU INTERMEDIATE R&B

## 2021-12-21 PROCEDURE — 87075 CULTR BACTERIA EXCEPT BLOOD: CPT

## 2021-12-21 PROCEDURE — 82040 ASSAY OF SERUM ALBUMIN: CPT

## 2021-12-21 PROCEDURE — 6360000002 HC RX W HCPCS: Performed by: INTERNAL MEDICINE

## 2021-12-21 PROCEDURE — 6370000000 HC RX 637 (ALT 250 FOR IP): Performed by: INTERNAL MEDICINE

## 2021-12-21 PROCEDURE — 36415 COLL VENOUS BLD VENIPUNCTURE: CPT

## 2021-12-21 PROCEDURE — 84157 ASSAY OF PROTEIN OTHER: CPT

## 2021-12-21 PROCEDURE — 88112 CYTOPATH CELL ENHANCE TECH: CPT

## 2021-12-21 PROCEDURE — 87070 CULTURE OTHR SPECIMN AEROBIC: CPT

## 2021-12-21 PROCEDURE — 94003 VENT MGMT INPAT SUBQ DAY: CPT

## 2021-12-21 PROCEDURE — 88305 TISSUE EXAM BY PATHOLOGIST: CPT

## 2021-12-21 PROCEDURE — 80048 BASIC METABOLIC PNL TOTAL CA: CPT

## 2021-12-21 PROCEDURE — 99222 1ST HOSP IP/OBS MODERATE 55: CPT | Performed by: SURGERY

## 2021-12-21 PROCEDURE — 84155 ASSAY OF PROTEIN SERUM: CPT

## 2021-12-21 PROCEDURE — 83615 LACTATE (LD) (LDH) ENZYME: CPT

## 2021-12-21 PROCEDURE — 99232 SBSQ HOSP IP/OBS MODERATE 35: CPT | Performed by: INTERNAL MEDICINE

## 2021-12-21 RX ORDER — LIDOCAINE HYDROCHLORIDE 20 MG/ML
5 INJECTION, SOLUTION INFILTRATION; PERINEURAL ONCE
Status: COMPLETED | OUTPATIENT
Start: 2021-12-21 | End: 2021-12-21

## 2021-12-21 RX ADMIN — LIDOCAINE HYDROCHLORIDE 5 ML: 20 INJECTION, SOLUTION INFILTRATION; PERINEURAL at 11:55

## 2021-12-21 RX ADMIN — Medication 125 MG: at 17:57

## 2021-12-21 RX ADMIN — METOPROLOL TARTRATE 50 MG: 50 TABLET, FILM COATED ORAL at 20:20

## 2021-12-21 RX ADMIN — Medication 125 MG: at 14:53

## 2021-12-21 RX ADMIN — ASPIRIN 325 MG: 325 TABLET ORAL at 08:07

## 2021-12-21 RX ADMIN — CEFTRIAXONE SODIUM 1000 MG: 1 INJECTION, POWDER, FOR SOLUTION INTRAMUSCULAR; INTRAVENOUS at 17:56

## 2021-12-21 RX ADMIN — MORPHINE SULFATE 2 MG: 2 INJECTION, SOLUTION INTRAMUSCULAR; INTRAVENOUS at 02:24

## 2021-12-21 RX ADMIN — ACETAMINOPHEN 650 MG: 325 TABLET ORAL at 20:20

## 2021-12-21 RX ADMIN — MORPHINE SULFATE 2 MG: 2 INJECTION, SOLUTION INTRAMUSCULAR; INTRAVENOUS at 08:07

## 2021-12-21 RX ADMIN — APIXABAN 2.5 MG: 2.5 TABLET, FILM COATED ORAL at 20:19

## 2021-12-21 RX ADMIN — DIAZEPAM 2.5 MG: 5 INJECTION, SOLUTION INTRAMUSCULAR; INTRAVENOUS at 10:06

## 2021-12-21 RX ADMIN — SODIUM HYPOCHLORITE: 1.25 SOLUTION TOPICAL at 08:07

## 2021-12-21 RX ADMIN — MORPHINE SULFATE 2 MG: 2 INJECTION, SOLUTION INTRAMUSCULAR; INTRAVENOUS at 14:53

## 2021-12-21 RX ADMIN — SODIUM CHLORIDE, PRESERVATIVE FREE 10 ML: 5 INJECTION INTRAVENOUS at 08:08

## 2021-12-21 RX ADMIN — METOPROLOL TARTRATE 50 MG: 50 TABLET, FILM COATED ORAL at 08:07

## 2021-12-21 RX ADMIN — Medication 125 MG: at 06:44

## 2021-12-21 RX ADMIN — CETIRIZINE HYDROCHLORIDE 10 MG: 10 TABLET, FILM COATED ORAL at 08:07

## 2021-12-21 RX ADMIN — DEXTROSE MONOHYDRATE: 50 INJECTION, SOLUTION INTRAVENOUS at 08:04

## 2021-12-21 ASSESSMENT — PAIN SCALES - GENERAL
PAINLEVEL_OUTOF10: 4
PAINLEVEL_OUTOF10: 5
PAINLEVEL_OUTOF10: 0
PAINLEVEL_OUTOF10: 3

## 2021-12-21 ASSESSMENT — PULMONARY FUNCTION TESTS
PIF_VALUE: 25
PIF_VALUE: 32
PIF_VALUE: 25.8
PIF_VALUE: 30.8
PIF_VALUE: 33.4

## 2021-12-21 ASSESSMENT — PAIN DESCRIPTION - PAIN TYPE: TYPE: ACUTE PAIN

## 2021-12-21 ASSESSMENT — PAIN DESCRIPTION - ONSET: ONSET: ON-GOING

## 2021-12-21 ASSESSMENT — PAIN DESCRIPTION - ORIENTATION: ORIENTATION: RIGHT

## 2021-12-21 ASSESSMENT — PAIN DESCRIPTION - PROGRESSION: CLINICAL_PROGRESSION: NOT CHANGED

## 2021-12-21 ASSESSMENT — PAIN DESCRIPTION - FREQUENCY: FREQUENCY: CONTINUOUS

## 2021-12-21 ASSESSMENT — PAIN - FUNCTIONAL ASSESSMENT: PAIN_FUNCTIONAL_ASSESSMENT: ACTIVITIES ARE NOT PREVENTED

## 2021-12-21 ASSESSMENT — PAIN DESCRIPTION - DESCRIPTORS: DESCRIPTORS: ACHING

## 2021-12-21 ASSESSMENT — PAIN SCALES - WONG BAKER: WONGBAKER_NUMERICALRESPONSE: 6

## 2021-12-21 ASSESSMENT — PAIN DESCRIPTION - LOCATION: LOCATION: BUTTOCKS

## 2021-12-21 NOTE — FLOWSHEET NOTE
12/21/21 0434   Provider Notification   Reason for Communication New orders   Provider Name jhony   Provider Notification Physician   Method of Communication Call   Response See orders   Notification Time [phone]     Notified of needing bilateral soft wrist restraints order renewed d/t patient continuing to try and pull at lines/tubes/trach. Order received to renew bilateral soft wrist restraint order.

## 2021-12-21 NOTE — CARE COORDINATION
DISCHARGE PLANNING UPDATE    Barriers to Discharge:   (+) Blood Culture: Staphylococcus Hominis, (+) Urine Culture: Proteus Mirabilis; monitor. Cardiology consulted for elevated troponin today. SGY following for possible coccyx necrotic wound I/D. 30% FIO2 Trach/Vent Settings: AC/VC 18 320 +5 Peep; monitor. IV AB, IVF continued; plans left thoracentesis today. PEG TF continued.  Palliative Care following for goals of care    Discharge Plan:   [facility] 2d from [place] chronic trach (11/10)/vent/PEG (11/17) corbin [place]; Chelsie in past; Chelsie QL eval pending; daughter/POA is Audrey; has restraints; collaborated yolanda Ortega

## 2021-12-21 NOTE — PROGRESS NOTES
Progress note: Infectious diseases    Patient - Marilu Swift,  Age - 80 y.o.    - 1934      Room Number - 4K-01/001-A   MRN -  [id number]   Acct # - [de-identified]  Date of Admission -  2021  1:15 PM    SUBJECTIVE:   Events noted. discussed with her daughter and her son. Had left thoracentesis. OBJECTIVE   VITALS    height is 5' (1.524 m) and weight is 146 lb 4.8 oz (66.4 kg). Her axillary temperature is 97.6 °F (36.4 °C). Her blood pressure is 101/63 and her pulse is 64. Her respiration is 18 and oxygen saturation is 97%. Wt Readings from Last 3 Encounters:   21 146 lb 4.8 oz (66.4 kg)   21 138 lb 7.2 oz (62.8 kg)   10/07/21 125 lb (56.7 kg)       I/O (24 Hours)    Intake/Output Summary (Last 24 hours) at 2021 1411  Last data filed at 2021 0650  Gross per 24 hour   Intake 1475.26 ml   Output 655 ml   Net 820.26 ml       General Appearance  Awake, alert, oriented,  Chronically sick looking. HEENT - normocephalic, atraumatic, pale conjunctiva,  anicteric sclera  Neck - Supple, no mass  Lungs -  Bilateral   air entry, no rhonchi, on vent  Cardiovascular - Heart sounds are normal.     Abdomen - soft, not distended, nontender,   Neurologic -chronically sick looking.   Skin -+ bruising or bleeding  Extremities - necrotic coccyx wound  Edema lower legs    MEDICATIONS:      sodium hypochlorite   Irrigation Daily    apixaban  2.5 mg Oral BID    vancomycin  125 mg Per G Tube 4 times per day    LORazepam  1 mg IntraVENous Once    aspirin  325 mg Oral Daily    cetirizine  10 mg Oral Daily    metoprolol tartrate  50 mg Oral BID    sodium chloride flush  5-40 mL IntraVENous 2 times per day    cefTRIAXone (ROCEPHIN) IV  1,000 mg IntraVENous Q24H    [Held by provider] furosemide  20 mg IntraVENous Q8H      dextrose 40 mL/hr at 21 0804    sodium chloride       potassium chloride **OR** potassium alternative oral replacement **OR** potassium chloride, diazePAM, morphine, sodium chloride flush, sodium chloride, acetaminophen **OR** acetaminophen, ondansetron **OR** ondansetron      LABS:     CBC:   Recent Labs     12/19/21  0504 12/20/21  0455 12/21/21  0435   WBC 14.1* 12.1* 14.7*   HGB 8.1* 7.9* 7.4*    238 235     BMP:    Recent Labs     12/19/21  0504 12/20/21  0455 12/21/21  0435   * 151* 147*   K 3.7 3.2* 3.6    106 104   CO2 34* 37* 35*   BUN 62* 62* 56*   CREATININE 1.0 1.0 0.9   GLUCOSE 84 106 134*     Calcium:  Recent Labs     12/21/21  0435   CALCIUM 9.5     Ionized Calcium:No results for input(s): IONCA in the last 72 hours. Magnesium:No results for input(s): MG in the last 72 hours. Phosphorus:No results for input(s): PHOS in the last 72 hours. BNP:No results for input(s): BNP in the last 72 hours. Glucose:  Recent Labs     12/20/21  1939 12/21/21  0621 12/21/21  1142   POCGLU 120* 134* 149*     HgbA1C: No results for input(s): LABA1C in the last 72 hours. INR:   Recent Labs     12/19/21  0504   INR 2.16*     Hepatic:   Recent Labs     12/21/21  0435   PROT 5.9*   LABALBU 2.1*     Amylase and Lipase:  Recent Labs     12/19/21  0504   LACTA 1.0     Lactic Acid:   Recent Labs     12/19/21  0504   LACTA 1.0     Troponin: No results for input(s): CKTOTAL, CKMB, TROPONINI in the last 72 hours. BNP: No results for input(s): BNP in the last 72 hours. CULTURES:   UA:   Recent Labs     12/18/21  1415 12/21/21  1113   SPECGRAV 1.017  --    PHUR 7.0  --    COLORU YELLOW ORANGE   PROTEINU 100*  --    BLOODU LARGE*  --    RBCUA 3-5  --    WBCUA 25-50  --    BACTERIA MANY  --    NITRU NEGATIVE  --    BILIRUBINUR NEGATIVE  --    UROBILINOGEN 0.2  --    KETUA NEGATIVE  --    LABCAST 8-15 C. GRAN  NONE SEEN  --      Micro:   Lab Results   Component Value Date    BC No growth-preliminary  12/18/2021    BC No growth-preliminary  12/18/2021         IMAGING: Problem list of patient:     Patient Active Problem List   Diagnosis Code    Sigmoid diverticulitis K57.32    Acute respiratory failure with hypoxia ([facility]) J96.01    COVID-19 U07.1    Acute congestive heart failure ([facility]) I50.9    C. difficile colitis A04.72    CHF (congestive heart failure) ([facility]) I50.9    Cardiomyopathy ([address] I42.9    UTI (urinary tract infection) N39.0    Hyperkalemia E87.5    Chronic respiratory failure ([facility]) J96.10    Decubitus ulcer L89.90    Ventilator dependence ([address] Z99.11    Severe malnutrition ([address] E43         ASSESSMENT/PLAN   Respiratory failure on vent  Severe cardiomyopathy  Hx of C.difficile infection  Malnutrition  Coccyx wound  UTI  Awaiting cardiology to see patient  Her prognosis is guarded. family want to continue treatment.         William Davis MD, MD, FACP 12/21/2021 2:11 PM

## 2021-12-21 NOTE — PROGRESS NOTES
[facility]'s Palliative Care           Progress Note    Patient Name:  Ben Dinh  Medical Record Number:  [id number]  YOB: 1934    Date:  12/21/2021  Time:  1:40 PM  Completed By:  Beth Gonzalez, RN, RN    Reason for Palliative Care Evaluation:  Goals of care    Current Issues:  []  Pain  []  Fatigue  []  Nausea  []  Anxiety  []  Depression  [x]  Shortness of Breath  []  Constipation  []  Appetite  []  Other:    Advance Directives  [] [facility] DNR Form  [] Living Will  [] Medical POA    Current Code Status  [x] Full Resuscitation  [] DNR-Comfort Care-Arrest  [] DNR-Comfort Care  [] Limited   [] No CPR   [] No shock   [] No ET intubation/reintubation   [] No resuscitative medications   [] Other limitation:    Performance Status:  Palliative Performance Scale:  ___70%  Ambulation reduced; Some disease; Can't do normal job or work; intake normal or reduced; can do full self care; LOC full  ___60%  Ambulation reduced; Significant disease; Can't do hobbies/housework; intake normal or reduced; occasional assist; LOC full/confusion  ___50%  Mainly sit/lie; Extensive disease; Can't do any work; Considerable assist; intake normal or reduced; LOC full/confusion  ___40%  Mainly in bed; Extensive disease; Mainly assist; intake normal or reduced; LOC full/confusion   ___30%  Bed Bound; Extensive disease; Total care; intake reduced; LOC full/confusion  ___20%  Bed Bound; Extensive disease; Total care; intake minimal; Drowsy/coma  _X_10%  Bed Bound; Extensive disease; Total care; Mouth care only; Drowsy/coma  ___0       Death      Family/Patient Discussion:  Received consult for code status conversation. Per chart review patient has had prolonged hospital stay due to COVID resulting in trach and peg tube. Patient was recently sent from [place] to [facility]. Patient was only at [facility] for less than 2 days    In room to speak with patient and family. Daughter, Stan Adair, at bedside.  Patient resting in bed, eyes closed, currently on ventilator. Margarette Erickson thoroughly updated this RN on patient's complete recent hospitalization. Margarette Erickson stated patient had been very active prior to previous admission and family was very hopeful of getting patient back home. Margarette Erickson stated that unfortunately patient has had multiple set backs. Margarette Erickson stated she and her family agreed if patient was showing no signs of improvement family was planning to change plan of care. Margarette Erickson stated she and her family agreed to keep the patient in the ECF for 100 days in hopes of improvement with therapy, otherwise just keep patient comfortable. Supported Margarette Erickson with keeping the patient's wishes in mind. Margarette Erickson stated the patient was very active and would never want to live the way she currently was for a prolong period of time. Margarette Erickson very realistic of possible outcome. Margaertte Erickson stated she and her family were planning to meet at the [facility] to discuss code status of patient. Offered for this RN to be present for conversation. Margarette Erickson stated she and her family would like to speak privately. Offered Code Status Review sheet and palliative care pamphlet. Audrey appreciative of information. Asked Margarette Erickson if this RN could follow up tomorrow, Margarette Erickson agreed. Denied further questions at this time.      Plan/Follow-Up:  Will follow up with family tomorrow     Yanna Plata, RN, RN  12/21/2021,  1:40 PM

## 2021-12-21 NOTE — PROGRESS NOTES
[place] [facility]    Patient - Fletcher Yeh   MRN -  [id number]   [facility] # - [de-identified]   - 1934      Date of Admission -  2021  1:15 PM  Date of evaluation -  2021  Room - --A   Hospital Day -  Sakina Baxter MD Primary Care Physician - Norma Granados MD   Chief Complaint   Acute on chronic respiratory failure with hypoxia  Active Hospital Problem List      Active Hospital Problems    Diagnosis Date Noted    Decubitus ulcer [L89.90] 2021    Ventilator dependence ([address] [Z99.11]     C. difficile colitis [A04.72] 2021    CHF (congestive heart failure) ([address] [I50.9] 2021    Cardiomyopathy ([address] [I42.9] 2021    UTI (urinary tract infection) [N39.0] 2021    Hyperkalemia [E87.5] 2021    Chronic respiratory failure ([address] [J96.10] 2021     HPI   Fletcher Yeh is a 80 y.o. female with a past medical history of chronic hypoxic respiratory failures status post tracheostomy on chronic mechanical ventilation secondary to recent Covid 19 pneumonia with ARDS, cardiomyopathy with ejection fraction 25%, hypertension, atrial fibrillation, acute kidney injury, C. difficile colitis and GI bleed. Patient was discharged from the intensive care unit to long-term acute care facility/[facility] where she completed her therapy but was unable to wean off the ventilator and was later discharged to a chronic vent facility on . She was sent back from the chronic vent facility due to altered mental status and agitation. Pulmonary consulted for ventilator management. She is currently on [facility] with a PEEP of 5 and FiO2 30%. Secretions are moderate.   X-ray showed worsened left lower lobe atelectasis/effusion      Past 24 hrs   -On vent in NAD   -tolerated thoracentesis today approx 600 cc removed   -Post procedure CXR no PTX  ROS limited 2/2 AMS    Past Medical History         Diagnosis Date    Anxiety     Arthritis     Bronchitis     Diverticulitis of colon     Hypertension       Past Surgical History           Procedure Laterality Date    APPENDECTOMY      CHOLECYSTECTOMY      GASTROSTOMY TUBE PLACEMENT N/A 2021    EGD PEG TUBE PLACEMENT performed by Norman Young MD at [address] [place] [address] DIET; Regular; Low Microbial  ADULT TUBE FEEDING; PEG; Other Tube Feeding (specify); nepro; Continuous; 40; No; 30; Q 4 hours  Allergies    Sulfa antibiotics, Gluten meal, Metronidazole, and Milk-related compounds  Social History     Social History     Socioeconomic History    Marital status:      Spouse name: Not on file    Number of children: Not on file    Years of education: Not on file    Highest education level: Not on file   Occupational History    Not on file   Tobacco Use    Smoking status: Former Smoker     Packs/day: 1.00     Years: 15.00     Pack years: 15.00     Types: Cigarettes     Quit date: 12/10/1977     Years since quittin.0    Smokeless tobacco: Never Used   Substance and Sexual Activity    Alcohol use: No    Drug use: No    Sexual activity: Not on file   Other Topics Concern    Not on file   Social History Narrative    Not on file     Social Determinants of Health     Financial Resource Strain:     Difficulty of Paying Living Expenses: Not on file   Food Insecurity:     Worried About Running Out of Food in the Last Year: Not on file    Caitlin of Food in the Last Year: Not on file   Transportation Needs:     Lack of Transportation (Medical): Not on file    Lack of Transportation (Non-Medical):  Not on file   Physical Activity:     Days of Exercise per Week: Not on file    Minutes of Exercise per Session: Not on file   Stress:     Feeling of Stress : Not on file   Social Connections:     Frequency of Communication with Friends and Family: Not on file    Frequency of Social Gatherings with Friends and Family: Not on file    Attends [employer] Services: Not on file    Active Member of Clubs or Organizations: Not on file    Attends Club or Organization Meetings: Not on file    Marital Status: Not on file   Intimate Partner Violence:     Fear of Current or Ex-Partner: Not on file    Emotionally Abused: Not on file    Physically Abused: Not on file    Sexually Abused: Not on file   Housing Stability:     Unable to Pay for Housing in the Last Year: Not on file    Number of [place] in the Last Year: Not on file    Unstable Housing in the Last Year: Not on file     Family History    History reviewed. No pertinent family history. Sleep History    No History  ROS    Unobtainable due to trach and vent  Meds    Current Medications    sodium hypochlorite   Irrigation Daily    [Held by provider] apixaban  2.5 mg Oral BID    vancomycin  125 mg Per G Tube 4 times per day    LORazepam  1 mg IntraVENous Once    aspirin  325 mg Oral Daily    cetirizine  10 mg Oral Daily    metoprolol tartrate  50 mg Oral BID    sodium chloride flush  5-40 mL IntraVENous 2 times per day    cefTRIAXone (ROCEPHIN) IV  1,000 mg IntraVENous Q24H    [Held by provider] furosemide  20 mg IntraVENous Q8H     potassium chloride **OR** potassium alternative oral replacement **OR** potassium chloride, diazePAM, morphine, sodium chloride flush, sodium chloride, acetaminophen **OR** acetaminophen, ondansetron **OR** ondansetron  IV Drips/Infusions   dextrose 40 mL/hr at 12/21/21 0804    heparin (porcine)      sodium chloride       Vitals    Vitals    height is 5' (1.524 m) and weight is 146 lb 4.8 oz (66.4 kg). Her axillary temperature is 99 °F (37.2 °C). Her blood pressure is 147/64 (abnormal) and her pulse is 65. Her respiration is 24 and oxygen saturation is 96%.      O2 Flow Rate (L/min): 6 L/min  I/O    Intake/Output Summary (Last 24 hours) at 12/21/2021 1102  Last data filed at 12/21/2021 0650  Gross per 24 hour   Intake 2121.66 ml   Output 1105 ml   Net 1016.66 ml     Patient Vitals for the past 96 hrs (Last 3 readings):   Weight   12/21/21 0314 146 lb 4.8 oz (66.4 kg)   12/20/21 0520 138 lb (62.6 kg)   12/18/21 1315 128 lb (58.1 kg)     Exam   Physical Exam   Constitutional: No distress on vent via trach. Patient appears elderly and chronically ill. Head: Normocephalic and atraumatic. Mouth/Throat: Oropharynx is clear and moist.  No oral thrush. Eyes: Conjunctivae are normal. Pupils are equal, round. No scleral icterus. Neck: Neck supple. No tracheal deviation present. Cardiovascular: S1 and S2 with no murmur. No peripheral edema  Pulmonary/Chest: Normal effort with bilateral air entry, mild bibasilar rales. No stridor. No respiratory distress. Patient exhibits no tenderness. Abdominal: Soft. Bowel sounds audible. No distension or tenderness to palp.    Musculoskeletal: Moves all extremities  Neurological: Patient is arousable to voice non-verbal does not follow commands       Labs   ABG  Lab Results   Component Value Date    PH 7.38 12/18/2021    PO2 62 12/18/2021    PCO2 62 12/18/2021    HCO3 37 12/18/2021    O2SAT 90 12/18/2021     Lab Results   Component Value Date    IFIO2 6 12/18/2021    MODE PC 10/30/2021    SETTIDVOL 320 12/18/2021    SETPEEP 5.0 12/18/2021     CBC  Recent Labs     12/19/21  0504 12/20/21  0455 12/21/21  0435   WBC 14.1* 12.1* 14.7*   RBC 2.76* 2.68* 2.54*   HGB 8.1* 7.9* 7.4*   HCT 27.3* 26.8* 25.4*   MCV 98.9 100.0* 100.0*   MCH 29.3 29.5 29.1   MCHC 29.7* 29.5* 29.1*    238 235   MPV 10.7 11.1 10.6      BMP  Recent Labs     12/19/21  0504 12/20/21  0455 12/21/21  0435   * 151* 147*   K 3.7 3.2* 3.6    106 104   CO2 34* 37* 35*   BUN 62* 62* 56*   CREATININE 1.0 1.0 0.9   GLUCOSE 84 106 134*   CALCIUM 9.6 9.7 9.5     LFT  Recent Labs     12/18/21  1403   AST 51*   ALT 19   BILITOT 0.5   ALKPHOS 168*     TROP  Lab Results   Component Value Date    TROPONINT 0.023 12/19/2021    TROPONINT 0.021 12/19/2021 TROPONINT 0.018 12/19/2021     BNP  Lab Results   Component Value Date    PROBNP 07285.0 12/18/2021    PROBNP > 09109.0 11/20/2021    PROBNP 36063.0 11/01/2021     D-Dimer  No results found for: DDIMER  Lactic Acid  Recent Labs     12/19/21  0504   LACTA 1.0     INR  Recent Labs     12/18/21  1403 12/19/21  0504   INR 2.06* 2.16*     PTT  No results for input(s): APTT in the last 72 hours. Glucose  Recent Labs     12/20/21  1156 12/20/21  1939 12/21/21  0621   POCGLU 129* 120* 134*     UA   Recent Labs     12/18/21  1415   SPECGRAV 1.017   PHUR 7.0   COLORU YELLOW   PROTEINU 100*   BLOODU LARGE*   RBCUA 3-5   WBCUA 25-50   BACTERIA MANY   NITRU NEGATIVE   BILIRUBINUR NEGATIVE   UROBILINOGEN 0.2   KETUA NEGATIVE   LABCAST 8-15 C. GRAN  NONE SEEN   . Results for Ellis Smith (MRN [id number]) as of 12/21/2021 15:11   Ref. Range 12/21/2021 11:13   Character, Body Fluid Unknown BLOODY   LD, Fluid Latest Units: U/L 124   pH, Fluid Unknown 7.63   Protein, Fluid Latest Units: gm/dl 2.6   Albumin, Fluid Latest Units: gm/dl 1.3   Body Fluid RBC Latest Units: /cumm 57771   Total Nucleated cells Body Fluid Latest Ref Range: 0 - 500 /cumm 773 (H)   Total Volume Received Body Fluid Latest Units: ml 43.0     Serum total protein: 5.9  Serum LDH: 306  Serum albumin: 2.1    Total protein ratio i.e Pleural fluid total protein/ Serum Total protein: 2.6/5.9=0.44  LDH ratio i.e Pleural fluid LDH/ Serum LDH: 124/306=0.41  Albumin gradient 2.1-1.3=0.8    Cytology-Pending   Body fluid culture-Pending     PFTs   No records  Echo     Summary   Ejection fraction is visually estimated at 25%. There was severe global hypokinesis of the left ventricle. Left Ventricular size is Moderately increased . The aortic valve leaflets were not well visualized. Aortic valve appears tricuspid. Thickened aortic valve leaflets noted. Aortic valve leaflets are Moderately calcified.   Cultures    Procalcitonin  Lab Results   Component Value and examination was performed. The above evaluation and note has been reviewed. Labs and radiographs were reviewed. I Have discussed with Mr. Yandy Rothman CNP about this patient in detail. The above assessment and plan has been reviewed. Please see my modifications mentioned below.      My modifications:  Chest x-ray significantly improved after doing thoracentesis on left side of the chest.  Wean patient off ventilator as tolerated  Follow pleural fluid cytology and cultures    Sanjay Reece MD 12/21/2021 6:40 PM

## 2021-12-21 NOTE — CONSULTS
General Surgery Consult Note  Jorge Alberto Chisholm DO    Pt Name: Jose Raul Conroy  MRN: [id number]  YOB: 1934  Date of evaluation: 12/21/2021  Primary Care Physician: Tyra Reyes MD  Patient evaluated at the request of  Dr. Tyler Malin  Reason for evaluation: necrotic decubitus ulcer  IMPRESSIONS:   1. Sacral decubitus with necrosis  2. C. Diff colitis  3. Acute on chronic respiratory failure  4. Left pleural effusion  5. Cardiomyopathy with EF 25%  6. Recent COVID with ARDS. has Acute congestive heart failure (HCC) on their pertinent problem list.  PLANS:   1. Antibiotics being managed by ID  2. Was on Eliquis as of yesterday, converted to IV heparin due to planned thoracentesis   3. Pt would benefit from debridement if continued to be aggressive about care. SUBJECTIVE:   History of Chief Complaint:    Teresa Ibarra is a 80 y. o.female who presented with decreased interaction. She was recently treated for Covid ARDS, failed vent wean and had a trach and PEG placed. After, failing vent wean, she was transferred [facility]. She is still dependant. Over this course, she has developed a sacral decubitus which no has necrotic tissue present. She has cardiomyopathy with an EF 25%, was on Eliquis until last night. Has planned thoracentesis today. Past Medical History   has a past medical history of Anxiety, Arthritis, Bronchitis, Diverticulitis of colon, and Hypertension. Past Surgical History   has a past surgical history that includes Appendectomy; Cholecystectomy; and Gastrostomy tube placement (N/A, 11/17/2021). Medications  Prior to Admission medications    Medication Sig Start Date End Date Taking?  Authorizing Provider   metoprolol tartrate (LOPRESSOR) 50 MG tablet 50 mg by PEG Tube route 3 times daily Hold for SBP <100 or HR <60   Yes Historical Provider, MD   acetaminophen (TYLENOL) 325 MG tablet 650 mg by PEG Tube route every 6 hours as needed for Pain   Yes Historical Provider, MD acetic acid 0.25 % irrigation Irrigate with 150 mLs as directed See Admin Instructions Every night shift Tue, Fri for sediment   Yes Historical Provider, MD   amiodarone (PACERONE) 100 MG tablet 100 mg by PEG Tube route daily   Yes Historical Provider, MD   apixaban (ELIQUIS) 5 MG TABS tablet 5 mg by Per G Tube route 2 times daily   Yes Historical Provider, MD   polyvinyl alcohol (LIQUIFILM TEARS) 1.4 % ophthalmic solution Place 2 drops into both eyes every 4 hours as needed   Yes Historical Provider, MD   sodium hypochlorite (DAKINS) 0.125 % SOLN external solution Apply topically See Admin Instructions Apply to sacral wound topically every shift   Yes Historical Provider, MD   famotidine (PEPCID) 20 MG tablet 20 mg by PEG Tube route daily   Yes Historical Provider, MD   polyethylene glycol (GLYCOLAX) 17 g packet Take 17 g by mouth daily as needed for Constipation   Yes Historical Provider, MD   OLANZapine (ZYPREXA) 5 MG tablet 5 mg by PEG Tube route nightly   Yes Historical Provider, MD   potassium bicarb-citric acid (EFFER-K) 20 MEQ TBEF effervescent tablet Take 2 tablets by mouth every 4 hours   Yes Historical Provider, MD   Lactobacillus (ACIDOPHILUS) CAPS capsule 1 capsule by PEG Tube route daily   Yes Historical Provider, MD   senna (SENOKOT) 8.6 MG tablet 1 tablet by PEG Tube route daily as needed for Constipation   Yes Historical Provider, MD    Scheduled Meds:   sodium hypochlorite   Irrigation Daily    [Held by provider] apixaban  2.5 mg Oral BID    vancomycin  125 mg Per G Tube 4 times per day    LORazepam  1 mg IntraVENous Once    aspirin  325 mg Oral Daily    cetirizine  10 mg Oral Daily    metoprolol tartrate  50 mg Oral BID    sodium chloride flush  5-40 mL IntraVENous 2 times per day    cefTRIAXone (ROCEPHIN) IV  1,000 mg IntraVENous Q24H    [Held by provider] furosemide  20 mg IntraVENous Q8H     Continuous Infusions:   dextrose 40 mL/hr at 12/21/21 0804    heparin (porcine)      None   CARDIOVASCULAR: Heart sounds are normal.  Regular rate and rhythm without murmur, gallop or rub. Normal S1 and S2. Carotid and femoral pulses 2+/4 and equal bilaterally. ABDOMEN: Normal shape. PEG  present. Normal bowel sounds. No bruits. Soft, nondistended, no masses or organomegaly. no evidence of hernia. Percussion: Normal without hepatosplenomegally. Tenderness: absent. RECTAL: deferred, not clinically indicated  NEUROLOGIC: Unable to assess  EXTREMITIES: no cyanosis, no clubbing and no edema. LABS:     Recent Labs     12/18/21  1403 12/18/21  1415 12/18/21  1433 12/19/21  0504 12/20/21  0455 12/21/21  0435   WBC  --   --    < > 14.1* 12.1* 14.7*   HGB  --   --    < > 8.1* 7.9* 7.4*   HCT  --   --    < > 27.3* 26.8* 25.4*   PLT  --   --    < > 234 238 235     --    < > 149* 151* 147*   K 5.6*  --    < > 3.7 3.2* 3.6   CL 99  --    < > 103 106 104   CO2 34*  --    < > 34* 37* 35*   BUN 62*  --    < > 62* 62* 56*   CREATININE 1.1  --    < > 1.0 1.0 0.9   CALCIUM 10.6*  --    < > 9.6 9.7 9.5   INR 2.06*  --   --  2.16*  --   --    AST 51*  --   --   --   --   --    ALT 19  --   --   --   --   --    BILITOT 0.5  --   --   --   --   --    LACTA  --   --   --  1.0  --   --    NITRU  --  NEGATIVE  --   --   --   --    COLORU  --  YELLOW  --   --   --   --    BACTERIA  --  MANY  --   --   --   --     < > = values in this interval not displayed. RADIOLOGY:   I have personally reviewed the following films:  XR CHEST PORTABLE   Final Result   Impression:   1. Significant increase in left pleural effusion. Follow-up to clearing    recommended. This document has been electronically signed by: Shyann Malone MD on 12/19/2021 05:14 AM      CT HEAD WO CONTRAST   Final Result   1. No acute intracranial hemorrhage. 2. White matter hypodensities that likely relate to chronic microvascular changes. 3. Mild cerebral volume loss. 4. Marked mastoid effusions bilaterally.             **This

## 2021-12-21 NOTE — PROGRESS NOTES
Comprehensive Nutrition Assessment    Type and Reason for Visit:  Initial,Wound,Consult,Positive Nutrition Screen    Nutrition Recommendations/Plan:   Continue with TF- fluids per Physician. If/when base fluids turned off, may consider water flush of 250 ml every 6 hrs to meet estimated fluid needs. [email or web address] ml/hr provides 1728 (30), 78 gm pro/d (1.3), 697 ml water from TF  May consider ZnSO4 and Vit C supplements to promote wound healing. Nutrition Assessment:    Pt. severely malnourished AEB criteria listed below. At risk for further nutritional compromise r/t wound healing and underlying medical condition (CHF, late phase covid, advanced age, necrotic wound, PEG 11/17, trach 11/10). Nutrition recommendations/interventions as above. Malnutrition Assessment:  Malnutrition Status:  Severe malnutrition    Context:  Chronic Illness     Findings of the 6 clinical characteristics of malnutrition:  Energy Intake:  Mild decrease in energy intake (Comment) (Over past days)  Body Fat Loss:   (Moderate) Orbital   Muscle Mass Loss:   (Moderate) Temples (temporalis),Clavicles (pectoralis & deltoids),Calf (gastrocnemius)    Estimated Daily Nutrient Needs:  Energy (kcal):  9320-1044 julián/d (25-35 julián/kg of admit wt 58 kg); Weight Used for Energy Requirements:  Admission     Protein (g):  70-99 gm pro/d (1.2-1.7 gm pro/kg admit wt 58 kg); Weight Used for Protein Requirements:  Admission        Fluid (ml/day):  Per Physician        Nutrition Related Findings:     Pt from LTAC, reported not have gotten enteral feeds on day of transfer/admit. Presented with necrosis on wounds. Did not respond to voice this AM at visit. Enteral feeds were infusing (at goal) as well as [email or web address] ml/hr. Labs reviewed, (12/21) Na147, BUN 56, Cr 0.9, Gluc 134. Wounds:  Multiple,Unstageable (Wound Care on board.   Noted necrosis.)       Current Nutrition Therapies:    Current Tube Feeding (TF) Orders:  · Feeding Route: PEG  · Formula: Renal Formula  · Schedule: Continuous   · Water Flushes: 30 ml bolus every 4 hrs + [email or web address] ml/hr running at time of visit  · Current TF & Flush Orders Provides: [email or web address] ml/hr provides 1728 (30), 78 gm pro/d (1.3), 697 ml water from TF + 180 ml water flush = 877 ml water from TF and flushes  · Goal TF & Flush Orders Provides: When IVF off, may consider water flushes of 250 ml every 6 hrs [email or web address] ml/hr to provide 1728 julián/d (30 julián/kg), 78 gm pro/d (1.3 gm pro/kg), 697 ml water from TF + 1000 ml water flush to provide 1697 ml fluids/d. Anthropometric Measures:  · Height: 5' (152.4 cm)  · Current Body Weight: 146 lb 4.8 oz (66.4 kg) (12/21/21, +1+2 Pitting edema)   · Admission Body Weight: 128 lb (58.1 kg) (12/18/21, unsure of source)    · Usual Body Weight: 125 lb (56.7 kg) (10/7/21)     · Ideal Body Weight: 100 lbs; % Ideal Body Weight     · BMI: 28.6    Nutrition Diagnosis:   · Inadequate oral intake related to increase demand for energy/nutrients as evidenced by wounds    Nutrition Interventions:   Food and/or Nutrient Delivery:  Continue Current Tube Feeding ([email or web address] ml/hr to provide)  Nutrition Education/Counseling:  No recommendation at this time   Coordination of Nutrition Care:  Continue to monitor while inpatient    Goals: Wili enteral feed during LOS to promote wound healing. Nutrition Monitoring and Evaluation:   Behavioral-Environmental Outcomes:  None Identified   Food/Nutrient Intake Outcomes:  Enteral Nutrition Intake/Tolerance  Physical Signs/Symptoms Outcomes:  Biochemical Data     Discharge Planning:     Too soon to determine     Electronically signed by Gagandeep Ace RD LD [address] [place]  on 12/21/21 at 11:00 AM EST    Contact: [phone]

## 2021-12-22 LAB
ANION GAP SERPL CALCULATED.3IONS-SCNC: 6 MEQ/L (ref 8–16)
BASOPHILS # BLD: 0.3 %
BASOPHILS ABSOLUTE: 0 THOU/MM3 (ref 0–0.1)
BUN BLDV-MCNC: 52 MG/DL (ref 7–22)
CALCIUM SERPL-MCNC: 9.6 MG/DL (ref 8.5–10.5)
CHLORIDE BLD-SCNC: 102 MEQ/L (ref 98–111)
CO2: 36 MEQ/L (ref 23–33)
CREAT SERPL-MCNC: 0.9 MG/DL (ref 0.4–1.2)
EOSINOPHIL # BLD: 2 %
EOSINOPHILS ABSOLUTE: 0.2 THOU/MM3 (ref 0–0.4)
ERYTHROCYTE [DISTWIDTH] IN BLOOD BY AUTOMATED COUNT: 17.2 % (ref 11.5–14.5)
ERYTHROCYTE [DISTWIDTH] IN BLOOD BY AUTOMATED COUNT: 61.6 FL (ref 35–45)
GFR SERPL CREATININE-BSD FRML MDRD: 59 ML/MIN/1.73M2
GLUCOSE BLD-MCNC: 115 MG/DL (ref 70–108)
GLUCOSE BLD-MCNC: 115 MG/DL (ref 70–108)
GLUCOSE BLD-MCNC: 117 MG/DL (ref 70–108)
HCT VFR BLD CALC: 24.1 % (ref 37–47)
HEMOGLOBIN: 7.1 GM/DL (ref 12–16)
IMMATURE GRANS (ABS): 0.43 THOU/MM3 (ref 0–0.07)
IMMATURE GRANULOCYTES: 3.6 %
LYMPHOCYTES # BLD: 9 %
LYMPHOCYTES ABSOLUTE: 1.1 THOU/MM3 (ref 1–4.8)
MCH RBC QN AUTO: 29.3 PG (ref 26–33)
MCHC RBC AUTO-ENTMCNC: 29.5 GM/DL (ref 32.2–35.5)
MCV RBC AUTO: 99.6 FL (ref 81–99)
MONOCYTES # BLD: 5.3 %
MONOCYTES ABSOLUTE: 0.6 THOU/MM3 (ref 0.4–1.3)
NUCLEATED RED BLOOD CELLS: 0 /100 WBC
ORGANISM: ABNORMAL
PLATELET # BLD: 247 THOU/MM3 (ref 130–400)
PMV BLD AUTO: 10.8 FL (ref 9.4–12.4)
POTASSIUM SERPL-SCNC: 3.2 MEQ/L (ref 3.5–5.2)
RBC # BLD: 2.42 MILL/MM3 (ref 4.2–5.4)
SEG NEUTROPHILS: 79.8 %
SEGMENTED NEUTROPHILS ABSOLUTE COUNT: 9.5 THOU/MM3 (ref 1.8–7.7)
SODIUM BLD-SCNC: 144 MEQ/L (ref 135–145)
URINE CULTURE, ROUTINE: ABNORMAL
WBC # BLD: 11.9 THOU/MM3 (ref 4.8–10.8)

## 2021-12-22 PROCEDURE — 2580000003 HC RX 258: Performed by: INTERNAL MEDICINE

## 2021-12-22 PROCEDURE — 99232 SBSQ HOSP IP/OBS MODERATE 35: CPT | Performed by: SURGERY

## 2021-12-22 PROCEDURE — 36415 COLL VENOUS BLD VENIPUNCTURE: CPT

## 2021-12-22 PROCEDURE — APPSS30 APP SPLIT SHARED TIME 16-30 MINUTES: Performed by: NURSE PRACTITIONER

## 2021-12-22 PROCEDURE — 6370000000 HC RX 637 (ALT 250 FOR IP): Performed by: INTERNAL MEDICINE

## 2021-12-22 PROCEDURE — 2060000000 HC ICU INTERMEDIATE R&B

## 2021-12-22 PROCEDURE — 82948 REAGENT STRIP/BLOOD GLUCOSE: CPT

## 2021-12-22 PROCEDURE — 85025 COMPLETE CBC W/AUTO DIFF WBC: CPT

## 2021-12-22 PROCEDURE — 6370000000 HC RX 637 (ALT 250 FOR IP): Performed by: REGISTERED NURSE

## 2021-12-22 PROCEDURE — 99232 SBSQ HOSP IP/OBS MODERATE 35: CPT | Performed by: INTERNAL MEDICINE

## 2021-12-22 PROCEDURE — 80048 BASIC METABOLIC PNL TOTAL CA: CPT

## 2021-12-22 PROCEDURE — 94003 VENT MGMT INPAT SUBQ DAY: CPT

## 2021-12-22 PROCEDURE — 6360000002 HC RX W HCPCS: Performed by: INTERNAL MEDICINE

## 2021-12-22 RX ORDER — CIPROFLOXACIN 500 MG/1
500 TABLET, FILM COATED ORAL 2 TIMES DAILY
Status: COMPLETED | OUTPATIENT
Start: 2021-12-22 | End: 2021-12-26

## 2021-12-22 RX ADMIN — METOPROLOL TARTRATE 50 MG: 50 TABLET, FILM COATED ORAL at 10:26

## 2021-12-22 RX ADMIN — MORPHINE SULFATE 2 MG: 2 INJECTION, SOLUTION INTRAMUSCULAR; INTRAVENOUS at 10:26

## 2021-12-22 RX ADMIN — Medication 125 MG: at 12:55

## 2021-12-22 RX ADMIN — CETIRIZINE HYDROCHLORIDE 10 MG: 10 TABLET, FILM COATED ORAL at 10:26

## 2021-12-22 RX ADMIN — SODIUM HYPOCHLORITE: 1.25 SOLUTION TOPICAL at 10:26

## 2021-12-22 RX ADMIN — POTASSIUM BICARBONATE 40 MEQ: 782 TABLET, EFFERVESCENT ORAL at 10:30

## 2021-12-22 RX ADMIN — CIPROFLOXACIN 500 MG: 500 TABLET, FILM COATED ORAL at 14:36

## 2021-12-22 RX ADMIN — CIPROFLOXACIN 500 MG: 500 TABLET, FILM COATED ORAL at 19:56

## 2021-12-22 RX ADMIN — Medication 125 MG: at 05:01

## 2021-12-22 RX ADMIN — METOPROLOL TARTRATE 50 MG: 50 TABLET, FILM COATED ORAL at 19:55

## 2021-12-22 RX ADMIN — MORPHINE SULFATE 2 MG: 2 INJECTION, SOLUTION INTRAMUSCULAR; INTRAVENOUS at 02:39

## 2021-12-22 RX ADMIN — Medication 125 MG: at 00:51

## 2021-12-22 RX ADMIN — DIAZEPAM 2.5 MG: 5 INJECTION, SOLUTION INTRAMUSCULAR; INTRAVENOUS at 06:19

## 2021-12-22 RX ADMIN — Medication 125 MG: at 17:51

## 2021-12-22 RX ADMIN — Medication 125 MG: at 23:27

## 2021-12-22 RX ADMIN — DEXTROSE MONOHYDRATE: 50 INJECTION, SOLUTION INTRAVENOUS at 12:58

## 2021-12-22 ASSESSMENT — PAIN SCALES - GENERAL
PAINLEVEL_OUTOF10: 4
PAINLEVEL_OUTOF10: 0
PAINLEVEL_OUTOF10: 0

## 2021-12-22 ASSESSMENT — PAIN SCALES - WONG BAKER
WONGBAKER_NUMERICALRESPONSE: 0
WONGBAKER_NUMERICALRESPONSE: 0
WONGBAKER_NUMERICALRESPONSE: 4

## 2021-12-22 ASSESSMENT — PULMONARY FUNCTION TESTS
PIF_VALUE: 26
PIF_VALUE: 26
PIF_VALUE: 32.7
PIF_VALUE: 28
PIF_VALUE: 35
PIF_VALUE: 28.1

## 2021-12-22 ASSESSMENT — PAIN DESCRIPTION - PAIN TYPE: TYPE: ACUTE PAIN

## 2021-12-22 NOTE — PLAN OF CARE
Problem: Non-Violent Restraints  Goal: Removal from restraints as soon as assessed to be safe  Outcome: Ongoing  Note: Patient still inappropriate to remove at this time, will continue to monitor  Goal: No harm/injury to patient while restraints in use  Outcome: Ongoing  Note: Q2 checks and ROM  Goal: Patient's dignity will be maintained  Outcome: Ongoing     Problem: Discharge Planning:  Goal: Discharged to appropriate level of care  Description: Discharged to appropriate level of care  Outcome: Ongoing     Problem: Pain:  Goal: Pain level will decrease  Description: Pain level will decrease  Outcome: Ongoing

## 2021-12-22 NOTE — PROGRESS NOTES
In room to follow up with family. No family currently at bedside. Call made to patient's daughter, Judy Jacobs. Asked Judy Jacobs if she or her family had any questions regarding code status at this time. Judy Jacobs stated she was still waiting to discuss with one of her other sisters. Plan at this time to remain FULL code. Family does wish to continue aggressive treatment at this time. Informed Judy Jacobs to call this RN if she or her family had any further questions or update primary RN of wanting code status changed. Judy Jacobs agreed.

## 2021-12-22 NOTE — PROGRESS NOTES
[facility] -  Sara Lugo MD M.D. FACS  Daily Progress Note    Pt Name: Raven Zayas  Medical Record Number: [id number]  Date of Birth 1934   Today's Date: 12/22/2021    ASSESSMENT:     1.   2. [address]    Diagnosis Date Noted    Severe malnutrition ([address] [E43] 12/21/2021    Decubitus ulcer [L89.90] 12/20/2021    Ventilator dependence ([address] [Z99.11]     C. difficile colitis [A04.72] 12/18/2021    CHF (congestive heart failure) ([address] [I50.9] 12/18/2021    Cardiomyopathy ([address] [I42.9] 12/18/2021    UTI (urinary tract infection) [N39.0] 12/18/2021    Hyperkalemia [E87.5] 12/18/2021    Chronic respiratory failure ([address] [J96.10] 12/18/2021   3. Chief Complaint:  Chief Complaint   Patient presents with    Altered Mental Status           PLAN:     1. Patient care turned over to me by Dr. Zuleyma Belle  2. Has very large on staged decubitus ulcer with eschar and debridement skin of the leave a very large defect of unknown depth at this time  3. Patient still getting her Eliquis ordered as of this morning  4. I have held it on with her full dose aspirin  5. And discussion with the bedside nurse apparently palliative care is having another discussion today to determine whether to proceed or not  6. So if they are going to proceed with surgical debridement she needs to be off her Eliquis for 24 hours and her tube feedings need to be stopped at midnight  7. SUBJECTIVE:     Maegan Akins is stable.   Per nursing report family was to have a discussion last night and have a meeting today with palliative care to determine whether the going to proceed with aggressive care or not      OBJECTIVE:     Patient Vitals for the past 24 hrs:   BP Temp Temp src Pulse Resp SpO2   12/22/21 0447 -- -- -- -- -- 97 %   12/22/21 0315 (!) 127/55 99 °F (37.2 °C) Oral 98 19 97 %   12/22/21 0230 127/62 -- -- -- -- --   12/22/21 0052 -- -- -- -- -- 98 %   12/21/21 2327 (!) 115/47 98.3 °F (36.8 °C) Axillary 69 18 98 %   12/21/21 2112 -- -- -- -- -- 97 %   12/21/21 2000 (!) 137/58 99.9 °F (37.7 °C) Oral 90 20 98 %   12/21/21 1525 -- -- -- 60 -- 97 %   12/21/21 1500 (!) 111/44 -- -- 67 19 96 %   12/21/21 1254 -- -- -- 64 -- 97 %   12/21/21 1210 101/63 -- -- -- -- --   12/21/21 1155 (!) 101/44 -- -- -- -- --   12/21/21 1143 (!) 101/42 97.6 °F (36.4 °C) Axillary 74 18 96 %   12/21/21 0900 -- -- -- 65 -- 96 %         Intake/Output Summary (Last 24 hours) at 12/22/2021 0825  Last data filed at 12/22/2021 0405  Gross per 24 hour   Intake 3713.11 ml   Output 1100 ml   Net 2613.11 ml       In: 2955.6 [I.V.:2209.5; NG/GT:594]  Out: 625 [Urine:575]    I/O last 3 completed shifts: In: 3713.1 [I.V.:2641; NG/GT:920; IV Piggyback:152.2]  Out: 1100 [Urine:1025; Stool:75]     Date 12/22/21 0000 - 12/22/21 2359   Shift 8642-9172 8397-7160 6399-0799 24 Hour Total   INTAKE   P.O.(mL/kg/hr) 0(0)   0   I. V.(mL/kg) 782. 5(11.8)   782. 5(11.8)   NG/GT(mL/kg) 341(5.1)   341(5.1)   Shift Total(mL/kg) 1123. 5(16.9)   1123. 5(16.9)   OUTPUT   Urine(mL/kg/hr) 300(0.6)   300   Stool(mL/kg) 0(0)   0(0)   Shift Total(mL/kg) 300(4.5)   300(4.5)   Weight (kg) 66.4 66.4 66.4 66.4       Wt Readings from Last 3 Encounters:   12/21/21 146 lb 4.8 oz (66.4 kg)   11/17/21 138 lb 7.2 oz (62.8 kg)   10/07/21 125 lb (56.7 kg)        Body mass index is 28.57 kg/m². Diet: ADULT DIET;  Regular; Low Microbial  ADULT TUBE FEEDING; PEG; Other Tube Feeding (specify); nepro; Continuous; 40; No; 30; Q 4 hours        MEDS:     Scheduled Meds:   sodium hypochlorite   Irrigation Daily    [Held by provider] apixaban  2.5 mg Oral BID    vancomycin  125 mg Per G Tube 4 times per day    LORazepam  1 mg IntraVENous Once    [Held by provider] aspirin  325 mg Oral Daily    cetirizine  10 mg Oral Daily    metoprolol tartrate  50 mg Oral BID    sodium chloride flush  5-40 mL IntraVENous 2 times per day    cefTRIAXone (ROCEPHIN) IV  1,000 mg IntraVENous Q24H    [Held by provider] furosemide  20 mg IntraVENous Q8H     Continuous Infusions:   dextrose 40 mL/hr at 12/22/21 0405    sodium chloride       PRN Meds:potassium chloride, 40 mEq, PRN   Or  potassium alternative oral replacement, 40 mEq, PRN   Or  potassium chloride, 10 mEq, PRN  diazePAM, 2.5 mg, Q6H PRN  morphine, 2 mg, Q4H PRN  sodium chloride flush, 5-40 mL, PRN  sodium chloride, 25 mL, PRN  acetaminophen, 650 mg, Q6H PRN   Or  acetaminophen, 650 mg, Q6H PRN  ondansetron, 4 mg, Q8H PRN   Or  ondansetron, 4 mg, Q6H PRN          PHYSICAL EXAM:     CONSTITUTIONAL: Sleeping but awakens opens eyes trach in place    WOUND/INCISION:        EXTREMITY: no cyanosis, clubbing or edema      LABS:     CBC:   Recent Labs     12/20/21 0455 12/21/21 0435 12/22/21 0433   WBC 12.1* 14.7* 11.9*   RBC 2.68* 2.54* 2.42*   HGB 7.9* 7.4* 7.1*   HCT 26.8* 25.4* 24.1*   .0* 100.0* 99.6*   MCH 29.5 29.1 29.3   MCHC 29.5* 29.1* 29.5*    235 247   MPV 11.1 10.6 10.8      Last 3 CMP:   Recent Labs     12/20/21 0455 12/21/21 0435 12/22/21  0433   * 147* 144   K 3.2* 3.6 3.2*    104 102   CO2 37* 35* 36*   BUN 62* 56* 52*   CREATININE 1.0 0.9 0.9   GLUCOSE 106 134* 115*   CALCIUM 9.7 9.5 9.6   PROT  --  5.9*  --    LABALBU  --  2.1*  --       Troponin: No results for input(s): TROPONINI in the last 72 hours. Calcium:   Lab Results   Component Value Date    CALCIUM 9.6 12/22/2021    CALCIUM 9.5 12/21/2021    CALCIUM 9.7 12/20/2021      Ionized Calcium: No results found for: IONCA   Lipids: No results for input(s): CHOL, HDL in the last 72 hours. Invalid input(s): LDLCALCU  INR: No results for input(s): INR in the last 72 hours.   Lactic Acid:   Lab Results   Component Value Date    LACTA 1.0 12/19/2021    LACTA 1.1 10/29/2021    LACTA 1.2 10/26/2021                  DVT prophylaxis: [] Lovenox                                 [] SCDs                                 [] SQ Heparin [] Encourage ambulation, low risk for DVT, no chemical or mechanical prophylaxis necessary              [] Already on Anticoagulation      RADIOLOGY:      CT HEAD WO CONTRAST    Result Date: 12/18/2021  1. No acute intracranial hemorrhage. 2. White matter hypodensities that likely relate to chronic microvascular changes. 3. Mild cerebral volume loss. 4. Marked mastoid effusions bilaterally. **This report has been created using voice recognition software. It may contain minor errors which are inherent in voice recognition technology. ** Final report electronically signed by Dr Sang Cartagena on 12/18/2021 6:39 PM    XR CHEST PORTABLE    Result Date: 12/21/2021  No pneumothorax following a left-sided thoracentesis. **This report has been created using voice recognition software. It may contain minor errors which are inherent in voice recognition technology. ** Final report electronically signed by Dr Rory Pedraza on 12/21/2021 12:46 PM    XR CHEST PORTABLE    Result Date: 12/19/2021  Impression: 1. Significant increase in left pleural effusion. Follow-up to clearing recommended. This document has been electronically signed by: Jessenia An MD on 12/19/2021 05:14 AM    XR CHEST PORTABLE    Result Date: 12/18/2021  1. Moderate cardiomegaly. Increased pulmonary vascularity. 2. Increased density throughout both lung fields. 3. Probable left pleural effusion. 4.. Tracheotomy tube in place. **This report has been created using voice recognition software. It may contain minor errors which are inherent in voice recognition technology. ** Final report electronically signed by DR Holly Levy on 12/18/2021 3:10 PM    US THORACENTESIS Which side should the procedure be performed? Left    Result Date: 12/21/2021  Successful ultrasound-guided thoracentesis with  0.6 L of fluid drained. **This report has been created using voice recognition software.  It may contain minor errors which are inherent in voice recognition technology. **  Final report electronically signed by Dr Bruno Segura on 12/21/2021 12:08 PM        Gregor Borja MD, M.D.  FACS  Electronically signed 12/22/2021 at 8:25 AM

## 2021-12-22 NOTE — CARE COORDINATION
DISCHARGE PLANNING UPDATE    Barriers to Discharge: 12/21 Left Thoracentesis = 600 ml removed. (+) Urine Culture: Proteus Mirabilis, Morganella Morganii, E.COLI, WBC 11.9, H 7.1; monitor. SGY plans 12/23 sacral I/D. 99.2t; monitor.  IV AB, 30% FIO2 AC/VC 18 320 +5 +35 PIP Peep Trach/Ventilator  continued    Discharge Plan  from [facility] only 2d from [facility] chronic trach (11/10)/vent/PEG (11/17) corbin REDDING; [facility] full LTACH referral; daughter/POA Luci Chung in agreement for [facility]; has restraints; collaborated w Bryan Hooper

## 2021-12-22 NOTE — PROGRESS NOTES
[facility]  Progress Note   Dr Lion Quiles Covering for Dr. Deana Alexander      Patient:  Nelson Richmond  YOB: 1934  Date of Service: 12/21/2021  MRN: [id number]   Acct:  [de-identified]   Primary Care Physician: Alaina Hagen MD    SUBJECTIVE: met resting quietly      Home Medications:   No current facility-administered medications on file prior to encounter.      Current Outpatient Medications on File Prior to Encounter   Medication Sig Dispense Refill    metoprolol tartrate (LOPRESSOR) 50 MG tablet 50 mg by PEG Tube route 3 times daily Hold for SBP <100 or HR <60      acetaminophen (TYLENOL) 325 MG tablet 650 mg by PEG Tube route every 6 hours as needed for Pain      acetic acid 0.25 % irrigation Irrigate with 150 mLs as directed See Admin Instructions Every night shift Tue, Fri for sediment      amiodarone (PACERONE) 100 MG tablet 100 mg by PEG Tube route daily      apixaban (ELIQUIS) 5 MG TABS tablet 5 mg by Per G Tube route 2 times daily      polyvinyl alcohol (LIQUIFILM TEARS) 1.4 % ophthalmic solution Place 2 drops into both eyes every 4 hours as needed      sodium hypochlorite (DAKINS) 0.125 % SOLN external solution Apply topically See Admin Instructions Apply to sacral wound topically every shift      famotidine (PEPCID) 20 MG tablet 20 mg by PEG Tube route daily      polyethylene glycol (GLYCOLAX) 17 g packet Take 17 g by mouth daily as needed for Constipation      OLANZapine (ZYPREXA) 5 MG tablet 5 mg by PEG Tube route nightly      potassium bicarb-citric acid (EFFER-K) 20 MEQ TBEF effervescent tablet Take 2 tablets by mouth every 4 hours      Lactobacillus (ACIDOPHILUS) CAPS capsule 1 capsule by PEG Tube route daily      senna (SENOKOT) 8.6 MG tablet 1 tablet by PEG Tube route daily as needed for Constipation           Scheduled Meds:   sodium hypochlorite   Irrigation Daily    apixaban  2.5 mg Oral BID    vancomycin  125 mg Per G Tube 4 times per day    LORazepam  1 mg IntraVENous Once    aspirin  325 mg Oral Daily    cetirizine  10 mg Oral Daily    metoprolol tartrate  50 mg Oral BID    sodium chloride flush  5-40 mL IntraVENous 2 times per day    cefTRIAXone (ROCEPHIN) IV  1,000 mg IntraVENous Q24H    [Held by provider] furosemide  20 mg IntraVENous Q8H     Continuous Infusions:   dextrose 40 mL/hr at 12/21/21 0804    sodium chloride       PRN Meds:potassium chloride **OR** potassium alternative oral replacement **OR** potassium chloride, diazePAM, morphine, sodium chloride flush, sodium chloride, acetaminophen **OR** acetaminophen, ondansetron **OR** ondansetron        Allergies:  Sulfa antibiotics, Gluten meal, Metronidazole, and Milk-related compounds    OBJECTIVE:    Vitals:   Vitals:    12/21/21 1525   BP:    Pulse: 60   Resp:    Temp:    SpO2: 97%      BMI: Body mass index is 28.57 kg/m². PHYSICAL EXAMINATION:            General appearance:  No apparent distress, appears stated age and cooperative. HEENT:  Normal cephalic, atraumatic without obvious deformity. Pupils equal, round, and reactive to light.  Extra ocular muscles intact. Conjunctivae/corneas clear. Neck: Supple, tracheostomy in place connected to the ventilator  Respiratory:   diminished entry bibasilarly  Cardiovascular:  Regular rhythm with normal S1/S2 without rubs or gallops. Abdomen:  Full, peg tube in place,soft, non-tender, non-distended with normal bowel sounds.  Flexi Seal in place  Musculoskeletal:  No clubbing, cyanosis  or edema bilaterally .   Neurologic: Alert and able to follow simple commands    Review of Labs and Diagnostic Testing:    Recent Results (from the past 24 hour(s))   POCT Glucose    Collection Time: 12/20/21  7:39 PM   Result Value Ref Range    POC Glucose 120 (H) 70 - 108 mg/dl   CBC auto differential    Collection Time: 12/21/21  4:35 AM   Result Value Ref Range    WBC 14.7 (H) 4.8 - 10.8 thou/mm3    RBC 2.54 (L) 4.20 - 5.40 mill/mm3    Hemoglobin 7.4 12/21/21 11:13 AM   Result Value Ref Range    Specimen PLEURAL     Color ORANGE     Character, Body Fluid BLOODY     Total Volume Received Body Fluid 43.0 ml    Total Nucleated cells Body Fluid 773 (H) 0 - 500 /cumm    Body Fluid RBC 95636 /cumm    Polymorphonuclear Cells Body Fluid 28.0 %    Mononuclear Cells Body Fluid 72.0 %    Pathologist Review JUDI PEOPLES Lactate Dehydrogenase, Body Fluid    Collection Time: 12/21/21 11:13 AM   Result Value Ref Range    LD, Fluid 124 U/L   pH, Body Fluid    Collection Time: 12/21/21 11:13 AM   Result Value Ref Range    pH, Fluid 7.63    Protein, Body Fluid    Collection Time: 12/21/21 11:13 AM   Result Value Ref Range    Protein, Fluid 2.6 gm/dl   Culture, Body Fluid    Collection Time: 12/21/21 11:13 AM    Specimen: Pleural Fluid; Body Fluid   Result Value Ref Range    Gram Stain Result       Rare segmented neutrophils observed. No bacteria seen. POCT Glucose    Collection Time: 12/21/21 11:42 AM   Result Value Ref Range    POC Glucose 149 (H) 70 - 108 mg/dl   POCT Glucose    Collection Time: 12/21/21  4:50 PM   Result Value Ref Range    POC Glucose 128 (H) 70 - 108 mg/dl       Radiology:     CT HEAD WO CONTRAST    Result Date: 12/18/2021  PROCEDURE: CT HEAD WO CONTRAST CLINICAL INFORMATION:ams COMPARISON: None TECHNIQUE: 5 mm axial imaging through the head without IV contrast. All CT scans at this facility use dose modulation, iterative reconstruction, and/or weight based dosing when appropriate to reduce the radiation dose to as low as reasonably achievable. FINDINGS: Mild cerebral volume loss. Periventricular and subcortical white matter hypodensities are seen. No ventriculomegaly. No midline shift or mass effect. No acute intracranial hemorrhage. No intracranial collection. Gray-white differentiation is unremarkable. The posterior fossa is unremarkable. The craniocervical junction is unremarkable. No acute bony abnormality.  Polyp or mucosal retention cyst is seen in the left sphenoid sinus. Otherwise, the remaining visualized paranasal sinuses are clear. Marked mastoid effusions bilaterally. The orbits are unremarkable. Mild atrophy of the pituitary gland. 1. No acute intracranial hemorrhage. 2. White matter hypodensities that likely relate to chronic microvascular changes. 3. Mild cerebral volume loss. 4. Marked mastoid effusions bilaterally. **This report has been created using voice recognition software. It may contain minor errors which are inherent in voice recognition technology. ** Final report electronically signed by Dr Venus Pisano on 12/18/2021 6:39 PM    XR CHEST PORTABLE    Result Date: 12/19/2021  AP chest Comparison: CR,SR - XR CHEST PORTABLE - 12/08/2021 01:00 AM EST Findings: Bilateral perihilar lung opacity similar to the prior exam. Large left pleural effusion has increased significantly. Cardiomegaly. Tracheostomy unchanged. Left subclavian central venous catheter has been removed. Percutaneous gastrostomy tube is likely in the upper body of stomach. No acute fracture. Impression: 1. Significant increase in left pleural effusion. Follow-up to clearing recommended. This document has been electronically signed by: Kathie Rod MD on 12/19/2021 05:14 AM    XR CHEST PORTABLE    Result Date: 12/18/2021  PROCEDURE: XR CHEST PORTABLE CLINICAL INFORMATION: ams. COMPARISON: Chest x-ray dated December 2021. TECHNIQUE: AP upright view of the chest. FINDINGS: There is a tracheotomy tube in place. There is moderate cardiomegaly. . Is atherosclerotic calcification aortic arch. .  There is increased density throughout both lung fields. There is a probable left pleural effusion. The pulmonary vascularity is increased. There is thoracic spondylosis. 1. Moderate cardiomegaly. Increased pulmonary vascularity. 2. Increased density throughout both lung fields. 3. Probable left pleural effusion. 4.. Tracheotomy tube in place.  **This report has been created using voice recognition software. It may contain minor errors which are inherent in voice recognition technology. ** Final report electronically signed by DR Marla Jimenez on 12/18/2021 3:10 PM        ASSESMENT:      Active Problems:    C. difficile colitis    CHF (congestive heart failure) ([address]    Cardiomyopathy ([address]    UTI (urinary tract infection)    Hyperkalemia    Chronic respiratory failure ([address]    Decubitus ulcer    Ventilator dependence ([address]    Severe malnutrition ([address]  Resolved Problems:    * No resolved hospital problems. 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 awaiting appreciate ID service input, patient has concurrent UTI. Follow-up on Blood and urine cultures,  She is also on IV Rocephin. [employer] suggests DICK suspected to be a contaminant. Continue wound care and follow-up cultures. Seen by surgical service and will benefit from debridement     Holding diuretics for now on account of hypernatremia. Began very gentle hydration sodium levels improving and replenished potassium     Patient with elevated troponins,   Continue beta-blockers and aspirin and resume her Eliquis at an adjusted dose. The Eliquis she had been on prior accounts for the elevated INR. was seen by the Cardiology Service on account of the elevated troponins, recommends medical management at this point.     Pulmonary Service following, continue vent management. Had thoracentesis, transudative Continue the bronchodilators pulmonary toileting and wean as able. Feels that the patient most likely will require bronchoscopy.      DVT prophylaxis: [] Lovenox                                 [] SCDs                                 [] SQ Heparin                                 [] Encourage ambulation, low risk for DVT, no chemical or mechanical prophylaxis necessary              [] Already on Anticoagulation                Anticipated Disposition upon discharge: [] Home                                                                         [] Home with Home Health                                                                         [] [address] [facility]                                                                         [] [address]          Electronically signed by Db Ghosh MD on 12/21/2021 at 7:02 PM

## 2021-12-22 NOTE — PROGRESS NOTES
[facility]    Patient - Mani Morel   MRN -  [id number]   [facility] # - [de-identified]   - 1934      Date of Admission -  2021  1:15 PM  Date of evaluation -  2021  Room - --A   Hospital Day - [address], [place] Primary Care Physician - Ketan Batres MD   Chief Complaint   Acute on chronic respiratory failure with hypoxia  Active Hospital Problem List      Active Hospital Problems    Diagnosis Date Noted    Severe malnutrition ([address] [E43] 2021    Decubitus ulcer [L89.90] 2021    Ventilator dependence ([address] [Z99.11]     C. difficile colitis [A04.72] 2021    CHF (congestive heart failure) ([address] [I50.9] 2021    Cardiomyopathy ([address] [I42.9] 2021    UTI (urinary tract infection) [N39.0] 2021    Hyperkalemia [E87.5] 2021    Chronic respiratory failure ([address] [J96.10] 2021     AMENA Morel is a 80 y.o. female with a past medical history of chronic hypoxic respiratory failures status post tracheostomy on chronic mechanical ventilation secondary to recent Covid 19 pneumonia with ARDS, cardiomyopathy with ejection fraction 25%, hypertension, atrial fibrillation, acute kidney injury, C. difficile colitis and GI bleed. Patient was discharged from the intensive care unit to long-term acute care facility/[facility] where she completed her therapy but was unable to wean off the ventilator and was later discharged to a chronic vent facility on . She was sent back from the chronic vent facility due to altered mental status and agitation. Pulmonary consulted for ventilator management. She is currently on [facility] with a PEEP of 5 and FiO2 30%. Secretions are moderate.   X-ray showed worsened left lower lobe atelectasis/effusion      Past 24 hrs   -On Vent in NAD   -SpO2 >95% on FiO2 30%  -Afebrile  ROS limited 2/2 AMS    Past Medical History         Diagnosis Date    Anxiety  Arthritis     Bronchitis     Diverticulitis of colon     Hypertension       Past Surgical History           Procedure Laterality Date    APPENDECTOMY      CHOLECYSTECTOMY      GASTROSTOMY TUBE PLACEMENT N/A 2021    EGD PEG TUBE PLACEMENT performed by Deana Christina MD at [address] [place] [address] DIET; Regular; Low Microbial  ADULT TUBE FEEDING; PEG; Other Tube Feeding (specify); nepro; Continuous; 40; No; 30; Q 4 hours  Diet NPO  Allergies    Sulfa antibiotics, Gluten meal, Metronidazole, and Milk-related compounds  Social History     Social History     Socioeconomic History    Marital status:      Spouse name: Not on file    Number of children: Not on file    Years of education: Not on file    Highest education level: Not on file   Occupational History    Not on file   Tobacco Use    Smoking status: Former Smoker     Packs/day: 1.00     Years: 15.00     Pack years: 15.00     Types: Cigarettes     Quit date: 12/10/1977     Years since quittin.0    Smokeless tobacco: Never Used   Substance and Sexual Activity    Alcohol use: No    Drug use: No    Sexual activity: Not on file   Other Topics Concern    Not on file   Social History Narrative    Not on file     Social Determinants of Health     Financial Resource Strain:     Difficulty of Paying Living Expenses: Not on file   Food Insecurity:     Worried About Running Out of Food in the Last Year: Not on file    Caitlin of Food in the Last Year: Not on file   Transportation Needs:     Lack of Transportation (Medical): Not on file    Lack of Transportation (Non-Medical):  Not on file   Physical Activity:     Days of Exercise per Week: Not on file    Minutes of Exercise per Session: Not on file   Stress:     Feeling of Stress : Not on file   Social Connections:     Frequency of Communication with Friends and Family: Not on file    Frequency of Social Gatherings with Friends and Family: Not on file    Attends Druze Services: Not on file    Active Member of Clubs or Organizations: Not on file    Attends Club or Organization Meetings: Not on file    Marital Status: Not on file   Intimate Partner Violence:     Fear of Current or Ex-Partner: Not on file    Emotionally Abused: Not on file    Physically Abused: Not on file    Sexually Abused: Not on file   Housing Stability:     Unable to Pay for Housing in the Last Year: Not on file    Number of [place] in the Last Year: Not on file    Unstable Housing in the Last Year: Not on file     Family History    History reviewed. No pertinent family history. Sleep History    No History  ROS    Unobtainable due to trach and vent  Meds    Current Medications    sodium hypochlorite   Irrigation Daily    [Held by provider] apixaban  2.5 mg Oral BID    vancomycin  125 mg Per G Tube 4 times per day    LORazepam  1 mg IntraVENous Once    [Held by provider] aspirin  325 mg Oral Daily    cetirizine  10 mg Oral Daily    metoprolol tartrate  50 mg Oral BID    sodium chloride flush  5-40 mL IntraVENous 2 times per day    cefTRIAXone (ROCEPHIN) IV  1,000 mg IntraVENous Q24H    [Held by provider] furosemide  20 mg IntraVENous Q8H     potassium chloride **OR** potassium alternative oral replacement **OR** potassium chloride, diazePAM, morphine, sodium chloride flush, sodium chloride, acetaminophen **OR** acetaminophen, ondansetron **OR** ondansetron  IV Drips/Infusions   dextrose 40 mL/hr at 12/22/21 0405    sodium chloride       Vitals    Vitals    height is 5' (1.524 m) and weight is 146 lb 4.8 oz (66.4 kg). Her axillary temperature is 99.2 °F (37.3 °C). Her blood pressure is 123/46 (abnormal) and her pulse is 73. Her respiration is 18 and oxygen saturation is 98%.      O2 Flow Rate (L/min): 6 L/min  I/O    Intake/Output Summary (Last 24 hours) at 12/22/2021 1033  Last data filed at 12/22/2021 0405  Gross per 24 hour   Intake 3713.11 ml   Output 1100 ml   Net 2613.11 ml     Patient Vitals for the past 96 hrs (Last 3 readings):   Weight   12/21/21 0314 146 lb 4.8 oz (66.4 kg)   12/20/21 0520 138 lb (62.6 kg)   12/18/21 1315 128 lb (58.1 kg)     Exam   Physical Exam   Constitutional: No distress on vent via trach. Patient appears elderly and chronically ill. Head: Normocephalic and atraumatic. Mouth/Throat: Oropharynx is clear and moist.  No oral thrush. Eyes: Conjunctivae are normal. Pupils are equal, round. No scleral icterus. Neck: Neck supple. No tracheal deviation present. Cardiovascular: S1 and S2 with no murmur. No peripheral edema  Pulmonary/Chest: Normal effort with bilateral air entry, mild bibasilar rales. No stridor. No respiratory distress. Patient exhibits no tenderness. Abdominal: Soft. Bowel sounds audible. No distension or tenderness to palp. Musculoskeletal: Moves all extremities  Neurological: Patient is arousable and does not follow commands       Labs   ABG  Lab Results   Component Value Date    PH 7.38 12/18/2021    PO2 62 12/18/2021    PCO2 62 12/18/2021    HCO3 37 12/18/2021    O2SAT 90 12/18/2021     Lab Results   Component Value Date    IFIO2 6 12/18/2021    MODE PC 10/30/2021    SETTIDVOL 320 12/18/2021    SETPEEP 5.0 12/18/2021     CBC  Recent Labs     12/20/21  0455 12/21/21  0435 12/22/21  0433   WBC 12.1* 14.7* 11.9*   RBC 2.68* 2.54* 2.42*   HGB 7.9* 7.4* 7.1*   HCT 26.8* 25.4* 24.1*   .0* 100.0* 99.6*   MCH 29.5 29.1 29.3   MCHC 29.5* 29.1* 29.5*    235 247   MPV 11.1 10.6 10.8      BMP  Recent Labs     12/20/21  0455 12/21/21  0435 12/22/21  0433   * 147* 144   K 3.2* 3.6 3.2*    104 102   CO2 37* 35* 36*   BUN 62* 56* 52*   CREATININE 1.0 0.9 0.9   GLUCOSE 106 134* 115*   CALCIUM 9.7 9.5 9.6     LFT  No results for input(s): AST, ALT, ALB, BILITOT, ALKPHOS, LIPASE in the last 72 hours. Invalid input(s):   AMYLASE  TROP  Lab Results   Component Value Date    TROPONINT 0.023 12/19/2021    TROPONINT 0.021 12/19/2021    TROPONINT 0.018 12/19/2021     BNP  Lab Results   Component Value Date    PROBNP 26751.0 12/18/2021    PROBNP > 59983.0 11/20/2021    PROBNP 48474.0 11/01/2021     D-Dimer  No results found for: DDIMER  Lactic Acid  No results for input(s): LACTA in the last 72 hours. INR  No results for input(s): INR, PROTIME in the last 72 hours. PTT  No results for input(s): APTT in the last 72 hours. Glucose  Recent Labs     12/21/21  0621 12/21/21  1142 12/21/21  1650   POCGLU 134* 149* 128*     UA   Recent Labs     12/21/21  1113   COLORU ORANGE   . Results for Radha Johnston (MRN [id number]) as of 12/21/2021 15:11   Ref. Range 12/21/2021 11:13   Character, Body Fluid Unknown BLOODY   LD, Fluid Latest Units: U/L 124   pH, Fluid Unknown 7.63   Protein, Fluid Latest Units: gm/dl 2.6   Albumin, Fluid Latest Units: gm/dl 1.3   Body Fluid RBC Latest Units: /cumm 31004   Total Nucleated cells Body Fluid Latest Ref Range: 0 - 500 /cumm 773 (H)   Total Volume Received Body Fluid Latest Units: ml 43.0     Serum total protein: 5.9  Serum LDH: 306  Serum albumin: 2.1    Total protein ratio i.e Pleural fluid total protein/ Serum Total protein: 2.6/5.9=0.44  LDH ratio i.e Pleural fluid LDH/ Serum LDH: 124/306=0.41  Albumin gradient 2.1-1.3=0.8    Cytology-Pending   Body fluid culture-No prelim growth    PFTs   No records  Echo     Summary   Ejection fraction is visually estimated at 25%. There was severe global hypokinesis of the left ventricle. Left Ventricular size is Moderately increased . The aortic valve leaflets were not well visualized. Aortic valve appears tricuspid. Thickened aortic valve leaflets noted. Aortic valve leaflets are Moderately calcified.   Cultures    Procalcitonin  Lab Results   Component Value Date    PROCAL 0.21 11/01/2021    PROCAL 0.24 10/29/2021    PROCAL 0.28 10/26/2021       Radiology    CXR  XR CHEST PORTABLE   12/21/2021   Impression:  No pneumothorax following a left-sided thoracentesis. XR CHEST PORTABLE   12/19/2021   Findings: Bilateral perihilar lung opacity similar to the prior exam. Large left pleural effusion has increased significantly. Cardiomegaly. Tracheostomy unchanged. Left subclavian central venous catheter has been removed. Percutaneous gastrostomy tube is likely in the upper body of stomach. No acute fracture. Impression:   1. Significant increase in left pleural effusion. Follow-up to clearing recommended. Assessment   -Altered mental status/metabolic encephalopathy-multifactorial   -Acute on chronic hypoxic respiratory failure on mechanical ventilation  -Left-sided pleural effusion- L thora 12/21/2021 600 cc out transudative likely 2/2 CHF  -Severe acute critical illness weakness/deconditioning  -Cardiomyopathy with ejection fraction 25%,  -atrial fibrillation  -Recent COVID-19pneumonia with ARDS  -UTI-Proteus mirabilis, Morganella morganii, E. Coli  -C Diff infection  -Bacteremia-Staph hominis ssp. hominis  -Recent HERIBERTO hyperkalemia  -History of GI bleeding  Recommendations   -Continue current mechanical ventilation, unable to wean due to weakness  -Thoracentesis completed transudative effusion need optimization of volume status   -Will continue on Mech Vent with current settings AC Vt 320 cc PEEP 5 rate 18 adjust FiO2 to maintain SpO2 >88%, will hold on weaning until mentation improves  -Follow pleural fluid culture and cytology   -Primary service following AMS-possibly 2/2 UTI  -ID service following   -General surgery-following for possible ID of sacral wound  -Cardiology consulted for preop eval     Case discussed with nurse and patient/family. Questions and concerns addressed. Meds and Orders reviewed. Electronically signed by     SERGE Koehler - CNP on 12/22/2021 at 10:33 AM     Addendum by Dr. Patricia Pantoja MD:  I have seen and examined the patient independently. Face to face evaluation and examination was performed.    The above evaluation and note has been reviewed. Labs and radiographs were reviewed. I Have discussed with Mr. Fidel Mccoy CNP about this patient in detail. The above assessment and plan has been reviewed. Please see my modifications mentioned below.      My modifications:  She is comfortable on current vent settings  Not in distress  Pleural fluid cultures no growth  Pleural fluid cytology pending dated 21 December 2020  Aspirin and Eliquis is on hold from primary service for planned debridement of coccyx    Jaren Velazquez MD 12/22/2021 5:36 PM

## 2021-12-22 NOTE — PROGRESS NOTES
Progress note: Infectious diseases    Patient - Juhi Harris,  Age - 80 y.o.    - 1934      Room Number - 4K-01/001-A   MRN -  [id number]   Acct # - [de-identified]  Date of Admission -  2021  1:15 PM    SUBJECTIVE:   No new issues. She will have surgery tomorrow  OBJECTIVE   VITALS    height is 5' (1.524 m) and weight is 146 lb 4.8 oz (66.4 kg). Her axillary temperature is 99 °F (37.2 °C). Her blood pressure is 124/51 (abnormal) and her pulse is 74. Her respiration is 18 and oxygen saturation is 98%. Wt Readings from Last 3 Encounters:   21 146 lb 4.8 oz (66.4 kg)   21 138 lb 7.2 oz (62.8 kg)   10/07/21 125 lb (56.7 kg)       I/O (24 Hours)    Intake/Output Summary (Last 24 hours) at 2021 1217  Last data filed at 2021 0405  Gross per 24 hour   Intake 3713.11 ml   Output 1100 ml   Net 2613.11 ml       General Appearance  Awake, alert, oriented,  Chronically sick looking. HEENT - normocephalic, atraumatic, pale conjunctiva,  anicteric sclera  Neck - Supple, no mass  Lungs -  Bilateral   air entry, no rhonchi, on vent  Cardiovascular - Heart sounds are normal.     Abdomen - soft, not distended, nontender,   Neurologic -chronically sick looking.   Skin -+ bruising or bleeding  Extremities - necrotic coccyx wound  Edema lower legs    MEDICATIONS:      ciprofloxacin  500 mg Oral 2 times per day    sodium hypochlorite   Irrigation Daily    [Held by provider] apixaban  2.5 mg Oral BID    vancomycin  125 mg Per G Tube 4 times per day    LORazepam  1 mg IntraVENous Once    [Held by provider] aspirin  325 mg Oral Daily    cetirizine  10 mg Oral Daily    metoprolol tartrate  50 mg Oral BID    sodium chloride flush  5-40 mL IntraVENous 2 times per day    [Held by provider] furosemide  20 mg IntraVENous Q8H      dextrose 40 mL/hr at 21 0405    sodium chloride       potassium chloride **OR** potassium alternative oral replacement **OR** potassium chloride, diazePAM, morphine, sodium chloride flush, sodium chloride, acetaminophen **OR** acetaminophen, ondansetron **OR** ondansetron      LABS:     CBC:   Recent Labs     12/20/21  0455 12/21/21  0435 12/22/21  0433   WBC 12.1* 14.7* 11.9*   HGB 7.9* 7.4* 7.1*    235 247     BMP:    Recent Labs     12/20/21  0455 12/21/21  0435 12/22/21  0433   * 147* 144   K 3.2* 3.6 3.2*    104 102   CO2 37* 35* 36*   BUN 62* 56* 52*   CREATININE 1.0 0.9 0.9   GLUCOSE 106 134* 115*     Calcium:  Recent Labs     12/22/21  0433   CALCIUM 9.6      Recent Labs     12/21/21  1142 12/21/21  1650 12/22/21  1157   POCGLU 149* 128* 117*      Recent Labs     12/21/21  0435   PROT 5.9*   LABALBU 2.1*        CULTURES:   UA:   Recent Labs     12/21/21  1113   COLORU ORANGE     Micro:   Lab Results   Component Value Date    BC No growth-preliminary  12/18/2021    BC No growth-preliminary  12/18/2021          Problem list of patient:     Patient Active Problem List   Diagnosis Code    Sigmoid diverticulitis K57.32    Acute respiratory failure with hypoxia ([facility]) J96.01    COVID-19 U07.1    Acute congestive heart failure ([facility]) I50.9    C. difficile colitis A04.72    CHF (congestive heart failure) ([facility]) I50.9    Cardiomyopathy ([facility]) I42.9    UTI (urinary tract infection) N39.0    Hyperkalemia E87.5    Chronic respiratory failure ([facility]) J96.10    Decubitus ulcer L89.90    Ventilator dependence ([address] Z99.11    Severe malnutrition ([facility]) E43         ASSESSMENT/PLAN   Respiratory failure on vent  Severe cardiomyopathy  Hx of C.difficile infection  Malnutrition  Coccyx wound: necrotic she will have debridement.  Explained to daughter that the wound will be a big one and it may not heal due to her overall comordbidities  UTI: antibiotic changed            Leslie Polk MD, MD, FACP 12/22/2021 12:17 PM

## 2021-12-22 NOTE — PROGRESS NOTES
INTERNAL MEDICINE SPECIALTIES  Progress Note   Dr Eloisa Guevara Covering for Dr. Quirino Saravia      Patient:  Rhea Teixeira  YOB: 1934  Date of Service: 2021  MRN: [id number]   Acct:  [de-identified]   Primary Care Physician: Candice Waldrop MD    SUBJECTIVE: No complaints. Home Medications:   No current facility-administered medications on file prior to encounter.      Current Outpatient Medications on File Prior to Encounter   Medication Sig Dispense Refill    metoprolol tartrate (LOPRESSOR) 50 MG tablet 50 mg by PEG Tube route 3 times daily Hold for SBP <100 or HR <60      acetaminophen (TYLENOL) 325 MG tablet 650 mg by PEG Tube route every 6 hours as needed for Pain      acetic acid 0.25 % irrigation Irrigate with 150 mLs as directed See Admin Instructions Every night shift Tue, Fri for sediment      amiodarone (PACERONE) 100 MG tablet 100 mg by PEG Tube route daily      apixaban (ELIQUIS) 5 MG TABS tablet 5 mg by Per G Tube route 2 times daily      polyvinyl alcohol (LIQUIFILM TEARS) 1.4 % ophthalmic solution Place 2 drops into both eyes every 4 hours as needed      sodium hypochlorite (DAKINS) 0.125 % SOLN external solution Apply topically See Admin Instructions Apply to sacral wound topically every shift      famotidine (PEPCID) 20 MG tablet 20 mg by PEG Tube route daily      polyethylene glycol (GLYCOLAX) 17 g packet Take 17 g by mouth daily as needed for Constipation      OLANZapine (ZYPREXA) 5 MG tablet 5 mg by PEG Tube route nightly      potassium bicarb-citric acid (EFFER-K) 20 MEQ TBEF effervescent tablet Take 2 tablets by mouth every 4 hours      Lactobacillus (ACIDOPHILUS) CAPS capsule 1 capsule by PEG Tube route daily      senna (SENOKOT) 8.6 MG tablet 1 tablet by PEG Tube route daily as needed for Constipation           Scheduled Meds:   ciprofloxacin  500 mg Oral 2 times per day    sodium hypochlorite   Irrigation Daily    [Held by provider] apixaban  2.5 mg Oral BID  vancomycin  125 mg Per G Tube 4 times per day    LORazepam  1 mg IntraVENous Once    [Held by provider] aspirin  325 mg Oral Daily    cetirizine  10 mg Oral Daily    metoprolol tartrate  50 mg Oral BID    sodium chloride flush  5-40 mL IntraVENous 2 times per day    [Held by provider] furosemide  20 mg IntraVENous Q8H     Continuous Infusions:   dextrose 40 mL/hr at 12/22/21 0405    sodium chloride       PRN Meds:potassium chloride **OR** potassium alternative oral replacement **OR** potassium chloride, diazePAM, morphine, sodium chloride flush, sodium chloride, acetaminophen **OR** acetaminophen, ondansetron **OR** ondansetron        Allergies:  Sulfa antibiotics, Gluten meal, Metronidazole, and Milk-related compounds    OBJECTIVE:    Vitals:   Vitals:    12/22/21 1015   BP: (!) 123/46   Pulse: 73   Resp: 18   Temp: 99.2 °F (37.3 °C)   SpO2: 98%      BMI: Body mass index is 28.57 kg/m². PHYSICAL EXAMINATION:            General appearance:  No apparent distress, appears stated age and cooperative. HEENT:  Normal cephalic, atraumatic without obvious deformity. Pupils equal, round, and reactive to light.  Extra ocular muscles intact. Conjunctivae/corneas clear. Neck: Supple, tracheostomy in place connected to the ventilator  Respiratory:   diminished entry bibasilarly  Cardiovascular:  Regular rhythm with normal S1/S2 without rubs or gallops. Abdomen:  Full, peg tube in place,soft, non-tender, non-distended with normal bowel sounds.  Flexi Seal in place  Musculoskeletal:  No clubbing, cyanosis  or edema bilaterally .   Neurologic: Alert and able to follow simple commands    Review of Labs and Diagnostic Testing:    Recent Results (from the past 24 hour(s))   POCT Glucose    Collection Time: 12/21/21 11:42 AM   Result Value Ref Range    POC Glucose 149 (H) 70 - 108 mg/dl   POCT Glucose    Collection Time: 12/21/21  4:50 PM   Result Value Ref Range    POC Glucose 128 (H) 70 - 108 mg/dl   CBC auto differential    Collection Time: 12/22/21  4:33 AM   Result Value Ref Range    WBC 11.9 (H) 4.8 - 10.8 thou/mm3    RBC 2.42 (L) 4.20 - 5.40 mill/mm3    Hemoglobin 7.1 (L) 12.0 - 16.0 gm/dl    Hematocrit 24.1 (L) 37.0 - 47.0 %    MCV 99.6 (H) 81.0 - 99.0 fL    MCH 29.3 26.0 - 33.0 pg    MCHC 29.5 (L) 32.2 - 35.5 gm/dl    RDW-CV 17.2 (H) 11.5 - 14.5 %    RDW-SD 61.6 (H) 35.0 - 45.0 fL    Platelets 671 961 - 818 thou/mm3    MPV 10.8 9.4 - 12.4 fL    Seg Neutrophils 79.8 %    Lymphocytes 9.0 %    Monocytes 5.3 %    Eosinophils 2.0 %    Basophils 0.3 %    Immature Granulocytes 3.6 %    Segs Absolute 9.5 (H) 1.8 - 7.7 thou/mm3    Lymphocytes Absolute 1.1 1.0 - 4.8 thou/mm3    Monocytes Absolute 0.6 0.4 - 1.3 thou/mm3    Eosinophils Absolute 0.2 0.0 - 0.4 thou/mm3    Basophils Absolute 0.0 0.0 - 0.1 thou/mm3    Immature Grans (Abs) 0.43 (H) 0.00 - 0.07 thou/mm3    nRBC 0 /100 wbc   Basic Metabolic Panel    Collection Time: 12/22/21  4:33 AM   Result Value Ref Range    Sodium 144 135 - 145 meq/L    Potassium 3.2 (L) 3.5 - 5.2 meq/L    Chloride 102 98 - 111 meq/L    CO2 36 (H) 23 - 33 meq/L    Glucose 115 (H) 70 - 108 mg/dL    BUN 52 (H) 7 - 22 mg/dL    CREATININE 0.9 0.4 - 1.2 mg/dL    Calcium 9.6 8.5 - 10.5 mg/dL   Anion Gap    Collection Time: 12/22/21  4:33 AM   Result Value Ref Range    Anion Gap 6.0 (L) 8.0 - 16.0 meq/L   Glomerular Filtration Rate, Estimated    Collection Time: 12/22/21  4:33 AM   Result Value Ref Range    Est, Glom Filt Rate 59 (A) ml/min/1.73m2       Radiology:     CT HEAD WO CONTRAST    Result Date: 12/18/2021  PROCEDURE: CT HEAD WO CONTRAST CLINICAL INFORMATION:ams COMPARISON: None TECHNIQUE: 5 mm axial imaging through the head without IV contrast. All CT scans at this facility use dose modulation, iterative reconstruction, and/or weight based dosing when appropriate to reduce the radiation dose to as low as reasonably achievable. FINDINGS: Mild cerebral volume loss.  Periventricular and subcortical white matter hypodensities are seen. No ventriculomegaly. No midline shift or mass effect. No acute intracranial hemorrhage. No intracranial collection. Gray-white differentiation is unremarkable. The posterior fossa is unremarkable. The craniocervical junction is unremarkable. No acute bony abnormality. Polyp or mucosal retention cyst is seen in the left sphenoid sinus. Otherwise, the remaining visualized paranasal sinuses are clear. Marked mastoid effusions bilaterally. The orbits are unremarkable. Mild atrophy of the pituitary gland. 1. No acute intracranial hemorrhage. 2. White matter hypodensities that likely relate to chronic microvascular changes. 3. Mild cerebral volume loss. 4. Marked mastoid effusions bilaterally. **This report has been created using voice recognition software. It may contain minor errors which are inherent in voice recognition technology. ** Final report electronically signed by Dr Marifer Schmitz on 12/18/2021 6:39 PM    XR CHEST PORTABLE    Result Date: 12/19/2021  AP chest Comparison: CR,SR - XR CHEST PORTABLE - 12/08/2021 01:00 AM EST Findings: Bilateral perihilar lung opacity similar to the prior exam. Large left pleural effusion has increased significantly. Cardiomegaly. Tracheostomy unchanged. Left subclavian central venous catheter has been removed. Percutaneous gastrostomy tube is likely in the upper body of stomach. No acute fracture. Impression: 1. Significant increase in left pleural effusion. Follow-up to clearing recommended. This document has been electronically signed by: Molli Skiff, MD on 12/19/2021 05:14 AM    XR CHEST PORTABLE    Result Date: 12/18/2021  PROCEDURE: XR CHEST PORTABLE CLINICAL INFORMATION: ams. COMPARISON: Chest x-ray dated December 2021. TECHNIQUE: AP upright view of the chest. FINDINGS: There is a tracheotomy tube in place. There is moderate cardiomegaly. . Is atherosclerotic calcification aortic arch. .  There is increased density throughout both lung fields. There is a probable left pleural effusion. The pulmonary vascularity is increased. There is thoracic spondylosis. 1. Moderate cardiomegaly. Increased pulmonary vascularity. 2. Increased density throughout both lung fields. 3. Probable left pleural effusion. 4.. Tracheotomy tube in place. **This report has been created using voice recognition software. It may contain minor errors which are inherent in voice recognition technology. ** Final report electronically signed by DR Glenys Engle on 12/18/2021 3:10 PM        ASSESMENT:      Active Problems:    C. difficile colitis    CHF (congestive heart failure) ([address]    Cardiomyopathy ([address]    UTI (urinary tract infection)    Hyperkalemia    Chronic respiratory failure ([address]    Decubitus ulcer    Ventilator dependence ([address]    Severe malnutrition ([address]  Resolved Problems:    * No resolved hospital problems. 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 UTI growing Proteus, Morganella, and E Coli; discussed with ID and will switch abx from Rocephin to Cipro through the PEG for 5 days total.    For surgical debridement tomorrow of coccyx wound. Eliquis on hold for surgery.     Holding diuretics for now on account of hypernatremia, which is improving. Replenish K+ per protocol.     Patient with elevated troponins,  Seen by cardiology Continue beta-blockers and aspirin and resume her Eliquis at an adjusted dose. The Eliquis she had been on prior accounts for the elevated INR.      Pulmonary Service following, continue vent management. Had thoracentesis, transudative. Continue the bronchodilators pulmonary toileting and wean as able. Feels that the patient most likely will require bronchoscopy at some point.         DVT prophylaxis: [] Lovenox                                 [] SCDs                                 [] SQ Heparin                                 [] Encourage ambulation, low risk for DVT, no chemical or mechanical prophylaxis necessary              [x] Already on Anticoagulation                Anticipated Disposition upon discharge: [] Home                                                                         [] Home with Home Health                                                                         [] [facility]                                                                         [] [address]    Assessment and plan of care discussed with supervising physician, Dr Gavin Sanders. Electronically signed by SERGE Sanford CNP on 12/22/2021 at 11:17 AM      Addendum/attestation by Meme Rahman MD:  I have seen and examined the patient independently. Face to face evaluation and examination was performed. The above evaluation and note has been reviewed. Laboratory and radiological data were reviewed. I Have discussed with Chanelle Murillo CNP about this patient in detail. The above assessment and plan has been reviewed. Please see my modifications mentioned below.       My modifications: Chelsie evaluation  Dr Ana aCnales covers from today till 1/5/2022    Electronically signed by Ray Payne MD on 12/22/2021 at 11:28 AM

## 2021-12-22 NOTE — PROGRESS NOTES
Physician Progress Note      PATIENTChatroy Hussein  CSN #:                  [id number]  :                       1934  ADMIT DATE:       2021 1:15 PM  [address] [place] DATE:  RESPONDING  PROVIDER #:        Kamaljit Caraballo MD          QUERY TEXT:    Pt admitted with C-Diff , UTI, Metabolic encephalopathy and has CHF   documented. If possible, please make selection  below  document in progress   notes and discharge summary further specificity regarding the type and acuity   of CHF:    The medical record reflects the following:  Risk Factors: known , history of cad, htn, recent covid 19 w/ ards prolonged   stay on vent , afib  Clinical Indicators: Po BNP 49212.0, Trop 0.017, 0.018, CXR - left pleural   effusion increased  ECHO (21) Summary  Ejection fraction is visually estimated at 25%. There was severe global hypokinesis of the left ventricle. Left Ventricular size is Moderately increased . Treatment: admission , imaging, cardiology consult , medication review and   management with attention to fluid balance, labs  and kidney function    Thank you ,  Jose Carlos Zamora  RN, BSN, [facility]  Clinical   P: [phone]  F: [phone]  Options provided:  -- Acute on Chronic Systolic CHF/HFrEF  -- Acute on Chronic Systolic and Diastolic CHF  -- Chronic Systolic CHF/HFrEF  -- Chronic Systolic and Diastolic CHF  -- Other - I will add my own diagnosis  -- Disagree - Not applicable / Not valid  -- Disagree - Clinically unable to determine / Unknown  -- Refer to Clinical Documentation Reviewer    PROVIDER RESPONSE TEXT:    This patient is in acute on chronic systolic CHF/HFrEF.     Query created by: Severo Ip on 2021 1:13 PM      Electronically signed by:  Kamaljit Caraballo MD 2021 5:35 PM

## 2021-12-23 ENCOUNTER — ANESTHESIA EVENT (OUTPATIENT)
Dept: OPERATING ROOM | Age: 86
DRG: 853 | End: 2021-12-23
Payer: MEDICARE

## 2021-12-23 ENCOUNTER — ANESTHESIA (OUTPATIENT)
Dept: OPERATING ROOM | Age: 86
DRG: 853 | End: 2021-12-23
Payer: MEDICARE

## 2021-12-23 VITALS
DIASTOLIC BLOOD PRESSURE: 54 MMHG | SYSTOLIC BLOOD PRESSURE: 105 MMHG | OXYGEN SATURATION: 99 % | RESPIRATION RATE: 25 BRPM

## 2021-12-23 LAB
ABO: NORMAL
ANION GAP SERPL CALCULATED.3IONS-SCNC: 7 MEQ/L (ref 8–16)
ANTIBODY SCREEN: NORMAL
BASOPHILS # BLD: 0.4 %
BASOPHILS ABSOLUTE: 0 THOU/MM3 (ref 0–0.1)
BUN BLDV-MCNC: 47 MG/DL (ref 7–22)
CALCIUM SERPL-MCNC: 8.9 MG/DL (ref 8.5–10.5)
CHLORIDE BLD-SCNC: 101 MEQ/L (ref 98–111)
CO2: 33 MEQ/L (ref 23–33)
CREAT SERPL-MCNC: 0.9 MG/DL (ref 0.4–1.2)
EOSINOPHIL # BLD: 1.7 %
EOSINOPHILS ABSOLUTE: 0.2 THOU/MM3 (ref 0–0.4)
ERYTHROCYTE [DISTWIDTH] IN BLOOD BY AUTOMATED COUNT: 17.6 % (ref 11.5–14.5)
ERYTHROCYTE [DISTWIDTH] IN BLOOD BY AUTOMATED COUNT: 62.9 FL (ref 35–45)
GFR SERPL CREATININE-BSD FRML MDRD: 59 ML/MIN/1.73M2
GLUCOSE BLD-MCNC: 96 MG/DL (ref 70–108)
GLUCOSE BLD-MCNC: 99 MG/DL (ref 70–108)
HCT VFR BLD CALC: 22.6 % (ref 37–47)
HCT VFR BLD CALC: 27.1 % (ref 37–47)
HEMOGLOBIN: 6.7 GM/DL (ref 12–16)
HEMOGLOBIN: 8.4 GM/DL (ref 12–16)
IMMATURE GRANS (ABS): 0.5 THOU/MM3 (ref 0–0.07)
IMMATURE GRANULOCYTES: 4.5 %
LYMPHOCYTES # BLD: 9.6 %
LYMPHOCYTES ABSOLUTE: 1.1 THOU/MM3 (ref 1–4.8)
MCH RBC QN AUTO: 29.6 PG (ref 26–33)
MCHC RBC AUTO-ENTMCNC: 29.6 GM/DL (ref 32.2–35.5)
MCV RBC AUTO: 100 FL (ref 81–99)
MONOCYTES # BLD: 6.9 %
MONOCYTES ABSOLUTE: 0.8 THOU/MM3 (ref 0.4–1.3)
NUCLEATED RED BLOOD CELLS: 0 /100 WBC
PLATELET # BLD: 218 THOU/MM3 (ref 130–400)
PMV BLD AUTO: 10.5 FL (ref 9.4–12.4)
POTASSIUM SERPL-SCNC: 3.8 MEQ/L (ref 3.5–5.2)
RBC # BLD: 2.26 MILL/MM3 (ref 4.2–5.4)
RH FACTOR: NORMAL
SEG NEUTROPHILS: 76.9 %
SEGMENTED NEUTROPHILS ABSOLUTE COUNT: 8.5 THOU/MM3 (ref 1.8–7.7)
SODIUM BLD-SCNC: 141 MEQ/L (ref 135–145)
WBC # BLD: 11 THOU/MM3 (ref 4.8–10.8)

## 2021-12-23 PROCEDURE — 11044 DBRDMT BONE 1ST 20 SQ CM/<: CPT | Performed by: SURGERY

## 2021-12-23 PROCEDURE — 86850 RBC ANTIBODY SCREEN: CPT

## 2021-12-23 PROCEDURE — 2700000000 HC OXYGEN THERAPY PER DAY

## 2021-12-23 PROCEDURE — 7100000001 HC PACU RECOVERY - ADDTL 15 MIN: Performed by: SURGERY

## 2021-12-23 PROCEDURE — 3700000001 HC ADD 15 MINUTES (ANESTHESIA): Performed by: SURGERY

## 2021-12-23 PROCEDURE — 99232 SBSQ HOSP IP/OBS MODERATE 35: CPT | Performed by: INTERNAL MEDICINE

## 2021-12-23 PROCEDURE — 6360000002 HC RX W HCPCS: Performed by: NURSE ANESTHETIST, CERTIFIED REGISTERED

## 2021-12-23 PROCEDURE — 85025 COMPLETE CBC W/AUTO DIFF WBC: CPT

## 2021-12-23 PROCEDURE — 36430 TRANSFUSION BLD/BLD COMPNT: CPT

## 2021-12-23 PROCEDURE — 2060000000 HC ICU INTERMEDIATE R&B

## 2021-12-23 PROCEDURE — 6360000002 HC RX W HCPCS: Performed by: INTERNAL MEDICINE

## 2021-12-23 PROCEDURE — 85018 HEMOGLOBIN: CPT

## 2021-12-23 PROCEDURE — 80048 BASIC METABOLIC PNL TOTAL CA: CPT

## 2021-12-23 PROCEDURE — 7100000000 HC PACU RECOVERY - FIRST 15 MIN: Performed by: SURGERY

## 2021-12-23 PROCEDURE — 86901 BLOOD TYPING SEROLOGIC RH(D): CPT

## 2021-12-23 PROCEDURE — 86923 COMPATIBILITY TEST ELECTRIC: CPT

## 2021-12-23 PROCEDURE — 99232 SBSQ HOSP IP/OBS MODERATE 35: CPT | Performed by: SURGERY

## 2021-12-23 PROCEDURE — C1713 ANCHOR/SCREW BN/BN,TIS/BN: HCPCS | Performed by: SURGERY

## 2021-12-23 PROCEDURE — 2709999900 HC NON-CHARGEABLE SUPPLY: Performed by: SURGERY

## 2021-12-23 PROCEDURE — 88304 TISSUE EXAM BY PATHOLOGIST: CPT

## 2021-12-23 PROCEDURE — APPSS30 APP SPLIT SHARED TIME 16-30 MINUTES: Performed by: NURSE PRACTITIONER

## 2021-12-23 PROCEDURE — P9016 RBC LEUKOCYTES REDUCED: HCPCS

## 2021-12-23 PROCEDURE — 36415 COLL VENOUS BLD VENIPUNCTURE: CPT

## 2021-12-23 PROCEDURE — 2580000003 HC RX 258: Performed by: INTERNAL MEDICINE

## 2021-12-23 PROCEDURE — 82948 REAGENT STRIP/BLOOD GLUCOSE: CPT

## 2021-12-23 PROCEDURE — 0QBS0ZZ EXCISION OF COCCYX, OPEN APPROACH: ICD-10-PCS | Performed by: SURGERY

## 2021-12-23 PROCEDURE — 85014 HEMATOCRIT: CPT

## 2021-12-23 PROCEDURE — 94003 VENT MGMT INPAT SUBQ DAY: CPT

## 2021-12-23 PROCEDURE — 3700000000 HC ANESTHESIA ATTENDED CARE: Performed by: SURGERY

## 2021-12-23 PROCEDURE — 3600000002 HC SURGERY LEVEL 2 BASE: Performed by: SURGERY

## 2021-12-23 PROCEDURE — 6370000000 HC RX 637 (ALT 250 FOR IP): Performed by: REGISTERED NURSE

## 2021-12-23 PROCEDURE — 11047 DBRDMT BONE EACH ADDL: CPT | Performed by: SURGERY

## 2021-12-23 PROCEDURE — 6370000000 HC RX 637 (ALT 250 FOR IP): Performed by: INTERNAL MEDICINE

## 2021-12-23 PROCEDURE — 86900 BLOOD TYPING SEROLOGIC ABO: CPT

## 2021-12-23 PROCEDURE — 3600000012 HC SURGERY LEVEL 2 ADDTL 15MIN: Performed by: SURGERY

## 2021-12-23 PROCEDURE — 6370000000 HC RX 637 (ALT 250 FOR IP): Performed by: SURGERY

## 2021-12-23 PROCEDURE — 94761 N-INVAS EAR/PLS OXIMETRY MLT: CPT

## 2021-12-23 RX ORDER — FENTANYL CITRATE 50 UG/ML
INJECTION, SOLUTION INTRAMUSCULAR; INTRAVENOUS PRN
Status: DISCONTINUED | OUTPATIENT
Start: 2021-12-23 | End: 2021-12-23 | Stop reason: SDUPTHER

## 2021-12-23 RX ORDER — FUROSEMIDE 10 MG/ML
20 INJECTION INTRAMUSCULAR; INTRAVENOUS ONCE
Status: COMPLETED | OUTPATIENT
Start: 2021-12-23 | End: 2021-12-23

## 2021-12-23 RX ORDER — SODIUM CHLORIDE 9 MG/ML
INJECTION, SOLUTION INTRAVENOUS PRN
Status: COMPLETED | OUTPATIENT
Start: 2021-12-23 | End: 2021-12-23

## 2021-12-23 RX ORDER — FUROSEMIDE 20 MG/1
20 TABLET ORAL 2 TIMES DAILY
Status: DISCONTINUED | OUTPATIENT
Start: 2021-12-24 | End: 2021-12-29 | Stop reason: HOSPADM

## 2021-12-23 RX ADMIN — SODIUM CHLORIDE: 9 INJECTION, SOLUTION INTRAVENOUS at 09:57

## 2021-12-23 RX ADMIN — FUROSEMIDE 20 MG: 10 INJECTION, SOLUTION INTRAMUSCULAR; INTRAVENOUS at 12:43

## 2021-12-23 RX ADMIN — CIPROFLOXACIN 500 MG: 500 TABLET, FILM COATED ORAL at 21:09

## 2021-12-23 RX ADMIN — METOPROLOL TARTRATE 50 MG: 50 TABLET, FILM COATED ORAL at 08:50

## 2021-12-23 RX ADMIN — FENTANYL CITRATE 50 MCG: 50 INJECTION, SOLUTION INTRAMUSCULAR; INTRAVENOUS at 10:45

## 2021-12-23 RX ADMIN — MORPHINE SULFATE 2 MG: 2 INJECTION, SOLUTION INTRAMUSCULAR; INTRAVENOUS at 01:03

## 2021-12-23 RX ADMIN — Medication 125 MG: at 12:44

## 2021-12-23 RX ADMIN — SODIUM CHLORIDE, PRESERVATIVE FREE 10 ML: 5 INJECTION INTRAVENOUS at 12:45

## 2021-12-23 RX ADMIN — CETIRIZINE HYDROCHLORIDE 10 MG: 10 TABLET, FILM COATED ORAL at 08:50

## 2021-12-23 RX ADMIN — FENTANYL CITRATE 50 MCG: 50 INJECTION, SOLUTION INTRAMUSCULAR; INTRAVENOUS at 11:14

## 2021-12-23 RX ADMIN — Medication 125 MG: at 18:42

## 2021-12-23 RX ADMIN — CIPROFLOXACIN 500 MG: 500 TABLET, FILM COATED ORAL at 08:50

## 2021-12-23 RX ADMIN — SODIUM CHLORIDE, PRESERVATIVE FREE 10 ML: 5 INJECTION INTRAVENOUS at 09:50

## 2021-12-23 RX ADMIN — METOPROLOL TARTRATE 50 MG: 50 TABLET, FILM COATED ORAL at 21:09

## 2021-12-23 ASSESSMENT — PULMONARY FUNCTION TESTS
PIF_VALUE: 16
PIF_VALUE: 3
PIF_VALUE: 16
PIF_VALUE: 16
PIF_VALUE: 2
PIF_VALUE: 16
PIF_VALUE: 14
PIF_VALUE: 16
PIF_VALUE: 3
PIF_VALUE: 16
PIF_VALUE: 27.7
PIF_VALUE: 16
PIF_VALUE: 21.3
PIF_VALUE: 16
PIF_VALUE: 29.6
PIF_VALUE: 3
PIF_VALUE: 16
PIF_VALUE: 0
PIF_VALUE: 16
PIF_VALUE: 17
PIF_VALUE: 16
PIF_VALUE: 15
PIF_VALUE: 16
PIF_VALUE: 15
PIF_VALUE: 15
PIF_VALUE: 16
PIF_VALUE: 16
PIF_VALUE: 34

## 2021-12-23 ASSESSMENT — PAIN SCALES - WONG BAKER
WONGBAKER_NUMERICALRESPONSE: 0

## 2021-12-23 ASSESSMENT — PAIN SCALES - GENERAL: PAINLEVEL_OUTOF10: 6

## 2021-12-23 NOTE — PROGRESS NOTES
Progress note: Infectious diseases    Patient - Camille Erickson,  Age - 80 y.o.    - 1934      Room Number - 4K-01/001-A   MRN -  [id number]   Acct # - [de-identified]  Date of Admission -  2021  1:15 PM    SUBJECTIVE:   She had wide surgical debridement of the coccyx including the fat muscle and bone   OBJECTIVE   VITALS    height is 5' (1.524 m) and weight is 148 lb 9.6 oz (67.4 kg). Her oral temperature is 97.7 °F (36.5 °C). Her blood pressure is 129/51 (abnormal) and her pulse is 71. Her respiration is 18 and oxygen saturation is 98%. Wt Readings from Last 3 Encounters:   21 148 lb 9.6 oz (67.4 kg)   21 138 lb 7.2 oz (62.8 kg)   10/07/21 125 lb (56.7 kg)       I/O (24 Hours)    Intake/Output Summary (Last 24 hours) at 2021 1509  Last data filed at 2021 1400  Gross per 24 hour   Intake 2896.2 ml   Output 2550 ml   Net 346.2 ml       General Appearance  Awake,  Chronically sick looking. on vent. HEENT - normocephalic, atraumatic, pale conjunctiva,  anicteric sclera  Neck - Supple, no mass  Lungs -  Bilateral   air entry, no rhonchi, on vent  Cardiovascular - Heart sounds are normal.     Abdomen - soft, not distended, nontender, dressed coccyx wound  Neurologic -chronically sick looking.   Skin -+ bruising or bleeding  Extremities - necrotic coccyx wound  Edema lower legs      MEDICATIONS:      ciprofloxacin  500 mg Oral BID    sodium hypochlorite   Irrigation Daily    [Held by provider] apixaban  2.5 mg Oral BID    vancomycin  125 mg Per G Tube 4 times per day    LORazepam  1 mg IntraVENous Once    [Held by provider] aspirin  325 mg Oral Daily    cetirizine  10 mg Oral Daily    metoprolol tartrate  50 mg Oral BID    sodium chloride flush  5-40 mL IntraVENous 2 times per day    [Held by provider] furosemide  20 mg IntraVENous Q8H      dextrose 40 mL/hr at 21 0542    sodium chloride       potassium chloride **OR** potassium alternative oral replacement **OR** potassium chloride, diazePAM, morphine, sodium chloride flush, sodium chloride, acetaminophen **OR** acetaminophen, ondansetron **OR** ondansetron      LABS:     CBC:   Recent Labs     12/21/21  0435 12/22/21  0433 12/23/21  0434   WBC 14.7* 11.9* 11.0*   HGB 7.4* 7.1* 6.7*    247 218     BMP:    Recent Labs     12/21/21  0435 12/22/21  0433 12/23/21  0434   * 144 141   K 3.6 3.2* 3.8    102 101   CO2 35* 36* 33   BUN 56* 52* 47*   CREATININE 0.9 0.9 0.9   GLUCOSE 134* 115* 99     Calcium:  Recent Labs     12/23/21  0434   CALCIUM 8.9      Recent Labs     12/21/21  1650 12/22/21  1157 12/22/21  1639   POCGLU 128* 117* 115*      Recent Labs     12/21/21  0435   PROT 5.9*   LABALBU 2.1*        CULTURES:   UA:   Recent Labs     12/21/21  1113   COLORU ORANGE     Micro:   Lab Results   Component Value Date    BC No growth-preliminary  12/18/2021    BC No growth-preliminary  12/18/2021          Problem list of patient:     Patient Active Problem List   Diagnosis Code    Sigmoid diverticulitis K57.32    Acute respiratory failure with hypoxia ([facility]) J96.01    COVID-19 U07.1    Acute congestive heart failure (HCC) I50.9    C. difficile colitis A04.72    CHF (congestive heart failure) (HCC) I50.9    Cardiomyopathy ([facility]) I42.9    UTI (urinary tract infection) N39.0    Hyperkalemia E87.5    Chronic respiratory failure ([facility]) J96.10    Decubitus ulcer L89.90    Ventilator dependence [facility] Z99.11    Severe malnutrition (HCC) E43         ASSESSMENT/PLAN   Respiratory failure on vent  Severe cardiomyopathy  Hx of C.difficile infection  Malnutrition  Necrotic coccyx wound: had wide surgical debridment  Discussed with family.  ( a daughter and a son)           Shamar Browne MD, MD, [address] 12/23/2021 3:09 PM

## 2021-12-23 NOTE — PROGRESS NOTES
INTERNAL MEDICINE SPECIALTIES  Progress Note         Patient:  Erich Martinez  YOB: 1934  Date of Service: 12/23/2021  MRN: [id number]   Acct:  [de-identified]   Primary Care Physician: Vadim Hull MD    SUBJECTIVE: S/P excisional debridement of decubitus ulcer measuring [id number] thick including skin, Subcu tissue, gluteus muscle, fascia, muscle including bone. Home Medications:   No current facility-administered medications on file prior to encounter.      Current Outpatient Medications on File Prior to Encounter   Medication Sig Dispense Refill    metoprolol tartrate (LOPRESSOR) 50 MG tablet 50 mg by PEG Tube route 3 times daily Hold for SBP <100 or HR <60      acetaminophen (TYLENOL) 325 MG tablet 650 mg by PEG Tube route every 6 hours as needed for Pain      acetic acid 0.25 % irrigation Irrigate with 150 mLs as directed See Admin Instructions Every night shift Tue, Fri for sediment      amiodarone (PACERONE) 100 MG tablet 100 mg by PEG Tube route daily      apixaban (ELIQUIS) 5 MG TABS tablet 5 mg by Per G Tube route 2 times daily      polyvinyl alcohol (LIQUIFILM TEARS) 1.4 % ophthalmic solution Place 2 drops into both eyes every 4 hours as needed      sodium hypochlorite (DAKINS) 0.125 % SOLN external solution Apply topically See Admin Instructions Apply to sacral wound topically every shift      famotidine (PEPCID) 20 MG tablet 20 mg by PEG Tube route daily      polyethylene glycol (GLYCOLAX) 17 g packet Take 17 g by mouth daily as needed for Constipation      OLANZapine (ZYPREXA) 5 MG tablet 5 mg by PEG Tube route nightly      potassium bicarb-citric acid (EFFER-K) 20 MEQ TBEF effervescent tablet Take 2 tablets by mouth every 4 hours      Lactobacillus (ACIDOPHILUS) CAPS capsule 1 capsule by PEG Tube route daily      senna (SENOKOT) 8.6 MG tablet 1 tablet by PEG Tube route daily as needed for Constipation           Scheduled Meds:   ciprofloxacin  500 mg Oral BID    sodium hypochlorite   Irrigation Daily    [Held by provider] apixaban  2.5 mg Oral BID    vancomycin  125 mg Per G Tube 4 times per day    LORazepam  1 mg IntraVENous Once    [Held by provider] aspirin  325 mg Oral Daily    cetirizine  10 mg Oral Daily    metoprolol tartrate  50 mg Oral BID    sodium chloride flush  5-40 mL IntraVENous 2 times per day    [Held by provider] furosemide  20 mg IntraVENous Q8H     Continuous Infusions:   dextrose 40 mL/hr at 12/23/21 0315    sodium chloride       PRN Meds:potassium chloride **OR** potassium alternative oral replacement **OR** potassium chloride, diazePAM, morphine, sodium chloride flush, sodium chloride, acetaminophen **OR** acetaminophen, ondansetron **OR** ondansetron        Allergies:  Sulfa antibiotics, Gluten meal, Metronidazole, and Milk-related compounds    OBJECTIVE:    Vitals:   Vitals:    12/23/21 1644   BP:    Pulse: 75   Resp:    Temp:    SpO2: 99%      BMI: Body mass index is 29.02 kg/m². PHYSICAL EXAMINATION:            General appearance:  No apparent distress, appears stated age and cooperative. HEENT:  Normal cephalic, atraumatic without obvious deformity. Pupils equal, round, and reactive to light.  Extra ocular muscles intact. Conjunctivae/corneas clear. Neck: Supple, tracheostomy in place connected to the ventilator  Respiratory:   diminished entry bibasilarly  Cardiovascular:  Regular rhythm with normal S1/S2 without rubs or gallops. Abdomen:  Full, peg tube in place,soft, non-tender, non-distended with normal bowel sounds.  Flexi Seal in place  Musculoskeletal:  No clubbing, cyanosis  or edema bilaterally .   Neurologic: Alert and able to follow simple commands    Review of Labs and Diagnostic Testing:    Recent Results (from the past 24 hour(s))   CBC auto differential    Collection Time: 12/23/21  4:34 AM   Result Value Ref Range    WBC 11.0 (H) 4.8 - 10.8 thou/mm3    RBC 2.26 (L) 4.20 - 5.40 mill/mm3    Hemoglobin 6.7 (LL) 12.0 - 16.0 gm/dl    Hematocrit 22.6 (L) 37.0 - 47.0 %    .0 (H) 81.0 - 99.0 fL    MCH 29.6 26.0 - 33.0 pg    MCHC 29.6 (L) 32.2 - 35.5 gm/dl    RDW-CV 17.6 (H) 11.5 - 14.5 %    RDW-SD 62.9 (H) 35.0 - 45.0 fL    Platelets 473 405 - 943 thou/mm3    MPV 10.5 9.4 - 12.4 fL    Seg Neutrophils 76.9 %    Lymphocytes 9.6 %    Monocytes 6.9 %    Eosinophils 1.7 %    Basophils 0.4 %    Immature Granulocytes 4.5 %    Segs Absolute 8.5 (H) 1.8 - 7.7 thou/mm3    Lymphocytes Absolute 1.1 1.0 - 4.8 thou/mm3    Monocytes Absolute 0.8 0.4 - 1.3 thou/mm3    Eosinophils Absolute 0.2 0.0 - 0.4 thou/mm3    Basophils Absolute 0.0 0.0 - 0.1 thou/mm3    Immature Grans (Abs) 0.50 (H) 0.00 - 0.07 thou/mm3    nRBC 0 /100 wbc   Basic Metabolic Panel    Collection Time: 12/23/21  4:34 AM   Result Value Ref Range    Sodium 141 135 - 145 meq/L    Potassium 3.8 3.5 - 5.2 meq/L    Chloride 101 98 - 111 meq/L    CO2 33 23 - 33 meq/L    Glucose 99 70 - 108 mg/dL    BUN 47 (H) 7 - 22 mg/dL    CREATININE 0.9 0.4 - 1.2 mg/dL    Calcium 8.9 8.5 - 10.5 mg/dL   Anion Gap    Collection Time: 12/23/21  4:34 AM   Result Value Ref Range    Anion Gap 7.0 (L) 8.0 - 16.0 meq/L   Glomerular Filtration Rate, Estimated    Collection Time: 12/23/21  4:34 AM   Result Value Ref Range    Est, Glom Filt Rate 59 (A) ml/min/1.73m2   TYPE AND SCREEN    Collection Time: 12/23/21  6:33 AM   Result Value Ref Range    ABO O     Rh Factor POS     Antibody Screen NEG        Radiology:     CT HEAD WO CONTRAST    Result Date: 12/18/2021  PROCEDURE: CT HEAD WO CONTRAST CLINICAL INFORMATION:ams COMPARISON: None TECHNIQUE: 5 mm axial imaging through the head without IV contrast. All CT scans at this facility use dose modulation, iterative reconstruction, and/or weight based dosing when appropriate to reduce the radiation dose to as low as reasonably achievable. FINDINGS: Mild cerebral volume loss. Periventricular and subcortical white matter hypodensities are seen.  No ventriculomegaly. No midline shift or mass effect. No acute intracranial hemorrhage. No intracranial collection. Gray-white differentiation is unremarkable. The posterior fossa is unremarkable. The craniocervical junction is unremarkable. No acute bony abnormality. Polyp or mucosal retention cyst is seen in the left sphenoid sinus. Otherwise, the remaining visualized paranasal sinuses are clear. Marked mastoid effusions bilaterally. The orbits are unremarkable. Mild atrophy of the pituitary gland. 1. No acute intracranial hemorrhage. 2. White matter hypodensities that likely relate to chronic microvascular changes. 3. Mild cerebral volume loss. 4. Marked mastoid effusions bilaterally. **This report has been created using voice recognition software. It may contain minor errors which are inherent in voice recognition technology. ** Final report electronically signed by Dr Tyler Blackwell on 12/18/2021 6:39 PM    XR CHEST PORTABLE    Result Date: 12/19/2021  AP chest Comparison: [facility] - XR CHEST PORTABLE - 12/08/2021 01:00 AM EST Findings: Bilateral perihilar lung opacity similar to the prior exam. Large left pleural effusion has increased significantly. Cardiomegaly. Tracheostomy unchanged. Left subclavian central venous catheter has been removed. Percutaneous gastrostomy tube is likely in the upper body of stomach. No acute fracture. Impression: 1. Significant increase in left pleural effusion. Follow-up to clearing recommended. This document has been electronically signed by: Hebert Coker MD on 12/19/2021 05:14 AM    XR CHEST PORTABLE    Result Date: 12/18/2021  PROCEDURE: XR CHEST PORTABLE CLINICAL INFORMATION: ams. COMPARISON: Chest x-ray dated December 2021. TECHNIQUE: AP upright view of the chest. FINDINGS: There is a tracheotomy tube in place. There is moderate cardiomegaly. . Is atherosclerotic calcification aortic arch. .  There is increased density throughout both lung fields.  There is a probable left pleural effusion. The pulmonary vascularity is increased. There is thoracic spondylosis. 1. Moderate cardiomegaly. Increased pulmonary vascularity. 2. Increased density throughout both lung fields. 3. Probable left pleural effusion. 4.. Tracheotomy tube in place. **This report has been created using voice recognition software. It may contain minor errors which are inherent in voice recognition technology. ** Final report electronically signed by DR Ailyn Braga on 12/18/2021 3:10 PM        ASSESMENT:      Active Problems:    C. difficile colitis    CHF (congestive heart failure) ([address]    Cardiomyopathy ([address]    UTI (urinary tract infection)    Hyperkalemia    Chronic respiratory failure ([address]    Decubitus ulcer    Ventilator dependence ([address]    Severe malnutrition ([address]  Resolved Problems:    * No resolved hospital problems. *      PLAN:    S/P   excisional debridement of decubitus ulcer measuring [id number] thick including skin, Subcu tissue, gluteus muscle, fascia, muscle including bone.  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 UTI growing Proteus, Morganella, and E Coli: Cont Cipro     Holding diuretics for now on account of hypernatremia, which is improving; trial restarting diuretics tomorrow.     Patient with elevated troponins,  Seen by cardiology and recommended to Continue beta-blockers and aspirin and Eliquis--check with Surg if Eliquis and ASA can be resumed      Pulmonary Service following, continue vent management.; noted unable to be weaned. Had thoracentesis, transudative. Continue the bronchodilators pulmonary toileting and wean as able. Feels that the patient most likely will require bronchoscopy at some point.         DVT prophylaxis: [] Lovenox                                 [] SCDs                                 [] SQ Heparin                                 [] Encourage ambulation, low risk for DVT, no chemical or mechanical prophylaxis necessary              [x] Already on Anticoagulation                Anticipated Disposition upon discharge: [] Home                                                                         [] Home with Home Health                                                                         [] [facility]                                                                         [] [address]    Assessment and plan of care discussed with supervising physician, Dr Sonia Link.          Electronically signed by Eilleen Apley, APRN - CNP on 12/23/2021 at 4:55 PM          Pt seen and examined by me  D/w Eilleen Apley  Pt on vent support   Vitals stable  Cont antibiotics for C diff and UTI  Resume blood thinners if ok with surgery     Electronically signed by Divya Molina MD on 12/23/2021 at 10:50 PM

## 2021-12-23 NOTE — PROGRESS NOTES
Comprehensive Nutrition Assessment    Type and Reason for Visit:  Reassess (TF Monitor)    Nutrition Recommendations/Plan:   *Continue Nepro TF at 40 mL/hr - fluids per Physician. *If/when base fluids turned off, may consider water flush of 250 ml every 6 hrs to meet estimated fluid needs. *Consider ZnSO4 and Vitamin supplements to promote wound healing. Nutrition Assessment: Pt improving from a nutritional standpoint AEB pt tolerating Nepro TF at goal rate of 40 mL/hr. Remains at risk for further nutritional compromise r/t admit d/t diarrhea and AMS, increased nutrient needs to aid in wound healing and underlying medical condition (CHF, late phase covid, advanced age, necrotic wound, PEG 11/17, trach 11/10). Nutrition recommendations/interventions as above. Malnutrition Assessment:  Malnutrition Status:  Severe malnutrition    Context:  Acute Illness     Findings of the 6 clinical characteristics of malnutrition:  Energy Intake:  No significant decrease in energy intake (TF at goal over the past 1-2 days)  Weight Loss:  No significant weight loss     Body Fat Loss:  7 - Moderate body fat loss Orbital   Muscle Mass Loss:  7 - Moderate muscle mass loss Temples (temporalis),Clavicles (pectoralis & deltoids)  Fluid Accumulation:  Unable to assess Extremities   Strength:  Not Performed    Estimated Daily Nutrient Needs:  Energy (kcal):  ~6701-1111 kcal/day (25-35 kcal/kg); Weight Used for Energy Requirements:  Current (67.4 kg on 12/23)     Protein (g):   g/day (1.2-2 g/kg); Weight Used for Protein Requirements:  Current (67.4 kg on 12/23)        Fluid (ml/day):  ~4424-6239 mL/day (25-30 mL/kg); Method Used for Fluid Requirements:  ml/Kg (67.4 kg)      Nutrition Related Findings: pt seen; remains on vent; per RN, pt was tolerating Nepro TF at goal rate before it was turned off this morning for wound debridement; D5 running at 40 mL/hr. +Rectal tube in place with 75 mL output in 24 hours.  Labs: Na 147, BUN 47, Cr. 0.9, Glucose 99. Rx includes: Cipro, Lasix, Vancomycin and Dextrose. Wounds:  Multiple,Pressure Injury,Stage III,Unstageable (pressure injury unstageable on sacrum; pressure injury stage III on coccyx; open perineum wound - wound care on board)       Current Nutrition Therapies:    Diet NPO   · Current Tube Feeding (TF) Orders:   · Feeding Route: PEG (placed 11/17/21)  · Formula: Renal Formula (Nepro)  · Schedule: Continuous (Nepro at 40 mL/hr)  · Additives/Modulars:  (none)  · Water Flushes: 30 ml bolus every 4 hrs + D5 at 40 ml/hr running at time of visit  · Current TF & Flush Orders Provides: Nepro at goal rate of 40 mL/hr  · Goal TF & Flush Orders Provides: When IVF off, may consider water flushes of 250 ml every 6 hrs plus Nepro at 40 ml/hr to provide 1728 kcal/day (30 julián/kg), 78 gm protein/day (1.3 gm pro/kg), 697 ml water from TF + 1000 ml water flush to provide 1697 ml fluids/day    Anthropometric Measures:  · Height: 5' (152.4 cm)  · Current Body Weight: 148 lb 9.6 oz (67.4 kg) (12/23; +2 pitting BLE; +1 pitting BUE)   · Admission Body Weight: 128 lb (58.1 kg) (12/18/21, unsure of source)    · Usual Body Weight: 125 lb (56.7 kg) (10/7/21)     · Ideal Body Weight: 100 lbs  · BMI: 29  · BMI Categories: Overweight (BMI 25.0-29. 9)       Nutrition Diagnosis:   · Increased nutrient needs related to increase demand for energy/nutrients as evidenced by wounds    Nutrition Interventions:   Food and/or Nutrient Delivery:  Continue NPO,Continue Current Tube Feeding  Nutrition Education/Counseling:  No recommendation at this time   Coordination of Nutrition Care:  Continue to monitor while inpatient    Goals:  TF to provided at least 80% or more of estimated nutritional needs during LOS       Nutrition Monitoring and Evaluation:   Behavioral-Environmental Outcomes:  None Identified   Food/Nutrient Intake Outcomes:  Enteral Nutrition Intake/Tolerance  Physical Signs/Symptoms Outcomes:  Biochemical

## 2021-12-23 NOTE — OP NOTE
fascia over the sacrum of the gluteus muscle muscle of both gluteus on both sides all necrotic. Some necrotic skin subtenons tissue muscle fascia and bone all removed. This left a very large wound total amount removed as above 20 cm x 15 cm x 8 cm. Hemostasis conscious with suture clips or cautery. At the end of the procedure the wound was then packed with 4 x 4 soaked in Dakin solution.   Dressing was applied and patient brought to recovery room

## 2021-12-23 NOTE — PROGRESS NOTES
[facility]    Patient - Roberta Cha   MRN -  [id number]   [facility]   - 1934      Date of Admission -  2021  1:15 PM  Date of evaluation -  2021  Room - --A   Hospital Day - [address]  Alvaro Davis MD Primary Care Physician - Rishi Burr MD   Chief Complaint   Acute on chronic respiratory failure with hypoxia  Active Hospital Problem List      Active Hospital Problems    Diagnosis Date Noted    Severe malnutrition ([address] [E43] 2021    Decubitus ulcer [L89.90] 2021    Ventilator dependence ([address] [Z99.11]     C. difficile colitis [A04.72] 2021    CHF (congestive heart failure) ([address] [I50.9] 2021    Cardiomyopathy ([address] [I42.9] 2021    UTI (urinary tract infection) [N39.0] 2021    Hyperkalemia [E87.5] 2021    Chronic respiratory failure ([address] [J96.10] 2021     HPI   Roberta Cha is a 80 y.o. female with a past medical history of chronic hypoxic respiratory failures status post tracheostomy on chronic mechanical ventilation secondary to recent Covid 19 pneumonia with ARDS, cardiomyopathy with ejection fraction 25%, hypertension, atrial fibrillation, acute kidney injury, C. difficile colitis and GI bleed. Patient was discharged from the intensive care unit to long-term acute care facility/[facility] where she completed her therapy but was unable to wean off the ventilator and was later discharged to a chronic vent facility on . She was sent back from the chronic vent facility due to altered mental status and agitation. Pulmonary consulted for ventilator management. She is currently on [facility] with a PEEP of 5 and FiO2 30%. Secretions are moderate.   X-ray showed worsened left lower lobe atelectasis/effusion      Past 24 hrs   -Plan for OR I and D of sacral wound  -PF cytology negative for malignant cells   -HGB 6.7 Primary giving 1 unit PRBC  ROS limited 2/2 AMS and vent via trach    Past Medical History         Diagnosis Date    Anxiety     Arthritis     Bronchitis     Diverticulitis of colon     Hypertension       Past Surgical History           Procedure Laterality Date    APPENDECTOMY      CHOLECYSTECTOMY      GASTROSTOMY TUBE PLACEMENT N/A 2021    EGD PEG TUBE PLACEMENT performed by Gregory Reece MD at [facility]     Diet    Diet NPO  Allergies    Sulfa antibiotics, Gluten meal, Metronidazole, and Milk-related compounds  Social History     Social History     Socioeconomic History    Marital status:      Spouse name: Not on file    Number of children: Not on file    Years of education: Not on file    Highest education level: Not on file   Occupational History    Not on file   Tobacco Use    Smoking status: Former Smoker     Packs/day: 1.00     Years: 15.00     Pack years: 15.00     Types: Cigarettes     Quit date: 12/10/1977     Years since quittin.0    Smokeless tobacco: Never Used   Substance and Sexual Activity    Alcohol use: No    Drug use: No    Sexual activity: Not on file   Other Topics Concern    Not on file   Social History Narrative    Not on file     Social Determinants of Health     Financial Resource Strain:     Difficulty of Paying Living Expenses: Not on file   Food Insecurity:     Worried About Running Out of Food in the Last Year: Not on file    Caitlin of Food in the Last Year: Not on file   Transportation Needs:     Lack of Transportation (Medical): Not on file    Lack of Transportation (Non-Medical):  Not on file   Physical Activity:     Days of Exercise per Week: Not on file    Minutes of Exercise per Session: Not on file   Stress:     Feeling of Stress : Not on file   Social Connections:     Frequency of Communication with Friends and Family: Not on file    Frequency of Social Gatherings with Friends and Family: Not on file    Attends Restorationism Services: Not on file    Active Member of Clubs or Organizations: Not on file    Attends Club or Organization Meetings: Not on file    Marital Status: Not on file   Intimate Partner Violence:     Fear of Current or Ex-Partner: Not on file    Emotionally Abused: Not on file    Physically Abused: Not on file    Sexually Abused: Not on file   Housing Stability:     Unable to Pay for Housing in the Last Year: Not on file    Number of [place] in the Last Year: Not on file    Unstable Housing in the Last Year: Not on file     Family History    History reviewed. No pertinent family history. Sleep History    No History  ROS    Unobtainable due to trach and vent  Meds    Current Medications    furosemide  20 mg IntraVENous Once    ciprofloxacin  500 mg Oral BID    sodium hypochlorite   Irrigation Daily    [Held by provider] apixaban  2.5 mg Oral BID    vancomycin  125 mg Per G Tube 4 times per day    LORazepam  1 mg IntraVENous Once    [Held by provider] aspirin  325 mg Oral Daily    cetirizine  10 mg Oral Daily    metoprolol tartrate  50 mg Oral BID    sodium chloride flush  5-40 mL IntraVENous 2 times per day    [Held by provider] furosemide  20 mg IntraVENous Q8H     sodium chloride, potassium chloride **OR** potassium alternative oral replacement **OR** potassium chloride, diazePAM, morphine, sodium chloride flush, sodium chloride, acetaminophen **OR** acetaminophen, ondansetron **OR** ondansetron  IV Drips/Infusions   sodium chloride      dextrose 40 mL/hr at 12/23/21 0315    sodium chloride       Vitals    Vitals    height is 5' (1.524 m) and weight is 148 lb 9.6 oz (67.4 kg). Her temperature is 98.9 °F (37.2 °C). Her blood pressure is 126/56 (abnormal) and her pulse is 72. Her respiration is 20 and oxygen saturation is 97%.      O2 Flow Rate (L/min): 6 L/min  I/O    Intake/Output Summary (Last 24 hours) at 12/23/2021 0955  Last data filed at 12/23/2021 0315  Gross per 24 hour   Intake 1736.72 ml   Output 1150 ml   Net 586.72 ml Patient Vitals for the past 96 hrs (Last 3 readings):   Weight   12/23/21 0315 148 lb 9.6 oz (67.4 kg)   12/21/21 0314 146 lb 4.8 oz (66.4 kg)   12/20/21 0520 138 lb (62.6 kg)     Exam   Physical Exam   Constitutional: No distress on vent via trach. Patient appears elderly and frail  Head: Normocephalic and atraumatic. Mouth/Throat: Oropharynx is clear and moist.  No oral thrush. Eyes: Conjunctivae are normal. Pupils are equal, round. No scleral icterus. Neck: Neck supple. Trach in place  Cardiovascular: S1 and S2 with no murmur. No peripheral edema  Pulmonary/Chest: Normal effort with bilateral air entry, faint rhonchi improved with suctioning. No stridor. No respiratory distress. Patient exhibits no tenderness. Abdominal: Soft. Bowel sounds audible. No distension or tenderness to palp. Musculoskeletal: Moves all extremities  Neurological: Patient is alert and moves extremities x 4 noted confusion    Labs   ABG  Lab Results   Component Value Date    PH 7.38 12/18/2021    PO2 62 12/18/2021    PCO2 62 12/18/2021    HCO3 37 12/18/2021    O2SAT 90 12/18/2021     Lab Results   Component Value Date    IFIO2 6 12/18/2021    MODE PC 10/30/2021    SETTIDVOL 320 12/18/2021    SETPEEP 5.0 12/18/2021     CBC  Recent Labs     12/21/21  0435 12/22/21  0433 12/23/21  0434   WBC 14.7* 11.9* 11.0*   RBC 2.54* 2.42* 2.26*   HGB 7.4* 7.1* 6.7*   HCT 25.4* 24.1* 22.6*   .0* 99.6* 100.0*   MCH 29.1 29.3 29.6   MCHC 29.1* 29.5* 29.6*    247 218   MPV 10.6 10.8 10.5      BMP  Recent Labs     12/21/21  0435 12/22/21  0433 12/23/21  0434   * 144 141   K 3.6 3.2* 3.8    102 101   CO2 35* 36* 33   BUN 56* 52* 47*   CREATININE 0.9 0.9 0.9   GLUCOSE 134* 115* 99   CALCIUM 9.5 9.6 8.9     LFT  No results for input(s): AST, ALT, ALB, BILITOT, ALKPHOS, LIPASE in the last 72 hours. Invalid input(s):   AMYLASE  TROP  Lab Results   Component Value Date    TROPONINT 0.023 12/19/2021    TROPONINT 0.021 12/19/2021    TROPONINT 0.018 12/19/2021     BNP  Lab Results   Component Value Date    PROBNP 27288.0 12/18/2021    PROBNP > 05918.0 11/20/2021    PROBNP 31667.0 11/01/2021     D-Dimer  No results found for: DDIMER  Lactic Acid  No results for input(s): LACTA in the last 72 hours. INR  No results for input(s): INR, PROTIME in the last 72 hours. PTT  No results for input(s): APTT in the last 72 hours. Glucose  Recent Labs     12/21/21  1650 12/22/21  1157 12/22/21  1639   POCGLU 128* 117* 115*     UA   Recent Labs     12/21/21  1113   COLORU ORANGE   . Results for Rand Araiza (MRN [id number]) as of 12/21/2021 15:11   Ref. Range 12/21/2021 11:13   Character, Body Fluid Unknown BLOODY   LD, Fluid Latest Units: U/L 124   pH, Fluid Unknown 7.63   Protein, Fluid Latest Units: gm/dl 2.6   Albumin, Fluid Latest Units: gm/dl 1.3   Body Fluid RBC Latest Units: /cumm 61449   Total Nucleated cells Body Fluid Latest Ref Range: 0 - 500 /cumm 773 (H)   Total Volume Received Body Fluid Latest Units: ml 43.0     Serum total protein: 5.9  Serum LDH: 306  Serum albumin: 2.1    Total protein ratio i.e Pleural fluid total protein/ Serum Total protein: 2.6/5.9=0.44  LDH ratio i.e Pleural fluid LDH/ Serum LDH: 124/306=0.41  Albumin gradient 2.1-1.3=0.8    Cytology-No malignant cells   Body fluid culture-No prelim growth    PFTs   No records  Echo     Summary   Ejection fraction is visually estimated at 25%. There was severe global hypokinesis of the left ventricle. Left Ventricular size is Moderately increased . The aortic valve leaflets were not well visualized. Aortic valve appears tricuspid. Thickened aortic valve leaflets noted. Aortic valve leaflets are Moderately calcified.   Cultures    Procalcitonin  Lab Results   Component Value Date    PROCAL 0.21 11/01/2021    PROCAL 0.24 10/29/2021    PROCAL 0.28 10/26/2021       Radiology    CXR  XR CHEST PORTABLE   12/21/2021   Impression:  No pneumothorax following a left-sided thoracentesis. XR CHEST PORTABLE   12/19/2021   Findings: Bilateral perihilar lung opacity similar to the prior exam. Large left pleural effusion has increased significantly. Cardiomegaly. Tracheostomy unchanged. Left subclavian central venous catheter has been removed. Percutaneous gastrostomy tube is likely in the upper body of stomach. No acute fracture. Impression:   1. Significant increase in left pleural effusion. Follow-up to clearing recommended. Assessment   -Altered mental status/metabolic encephalopathy-multifactorial   -Acute on chronic hypoxic respiratory failure on mechanical ventilation  -Left-sided pleural effusion- L thora 12/21/2021 600 cc out transudative likely 2/2 CHF  -Severe acute critical illness weakness/deconditioning  -Cardiomyopathy with ejection fraction 25%,  -atrial fibrillation  -Recent COVID-19pneumonia with ARDS  -UTI-Proteus mirabilis, Morganella morganii, E. Coli  -C Diff infection  -Bacteremia-Staph hominis ssp. hominis  -Recent HERIBERTO hyperkalemia  -History of GI bleeding  Recommendations   -Continue current mechanical ventilation, unable to wean due to weakness  -Thoracentesis completed transudative effusion need optimization of volume status  -Pleural fluid cytology negative for malignancy    -Will continue on Mech Vent with current settings AC Vt 320 cc PEEP 5 rate 18 adjust FiO2 to maintain SpO2 >88%, will hold on weaning until mentation improves  -Follow pleural fluid culture and cytology-Negative  -Primary service following AMS-possibly 2/2 UTI  -ID service following   -General surgery-following planning I and D of sacral wound  -Cardiology consulted for preop eval     Case discussed with nurse and patient/family. Questions and concerns addressed. Meds and Orders reviewed. Electronically signed by     SERGE Valdez - CNP on 12/23/2021 at 9:55 AM     Addendum by Dr. Marcy Junior MD:  I have seen and examined the patient independently.  Face to face evaluation and examination was performed. The above evaluation and note has been reviewed. Labs and radiographs were reviewed. I Have discussed with Mr. Angela Bautista CNP about this patient in detail. The above assessment and plan has been reviewed. Please see my modifications mentioned below. My modifications:  She remain on the ventilator  No acute distress  Waiting for surgical debridement of coccyx. Patient had to be continued on the ventilator with current ventilator settings for planned surgery  Left-sided pleural fluid cytology dated 21 December 2021-negative for malignant cells.     Katherine Escobar MD 12/23/2021 10:32 AM

## 2021-12-23 NOTE — CARE COORDINATION
DISCHARGE PLANNING UPDATE    Barriers to Discharge:   12/21 Left Thoracentesis = 600 ml removed. (+) Urine Culture: Proteus Mirabilis, Morganella Morganii, E.COLI, WBC 11, H 6.7; plans PRBC transfusio; monitor. SGY plans 12/23 sacral I/D.  IV AB, IV Diuretic x1, 30% FIO2 AC/VC 18 320 +5 + 26 PIP Peep Trach/Ventilator  continued     Discharge Plan:   from [facility] only 2d from [facility] chronic trach (11/10)/vent/PEG (11/17) [facility]; [facility] full LTACH referral; daughter/POA Janice Barriga in agreement for LTACH; has restraints; collaborated w Christina Patel, [place]

## 2021-12-23 NOTE — ANESTHESIA POSTPROCEDURE EVALUATION
Department of Anesthesiology  Postprocedure Note    Patient: Brianna Bal  MRN: [id number]  YOB: 1934  Date of evaluation: 12/23/2021  Time:  12:24 PM     Procedure Summary     Date: 12/23/21 Room / Location: [facility]  / [facility]    Anesthesia Start: 1036 Anesthesia Stop: 1138    Procedure: [address] (N/A Back) Diagnosis: (decubitis ulcer)    Surgeons: Angel Luis Whiteside MD Responsible Provider: Antony Segura MD    Anesthesia Type: general ASA Status: 4          Anesthesia Type: general    Erma Phase I: Erma Score: 8    Erma Phase II:      Last vitals: Reviewed and per EMR flowsheets.        Anesthesia Post Evaluation    Patient location during evaluation: PACU  Patient participation: complete - patient participated  Level of consciousness: responsive to verbal stimuli  Airway patency: patent  Nausea & Vomiting: no vomiting and no nausea  Complications: no  Cardiovascular status: hemodynamically stable  Respiratory status: acceptable and ventilator  Hydration status: stable

## 2021-12-23 NOTE — FLOWSHEET NOTE
12/23/21 0548   Provider Notification   Reason for Communication Critical Value (comment)  (hgb 6.7)   Provider Name Dr. Graciela Lopez   Provider Notification Physician   Method of Communication Call   Response See orders   Notification Time 22 [id number]- At this time this RN spoke with Dr. Graciela Lopez about the pt's hgb being 6.7. She gave me verbal orders for 1 unit PRBC and to give 20 mg IV lasix after the blood transfusion is done.

## 2021-12-23 NOTE — ANESTHESIA PRE PROCEDURE
Department of Anesthesiology  Preprocedure Note       Name:  Paola Patino   Age:  80 y.o.  :  1934                                          MRN:  [id number]         Date:  2021      Surgeon: Cezar Haddad):  Constantino Oates MD    Procedure: Procedure(s):  DECUBITUS ULCER DEBRIDEMENT REPAIR    Medications prior to admission:   Prior to Admission medications    Medication Sig Start Date End Date Taking?  Authorizing Provider   metoprolol tartrate (LOPRESSOR) 50 MG tablet 50 mg by PEG Tube route 3 times daily Hold for SBP <100 or HR <60   Yes Historical Provider, MD   acetaminophen (TYLENOL) 325 MG tablet 650 mg by PEG Tube route every 6 hours as needed for Pain   Yes Historical Provider, MD   acetic acid 0.25 % irrigation Irrigate with 150 mLs as directed See Admin Instructions Every night shift Tue, Fri for sediment   Yes Historical Provider, MD   amiodarone (PACERONE) 100 MG tablet 100 mg by PEG Tube route daily   Yes Historical Provider, MD   apixaban (ELIQUIS) 5 MG TABS tablet 5 mg by Per G Tube route 2 times daily   Yes Historical Provider, MD   polyvinyl alcohol (LIQUIFILM TEARS) 1.4 % ophthalmic solution Place 2 drops into both eyes every 4 hours as needed   Yes Historical Provider, MD   sodium hypochlorite (DAKINS) 0.125 % SOLN external solution Apply topically See Admin Instructions Apply to sacral wound topically every shift   Yes Historical Provider, MD   famotidine (PEPCID) 20 MG tablet 20 mg by PEG Tube route daily   Yes Historical Provider, MD   polyethylene glycol (GLYCOLAX) 17 g packet Take 17 g by mouth daily as needed for Constipation   Yes Historical Provider, MD   OLANZapine (ZYPREXA) 5 MG tablet 5 mg by PEG Tube route nightly   Yes Historical Provider, MD   potassium bicarb-citric acid (EFFER-K) 20 MEQ TBEF effervescent tablet Take 2 tablets by mouth every 4 hours   Yes Historical Provider, MD   Lactobacillus (ACIDOPHILUS) CAPS capsule 1 capsule by PEG Tube route daily   Yes Historical Provider, MD   senna (SENOKOT) 8.6 MG tablet 1 tablet by PEG Tube route daily as needed for Constipation   Yes Historical Provider, MD       Current medications:    Current Facility-Administered Medications   Medication Dose Route Frequency Provider Last Rate Last Admin    furosemide (LASIX) injection 20 mg  20 mg IntraVENous Once Donell Nath MD        ciprofloxacin (CIPRO) tablet 500 mg  500 mg Oral BID Colin All, APRN - CNP   500 mg at 12/23/21 0850    dextrose 5 % solution   IntraVENous Continuous Meena Dumont MD 40 mL/hr at 12/23/21 0315 Rate Verify at 12/23/21 0315    potassium chloride (KLOR-CON M) extended release tablet 40 mEq  40 mEq Oral PRN Meena Dumont MD        Or    potassium bicarb-citric acid (EFFER-K) effervescent tablet 40 mEq  40 mEq Oral PRN Meena Dumont MD   40 mEq at 12/22/21 1030    Or    potassium chloride 10 mEq/100 mL IVPB (Peripheral Line)  10 mEq IntraVENous PRN Meena Dumont MD        sodium hypochlorite (DAKINS) 0.125 % external solution   Irrigation Daily Lisandra Onofre MD   Given at 12/22/21 1026    [Held by provider] apixaban (ELIQUIS) tablet 2.5 mg  2.5 mg Oral BID Meena Dumont MD   2.5 mg at 12/21/21 2019    diazePAM (VALIUM) injection 2.5 mg  2.5 mg IntraVENous Q6H PRN Binu Farrell MD   2.5 mg at 12/22/21 9160    morphine (PF) injection 2 mg  2 mg IntraVENous Q4H PRN Binu Farrell MD   2 mg at 12/23/21 0103    vancomycin (VANCOCIN) oral solution 125 mg  125 mg Per G Tube 4 times per day Meena Dumont MD   125 mg at 12/22/21 2327    LORazepam (ATIVAN) injection 1 mg  1 mg IntraVENous Once Francisco Javier Martin MD        [facility] by provider] aspirin tablet 325 mg  325 mg Oral Daily Meena Dumont MD   325 mg at 12/21/21 0807    cetirizine (ZYRTEC) tablet 10 mg  10 mg Oral Daily Meena Dumont MD   10 mg at 12/23/21 0850    metoprolol tartrate (LOPRESSOR) tablet 50 mg  50 mg Oral BID Elaine Jasmine MD   50 mg at 12/23/21 0850    sodium chloride flush 0.9 % injection 5-40 mL  5-40 mL IntraVENous 2 times per day Elaine Jasmine MD   10 mL at 12/23/21 0950    sodium chloride flush 0.9 % injection 5-40 mL  5-40 mL IntraVENous PRN Elaine Jasmine MD        0.9 % sodium chloride infusion  25 mL IntraVENous PRN Elaine Jasmine MD        acetaminophen (TYLENOL) tablet 650 mg  650 mg Oral Q6H PRN Elaine Jasmine MD   650 mg at 12/21/21 2020    Or    acetaminophen (TYLENOL) suppository 650 mg  650 mg Rectal Q6H PRN Elaine Jasmine MD        ondansetron (ZOFRAN-ODT) disintegrating tablet 4 mg  4 mg Oral Q8H PRN Elaine Jasmine MD        Or    ondansetron (ZOFRAN) injection 4 mg  4 mg IntraVENous Q6H PRN Elaine Jasmine MD        [Held by provider] furosemide (LASIX) injection 20 mg  20 mg IntraVENous Q8H Elaine Jasmine MD   20 mg at 12/19/21 0017       Allergies:     Allergies   Allergen Reactions    Sulfa Antibiotics     Gluten Meal Diarrhea    Metronidazole Rash and Hives    Milk-Related Compounds Diarrhea       Problem List:    Patient Active Problem List   Diagnosis Code    Sigmoid diverticulitis K57.32    Acute respiratory failure with hypoxia (HCC) J96.01    COVID-19 U07.1    Acute congestive heart failure (HCC) I50.9    C. difficile colitis A04.72    CHF (congestive heart failure) (HCC) I50.9    Cardiomyopathy (HCC) I42.9    UTI (urinary tract infection) N39.0    Hyperkalemia E87.5    Chronic respiratory failure ([facility]) J96.10    Decubitus ulcer L89.90    Ventilator dependence ([facility] Z99.11    Severe malnutrition ([address] E43       Past Medical History:        Diagnosis Date    Anxiety     Arthritis     Bronchitis     Diverticulitis of colon     Hypertension        Past Surgical History:        Procedure Laterality Date    APPENDECTOMY      CHOLECYSTECTOMY      GASTROSTOMY TUBE PLACEMENT N/A 11/17/2021    EGD PEG TUBE PLACEMENT performed by Chivo Ren MD at [facility]       Social History:    Social History     Tobacco Use    Smoking status: Former Smoker     Packs/day: 1.00     Years: 15.00     Pack years: 15.00     Types: Cigarettes     Quit date: 12/10/1977     Years since quittin.0    Smokeless tobacco: Never Used   Substance Use Topics    Alcohol use: No                                Counseling given: Not Answered      Vital Signs (Current):   Vitals:    21 0315 21 0739 21 0951 21 1014   BP: (!) 115/48 (!) 122/55 (!) 126/56 (!) 123/59   Pulse: 61 69 72 70   Resp:    Temp: 97.6 °F (36.4 °C) 98.6 °F (37 °C) 98.9 °F (37.2 °C) 98.9 °F (37.2 °C)   TempSrc: Oral Oral Oral Oral   SpO2: 98% 97% 97% 97%   Weight: 148 lb 9.6 oz (67.4 kg)      Height:                                                  BP Readings from Last 3 Encounters:   21 (!) 123/59   21 (!) 127/96   10/07/21 126/80       NPO Status:                                                                                 BMI:   Wt Readings from Last 3 Encounters:   21 148 lb 9.6 oz (67.4 kg)   21 138 lb 7.2 oz (62.8 kg)   10/07/21 125 lb (56.7 kg)     Body mass index is 29.02 kg/m².     CBC:   Lab Results   Component Value Date    WBC 11.0 2021    RBC 2.26 2021    RBC 4.02 08/10/2011    HGB 6.7 2021    HCT 22.6 2021    .0 2021    RDW 14.2 2019     2021       CMP:   Lab Results   Component Value Date     2021    K 3.8 2021    K 4.7 2021     2021    CO2 33 2021    BUN 47 2021    CREATININE 0.9 2021    GFRAA >60 2019    LABGLOM 59 2021    GLUCOSE 99 2021    GLUCOSE 118 08/10/2011    PROT 5.9 2021    CALCIUM 8.9 2021    BILITOT 0.5 2021    ALKPHOS 168 2021    AST 51 2021    ALT 19 2021       POC Tests:   Recent Labs     21  5407

## 2021-12-23 NOTE — PROGRESS NOTES
[facility] Surgery -  Paul Chadwick MD M.D. FACS  Daily Progress Note    Pt Name: Silvestre Theodore  Medical Record Number: [id number]  Date of Birth 1934   Today's Date: 12/23/2021    ASSESSMENT:     1.   2. HD # [address] Problems    Diagnosis Date Noted    Severe malnutrition ([address] [E43] 12/21/2021    Decubitus ulcer [L89.90] 12/20/2021    Ventilator dependence ([address] [Z99.11]     C. difficile colitis [A04.72] 12/18/2021    CHF (congestive heart failure) ([facility] [I50.9] 12/18/2021    Cardiomyopathy ([facility] [I42.9] 12/18/2021    UTI (urinary tract infection) [N39.0] 12/18/2021    Hyperkalemia [E87.5] 12/18/2021    Chronic respiratory failure [facility]) [J96.10] 12/18/2021       Chief Complaint:  Chief Complaint   Patient presents with    Altered Mental Status           PLAN:     1. Patient care turned over to me by Dr. Elfredia Habermann  2. Has very large on staged decubitus ulcer with eschar and debridement skin of the leave a very large defect of unknown depth at this time  3. TF stopped at [place]  4. Eliquis and asa on hold  5. Hg 6.7 this am hospitalist has ordered 1 upRBC  6. SUBJECTIVE:     Elmerwmona Gray is stable.   Per nursing report family was to have a discussion last night and have a meeting today with palliative care to determine whether the going to proceed with aggressive care or not      OBJECTIVE:     Patient Vitals for the past 24 hrs:   BP Temp Temp src Pulse Resp SpO2 Weight   12/23/21 0315 (!) 115/48 97.6 °F (36.4 °C) Oral 61 18 98 % 148 lb 9.6 oz (67.4 kg)   12/23/21 0120 -- -- -- 73 18 97 % --   12/22/21 2300 (!) 118/50 99.4 °F (37.4 °C) Oral 67 18 98 % --   12/22/21 2057 -- -- -- 67 18 98 % --   12/22/21 1930 (!) 122/38 99.3 °F (37.4 °C) Oral 60 18 97 % --   12/22/21 1612 -- -- -- 67 19 98 % --   12/22/21 1526 (!) 104/92 99 °F (37.2 °C) Axillary 60 18 98 % --   12/22/21 1212 (!) 124/51 99 °F (37.2 °C) Axillary 74 18 98 % --   12/22/21 1207 -- -- -- -- 18 98 % -- 12/22/21 1015 (!) 123/46 99.2 °F (37.3 °C) Axillary 73 18 98 % --   12/22/21 0830 -- -- -- 77 23 98 % --         Intake/Output Summary (Last 24 hours) at 12/23/2021 0656  Last data filed at 12/23/2021 0315  Gross per 24 hour   Intake 1736.72 ml   Output 1150 ml   Net 586.72 ml       In: 1736.7 [I.V.:919.7; NG/GT:817]  Out: 1150 [Urine:1100]    I/O last 3 completed shifts: In: 3661 [I.V.:1394; NG/GT:1158]  Out: 6300 [Urine:1050]     Date 12/23/21 0000 - 12/23/21 2359   Shift 3890-6740 3351-7394 1996-5088 24 Hour Total   INTAKE   P.O.(mL/kg/hr) 0   0   I. V.(mL/kg) 308.2(4.6)   308.2(4.6)   Shift Total(mL/kg) 308.2(4.6)   308.2(4.6)   OUTPUT   Urine(mL/kg/hr) 350   350   Stool(mL/kg) 50(0.7)   50(0.7)   Shift Total(mL/kg) 400(5.9)   400(5.9)   Weight (kg) 67.4 67.4 67.4 67.4       Wt Readings from Last 3 Encounters:   12/23/21 148 lb 9.6 oz (67.4 kg)   11/17/21 138 lb 7.2 oz (62.8 kg)   10/07/21 125 lb (56.7 kg)        Body mass index is 29.02 kg/m².      Diet: Diet NPO        MEDS:     Scheduled Meds:   furosemide  20 mg IntraVENous Once    ciprofloxacin  500 mg Oral BID    sodium hypochlorite   Irrigation Daily    [Held by provider] apixaban  2.5 mg Oral BID    vancomycin  125 mg Per G Tube 4 times per day    LORazepam  1 mg IntraVENous Once    [Held by provider] aspirin  325 mg Oral Daily    cetirizine  10 mg Oral Daily    metoprolol tartrate  50 mg Oral BID    sodium chloride flush  5-40 mL IntraVENous 2 times per day    [Held by provider] furosemide  20 mg IntraVENous Q8H     Continuous Infusions:   sodium chloride      dextrose 40 mL/hr at 12/23/21 0315    sodium chloride       PRN Meds:sodium chloride, , PRN  potassium chloride, 40 mEq, PRN   Or  potassium alternative oral replacement, 40 mEq, PRN   Or  potassium chloride, 10 mEq, PRN  diazePAM, 2.5 mg, Q6H PRN  morphine, 2 mg, Q4H PRN  sodium chloride flush, 5-40 mL, PRN  sodium chloride, 25 mL, PRN  acetaminophen, 650 mg, Q6H PRN Or  acetaminophen, 650 mg, Q6H PRN  ondansetron, 4 mg, Q8H PRN   Or  ondansetron, 4 mg, Q6H PRN          PHYSICAL EXAM:     CONSTITUTIONAL: Sleeping but awakens opens eyes trach in place    WOUND/INCISION:        EXTREMITY: no cyanosis, clubbing or edema      LABS:     CBC:   Recent Labs     12/21/21 0435 12/22/21 0433 12/23/21 0434   WBC 14.7* 11.9* 11.0*   RBC 2.54* 2.42* 2.26*   HGB 7.4* 7.1* 6.7*   HCT 25.4* 24.1* 22.6*   .0* 99.6* 100.0*   MCH 29.1 29.3 29.6   MCHC 29.1* 29.5* 29.6*    247 218   MPV 10.6 10.8 10.5      Last 3 CMP:   Recent Labs     12/21/21 0435 12/22/21 0433 12/23/21 0434   * 144 141   K 3.6 3.2* 3.8    102 101   CO2 35* 36* 33   BUN 56* 52* 47*   CREATININE 0.9 0.9 0.9   GLUCOSE 134* 115* 99   CALCIUM 9.5 9.6 8.9   PROT 5.9*  --   --    LABALBU 2.1*  --   --       Troponin: No results for input(s): TROPONINI in the last 72 hours. Calcium:   Lab Results   Component Value Date    CALCIUM 8.9 12/23/2021    CALCIUM 9.6 12/22/2021    CALCIUM 9.5 12/21/2021      Ionized Calcium: No results found for: IONCA   Lipids: No results for input(s): CHOL, HDL in the last 72 hours. Invalid input(s): LDLCALCU  INR: No results for input(s): INR in the last 72 hours. Lactic Acid:   Lab Results   Component Value Date    LACTA 1.0 12/19/2021    LACTA 1.1 10/29/2021    LACTA 1.2 10/26/2021                  DVT prophylaxis: [] Lovenox                                 [] SCDs                                 [] SQ Heparin                                 [] Encourage ambulation, low risk for DVT, no chemical or mechanical prophylaxis necessary              [] Already on Anticoagulation      RADIOLOGY:      CT HEAD WO CONTRAST    Result Date: 12/18/2021  1. No acute intracranial hemorrhage. 2. White matter hypodensities that likely relate to chronic microvascular changes. 3. Mild cerebral volume loss. 4. Marked mastoid effusions bilaterally.  **This report has been created using voice recognition software. It may contain minor errors which are inherent in voice recognition technology. ** Final report electronically signed by Dr Chelsie Floyd on 12/18/2021 6:39 PM    XR CHEST PORTABLE    Result Date: 12/21/2021  No pneumothorax following a left-sided thoracentesis. **This report has been created using voice recognition software. It may contain minor errors which are inherent in voice recognition technology. ** Final report electronically signed by Dr Ana M Sanchez on 12/21/2021 12:46 PM    XR CHEST PORTABLE    Result Date: 12/19/2021  Impression: 1. Significant increase in left pleural effusion. Follow-up to clearing recommended. This document has been electronically signed by: Tiara Cohen MD on 12/19/2021 05:14 AM    XR CHEST PORTABLE    Result Date: 12/18/2021  1. Moderate cardiomegaly. Increased pulmonary vascularity. 2. Increased density throughout both lung fields. 3. Probable left pleural effusion. 4.. Tracheotomy tube in place. **This report has been created using voice recognition software. It may contain minor errors which are inherent in voice recognition technology. ** Final report electronically signed by DR Leslie Castillo on 12/18/2021 3:10 PM    US THORACENTESIS Which side should the procedure be performed? Left    Result Date: 12/21/2021  Successful ultrasound-guided thoracentesis with  0.6 L of fluid drained. **This report has been created using voice recognition software. It may contain minor errors which are inherent in voice recognition technology. **  Final report electronically signed by Dr Ana M Sanchez on 12/21/2021 12:08 PM        Manish Rocha MD, MKIMBERLYN.  FACS  Electronically signed 12/23/2021 at 6:56 AM

## 2021-12-24 LAB
ANION GAP SERPL CALCULATED.3IONS-SCNC: 9 MEQ/L (ref 8–16)
BLOOD CULTURE, ROUTINE: NORMAL
BLOOD CULTURE, ROUTINE: NORMAL
BUN BLDV-MCNC: 43 MG/DL (ref 7–22)
CALCIUM SERPL-MCNC: 8.6 MG/DL (ref 8.5–10.5)
CHLORIDE BLD-SCNC: 101 MEQ/L (ref 98–111)
CO2: 31 MEQ/L (ref 23–33)
CREAT SERPL-MCNC: 0.9 MG/DL (ref 0.4–1.2)
ERYTHROCYTE [DISTWIDTH] IN BLOOD BY AUTOMATED COUNT: 17.8 % (ref 11.5–14.5)
ERYTHROCYTE [DISTWIDTH] IN BLOOD BY AUTOMATED COUNT: 60.4 FL (ref 35–45)
GFR SERPL CREATININE-BSD FRML MDRD: 59 ML/MIN/1.73M2
GLUCOSE BLD-MCNC: 131 MG/DL (ref 70–108)
GLUCOSE BLD-MCNC: 92 MG/DL (ref 70–108)
HCT VFR BLD CALC: 26.5 % (ref 37–47)
HEMOGLOBIN: 8.2 GM/DL (ref 12–16)
MCH RBC QN AUTO: 29.6 PG (ref 26–33)
MCHC RBC AUTO-ENTMCNC: 30.9 GM/DL (ref 32.2–35.5)
MCV RBC AUTO: 95.7 FL (ref 81–99)
PLATELET # BLD: 218 THOU/MM3 (ref 130–400)
PMV BLD AUTO: 10.4 FL (ref 9.4–12.4)
POTASSIUM SERPL-SCNC: 3.7 MEQ/L (ref 3.5–5.2)
RBC # BLD: 2.77 MILL/MM3 (ref 4.2–5.4)
SODIUM BLD-SCNC: 141 MEQ/L (ref 135–145)
WBC # BLD: 8.4 THOU/MM3 (ref 4.8–10.8)

## 2021-12-24 PROCEDURE — 2060000000 HC ICU INTERMEDIATE R&B

## 2021-12-24 PROCEDURE — 2700000000 HC OXYGEN THERAPY PER DAY

## 2021-12-24 PROCEDURE — 85027 COMPLETE CBC AUTOMATED: CPT

## 2021-12-24 PROCEDURE — 94761 N-INVAS EAR/PLS OXIMETRY MLT: CPT

## 2021-12-24 PROCEDURE — APPSS30 APP SPLIT SHARED TIME 16-30 MINUTES: Performed by: NURSE PRACTITIONER

## 2021-12-24 PROCEDURE — 82948 REAGENT STRIP/BLOOD GLUCOSE: CPT

## 2021-12-24 PROCEDURE — 2580000003 HC RX 258: Performed by: INTERNAL MEDICINE

## 2021-12-24 PROCEDURE — 36415 COLL VENOUS BLD VENIPUNCTURE: CPT

## 2021-12-24 PROCEDURE — 6370000000 HC RX 637 (ALT 250 FOR IP): Performed by: REGISTERED NURSE

## 2021-12-24 PROCEDURE — 6360000002 HC RX W HCPCS: Performed by: INTERNAL MEDICINE

## 2021-12-24 PROCEDURE — 99232 SBSQ HOSP IP/OBS MODERATE 35: CPT | Performed by: INTERNAL MEDICINE

## 2021-12-24 PROCEDURE — 6370000000 HC RX 637 (ALT 250 FOR IP): Performed by: INTERNAL MEDICINE

## 2021-12-24 PROCEDURE — 99024 POSTOP FOLLOW-UP VISIT: CPT | Performed by: SURGERY

## 2021-12-24 PROCEDURE — 80048 BASIC METABOLIC PNL TOTAL CA: CPT

## 2021-12-24 PROCEDURE — 94003 VENT MGMT INPAT SUBQ DAY: CPT

## 2021-12-24 RX ORDER — 0.9 % SODIUM CHLORIDE 0.9 %
250 INTRAVENOUS SOLUTION INTRAVENOUS ONCE
Status: COMPLETED | OUTPATIENT
Start: 2021-12-24 | End: 2021-12-25

## 2021-12-24 RX ADMIN — Medication 125 MG: at 05:21

## 2021-12-24 RX ADMIN — FUROSEMIDE 20 MG: 20 TABLET ORAL at 17:39

## 2021-12-24 RX ADMIN — Medication 125 MG: at 17:39

## 2021-12-24 RX ADMIN — MORPHINE SULFATE 2 MG: 2 INJECTION, SOLUTION INTRAMUSCULAR; INTRAVENOUS at 16:22

## 2021-12-24 RX ADMIN — CIPROFLOXACIN 500 MG: 500 TABLET, FILM COATED ORAL at 21:07

## 2021-12-24 RX ADMIN — SODIUM CHLORIDE, PRESERVATIVE FREE 10 ML: 5 INJECTION INTRAVENOUS at 21:07

## 2021-12-24 RX ADMIN — METOPROLOL TARTRATE 50 MG: 50 TABLET, FILM COATED ORAL at 21:06

## 2021-12-24 RX ADMIN — Medication 125 MG: at 12:47

## 2021-12-24 RX ADMIN — SODIUM CHLORIDE 250 ML: 9 INJECTION, SOLUTION INTRAVENOUS at 23:23

## 2021-12-24 RX ADMIN — SODIUM HYPOCHLORITE: 1.25 SOLUTION TOPICAL at 16:27

## 2021-12-24 RX ADMIN — Medication 125 MG: at 23:26

## 2021-12-24 RX ADMIN — CIPROFLOXACIN 500 MG: 500 TABLET, FILM COATED ORAL at 09:21

## 2021-12-24 RX ADMIN — FUROSEMIDE 20 MG: 20 TABLET ORAL at 09:21

## 2021-12-24 RX ADMIN — MORPHINE SULFATE 2 MG: 2 INJECTION, SOLUTION INTRAMUSCULAR; INTRAVENOUS at 05:21

## 2021-12-24 RX ADMIN — ACETAMINOPHEN 650 MG: 325 TABLET ORAL at 18:15

## 2021-12-24 RX ADMIN — Medication 125 MG: at 00:34

## 2021-12-24 RX ADMIN — METOPROLOL TARTRATE 50 MG: 50 TABLET, FILM COATED ORAL at 09:21

## 2021-12-24 RX ADMIN — CETIRIZINE HYDROCHLORIDE 10 MG: 10 TABLET, FILM COATED ORAL at 09:21

## 2021-12-24 ASSESSMENT — PAIN SCALES - WONG BAKER
WONGBAKER_NUMERICALRESPONSE: 0
WONGBAKER_NUMERICALRESPONSE: 0

## 2021-12-24 ASSESSMENT — PAIN SCALES - GENERAL
PAINLEVEL_OUTOF10: 5
PAINLEVEL_OUTOF10: 10
PAINLEVEL_OUTOF10: 6
PAINLEVEL_OUTOF10: 1

## 2021-12-24 ASSESSMENT — PULMONARY FUNCTION TESTS
PIF_VALUE: 27.5
PIF_VALUE: 29.4
PIF_VALUE: 26
PIF_VALUE: 31.6
PIF_VALUE: 28
PIF_VALUE: 30

## 2021-12-24 NOTE — PROGRESS NOTES
INTERNAL MEDICINE SPECIALTIES  Progress Note         Patient:  Brisa Reyes  YOB: 1934  Date of Service: 12/24/2021  MRN: [id number]   Acct:  [de-identified]   Primary Care Physician: Anna Pedraza MD    SUBJECTIVE: S/P excisional debridement of decubitus ulcer measuring [id number] thick including skin, Subcu tissue, gluteus muscle, fascia, muscle including bone POD 2      Home Medications:   No current facility-administered medications on file prior to encounter.      Current Outpatient Medications on File Prior to Encounter   Medication Sig Dispense Refill    metoprolol tartrate (LOPRESSOR) 50 MG tablet 50 mg by PEG Tube route 3 times daily Hold for SBP <100 or HR <60      acetaminophen (TYLENOL) 325 MG tablet 650 mg by PEG Tube route every 6 hours as needed for Pain      acetic acid 0.25 % irrigation Irrigate with 150 mLs as directed See Admin Instructions Every night shift Tue, Fri for sediment      amiodarone (PACERONE) 100 MG tablet 100 mg by PEG Tube route daily      apixaban (ELIQUIS) 5 MG TABS tablet 5 mg by Per G Tube route 2 times daily      polyvinyl alcohol (LIQUIFILM TEARS) 1.4 % ophthalmic solution Place 2 drops into both eyes every 4 hours as needed      sodium hypochlorite (DAKINS) 0.125 % SOLN external solution Apply topically See Admin Instructions Apply to sacral wound topically every shift      famotidine (PEPCID) 20 MG tablet 20 mg by PEG Tube route daily      polyethylene glycol (GLYCOLAX) 17 g packet Take 17 g by mouth daily as needed for Constipation      OLANZapine (ZYPREXA) 5 MG tablet 5 mg by PEG Tube route nightly      potassium bicarb-citric acid (EFFER-K) 20 MEQ TBEF effervescent tablet Take 2 tablets by mouth every 4 hours      Lactobacillus (ACIDOPHILUS) CAPS capsule 1 capsule by PEG Tube route daily      senna (SENOKOT) 8.6 MG tablet 1 tablet by PEG Tube route daily as needed for Constipation           Scheduled Meds:   furosemide  20 mg Oral BID    ciprofloxacin  500 mg Oral BID    sodium hypochlorite   Irrigation Daily    [Held by provider] apixaban  2.5 mg Oral BID    vancomycin  125 mg Per G Tube 4 times per day    LORazepam  1 mg IntraVENous Once    [Held by provider] aspirin  325 mg Oral Daily    cetirizine  10 mg Oral Daily    metoprolol tartrate  50 mg Oral BID    sodium chloride flush  5-40 mL IntraVENous 2 times per day    [Held by provider] furosemide  20 mg IntraVENous Q8H     Continuous Infusions:   dextrose 40 mL/hr at 12/23/21 0315    sodium chloride       PRN Meds:potassium chloride **OR** potassium alternative oral replacement **OR** potassium chloride, diazePAM, morphine, sodium chloride flush, sodium chloride, acetaminophen **OR** acetaminophen, ondansetron **OR** ondansetron        Allergies:  Sulfa antibiotics, Gluten meal, Metronidazole, and Milk-related compounds    OBJECTIVE:    Vitals:   Vitals:    12/24/21 0912   BP: (!) 107/48   Pulse: 72   Resp: 18   Temp: 98.7 °F (37.1 °C)   SpO2: 97%      BMI: Body mass index is 29.56 kg/m². PHYSICAL EXAMINATION:            General appearance:  No apparent distress, appears stated age and cooperative. HEENT:  Normal cephalic, atraumatic without obvious deformity. Pupils equal, round, and reactive to light.  Extra ocular muscles intact. Conjunctivae/corneas clear. Neck: Supple, tracheostomy in place connected to the ventilator  Respiratory:   diminished entry bibasilarly  Cardiovascular:  Regular rhythm with normal S1/S2 without rubs or gallops. Abdomen:  Full, peg tube in place,soft, non-tender, non-distended with normal bowel sounds.  Flexi Seal in place  Musculoskeletal:  No clubbing, cyanosis  or edema bilaterally .   Neurologic: Alert and able to follow simple commands    Review of Labs and Diagnostic Testing:    Recent Results (from the past 24 hour(s))   Hemoglobin and hematocrit, blood    Collection Time: 12/23/21  7:01 PM   Result Value Ref Range    Hemoglobin 8.4 (L) 12.0 - **This report has been created using voice recognition software. It may contain minor errors which are inherent in voice recognition technology. ** Final report electronically signed by Dr Binh Waldrop on 2021 6:39 PM    XR CHEST PORTABLE    Result Date: 2021  AP chest Comparison: CR,SR - XR CHEST PORTABLE - 2021 01:00 AM EST Findings: Bilateral perihilar lung opacity similar to the prior exam. Large left pleural effusion has increased significantly. Cardiomegaly. Tracheostomy unchanged. Left subclavian central venous catheter has been removed. Percutaneous gastrostomy tube is likely in the upper body of stomach. No acute fracture. Impression: 1. Significant increase in left pleural effusion. Follow-up to clearing recommended. This document has been electronically signed by: Shyann Malone MD on 2021 05:14 AM    XR CHEST PORTABLE    Result Date: 2021  PROCEDURE: XR CHEST PORTABLE CLINICAL INFORMATION: ams. COMPARISON: Chest x-ray dated 2021. TECHNIQUE: AP upright view of the chest. FINDINGS: There is a tracheotomy tube in place. There is moderate cardiomegaly. . Is atherosclerotic calcification aortic arch. .  There is increased density throughout both lung fields. There is a probable left pleural effusion. The pulmonary vascularity is increased. There is thoracic spondylosis. 1. Moderate cardiomegaly. Increased pulmonary vascularity. 2. Increased density throughout both lung fields. 3. Probable left pleural effusion. 4.. Tracheotomy tube in place. **This report has been created using voice recognition software. It may contain minor errors which are inherent in voice recognition technology. ** Final report electronically signed by DR Nadeem Wilkerson on 2021 3:10 PM        ASSESMENT:      Active Problems:    C. difficile colitis    CHF (congestive heart failure) ([facility]    Cardiomyopathy ([facility]    UTI (urinary tract infection)    Hyperkalemia    Chronic respiratory failure (HCC)    Decubitus ulcer    Ventilator dependence ([address]    Severe malnutrition ([facility]  Resolved Problems:    * No resolved hospital problems. *      PLAN:    S/P   excisional debridement of decubitus ulcer measuring [id number] thick including skin, Subcu tissue, gluteus muscle, fascia, muscle including bone.  -Cont post op protocol     Had hgb 6.7 12/23; improved to 8.4 s/p 1URBC yesterday. 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 UTI growing Proteus, Morganella, and E Coli: Cont Cipro     Holding diuretics for now on account of hypernatremia, which is improving; trial restarting diuretics today.     Patient with elevated troponins,  Seen by cardiology and recommended to Continue beta-blockers and aspirin and Eliquis--check with Surg if Eliquis and ASA can be resumed      Pulmonary Service following, continue vent management.; noted unable to be weaned currently. Had thoracentesis, transudative. Continue the bronchodilators pulmonary toileting and wean as able. Feels that the patient most likely will require bronchoscopy at some point.         DVT prophylaxis: [] Lovenox                                 [x] SCDs                                 [] SQ Heparin                                 [] Encourage ambulation, low risk for DVT, no chemical or mechanical prophylaxis necessary              [] Already on Anticoagulation                Anticipated Disposition upon discharge: [] Home                                                                         [] Home with Home Health                                                                         [] [facility]                                                                         [] [address]    Assessment and plan of care discussed with supervising physician, Dr Jemma Lr.          Electronically signed by SERGE Mcclelland CNP on 12/24/2021 at 11:05 AM      Pt asleep   Did wake up to name  On vent support  Cont antibiotics per ID   Resume blood thinners when ok with surgery    Electronically signed by Alana Grider MD on 12/24/2021 at 11:22 AM

## 2021-12-24 NOTE — PROGRESS NOTES
[facility]    Patient - Nadira Esteban   MRN -  [id number]   [facility] # - [de-identified]   - 1934      Date of Admission -  2021  1:15 PM  Date of evaluation -  2021  Room - --A   Hospital Day - [facility] Davie Peralta MD Primary Care Physician - Shoshana Wade MD   Chief Complaint   Acute on chronic respiratory failure with hypoxia  Active Hospital Problem List      Active Hospital Problems    Diagnosis Date Noted    Severe malnutrition ([address] [E43] 2021     Class: Acute    Decubitus ulcer [L89.90] 2021    Ventilator dependence ([address] [Z99.11]     C. difficile colitis [A04.72] 2021    CHF (congestive heart failure) ([address] [I50.9] 2021    Cardiomyopathy ([address] [I42.9] 2021    UTI (urinary tract infection) [N39.0] 2021    Hyperkalemia [E87.5] 2021    Chronic respiratory failure ([address] [J96.10] 2021     HPI   Nadira Esteban is a 80 y.o. female with a past medical history of chronic hypoxic respiratory failures status post tracheostomy on chronic mechanical ventilation secondary to recent Covid 19 pneumonia with ARDS, cardiomyopathy with ejection fraction 25%, hypertension, atrial fibrillation, acute kidney injury, C. difficile colitis and GI bleed. Patient was discharged from the intensive care unit to long-term acute care facility/[facility] where she completed her therapy but was unable to wean off the ventilator and was later discharged to a chronic vent facility on . She was sent back from the chronic vent facility due to altered mental status and agitation. Pulmonary consulted for ventilator management. She is currently on [facility] with a PEEP of 5 and FiO2 30%. Secretions are moderate.   X-ray showed worsened left lower lobe atelectasis/effusion      Past 24 hrs   -S/p extensive I/D POD 1   -More alert today  -Tolerating vent via trach   ROS limited 2/2 AMS and vent via trach    Past Medical History         Diagnosis Date    Anxiety     Arthritis     Bronchitis     Diverticulitis of colon     Hypertension       Past Surgical History           Procedure Laterality Date    APPENDECTOMY      CHOLECYSTECTOMY      GASTROSTOMY TUBE PLACEMENT N/A 2021    EGD PEG [facility]     Diet    Diet NPO  ADULT TUBE FEEDING; PEG; Other Tube Feeding (specify); nepro; Continuous; 40; No; 30; Q 4 hours  Allergies    Sulfa antibiotics, Gluten meal, Metronidazole, and Milk-related compounds  Social History     Social History     Socioeconomic History    Marital status:      Spouse name: Not on file    Number of children: Not on file    Years of education: Not on file    Highest education level: Not on file   Occupational History    Not on file   Tobacco Use    Smoking status: Former Smoker     Packs/day: 1.00     Years: 15.00     Pack years: 15.00     Types: Cigarettes     Quit date: 12/10/1977     Years since quittin.0    Smokeless tobacco: Never Used   Substance and Sexual Activity    Alcohol use: No    Drug use: No    Sexual activity: Not on file   Other Topics Concern    Not on file   Social History Narrative    Not on file     Social Determinants of Health     Financial Resource Strain:     Difficulty of Paying Living Expenses: Not on file   Food Insecurity:     Worried About Running Out of Food in the Last Year: Not on file    Caitlin of Food in the Last Year: Not on file   Transportation Needs:     Lack of Transportation (Medical): Not on file    Lack of Transportation (Non-Medical):  Not on file   Physical Activity:     Days of Exercise per Week: Not on file    Minutes of Exercise per Session: Not on file   Stress:     Feeling of Stress : Not on file   Social Connections:     Frequency of Communication with Friends and Family: Not on file    Frequency of Social Gatherings with Friends and Family: Not on file    Attends Caodaism Services: Not on file    Active Member of Clubs or Organizations: Not on file    Attends Club or Organization Meetings: Not on file    Marital Status: Not on file   Intimate Partner Violence:     Fear of Current or Ex-Partner: Not on file    Emotionally Abused: Not on file    Physically Abused: Not on file    Sexually Abused: Not on file   Housing Stability:     Unable to Pay for Housing in the Last Year: Not on file    Number of [place] in the Last Year: Not on file    Unstable Housing in the Last Year: Not on file     Family History    History reviewed. No pertinent family history. Sleep History    No History  ROS    Unobtainable due to trach and vent  Meds    Current Medications    furosemide  20 mg Oral BID    ciprofloxacin  500 mg Oral BID    sodium hypochlorite   Irrigation Daily    [Held by provider] apixaban  2.5 mg Oral BID    vancomycin  125 mg Per G Tube 4 times per day    LORazepam  1 mg IntraVENous Once    [Held by provider] aspirin  325 mg Oral Daily    cetirizine  10 mg Oral Daily    metoprolol tartrate  50 mg Oral BID    sodium chloride flush  5-40 mL IntraVENous 2 times per day    [Held by provider] furosemide  20 mg IntraVENous Q8H     potassium chloride **OR** potassium alternative oral replacement **OR** potassium chloride, diazePAM, morphine, sodium chloride flush, sodium chloride, acetaminophen **OR** acetaminophen, ondansetron **OR** ondansetron  IV Drips/Infusions   dextrose 40 mL/hr at 12/23/21 0315    sodium chloride       Vitals    Vitals    height is 5' (1.524 m) and weight is 151 lb 6 oz (68.7 kg). Her oral temperature is 98.7 °F (37.1 °C). Her blood pressure is 107/48 (abnormal) and her pulse is 72. Her respiration is 18 and oxygen saturation is 97%.      O2 Flow Rate (L/min): 6 L/min  I/O    Intake/Output Summary (Last 24 hours) at 12/24/2021 0916  Last data filed at 12/24/2021 0300  Gross per 24 hour   Intake 1507.48 ml   Output 2150 ml   Net -642.52 ml     Patient Vitals for the past 96 hrs (Last 3 readings):   Weight   12/24/21 0300 151 lb 6 oz (68.7 kg)   12/23/21 0315 148 lb 9.6 oz (67.4 kg)   12/21/21 0314 146 lb 4.8 oz (66.4 kg)     Exam   Physical Exam   Constitutional: No distress on vent via trach. Patient appears elderly and frail. Head: Normocephalic and atraumatic. Mouth/Throat: Oropharynx is clear and moist.  No oral thrush. Eyes: Conjunctivae are normal. Pupils are equal, round. No scleral icterus. Neck: Neck supple. Trach 7.5 mm ID covidien  Cardiovascular: S1 and S2 with no murmur. No peripheral edema  Pulmonary/Chest: Normal effort with bilateral air entry, clear breath sounds. No stridor. No respiratory distress. Patient exhibits no tenderness. Abdominal: Soft. Bowel sounds audible. No distension or tenderness to palp. Musculoskeletal: Moves all extremities  Neurological: Patient is alert and mouths words but does not consistently follow commands    Labs   ABG  Lab Results   Component Value Date    PH 7.38 12/18/2021    PO2 62 12/18/2021    PCO2 62 12/18/2021    HCO3 37 12/18/2021    O2SAT 90 12/18/2021     Lab Results   Component Value Date    IFIO2 6 12/18/2021    MODE PC 10/30/2021    SETTIDVOL 320 12/18/2021    SETPEEP 5.0 12/18/2021     CBC  Recent Labs     12/22/21  0433 12/23/21  0434 12/23/21  1901   WBC 11.9* 11.0*  --    RBC 2.42* 2.26*  --    HGB 7.1* 6.7* 8.4*   HCT 24.1* 22.6* 27.1*   MCV 99.6* 100.0*  --    MCH 29.3 29.6  --    MCHC 29.5* 29.6*  --     218  --    MPV 10.8 10.5  --       BMP  Recent Labs     12/22/21  0433 12/23/21  0434 12/24/21  0413    141 141   K 3.2* 3.8 3.7    101 101   CO2 36* 33 31   BUN 52* 47* 43*   CREATININE 0.9 0.9 0.9   GLUCOSE 115* 99 92   CALCIUM 9.6 8.9 8.6     LFT  No results for input(s): AST, ALT, ALB, BILITOT, ALKPHOS, LIPASE in the last 72 hours. Invalid input(s):   AMYLASE  TROP  Lab Results   Component Value Date TROPONINT 0.023 12/19/2021    TROPONINT 0.021 12/19/2021    TROPONINT 0.018 12/19/2021     BNP  Lab Results   Component Value Date    PROBNP 68323.0 12/18/2021    PROBNP > 98235.0 11/20/2021    PROBNP 31392.0 11/01/2021     D-Dimer  No results found for: DDIMER  Lactic Acid  No results for input(s): LACTA in the last 72 hours. INR  No results for input(s): INR, PROTIME in the last 72 hours. PTT  No results for input(s): APTT in the last 72 hours. Glucose  Recent Labs     12/22/21  1157 12/22/21  1639 12/23/21  1945   POCGLU 117* 115* 96     UA   Recent Labs     12/21/21  1113   [facility]   . Results for Koby Liu (MRN [id number]) as of 12/21/2021 15:11   Ref. Range 12/21/2021 11:13   Character, Body Fluid Unknown BLOODY   LD, Fluid Latest Units: U/L 124   pH, Fluid Unknown 7.63   Protein, Fluid Latest Units: gm/dl 2.6   Albumin, Fluid Latest Units: gm/dl 1.3   Body Fluid RBC Latest Units: /cumm 95333   Total Nucleated cells Body Fluid Latest Ref Range: 0 - 500 /cumm 773 (H)   Total Volume Received Body Fluid Latest Units: ml 43.0     Serum total protein: 5.9  Serum LDH: 306  Serum albumin: 2.1    Total protein ratio i.e Pleural fluid total protein/ Serum Total protein: 2.6/5.9=0.44  LDH ratio i.e Pleural fluid LDH/ Serum LDH: 124/306=0.41  Albumin gradient 2.1-1.3=0.8    Cytology-No malignant cells   Body fluid culture-No prelim growth    PFTs   No records  Echo     Summary   Ejection fraction is visually estimated at 25%. There was severe global hypokinesis of the left ventricle. Left Ventricular size is Moderately increased . The aortic valve leaflets were not well visualized. Aortic valve appears tricuspid. Thickened aortic valve leaflets noted. Aortic valve leaflets are Moderately calcified.   Cultures    Procalcitonin  Lab Results   Component Value Date    PROCAL 0.21 11/01/2021    PROCAL 0.24 10/29/2021    PROCAL 0.28 10/26/2021       Radiology    CXR  XR CHEST PORTABLE   12/21/2021 Impression:  No pneumothorax following a left-sided thoracentesis. XR CHEST PORTABLE   12/19/2021   Findings: Bilateral perihilar lung opacity similar to the prior exam. Large left pleural effusion has increased significantly. Cardiomegaly. Tracheostomy unchanged. Left subclavian central venous catheter has been removed. Percutaneous gastrostomy tube is likely in the upper body of stomach. No acute fracture. Impression:   1. Significant increase in left pleural effusion. Follow-up to clearing recommended. Assessment   -Altered mental status/metabolic encephalopathy-multifactorial   -Acute on chronic hypoxic respiratory failure on mechanical ventilation  -Left-sided pleural effusion- L thora 12/21/2021 600 cc out transudative likely 2/2 CHF-Culture and cytology negative  -Severe acute critical illness weakness/deconditioning  -Cardiomyopathy with ejection fraction 25%,  -atrial fibrillation  -Recent COVID-19pneumonia with ARDS  -UTI-Proteus mirabilis, Morganella morganii, E. Coli  -C Diff infection  -Bacteremia-Staph hominis ssp. 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 Joann Becerra.   -Recent HERIBERTO hyperkalemia  -History of GI bleeding  Recommendations   -Thoracentesis completed transudative effusion need optimization of volume status  -Pleural fluid cytology negative for malignancy    -Will continue on Mech Vent with current settings AC Vt 320 cc PEEP 5 rate 18 adjust FiO2 to maintain SpO2 >88%, will attempt vent weaning as mentation improves  -Primary service following AMS-possibly 2/2 UTI  -ID service following   -General surgery-completed I and D of sacral wound 12/23/2021  -Cardiology consulted for preop eval     Case discussed with nurse and patient/family. Questions and concerns addressed. Meds and Orders reviewed.     Electronically signed by   SERGE Quiroga - CNP on 12/24/2021 at 9:16 AM     Addendum by Dr. Vilma Mitchell MD:  I have seen and examined the patient independently. Face to face evaluation and examination was performed. The above evaluation and note has been reviewed. Labs and radiographs were reviewed. I Have discussed with Mr. Sea Lema CNP about this patient in detail. The above assessment and plan has been reviewed. Please see my modifications mentioned below. My modifications: Will do CPAP trial for 2 hours. If patient tolerates CPAP trial advance to trach collar trials for 2 hours during daytime. Continue the patient rest of the time on the ventilator with current settings.   I spoke with RN taking care of the patient and informed about my impression plan at bedside    Arnie Herr MD 12/24/2021 2:07 PM

## 2021-12-24 NOTE — PROGRESS NOTES
Postop day #1 status post extensive debridement of sacral decubitus  Hemoglobin stable  Wound care continues  Stable postop

## 2021-12-24 NOTE — PROGRESS NOTES
Progress note: Infectious diseases    Patient - Aftab Dupree,  Age - 80 y.o.    - 1934      Room Number - 4K-01/001-A   MRN -  [id number]   Acct # - [de-identified]  Date of Admission -  2021  1:15 PM    SUBJECTIVE:   No new issues  Wound was re inforced in the morning  OBJECTIVE   VITALS    height is 5' (1.524 m) and weight is 151 lb 6 oz (68.7 kg). Her axillary temperature is 97.9 °F (36.6 °C). Her blood pressure is 98/37 (abnormal) and her pulse is 71. Her respiration is 18 and oxygen saturation is 96%. Wt Readings from Last 3 Encounters:   21 151 lb 6 oz (68.7 kg)   21 138 lb 7.2 oz (62.8 kg)   10/07/21 125 lb (56.7 kg)       I/O (24 Hours)    Intake/Output Summary (Last 24 hours) at 2021 1300  Last data filed at 2021 0300  Gross per 24 hour   Intake 1139.98 ml   Output 1700 ml   Net -560.02 ml       General Appearance  Awake,  Chronically sick looking. on vent. HEENT - normocephalic, atraumatic, pale conjunctiva,  anicteric sclera  Neck - Supple, no mass  Lungs -  Bilateral   air entry, no rhonchi, on vent  Cardiovascular - Heart sounds are normal.     Abdomen - soft, not distended, nontender, dressed coccyx wound  Neurologic -chronically sick looking.   Skin -+ bruising or bleeding  Extremities - dressed coccyx wound (post debridement)    MEDICATIONS:      furosemide  20 mg Oral BID    ciprofloxacin  500 mg Oral BID    sodium hypochlorite   Irrigation Daily    [Held by provider] apixaban  2.5 mg Oral BID    vancomycin  125 mg Per G Tube 4 times per day    LORazepam  1 mg IntraVENous Once    [Held by provider] aspirin  325 mg Oral Daily    cetirizine  10 mg Oral Daily    metoprolol tartrate  50 mg Oral BID    sodium chloride flush  5-40 mL IntraVENous 2 times per day    [Held by provider] furosemide  20 mg IntraVENous Q8H      dextrose 40 mL/hr at 21 0317    sodium chloride       potassium chloride **OR** potassium alternative oral replacement **OR** potassium chloride, diazePAM, morphine, sodium chloride flush, sodium chloride, acetaminophen **OR** acetaminophen, ondansetron **OR** ondansetron      LABS:     CBC:   Recent Labs     12/22/21  0433 12/22/21  0433 12/23/21  0434 12/23/21  1901 12/24/21  1207   WBC 11.9*  --  11.0*  --  8.4   HGB 7.1*   < > 6.7* 8.4* 8.2*     --  218  --  218    < > = values in this interval not displayed. BMP:    Recent Labs     12/22/21  0433 12/23/21  0434 12/24/21  0413    141 141   K 3.2* 3.8 3.7    101 101   CO2 36* 33 31   BUN 52* 47* 43*   CREATININE 0.9 0.9 0.9   GLUCOSE 115* 99 92     Calcium:  Recent Labs     12/24/21  0413   CALCIUM 8.6      Recent Labs     12/22/21  1157 12/22/21  1639 12/23/21  1945   POCGLU 117* 115* 96      No results for input(s): ALKPHOS, ALT, AST, PROT, BILITOT, BILIDIR, LABALBU in the last 72 hours. CULTURES:   UA:   No results for input(s): SPECGRAV, PHUR, COLORU, CLARITYU, MUCUS, PROTEINU, BLOODU, RBCUA, WBCUA, BACTERIA, NITRU, GLUCOSEU, BILIRUBINUR, UROBILINOGEN, KETUA, LABCAST, LABCASTTY, AMORPHOS in the last 72 hours.     Invalid input(s): CRYSTALS  Micro:   Lab Results   Component Value Date    BC No growth-preliminary No growth  12/18/2021    BC No growth-preliminary No growth  12/18/2021          Problem list of patient:     Patient Active Problem List   Diagnosis Code    Sigmoid diverticulitis K57.32    Acute respiratory failure with hypoxia (HCC) J96.01    COVID-19 U07.1    Acute congestive heart failure (HCC) I50.9    C. difficile colitis A04.72    CHF (congestive heart failure) (HCC) I50.9    Cardiomyopathy ([address] I42.9    UTI (urinary tract infection) N39.0    Hyperkalemia E87.5    Chronic respiratory failure (HCC) J96.10    Decubitus ulcer L89.90    Ventilator dependence ([address] Z99.11    Severe malnutrition ([address] E43         ASSESSMENT/PLAN   Respiratory failure on vent  Severe cardiomyopathy  Hx of C.difficile infection  Malnutrition  Necrotic coccyx wound: had wide surgical debridment   continue local wound care           Bijal Mora MD, MD, FACP 12/24/2021 1:00 PM

## 2021-12-25 LAB
ANION GAP SERPL CALCULATED.3IONS-SCNC: 7 MEQ/L (ref 8–16)
ANISOCYTOSIS: PRESENT
BASOPHILS # BLD: 0.5 %
BASOPHILS ABSOLUTE: 0 THOU/MM3 (ref 0–0.1)
BUN BLDV-MCNC: 46 MG/DL (ref 7–22)
CALCIUM SERPL-MCNC: 8.9 MG/DL (ref 8.5–10.5)
CHLORIDE BLD-SCNC: 103 MEQ/L (ref 98–111)
CO2: 33 MEQ/L (ref 23–33)
CREAT SERPL-MCNC: 1.1 MG/DL (ref 0.4–1.2)
EOSINOPHIL # BLD: 3.3 %
EOSINOPHILS ABSOLUTE: 0.3 THOU/MM3 (ref 0–0.4)
ERYTHROCYTE [DISTWIDTH] IN BLOOD BY AUTOMATED COUNT: 17.7 % (ref 11.5–14.5)
ERYTHROCYTE [DISTWIDTH] IN BLOOD BY AUTOMATED COUNT: 62.1 FL (ref 35–45)
GFR SERPL CREATININE-BSD FRML MDRD: 47 ML/MIN/1.73M2
GLUCOSE BLD-MCNC: 107 MG/DL (ref 70–108)
GLUCOSE BLD-MCNC: 113 MG/DL (ref 70–108)
GLUCOSE BLD-MCNC: 117 MG/DL (ref 70–108)
GLUCOSE BLD-MCNC: 119 MG/DL (ref 70–108)
GLUCOSE BLD-MCNC: 120 MG/DL (ref 70–108)
HCT VFR BLD CALC: 25.1 % (ref 37–47)
HEMOGLOBIN: 7.7 GM/DL (ref 12–16)
IMMATURE GRANS (ABS): 0.42 THOU/MM3 (ref 0–0.07)
IMMATURE GRANULOCYTES: 5.5 %
LYMPHOCYTES # BLD: 13.6 %
LYMPHOCYTES ABSOLUTE: 1 THOU/MM3 (ref 1–4.8)
MCH RBC QN AUTO: 30.1 PG (ref 26–33)
MCHC RBC AUTO-ENTMCNC: 30.7 GM/DL (ref 32.2–35.5)
MCV RBC AUTO: 98 FL (ref 81–99)
MONOCYTES # BLD: 7.2 %
MONOCYTES ABSOLUTE: 0.5 THOU/MM3 (ref 0.4–1.3)
NUCLEATED RED BLOOD CELLS: 0 /100 WBC
PLATELET # BLD: 250 THOU/MM3 (ref 130–400)
PLATELET ESTIMATE: ADEQUATE
PMV BLD AUTO: 10.8 FL (ref 9.4–12.4)
POTASSIUM SERPL-SCNC: 3.3 MEQ/L (ref 3.5–5.2)
RBC # BLD: 2.56 MILL/MM3 (ref 4.2–5.4)
SCAN OF BLOOD SMEAR: NORMAL
SEG NEUTROPHILS: 69.9 %
SEGMENTED NEUTROPHILS ABSOLUTE COUNT: 5.3 THOU/MM3 (ref 1.8–7.7)
SODIUM BLD-SCNC: 143 MEQ/L (ref 135–145)
WBC # BLD: 7.6 THOU/MM3 (ref 4.8–10.8)

## 2021-12-25 PROCEDURE — 99024 POSTOP FOLLOW-UP VISIT: CPT | Performed by: SURGERY

## 2021-12-25 PROCEDURE — 82948 REAGENT STRIP/BLOOD GLUCOSE: CPT

## 2021-12-25 PROCEDURE — 6370000000 HC RX 637 (ALT 250 FOR IP): Performed by: REGISTERED NURSE

## 2021-12-25 PROCEDURE — 80048 BASIC METABOLIC PNL TOTAL CA: CPT

## 2021-12-25 PROCEDURE — 99232 SBSQ HOSP IP/OBS MODERATE 35: CPT | Performed by: INTERNAL MEDICINE

## 2021-12-25 PROCEDURE — 2060000000 HC ICU INTERMEDIATE R&B

## 2021-12-25 PROCEDURE — 6370000000 HC RX 637 (ALT 250 FOR IP): Performed by: INTERNAL MEDICINE

## 2021-12-25 PROCEDURE — 36415 COLL VENOUS BLD VENIPUNCTURE: CPT

## 2021-12-25 PROCEDURE — 6360000002 HC RX W HCPCS: Performed by: INTERNAL MEDICINE

## 2021-12-25 PROCEDURE — 94761 N-INVAS EAR/PLS OXIMETRY MLT: CPT

## 2021-12-25 PROCEDURE — 85025 COMPLETE CBC W/AUTO DIFF WBC: CPT

## 2021-12-25 PROCEDURE — 2580000003 HC RX 258: Performed by: INTERNAL MEDICINE

## 2021-12-25 RX ORDER — POTASSIUM CHLORIDE 7.45 MG/ML
10 INJECTION INTRAVENOUS PRN
Status: DISCONTINUED | OUTPATIENT
Start: 2021-12-25 | End: 2021-12-29 | Stop reason: HOSPADM

## 2021-12-25 RX ORDER — POTASSIUM CHLORIDE 20 MEQ/1
40 TABLET, EXTENDED RELEASE ORAL PRN
Status: DISCONTINUED | OUTPATIENT
Start: 2021-12-25 | End: 2021-12-29 | Stop reason: HOSPADM

## 2021-12-25 RX ADMIN — CIPROFLOXACIN 500 MG: 500 TABLET, FILM COATED ORAL at 07:39

## 2021-12-25 RX ADMIN — Medication 125 MG: at 14:00

## 2021-12-25 RX ADMIN — Medication 125 MG: at 23:57

## 2021-12-25 RX ADMIN — ACETAMINOPHEN 650 MG: 325 TABLET ORAL at 07:46

## 2021-12-25 RX ADMIN — CIPROFLOXACIN 500 MG: 500 TABLET, FILM COATED ORAL at 21:00

## 2021-12-25 RX ADMIN — SODIUM CHLORIDE, PRESERVATIVE FREE 10 ML: 5 INJECTION INTRAVENOUS at 21:01

## 2021-12-25 RX ADMIN — CETIRIZINE HYDROCHLORIDE 10 MG: 10 TABLET, FILM COATED ORAL at 07:39

## 2021-12-25 RX ADMIN — Medication 125 MG: at 06:00

## 2021-12-25 RX ADMIN — MORPHINE SULFATE 2 MG: 2 INJECTION, SOLUTION INTRAMUSCULAR; INTRAVENOUS at 17:26

## 2021-12-25 RX ADMIN — Medication 125 MG: at 17:26

## 2021-12-25 RX ADMIN — FUROSEMIDE 20 MG: 20 TABLET ORAL at 07:39

## 2021-12-25 RX ADMIN — SODIUM CHLORIDE, PRESERVATIVE FREE 10 ML: 5 INJECTION INTRAVENOUS at 07:40

## 2021-12-25 RX ADMIN — FUROSEMIDE 20 MG: 20 TABLET ORAL at 17:35

## 2021-12-25 RX ADMIN — METOPROLOL TARTRATE 25 MG: 50 TABLET, FILM COATED ORAL at 07:40

## 2021-12-25 RX ADMIN — METOPROLOL TARTRATE 25 MG: 50 TABLET, FILM COATED ORAL at 21:00

## 2021-12-25 RX ADMIN — POTASSIUM BICARBONATE 40 MEQ: 391 TABLET, EFFERVESCENT ORAL at 08:46

## 2021-12-25 RX ADMIN — HYOSCYAMINE SULFATE: 16 SOLUTION at 08:48

## 2021-12-25 RX ADMIN — MORPHINE SULFATE 2 MG: 2 INJECTION, SOLUTION INTRAMUSCULAR; INTRAVENOUS at 04:46

## 2021-12-25 ASSESSMENT — PULMONARY FUNCTION TESTS
PIF_VALUE: 28
PIF_VALUE: 34
PIF_VALUE: 35.1
PIF_VALUE: 35.8

## 2021-12-25 ASSESSMENT — PAIN SCALES - WONG BAKER: WONGBAKER_NUMERICALRESPONSE: 0

## 2021-12-25 ASSESSMENT — PAIN SCALES - GENERAL
PAINLEVEL_OUTOF10: 4
PAINLEVEL_OUTOF10: 4
PAINLEVEL_OUTOF10: 3

## 2021-12-25 NOTE — PROGRESS NOTES
Postop day #2 status post extensive debridement of sacral decubitus  Hemoglobin 7.7  Wound care continues  Stable postop    Results for Ivanna Culver (MRN [id number]) as of 12/25/2021 07:19   Ref.  Range 12/23/2021 19:01 12/24/2021 12:07 12/25/2021 04:39   WBC Latest Ref Range: 4.8 - 10.8 thou/mm3  8.4 7.6   RBC Latest Ref Range: 4.20 - 5.40 mill/mm3  2.77 (L) 2.56 (L)   Hemoglobin Quant Latest Ref Range: 12.0 - 16.0 gm/dl 8.4 (L) 8.2 (L) 7.7 (L)   Hematocrit Latest Ref Range: 37.0 - 47.0 % 27.1 (L) 26.5 (L) 25.1 (L)   MCV Latest Ref Range: 81.0 - 99.0 fL  95.7 98.0   MCH Latest Ref Range: 26.0 - 33.0 pg  29.6 30.1   MCHC Latest Ref Range: 32.2 - 35.5 gm/dl  30.9 (L) 30.7 (L)   MPV Latest Ref Range: 9.4 - 12.4 fL  10.4 10.8   RDW-CV Latest Ref Range: 11.5 - 14.5 %  17.8 (H) 17.7 (H)   RDW-SD Latest Ref Range: 35.0 - 45.0 fL  60.4 (H) 62.1 (H)   Platelet Count Latest Ref Range: 130 - 400 thou/mm3  218 250   Platelet Estimate Latest Ref Range: Adequate    ADEQUATE   Lymphocytes Absolute Latest Ref Range: 1.0 - 4.8 thou/mm3   1.0   Monocytes Absolute Latest Ref Range: 0.4 - 1.3 thou/mm3   0.5   Eosinophils Absolute Latest Ref Range: 0.0 - 0.4 thou/mm3   0.3   Basophils Absolute Latest Ref Range: 0.0 - 0.1 thou/mm3   0.0   Seg Neutrophils Latest Units: %   69.9   Segs Absolute Latest Ref Range: 1.8 - 7.7 thou/mm3   5.3   Lymphocytes Latest Units: %   13.6   Monocytes Latest Units: %   7.2   Eosinophils Latest Units: %   3.3   Basophils Latest Units: %   0.5   Immature Grans (Abs) Latest Ref Range: 0.00 - 0.07 thou/mm3   0.42 (H)   Immature Granulocytes Latest Units: %   5.5   Nucleated Red Blood Cells Latest Units: /100 wbc   0   Anisocytosis Latest Ref Range: Absent    Present   SCAN OF BLOOD SMEAR Unknown   see below

## 2021-12-25 NOTE — PROGRESS NOTES
INTERNAL MEDICINE SPECIALTIES  Progress Note         Patient:  Saray Herron  YOB: 1934  Date of Service: 12/25/2021  MRN: [id number]   Acct:  [de-identified]   Primary Care Physician: Anabel Castillo MD    SUBJECTIVE: No complaints. BP has been low. Home Medications:   No current facility-administered medications on file prior to encounter.      Current Outpatient Medications on File Prior to Encounter   Medication Sig Dispense Refill    metoprolol tartrate (LOPRESSOR) 50 MG tablet 50 mg by PEG Tube route 3 times daily Hold for SBP <100 or HR <60      acetaminophen (TYLENOL) 325 MG tablet 650 mg by PEG Tube route every 6 hours as needed for Pain      acetic acid 0.25 % irrigation Irrigate with 150 mLs as directed See Admin Instructions Every night shift Tue, Fri for sediment      amiodarone (PACERONE) 100 MG tablet 100 mg by PEG Tube route daily      apixaban (ELIQUIS) 5 MG TABS tablet 5 mg by Per G Tube route 2 times daily      polyvinyl alcohol (LIQUIFILM TEARS) 1.4 % ophthalmic solution Place 2 drops into both eyes every 4 hours as needed      sodium hypochlorite (DAKINS) 0.125 % SOLN external solution Apply topically See Admin Instructions Apply to sacral wound topically every shift      famotidine (PEPCID) 20 MG tablet 20 mg by PEG Tube route daily      polyethylene glycol (GLYCOLAX) 17 g packet Take 17 g by mouth daily as needed for Constipation      OLANZapine (ZYPREXA) 5 MG tablet 5 mg by PEG Tube route nightly      potassium bicarb-citric acid (EFFER-K) 20 MEQ TBEF effervescent tablet Take 2 tablets by mouth every 4 hours      Lactobacillus (ACIDOPHILUS) CAPS capsule 1 capsule by PEG Tube route daily      senna (SENOKOT) 8.6 MG tablet 1 tablet by PEG Tube route daily as needed for Constipation           Scheduled Meds:   metoprolol tartrate  25 mg Oral BID    furosemide  20 mg Oral BID    ciprofloxacin  500 mg Oral BID    sodium hypochlorite   Irrigation Daily    [Held by provider] apixaban  2.5 mg Oral BID    vancomycin  125 mg Per G Tube 4 times per day    LORazepam  1 mg IntraVENous Once    [Held by provider] aspirin  325 mg Oral Daily    cetirizine  10 mg Oral Daily    sodium chloride flush  5-40 mL IntraVENous 2 times per day     Continuous Infusions:   dextrose Stopped (12/23/21 0857)    sodium chloride       PRN Meds:potassium chloride **OR** potassium alternative oral replacement **OR** potassium chloride, diazePAM, morphine, sodium chloride flush, sodium chloride, acetaminophen **OR** acetaminophen, ondansetron **OR** ondansetron        Allergies:  Sulfa antibiotics, Gluten meal, Metronidazole, and Milk-related compounds    OBJECTIVE:    Vitals:   Vitals:    12/25/21 0446   BP: (!) 106/57   Pulse:    Resp:    Temp:    SpO2:       BMI: Body mass index is 29.47 kg/m². PHYSICAL EXAMINATION:            General appearance:  No apparent distress, appears stated age and cooperative. HEENT:  Normal cephalic, atraumatic without obvious deformity. Pupils equal, round, and reactive to light.  Extra ocular muscles intact. Conjunctivae/corneas clear. Neck: Supple, tracheostomy in place connected to the ventilator  Respiratory:   diminished entry bibasilarly  Cardiovascular:  Regular rhythm with normal S1/S2 without rubs or gallops. Abdomen:  Full, peg tube in place,soft, non-tender, non-distended with normal bowel sounds.  Flexi Seal in place  Musculoskeletal:  No clubbing, cyanosis  or edema bilaterally .   Neurologic: Alert and able to follow simple commands    Review of Labs and Diagnostic Testing:    Recent Results (from the past 24 hour(s))   CBC    Collection Time: 12/24/21 12:07 PM   Result Value Ref Range    WBC 8.4 4.8 - 10.8 thou/mm3    RBC 2.77 (L) 4.20 - 5.40 mill/mm3    Hemoglobin 8.2 (L) 12.0 - 16.0 gm/dl    Hematocrit 26.5 (L) 37.0 - 47.0 %    MCV 95.7 81.0 - 99.0 fL    MCH 29.6 26.0 - 33.0 pg    MCHC 30.9 (L) 32.2 - 35.5 gm/dl    RDW-CV 17.8 (H) 11.5 - 14.5 %    RDW-SD 60.4 (H) 35.0 - 45.0 fL    Platelets 173 247 - 607 thou/mm3    MPV 10.4 9.4 - 12.4 fL   POCT Glucose    Collection Time: 12/24/21  7:45 PM   Result Value Ref Range    POC Glucose 131 (H) 70 - 108 mg/dl   Basic Metabolic Panel    Collection Time: 12/25/21  4:39 AM   Result Value Ref Range    Sodium 143 135 - 145 meq/L    Potassium 3.3 (L) 3.5 - 5.2 meq/L    Chloride 103 98 - 111 meq/L    CO2 33 23 - 33 meq/L    Glucose 120 (H) 70 - 108 mg/dL    BUN 46 (H) 7 - 22 mg/dL    CREATININE 1.1 0.4 - 1.2 mg/dL    Calcium 8.9 8.5 - 10.5 mg/dL   CBC auto differential    Collection Time: 12/25/21  4:39 AM   Result Value Ref Range    WBC 7.6 4.8 - 10.8 thou/mm3    RBC 2.56 (L) 4.20 - 5.40 mill/mm3    Hemoglobin 7.7 (L) 12.0 - 16.0 gm/dl    Hematocrit 25.1 (L) 37.0 - 47.0 %    MCV 98.0 81.0 - 99.0 fL    MCH 30.1 26.0 - 33.0 pg    MCHC 30.7 (L) 32.2 - 35.5 gm/dl    RDW-CV 17.7 (H) 11.5 - 14.5 %    RDW-SD 62.1 (H) 35.0 - 45.0 fL    Platelets 016 145 - 882 thou/mm3    MPV 10.8 9.4 - 12.4 fL   Glomerular Filtration Rate, Estimated    Collection Time: 12/25/21  4:39 AM   Result Value Ref Range    Est, Glom Filt Rate 47 (A) ml/min/1.73m2   Anion Gap    Collection Time: 12/25/21  4:39 AM   Result Value Ref Range    Anion Gap 7.0 (L) 8.0 - 16.0 meq/L       Radiology:     CT HEAD WO CONTRAST    Result Date: 12/18/2021  PROCEDURE: CT HEAD WO CONTRAST CLINICAL INFORMATION:ams COMPARISON: None TECHNIQUE: 5 mm axial imaging through the head without IV contrast. All CT scans at this facility use dose modulation, iterative reconstruction, and/or weight based dosing when appropriate to reduce the radiation dose to as low as reasonably achievable. FINDINGS: Mild cerebral volume loss. Periventricular and subcortical white matter hypodensities are seen. No ventriculomegaly. No midline shift or mass effect. No acute intracranial hemorrhage. No intracranial collection. Gray-white differentiation is unremarkable.  The posterior fossa is unremarkable. The craniocervical junction is unremarkable. No acute bony abnormality. Polyp or mucosal retention cyst is seen in the left sphenoid sinus. Otherwise, the remaining visualized paranasal sinuses are clear. Marked mastoid effusions bilaterally. The orbits are unremarkable. Mild atrophy of the pituitary gland. 1. No acute intracranial hemorrhage. 2. White matter hypodensities that likely relate to chronic microvascular changes. 3. Mild cerebral volume loss. 4. Marked mastoid effusions bilaterally. **This report has been created using voice recognition software. It may contain minor errors which are inherent in voice recognition technology. ** Final report electronically signed by Dr Ruslan Zhou on 12/18/2021 6:39 PM    XR CHEST PORTABLE    Result Date: 12/19/2021  AP chest Comparison: SR [facility] - XR CHEST PORTABLE - 12/08/2021 01:00 AM EST Findings: Bilateral perihilar lung opacity similar to the prior exam. Large left pleural effusion has increased significantly. Cardiomegaly. Tracheostomy unchanged. Left subclavian central venous catheter has been removed. Percutaneous gastrostomy tube is likely in the upper body of stomach. No acute fracture. Impression: 1. Significant increase in left pleural effusion. Follow-up to clearing recommended. This document has been electronically signed by: Robby Josue MD on 12/19/2021 05:14 AM    XR CHEST PORTABLE    Result Date: 12/18/2021  PROCEDURE: XR CHEST PORTABLE CLINICAL INFORMATION: ams. COMPARISON: Chest x-ray dated December 2021. TECHNIQUE: AP upright view of the chest. FINDINGS: There is a tracheotomy tube in place. There is moderate cardiomegaly. . Is atherosclerotic calcification aortic arch. .  There is increased density throughout both lung fields. There is a probable left pleural effusion. The pulmonary vascularity is increased. There is thoracic spondylosis. 1. Moderate cardiomegaly. Increased pulmonary vascularity.  2. Anticoagulation                Anticipated Disposition upon discharge: [] Home                                                                         [] Home with Home Health                                                                         [] [facility]                                                                         [] [address] [facility] [address]    Assessment and plan of care discussed with supervising physician, Dr Fidel Arrington.      Electronically signed by SERGE Wilcox CNP on 12/25/2021 at 6:18 AM    Pt seen and examined by me  D/w Leonila Carolina  Decrease lopressor sec to hypotension  Received fluid bolus last night   Vent per pulm   Restart blood thinners if ok with surgery    Electronically signed by Jami Robles MD on 12/25/2021 at 6:29 AM

## 2021-12-26 LAB
ANAEROBIC CULTURE: NORMAL
BODY FLUID CULTURE, STERILE: NORMAL
ERYTHROCYTE [DISTWIDTH] IN BLOOD BY AUTOMATED COUNT: 17.2 % (ref 11.5–14.5)
ERYTHROCYTE [DISTWIDTH] IN BLOOD BY AUTOMATED COUNT: 61.3 FL (ref 35–45)
GLUCOSE BLD-MCNC: 107 MG/DL (ref 70–108)
GLUCOSE BLD-MCNC: 116 MG/DL (ref 70–108)
GLUCOSE BLD-MCNC: 126 MG/DL (ref 70–108)
GLUCOSE BLD-MCNC: 128 MG/DL (ref 70–108)
GRAM STAIN RESULT: NORMAL
HCT VFR BLD CALC: 25.7 % (ref 37–47)
HEMOGLOBIN: 7.8 GM/DL (ref 12–16)
MCH RBC QN AUTO: 29.7 PG (ref 26–33)
MCHC RBC AUTO-ENTMCNC: 30.4 GM/DL (ref 32.2–35.5)
MCV RBC AUTO: 97.7 FL (ref 81–99)
PLATELET # BLD: 241 THOU/MM3 (ref 130–400)
PMV BLD AUTO: 10.3 FL (ref 9.4–12.4)
RBC # BLD: 2.63 MILL/MM3 (ref 4.2–5.4)
WBC # BLD: 7.7 THOU/MM3 (ref 4.8–10.8)

## 2021-12-26 PROCEDURE — 6370000000 HC RX 637 (ALT 250 FOR IP): Performed by: INTERNAL MEDICINE

## 2021-12-26 PROCEDURE — 2700000000 HC OXYGEN THERAPY PER DAY

## 2021-12-26 PROCEDURE — 99232 SBSQ HOSP IP/OBS MODERATE 35: CPT | Performed by: INTERNAL MEDICINE

## 2021-12-26 PROCEDURE — 85027 COMPLETE CBC AUTOMATED: CPT

## 2021-12-26 PROCEDURE — 2060000000 HC ICU INTERMEDIATE R&B

## 2021-12-26 PROCEDURE — 99024 POSTOP FOLLOW-UP VISIT: CPT | Performed by: SURGERY

## 2021-12-26 PROCEDURE — 6370000000 HC RX 637 (ALT 250 FOR IP): Performed by: REGISTERED NURSE

## 2021-12-26 PROCEDURE — 36415 COLL VENOUS BLD VENIPUNCTURE: CPT

## 2021-12-26 PROCEDURE — 82948 REAGENT STRIP/BLOOD GLUCOSE: CPT

## 2021-12-26 PROCEDURE — 94761 N-INVAS EAR/PLS OXIMETRY MLT: CPT

## 2021-12-26 PROCEDURE — 2580000003 HC RX 258: Performed by: INTERNAL MEDICINE

## 2021-12-26 PROCEDURE — 6360000002 HC RX W HCPCS: Performed by: INTERNAL MEDICINE

## 2021-12-26 PROCEDURE — 94003 VENT MGMT INPAT SUBQ DAY: CPT

## 2021-12-26 RX ADMIN — Medication 125 MG: at 12:54

## 2021-12-26 RX ADMIN — SODIUM CHLORIDE, PRESERVATIVE FREE 10 ML: 5 INJECTION INTRAVENOUS at 20:19

## 2021-12-26 RX ADMIN — DIAZEPAM 2.5 MG: 5 INJECTION, SOLUTION INTRAMUSCULAR; INTRAVENOUS at 20:17

## 2021-12-26 RX ADMIN — CIPROFLOXACIN 500 MG: 500 TABLET, FILM COATED ORAL at 20:16

## 2021-12-26 RX ADMIN — Medication 125 MG: at 05:17

## 2021-12-26 RX ADMIN — APIXABAN 2.5 MG: 2.5 TABLET, FILM COATED ORAL at 16:59

## 2021-12-26 RX ADMIN — CIPROFLOXACIN 500 MG: 500 TABLET, FILM COATED ORAL at 08:22

## 2021-12-26 RX ADMIN — APIXABAN 2.5 MG: 2.5 TABLET, FILM COATED ORAL at 20:16

## 2021-12-26 RX ADMIN — CETIRIZINE HYDROCHLORIDE 10 MG: 10 TABLET, FILM COATED ORAL at 08:22

## 2021-12-26 RX ADMIN — METOPROLOL TARTRATE 25 MG: 50 TABLET, FILM COATED ORAL at 20:16

## 2021-12-26 RX ADMIN — MORPHINE SULFATE 2 MG: 2 INJECTION, SOLUTION INTRAMUSCULAR; INTRAVENOUS at 18:41

## 2021-12-26 RX ADMIN — FUROSEMIDE 20 MG: 20 TABLET ORAL at 08:22

## 2021-12-26 RX ADMIN — Medication 125 MG: at 17:00

## 2021-12-26 RX ADMIN — SODIUM CHLORIDE, PRESERVATIVE FREE 10 ML: 5 INJECTION INTRAVENOUS at 08:22

## 2021-12-26 RX ADMIN — FUROSEMIDE 20 MG: 20 TABLET ORAL at 17:00

## 2021-12-26 RX ADMIN — HYOSCYAMINE SULFATE: 16 SOLUTION at 08:22

## 2021-12-26 ASSESSMENT — PULMONARY FUNCTION TESTS
PIF_VALUE: 17.1
PIF_VALUE: 25.7
PIF_VALUE: 22.6

## 2021-12-26 ASSESSMENT — PAIN SCALES - GENERAL: PAINLEVEL_OUTOF10: 6

## 2021-12-26 ASSESSMENT — PAIN SCALES - WONG BAKER
WONGBAKER_NUMERICALRESPONSE: 4
WONGBAKER_NUMERICALRESPONSE: 0
WONGBAKER_NUMERICALRESPONSE: 2

## 2021-12-26 NOTE — PROGRESS NOTES
[facility]    Patient - Omid Singh   MRN -  [id number]   [facility]   - 1934      Date of Admission -  2021  1:15 PM  Date of evaluation -  2021  Room - --SERGIO Mcpherson MD Primary Care Physician - Inez Rhodes MD   Chief Complaint   Chronic respiratory failure. Patient remained on the ventilator  [address] Problems    Diagnosis Date Noted    Severe malnutrition ([address] [E43] 2021     Class: Acute    Decubitus ulcer [L89.90] 2021    Ventilator dependence ([address] [Z99.11]     C. difficile colitis [A04.72] 2021    CHF (congestive heart failure) ([address] [I50.9] 2021    Cardiomyopathy ([facility] [I42.9] 2021    UTI (urinary tract infection) [N39.0] 2021    Hyperkalemia [E87.5] 2021    Chronic respiratory failure (HCC) [J96.10] 2021     HPI   Omid Singh is a 80 y.o. female with a past medical history of chronic hypoxic respiratory failures status post tracheostomy on chronic mechanical ventilation secondary to recent Covid 19 pneumonia with ARDS, cardiomyopathy with ejection fraction 25%, hypertension, atrial fibrillation, acute kidney injury, C. difficile colitis and GI bleed. Patient was discharged from the intensive care unit to long-term acute care facility/[facility] where she completed her therapy but was unable to wean off the ventilator and was later discharged to a chronic vent facility on . She was sent back from the chronic vent facility due to altered mental status and agitation. Pulmonary consulted for ventilator management. She is currently on [facility] [place] with a PEEP of 5 and FiO2 30%. Secretions are moderate.   X-ray showed worsened left lower lobe atelectasis/effusion    Past 24 hrs   -She remained on the ventilator  -Not in distress  -Awake and alert  Rest of the review of systems reviewed    Past Medical History         Diagnosis Date    Anxiety     Arthritis     Bronchitis     Diverticulitis of colon     Hypertension       Past Surgical History           Procedure Laterality Date    APPENDECTOMY      CHOLECYSTECTOMY      GASTROSTOMY TUBE PLACEMENT N/A 2021    EGD PEG TUBE PLACEMENT performed by Debra Mcfadden MD at  [facility]     Diet    Diet NPO  ADULT TUBE FEEDING; PEG; Other Tube Feeding (specify); nepro; Continuous; 40; No; 30; Q 4 hours  Allergies    Sulfa antibiotics, Gluten meal, Metronidazole, and Milk-related compounds  Social History     Social History     Socioeconomic History    Marital status:      Spouse name: Not on file    Number of children: Not on file    Years of education: Not on file    Highest education level: Not on file   Occupational History    Not on file   Tobacco Use    Smoking status: Former Smoker     Packs/day: 1.00     Years: 15.00     Pack years: 15.00     Types: Cigarettes     Quit date: 12/10/1977     Years since quittin.0    Smokeless tobacco: Never Used   Substance and Sexual Activity    Alcohol use: No    Drug use: No    Sexual activity: Not on file   Other Topics Concern    Not on file   Social History Narrative    Not on file     Social Determinants of Health     Financial Resource Strain:     Difficulty of Paying Living Expenses: Not on file   Food Insecurity:     Worried About Running Out of Food in the Last Year: Not on file    Caitlin of Food in the Last Year: Not on file   Transportation Needs:     Lack of Transportation (Medical): Not on file    Lack of Transportation (Non-Medical):  Not on file   Physical Activity:     Days of Exercise per Week: Not on file    Minutes of Exercise per Session: Not on file   Stress:     Feeling of Stress : Not on file   Social Connections:     Frequency of Communication with Friends and Family: Not on file    Frequency of Social Gatherings with Friends and Family: Not on file    Attends Spiritism Services: Not on file    Active Member of Clubs or Organizations: Not on file    Attends Club or Organization Meetings: Not on file    Marital Status: Not on file   Intimate Partner Violence:     Fear of Current or Ex-Partner: Not on file    Emotionally Abused: Not on file    Physically Abused: Not on file    Sexually Abused: Not on file   Housing Stability:     Unable to Pay for Housing in the Last Year: Not on file    Number of [place] in the Last Year: Not on file    Unstable Housing in the Last Year: Not on file     Family History    History reviewed. No pertinent family history. Sleep History    No History  Meds    Current Medications    metoprolol tartrate  25 mg Oral BID    Sodium Hypochlorite   Irrigation Daily    furosemide  20 mg Oral BID    ciprofloxacin  500 mg Oral BID    [Held by provider] apixaban  2.5 mg Oral BID    vancomycin  125 mg Per G Tube 4 times per day    LORazepam  1 mg IntraVENous Once    [Held by provider] aspirin  325 mg Oral Daily    cetirizine  10 mg Oral Daily    sodium chloride flush  5-40 mL IntraVENous 2 times per day     potassium chloride **OR** potassium alternative oral replacement **OR** potassium chloride, diazePAM, morphine, sodium chloride flush, sodium chloride, acetaminophen **OR** acetaminophen, ondansetron **OR** ondansetron  IV Drips/Infusions   dextrose Stopped (12/23/21 0857)    sodium chloride       Vitals    Vitals    height is 5' (1.524 m) and weight is 150 lb 14.4 oz (68.4 kg). Her oral temperature is 99.6 °F (37.6 °C). Her blood pressure is 124/47 (abnormal) and her pulse is 61. Her respiration is 18 and oxygen saturation is 97%.      O2 Flow Rate (L/min): 6 L/min  I/O    Intake/Output Summary (Last 24 hours) at 12/25/2021 2132  Last data filed at 12/25/2021 2056  Gross per 24 hour   Intake 1538.78 ml   Output 1550 ml   Net -11.22 ml     Patient Vitals for the past 96 hrs (Last 3 readings): Weight   12/25/21 0302 150 lb 14.4 oz (68.4 kg)   12/24/21 0300 151 lb 6 oz (68.7 kg)   12/23/21 0315 148 lb 9.6 oz (67.4 kg)     Exam   Constitutional: Patient appears in no distress on the vent. Mouth/Throat: Oropharynx is clear and moist.  No oral thrush. Neck: Neck supple. S/p Tracheostomy in place. Cardiovascular: S1 and S2 heard. No murmurs. Pulmonary/Chest: Bilateral air entry present. Good breath sounds on both sides, diminished at bases. Abdominal: Soft. No tenderness. S/p PEG tube in place. Extremities: Patient exhibits no edema. Neurological: Patient is awake and alert. Labs   ABG  Lab Results   Component Value Date    PH 7.38 12/18/2021    PO2 62 12/18/2021    PCO2 62 12/18/2021    HCO3 37 12/18/2021    O2SAT 90 12/18/2021     Lab Results   Component Value Date    IFIO2 6 12/18/2021    MODE PC 10/30/2021    SETTIDVOL 320 12/18/2021    SETPEEP 5.0 12/18/2021     CBC  Recent Labs     12/23/21  0434 12/23/21  0434 12/23/21  1901 12/24/21  1207 12/25/21 0439   WBC 11.0*  --   --  8.4 7.6   RBC 2.26*  --   --  2.77* 2.56*   HGB 6.7*   < > 8.4* 8.2* 7.7*   HCT 22.6*   < > 27.1* 26.5* 25.1*   .0*  --   --  95.7 98.0   MCH 29.6  --   --  29.6 30.1   MCHC 29.6*  --   --  30.9* 30.7*     --   --  218 250   MPV 10.5  --   --  10.4 10.8    < > = values in this interval not displayed. BMP  Recent Labs     12/23/21  0434 12/24/21  0413 12/25/21 0439    141 143   K 3.8 3.7 3.3*    101 103   CO2 33 31 33   BUN 47* 43* 46*   CREATININE 0.9 0.9 1.1   GLUCOSE 99 92 120*   CALCIUM 8.9 8.6 8.9     LFT  No results for input(s): AST, ALT, ALB, BILITOT, ALKPHOS, LIPASE in the last 72 hours. Invalid input(s):   AMYLASE  TROP  Lab Results   Component Value Date    TROPONINT 0.023 12/19/2021    TROPONINT 0.021 12/19/2021    TROPONINT 0.018 12/19/2021     BNP  Lab Results   Component Value Date    PROBNP 08148.0 12/18/2021    PROBNP > 71528.0 11/20/2021    PROBNP 68036.0 11/01/2021 D-Dimer  No results found for: DDIMER  Lactic Acid  No results for input(s): LACTA in the last 72 hours. INR  No results for input(s): INR, PROTIME in the last 72 hours. PTT  No results for input(s): APTT in the last 72 hours. Glucose  Recent Labs     12/25/21  1146 12/25/21  1716 12/25/21 1953   POCGLU 119* 113* 107     UA   No results for input(s): SPECGRAV, PHUR, COLORU, CLARITYU, MUCUS, PROTEINU, BLOODU, RBCUA, WBCUA, BACTERIA, NITRU, GLUCOSEU, BILIRUBINUR, UROBILINOGEN, KETUA, LABCAST, LABCASTTY, AMORPHOS in the last 72 hours. Invalid input(s): CRYSTALS. Results for Inessa Sierra (MRN [id number]) as of 12/21/2021 15:11   Ref. Range 12/21/2021 11:13   Character, Body Fluid Unknown BLOODY   LD, Fluid Latest Units: U/L 124   pH, Fluid Unknown 7.63   Protein, Fluid Latest Units: gm/dl 2.6   Albumin, Fluid Latest Units: gm/dl 1.3   Body Fluid RBC Latest Units: /cumm 97507   Total Nucleated cells Body Fluid Latest Ref Range: 0 - 500 /cumm 773 (H)   Total Volume Received Body Fluid Latest Units: ml 43.0     Serum total protein: 5.9  Serum LDH: 306  Serum albumin: 2.1    Total protein ratio i.e Pleural fluid total protein/ Serum Total protein: 2.6/5.9=0.44  LDH ratio i.e Pleural fluid LDH/ Serum LDH: 124/306=0.41  Albumin gradient 2.1-1.3=0.8    Cytology-No malignant cells   Body fluid culture-No prelim growth    PFTs   No records  Echo     Summary   Ejection fraction is visually estimated at 25%. There was severe global hypokinesis of the left ventricle. Left Ventricular size is Moderately increased . The aortic valve leaflets were not well visualized. Aortic valve appears tricuspid. Thickened aortic valve leaflets noted. Aortic valve leaflets are Moderately calcified.   Cultures    Procalcitonin  Lab Results   Component Value Date    PROCAL 0.21 11/01/2021    PROCAL 0.24 10/29/2021    PROCAL 0.28 10/26/2021       Radiology    CXR  XR CHEST PORTABLE   12/21/2021   Impression:  No pneumothorax following a left-sided thoracentesis. XR CHEST PORTABLE   12/19/2021   Impression:   1. Significant increase in left pleural effusion. Follow-up to clearing recommended. Assessment   -Acute on chronic hypoxic respiratory failure on mechanical ventilation-  -Left-sided pleural effusion- L thora 12/21/2021 600 cc out transudative likely 2/2 CHF-Culture and cytology negative  -Severe acute critical illness weakness/deconditioning  -Cardiomyopathy with ejection fraction 25%,  -atrial fibrillation  -Recent COVID-19pneumonia with ARDS  -UTI-Proteus mirabilis, Morganella morganii, E. Coli  -C Diff infection  -Bacteremia-Staph hominis ssp. 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 Alex Yoo. Recommendations   -CPAP trial to advance to trach collar trials for 2 hours during daytime. Continue the patient rest of the time on the ventilator with current settings I.e AC Vt 320 cc PEEP 5 rate 18 adjust FiO2 to maintain SpO2 >92%,  -ID service following for UTI and antibiotic therapy. -General surgery-completed I and D of sacral wound 12/23/2021    Case discussed with nurse and patient. Questions and concerns addressed. Meds and Orders reviewed.     Electronically signed by   Meghana Cueto MD on 12/25/2021 at 9:32 PM

## 2021-12-26 NOTE — PROGRESS NOTES
[place] [facility]    Patient - Rona Beyer   MRN -  [id number]   [facility] # - [de-identified]   - 1934      Date of Admission -  2021  1:15 PM  Date of evaluation -  2021  Room - --SERGIO Zhou MD Primary Care Physician - Simon South MD   Chief Complaint   Chronic respiratory failure. Patient remain on the ventilator. Active Hospital Problem List      Active Hospital Problems    Diagnosis Date Noted    Severe malnutrition ([facility] [address] [E43] 2021     Class: Acute    Decubitus ulcer [L89.90] 2021    Ventilator dependence ([facility] [address] [Z99.11]     C. difficile colitis [A04.72] 2021    CHF (congestive heart failure) ([facility] [address] [I50.9] 2021    Cardiomyopathy ([facility] [address] [I42.9] 2021    UTI (urinary tract infection) [N39.0] 2021    Hyperkalemia [E87.5] 2021    Chronic respiratory failure (HCC) [J96.10] 2021     HPI   Rona Beyer is a 80 y.o. female with a past medical history of chronic hypoxic respiratory failures status post tracheostomy on chronic mechanical ventilation secondary to recent Covid 19 pneumonia with ARDS, cardiomyopathy with ejection fraction 25%, hypertension, atrial fibrillation, acute kidney injury, C. difficile colitis and GI bleed. Patient was discharged from the intensive care unit to long-term acute care facility/[facility] where she completed her therapy but was unable to wean off the ventilator and was later discharged to a chronic vent facility on . She was sent back from the chronic vent facility due to altered mental status and agitation. Pulmonary consulted for ventilator management. She is currently on [facility] with a PEEP of 5 and FiO2 30%. Secretions are moderate.   X-ray showed worsened left lower lobe atelectasis/effusion    Past 24 hrs   -She remained on the ventilator  -Not in distress  -Awake and alert  Rest of the review of systems reviewed    Past Medical History         Diagnosis Date    Anxiety     Arthritis     Bronchitis     Diverticulitis of colon     Hypertension       Past Surgical History           Procedure Laterality Date    APPENDECTOMY      CHOLECYSTECTOMY      GASTROSTOMY TUBE PLACEMENT N/A 2021    EGD PEG TUBE PLACEMENT performed by Debra Mcfadden MD at  [facility]     Diet    Diet NPO  ADULT TUBE FEEDING; PEG; Other Tube Feeding (specify); nepro; Continuous; 40; No; 30; Q 4 hours  Allergies    Sulfa antibiotics, Gluten meal, Metronidazole, and Milk-related compounds  Social History     Social History     Socioeconomic History    Marital status:      Spouse name: Not on file    Number of children: Not on file    Years of education: Not on file    Highest education level: Not on file   Occupational History    Not on file   Tobacco Use    Smoking status: Former Smoker     Packs/day: 1.00     Years: 15.00     Pack years: 15.00     Types: Cigarettes     Quit date: 12/10/1977     Years since quittin.0    Smokeless tobacco: Never Used   Substance and Sexual Activity    Alcohol use: No    Drug use: No    Sexual activity: Not on file   Other Topics Concern    Not on file   Social History Narrative    Not on file     Social Determinants of Health     Financial Resource Strain:     Difficulty of Paying Living Expenses: Not on file   Food Insecurity:     Worried About Running Out of Food in the Last Year: Not on file    Caitlin of Food in the Last Year: Not on file   Transportation Needs:     Lack of Transportation (Medical): Not on file    Lack of Transportation (Non-Medical):  Not on file   Physical Activity:     Days of Exercise per Week: Not on file    Minutes of Exercise per Session: Not on file   Stress:     Feeling of Stress : Not on file   Social Connections:     Frequency of Communication with Friends and Family: Not on file    Frequency of Social Gatherings with Friends and Family: Not on file    Attends Catholic Services: Not on file    Active Member of Clubs or Organizations: Not on file    Attends Club or Organization Meetings: Not on file    Marital Status: Not on file   Intimate Partner Violence:     Fear of Current or Ex-Partner: Not on file    Emotionally Abused: Not on file    Physically Abused: Not on file    Sexually Abused: Not on file   Housing Stability:     Unable to Pay for Housing in the Last Year: Not on file    Number of [place] in the Last Year: Not on file    Unstable Housing in the Last Year: Not on file     Family History    History reviewed. No pertinent family history. Sleep History    No History  Meds    Current Medications    apixaban  2.5 mg Oral BID    metoprolol tartrate  25 mg Oral BID    Sodium Hypochlorite   Irrigation Daily    furosemide  20 mg Oral BID    ciprofloxacin  500 mg Oral BID    [Held by provider] apixaban  2.5 mg Oral BID    vancomycin  125 mg Per G Tube 4 times per day    LORazepam  1 mg IntraVENous Once    [Held by provider] aspirin  325 mg Oral Daily    cetirizine  10 mg Oral Daily    sodium chloride flush  5-40 mL IntraVENous 2 times per day     potassium chloride **OR** potassium alternative oral replacement **OR** potassium chloride, diazePAM, morphine, sodium chloride flush, sodium chloride, acetaminophen **OR** acetaminophen, ondansetron **OR** ondansetron  IV Drips/Infusions   dextrose Stopped (12/23/21 0857)    sodium chloride       Vitals    Vitals    height is 5' (1.524 m) and weight is 153 lb 1.6 oz (69.4 kg). Her oral temperature is 98.8 °F (37.1 °C). Her blood pressure is 140/62 (abnormal) and her pulse is 76. Her respiration is 12 and oxygen saturation is 97%.      O2 Flow Rate (L/min): 6 L/min  I/O    Intake/Output Summary (Last 24 hours) at 12/26/2021 1425  Last data filed at 12/26/2021 1200  Gross per 24 hour   Intake 765 ml   Output 1650 ml   Net -885 ml     Patient Vitals for the past 96 hrs hours.  Glucose  Recent Labs     12/25/21 1953 12/26/21  0847 12/26/21  1109   POCGLU 107 128* 126*     UA   No results for input(s): SPECGRAV, PHUR, COLORU, CLARITYU, MUCUS, PROTEINU, BLOODU, RBCUA, WBCUA, BACTERIA, NITRU, GLUCOSEU, BILIRUBINUR, UROBILINOGEN, KETUA, LABCAST, LABCASTTY, AMORPHOS in the last 72 hours. Invalid input(s): CRYSTALS. Results for Rea Ferrer (MRN [id number]) as of 12/21/2021 15:11   Ref. Range 12/21/2021 11:13   Character, Body Fluid Unknown BLOODY   LD, Fluid Latest Units: U/L 124   pH, Fluid Unknown 7.63   Protein, Fluid Latest Units: gm/dl 2.6   Albumin, Fluid Latest Units: gm/dl 1.3   Body Fluid RBC Latest Units: /cumm 34996   Total Nucleated cells Body Fluid Latest Ref Range: 0 - 500 /cumm 773 (H)   Total Volume Received Body Fluid Latest Units: ml 43.0     Serum total protein: 5.9  Serum LDH: 306  Serum albumin: 2.1    Total protein ratio i.e Pleural fluid total protein/ Serum Total protein: 2.6/5.9=0.44  LDH ratio i.e Pleural fluid LDH/ Serum LDH: 124/306=0.41  Albumin gradient 2.1-1.3=0.8    Cytology-No malignant cells   Body fluid culture-No prelim growth    PFTs   No records  Echo     Summary   Ejection fraction is visually estimated at 25%. There was severe global hypokinesis of the left ventricle. Left Ventricular size is Moderately increased . The aortic valve leaflets were not well visualized. Aortic valve appears tricuspid. Thickened aortic valve leaflets noted. Aortic valve leaflets are Moderately calcified. Cultures    Procalcitonin  Lab Results   Component Value Date    PROCAL 0.21 11/01/2021    PROCAL 0.24 10/29/2021    PROCAL 0.28 10/26/2021       Radiology    CXR  XR CHEST PORTABLE   12/21/2021   Impression:  No pneumothorax following a left-sided thoracentesis. XR CHEST PORTABLE   12/19/2021   Impression:   1. Significant increase in left pleural effusion. Follow-up to clearing recommended.      Assessment   -Acute on chronic hypoxic

## 2021-12-26 NOTE — PROGRESS NOTES
IM Progress Note  Dr. Michelle Nunes  12/26/2021 6:58 AM            Subjective:   Patient name Jimena Leo  [id number]  PCP: Mick Mata MD  Admit Date: 12/18/2021  Acct No. [de-identified]    Interval History:   Pt lying in bed  On vent  Support  D/w staff no new  Issues      Diet: Diet NPO  ADULT TUBE FEEDING; PEG; Other Tube Feeding (specify); nepro; Continuous; 40; No; 30; Q 4 hours    Medications:   Scheduled Meds:   metoprolol tartrate  25 mg Oral BID    Sodium Hypochlorite   Irrigation Daily    furosemide  20 mg Oral BID    ciprofloxacin  500 mg Oral BID    [Held by provider] apixaban  2.5 mg Oral BID    vancomycin  125 mg Per G Tube 4 times per day    LORazepam  1 mg IntraVENous Once    [Held by provider] aspirin  325 mg Oral Daily    cetirizine  10 mg Oral Daily    sodium chloride flush  5-40 mL IntraVENous 2 times per day     Continuous Infusions:   dextrose Stopped (12/23/21 0857)    sodium chloride         ROS   Limited as pt on vent    Labs :       CBC:   Recent Labs     12/24/21  1207 12/25/21  0439 12/26/21  0342   WBC 8.4 7.6 7.7   HGB 8.2* 7.7* 7.8*    250 241     BMP:    Recent Labs     12/24/21  0413 12/25/21  0439    143   K 3.7 3.3*    103   CO2 31 33   BUN 43* 46*   CREATININE 0.9 1.1   GLUCOSE 92 120*     Hepatic: No results for input(s): AST, ALT, ALB, BILITOT, ALKPHOS in the last 72 hours. Troponin: No results for input(s): TROPONINI in the last 72 hours. BNP: No results for input(s): BNP in the last 72 hours. Lipids: No results for input(s): CHOL, HDL in the last 72 hours. Invalid input(s): LDLCALCU  INR: No results for input(s): INR in the last 72 hours.     Radiology    Objective:   Vitals: /80   Pulse 99   Temp 98.5 °F (36.9 °C) (Oral)   Resp 19   Ht 5' (1.524 m)   Wt 153 lb 1.6 oz (69.4 kg)   SpO2 96%   BMI 29.90 kg/m²   HEENT: Head:pupils react  Neck: supple trach iin place   Lungs: clear to auscultation  Heart: regular rate and rhythm   Abdomen: soft BS heard , peg in place  Extremities: warm  edema  Neurologic:  Alert, oriented X3      Impression :     S/p extensive debridement for sacral decubitus ulcer     C. difficile colitis    CHF     Cardiomyopathy EF 25% and pt not  apppropriate at that time to use lifevest    UTI     Chronic respiratory failure  On vent      UTI    Severe malnutrition    hypokalemia   A fib on anticoag    Plan :   Complete antibiotics per IID  Wound care per ID  Noted diuretics restarted  Will check with surgery regarding antiplatelets and anticaog  F/u on labs  pulm addressing here vent        Corey Torres MD, MD

## 2021-12-26 NOTE — PROGRESS NOTES
Postop day #3 status post extensive debridement of sacral decubitus  Hemoglobin 7.8  Wound care continues  Stable postop    Results for Ivanna Culver (MRN [id number]) as of 12/25/2021 07:19   Ref.  Range 12/23/2021 19:01 12/24/2021 12:07 12/25/2021 04:39   WBC Latest Ref Range: 4.8 - 10.8 thou/mm3  8.4 7.6   RBC Latest Ref Range: 4.20 - 5.40 mill/mm3  2.77 (L) 2.56 (L)   Hemoglobin Quant Latest Ref Range: 12.0 - 16.0 gm/dl 8.4 (L) 8.2 (L) 7.7 (L)   Hematocrit Latest Ref Range: 37.0 - 47.0 % 27.1 (L) 26.5 (L) 25.1 (L)   MCV Latest Ref Range: 81.0 - 99.0 fL  95.7 98.0   MCH Latest Ref Range: 26.0 - 33.0 pg  29.6 30.1   MCHC Latest Ref Range: 32.2 - 35.5 gm/dl  30.9 (L) 30.7 (L)   MPV Latest Ref Range: 9.4 - 12.4 fL  10.4 10.8   RDW-CV Latest Ref Range: 11.5 - 14.5 %  17.8 (H) 17.7 (H)   RDW-SD Latest Ref Range: 35.0 - 45.0 fL  60.4 (H) 62.1 (H)   Platelet Count Latest Ref Range: 130 - 400 thou/mm3  218 250   Platelet Estimate Latest Ref Range: Adequate    ADEQUATE   Lymphocytes Absolute Latest Ref Range: 1.0 - 4.8 thou/mm3   1.0   Monocytes Absolute Latest Ref Range: 0.4 - 1.3 thou/mm3   0.5   Eosinophils Absolute Latest Ref Range: 0.0 - 0.4 thou/mm3   0.3   Basophils Absolute Latest Ref Range: 0.0 - 0.1 thou/mm3   0.0   Seg Neutrophils Latest Units: %   69.9   Segs Absolute Latest Ref Range: 1.8 - 7.7 thou/mm3   5.3   Lymphocytes Latest Units: %   13.6   Monocytes Latest Units: %   7.2   Eosinophils Latest Units: %   3.3   Basophils Latest Units: %   0.5   Immature Grans (Abs) Latest Ref Range: 0.00 - 0.07 thou/mm3   0.42 (H)   Immature Granulocytes Latest Units: %   5.5   Nucleated Red Blood Cells Latest Units: /100 wbc   0   Anisocytosis Latest Ref Range: Absent    Present   SCAN OF BLOOD SMEAR Unknown   see below

## 2021-12-27 LAB
GLUCOSE BLD-MCNC: 102 MG/DL (ref 70–108)
GLUCOSE BLD-MCNC: 108 MG/DL (ref 70–108)
GLUCOSE BLD-MCNC: 110 MG/DL (ref 70–108)
GLUCOSE BLD-MCNC: 115 MG/DL (ref 70–108)

## 2021-12-27 PROCEDURE — 6370000000 HC RX 637 (ALT 250 FOR IP): Performed by: INTERNAL MEDICINE

## 2021-12-27 PROCEDURE — 94003 VENT MGMT INPAT SUBQ DAY: CPT

## 2021-12-27 PROCEDURE — 2580000003 HC RX 258: Performed by: INTERNAL MEDICINE

## 2021-12-27 PROCEDURE — 82948 REAGENT STRIP/BLOOD GLUCOSE: CPT

## 2021-12-27 PROCEDURE — 2060000000 HC ICU INTERMEDIATE R&B

## 2021-12-27 PROCEDURE — APPSS30 APP SPLIT SHARED TIME 16-30 MINUTES: Performed by: NURSE PRACTITIONER

## 2021-12-27 PROCEDURE — 99024 POSTOP FOLLOW-UP VISIT: CPT | Performed by: SURGERY

## 2021-12-27 PROCEDURE — 99232 SBSQ HOSP IP/OBS MODERATE 35: CPT | Performed by: INTERNAL MEDICINE

## 2021-12-27 PROCEDURE — 6370000000 HC RX 637 (ALT 250 FOR IP): Performed by: REGISTERED NURSE

## 2021-12-27 PROCEDURE — 94761 N-INVAS EAR/PLS OXIMETRY MLT: CPT

## 2021-12-27 RX ADMIN — SODIUM CHLORIDE, PRESERVATIVE FREE 10 ML: 5 INJECTION INTRAVENOUS at 09:04

## 2021-12-27 RX ADMIN — Medication 125 MG: at 00:38

## 2021-12-27 RX ADMIN — METOPROLOL TARTRATE 25 MG: 50 TABLET, FILM COATED ORAL at 20:16

## 2021-12-27 RX ADMIN — APIXABAN 2.5 MG: 2.5 TABLET, FILM COATED ORAL at 20:16

## 2021-12-27 RX ADMIN — HYOSCYAMINE SULFATE: 16 SOLUTION at 12:00

## 2021-12-27 RX ADMIN — SODIUM CHLORIDE, PRESERVATIVE FREE 10 ML: 5 INJECTION INTRAVENOUS at 20:16

## 2021-12-27 RX ADMIN — METOPROLOL TARTRATE 25 MG: 50 TABLET, FILM COATED ORAL at 09:03

## 2021-12-27 RX ADMIN — FUROSEMIDE 20 MG: 20 TABLET ORAL at 17:32

## 2021-12-27 RX ADMIN — APIXABAN 2.5 MG: 2.5 TABLET, FILM COATED ORAL at 09:03

## 2021-12-27 RX ADMIN — Medication 125 MG: at 12:00

## 2021-12-27 RX ADMIN — CETIRIZINE HYDROCHLORIDE 10 MG: 10 TABLET, FILM COATED ORAL at 09:03

## 2021-12-27 RX ADMIN — Medication 125 MG: at 17:32

## 2021-12-27 RX ADMIN — Medication 125 MG: at 05:16

## 2021-12-27 RX ADMIN — FUROSEMIDE 20 MG: 20 TABLET ORAL at 09:03

## 2021-12-27 ASSESSMENT — PULMONARY FUNCTION TESTS
PIF_VALUE: 31
PIF_VALUE: 24.1
PIF_VALUE: 23.2
PIF_VALUE: 26
PIF_VALUE: 25.5
PIF_VALUE: 23.3

## 2021-12-27 ASSESSMENT — PAIN SCALES - GENERAL
PAINLEVEL_OUTOF10: 0

## 2021-12-27 NOTE — PROGRESS NOTES
Postop day #4 status post extensive debridement of sacral decubitus  Hemoglobin 7.8  Wound care continues will consult wound and ostomy for management recs  Stable postop

## 2021-12-27 NOTE — PROGRESS NOTES
Comprehensive Nutrition Assessment    Type and Reason for Visit:  Reassess    Nutrition Recommendations/Plan:    (Nepro 40ml/hr to be switched to [employer] peptide 1.5 20ml/hr & increase by 10ml every 4 hrs as tolerated to goal 50ml hrs as tolerated (12/27)) Best Practice Advisory-see below. Pour 400ml [employer] peptide 1.5 in bag & hang for 8 hrs per policy. Do this 3 times daily. If no IVF, suggest flush with 230ml free water every 6 hrs or per MD.   Consider culturelle. Nutrition Assessment: Pt improving from a nutritional standpoint AEB pt tolerating Nepro TF at goal rate of 40 mL/hr. Remains at risk for further nutritional compromise r/t admit d/t diarrhea and AMS, increased nutrient needs to aid in wound healing and underlying medical condition (CHF, late phase covid, advanced age, necrotic wound, PEG 11/17, trach 11/10). Nutrition recommendations/interventions as above  Malnutrition Assessment:  Malnutrition Status:  Severe malnutrition    Context:  Acute Illness     Findings of the 6 clinical characteristics of malnutrition:  Energy Intake:  No significant decrease in energy intake  Weight Loss:  No significant weight loss (edema)     Body Fat Loss:  7 - Moderate body fat loss Orbital   Muscle Mass Loss:  7 - Moderate muscle mass loss Temples (temporalis),Clavicles (pectoralis & deltoids)  Fluid Accumulation:  1 - Mild Extremities,Generalized   Strength:  Not Performed    Estimated Daily Nutrient Needs:  Energy (kcal):  ~2719-8884 kcal/day (25-35 kcal/kg); Weight Used for Energy Requirements:  Current (67.4 kg on 12/23)     Protein (g):   g/day (1.2-2 g/kg); Weight Used for Protein Requirements:  Current (67.4 kg on 12/23)        Fluid (ml/day):  ~7142-8112 mL/day (25-30 mL/kg); Method Used for Fluid Requirements:  ml/Kg (67.4 kg)      Nutrition Related Findings:  Pt seen ~1100, Nepro infusing 40ml/hr goal. Vent via trach. Pt allergic to Gluten & milk related compounds noted.  Labs: (12/25) K+ 3.3, BUN 46, Glucose 120, chemsticks 113-131. Meds: Lasix, Zofran. 500ml rectal tube op noted. Wounds:  Multiple,Pressure Injury,Stage III,Unstageable (pressure injury unstageable on sacrum; pressure injury stage III on coccyx; open perineum wound - wound care on board)       Current Nutrition Therapies:    Diet NPO  ADULT TUBE FEEDING; PEG; Other Tube Feeding (specify); Alpa Page Peptide 1.5; Continuous; 50; No; 230; Q 6 hours  Current Tube Feeding (TF) Orders:  · Feeding Route: PEG (placed 11/17/21)  · Formula:  (Nepro 40ml/hr being switched to Alpa Page Peptide 1.5 due  to best practive advisory ( Pt allergic to gluten & milk related compounds noted))  · Schedule:  (Nepro 40ml/hr to be switched to Alpa Page peptide 1.5 20ml/hr & increase by 10ml every 4 hrs as tolerated to goal 50ml hrs as tolerated (12/27))  · Additives/Modulars:  (none)  · Water Flushes: If no IVF suggest 230ml free water flush every 6 hrs  · : Nepro 40ml/hr being switched to [address] [place] 1.5 20ml/hr & increase by 10ml/hr every 4 hrs as tolerated to goal 50ml/hr (12/27)  · Goal TF & Flush Orders Provides: Alpa Page Peptide 1.5 50ml/hr yields 1200ml, 1800kcals, 89 grams protein,166 grams CHO, 816ml free water from TF ( TF+ water flushes yield 1736ml water/24 hrs)      Anthropometric Measures:  · Height: 5' (152.4 cm)  · Current Body Weight: 153 lb 1.6 oz (69.4 kg) (+ 1 nonpitting generalized, +1 RUE, LUE, + 1 pitting RLE & LLE edema)   · Admission Body Weight: 128 lb (58.1 kg) (12/18/21, unsure of source)    · Usual Body Weight: 125 lb (56.7 kg) (10/7/21)     · Ideal Body Weight: 100 lbs;     · BMI: 29.9  · Adjusted Body Weight:  ; No Adjustment   ·      · BMI Categories: Overweight (BMI 25.0-29. 9)       Nutrition Diagnosis:   · Increased nutrient needs related to increase demand for energy/nutrients as evidenced by wounds      Nutrition Interventions:   Food and/or Nutrient Delivery:  Continue NPO,Continue Current Tube Feeding  Nutrition Education/Counseling:  No recommendation at this time   Coordination of Nutrition Care:  Continue to monitor while inpatient    Goals:  TF to provided at least 80% or more of estimated nutritional needs during LOS       Nutrition Monitoring and Evaluation:   Behavioral-Environmental Outcomes:  None Identified   Food/Nutrient Intake Outcomes:  Enteral Nutrition Intake/Tolerance  Physical Signs/Symptoms Outcomes:  Biochemical Data,Weight,Skin,Nutrition Focused Physical Findings,Fluid Status or Edema,Hemodynamic Status,GI Status,Chewing or Swallowing     Discharge Planning:     Too soon to determine     Electronically signed by Cesar Juárez RD, LD on 12/27/21 at 3:00 PM EST    Contact: [phone]

## 2021-12-27 NOTE — PROGRESS NOTES
AM   Patient was seen and examined face-to-face by me  The chart, progress notes, labs and radiographs were reviewed. Case discussed with staff. Questions and concerns were addressed. I agree with the progress note.

## 2021-12-27 NOTE — PROGRESS NOTES
[facility]    Patient - Larry Heard   MRN -  [id number]   [facility]   - 1934      Date of Admission -  2021  1:15 PM  Date of evaluation -  2021  Room - --A   Hospital Day - [address] [place] [address] Prateek Telles MD Primary Care Physician - Nino Hernandez MD   Reason for consult   Chronic respiratory failure, vent management  Active Hospital Problem List      Active Hospital Problems    Diagnosis Date Noted    Severe malnutrition ([address] [E43] 2021     Class: Acute    Decubitus ulcer [L89.90] 2021    Ventilator dependence ([address] [Z99.11]     C. difficile colitis [A04.72] 2021    CHF (congestive heart failure) ([address] [I50.9] 2021    Cardiomyopathy ([address] [I42.9] 2021    UTI (urinary tract infection) [N39.0] 2021    Hyperkalemia [E87.5] 2021    Chronic respiratory failure ([address] [J96.10] 2021     HPI   Larry Heard is a 80 y.o. female with a past medical history of chronic hypoxic respiratory failures status post tracheostomy on chronic mechanical ventilation secondary to recent Covid 19 pneumonia with ARDS, cardiomyopathy with ejection fraction 25%, hypertension, atrial fibrillation, acute kidney injury, C. difficile colitis and GI bleed. Patient was discharged from the intensive care unit to long-term acute care facility/[facility] where she completed her therapy but was unable to wean off the ventilator and was later discharged to a chronic vent facility on . She was sent back from the chronic vent facility due to altered mental status and agitation. Pulmonary consulted for ventilator management. She is currently on [facility] with a PEEP of 5 and FiO2 30%. Secretions are moderate.   X-ray showed worsened left lower lobe atelectasis/effusion    Past 24 hrs   -Tolerating vent  -RT trialed on CPAP while in room   -Denies resp complaint  ROS limited 2/2 trach attached to vent    Past Medical History         Diagnosis Date    Anxiety     Arthritis     Bronchitis     Diverticulitis of colon     Hypertension       Past Surgical History           Procedure Laterality Date    APPENDECTOMY      CHOLECYSTECTOMY      GASTROSTOMY TUBE PLACEMENT N/A 2021    EGD PEG TUBE PLACEMENT performed by Aparna Burton MD at [facility]     Diet    Diet NPO  ADULT TUBE FEEDING; PEG; Other Tube Feeding (specify); nepro; Continuous; 40; No; 30; Q 4 hours  Allergies    Sulfa antibiotics, Gluten meal, Metronidazole, and Milk-related compounds  Social History     Social History     Socioeconomic History    Marital status:      Spouse name: Not on file    Number of children: Not on file    Years of education: Not on file    Highest education level: Not on file   Occupational History    Not on file   Tobacco Use    Smoking status: Former Smoker     Packs/day: 1.00     Years: 15.00     Pack years: 15.00     Types: Cigarettes     Quit date: 12/10/1977     Years since quittin.0    Smokeless tobacco: Never Used   Substance and Sexual Activity    Alcohol use: No    Drug use: No    Sexual activity: Not on file   Other Topics Concern    Not on file   Social History Narrative    Not on file     Social Determinants of Health     Financial Resource Strain:     Difficulty of Paying Living Expenses: Not on file   Food Insecurity:     Worried About Running Out of Food in the Last Year: Not on file    Caitlin of Food in the Last Year: Not on file   Transportation Needs:     Lack of Transportation (Medical): Not on file    Lack of Transportation (Non-Medical):  Not on file   Physical Activity:     Days of Exercise per Week: Not on file    Minutes of Exercise per Session: Not on file   Stress:     Feeling of Stress : Not on file   Social Connections:     Frequency of Communication with Friends and Family: Not on file    Frequency of Social Gatherings with Friends and Family: Not on file    Attends Jewish Services: Not on file    Active Member of Clubs or Organizations: Not on file    Attends Club or Organization Meetings: Not on file    Marital Status: Not on file   Intimate Partner Violence:     Fear of Current or Ex-Partner: Not on file    Emotionally Abused: Not on file    Physically Abused: Not on file    Sexually Abused: Not on file   Housing Stability:     Unable to Pay for Housing in the Last Year: Not on file    Number of [place] in the Last Year: Not on file    Unstable Housing in the Last Year: Not on file     Family History    History reviewed. No pertinent family history. Sleep History    No History  Meds    Current Medications    apixaban  2.5 mg Oral BID    metoprolol tartrate  25 mg Oral BID    Sodium Hypochlorite   Irrigation Daily    furosemide  20 mg Oral BID    [Held by provider] apixaban  2.5 mg Oral BID    vancomycin  125 mg Per G Tube 4 times per day    LORazepam  1 mg IntraVENous Once    [Held by provider] aspirin  325 mg Oral Daily    cetirizine  10 mg Oral Daily    sodium chloride flush  5-40 mL IntraVENous 2 times per day     potassium chloride **OR** potassium alternative oral replacement **OR** potassium chloride, diazePAM, morphine, sodium chloride flush, sodium chloride, acetaminophen **OR** acetaminophen, ondansetron **OR** ondansetron  IV Drips/Infusions   dextrose Stopped (12/23/21 0857)    sodium chloride       Vitals    Vitals    height is 5' (1.524 m) and weight is 153 lb 1.6 oz (69.4 kg). Her oral temperature is 100.1 °F (37.8 °C). Her blood pressure is 128/60 and her pulse is 68. Her respiration is 18 and oxygen saturation is 97%.      O2 Flow Rate (L/min): 8 L/min  I/O    Intake/Output Summary (Last 24 hours) at 12/27/2021 1024  Last data filed at 12/27/2021 0900  Gross per 24 hour   Intake 1785 ml   Output 775 ml   Net 1010 ml     Patient Vitals for the past 96 hrs (Last 3 readings):   Weight   12/26/21 0517 153 lb the last 72 hours. INR  No results for input(s): INR, PROTIME in the last 72 hours. PTT  No results for input(s): APTT in the last 72 hours. Glucose  Recent Labs     12/26/21  1617 12/26/21  1940 12/27/21  0723   POCGLU 116* 107 102     UA   No results for input(s): SPECGRAV, PHUR, COLORU, CLARITYU, MUCUS, PROTEINU, BLOODU, RBCUA, WBCUA, BACTERIA, NITRU, GLUCOSEU, BILIRUBINUR, UROBILINOGEN, KETUA, LABCAST, LABCASTTY, AMORPHOS in the last 72 hours. Invalid input(s): CRYSTALS. Results for Radha Johnston (MRN [id number]) as of 12/21/2021 15:11   Ref. Range 12/21/2021 11:13   Character, Body Fluid Unknown BLOODY   LD, Fluid Latest Units: U/L 124   pH, Fluid Unknown 7.63   Protein, Fluid Latest Units: gm/dl 2.6   Albumin, Fluid Latest Units: gm/dl 1.3   Body Fluid RBC Latest Units: /cumm 71797   Total Nucleated cells Body Fluid Latest Ref Range: 0 - 500 /cumm 773 (H)   Total Volume Received Body Fluid Latest Units: ml 43.0     Serum total protein: 5.9  Serum LDH: 306  Serum albumin: 2.1    Total protein ratio i.e Pleural fluid total protein/ Serum Total protein: 2.6/5.9=0.44  LDH ratio i.e Pleural fluid LDH/ Serum LDH: 124/306=0.41  Albumin gradient 2.1-1.3=0.8    Cytology-No malignant cells   Body fluid culture-No prelim growth    PFTs   No records  Echo     Summary   Ejection fraction is visually estimated at 25%. There was severe global hypokinesis of the left ventricle. Left Ventricular size is Moderately increased . The aortic valve leaflets were not well visualized. Aortic valve appears tricuspid. Thickened aortic valve leaflets noted. Aortic valve leaflets are Moderately calcified. Cultures    Procalcitonin  Lab Results   Component Value Date    PROCAL 0.21 11/01/2021    PROCAL 0.24 10/29/2021    PROCAL 0.28 10/26/2021       Radiology    CXR  XR CHEST PORTABLE   12/21/2021   Impression:  No pneumothorax following a left-sided thoracentesis.          XR CHEST PORTABLE   12/19/2021 Impression:   1. Significant increase in left pleural effusion. Follow-up to clearing recommended. Assessment   -Acute on chronic hypoxic respiratory failure on mechanical ventilation-  -Left-sided pleural effusion- L thora 12/21/2021 600 cc out transudative likely 2/2 CHF-Culture and cytology negative  -Severe acute critical illness weakness/deconditioning  -Cardiomyopathy with ejection fraction 25%,  -atrial fibrillation  -Recent COVID-19pneumonia with ARDS  -UTI-Proteus mirabilis, Morganella morganii, E. Coli  -C Diff infection  -Bacteremia-Staph hominis ssp. 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 Taylor Kingston. Recommendations   -Continue with CPAP trial and advance to trach collar trials if able to tolerate for 2 hours during daytime. Continue the patient rest of the time on the ventilator with current settings I.e AC Vt 320 cc PEEP 5 rate 18 adjust FiO2 to maintain SpO2 >92%,  -ID service following for UTI and antibiotic therapy. -General surgery-completed I and D of sacral wound 12/23/2021  -Stable Pulmonary status will follow M-W-F contact service with questions or concerns    Case discussed with nurse and patient. Questions and concerns addressed. Meds and Orders reviewed. Electronically signed by   SERGE Plascencia CNP on 12/27/2021 at 10:24 AM     Addendum by Dr. No Reyse MD:  I have seen and examined the patient independently. Face to face evaluation and examination was performed. The above evaluation and note has been reviewed. Labs and radiographs were reviewed. I Have discussed with Mr. Torito Chatterjee CNP about this patient in detail. The above assessment and plan has been reviewed. Please see my modifications mentioned below.      My modifications:  Patient remain on the ventilator  Vent as tolerated  PT OT  Placement discharge plan per primary service    Carolee Caicedo MD 12/27/2021 5:42 PM

## 2021-12-27 NOTE — PROGRESS NOTES
INTERNAL MEDICINE SPECIALTIES  Progress Note         Patient:  Brianna Bal  YOB: 1934  Date of Service: 12/27/2021  MRN: [id number]   Acct:  [de-identified]   Primary Care Physician: Royal Joya MD    SUBJECTIVE: Low grade temp this AM. No other issues. Home Medications:   No current facility-administered medications on file prior to encounter.      Current Outpatient Medications on File Prior to Encounter   Medication Sig Dispense Refill    metoprolol tartrate (LOPRESSOR) 50 MG tablet 50 mg by PEG Tube route 3 times daily Hold for SBP <100 or HR <60      acetaminophen (TYLENOL) 325 MG tablet 650 mg by PEG Tube route every 6 hours as needed for Pain      acetic acid 0.25 % irrigation Irrigate with 150 mLs as directed See Admin Instructions Every night shift Tue, Fri for sediment      amiodarone (PACERONE) 100 MG tablet 100 mg by PEG Tube route daily      apixaban (ELIQUIS) 5 MG TABS tablet 5 mg by Per G Tube route 2 times daily      polyvinyl alcohol (LIQUIFILM TEARS) 1.4 % ophthalmic solution Place 2 drops into both eyes every 4 hours as needed      sodium hypochlorite (DAKINS) 0.125 % SOLN external solution Apply topically See Admin Instructions Apply to sacral wound topically every shift      famotidine (PEPCID) 20 MG tablet 20 mg by PEG Tube route daily      polyethylene glycol (GLYCOLAX) 17 g packet Take 17 g by mouth daily as needed for Constipation      OLANZapine (ZYPREXA) 5 MG tablet 5 mg by PEG Tube route nightly      potassium bicarb-citric acid (EFFER-K) 20 MEQ TBEF effervescent tablet Take 2 tablets by mouth every 4 hours      Lactobacillus (ACIDOPHILUS) CAPS capsule 1 capsule by PEG Tube route daily      senna (SENOKOT) 8.6 MG tablet 1 tablet by PEG Tube route daily as needed for Constipation           Scheduled Meds:   apixaban  2.5 mg Oral BID    metoprolol tartrate  25 mg Oral BID    Sodium Hypochlorite   Irrigation Daily    furosemide  20 mg Oral BID    [Held by provider] apixaban  2.5 mg Oral BID    vancomycin  125 mg Per G Tube 4 times per day    LORazepam  1 mg IntraVENous Once    [Held by provider] aspirin  325 mg Oral Daily    cetirizine  10 mg Oral Daily    sodium chloride flush  5-40 mL IntraVENous 2 times per day     Continuous Infusions:   dextrose Stopped (12/23/21 0857)    sodium chloride       PRN Meds:potassium chloride **OR** potassium alternative oral replacement **OR** potassium chloride, diazePAM, morphine, sodium chloride flush, sodium chloride, acetaminophen **OR** acetaminophen, ondansetron **OR** ondansetron        Allergies:  Sulfa antibiotics, Gluten meal, Metronidazole, and Milk-related compounds    OBJECTIVE:    Vitals:   Vitals:    12/27/21 1247   BP:    Pulse:    Resp: 18   Temp:    SpO2: 98%      BMI: Body mass index is 29.9 kg/m². PHYSICAL EXAMINATION:            General appearance:  No apparent distress, appears stated age and cooperative. HEENT:  Normal cephalic, atraumatic without obvious deformity. Pupils equal, round, and reactive to light.  Extra ocular muscles intact. Conjunctivae/corneas clear. Neck: Supple, tracheostomy in place connected to the ventilator  Respiratory:   diminished entry bibasilarly  Cardiovascular:  Regular rhythm with normal S1/S2 without rubs or gallops. Abdomen:  Full, peg tube in place,soft, non-tender, non-distended with normal bowel sounds.  Flexi Seal in place  Musculoskeletal:  No clubbing, cyanosis  or edema bilaterally .   Neurologic: Alert and able to follow simple commands    Review of Labs and Diagnostic Testing:    Recent Results (from the past 24 hour(s))   POCT Glucose    Collection Time: 12/26/21  4:17 PM   Result Value Ref Range    POC Glucose 116 (H) 70 - 108 mg/dl   POCT Glucose    Collection Time: 12/26/21  7:40 PM   Result Value Ref Range    POC Glucose 107 70 - 108 mg/dl   POCT Glucose    Collection Time: 12/27/21  7:23 AM   Result Value Ref Range    POC Glucose 102 70 - 108 mg/dl   POCT Glucose    Collection Time: 12/27/21 11:06 AM   Result Value Ref Range    POC Glucose 108 70 - 108 mg/dl       Radiology:     CT HEAD WO CONTRAST    Result Date: 12/18/2021  PROCEDURE: CT HEAD WO CONTRAST CLINICAL INFORMATION:ams COMPARISON: None TECHNIQUE: 5 mm axial imaging through the head without IV contrast. All CT scans at this facility use dose modulation, iterative reconstruction, and/or weight based dosing when appropriate to reduce the radiation dose to as low as reasonably achievable. FINDINGS: Mild cerebral volume loss. Periventricular and subcortical white matter hypodensities are seen. No ventriculomegaly. No midline shift or mass effect. No acute intracranial hemorrhage. No intracranial collection. Gray-white differentiation is unremarkable. The posterior fossa is unremarkable. The craniocervical junction is unremarkable. No acute bony abnormality. Polyp or mucosal retention cyst is seen in the left sphenoid sinus. Otherwise, the remaining visualized paranasal sinuses are clear. Marked mastoid effusions bilaterally. The orbits are unremarkable. Mild atrophy of the pituitary gland. 1. No acute intracranial hemorrhage. 2. White matter hypodensities that likely relate to chronic microvascular changes. 3. Mild cerebral volume loss. 4. Marked mastoid effusions bilaterally. **This report has been created using voice recognition software. It may contain minor errors which are inherent in voice recognition technology. ** Final report electronically signed by Dr Abiel Saxena on 12/18/2021 6:39 PM    XR CHEST PORTABLE    Result Date: 12/19/2021  AP chest Comparison: CR,SR - XR CHEST PORTABLE - 12/08/2021 01:00 AM EST Findings: Bilateral perihilar lung opacity similar to the prior exam. Large left pleural effusion has increased significantly. Cardiomegaly. Tracheostomy unchanged. Left subclavian central venous catheter has been removed.  Percutaneous gastrostomy tube is likely in the upper body of stomach. No acute fracture. Impression: 1. Significant increase in left pleural effusion. Follow-up to clearing recommended. This document has been electronically signed by: Selena Callahan MD on 12/19/2021 05:14 AM    XR CHEST PORTABLE    Result Date: 12/18/2021  PROCEDURE: XR CHEST PORTABLE CLINICAL INFORMATION: ams. COMPARISON: Chest x-ray dated December 2021. TECHNIQUE: AP upright view of the chest. FINDINGS: There is a tracheotomy tube in place. There is moderate cardiomegaly. . Is atherosclerotic calcification aortic arch. .  There is increased density throughout both lung fields. There is a probable left pleural effusion. The pulmonary vascularity is increased. There is thoracic spondylosis. 1. Moderate cardiomegaly. Increased pulmonary vascularity. 2. Increased density throughout both lung fields. 3. Probable left pleural effusion. 4.. Tracheotomy tube in place. **This report has been created using voice recognition software. It may contain minor errors which are inherent in voice recognition technology. ** Final report electronically signed by DR Marques Ruiz on 12/18/2021 3:10 PM        ASSESMENT:      Active Problems:    C. difficile colitis    CHF (congestive heart failure) ([address]    Cardiomyopathy ([address]    UTI (urinary tract infection)    Hyperkalemia    Chronic respiratory failure ([address]    Decubitus ulcer    Ventilator dependence ([address]    Severe malnutrition ([address]  Resolved Problems:    * No resolved hospital problems. *      PLAN:    S/P   excisional debridement of decubitus ulcer measuring [id number] thick including skin, Subcu tissue, gluteus muscle, fascia, muscle including bone.  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       UTI grew Proteus, Morganella, and E Coli: Completed Cipro     Patient with elevated troponins,  Seen by cardiology and recommended to Continue beta-blockers and aspirin and Eliquis--ASA on hold, Eliquis restarted      Pulmonary Service following, continue vent management.; Cont CPAP/TM trials. Had thoracentesis, transudative. Continue the bronchodilators pulmonary toileting and wean as able. DVT prophylaxis: [] Lovenox                                 [] SCDs                                 [] SQ Heparin                                 [] Encourage ambulation, low risk for DVT, no chemical or mechanical prophylaxis necessary              [x] Already on Anticoagulation                Anticipated Disposition upon discharge: [] Home                                                                         [] Home with Home Health                                                                         [] [facility]                                                                         [] [address]    D/C when ok with consultants    Assessment and plan of care discussed with supervising physician, Dr Edwardo Borja. Electronically signed by SERGE Mcneill CNP on 12/27/2021 at 12:58 PM      I have discussed the case, including pertinent history and exam findings with the NP. I have seen and examined the patient and the key elements of the encounter have been performed by me. I agree with the assessment, plan and orders as documented by the NP.     Electronically signed by Amy Echavarria MD on 12/27/2021 at 2:34 PM

## 2021-12-27 NOTE — PROGRESS NOTES
[address] [address] Continence Nurse  Consult Note       Mani Morel  AGE: 80 y.o. 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 female referred by:   [] Physician/PA/APRN  [x] Nursing  [] Other:     Wound Identification:  Wound Type: pressure  Contributing Factors: chronic pressure, decreased mobility and malnutrition    Objective:     Darío Risk Score: Darío Scale Score: 11    Assessment:     Encounter: Present to patient room. Patient in bed upon arrival. Assessment and photo to follow. Hendrix and rectal tube in place. Assisted patient onto right side for evaluation. Old dressing removed. Cleansed wound with normal saline and gauze. Pat dry with clean gauze. 0.25% Dakins moistened gauze applied to wound, covered with ABD pad, secured with tape. Continue dakins dressing changes twice daily and as needed. Pillow placed under left hip. HOB at 30 degrees. Continue to turn every 2 hours and PRN, offload with pillows, ensure patient is on low air loss support surface [employer], SPR+, [place], Bariatric bed), utilize moisture wicking underpads, limit use of depends, and if applicable, use waffle cushion when in chair. Patient in bed, call light beside pt. Wound ostomy to continue to follow and assess wound weekly. Call with concerns.  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  Wound size: 10.6cm x 13cm x 1.5cm  Undermining or Tunneling: None  Wound assessment/color: red, yellow, gray  Drainage amount: Large  Drainage description: serosanguinous  Odor: strong  Margins: Attached  Nida wound: Intact, fragile, painful  Exposed structure: bone      21    Wound type: Unstageable coccyx wound, POA  Wound size: 13cm x 5.2cm x 3.1cm  Undermining or Tunneling: None  Wound assessment/color: 90% black, 10% pink  Drainage amount: Large  Drainage description: Purulent  Odor: Foul  Margins: Attached  Nida wound: Intact, fragile, painful  Exposed structure: MACKENZIE, concern for osteomyelitis due to wound depth    Response to treatment:  Well tolerated by patient., With complaints of pain. Plan:     Treatment Recommendations:   Recommend Dakins 0.25% wet to dry dressing changes BID.        Specialty Bed Required :   [x] Low Air Loss   [x] Pressure Redistribution  [] Fluid Immersion- [place]olphin  [] Bariatric  [] RotoProne   [] Other:     Discharge Plan:  Placement for patient upon discharge: ECF  Patient appropriate for Outpatient [address]: yes with Dr. Silvia Cushing

## 2021-12-28 LAB
ANION GAP SERPL CALCULATED.3IONS-SCNC: 9 MEQ/L (ref 8–16)
BUN BLDV-MCNC: 36 MG/DL (ref 7–22)
CALCIUM SERPL-MCNC: 9.6 MG/DL (ref 8.5–10.5)
CHLORIDE BLD-SCNC: 104 MEQ/L (ref 98–111)
CO2: 32 MEQ/L (ref 23–33)
CREAT SERPL-MCNC: 1 MG/DL (ref 0.4–1.2)
GFR SERPL CREATININE-BSD FRML MDRD: 52 ML/MIN/1.73M2
GLUCOSE BLD-MCNC: 105 MG/DL (ref 70–108)
GLUCOSE BLD-MCNC: 113 MG/DL (ref 70–108)
GLUCOSE BLD-MCNC: 113 MG/DL (ref 70–108)
GLUCOSE BLD-MCNC: 96 MG/DL (ref 70–108)
GLUCOSE BLD-MCNC: 97 MG/DL (ref 70–108)
POTASSIUM SERPL-SCNC: 4.2 MEQ/L (ref 3.5–5.2)
SODIUM BLD-SCNC: 145 MEQ/L (ref 135–145)

## 2021-12-28 PROCEDURE — 6370000000 HC RX 637 (ALT 250 FOR IP): Performed by: INTERNAL MEDICINE

## 2021-12-28 PROCEDURE — 2580000003 HC RX 258: Performed by: INTERNAL MEDICINE

## 2021-12-28 PROCEDURE — 6370000000 HC RX 637 (ALT 250 FOR IP): Performed by: REGISTERED NURSE

## 2021-12-28 PROCEDURE — 94003 VENT MGMT INPAT SUBQ DAY: CPT

## 2021-12-28 PROCEDURE — 36415 COLL VENOUS BLD VENIPUNCTURE: CPT

## 2021-12-28 PROCEDURE — 82948 REAGENT STRIP/BLOOD GLUCOSE: CPT

## 2021-12-28 PROCEDURE — 80048 BASIC METABOLIC PNL TOTAL CA: CPT

## 2021-12-28 PROCEDURE — 2060000000 HC ICU INTERMEDIATE R&B

## 2021-12-28 RX ADMIN — Medication 125 MG: at 23:56

## 2021-12-28 RX ADMIN — Medication 125 MG: at 17:13

## 2021-12-28 RX ADMIN — Medication 125 MG: at 05:42

## 2021-12-28 RX ADMIN — HYOSCYAMINE SULFATE: 16 SOLUTION at 09:01

## 2021-12-28 RX ADMIN — SODIUM CHLORIDE, PRESERVATIVE FREE 10 ML: 5 INJECTION INTRAVENOUS at 09:02

## 2021-12-28 RX ADMIN — METOPROLOL TARTRATE 25 MG: 50 TABLET, FILM COATED ORAL at 09:01

## 2021-12-28 RX ADMIN — CETIRIZINE HYDROCHLORIDE 10 MG: 10 TABLET, FILM COATED ORAL at 09:01

## 2021-12-28 RX ADMIN — APIXABAN 2.5 MG: 2.5 TABLET, FILM COATED ORAL at 20:19

## 2021-12-28 RX ADMIN — METOPROLOL TARTRATE 25 MG: 50 TABLET, FILM COATED ORAL at 20:19

## 2021-12-28 RX ADMIN — Medication 125 MG: at 01:01

## 2021-12-28 RX ADMIN — Medication 125 MG: at 12:00

## 2021-12-28 RX ADMIN — APIXABAN 2.5 MG: 2.5 TABLET, FILM COATED ORAL at 09:01

## 2021-12-28 RX ADMIN — FUROSEMIDE 20 MG: 20 TABLET ORAL at 16:22

## 2021-12-28 RX ADMIN — FUROSEMIDE 20 MG: 20 TABLET ORAL at 09:01

## 2021-12-28 RX ADMIN — SODIUM CHLORIDE, PRESERVATIVE FREE 10 ML: 5 INJECTION INTRAVENOUS at 20:19

## 2021-12-28 ASSESSMENT — PAIN SCALES - GENERAL
PAINLEVEL_OUTOF10: 0

## 2021-12-28 ASSESSMENT — PULMONARY FUNCTION TESTS
PIF_VALUE: 20.8
PIF_VALUE: 33.1
PIF_VALUE: 26
PIF_VALUE: 17.1

## 2021-12-28 NOTE — PROGRESS NOTES
Returned pt to vent at this time. Pt didn't like the cool air coming from trach mask and pulled mask off.  sats dropped to 85%  reinflated trach cuff.   sx'd small mucus plug from trach

## 2021-12-28 NOTE — PROGRESS NOTES
INTERNAL MEDICINE SPECIALTIES  Progress Note         Patient:  Marilu Swift  YOB: 1934  Date of Service: 12/28/2021  MRN: [id number]   Acct:  [de-identified]   Primary Care Physician: Hilario Bradley MD    SUBJECTIVE: No new issues. Tolerating CPAP trials. Home Medications:   No current facility-administered medications on file prior to encounter.      Current Outpatient Medications on File Prior to Encounter   Medication Sig Dispense Refill    metoprolol tartrate (LOPRESSOR) 50 MG tablet 50 mg by PEG Tube route 3 times daily Hold for SBP <100 or HR <60      acetaminophen (TYLENOL) 325 MG tablet 650 mg by PEG Tube route every 6 hours as needed for Pain      acetic acid 0.25 % irrigation Irrigate with 150 mLs as directed See Admin Instructions Every night shift Tue, Fri for sediment      amiodarone (PACERONE) 100 MG tablet 100 mg by PEG Tube route daily      apixaban (ELIQUIS) 5 MG TABS tablet 5 mg by Per G Tube route 2 times daily      polyvinyl alcohol (LIQUIFILM TEARS) 1.4 % ophthalmic solution Place 2 drops into both eyes every 4 hours as needed      sodium hypochlorite (DAKINS) 0.125 % SOLN external solution Apply topically See Admin Instructions Apply to sacral wound topically every shift      famotidine (PEPCID) 20 MG tablet 20 mg by PEG Tube route daily      polyethylene glycol (GLYCOLAX) 17 g packet Take 17 g by mouth daily as needed for Constipation      OLANZapine (ZYPREXA) 5 MG tablet 5 mg by PEG Tube route nightly      potassium bicarb-citric acid (EFFER-K) 20 MEQ TBEF effervescent tablet Take 2 tablets by mouth every 4 hours      Lactobacillus (ACIDOPHILUS) CAPS capsule 1 capsule by PEG Tube route daily      senna (SENOKOT) 8.6 MG tablet 1 tablet by PEG Tube route daily as needed for Constipation           Scheduled Meds:   apixaban  2.5 mg Oral BID    metoprolol tartrate  25 mg Oral BID    Sodium Hypochlorite   Irrigation Daily    furosemide  20 mg Oral BID    vancomycin  125 mg Per G Tube 4 times per day    [Held by provider] aspirin  325 mg Oral Daily    cetirizine  10 mg Oral Daily    sodium chloride flush  5-40 mL IntraVENous 2 times per day     Continuous Infusions:   dextrose Stopped (12/23/21 0857)    sodium chloride       PRN Meds:potassium chloride **OR** potassium alternative oral replacement **OR** potassium chloride, diazePAM, morphine, sodium chloride flush, sodium chloride, acetaminophen **OR** acetaminophen, ondansetron **OR** ondansetron        Allergies:  Sulfa antibiotics, Gluten meal, Metronidazole, and Milk-related compounds    OBJECTIVE:    Vitals:   Vitals:    12/28/21 1137   BP:    Pulse:    Resp:    Temp:    SpO2: 99%      BMI: Body mass index is 29.9 kg/m². PHYSICAL EXAMINATION:            General appearance:  No apparent distress, appears stated age and cooperative. HEENT:  Normal cephalic, atraumatic without obvious deformity. Pupils equal, round, and reactive to light.  Extra ocular muscles intact. Conjunctivae/corneas clear. Neck: Supple, tracheostomy in place connected to the ventilator  Respiratory:   diminished entry bibasilarly  Cardiovascular:  Regular rhythm with normal S1/S2 without rubs or gallops. Abdomen:  Full, peg tube in place,soft, non-tender, non-distended with normal bowel sounds.  Flexi Seal in place  Musculoskeletal:  No clubbing, cyanosis  or edema bilaterally .   Neurologic: Alert and able to follow simple commands    Review of Labs and Diagnostic Testing:    Recent Results (from the past 24 hour(s))   POCT glucose    Collection Time: 12/27/21  3:54 PM   Result Value Ref Range    POC Glucose 115 (H) 70 - 108 mg/dl   POCT glucose    Collection Time: 12/27/21  8:29 PM   Result Value Ref Range    POC Glucose 110 (H) 70 - 108 mg/dl   Basic Metabolic Panel    Collection Time: 12/28/21  5:07 AM   Result Value Ref Range    Sodium 145 135 - 145 meq/L    Potassium 4.2 3.5 - 5.2 meq/L    Chloride 104 98 - 111 meq/L    CO2 32 23 - 33 meq/L    Glucose 97 70 - 108 mg/dL    BUN 36 (H) 7 - 22 mg/dL    CREATININE 1.0 0.4 - 1.2 mg/dL    Calcium 9.6 8.5 - 10.5 mg/dL   Anion Gap    Collection Time: 12/28/21  5:07 AM   Result Value Ref Range    Anion Gap 9.0 8.0 - 16.0 meq/L   Glomerular Filtration Rate, Estimated    Collection Time: 12/28/21  5:07 AM   Result Value Ref Range    Est, Glom Filt Rate 52 (A) ml/min/1.73m2   POCT glucose    Collection Time: 12/28/21  7:10 AM   Result Value Ref Range    POC Glucose 105 70 - 108 mg/dl       Radiology:     CT HEAD WO CONTRAST    Result Date: 12/18/2021  PROCEDURE: CT HEAD WO CONTRAST CLINICAL INFORMATION:ams COMPARISON: None TECHNIQUE: 5 mm axial imaging through the head without IV contrast. All CT scans at this facility use dose modulation, iterative reconstruction, and/or weight based dosing when appropriate to reduce the radiation dose to as low as reasonably achievable. FINDINGS: Mild cerebral volume loss. Periventricular and subcortical white matter hypodensities are seen. No ventriculomegaly. No midline shift or mass effect. No acute intracranial hemorrhage. No intracranial collection. Gray-white differentiation is unremarkable. The posterior fossa is unremarkable. The craniocervical junction is unremarkable. No acute bony abnormality. Polyp or mucosal retention cyst is seen in the left sphenoid sinus. Otherwise, the remaining visualized paranasal sinuses are clear. Marked mastoid effusions bilaterally. The orbits are unremarkable. Mild atrophy of the pituitary gland. 1. No acute intracranial hemorrhage. 2. White matter hypodensities that likely relate to chronic microvascular changes. 3. Mild cerebral volume loss. 4. Marked mastoid effusions bilaterally. **This report has been created using voice recognition software. It may contain minor errors which are inherent in voice recognition technology. ** Final report electronically signed by Dr Evaristo Howard on 12/18/2021 6:39 PM    XR CHEST PORTABLE    Result Date: 12/19/2021  AP chest Comparison: CR,SR - XR CHEST PORTABLE - 12/08/2021 01:00 AM EST Findings: Bilateral perihilar lung opacity similar to the prior exam. Large left pleural effusion has increased significantly. Cardiomegaly. Tracheostomy unchanged. Left subclavian central venous catheter has been removed. Percutaneous gastrostomy tube is likely in the upper body of stomach. No acute fracture. Impression: 1. Significant increase in left pleural effusion. Follow-up to clearing recommended. This document has been electronically signed by: Tiara Cohen MD on 12/19/2021 05:14 AM    XR CHEST PORTABLE    Result Date: 12/18/2021  PROCEDURE: XR CHEST PORTABLE CLINICAL INFORMATION: ams. COMPARISON: Chest x-ray dated December 2021. TECHNIQUE: AP upright view of the chest. FINDINGS: There is a tracheotomy tube in place. There is moderate cardiomegaly. . Is atherosclerotic calcification aortic arch. .  There is increased density throughout both lung fields. There is a probable left pleural effusion. The pulmonary vascularity is increased. There is thoracic spondylosis. 1. Moderate cardiomegaly. Increased pulmonary vascularity. 2. Increased density throughout both lung fields. 3. Probable left pleural effusion. 4.. Tracheotomy tube in place. **This report has been created using voice recognition software. It may contain minor errors which are inherent in voice recognition technology. ** Final report electronically signed by DR Leslie Castillo on 12/18/2021 3:10 PM        ASSESMENT:      Active Problems:    C. difficile colitis    CHF (congestive heart failure) ([address]    Cardiomyopathy ([address]    UTI (urinary tract infection)    Hyperkalemia    Chronic respiratory failure ([address]    Decubitus ulcer    Ventilator dependence ([address]    Severe malnutrition ([address]  Resolved Problems:    * No resolved hospital problems.  *      PLAN:  SLP eval today to evaluate for potential of starting diet    S/P excisional debridement of decubitus ulcer measuring [id number] thick including skin, Subcu tissue, gluteus muscle, fascia, muscle including bone.  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 UTI grew Proteus, Morganella, and E Coli: Completed Cipro     Patient with elevated troponins,  Seen by cardiology and recommended to Continue beta-blockers and aspirin and Eliquis--ASA on hold still,  Eliquis restarted      Pulmonary Service following, continue vent management.; Cont CPAP/TM trials. Had thoracentesis, transudative. Continue the bronchodilators pulmonary toileting and wean as able. DVT prophylaxis: [] Lovenox                                 [] SCDs                                 [] SQ Heparin                                 [] Encourage ambulation, low risk for DVT, no chemical or mechanical prophylaxis necessary              [x] Already on Anticoagulation                Anticipated Disposition upon discharge: [] Home                                                                         [] Home with Home Health                                                                         [] [facility]                                                                         [x] [address]    Plans are for Chelsie     Assessment and plan of care discussed with supervising physician, Dr Gabriela Amin. Electronically signed by SERGE Toledo CNP on 12/28/2021 at 11:43 AM      I have discussed the case, including pertinent history and exam findings with the NP. I have seen and examined the patient and the key elements of the encounter have been performed by me. I agree with the assessment, plan and orders as documented by the NP.     Electronically signed by Arturo Gallardo MD on 12/28/2021 at 12:12 PM

## 2021-12-28 NOTE — PROGRESS NOTES
Placed PMV on pt at 28% trach mask. Pt able to talk and is tolerating pmv well.   pts daughter in room

## 2021-12-28 NOTE — PROGRESS NOTES
Progress note: Infectious diseases    Patient - Ben Dinh,  Age - 80 y.o.    - 1934      Room Number - 4K-01/001-A   MRN -  [id number]   Acct # - [de-identified]  Date of Admission -  2021  1:15 PM    SUBJECTIVE:   No new issues. OBJECTIVE   VITALS    height is 5' (1.524 m) and weight is 153 lb 1.6 oz (69.4 kg). Her oral temperature is 98.9 °F (37.2 °C). Her blood pressure is 140/62 (abnormal) and her pulse is 83. Her respiration is 16 and oxygen saturation is 99%. Wt Readings from Last 3 Encounters:   21 153 lb 1.6 oz (69.4 kg)   21 138 lb 7.2 oz (62.8 kg)   10/07/21 125 lb (56.7 kg)       I/O (24 Hours)    Intake/Output Summary (Last 24 hours) at 2021 1122  Last data filed at 2021 1000  Gross per 24 hour   Intake 1316 ml   Output 1900 ml   Net -584 ml       General Appearance - Awake, Chronically sick looking. On vent.   HEENT - normocephalic, atraumatic, pale conjunctiva,  anicteric sclera  Neck - Supple, no mass  Lungs -  Bilateral air entry, mild rhonchi in bases, on vent with trach  Cardiovascular - Heart sounds are normal.     Abdomen - soft, not distended, nontender, dressed coccyx wound  Neurologic - chronically sick looking  Skin - + bruising where blood draw was taken  Extremities - dressed coccyx wound (post debridement)        MEDICATIONS:      apixaban  2.5 mg Oral BID    metoprolol tartrate  25 mg Oral BID    Sodium Hypochlorite   Irrigation Daily    furosemide  20 mg Oral BID    vancomycin  125 mg Per G Tube 4 times per day    [Held by provider] aspirin  325 mg Oral Daily    cetirizine  10 mg Oral Daily    sodium chloride flush  5-40 mL IntraVENous 2 times per day      dextrose Stopped (21 0857)    sodium chloride       potassium chloride **OR** potassium alternative oral replacement **OR** potassium chloride, diazePAM, morphine, sodium chloride flush, sodium chloride, acetaminophen **OR** acetaminophen, ondansetron **OR** ondansetron      LABS:     CBC:   Recent Labs     12/26/21  0342   WBC 7.7   HGB 7.8*        BMP:    Recent Labs     12/28/21  0507      K 4.2      CO2 32   BUN 36*   CREATININE 1.0   GLUCOSE 97     Calcium:  Recent Labs     12/28/21  0507   CALCIUM 9.6      Recent Labs     12/27/21  1554 12/27/21 2029 12/28/21  0710   POCGLU 115* 110* 105         CULTURES:   UA:   No results for input(s): SPECGRAV, PHUR, COLORU, CLARITYU, MUCUS, PROTEINU, BLOODU, RBCUA, WBCUA, BACTERIA, NITRU, GLUCOSEU, BILIRUBINUR, UROBILINOGEN, KETUA, LABCAST, LABCASTTY, AMORPHOS in the last 72 hours.     Invalid input(s): CRYSTALS  Micro:   Lab Results   Component Value Date    BC No growth-preliminary No growth  12/18/2021    BC No growth-preliminary No growth  12/18/2021          Problem list of patient:     Patient Active Problem List   Diagnosis Code    Sigmoid diverticulitis K57.32    Acute respiratory failure with hypoxia ([facility]) J96.01    COVID-19 U07.1    Acute congestive heart failure ([facility]) I50.9    C. difficile colitis A04.72    CHF (congestive heart failure) ([facility]) I50.9    Cardiomyopathy ([facility] I42.9    UTI (urinary tract infection) N39.0    Hyperkalemia E87.5    Chronic respiratory failure ([facility]) J96.10    Decubitus ulcer L89.90    Ventilator dependence ([address] Z99.11    Severe malnutrition ([address] E43         ASSESSMENT/PLAN   Respiratory failure on vent with chronic tracheostomy  Severe cardiomyopathy  Hx of C.difficile infection  Malnutrition  Necrotic coccyx wound: had wide surgical debridement, Continue local wound care      Karin Sheehan MD, 12/28/2021 11:22 AM

## 2021-12-29 ENCOUNTER — HOSPITAL ENCOUNTER (OUTPATIENT)
Age: 86
Discharge: HOME | End: 2022-02-02
Attending: INTERNAL MEDICINE | Admitting: INTERNAL MEDICINE
Payer: COMMERCIAL

## 2021-12-29 VITALS
RESPIRATION RATE: 18 BRPM | HEIGHT: 60 IN | HEART RATE: 84 BPM | BODY MASS INDEX: 27.33 KG/M2 | SYSTOLIC BLOOD PRESSURE: 134 MMHG | OXYGEN SATURATION: 94 % | WEIGHT: 139.2 LBS | DIASTOLIC BLOOD PRESSURE: 59 MMHG | TEMPERATURE: 98.7 F

## 2021-12-29 LAB
ANION GAP SERPL CALCULATED.3IONS-SCNC: 8 MEQ/L (ref 8–16)
BUN BLDV-MCNC: 41 MG/DL (ref 7–22)
CALCIUM SERPL-MCNC: 9.7 MG/DL (ref 8.5–10.5)
CHLORIDE BLD-SCNC: 102 MEQ/L (ref 98–111)
CO2: 31 MEQ/L (ref 23–33)
CREAT SERPL-MCNC: 1.1 MG/DL (ref 0.4–1.2)
ERYTHROCYTE [DISTWIDTH] IN BLOOD BY AUTOMATED COUNT: 17.2 % (ref 11.5–14.5)
ERYTHROCYTE [DISTWIDTH] IN BLOOD BY AUTOMATED COUNT: 60.7 FL (ref 35–45)
GFR SERPL CREATININE-BSD FRML MDRD: 47 ML/MIN/1.73M2
GLUCOSE BLD-MCNC: 110 MG/DL (ref 70–108)
GLUCOSE BLD-MCNC: 116 MG/DL (ref 70–108)
GLUCOSE BLD-MCNC: 123 MG/DL (ref 70–108)
HCT VFR BLD CALC: 28.9 % (ref 37–47)
HEMOGLOBIN: 8.7 GM/DL (ref 12–16)
MCH RBC QN AUTO: 29.8 PG (ref 26–33)
MCHC RBC AUTO-ENTMCNC: 30.1 GM/DL (ref 32.2–35.5)
MCV RBC AUTO: 99 FL (ref 81–99)
PLATELET # BLD: 287 THOU/MM3 (ref 130–400)
PMV BLD AUTO: 10.3 FL (ref 9.4–12.4)
POTASSIUM SERPL-SCNC: 4.9 MEQ/L (ref 3.5–5.2)
RBC # BLD: 2.92 MILL/MM3 (ref 4.2–5.4)
SODIUM BLD-SCNC: 141 MEQ/L (ref 135–145)
WBC # BLD: 9.1 THOU/MM3 (ref 4.8–10.8)

## 2021-12-29 PROCEDURE — 6360000002 HC RX W HCPCS: Performed by: INTERNAL MEDICINE

## 2021-12-29 PROCEDURE — 6370000000 HC RX 637 (ALT 250 FOR IP): Performed by: REGISTERED NURSE

## 2021-12-29 PROCEDURE — APPSS30 APP SPLIT SHARED TIME 16-30 MINUTES: Performed by: NURSE PRACTITIONER

## 2021-12-29 PROCEDURE — 6370000000 HC RX 637 (ALT 250 FOR IP): Performed by: INTERNAL MEDICINE

## 2021-12-29 PROCEDURE — 94761 N-INVAS EAR/PLS OXIMETRY MLT: CPT

## 2021-12-29 PROCEDURE — 36415 COLL VENOUS BLD VENIPUNCTURE: CPT

## 2021-12-29 PROCEDURE — 94003 VENT MGMT INPAT SUBQ DAY: CPT

## 2021-12-29 PROCEDURE — 2700000000 HC OXYGEN THERAPY PER DAY

## 2021-12-29 PROCEDURE — 92610 EVALUATE SWALLOWING FUNCTION: CPT

## 2021-12-29 PROCEDURE — 99232 SBSQ HOSP IP/OBS MODERATE 35: CPT | Performed by: INTERNAL MEDICINE

## 2021-12-29 PROCEDURE — 2580000003 HC RX 258: Performed by: INTERNAL MEDICINE

## 2021-12-29 PROCEDURE — 92526 ORAL FUNCTION THERAPY: CPT

## 2021-12-29 PROCEDURE — 80048 BASIC METABOLIC PNL TOTAL CA: CPT

## 2021-12-29 PROCEDURE — 82948 REAGENT STRIP/BLOOD GLUCOSE: CPT

## 2021-12-29 PROCEDURE — 85027 COMPLETE CBC AUTOMATED: CPT

## 2021-12-29 RX ORDER — CETIRIZINE HYDROCHLORIDE 10 MG/1
10 TABLET ORAL DAILY
DISCHARGE
Start: 2021-12-30 | End: 2022-03-27

## 2021-12-29 RX ORDER — ONDANSETRON 2 MG/ML
4 INJECTION INTRAMUSCULAR; INTRAVENOUS EVERY 6 HOURS PRN
Qty: 84 ML | DISCHARGE
Start: 2021-12-29 | End: 2022-03-03

## 2021-12-29 RX ORDER — FUROSEMIDE 20 MG/1
20 TABLET ORAL 2 TIMES DAILY
Qty: 60 TABLET | Refills: 3 | DISCHARGE
Start: 2021-12-29 | End: 2022-03-03

## 2021-12-29 RX ORDER — DIAZEPAM 5 MG/ML
2.5 INJECTION, SOLUTION INTRAMUSCULAR; INTRAVENOUS EVERY 6 HOURS PRN
Status: SHIPPED | DISCHARGE
Start: 2021-12-29 | End: 2022-01-05

## 2021-12-29 RX ORDER — ONDANSETRON 4 MG/1
4 TABLET, ORALLY DISINTEGRATING ORAL EVERY 8 HOURS PRN
DISCHARGE
Start: 2021-12-29 | End: 2022-03-03

## 2021-12-29 RX ORDER — MORPHINE SULFATE 2 MG/ML
2 INJECTION, SOLUTION INTRAMUSCULAR; INTRAVENOUS EVERY 4 HOURS PRN
Refills: 0 | Status: SHIPPED | DISCHARGE
Start: 2021-12-29 | End: 2022-01-01

## 2021-12-29 RX ORDER — ASPIRIN 325 MG
325 TABLET ORAL DAILY
Qty: 30 TABLET | Refills: 3 | DISCHARGE
Start: 2021-12-29 | End: 2022-06-20

## 2021-12-29 RX ADMIN — METOPROLOL TARTRATE 25 MG: 50 TABLET, FILM COATED ORAL at 08:46

## 2021-12-29 RX ADMIN — FUROSEMIDE 20 MG: 20 TABLET ORAL at 08:46

## 2021-12-29 RX ADMIN — FUROSEMIDE 20 MG: 20 TABLET ORAL at 17:57

## 2021-12-29 RX ADMIN — Medication 125 MG: at 12:19

## 2021-12-29 RX ADMIN — APIXABAN 2.5 MG: 2.5 TABLET, FILM COATED ORAL at 08:46

## 2021-12-29 RX ADMIN — CETIRIZINE HYDROCHLORIDE 10 MG: 10 TABLET, FILM COATED ORAL at 08:46

## 2021-12-29 RX ADMIN — HYOSCYAMINE SULFATE: 16 SOLUTION at 15:09

## 2021-12-29 RX ADMIN — SODIUM CHLORIDE, PRESERVATIVE FREE 10 ML: 5 INJECTION INTRAVENOUS at 08:45

## 2021-12-29 RX ADMIN — Medication 125 MG: at 06:12

## 2021-12-29 RX ADMIN — DIAZEPAM 2.5 MG: 5 INJECTION, SOLUTION INTRAMUSCULAR; INTRAVENOUS at 15:09

## 2021-12-29 RX ADMIN — Medication 125 MG: at 17:57

## 2021-12-29 RX ADMIN — ASPIRIN 325 MG: 325 TABLET ORAL at 12:18

## 2021-12-29 ASSESSMENT — PULMONARY FUNCTION TESTS
PIF_VALUE: 26.5
PIF_VALUE: 26
PIF_VALUE: 17

## 2021-12-29 NOTE — PROGRESS NOTES
[address] ICU STEPDOWN TELEMETRY 4K  Clinical Swallow Evaluation + Dysphagia Therapy      SLP Individual Minutes  Time In: 5985  Time Out: 6109  Minutes: 30  Timed Code Treatment Minutes: 0 Minutes   Clinical swallow evaluation: 16 minutes  Dysphagia therapy: 14 minutes       Date: 2021  Patient Name: Rona Beyer      CSN: [id number]   : 1934  (80 y.o.)  Gender: female   Referring Physician:  Augustine Jim MD  Diagnosis: Ventilator dependence   Secondary Diagnosis: Dysphagia     History of Present Illness/Injury: Patient admitted to [facility] with above med dx; please refer to physician H&P for full details. Per chart review, \"80 y.o. female with a past medical history of chronic hypoxic respiratory failures status post tracheostomy on chronic mechanical ventilation secondary to recent Covid 19 pneumonia with ARDS, cardiomyopathy with ejection fraction 25%, hypertension, atrial fibrillation, acute kidney injury, C. difficile colitis and GI bleed. Patient was discharged from the intensive care unit to long-term acute care facility/[facility] where she completed her therapy but was unable to wean off the ventilator and was later discharged to a chronic vent facility on . She was sent back from the chronic vent facility due to altered mental status and agitation. Pulmonary consulted for ventilator management. She is currently on [facility] with a PEEP of 5 and FiO2 30%. Secretions are moderate. X-ray showed worsened left lower lobe atelectasis/effusion\". Initial admission to this facility on 10/11/21 with needs for multiple intubations+extubations prior to tracheostomy placement. ST consulted to complete clinical swallow evaluation to assist with development of dysphagia management POC.      Past Medical History:   Diagnosis Date    Anxiety     Arthritis     Bronchitis     Diverticulitis of colon     Hypertension        SUBJECTIVE:  ROLF Ken with approval to proceed with evaluation; indications conveyed for anticipated transition to [facility] at date. Upon arrival, patient resting in bed with daughter present at bedside; alternate daughter presented at end of evaluation. Hearing acuity significantly decreased despite usage of a Pocket Talker with poor awareness to immediate surroundings. OBJECTIVE:    Pain:  No pain reported. Current Diet: NPO; TF via PEG tube    Respiratory Status:  Tracheostomy Shiley 7.5mm with trach mask 28% (anticipated needs to return to mechanical ventilation per nursing/RT); PMV in place     Behavioral Observation:  Alert, Confused, Lethargic and Limited Direction Following    Oral Mechanism Evaluation:    *limitations for comprehensiveness of OME d/t inconsistencies for execution of 1-step commands; results outlined below obtained informally within PO offerings administered    Facial / Labial Impaired Decreased tone; generalized weakness   Lingual Impaired Reduced ROM, strength, coordination   Dentition WFL    Velum WFL    Vocal Quality Impaired Dysphonia   Sensation WFL    Cough Impaired Weak     Patient Evaluated Using:  Ice chips x3    Oral Phase:  Impaired:  Impaired AP Movement, Impaired Mastication and Reduced Bolus Formation    Pharyngeal Phase: Impaired:  Decreased Hyolaryngeal Elevation, Audible Swallow and Suspected Pharyngeal Residue   *not able to fully validate presence of pharyngeal phase deficits without formal instrumentation/supportive imaging     Signs and Symptoms of Laryngeal Penetration/Aspiration: Throat Clear, Delayed Cough and Wet Vocal Quality    Impresssions: Patient presents with moderate oral dysphagia with inability to fully discern potential presence of pharyngeal phase deficits without formal instrumentation.  Current level of dysphagia severity is largely compounded d/t recent complexity of medical status as it r/t pulmonary functioning as well as likelihood for disuse atrophy of the pharyngeal musculature d/t prolonged NPO diet level. Oral phase prolonged with increased time required to masticate, manipulate, and coordinate conservative boli for AP transit. Concerns for reduced control/containment of melted viscous bolus leading to premature spillage/pharyngeal entry with delayed onset of pharyngeal trigger. Highly anticipate decreased hyolaryngeal elevation s/t tracheostomy placement serving as laryngeal anchor of pharyngeal musculature. Consistent production of a throat clear+wet vocal quality with x1 occurrence for a delayed cough reflex, concerning for airway invasion events. Further trials deferred. Certainly not able to r/o totality of laryngeal penetration and/or tracheal aspiration events as well as degree of pharyngeal deficits without supportive imaging. Recommendations to maintain STRICT NPO diet level, TF via PEG tube. Plans to complete instrumental evaluation as it becomes clinically indicated, dependent upon improvements with endurance measures. *post evaluation, patient without respiratory distress upon leaving room; RN Paula Downs notified re: clinical findings and recommendations from the assessment; verbal receptiveness noted   **further f/u completed with attending medical provider, Shirley Rey MD, and Grace Briggs RN (case management) re: recommendations for ST interventions upon transition to [facility] to target dysphagia needs; verbal receptiveness noted    Dysphagia Therapy: Following completion of clinical swallow evaluation, transitioned to skilled service provision with direct education provided to patient and daughters re: anatomy/physiology of swallow mechanism, rationale for continuation of NPO diet level, and s/s aspiration (immediate/delayed coughing, throat clearing, wet vocal quality) and potential implications (pneumonia, recurrent/prolonged hospitalizations, medical decline).  Outline provided re: necessity for a formal instrumentation prior to PO diet level initiation d/t extent of complicated medical course and likelihood for some degree of pharyngeal dysfunction. Further education provided re: needs to engage in comprehensive oral care in attempts to reduce potential bacteria colonization and to maximize oral integrity. Verbal receptiveness noted; ongoing patient+family education is encouraged. RECOMMENDATIONS/ASSESSMENT:  Instrumental Evaluation: Instrumental evaluation not indicated at this time. Diet Recommendations:  STRICT NPO; TF via PEG tube  Strategies:  Recommend NPO, Oral Care Q2H   Rehabilitation Potential: good    EDUCATION:  Learner: Patient and Family  Education:  Reviewed results and recommendations of this evaluation, Reviewed diet and strategies, Reviewed signs, symptoms and risks of aspiration, Reviewed ST goals and Plan of Care and Reviewed recommendations for follow-up  Evaluation of Education: Demonstrates with assistance and Needs further instruction    PLAN:  Skilled SLP intervention on acute care 3-5 x per week or until goals met and/or pt plateaus in function. Specific interventions for next session may include: dysphagia management. PATIENT GOAL:    Did not state. Will further assess during treatment. SHORT TERM GOALS:  Short-term Goals  Timeframe for Short-term Goals: 2 weeks  Goal 1: Patient will consume conservative PO trials of ice chips post oral care demonstrating consistency of pharyngeal trigger and adequate endurance to determine readiness for FEES. Goal 2: Staff and family will exhibit return demonstration for completion of comprehensive oral care procedural analysis to maximize oral integrity and to reduce potential bacteria colonization. Goal 3: Monitor mentation; complete alternate cognitive linguistic assessment WITH usage of Pocket Talker as it becomes clinically indicated.     LONG TERM GOALS:  No LTG established given short KATIA Rios M.A., [facility]

## 2021-12-29 NOTE — CARE COORDINATION
12/29/21, 11:54 AM EST  Plans Chelsie today; collaborated yolanda Thurman, [phone]  Patient goals/plan/ treatment preferences discussed by  and . Patient goals/plan/ treatment preferences reviewed with patient/ family. Patient/ family verbalize understanding of discharge plan and are in agreement with goal/plan/treatment preferences. Understanding was demonstrated using the teach back method. AVS provided by RN at time of discharge, which includes all necessary medical information pertaining to the patients current course of illness, treatment, post-discharge goals of care, and treatment preferences.         IMM Letter  IMM Letter date given[de-identified] 12/18/21

## 2021-12-29 NOTE — PROGRESS NOTES
[place] [facility]    Patient - Sangeeta Young   MRN -  [id number]   [facility] # - [de-identified]   - 1934      Date of Admission -  2021  1:15 PM  Date of evaluation -  2021  Room - --A   Hospital Day - [facility] Everette Mckeon MD Primary Care Physician - Jose L Youngblood MD   Reason for consult   Chronic respiratory failure vent management   Active Hospital Problem List      Active Hospital Problems    Diagnosis Date Noted    Severe malnutrition ([address] [E43] 2021     Class: Acute    Decubitus ulcer [L89.90] 2021    Ventilator dependence ([address] [Z99.11]     C. difficile colitis [A04.72] 2021    CHF (congestive heart failure) ([address] [I50.9] 2021    Cardiomyopathy ([address] [I42.9] 2021    UTI (urinary tract infection) [N39.0] 2021    Hyperkalemia [E87.5] 2021    Chronic respiratory failure ([address] [J96.10] 2021     HPI   Sangeeta Young is a 80 y.o. female with a past medical history of chronic hypoxic respiratory failures status post tracheostomy on chronic mechanical ventilation secondary to recent Covid 19 pneumonia with ARDS, cardiomyopathy with ejection fraction 25%, hypertension, atrial fibrillation, acute kidney injury, C. difficile colitis and GI bleed. Patient was discharged from the intensive care unit to long-term acute care facility/[place] where she completed her therapy but was unable to wean off the ventilator and was later discharged to a chronic vent facility on . She was sent back from the chronic vent facility due to altered mental status and agitation. Pulmonary consulted for ventilator management. She is currently on [facility] with a PEEP of 5 and FiO2 30%. Secretions are moderate.   X-ray showed worsened left lower lobe atelectasis/effusion    Past 24 hrs   -On PMV today  -strong voice very [place] asked RN about assist device looking into it  -Communication via pen and paper -Denies SOB or significant cough   -Wishes to eat and drink  ROS limited 2/2 [place]    Past Medical History         Diagnosis Date    Anxiety     Arthritis     Bronchitis     Diverticulitis of colon     Hypertension       Past Surgical History           Procedure Laterality Date    APPENDECTOMY      CHOLECYSTECTOMY      GASTROSTOMY TUBE PLACEMENT N/A 2021    EGD PEG TUBE PLACEMENT performed by Norman Young MD at [address] [place] [address] N/A 2021    DECUBITUS ULCER DEBRIDEMENT REPAIR performed by Jacqueline Berrios MD at [place] [address]; PEG; Other Tube Feeding (specify); Migdalia Me Peptide 1.5; Continuous; 50; No; 230; Q 6 hours  Allergies    Sulfa antibiotics, Gluten meal, Metronidazole, and Milk-related compounds  Social History     Social History     Socioeconomic History    Marital status:      Spouse name: Not on file    Number of children: Not on file    Years of education: Not on file    Highest education level: Not on file   Occupational History    Not on file   Tobacco Use    Smoking status: Former Smoker     Packs/day: 1.00     Years: 15.00     Pack years: 15.00     Types: Cigarettes     Quit date: 12/10/1977     Years since quittin.0    Smokeless tobacco: Never Used   Substance and Sexual Activity    Alcohol use: No    Drug use: No    Sexual activity: Not on file   Other Topics Concern    Not on file   Social History Narrative    Not on file     Social Determinants of Health     Financial Resource Strain:     Difficulty of Paying Living Expenses: Not on file   Food Insecurity:     Worried About Running Out of Food in the Last Year: Not on file    Caitlin of Food in the Last Year: Not on file   Transportation Needs:     Lack of Transportation (Medical): Not on file    Lack of Transportation (Non-Medical):  Not on file   Physical Activity:     Days of Exercise per Week: Not on file    Minutes of Exercise per Session: Not on file   Stress:     Feeling of Stress : Not on file   Social Connections:     Frequency of Communication with Friends and Family: Not on file    Frequency of Social Gatherings with Friends and Family: Not on file    Attends Mu-ism Services: Not on file    Active Member of Clubs or Organizations: Not on file    Attends Club or Organization Meetings: Not on file    Marital Status: Not on file   Intimate Partner Violence:     Fear of Current or Ex-Partner: Not on file    Emotionally Abused: Not on file    Physically Abused: Not on file    Sexually Abused: Not on file   Housing Stability:     Unable to Pay for Housing in the Last Year: Not on file    Number of [place] in the Last Year: Not on file    Unstable Housing in the Last Year: Not on file     Family History    History reviewed. No pertinent family history. Sleep History    No History  Meds    Current Medications    apixaban  2.5 mg Oral BID    metoprolol tartrate  25 mg Oral BID    Sodium Hypochlorite   Irrigation Daily    furosemide  20 mg Oral BID    vancomycin  125 mg Per G Tube 4 times per day    aspirin  325 mg Oral Daily    cetirizine  10 mg Oral Daily    sodium chloride flush  5-40 mL IntraVENous 2 times per day     potassium chloride **OR** potassium alternative oral replacement **OR** potassium chloride, diazePAM, morphine, sodium chloride flush, sodium chloride, acetaminophen **OR** acetaminophen, ondansetron **OR** ondansetron  IV Drips/Infusions   dextrose Stopped (12/23/21 0857)    sodium chloride       Vitals    Vitals    height is 5' (1.524 m) and weight is 139 lb 3.2 oz (63.1 kg). Her oral temperature is 98.7 °F (37.1 °C). Her blood pressure is 134/59 (abnormal) and her pulse is 77. Her respiration is 22 and oxygen saturation is 98%.      O2 Flow Rate (L/min): 6 L/min (pt cant tolerate the flow of 8-10)  I/O    Intake/Output Summary (Last 24 hours) at 12/29/2021 1311  Last data filed at 12/29/2021 1225  Gross per 24 hour   Intake 1308 ml   Output 1550 ml   Net -242 ml     Patient Vitals for the past 96 hrs (Last 3 readings):   Weight   12/29/21 0306 139 lb 3.2 oz (63.1 kg)   12/26/21 0517 153 lb 1.6 oz (69.4 kg)     Exam   Physical Exam   Constitutional: No distress on vent via trach. Patient appears elderly and frail   Head: Normocephalic and atraumatic. Mouth/Throat: Oropharynx is clear and moist.  No oral thrush. Eyes: Conjunctivae are normal. Pupils are equal, round. No scleral icterus. Neck: Neck supple. Kreg Rumple present with speaking valve  Cardiovascular: S1 and S2 with no murmur. No peripheral edema  Pulmonary/Chest: Normal effort with bilateral air entry, clear breath sounds. No stridor. No respiratory distress. Patient exhibits no tenderness. Abdominal: Soft. Bowel sounds audible. PEG in place  Musculoskeletal: Moves all extremities  Neurological: Patient is alert and follows simple commands very [place] no HA  Labs   ABG  Lab Results   Component Value Date    PH 7.38 12/18/2021    PO2 62 12/18/2021    PCO2 62 12/18/2021    HCO3 37 12/18/2021    O2SAT 90 12/18/2021     Lab Results   Component Value Date    IFIO2 6 12/18/2021    MODE PC 10/30/2021    SETTIDVOL 320 12/18/2021    SETPEEP 5.0 12/18/2021     CBC  No results for input(s): WBC, RBC, HGB, HCT, MCV, MCH, MCHC, RDW, PLT, MPV in the last 72 hours. BMP  Recent Labs     12/28/21  0507      K 4.2      CO2 32   BUN 36*   CREATININE 1.0   GLUCOSE 97   CALCIUM 9.6     LFT  No results for input(s): AST, ALT, ALB, BILITOT, ALKPHOS, LIPASE in the last 72 hours. Invalid input(s):   AMYLASE  TROP  Lab Results   Component Value Date    TROPONINT 0.023 12/19/2021    TROPONINT 0.021 12/19/2021    TROPONINT 0.018 12/19/2021     BNP  Lab Results   Component Value Date    PROBNP 19785.0 12/18/2021    PROBNP > 82260.0 11/20/2021    PROBNP 85873.0 11/01/2021     D-Dimer  No results found for: DDIMER  Lactic Acid  No results for input(s): LACTA in the last 72 hours. INR  No results for input(s): INR, PROTIME in the last 72 hours. PTT  No results for input(s): APTT in the last 72 hours. Glucose  Recent Labs     12/28/21  1935 12/29/21  0720 12/29/21  1137   POCGLU 113* 123* 110*     UA   No results for input(s): SPECGRAV, PHUR, COLORU, CLARITYU, MUCUS, PROTEINU, BLOODU, RBCUA, WBCUA, BACTERIA, NITRU, GLUCOSEU, BILIRUBINUR, UROBILINOGEN, KETUA, LABCAST, LABCASTTY, AMORPHOS in the last 72 hours. Invalid input(s): CRYSTALS. Results for Ivanna Culver (MRN [id number]) as of 12/21/2021 15:11   Ref. Range 12/21/2021 11:13   Character, Body Fluid Unknown BLOODY   LD, Fluid Latest Units: U/L 124   pH, Fluid Unknown 7.63   Protein, Fluid Latest Units: gm/dl 2.6   Albumin, Fluid Latest Units: gm/dl 1.3   Body Fluid RBC Latest Units: /cumm 22202   Total Nucleated cells Body Fluid Latest Ref Range: 0 - 500 /cumm 773 (H)   Total Volume Received Body Fluid Latest Units: ml 43.0     Serum total protein: 5.9  Serum LDH: 306  Serum albumin: 2.1    Total protein ratio i.e Pleural fluid total protein/ Serum Total protein: 2.6/5.9=0.44  LDH ratio i.e Pleural fluid LDH/ Serum LDH: 124/306=0.41  Albumin gradient 2.1-1.3=0.8    Cytology-No malignant cells   Body fluid culture-No prelim growth    PFTs   No records  Echo     Summary   Ejection fraction is visually estimated at 25%. There was severe global hypokinesis of the left ventricle. Left Ventricular size is Moderately increased . The aortic valve leaflets were not well visualized. Aortic valve appears tricuspid. Thickened aortic valve leaflets noted. Aortic valve leaflets are Moderately calcified. Cultures    Procalcitonin  Lab Results   Component Value Date    PROCAL 0.21 11/01/2021    PROCAL 0.24 10/29/2021    PROCAL 0.28 10/26/2021       Radiology    CXR  XR CHEST PORTABLE   12/21/2021   Impression:  No pneumothorax following a left-sided thoracentesis.          XR CHEST PORTABLE PM

## 2021-12-29 NOTE — PROGRESS NOTES
Pt was taken off vent and transported on 100% ambu bag via trach to [facility].  Report given to mike dixon RRT

## 2021-12-29 NOTE — PROGRESS NOTES
Progress note: Infectious diseases    Patient - Erich Martinez,  Age - 80 y.o.    - 1934      Room Number - 4K-01/001-A   MRN -  [id number]   Acct # - [de-identified]  Date of Admission -  2021  1:15 PM    SUBJECTIVE:   No new issues. She is on the vent. OBJECTIVE   VITALS    height is 5' (1.524 m) and weight is 139 lb 3.2 oz (63.1 kg). Her oral temperature is 98.7 °F (37.1 °C). Her blood pressure is 134/59 (abnormal) and her pulse is 77. Her respiration is 22 and oxygen saturation is 98%. Wt Readings from Last 3 Encounters:   21 139 lb 3.2 oz (63.1 kg)   21 138 lb 7.2 oz (62.8 kg)   10/07/21 125 lb (56.7 kg)       I/O (24 Hours)    Intake/Output Summary (Last 24 hours) at 2021 1126  Last data filed at 2021 0306  Gross per 24 hour   Intake 1308 ml   Output 650 ml   Net 658 ml       General Appearance - Awake, Chronically sick looking. On vent.   HEENT - normocephalic, atraumatic, pale conjunctiva,  anicteric sclera  Neck - Supple, no mass  Lungs -  Bilateral air entry, mild rhonchi in bases, on vent with trach  Cardiovascular - Heart sounds are normal.     Abdomen - soft, not distended, nontender, dressed coccyx wound  Neurologic - chronically sick looking  Skin - + bruising where blood draw was taken  Extremities - dressed coccyx wound (post debridement)        MEDICATIONS:      apixaban  2.5 mg Oral BID    metoprolol tartrate  25 mg Oral BID    Sodium Hypochlorite   Irrigation Daily    furosemide  20 mg Oral BID    vancomycin  125 mg Per G Tube 4 times per day    aspirin  325 mg Oral Daily    cetirizine  10 mg Oral Daily    sodium chloride flush  5-40 mL IntraVENous 2 times per day      dextrose Stopped (21 0857)    sodium chloride       potassium chloride **OR** potassium alternative oral replacement **OR** potassium chloride, diazePAM, morphine, sodium chloride flush, sodium chloride, acetaminophen **OR** acetaminophen, ondansetron **OR** ondansetron      LABS:     CBC:   No results for input(s): WBC, HGB, PLT in the last 72 hours. BMP:    Recent Labs     12/28/21  0507      K 4.2      CO2 32   BUN 36*   CREATININE 1.0   GLUCOSE 97     Calcium:  Recent Labs     12/28/21  0507   CALCIUM 9.6      Recent Labs     12/28/21  1621 12/28/21  1935 12/29/21  0720   POCGLU 96 113* 123*         CULTURES:   UA:   No results for input(s): Dane Maico, COLORU, CLARITYU, MUCUS, PROTEINU, BLOODU, RBCUA, WBCUA, BACTERIA, NITRU, GLUCOSEU, BILIRUBINUR, UROBILINOGEN, KETUA, LABCAST, LABCASTTY, AMORPHOS in the last 72 hours. Invalid input(s): CRYSTALS  Micro:   Lab Results   Component Value Date    BC No growth-preliminary No growth  12/18/2021    BC No growth-preliminary No growth  12/18/2021          Problem list of patient:     Patient Active Problem List   Diagnosis Code    Sigmoid diverticulitis K57.32    Acute respiratory failure with hypoxia ([facility]) J96.01    COVID-19 U07.1    Acute congestive heart failure ([facility]) I50.9    C. difficile colitis A04.72    CHF (congestive heart failure) ([facility]) I50.9    Cardiomyopathy ([address] I42.9    UTI (urinary tract infection) N39.0    Hyperkalemia E87.5    Chronic respiratory failure ([facility]) J96.10    Decubitus ulcer L89.90    Ventilator dependence ([address] Z99.11    Severe malnutrition ([address] E43         ASSESSMENT/PLAN   Respiratory failure on vent with chronic tracheostomy  Severe cardiomyopathy  Hx of C.difficile infection  Malnutrition  Necrotic coccyx wound: had wide surgical debridement will continue local wound care.   Overall prognosis is poor      John Rojo MD, 12/29/2021 11:26 AM

## 2021-12-29 NOTE — DISCHARGE INSTR - DIET

## 2021-12-29 NOTE — DISCHARGE SUMMARY
Discharge Summary  12/29/2021  2:13 PM    Patient:  Camille Erickson  YOB: 1934    MRN: [id number]   Acct: [de-identified]   4K-01/001-A   Primary Care Physician: Agapito Sanchez MD    Admit date:  12/18/2021    Discharge date:  12/29/2021    Discharge Diagnoses:   C diff colitis   CHF reduced EF  Cardiomyopathy  UTI  Hyperkalemia, resolved   Decubitus ulcer s/p debridement   Chronic resp failure on vent  Anemia      Discharge Medications:         Medication List      START taking these medications    diazePAM 5 MG/ML injection  Commonly known as: VALIUM  Infuse 0.5 mLs intravenously every 6 hours as needed (anxiety) for up to 7 days. furosemide 20 MG tablet  Commonly known as: LASIX  Take 1 tablet by mouth 2 times daily     morphine (PF) 2 MG/ML Soln injection  Infuse 1 mL intravenously every 4 hours as needed (pain) for up to 3 days. ondansetron 4 MG disintegrating tablet  Commonly known as: ZOFRAN-ODT  Take 1 tablet by mouth every 8 hours as needed for Nausea or Vomiting     ondansetron 4 MG/2ML injection  Commonly known as: ZOFRAN  Infuse 2 mLs intravenously every 6 hours as needed for Nausea or Vomiting     vancomycin 50 mg/mL oral solution  Commonly known as: VANCOCIN  2.5 mLs by Per G Tube route every 6 hours        CHANGE how you take these medications    apixaban 2.5 MG Tabs tablet  Commonly known as: ELIQUIS  Take 1 tablet by mouth 2 times daily  What changed:   · medication strength  · how much to take  · how to take this     cetirizine 10 MG tablet  Commonly known as: ZYRTEC  Take 1 tablet by mouth daily  Start taking on: December 30, 2021  What changed: See the new instructions. metoprolol tartrate 25 MG tablet  Commonly known as: LOPRESSOR  Take 1 tablet by mouth 2 times daily  What changed:   · medication strength  · how much to take  · Another medication with the same name was removed. Continue taking this medication, and follow the directions you see here.         CONTINUE taking these medications    acetaminophen 325 MG tablet  Commonly known as: TYLENOL     aspirin 325 MG tablet  Take 1 tablet by mouth daily     sodium hypochlorite 0.125 % Soln external solution  Commonly known as: DAKINS        STOP taking these medications    acetic acid 0.25 % irrigation     acidophilus Caps capsule     amiodarone 100 MG tablet  Commonly known as: PACERONE     Effer-K 20 MEQ Tbef effervescent tablet  Generic drug: potassium bicarb-citric acid     famotidine 20 MG tablet  Commonly known as: PEPCID     OLANZapine 5 MG tablet  Commonly known as: ZYPREXA     polyethylene glycol 17 g packet  Commonly known as: GLYCOLAX     polyvinyl alcohol 1.4 % ophthalmic solution  Commonly known as: LIQUIFILM TEARS     senna 8.6 MG tablet  Commonly known as: SENOKOT           Where to Get Your Medications      You can get these medications from any pharmacy    Bring a paper prescription for each of these medications  · diazePAM 5 MG/ML injection  · morphine (PF) 2 MG/ML Soln injection     Information about where to get these medications is not yet available    Ask your nurse or doctor about these medications  · apixaban 2.5 MG Tabs tablet  · aspirin 325 MG tablet  · cetirizine 10 MG tablet  · furosemide 20 MG tablet  · metoprolol tartrate 25 MG tablet  · ondansetron 4 MG disintegrating tablet  · ondansetron 4 MG/2ML injection  · vancomycin 50 mg/mL oral solution       Scheduled Meds: Scheduled Meds:   apixaban  2.5 mg Oral BID    metoprolol tartrate  25 mg Oral BID    Sodium Hypochlorite   Irrigation Daily    furosemide  20 mg Oral BID    vancomycin  125 mg Per G Tube 4 times per day    aspirin  325 mg Oral Daily    cetirizine  10 mg Oral Daily    sodium chloride flush  5-40 mL IntraVENous 2 times per day     Continuous Infusions:   dextrose Stopped (12/23/21 0857)    sodium chloride       PRN Meds:.potassium chloride **OR** potassium alternative oral replacement **OR** potassium chloride, diazePAM, morphine, sodium chloride flush, sodium chloride, acetaminophen **OR** acetaminophen, ondansetron **OR** ondansetron  Continuous Infusions:   dextrose Stopped (12/23/21 0857)    sodium chloride         Intake/Output Summary (Last 24 hours) at 12/29/2021 1413  Last data filed at 12/29/2021 1225  Gross per 24 hour   Intake 1308 ml   Output 1550 ml   Net -242 ml       Diet:  Diet NPO  ADULT TUBE FEEDING; PEG; Other Tube Feeding (specify);  Alpa Page Peptide 1.5; Continuous; 50; No; 230; Q 6 hours    Activity:  Activity as tolerated (Patient may move about with assist as indicated or with supervision.)    Follow-up:  in the next few weeks with Gracie Prado MD,      Consultants:  Pulm, ID, Gen Surg     Procedures:  Excisional debridement of decubitus ulcer       Objective:  Lab Results   Component Value Date    WBC 9.1 12/29/2021    RBC 2.92 12/29/2021    RBC 4.02 08/10/2011    HGB 8.7 12/29/2021    HCT 28.9 12/29/2021    MCV 99.0 12/29/2021    MCH 29.8 12/29/2021    MCHC 30.1 12/29/2021    RDW 14.2 07/18/2019     12/29/2021    MPV 10.3 12/29/2021     Lab Results   Component Value Date     12/29/2021    K 4.9 12/29/2021    K 4.7 12/18/2021     12/29/2021    CO2 31 12/29/2021    BUN 41 12/29/2021    CREATININE 1.1 12/29/2021    GLUCOSE 116 12/29/2021    GLUCOSE 118 08/10/2011    CALCIUM 9.7 12/29/2021     Lab Results   Component Value Date    CALCIUM 9.7 12/29/2021     No results found for: IONCA  Lab Results   Component Value Date    MG 2.1 12/12/2021     Lab Results   Component Value Date    PHOS 3.0 12/15/2021     No results found for: BNP  Lab Results   Component Value Date    ALKPHOS 168 12/18/2021    ALT 19 12/18/2021    AST 51 12/18/2021    PROT 5.9 12/21/2021    BILITOT 0.5 12/18/2021    BILIDIR <0.2 11/08/2021    LABALBU 2.1 12/21/2021    LABALBU 3.9 08/10/2011     Lab Results   Component Value Date    LACTA 1.0 12/19/2021     No results found for: AMYLASE  Lab Results   Component Value Date    LIPASE 63.6 03/15/2015     Lab Results   Component Value Date    TRIG 102 10/24/2021    HDL 47 07/18/2019     Recent Labs     12/28/21  1935 12/29/21  0720 12/29/21  1137   POCGLU 113* 123* 110*     No results for input(s): CKTOTAL, CKMB, TROPONINI in the last 72 hours. No results found for: LABA1C  Lab Results   Component Value Date    INR 2.16 12/19/2021     No results found for: PHART, PO2ART, GFO0WXQ, FKO4OHM, [facility] Course: clinical course has improved    Initial H+P:   The patient is a 80 y.o. female who has a background history of hypertension, anxiety diverticular disease  Atrial fibrillation, and recently treated for respiratory failure with COVID-19 pneumonia, ARDS at [facility] along with CHF. She had been intubated and managed in the ICU was also noted to have evidence of cardiomyopathy with ejection fraction of 25%. She had been evaluated by the cardiologist for possible AICD however condition at the time had a difficult for her to use a life vest.  She was unable to be weaned off the ventilator and had required tracheostomy and was transferred to [place] for further management under the care of Dr. Jeancarlos Huff. While she was at [address] she  Was managed for HERIBERTO and hyperkalemia, severe protein calorie malnutrition on altered mental state she had also developed C difficile colitis which was treated with vancomycin via the PEG tube . She had occult positive stool  And was seen by the GI service who felt that this was positive on account of recent PEG tube placement which would be expected. She had also has some skin breakdown around coccygeal area which may have contaminated the sample. PPI was recommended. She had been maintained on midodrine for blood pressure support and beta-blockers and anticoagulation with Eliquiscontinued . All attempts at weaning her off the ventilator was unsuccessful and [facility].     During this hospital stay she was seen by rigo STANLEY for UTI growing Proteus, Morganella and E coli--completed tx with Cipro. Also seen by gen surg and underwent excisional debridement of decubitus ulcer including skin, subcu tissue, gluteus muscle, fascia, muscle including bone. She had some anemia requiring 1URBC; stable ever since--no signs of active bleeding. Also seen by cardiology due to severe cardiomyopathy with elevated trops; recommended continuing medical management. Pt is on Eliquis and ASA; resumed by gen surg. She was seen by pulm for vent weaning and was able to be weaned down to TM with PMV on. Pt is transferring to [place] for continued O2 weaning and continued wound care of large decubitus ulcer. Vitals: BP (!) 134/59   Pulse 77   Temp 98.7 °F (37.1 °C) (Oral)   Resp 22   Ht 5' (1.524 m)   Wt 139 lb 3.2 oz (63.1 kg)   SpO2 98%   BMI 27.19 kg/m²   Physical Exam:  General appearance:  No apparent distress, appears stated age and cooperative. HEENT:  Normal cephalic, atraumatic without obvious deformity. Pupils equal, round, and reactive to light.  Extra ocular muscles intact. Conjunctivae/corneas clear. Neck: Supple, tracheostomy in place connected to the ventilator  Respiratory:   diminished entry bibasilarly  Cardiovascular:  Regular rhythm with normal S1/S2 without rubs or gallops. Abdomen:  Full, peg tube in place,soft, non-tender, non-distended with normal bowel sounds. Flexi Seal in place  Musculoskeletal:  No clubbing, cyanosis  or edema bilaterally .   Neurologic: Alert and able to follow simple commands      Disposition: long term care facility    Condition: Stable    Over 35 minutes spent on encounter    Assessment and plan of care discussed with supervising physician, Dr Juan Jose Hernandez.     Electronically signed by SERGE Henson CNP on 12/29/2021 at 2:13 PM    Copy: Primary Care Physician: Shoshana Wade MD  Internal Medicine    I have discussed the case, including pertinent history and exam findings with the NP. I have seen and examined the patient and the key elements of the encounter have been performed by me. I agree with the assessment, plan and orders as documented by the NP.     Electronically signed by Yeny Power MD on 12/29/2021 at 2:46 PM

## 2021-12-29 NOTE — PROGRESS NOTES
Patient turned and repositioned every 2 hours with pillow support. Hendrix catheter care completed. Trach care completed and inner cannula changed. 4234: Perfect serve message sent to Dr. Candie Collins regarding no new labs. 3068: Dr. Candie Collins responded to perfect serve and ordered CBC AND BMP. Labs ordered with telephone with readback.

## 2021-12-29 NOTE — PROGRESS NOTES
Patient discharged to [place] room 17. Transported by bed via transport. Respiratory at bedside during transfer. Patients family with patient. Report called to Campbell Salgado at [place] prior to discharge.

## 2021-12-31 LAB
ALBUMIN SERPL-MCNC: 2.4 G/DL (ref 3.5–5.1)
ANION GAP SERPL CALCULATED.3IONS-SCNC: 9 MEQ/L (ref 8–16)
BASOPHILS # BLD: 0.6 %
BASOPHILS ABSOLUTE: 0.1 THOU/MM3 (ref 0–0.1)
BUN BLDV-MCNC: 51 MG/DL (ref 7–22)
CALCIUM SERPL-MCNC: 8.8 MG/DL (ref 8.5–10.5)
CHLORIDE BLD-SCNC: 102 MEQ/L (ref 98–111)
CO2: 31 MEQ/L (ref 23–33)
CREAT SERPL-MCNC: 1.1 MG/DL (ref 0.4–1.2)
EOSINOPHIL # BLD: 2.4 %
EOSINOPHILS ABSOLUTE: 0.2 THOU/MM3 (ref 0–0.4)
ERYTHROCYTE [DISTWIDTH] IN BLOOD BY AUTOMATED COUNT: 17.4 % (ref 11.5–14.5)
ERYTHROCYTE [DISTWIDTH] IN BLOOD BY AUTOMATED COUNT: 62.1 FL (ref 35–45)
GFR SERPL CREATININE-BSD FRML MDRD: 47 ML/MIN/1.73M2
GLUCOSE BLD-MCNC: 117 MG/DL (ref 70–108)
HCT VFR BLD CALC: 25.9 % (ref 37–47)
HEMOGLOBIN: 7.5 GM/DL (ref 12–16)
IMMATURE GRANS (ABS): 0.23 THOU/MM3 (ref 0–0.07)
IMMATURE GRANULOCYTES: 2.5 %
LYMPHOCYTES # BLD: 20.7 %
LYMPHOCYTES ABSOLUTE: 1.9 THOU/MM3 (ref 1–4.8)
MCH RBC QN AUTO: 28.7 PG (ref 26–33)
MCHC RBC AUTO-ENTMCNC: 29 GM/DL (ref 32.2–35.5)
MCV RBC AUTO: 99.2 FL (ref 81–99)
MONOCYTES # BLD: 7.3 %
MONOCYTES ABSOLUTE: 0.7 THOU/MM3 (ref 0.4–1.3)
NUCLEATED RED BLOOD CELLS: 0 /100 WBC
PLATELET # BLD: 256 THOU/MM3 (ref 130–400)
PMV BLD AUTO: 10.6 FL (ref 9.4–12.4)
POTASSIUM SERPL-SCNC: 4.9 MEQ/L (ref 3.5–5.2)
PREALBUMIN: 13.9 MG/DL (ref 20–40)
RBC # BLD: 2.61 MILL/MM3 (ref 4.2–5.4)
SEG NEUTROPHILS: 66.5 %
SEGMENTED NEUTROPHILS ABSOLUTE COUNT: 6.1 THOU/MM3 (ref 1.8–7.7)
SODIUM BLD-SCNC: 142 MEQ/L (ref 135–145)
TSH SERPL DL<=0.05 MIU/L-ACNC: 31.02 UIU/ML (ref 0.4–4.2)
WBC # BLD: 9.1 THOU/MM3 (ref 4.8–10.8)

## 2022-01-01 LAB
ANION GAP SERPL CALCULATED.3IONS-SCNC: 10 MEQ/L (ref 8–16)
BASOPHILS # BLD: 0.7 %
BASOPHILS ABSOLUTE: 0.1 THOU/MM3 (ref 0–0.1)
BUN BLDV-MCNC: 57 MG/DL (ref 7–22)
CALCIUM SERPL-MCNC: 8.9 MG/DL (ref 8.5–10.5)
CHLORIDE BLD-SCNC: 100 MEQ/L (ref 98–111)
CO2: 30 MEQ/L (ref 23–33)
CREAT SERPL-MCNC: 1.2 MG/DL (ref 0.4–1.2)
EOSINOPHIL # BLD: 3.1 %
EOSINOPHILS ABSOLUTE: 0.3 THOU/MM3 (ref 0–0.4)
ERYTHROCYTE [DISTWIDTH] IN BLOOD BY AUTOMATED COUNT: 17.2 % (ref 11.5–14.5)
ERYTHROCYTE [DISTWIDTH] IN BLOOD BY AUTOMATED COUNT: 59.7 FL (ref 35–45)
GFR SERPL CREATININE-BSD FRML MDRD: 42 ML/MIN/1.73M2
GLUCOSE BLD-MCNC: 125 MG/DL (ref 70–108)
HCT VFR BLD CALC: 24.7 % (ref 37–47)
HEMOGLOBIN: 7.4 GM/DL (ref 12–16)
IMMATURE GRANS (ABS): 0.19 THOU/MM3 (ref 0–0.07)
IMMATURE GRANULOCYTES: 2.1 %
LYMPHOCYTES # BLD: 12.7 %
LYMPHOCYTES ABSOLUTE: 1.1 THOU/MM3 (ref 1–4.8)
MCH RBC QN AUTO: 29.2 PG (ref 26–33)
MCHC RBC AUTO-ENTMCNC: 30 GM/DL (ref 32.2–35.5)
MCV RBC AUTO: 97.6 FL (ref 81–99)
MONOCYTES # BLD: 6.9 %
MONOCYTES ABSOLUTE: 0.6 THOU/MM3 (ref 0.4–1.3)
NUCLEATED RED BLOOD CELLS: 0 /100 WBC
PLATELET # BLD: 274 THOU/MM3 (ref 130–400)
PMV BLD AUTO: 10.5 FL (ref 9.4–12.4)
POTASSIUM SERPL-SCNC: 5 MEQ/L (ref 3.5–5.2)
RBC # BLD: 2.53 MILL/MM3 (ref 4.2–5.4)
SEG NEUTROPHILS: 74.5 %
SEGMENTED NEUTROPHILS ABSOLUTE COUNT: 6.7 THOU/MM3 (ref 1.8–7.7)
SODIUM BLD-SCNC: 140 MEQ/L (ref 135–145)
T4 FREE: 0.78 NG/DL (ref 0.93–1.76)
TSH SERPL DL<=0.05 MIU/L-ACNC: 22.66 UIU/ML (ref 0.4–4.2)
WBC # BLD: 9 THOU/MM3 (ref 4.8–10.8)

## 2022-01-02 LAB
ANION GAP SERPL CALCULATED.3IONS-SCNC: 10 MEQ/L (ref 8–16)
BASOPHILS # BLD: 0.3 %
BASOPHILS ABSOLUTE: 0 THOU/MM3 (ref 0–0.1)
BUN BLDV-MCNC: 65 MG/DL (ref 7–22)
CALCIUM SERPL-MCNC: 8.9 MG/DL (ref 8.5–10.5)
CHLORIDE BLD-SCNC: 98 MEQ/L (ref 98–111)
CO2: 30 MEQ/L (ref 23–33)
CREAT SERPL-MCNC: 1.2 MG/DL (ref 0.4–1.2)
EOSINOPHIL # BLD: 3.4 %
EOSINOPHILS ABSOLUTE: 0.3 THOU/MM3 (ref 0–0.4)
ERYTHROCYTE [DISTWIDTH] IN BLOOD BY AUTOMATED COUNT: 17.3 % (ref 11.5–14.5)
ERYTHROCYTE [DISTWIDTH] IN BLOOD BY AUTOMATED COUNT: 61.4 FL (ref 35–45)
GFR SERPL CREATININE-BSD FRML MDRD: 42 ML/MIN/1.73M2
GLUCOSE BLD-MCNC: 118 MG/DL (ref 70–108)
HCT VFR BLD CALC: 24.6 % (ref 37–47)
HEMOGLOBIN: 7.5 GM/DL (ref 12–16)
IMMATURE GRANS (ABS): 0.15 THOU/MM3 (ref 0–0.07)
IMMATURE GRANULOCYTES: 1.7 %
LYMPHOCYTES # BLD: 15.4 %
LYMPHOCYTES ABSOLUTE: 1.4 THOU/MM3 (ref 1–4.8)
MCH RBC QN AUTO: 30 PG (ref 26–33)
MCHC RBC AUTO-ENTMCNC: 30.5 GM/DL (ref 32.2–35.5)
MCV RBC AUTO: 98.4 FL (ref 81–99)
MONOCYTES # BLD: 6.9 %
MONOCYTES ABSOLUTE: 0.6 THOU/MM3 (ref 0.4–1.3)
NUCLEATED RED BLOOD CELLS: 0 /100 WBC
PLATELET # BLD: 281 THOU/MM3 (ref 130–400)
PMV BLD AUTO: 10.5 FL (ref 9.4–12.4)
POTASSIUM SERPL-SCNC: 4.8 MEQ/L (ref 3.5–5.2)
RBC # BLD: 2.5 MILL/MM3 (ref 4.2–5.4)
SEG NEUTROPHILS: 72.3 %
SEGMENTED NEUTROPHILS ABSOLUTE COUNT: 6.4 THOU/MM3 (ref 1.8–7.7)
SODIUM BLD-SCNC: 138 MEQ/L (ref 135–145)
WBC # BLD: 8.8 THOU/MM3 (ref 4.8–10.8)

## 2022-01-05 LAB
ANION GAP SERPL CALCULATED.3IONS-SCNC: 12 MEQ/L (ref 8–16)
BUN BLDV-MCNC: 76 MG/DL (ref 7–22)
CALCIUM SERPL-MCNC: 9.2 MG/DL (ref 8.5–10.5)
CHLORIDE BLD-SCNC: 98 MEQ/L (ref 98–111)
CO2: 27 MEQ/L (ref 23–33)
CREAT SERPL-MCNC: 1.2 MG/DL (ref 0.4–1.2)
ERYTHROCYTE [DISTWIDTH] IN BLOOD BY AUTOMATED COUNT: 17.8 % (ref 11.5–14.5)
ERYTHROCYTE [DISTWIDTH] IN BLOOD BY AUTOMATED COUNT: 63.7 FL (ref 35–45)
GFR SERPL CREATININE-BSD FRML MDRD: 42 ML/MIN/1.73M2
GLUCOSE BLD-MCNC: 116 MG/DL (ref 70–108)
HCT VFR BLD CALC: 23.8 % (ref 37–47)
HEMOGLOBIN: 7.1 GM/DL (ref 12–16)
MCH RBC QN AUTO: 29.3 PG (ref 26–33)
MCHC RBC AUTO-ENTMCNC: 29.8 GM/DL (ref 32.2–35.5)
MCV RBC AUTO: 98.3 FL (ref 81–99)
PLATELET # BLD: 288 THOU/MM3 (ref 130–400)
PMV BLD AUTO: 10.5 FL (ref 9.4–12.4)
POTASSIUM SERPL-SCNC: 4.7 MEQ/L (ref 3.5–5.2)
RBC # BLD: 2.42 MILL/MM3 (ref 4.2–5.4)
SODIUM BLD-SCNC: 137 MEQ/L (ref 135–145)
WBC # BLD: 8.4 THOU/MM3 (ref 4.8–10.8)

## 2022-01-07 LAB
ANION GAP SERPL CALCULATED.3IONS-SCNC: 15 MEQ/L (ref 8–16)
BASOPHILS # BLD: 0.8 %
BASOPHILS ABSOLUTE: 0.1 THOU/MM3 (ref 0–0.1)
BUN BLDV-MCNC: 92 MG/DL (ref 7–22)
CALCIUM SERPL-MCNC: 9.8 MG/DL (ref 8.5–10.5)
CHLORIDE BLD-SCNC: 97 MEQ/L (ref 98–111)
CO2: 30 MEQ/L (ref 23–33)
CREAT SERPL-MCNC: 1.3 MG/DL (ref 0.4–1.2)
EOSINOPHIL # BLD: 5 %
EOSINOPHILS ABSOLUTE: 0.5 THOU/MM3 (ref 0–0.4)
ERYTHROCYTE [DISTWIDTH] IN BLOOD BY AUTOMATED COUNT: 18 % (ref 11.5–14.5)
ERYTHROCYTE [DISTWIDTH] IN BLOOD BY AUTOMATED COUNT: 63.8 FL (ref 35–45)
GFR SERPL CREATININE-BSD FRML MDRD: 39 ML/MIN/1.73M2
GLUCOSE BLD-MCNC: 104 MG/DL (ref 70–108)
HCT VFR BLD CALC: 25 % (ref 37–47)
HEMOGLOBIN: 7.6 GM/DL (ref 12–16)
IMMATURE GRANS (ABS): 0.47 THOU/MM3 (ref 0–0.07)
IMMATURE GRANULOCYTES: 4.5 %
LYMPHOCYTES # BLD: 23.1 %
LYMPHOCYTES ABSOLUTE: 2.4 THOU/MM3 (ref 1–4.8)
MCH RBC QN AUTO: 29.7 PG (ref 26–33)
MCHC RBC AUTO-ENTMCNC: 30.4 GM/DL (ref 32.2–35.5)
MCV RBC AUTO: 97.7 FL (ref 81–99)
MONOCYTES # BLD: 9.3 %
MONOCYTES ABSOLUTE: 1 THOU/MM3 (ref 0.4–1.3)
NUCLEATED RED BLOOD CELLS: 0 /100 WBC
PLATELET # BLD: 306 THOU/MM3 (ref 130–400)
PMV BLD AUTO: 10 FL (ref 9.4–12.4)
POTASSIUM SERPL-SCNC: 5.1 MEQ/L (ref 3.5–5.2)
RBC # BLD: 2.56 MILL/MM3 (ref 4.2–5.4)
SEG NEUTROPHILS: 57.3 %
SEGMENTED NEUTROPHILS ABSOLUTE COUNT: 6 THOU/MM3 (ref 1.8–7.7)
SODIUM BLD-SCNC: 142 MEQ/L (ref 135–145)
WBC # BLD: 10.4 THOU/MM3 (ref 4.8–10.8)

## 2022-01-08 LAB
ANION GAP SERPL CALCULATED.3IONS-SCNC: 11 MEQ/L (ref 8–16)
BUN BLDV-MCNC: 85 MG/DL (ref 7–22)
CALCIUM SERPL-MCNC: 9.3 MG/DL (ref 8.5–10.5)
CHLORIDE BLD-SCNC: 100 MEQ/L (ref 98–111)
CO2: 29 MEQ/L (ref 23–33)
CREAT SERPL-MCNC: 1.3 MG/DL (ref 0.4–1.2)
GFR SERPL CREATININE-BSD FRML MDRD: 39 ML/MIN/1.73M2
GLUCOSE BLD-MCNC: 126 MG/DL (ref 70–108)
POTASSIUM SERPL-SCNC: 5.1 MEQ/L (ref 3.5–5.2)
SODIUM BLD-SCNC: 140 MEQ/L (ref 135–145)

## 2022-01-13 ENCOUNTER — APPOINTMENT (OUTPATIENT)
Dept: GENERAL RADIOLOGY | Age: 87
End: 2022-01-13
Attending: INTERNAL MEDICINE
Payer: COMMERCIAL

## 2022-01-13 LAB
ANION GAP SERPL CALCULATED.3IONS-SCNC: 10 MEQ/L (ref 8–16)
ANION GAP SERPL CALCULATED.3IONS-SCNC: 13 MEQ/L (ref 8–16)
ANISOCYTOSIS: PRESENT
BASOPHILS # BLD: 0.4 %
BASOPHILS ABSOLUTE: 0 THOU/MM3 (ref 0–0.1)
BUN BLDV-MCNC: 50 MG/DL (ref 7–22)
BUN BLDV-MCNC: 51 MG/DL (ref 7–22)
CALCIUM SERPL-MCNC: 9 MG/DL (ref 8.5–10.5)
CALCIUM SERPL-MCNC: 9.2 MG/DL (ref 8.5–10.5)
CHLORIDE BLD-SCNC: 112 MEQ/L (ref 98–111)
CHLORIDE BLD-SCNC: 113 MEQ/L (ref 98–111)
CO2: 19 MEQ/L (ref 23–33)
CO2: 21 MEQ/L (ref 23–33)
CREAT SERPL-MCNC: 1.1 MG/DL (ref 0.4–1.2)
CREAT SERPL-MCNC: 1.1 MG/DL (ref 0.4–1.2)
EOSINOPHIL # BLD: 4.8 %
EOSINOPHILS ABSOLUTE: 0.6 THOU/MM3 (ref 0–0.4)
ERYTHROCYTE [DISTWIDTH] IN BLOOD BY AUTOMATED COUNT: 18.6 % (ref 11.5–14.5)
ERYTHROCYTE [DISTWIDTH] IN BLOOD BY AUTOMATED COUNT: 18.6 % (ref 11.5–14.5)
ERYTHROCYTE [DISTWIDTH] IN BLOOD BY AUTOMATED COUNT: 68.4 FL (ref 35–45)
ERYTHROCYTE [DISTWIDTH] IN BLOOD BY AUTOMATED COUNT: 69 FL (ref 35–45)
GFR SERPL CREATININE-BSD FRML MDRD: 47 ML/MIN/1.73M2
GFR SERPL CREATININE-BSD FRML MDRD: 47 ML/MIN/1.73M2
GLUCOSE BLD-MCNC: 84 MG/DL (ref 70–108)
GLUCOSE BLD-MCNC: 91 MG/DL (ref 70–108)
HCT VFR BLD CALC: 23.4 % (ref 37–47)
HCT VFR BLD CALC: 25.8 % (ref 37–47)
HEMOGLOBIN: 6.7 GM/DL (ref 12–16)
HEMOGLOBIN: 7.6 GM/DL (ref 12–16)
HYPOCHROMIA: PRESENT
IMMATURE GRANS (ABS): 1.76 THOU/MM3 (ref 0–0.07)
IMMATURE GRANULOCYTES: 14.6 %
LYMPHOCYTES # BLD: 13.2 %
LYMPHOCYTES ABSOLUTE: 1.6 THOU/MM3 (ref 1–4.8)
MCH RBC QN AUTO: 29.5 PG (ref 26–33)
MCH RBC QN AUTO: 30.2 PG (ref 26–33)
MCHC RBC AUTO-ENTMCNC: 28.6 GM/DL (ref 32.2–35.5)
MCHC RBC AUTO-ENTMCNC: 29.5 GM/DL (ref 32.2–35.5)
MCV RBC AUTO: 102.4 FL (ref 81–99)
MCV RBC AUTO: 103.1 FL (ref 81–99)
MONOCYTES # BLD: 8.2 %
MONOCYTES ABSOLUTE: 1 THOU/MM3 (ref 0.4–1.3)
NUCLEATED RED BLOOD CELLS: 0 /100 WBC
PLATELET # BLD: 193 THOU/MM3 (ref 130–400)
PLATELET # BLD: 194 THOU/MM3 (ref 130–400)
PMV BLD AUTO: 10 FL (ref 9.4–12.4)
PMV BLD AUTO: 9.9 FL (ref 9.4–12.4)
POTASSIUM SERPL-SCNC: 4.8 MEQ/L (ref 3.5–5.2)
POTASSIUM SERPL-SCNC: 5.7 MEQ/L (ref 3.5–5.2)
POTASSIUM SERPL-SCNC: 6 MEQ/L (ref 3.5–5.2)
RBC # BLD: 2.27 MILL/MM3 (ref 4.2–5.4)
RBC # BLD: 2.52 MILL/MM3 (ref 4.2–5.4)
SEG NEUTROPHILS: 58.8 %
SEGMENTED NEUTROPHILS ABSOLUTE COUNT: 7.1 THOU/MM3 (ref 1.8–7.7)
SODIUM BLD-SCNC: 144 MEQ/L (ref 135–145)
SODIUM BLD-SCNC: 144 MEQ/L (ref 135–145)
WBC # BLD: 12.1 THOU/MM3 (ref 4.8–10.8)
WBC # BLD: 14.7 THOU/MM3 (ref 4.8–10.8)

## 2022-01-13 NOTE — PROGRESS NOTES
[address] [facility] THERAPY MISSED TREATMENT NOTE  CATHIE Graciela       Date: 2022  Patient Name: Jeevan Chavarria        MRN: [id number]    : 1934  (80 y.o.)    REASON FOR MISSED TREATMENT:  ST received new MBS order ; ST with call to [place] RN to schedule MBS; patient MBS initially scheduled  at 1100. Chelsie RN reporting to radiology at a later time this AM; nursing unable to bring patient down at 1100 d/t additional nursing needs. Unable to complete MBS this date d/t scheduling conflicts.  Will plan to re-attempt/complete     ROGER Swift 23

## 2022-01-14 ENCOUNTER — APPOINTMENT (OUTPATIENT)
Dept: GENERAL RADIOLOGY | Age: 87
End: 2022-01-14
Attending: INTERNAL MEDICINE
Payer: COMMERCIAL

## 2022-01-14 LAB
ANION GAP SERPL CALCULATED.3IONS-SCNC: 10 MEQ/L (ref 8–16)
BUN BLDV-MCNC: 47 MG/DL (ref 7–22)
CALCIUM SERPL-MCNC: 8.6 MG/DL (ref 8.5–10.5)
CHLORIDE BLD-SCNC: 114 MEQ/L (ref 98–111)
CO2: 24 MEQ/L (ref 23–33)
CREAT SERPL-MCNC: 1.1 MG/DL (ref 0.4–1.2)
GFR SERPL CREATININE-BSD FRML MDRD: 47 ML/MIN/1.73M2
GLUCOSE BLD-MCNC: 145 MG/DL (ref 70–108)
POTASSIUM SERPL-SCNC: 5.3 MEQ/L (ref 3.5–5.2)
SODIUM BLD-SCNC: 148 MEQ/L (ref 135–145)

## 2022-01-14 PROCEDURE — 74230 X-RAY XM SWLNG FUNCJ C+: CPT

## 2022-01-14 PROCEDURE — 92611 MOTION FLUOROSCOPY/SWALLOW: CPT

## 2022-01-14 PROCEDURE — 2500000003 HC RX 250 WO HCPCS: Performed by: INTERNAL MEDICINE

## 2022-01-14 RX ADMIN — BARIUM SULFATE 20 ML: 400 PASTE ORAL at 09:05

## 2022-01-14 RX ADMIN — BARIUM SULFATE 20 ML: 400 SUSPENSION ORAL at 09:06

## 2022-01-14 RX ADMIN — BARIUM SULFATE 100 ML: 0.81 POWDER, FOR SUSPENSION ORAL at 09:05

## 2022-01-14 NOTE — DISCHARGE SUMMARY
[facility]  Modified Barium Swallow    SLP Individual Minutes  Time In: 0848  Time Out: 3362  Minutes: 17  Timed Code Treatment Minutes: 0 Minutes       Date: 2022  Patient Name: Kirby Yoo      CSN: [id number]   : 1934  (80 y.o.)  Gender: female   Referring Physician:  Tulio Zarate MD  Diagnosis: Respiratory failure unspecified whether d/t hypoxia or hypercapnia   Secondary Diagnosis: Dysphagia,   Precautions: fall risk, aspiration precautions, contact isolation   History of Present Illness/Injury: Per chart review; Kirby Yoo is a 80 y. o. female with a past medical history of chronic hypoxic respiratory failures status post tracheostomy on chronic mechanical ventilation secondary to recent Covid 19 pneumonia with ARDS, cardiomyopathy with ejection fraction 25%, hypertension, atrial fibrillation, acute kidney injury, C. difficile colitis and GI bleed.  Patient was discharged from the intensive care unit to long-term acute care [facility] where she completed her therapy but was unable to wean off the ventilator and was later discharged to a chronic vent facility on .  She was sent back from the chronic vent facility due to altered mental status and agitation.  Pulmonary consulted for ventilator management. Ngoc Oretz is currently on [facility] with a PEEP of 5 and FiO2 30%.  Secretions are moderate.  X-ray showed worsened left lower lobe atelectasis/effusion\". Initial admission to this facility on 10/11/21 with needs for multiple intubations+extubations prior to tracheostomy placement. This date pt was referred from [place] for a OPMBS to further assess swallow function and determine readiness for a diet initiation. Pt currently on trach/mask, trach/PMV, room air     has a past medical history of Anxiety, Arthritis, Bronchitis, Diverticulitis of colon, and Hypertension.   Current Diet: NPO + PEG     Pain: No pain reported. SUBJECTIVE:  Patient see upright in bed for MBS. Pt is alert, pleasantly confused, but able to follow commands to complete MBS. No family or aleks staff present for MBS. *RN update via phone immediately following MBS. OBJECTIVE:    Respiratory Status:  Tracheostomy and PMV and roomair     Behavioral Observation:  Alert and Confused    PATIENT WAS EVALUATED USING:  BARIUM: thin via tsp, cup, straw, consecutive straw, mildly thick via cup, straw, puree, mixed, soft, advanced solids     ORAL PREPARATION PHASE:  Impaired:  Slow Mastication, Uncoordinated Mastication, Decreased Bolus Control, Decreased Bolus Formation and Loss of Bolus from Lips/Anterior Spillage    ORAL PHASE: Residue in the Anterior Sulcus (cracker) , Uncoordinated AP Movement, Slow AP Movement, Decreased Lingual Elevation and Uncontrolled Bolus/Diffuse Fall Over Tongue Base     ORAL PHASE SHILA SCORE: (Dysphagia outcome and severity scale)  4 = Mild-Moderate Dysphagia - May have one or two diet consistencies restricted - Oral residue clears with cue - Intermittent supervision or cueing    PHARYNGEAL PHASE:  Impaired: Decreased Airway Protection, Decreased Epiglottic Inversion, Decreased Hyolaryngeal Elevation, Decreased Tongue Based Retraction and Residue in the Valleculae     PHARYNGEAL PHASE SHILA SCORE: (Dysphagia outcome and severity scale)  5 = Mild Dysphagia - may need one consistency restricted - May have one or more of the following: Aspiration with thin - cough to clear, Airway penetration midway to the vocal cords with one or more consistency or to the vocal folds with one consistency, but clears spontaneously - Residue in the pharynx clears spontaneously    EVIDENCE FOR LARYNGEAL PENETRATION AND/OR ASPIRATION:  No evidence of aspiration  Laryngeal penetration evident with inconsistently with thin liquids     PENETRATION-ASPIRATION SCALE (PAS):   Thin Liquids: 2 = Material enters the airway, remains above vocal folds, and is ejected from the airway  Mildly Thick Liquids:  1 = Material does not enter the airway  Puree:  1 = Material does not enter the airway  Soft Solid:  1 = Material does not enter the airway  Hard Solid: 1 = Material does not enter the airway    ESOPHAGEAL PHASE:   No significant findings   *possible evidence of small bony protrusion, consistent with osteophyte, however, does NOT impact swallow function     ATTEMPTED TECHNIQUES:  Small Bolus Size Effective    Straw Effective    Cup Effective    Chin Tuck Not Attempted    Head Turn Not Attempted    Spoon Presentations Effective    Volitional Cough Not Attempted    Spontaneous Cough Ineffective Chronic congested cough           DIAGNOSTIC IMPRESSIONS:  Patient presented with Mild to Moderate Oral Dysphagia / Mild Pharyngeal Dysphagia as outlined above. Orally, the pt is consistently presenting with prolonged mastication, incoordination of bolus formation, advanced solids diffuse througout the oral cavity during manipulation as well as after the swallow. Pt is able to clear residue with close SUP and direct 1:1 verbal cues to alternate liquids/solids. Additionally, pt demonstrated Mild decrease posterior tongue base control/elevation, and incoordinated AP transit resulting in uncontrolled premature spillage to the level of the valleculae prior to swallow onset. Re: the pharyngeal phase of the swallow, the pt presented with mild decreased BOT retraction, diminished laryngeal elevation and anterior excursion, contributing to slow epiglottic inversion // resulting in mild valleculae residue which inconsistently clears with subsequent swallow and spontaneous swallows (no cues required). Pt presented with trace laryngeal penetration during the swallow which clears with the swallow. No tracheal aspiration observed during the swallow.  Given persistent congested coughing throughout MBS - no indicative of airway invasion - ST recommended diet initiation of minced/moist solids, thin liquids, meds crushed in puree, and close pulmonary monitor of lung sounds before and after meals to assist with monitoring tolerance of PO. Diet Recommendations:  Minced/moist solids, thin liquids, pulmonary monitoring, crushed in puree  Strategies:  Full Upright Position, Small Bite/Sip, Medications Crushed with Puree, Direct 1:1 Supervision, Alternate Solids and Liquids, Limit Distractions, Monitor for Fatigue and PMV in Place for all PO   Rehabilitation Potential: good    EDUCATION:  Learner: Patient  Education:  Reviewed results and recommendations of this evaluation, Reviewed diet and strategies, Reviewed ST goals and Plan of Care and Reviewed recommendations for follow-up  Evaluation of Education: Demonstrates with assistance, Needs further instruction and Family not present    PLAN:  TBD by primary ST     PATIENT GOAL:    Return to least restrictive diet. Giuliana Mccarthy MA., [address] [facility]#.[id number]

## 2022-01-16 LAB
T4 FREE: 0.76 NG/DL (ref 0.93–1.76)
TSH SERPL DL<=0.05 MIU/L-ACNC: 34 UIU/ML (ref 0.4–4.2)

## 2022-01-17 PROBLEM — N39.0 UTI (URINARY TRACT INFECTION): Status: RESOLVED | Noted: 2021-12-18 | Resolved: 2022-01-17

## 2022-01-17 LAB
ALLEN TEST: POSITIVE
BASE EXCESS (CALCULATED): -2.4 MMOL/L (ref -2.5–2.5)
COLLECTED BY:: ABNORMAL
DEVICE: ABNORMAL
HCO3: 24 MMOL/L (ref 23–28)
IFIO2: 28
O2 SATURATION: 93 %
PCO2: 45 MMHG (ref 35–45)
PH BLOOD GAS: 7.33 (ref 7.35–7.45)
PO2: 72 MMHG (ref 71–104)
PREALBUMIN: 11.8 MG/DL (ref 20–40)
SOURCE, BLOOD GAS: ABNORMAL

## 2022-01-18 LAB
ABO: NORMAL
ANION GAP SERPL CALCULATED.3IONS-SCNC: 12 MEQ/L (ref 8–16)
ANISOCYTOSIS: PRESENT
ANTIBODY SCREEN: NORMAL
BASOPHILIA: ABNORMAL
BASOPHILS # BLD: 0.8 %
BASOPHILS ABSOLUTE: 0.1 THOU/MM3 (ref 0–0.1)
BUN BLDV-MCNC: 33 MG/DL (ref 7–22)
CALCIUM SERPL-MCNC: 8.9 MG/DL (ref 8.5–10.5)
CHLORIDE BLD-SCNC: 107 MEQ/L (ref 98–111)
CO2: 19 MEQ/L (ref 23–33)
CREAT SERPL-MCNC: 1 MG/DL (ref 0.4–1.2)
DIFFERENTIAL TYPE: ABNORMAL
EOSINOPHIL # BLD: 1.1 %
EOSINOPHILS ABSOLUTE: 0.1 THOU/MM3 (ref 0–0.4)
ERYTHROCYTE [DISTWIDTH] IN BLOOD BY AUTOMATED COUNT: 19.8 % (ref 11.5–14.5)
ERYTHROCYTE [DISTWIDTH] IN BLOOD BY AUTOMATED COUNT: 69.9 FL (ref 35–45)
GFR SERPL CREATININE-BSD FRML MDRD: 52 ML/MIN/1.73M2
GLUCOSE BLD-MCNC: 104 MG/DL (ref 70–108)
HCT VFR BLD CALC: 23.4 % (ref 37–47)
HCT VFR BLD CALC: 28.3 % (ref 37–47)
HEMOGLOBIN: 6.8 GM/DL (ref 12–16)
HEMOGLOBIN: 8.6 GM/DL (ref 12–16)
IMMATURE GRANS (ABS): 1.37 THOU/MM3 (ref 0–0.07)
IMMATURE GRANULOCYTES: 10.1 %
LYMPHOCYTES # BLD: 12.2 %
LYMPHOCYTES ABSOLUTE: 1.6 THOU/MM3 (ref 1–4.8)
MCH RBC QN AUTO: 30.1 PG (ref 26–33)
MCHC RBC AUTO-ENTMCNC: 29.1 GM/DL (ref 32.2–35.5)
MCV RBC AUTO: 103.5 FL (ref 81–99)
MONOCYTES # BLD: 7.2 %
MONOCYTES ABSOLUTE: 1 THOU/MM3 (ref 0.4–1.3)
NUCLEATED RED BLOOD CELLS: 1 /100 WBC
PATHOLOGIST REVIEW: ABNORMAL
PLATELET # BLD: 214 THOU/MM3 (ref 130–400)
PLATELET ESTIMATE: ADEQUATE
PMV BLD AUTO: 10.3 FL (ref 9.4–12.4)
POTASSIUM SERPL-SCNC: 5.6 MEQ/L (ref 3.5–5.2)
RBC # BLD: 2.26 MILL/MM3 (ref 4.2–5.4)
RH FACTOR: NORMAL
SCAN OF BLOOD SMEAR: NORMAL
SEG NEUTROPHILS: 68.6 %
SEGMENTED NEUTROPHILS ABSOLUTE COUNT: 9.3 THOU/MM3 (ref 1.8–7.7)
SODIUM BLD-SCNC: 138 MEQ/L (ref 135–145)
WBC # BLD: 13.5 THOU/MM3 (ref 4.8–10.8)

## 2022-01-19 LAB
ALLEN TEST: POSITIVE
BASE EXCESS (CALCULATED): -1.1 MMOL/L (ref -2.5–2.5)
COLLECTED BY:: ABNORMAL
DEVICE: ABNORMAL
HCO3: 25 MMOL/L (ref 23–28)
IFIO2: 28
O2 SATURATION: 92 %
PCO2: 49 MMHG (ref 35–45)
PH BLOOD GAS: 7.32 (ref 7.35–7.45)
PO2: 71 MMHG (ref 71–104)
SOURCE, BLOOD GAS: ABNORMAL

## 2022-01-20 LAB
ANION GAP SERPL CALCULATED.3IONS-SCNC: 7 MEQ/L (ref 8–16)
BUN BLDV-MCNC: 30 MG/DL (ref 7–22)
CALCIUM SERPL-MCNC: 9 MG/DL (ref 8.5–10.5)
CHLORIDE BLD-SCNC: 105 MEQ/L (ref 98–111)
CO2: 26 MEQ/L (ref 23–33)
CREAT SERPL-MCNC: 1 MG/DL (ref 0.4–1.2)
GFR SERPL CREATININE-BSD FRML MDRD: 52 ML/MIN/1.73M2
GLUCOSE BLD-MCNC: 116 MG/DL (ref 70–108)
POTASSIUM SERPL-SCNC: 5.8 MEQ/L (ref 3.5–5.2)
SODIUM BLD-SCNC: 138 MEQ/L (ref 135–145)

## 2022-01-21 LAB
ALLEN TEST: POSITIVE
ANION GAP SERPL CALCULATED.3IONS-SCNC: 8 MEQ/L (ref 8–16)
BASE EXCESS (CALCULATED): 1.9 MMOL/L (ref -2.5–2.5)
BUN BLDV-MCNC: 26 MG/DL (ref 7–22)
CALCIUM SERPL-MCNC: 9.1 MG/DL (ref 8.5–10.5)
CHLORIDE BLD-SCNC: 105 MEQ/L (ref 98–111)
CO2: 24 MEQ/L (ref 23–33)
COLLECTED BY:: ABNORMAL
CREAT SERPL-MCNC: 0.9 MG/DL (ref 0.4–1.2)
DEVICE: ABNORMAL
GFR SERPL CREATININE-BSD FRML MDRD: 59 ML/MIN/1.73M2
GLUCOSE BLD-MCNC: 97 MG/DL (ref 70–108)
HCO3: 29 MMOL/L (ref 23–28)
IFIO2: 28
O2 SATURATION: 89 %
PCO2: 57 MMHG (ref 35–45)
PH BLOOD GAS: 7.31 (ref 7.35–7.45)
PO2: 64 MMHG (ref 71–104)
POTASSIUM SERPL-SCNC: 5.2 MEQ/L (ref 3.5–5.2)
SODIUM BLD-SCNC: 137 MEQ/L (ref 135–145)
SOURCE, BLOOD GAS: ABNORMAL

## 2022-01-22 LAB
ANION GAP SERPL CALCULATED.3IONS-SCNC: 7 MEQ/L (ref 8–16)
ANISOCYTOSIS: PRESENT
BASOPHILS # BLD: 1.1 %
BASOPHILS ABSOLUTE: 0.1 THOU/MM3 (ref 0–0.1)
BUN BLDV-MCNC: 29 MG/DL (ref 7–22)
CALCIUM SERPL-MCNC: 9 MG/DL (ref 8.5–10.5)
CHLORIDE BLD-SCNC: 103 MEQ/L (ref 98–111)
CO2: 27 MEQ/L (ref 23–33)
CREAT SERPL-MCNC: 0.8 MG/DL (ref 0.4–1.2)
EOSINOPHIL # BLD: 2.5 %
EOSINOPHILS ABSOLUTE: 0.3 THOU/MM3 (ref 0–0.4)
ERYTHROCYTE [DISTWIDTH] IN BLOOD BY AUTOMATED COUNT: 21.4 % (ref 11.5–14.5)
ERYTHROCYTE [DISTWIDTH] IN BLOOD BY AUTOMATED COUNT: 67.3 FL (ref 35–45)
GFR SERPL CREATININE-BSD FRML MDRD: 68 ML/MIN/1.73M2
GLUCOSE BLD-MCNC: 105 MG/DL (ref 70–108)
HCT VFR BLD CALC: 27.1 % (ref 37–47)
HEMOGLOBIN: 8.2 GM/DL (ref 12–16)
IMMATURE GRANS (ABS): 0.7 THOU/MM3 (ref 0–0.07)
IMMATURE GRANULOCYTES: 6.8 %
LYMPHOCYTES # BLD: 16.6 %
LYMPHOCYTES ABSOLUTE: 1.7 THOU/MM3 (ref 1–4.8)
MCH RBC QN AUTO: 29.6 PG (ref 26–33)
MCHC RBC AUTO-ENTMCNC: 30.3 GM/DL (ref 32.2–35.5)
MCV RBC AUTO: 97.8 FL (ref 81–99)
MONOCYTES # BLD: 8.1 %
MONOCYTES ABSOLUTE: 0.8 THOU/MM3 (ref 0.4–1.3)
NUCLEATED RED BLOOD CELLS: 0 /100 WBC
PLATELET # BLD: 249 THOU/MM3 (ref 130–400)
PMV BLD AUTO: 10 FL (ref 9.4–12.4)
POTASSIUM SERPL-SCNC: 5 MEQ/L (ref 3.5–5.2)
RBC # BLD: 2.77 MILL/MM3 (ref 4.2–5.4)
SEG NEUTROPHILS: 64.9 %
SEGMENTED NEUTROPHILS ABSOLUTE COUNT: 6.7 THOU/MM3 (ref 1.8–7.7)
SODIUM BLD-SCNC: 137 MEQ/L (ref 135–145)
TSH SERPL DL<=0.05 MIU/L-ACNC: 26.81 UIU/ML (ref 0.4–4.2)
WBC # BLD: 10.4 THOU/MM3 (ref 4.8–10.8)

## 2022-01-23 LAB
ALBUMIN SERPL-MCNC: 2.9 G/DL (ref 3.5–5.1)
ALP BLD-CCNC: 62 U/L (ref 38–126)
ALT SERPL-CCNC: < 5 U/L (ref 11–66)
ANION GAP SERPL CALCULATED.3IONS-SCNC: 14 MEQ/L (ref 8–16)
AST SERPL-CCNC: 17 U/L (ref 5–40)
BILIRUB SERPL-MCNC: 0.4 MG/DL (ref 0.3–1.2)
BUN BLDV-MCNC: 37 MG/DL (ref 7–22)
CALCIUM SERPL-MCNC: 9.4 MG/DL (ref 8.5–10.5)
CHLORIDE BLD-SCNC: 104 MEQ/L (ref 98–111)
CO2: 24 MEQ/L (ref 23–33)
CREAT SERPL-MCNC: 0.9 MG/DL (ref 0.4–1.2)
GFR SERPL CREATININE-BSD FRML MDRD: 59 ML/MIN/1.73M2
GLUCOSE BLD-MCNC: 105 MG/DL (ref 70–108)
POTASSIUM SERPL-SCNC: 5 MEQ/L (ref 3.5–5.2)
SODIUM BLD-SCNC: 142 MEQ/L (ref 135–145)
TOTAL PROTEIN: 6.7 G/DL (ref 6.1–8)

## 2022-01-24 LAB
ALBUMIN SERPL-MCNC: 2.9 G/DL (ref 3.5–5.1)
ALP BLD-CCNC: 62 U/L (ref 38–126)
ALT SERPL-CCNC: < 5 U/L (ref 11–66)
ANION GAP SERPL CALCULATED.3IONS-SCNC: 12 MEQ/L (ref 8–16)
AST SERPL-CCNC: 18 U/L (ref 5–40)
BILIRUB SERPL-MCNC: 0.4 MG/DL (ref 0.3–1.2)
BUN BLDV-MCNC: 39 MG/DL (ref 7–22)
CALCIUM SERPL-MCNC: 8.8 MG/DL (ref 8.5–10.5)
CHLORIDE BLD-SCNC: 103 MEQ/L (ref 98–111)
CO2: 25 MEQ/L (ref 23–33)
CREAT SERPL-MCNC: 1 MG/DL (ref 0.4–1.2)
GFR SERPL CREATININE-BSD FRML MDRD: 52 ML/MIN/1.73M2
GLUCOSE BLD-MCNC: 96 MG/DL (ref 70–108)
POTASSIUM SERPL-SCNC: 4.7 MEQ/L (ref 3.5–5.2)
PREALBUMIN: 12.2 MG/DL (ref 20–40)
SODIUM BLD-SCNC: 140 MEQ/L (ref 135–145)
TOTAL PROTEIN: 6.4 G/DL (ref 6.1–8)

## 2022-01-26 LAB
ANION GAP SERPL CALCULATED.3IONS-SCNC: 12 MEQ/L (ref 8–16)
BUN BLDV-MCNC: 40 MG/DL (ref 7–22)
CALCIUM SERPL-MCNC: 8.6 MG/DL (ref 8.5–10.5)
CHLORIDE BLD-SCNC: 105 MEQ/L (ref 98–111)
CO2: 21 MEQ/L (ref 23–33)
CREAT SERPL-MCNC: 1 MG/DL (ref 0.4–1.2)
GFR SERPL CREATININE-BSD FRML MDRD: 52 ML/MIN/1.73M2
GLUCOSE BLD-MCNC: 89 MG/DL (ref 70–108)
POTASSIUM SERPL-SCNC: 4.8 MEQ/L (ref 3.5–5.2)
SODIUM BLD-SCNC: 138 MEQ/L (ref 135–145)

## 2022-01-27 LAB
ANISOCYTOSIS: PRESENT
BASOPHILS # BLD: 0.5 %
BASOPHILS ABSOLUTE: 0 THOU/MM3 (ref 0–0.1)
EOSINOPHIL # BLD: 7 %
EOSINOPHILS ABSOLUTE: 0.7 THOU/MM3 (ref 0–0.4)
ERYTHROCYTE [DISTWIDTH] IN BLOOD BY AUTOMATED COUNT: 21.4 % (ref 11.5–14.5)
ERYTHROCYTE [DISTWIDTH] IN BLOOD BY AUTOMATED COUNT: 75.9 FL (ref 35–45)
HCT VFR BLD CALC: 28.8 % (ref 37–47)
HEMOGLOBIN: 8.4 GM/DL (ref 12–16)
IMMATURE GRANS (ABS): 0.13 THOU/MM3 (ref 0–0.07)
IMMATURE GRANULOCYTES: 1.3 %
LYMPHOCYTES # BLD: 11 %
LYMPHOCYTES ABSOLUTE: 1.1 THOU/MM3 (ref 1–4.8)
MCH RBC QN AUTO: 29.5 PG (ref 26–33)
MCHC RBC AUTO-ENTMCNC: 29.2 GM/DL (ref 32.2–35.5)
MCV RBC AUTO: 101.1 FL (ref 81–99)
MONOCYTES # BLD: 8.5 %
MONOCYTES ABSOLUTE: 0.8 THOU/MM3 (ref 0.4–1.3)
NUCLEATED RED BLOOD CELLS: 0 /100 WBC
PLATELET # BLD: 247 THOU/MM3 (ref 130–400)
PMV BLD AUTO: 10.5 FL (ref 9.4–12.4)
RBC # BLD: 2.85 MILL/MM3 (ref 4.2–5.4)
SEG NEUTROPHILS: 71.7 %
SEGMENTED NEUTROPHILS ABSOLUTE COUNT: 7 THOU/MM3 (ref 1.8–7.7)
WBC # BLD: 9.7 THOU/MM3 (ref 4.8–10.8)

## 2022-02-28 NOTE — FLOWSHEET NOTE
Pt int/ NR. Spoke to daughter Mauar Pineda by phone. She shared that patient is past COVID patient and was discharged to ECF< but was back after one day. She has been fighting infections. Mar Amaya understands that a difficult decision may be pending. They are still praying for healing, but she is clear that if that doesn;t look likely they don't want Kourtney to suffer. Additionally- Mar Amaya was caregiver fr her sister in law Mary Pederson who  of cancer this past . We offered prayer for healing, consolation, and peace. Let her know of support available from chaplains. Passing along to chaplains for continued support. Called  to attend c/s delivery due to category II tracing and IUGR . Baby is  product of a 39.5 week gestation born to a G 3  (SAB x2) 24 year old female   Maternal labs include Blood Type O+ , HIV neg , RPR NR , Hep B[ - ], GBS + on 2/17/22, Covid neg. Maternal history is significant for sickle cell trait . Pregnancy was complicated by IUGR .   ROM at  0852 clear, approximately  1 hr PTD  Resuscitation included: WDSS.  Apgars were: 9&9. EOS score 0.04. Admit to NBN.  Temperature prior to transfer 36.6C.

## 2022-03-02 PROBLEM — I49.3 PVC (PREMATURE VENTRICULAR CONTRACTION): Status: ACTIVE | Noted: 2022-03-02

## 2022-03-02 PROBLEM — I50.22 CHRONIC SYSTOLIC CONGESTIVE HEART FAILURE (HCC): Status: ACTIVE | Noted: 2022-03-02

## 2022-03-02 NOTE — PROGRESS NOTES
[place] [facility] PROFESSIONAL SERVICES  [facility] [address]    [address] [address]   Dept: [phone]   Dept Fax: [phone]   Loc: [phone]      Chief Complaint   Patient presents with    3 Month Follow-Up     Cardiologist:  Dr. Mary Coe  81 yo female presents for 3 month f/u. Hx of NICDMP, COVID 19 PNA/ARDS, EF 25%. Previously had holter with frequent PACs/PVCs. Patient has been slowly getting better. Still with trach and peg tube. Still healing from pressure ulcer. Patient and family wanted to discuss tests DR Mary Coe ordered as well as what may still need done. SOB,   General:   No fever, no chills, no weight loss, + fatigue  Pulmonary:    + dyspnea, no wheezing  Cardiac:    Denies recent chest pain   GI:     No nausea or vomiting, no abdominal pain  Neuro:     No dizziness or light headedness  Musculoskeletal: + pain of legs, tailbone  Extremities:   No edema      Past Medical History:   Diagnosis Date    Anxiety     Arthritis     Bronchitis     Diverticulitis of colon     Hypertension        Allergies   Allergen Reactions    Sulfa Antibiotics     Gluten Meal Diarrhea    Metronidazole Rash and Hives    Milk-Related Compounds Diarrhea       Current Outpatient Medications   Medication Sig Dispense Refill    chlorhexidine (PERIDEX) 0.12 % solution Take 15 mLs by mouth 2 times daily      HYDROcodone-acetaminophen (NORCO) 5-325 MG per tablet Take 1 tablet by mouth every 6 hours as needed for Pain.       loperamide (IMODIUM A-D) 2 MG tablet Take 2 mg by mouth 4 times daily as needed for Diarrhea      ipratropium-albuterol (DUONEB) 0.5-2.5 (3) MG/3ML SOLN nebulizer solution Inhale 1 vial into the lungs every 4 hours      Levothyroxine Sodium 62.5 MCG/ML SOLN Take 1 mL by mouth daily      Multiple Vitamin (MULTI VITAMIN DAILY PO) Take by mouth      Amino Acids-Protein Hydrolys (PRO-STAT) LIQD Take by mouth once      Probiotic Acidophilus (FLORANEX) TABS Take 1 tablet by mouth daily      aspirin 325 MG tablet Take 1 tablet by mouth daily 30 tablet 3    apixaban (ELIQUIS) 2.5 MG TABS tablet Take 1 tablet by mouth 2 times daily 60 tablet     cetirizine (ZYRTEC) 10 MG tablet Take 1 tablet by mouth daily      metoprolol tartrate (LOPRESSOR) 25 MG tablet Take 1 tablet by mouth 2 times daily 60 tablet 3    acetaminophen (TYLENOL) 325 MG tablet 650 mg by PEG Tube route every 6 hours as needed for Pain      sodium hypochlorite (DAKINS) 0.125 % SOLN external solution Apply topically See Admin Instructions Apply to sacral wound topically every shift       No current facility-administered medications for this visit. Social History     Socioeconomic History    Marital status:      Spouse name: None    Number of children: None    Years of education: None    Highest education level: None   Occupational History    None   Tobacco Use    Smoking status: Former Smoker     Packs/day: 1.00     Years: 15.00     Pack years: 15.00     Types: Cigarettes     Quit date: 12/10/1977     Years since quittin.2    Smokeless tobacco: Never Used   Substance and Sexual Activity    Alcohol use: No    Drug use: No    Sexual activity: None   Other Topics Concern    None   Social History Narrative    None     Social Determinants of Health     Financial Resource Strain:     Difficulty of Paying Living Expenses: Not on file   Food Insecurity:     Worried About Running Out of Food in the Last Year: Not on file    Caitlin of Food in the Last Year: Not on file   Transportation Needs:     Lack of Transportation (Medical): Not on file    Lack of Transportation (Non-Medical):  Not on file   Physical Activity:     Days of Exercise per Week: Not on file    Minutes of Exercise per Session: Not on file   Stress:     Feeling of Stress : Not on file   Social Connections:     Frequency of Communication with Friends and Family: Not on file    Frequency of Social Gatherings with Friends and Family: Not on file    Attends Yarsani Services: Not on file    Active Member of Clubs or Organizations: Not on file    Attends Club or Organization Meetings: Not on file    Marital Status: Not on file   Intimate Partner Violence:     Fear of Current or Ex-Partner: Not on file    Emotionally Abused: Not on file    Physically Abused: Not on file    Sexually Abused: Not on file   Housing Stability:     Unable to Pay for Housing in the Last Year: Not on file    Number of [place] in the Last Year: Not on file    Unstable Housing in the Last Year: Not on file       History reviewed. No pertinent family history. Blood pressure 108/60, pulse 110, height 5' (1.524 m), weight 103 lb (46.7 kg). General:   Well developed, well nourished  Lungs:   Clear to auscultation  Heart:    Normal S1 S2, No murmur, rubs, or gallops, slightly fast HR,   Abdomen:   Soft, non tender, no organomegalies, positive bowel sounds  Extremities:   No edema, no cyanosis, good peripheral pulses  Neurological:   Awake, alert, oriented. No obvious focal deficits  Musculoskeletal:  No obvious deformities    EKG:     TTE  Conclusions      Summary   Ejection fraction is visually estimated at 25%. There was severe global hypokinesis of the left ventricle. Left Ventricular size is Moderately increased . The aortic valve leaflets were not well visualized. Aortic valve appears tricuspid. Thickened aortic valve leaflets noted. Aortic valve leaflets are Moderately calcified. Signature      ----------------------------------------------------------------   Electronically signed by Yelena Laguna MD (Interpreting   physician) on 11/22/2021      Diagnosis Orders   1. Cardiomyopathy, unspecified type ([facility] [address] [facility]     2. Chronic systolic congestive heart failure ([facility] [address]     3. PVC (premature ventricular contraction)         No orders of the defined types were placed in this encounter.       Assessment/Plan:   Chronic systolic CHF/CDMP--likely 2/2 COVID-- patient is on BB. BP today is fairly low, 108/60. Unsure patient would be able to tolerate any added or increased doses of CDMP meds including ace/arb/arni or jardiance, etc. Will continue to monitor for improvement of patient for further goals of care. Did d/w patient and family about possibility of stress test in the future. If abnormal, would be recommended to have LHC, which patient right now may not tolerate as well. Patient and family agreeable to see how she is doing over the next few months and decide at next visit about possible further testing. PVC- continue metoprolol     Disposition:   F/u with Dr Fabian Barger as scheduled.    Continue Dr Abdoul Castellano current treatment plan  Follow up with Dr Mara Jo as scheduled or sooner if needed

## 2022-03-03 ENCOUNTER — OFFICE VISIT (OUTPATIENT)
Dept: CARDIOLOGY CLINIC | Age: 87
End: 2022-03-03
Payer: MEDICARE

## 2022-03-03 VITALS
DIASTOLIC BLOOD PRESSURE: 60 MMHG | WEIGHT: 103 LBS | SYSTOLIC BLOOD PRESSURE: 108 MMHG | BODY MASS INDEX: 20.22 KG/M2 | HEART RATE: 110 BPM | HEIGHT: 60 IN

## 2022-03-03 DIAGNOSIS — I50.22 CHRONIC SYSTOLIC CONGESTIVE HEART FAILURE (HCC): ICD-10-CM

## 2022-03-03 DIAGNOSIS — I49.3 PVC (PREMATURE VENTRICULAR CONTRACTION): ICD-10-CM

## 2022-03-03 DIAGNOSIS — I42.9 CARDIOMYOPATHY, UNSPECIFIED TYPE (HCC): Primary | ICD-10-CM

## 2022-03-03 PROCEDURE — 4040F PNEUMOC VAC/ADMIN/RCVD: CPT | Performed by: STUDENT IN AN ORGANIZED HEALTH CARE EDUCATION/TRAINING PROGRAM

## 2022-03-03 PROCEDURE — G8420 CALC BMI NORM PARAMETERS: HCPCS | Performed by: STUDENT IN AN ORGANIZED HEALTH CARE EDUCATION/TRAINING PROGRAM

## 2022-03-03 PROCEDURE — G8427 DOCREV CUR MEDS BY ELIG CLIN: HCPCS | Performed by: STUDENT IN AN ORGANIZED HEALTH CARE EDUCATION/TRAINING PROGRAM

## 2022-03-03 PROCEDURE — 1090F PRES/ABSN URINE INCON ASSESS: CPT | Performed by: STUDENT IN AN ORGANIZED HEALTH CARE EDUCATION/TRAINING PROGRAM

## 2022-03-03 PROCEDURE — G8484 FLU IMMUNIZE NO ADMIN: HCPCS | Performed by: STUDENT IN AN ORGANIZED HEALTH CARE EDUCATION/TRAINING PROGRAM

## 2022-03-03 PROCEDURE — 1036F TOBACCO NON-USER: CPT | Performed by: STUDENT IN AN ORGANIZED HEALTH CARE EDUCATION/TRAINING PROGRAM

## 2022-03-03 PROCEDURE — 1123F ACP DISCUSS/DSCN MKR DOCD: CPT | Performed by: STUDENT IN AN ORGANIZED HEALTH CARE EDUCATION/TRAINING PROGRAM

## 2022-03-03 PROCEDURE — 99214 OFFICE O/P EST MOD 30 MIN: CPT | Performed by: STUDENT IN AN ORGANIZED HEALTH CARE EDUCATION/TRAINING PROGRAM

## 2022-03-03 RX ORDER — LOPERAMIDE HYDROCHLORIDE 2 MG/1
2 TABLET ORAL 4 TIMES DAILY PRN
Status: ON HOLD | COMMUNITY
End: 2022-03-29 | Stop reason: HOSPADM

## 2022-03-03 RX ORDER — IPRATROPIUM BROMIDE AND ALBUTEROL SULFATE 2.5; .5 MG/3ML; MG/3ML
1 SOLUTION RESPIRATORY (INHALATION) EVERY 4 HOURS PRN
COMMUNITY
End: 2022-06-20

## 2022-03-03 RX ORDER — HYDROCODONE BITARTRATE AND ACETAMINOPHEN 5; 325 MG/1; MG/1
0.5 TABLET ORAL EVERY 6 HOURS PRN
COMMUNITY
End: 2022-06-20

## 2022-03-03 RX ORDER — AMINO ACIDS/PROTEIN HYDROLYS 15G-100/30
LIQUID (ML) ORAL 3 TIMES DAILY
COMMUNITY
End: 2022-06-20

## 2022-03-03 RX ORDER — GREEN TEA/HOODIA GORDONII 315-12.5MG
1 CAPSULE ORAL DAILY
COMMUNITY

## 2022-03-03 RX ORDER — CHLORHEXIDINE GLUCONATE 0.12 MG/ML
15 RINSE ORAL 2 TIMES DAILY
COMMUNITY
End: 2022-03-27

## 2022-03-27 ENCOUNTER — APPOINTMENT (OUTPATIENT)
Dept: CT IMAGING | Age: 87
End: 2022-03-27
Payer: MEDICARE

## 2022-03-27 ENCOUNTER — HOSPITAL ENCOUNTER (OUTPATIENT)
Age: 87
Setting detail: OBSERVATION
Discharge: NURSING, REHAB OR LONG-TERM CARE | End: 2022-03-29
Attending: EMERGENCY MEDICINE | Admitting: FAMILY MEDICINE
Payer: MEDICARE

## 2022-03-27 DIAGNOSIS — K59.00 CONSTIPATION, UNSPECIFIED CONSTIPATION TYPE: Primary | ICD-10-CM

## 2022-03-27 DIAGNOSIS — K56.41 IMPACTED STOOL IN RECTUM (HCC): ICD-10-CM

## 2022-03-27 LAB
ALBUMIN SERPL-MCNC: 3.4 G/DL (ref 3.5–5.1)
ALP BLD-CCNC: 53 U/L (ref 38–126)
ALT SERPL-CCNC: 7 U/L (ref 11–66)
ANION GAP SERPL CALCULATED.3IONS-SCNC: 7 MEQ/L (ref 8–16)
ANISOCYTOSIS: PRESENT
AST SERPL-CCNC: 22 U/L (ref 5–40)
BACTERIA: ABNORMAL /HPF
BASOPHILS # BLD: 0.6 %
BASOPHILS ABSOLUTE: 0.1 THOU/MM3 (ref 0–0.1)
BILIRUB SERPL-MCNC: 0.2 MG/DL (ref 0.3–1.2)
BILIRUBIN URINE: NEGATIVE
BLOOD, URINE: ABNORMAL
BUN BLDV-MCNC: 59 MG/DL (ref 7–22)
CALCIUM SERPL-MCNC: 9.7 MG/DL (ref 8.5–10.5)
CASTS 2: ABNORMAL /LPF
CASTS UA: ABNORMAL /LPF
CHARACTER, URINE: CLEAR
CHLORIDE BLD-SCNC: 105 MEQ/L (ref 98–111)
CO2: 23 MEQ/L (ref 23–33)
COLOR: YELLOW
CREAT SERPL-MCNC: 0.9 MG/DL (ref 0.4–1.2)
CRYSTALS, UA: ABNORMAL
EOSINOPHIL # BLD: 2.7 %
EOSINOPHILS ABSOLUTE: 0.2 THOU/MM3 (ref 0–0.4)
EPITHELIAL CELLS, UA: ABNORMAL /HPF
ERYTHROCYTE [DISTWIDTH] IN BLOOD BY AUTOMATED COUNT: 19.1 % (ref 11.5–14.5)
ERYTHROCYTE [DISTWIDTH] IN BLOOD BY AUTOMATED COUNT: 71.3 FL (ref 35–45)
GFR SERPL CREATININE-BSD FRML MDRD: 59 ML/MIN/1.73M2
GLUCOSE BLD-MCNC: 85 MG/DL (ref 70–108)
GLUCOSE URINE: NEGATIVE MG/DL
HCT VFR BLD CALC: 34.6 % (ref 37–47)
HEMOGLOBIN: 10.9 GM/DL (ref 12–16)
IMMATURE GRANS (ABS): 0.04 THOU/MM3 (ref 0–0.07)
IMMATURE GRANULOCYTES: 0.5 %
KETONES, URINE: NEGATIVE
LEUKOCYTE ESTERASE, URINE: ABNORMAL
LIPASE: 158 U/L (ref 5.6–51.3)
LYMPHOCYTES # BLD: 21.2 %
LYMPHOCYTES ABSOLUTE: 1.8 THOU/MM3 (ref 1–4.8)
MCH RBC QN AUTO: 32 PG (ref 26–33)
MCHC RBC AUTO-ENTMCNC: 31.5 GM/DL (ref 32.2–35.5)
MCV RBC AUTO: 101.5 FL (ref 81–99)
MISCELLANEOUS 2: ABNORMAL
MONOCYTES # BLD: 8.5 %
MONOCYTES ABSOLUTE: 0.7 THOU/MM3 (ref 0.4–1.3)
NITRITE, URINE: NEGATIVE
NUCLEATED RED BLOOD CELLS: 0 /100 WBC
OSMOLALITY CALCULATION: 285.9 MOSMOL/KG (ref 275–300)
PH UA: 5 (ref 5–9)
PLATELET # BLD: 217 THOU/MM3 (ref 130–400)
PLATELET ESTIMATE: ADEQUATE
PMV BLD AUTO: 11.4 FL (ref 9.4–12.4)
POTASSIUM SERPL-SCNC: 5.2 MEQ/L (ref 3.5–5.2)
PROTEIN UA: NEGATIVE
RBC # BLD: 3.41 MILL/MM3 (ref 4.2–5.4)
RBC URINE: ABNORMAL /HPF
REASON FOR REJECTION: NORMAL
REJECTED TEST: NORMAL
RENAL EPITHELIAL, UA: ABNORMAL
SCAN OF BLOOD SMEAR: NORMAL
SEG NEUTROPHILS: 66.5 %
SEGMENTED NEUTROPHILS ABSOLUTE COUNT: 5.7 THOU/MM3 (ref 1.8–7.7)
SODIUM BLD-SCNC: 135 MEQ/L (ref 135–145)
SPECIFIC GRAVITY, URINE: 1.01 (ref 1–1.03)
TOTAL PROTEIN: 6.9 G/DL (ref 6.1–8)
TROPONIN T: 0.02 NG/ML
UROBILINOGEN, URINE: 0.2 EU/DL (ref 0–1)
WBC # BLD: 8.5 THOU/MM3 (ref 4.8–10.8)
WBC UA: ABNORMAL /HPF
YEAST: ABNORMAL

## 2022-03-27 PROCEDURE — 74176 CT ABD & PELVIS W/O CONTRAST: CPT

## 2022-03-27 PROCEDURE — 2580000003 HC RX 258: Performed by: EMERGENCY MEDICINE

## 2022-03-27 PROCEDURE — 87086 URINE CULTURE/COLONY COUNT: CPT

## 2022-03-27 PROCEDURE — G0378 HOSPITAL OBSERVATION PER HR: HCPCS

## 2022-03-27 PROCEDURE — 36415 COLL VENOUS BLD VENIPUNCTURE: CPT

## 2022-03-27 PROCEDURE — 99220 PR INITIAL OBSERVATION CARE/DAY 70 MINUTES: CPT

## 2022-03-27 PROCEDURE — 96360 HYDRATION IV INFUSION INIT: CPT

## 2022-03-27 PROCEDURE — 83690 ASSAY OF LIPASE: CPT

## 2022-03-27 PROCEDURE — 85025 COMPLETE CBC W/AUTO DIFF WBC: CPT

## 2022-03-27 PROCEDURE — 84484 ASSAY OF TROPONIN QUANT: CPT

## 2022-03-27 PROCEDURE — 93005 ELECTROCARDIOGRAM TRACING: CPT | Performed by: EMERGENCY MEDICINE

## 2022-03-27 PROCEDURE — 84439 ASSAY OF FREE THYROXINE: CPT

## 2022-03-27 PROCEDURE — 81001 URINALYSIS AUTO W/SCOPE: CPT

## 2022-03-27 PROCEDURE — 84443 ASSAY THYROID STIM HORMONE: CPT

## 2022-03-27 PROCEDURE — 80053 COMPREHEN METABOLIC PANEL: CPT

## 2022-03-27 PROCEDURE — 99285 EMERGENCY DEPT VISIT HI MDM: CPT

## 2022-03-27 RX ORDER — SIMETHICONE 125 MG
2 CAPSULE ORAL EVERY 8 HOURS PRN
COMMUNITY
End: 2022-06-20

## 2022-03-27 RX ORDER — SODIUM CHLORIDE 9 MG/ML
25 INJECTION, SOLUTION INTRAVENOUS PRN
Status: DISCONTINUED | OUTPATIENT
Start: 2022-03-27 | End: 2022-03-29 | Stop reason: HOSPADM

## 2022-03-27 RX ORDER — SODIUM CHLORIDE 0.9 % (FLUSH) 0.9 %
5-40 SYRINGE (ML) INJECTION EVERY 12 HOURS SCHEDULED
Status: DISCONTINUED | OUTPATIENT
Start: 2022-03-27 | End: 2022-03-29 | Stop reason: HOSPADM

## 2022-03-27 RX ORDER — POLYETHYLENE GLYCOL 3350 17 G/17G
17 POWDER, FOR SOLUTION ORAL
Status: DISCONTINUED | OUTPATIENT
Start: 2022-03-28 | End: 2022-03-28

## 2022-03-27 RX ORDER — ONDANSETRON 4 MG/1
4 TABLET, ORALLY DISINTEGRATING ORAL EVERY 8 HOURS PRN
Status: DISCONTINUED | OUTPATIENT
Start: 2022-03-27 | End: 2022-03-29 | Stop reason: HOSPADM

## 2022-03-27 RX ORDER — ACETAMINOPHEN 650 MG/1
650 SUPPOSITORY RECTAL EVERY 6 HOURS PRN
Status: DISCONTINUED | OUTPATIENT
Start: 2022-03-27 | End: 2022-03-29 | Stop reason: HOSPADM

## 2022-03-27 RX ORDER — ACETIC ACID 0.25 G/100ML
150 IRRIGANT IRRIGATION
COMMUNITY
End: 2022-06-20

## 2022-03-27 RX ORDER — LEVOTHYROXINE SODIUM 0.12 MG/1
62.5 TABLET ORAL
Status: DISCONTINUED | OUTPATIENT
Start: 2022-03-28 | End: 2022-03-29 | Stop reason: HOSPADM

## 2022-03-27 RX ORDER — ACETAMINOPHEN 325 MG/1
650 TABLET ORAL EVERY 6 HOURS PRN
Status: DISCONTINUED | OUTPATIENT
Start: 2022-03-27 | End: 2022-03-29 | Stop reason: HOSPADM

## 2022-03-27 RX ORDER — SODIUM CHLORIDE 0.9 % (FLUSH) 0.9 %
5-40 SYRINGE (ML) INJECTION PRN
Status: DISCONTINUED | OUTPATIENT
Start: 2022-03-27 | End: 2022-03-29 | Stop reason: HOSPADM

## 2022-03-27 RX ORDER — IPRATROPIUM BROMIDE AND ALBUTEROL SULFATE 2.5; .5 MG/3ML; MG/3ML
1 SOLUTION RESPIRATORY (INHALATION) EVERY 4 HOURS PRN
Status: DISCONTINUED | OUTPATIENT
Start: 2022-03-27 | End: 2022-03-29 | Stop reason: HOSPADM

## 2022-03-27 RX ORDER — FEXOFENADINE HCL 180 MG/1
180 TABLET ORAL DAILY
Status: ON HOLD | COMMUNITY
End: 2022-06-28 | Stop reason: HOSPADM

## 2022-03-27 RX ORDER — 0.9 % SODIUM CHLORIDE 0.9 %
500 INTRAVENOUS SOLUTION INTRAVENOUS ONCE
Status: COMPLETED | OUTPATIENT
Start: 2022-03-27 | End: 2022-03-27

## 2022-03-27 RX ORDER — ONDANSETRON 2 MG/ML
4 INJECTION INTRAMUSCULAR; INTRAVENOUS EVERY 6 HOURS PRN
Status: DISCONTINUED | OUTPATIENT
Start: 2022-03-27 | End: 2022-03-29 | Stop reason: HOSPADM

## 2022-03-27 RX ORDER — LACTOBACILLUS RHAMNOSUS GG 10B CELL
1 CAPSULE ORAL DAILY
Status: DISCONTINUED | OUTPATIENT
Start: 2022-03-28 | End: 2022-03-29 | Stop reason: HOSPADM

## 2022-03-27 RX ORDER — CETIRIZINE HYDROCHLORIDE 10 MG/1
10 TABLET ORAL DAILY
Status: DISCONTINUED | OUTPATIENT
Start: 2022-03-28 | End: 2022-03-29 | Stop reason: HOSPADM

## 2022-03-27 RX ADMIN — SODIUM CHLORIDE 500 ML: 9 INJECTION, SOLUTION INTRAVENOUS at 18:15

## 2022-03-27 ASSESSMENT — PAIN SCALES - GENERAL
PAINLEVEL_OUTOF10: 6
PAINLEVEL_OUTOF10: 6

## 2022-03-27 ASSESSMENT — PAIN DESCRIPTION - LOCATION
LOCATION: FOOT
LOCATION: FOOT

## 2022-03-27 ASSESSMENT — PAIN DESCRIPTION - ORIENTATION
ORIENTATION: LEFT;RIGHT
ORIENTATION: RIGHT;LEFT

## 2022-03-27 ASSESSMENT — PAIN DESCRIPTION - DESCRIPTORS: DESCRIPTORS: DISCOMFORT

## 2022-03-27 NOTE — ED NOTES
Pt urinated in bed. Scant amount of liquid stool also noted. RN changed bedding and placed pt in new gown. Pt requesting protein drink through her PEG tube.       Nico Echeverria RN  03/27/22 1940

## 2022-03-27 NOTE — ED NOTES
7256 [employer] called for report. Reports patient constipation with with straining. Pt having bright red and dark blood in depend.       Vandana Anderson RN  03/27/22 2571

## 2022-03-27 NOTE — ED NOTES
Patient presents to ED via EMS with complaint of constipation for past 3-5 days. Patient states Milton gave her prune juice and laxatives with no results. Patient states she has had blood at her rectum as well. Patient has wound patch on coccyx area intact with tape needing replaced, tape cleansed from dark brown stool. Patient has clamped PEG tube noted on LUQ. Patient has capped trach, stating they are going to remove it soon and she is on a stepdown trach. Respirations unlabored.      Coty Vázquez RN  03/27/22 1115

## 2022-03-27 NOTE — ED PROVIDER NOTES
[address] ENCOUNTER      PATIENT NAME: Lien Penaloza  MRN: [id number]  : 1934  GIL: 3/27/2022  PROVIDER: Verna Zafar MD      CHIEF COMPLAINT       Chief Complaint   Patient presents with    Constipation    Rectal Bleeding       Patient is seen and evaluated in a timely fashion. Nurses Notes are reviewed and I agree except as noted in the HPI. HISTORY OF PRESENT ILLNESS    Lien Penaloza is a 80 y.o. female who presents to Emergency Department with Constipation and Rectal Bleeding     Patient presents for complication of constipation 3-4 days with blood in stool during straining. She has been having constipation since 2021. 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 She also has a large sacrum decub ulcer which is healing now. She states she had no bowel movements at all for 4 days. She feels significant lower abdominal pain and rectum pressure. She tried to strain but had some rectal bleeding today    SNF is afraid to give her Miralax with concern that her decub ulcer may be contaminated with diarrhea secondary to Miralax. She has no nausea or vomiting. She constantly feels wave pain from lower abdomen. History is obtained from daughter.      REVIEW OF SYSTEMS   Ten-point review of systems is negative except those documented in above HPI including constitutional, HEENT, respiratory, cardiovascular, gastrointestinal, genitourinary, musculoskeletal, skin, neurological, hematological and behavioral.      PAST MEDICAL HISTORY     Past Medical History:   Diagnosis Date    Anxiety     Arthritis     Bronchitis     Diverticulitis of colon     Hypertension     Pressure ulcer        SURGICAL HISTORY       Past Surgical History:   Procedure Laterality Date    APPENDECTOMY      CHOLECYSTECTOMY      GASTROSTOMY TUBE PLACEMENT N/A 2021    EGD PEG TUBE PLACEMENT performed by Amaya Magallon MD at [facility] N/A 2021    DECUBITUS ULCER DEBRIDEMENT REPAIR performed by Alisha Anglin MD at [address]       Previous Medications    ACETAMINOPHEN (TYLENOL) 325 MG TABLET    650 mg by PEG Tube route every 6 hours as needed for Pain    AMINO ACIDS-PROTEIN HYDROLYS (PRO-STAT) LIQD    Take by mouth once    APIXABAN (ELIQUIS) 2.5 MG TABS TABLET    Take 1 tablet by mouth 2 times daily    ASPIRIN 325 MG TABLET    Take 1 tablet by mouth daily    CETIRIZINE (ZYRTEC) 10 MG TABLET    Take 1 tablet by mouth daily    CHLORHEXIDINE (PERIDEX) 0.12 % SOLUTION    Take 15 mLs by mouth 2 times daily    HYDROCODONE-ACETAMINOPHEN (NORCO) 5-325 MG PER TABLET    Take 1 tablet by mouth every 6 hours as needed for Pain. IPRATROPIUM-ALBUTEROL (DUONEB) 0.5-2.5 (3) MG/3ML SOLN NEBULIZER SOLUTION    Inhale 1 vial into the lungs every 4 hours    LEVOTHYROXINE SODIUM 62.5 MCG/ML SOLN    Take 1 mL by mouth daily    LOPERAMIDE (IMODIUM A-D) 2 MG TABLET    Take 2 mg by mouth 4 times daily as needed for Diarrhea    METOPROLOL TARTRATE (LOPRESSOR) 25 MG TABLET    Take 1 tablet by mouth 2 times daily    MULTIPLE VITAMIN (MULTI VITAMIN DAILY PO)    Take by mouth    PROBIOTIC ACIDOPHILUS (FLORANEX) TABS    Take 1 tablet by mouth daily    SODIUM HYPOCHLORITE (DAKINS) 0.125 % SOLN EXTERNAL SOLUTION    Apply topically See Admin Instructions Apply to sacral wound topically every shift       ALLERGIES     Sulfa antibiotics, Gluten meal, Metronidazole, and Milk-related compounds    FAMILY HISTORY     She indicated that her mother is . She indicated that her father is . family history is not on file. SOCIAL HISTORY      reports that she quit smoking about 44 years ago. Her smoking use included cigarettes. She has a 15.00 pack-year smoking history.  She has never used smokeless tobacco. She reports that she does not drink alcohol and does not use drugs. PHYSICAL EXAM      height is 5' (1.524 m) and weight is 102 lb (46.3 kg). Her temperature is 97.7 °F (36.5 °C). Her blood pressure is 135/61 and her pulse is 86. Her respiration is 18 and oxygen saturation is 100%. Physical Exam  Vitals and nursing note reviewed. Constitutional:       Appearance: She is well-developed. She is not diaphoretic. HENT:      Head: Normocephalic and atraumatic. Nose: Nose normal.   Eyes:      General: No scleral icterus. Right eye: No discharge. Left eye: No discharge. Conjunctiva/sclera: Conjunctivae normal.      Pupils: Pupils are equal, round, and reactive to light. Neck:      Vascular: No JVD. Trachea: No tracheal deviation. Cardiovascular:      Rate and Rhythm: Normal rate and regular rhythm. Heart sounds: Normal heart sounds. No murmur heard. No friction rub. No gallop. Pulmonary:      Effort: Pulmonary effort is normal. No respiratory distress. Breath sounds: Normal breath sounds. No stridor. No wheezing or rales. Chest:      Chest wall: No tenderness. Abdominal:      General: Bowel sounds are normal. There is no distension. Palpations: Abdomen is soft. There is no mass. Tenderness: There is no abdominal tenderness. There is no guarding or rebound. Hernia: No hernia is present. Comments: Left lower abdomen tenderness, no guarding, no rebound. Musculoskeletal:         General: No tenderness or deformity. Cervical back: Normal range of motion and neck supple. Lymphadenopathy:      Cervical: No cervical adenopathy. Skin:     General: Skin is warm and dry. Capillary Refill: Capillary refill takes less than 2 seconds. Coloration: Skin is not pale. Findings: No erythema or rash. Comments: Healing sacral decub ulcer. Neurological:      Mental Status: She is alert and oriented to person, place, and time.       Cranial Nerves: No cranial nerve deficit. Sensory: No sensory deficit. Motor: No abnormal muscle tone. Coordination: Coordination normal.      Deep Tendon Reflexes: Reflexes normal.   Psychiatric:         Behavior: Behavior normal.         Thought Content: Thought content normal.         Judgment: Judgment normal.       ANCILLARY TEST RESULTS   EKG:    Interpreted by me  Normal sinus rhythm, ventricular rate 73 bpm, DE interval 190 ms, QRS duration 140 ms,  ms, no ST elevation or acute T wave    LAB RESULTS:  Results for orders placed or performed during the hospital encounter of 03/27/22   CBC with Auto Differential   Result Value Ref Range    WBC 8.5 4.8 - 10.8 thou/mm3    RBC 3.41 (L) 4.20 - 5.40 mill/mm3    Hemoglobin 10.9 (L) 12.0 - 16.0 gm/dl    Hematocrit 34.6 (L) 37.0 - 47.0 %    .5 (H) 81.0 - 99.0 fL    MCH 32.0 26.0 - 33.0 pg    MCHC 31.5 (L) 32.2 - 35.5 gm/dl    RDW-CV 19.1 (H) 11.5 - 14.5 %    RDW-SD 71.3 (H) 35.0 - 45.0 fL    Platelets 393 019 - 724 thou/mm3    MPV 11.4 9.4 - 12.4 fL    Seg Neutrophils 66.5 %    Lymphocytes 21.2 %    Monocytes 8.5 %    Eosinophils 2.7 %    Basophils 0.6 %    Immature Granulocytes 0.5 %    Platelet Estimate ADEQUATE Adequate    Segs Absolute 5.7 1.8 - 7.7 thou/mm3    Lymphocytes Absolute 1.8 1.0 - 4.8 thou/mm3    Monocytes Absolute 0.7 0.4 - 1.3 thou/mm3    Eosinophils Absolute 0.2 0.0 - 0.4 thou/mm3    Basophils Absolute 0.1 0.0 - 0.1 thou/mm3    Immature Grans (Abs) 0.04 0.00 - 0.07 thou/mm3    nRBC 0 /100 wbc    Anisocytosis PRESENT Absent   SPECIMEN REJECTION   Result Value Ref Range    Rejected Test Chem tests     Reason for Rejection see below    Urine with Reflexed Micro   Result Value Ref Range    Glucose, Ur NEGATIVE NEGATIVE mg/dl    Bilirubin Urine NEGATIVE NEGATIVE    Ketones, Urine NEGATIVE NEGATIVE    Specific Gravity, Urine 1.014 1.002 - 1.030    Blood, Urine LARGE (A) NEGATIVE    pH, UA 5.0 5.0 - 9.0    Protein, UA NEGATIVE NEGATIVE    Urobilinogen, Urine 0.2 0.0 - 1.0 eu/dl    Nitrite, Urine NEGATIVE NEGATIVE    Leukocyte Esterase, Urine SMALL (A) NEGATIVE    Color, UA YELLOW STRAW-YELLOW    Character, Urine CLEAR CLEAR-SL CLOUD    RBC, UA 25-50 0-2/hpf /hpf    WBC, UA 10-15 0-4/hpf /hpf    Epithelial Cells, UA 0-2 3-5/hpf /hpf    Bacteria, UA NONE SEEN FEW/NONE SEEN /hpf    Casts UA NONE SEEN NONE SEEN /lpf    Crystals, UA NONE SEEN NONE SEEN    Renal Epithelial, UA NONE SEEN NONE SEEN    Yeast, UA NONE SEEN NONE SEEN    CASTS 2 NONE SEEN NONE SEEN /lpf    MISCELLANEOUS 2 NONE SEEN    Comprehensive Metabolic Panel   Result Value Ref Range    Glucose 85 70 - 108 mg/dL    CREATININE 0.9 0.4 - 1.2 mg/dL    BUN 59 (H) 7 - 22 mg/dL    Sodium 135 135 - 145 meq/L    Potassium 5.2 3.5 - 5.2 meq/L    Chloride 105 98 - 111 meq/L    CO2 23 23 - 33 meq/L    Calcium 9.7 8.5 - 10.5 mg/dL    AST 22 5 - 40 U/L    Alkaline Phosphatase 53 38 - 126 U/L    Total Protein 6.9 6.1 - 8.0 g/dL    Albumin 3.4 (L) 3.5 - 5.1 g/dL    Total Bilirubin 0.2 (L) 0.3 - 1.2 mg/dL    ALT 7 (L) 11 - 66 U/L   Troponin   Result Value Ref Range    Troponin T 0.018 (A) ng/ml   Lipase   Result Value Ref Range    Lipase 158.0 (H) 5.6 - 51.3 U/L   Anion Gap   Result Value Ref Range    Anion Gap 7.0 (L) 8.0 - 16.0 meq/L   Glomerular Filtration Rate, Estimated   Result Value Ref Range    Est, Glom Filt Rate 59 (A) ml/min/1.73m2   Osmolality   Result Value Ref Range    Osmolality Calc 285.9 275.0 - 300.0 mOsmol/kg   Scan of Blood Smear   Result Value Ref Range    SCAN OF BLOOD SMEAR see below    EKG Emergency   Result Value Ref Range    Ventricular Rate 73 BPM    Atrial Rate 73 BPM    P-R Interval 190 ms    QRS Duration 140 ms    Q-T Interval 436 ms    QTc Calculation (Bazett) 480 ms    P Axis 54 degrees    R Axis -58 degrees    T Axis 64 degrees       RADIOLOGY REPORTS  CT ABDOMEN PELVIS WO CONTRAST Additional Contrast? None   Final Result      1. Marked fecal impaction in the distal colon/rectum.  Findings of constipation throughout the colon. 2. Findings of diverticulosis are again noted. There is redemonstration of an abnormally thickened appearance in the sigmoid colon which could be related to an infectious/inflammatory process although a neoplasm could have a similar appearance. 3. Scattered subcentimeter pulmonary nodules at the lung bases. Follow-up is recommended in 6 months to assess for stability. **This report has been created using voice recognition software. It may contain minor errors which are inherent in voice recognition technology. **      Final report electronically signed by Dr Juan Manuel Westbrook on 3/27/2022 6:48 PM          MEDICAL Sr[facility] [id number] (MDM)     Differential Diagnosis: Constipation, stool impaction, dehydration, diverticulitis, UTI, small bowel obstruction    Initial Plans:   Large-bore IV,  Saline bolus  Basic labs  CT abdomen pelvis    Summary:    Labs are reassuring. Stable ED stay. CT abdomen pelvis shows marked fecal impaction in the distal colon/rectum. Diverticulosis of sigmoid colon. Redemonstration of the abnormally thickened appearance in the sigmoid colon which could be related to a infectious inflammatory process (patient and daughter are aware of this, daughter states patient had inflammatory scar in that area). She already has some rectal bleeding, on Eliquis, with decub ulcer, very hard for me to do manual disimpaction. I spoke with Main Serrato and he suggested MiraLAX 17 g every 2 hours and admit.     Discussed and admitted to hospitalist service    ED Medications  Medications   0.9 % sodium chloride bolus (0 mLs IntraVENous Stopped 3/27/22 1922)     Vital signs in ED  Vitals:    03/27/22 1654 03/27/22 1816 03/27/22 1923   BP: (!) 134/44 (!) 110/47 135/61   Pulse: 90 76 86   Resp: 20 20 18   Temp: 97.7 °F (36.5 °C)     SpO2: 96% 99% 100%   Weight: 102 lb (46.3 kg)     Height: 5' (1.524 m)         CRITICAL CARE   None    CONSULTS    Jeremy Cooks Dr. Roxie Dunn (he suggested Hospitalist admit this time)    PROCEDURES   None    FINAL IMPRESSION AND DISPOSITION      1. Constipation, unspecified constipation type    2. Impacted stool in rectum [facility])        DISPOSITION Decision To Admit 03/27/2022 08:03:36 PM        PATIENT REFERRED TO:  No follow-up provider specified.     DISCHARGE MEDICATIONS:  New Prescriptions    No medications on file       (Please note that portions of this note were completed with a voice recognition program.  Efforts were made to edit the dictations but occasionally words aremis-transcribed.)    MD Elliot Zarate MD  03/27/22 0399

## 2022-03-27 NOTE — ED NOTES
ED nurse-to-nurse bedside report    Chief Complaint   Patient presents with    Constipation    Rectal Bleeding      LOC: alert and orientated to name, place, date  Vital signs   Vitals:    03/27/22 1654 03/27/22 1816   BP: (!) 134/44 (!) 110/47   Pulse: 90 76   Resp: 20 20   Temp: 97.7 °F (36.5 °C)    SpO2: 96% 99%   Weight: 102 lb (46.3 kg)    Height: 5' (1.524 m)       Pain:    Pain Interventions: Warm Blankets  Pain Goal: 2  Oxygen: No    Current needs required Room Air   Telemetry: No  LDAs:   Peripheral IV 03/27/22 Left Forearm (Active)     Continuous Infusions:   Mobility: Requires assistance * 1  Perla Fall Risk Score: No flowsheet data found. Fall Interventions:  Moderate  Report given to: Azeem Worthington RN  03/27/22 0595

## 2022-03-28 LAB
ANION GAP SERPL CALCULATED.3IONS-SCNC: 11 MEQ/L (ref 8–16)
ANISOCYTOSIS: PRESENT
BASOPHILS # BLD: 0.6 %
BASOPHILS ABSOLUTE: 0 THOU/MM3 (ref 0–0.1)
BUN BLDV-MCNC: 47 MG/DL (ref 7–22)
CALCIUM SERPL-MCNC: 9.8 MG/DL (ref 8.5–10.5)
CHLORIDE BLD-SCNC: 108 MEQ/L (ref 98–111)
CO2: 21 MEQ/L (ref 23–33)
CREAT SERPL-MCNC: 0.8 MG/DL (ref 0.4–1.2)
EKG ATRIAL RATE: 73 BPM
EKG P AXIS: 54 DEGREES
EKG P-R INTERVAL: 190 MS
EKG Q-T INTERVAL: 436 MS
EKG QRS DURATION: 140 MS
EKG QTC CALCULATION (BAZETT): 480 MS
EKG R AXIS: -58 DEGREES
EKG T AXIS: 64 DEGREES
EKG VENTRICULAR RATE: 73 BPM
EOSINOPHIL # BLD: 4 %
EOSINOPHILS ABSOLUTE: 0.3 THOU/MM3 (ref 0–0.4)
ERYTHROCYTE [DISTWIDTH] IN BLOOD BY AUTOMATED COUNT: 19 % (ref 11.5–14.5)
ERYTHROCYTE [DISTWIDTH] IN BLOOD BY AUTOMATED COUNT: 70.6 FL (ref 35–45)
GFR SERPL CREATININE-BSD FRML MDRD: 68 ML/MIN/1.73M2
GLUCOSE BLD-MCNC: 93 MG/DL (ref 70–108)
HCT VFR BLD CALC: 32.5 % (ref 37–47)
HEMOGLOBIN: 10.2 GM/DL (ref 12–16)
IMMATURE GRANS (ABS): 0.03 THOU/MM3 (ref 0–0.07)
IMMATURE GRANULOCYTES: 0.5 %
LYMPHOCYTES # BLD: 24.8 %
LYMPHOCYTES ABSOLUTE: 1.6 THOU/MM3 (ref 1–4.8)
MCH RBC QN AUTO: 31.8 PG (ref 26–33)
MCHC RBC AUTO-ENTMCNC: 31.4 GM/DL (ref 32.2–35.5)
MCV RBC AUTO: 101.2 FL (ref 81–99)
MONOCYTES # BLD: 10.6 %
MONOCYTES ABSOLUTE: 0.7 THOU/MM3 (ref 0.4–1.3)
NUCLEATED RED BLOOD CELLS: 0 /100 WBC
ORGANISM: ABNORMAL
PLATELET # BLD: 187 THOU/MM3 (ref 130–400)
PMV BLD AUTO: 10.7 FL (ref 9.4–12.4)
POTASSIUM REFLEX MAGNESIUM: 4.6 MEQ/L (ref 3.5–5.2)
RBC # BLD: 3.21 MILL/MM3 (ref 4.2–5.4)
SEG NEUTROPHILS: 59.5 %
SEGMENTED NEUTROPHILS ABSOLUTE COUNT: 3.7 THOU/MM3 (ref 1.8–7.7)
SODIUM BLD-SCNC: 140 MEQ/L (ref 135–145)
T4 FREE: 1.17 NG/DL (ref 0.93–1.76)
TSH SERPL DL<=0.05 MIU/L-ACNC: 10.71 UIU/ML (ref 0.4–4.2)
URINE CULTURE REFLEX: ABNORMAL
WBC # BLD: 6.3 THOU/MM3 (ref 4.8–10.8)

## 2022-03-28 PROCEDURE — 2580000003 HC RX 258

## 2022-03-28 PROCEDURE — 85025 COMPLETE CBC W/AUTO DIFF WBC: CPT

## 2022-03-28 PROCEDURE — G0378 HOSPITAL OBSERVATION PER HR: HCPCS

## 2022-03-28 PROCEDURE — 6370000000 HC RX 637 (ALT 250 FOR IP): Performed by: PHYSICIAN ASSISTANT

## 2022-03-28 PROCEDURE — 96361 HYDRATE IV INFUSION ADD-ON: CPT

## 2022-03-28 PROCEDURE — 6370000000 HC RX 637 (ALT 250 FOR IP)

## 2022-03-28 PROCEDURE — 36415 COLL VENOUS BLD VENIPUNCTURE: CPT

## 2022-03-28 PROCEDURE — 99225 PR SBSQ OBSERVATION CARE/DAY 25 MINUTES: CPT

## 2022-03-28 PROCEDURE — 80048 BASIC METABOLIC PNL TOTAL CA: CPT

## 2022-03-28 PROCEDURE — 6370000000 HC RX 637 (ALT 250 FOR IP): Performed by: NURSE PRACTITIONER

## 2022-03-28 RX ORDER — DOCUSATE SODIUM 100 MG/1
100 CAPSULE, LIQUID FILLED ORAL 2 TIMES DAILY
Status: DISCONTINUED | OUTPATIENT
Start: 2022-03-28 | End: 2022-03-28 | Stop reason: SDUPTHER

## 2022-03-28 RX ORDER — ASPIRIN 325 MG
325 TABLET ORAL DAILY
Status: DISCONTINUED | OUTPATIENT
Start: 2022-03-28 | End: 2022-03-29 | Stop reason: HOSPADM

## 2022-03-28 RX ORDER — SODIUM CHLORIDE 9 MG/ML
INJECTION, SOLUTION INTRAVENOUS CONTINUOUS
Status: DISCONTINUED | OUTPATIENT
Start: 2022-03-28 | End: 2022-03-29 | Stop reason: HOSPADM

## 2022-03-28 RX ADMIN — METOPROLOL TARTRATE 25 MG: 25 TABLET, FILM COATED ORAL at 03:34

## 2022-03-28 RX ADMIN — APIXABAN 2.5 MG: 2.5 TABLET, FILM COATED ORAL at 03:34

## 2022-03-28 RX ADMIN — APIXABAN 2.5 MG: 2.5 TABLET, FILM COATED ORAL at 21:44

## 2022-03-28 RX ADMIN — LEVOTHYROXINE SODIUM 62.5 MCG: 0.12 TABLET ORAL at 10:01

## 2022-03-28 RX ADMIN — METOPROLOL TARTRATE 25 MG: 25 TABLET, FILM COATED ORAL at 10:01

## 2022-03-28 RX ADMIN — POLYETHYLENE GLYCOL 3350 17 G: 17 POWDER, FOR SOLUTION ORAL at 05:40

## 2022-03-28 RX ADMIN — SODIUM CHLORIDE, PRESERVATIVE FREE 10 ML: 5 INJECTION INTRAVENOUS at 00:27

## 2022-03-28 RX ADMIN — Medication 1 CAPSULE: at 10:01

## 2022-03-28 RX ADMIN — NALOXEGOL OXALATE 12.5 MG: 12.5 TABLET, FILM COATED ORAL at 16:10

## 2022-03-28 RX ADMIN — POLYETHYLENE GLYCOL 3350 17 G: 17 POWDER, FOR SOLUTION ORAL at 02:06

## 2022-03-28 RX ADMIN — POLYETHYLENE GLYCOL 3350 17 G: 17 POWDER, FOR SOLUTION ORAL at 10:02

## 2022-03-28 RX ADMIN — POLYETHYLENE GLYCOL 3350 17 G: 17 POWDER, FOR SOLUTION ORAL at 03:43

## 2022-03-28 RX ADMIN — DOCUSATE SODIUM LIQUID 100 MG: 100 LIQUID ORAL at 10:01

## 2022-03-28 RX ADMIN — SODIUM CHLORIDE: 9 INJECTION, SOLUTION INTRAVENOUS at 22:37

## 2022-03-28 RX ADMIN — SODIUM CHLORIDE: 9 INJECTION, SOLUTION INTRAVENOUS at 03:33

## 2022-03-28 RX ADMIN — ASPIRIN 325 MG: 325 TABLET ORAL at 10:01

## 2022-03-28 RX ADMIN — POLYETHYLENE GLYCOL 3350 17 G: 17 POWDER, FOR SOLUTION ORAL at 00:21

## 2022-03-28 RX ADMIN — CETIRIZINE HYDROCHLORIDE 10 MG: 10 TABLET, FILM COATED ORAL at 10:01

## 2022-03-28 RX ADMIN — APIXABAN 2.5 MG: 2.5 TABLET, FILM COATED ORAL at 10:01

## 2022-03-28 ASSESSMENT — PAIN SCALES - GENERAL
PAINLEVEL_OUTOF10: 10
PAINLEVEL_OUTOF10: 0

## 2022-03-28 NOTE — CONSULTS
Consult History & Physical      Patient:  Beatrice Giang  YOB: 1934  MRN: [id number]     Acct: [de-identified]    Chief Complaint:    Chief Complaint   Patient presents with    Constipation    Rectal Bleeding       Date of Service: Pt seen/examined in consultation on 3/28/2022    History Of Present Illness:      80 y.o. female who we are asked to see/evaluate by Pasco Cheadle, APRN - * for medical management of fecal impaction. HPI from patient and chart review. She came to the ED yesterday, from SNF, for constipation and abdominal pain. She is unsure when she last had a \"good bowel movement,\" but it has been several days. She states she has to strain and just passes small, firm stools. She occasionally has \"red streaks\" when straining to pass stools. She is on chronic pain medication. She has some generalized abdominal discomfort. Denies nausea, vomiting, and melena. It was reported that SNF may have held the Miralax due to a decubitus ulcer. CT A/P demonstrated marked fecal impaction in the distal colon/rectum, constipation throughout the colon, diverticulosis, abnormally thickened appearance in the sigmoid colon. Past Medical History:    Past Medical History:   Diagnosis Date    [facility]RO [facility] ([facility] [address]     Anxiety     Arthritis     Bronchitis     [facility]     Chronic a-fib ([facility])     Diverticulitis of colon     Hyperkalemia     Hypertension     Neuromuscular dysfunction of bladder     Personal history of COVID-19     Pressure ulcer     Protein-calorie malnutrition ([facility] [address]        Home Medications:  Prior to Admission medications    Medication Sig Start Date End Date Taking?  Authorizing Provider   OXYGEN Inhale into the lungs as needed (to keep sats > 90%)   Yes Historical Provider, MD   fexofenadine (ALLEGRA) 180 MG tablet 180 mg by PEG Tube route daily   Yes Historical Provider, MD   acetic acid 0.25 % irrigation Irrigate with 150 mLs as directed Irrigate Tues & Fri nights   Yes Historical Provider, MD   Simethicone 125 MG CAPS 2 capsules by PEG Tube route every 8 hours as needed (bloating / gas)   Yes Historical Provider, MD   HYDROcodone-acetaminophen (NORCO) 5-325 MG per tablet 0.5 tablets every 6 hours as needed for Pain.  Via PEG    Historical Provider, MD   loperamide (IMODIUM A-D) 2 MG tablet 2 mg 4 times daily as needed for Diarrhea Via PEG    Historical Provider, MD   ipratropium-albuterol (DUONEB) 0.5-2.5 (3) MG/3ML SOLN nebulizer solution Inhale 1 vial into the lungs every 4 hours as needed for Shortness of Breath     Historical Provider, MD   Levothyroxine Sodium 62.5 MCG/ML SOLN 1 mL by PEG Tube route daily     Historical Provider, MD   Multiple Vitamin (MULTI VITAMIN DAILY PO) 1 tablet by PEG Tube route daily     Historical Provider, MD   Amino Acids-Protein Hydrolys (PRO-STAT) LIQD by Per G Tube route 3 times daily 30 ml each dose    Historical Provider, MD   Probiotic Acidophilus (FLORANEX) TABS 1 tablet by Per G Tube route daily     Historical Provider, MD   aspirin 325 MG tablet Take 1 tablet by mouth daily  Patient taking differently: 325 mg by PEG Tube route daily  12/29/21   SERGE Serna CNP   apixaban (ELIQUIS) 2.5 MG TABS tablet Take 1 tablet by mouth 2 times daily  Patient taking differently: 2.5 mg by Per G Tube route 2 times daily  12/29/21   SERGE Serna CNP   metoprolol tartrate (LOPRESSOR) 25 MG tablet Take 1 tablet by mouth 2 times daily  Patient taking differently: 25 mg by PEG Tube route 2 times daily  12/29/21   SERGE Serna CNP   acetaminophen (TYLENOL) 325 MG tablet 650 mg by PEG Tube route every 6 hours as needed for Pain    Historical Provider, MD   sodium hypochlorite (DAKINS) 0.125 % SOLN external solution Apply topically See Admin Instructions Apply to sacral wound topically every shift    Historical Provider, MD       Surgical History:  Past Surgical History:   Procedure Laterality Date    APPENDECTOMY      CHOLECYSTECTOMY      GASTROSTOMY TUBE PLACEMENT N/A 11/17/2021    EGD PEG TUBE PLACEMENT performed by Irma Alcantar MD at [address] [place] [address] N/A 12/23/2021    DECUBITUS ULCER DEBRIDEMENT REPAIR performed by Rachele Lopez MD at [facility]         Family History:  History reviewed. No pertinent family history. Past GI History:  EGD with PEG 11/2021, left-sided diverticulosis, diverticulitis, internal hemorrhoids, colonoscopy    Dr. Deya Yepez    Allergies:  Sulfa antibiotics, Gluten meal, Metronidazole, and Milk-related compounds    Social History:   TOBACCO:   reports that she quit smoking about 44 years ago. Her smoking use included cigarettes. She has a 15.00 pack-year smoking history. She has never used smokeless tobacco.  ETOH:   reports no history of alcohol use. Review Of Systems  GENERAL: No fever, chills or weight loss. EYES:  No  blurred vision, double vision   CARDIOVASCULAR: No chest pain or palpitations. RESPIRATORY:  No dyspnea or cough. GI:  See HPI  MUSCULOSKELETAL: No new painful or swollen joints or myalgias. :   No dysuria or hematuria. SKIN:  No rashes or jaundice. NEUROLOGIC:  No headaches or seizures, numbness or tingling of arms, or legs. PSYCH:  No anxiety or depression. ENDOCRINE:  No polyuria or polydipsia. BLOOD:  +anemia    PHYSICAL EXAM:  BP (!) 120/52   Pulse 79   Temp 97.4 °F (36.3 °C) (Axillary)   Resp 18   Ht 5' (1.524 m)   Wt 102 lb (46.3 kg)   SpO2 100%   BMI 19.92 kg/m²     General appearance: Chronically ill appearing female  HEENT: Normal cephalic, atraumatic without obvious deformity. Pupils equal, round, and reactive to light. Neck: Supple, with full range of motion. No jugular venous distention. Trachea midline. Respiratory:  Normal respiratory effort. Clear to auscultation, bilaterally without Rales/Wheezes/Rhonchi.   Cardiovascular: Regular rate and rhythm without murmurs, rubs or gallops. Abdomen: Soft, tender throughout with palpation, mildly distended with active bowel sounds. PEG intact & patent. Musculoskeletal: No clubbing, cyanosis or edema bilaterally. Skin: Pale, warm, dry. No rashes or lesions. Psychiatric: Alert and oriented, thought content appropriate, normal insight    Labs:   Recent Labs     03/28/22  0500   WBC 6.3   HGB 10.2*   HCT 32.5*        Recent Labs     03/28/22  0500      K 4.6      CO2 21*   BUN 47*   CREATININE 0.8   CALCIUM 9.8     Recent Labs     03/27/22  1858   AST 22   ALT 7*   BILITOT 0.2*   ALKPHOS 48     Radiology:   CT abdomen/pelvis WO contrast 03/27/22  FINDINGS:   There are scattered pulmonary nodules at the lung bases. For example, on image #4 there is a 5 mm pulmonary nodule in the right lower lobe.       There is no pleural effusion or pneumothorax.       There is cardiomegaly.       Lack of IV contrast limits evaluation of the abdominal organs.       Calcified granulomas are in the spleen.       The liver is a smooth contour and is normal in size.       The pancreas and adrenal glands are within normal limits.       There are some cyst arising from the kidneys. There is no hydronephrosis.       There is a gastrostomy tube positioned in the stomach.       There is no small bowel obstruction.       There is extensive retained fecal material in the rectum. There is a large amount retained stool throughout the colon. There are diverticula. There is a markedly thickened appearance in the region of the sigmoid colon.       The urinary bladder appears normal.       Diffuse calcified plaque is in the aorta. There is no aneurysmal dilatation.       There is no ascites or free air.       The bones are intact.           Impression       1. Marked fecal impaction in the distal colon/rectum. Findings of constipation throughout the colon. 2. Findings of diverticulosis are again noted.  There is

## 2022-03-28 NOTE — H&P
History & Physical    Patient:  Elle Brower  YOB: 1934  Date of Service: 3/28/2022  MRN: [id number]   Acct:  [de-identified]   Primary Care Physician: Dorian Gaston MD    Chief Complaint: Abdominal Pain and Constipation    Assessment and Plan:    1. Abdominal Pain secondary to fecal impaction, likely intermittent opiate use and decreased mobility contributing: Patient presents with complaints of left lower quadrant abdominal pain and reports that she has not had a bowel movement for 4 days. CT scan of her abdomen/pelvis showing marked fecal impaction in the distal colon/rectum findings of constipation throughout the colon. Per report patient having bleeding while straining on the commode. Also with a stage IV sacral ulcer which makes manual disimpaction difficult. The ER physician spoke with Dr. Gaeln Hay who is recommending MiraLAX 17 g every 2 hours. This has been ordered. Will add stool soft softener. Gentle hydration. Encourage oral intake. Full liquid diet. May benefit from [facility]. We will consult GI for further recommendations. 2. Stage IV sacral ulcer: Patient and daughter report sacral ulcer since admission for Covid in October 2021. Follows with wound care and is healing per daughter. Will consult wound care for inpatient management. Important to keep wound clean if patient begins having multiple bowel movements. 3. History of diverticulosis: CT scan showing Findings of diverticulosis. There is redemonstration of an abnormally thickened appearance in the sigmoid colon which could be related to an infectious/inflammatory process although a neoplasm could have a similar appearance. This was seen on a CT scan from October of last year as well and patient and daughter are aware of this, daughter states patient had inflammatory scar in that area. White count is normal.  Patient is not febrile. No indication the patient is having an episode of diverticulitis.  Will check ESR and CRP. 4. Elevated lipase: Lipase is 158 on arrival.  No evidence of inflammation of the pancreas on CT. Patient denies right upper abdominal pain. Will repeat lipase in the morning. 5. Chronic macrocytic anemia: Hemoglobin on arrival 10.9. Baseline appears to be around 8. May be some level of hemoconcentration secondary to dehydration. Vitamin B12 and Folate normal in 11/2021. We will monitor with daily CBC. 6. History of paroxysmal atrial fibrillation: Patient currently in sinus rhythm. Heart rate in the 80s. [id number] score is 4. We will continue home dose of Eliquis. 7. Chronically elevated troponin: Troponin elevated on arrival at 0.018. During previous admission in December troponin was near 0.02. EKG unchanged from December showing sinus rhythm with bifascicular block, no signs of ischemia noted. Patient denies chest pain. No reason to trend at this time. 8. Elevated BUN, likely multifactorial: Some component of dehydration likely. Patient also receiving protein supplement for wound healing at the ECF. Will monitor daily BMP. 9. 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 10. History of CKDIII: GFR at 59 on admission. Creatinine at baseline 0.9. 11. History of Systolic Heart Failure:  Echo from 11/22/21 showing an Ejection fraction that is visually estimated at 25%, with severe global hypokinesis of the left ventricle. No evidence of acute exacerbation. Continue home meds. 12. History of asthma: Continue home PRN nebulizer. 13. Incidental finding of pulmonary nodules at the lung bases: Follow-up is recommended in 6 months to assess for stability. History of Present Illness:   History obtained from chart review and the patient and daughter.     The patient is a 80 y.o. female with a past medical history of congestive heart failure, atrial fibrillation, and a pressure ulcer who presents with complaints of abdominal pain and constipation. Patient was hospitalized with COVID-19 in October 2021, had tracheostomy and PEG tube placement. Currently residing in a skilled nursing facility. Patient reports intermittent constipation likely secondary to intermittent opiate use and decreased mobility. Patient reports that she has not had a bowel movement for 4 days and is now having significant lower abdominal pain and rectal pressure. She reports that when she is on the toilet sometimes she has bleeding from her rectum with straining. Per report, SNF has not been giving her MiraLAX as they had concerns her decubitus ulcer may become contaminated with diarrhea. She states that she feels a large stool in her rectum but is unable to pass it. Past Medical History:        Diagnosis Date    [facility]IBERTO [facility]) [facility] [address] [facility]     Anxiety     Arthritis     Bronchitis     [facility]     Chronic a-fib (HCC)     Diverticulitis of colon     Hyperkalemia     Hypertension     Neuromuscular dysfunction of bladder     Personal history of COVID-19     Pressure ulcer     Protein-calorie malnutrition ([facility] [address]        Past Surgical History:        Procedure Laterality Date    APPENDECTOMY      CHOLECYSTECTOMY      GASTROSTOMY TUBE PLACEMENT N/A 11/17/2021    EGD PEG TUBE PLACEMENT performed by Senthil Mello MD at [address] [place] [address] N/A 12/23/2021    DECUBITUS ULCER DEBRIDEMENT REPAIR performed by Lynn Mendoza MD at [address] Medications:   No current facility-administered medications on file prior to encounter.      Current Outpatient Medications on File Prior to Encounter   Medication Sig Dispense Refill    OXYGEN Inhale into the lungs as needed (to keep sats > 90%)      fexofenadine (ALLEGRA) 180 MG tablet 180 mg by PEG Tube route daily      acetic acid 0.25 % irrigation Irrigate with 150 mLs as directed Irrigate Tues & Fri nights      Simethicone 125 MG CAPS 2 capsules by PEG Tube route every 8 hours as needed (bloating / gas)      HYDROcodone-acetaminophen (NORCO) 5-325 MG per tablet 0.5 tablets every 6 hours as needed for Pain. Via PEG      loperamide (IMODIUM A-D) 2 MG tablet 2 mg 4 times daily as needed for Diarrhea Via PEG      ipratropium-albuterol (DUONEB) 0.5-2.5 (3) MG/3ML SOLN nebulizer solution Inhale 1 vial into the lungs every 4 hours as needed for Shortness of Breath       Levothyroxine Sodium 62.5 MCG/ML SOLN 1 mL by PEG Tube route daily       Multiple Vitamin (MULTI VITAMIN DAILY PO) 1 tablet by PEG Tube route daily       Amino Acids-Protein Hydrolys (PRO-STAT) LIQD by Per G Tube route 3 times daily 30 ml each dose      Probiotic Acidophilus (FLORANEX) TABS 1 tablet by Per G Tube route daily       aspirin 325 MG tablet Take 1 tablet by mouth daily (Patient taking differently: 325 mg by PEG Tube route daily ) 30 tablet 3    apixaban (ELIQUIS) 2.5 MG TABS tablet Take 1 tablet by mouth 2 times daily (Patient taking differently: 2.5 mg by Per G Tube route 2 times daily ) 60 tablet     metoprolol tartrate (LOPRESSOR) 25 MG tablet Take 1 tablet by mouth 2 times daily (Patient taking differently: 25 mg by PEG Tube route 2 times daily ) 60 tablet 3    acetaminophen (TYLENOL) 325 MG tablet 650 mg by PEG Tube route every 6 hours as needed for Pain      sodium hypochlorite (DAKINS) 0.125 % SOLN external solution Apply topically See Admin Instructions Apply to sacral wound topically every shift         Allergies:  Sulfa antibiotics, Gluten meal, Metronidazole, and Milk-related compounds    Social History:    reports that she quit smoking about 44 years ago. Her smoking use included cigarettes. She has a 15.00 pack-year smoking history.  She has never used smokeless tobacco. She reports that she does not drink alcohol and does not use drugs. Family History:   History reviewed. No pertinent family history. Review of systems:  Constitutional: no fever, no night sweats, no fatigue  Head: no headache, no head injury, no migranes. Eye: no blurring of vision, no double vision. Ears: no hearing difficulty, no tinnitus  Mouth/throat: no ulceration, dental caries, dysphagia  Lungs: no cough, no shortness of breath, no wheeze  CVS: no palpitation, no chest pain, no shortness of breath  GI: Positive for abdominal pain, intermittent nausea, constipation  FELECIA: no dysuria, frequency and urgency, no hematuria, no kidney stones  Musculoskeletal: no joint pain, swelling , stiffness  Endocrine: no polyuria, polydypsia, no cold or heat intolerence  Hematology: no anemia, no easy brusing or bleeding, no hx of clotting disorder  Dermatology: no skin rash, no eczema, no prurities,  Psychiatry: no depression, no anxiety,no panic attacks, no suicide ideation  Neurology: no syncope, no seizures, no numbness or tingling of hands, no numbness or tingling of feet, no paresis    10 point review of systems completed, all other than noted above are negative. Vitals:   Vitals:    03/28/22 0015   BP: (!) 109/53   Pulse: 83   Resp: 20   Temp:    SpO2: 100%      BMI: Body mass index is 19.92 kg/m². Exam:  Physical Examination: General appearance -in mild distress related to pain  Mental status - alert, oriented to person, place, and time  Neck - supple, no significant adenopathy, no JVD, or carotid bruits, tracheostomy in place midline  Chest - clear to auscultation, no wheezes, rales or rhonchi, symmetric air entry  Heart - normal rate, regular rhythm, normal S1, S2, no murmurs, rubs, clicks or gallops  Abdomen -soft, extremely tender in lower abdomen and left lower quadrant.   Neurological - alert, oriented, normal speech, no focal findings or movement disorder noted  Musculoskeletal - no joint tenderness, deformity or swelling,  Extremities - peripheral pulses normal, no pedal edema, no clubbing or cyanosis  Skin - normal coloration and turgor, no rashes,  large sacral ulcer      Review of Labs and Diagnostic Testing:    Recent Results (from the past 24 hour(s))   EKG Emergency    Collection Time: 03/27/22  6:06 PM   Result Value Ref Range    Ventricular Rate 73 BPM    Atrial Rate 73 BPM    P-R Interval 190 ms    QRS Duration 140 ms    Q-T Interval 436 ms    QTc Calculation (Bazett) 480 ms    P Axis 54 degrees    R Axis -58 degrees    T Axis 64 degrees   CBC with Auto Differential    Collection Time: 03/27/22  6:07 PM   Result Value Ref Range    WBC 8.5 4.8 - 10.8 thou/mm3    RBC 3.41 (L) 4.20 - 5.40 mill/mm3    Hemoglobin 10.9 (L) 12.0 - 16.0 gm/dl    Hematocrit 34.6 (L) 37.0 - 47.0 %    .5 (H) 81.0 - 99.0 fL    MCH 32.0 26.0 - 33.0 pg    MCHC 31.5 (L) 32.2 - 35.5 gm/dl    RDW-CV 19.1 (H) 11.5 - 14.5 %    RDW-SD 71.3 (H) 35.0 - 45.0 fL    Platelets 294 053 - 233 thou/mm3    MPV 11.4 9.4 - 12.4 fL    Seg Neutrophils 66.5 %    Lymphocytes 21.2 %    Monocytes 8.5 %    Eosinophils 2.7 %    Basophils 0.6 %    Immature Granulocytes 0.5 %    Platelet Estimate ADEQUATE Adequate    Segs Absolute 5.7 1.8 - 7.7 thou/mm3    Lymphocytes Absolute 1.8 1.0 - 4.8 thou/mm3    Monocytes Absolute 0.7 0.4 - 1.3 thou/mm3    Eosinophils Absolute 0.2 0.0 - 0.4 thou/mm3    Basophils Absolute 0.1 0.0 - 0.1 thou/mm3    Immature Grans (Abs) 0.04 0.00 - 0.07 thou/mm3    nRBC 0 /100 wbc    Anisocytosis PRESENT Absent   Scan of Blood Smear    Collection Time: 03/27/22  6:07 PM   Result Value Ref Range    SCAN OF BLOOD SMEAR see below    Urine with Reflexed Micro    Collection Time: 03/27/22  6:30 PM   Result Value Ref Range    Glucose, Ur NEGATIVE NEGATIVE mg/dl    Bilirubin Urine NEGATIVE NEGATIVE    Ketones, Urine NEGATIVE NEGATIVE    Specific Gravity, Urine 1.014 1.002 - 1.030    Blood, Urine LARGE (A) NEGATIVE    pH, UA 5.0 5.0 - 9.0    Protein, UA NEGATIVE NEGATIVE Urobilinogen, Urine 0.2 0.0 - 1.0 eu/dl    Nitrite, Urine NEGATIVE NEGATIVE    Leukocyte Esterase, Urine SMALL (A) NEGATIVE    Color, UA YELLOW STRAW-YELLOW    Character, Urine CLEAR CLEAR-SL CLOUD    RBC, UA 25-50 0-2/hpf /hpf    WBC, UA 10-15 0-4/hpf /hpf    Epithelial Cells, UA 0-2 3-5/hpf /hpf    Bacteria, UA NONE SEEN FEW/NONE SEEN /hpf    Casts UA NONE SEEN NONE SEEN /lpf    Crystals, UA NONE SEEN NONE SEEN    Renal Epithelial, UA NONE SEEN NONE SEEN    Yeast, UA NONE SEEN NONE SEEN    CASTS 2 NONE SEEN NONE SEEN /lpf    MISCELLANEOUS 2 NONE SEEN    SPECIMEN REJECTION    Collection Time: 03/27/22  6:41 PM   Result Value Ref Range    Rejected Test Chem tests     Reason for Rejection see below    Comprehensive Metabolic Panel    Collection Time: 03/27/22  6:58 PM   Result Value Ref Range    Glucose 85 70 - 108 mg/dL    CREATININE 0.9 0.4 - 1.2 mg/dL    BUN 59 (H) 7 - 22 mg/dL    Sodium 135 135 - 145 meq/L    Potassium 5.2 3.5 - 5.2 meq/L    Chloride 105 98 - 111 meq/L    CO2 23 23 - 33 meq/L    Calcium 9.7 8.5 - 10.5 mg/dL    AST 22 5 - 40 U/L    Alkaline Phosphatase 53 38 - 126 U/L    Total Protein 6.9 6.1 - 8.0 g/dL    Albumin 3.4 (L) 3.5 - 5.1 g/dL    Total Bilirubin 0.2 (L) 0.3 - 1.2 mg/dL    ALT 7 (L) 11 - 66 U/L   Troponin    Collection Time: 03/27/22  6:58 PM   Result Value Ref Range    Troponin T 0.018 (A) ng/ml   Lipase    Collection Time: 03/27/22  6:58 PM   Result Value Ref Range    Lipase 158.0 (H) 5.6 - 51.3 U/L   Anion Gap    Collection Time: 03/27/22  6:58 PM   Result Value Ref Range    Anion Gap 7.0 (L) 8.0 - 16.0 meq/L   Glomerular Filtration Rate, Estimated    Collection Time: 03/27/22  6:58 PM   Result Value Ref Range    Est, Glom Filt Rate 59 (A) ml/min/1.73m2   Osmolality    Collection Time: 03/27/22  6:58 PM   Result Value Ref Range    Osmolality Calc 285.9 275.0 - 300.0 mOsmol/kg       Radiology:     CT ABDOMEN PELVIS WO CONTRAST Additional Contrast? None    Result Date: 3/27/2022  PROCEDURE: CT ABDOMEN PELVIS WO CONTRAST CLINICAL INFORMATION: 80-year-old female with constipation . COMPARISON: CT 10/30/2021. TECHNIQUE: Axial 5 mm CT images were obtained through the abdomen and pelvis. No contrast was given. Coronal reconstructions were obtained. All CT scans at this facility use dose modulation, iterative reconstruction, and/or weight-based dosing when appropriate to reduce radiation dose to as low as reasonably achievable. FINDINGS: There are scattered pulmonary nodules at the lung bases. For example, on image #4 there is a 5 mm pulmonary nodule in the right lower lobe. There is no pleural effusion or pneumothorax. There is cardiomegaly. Lack of IV contrast limits evaluation of the abdominal organs. Calcified granulomas are in the spleen. The liver is a smooth contour and is normal in size. The pancreas and adrenal glands are within normal limits. There are some cyst arising from the kidneys. There is no hydronephrosis. There is a gastrostomy tube positioned in the stomach. There is no small bowel obstruction. There is extensive retained fecal material in the rectum. There is a large amount retained stool throughout the colon. There are diverticula. There is a markedly thickened appearance in the region of the sigmoid colon. The urinary bladder appears normal. Diffuse calcified plaque is in the aorta. There is no aneurysmal dilatation. There is no ascites or free air. The bones are intact. 1. Marked fecal impaction in the distal colon/rectum. Findings of constipation throughout the colon. 2. Findings of diverticulosis are again noted. There is redemonstration of an abnormally thickened appearance in the sigmoid colon which could be related to an infectious/inflammatory process although a neoplasm could have a similar appearance. 3. Scattered subcentimeter pulmonary nodules at the lung bases. Follow-up is recommended in 6 months to assess for stability.  **This report has been created using voice recognition software. It may contain minor errors which are inherent in voice recognition technology. ** Final report electronically signed by Dr Mohan Beard on 3/27/2022 6:48 PM        EKG: NSR with bifascicular block        DVT prophylaxis: [] Lovenox                                 [] SCDs                                 [] SQ Heparin                                 [] Encourage ambulation, low risk for DVT, no chemical or mechanical prophylaxis necessary              [x] Already on Anticoagulation                Anticipated Disposition upon discharge: [] Home                                                                         [] Home with Home Health                                                                         [x] [facility]                                                                         [] [address]          Electronically signed by SERGE Black - CNP on 3/28/2022 at 12:41 AM

## 2022-03-28 NOTE — PROGRESS NOTES
[address] [id number] Continence Nurse  Consult Note       Rita Segura  AGE: 80 y.o. GENDER: female  : 1934  UNIT: 5K-15/015-A  TODAY'S DATE:  3/28/2022  ADMISSION DATE: 3/27/2022  4:53 PM  Subjective:     Reason for [address] Evaluation and Assessment: \"stage IV sacral ulcer / follows with wound care at [facility]\"      Rita Segura is a 80 y.o. female referred by:   [x] Physician/PA/APRN  [] Nursing  [] Other:     Wound Identification:  Wound Type: pressure  Contributing Factors: chronic pressure, decreased mobility and shear force    Objective:     Darío Risk Score: Darío Scale Score: 14    Assessment:     Encounter: Present to patient room. Patient in bed upon arrival. Assessment and photo to follow. Patient known to service. Patient on right side. Depend removed. Large, baseball sized formed stool in depends. Stool cleansed with wipe, pat dry. Cleansed wound with normal saline and gauze. Pat dry with clean gauze. Patient noted to have healing stage 4 pressure injury to sacrum, POA. Normal saline moistened gauze applied to wound, covered with ABD pad, secured with HyTape. Recommend staff to change twice daily and PRN for fecal contamination. Chux pad changed. Recommend staff to continue Mandie Reason turns, offload with pillows, application of SPR+ overlay or [place] bed, and to discontinue use of depends due to presence of wound. Triad applied to friable nida rectal skin. Staff to apply with each BM. Bed in low, call light in reach. Wound ostomy to sign off at this time. Call as needed. Wound type: Healing stage 4 sacrum, POA  Wound size: 7.5cm x 7cm x 0.8cm  Undermining or Tunneling: None  Wound assessment/color: 100% pink  Drainage amount: Minimal  Drainage description: Serosanguinous  Odor: None  Margins: Attached, Epibole  Nida wound: Intact, scarring  Exposed structure: None    Response to treatment:  Poorly tolerated by patient., With complaints of pain.      Plan:     Treatment Recommendations:   Sacral wound- Cleanse wound with normal saline and gauze. Pat dry with clean gauze. Apply normal saline moistened gauze to wound bed, cover with ABD pad, secure with HyTape or foam tape. Change twice daily and as needed for fecal contamination. Staff to apply SPR+ overlay or [place] bed due to presence of stage 4 pressure injury. Apply Triad to perirectal wound with each episode of fecal incontinence. Specialty Bed Required :   [x] Low Air Loss   [x] Pressure Redistribution  [] Fluid Immersion- Dolphin  [] Bariatric  [] RotoProne   [] Other:     Discharge Plan:  Placement for patient upon discharge: From ECF  Patient appropriate for [address]:  Yes

## 2022-03-28 NOTE — PROGRESS NOTES
Perfect serve sent to [facility] for wound ostomy consult regarding stage 4 to coccyx at this time.    Electronically signed by Lamar Moon RN on 3/28/2022 at 7:41 AM

## 2022-03-28 NOTE — CARE COORDINATION
3/28/22, 12:45 PM EDT  DISCHARGE PLANNING EVALUATION:    Clementina Rodriguez       Admitted: 3/27/2022/ [address] day: 0   Location: -15/015-A Reason for admit: Impacted stool in rectum ([facility] [address] [K56.41]  Constipation [K59.00]  Constipation, unspecified constipation type [K59.00]   PMH:  has a past medical history of HERIBERTO (acute kidney injury) ([facility] [address] Anxiety, Arthritis, Bronchitis, CHF (congestive heart failure) ([facility] [address] Chronic a-fib ([facility] [address] Diverticulitis of colon, Hyperkalemia, Hypertension, Neuromuscular dysfunction of bladder, Personal history of COVID-19, Pressure ulcer, and Protein-calorie malnutrition ([facility] [address] Procedure: N/A  Barriers to Discharge:  Patient presents due to constipation and rectal bleeding. GI consult, IV fluids, Magnesium Citrate x 1 dose, Movantik and Colace scheduled, Eliquis, prn Tylenol, Duoneb nebulizer, and Zofran, C-reactive protein and Sed rate in a.m., full liquid diet, fall precautions, telemetry, consult to SOURAV MCCOLLUM, and Dietician. Patient has a PEG and a trach. PCP: Osiris Padilla MD   %    Patient Goals/Plan/Treatment Preferences: Amy Deshpande is from [facility]. SS consulted for her return. Transportation/Food Security/Housekeeping Addressed:  No issues identified.

## 2022-03-28 NOTE — ED NOTES
RN assisted pt to use bedside commode. Pt given ensure drink per request. Family remains at bedside.       Sheng Pinzon RN  03/27/22 0725

## 2022-03-28 NOTE — CARE COORDINATION
3/28/22, 10:55 AM EDT  Discharge Planning Evaluation  Social work consult received, patient from [facility]. Patient/Family preference is to return to [facility]. The patient's current payor source at the facility is Medicare. Medicare skilled days available: Yes   Insurance precert:  No  Spoke with Livia Kilgore at the facility. Patient bed hold: Yes  Anticipated transport plan: Ambulance due to wound  Do they require COVID 19 test to return to ECF: yes  Is there a required time frame which which COVID test needs done: 24/48 hours. Livia Kilgore is aware that discharge today is possible. Spoke with Stas Ibarra daughter ands he is also aware of possible discharge if patient is shan to have a BM. Discharge instructions placed on the chart.

## 2022-03-28 NOTE — PROGRESS NOTES
Comprehensive Nutrition Assessment    Type and Reason for Visit:  Initial,Positive Nutrition Screen (supplemental tube feeding, wound)    Nutrition Recommendations/Plan:   -Diet progression as per Doctor/ Speech Therapy  -ONS initiated: Bettey Beckers Standard TID (does not contain milk related compounds or gluten)  -Recommend supplemental tube feeding: If intake 50% or less of meal, recommend bolus 325ml Bettey Beckers Peptide 1.5 and flush 100ml water before and after each bolus (to provide 1500 kcals, 72 grams protein, 135 grams CHO, 684ml free water (1284ml free water including flushes)/ 975ml volume (1575ml total volume including water flushes)/ 24 hours     Nutrition Assessment:    Pt. severely malnourished AEB criteria listed below. At risk for further nutritional compromise r/t admit with impacted stool in rectum, increased nutrient needs to aid in healing and underlying medical condition (hx HERIBERTO, anxiety, CHF, diverticulitis, HTN, COVID). Malnutrition Assessment:  Malnutrition Status:  Severe malnutrition    Context:  Acute Illness     Findings of the 6 clinical characteristics of malnutrition:  Energy Intake:  7 - 50% or less of estimated energy requirements for 5 or more days (50% or less of meals at [facility] and often refuses tube feeding per ECF report)  Weight Loss:  7 - Greater than 7.5% over 3 months (18.7% loss over 3 months)     Body Fat Loss:  7 - Moderate body fat loss Orbital   Muscle Mass Loss:  7 - Moderate muscle mass loss Temples (temporalis),Clavicles (pectoralis & deltoids)  Fluid Accumulation:  No significant fluid accumulation     Strength:  Not Performed    Estimated Daily Nutrient Needs:  Energy (kcal):  2762-7357 kcals (30-35); Weight Used for Energy Requirements:   (51kgm on 3/28)     Protein (g):   grams (1.2-2); Weight Used for Protein Requirements:   (51kgm on 3/28)        Fluid (ml/day):  1275-1530ml (25-30);  Method Used for Fluid Requirements:   (51kgm on 3/28) Nutrition Related Findings:       Pt. Report/Treatments/Miscellaneous: thin patient seen with trach, reports will have trach removed soon, reports intake of cranberry juice and jello for breakfast, note allergy to milk related compounds and gluten, reports also avoids corn syrup  Per ECF staff: bolus 360ml Glucerna 1.5 TID but often refuse because is able to eat, no use of ONS at [facility], on mechanical soft diet with thin liquids at [facility]  GI Status: admit with abdominal pain secondary to fecal impaction, patient report of \"belly discomfort\", RN reports had one BM this morning  Pertinent Labs: lipase 158, Sodium 140, Potassium 4.6, BUN 47, Creatinine 0.8, Glucose 93  Pertinent Meds: colace, glycolax every 2 hours, culturelle, synthroid, IV fluid    Wounds:  Stage IV (coccyx; buttocks small open area)       Current Nutrition Therapies:    ADULT DIET; Full Liquid  ADULT ORAL NUTRITION SUPPLEMENT; Breakfast, Lunch, Dinner;  Other Oral Supplement; Aletta Borders Standard TID    Anthropometric Measures:  · Height: 5' (152.4 cm)  · Current Body Weight: 113 lb (51.3 kg) (bedscale weight obtained by writer 3/28)   · Admission Body Weight: 102 lb (46.3 kg) (stated 3/27; non pitting, trace edema)    · Usual Body Weight:  (per ECF: 3/15/22 102.6#; per EMR: 12/18/21 139# 3.2oz, 10/11/21 138# 7.2oz)     · Ideal Body Weight: 100 lbs;   · BMI: 22.1  · BMI Categories: Normal Weight (BMI 22.0 to 24.9) age over 72       Nutrition Diagnosis:   · Severe malnutrition,In context of acute illness or injury related to inadequate protein-energy intake as evidenced by poor intake prior to admission,moderate loss of subcutaneous fat,moderate muscle loss      Nutrition Interventions:   Food and/or Nutrient Delivery:  Continue Current Diet,Start Oral Nutrition Supplement (diet progression as per Doctor/ Speech therapy)  Nutrition Education/Counseling:  Education initiated (encouraged good nutrition/ intake at best efforts, agreeable to trial [facility] [employer] ONS)   Coordination of Nutrition Care:  Continue to monitor while inpatient    Goals:  Patient will receive % nutrients needs via oral diet and/or enteral feeding during LOS. Nutrition Monitoring and Evaluation:   Behavioral-Environmental Outcomes:  None Identified   Food/Nutrient Intake Outcomes:  Diet Advancement/Tolerance,Food and Nutrient Intake,Supplement Intake  Physical Signs/Symptoms Outcomes:  Biochemical Data,Fluid Status or Edema,Meal Time Behavior,Nutrition Focused Physical Findings,Skin,Weight,GI Status     Discharge Planning:     Too soon to determine     Electronically signed by Castro Mckeon RD, LD on 3/28/22 at 11:46 AM EDT    Contact: [phone]

## 2022-03-28 NOTE — PLAN OF CARE
Nutrition Problem #1: Severe malnutrition,In context of acute illness or injury  Intervention: Food and/or Nutrient Delivery: Continue Current Diet,Start Oral Nutrition Supplement (diet progression as per Doctor/ Speech therapy)  Nutritional Goals: Patient will receive % nutrients needs via oral diet and/or enteral feeding during LOS.    Problem: Nutrition  Goal: Optimal nutrition therapy  Outcome: Ongoing

## 2022-03-28 NOTE — ED NOTES
ED to inpatient nurses report    Chief Complaint   Patient presents with    Constipation    Rectal Bleeding      Present to ED from home  LOC: alert and orientated to name, place, date  Vital signs   Vitals:    03/27/22 1654 03/27/22 1816 03/27/22 1923 03/27/22 2041   BP: (!) 134/44 (!) 110/47 135/61 117/73   Pulse: 90 76 86 82   Resp: 20 20 18 18   Temp: 97.7 °F (36.5 °C)      SpO2: 96% 99% 100% 99%   Weight: 102 lb (46.3 kg)      Height: 5' (1.524 m)         Oxygen Baseline RA    Current needs required RA   LDAs:   Peripheral IV 03/27/22 Left Forearm (Active)   Site Assessment Clean;Dry; Intact 03/27/22 2042   Line Status Normal saline locked 03/27/22 2042     Mobility: Requires assistance * 1  Pending ED orders: none  Present condition: stable, family at bedside    Electronically signed by Lula Garnica RN on 3/27/2022 at 8:43 PM       Lula Garnica RN  03/27/22 2043

## 2022-03-28 NOTE — PROGRESS NOTES
Hospitalist Progress Note    Patient:  Marylee Estelle      Unit/Bed:5K-15/015-A    YOB: 1934    MRN: [id number]       Acct: [de-identified]     PCP: Dylan Larose MD    Date of Admission: 3/27/2022    Assessment/Plan:    1. Abdominal Pain secondary to fecal impaction, likely intermittent opiate use and decreased mobility contributing:    - CT abd/pelv (3/27) notable for marked fecal impaction of the distal colon/rectum findings of constipation throughout the colon. - Patient initially started on Miralax Q2H per Dr. Karmen Mora. Patient switched to [facility] and to receive 1 dose of Mag citrate.   - Continue stool softner   - Continuous IVF for gentle rehydration   - Encourage oral intake. Full liquid diet. - GI consulted, appreciate recs. 2. Stage IV sacral ulcer:    - Patient and daughter report sacral ulcer since admission for Covid in October 2021. Follows with wound care and is healing per daughter.    - Wound care consulted, appreciate recs. 3. History of diverticulosis:    - CT abd/pelv (3/27) showing indings of diverticulosis. There is redemonstration of an abnormally thickened appearance in the sigmoid colon which could be related to an infectious/inflammatory process although a neoplasm could have a similar appearance. This was seen on a CT scan from October of last year as well and patient and daughter are aware of this, daughter states patient had inflammatory scar in that area. White count is normal.  Patient is not febrile. No indication the patient is having an episode of diverticulitis. ESR and CRP ordered and pending. 4. Elevated lipase:    - Lipase is 158 on arrival.  No evidence of inflammation of the pancreas on CT. Patient denies right upper abdominal pain. Will repeat lipase in the morning. 5. Chronic macrocytic anemia:    - Baseline Hbg range ~ 7-8. Hemoglobin 10.2 (3/28). May be some level of hemoconcentration secondary to dehydration.     - Trend and monitor CBC. - Transfuse for Hgb < 7 g/dL. 6. History of paroxysmal atrial fibrillation:    -  [id number] score is 4.    - Continue home Eliquis. 7. Chronically elevated troponin:    - Baseline troponin range ~0.014 - 0.023. Troponin within baseline range on admission. ECG unchanged from prior and showing no new ischemic changes. Patient denies chest pain. No need to trend and monitor. 8. Elevated BUN, likely multifactorial:    - Some component of dehydration likely. Patient also receiving protein supplement for wound healing at the [facility]. Will monitor daily BMP. 9. History of dysphagia secondary to tracheostomy:    - Per speech therapy recommendations from January 14 minced/moist solids, thin liquids, meds crushed in puree, and  Direct 1:1 supervision. 10. History of CKDIII:    - Stable. Continue to monitor with daily lab. 11. History of Systolic Heart Failure:     - Echo 11/22/21) notable for EF 25%,severe global hypokinesis of LV.   - Patient euvolemic. Lungs CTAB, no LE swelling.   -  Continue ASA, Lopressor. 12. History of asthma:    - Continue home PRN nebulizer. 13. Incidental finding of pulmonary nodules at the lung bases:   - Recommend OP f/u in 6 months to assess for stability. Expected discharge date:  Possibly 3/29/22 if she has a bowel movement. Disposition:    [] Home       [] TCU       [] Rehab       [] Psych       [x] SNF       [] [facility]       [] Other-    Chief Complaint: Abdominal pain, constipation    Hospital Course: Per HPI documented 3/27/22: \"History obtained from chart review and the patient and daughter.     The patient is a 80 y.o. female with a past medical history of congestive heart failure, atrial fibrillation, and a pressure ulcer who presents with complaints of abdominal pain and constipation. Patient was hospitalized with COVID-19 in October 2021, had tracheostomy and PEG tube placement.   Currently residing in a skilled nursing facility. Patient reports intermittent constipation likely secondary to intermittent opiate use and decreased mobility. Patient reports that she has not had a bowel movement for 4 days and is now having significant lower abdominal pain and rectal pressure. She reports that when she is on the toilet sometimes she has bleeding from her rectum with straining. Per report, SNF has not been giving her MiraLAX as they had concerns her decubitus ulcer may become contaminated with diarrhea. She states that she feels a large stool in her rectum but is unable to pass it. \"    3/28/22: Patient resting in bed, looks uncomfortable. She is satting 100% on room air, remains afebrile with hemodynamically stable vital signs. States that she has a lot of \"pressure\" on her abdomen. States that she feels like she is about to have a bowel movement. Continue bowel regimen ordered. If patient has bowel movement she can possibly be discharged back to nursing home tomorrow. Pending wound care recommendations about decubitus sacral ulcer. Subjective (past 24 hours):     Denies chest pain, worsening SOB, GIL, N/V/D, F/C. Still reports constipation. Reports abdominal pain rated 7/10.        Medications:  Reviewed    Infusion Medications    sodium chloride 50 mL/hr at 03/28/22 0208    sodium chloride       Scheduled Medications    apixaban  2.5 mg Per G Tube BID    aspirin  325 mg PEG Tube Daily    docusate  100 mg Per G Tube BID    cetirizine  10 mg Oral Daily    levothyroxine  62.5 mcg PEG Tube QAM AC    metoprolol tartrate  25 mg PEG Tube BID    lactobacillus  1 capsule Per G Tube Daily    sodium chloride flush  5-40 mL IntraVENous 2 times per day    polyethylene glycol  17 g Per G Tube Q2H     PRN Meds: ipratropium-albuterol, sodium chloride flush, sodium chloride, acetaminophen **OR** acetaminophen, ondansetron **OR** ondansetron    No intake or output data in the 24 hours ending 03/28/22 1150    Diet:  ADULT DIET; Full Liquid    Exam:  BP (!) 116/54   Pulse 92   Temp 97.3 °F (36.3 °C)   Resp 20   Ht 5' (1.524 m)   Wt 102 lb (46.3 kg)   SpO2 99%   BMI 19.92 kg/m²     General appearance: No apparent distress, appears older than stated age and cooperative. Chronically ill-appearing. HEENT: Pupils equal, round, and reactive to light. Conjunctivae/corneas clear. Neck: Supple, with full range of motion. No jugular venous distention. Trachea midline with capped trach noted. Respiratory:  Normal respiratory effort. Clear to auscultation, bilaterally without Rales/Wheezes/Rhonchi. Cardiovascular: Regular rate and rhythm with normal S1/S2 without murmurs, rubs or gallops. Abdomen: Soft, non-tender, mildly distended with normal bowel sounds. PEG intact. Musculoskeletal: passive and active ROM x 4 extremities. Skin: Skin color, texture, turgor normal.  No rashes or lesions. Neurologic:  Neurovascularly intact without any focal sensory/motor deficits. Cranial nerves: II-XII intact, grossly non-focal.  Psychiatric: Alert and oriented, thought content appropriate, normal insight  Capillary Refill: Brisk,< 3 seconds   Peripheral Pulses: +2 palpable, equal bilaterally       Labs:   Recent Labs     03/27/22  1807 03/28/22  0500   WBC 8.5 6.3   HGB 10.9* 10.2*   HCT 34.6* 32.5*    187     Recent Labs     03/27/22  1858 03/28/22  0500    140   K 5.2 4.6    108   CO2 23 21*   BUN 59* 47*   CREATININE 0.9 0.8   CALCIUM 9.7 9.8     Recent Labs     03/27/22  1858   AST 22   ALT 7*   BILITOT 0.2*   ALKPHOS 53     No results for input(s): INR in the last 72 hours. No results for input(s): Preston Altes in the last 72 hours.     Microbiology:      Urinalysis:      Lab Results   Component Value Date    NITRU NEGATIVE 03/27/2022    WBCUA 10-15 03/27/2022    BACTERIA NONE SEEN 03/27/2022    RBCUA 25-50 03/27/2022    BLOODU LARGE 03/27/2022    SPECGRAV 1.017 12/18/2021    GLUCOSEU NEGATIVE 03/27/2022       Radiology:  CT ABDOMEN PELVIS WO CONTRAST Additional Contrast? None    Result Date: 3/27/2022  PROCEDURE: CT ABDOMEN PELVIS WO CONTRAST CLINICAL INFORMATION: 66-year-old female with constipation . COMPARISON: CT 10/30/2021. TECHNIQUE: Axial 5 mm CT images were obtained through the abdomen and pelvis. No contrast was given. Coronal reconstructions were obtained. All CT scans at this facility use dose modulation, iterative reconstruction, and/or weight-based dosing when appropriate to reduce radiation dose to as low as reasonably achievable. FINDINGS: There are scattered pulmonary nodules at the lung bases. For example, on image #4 there is a 5 mm pulmonary nodule in the right lower lobe. There is no pleural effusion or pneumothorax. There is cardiomegaly. Lack of IV contrast limits evaluation of the abdominal organs. Calcified granulomas are in the spleen. The liver is a smooth contour and is normal in size. The pancreas and adrenal glands are within normal limits. There are some cyst arising from the kidneys. There is no hydronephrosis. There is a gastrostomy tube positioned in the stomach. There is no small bowel obstruction. There is extensive retained fecal material in the rectum. There is a large amount retained stool throughout the colon. There are diverticula. There is a markedly thickened appearance in the region of the sigmoid colon. The urinary bladder appears normal. Diffuse calcified plaque is in the aorta. There is no aneurysmal dilatation. There is no ascites or free air. The bones are intact. 1. Marked fecal impaction in the distal colon/rectum. Findings of constipation throughout the colon. 2. Findings of diverticulosis are again noted. There is redemonstration of an abnormally thickened appearance in the sigmoid colon which could be related to an infectious/inflammatory process although a neoplasm could have a similar appearance.  3. Scattered subcentimeter pulmonary nodules at the lung bases. Follow-up is recommended in 6 months to assess for stability. **This report has been created using voice recognition software. It may contain minor errors which are inherent in voice recognition technology. ** Final report electronically signed by Dr Claudene Patch on 3/27/2022 6:48 PM      DVT prophylaxis: [] Lovenox                                 [] SCDs                                 [] SQ Heparin                                 [] Encourage ambulation           [x] Already on Anticoagulation     Code Status: Full Code    PT/OT Eval Status: None    Tele:   [] yes             [x] no    Active Hospital Problems    Diagnosis Date Noted    Constipation [K59.00] 03/27/2022       Electronically signed by SERGE Adams CNP on 3/28/2022 at 7:52 AM

## 2022-03-28 NOTE — DISCHARGE INSTR - COC
Continuity of Care Form    Patient Name: Mu Presshaheen   :  1934  MRN:  [id number]    Admit date:  3/27/2022  Discharge date:  22    Code Status Order: Full Code   Advance Directives:      Admitting Physician:  Napoleon Wu MD  PCP: Manuela Conti MD    Discharging Nurse: [facility] Unit/Room#: 5K-15/015-A  Discharging Unit Phone Number: [phone]    Emergency Contact:   Extended Emergency Contact Information  Primary Emergency Contact: Audrey Llamas  Address: [facility]             [facility] [address] Phone: [phone]  Mobile Phone: [phone]  Relation: Child  Hearing or visual needs: None  Other needs: None  Preferred language: [place]   needed? No  Secondary Emergency Contact: Fatoumata Grajeda  Address: [address] [facility] [address]           [facility] [address]  Phone: [phone]  Relation: Child  Hearing or visual needs: None  Other needs: None  Preferred language: English   needed?  No    Past Surgical History:  Past Surgical History:   Procedure Laterality Date    APPENDECTOMY      CHOLECYSTECTOMY      GASTROSTOMY TUBE PLACEMENT N/A 2021    EGD PEG TUBE PLACEMENT performed by Joan Myers MD at [facility] N/A 2021    DECUBITUS ULCER DEBRIDEMENT REPAIR performed by Raphael Rivera MD at [facility]         Immunization History:   Immunization History   Administered Date(s) Administered    COVID-19, Pfizer Purple top, DILUTE for use, 12+ yrs, 30mcg/0.3mL dose 2021, 2021    Influenza, Quadv, Recombinant, IM PF (Flublok 18 yrs and older) 2020    Tdap (Boostrix, Adacel) 2021    Zoster Live (Zostavax) 2015    Zoster Recombinant (Shingrix) 2020, 2021       Active Problems:  Patient Active Problem List   Diagnosis Code    Sigmoid diverticulitis K57.32    Acute respiratory failure with hypoxia ([facility] [address] J96.01    COVID-19 U07.1    Acute congestive heart failure (HCC) I50.9    C. difficile colitis A04.72    CHF (congestive heart failure) (HCC) I50.9    Cardiomyopathy (HCC) I42.9    Hyperkalemia E87.5    Chronic respiratory failure (HCC) J96.10    Decubitus ulcer L89.90    Ventilator dependence (HCC) Z99.11    Severe malnutrition (HCC) E43    Chronic systolic congestive heart failure (HCC) I50.22    PVC (premature ventricular contraction) I49.3    Constipation K59.00       Isolation/Infection:   Isolation            No Isolation          Patient Infection Status       Infection Onset Added Last Indicated Last Indicated By Review Planned Expiration Resolved Resolved By    None active    Resolved    C-diff Rule Out 12/18/21 12/18/21 12/18/21 Clostridium Difficile Toxin/Antigen (Ordered)   12/18/21 Rule-Out Test Resulted    C-diff Rule Out 11/27/21 11/27/21 11/27/21 Clostridium Difficile Toxin/Antigen (Ordered)   11/27/21 Rule-Out Test Resulted    C-diff Rule Out 10/21/21 10/21/21 10/21/21 Gastrointestinal Panel, Molecular (Ordered)   10/25/21 Tip Collado RN    COVID-19 10/05/21 10/07/21 10/05/21 COVID-19   11/16/21 Tip Collado RN    +10/5, admit 10/11, hypoxic, severe    COVID-19 (Rule Out) 10/05/21 10/05/21 10/05/21 COVID-19 (Ordered)   10/07/21 Rule-Out Test Resulted            Nurse Assessment:  Last Vital Signs: BP (!) 120/52   Pulse 79   Temp 97.4 °F (36.3 °C) (Axillary)   Resp 18   Ht 5' (1.524 m)   Wt 102 lb (46.3 kg)   SpO2 100%   BMI 19.92 kg/m²     Last documented pain score (0-10 scale): Pain Level: 10  Last Weight:   Wt Readings from Last 1 Encounters:   03/27/22 102 lb (46.3 kg)     Mental Status:  oriented and alert    IV Access:  - None    Nursing Mobility/ADLs:  Walking   Assisted  Transfer  Assisted  Bathing  Assisted  Dressing  Assisted  Toileting  Assisted  Feeding  Independent  Med Admin  Assisted  Med Delivery   whole    Wound Care Documentation and Therapy:  Wound 11/06/21 Coccyx (Active)   Dressing Status New dressing applied 03/27/22 2115   Wound Cleansed Cleansed with saline 03/27/22 2115   Dressing/Treatment Moist to dry;Silicone border; Foam 03/27/22 2115   Wound Assessment [place]/red;[place] 03/27/22 2115   Nida-wound Assessment Non-blanchable erythema;Fragile 03/27/22 2115   Number of days: 142       Wound 03/27/22 Buttocks Right;Lateral Small open area (Active)   Number of days: 0        Elimination:  Continence: Bowel: AT TIMES  Bladder: AT TIMES    Urinary Catheter: None   Colostomy/Ileostomy/Ileal Conduit: No       Date of Last BM: 03/29/22  No intake or output data in the 24 hours ending 03/28/22 1043  No intake/output data recorded. Safety Concerns: At Risk for Falls and Aspiration Risk    Impairments/Disabilities:      None    Nutrition Therapy:  Current Nutrition Therapy:   - Oral Diet:  General and gluten free  - Tube Feedings:  AS PRESCRIBED BEFORE. IF PT EATS LESS THAN 50% OF MEALS    Routes of Feeding: Oral and Gastrostomy Tube  Liquids: Thin Liquids  Daily Fluid Restriction: no  Last Modified Barium Swallow with Video (Video Swallowing Test): not done    Treatments at the Time of Hospital Discharge:   Respiratory Treatments: ***  Oxygen Therapy:  is not on home oxygen therapy.   Ventilator:    - No ventilator support    Rehab Therapies: Physical Therapy, Occupational Therapy, and Speech/Language Therapy  Weight Bearing Status/Restrictions: No weight bearing restrictions  Other Medical Equipment (for information only, NOT a DME order):  wheelchair and walker  Other Treatments: n/a    Patient's personal belongings (please select all that are sent with patient):  None    RN SIGNATURE:  Electronically signed by Lashae Mcdermott RN on 3/29/22 at 3:27 PM EDT    CASE MANAGEMENT/SOCIAL WORK SECTION    Inpatient Status Date: 3/27/2022    Readmission Risk Assessment Score:  Readmission Risk              Risk of Unplanned Readmission:  0           Discharging to Facility/ [address] [place] [address] [place], [address] [address]         Phone: [phone]       Fax: [phone]          Dialysis Facility (if applicable)   Name:  Address:  Dialysis Schedule:  Phone:  Fax:    /Social Kristy Fisher signature: Electronically signed by BELEM Radford on 3/28/22 at 10:44 AM EDT  /  PHYSICIAN SECTION    Prognosis: Good    Condition at Discharge: Stable    Rehab Potential (if transferring to Rehab): Fair    Recommended Labs or Other Treatments After Discharge: Colace BID, Daily movantik, stop taking immodium. Zofran PRN, continue tap water enemas     Physician Certification: I certify the above information and transfer of Maryjo Arnold  is necessary for the continuing treatment of the diagnosis listed and that she requires Valdez Aguel for greater 30 days.      Update Admission H&P: No change in H&P    PHYSICIAN SIGNATURE:  Electronically signed by SERGE Santiago CNP on 3/29/22 at 3:36 PM EDT

## 2022-03-28 NOTE — PLAN OF CARE
Problem: Skin Integrity:  Goal: Will show no infection signs and symptoms  Description: Will show no infection signs and symptoms  Outcome: Ongoing  Pt afebrile with no s/s of infection. Will continue to monitor. Problem: Skin Integrity:  Goal: Absence of new skin breakdown  Description: Absence of new skin breakdown  Outcome: Ongoing  No new skin breakdown this shift. Patient being assisted with turning. Patients states understanding of repositioning every two hours. Problem: Falls - Risk of:  Goal: Will remain free from falls  Description: Will remain free from falls  Outcome: Ongoing  All fall precautions in place. Bed in low position, alarm activated and appropriate use of call light. Problem: Pain:  Goal: Control of acute pain  Description: Control of acute pain  Outcome: Ongoing  Pt expresses relief of pain at this time. Problem: Discharge Planning:  Goal: Discharged to appropriate level of care  Description: Discharged to appropriate level of care  Outcome: Ongoing   Pt from [facility]. Pt to return at time of d/c. Care plan reviewed with patient. Patient verbalizes understanding of the plan of care and contributes to goal setting.

## 2022-03-28 NOTE — ED NOTES
Patient transferred to [facility] room 015 nurse informed.       Andre Rosenberg  03/27/22 5008

## 2022-03-29 VITALS
RESPIRATION RATE: 18 BRPM | HEIGHT: 60 IN | BODY MASS INDEX: 20.03 KG/M2 | TEMPERATURE: 97.2 F | HEART RATE: 75 BPM | SYSTOLIC BLOOD PRESSURE: 106 MMHG | WEIGHT: 102 LBS | OXYGEN SATURATION: 100 % | DIASTOLIC BLOOD PRESSURE: 49 MMHG

## 2022-03-29 PROBLEM — K59.00 CONSTIPATION: Status: RESOLVED | Noted: 2022-03-27 | Resolved: 2022-03-29

## 2022-03-29 LAB
C-REACTIVE PROTEIN: 3.36 MG/DL (ref 0–1)
LIPASE: 114.4 U/L (ref 5.6–51.3)
SARS-COV-2, NAAT: NOT  DETECTED
SEDIMENTATION RATE, ERYTHROCYTE: 35 MM/HR (ref 0–20)

## 2022-03-29 PROCEDURE — 6370000000 HC RX 637 (ALT 250 FOR IP)

## 2022-03-29 PROCEDURE — 6370000000 HC RX 637 (ALT 250 FOR IP): Performed by: NURSE PRACTITIONER

## 2022-03-29 PROCEDURE — 87635 SARS-COV-2 COVID-19 AMP PRB: CPT

## 2022-03-29 PROCEDURE — 99225 PR SBSQ OBSERVATION CARE/DAY 25 MINUTES: CPT

## 2022-03-29 PROCEDURE — 83690 ASSAY OF LIPASE: CPT

## 2022-03-29 PROCEDURE — 85651 RBC SED RATE NONAUTOMATED: CPT

## 2022-03-29 PROCEDURE — G0378 HOSPITAL OBSERVATION PER HR: HCPCS

## 2022-03-29 PROCEDURE — 96361 HYDRATE IV INFUSION ADD-ON: CPT

## 2022-03-29 PROCEDURE — 36415 COLL VENOUS BLD VENIPUNCTURE: CPT

## 2022-03-29 PROCEDURE — 86140 C-REACTIVE PROTEIN: CPT

## 2022-03-29 PROCEDURE — 6370000000 HC RX 637 (ALT 250 FOR IP): Performed by: PHYSICIAN ASSISTANT

## 2022-03-29 RX ORDER — ONDANSETRON 4 MG/1
4 TABLET, ORALLY DISINTEGRATING ORAL EVERY 8 HOURS PRN
DISCHARGE
Start: 2022-03-29

## 2022-03-29 RX ADMIN — CETIRIZINE HYDROCHLORIDE 10 MG: 10 TABLET, FILM COATED ORAL at 11:16

## 2022-03-29 RX ADMIN — Medication 1 CAPSULE: at 11:20

## 2022-03-29 RX ADMIN — NALOXEGOL OXALATE 12.5 MG: 12.5 TABLET, FILM COATED ORAL at 11:16

## 2022-03-29 RX ADMIN — ACETAMINOPHEN 325 MG: 325 TABLET ORAL at 04:19

## 2022-03-29 RX ADMIN — DOCUSATE SODIUM LIQUID 100 MG: 100 LIQUID ORAL at 11:20

## 2022-03-29 RX ADMIN — METOPROLOL TARTRATE 25 MG: 25 TABLET, FILM COATED ORAL at 11:16

## 2022-03-29 RX ADMIN — APIXABAN 2.5 MG: 2.5 TABLET, FILM COATED ORAL at 11:17

## 2022-03-29 RX ADMIN — LEVOTHYROXINE SODIUM 62.5 MCG: 0.12 TABLET ORAL at 11:17

## 2022-03-29 RX ADMIN — ASPIRIN 325 MG: 325 TABLET ORAL at 11:20

## 2022-03-29 ASSESSMENT — PAIN SCALES - GENERAL
PAINLEVEL_OUTOF10: 3
PAINLEVEL_OUTOF10: 0

## 2022-03-29 NOTE — PROGRESS NOTES
Gastroenterology Progress Note:     Patient Name:  Maryjo Arnold   MRN: [id number]  [id number]  YOB: 1934  Admit Date: 3/27/2022  4:53 PM  Primary Care Physician: Young Daly MD   [id number]     Patient seen and examined. 24 hours events and chart reviewed. Subjective: Patient resting in bed. She continues to have some generalized abd discomfort, but it is better. She had 3 BMs yesterday. She feels like she needs to have more BMs. Per RN, she has been refusing the tap water enemas. Lengthy discussion had regarding the importance of the tap water enemas. She denies n/v. She is tolerating a full liquid diet. Objective:  BP (!) 106/49   Pulse 75   Temp 97.2 °F (36.2 °C) (Axillary)   Resp 18   Ht 5' (1.524 m)   Wt 102 lb (46.3 kg)   SpO2 100%   BMI 19.92 kg/m²     Physical Exam:    General:  Chronically ill appearing female  HEENT: Atraumatic, normocephalic. Moist oral mucous membranes. Neck: Supple without adenopathy, JVD, thyromegaly or masses. Trachea midline. CV: Heart RRR, no murmurs, rubs, gallops. Resp: Even, easy without cough or accessory use. Lungs clear to ascultation bilaterally. Abd: Round, soft, nontender. No hepatosplenomegaly or mass present. Active bowel sounds heard. Soft distention noted. PEG intact & patent. Ext:  Without cyanosis, clubbing, edema. Skin: Pink, warm, dry  Neuro:  Alert, oriented x 3 with no obvious deficits.        Rectal: deferred    Labs:   CBC:   Lab Results   Component Value Date    WBC 6.3 03/28/2022    HGB 10.2 03/28/2022    HCT 32.5 03/28/2022    .2 03/28/2022     03/28/2022     BMP:   Lab Results   Component Value Date     03/28/2022    K 4.6 03/28/2022     03/28/2022    CO2 21 03/28/2022    PHOS 3.0 12/15/2021    BUN 47 03/28/2022    CREATININE 0.8 03/28/2022    CALCIUM 9.8 03/28/2022     PT/INR:   Lab Results   Component Value Date    INR 2.16 12/19/2021     Lipids:   Lab Results   Component Value Date ALKPHOS 53 03/27/2022    ALT 7 03/27/2022    AST 22 03/27/2022    BILITOT 0.2 03/27/2022    BILIDIR <0.2 11/08/2021    LABALBU 3.4 03/27/2022    LABALBU 3.9 08/10/2011    LIPASE 114.4 03/29/2022     Current Meds:  Scheduled Meds:   apixaban  2.5 mg Per G Tube BID    aspirin  325 mg PEG Tube Daily    docusate  100 mg Per G Tube BID    naloxegol  12.5 mg Per NG tube QAM    magnesium citrate  296 mL PEG Tube Once    cetirizine  10 mg Oral Daily    levothyroxine  62.5 mcg PEG Tube QAM AC    metoprolol tartrate  25 mg PEG Tube BID    lactobacillus  1 capsule Per G Tube Daily    sodium chloride flush  5-40 mL IntraVENous 2 times per day     Continuous Infusions:   sodium chloride 50 mL/hr at 03/29/22 0631    sodium chloride         Assessment:  79 yo F admitted 03/27/22 from SNF for abd pain & constipation. Endorses intermittent red streaks with straining with stools. Reported SNF was holding Miralax due to decubitus ulcer. CT A/P demonstrated marked fecal impaction in the distal colon/rectum, constipation throughout the colon, diverticulosis, abnormally thickened appearance in the sigmoid colon. 1. Generalized abdominal pain  2. Acute constipation, opioid induced- improved  3. Stage IV decubitus sacral ulcer  4. Afib- on Eliquis  5. Dysphagia s/p PEG  6. S/P tracheostomy  7. CKD stage III  8. Chronic macrocytic anemia- no GI bleeding noted  9. CHF  10. Asthma, not exacerbated  11. Pulmonary nodules- noted on CT  12. H/O sigmoid stricture  13. H/O diverticulosis  14. Internal hemorrhoids    Plan:    · Monitor H & H, transfuse prn  · Continue Colace BID, script sent  · Continue Movantik daily, script sent  · Continue tap water enemas  · Lengthy discussion had regarding the importance of the tap water enemas.   · Nursing to monitor stool output & document  · Okay for general diet  · Wound RN following  · Supportive care per primary team   Okay to discharge if tolerating general diet from GI standpoint   Will need a 5 week follow-up with [facility]    Case reviewed and impression/plan reviewed in collaboration with Dr. Zakc Meade  Electronically signed by SERGE Irwin - CNP on 3/29/2022 at 10:41 AM     [facility]

## 2022-03-29 NOTE — PLAN OF CARE
Problem: Skin Integrity:  Goal: Will show no infection signs and symptoms  Description: Will show no infection signs and symptoms  Outcome: Completed  Goal: Absence of new skin breakdown  Description: Absence of new skin breakdown  Outcome: Completed     Problem: Falls - Risk of:  Goal: Will remain free from falls  Description: Will remain free from falls  Outcome: Completed  Goal: Absence of physical injury  Description: Absence of physical injury  Outcome: Completed     Problem: Pain:  Goal: Pain level will decrease  Description: Pain level will decrease  Outcome: Completed  Goal: Control of acute pain  Description: Control of acute pain  Outcome: Completed  Goal: Control of chronic pain  Description: Control of chronic pain  Outcome: Completed     Problem: Discharge Planning:  Goal: Discharged to appropriate level of care  Description: Discharged to appropriate level of care  Outcome: Completed     Problem: Nutrition  Goal: Optimal nutrition therapy  Outcome: Completed

## 2022-03-29 NOTE — DISCHARGE SUMMARY
Hospital Medicine Discharge Summary      Patient Identification:   Aung Caceres   : 1934  MRN: [id number]   Account: [de-identified]      Patient's PCP: Lina Antonio MD    Admit Date: 3/27/2022     Discharge Date:   22     Admitting Physician: Alisha Almodovar MD     Discharging Nurse Practitioner: SERGE Rice - CNP     Discharge Diagnoses with Assessment/Plan:    1. Abdominal Pain secondary to fecal impaction, likely intermittent opiate use and decreased mobility contributing:               - CT abd/pelv (3/27) notable for marked fecal impaction of the distal colon/rectum findings of constipation throughout the colon. - Patient initially started on Miralax Q2H per Dr. aArti Saul 3/27. Patient switched to [place] [facility] and to receive s/p 1 dose of Mag citrate 3/28. - Patient had 3 large bowel movements and tolerating solid foods w/o abd pain, N/V.              - Continue stool softner BID              - Continue daily Movantik   - PRN antiemetics               - Okay to resume regular diet. - GI consulted, appreciate recs: F/u with SER[facility] Rhoades, OP GI within 5 weeks.      2. Healing Stage IV sacral ulcer (POA):               - Patient and daughter report sacral ulcer since admission for Covid in 2021.  Follows with wound care and is healing per daughter.               - Wound care consulted. Per wound care note documented 3/28/2022 at 1220: \"Normal saline moistened gauze applied to wound, covered with ABD pad, secured with HyTape. Recommend staff to change twice daily and PRN for fecal contamination. Chux pad changed. Recommend staff to continue [place] turns, offload with pillows, application of SPR+ overlay or Silva bed, and to discontinue use of depends due to presence of wound. Triad applied to friable patel rectal skin. Staff to apply with each BM. \" Further recommendations for low air loss, pressure redistribution bed noted.     3. History of diverticulosis:               - CT abd/pelv (3/27) showing indings of diverticulosis. There is redemonstration of an abnormally thickened appearance in the sigmoid colon which could be related to an infectious/inflammatory process although a neoplasm could have a similar appearance.  This was seen on a CT scan from October of last year as well and patient and daughter are aware of this, daughter states patient had inflammatory scar in that area.  White count is normal.  Patient is not febrile. Nonbloody BM's. No indication the patient is having an episode of diverticulitis.      4. Elevated lipase: improving              - Lipase is 114.4 (3/29), down trending from 158 on arrival (3/27).  No evidence of inflammation of the pancreas on CT.  Patient denies right upper abdominal pain.     5. Chronic macrocytic anemia:               - Baseline Hbg range ~ 7-8. Hemoglobin 10.2 (3/28).  May be some level of hemoconcentration secondary to dehydration.               - Trend and monitor CBC. - Transfuse for Hgb < 7 g/dL.     6. History of paroxysmal atrial fibrillation:               -  [id number] score is 4.               - Continue home Eliquis.       7. Chronically elevated troponin:               - Baseline troponin range ~0.014 - 0.023. Troponin within baseline range on admission. ECG unchanged from prior and showing no new ischemic changes. Patient denies chest pain. No need to trend and monitor.     8. Elevated BUN, likely multifactorial:               - Some component of dehydration likely. Patient also receiving protein supplement for wound healing at the ECF. Will monitor daily BMP.     9. History of dysphagia secondary to tracheostomy:               - Per speech therapy recommendations from January 14 minced/moist solids, thin liquids, meds crushed in puree, and  Direct 1:1 supervision.      10. History of CKDIII:               - Stable.  Continue to monitor with daily lab.  11. History of Systolic Heart Failure:                - Echo 11/22/21) notable for EF 25%,severe global hypokinesis of LV.              - Patient euvolemic. Lungs CTAB, no LE swelling.              -  Continue ASA, Lopressor.     12. History of asthma:               - Continue home PRN nebulizer.     13. Incidental finding of pulmonary nodules at the lung bases:         - Recommend OP f/u in 6 months to assess for stability. The patient was seen and examined on day of discharge and this discharge summary is in conjunction with any daily progress note from day of discharge. Hospital Course:   Per HPI documented 3/27/22: \"History obtained from chart review and the patient and daughter.     The patient is a 80 y. o. female with a past medical history of congestive heart failure, atrial fibrillation, and a pressure ulcer who presents with complaints of abdominal pain and constipation.  Patient was hospitalized with COVID-19 in October 2021, had tracheostomy and PEG tube placement.  Currently residing in a skilled nursing facility.  Patient reports intermittent constipation likely secondary to intermittent opiate use and decreased mobility.  Patient reports that she has not had a bowel movement for 4 days and is now having significant lower abdominal pain and rectal pressure.  She reports that when she is on the toilet sometimes she has bleeding from her rectum with straining.  Per report, SNF has not been giving her MiraLAX as they had concerns her decubitus ulcer may become contaminated with diarrhea. She states that she feels a large stool in her rectum but is unable to pass it. \"     3/28/22: Patient resting in bed, looks uncomfortable. She is satting 100% on room air, remains afebrile with hemodynamically stable vital signs. States that she has a lot of \"pressure\" on her abdomen. States that she feels like she is about to have a bowel movement. Continue bowel regimen ordered.   If patient has bowel movement she can possibly be discharged back to nursing home tomorrow. Pending wound care recommendations about decubitus sacral ulcer. 3/29/22: Patient resting in bed comfortably, well-appearing in no apparent distress. She remains afebrile, satting 100% on room air, with hemodynamically stable vital signs. Patient reports that she is feeling better today, although reports 2/10 left lower quadrant abdominal \"discomfort. \"  Reports that she is tolerating a full liquid diet, and expressed desire to eat solid foods. States she feels nauseous from not eating solid foods. She denies chest pain, shortness of breath, GIL, vomiting, fever, chills. Per nursing staff patient had 3 large bowel movements yesterday after receiving magnesium citrate. Per nursing staff patient has also been refusing tapwater enemas. I discussed importance of using tapwater enemas with patient and how it can help reduce constipation/fecal impaction. Patient's diet was advanced to solid foods, in which patient was able to tolerate well without nausea and abdominal pain. Patient was seen and evaluated by wound care nurse who stated that patient was appropriate for outpatient wound care center. See assessment and plan for wound care recommendations. Patient was seen and evaluated by GI and was cleared to be discharged back to nursing home. Patient sent home with prescription instructed to take Colace twice daily, Movantik daily, and will follow up with [facility] Ashley gastroenterology outpatient in 5 weeks. At this time patient is stable for discharge back to [place] nursing home.       Exam:     Vitals:  Vitals:    03/28/22 2145 03/28/22 2240 03/29/22 0419 03/29/22 0830   BP: (!) 106/36 (!) 106/36 121/60 (!) 106/49   Pulse: 60 60 77 75   Resp: 18  18 18   Temp: 97.6 °F (36.4 °C)  97.5 °F (36.4 °C) 97.2 °F (36.2 °C)   TempSrc: Oral  Oral Axillary   SpO2:   98% 100%   Weight:       Height:         Weight: Weight: 102 lb (46.3 kg) 24 hour intake/output:    Intake/Output Summary (Last 24 hours) at 3/29/2022 1459  Last data filed at 3/29/2022 1425  Gross per 24 hour   Intake 1585.04 ml   Output --   Net 1585.04 ml         General appearance: No apparent distress, appears older than stated age and cooperative. Chronically ill-appearing. HEENT: Pupils equal, round, and reactive to light. Conjunctivae/corneas clear. Neck: Supple, with full range of motion. No jugular venous distention. Trachea midline with capped trach noted. Respiratory:  Normal respiratory effort. Clear to auscultation, bilaterally without Rales/Wheezes/Rhonchi. Cardiovascular: Regular rate and rhythm with normal S1/S2 without murmurs, rubs or gallops. Abdomen: Soft, non-tender, mildly distended with normal bowel sounds. PEG intact. Musculoskeletal: passive and active ROM x 4 extremities. Skin: Skin color, texture, turgor normal.  No rashes or lesions. Decubitus ulcer to sacrum covered in mepilex dressing. Dressing CDI. Neurologic:  Neurovascularly intact without any focal sensory/motor deficits. Cranial nerves: II-XII intact, grossly non-focal.  Psychiatric: Alert and oriented, thought content appropriate, normal insight  Capillary Refill: Brisk,< 3 seconds   Peripheral Pulses: +2 palpable, equal bilaterally       Labs:  For convenience and continuity at follow-up the following most recent labs are provided:      CBC:    Lab Results   Component Value Date    WBC 6.3 03/28/2022    HGB 10.2 03/28/2022    HCT 32.5 03/28/2022     03/28/2022       Renal:    Lab Results   Component Value Date     03/28/2022    K 4.6 03/28/2022     03/28/2022    CO2 21 03/28/2022    BUN 47 03/28/2022    CREATININE 0.8 03/28/2022    CALCIUM 9.8 03/28/2022    PHOS 3.0 12/15/2021       Cardiac:   Recent Labs     03/27/22  1858   TROPONINT 0.018*       Significant Diagnostic Studies    Radiology:   CT ABDOMEN PELVIS WO CONTRAST Additional Contrast? None   Final Result START taking these medications    docusate 50 MG/5ML liquid  Commonly known as: COLACE  10 mLs by Per G Tube route 2 times daily     naloxegol 12.5 MG Tabs tablet  Commonly known as: MOVANTIK  1 tablet by Per NG tube route every morning     ondansetron 4 MG disintegrating tablet  Commonly known as: ZOFRAN-ODT  Take 1 tablet by mouth every 8 hours as needed for Nausea or Vomiting        CHANGE how you take these medications    apixaban 2.5 MG Tabs tablet  Commonly known as: ELIQUIS  Take 1 tablet by mouth 2 times daily  What changed: how to take this     aspirin 325 MG tablet  Take 1 tablet by mouth daily  What changed: how to take this     metoprolol tartrate 25 MG tablet  Commonly known as: LOPRESSOR  Take 1 tablet by mouth 2 times daily  What changed: how to take this        CONTINUE taking these medications    acetaminophen 325 MG tablet  Commonly known as: TYLENOL     acetic acid 0.25 % irrigation     fexofenadine 180 MG tablet  Commonly known as: ALLEGRA     HYDROcodone-acetaminophen 5-325 MG per tablet  Commonly known as: NORCO     ipratropium-albuterol 0.5-2.5 (3) MG/3ML Soln nebulizer solution  Commonly known as: DUONEB     Levothyroxine Sodium 62.5 MCG/ML Soln     MULTI VITAMIN DAILY PO     OXYGEN     Pro-Stat Liqd     Probiotic Acidophilus Tabs     Simethicone 125 MG Caps     sodium hypochlorite 0.125 % Soln external solution  Commonly known as: DAKINS        STOP taking these medications    loperamide 2 MG tablet  Commonly known as: IMODIUM A-D           Where to Get Your Medications      Information about where to get these medications is not yet available    Ask your nurse or doctor about these medications  · docusate 50 MG/5ML liquid  · naloxegol 12.5 MG Tabs tablet  · ondansetron 4 MG disintegrating tablet         Time Spent on discharge is more than 45 minutes in the examination, evaluation, counseling and review of medications and discharge plan. Signed:     Thank you Kelly Apple MD for the opportunity to be involved in this patient's care.     Electronically signed by SERGE Lerner CNP on 3/29/2022 at 2:59 PM

## 2022-03-29 NOTE — PLAN OF CARE
Problem: Skin Integrity:  Goal: Will show no infection signs and symptoms  Description: Will show no infection signs and symptoms  3/28/2022 2333 by Kimberly Blackwell RN  Outcome: Ongoing  Note: Turn 2 hourly, positioning, wound cleaned and dressed, kept dry and intact     Problem: Falls - Risk of:  Goal: Will remain free from falls  Description: Will remain free from falls  3/28/2022 2333 by Kimberly Blackwell RN  Outcome: Ongoing  Note: Call light within reach, bed alarm on, non skid socks, hourly rounding     Problem: Pain:  Goal: Pain level will decrease  Description: Pain level will decrease  3/28/2022 2333 by Kimberly Blackwell RN  Outcome: Ongoing  Note: Patient has no current pain complains     Problem: Discharge Planning:  Goal: Discharged to appropriate level of care  Description: Discharged to appropriate level of care  3/28/2022 2333 by Kimberly Blackwell RN  Outcome: Ongoing  Note: Patient possible discharge in AM after having bowel movement today

## 2022-03-29 NOTE — CARE COORDINATION
3/29/22, 1:32 PM EDT    DISCHARGE PLANNING EVALUATION    Patient is to be discharged today. Transport set for 7 pm with [facility]. Call to Parth at [facility] and advised him of discharge and transport time. He will check with his DON to see if they can accept at that time. Received call back-they are able to accept after 7 pm.     SW spoke with patient and daughter and advised them of transport time. Paperwork faxed to [facility]. [address] faxed to [facility]. Number provided to RN Onelia Urias for report. Patient will return under her medicare benefit. No other needs at this time. 3/29/22, 3:50 PM EDT    Patient goals/plan/ treatment preferences discussed by  and . Patient goals/plan/ treatment preferences reviewed with patient/ family. Patient/ family verbalize understanding of discharge plan and are in agreement with goal/plan/treatment preferences. Understanding was demonstrated using the teach back method. AVS provided by RN at time of discharge, which includes all necessary medical information pertaining to the patients current course of illness, treatment, post-discharge goals of care, and treatment preferences.     Services After Discharge  Services At/After Discharge: [facility] [facility])   IMM Letter  IMM Letter given to Patient/Family/Significant other/Guardian/POA/by[de-identified] staff  IMM Letter date given[de-identified] 03/27/22  IMM Letter time given[de-identified] 2042

## 2022-04-19 ENCOUNTER — OFFICE VISIT (OUTPATIENT)
Dept: ENT CLINIC | Age: 87
End: 2022-04-19
Payer: MEDICARE

## 2022-04-19 VITALS
HEART RATE: 73 BPM | RESPIRATION RATE: 14 BRPM | TEMPERATURE: 97.2 F | BODY MASS INDEX: 20.25 KG/M2 | DIASTOLIC BLOOD PRESSURE: 62 MMHG | WEIGHT: 103.7 LBS | SYSTOLIC BLOOD PRESSURE: 138 MMHG | OXYGEN SATURATION: 94 %

## 2022-04-19 DIAGNOSIS — H91.90 HEARING LOSS, UNSPECIFIED HEARING LOSS TYPE, UNSPECIFIED LATERALITY: ICD-10-CM

## 2022-04-19 DIAGNOSIS — Z93.0 TRACHEOSTOMY TUBE PRESENT (HCC): Primary | ICD-10-CM

## 2022-04-19 PROCEDURE — 99202 OFFICE O/P NEW SF 15 MIN: CPT | Performed by: OTOLARYNGOLOGY

## 2022-04-19 PROCEDURE — 1123F ACP DISCUSS/DSCN MKR DOCD: CPT | Performed by: OTOLARYNGOLOGY

## 2022-04-19 PROCEDURE — 1036F TOBACCO NON-USER: CPT | Performed by: OTOLARYNGOLOGY

## 2022-04-19 PROCEDURE — 4040F PNEUMOC VAC/ADMIN/RCVD: CPT | Performed by: OTOLARYNGOLOGY

## 2022-04-19 PROCEDURE — 1090F PRES/ABSN URINE INCON ASSESS: CPT | Performed by: OTOLARYNGOLOGY

## 2022-04-19 PROCEDURE — G8420 CALC BMI NORM PARAMETERS: HCPCS | Performed by: OTOLARYNGOLOGY

## 2022-04-19 PROCEDURE — G8427 DOCREV CUR MEDS BY ELIG CLIN: HCPCS | Performed by: OTOLARYNGOLOGY

## 2022-04-19 ASSESSMENT — ENCOUNTER SYMPTOMS
SORE THROAT: 0
TROUBLE SWALLOWING: 0
CHEST TIGHTNESS: 0
COUGH: 0
STRIDOR: 0
ABDOMINAL PAIN: 0
RHINORRHEA: 0
SHORTNESS OF BREATH: 0
WHEEZING: 0
SINUS PRESSURE: 0
CHOKING: 0
DIARRHEA: 0
NAUSEA: 0
FACIAL SWELLING: 0
VOMITING: 0
VOICE CHANGE: 0
COLOR CHANGE: 0
APNEA: 0

## 2022-04-19 NOTE — PROGRESS NOTES
[facility] [place] [facility]  [facility] EAR, NOSE AND THROAT  [address] [place] [address]  Dept: [phone]  Dept Fax: [phone]  Loc: [phone]    Jammei Flaherty is a 80 y.o. female who was referred byNo ref. provider found for:  Chief Complaint   Patient presents with    New Patient     patient is her for referral to ent due to trach   . HPI:     Jammie Flaherty is a 80 y.o. female who presents today for distance with decannulation. Patient has had her #6 Shiley trach tube capped for several days now without having to remove the cap at all for suctioning etc.  She breathes around it easily. .  She had COVID-19 about 6 months ago. There is also a reported concern for her hearing    History: Allergies   Allergen Reactions    Sulfa Antibiotics     Gluten Meal Diarrhea    Metronidazole Rash and Hives    Milk-Related Compounds Diarrhea     Current Outpatient Medications   Medication Sig Dispense Refill    docusate (COLACE) 50 MG/5ML liquid 10 mLs by Per G Tube route 2 times daily 600 mL 2    naloxegol (MOVANTIK) 12.5 MG TABS tablet 1 tablet by Per NG tube route every morning 30 tablet 2    ondansetron (ZOFRAN-ODT) 4 MG disintegrating tablet Take 1 tablet by mouth every 8 hours as needed for Nausea or Vomiting      OXYGEN Inhale into the lungs as needed (to keep sats > 90%)      fexofenadine (ALLEGRA) 180 MG tablet 180 mg by PEG Tube route daily      acetic acid 0.25 % irrigation Irrigate with 150 mLs as directed Irrigate Tues & Fri nights      Simethicone 125 MG CAPS 2 capsules by PEG Tube route every 8 hours as needed (bloating / gas)      HYDROcodone-acetaminophen (NORCO) 5-325 MG per tablet 0.5 tablets every 6 hours as needed for Pain.  Via PEG      ipratropium-albuterol (DUONEB) 0.5-2.5 (3) MG/3ML SOLN nebulizer solution Inhale 1 vial into the lungs every 4 hours as needed for Shortness of Breath       Levothyroxine Sodium 62.5 MCG/ML SOLN 1 mL by PEG Tube route daily       Multiple Vitamin (MULTI VITAMIN DAILY PO) 1 tablet by PEG Tube route daily       Amino Acids-Protein Hydrolys (PRO-STAT) LIQD by Per G Tube route 3 times daily 30 ml each dose      Probiotic Acidophilus (FLORANEX) TABS 1 tablet by Per G Tube route daily       aspirin 325 MG tablet Take 1 tablet by mouth daily (Patient taking differently: 325 mg by PEG Tube route daily ) 30 tablet 3    apixaban (ELIQUIS) 2.5 MG TABS tablet Take 1 tablet by mouth 2 times daily (Patient taking differently: 2.5 mg by Per G Tube route 2 times daily ) 60 tablet     metoprolol tartrate (LOPRESSOR) 25 MG tablet Take 1 tablet by mouth 2 times daily (Patient taking differently: 25 mg by PEG Tube route 2 times daily ) 60 tablet 3    acetaminophen (TYLENOL) 325 MG tablet 650 mg by PEG Tube route every 6 hours as needed for Pain      sodium hypochlorite (DAKINS) 0.125 % SOLN external solution Apply topically See Admin Instructions Apply to sacral wound topically every shift       No current facility-administered medications for this visit. Past Medical History:   Diagnosis Date    [facility]     Anxiety     Arthritis     Bronchitis     [facility])     Chronic a-fib ([facility])     Diverticulitis of colon     Hyperkalemia     Hypertension     Neuromuscular dysfunction of bladder     Personal history of COVID-19     Pressure ulcer     Protein-calorie malnutrition ([facility]       Past Surgical History:   Procedure Laterality Date    APPENDECTOMY      CHOLECYSTECTOMY      GASTROSTOMY TUBE PLACEMENT N/A 11/17/2021    EGD PEG TUBE PLACEMENT performed by Yoana Osullivan MD at [facility] N/A 12/23/2021    DECUBITUS ULCER DEBRIDEMENT REPAIR performed by Leonila Almeida MD at [address]       History reviewed. No pertinent family history.   Social History     Tobacco Use    Smoking status: Former Smoker Packs/day: 1.00     Years: 15.00     Pack years: 15.00     Types: Cigarettes     Quit date: 12/10/1977     Years since quittin.3    Smokeless tobacco: Never Used   Substance Use Topics    Alcohol use: No       Subjective:      Review of Systems   Constitutional: Negative for activity change, appetite change, chills, diaphoresis, fatigue, fever and unexpected weight change. HENT: Negative for congestion, dental problem, ear discharge, ear pain, facial swelling, hearing loss, mouth sores, nosebleeds, postnasal drip, rhinorrhea, sinus pressure, sneezing, sore throat, tinnitus, trouble swallowing and voice change. Eyes: Negative for visual disturbance. Respiratory: Negative for apnea, cough, choking, chest tightness, shortness of breath, wheezing and stridor. Cardiovascular: Negative for chest pain, palpitations and leg swelling. Gastrointestinal: Negative for abdominal pain, diarrhea, nausea and vomiting. Endocrine: Negative for cold intolerance, heat intolerance, polydipsia and polyuria. Genitourinary: Negative for dysuria, enuresis and hematuria. Musculoskeletal: Negative for arthralgias, gait problem, neck pain and neck stiffness. Skin: Negative for color change and rash. Allergic/Immunologic: Negative for environmental allergies, food allergies and immunocompromised state. Neurological: Negative for dizziness, syncope, facial asymmetry, speech difficulty, light-headedness and headaches. Hematological: Negative for adenopathy. Does not bruise/bleed easily. Psychiatric/Behavioral: Negative for confusion and sleep disturbance. The patient is not nervous/anxious. Objective:   /62 (Site: Left Upper Arm, Position: Sitting)   Pulse 73   Temp 97.2 °F (36.2 °C) (Infrared)   Resp 14   Wt 103 lb 11.2 oz (47 kg)   SpO2 94%   BMI 20.25 kg/m²     Physical Exam   Elderly female with trach tube in place. Exam limited to the neck. No significant granulation under the flange. She is breathing easily around it with it. Dimensions listed on tube indicate a #6 tube. Data:  All of the past medical history, past surgical history, family history,social history, allergies and current medications were reviewed with the patient. Assessment & Plan   Diagnoses and all orders for this visit:     Diagnosis Orders   1. Tracheostomy tube present ([address]     2. Hearing loss, unspecified hearing loss type, unspecified laterality  Audiometry with tympanometry       The findings were explained and her questions were answered. She is currently ready to be cannot be decannulated. Recommended putting in a #4 uncuffed nonfenestrated Shiley tube until the stoma had closed around that. It should be capped as well. Then assuming she is still doing well Up, the number 4 could be removed, leaving a much smaller defect to close from open decannulated status than simply pulling the #6. This was written out in long-hand for the caregiver. Dressings will be needed until stoma closes    Return as needed       Sheila Gaston. Too Garland MD    **This report has been created using voice recognition software. It may contain minor errors which are inherent in voicerecognition technology. **

## 2022-05-05 ENCOUNTER — OFFICE VISIT (OUTPATIENT)
Dept: ENT CLINIC | Age: 87
End: 2022-05-05
Payer: MEDICARE

## 2022-05-05 ENCOUNTER — HOSPITAL ENCOUNTER (OUTPATIENT)
Dept: AUDIOLOGY | Age: 87
Discharge: HOME | End: 2022-05-05
Payer: MEDICARE

## 2022-05-05 VITALS
OXYGEN SATURATION: 99 % | SYSTOLIC BLOOD PRESSURE: 104 MMHG | HEART RATE: 73 BPM | WEIGHT: 103 LBS | TEMPERATURE: 97.1 F | DIASTOLIC BLOOD PRESSURE: 64 MMHG | BODY MASS INDEX: 20.12 KG/M2 | RESPIRATION RATE: 14 BRPM

## 2022-05-05 DIAGNOSIS — H93.13 TINNITUS OF BOTH EARS: ICD-10-CM

## 2022-05-05 DIAGNOSIS — M26.629 TMJ SYNDROME: ICD-10-CM

## 2022-05-05 DIAGNOSIS — G90.01 CAROTIDYNIA: ICD-10-CM

## 2022-05-05 DIAGNOSIS — H90.3 SENSORINEURAL HEARING LOSS (SNHL) OF BOTH EARS: Primary | ICD-10-CM

## 2022-05-05 PROCEDURE — G8427 DOCREV CUR MEDS BY ELIG CLIN: HCPCS | Performed by: OTOLARYNGOLOGY

## 2022-05-05 PROCEDURE — 1036F TOBACCO NON-USER: CPT | Performed by: OTOLARYNGOLOGY

## 2022-05-05 PROCEDURE — 99213 OFFICE O/P EST LOW 20 MIN: CPT | Performed by: OTOLARYNGOLOGY

## 2022-05-05 PROCEDURE — 1090F PRES/ABSN URINE INCON ASSESS: CPT | Performed by: OTOLARYNGOLOGY

## 2022-05-05 PROCEDURE — 92557 COMPREHENSIVE HEARING TEST: CPT | Performed by: AUDIOLOGIST

## 2022-05-05 PROCEDURE — G8420 CALC BMI NORM PARAMETERS: HCPCS | Performed by: OTOLARYNGOLOGY

## 2022-05-05 PROCEDURE — 1123F ACP DISCUSS/DSCN MKR DOCD: CPT | Performed by: OTOLARYNGOLOGY

## 2022-05-05 ASSESSMENT — ENCOUNTER SYMPTOMS
COLOR CHANGE: 0
FACIAL SWELLING: 0
STRIDOR: 0
CHEST TIGHTNESS: 0
DIARRHEA: 0
SHORTNESS OF BREATH: 0
SORE THROAT: 0
NAUSEA: 0
CHOKING: 0
VOICE CHANGE: 0
APNEA: 0
ABDOMINAL PAIN: 0
VOMITING: 0
WHEEZING: 0
TROUBLE SWALLOWING: 0
RHINORRHEA: 0
SINUS PRESSURE: 0
COUGH: 0

## 2022-05-05 NOTE — PROGRESS NOTES
[facility] [place] [facility]  [facility] EAR, NOSE AND THROAT  [facility] [place] [address]  Dept: [phone]  Dept Fax: [phone]  Loc: [phone]    Mira Kussmaul is a 80 y.o. female who was referred byNo ref. provider found for:  Chief Complaint   Patient presents with    Follow-up     pt is here for same day audio/tymp,couldnt tolerate tymp. c/o having bad allergies and sinus    . HPI:     Mira Kussmaul is a 80 y.o. female who presents today for follow up audiogram/tympanogram.  Couldn't tolerate tympanogram.  Complains of having bad allergies and sinus. Audiogram/tympanogram:  COMMENTS: Moderate to profound hearing loss for each ear. A possible air bone gap is present at 250-500 Hz only. Masked bone conduction testing could not be completed at the patient could not tolerate the masking noise. Word recognition ability is poor at 64%% for the left ear and very poor at 64% for the right ear. Tympanometry was attempted but the patient could not tolerate the testing due to the loudness of the probe tone. RECOMMENDATION(S):   1- Follow up with Dr. Farrah Peralta regarding these results and any further testing or treatment recommendations. 2- Repeat testing as medically indicated or annually to monitor for progression, sooner with any significant changes or new concerns. 3- Binaural hearing aids are recommended pending medical clearance and patient motivation. Prior authorization for hearing aids will be submitted to insurance pending medical clearance. Severe sloping sensioneural hearing loss. Hasn't been allergic to tape in the past.  Tape is changed twice a day. Was told she is allergic to tape. Tape itches and it hurts. Uses warm hot cloths on it then switched to cold was cloths. States it's good and clean. Felt better when ears was cleaned out last week. Had mastoid surgery when she was 10years old. Ears pop and crack.   Has constant tinnitus. Has terrible allergies. Had them her whole life. Taking Allegra. Has drainage, feeling like far away. States she thinks she needs antibiotics and nothing is given to her. She can't get anything to break it up. Carotid artery hurts. Hasn't had vertigo recently. When she had it states was sick to stomach, vomiting. When she first had it room was spinning but now she's alright. Now she doesn't get dizzy. When first started she would get dizzy standing up. History: Allergies   Allergen Reactions    Sulfa Antibiotics     Gluten Meal Diarrhea    Metronidazole Rash and Hives    Milk-Related Compounds Diarrhea     Current Outpatient Medications   Medication Sig Dispense Refill    docusate (COLACE) 50 MG/5ML liquid 10 mLs by Per G Tube route 2 times daily 600 mL 2    naloxegol (MOVANTIK) 12.5 MG TABS tablet 1 tablet by Per NG tube route every morning 30 tablet 2    ondansetron (ZOFRAN-ODT) 4 MG disintegrating tablet Take 1 tablet by mouth every 8 hours as needed for Nausea or Vomiting      OXYGEN Inhale into the lungs as needed (to keep sats > 90%)      fexofenadine (ALLEGRA) 180 MG tablet 180 mg by PEG Tube route daily      acetic acid 0.25 % irrigation Irrigate with 150 mLs as directed Irrigate Tues & Fri nights      Simethicone 125 MG CAPS 2 capsules by PEG Tube route every 8 hours as needed (bloating / gas)      HYDROcodone-acetaminophen (NORCO) 5-325 MG per tablet 0.5 tablets every 6 hours as needed for Pain.  Via PEG      ipratropium-albuterol (DUONEB) 0.5-2.5 (3) MG/3ML SOLN nebulizer solution Inhale 1 vial into the lungs every 4 hours as needed for Shortness of Breath       Levothyroxine Sodium 62.5 MCG/ML SOLN 1 mL by PEG Tube route daily       Multiple Vitamin (MULTI VITAMIN DAILY PO) 1 tablet by PEG Tube route daily       Amino Acids-Protein Hydrolys (PRO-STAT) LIQD by Per G Tube route 3 times daily 30 ml each dose      Probiotic Acidophilus [facility]) TABS 1 tablet by Per G Tube route daily       aspirin 325 MG tablet Take 1 tablet by mouth daily (Patient taking differently: 325 mg by PEG Tube route daily ) 30 tablet 3    apixaban (ELIQUIS) 2.5 MG TABS tablet Take 1 tablet by mouth 2 times daily (Patient taking differently: 2.5 mg by Per G Tube route 2 times daily ) 60 tablet     metoprolol tartrate (LOPRESSOR) 25 MG tablet Take 1 tablet by mouth 2 times daily (Patient taking differently: 25 mg by PEG Tube route 2 times daily ) 60 tablet 3    acetaminophen (TYLENOL) 325 MG tablet 650 mg by PEG Tube route every 6 hours as needed for Pain      sodium hypochlorite (DAKINS) 0.125 % SOLN external solution Apply topically See Admin Instructions Apply to sacral wound topically every shift       No current facility-administered medications for this visit. Past Medical History:   Diagnosis Date    [facility]     Anxiety     Arthritis     Bronchitis     [facility]     Chronic a-fib ([facility])     Diverticulitis of colon     Hyperkalemia     Hypertension     Neuromuscular dysfunction of bladder     Personal history of COVID-19     Pressure ulcer     Protein-calorie malnutrition ([facility]       Past Surgical History:   Procedure Laterality Date    APPENDECTOMY      CHOLECYSTECTOMY      GASTROSTOMY TUBE PLACEMENT N/A 2021    EGD PEG TUBE PLACEMENT performed by Jolly Bush MD at [address] [place] [address] N/A 2021    DECUBITUS ULCER DEBRIDEMENT REPAIR performed by Tiffany Costello MD at [facility]       History reviewed. No pertinent family history.   Social History     Tobacco Use    Smoking status: Former Smoker     Packs/day: 1.00     Years: 15.00     Pack years: 15.00     Types: Cigarettes     Quit date: 12/10/1977     Years since quittin.4    Smokeless tobacco: Never Used   Substance Use Topics    Alcohol use: No       Subjective: Review of Systems   Constitutional: Negative for activity change, appetite change, chills, diaphoresis, fatigue, fever and unexpected weight change. HENT: Negative for congestion, dental problem, ear discharge, ear pain, facial swelling, hearing loss, mouth sores, nosebleeds, postnasal drip, rhinorrhea, sinus pressure, sneezing, sore throat, tinnitus, trouble swallowing and voice change. Eyes: Negative for visual disturbance. Respiratory: Negative for apnea, cough, choking, chest tightness, shortness of breath, wheezing and stridor. Cardiovascular: Negative for chest pain, palpitations and leg swelling. Gastrointestinal: Negative for abdominal pain, diarrhea, nausea and vomiting. Endocrine: Negative for cold intolerance, heat intolerance, polydipsia and polyuria. Genitourinary: Negative for dysuria, enuresis and hematuria. Musculoskeletal: Negative for arthralgias, gait problem, neck pain and neck stiffness. Skin: Negative for color change and rash. Allergic/Immunologic: Negative for environmental allergies, food allergies and immunocompromised state. Neurological: Negative for dizziness, syncope, facial asymmetry, speech difficulty, light-headedness and headaches. Hematological: Negative for adenopathy. Does not bruise/bleed easily. Psychiatric/Behavioral: Negative for confusion and sleep disturbance. The patient is not nervous/anxious. Objective:     /64 (Site: Right Upper Arm, Position: Sitting)   Pulse 73   Temp 97.1 °F (36.2 °C) (Infrared)   Resp 14   Wt 103 lb (46.7 kg)   SpO2 99%   BMI 20.12 kg/m²     Physical Exam    No excursion at TMJ's  Tenderness overlying carotid. No masses. Tracheostomy stoma is essentially closed    Data:  All of the past medical history, past surgical history, family history,social history, allergies and current medications were reviewed with the patient.      Assessment & Plan   Diagnoses and all orders for this visit:     Diagnosis Orders   1. Sensorineural hearing loss (SNHL) of both ears, severe     2. Tracheostomy tube present ([address]     3. TMJ syndrome     4. Carotidynia         The findings were explained and her questions were answered. Options were discussed including hearing aid trail ordered. Leave neck alone. She agreed. Discussed bite with any dentures dysfunction of the temporomandibular joints. She and her family member understood. Hearing aid trial ordered  Return as needed       ILilian CMA ([facility]), am scribing for, and in the presence of Dr. Loulou Veliz. Electronically signed by Lu Nicole CMA ([facility]) on 5/5/22 at 2:49 PM EDT. (Please note that portions of this note were completed with a voice recognition program. Efforts were made to edit the dictations butoccasionally words are mis-transcribed.)    I agree to the above documentation placed by my scribe. I have personally evaluated this patient. Additional findings are as noted. I reviewed the scribe's note and agree with the documented findings and plan of care. Any areas of disagreement are corrected. I agree with the chief complaint, past medical history, past surgical history, allergies, medications, social and family history as documented unless otherwise noted below.      Electronically signed by Melchor Mcclellan MD on 5/22/2022 at 1:59 PM

## 2022-05-05 NOTE — PROGRESS NOTES
AUDIOLOGICAL EVALUATION      REASON FOR TESTING:  Audiometric evaluation per the request of Dr. Britney Ryan, due to the diagnosis of hearing loss unspecified. The patient is reporting increased hearing loss and tinnitus following severe Covid and subsequent ICU admission. The patient's daughter stated that her mother had mastoiditis as a child and suffers from vertigo. OTOSCOPY: Clear canal, bilaterally     AUDIOGRAM            Reliability: Good  Audiometer Used:  [id number]        COMMENTS: Moderate to profound hearing loss for each ear. A possible air bone gap is present at 250-500 Hz only. Masked bone conduction testing could not be completed at the patient could not tolerate the masking noise. Word recognition ability is poor at 64%% for the left ear and very poor at 64% for the right ear. Tympanometry was attempted but the patient could not tolerate the testing due to the loudness of the probe tone. RECOMMENDATION(S):   1- Follow up with Dr. Britney Ryan regarding these results and any further testing or treatment recommendations. 2- Repeat testing as medically indicated or annually to monitor for progression, sooner with any significant changes or new concerns. 3- Binaural hearing aids are recommended pending medical clearance and patient motivation. Prior authorization for hearing aids will be submitted to insurance pending medical clearance.

## 2022-05-05 NOTE — PATIENT INSTRUCTIONS
Keep dressing clean, keep tape off. Cold pack and benadryl will help with itching.     Hearing aid trial ordered     Leave neck alone

## 2022-05-19 ENCOUNTER — HOSPITAL ENCOUNTER (OUTPATIENT)
Dept: AUDIOLOGY | Age: 87
Discharge: HOME | End: 2022-05-19

## 2022-05-19 PROCEDURE — 9990000010 HC NO CHARGE VISIT: Performed by: AUDIOLOGIST

## 2022-06-07 ENCOUNTER — HOSPITAL ENCOUNTER (OUTPATIENT)
Dept: AUDIOLOGY | Age: 87
Discharge: HOME | End: 2022-06-07
Payer: COMMERCIAL

## 2022-06-07 PROCEDURE — V5160 DISPENSING FEE BINAURAL: HCPCS | Performed by: AUDIOLOGIST

## 2022-06-07 PROCEDURE — V5261 HEARING AID, DIGIT, BIN, BTE: HCPCS | Performed by: AUDIOLOGIST

## 2022-06-07 NOTE — PROGRESS NOTES
[facility]#: [id number]   ACCT#: [de-identified]    DIAGNOSIS: H90.3                      NEW HEARING AID FITTING: A [employer] P50-R hearing aid was fit and dispensed for both ears. Explained care, use and insertion/removal.  Programmed. Hearing Aids were not paired to any devices. Speech mapping not completed due to difficulty with proper probe placement. Speech mapping to be attempted at fitting recheck. Hearing aid fitting recheck scheduled for 06/21/2022. Output user level was reduced to 90% and gain for soft inputs decreased. Soft sound manager (decreased loudness for soft sounds) was increased to medium for all programs.

## 2022-06-15 ENCOUNTER — TELEPHONE (OUTPATIENT)
Dept: CARDIOLOGY CLINIC | Age: 87
End: 2022-06-15

## 2022-06-15 NOTE — TELEPHONE ENCOUNTER
Marjorie from [facility] called and LM on nurse line stating patient having palpitations at night and feels congested with some shortness of breath. I called Leia Lai back at [phone] and LM for her to call our office back. Does patient want to come in tomorrow to see Chad?

## 2022-06-15 NOTE — TELEPHONE ENCOUNTER
I called and talked to [address] with [facility] who is no longer with patient. I called patient at home and spoke with her daughter. Appt scheduled on 6/16/2022 at [id number] with Leandrew Laws. She confirmed appt date, time and location.

## 2022-06-16 ENCOUNTER — OFFICE VISIT (OUTPATIENT)
Dept: CARDIOLOGY CLINIC | Age: 87
End: 2022-06-16
Payer: MEDICARE

## 2022-06-16 VITALS
SYSTOLIC BLOOD PRESSURE: 99 MMHG | HEIGHT: 60 IN | BODY MASS INDEX: 20.62 KG/M2 | WEIGHT: 105 LBS | DIASTOLIC BLOOD PRESSURE: 55 MMHG | HEART RATE: 76 BPM

## 2022-06-16 DIAGNOSIS — I49.3 PVC (PREMATURE VENTRICULAR CONTRACTION): ICD-10-CM

## 2022-06-16 DIAGNOSIS — I50.22 CHRONIC SYSTOLIC CONGESTIVE HEART FAILURE (HCC): Primary | ICD-10-CM

## 2022-06-16 PROCEDURE — 1090F PRES/ABSN URINE INCON ASSESS: CPT | Performed by: STUDENT IN AN ORGANIZED HEALTH CARE EDUCATION/TRAINING PROGRAM

## 2022-06-16 PROCEDURE — 1123F ACP DISCUSS/DSCN MKR DOCD: CPT | Performed by: STUDENT IN AN ORGANIZED HEALTH CARE EDUCATION/TRAINING PROGRAM

## 2022-06-16 PROCEDURE — 1036F TOBACCO NON-USER: CPT | Performed by: STUDENT IN AN ORGANIZED HEALTH CARE EDUCATION/TRAINING PROGRAM

## 2022-06-16 PROCEDURE — G8420 CALC BMI NORM PARAMETERS: HCPCS | Performed by: STUDENT IN AN ORGANIZED HEALTH CARE EDUCATION/TRAINING PROGRAM

## 2022-06-16 PROCEDURE — 99214 OFFICE O/P EST MOD 30 MIN: CPT | Performed by: STUDENT IN AN ORGANIZED HEALTH CARE EDUCATION/TRAINING PROGRAM

## 2022-06-16 PROCEDURE — G8427 DOCREV CUR MEDS BY ELIG CLIN: HCPCS | Performed by: STUDENT IN AN ORGANIZED HEALTH CARE EDUCATION/TRAINING PROGRAM

## 2022-06-16 RX ORDER — CARVEDILOL 6.25 MG/1
6.25 TABLET ORAL 2 TIMES DAILY
Qty: 60 TABLET | Refills: 5 | Status: ON HOLD | OUTPATIENT
Start: 2022-06-16 | End: 2022-06-28 | Stop reason: HOSPADM

## 2022-06-16 NOTE — PROGRESS NOTES
[facility]  [facility]   [address]   [address] [address]   Dept: [phone]   Dept Fax: [phone]   Loc: [phone]      Chief Complaint   Patient presents with    Follow-up     follow-up palpitations and shortness of breath     Cardiologist:  Dr. Mena Frias  81 yo female presents for 3 month f/u. Hx of NICDMP, COVID 19 PNA/ARDS, EF 25%, afib. Previously had holter with frequent PACs/PVCs. Has had difficulty sleeping. Still having flutters occasionally. Some SOB that is worse when she tries lay down. Does have allergies, started zpak yesterday. Some dizziness at times. Still feels very weak, going through rehab with PT/OT. Out of the nursing home now. No swelling.          General:   No fever, no chills, no weight loss, + fatigue  Pulmonary:    + dyspnea, no wheezing  Cardiac:    Denies recent chest pain   GI:     No nausea or vomiting, no abdominal pain  Neuro:     No dizziness or light headedness  Musculoskeletal: + pain of legs, tailbone  Extremities:   No edema      Past Medical History:   Diagnosis Date    [facility]IBERTO [facility] [address]     Anxiety     Arthritis     Bronchitis     [facility]) [facility]     Chronic a-fib ([facility])     Diverticulitis of colon     Hyperkalemia     Hypertension     Neuromuscular dysfunction of bladder     Personal history of COVID-19     Pressure ulcer     Protein-calorie malnutrition (HCC)        Allergies   Allergen Reactions    Sulfa Antibiotics     Gluten Meal Diarrhea    Metronidazole Rash and Hives    Milk-Related Compounds Diarrhea       Current Outpatient Medications   Medication Sig Dispense Refill    NONFORMULARY amitiza      docusate (COLACE) 50 MG/5ML liquid 10 mLs by Per G Tube route 2 times daily 600 mL 2    naloxegol (MOVANTIK) 12.5 MG TABS tablet 1 tablet by Per NG tube route every morning 30 tablet 2    ondansetron (ZOFRAN-ODT) 4 MG disintegrating tablet Take 1 tablet by mouth every 8 hours as needed for Nausea or Vomiting      OXYGEN Inhale into the lungs as needed (to keep sats > 90%)      fexofenadine (ALLEGRA) 180 MG tablet 180 mg by PEG Tube route daily      acetic acid 0.25 % irrigation Irrigate with 150 mLs as directed Irrigate Tues & Fri nights      Simethicone 125 MG CAPS 2 capsules by PEG Tube route every 8 hours as needed (bloating / gas)      HYDROcodone-acetaminophen (NORCO) 5-325 MG per tablet 0.5 tablets every 6 hours as needed for Pain. Via PEG      ipratropium-albuterol (DUONEB) 0.5-2.5 (3) MG/3ML SOLN nebulizer solution Inhale 1 vial into the lungs every 4 hours as needed for Shortness of Breath       Levothyroxine Sodium 62.5 MCG/ML SOLN 1 mL by PEG Tube route daily       Multiple Vitamin (MULTI VITAMIN DAILY PO) 1 tablet by PEG Tube route daily       Amino Acids-Protein Hydrolys (PRO-STAT) LIQD by Per G Tube route 3 times daily 30 ml each dose      Probiotic Acidophilus (FLORANEX) TABS 1 tablet by Per G Tube route daily       aspirin 325 MG tablet Take 1 tablet by mouth daily (Patient taking differently: 325 mg by PEG Tube route daily ) 30 tablet 3    apixaban (ELIQUIS) 2.5 MG TABS tablet Take 1 tablet by mouth 2 times daily (Patient taking differently: 2.5 mg by Per G Tube route 2 times daily ) 60 tablet     metoprolol tartrate (LOPRESSOR) 25 MG tablet Take 1 tablet by mouth 2 times daily (Patient taking differently: 25 mg by PEG Tube route 2 times daily ) 60 tablet 3    acetaminophen (TYLENOL) 325 MG tablet 650 mg by PEG Tube route every 6 hours as needed for Pain      sodium hypochlorite (DAKINS) 0.125 % SOLN external solution Apply topically See Admin Instructions Apply to sacral wound topically every shift       No current facility-administered medications for this visit. Social History     Socioeconomic History    Marital status:       Spouse name: Not on file    Number of children: Not on file    Years of education: Not on file    Highest education level: Not on file   Occupational History    Not on file   Tobacco Use    Smoking status: Former Smoker     Packs/day: 1.00     Years: 15.00     Pack years: 15.00     Types: Cigarettes     Quit date: 12/10/1977     Years since quittin.5    Smokeless tobacco: Never Used   Vaping Use    Vaping Use: Never used   Substance and Sexual Activity    Alcohol use: No    Drug use: No    Sexual activity: Not on file   Other Topics Concern    Not on file   Social History Narrative    Not on file     Social Determinants of Health     Financial Resource Strain:     Difficulty of Paying Living Expenses: Not on file   Food Insecurity:     Worried About Running Out of Food in the Last Year: Not on file    Caitlin of Food in the Last Year: Not on file   Transportation Needs:     Lack of Transportation (Medical): Not on file    Lack of Transportation (Non-Medical): Not on file   Physical Activity:     Days of Exercise per Week: Not on file    Minutes of Exercise per Session: Not on file   Stress:     Feeling of Stress : Not on file   Social Connections:     Frequency of Communication with Friends and Family: Not on file    Frequency of Social Gatherings with Friends and Family: Not on file    Attends Mosque Services: Not on file    Active Member of [address] [place] [address] [employer] or Organizations: Not on file    Attends Club or Organization Meetings: Not on file    Marital Status: Not on file   Intimate Partner Violence:     Fear of Current or Ex-Partner: Not on file    Emotionally Abused: Not on file    Physically Abused: Not on file    Sexually Abused: Not on file   Housing Stability:     Unable to Pay for Housing in the Last Year: Not on file    Number of [place] in the Last Year: Not on file    Unstable Housing in the Last Year: Not on file       No family history on file. Blood pressure (!) 99/55, pulse 76, height 5' (1.524 m), weight 105 lb (47.6 kg).     General:   Well developed, well nourished  Lungs:   Clear to auscultation  Heart:    Normal S1 S2, No murmur, rubs, or gallops, slightly fast HR,   Abdomen:   Soft, non tender, no organomegalies, positive bowel sounds  Extremities:   No edema, no cyanosis, good peripheral pulses  Neurological:   Awake, alert, oriented. No obvious focal deficits  Musculoskeletal:  No obvious deformities    EKG:     TTE  Conclusions      Summary   Ejection fraction is visually estimated at 25%. There was severe global hypokinesis of the left ventricle. Left Ventricular size is Moderately increased . The aortic valve leaflets were not well visualized. Aortic valve appears tricuspid. Thickened aortic valve leaflets noted. Aortic valve leaflets are Moderately calcified. Signature      ----------------------------------------------------------------   Electronically signed by Royce Pearl MD (Interpreting   physician) on 11/22/2021      Diagnosis Orders   1. Chronic systolic congestive heart failure ([address]     2. PVC (premature ventricular contraction)         No orders of the defined types were placed in this encounter. Assessment/Plan:   Chronic systolic CHF/CDMP-- basically appears euvolemic on exam today. HR is controlled and nsr. Still very weak, will switch lopressor to coreg for CHF benefit. Not able to increase/add other CMP meds as BP is very low and she has dizziness. SOB may very well be exacerbated by sinus issues, which she started zpak for and has been better after 1 day. Weakness, etc may be partly from weak heart msucle, EF was 25% on last echo   PVC- likely still having pvc at times. Cannot increase bb dose as BP is low. afib--continue eliquis, coreg. Switch metoprolol to coreg 6.25 mg BID. F/u with PCP if zpak does not improve symptoms. Disposition:   F/u with Dr Abner Maldonado in 3-4 months.    Continue Dr Lew Barnett current treatment plan  Follow up with Dr Pillo Steve as scheduled or sooner if needed

## 2022-06-20 ENCOUNTER — HOSPITAL ENCOUNTER (INPATIENT)
Age: 87
LOS: 8 days | Discharge: HOME HEALTH | DRG: 216 | End: 2022-06-28
Attending: EMERGENCY MEDICINE | Admitting: INTERNAL MEDICINE
Payer: MEDICARE

## 2022-06-20 ENCOUNTER — APPOINTMENT (OUTPATIENT)
Dept: GENERAL RADIOLOGY | Age: 87
DRG: 216 | End: 2022-06-20
Payer: MEDICARE

## 2022-06-20 ENCOUNTER — APPOINTMENT (OUTPATIENT)
Dept: CT IMAGING | Age: 87
DRG: 216 | End: 2022-06-20
Payer: MEDICARE

## 2022-06-20 DIAGNOSIS — J96.01 ACUTE RESPIRATORY FAILURE WITH HYPOXIA (HCC): Primary | ICD-10-CM

## 2022-06-20 DIAGNOSIS — F51.01 PRIMARY INSOMNIA: ICD-10-CM

## 2022-06-20 DIAGNOSIS — J18.9 PNEUMONIA DUE TO INFECTIOUS ORGANISM, UNSPECIFIED LATERALITY, UNSPECIFIED PART OF LUNG: ICD-10-CM

## 2022-06-20 DIAGNOSIS — I50.43 CHF (CONGESTIVE HEART FAILURE), NYHA CLASS I, ACUTE ON CHRONIC, COMBINED (HCC): ICD-10-CM

## 2022-06-20 DIAGNOSIS — I31.39 PERICARDIAL EFFUSION: ICD-10-CM

## 2022-06-20 LAB
ANION GAP SERPL CALCULATED.3IONS-SCNC: 9 MEQ/L (ref 8–16)
BASOPHILS # BLD: 0.7 %
BASOPHILS ABSOLUTE: 0.1 THOU/MM3 (ref 0–0.1)
BUN BLDV-MCNC: 35 MG/DL (ref 7–22)
CALCIUM SERPL-MCNC: 9.2 MG/DL (ref 8.5–10.5)
CHLORIDE BLD-SCNC: 99 MEQ/L (ref 98–111)
CO2: 26 MEQ/L (ref 23–33)
CREAT SERPL-MCNC: 0.9 MG/DL (ref 0.4–1.2)
EKG ATRIAL RATE: 88 BPM
EKG P AXIS: 78 DEGREES
EKG P-R INTERVAL: 206 MS
EKG Q-T INTERVAL: 378 MS
EKG QRS DURATION: 154 MS
EKG QTC CALCULATION (BAZETT): 457 MS
EKG R AXIS: -43 DEGREES
EKG T AXIS: 101 DEGREES
EKG VENTRICULAR RATE: 88 BPM
EOSINOPHIL # BLD: 0.9 %
EOSINOPHILS ABSOLUTE: 0.1 THOU/MM3 (ref 0–0.4)
ERYTHROCYTE [DISTWIDTH] IN BLOOD BY AUTOMATED COUNT: 15.6 % (ref 11.5–14.5)
ERYTHROCYTE [DISTWIDTH] IN BLOOD BY AUTOMATED COUNT: 60.5 FL (ref 35–45)
FLU A ANTIGEN: NEGATIVE
FLU B ANTIGEN: NEGATIVE
GFR SERPL CREATININE-BSD FRML MDRD: 59 ML/MIN/1.73M2
GLUCOSE BLD-MCNC: 118 MG/DL (ref 70–108)
HCT VFR BLD CALC: 39.6 % (ref 37–47)
HEMOGLOBIN: 12.3 GM/DL (ref 12–16)
IMMATURE GRANS (ABS): 0.43 THOU/MM3 (ref 0–0.07)
IMMATURE GRANULOCYTES: 5.3 %
LYMPHOCYTES # BLD: 17.8 %
LYMPHOCYTES ABSOLUTE: 1.5 THOU/MM3 (ref 1–4.8)
MCH RBC QN AUTO: 32.9 PG (ref 26–33)
MCHC RBC AUTO-ENTMCNC: 31.1 GM/DL (ref 32.2–35.5)
MCV RBC AUTO: 105.9 FL (ref 81–99)
MONOCYTES # BLD: 5.9 %
MONOCYTES ABSOLUTE: 0.5 THOU/MM3 (ref 0.4–1.3)
MRSA SCREEN RT-PCR: NEGATIVE
NUCLEATED RED BLOOD CELLS: 0 /100 WBC
OSMOLALITY CALCULATION: 277.3 MOSMOL/KG (ref 275–300)
PLATELET # BLD: 164 THOU/MM3 (ref 130–400)
PMV BLD AUTO: 11.7 FL (ref 9.4–12.4)
POTASSIUM REFLEX MAGNESIUM: 5.5 MEQ/L (ref 3.5–5.2)
POTASSIUM SERPL-SCNC: 4.7 MEQ/L (ref 3.5–5.2)
PRO-BNP: ABNORMAL PG/ML (ref 0–1800)
RBC # BLD: 3.74 MILL/MM3 (ref 4.2–5.4)
REASON FOR REJECTION: NORMAL
REJECTED TEST: NORMAL
SARS-COV-2, NAAT: NOT  DETECTED
SCAN OF BLOOD SMEAR: NORMAL
SEG NEUTROPHILS: 69.4 %
SEGMENTED NEUTROPHILS ABSOLUTE COUNT: 5.7 THOU/MM3 (ref 1.8–7.7)
SODIUM BLD-SCNC: 134 MEQ/L (ref 135–145)
TSH SERPL DL<=0.05 MIU/L-ACNC: 2.18 UIU/ML (ref 0.4–4.2)
VANCOMYCIN RESISTANT ENTEROCOCCUS: NEGATIVE
WBC # BLD: 8.2 THOU/MM3 (ref 4.8–10.8)

## 2022-06-20 PROCEDURE — 83880 ASSAY OF NATRIURETIC PEPTIDE: CPT

## 2022-06-20 PROCEDURE — 87635 SARS-COV-2 COVID-19 AMP PRB: CPT

## 2022-06-20 PROCEDURE — 96374 THER/PROPH/DIAG INJ IV PUSH: CPT

## 2022-06-20 PROCEDURE — 80048 BASIC METABOLIC PNL TOTAL CA: CPT

## 2022-06-20 PROCEDURE — 2060000000 HC ICU INTERMEDIATE R&B

## 2022-06-20 PROCEDURE — 2580000003 HC RX 258: Performed by: INTERNAL MEDICINE

## 2022-06-20 PROCEDURE — 87804 INFLUENZA ASSAY W/OPTIC: CPT

## 2022-06-20 PROCEDURE — 71275 CT ANGIOGRAPHY CHEST: CPT

## 2022-06-20 PROCEDURE — 6360000004 HC RX CONTRAST MEDICATION: Performed by: NURSE PRACTITIONER

## 2022-06-20 PROCEDURE — 93005 ELECTROCARDIOGRAM TRACING: CPT | Performed by: NURSE PRACTITIONER

## 2022-06-20 PROCEDURE — 94761 N-INVAS EAR/PLS OXIMETRY MLT: CPT

## 2022-06-20 PROCEDURE — 6370000000 HC RX 637 (ALT 250 FOR IP): Performed by: INTERNAL MEDICINE

## 2022-06-20 PROCEDURE — 99285 EMERGENCY DEPT VISIT HI MDM: CPT

## 2022-06-20 PROCEDURE — 6360000002 HC RX W HCPCS: Performed by: INTERNAL MEDICINE

## 2022-06-20 PROCEDURE — 93010 ELECTROCARDIOGRAM REPORT: CPT | Performed by: NUCLEAR MEDICINE

## 2022-06-20 PROCEDURE — 6370000000 HC RX 637 (ALT 250 FOR IP): Performed by: NURSE PRACTITIONER

## 2022-06-20 PROCEDURE — 84132 ASSAY OF SERUM POTASSIUM: CPT

## 2022-06-20 PROCEDURE — 85025 COMPLETE CBC W/AUTO DIFF WBC: CPT

## 2022-06-20 PROCEDURE — 71045 X-RAY EXAM CHEST 1 VIEW: CPT

## 2022-06-20 PROCEDURE — 6360000002 HC RX W HCPCS: Performed by: NURSE PRACTITIONER

## 2022-06-20 PROCEDURE — 2580000003 HC RX 258: Performed by: NURSE PRACTITIONER

## 2022-06-20 PROCEDURE — 94640 AIRWAY INHALATION TREATMENT: CPT

## 2022-06-20 PROCEDURE — 36415 COLL VENOUS BLD VENIPUNCTURE: CPT

## 2022-06-20 PROCEDURE — 2700000000 HC OXYGEN THERAPY PER DAY

## 2022-06-20 PROCEDURE — 87641 MR-STAPH DNA AMP PROBE: CPT

## 2022-06-20 PROCEDURE — 87500 VANOMYCIN DNA AMP PROBE: CPT

## 2022-06-20 PROCEDURE — 84443 ASSAY THYROID STIM HORMONE: CPT

## 2022-06-20 RX ORDER — LOSARTAN POTASSIUM 25 MG/1
25 TABLET ORAL DAILY
Status: DISCONTINUED | OUTPATIENT
Start: 2022-06-20 | End: 2022-06-28 | Stop reason: HOSPADM

## 2022-06-20 RX ORDER — FUROSEMIDE 10 MG/ML
20 INJECTION INTRAMUSCULAR; INTRAVENOUS EVERY 8 HOURS
Status: DISCONTINUED | OUTPATIENT
Start: 2022-06-20 | End: 2022-06-27

## 2022-06-20 RX ORDER — CETIRIZINE HYDROCHLORIDE 5 MG/1
5 TABLET ORAL DAILY
Status: DISCONTINUED | OUTPATIENT
Start: 2022-06-20 | End: 2022-06-28 | Stop reason: HOSPADM

## 2022-06-20 RX ORDER — ONDANSETRON 2 MG/ML
4 INJECTION INTRAMUSCULAR; INTRAVENOUS EVERY 6 HOURS PRN
Status: DISCONTINUED | OUTPATIENT
Start: 2022-06-20 | End: 2022-06-28 | Stop reason: HOSPADM

## 2022-06-20 RX ORDER — ONDANSETRON 4 MG/1
4 TABLET, ORALLY DISINTEGRATING ORAL EVERY 6 HOURS PRN
Status: DISCONTINUED | OUTPATIENT
Start: 2022-06-20 | End: 2022-06-28 | Stop reason: HOSPADM

## 2022-06-20 RX ORDER — IPRATROPIUM BROMIDE AND ALBUTEROL SULFATE 2.5; .5 MG/3ML; MG/3ML
1 SOLUTION RESPIRATORY (INHALATION) 4 TIMES DAILY
Status: DISCONTINUED | OUTPATIENT
Start: 2022-06-20 | End: 2022-06-20

## 2022-06-20 RX ORDER — IPRATROPIUM BROMIDE AND ALBUTEROL SULFATE 2.5; .5 MG/3ML; MG/3ML
1 SOLUTION RESPIRATORY (INHALATION) EVERY 4 HOURS PRN
Status: DISCONTINUED | OUTPATIENT
Start: 2022-06-20 | End: 2022-06-27

## 2022-06-20 RX ORDER — IPRATROPIUM BROMIDE AND ALBUTEROL SULFATE 2.5; .5 MG/3ML; MG/3ML
1 SOLUTION RESPIRATORY (INHALATION)
Status: DISCONTINUED | OUTPATIENT
Start: 2022-06-20 | End: 2022-06-20

## 2022-06-20 RX ORDER — CARVEDILOL 6.25 MG/1
6.25 TABLET ORAL 2 TIMES DAILY
Status: DISCONTINUED | OUTPATIENT
Start: 2022-06-20 | End: 2022-06-25

## 2022-06-20 RX ORDER — LEVOTHYROXINE SODIUM 0.12 MG/1
125 TABLET ORAL DAILY
Status: DISCONTINUED | OUTPATIENT
Start: 2022-06-21 | End: 2022-06-28 | Stop reason: HOSPADM

## 2022-06-20 RX ORDER — LEVOTHYROXINE SODIUM 0.12 MG/1
125 TABLET ORAL DAILY
COMMUNITY

## 2022-06-20 RX ORDER — VITAMIN B COMPLEX
500 TABLET ORAL DAILY
Status: DISCONTINUED | OUTPATIENT
Start: 2022-06-21 | End: 2022-06-28 | Stop reason: HOSPADM

## 2022-06-20 RX ORDER — MULTIVITAMIN WITH IRON
1 TABLET ORAL DAILY
Status: DISCONTINUED | OUTPATIENT
Start: 2022-06-20 | End: 2022-06-28 | Stop reason: HOSPADM

## 2022-06-20 RX ORDER — LACTOBACILLUS RHAMNOSUS GG 10B CELL
1 CAPSULE ORAL DAILY
Status: DISCONTINUED | OUTPATIENT
Start: 2022-06-21 | End: 2022-06-28 | Stop reason: HOSPADM

## 2022-06-20 RX ORDER — ACETAMINOPHEN 325 MG/1
650 TABLET ORAL EVERY 6 HOURS PRN
Status: DISCONTINUED | OUTPATIENT
Start: 2022-06-20 | End: 2022-06-28 | Stop reason: HOSPADM

## 2022-06-20 RX ORDER — DIMENHYDRINATE 50 MG/1
50 TABLET ORAL
Status: ON HOLD | COMMUNITY
End: 2022-06-28 | Stop reason: HOSPADM

## 2022-06-20 RX ORDER — LUBIPROSTONE 24 UG/1
24 CAPSULE, GELATIN COATED ORAL
COMMUNITY

## 2022-06-20 RX ORDER — LUBIPROSTONE 24 UG/1
24 CAPSULE, GELATIN COATED ORAL
Status: DISCONTINUED | OUTPATIENT
Start: 2022-06-21 | End: 2022-06-28 | Stop reason: HOSPADM

## 2022-06-20 RX ORDER — FUROSEMIDE 10 MG/ML
20 INJECTION INTRAMUSCULAR; INTRAVENOUS ONCE
Status: COMPLETED | OUTPATIENT
Start: 2022-06-20 | End: 2022-06-20

## 2022-06-20 RX ADMIN — APIXABAN 2.5 MG: 2.5 TABLET, FILM COATED ORAL at 21:56

## 2022-06-20 RX ADMIN — AZITHROMYCIN DIHYDRATE 500 MG: 500 INJECTION, POWDER, LYOPHILIZED, FOR SOLUTION INTRAVENOUS at 21:47

## 2022-06-20 RX ADMIN — CARVEDILOL 6.25 MG: 6.25 TABLET, FILM COATED ORAL at 21:56

## 2022-06-20 RX ADMIN — FUROSEMIDE 20 MG: 10 INJECTION, SOLUTION INTRAMUSCULAR; INTRAVENOUS at 23:52

## 2022-06-20 RX ADMIN — CEFTRIAXONE SODIUM 1000 MG: 1 INJECTION, POWDER, FOR SOLUTION INTRAMUSCULAR; INTRAVENOUS at 19:25

## 2022-06-20 RX ADMIN — IOPAMIDOL 80 ML: 755 INJECTION, SOLUTION INTRAVENOUS at 15:10

## 2022-06-20 RX ADMIN — IPRATROPIUM BROMIDE AND ALBUTEROL SULFATE 1 AMPULE: .5; 3 SOLUTION RESPIRATORY (INHALATION) at 14:40

## 2022-06-20 RX ADMIN — IPRATROPIUM BROMIDE AND ALBUTEROL SULFATE 1 AMPULE: .5; 3 SOLUTION RESPIRATORY (INHALATION) at 15:17

## 2022-06-20 RX ADMIN — Medication 1 TABLET: at 21:56

## 2022-06-20 RX ADMIN — FUROSEMIDE 20 MG: 10 INJECTION, SOLUTION INTRAMUSCULAR; INTRAVENOUS at 15:29

## 2022-06-20 RX ADMIN — ONDANSETRON 4 MG: 2 INJECTION INTRAMUSCULAR; INTRAVENOUS at 23:04

## 2022-06-20 RX ADMIN — LOSARTAN POTASSIUM 25 MG: 25 TABLET, FILM COATED ORAL at 21:49

## 2022-06-20 ASSESSMENT — PAIN DESCRIPTION - PAIN TYPE
TYPE: ACUTE PAIN

## 2022-06-20 ASSESSMENT — PAIN DESCRIPTION - LOCATION
LOCATION: CHEST

## 2022-06-20 ASSESSMENT — PAIN DESCRIPTION - ORIENTATION
ORIENTATION: LEFT

## 2022-06-20 ASSESSMENT — PAIN - FUNCTIONAL ASSESSMENT
PAIN_FUNCTIONAL_ASSESSMENT: 0-10

## 2022-06-20 ASSESSMENT — PAIN DESCRIPTION - FREQUENCY
FREQUENCY: CONTINUOUS

## 2022-06-20 ASSESSMENT — PAIN SCALES - GENERAL
PAINLEVEL_OUTOF10: 6
PAINLEVEL_OUTOF10: 5
PAINLEVEL_OUTOF10: 6
PAINLEVEL_OUTOF10: 5

## 2022-06-20 ASSESSMENT — ENCOUNTER SYMPTOMS
COUGH: 1
SHORTNESS OF BREATH: 1

## 2022-06-20 ASSESSMENT — LIFESTYLE VARIABLES: HOW OFTEN DO YOU HAVE A DRINK CONTAINING ALCOHOL: NEVER

## 2022-06-20 NOTE — ED NOTES
Pt resting in bed eating. Respirations unlabored. Purewick in place. Denies further needs at this time. Call light within reach.       Mary Bernal RN  06/20/22 1927

## 2022-06-20 NOTE — ED NOTES
In for hourly rounding. Pt resting on cot in position of comfort. Pt returning from CT scan at this time. Pt remains A&Ox4, resps more easy and less labored, remains tachypnic. IV shows no s/s of infection or infiltration, flushed and medicated pt per MAR. Pain remains unchanged at this time. External catheter placed for pt comfort at this time. Monitor remains in place. VSS. Pt reports feeling better after 2nd breathing treatment.      Shoshana Chavez RN  06/20/22 [id number]

## 2022-06-20 NOTE — ED NOTES
Pt presents to ED via triage for c/o shortness of breath and insomnia. Pt has been in and out of the hospital since November d/t Covid and infection after. Pt reports starting with shortness of breath a couple days ago. Pt decided to get seen after not being able to sleep last night and shortness of breath continued. Upon initial assessment, pt is A&Ox4, resps labored and tachy. Family at bedside reports pt's O2 at home was anywhere from low to mid [de-identified]. Upon first presentation to pt, pt on 3LPM via nc. Pt reports L sided chest/rib pain at this time. EKG completed upon arrival. IV established with blood drawn and sent to lab. Monitor applied and VS as noted. Awaiting provider assessment. Will monitor. Pt has cough at this time, coughing up small amounts of white/clear phlegm.      Zenaida Dillard, RN  06/20/22 8407

## 2022-06-20 NOTE — H&P
HISTORY AND PHYSICAL             Date: 6/20/2022        Patient Name: Jose Raul Conroy     YOB: 1934      Age:  80 y.o. Chief Complaint     Chief Complaint   Patient presents with    Shortness of Breath    Insomnia          History Obtained From   patient    History of Present Illness   70-year-old woman known to our service from prior admissions with known history of COVID and acute respiratory failure requiring trach and PEG. Patient was in the LTAC unit for a prolonged period of time. She was eventually transferred to the nursing home. She is now decannulated. She has been home with her daughter since April of this year. She uses a walker to ambulate. Patient started having increasing shortness of breath for the last 3 days. There is no fever or chills. Positive for cough productive sputum. No increased swelling. She is not on any diuretics at this time. But does have a known ejection fraction of 45%. Unsure why patient was not on LifeVest or defibrillator. Work-up in the ER she was noted to have fluid retention. She is being started on diuretics. Her breathing is now comfortable  On nasal oxygen at this time. She does not use any oxygen at home.     Past Medical History     Past Medical History:   Diagnosis Date    HERIBERTO (acute kidney injury) ([address]     Anxiety     Arthritis     Bronchitis     CHF (congestive heart failure) (HCC)     Chronic a-fib (HCC)     Diverticulitis of colon     Hyperkalemia     Hypertension     Neuromuscular dysfunction of bladder     Personal history of COVID-19     Pressure ulcer     Protein-calorie malnutrition ([facility] [address]         Past Surgical History     Past Surgical History:   Procedure Laterality Date    APPENDECTOMY      CHOLECYSTECTOMY      GASTROSTOMY TUBE PLACEMENT N/A 11/17/2021    EGD PEG TUBE PLACEMENT performed by Melvin Peck MD at [address] [place] [address] N/A 12/23/2021    DECUBITUS ULCER DEBRIDEMENT REPAIR performed by Yessica Gage MD at [facility]          Medications Prior to Admission     Prior to Admission medications    Medication Sig Start Date End Date Taking?  Authorizing Provider   lubiprostone (AMITIZA) 24 MCG capsule Take 24 mcg by mouth daily (with breakfast)    Yes Historical Provider, MD   NONFORMULARY 1 spray by Nasal route daily Olive leaf nasal spray   Yes Historical Provider, MD   levothyroxine (SYNTHROID) 125 MCG tablet Take 125 mcg by mouth Daily Take 0.5 tabs by mouth daily   Yes Historical Provider, MD   VITAMIN D, CHOLECALCIFEROL, PO Take 1 tablet by mouth daily   Yes Historical Provider, MD   dimenhyDRINATE (DRAMAMINE) 50 MG tablet Take 50 mg by mouth every 6-8 hours as needed (anxiety)   Yes Historical Provider, MD   carvedilol (COREG) 6.25 MG tablet Take 1 tablet by mouth 2 times daily 6/16/22   Jeanette Cash PA-C   docusate (COLACE) 50 MG/5ML liquid 10 mLs by Per G Tube route 2 times daily  Patient taking differently: Take 100 mg by mouth 2 times daily  3/29/22   SERGE Woodard CNP   ondansetron (ZOFRAN-ODT) 4 MG disintegrating tablet Take 1 tablet by mouth every 8 hours as needed for Nausea or Vomiting 3/29/22   SERGE Tan CNP   OXYGEN Inhale into the lungs as needed (to keep sats > 90%)    Historical Provider, MD   fexofenadine (ALLEGRA) 180 MG tablet Take 180 mg by mouth daily     Historical Provider, MD   Multiple Vitamin (MULTI VITAMIN DAILY PO) Take 1 tablet by mouth daily     Historical Provider, MD   Probiotic Acidophilus (FLORANEX) TABS Take 1 tablet by mouth daily     Historical Provider, MD   apixaban (ELIQUIS) 2.5 MG TABS tablet Take 1 tablet by mouth 2 times daily 12/29/21   Ethyl MazaSERGE CNP   acetaminophen (TYLENOL) 325 MG tablet Take 650 mg by mouth every 6 hours as needed for Pain     Historical Provider, MD        Allergies   Sulfa antibiotics, Gluten meal, Metronidazole, and Milk-related compounds    Social History Social History     Tobacco History     Smoking Status  Former Smoker Quit date  12/10/1977 Smoking Frequency  1 pack/day for 15 years (15 pk yrs) Smoking Tobacco Type  Cigarettes    Smokeless Tobacco Use  Never Used          Alcohol History     Alcohol Use Status  No          Drug Use     Drug Use Status  No          Sexual Activity     Sexually Active  Not Asked                Family History   History reviewed. No pertinent family history. Review of Systems   General ROS: Positive for fatigue  Ophthalmic ROS: No blurred vision or double vision   ENT ROS: No decreased hearing no sore throat positive for runny nose  Hematological and Lymphatic ROS: No unusual bleeding or lumps above  Respiratory ROS: Positive for cough productive sputum.   Positive for increasing shortness of breath  Cardiovascular ROS: Positive for chest pain today now resolved no increased swelling in the legs  Gastrointestinal ROS: No diarrhea or constipation no abdominal pain  Neurological ROS: No dizziness no numbness tingling  Dermatological ROS: Does have a pressure ulcer on her back being addressed by ID as outpatient    Physical Exam   /73   Pulse 88   Temp 97.3 °F (36.3 °C) (Oral)   Resp 30   Wt 105 lb (47.6 kg)   SpO2 99%   BMI 20.51 kg/m²     CONSTITUTIONAL: Awake and oriented and able to follow instructions nasal oxygen in place  EYES: Pupils react no icterus conjunctive a not pale  ENT: Nasal mucosa is moist tongue is moist  NECK: Supple nontender no JVD prior tracheostomy scar noted  LUNGS: Air entry is diminished bilaterally with no rales or  CARDIOVASCULAR: S1-S2 heard irregular  ABDOMEN: Soft bowel sounds heard no guarding no tenderness   NEUROLOGIC: Alert and oriented to person place and time moving all extremities  SKIN: Warm and dry  Extremities trace ankle edema dorsalis pedis posterior tibialis felt    Labs      Recent Results (from the past 24 hour(s))   EKG 12 Lead    Collection Time: 06/20/22  2:00 PM Result Value Ref Range    Ventricular Rate 88 BPM    Atrial Rate 88 BPM    P-R Interval 206 ms    QRS Duration 154 ms    Q-T Interval 378 ms    QTc Calculation (Bazett) 457 ms    P Axis 78 degrees    R Axis -43 degrees    T Axis 101 degrees   Basic Metabolic Panel w/ Reflex to MG    Collection Time: 06/20/22  2:19 PM   Result Value Ref Range    Sodium 134 (L) 135 - 145 meq/L    Potassium reflex Magnesium 5.5 (H) 3.5 - 5.2 meq/L    Chloride 99 98 - 111 meq/L    CO2 26 23 - 33 meq/L    Glucose 118 (H) 70 - 108 mg/dL    BUN 35 (H) 7 - 22 mg/dL    CREATININE 0.9 0.4 - 1.2 mg/dL    Calcium 9.2 8.5 - 10.5 mg/dL   Brain Natriuretic Peptide    Collection Time: 06/20/22  2:19 PM   Result Value Ref Range    Pro-BNP 00323.0 (H) 0.0 - 1800.0 pg/mL   CBC with Auto Differential    Collection Time: 06/20/22  2:19 PM   Result Value Ref Range    WBC 8.2 4.8 - 10.8 thou/mm3    RBC 3.74 (L) 4.20 - 5.40 mill/mm3    Hemoglobin 12.3 12.0 - 16.0 gm/dl    Hematocrit 39.6 37.0 - 47.0 %    .9 (H) 81.0 - 99.0 fL    MCH 32.9 26.0 - 33.0 pg    MCHC 31.1 (L) 32.2 - 35.5 gm/dl    RDW-CV 15.6 (H) 11.5 - 14.5 %    RDW-SD 60.5 (H) 35.0 - 45.0 fL    Platelets 185 448 - 548 thou/mm3    MPV 11.7 9.4 - 12.4 fL    Seg Neutrophils 69.4 %    Lymphocytes 17.8 %    Monocytes 5.9 %    Eosinophils 0.9 %    Basophils 0.7 %    Immature Granulocytes 5.3 %    Segs Absolute 5.7 1.8 - 7.7 thou/mm3    Lymphocytes Absolute 1.5 1.0 - 4.8 thou/mm3    Monocytes Absolute 0.5 0.4 - 1.3 thou/mm3    Eosinophils Absolute 0.1 0.0 - 0.4 thou/mm3    Basophils Absolute 0.1 0.0 - 0.1 thou/mm3    Immature Grans (Abs) 0.43 (H) 0.00 - 0.07 thou/mm3    nRBC 0 /100 wbc   Anion Gap    Collection Time: 06/20/22  2:19 PM   Result Value Ref Range    Anion Gap 9.0 8.0 - 16.0 meq/L   Glomerular Filtration Rate, Estimated    Collection Time: 06/20/22  2:19 PM   Result Value Ref Range    Est, Glom Filt Rate 59 (A) ml/min/1.73m2   Osmolality    Collection Time: 06/20/22  2:19 PM Result Value Ref Range    Osmolality Calc 277.3 275.0 - 300.0 mOsmol/kg   Scan of Blood Smear    Collection Time: 06/20/22  2:19 PM   Result Value Ref Range    SCAN OF BLOOD SMEAR see below    COVID-19, Rapid    Collection Time: 06/20/22  2:53 PM    Specimen: Nasopharyngeal Swab   Result Value Ref Range    SARS-CoV-2, NAAT NOT  DETECTED NOT DETECTED   Rapid influenza A/B antigens    Collection Time: 06/20/22  2:53 PM    Specimen: Nasopharyngeal   Result Value Ref Range    Flu A Antigen Negative NEGATIVE    Flu B Antigen Negative NEGATIVE        Imaging/Diagnostics Last 24 Hours   CTA CHEST W WO CONTRAST    Result Date: 6/20/2022  PROCEDURE: CTA CHEST W WO CONTRAST CLINICAL INFORMATION: shortness of breath, r/o PE. COMPARISON: CTA  chest dated 30 October 2021. TECHNIQUE: 3 mm axial images were obtained through the chest after the administration of IV contrast.  A non-contrast localizer was obtained. 3D reconstructions were performed on the scanner to include MIP coronal and sagittal images through the chest. Isovue was the intravenous contrast utilized. All CT scans at this facility use dose modulation, iterative reconstruction, and/or weight-based dosing when appropriate to reduce radiation dose to as low as reasonably achievable. FINDINGS: There is adequate opacification of the pulmonary arterial system. No pulmonary emboli are present. There is atherosclerotic calcification in the thoracic aorta. There is cardiomegaly. . There is a pericardial  effusion. There are bilateral pleural effusions right greater than left. There is no mediastinal, axillary or hilar adenopathy. There is evidence for granulomatous disease in the right middle lobe and right hilum. . There are areas of atelectasis or infiltrate at both lung bases. .  There is thoracic spondylosis. .     1. No evidence of pulmonary embolism. 2. Cardiomegaly. 3. Bilateral pleural effusions right greater than left. Pericardial effusion.  4. Areas of atelectasis or infiltrate at both lung bases. 5. Evidence for granulomatous disease in the right middle lobe and right hilum. 6. Thoracic spondylosis. **This report has been created using voice recognition software. It may contain minor errors which are inherent in voice recognition technology. ** Final report electronically signed by DR Dar Rios on 6/20/2022 3:34 PM    XR CHEST PORTABLE    Result Date: 6/20/2022  PROCEDURE: XR CHEST PORTABLE CLINICAL INFORMATION: Shortness of breath. COMPARISON: Chest x-ray dated 21 December 2021. . TECHNIQUE: AP upright view of the chest. FINDINGS: The previously noted tracheotomy tube is no longer seen. There is mild cardiomegaly. . Is atherosclerotic calcification aortic arch. There is increased density throughout both lung fields especially in the right lower lobe. The pulmonary vascularity is increased. There is thoracic spondylosis. 1. Cardiomegaly and probable CHF. 2. Infiltrate and effusion at the right lung base. 3. Atherosclerotic calcification in the aortic arch. 4. The previously noted tracheotomy tube is no longer seen. **This report has been created using voice recognition software. It may contain minor errors which are inherent in voice recognition technology. ** Final report electronically signed by DR Dar Rios on 6/20/2022 2:37 PM      Assessment    Acute congestive heart failure secondary to systolic dysfunction with a EF of 25% next number cardiomyopathy EF of 25% unsure why patient is not on LifeVest / AICD   Prior history of COVID requiring tracheostomy now decannulated  Sacral decubitus pressure ulcer present on admission being followed by ID as outpatient  Paroxysmal atrial fibrillation on anticoagulation  History of pulmonary nodule  Hypothyroidism  Hypertension  Hyperkalemia    Plan   Continue careful diuresis  Monitor cardiac enzymes  Repeat another echo to evaluate resume EF and also consult cardiology for AICD/LifeVest recommendations   bronchodilator treatment  Wound care consult  Resume home medications when appropriate  Discussed CODE STATUS wishes full resuscitation at this time  PT OT  Dr. Edd Harrison to resume care in the morning    Consultations Ordered:  IP CONSULT TO CARDIOLOGY    Electronically signed by Nadira Tsai MD on 6/20/22 at 6:57 PM EDT

## 2022-06-20 NOTE — ED NOTES
ED to inpatient nurses report    Chief Complaint   Patient presents with    Shortness of Breath    Insomnia      Present to ED from home  LOC: alert and orientated to name, place, date  Vital signs   Vitals:    06/20/22 1515 06/20/22 1615 06/20/22 1715 06/20/22 1902   BP: 122/62 125/64 121/73 123/60   Pulse: 94 90 88 85   Resp: 24 22 30 23   Temp:       TempSrc:       SpO2: 99% 100% 99% 98%   Weight:          Oxygen Baseline Room Air    Current needs required 3LPM via nc Bipap/Cpap No  LDAs:   Peripheral IV 06/20/22 Left Forearm (Active)   Site Assessment Clean, dry & intact 06/20/22 1515   Line Status Normal saline locked 06/20/22 1515   Phlebitis Assessment No symptoms 06/20/22 1515   Infiltration Assessment 0 06/20/22 1515   Dressing Status Clean, dry & intact 06/20/22 1515   Dressing Type Transparent 06/20/22 1515   Dressing Intervention New 06/20/22 1417     Mobility: Requires assistance * 1  Pending ED orders: NA  Present condition: Pt resting on cot in position of comfort. Pt is A&Ox4, resps easy and unlabored. Pt continues on 3LPM via nc. Pt reports pain in the buttocks at wound site. IV shows no s/s of infection or infiltration.   Person of contract, phone number   Our promise was given to patient    Electronically signed by Chalo Crowe RN on 6/20/2022 at 7:18 PM       Chalo Crowe RN  06/20/22 1920

## 2022-06-20 NOTE — ED PROVIDER NOTES
[address] [facility]  EMERGENCY MEDICINE ATTENDING ATTESTATION      Evaluation of Jimena Leo. Case discussed and care plan developed with resident physician. I agree with the resident physician documentation and plan as documented by her, except if my documentation differs. Patient seen, interviewed and examined by me. I reviewed the medical, surgical, family and social history, medications and allergies. I have reviewed the nursing documentation. I have reviewed the patient's vital signs and are abnormal from Tachypnea per my interpretation. Body mass index is 20.51 kg/m². Pulsoxymetry is normal per my interpretation on supplemental oxygen at the moment of my evaluation, had oxygen saturation of 80% on arrival.     Brief H&P   Patient with recent complicated course of BTAXY-48 requiring ICU admission, intubation, tracheostomy placement, and debridement from decubitus ulcer and thoracentesis for large pleural effusion, discharged home recently and improving significantly at home. He is brought in by her daughter for decreased oxygen saturation today as well as worsened shortness of breath. Physical exam is notable for chronically ill appearing elderly female, visibly short of breath without respiratory distress. Bibasal rales. Medical Decision Making   MDM:   1. New oxygen requirement in the setting of recent long admission  2. Possible pneumonia  3. Possible pulmonary embolism  4. Possible new pleural effusion  5. Possible CHF exacerbation  Plan:    IV line, left   EKG   Imaging   Medication   Observation in the ED while awaiting results   Patient will need to be admitted to the hospital.    Please see the resident physician completed note for final disposition except as documented on this attestation. I have reviewed and interpreted all available lab, radiology and ekg results available at the moment.   Diagnosis, treatment and disposition plans were discussed and agreed upon by patient. This transcription was electronically signed. It was dictated by use of voice recognition software and electronically transcribed. The transcription may contain errors not detected in proofreading.      I performed direct supervision and was present for the critical portion following procedures: None  Critical care time on this case: None    Electronically signed by Jesus Martini MD on 6/20/22 at 3:46 PM EDT       Jesus Martini MD  06/20/22 [id number]

## 2022-06-20 NOTE — PROGRESS NOTES
Pharmacy Medication History Note      List of current medications patient is taking is complete. Source of information: pt, daughter, and external medication fill history    Changes made to medication list:  Medications removed (include reason, ex. therapy complete or physician discontinued):  Acetic acid irrigation-therapy complete  Pro Stat-therapy complete  Aspirin-physician discontinued  DuoNeb-physician discontinued  Gregory-therapy complete  Levothyroxine solution-physician discontinued  Movantik-not covered by insurance  Simethicone-therapy complete  Dakins-therapy complete  Non formulary- now have actual dose of Amitiza    Medications added/doses adjusted:  Amitiza 24mcg QD-added  Olive leaf nasal spray-added  Vitamin D-added  Levothyroxine 125mcg tab QD-added  Dramamine 50 mg Q6-8H PRN anxiety - added    Other notes (ex. Recent course of antibiotics, Coumadin dosing):  Jami Zapien completed 6-  Denies use of other OTC or herbal medications.       Allergies reviewed      Electronically signed by Amadou Bella on 6/20/2022 at 4:54 PM

## 2022-06-21 LAB
ANION GAP SERPL CALCULATED.3IONS-SCNC: 7 MEQ/L (ref 8–16)
BASOPHILS # BLD: 1 %
BASOPHILS ABSOLUTE: 0 THOU/MM3 (ref 0–0.1)
BUN BLDV-MCNC: 32 MG/DL (ref 7–22)
CALCIUM SERPL-MCNC: 9 MG/DL (ref 8.5–10.5)
CHLORIDE BLD-SCNC: 102 MEQ/L (ref 98–111)
CO2: 31 MEQ/L (ref 23–33)
CREAT SERPL-MCNC: 1.2 MG/DL (ref 0.4–1.2)
EOSINOPHIL # BLD: 0.8 %
EOSINOPHILS ABSOLUTE: 0 THOU/MM3 (ref 0–0.4)
ERYTHROCYTE [DISTWIDTH] IN BLOOD BY AUTOMATED COUNT: 15.4 % (ref 11.5–14.5)
ERYTHROCYTE [DISTWIDTH] IN BLOOD BY AUTOMATED COUNT: 59.6 FL (ref 35–45)
GFR SERPL CREATININE-BSD FRML MDRD: 42 ML/MIN/1.73M2
GLUCOSE BLD-MCNC: 91 MG/DL (ref 70–108)
HCT VFR BLD CALC: 37.3 % (ref 37–47)
HEMOGLOBIN: 11.3 GM/DL (ref 12–16)
IMMATURE GRANS (ABS): 0.21 THOU/MM3 (ref 0–0.07)
IMMATURE GRANULOCYTES: 4.3 %
LYMPHOCYTES # BLD: 25.3 %
LYMPHOCYTES ABSOLUTE: 1.2 THOU/MM3 (ref 1–4.8)
MCH RBC QN AUTO: 32.4 PG (ref 26–33)
MCHC RBC AUTO-ENTMCNC: 30.3 GM/DL (ref 32.2–35.5)
MCV RBC AUTO: 106.9 FL (ref 81–99)
MONOCYTES # BLD: 8.5 %
MONOCYTES ABSOLUTE: 0.4 THOU/MM3 (ref 0.4–1.3)
NUCLEATED RED BLOOD CELLS: 1 /100 WBC
OSMOLALITY CALCULATION: 285.9 MOSMOL/KG (ref 275–300)
PLATELET # BLD: 122 THOU/MM3 (ref 130–400)
PMV BLD AUTO: 11.9 FL (ref 9.4–12.4)
POTASSIUM SERPL-SCNC: 4.5 MEQ/L (ref 3.5–5.2)
RBC # BLD: 3.49 MILL/MM3 (ref 4.2–5.4)
SEG NEUTROPHILS: 60.1 %
SEGMENTED NEUTROPHILS ABSOLUTE COUNT: 2.9 THOU/MM3 (ref 1.8–7.7)
SODIUM BLD-SCNC: 140 MEQ/L (ref 135–145)
TROPONIN T: 0.02 NG/ML
TROPONIN T: 0.02 NG/ML
WBC # BLD: 4.8 THOU/MM3 (ref 4.8–10.8)

## 2022-06-21 PROCEDURE — 85025 COMPLETE CBC W/AUTO DIFF WBC: CPT

## 2022-06-21 PROCEDURE — 6360000002 HC RX W HCPCS: Performed by: INTERNAL MEDICINE

## 2022-06-21 PROCEDURE — 2060000000 HC ICU INTERMEDIATE R&B

## 2022-06-21 PROCEDURE — 2700000000 HC OXYGEN THERAPY PER DAY

## 2022-06-21 PROCEDURE — 97166 OT EVAL MOD COMPLEX 45 MIN: CPT

## 2022-06-21 PROCEDURE — 94760 N-INVAS EAR/PLS OXIMETRY 1: CPT

## 2022-06-21 PROCEDURE — 6370000000 HC RX 637 (ALT 250 FOR IP): Performed by: STUDENT IN AN ORGANIZED HEALTH CARE EDUCATION/TRAINING PROGRAM

## 2022-06-21 PROCEDURE — 6370000000 HC RX 637 (ALT 250 FOR IP): Performed by: INTERNAL MEDICINE

## 2022-06-21 PROCEDURE — 99223 1ST HOSP IP/OBS HIGH 75: CPT | Performed by: INTERNAL MEDICINE

## 2022-06-21 PROCEDURE — 80048 BASIC METABOLIC PNL TOTAL CA: CPT

## 2022-06-21 PROCEDURE — 97535 SELF CARE MNGMENT TRAINING: CPT

## 2022-06-21 PROCEDURE — 84484 ASSAY OF TROPONIN QUANT: CPT

## 2022-06-21 PROCEDURE — 93307 TTE W/O DOPPLER COMPLETE: CPT

## 2022-06-21 PROCEDURE — 2580000003 HC RX 258: Performed by: INTERNAL MEDICINE

## 2022-06-21 PROCEDURE — 36415 COLL VENOUS BLD VENIPUNCTURE: CPT

## 2022-06-21 RX ORDER — SPIRONOLACTONE 25 MG/1
25 TABLET ORAL DAILY
Status: DISCONTINUED | OUTPATIENT
Start: 2022-06-21 | End: 2022-06-28 | Stop reason: HOSPADM

## 2022-06-21 RX ADMIN — CARVEDILOL 6.25 MG: 6.25 TABLET, FILM COATED ORAL at 08:28

## 2022-06-21 RX ADMIN — SPIRONOLACTONE 25 MG: 25 TABLET ORAL at 16:49

## 2022-06-21 RX ADMIN — LEVOTHYROXINE SODIUM 125 MCG: 0.12 TABLET ORAL at 06:17

## 2022-06-21 RX ADMIN — FUROSEMIDE 20 MG: 10 INJECTION, SOLUTION INTRAMUSCULAR; INTRAVENOUS at 06:17

## 2022-06-21 RX ADMIN — Medication 500 UNITS: at 08:25

## 2022-06-21 RX ADMIN — FUROSEMIDE 20 MG: 10 INJECTION, SOLUTION INTRAMUSCULAR; INTRAVENOUS at 23:11

## 2022-06-21 RX ADMIN — Medication 1 CAPSULE: at 08:26

## 2022-06-21 RX ADMIN — APIXABAN 2.5 MG: 2.5 TABLET, FILM COATED ORAL at 08:26

## 2022-06-21 RX ADMIN — LOSARTAN POTASSIUM 25 MG: 25 TABLET, FILM COATED ORAL at 08:29

## 2022-06-21 RX ADMIN — FUROSEMIDE 20 MG: 10 INJECTION, SOLUTION INTRAMUSCULAR; INTRAVENOUS at 16:47

## 2022-06-21 RX ADMIN — AZITHROMYCIN DIHYDRATE 500 MG: 500 INJECTION, POWDER, LYOPHILIZED, FOR SOLUTION INTRAVENOUS at 19:52

## 2022-06-21 RX ADMIN — Medication 1 TABLET: at 08:26

## 2022-06-21 RX ADMIN — CEFTRIAXONE SODIUM 1000 MG: 1 INJECTION, POWDER, FOR SOLUTION INTRAMUSCULAR; INTRAVENOUS at 19:00

## 2022-06-21 RX ADMIN — CETIRIZINE HYDROCHLORIDE 5 MG: 5 TABLET ORAL at 08:26

## 2022-06-21 RX ADMIN — ACETAMINOPHEN 650 MG: 325 TABLET ORAL at 08:25

## 2022-06-21 RX ADMIN — Medication 100 MG: at 08:30

## 2022-06-21 ASSESSMENT — PAIN DESCRIPTION - ONSET: ONSET: ON-GOING

## 2022-06-21 ASSESSMENT — PAIN DESCRIPTION - ORIENTATION: ORIENTATION: POSTERIOR

## 2022-06-21 ASSESSMENT — PAIN DESCRIPTION - PAIN TYPE: TYPE: CHRONIC PAIN

## 2022-06-21 ASSESSMENT — PAIN DESCRIPTION - FREQUENCY: FREQUENCY: INTERMITTENT

## 2022-06-21 ASSESSMENT — PAIN - FUNCTIONAL ASSESSMENT
PAIN_FUNCTIONAL_ASSESSMENT: PREVENTS OR INTERFERES SOME ACTIVE ACTIVITIES AND ADLS
PAIN_FUNCTIONAL_ASSESSMENT: PREVENTS OR INTERFERES SOME ACTIVE ACTIVITIES AND ADLS

## 2022-06-21 ASSESSMENT — PAIN DESCRIPTION - LOCATION
LOCATION: COCCYX
LOCATION: COCCYX;BUTTOCKS

## 2022-06-21 ASSESSMENT — PAIN DESCRIPTION - DESCRIPTORS
DESCRIPTORS: ACHING
DESCRIPTORS: ACHING

## 2022-06-21 ASSESSMENT — PAIN SCALES - GENERAL
PAINLEVEL_OUTOF10: 10
PAINLEVEL_OUTOF10: 10

## 2022-06-21 NOTE — CARE COORDINATION
DISCHARGE/PLANNING EVALUATION  6/21/22, 11:34 AM EDT    Reason for Referral: Current with [facility]  Mental Status: Alert and oriented  Decision Making: Answers questions appropriately, did tell me to call her daughter. Family/Social/Home Environment: Patient stated that she resides at home with her daughter. She stated that it is one floor with 2 steps inside. She stated that she uses a walker. She stated that she has a tub shower with a seat and grab bars. Current Services including food security, transportation and housekeeping: Daughter  Current Equipment: Evette [place] shower chair  Payment Source:  Medicare  Concerns or Barriers to Discharge:  NA  Post acute provider list with quality measures, geographic area and applicable managed care information provided. Questions regarding selection process answered: Current with [facility]. Teach Back Method used with Kourtney regarding care plan and options for discharge. Patient and Aly Castillo, daughter verbalize understanding of the plan of care and contribute to goal setting. Patient goals, treatment preferences and discharge plan: Also spoke with daughter Aly Castillo and confirmed plan of home with [facility]. aJe Lyle from [facility] aware of patient and will follow for [facility] current with PT,OT and RN.      Electronically signed by BELEM Taylor on 6/21/2022 at 11:34 AM

## 2022-06-21 NOTE — PROGRESS NOTES
[facility] [address]  INPATIENT OCCUPATIONAL THERAPY  STR[facility] ICU STEPDOWN TELEMETRY 4K  EVALUATION    Time:   Time In: 3479  Time Out: 1715  Timed Code Treatment Minutes: 15 Minutes  Minutes: 30          Date: 2022  Patient Name: Saray Herron,   Gender: female      MRN: [id number]  : 1934  (80 y.o.)  Referring Practitioner: Dr. Yudith Gaspar MD  Diagnosis: Pericardial Effusion  Additional Pertinent Hx: Pt admissions with known history of COVID and acute respiratory failure requiring trach and PEG. Patient was in the LTAC unit for a prolonged period of time. She was eventually transferred to the nursing home. She is now decannulated. She has been home with her daughter since April of this year. She uses a walker to ambulate. Patient started having increasing shortness of breath for the last 3 days. There is no fever or chills. Positive for cough productive sputum. No increased swelling. She is not on any diuretics at this time. But does have a known ejection fraction of 45%. Unsure why patient was not on LifeVest or defibrillator. Work-up in the ER she was noted to have fluid retention. She is being started on diuretics. Her breathing is now comfortable  On nasal oxygen at this time. She does not use any oxygen at home. Restrictions/Precautions:  Restrictions/Precautions: Fall Risk  Position Activity Restriction  Other position/activity restrictions: skin protection    Subjective  Chart Reviewed: Oumou Flores and Physical  Family / Caregiver Present: No    Subjective: Pleasant and cooperative  Comments: RN approved session. Pt agreed to demonstrate some of her strength and balance. She had been incontinent of urine when the external catheter placement was not fixed properly. She C/O pain in her buttocks from her decubitus ulcer. Comments / Details: Pt returned to her R side at the end of tx with a pillow against her back. Pain: No number given.   Facial expression- pain in her buttocks. Pt's nurse was using a tegasorb covering on her bottom    Vitals: Vitals not assessed per clinical judgement, see nursing flowsheet    Social/Functional History:  Lives With: Daughter  Type of Home: House  Home Layout: Two level,Laundry in basement,Able to Live on Main level with bedroom/bathroom  Home Access: Stairs to enter with rails  Entrance Stairs - Number of Steps: 1+1  Home Equipment: Kraig Spore, 4 wheeled   Bathroom Shower/Tub: Tub/Shower unit,Shower chair with back  [employer]: Standard  Bathroom Equipment: Grab bars in shower  Bathroom Accessibility: Accessible    Receives Help From: Family  ADL Assistance: Needs assistance  Homemaking Responsibilities: No  Ambulation Assistance: Independent  Transfer Assistance: Independent    Active : No  Patient's  Info: Daughter provides transport  Occupation: Retired  Leisure & Hobbies: Going to [place] or volunteering when feeling better  Additional Comments: Pt was using a 4 wheeled walker for ambulation. She has been at her daughter's home for 1 month. She had 3 months in a nursing home after a long hospitalization with COVID-19. Pt was weaned to not needing supplemental O2 when she returned home. She had visiting nurses and therapy at home. VISION:Corrected    HEARING:  WFL    COGNITION: Decreased Safety Awareness    RANGE OF MOTION:  Bilateral Upper Extremity:  WFL    STRENGTH:  Bilateral Upper Extremity:  Impaired - 3+/5 deltoid; 3+/5 pectoral; 4-/5 biceps and triceps    SENSATION:   WFL    ADL:   Toileting: Contact Guard Assistance. Pt wiped her groin area with a moist wipe after having been incontinent of urine. BALANCE:  Sitting Balance:  Stand By Assistance. scooting at the edge of bed  Standing Balance: Stand By Assistance.  holding onto the walker while having help with changing the padding in her bed    BED MOBILITY:  Rolling to Right: Stand By Assistance, with head of bed flat, with rail    Supine to Sit: Stand By Assistance, with head of bed flat, with rail    Sit to Supine: Contact Guard Assistance, with head of bed flat, with rail    Scooting: Stand By Assistance, with rail      TRANSFERS:  Sit to Stand:  Stand By Assistance. from the edge of bed  Stand to Sit: Stand By Assistance. to the edge of bed    FUNCTIONAL MOBILITY:  Did not demonstrate. Activity Tolerance:  Patient tolerance of  treatment: fair. Pt was using 1L of O2. She had SOB when getting up to the edge of bed. She stood for 25 seconds beside the bed. Pt returned to supine after the activity. Assessment:  Assessment: Patient would benefit from continued skilled OT services to address above deficits. She presents with pericardial effusion. She had fluid around her heart and also had pneumonia. She had been at home recovering from a long hospitalization after having COVID-19. Pt has a decubitus ulcer on her posterior sacrum. She had been doing her self care with assist but was walking with a 4 wheeled walker. She used room air at home. She demonstrated bed mobility with SBA and CGA for returning to supine. She had SBA for standing balance and for the transfer to/from the edge of bed. She had been in the chair earlier today with help from nursing. She had to lay on her side to relieve her bottom. Pt was using an external catheter and had some spillage while urinating without a proper seal.  She was able to wipe her perineum with a moist cloth while in supine. Performance deficits / Impairments: Decreased functional mobility ,Decreased ADL status,Decreased endurance,Decreased strength  Prognosis: Good  REQUIRES OT FOLLOW-UP: Yes  Decision Making: Medium Complexity    Treatment Initiated: Treatment and education initiated within context of evaluation.   Evaluation time included review of current medical information, gathering information related to past medical, social and functional history, completion of standardized testing, formal and informal observation of tasks, assessment of data and development of plan of care and goals. Treatment time included skilled education and facilitation of tasks to increase safety and independence with ADL's for improved functional independence and quality of life. She described her visiting nurses and therapies prior to admission. Pt was motivated to return home. She indicates that she makes frequent trips to use the bathroom every night. She stated she does not drink caffeine drinks but still has frequent urination. She has to take naps during the day because of having difficulty with sleeping at night. Discussed the external catheter which she is using at this time and she stated that she was interested in using one when she returns home, if it were possible. Per nursing, pt is side sleeping and that this makes placement of the device so that it keeps a seal especially difficult. Discharge Recommendations:  Home with Home health OT    Patient Education:     Patient Education  Education Given To: Patient  Education Provided: Role of Therapy,Plan of Care  Education Provided Comments: Importance of both drinking enough fluids and also being able to rest at night and not have to wake up frequently to go to the bathroom; using the waffle cushion and changing positions frequently to help the skin on her bottom to heal  Education Method: Verbal  Barriers to Learning: None  Education Outcome: Verbalized understanding    Equipment Recommendations:  Equipment Needed: No    Plan:  Times per Week: 5x  Current Treatment Recommendations: Strengthening,Functional mobility training,Endurance training,Safety education & training,Self-Care / ADL  Plan Comment: Pt would benefit from continued skilled OT services when medically stable and discharged from Acute. HHOT recommended. Specific Instructions for Next Treatment: Functional mobility; ADLs and standing tolerance; UE exercises.   See long-term goal time frame for expected duration of plan of care. If no long-term goals established, a short length of stay is anticipated. Goals:  Patient goals : \"I want to get back to my home routine. \" pt stated. Short Term Goals  Time Frame for Short term goals: By discharge  Short Term Goal 1: Pt will complete simple ADLs while standing for over 3 minute duration with CGA to increase her endurance for ease of doing sinkside grooming or dressing. Short Term Goal 2: Pt will complete toileting routine including clothing management with SBA to increase her independence with self care. Short Term Goal 3: Pt will demonstrate functional mobility with OTR to prepare for doing sinkside ADLs. Short Term Goal 4: Pt will complete BUE ROM/moderate resistance exercises with any handout needed to increase her strength and enduranace for ease of doing ADLs and functional mobility. Additional Goals?: No         Following session, patient left in safe position with all fall risk precautions in place.

## 2022-06-21 NOTE — ED PROVIDER NOTES
[place] ENCOUNTER          Pt Name: Jimena Leo  MRN: [id number]  [place] 1934  Date of evaluation: 6/20/2022  Treating Resident Physician: Moo Parsons MD  Supervising Beatriz Ewing MD    History obtained from the patient. CHIEF COMPLAINT       Chief Complaint   Patient presents with    Shortness of Breath    Insomnia           HISTORY OF PRESENT ILLNESS    HPI  Jimena Leo is a 80 y.o. female who presents to the emergency department for evaluation of shortness of breath. Patient was short of breath x2 days. Patient's daughter stated that pulse ox at home was in the mid 80s and patient started breathing rapidly. Patient has also had increasing cough is productive with yellow-green sputum. Patient has needed tachypnea and pulse ox at 87% on room air on arrival.  Requiring O2 3 L per nasal cannula patient denies any fever or chest pain. PatientThe patient has no other acute complaints at this time. REVIEW OF SYSTEMS   Review of Systems   Constitutional: Positive for fatigue and fever. HENT: Positive for congestion. Negative for ear pain. Respiratory: Positive for cough and shortness of breath. Genitourinary: Negative for difficulty urinating and dysuria. Skin: Negative for rash.          PAST MEDICAL AND SURGICAL HISTORY     Past Medical History:   Diagnosis Date    HERIBERTO (acute kidney injury) ([address]     Anxiety     Arthritis     Bronchitis     CHF (congestive heart failure) (HCC)     Chronic a-fib (HCC)     Diverticulitis of colon     Hyperkalemia     Hypertension     Neuromuscular dysfunction of bladder     Personal history of COVID-19     Pressure ulcer     Protein-calorie malnutrition ([address]      Past Surgical History:   Procedure Laterality Date    APPENDECTOMY      CHOLECYSTECTOMY      GASTROSTOMY TUBE PLACEMENT N/A 11/17/2021    EGD PEG TUBE PLACEMENT performed by Tyson Walters MD at [facility]    PRESSURE ULCER DEBRIDEMENT N/A 12/23/2021    DECUBITUS ULCER DEBRIDEMENT REPAIR performed by Carmina Camacho MD at [address] [place] [address]     Current Facility-Administered Medications:     acetaminophen (TYLENOL) tablet 650 mg, 650 mg, Oral, Q6H PRN, Corey Torres MD    apixaban (ELIQUIS) tablet 2.5 mg, 2.5 mg, Oral, BID, Corey Torres MD    carvedilol (COREG) tablet 6.25 mg, 6.25 mg, Oral, BID, Corey Torres MD    docusate (COLACE) 50 MG/5ML liquid 100 mg, 100 mg, Oral, BID, Corey Torres MD    cetirizine (ZYRTEC) tablet 10 mg, 10 mg, Oral, Daily, Corey Torres MD  [facility]  [START ON 6/21/2022] levothyroxine (SYNTHROID) tablet 125 mcg, 125 mcg, Oral, Daily, Corey Torres MD  [facility]  [START ON 6/21/2022] lubiprostone (AMITIZA) capsule 24 mcg, 24 mcg, Oral, Daily with breakfast, Corey Torres MD    multivitamin 1 tablet, 1 tablet, Oral, Daily, Corey Torres MD  [facility]  [START ON 6/21/2022] lactobacillus (CULTURELLE) capsule 1 capsule, 1 capsule, Oral, Daily, Corey Torres MD    Vitamin D (Cholecalciferol) CHEW 1 tablet, 1 tablet, Oral, Daily, Corey Torres MD    furosemide (LASIX) injection 20 mg, 20 mg, IntraVENous, q8h, Corey Torres MD    azithromycin (ZITHROMAX) 500 mg in sodium chloride 0.9 % 250 mL IVPB ([id number]), 500 mg, IntraVENous, Q24H, Corey Torres MD  [facility]  [START ON 6/21/2022] cefTRIAXone (ROCEPHIN) 1000 mg IVPB in 50 mL D5W minibag, 1,000 mg, IntraVENous, Q24H, Corey Torres MD    ipratropium-albuterol (DUONEB) nebulizer solution 1 ampule, 1 ampule, Inhalation, Q4H PRN, Corey Torres MD    ipratropium-albuterol (DUONEB) nebulizer solution 1 ampule, 1 ampule, Inhalation, 4x daily, Corey Torres MD    losartan (COZAAR) tablet 25 mg, 25 mg, Oral, Daily, Corey Torres MD      SOCIAL HISTORY     Social History     Social History Narrative  Not on file     Social History     Tobacco Use    Smoking status: Former Smoker     Packs/day: 1.00     Years: 15.00     Pack years: 15.00     Types: Cigarettes     Quit date: 12/10/1977     Years since quittin.5    Smokeless tobacco: Never Used   Vaping Use    Vaping Use: Never used   Substance Use Topics    Alcohol use: No    Drug use: No         ALLERGIES     Allergies   Allergen Reactions    Sulfa Antibiotics     Gluten Meal Diarrhea     Pt decided to start a gluten free diet, but wants the option to eat gluten.  Metronidazole Rash and Hives    Milk-Related Compounds Diarrhea     Pt decided to start a dairy free diet, but wants the option to eat dairy. FAMILY HISTORY   History reviewed. No pertinent family history. PREVIOUS RECORDS   Previous records reviewed    PHYSICAL EXAM     ED Triage Vitals [22 1411]   BP Temp Temp Source Heart Rate Resp SpO2 Height Weight   125/78 97.3 °F (36.3 °C) Oral 92 (!) 44 97 % -- 105 lb (47.6 kg)     Initial vital signs and nursing assessment reviewed and normal. Body mass index is 20.51 kg/m². Pulsoximetry is normal per my interpretation. Additional Vital Signs:  Vitals:    22 2049   BP: (!) 113/56   Pulse: 85   Resp: 20   Temp: 98 °F (36.7 °C)   SpO2: 100%       Physical Exam  Constitutional:       Appearance: Normal appearance. HENT:      Head: Normocephalic. Right Ear: External ear normal.      Left Ear: External ear normal.      Nose: Nose normal.      Mouth/Throat:      Mouth: Mucous membranes are moist.      Pharynx: Oropharynx is clear. Eyes:      Extraocular Movements: Extraocular movements intact. Conjunctiva/sclera: Conjunctivae normal.      Pupils: Pupils are equal, round, and reactive to light. Cardiovascular:      Rate and Rhythm: Normal rate and regular rhythm. Pulses: Normal pulses. Heart sounds: Normal heart sounds. Pulmonary:      Effort: Tachypnea present.       Breath sounds: Examination components:    RBC 3.74 (*)     .9 (*)     MCHC 31.1 (*)     RDW-CV 15.6 (*)     RDW-SD 60.5 (*)     Immature Grans (Abs) 0.43 (*)     All other components within normal limits   GLOMERULAR FILTRATION RATE, ESTIMATED - Abnormal; Notable for the following components:    Est, Glom Filt Rate 59 (*)     All other components within normal limits   COVID-19, RAPID   RAPID INFLUENZA A/B ANTIGENS   VRE SCREEN BY PCR   ANION GAP   OSMOLALITY   SCAN OF BLOOD SMEAR   SPECIMEN REJECTION   POTASSIUM   TSH   CBC WITH AUTO DIFFERENTIAL   BASIC METABOLIC PANEL   TROPONIN   TROPONIN   MRSA BY PCR       Radiologic studies results:  CTA CHEST W WO CONTRAST   Final Result      1. No evidence of pulmonary embolism. 2. Cardiomegaly. 3. Bilateral pleural effusions right greater than left. Pericardial effusion. 4. Areas of atelectasis or infiltrate at both lung bases. 5. Evidence for granulomatous disease in the right middle lobe and right hilum. 6. Thoracic spondylosis. **This report has been created using voice recognition software. It may contain minor errors which are inherent in voice recognition technology. **      Final report electronically signed by DR Christine Segundo on 6/20/2022 3:34 PM      XR CHEST PORTABLE   Final Result   1. Cardiomegaly and probable CHF. 2. Infiltrate and effusion at the right lung base. 3. Atherosclerotic calcification in the aortic arch. 4. The previously noted tracheotomy tube is no longer seen. **This report has been created using voice recognition software. It may contain minor errors which are inherent in voice recognition technology. **      Final report electronically signed by DR Christine Segundo on 6/20/2022 2:37 PM          ED Medications administered this visit:   Medications   acetaminophen (TYLENOL) tablet 650 mg (has no administration in time range)   apixaban (ELIQUIS) tablet 2.5 mg (has no administration in time range)   carvedilol (COREG) tablet 6.25 mg (has no transcriptions may have been dictated by use of voice recognition software and electronically transcribed, and parts may have been transcribed with the assistance of an ED scribe. The transcription may contain errors not detected in proofreading. Please refer to my supervising physician's documentation if my documentation differs.     Electronically Signed: Lesa Grover MD, 06/20/22, 9:05 PM       Lesa Grover MD  Resident  06/20/22 [id number]

## 2022-06-21 NOTE — CARE COORDINATION
6/21/22, 11:17 AM EDT  DISCHARGE PLANNING EVALUATION:    Omid Singh       Admitted: 6/20/2022/ [address] day: 1   Location: [address] [place] [address] Reason for admit: Pericardial effusion [I31.3]  Acute respiratory failure with hypoxia (HCC) [J96.01]  CHF (congestive heart failure), NYHA class I, acute on chronic, combined ([facility] [address] [I50.43]  Pneumonia due to infectious organism, unspecified laterality, unspecified part of lung [J18.9]   PMH:  has a past medical history of HERIBERTO (acute kidney injury) ([facility] [address] Anxiety, Arthritis, Bronchitis, CHF (congestive heart failure) ([facility] [address] Chronic a-fib ([facility] [address] Diverticulitis of colon, Hyperkalemia, Hypertension, Neuromuscular dysfunction of bladder, Personal history of COVID-19, Pressure ulcer, and Protein-calorie malnutrition ([facility] [address] Procedure: 6/20 CXR: Cardiomegaly and probable CHF. Infiltrate and effusion at the right lung base. Atherosclerotic calcification in the aortic arch. The previously noted tracheotomy tube is no longer seen  6/20 CTA  Chest: No evidence of pulmonary embolism. Cardiomegaly. Bilateral pleural effusions right greater than left. Pericardial effusion. Areas of atelectasis or infiltrate at both lung bases. Evidence for granulomatous disease in the right middle lobe and right hilum. Thoracic spondylosis  6/21 ECHO limited: ordered  Barriers to Discharge:  Admitted through ER with SOB and insomnia. On arrival to ER SpO2 87% on room air and RR in the 30s. Placed on 3 L O2 and weaned to 2 L. Labs on admission: Na 124, K 5.5, BNP 34049, - COVID, -Flu. Trop 0.020. Limited ECHO ordered to evaluate EF, last echo 11/18/21 showed an EF 25%. Consult to Cardiology. PT & OT eval. IV Zithromax, Rocephin, Lasix. PCP: SERGE Cole CNP  Readmission Risk Score: 20.6 ( )%  Patient's Healthcare Decision Maker: Legal Next of Kin    Patient Goals/Plan/Treatment Preferences: See below. From home with daughter Judy Jacobs.  States she has HH, unsure of the company, states they are through [facility] on aging, SW consult placed. Will follow for potential other needs for O2 and pending echo results. Transportation/Food Security/Housekeeping Addressed:  No issues identified.          06/21/22 1113   Service Assessment   History Provided By Patient   Primary Caregiver Abhishek 18 Children;Family Members   Patient's Healthcare Decision Maker is: Legal Next of Akbar 69   PCP Verified by CM Yes   Prior Functional Level Independent in ADLs/IADLs   Family able to assist with home care needs: Yes   Social/Functional History   Lives With Daughter   Bathroom Equipment Shower chair   Home Equipment Walker, rolling   Active  No   Patient's  Info Daughter provides transport   Discharge Planning   Current Services Prior To Admission Abdulkadir

## 2022-06-21 NOTE — CONSULTS
10/12/2021  Number    Date of Birth   1934      Referring Physician   Michele Weston Jo Fuller DO    Age             80 year(s)      Adrian Shaw, [facility]    Interpreting          Karel Acuna MD  Physician    Procedure    Type of Study    TTE procedure:ECHOCARDIOGRAM COMPLETE 2D W DOPPLER W COLOR. Procedure Date  Date: 10/12/2021 Start: 08:01 AM    Study Location: Bedside  Technical Quality: Adequate visualization    Indications:Congestive heart failure. Additional Medical History:Tobacco use, Respiratory failure, COVID,  Hypertension, Fatigue, Chest pain, Murmur, Arthritis, Palpitations, Family  history of heart disease    Patient Status: Routine    Height: 59.84 inches Weight: 124 pounds BSA: 1.52 m^2 BMI: 24.34 kg/m^2    BP: 111/64 mmHg    Conclusions    Summary  Ejection fraction is visually estimated at 25-30%. There was severe global hypokinesis of the left ventricle. Left Ventricular size is Moderately increased . Aortic valve appears tricuspid. Aortic valve leaflets are somewhat thickened. Aortic valve leaflets are Mildly calcified. Trivial aortic regurgitation is noted. Myxomatotic degeneration of mitral valve. Mild to Moderate mitral regurgitation is present. Small circumferential pericardial effusion with no tamponade physiology. Signature    ----------------------------------------------------------------  Electronically signed by Karel Acuna MD (Interpreting  physician) on 10/12/2021 at 04:07 PM  ----------------------------------------------------------------    Findings    Mitral Valve  Myxomatotic degeneration of mitral valve. Mild to Moderate mitral regurgitation is present. Aortic Valve  Aortic valve appears tricuspid. Aortic valve leaflets are somewhat thickened. Aortic valve leaflets are Mildly calcified. Trivial aortic regurgitation is noted. Tricuspid Valve  Mild tricuspid regurgitation visualized.     Pulmonic Valve  The pulmonic valve was not well visualized . Trivial pulmonic regurgitation visualized. Left Atrium  Mildly dilated left atrium. Left Ventricle  Ejection fraction is visually estimated at 25-30%. There was severe global hypokinesis of the left ventricle. Left Ventricular size is Moderately increased . Right Atrium  Right atrial size was normal.    Right Ventricle  The right ventricular size was normal with normal systolic function and  wall thickness. Pericardial Effusion  Small circumferential pericardial effusion with no tamponade physiology. Pleural Effusion  No evidence of pleural effusion. Aorta / Great Vessels  -Aortic root dimension within normal limits.  -The Pulmonary artery is within normal limits. -IVC size is within normal limits with normal respiratory phasic changes.     M-Mode/2D Measurements & Calculations    LV Diastolic   LV Systolic Dimension:    AV Cusp Separation: 1.4 cmLA  Dimension: 5.9 5.1 cm                    Dimension: 4.1 cmAO Root  cm             LV Volume Diastolic: 845  Dimension: 2.4 cmLA Area: 23.2  LV FS:13.6 %   ml                        cm^2  LV PW          LV Volume Systolic: 671  Diastolic: 0.8 ml  cm             LV EDV/LV EDV Index: 173  Septum         ml/114 m^2LV ESV/LV ESV   RV Diastolic Dimension: 3.1 cm  Diastolic: 0.9 Index: 899 ND/29 m^2  cm             EF Calculated: 28.3 %     LA/Aorta: 1.71    LV Length: 8.6 cm         LA volume/Index: 73 ml /48m^2    LV Area        LVOT: 2 cm  Diastolic:  88.5 cm^2  LV Area  Systolic: 78.1  cm^2    Doppler Measurements & Calculations    MV Peak E-Wave:     AV Peak Velocity: 147    LVOT Peak Velocity: 56.8 cm/s  97.3 cm/s           cm/s                     LVOT Mean Velocity: 39.6 cm/s  MV Peak A-Wave:     AV Peak Gradient: 8.64   LVOT Peak Gradient: 1  73.7 cm/s           mmHg                     mmHgLVOT Mean Gradient: 1  MV E/A Ratio: 1.32  AV Mean Velocity: 100    mmHg  MV Peak Gradient:   cm/s  3.79 mmHg AV Mean Gradient: 4 mmHg TV Peak E-Wave: 46.7 cm/s  AV VTI: 26.4 cm          TV Peak A-Wave: 38.6 cm/s  MV Deceleration     AV Area  Time: 165 msec      (Continuity):1.19 cm^2   TV Peak Gradient: 0.87 mmHg  MV P1/2t: 48 msec                            TR Velocity:223 cm/s  MVA by PHT:4.58     LVOT VTI: 10 cm          TR Gradient:19.89 mmHg  cm^2                AV P1/2t: 500 msec       PV Peak Velocity: 36.4 cm/s  IVRT: 74 msec            PV Peak Gradient: 0.53 mmHg  MV E' Septal  Velocity: 5.5 cm/s  MV A' Septal        AV DVI (VTI): 0.38AV DVI ME ED Velocity: 117 cm/s  Velocity: 4.9 cm/s  (Vmax):0.39  MV E' Lateral  Velocity: 4.2 cm/s  MV A' Lateral  Velocity: 7.3 cm/s  E/E' septal: 17.69  E/E' lateral: 23.17  MR Velocity: 390  cm/s    [email or web address]? DocKey=[id number]%[id number]       All labs, EKG's, diagnostic testing and images as well as cardiac cath, stress testing were reviewed during this encounter    Past Medical History:   Diagnosis Date    HERIBERTO [facility]) ([facility] [address]     Anxiety     Arthritis     Bronchitis     CHF (congestive heart failure) (HCC)     Chronic a-fib (HCC)     Diverticulitis of colon     Hyperkalemia     Hypertension     Neuromuscular dysfunction of bladder     Personal history of COVID-19     Pressure ulcer     Protein-calorie malnutrition ([facility] [address]      Past Surgical History:   Procedure Laterality Date    APPENDECTOMY      CHOLECYSTECTOMY      GASTROSTOMY TUBE PLACEMENT N/A 11/17/2021    EGD PEG TUBE PLACEMENT performed by Jigar Payton MD at [facility] N/A 12/23/2021    DECUBITUS ULCER DEBRIDEMENT REPAIR performed by Constantino Oates MD at [address]       Current Facility-Administered Medications   Medication Dose Route Frequency Provider Last Rate Last Admin    spironolactone (ALDACTONE) tablet 25 mg  25 mg Oral Daily [facility],         acetaminophen (TYLENOL) tablet 650 mg  650 mg Oral Q6H PRN Otto Cortez MD   650 mg at 06/21/22 0825    carvedilol (COREG) tablet 6.25 mg  6.25 mg Oral BID Otto Cortez MD   6.25 mg at 06/21/22 0828    docusate (COLACE) 50 MG/5ML liquid 100 mg  100 mg Oral BID Otto Cortez MD   100 mg at 06/21/22 0830    cetirizine (ZYRTEC) tablet 5 mg  5 mg Oral Daily Otto Cortez MD   5 mg at 06/21/22 0826    levothyroxine (SYNTHROID) tablet 125 mcg  125 mcg Oral Daily Otto Cortez MD   125 mcg at 06/21/22 0617    lubiprostone (AMITIZA) capsule 24 mcg  24 mcg Oral Daily with breakfast Otto Cortez MD        multivitamin 1 tablet  1 tablet Oral Daily Otto Cortez MD   1 tablet at 06/21/22 0826    lactobacillus (CULTURELLE) capsule 1 capsule  1 capsule Oral Daily Otto Cortez MD   1 capsule at 06/21/22 0826    Vitamin D (CHOLECALCIFEROL) tablet 500 Units  500 Units Oral Daily Otto Cortez MD   500 Units at 06/21/22 0825    furosemide (LASIX) injection 20 mg  20 mg IntraVENous q8h Otto Cortez MD   20 mg at 06/21/22 0617    azithromycin (ZITHROMAX) 500 mg in sodium chloride 0.9 % 250 mL IVPB ([id number])  500 mg IntraVENous Q24H Otto Cortez MD   Stopped at 06/20/22 2256    cefTRIAXone (ROCEPHIN) 1000 mg IVPB in 50 mL D5W minibag  1,000 mg IntraVENous Q24H Otto Cortez MD        ipratropium-albuterol (DUONEB) nebulizer solution 1 ampule  1 ampule Inhalation Q4H PRN Otto Cortez MD        losartan (COZAAR) tablet 25 mg  25 mg Oral Daily Otto Cortez MD   25 mg at 06/21/22 0829    ondansetron (ZOFRAN-ODT) disintegrating tablet 4 mg  4 mg Oral Q6H PRN Otto Cortez MD        Or    [facility] injection 4 mg  4 mg IntraVENous Q6H PRN Otto Cortez MD   4 mg at 06/20/22 2304     Prior to Admission medications    Medication Sig Start Date End Date Taking?  Authorizing Provider   lubiprostone (AMITIZA) 24 MCG capsule Take 24 mcg by mouth daily (with breakfast)    Yes Historical Provider, MD   NONFORMULARY 1 spray by Nasal route daily Olive leaf nasal spray   Yes Historical Provider, MD   levothyroxine (SYNTHROID) 125 MCG tablet Take 125 mcg by mouth Daily Take 0.5 tabs by mouth daily   Yes Historical Provider, MD   VITAMIN D, CHOLECALCIFEROL, PO Take 1 tablet by mouth daily   Yes Historical Provider, MD   dimenhyDRINATE (DRAMAMINE) 50 MG tablet Take 50 mg by mouth every 6-8 hours as needed (anxiety)   Yes Historical Provider, MD   carvedilol (COREG) 6.25 MG tablet Take 1 tablet by mouth 2 times daily 6/16/22   Paulette Bradley PA-C   docusate (COLACE) 50 MG/5ML liquid 10 mLs by Per G Tube route 2 times daily  Patient taking differently: Take 100 mg by mouth 2 times daily  3/29/22   SERGE Jackson - CNP   ondansetron (ZOFRAN-ODT) 4 MG disintegrating tablet Take 1 tablet by mouth every 8 hours as needed for Nausea or Vomiting 3/29/22   Fransisco Shone, APRN - CNP   OXYGEN Inhale into the lungs as needed (to keep sats > 90%)    Historical Provider, MD   fexofenadine (ALLEGRA) 180 MG tablet Take 180 mg by mouth daily     Historical Provider, MD   Multiple Vitamin (MULTI VITAMIN DAILY PO) Take 1 tablet by mouth daily     Historical Provider, MD   Probiotic Acidophilus (FLORANEX) TABS Take 1 tablet by mouth daily     Historical Provider, MD   apixaban (ELIQUIS) 2.5 MG TABS tablet Take 1 tablet by mouth 2 times daily 12/29/21   SERGE Larkin CNP   acetaminophen (TYLENOL) 325 MG tablet Take 650 mg by mouth every 6 hours as needed for Pain     Historical Provider, MD   Scheduled Meds:   spironolactone  25 mg Oral Daily    carvedilol  6.25 mg Oral BID    docusate  100 mg Oral BID    cetirizine  5 mg Oral Daily    levothyroxine  125 mcg Oral Daily    lubiprostone  24 mcg Oral Daily with breakfast    multivitamin  1 tablet Oral Daily    lactobacillus  1 capsule Oral Daily    Vitamin D  500 Units Oral Daily    furosemide  20 mg IntraVENous q8h    azithromycin  500 mg IntraVENous Q24H    cefTRIAXone (ROCEPHIN) IV  1,000 mg IntraVENous Q24H    losartan  25 mg Oral Daily     Continuous Infusions:  PRN Meds:.acetaminophen, ipratropium-albuterol, ondansetron **OR** ondansetron    Allergies   Allergen Reactions    Sulfa Antibiotics     Gluten Meal Diarrhea     Pt decided to start a gluten free diet, but wants the option to eat gluten.  Metronidazole Rash and Hives    Milk-Related Compounds Diarrhea     Pt decided to start a dairy free diet, but wants the option to eat dairy. History reviewed. No pertinent family history. Social History     Socioeconomic History    Marital status:      Spouse name: Not on file    Number of children: Not on file    Years of education: Not on file    Highest education level: Not on file   Occupational History    Not on file   Tobacco Use    Smoking status: Former Smoker     Packs/day: 1.00     Years: 15.00     Pack years: 15.00     Types: Cigarettes     Quit date: 12/10/1977     Years since quittin.5    Smokeless tobacco: Never Used   Vaping Use    Vaping Use: Never used   Substance and Sexual Activity    Alcohol use: No    Drug use: No    Sexual activity: Not on file   Other Topics Concern    Not on file   Social History Narrative    Not on file     Social Determinants of Health     Financial Resource Strain:     Difficulty of Paying Living Expenses: Not on file   Food Insecurity:     Worried About Running Out of Food in the Last Year: Not on file    Caitlin of Food in the Last Year: Not on file   Transportation Needs:     Lack of Transportation (Medical): Not on file    Lack of Transportation (Non-Medical):  Not on file   Physical Activity:     Days of Exercise per Week: Not on file    Minutes of Exercise per Session: Not on file   Stress:     Feeling of Stress : Not on file Social Connections:     Frequency of Communication with Friends and Family: Not on file    Frequency of Social Gatherings with Friends and Family: Not on file    Attends Hinduism Services: Not on file    Active Member of Clubs or Organizations: Not on file    Attends Club or Organization Meetings: Not on file    Marital Status: Not on file   Intimate Partner Violence:     Fear of Current or Ex-Partner: Not on file    Emotionally Abused: Not on file    Physically Abused: Not on file    Sexually Abused: Not on file   Housing Stability:     Unable to Pay for Housing in the Last Year: Not on file    Number of [place] in the Last Year: Not on file    Unstable Housing in the Last Year: Not on file     ROS:   Constitutional: Denies any recent wt change. Eyes:  Denies any blurring or double vision, no glaucoma  Ears/Nose/Mouth/Throat:  Denies any chronic sinus/rhinitis, bleeding gums  Cardiovascular:  As described above. Respiratory:  Admits to cough and SOB  Genitourinary:  Denies difficulty with urination and kidney stones  Gastrointestinal:  Denies any chronic problems with abdominal pain, nausea, vomiting or diarrhea  Musculoskeletal:  Denies any joint pain, back pain, or difficulty walking  Integumentary:  Denies any rash  Neurological:  No numbness or tingling  Endocrine:  Denies any polydipsia. Hematologic/Lymphatic:  Denies any hemorrhage or lymphatic drainage problems. Labs:  CBC:   Recent Labs     06/20/22  1419 06/21/22  0602   WBC 8.2 4.8   HGB 12.3 11.3*   HCT 39.6 37.3   .9* 106.9*    122*     BMP:   Recent Labs     06/20/22  1419 06/20/22  1955 06/21/22  0602   *  --  140   K 5.5* 4.7 4.5   CL 99  --  102   CO2 26  --  31   BUN 35*  --  32*   CREATININE 0.9  --  1.2     Accucheck Glucoses: No results for input(s): POCGLU in the last 72 hours. Cardiac Enzymes: No results for input(s): CKTOTAL, CKMB, CKMBINDEX, TROPONINI in the last 72 hours.   PT/INR: No results for input(s): PROTIME, INR in the last 72 hours. APTT: No results for input(s): APTT in the last 72 hours. Liver Profile:  Lab Results   Component Value Date    AST 22 03/27/2022    ALT 7 03/27/2022    BILIDIR <0.2 11/08/2021    BILITOT 0.2 03/27/2022    ALKPHOS 53 03/27/2022     Lab Results   Component Value Date    HDL 47 07/18/2019    TRIG 102 10/24/2021     TSH:   Lab Results   Component Value Date    TSH 2.180 06/20/2022     UA:   Lab Results   Component Value Date    COLORU YELLOW 03/27/2022    PHUR 5.0 03/27/2022    LABCAST 8-15 C. GRAN 12/18/2021    LABCAST NONE SEEN 12/18/2021    WBCUA 10-15 03/27/2022    RBCUA 25-50 03/27/2022    YEAST NONE SEEN 03/27/2022    BACTERIA NONE SEEN 03/27/2022    SPECGRAV 1.017 12/18/2021    LEUKOCYTESUR SMALL 03/27/2022    UROBILINOGEN 0.2 03/27/2022    BILIRUBINUR NEGATIVE 03/27/2022    BILIRUBINUR NEGATIVE 08/09/2011    BLOODU LARGE 03/27/2022    GLUCOSEU NEGATIVE 03/27/2022    AMORPHOUS URATES 12/18/2021         Physical Exam:  Vitals:    06/21/22 1121   BP: (!) 90/42   Pulse: 57   Resp: 20   Temp: 97.8 °F (36.6 °C)   SpO2: 96%        Intake/Output Summary (Last 24 hours) at 6/21/2022 1347  Last data filed at 6/21/2022 6828  Gross per 24 hour   Intake 709.92 ml   Output 1900 ml   Net -1190.08 ml      General:  Chronically ill appearing. Cachectic. No acute distress  Neck: Supple, no JVD, no carotid bruits  Heart: Regular rhythm normal S1 and S2, no rubs, murmurs or gallops  Lungs: Diminished throughout. Mild bibasilar rales. Productive cough. Abdomen: positive bowel sounds, soft, non-tender, non-distended. PEG noted.   Extremities:no clubbing, cyanosis or edema  Neurologic: alert and oriented x 3, cranial nerves 2-12 grossly intact, motor and sensory intact, moving all extremities  Skin: No rashes  Psych: AO x 3, no depression/jose, no pressured speech, normal affect  Lymph: No obvious LAD      Assessment:  CHF (congestive heart failure), NYHA class I, acute on chronic, combined ([facility] [address]   Chronically elevated troponin  Paroxysmal Atrial Fibrillation  Acute hypoxic respiratory failure      Plan:  1. Acute on Chronic Systolic HF - Continue diuresis with Lasix. Add Aldactone. Based on patients EF GDMT should include jardiance and entresto. She is currently on Coreg though there is concern for additional GDMT medications and her ability to tolerate with soft BP  1. Recommend change coreg to metoprolol succinate to reduce effects on BP  2. Aldactone 25mg added  3. Consider adding jardiance once able to tolerate  4. Recommend discontinuing losartan and starting entresto if BP tolerates  5. Daily weights, I&O's. Low sodium and fluid restricted diet. 6. Will need further ischemic workup  1. Hold Eliquis. Plan for cath 48hrs after stopping anticoagulation to evaluate for ischemic disease. 2. Repeat Echo completed. Pending read. 1. Pending results may require lifevest on discharge. Paroxysmal Atrial Fibrillation - [id number] Score: 4. Currently on Coreg. Eliquis held for pending cath. EKG shows sinus rhythm. 2. Chronically elevated troponin - In the setting of CKD. ~0.02. At baseline. No CP. 3. Acute hypoxic respiratory failure - Tx per primary. Thank you for allowing us to participate in the care of this patient. Please do not hesitate to call us with questions. Case and plan developed in coordination with Dr. Lisa Quiñonez    Electronically signed by Jamshid MedicineDO on 6/21/2022 at 1:47 PM     Attending Supervising Physician's [address] Statement  I performed a history and physical examination on the patient and discussed the management with the resident physician. I reviewed and agree with the findings and plan as documented in the resident's note except for as noted below. LVSF, unclear etiology, had COVID requiring trach-peg and she improved, awaiting ischemic eval, BP is low, hold ARB, add Aldactone and Lasix. Hx of afib, hold Eliquis for now, cath in 48 hours. Lifevest per patient preferences - she wants to go straight to ICD, which is not typical without attempted adequate maxed out GDMT. Trop is in the setting demand and CKD. No active chest pain. She is quite frail, may need to consider goals of care discussion. Will need Jardiance as outpatient. Further recommendations based on results and clinical course.       Electronically signed by Hilario Alex MD on 6/21/22 at 1:51 PM EDT      Interventional Cardiology - [facility]

## 2022-06-21 NOTE — PROGRESS NOTES
Pt admitted to  4K24 via cart/stretcher. Complaints: Shortness of breath. IV none infusing into the wrist left, condition patent and no redness at site. IV site free of s/s of infection or infiltration. Vital signs obtained. Assessment and data collection initiated. Two nurse skin assessment performed by Dimas Lerner and Cornelius Farmer. Oriented to room. Policies and procedures for 4 explained. All questions answered with no further questions at this time. Fall prevention and safety brochure discussed with patient. Bed alarm on. Call light in reach. Would you like your Primary Care Physician notified? NO   The best day to schedule a follow up Dr appointment is:  Tuesday p.m.

## 2022-06-21 NOTE — PROGRESS NOTES
Dr. Jaren Pelletier Progress note       Internal Medicine              Patient:  Lisbet Fisher  YOB: 1934    MRN: [id number]   Acct:  [de-identified]   [id number]  Primary Care Physician: SERGE Bhakta - CNP    Admit Date: 6/20/2022           Subjective: Patient seen this afternoon resuming care. Patient was admitted last night with shortness of breath diagnosed with congestive heart failure. Patient has been evaluated currently by the cardiologist as well. She does feel better this afternoon daughter at the bedside.       Objective:      Physical Exam:    Vitals:Patient Vitals for the past 24 hrs:   BP Temp Temp src Pulse Resp SpO2 Height Weight   06/21/22 1121 (!) 90/42 97.8 °F (36.6 °C) Oral 57 20 96 % -- --   06/21/22 0815 (!) 112/57 98 °F (36.7 °C) Oral 76 18 97 % -- --   06/21/22 0615 (!) 106/50 -- -- 70 -- -- -- --   06/21/22 0311 (!) 104/51 97.4 °F (36.3 °C) Oral 76 18 98 % -- 112 lb 9.6 oz (51.1 kg)   06/20/22 2347 (!) 103/55 97.4 °F (36.3 °C) Oral 71 16 98 % -- --   06/20/22 2156 -- -- -- 84 -- -- -- --   06/20/22 2130 -- -- -- 79 18 98 % -- --   06/20/22 2049 (!) 113/56 98 °F (36.7 °C) Oral 85 20 100 % 5' (1.524 m) 111 lb 14.4 oz (50.8 kg)   06/20/22 2000 121/64 -- -- 72 19 99 % -- --   06/20/22 1902 123/60 -- -- 85 23 98 % -- --   06/20/22 1715 121/73 -- -- 88 30 99 % -- --   06/20/22 1615 125/64 -- -- 90 22 100 % -- --   06/20/22 1515 122/62 -- -- 94 24 99 % -- --   06/20/22 1441 -- -- -- -- -- 96 % -- --   06/20/22 1411 125/78 97.3 °F (36.3 °C) Oral 92 (!) 44 97 % -- 105 lb (47.6 kg)     Weight: Weight: 112 lb 9.6 oz (51.1 kg)     24 hour intake/output:    Intake/Output Summary (Last 24 hours) at 6/21/2022 1223  Last data filed at 6/21/2022 1746  Gross per 24 hour   Intake 709.92 ml   Output 1900 ml   Net -1190.08 ml       General appearance - alert, and in no distress  Eyes - pupils equal and reactive,t  Mouth - mucous membranes moist, Neck - supple, no significant adenopathy  Chest -diminished breath sounds bilaterally with some posterior rhonchi. Heart -  S1, S2, heard. Abdomen - soft, PEG in place pos bs. Neurological - alert, responsive and about her baseline. Musculoskeletal - no joint tenderness, deformity or swelling  Extremities - peripheral pulses normal, no pedal edema, no clubbing or cyanosis  Skin - normal coloration and turgor, no rashes, no suspicious skin lesions noted    Review of Labs and Diagnostic Testing:    CBC:   Recent Labs     06/21/22  0602   WBC 4.8   HGB 11.3*   HCT 37.3   .9*   *     BMP:   Recent Labs     06/21/22  0602      K 4.5      CO2 31   BUN 32*   CREATININE 1.2   CALCIUM 9.0   GLUCOSE 91     PT/INR: No results for input(s): PROTIME, INR in the last 72 hours. APTT: No results for input(s): APTT in the last 72 hours. Lipids: No results for input(s): ALKPHOS, ALT, AST, BILITOT, BILIDIR, LABALBU, AMYLASE, LIPASE in the last 72 hours. Troponin: No results for input(s): TROPONINI in the last 72 hours. Imaging:  CTA CHEST W WO CONTRAST    Result Date: 6/20/2022  PROCEDURE: CTA CHEST W WO CONTRAST CLINICAL INFORMATION: shortness of breath, r/o PE. COMPARISON: CTA  chest dated 30 October 2021. TECHNIQUE: 3 mm axial images were obtained through the chest after the administration of IV contrast.  A non-contrast localizer was obtained. 3D reconstructions were performed on the scanner to include MIP coronal and sagittal images through the chest. Isovue was the intravenous contrast utilized. All CT scans at this facility use dose modulation, iterative reconstruction, and/or weight-based dosing when appropriate to reduce radiation dose to as low as reasonably achievable. FINDINGS: There is adequate opacification of the pulmonary arterial system. No pulmonary emboli are present. There is atherosclerotic calcification in the thoracic aorta. There is cardiomegaly. . There is a pericardial effusion. There are bilateral pleural effusions right greater than left. There is no mediastinal, axillary or hilar adenopathy. There is evidence for granulomatous disease in the right middle lobe and right hilum. . There are areas of atelectasis or infiltrate at both lung bases. .  There is thoracic spondylosis. .     1. No evidence of pulmonary embolism. 2. Cardiomegaly. 3. Bilateral pleural effusions right greater than left. Pericardial effusion. 4. Areas of atelectasis or infiltrate at both lung bases. 5. Evidence for granulomatous disease in the right middle lobe and right hilum. 6. Thoracic spondylosis. **This report has been created using voice recognition software. It may contain minor errors which are inherent in voice recognition technology. ** Final report electronically signed by DR Jose Carlos Reyez on 6/20/2022 3:34 PM    XR CHEST PORTABLE    Result Date: 6/20/2022  PROCEDURE: XR CHEST PORTABLE CLINICAL INFORMATION: Shortness of breath. COMPARISON: Chest x-ray dated 21 December 2021. . TECHNIQUE: AP upright view of the chest. FINDINGS: The previously noted tracheotomy tube is no longer seen. There is mild cardiomegaly. . Is atherosclerotic calcification aortic arch. There is increased density throughout both lung fields especially in the right lower lobe. The pulmonary vascularity is increased. There is thoracic spondylosis. 1. Cardiomegaly and probable CHF. 2. Infiltrate and effusion at the right lung base. 3. Atherosclerotic calcification in the aortic arch. 4. The previously noted tracheotomy tube is no longer seen. **This report has been created using voice recognition software. It may contain minor errors which are inherent in voice recognition technology. ** Final report electronically signed by DR Jose Carlos Reyez on 6/20/2022 2:37 PM      EKG:      Diet: ADULT DIET;  Regular        Data:   Scheduled Meds: Scheduled Meds:   spironolactone  25 mg Oral Daily    carvedilol  6.25 mg Oral BID    docusate  100 mg Oral BID    cetirizine  5 mg Oral Daily    levothyroxine  125 mcg Oral Daily    lubiprostone  24 mcg Oral Daily with breakfast    multivitamin  1 tablet Oral Daily    lactobacillus  1 capsule Oral Daily    Vitamin D  500 Units Oral Daily    furosemide  20 mg IntraVENous q8h    azithromycin  500 mg IntraVENous Q24H    cefTRIAXone (ROCEPHIN) IV  1,000 mg IntraVENous Q24H    losartan  25 mg Oral Daily     Continuous Infusions:  PRN Meds:.acetaminophen, ipratropium-albuterol, ondansetron **OR** ondansetron  Continuous Infusions:      Assessment   Principal Problem:    CHF (congestive heart failure), NYHA class I, acute on chronic, combined ([address]  Active Problems:    Cardiomyopathy ([address]  Resolved Problems:    * No resolved hospital problems. *        Patient Active Problem List   Diagnosis    Sigmoid diverticulitis    Acute respiratory failure with hypoxia ([address]    COVID-19    Acute congestive heart failure (HCC)    C. difficile colitis    CHF (congestive heart failure) ([address]    Cardiomyopathy ([address]    Hyperkalemia    Chronic respiratory failure (HCC)    Decubitus ulcer    Ventilator dependence ([address]    Severe malnutrition (HCC)    Chronic systolic congestive heart failure (HCC)    PVC (premature ventricular contraction)    CHF (congestive heart failure), NYHA class I, acute on chronic, combined ([address]        Plan   Selective bilateral was seen by cardiologist with plan for cardiac catheterization. I did discuss with the patient and the daughter at the bedside to probably hold the PEG tube removal for now.   We will continue with the current treatment plan      Electronically signed by Rigo Muñoz MD on 6/21/2022 at 12:23 PM  Over 35 mins spent on this evaluation

## 2022-06-21 NOTE — ED NOTES
Patient resting in bed with family at bedside. Respirations easy and unlabored. No distress noted. Denies further needs at this time. Call light within reach.        Roxi Hand RN  06/20/22 2002

## 2022-06-21 NOTE — PLAN OF CARE
Care plan reviewed with patient. Patient verbalize understanding of the plan of care and contribute to goal setting. Problem: Safety - Adult  Goal: Free from fall injury  Outcome: Progressing  Flowsheets (Taken 6/21/2022 0310)  Free From Fall Injury:   Instruct family/caregiver on patient safety   Based on caregiver fall risk screen, instruct family/caregiver to ask for assistance with transferring infant if caregiver noted to have fall risk factors     Problem: Skin/Tissue Integrity  Goal: Absence of new skin breakdown  Description: 1. Monitor for areas of redness and/or skin breakdown  2. Assess vascular access sites hourly  3. Every 4-6 hours minimum:  Change oxygen saturation probe site  4. Every 4-6 hours:  If on nasal continuous positive airway pressure, respiratory therapy assess nares and determine need for appliance change or resting period.   Outcome: Progressing     Problem: ABCDS Injury Assessment  Goal: Absence of physical injury  Outcome: Progressing  Flowsheets (Taken 6/21/2022 0310)  Absence of Physical Injury: Implement safety measures based on patient assessment     Problem: Respiratory - Adult  Goal: Achieves optimal ventilation and oxygenation  Outcome: Progressing  Flowsheets (Taken 6/21/2022 0310)  Achieves optimal ventilation and oxygenation:   Assess for changes in respiratory status   Assess for changes in mentation and behavior   Position to facilitate oxygenation and minimize respiratory effort   Oxygen supplementation based on oxygen saturation or arterial blood gases     Problem: Cardiovascular - Adult  Goal: Maintains optimal cardiac output and hemodynamic stability  Outcome: Progressing  Flowsheets (Taken 6/21/2022 0310)  Maintains optimal cardiac output and hemodynamic stability:   Monitor blood pressure and heart rate   Assess for signs of decreased cardiac output   Monitor urine output and notify Licensed Independent Practitioner for values outside of normal range  Goal: Absence of cardiac dysrhythmias or at baseline  Outcome: Progressing  Flowsheets (Taken 6/21/2022 0310)  Absence of cardiac dysrhythmias or at baseline:   Monitor cardiac rate and rhythm   Assess for signs of decreased cardiac output

## 2022-06-21 NOTE — FLOWSHEET NOTE
During Virtual RN rounds, bedside procedure in progress, patient talking with staff at bedside, no distress.

## 2022-06-21 NOTE — RT PROTOCOL NOTE
RT Inhaler-Nebulizer Bronchodilator Protocol Note    There is a bronchodilator order in the chart from a provider indicating to follow the RT Bronchodilator Protocol and there is an Initiate RT Inhaler-Nebulizer Bronchodilator Protocol order as well (see protocol at bottom of note). CXR Findings:  XR CHEST PORTABLE    Result Date: 6/20/2022  1. Cardiomegaly and probable CHF. 2. Infiltrate and effusion at the right lung base. 3. Atherosclerotic calcification in the aortic arch. 4. The previously noted tracheotomy tube is no longer seen. **This report has been created using voice recognition software. It may contain minor errors which are inherent in voice recognition technology. ** Final report electronically signed by DR Marques Ruiz on 6/20/2022 2:37 PM      The findings from the last RT Protocol Assessment were as follows:   History Pulmonary Disease: Smoker 15 pack years or more  Respiratory Pattern: Regular pattern and RR 12-20 bpm  Breath Sounds: Slightly diminished and/or crackles  Cough: Strong, spontaneous, non-productive  Indication for Bronchodilator Therapy: Decreased or absent breath sounds  Bronchodilator Assessment Score: 3    Aerosolized bronchodilator medication orders have been revised according to the RT Inhaler-Nebulizer Bronchodilator Protocol below. Respiratory Therapist to perform RT Therapy Protocol Assessment initially then follow the protocol. Repeat RT Therapy Protocol Assessment PRN for score 0-3 or on second treatment, BID, and PRN for scores above 3. No Indications - adjust the frequency to every 6 hours PRN wheezing or bronchospasm, if no treatments needed after 48 hours then discontinue using Per Protocol order mode. If indication present, adjust the RT bronchodilator orders based on the Bronchodilator Assessment Score as indicated below.   Use Inhaler orders unless patient has one or more of the following: on home nebulizer, not able to hold breath for 10 seconds, is not alert and oriented, cannot activate and use MDI correctly, or respiratory rate 25 breaths per minute or more, then use the equivalent nebulizer order(s) with same Frequency and PRN reasons based on the score. If a patient is on this medication at home then do not decrease Frequency below that used at home. 0-3 - enter or revise RT bronchodilator order(s) to equivalent RT Bronchodilator order with Frequency of every 4 hours PRN for wheezing or increased work of breathing using Per Protocol order mode. 4-6 - enter or revise RT Bronchodilator order(s) to two equivalent RT bronchodilator orders with one order with BID Frequency and one order with Frequency of every 4 hours PRN wheezing or increased work of breathing using Per Protocol order mode. 7-10 - enter or revise RT Bronchodilator order(s) to two equivalent RT bronchodilator orders with one order with TID Frequency and one order with Frequency of every 4 hours PRN wheezing or increased work of breathing using Per Protocol order mode. 11-13 - enter or revise RT Bronchodilator order(s) to one equivalent RT bronchodilator order with QID Frequency and an Albuterol order with Frequency of every 4 hours PRN wheezing or increased work of breathing using Per Protocol order mode. Greater than 13 - enter or revise RT Bronchodilator order(s) to one equivalent RT bronchodilator order with every 4 hours Frequency and an Albuterol order with Frequency of every 2 hours PRN wheezing or increased work of breathing using Per Protocol order mode. RT to enter RT Home Evaluation for COPD & MDI Assessment order using Per Protocol order mode.     Electronically signed by Priscila Brito RCP on 6/21/2022 at 8:17 AM

## 2022-06-22 PROCEDURE — 2060000000 HC ICU INTERMEDIATE R&B

## 2022-06-22 PROCEDURE — 6360000002 HC RX W HCPCS: Performed by: INTERNAL MEDICINE

## 2022-06-22 PROCEDURE — 97530 THERAPEUTIC ACTIVITIES: CPT

## 2022-06-22 PROCEDURE — 2580000003 HC RX 258: Performed by: INTERNAL MEDICINE

## 2022-06-22 PROCEDURE — 99232 SBSQ HOSP IP/OBS MODERATE 35: CPT | Performed by: NURSE PRACTITIONER

## 2022-06-22 PROCEDURE — 97110 THERAPEUTIC EXERCISES: CPT

## 2022-06-22 PROCEDURE — 6370000000 HC RX 637 (ALT 250 FOR IP): Performed by: INTERNAL MEDICINE

## 2022-06-22 PROCEDURE — 6370000000 HC RX 637 (ALT 250 FOR IP): Performed by: STUDENT IN AN ORGANIZED HEALTH CARE EDUCATION/TRAINING PROGRAM

## 2022-06-22 RX ORDER — AZITHROMYCIN 250 MG/1
500 TABLET, FILM COATED ORAL EVERY 24 HOURS
Status: COMPLETED | OUTPATIENT
Start: 2022-06-22 | End: 2022-06-24

## 2022-06-22 RX ADMIN — LEVOTHYROXINE SODIUM 125 MCG: 0.12 TABLET ORAL at 09:12

## 2022-06-22 RX ADMIN — Medication 1 CAPSULE: at 09:11

## 2022-06-22 RX ADMIN — Medication 100 MG: at 09:11

## 2022-06-22 RX ADMIN — Medication 500 UNITS: at 09:11

## 2022-06-22 RX ADMIN — ONDANSETRON 4 MG: 2 INJECTION INTRAMUSCULAR; INTRAVENOUS at 13:31

## 2022-06-22 RX ADMIN — CETIRIZINE HYDROCHLORIDE 5 MG: 5 TABLET ORAL at 09:12

## 2022-06-22 RX ADMIN — SPIRONOLACTONE 25 MG: 25 TABLET ORAL at 09:12

## 2022-06-22 RX ADMIN — AZITHROMYCIN MONOHYDRATE 500 MG: 250 TABLET ORAL at 20:21

## 2022-06-22 RX ADMIN — FUROSEMIDE 20 MG: 10 INJECTION, SOLUTION INTRAMUSCULAR; INTRAVENOUS at 18:00

## 2022-06-22 RX ADMIN — CEFTRIAXONE SODIUM 1000 MG: 1 INJECTION, POWDER, FOR SOLUTION INTRAMUSCULAR; INTRAVENOUS at 18:50

## 2022-06-22 RX ADMIN — CARVEDILOL 6.25 MG: 6.25 TABLET, FILM COATED ORAL at 09:12

## 2022-06-22 RX ADMIN — LOSARTAN POTASSIUM 25 MG: 25 TABLET, FILM COATED ORAL at 09:13

## 2022-06-22 RX ADMIN — Medication 1 TABLET: at 09:12

## 2022-06-22 RX ADMIN — FUROSEMIDE 20 MG: 10 INJECTION, SOLUTION INTRAMUSCULAR; INTRAVENOUS at 23:36

## 2022-06-22 RX ADMIN — FUROSEMIDE 20 MG: 10 INJECTION, SOLUTION INTRAMUSCULAR; INTRAVENOUS at 09:13

## 2022-06-22 ASSESSMENT — PAIN DESCRIPTION - DESCRIPTORS: DESCRIPTORS: ACHING

## 2022-06-22 ASSESSMENT — PAIN DESCRIPTION - PAIN TYPE: TYPE: ACUTE PAIN

## 2022-06-22 ASSESSMENT — PAIN DESCRIPTION - ORIENTATION: ORIENTATION: LEFT;ANTERIOR;POSTERIOR

## 2022-06-22 ASSESSMENT — PAIN - FUNCTIONAL ASSESSMENT: PAIN_FUNCTIONAL_ASSESSMENT: PREVENTS OR INTERFERES SOME ACTIVE ACTIVITIES AND ADLS

## 2022-06-22 ASSESSMENT — PAIN DESCRIPTION - LOCATION: LOCATION: CHEST;COCCYX

## 2022-06-22 ASSESSMENT — PAIN DESCRIPTION - FREQUENCY: FREQUENCY: INTERMITTENT

## 2022-06-22 ASSESSMENT — PAIN SCALES - GENERAL: PAINLEVEL_OUTOF10: 7

## 2022-06-22 ASSESSMENT — PAIN DESCRIPTION - ONSET: ONSET: OTHER (COMMENT)

## 2022-06-22 NOTE — FLOWSHEET NOTE
Patient sitting in chair, appears to be resting quietly without distress.  Attempted to speak with patient, however no response

## 2022-06-22 NOTE — CARE COORDINATION
6/22/22, 6:54 AM EDT    DISCHARGE ON GOING EVALUATION    [facility] day: 2  Location: [facility]24/024-A Reason for admit: Pericardial effusion [I31.3]  Acute respiratory failure with hypoxia ([facility] [J96.01]  CHF (congestive heart failure), NYHA class I, acute on chronic, combined ([address] [I50.43]  Pneumonia due to infectious organism, unspecified laterality, unspecified part of lung [J18.9]   Procedure: 6/20: CTA chest:  1. No evidence of pulmonary embolism. 2. Cardiomegaly. 3. Bilateral pleural effusions right greater than left. Pericardial effusion. 4. Areas of atelectasis or infiltrate at both lung bases. 5. Evidence for granulomatous disease in the right middle lobe and right hilum. 6. Thoracic spondylosis.    6/20: ECHO: EF  30/35%  Barriers to Discharge: Rocephin IV, Zithromax IV, Lasix IV, 2L O2, PT/OT, cardiology following-meds adjusted/  PCP: SERGE Castillo CNP  Readmission Risk Score: 20.7 ( )%  Patient Goals/Plan/Treatment Preferences: ***

## 2022-06-22 NOTE — PROGRESS NOTES
Cardiology Progress Note      Patient:  Brisa Reyes  YOB: 1934  MRN: [id number]   Acct: [de-identified]  Admit Date:  6/20/2022  Primary Cardiologist: Dr Foster Dupree  Seen by Dr Fauzia Crane    Per prior cardiology consult note-  CHIEF COMPLAINT: SOB. Consult for: [facility] reccomendation        HPI:   This is a pleasant 80 y.o. female who  has a past medical history of HERIBERTO (acute kidney injury) ([facility] [address] Anxiety, Arthritis, Bronchitis, CHF (congestive heart failure) ([address] Chronic a-fib ([facility] [address] Diverticulitis of colon, Hyperkalemia, Hypertension, Neuromuscular dysfunction of bladder, Personal history of COVID-19, Pressure ulcer, and Protein-calorie malnutrition ([facility] [address]    She presents to [facility] with complaints of SOB and productive cough x3 days. She denies any increased swelling in her legs. She has a history of systolic HF with last echo 11/2021 showing EF 25%, severe global hypokinesis of LV. LV size was moderately increased. She had COVID requiring ICU, tracheostomy and PEG tube at the end of last year. She does follow with Cardiology as an outpatient. Pt states she had a cath many years ago and was told it was all good. No report in chart. Cardiology was consulted for recommendations of [facility]. Patient denies fever, chills, CP, abdominal pain, n/v/d/c. Admits to SOB, productive cough. CXR shows cardiomegaly with probable CHF. CTA chest was negative for PE. Demonstrated bilateral pleural effusions and a pericardial effusion. Troponin was elevated though is chronic. BNP 96476.   She has no other acute complaints        Subjective (Events in last 24 hours):   Pt up in chair - sh is lying on her side     She states no CP   SOB much improved from arrival     Discussed CHF and cath need - pt wants everything done at present   Plan for cath tomorrow     Tele SR no ectopy       Objective:   BP (!) 95/46   Pulse 65   Temp 98.3 °F (36.8 °C) (Oral)   Resp 20   Ht 5' (1.524 m)   Wt 105 lb 14.4 oz (48 kg)   SpO2 99% separation. Pulmonic Valve   The pulmonic valve leaflets were not well seen. Left Atrium   Left atrial size was normal.      Left Ventricle   Moderately dilated left ventricular size and severely reduced systolic   function. There was severe global hypokinesis. Wall thickness was within normal limits. Ejection fraction was estimated at 30-35%. Right Atrium   Right atrial size was normal.      Right Ventricle   The right ventricular size was normal with normal systolic function and   wall thickness. Pericardial Effusion   The pericardium was normal in appearance with a moderate,   non-hemodynamically significant pericardial effusion. Pleural Effusion   Left sided pleural effusion. Aorta / Great Vessels   IVC size is within normal limits with normal respiratory phasic changes. Lab Data:    Cardiac Enzymes:  No results for input(s): CKTOTAL, CKMB, CKMBINDEX, TROPONINI in the last 72 hours.     CBC:   Lab Results   Component Value Date    WBC 4.8 06/21/2022    RBC 3.49 06/21/2022    RBC 4.02 08/10/2011    HGB 11.3 06/21/2022    HCT 37.3 06/21/2022     06/21/2022       CMP:    Lab Results   Component Value Date     06/21/2022    K 4.5 06/21/2022    K 5.5 06/20/2022     06/21/2022    CO2 31 06/21/2022    BUN 32 06/21/2022    CREATININE 1.2 06/21/2022    GFRAA >60 07/18/2019    LABGLOM 42 06/21/2022    GLUCOSE 91 06/21/2022    GLUCOSE 118 08/10/2011    CALCIUM 9.0 06/21/2022       Hepatic Function Panel:    Lab Results   Component Value Date    ALKPHOS 53 03/27/2022    ALT 7 03/27/2022    AST 22 03/27/2022    PROT 6.9 03/27/2022    BILITOT 0.2 03/27/2022    BILIDIR <0.2 11/08/2021    LABALBU 3.4 03/27/2022    LABALBU 3.9 08/10/2011       Magnesium:    Lab Results   Component Value Date    MG 2.1 12/12/2021       PT/INR:    Lab Results   Component Value Date    INR 2.16 12/19/2021       HgBA1c:  No results found for: LABA1C    FLP:    Lab Results   Component Value Date    TRIG 102 10/24/2021    HDL 47 07/18/2019       TSH:    Lab Results   Component Value Date    TSH 2.180 06/20/2022         Assessment:    Acute on chronic systolic chf -- improved   Bilateral pleural effusions right greater than left    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

## 2022-06-22 NOTE — PLAN OF CARE
Care plan reviewed with patient and daughter Linda Plata. Patient and family verbalize understanding of the plan of care and contribute to goal setting. Problem: Safety - Adult  Goal: Free from fall injury  Outcome: Progressing  Flowsheets  Taken 6/22/2022 0137 by Phill Casey RN  Free From Fall Injury:   Instruct family/caregiver on patient safety   Based on caregiver fall risk screen, instruct family/caregiver to ask for assistance with transferring infant if caregiver noted to have fall risk factors  Taken 6/22/2022 0136 by Phill Casey RN  Free From Fall Injury:   Instruct family/caregiver on patient safety   Based on caregiver fall risk screen, instruct family/caregiver to ask for assistance with transferring infant if caregiver noted to have fall risk factors  Taken 6/21/2022 1853 by Ely Martinez RN  Free From Fall Injury: Instruct family/caregiver on patient safety     Problem: Skin/Tissue Integrity  Goal: Absence of new skin breakdown  Description: 1. Monitor for areas of redness and/or skin breakdown  2. Assess vascular access sites hourly  3. Every 4-6 hours minimum:  Change oxygen saturation probe site  4. Every 4-6 hours:  If on nasal continuous positive airway pressure, respiratory therapy assess nares and determine need for appliance change or resting period.   Outcome: Progressing     Problem: Cardiovascular - Adult  Goal: Absence of cardiac dysrhythmias or at baseline  Outcome: Progressing  Flowsheets  Taken 6/22/2022 0137  Absence of cardiac dysrhythmias or at baseline:   Monitor cardiac rate and rhythm   Assess for signs of decreased cardiac output  Taken 6/21/2022 1952  Absence of cardiac dysrhythmias or at baseline:   Monitor cardiac rate and rhythm   Assess for signs of decreased cardiac output     Problem: Genitourinary - Adult  Goal: Absence of urinary retention  Outcome: Progressing  Flowsheets  Taken 6/22/2022 0137  Absence of urinary retention:   Assess patients ability to void and empty bladder   Monitor intake/output and perform bladder scan as needed  Taken 6/21/2022 1952  Absence of urinary retention:   Assess patients ability to void and empty bladder   Monitor intake/output and perform bladder scan as needed     Problem: Chronic Conditions and Co-morbidities  Goal: Patient's chronic conditions and co-morbidity symptoms are monitored and maintained or improved  Outcome: Progressing  Flowsheets  Taken 6/22/2022 [address] - Patient's Chronic Conditions and Co-Morbidity Symptoms are Monitored and Maintained or Improved:   Monitor and assess patient's chronic conditions and comorbid symptoms for stability, deterioration, or improvement   Collaborate with multidisciplinary team to address chronic and comorbid conditions and prevent exacerbation or deterioration  Taken 6/21/2022 1952  Care Plan - Patient's Chronic Conditions and Co-Morbidity Symptoms are Monitored and Maintained or Improved:   Monitor and assess patient's chronic conditions and comorbid symptoms for stability, deterioration, or improvement   Collaborate with multidisciplinary team to address chronic and comorbid conditions and prevent exacerbation or deterioration

## 2022-06-22 NOTE — PROGRESS NOTES
[facility] 75 Continence Nurse  Progress Note       Keshia Harper  AGE: 80 y.o. GENDER: female  : 1934  UNIT: 4K-24/024-A  TODAY'S DATE:  2022  ADMISSION DATE: 2022  2:04 PM  Subjective:     Reason for [address] Evaluation and Assessment: stage 3 wound on coccyx      Keshia Harper is a 80 y.o. female referred by:   [] Physician/PA/APRN  [x] Nursing  [] Other:     Wound Identification:  Wound Type: pressure  Contributing Factors: chronic pressure and decreased mobility    Objective:     Darío Risk Score: Darío Scale Score: 18    Assessment:     Encounter: Present to patient room. Patient in chair upon arrival. Assessment and photo to follow. Old dressing removed. Cleansed wound with normal saline and gauze. Pat dry with clean gauze. Patient noted to have healing stage 3 to sacrum. Patient's daughter in room. Reviewed current treatment plan with Dr Kp Fournier. Apply cut piece of Opticell Ag to wound, covered with sacral bordered foam dressing. Staff to change every other day. If Opticell is adhered to wound, wet with saline and allow to form gel. Wipe clean with dry gauze. Change for fecal incontinence. Patient in chair, call light in reach. Will continue to follow and assess wound. Call with concerns and for wound evolution. Wound type: Healing stage 3 sacrum, POA  Wound size: mid 2.5cm x 1cm x 0.4cm; 0.5cm x 0.5cm x 0.1cm left  Undermining or Tunneling: None  Wound assessment/color: Pink, yellow  Drainage amount: Small  Drainage description: Serosanguinous  Odor: None  Margins: Attached  Nida wound: Intact, scarring  Exposed structure: None    Response to treatment:  Well tolerated by patient. Plan:     Treatment Recommendations:   Sacral wound- Clean wound with normal saline and gauze. Pat dry with clean gauze. Apply cut piece of Opticell Ag to wound, cover with offset sacral bordered foam dressing. Change every other day.  If Opticell is adhered to wound, wet with saline and allow to form gel. Wipe clean with dry gauze.      Specialty Bed Required :   [x] Low Air Loss   [x] Pressure Redistribution  [] Fluid Immersion- Dolphin  [] Bariatric  [] RotoProne   [] Other:     Discharge Plan:  Placement for patient upon discharge: from home  Patient appropriate for Outpatient [address]: Follow up with Dr Kp Forunier

## 2022-06-22 NOTE — PROGRESS NOTES
[facility] ICU STEPDOWN TELEMETRY 4K  Occupational Therapy  Daily Note  Time:    Time In:   Time Out: 1434  Timed Code Treatment Minutes: 25 Minutes  Minutes: 26          Date: 2022  Patient Name: Jimena Leo,   Gender: female      Room: [facility]24/024-A  MRN: [id number]  : 1934  (80 y.o.)  Referring Practitioner: Dr. Yolanda Rodriguez MD  Diagnosis: Pericardial Effusion  Additional Pertinent Hx: Pt admissions with known history of COVID and acute respiratory failure requiring trach and PEG. Patient was in the LTAC unit for a prolonged period of time. She was eventually transferred to the nursing home. She is now decannulated. She has been home with her daughter since April of this year. She uses a walker to ambulate. Patient started having increasing shortness of breath for the last 3 days. There is no fever or chills. Positive for cough productive sputum. No increased swelling. She is not on any diuretics at this time. But does have a known ejection fraction of 45%. Unsure why patient was not on LifeVest or defibrillator. Work-up in the ER she was noted to have fluid retention. She is being started on diuretics. Her breathing is now comfortable  On nasal oxygen at this time. She does not use any oxygen at home. Restrictions/Precautions:  Restrictions/Precautions: Fall Risk  Position Activity Restriction  Other position/activity restrictions: skin protection      SUBJECTIVE: Pt sitting up in bedside chair with family present. Pt reporting she knows she needs to stay out of the bed d/t having a sore. Pt  Educated on the need to continue to change position even while in chair as well. PAIN: did not state /10: sore on bottom      Vitals: Oxygen: pt on 1L of O2 during. Initial sats at 97%. after ambualtion sats at 95-96%. COGNITION: WNL    ADL:   Lower Extremity Dressing: Maximum Assistance. socks. BALANCE:  Sitting Balance:  Stand By Assistance.  at EOC  Standing Balance: Contact Guard Assistance. x 2 min with bilateral UE support on walker     BED MOBILITY:  Not Tested    TRANSFERS:  Sit to Stand:  Contact Guard Assistance. from bedside chair  Stand to Sit: [employer]. into bedside chair with educaiton on keeping walker with her until she turns all the way around    FUNCTIONAL MOBILITY:  Assistive Device: Rolling Walker  Assist Level:  Contact Guard Assistance. Distance: into halls  Pt requiring several short standing rest breaks during. Pt having no complaints of increased SOB during only expressed herself feeling weak and getting weaker       ASSESSMENT:     Activity Tolerance:  Patient tolerance of  treatment: fair. Pt with increased weakness with increased activity. O2 sats remained stable while on 1L with activity Sid BANSAL made aware      Discharge Recommendations: Home with Home Health OT  Equipment Recommendations: Equipment Needed: No  Plan: Times per Week: 5x  Specific Instructions for Next Treatment: Functional mobility; ADLs and standing tolerance; UE exercises  Current Treatment Recommendations: Strengthening,Functional mobility training,Endurance training,Safety education & training,Self-Care / ADL  Plan Comment: Pt would benefit from continued skilled OT services when medically stable and discharged from Acute. HHOT recommended. Patient Education  Patient Education: safety with transfers     Goals  Short Term Goals  Time Frame for Short term goals: By discharge  Short Term Goal 1: Pt will complete simple ADLs while standing for over 3 minute duration with CGA to increase her endurance for ease of doing sinkside grooming or dressing. Short Term Goal 2: Pt will complete toileting routine including clothing management with SBA to increase her independence with self care. Short Term Goal 3: Pt will demonstrate functional mobility with OTR to prepare for doing sinkside ADLs.   Short Term Goal 4: Pt will complete BUE ROM/moderate resistance exercises with any handout needed to increase her strength and enduranace for ease of doing ADLs and functional mobility. Additional Goals?: No    Revised Short-Term Goals  Short Term Goals  Time Frame for Short term goals: By discharge  Short Term Goal 1: Pt will complete simple ADLs while standing for over 3 minute duration with CGA to increase her endurance for ease of doing sinkside grooming or dressing. Short Term Goal 2: Pt will complete toileting routine including clothing management with SBA to increase her independence with self care. Short Term Goal 3: Pt to navigate [facility] distances using aD with SBA, no LOB or increase need for rest breaks to complete ADL tasks in home wiht less caregiver burden  Short Term Goal 4: Pt will complete BUE ROM/moderate resistance exercises with any handout needed to increase her strength and enduranace for ease of doing ADLs and functional mobility. Additional Goals?: No    Following session, patient left in safe position with all fall risk precautions in place.

## 2022-06-22 NOTE — PROGRESS NOTES
Pharmacy Intravenous to Oral Protocol  Medication changed per P&T protocol: azithromycin    Patient meets criteria based on the followin. IV therapy > 24 hours - yes  2. Nausea/vomiting - some nausea after eating per RN, but okay with PO meds  3. Regular diet - yes  4. Tolerating other oral medications: yes  5. Afebrile for 24 hours: yes  6.  WBC trending downward: yes    America Lyon, Vic, BCPS   2022  4:38 PM

## 2022-06-22 NOTE — PROGRESS NOTES
Evaluated patient cost of [place] and [place] for Jenelle Lea and [place], [place]. [place] is not covered by patient insurance. [place] is covered 100% by insurance and patient has no copay. -BLANCA [facility] (ext. [phone]) 6/22/2022,3:56 PM

## 2022-06-23 ENCOUNTER — APPOINTMENT (OUTPATIENT)
Dept: CARDIAC CATH/INVASIVE PROCEDURES | Age: 87
DRG: 216 | End: 2022-06-23
Payer: MEDICARE

## 2022-06-23 LAB
ABO: NORMAL
ANION GAP SERPL CALCULATED.3IONS-SCNC: 11 MEQ/L (ref 8–16)
ANTIBODY IDENTIFICATION: NORMAL
ANTIBODY IDENTIFICATION: NORMAL
BUN BLDV-MCNC: 32 MG/DL (ref 7–22)
C (BIG) ANTIGEN: NORMAL
CALCIUM SERPL-MCNC: 9.4 MG/DL (ref 8.5–10.5)
CHLORIDE BLD-SCNC: 97 MEQ/L (ref 98–111)
CHOLESTEROL, TOTAL: 123 MG/DL (ref 100–199)
CO2: 32 MEQ/L (ref 23–33)
CREAT SERPL-MCNC: 1.2 MG/DL (ref 0.4–1.2)
E (LITTLE) ANTIGEN: NORMAL
EKG Q-T INTERVAL: 398 MS
EKG QRS DURATION: 150 MS
EKG QTC CALCULATION (BAZETT): 548 MS
EKG R AXIS: -62 DEGREES
EKG T AXIS: 86 DEGREES
EKG VENTRICULAR RATE: 114 BPM
ERYTHROCYTE [DISTWIDTH] IN BLOOD BY AUTOMATED COUNT: 15.5 % (ref 11.5–14.5)
ERYTHROCYTE [DISTWIDTH] IN BLOOD BY AUTOMATED COUNT: 60 FL (ref 35–45)
GEL INDIRECT COOMBS: NORMAL
GFR SERPL CREATININE-BSD FRML MDRD: 42 ML/MIN/1.73M2
GLUCOSE BLD-MCNC: 87 MG/DL (ref 70–108)
HCT VFR BLD CALC: 41.1 % (ref 37–47)
HDLC SERPL-MCNC: 46 MG/DL
HEMOGLOBIN: 12.7 GM/DL (ref 12–16)
INR BLD: 1.18 (ref 0.85–1.13)
LDL CHOLESTEROL CALCULATED: 57 MG/DL
MCH RBC QN AUTO: 32.8 PG (ref 26–33)
MCHC RBC AUTO-ENTMCNC: 30.9 GM/DL (ref 32.2–35.5)
MCV RBC AUTO: 106.2 FL (ref 81–99)
PLATELET # BLD: 154 THOU/MM3 (ref 130–400)
PMV BLD AUTO: 11.5 FL (ref 9.4–12.4)
POTASSIUM SERPL-SCNC: 3.8 MEQ/L (ref 3.5–5.2)
RBC # BLD: 3.87 MILL/MM3 (ref 4.2–5.4)
RH FACTOR: NORMAL
SODIUM BLD-SCNC: 140 MEQ/L (ref 135–145)
TRIGL SERPL-MCNC: 99 MG/DL (ref 0–199)
WBC # BLD: 6.4 THOU/MM3 (ref 4.8–10.8)

## 2022-06-23 PROCEDURE — 99223 1ST HOSP IP/OBS HIGH 75: CPT | Performed by: THORACIC SURGERY (CARDIOTHORACIC VASCULAR SURGERY)

## 2022-06-23 PROCEDURE — 6360000002 HC RX W HCPCS

## 2022-06-23 PROCEDURE — 2060000000 HC ICU INTERMEDIATE R&B

## 2022-06-23 PROCEDURE — 93458 L HRT ARTERY/VENTRICLE ANGIO: CPT

## 2022-06-23 PROCEDURE — B2111ZZ FLUOROSCOPY OF MULTIPLE CORONARY ARTERIES USING LOW OSMOLAR CONTRAST: ICD-10-PCS | Performed by: INTERNAL MEDICINE

## 2022-06-23 PROCEDURE — 85027 COMPLETE CBC AUTOMATED: CPT

## 2022-06-23 PROCEDURE — 36415 COLL VENOUS BLD VENIPUNCTURE: CPT

## 2022-06-23 PROCEDURE — 94761 N-INVAS EAR/PLS OXIMETRY MLT: CPT

## 2022-06-23 PROCEDURE — 2580000003 HC RX 258: Performed by: NURSE PRACTITIONER

## 2022-06-23 PROCEDURE — 2580000003 HC RX 258: Performed by: INTERNAL MEDICINE

## 2022-06-23 PROCEDURE — 86905 BLOOD TYPING RBC ANTIGENS: CPT

## 2022-06-23 PROCEDURE — 80048 BASIC METABOLIC PNL TOTAL CA: CPT

## 2022-06-23 PROCEDURE — 80061 LIPID PANEL: CPT

## 2022-06-23 PROCEDURE — C1769 GUIDE WIRE: HCPCS

## 2022-06-23 PROCEDURE — 2700000000 HC OXYGEN THERAPY PER DAY

## 2022-06-23 PROCEDURE — 6370000000 HC RX 637 (ALT 250 FOR IP): Performed by: INTERNAL MEDICINE

## 2022-06-23 PROCEDURE — 4A023N7 MEASUREMENT OF CARDIAC SAMPLING AND PRESSURE, LEFT HEART, PERCUTANEOUS APPROACH: ICD-10-PCS | Performed by: INTERNAL MEDICINE

## 2022-06-23 PROCEDURE — 6360000002 HC RX W HCPCS: Performed by: INTERNAL MEDICINE

## 2022-06-23 PROCEDURE — 75716 ARTERY X-RAYS ARMS/LEGS: CPT

## 2022-06-23 PROCEDURE — 86900 BLOOD TYPING SEROLOGIC ABO: CPT

## 2022-06-23 PROCEDURE — 86885 COOMBS TEST INDIRECT QUAL: CPT

## 2022-06-23 PROCEDURE — B2151ZZ FLUOROSCOPY OF LEFT HEART USING LOW OSMOLAR CONTRAST: ICD-10-PCS | Performed by: INTERNAL MEDICINE

## 2022-06-23 PROCEDURE — 6370000000 HC RX 637 (ALT 250 FOR IP): Performed by: STUDENT IN AN ORGANIZED HEALTH CARE EDUCATION/TRAINING PROGRAM

## 2022-06-23 PROCEDURE — 86901 BLOOD TYPING SEROLOGIC RH(D): CPT

## 2022-06-23 PROCEDURE — 2500000003 HC RX 250 WO HCPCS

## 2022-06-23 PROCEDURE — 6360000004 HC RX CONTRAST MEDICATION: Performed by: INTERNAL MEDICINE

## 2022-06-23 PROCEDURE — C1894 INTRO/SHEATH, NON-LASER: HCPCS

## 2022-06-23 PROCEDURE — 86870 RBC ANTIBODY IDENTIFICATION: CPT

## 2022-06-23 PROCEDURE — 85610 PROTHROMBIN TIME: CPT

## 2022-06-23 PROCEDURE — 93010 ELECTROCARDIOGRAM REPORT: CPT | Performed by: NUCLEAR MEDICINE

## 2022-06-23 PROCEDURE — 93005 ELECTROCARDIOGRAM TRACING: CPT | Performed by: NURSE PRACTITIONER

## 2022-06-23 RX ORDER — NITROGLYCERIN 0.4 MG/1
0.4 TABLET SUBLINGUAL EVERY 5 MIN PRN
Status: DISCONTINUED | OUTPATIENT
Start: 2022-06-23 | End: 2022-06-28 | Stop reason: HOSPADM

## 2022-06-23 RX ORDER — SODIUM CHLORIDE 0.9 % (FLUSH) 0.9 %
5-40 SYRINGE (ML) INJECTION EVERY 12 HOURS SCHEDULED
Status: DISCONTINUED | OUTPATIENT
Start: 2022-06-23 | End: 2022-06-24 | Stop reason: SDUPTHER

## 2022-06-23 RX ORDER — SODIUM CHLORIDE 0.9 % (FLUSH) 0.9 %
5-40 SYRINGE (ML) INJECTION PRN
Status: DISCONTINUED | OUTPATIENT
Start: 2022-06-23 | End: 2022-06-24 | Stop reason: SDUPTHER

## 2022-06-23 RX ORDER — ASPIRIN 325 MG
325 TABLET ORAL ONCE
Status: DISCONTINUED | OUTPATIENT
Start: 2022-06-23 | End: 2022-06-27

## 2022-06-23 RX ORDER — SODIUM CHLORIDE 9 MG/ML
INJECTION, SOLUTION INTRAVENOUS PRN
Status: DISCONTINUED | OUTPATIENT
Start: 2022-06-23 | End: 2022-06-24 | Stop reason: SDUPTHER

## 2022-06-23 RX ORDER — HYDROCODONE BITARTRATE AND ACETAMINOPHEN 5; 325 MG/1; MG/1
1 TABLET ORAL ONCE
Status: DISCONTINUED | OUTPATIENT
Start: 2022-06-23 | End: 2022-06-28 | Stop reason: HOSPADM

## 2022-06-23 RX ADMIN — Medication 500 UNITS: at 08:20

## 2022-06-23 RX ADMIN — SODIUM CHLORIDE, PRESERVATIVE FREE 5 ML: 5 INJECTION INTRAVENOUS at 22:04

## 2022-06-23 RX ADMIN — IOPAMIDOL 60 ML: 755 INJECTION, SOLUTION INTRAVENOUS at 10:45

## 2022-06-23 RX ADMIN — SPIRONOLACTONE 25 MG: 25 TABLET ORAL at 08:21

## 2022-06-23 RX ADMIN — FUROSEMIDE 20 MG: 10 INJECTION, SOLUTION INTRAMUSCULAR; INTRAVENOUS at 05:23

## 2022-06-23 RX ADMIN — SODIUM CHLORIDE, PRESERVATIVE FREE 10 ML: 5 INJECTION INTRAVENOUS at 08:20

## 2022-06-23 RX ADMIN — CETIRIZINE HYDROCHLORIDE 5 MG: 5 TABLET ORAL at 08:20

## 2022-06-23 RX ADMIN — Medication 1 CAPSULE: at 08:18

## 2022-06-23 RX ADMIN — CARVEDILOL 6.25 MG: 6.25 TABLET, FILM COATED ORAL at 08:19

## 2022-06-23 RX ADMIN — ACETAMINOPHEN 650 MG: 325 TABLET ORAL at 13:32

## 2022-06-23 RX ADMIN — Medication 1 TABLET: at 08:19

## 2022-06-23 RX ADMIN — CEFTRIAXONE SODIUM 1000 MG: 1 INJECTION, POWDER, FOR SOLUTION INTRAMUSCULAR; INTRAVENOUS at 21:32

## 2022-06-23 RX ADMIN — LOSARTAN POTASSIUM 25 MG: 25 TABLET, FILM COATED ORAL at 08:20

## 2022-06-23 RX ADMIN — AZITHROMYCIN MONOHYDRATE 500 MG: 250 TABLET ORAL at 21:12

## 2022-06-23 RX ADMIN — Medication 100 MG: at 08:21

## 2022-06-23 ASSESSMENT — PAIN DESCRIPTION - LOCATION
LOCATION: ARM
LOCATION: ARM

## 2022-06-23 ASSESSMENT — PAIN SCALES - GENERAL
PAINLEVEL_OUTOF10: 4
PAINLEVEL_OUTOF10: 4

## 2022-06-23 ASSESSMENT — ENCOUNTER SYMPTOMS
BACK PAIN: 1
SHORTNESS OF BREATH: 1
CHEST TIGHTNESS: 1
COLOR CHANGE: 0
COUGH: 1
EYE DISCHARGE: 0
ABDOMINAL PAIN: 0

## 2022-06-23 ASSESSMENT — PAIN DESCRIPTION - FREQUENCY
FREQUENCY: INTERMITTENT
FREQUENCY: INTERMITTENT

## 2022-06-23 ASSESSMENT — PAIN DESCRIPTION - PAIN TYPE
TYPE: ACUTE PAIN
TYPE: ACUTE PAIN

## 2022-06-23 ASSESSMENT — PAIN DESCRIPTION - DESCRIPTORS
DESCRIPTORS: ACHING
DESCRIPTORS: ACHING

## 2022-06-23 ASSESSMENT — PAIN DESCRIPTION - ORIENTATION
ORIENTATION: RIGHT
ORIENTATION: RIGHT

## 2022-06-23 NOTE — RT PROTOCOL NOTE
RT Inhaler-Nebulizer Bronchodilator Protocol Note    There is a bronchodilator order in the chart from a provider indicating to follow the RT Bronchodilator Protocol and there is an Initiate RT Inhaler-Nebulizer Bronchodilator Protocol order as well (see protocol at bottom of note). CXR Findings:  No results found. The findings from the last RT Protocol Assessment were as follows:   History Pulmonary Disease: Smoker 15 pack years or more  Respiratory Pattern: Regular pattern and RR 12-20 bpm  Breath Sounds: Slightly diminished and/or crackles  Cough: Strong, spontaneous, non-productive  Indication for Bronchodilator Therapy: Decreased or absent breath sounds  Bronchodilator Assessment Score: 3    Aerosolized bronchodilator medication orders have been revised according to the RT Inhaler-Nebulizer Bronchodilator Protocol below. Respiratory Therapist to perform RT Therapy Protocol Assessment initially then follow the protocol. Repeat RT Therapy Protocol Assessment PRN for score 0-3 or on second treatment, BID, and PRN for scores above 3. No Indications - adjust the frequency to every 6 hours PRN wheezing or bronchospasm, if no treatments needed after 48 hours then discontinue using Per Protocol order mode. If indication present, adjust the RT bronchodilator orders based on the Bronchodilator Assessment Score as indicated below. Use Inhaler orders unless patient has one or more of the following: on home nebulizer, not able to hold breath for 10 seconds, is not alert and oriented, cannot activate and use MDI correctly, or respiratory rate 25 breaths per minute or more, then use the equivalent nebulizer order(s) with same Frequency and PRN reasons based on the score. If a patient is on this medication at home then do not decrease Frequency below that used at home.     0-3 - enter or revise RT bronchodilator order(s) to equivalent RT Bronchodilator order with Frequency of every 4 hours PRN for wheezing or increased work of breathing using Per Protocol order mode. 4-6 - enter or revise RT Bronchodilator order(s) to two equivalent RT bronchodilator orders with one order with BID Frequency and one order with Frequency of every 4 hours PRN wheezing or increased work of breathing using Per Protocol order mode. 7-10 - enter or revise RT Bronchodilator order(s) to two equivalent RT bronchodilator orders with one order with TID Frequency and one order with Frequency of every 4 hours PRN wheezing or increased work of breathing using Per Protocol order mode. 11-13 - enter or revise RT Bronchodilator order(s) to one equivalent RT bronchodilator order with QID Frequency and an Albuterol order with Frequency of every 4 hours PRN wheezing or increased work of breathing using Per Protocol order mode. Greater than 13 - enter or revise RT Bronchodilator order(s) to one equivalent RT bronchodilator order with every 4 hours Frequency and an Albuterol order with Frequency of every 2 hours PRN wheezing or increased work of breathing using Per Protocol order mode. RT to enter RT Home Evaluation for COPD & MDI Assessment order using Per Protocol order mode.     Electronically signed by Sixto Silva RCP on 6/23/2022 at 8:23 AM

## 2022-06-23 NOTE — PROCEDURES
[address] [place] [address]                            CARDIAC CATHETERIZATION    PATIENT NAME: Jennifer Rodriguez                    :        1934  MED REC NO:   [id number]                           ROOM:       0024  ACCOUNT NO:   [de-identified]                           ADMIT DATE: 2022  PROVIDER:     Tali Enrique MD    DATE OF PROCEDURE:  2022    INDICATION FOR PROCEDURE:  1. Recently diagnosed severe cardiomyopathy, ejection fraction 25% on  echocardiogram.  No recent ischemic workup, suspected ischemic etiology. 2.  Acute congestive heart failure with reduced ejection fraction. Elevated Troponin, abnormal EKG  3. Dyspnea on exertion, fatigue, angina equivalent symptoms. PROCEDURES PERFORMED:  1. Left cardiac catheterization with selective coronary angiogram.  2.  Left ventriculogram.    DESCRIPTION OF PROCEDURE/DETAILS:  After informed consent, the patient  was brought to the cardiac catheterization room. She was prepped and  draped in a sterile fashion. 2% lidocaine was injected in the skin and  subcutaneous tissue overlying the right radial artery. Under ultrasound  guidance using modified Seldinger technique, access was obtained in the  right radial artery. 5/6 Slender sheath was inserted. Standard  antithrombotic/antispasmodic medications were given. I used 5-Yoruba  JR4, 5-Yoruba JL3.5 diagnostic catheters to complete the procedure. FINDINGS:  HEMODYNAMICS:  Left ventricular end-diastolic pressure was 1 mmHg. No  significant pressure gradient across the aortic valve upon pullback. LEFT VENTRICULOGRAM:  There is evidence of severe global hypokinesis,  ejection fraction estimated at 20%-25%. CORONARY ANGIOGRAM:  1. RIGHT CORONARY ARTERY:  Right coronary artery is a relatively small  nondominant vessel with mild luminal irregularities. No evidence for  obstructive disease noted.   2.  LEFT MAIN CORONARY ARTERY:  Distal segment of left main coronary  artery has severe 60%-70% calcified lesion with findings consistent with  an ulcerated plaque. This is a trifurcation lesion. Left main gives  rise to ramus intermedius, left circumflex, and left anterior descending  arteries. 3.  LEFT CIRCUMFLEX ARTERY:  Dominant vessel. Luminal irregularities  were noted. Otherwise patent. 3.  RAMUS INTERMEDIUS:  This is an intermediate sized vessel with  luminal irregularities. 4.  LEFT ANTERIOR DESCENDING ARTERY:  Ostial LAD has involvement of the  left main lesion as detailed above, suspected to have 50%-60% ostial LAD  stenosis, otherwise left anterior descending artery has mild luminal  irregularities in mid segment. Distal LAD has mild diffuse disease. Abdominal aortogram was performed. I advanced the 5-Montserratian pigtail  catheter over the 0.035 J wire to the distal abdominal aorta. Aortogram  was completed. Distal abdominal aorta is patent. No significant  aneurysmal dilation. Common iliac, internal iliac, and external iliac  arteries on both sides are patent with no significant stenotic lesions. MEDICATIONS:  See MAR. COMPLICATIONS:  None. ESTIMATED BLOOD LOSS:  Less than 50 mL. ACCESS:  Right radial artery access. Vasc Band was applied. Hemostasis  was achieved. IMPRESSION:  1. Severe left main stenosis. 2.  Severe ischemic cardiomyopathy. RECOMMENDATIONS:  Bedrest for the next 2 hours. Access site care. Medical therapy and aggressive risk factor modification for coronary  artery disease. Continue on aspirin 81 mg p.o. daily. High intensity  statin therapy. The patient is chronically on Eliquis 2.5 mg p.o.  b.i.d. for her history of atrial fibrillation. Continue to hold Eliquis  at this time pending decision on the revascularization. The severe  distal left main disease has a complex lesion ulcerated plaque was  noted. This is a trifurcation lesion.   The left main gives rise to left  anterior descending, ramus intermedius, and left circumflex arteries. This is a left-sided dominant system with dominant left circumflex  artery. Age and general medical status might prohibit the patient from  be a candidate for coronary artery bypass graft surgery. I discussed  with the patient and her family other treatment options including  high-risk PCI with Impella support versus medical therapy. We will  consult Cardiovascular Surgery for heart team discussion. Left  ventricular end-diastolic pressure was 1 mmHg. Creatinine is up to 1.2. She has no signs of volume overload at this time. Hold Lasix. Hold  Aldactone. Hold losartan. Gentle hydration with IV fluids. Repeat  basic metabolic panel in the morning.         Bushar Ramirez MD    D: 06/23/2022 13:33:42       T: 06/23/2022 13:38:58     [id number]  Job#: [id number]     Doc#: [id number]    CC:

## 2022-06-23 NOTE — FLOWSHEET NOTE
06/23/22 1316   Safe Environment   Safety Measures   (virtual safety round complete)   Patient laying in bed watching tv, family at bedside. Patient c/o pain 10/10 in wrist and coccyx. Family inquiring about repositioning patient, dressing change to coccyx area, and if patient able to eat lunch. PerfectServe message to sent to bedside nurse. Call light within reach. No safety concerns. Will continue to monitor.

## 2022-06-23 NOTE — PROGRESS NOTES
Dr. Diaz Bachelor note       Internal Medicine              Patient:  Ben Dinh  YOB: 1934    MRN: [id number]   Acct:  [de-identified]   4K-24/024-A  Primary Care Physician: SERGE Leyva CNP    Admit Date: 6/20/2022           Subjective: back from the cath lab. Family at the bed side. Objective:      Physical Exam:    Vitals:  Patient Vitals for the past 24 hrs:   BP Temp Temp src Pulse Resp SpO2 Weight   06/23/22 1110 (!) 105/51 -- -- 71 18 97 % --   06/23/22 1055 (!) 106/52 -- -- 69 18 98 % --   06/23/22 1042 (!) 110/57 -- -- 69 18 (!) 78 % --   06/23/22 0830 -- -- -- -- -- 92 % --   06/23/22 0829 -- -- -- -- -- (!) 89 % --   06/23/22 0822 -- -- -- -- -- 95 % --   06/23/22 0808 104/62 98.8 °F (37.1 °C) Oral (!) 114 18 92 % --   06/23/22 0345 -- -- -- -- -- -- 107 lb 8 oz (48.8 kg)   06/23/22 0315 (!) 108/50 98.3 °F (36.8 °C) Oral 77 18 94 % --   06/22/22 2330 (!) 107/51 97.8 °F (36.6 °C) Oral 75 16 94 % --   06/22/22 2000 (!) 98/46 98 °F (36.7 °C) Oral 67 18 96 % --   06/22/22 1800 (!) 110/56 -- -- 79 -- -- --   06/22/22 1635 (!) 101/50 97.9 °F (36.6 °C) Oral 72 20 98 % --     Weight: Weight: 107 lb 8 oz (48.8 kg)     24 hour intake/output:    Intake/Output Summary (Last 24 hours) at 6/23/2022 1117  Last data filed at 6/23/2022 0345  Gross per 24 hour   Intake 76.87 ml   Output 725 ml   Net -648.13 ml       General appearance - alert, and in no distress  Eyes - pupils equal and reactive,t  Mouth - mucous membranes moist,   Neck - supple, no significant adenopathy  Chest -diminished breath sounds bilaterally with some posterior rhonchi. Heart -  S1, S2, heard. Abdomen - soft, PEG in place pos bs. Neurological - alert, responsive and about her baseline.   Musculoskeletal - no joint tenderness, deformity or swelling  Extremities - peripheral pulses normal, no pedal edema, no clubbing or cyanosis  Skin - normal coloration and turgor, no rashes, no suspicious skin lesions noted    Review of Labs and Diagnostic Testing:    CBC:   Recent Labs     06/23/22  0520   WBC 6.4   HGB 12.7   HCT 41.1   .2*        BMP:   Recent Labs     06/23/22  0520      K 3.8   CL 97*   CO2 32   BUN 32*   CREATININE 1.2   CALCIUM 9.4   GLUCOSE 87     PT/INR:   Recent Labs     06/23/22 0520   INR 1.18*     APTT: No results for input(s): APTT in the last 72 hours. Lipids: No results for input(s): ALKPHOS, ALT, AST, BILITOT, BILIDIR, LABALBU, AMYLASE, LIPASE in the last 72 hours. Troponin: No results for input(s): TROPONINI in the last 72 hours. Imaging:  CTA CHEST W WO CONTRAST    Result Date: 6/20/2022  PROCEDURE: CTA CHEST W WO CONTRAST CLINICAL INFORMATION: shortness of breath, r/o PE. COMPARISON: CTA  chest dated 30 October 2021. TECHNIQUE: 3 mm axial images were obtained through the chest after the administration of IV contrast.  A non-contrast localizer was obtained. 3D reconstructions were performed on the scanner to include MIP coronal and sagittal images through the chest. Isovue was the intravenous contrast utilized. All CT scans at this facility use dose modulation, iterative reconstruction, and/or weight-based dosing when appropriate to reduce radiation dose to as low as reasonably achievable. FINDINGS: There is adequate opacification of the pulmonary arterial system. No pulmonary emboli are present. There is atherosclerotic calcification in the thoracic aorta. There is cardiomegaly. . There is a pericardial  effusion. There are bilateral pleural effusions right greater than left. There is no mediastinal, axillary or hilar adenopathy. There is evidence for granulomatous disease in the right middle lobe and right hilum. . There are areas of atelectasis or infiltrate at both lung bases. .  There is thoracic spondylosis. .     1. No evidence of pulmonary embolism. 2. Cardiomegaly.  3. Bilateral pleural effusions right greater than left. Pericardial effusion. 4. Areas of atelectasis or infiltrate at both lung bases. 5. Evidence for granulomatous disease in the right middle lobe and right hilum. 6. Thoracic spondylosis. **This report has been created using voice recognition software. It may contain minor errors which are inherent in voice recognition technology. ** Final report electronically signed by DR Jd Rossi on 6/20/2022 3:34 PM    XR CHEST PORTABLE    Result Date: 6/20/2022  PROCEDURE: XR CHEST PORTABLE CLINICAL INFORMATION: Shortness of breath. COMPARISON: Chest x-ray dated 21 December 2021. . TECHNIQUE: AP upright view of the chest. FINDINGS: The previously noted tracheotomy tube is no longer seen. There is mild cardiomegaly. . Is atherosclerotic calcification aortic arch. There is increased density throughout both lung fields especially in the right lower lobe. The pulmonary vascularity is increased. There is thoracic spondylosis. 1. Cardiomegaly and probable CHF. 2. Infiltrate and effusion at the right lung base. 3. Atherosclerotic calcification in the aortic arch. 4. The previously noted tracheotomy tube is no longer seen. **This report has been created using voice recognition software. It may contain minor errors which are inherent in voice recognition technology. ** Final report electronically signed by DR Jd Rossi on 6/20/2022 2:37 PM      EKG:      Diet: Diet NPO Exceptions are: Sips of Water with Meds  Diet NPO Exceptions are: Sips of Water with Meds        Data:   Scheduled Meds: Scheduled Meds:   sodium chloride flush  5-40 mL IntraVENous 2 times per day    aspirin  325 mg Oral Once    azithromycin  500 mg Oral Q24H    spironolactone  25 mg Oral Daily    carvedilol  6.25 mg Oral BID    docusate  100 mg Oral BID    cetirizine  5 mg Oral Daily    levothyroxine  125 mcg Oral Daily    lubiprostone  24 mcg Oral Daily with breakfast    multivitamin  1 tablet Oral Daily    lactobacillus  1 capsule Oral Daily   

## 2022-06-23 NOTE — BRIEF OP NOTE
[address] [address]  Sedation/Analgesia Post Sedation Record    Pt Name: Marlen Tai  Account number: [de-identified]  MRN: [id number]  YOB: 1934  Procedure Performed By: Edita Cunningham MD MD   Primary Care Physician: Henok Garcia, APRN - CNP  Date: 6/23/2022    POST-PROCEDURE    Physicians/Assistants: Edita Cunningham MD MD     Procedure Performed:Cath      Sedation/Anesthesia: Versed/ Fentanyl and 2% xylocaine local anesthesia. Estimated Blood Loss: < 50 ml. Specimens Removed: None         Disposition of Specimen: N/A        Complications: No Immediate Complications. Post-procedure Diagnosis/Findings:        Severe left main stenosis     Severe ischemic cardiomyopathy    EF 20-25%     Recommendations:  Bed rest for the next 2 hours  Access site care  Medical therapy and aggressive risk factor modification for CAD  ASA 81 mg PO daily   Statin therapy  Severe distal LM trifurcation lesion, ulcerated plaque, was noted on the coronary angiogram. This a complex lesion, LM gives rise to LAD, Ramus and LCX. Age and general medical status might prohibit her from being a candidate for CABG. I discussed with the family other Rx options, namely high risk PCI with Impella support Vs medical therapy. Will consult CT, heart team discussion   LVEDP 1 mmHg. Cr up to 1.2. No signs of volume overload at this time. Hold lasix, aldactone and losartan. Gentle hydration with IVF. Repeat BMP in AM      Above findings and plan of care were discussed with patient and her family, questions were answered, agreeable with plan.        Edita Cunningham MD, Lachelle Oro   Electronically signed 6/23/2022 at 11:48 AM  Interventional Cardiology

## 2022-06-23 NOTE — PLAN OF CARE
Problem: Discharge Planning  Goal: Discharge to home or other facility with appropriate resources  Outcome: Progressing  Flowsheets (Taken 6/23/2022 0414)  Discharge to home or other facility with appropriate resources:   Identify barriers to discharge with patient and caregiver   Arrange for needed discharge resources and transportation as appropriate   Identify discharge learning needs (meds, wound care, etc)     Problem: Safety - Adult  Goal: Free from fall injury  Outcome: Progressing  Flowsheets (Taken 6/22/2022 1029 by Harvey Myles, RN)  Free From Fall Injury: Instruct family/caregiver on patient safety     Problem: Skin/Tissue Integrity  Goal: Absence of new skin breakdown  Description: 1. Monitor for areas of redness and/or skin breakdown  2. Assess vascular access sites hourly  3. Every 4-6 hours minimum:  Change oxygen saturation probe site  4. Every 4-6 hours:  If on nasal continuous positive airway pressure, respiratory therapy assess nares and determine need for appliance change or resting period.   Outcome: Progressing     Problem: ABCDS Injury Assessment  Goal: Absence of physical injury  Outcome: Progressing  Flowsheets (Taken 6/22/2022 1029 by Harvey Myles RN)  Absence of Physical Injury: Implement safety measures based on patient assessment     Problem: Respiratory - Adult  Goal: Achieves optimal ventilation and oxygenation  Outcome: Progressing  Flowsheets (Taken 6/22/2022 1399 by Federico Cartagena RN)  Achieves optimal ventilation and oxygenation:   Assess for changes in respiratory status   Assess for changes in mentation and behavior   Position to facilitate oxygenation and minimize respiratory effort     Problem: Cardiovascular - Adult  Goal: Maintains optimal cardiac output and hemodynamic stability  Outcome: Progressing  Flowsheets (Taken 6/22/2022 0137 by Federico Cartagena RN)  Maintains optimal cardiac output and hemodynamic stability:   Monitor blood pressure and heart rate RN)  Glucose maintained within prescribed range:   Monitor blood glucose as ordered   Assess for signs and symptoms of hyperglycemia and hypoglycemia     Problem: Hematologic - Adult  Goal: Maintains hematologic stability  Outcome: Progressing  Flowsheets (Taken 6/21/2022 1952 by Naya Bello RN)  Maintains hematologic stability:   Assess for signs and symptoms of bleeding or hemorrhage   Monitor labs for bleeding or clotting disorders     Problem: Chronic Conditions and Co-morbidities  Goal: Patient's chronic conditions and co-morbidity symptoms are monitored and maintained or improved  Outcome: Progressing  Flowsheets (Taken 6/22/2022 0137 by Naya Bello RN)  Care Plan - Patient's Chronic Conditions and Co-Morbidity Symptoms are Monitored and Maintained or Improved:   Monitor and assess patient's chronic conditions and comorbid symptoms for stability, deterioration, or improvement   Collaborate with multidisciplinary team to address chronic and comorbid conditions and prevent exacerbation or deterioration     Problem: Pain  Goal: Verbalizes/displays adequate comfort level or baseline comfort level  Outcome: Progressing  Flowsheets (Taken 6/23/2022 0414)  Verbalizes/displays adequate comfort level or baseline comfort level:   Encourage patient to monitor pain and request assistance   Assess pain using appropriate pain scale   Administer analgesics based on type and severity of pain and evaluate response   Implement non-pharmacological measures as appropriate and evaluate response     Care plan reviewed with patient. Patient verbalize understanding of the plan of care and contribute to goal setting.

## 2022-06-23 NOTE — CARE COORDINATION
6/23/22, 8:40 AM EDT    DISCHARGE ON GOING EVALUATION    [facility] day: 3  Location: -24/024-A Reason for admit: Pericardial effusion [I31.3]  Acute respiratory failure with hypoxia ([facility] [address] [J96.01]  CHF (congestive heart failure), NYHA class I, acute on chronic, combined ([address] [I50.43]  Pneumonia due to infectious organism, unspecified laterality, unspecified part of lung [J18.9]   Procedure:    6/20 CXR: Cardiomegaly and probable CHF. Infiltrate and effusion at the right lung base. Atherosclerotic calcification in the aortic arch. The previously noted tracheotomy tube is no longer seen  6/20 CTA  Chest: No evidence of pulmonary embolism. Cardiomegaly. Bilateral pleural effusions right greater than left. Pericardial effusion. Areas of atelectasis or infiltrate at both lung bases. Evidence for granulomatous disease in the right middle lobe and right hilum. Thoracic spondylosis  6/21 ECHO limited: ordered  6/23 Cardiac cath pending  Barriers to Discharge: Asa, oral Zithromax, IV Rocephin, IV Lasix, Duonebs. Cardiology following, PT/OT, Wound Ostomy following. PCP: SERGE Hu CNP  Readmission Risk Score: 19.4 ( )%  Patient Goals/Plan/Treatment Preferences: Plans home with daughter, [facility]. SW following. [employer] order information faxed to Madelin Giron at [phone]. Message left with Tiffanie Woods  [phone].

## 2022-06-23 NOTE — H&P
[facility]  Sedation/Analgesia History & Physical    Pt Name: Nelson Richmond  Account number: [de-identified]  MRN: [id number]  YOB: 1934  Provider Performing Procedure: Osvaldo Shetty MD MD  Referring Provider: Piyush Cash MD   Primary Care Physician: Ana Blakely, APRN - CNP  Date: 6/23/2022    PRE-PROCEDURE    Code Status: FULL CODE  Brief History/Pre-Procedure Diagnosis:     Severe cardiomyopathy  HFrEF     Consent: : I have discussed with the patient risks, benefits, and alternatives (and relevant risks, benefits, and side effects related to alternatives or not receiving care), and likelihood of the success. The patient and/or representative appear to understand and agree to proceed. The discussion encompasses risks, benefits, and side effects related to the alternatives and the risks related to not receiving the proposed care, treatment, and services. The indication, risks and benefits of the procedure and possible therapeutic consequences and alternatives were discussed with the patient. The patient was given the opportunity to ask questions and to have them answered to his/her satisfaction. Risks of the procedure include but are not limited to the following: Bleeding, hematoma including retroperitoneal hemmorhage, infection, pain and discomfort, injury to the aorta and other blood vessels, rhythm disturbance, low blood pressure, myocardial infarction, stroke, kidney damage/failure, myocardial perforation, allergic reactions to sedatives/contrast material, loss of pulse/vascular compromise requiring surgery, aneurysm/pseudoaneurysm formation, possible loss of a limb/hand/leg, needing blood transfusion, requiring emergent open heart surgery or emergent coronary intervention, the need for intubation/respiratory support, the requirement for defibrillation/cardioversion, and death. Alternatives to and omission of the suggested procedure were discussed.  The patient had no further questions and wished to proceed; the consent form was signed. MEDICAL HISTORY  []ASHD/ANGINA/MI/CHF   []Hypertension  []Diabetes  []Hyperlipidemia  []Smoking  []Family Hx of ASHD  []Additional information:       has a past medical history of HERIBERTO (acute kidney injury) ([facility] [address] Anxiety, Arthritis, Bronchitis, CHF (congestive heart failure) ([facility] [address] Chronic a-fib ([facility] Diverticulitis of colon, Hyperkalemia, Hypertension, Neuromuscular dysfunction of bladder, Personal history of COVID-19, Pressure ulcer, and Protein-calorie malnutrition ([facility] SURGICAL HISTORY   has a past surgical history that includes Appendectomy; Cholecystectomy; Gastrostomy tube placement (N/A, 11/17/2021); Pressure ulcer debridement (N/A, 12/23/2021); and tracheostomy.   Additional information:       ALLERGIES   Allergies as of 06/20/2022 - Fully Reviewed 06/20/2022   Allergen Reaction Noted    Sulfa antibiotics  12/10/2012    Gluten meal Diarrhea 10/11/2021    Metronidazole Rash and Hives 10/11/2021    Milk-related compounds Diarrhea 10/11/2021     Additional information:       MEDICATIONS   Aspirin  [] 81 mg  [] 325 mg  [] None  Antiplatelet drug therapy use last 5 days  []No []Yes  Coumadin Use Last 5 Days []No []Yes  Other anticoagulant use last 5 days  []No []Yes    Current Facility-Administered Medications:     sodium chloride flush 0.9 % injection 5-40 mL, 5-40 mL, IntraVENous, 2 times per day, SERGE Bhatia CNP, 10 mL at 06/23/22 0820    sodium chloride flush 0.9 % injection 5-40 mL, 5-40 mL, IntraVENous, PRN, SERGE Bhatia CNP    0.9 % sodium chloride infusion, , IntraVENous, PRN, SERGE Bhatia CNP    aspirin tablet 325 mg, 325 mg, Oral, Once, SERGE Langley CNP    nitroGLYCERIN (NITROSTAT) SL tablet 0.4 mg, 0.4 mg, SubLINGual, Q5 Min PRN, SERGE Bhatia CNP    azithromycin (ZITHROMAX) tablet 500 mg, 500 mg, Oral, Q24H, Sasikala T Karson, MD, 500 mg at 06/22/22 2021    spironolactone (ALDACTONE) tablet 25 mg, 25 mg, Oral, Daily, Selvin Hernandez DO, 25 mg at 06/23/22 6864    acetaminophen (TYLENOL) tablet 650 mg, 650 mg, Oral, Q6H PRN, Dave Tate MD, 650 mg at 06/21/22 0825    carvedilol (COREG) tablet 6.25 mg, 6.25 mg, Oral, BID, Dave Tate MD, 6.25 mg at 06/23/22 0819    docusate (COLACE) 50 MG/5ML liquid 100 mg, 100 mg, Oral, BID, Dave Tate MD, 100 mg at 06/23/22 9506    cetirizine (ZYRTEC) tablet 5 mg, 5 mg, Oral, Daily, Dave Tate MD, 5 mg at 06/23/22 0820    levothyroxine (SYNTHROID) tablet 125 mcg, 125 mcg, Oral, Daily, Dave Tate MD, 125 mcg at 06/22/22 0912    lubiprostone (AMITIZA) capsule 24 mcg, 24 mcg, Oral, Daily with breakfast, Dave Tate MD    multivitamin 1 tablet, 1 tablet, Oral, Daily, Dave Tate MD, 1 tablet at 06/23/22 0819    lactobacillus (CULTURELLE) capsule 1 capsule, 1 capsule, Oral, Daily, Dave Tate MD, 1 capsule at 06/23/22 0818    Vitamin D (CHOLECALCIFEROL) tablet 500 Units, 500 Units, Oral, Daily, Dave Tate MD, 500 Units at 06/23/22 0820    furosemide (LASIX) injection 20 mg, 20 mg, IntraVENous, q8h, Dave Tate MD, 20 mg at 06/23/22 0523    cefTRIAXone (ROCEPHIN) 1000 mg IVPB in 50 mL D5W minibag, 1,000 mg, IntraVENous, Q24H, Dave Tate MD, Stopped at 06/22/22 1925    ipratropium-albuterol (DUONEB) nebulizer solution 1 ampule, 1 ampule, Inhalation, Q4H PRN, Dave Tate MD    losartan (COZAAR) tablet 25 mg, 25 mg, Oral, Daily, Dave Tate MD, 25 mg at 06/23/22 0820    ondansetron (ZOFRAN-ODT) disintegrating tablet 4 mg, 4 mg, Oral, Q6H PRN **OR** ondansetron (ZOFRAN) injection 4 mg, 4 mg, IntraVENous, Q6H PRN, Dave Tate MD, 4 mg at 06/22/22 1331  Prior to Admission medications    Medication Sig Start Date End Date Taking?  Authorizing Provider lubiprostone (AMITIZA) 24 MCG capsule Take 24 mcg by mouth daily (with breakfast)    Yes Historical Provider, MD   NONFORMULARY 1 spray by Nasal route daily Olive leaf nasal spray   Yes Historical Provider, MD   levothyroxine (SYNTHROID) 125 MCG tablet Take 125 mcg by mouth Daily Take 0.5 tabs by mouth daily   Yes Historical Provider, MD   VITAMIN D, CHOLECALCIFEROL, PO Take 1 tablet by mouth daily   Yes Historical Provider, MD   dimenhyDRINATE (DRAMAMINE) 50 MG tablet Take 50 mg by mouth every 6-8 hours as needed (anxiety)   Yes Historical Provider, MD   carvedilol (COREG) 6.25 MG tablet Take 1 tablet by mouth 2 times daily 6/16/22   Donna Dyer PA-C   docusate (COLACE) 50 MG/5ML liquid 10 mLs by Per G Tube route 2 times daily  Patient taking differently: Take 100 mg by mouth 2 times daily  3/29/22   SERGE Middleton - CNP   ondansetron (ZOFRAN-ODT) 4 MG disintegrating tablet Take 1 tablet by mouth every 8 hours as needed for Nausea or Vomiting 3/29/22   SERGE Calabrese CNP   OXYGEN Inhale into the lungs as needed (to keep sats > 90%)    Historical Provider, MD   fexofenadine (ALLEGRA) 180 MG tablet Take 180 mg by mouth daily     Historical Provider, MD   Multiple Vitamin (MULTI VITAMIN DAILY PO) Take 1 tablet by mouth daily     Historical Provider, MD   Probiotic Acidophilus (FLORANEX) TABS Take 1 tablet by mouth daily     Historical Provider, MD   apixaban (ELIQUIS) 2.5 MG TABS tablet Take 1 tablet by mouth 2 times daily 12/29/21   SERGE South CNP   acetaminophen (TYLENOL) 325 MG tablet Take 650 mg by mouth every 6 hours as needed for Pain     Historical Provider, MD     Additional information:       VITAL SIGNS   Vitals:    06/23/22 0830   BP:    Pulse:    Resp:    Temp:    SpO2: 92%       PHYSICAL:   General: No acute distress  HEENT:  Unremarkable for age  Neck: without increased JVD, carotid pulses 2+ bilaterally without bruits  Heart: RRR, S1 & S2 WNL.  No murmurs   Lungs: Clear to

## 2022-06-23 NOTE — PROGRESS NOTES
Pt reports some nausea today- on Zofran PRN. Last BM x1 on 6/22. Pertinent Labs: BUN 32  Pertinent Meds: Zithromax, Rocephin, Colace, Lasix, Culturelle, Multivitamin, Vitamin D, Zofran PRN. Wound Type: Pressure Injury,Stage III,Multiple (on right buttocks and coccyx)       Current Nutrition Intake & Therapies:    Average Meal Intake: %,51-75%  Average Supplements Intake:  (Initiated today)  ADULT DIET;  Regular; Low Fat/Low Chol/High Fiber/2 gm Na  ADULT ORAL NUTRITION SUPPLEMENT; Breakfast, Lunch, Dinner; Standard High Calorie/High Protein Oral Supplement    Anthropometric Measures:  Height: 5' (152.4 cm)  Ideal Body Weight (IBW): 100 lbs (45 kg)    Admission Body Weight: 111 lb 14.4 oz (50.8 kg) (6/20; no edema noted)  Current Body Weight: 107 lb 8 oz (48.8 kg) (6/23; no edema noted)  Current BMI (kg/m2): 21  Usual Body Weight:  (Per EMR: 132 lb 11.5 oz on 10/16/21; 139 lb 3.2 oz on 12/29/21; 103 lb 11.2 oz on 4/19/22; 111 lb 14.4 oz on 6/20/22)  BMI Categories: Underweight (BMI less than 22) age over 72    Estimated Daily Nutrient Needs:  Energy Requirements Based On: Kcal/kg  Weight Used for Energy Requirements: Current  Energy (kcal/day): 0603-2780 kcal/day (30-35 kcal/kg)  Protein (g/day): 73 +g/dy (1.5 + g/kg)    Nutrition Diagnosis:   · Severe malnutrition,In context of chronic illness related to inadequate protein-energy intake as evidenced by poor intake prior to admission,severe muscle loss,severe loss of subcutaneous fat    Nutrition Interventions:   Food and/or Nutrient Delivery: Continue Current Diet,Start Oral Nutrition Supplement,Vitamin Supplement  Nutrition Education/Counseling: Education initiated (Encouraged po intake of meals and ONS at best effort)  Coordination of Nutrition Care: Continue to monitor while inpatient    Goals:  Goals: PO intake 75% or greater,by next RD assessment    Nutrition Monitoring and Evaluation:   Behavioral-Environmental Outcomes: None Identified  Food/Nutrient Intake Outcomes: Diet Advancement/Tolerance,Food and Nutrient Intake,Supplement Intake,Vitamin/Mineral Intake  Physical Signs/Symptoms Outcomes: Biochemical Data,Chewing or Swallowing,GI Status,Weight,Skin,Nutrition Focused Physical Findings    Discharge Planning:     Too soon to determine     Marrion Caller, MS, RD, LD  Contact: [phone]

## 2022-06-23 NOTE — CONSULTS
Cardiothoracic Surgery History & Physical       Patient:  Silvestre Theodore  YOB: 1934    MRN: [id number]     Acct: [de-identified]    PCP: SERGE Bartlett CNP    Date of Admission: 6/20/2022    Chief Complaint:    Chief Complaint   Patient presents with    Shortness of Breath    Insomnia       History Of Present Illness:  80 y.o. chronically ill, medically complex female nursing home resident, with history of recent COVID/respiratory failure/tracheostomy, on continuous O2, CRI, chronic atrial fibrillation and dilated cardiomyopathy, admitted with dyspnea at rest and productive cough without chest pain. No acute EKG changes or significant troponin elevation. [facility] today shows critical left main coronary disease. Past Medical History:          Diagnosis Date    HERIBERTO [facility]) ([facility] [address]     Anxiety     Arthritis     Bronchitis     [facility]     Chronic a-fib (HCC)     Diverticulitis of colon     Hyperkalemia     Hypertension     Neuromuscular dysfunction of bladder     Personal history of COVID-19     Pressure ulcer     Protein-calorie malnutrition ([facility] [address]        Past Surgical History:          Procedure Laterality Date    APPENDECTOMY      CHOLECYSTECTOMY      GASTROSTOMY TUBE PLACEMENT N/A 11/17/2021    EGD PEG TUBE PLACEMENT performed by Alberto Castaneda MD at [address] [place] [address] N/A 12/23/2021    DECUBITUS ULCER DEBRIDEMENT REPAIR performed by Paul Chadwick MD at [facility]         Medications Prior to Admission:      Prior to Admission medications    Medication Sig Start Date End Date Taking?  Authorizing Provider   lubiprostone (AMITIZA) 24 MCG capsule Take 24 mcg by mouth daily (with breakfast)    Yes Historical Provider, MD   NONFORMULARY 1 spray by Nasal route daily Olive leaf nasal spray   Yes Historical Provider, MD   levothyroxine (SYNTHROID) 125 MCG tablet Take 125 mcg by mouth Daily Take 0.5 tabs by mouth daily   Yes Historical Provider, MD   VITAMIN D, CHOLECALCIFEROL, PO Take 1 tablet by mouth daily   Yes Historical Provider, MD   dimenhyDRINATE (DRAMAMINE) 50 MG tablet Take 50 mg by mouth every 6-8 hours as needed (anxiety)   Yes Historical Provider, MD   carvedilol (COREG) 6.25 MG tablet Take 1 tablet by mouth 2 times daily 6/16/22   Amanda Smith PA-C   docusate (COLACE) 50 MG/5ML liquid 10 mLs by Per G Tube route 2 times daily  Patient taking differently: Take 100 mg by mouth 2 times daily  3/29/22   SERGE Harry - CNP   ondansetron (ZOFRAN-ODT) 4 MG disintegrating tablet Take 1 tablet by mouth every 8 hours as needed for Nausea or Vomiting 3/29/22   SERGE Hanna - CNP   OXYGEN Inhale into the lungs as needed (to keep sats > 90%)    Historical Provider, MD   fexofenadine (ALLEGRA) 180 MG tablet Take 180 mg by mouth daily     Historical Provider, MD   Multiple Vitamin (MULTI VITAMIN DAILY PO) Take 1 tablet by mouth daily     Historical Provider, MD   Probiotic Acidophilus (FLORANEX) TABS Take 1 tablet by mouth daily     Historical Provider, MD   apixaban (ELIQUIS) 2.5 MG TABS tablet Take 1 tablet by mouth 2 times daily 12/29/21   SERGE Hayes CNP   acetaminophen (TYLENOL) 325 MG tablet Take 650 mg by mouth every 6 hours as needed for Pain     Historical Provider, MD       Allergies:  Sulfa antibiotics and Metronidazole    Social History:      TOBACCO:   reports that she quit smoking about 44 years ago. Her smoking use included cigarettes. She has a 15.00 pack-year smoking history. She has never used smokeless tobacco.  ETOH:   reports no history of alcohol use. Social History     Substance and Sexual Activity   Drug Use No         Family History:      History reviewed. No pertinent family history. REVIEW OFSYSTEMS:      Review of Systems   Constitutional: Positive for activity change and fatigue. HENT: Negative for congestion. Eyes: Negative for discharge. Respiratory: Positive for cough, chest tightness and shortness of breath. Cardiovascular: Positive for leg swelling. Negative for chest pain. Gastrointestinal: Negative for abdominal pain. Endocrine: Negative for cold intolerance. Genitourinary: Positive for difficulty urinating. Musculoskeletal: Positive for back pain and gait problem. Skin: Negative for color change. Allergic/Immunologic: Positive for environmental allergies. Neurological: Positive for weakness. Hematological: Bruises/bleeds easily. Psychiatric/Behavioral: Negative for agitation. PHYSICALEXAM:    BP (!) 102/53   Pulse 84   Temp 98.8 °F (37.1 °C) (Oral)   Resp 18   Ht 5' (1.524 m)   Wt 107 lb 8 oz (48.8 kg)   SpO2 94%   BMI 20.99 kg/m²     General appearance:  No apparent distress, appears stated age and cooperative. HEENT:  Normal cephalic, atraumatic withoutobvious deformity. Pupils equal, round, and reactive to light. Extra ocular muscles intact. Conjunctivae/corneas clear. Neck: Supple, with full range of motion. No jugular venous distention. Trachea midline. Respiratory: Bilateral decreased breath sounds, rales present  Cardiovascular:  Regular rate and rhythm with normal S1/S2 without murmurs, rubs or gallops. Abdomen: Soft,non-tender, non-distended with normal bowel sounds. Musculoskeletal:  No clubbing, cyanosis or edemabilaterally. Full range of motion without deformity. Skin: 2+ edema bilaterallly   Neurologic:  Neurovascularly intact without any focal sensory/motor deficits.  Cranial nerves: II-XIIintact, grossly non-focal.  Psychiatric:  Alert and oriented, thought content appropriate, normal insight  Capillary Refill: Brisk,< 3 seconds   PeripheralPulses: +2 palpable, equal bilaterally       Labs:    Recent Labs     06/21/22 0602 06/23/22  0520   WBC 4.8 6.4   HGB 11.3* 12.7   HCT 37.3 41.1   * 154     Recent Labs     06/20/22 1955 06/21/22 0602 06/23/22  0520   NA  --  140 140   K 4.7 4.5 3.8   CL  --  102 97*   CO2  --  31 32   BUN  --  32* 32*   CREATININE  --  1.2 1.2   CALCIUM  --  9.0 9.4     No results for input(s): AST, ALT, BILIDIR, BILITOT, ALKPHOS in the last 72 hours. Recent Labs     06/23/22  0520   INR 1.18*     No results for input(s): Marian Ill in the last 72 hours. Urinalysis:      Lab Results   Component Value Date    NITRU NEGATIVE 03/27/2022    WBCUA 10-15 03/27/2022    BACTERIA NONE SEEN 03/27/2022    RBCUA 25-50 03/27/2022    BLOODU LARGE 03/27/2022    SPECGRAV 1.017 12/18/2021    GLUCOSEU NEGATIVE 03/27/2022       ECHO: Results for orders placed during the hospital encounter of 10/11/21    ECHO Complete 2D W Doppler W Color    Narrative  Transthoracic Echocardiography Report (TTE)    Demographics    Patient Name    Carmen Mcmillan Gender                Female    MR #            [id number]       Race                      Ethnicity    Account #       [de-identified]       Room Number           0034    Accession       [id number]      Date of Study         10/12/2021  Number    Date of Birth   1934      Referring Physician   Kimo Morris [facility]    Age             80 year(s)      Sage Rodriguez RDCS    Interpreting          Benjie Gonzalez MD  Physician    Procedure    Type of Study    TTE procedure:ECHOCARDIOGRAM COMPLETE 2D W DOPPLER W COLOR. Procedure Date  Date: 10/12/2021 Start: 08:01 AM    Study Location: Bedside  Technical Quality: Adequate visualization    Indications:Congestive heart failure. Additional Medical History:Tobacco use, Respiratory failure, COVID,  Hypertension, Fatigue, Chest pain, Murmur, Arthritis, Palpitations, Family  history of heart disease    Patient Status: Routine    Height: 59.84 inches Weight: 124 pounds BSA: 1.52 m^2 BMI: 24.34 kg/m^2    BP: 111/64 mmHg    Conclusions    Summary  Ejection fraction is visually estimated at 25-30%.   There was severe global hypokinesis of the left ventricle. Left Ventricular size is Moderately increased . Aortic valve appears tricuspid. Aortic valve leaflets are somewhat thickened. Aortic valve leaflets are Mildly calcified. Trivial aortic regurgitation is noted. Myxomatotic degeneration of mitral valve. Mild to Moderate mitral regurgitation is present. Small circumferential pericardial effusion with no tamponade physiology. Signature    ----------------------------------------------------------------  Electronically signed by Yolande Navas MD (Interpreting  physician) on 10/12/2021 at 04:07 PM  ----------------------------------------------------------------    Findings    Mitral Valve  Myxomatotic degeneration of mitral valve. Mild to Moderate mitral regurgitation is present. Aortic Valve  Aortic valve appears tricuspid. Aortic valve leaflets are somewhat thickened. Aortic valve leaflets are Mildly calcified. Trivial aortic regurgitation is noted. Tricuspid Valve  Mild tricuspid regurgitation visualized. Pulmonic Valve  The pulmonic valve was not well visualized . Trivial pulmonic regurgitation visualized. Left Atrium  Mildly dilated left atrium. Left Ventricle  Ejection fraction is visually estimated at 25-30%. There was severe global hypokinesis of the left ventricle. Left Ventricular size is Moderately increased . Right Atrium  Right atrial size was normal.    Right Ventricle  The right ventricular size was normal with normal systolic function and  wall thickness. Pericardial Effusion  Small circumferential pericardial effusion with no tamponade physiology. Pleural Effusion  No evidence of pleural effusion. Aorta / Great Vessels  -Aortic root dimension within normal limits.  -The Pulmonary artery is within normal limits. -IVC size is within normal limits with normal respiratory phasic changes.     M-Mode/2D Measurements & Calculations    LV Diastolic   LV Systolic Dimension:    AV Cusp Separation: 1.4 cmLA  Dimension: 5.9 5.1 cm                    Dimension: 4.1 cmAO Root  cm             LV Volume Diastolic: 730  Dimension: 2.4 cmLA Area: 23.2  LV FS:13.6 %   ml                        cm^2  LV PW          LV Volume Systolic: 625  Diastolic: 0.8 ml  cm             LV EDV/LV EDV Index: 173  Septum         ml/114 m^2LV ESV/LV ESV   RV Diastolic Dimension: 3.1 cm  Diastolic: 0.9 Index: 314 VK/36 m^2  cm             EF Calculated: 28.3 %     LA/Aorta: 1.71    LV Length: 8.6 cm         LA volume/Index: 73 ml /48m^2    LV Area        LVOT: 2 cm  Diastolic:  84.7 cm^2  LV Area  Systolic: 03.2  cm^2    Doppler Measurements & Calculations    MV Peak E-Wave:     AV Peak Velocity: 147    LVOT Peak Velocity: 56.8 cm/s  97.3 cm/s           cm/s                     LVOT Mean Velocity: 39.6 cm/s  MV Peak A-Wave:     AV Peak Gradient: 8.64   LVOT Peak Gradient: 1  73.7 cm/s           mmHg                     mmHgLVOT Mean Gradient: 1  MV E/A Ratio: 1.32  AV Mean Velocity: 100    mmHg  MV Peak Gradient:   cm/s  3.79 mmHg           AV Mean Gradient: 4 mmHg TV Peak E-Wave: 46.7 cm/s  AV VTI: 26.4 cm          TV Peak A-Wave: 38.6 cm/s  MV Deceleration     AV Area  Time: 165 msec      (Continuity):1.19 cm^2   TV Peak Gradient: 0.87 mmHg  MV P1/2t: 48 msec                            TR Velocity:223 cm/s  MVA by PHT:4.58     LVOT VTI: 10 cm          TR Gradient:19.89 mmHg  cm^2                AV P1/2t: 500 msec       PV Peak Velocity: 36.4 cm/s  IVRT: 74 msec            PV Peak Gradient: 0.53 mmHg  MV E' Septal  Velocity: 5.5 cm/s  MV A' Septal        AV DVI (VTI): 0.38AV DVI DC ED Velocity: 117 cm/s  Velocity: 4.9 cm/s  (Vmax):0.39  MV E' Lateral  Velocity: 4.2 cm/s  MV A' Lateral  Velocity: 7.3 cm/s  E/E' septal: 17.69  E/E' lateral: 23.17  MR Velocity: 390  cm/s    [id number]      Radiology:     Left heart cath:  I have reviewed the left heart catheterization images with the following interpretation: as per HPI      Code Status: Full Code      ASSESSMENT:    Active Hospital Problems    Diagnosis Date Noted    CHF (congestive heart failure), NYHA class I, acute on chronic, combined ([facility] [address] [I50.43] 06/20/2022     Priority: Medium    Severe malnutrition ([facility] [address] [E43] 12/21/2021     Class: Chronic    Cardiomyopathy ([facility] [address] [I42.9] 12/18/2021       PLAN:  80year old chronically ill female with left coronary disease in context of longstanding dilated cardiomyopathy. Patient clearly is a prohibitive risk for CABG under all circumstances. Recommend salvage PCI vs medical therapy. Issues discussed in detail with the patient, who understands and has no further questions. Time spent with patient: 120 minutes, of which more than 50% was spent counseling/coordinating the patient's care.     Electronically signed by Terry Darling MD on 6/23/2022 at 3:25 PM

## 2022-06-23 NOTE — PROGRESS NOTES
Physician Progress Note      PATIENTDeshawn MONTILLA #:                  [id number]  :                       1934  ADMIT DATE:       2022 2:04 PM  [address] [place] DATE:  RESPONDING  PROVIDER #:        Mercy Francisco MD          QUERY TEXT:    Dr Kristin Reynolds,    Pt admitted with Acute CHF Exacerbation. Pt with PNA per ED Physician. No   fever or leukocytosis. If possible, please document in progress notes and   discharge summary:    The medical record reflects the following:  Risk Factors: Family at bedside reports pt's O2 at home was anywhere from low   to mid [de-identified]. Clinical Indicator: Per ED Physician:  PNA. CXR: Infiltrate and effusion at   the right lung base. No fever or leukocytosis. Treatment: supplemental 02. IV Rocephin & Zithromax. Dhaval Rebekah. Options provided:  -- PNA confirmed present on admission  -- PNA ruled out  -- Other - I will add my own diagnosis  -- Disagree - Not applicable / Not valid  -- Disagree - Clinically unable to determine / Unknown  -- Refer to Clinical Documentation Reviewer    PROVIDER RESPONSE TEXT:    The diagnosis of PNA was ruled out.     Query created by: Lauro Guzman on 2022 6:27 AM      Electronically signed by:  Mercy Francisco MD 2022 11:23 AM

## 2022-06-23 NOTE — PROGRESS NOTES
Educated patient regarding heart failure management by explaining the importance of obtaining daily weights at the same time every day with approximately the same amount of clothing, how to recognize symptoms, low sodium diet and taking prescribed medications as ordered. Patient was given our heart failure booklet, a weight chart and a new patient appointment scheduled in OP CHF clinic. Patient was receptive to the education given and verbalized understanding. Daughter at bedside at time of education.

## 2022-06-23 NOTE — FLOWSHEET NOTE
06/23/22 1820   Safe Environment   Safety Measures   (virtual safety round complete)   Patient sitting in bed eating, family at bedside. Patient complains of pain in right wrist, bedside nurse notified. Denies any other needs at this time. Call light within reach. No safety concerns. Will continue to monitor.

## 2022-06-24 ENCOUNTER — APPOINTMENT (OUTPATIENT)
Dept: CARDIAC CATH/INVASIVE PROCEDURES | Age: 87
DRG: 216 | End: 2022-06-24
Payer: MEDICARE

## 2022-06-24 LAB
ACTIVATED CLOTTING TIME: 283 SECONDS (ref 1–150)
ANION GAP SERPL CALCULATED.3IONS-SCNC: 11 MEQ/L (ref 8–16)
BUN BLDV-MCNC: 37 MG/DL (ref 7–22)
CALCIUM SERPL-MCNC: 9.5 MG/DL (ref 8.5–10.5)
CHLORIDE BLD-SCNC: 98 MEQ/L (ref 98–111)
CO2: 33 MEQ/L (ref 23–33)
CREAT SERPL-MCNC: 1.1 MG/DL (ref 0.4–1.2)
ERYTHROCYTE [DISTWIDTH] IN BLOOD BY AUTOMATED COUNT: 15.3 % (ref 11.5–14.5)
ERYTHROCYTE [DISTWIDTH] IN BLOOD BY AUTOMATED COUNT: 57.4 FL (ref 35–45)
GFR SERPL CREATININE-BSD FRML MDRD: 47 ML/MIN/1.73M2
GLUCOSE BLD-MCNC: 95 MG/DL (ref 70–108)
HCT VFR BLD CALC: 39 % (ref 37–47)
HEMOGLOBIN: 12.6 GM/DL (ref 12–16)
MCH RBC QN AUTO: 33.3 PG (ref 26–33)
MCHC RBC AUTO-ENTMCNC: 32.3 GM/DL (ref 32.2–35.5)
MCV RBC AUTO: 103.2 FL (ref 81–99)
PLATELET # BLD: 142 THOU/MM3 (ref 130–400)
PMV BLD AUTO: 11.5 FL (ref 9.4–12.4)
POTASSIUM SERPL-SCNC: 3.9 MEQ/L (ref 3.5–5.2)
RBC # BLD: 3.78 MILL/MM3 (ref 4.2–5.4)
SODIUM BLD-SCNC: 142 MEQ/L (ref 135–145)
WBC # BLD: 7.1 THOU/MM3 (ref 4.8–10.8)

## 2022-06-24 PROCEDURE — 6360000004 HC RX CONTRAST MEDICATION: Performed by: INTERNAL MEDICINE

## 2022-06-24 PROCEDURE — C1725 CATH, TRANSLUMIN NON-LASER: HCPCS

## 2022-06-24 PROCEDURE — 6370000000 HC RX 637 (ALT 250 FOR IP): Performed by: INTERNAL MEDICINE

## 2022-06-24 PROCEDURE — 2580000003 HC RX 258: Performed by: INTERNAL MEDICINE

## 2022-06-24 PROCEDURE — C9602 PERC D-E COR STENT ATHER S: HCPCS

## 2022-06-24 PROCEDURE — 2700000000 HC OXYGEN THERAPY PER DAY

## 2022-06-24 PROCEDURE — 85027 COMPLETE CBC AUTOMATED: CPT

## 2022-06-24 PROCEDURE — 6360000002 HC RX W HCPCS: Performed by: INTERNAL MEDICINE

## 2022-06-24 PROCEDURE — 93458 L HRT ARTERY/VENTRICLE ANGIO: CPT | Performed by: INTERNAL MEDICINE

## 2022-06-24 PROCEDURE — B41F1ZZ FLUOROSCOPY OF RIGHT LOWER EXTREMITY ARTERIES USING LOW OSMOLAR CONTRAST: ICD-10-PCS | Performed by: INTERNAL MEDICINE

## 2022-06-24 PROCEDURE — C1753 CATH, INTRAVAS ULTRASOUND: HCPCS

## 2022-06-24 PROCEDURE — 6360000002 HC RX W HCPCS

## 2022-06-24 PROCEDURE — 92978 ENDOLUMINL IVUS OCT C 1ST: CPT | Performed by: INTERNAL MEDICINE

## 2022-06-24 PROCEDURE — 2000000000 HC ICU R&B

## 2022-06-24 PROCEDURE — 33990 INSJ PERQ VAD L HRT ARTERIAL: CPT | Performed by: INTERNAL MEDICINE

## 2022-06-24 PROCEDURE — 2500000003 HC RX 250 WO HCPCS

## 2022-06-24 PROCEDURE — C1769 GUIDE WIRE: HCPCS

## 2022-06-24 PROCEDURE — 94761 N-INVAS EAR/PLS OXIMETRY MLT: CPT

## 2022-06-24 PROCEDURE — 92933 PRQ TRLML C ATHRC ST ANGIOP1: CPT | Performed by: INTERNAL MEDICINE

## 2022-06-24 PROCEDURE — 027034Z DILATION OF CORONARY ARTERY, ONE ARTERY WITH DRUG-ELUTING INTRALUMINAL DEVICE, PERCUTANEOUS APPROACH: ICD-10-PCS | Performed by: INTERNAL MEDICINE

## 2022-06-24 PROCEDURE — C1874 STENT, COATED/COV W/DEL SYS: HCPCS

## 2022-06-24 PROCEDURE — B240ZZ3 ULTRASONOGRAPHY OF SINGLE CORONARY ARTERY, INTRAVASCULAR: ICD-10-PCS | Performed by: INTERNAL MEDICINE

## 2022-06-24 PROCEDURE — 6370000000 HC RX 637 (ALT 250 FOR IP)

## 2022-06-24 PROCEDURE — 02C03ZZ EXTIRPATION OF MATTER FROM CORONARY ARTERY, ONE ARTERY, PERCUTANEOUS APPROACH: ICD-10-PCS | Performed by: INTERNAL MEDICINE

## 2022-06-24 PROCEDURE — 85347 COAGULATION TIME ACTIVATED: CPT

## 2022-06-24 PROCEDURE — C1714 CATH, TRANS ATHERECTOMY, DIR: HCPCS

## 2022-06-24 PROCEDURE — 93005 ELECTROCARDIOGRAM TRACING: CPT | Performed by: INTERNAL MEDICINE

## 2022-06-24 PROCEDURE — C1760 CLOSURE DEV, VASC: HCPCS

## 2022-06-24 PROCEDURE — C1894 INTRO/SHEATH, NON-LASER: HCPCS

## 2022-06-24 PROCEDURE — 33990 INSJ PERQ VAD L HRT ARTERIAL: CPT

## 2022-06-24 PROCEDURE — 36415 COLL VENOUS BLD VENIPUNCTURE: CPT

## 2022-06-24 PROCEDURE — 2720000010 HC SURG SUPPLY STERILE

## 2022-06-24 PROCEDURE — C1887 CATHETER, GUIDING: HCPCS

## 2022-06-24 PROCEDURE — 80048 BASIC METABOLIC PNL TOTAL CA: CPT

## 2022-06-24 PROCEDURE — 5A0221D ASSISTANCE WITH CARDIAC OUTPUT USING IMPELLER PUMP, CONTINUOUS: ICD-10-PCS | Performed by: INTERNAL MEDICINE

## 2022-06-24 RX ORDER — FUROSEMIDE 40 MG/1
40 TABLET ORAL DAILY
Status: DISCONTINUED | OUTPATIENT
Start: 2022-06-24 | End: 2022-06-28 | Stop reason: HOSPADM

## 2022-06-24 RX ORDER — ALPRAZOLAM 0.25 MG/1
0.25 TABLET ORAL 2 TIMES DAILY PRN
Status: DISCONTINUED | OUTPATIENT
Start: 2022-06-24 | End: 2022-06-28 | Stop reason: HOSPADM

## 2022-06-24 RX ORDER — HYDROCODONE BITARTRATE AND ACETAMINOPHEN 5; 325 MG/1; MG/1
1 TABLET ORAL EVERY 4 HOURS PRN
Status: DISPENSED | OUTPATIENT
Start: 2022-06-24 | End: 2022-06-25

## 2022-06-24 RX ORDER — SODIUM CHLORIDE 0.9 % (FLUSH) 0.9 %
5-40 SYRINGE (ML) INJECTION PRN
Status: DISCONTINUED | OUTPATIENT
Start: 2022-06-24 | End: 2022-06-24 | Stop reason: SDUPTHER

## 2022-06-24 RX ORDER — ASPIRIN 325 MG
325 TABLET ORAL ONCE
Status: COMPLETED | OUTPATIENT
Start: 2022-06-24 | End: 2022-06-24

## 2022-06-24 RX ORDER — SODIUM CHLORIDE 9 MG/ML
INJECTION, SOLUTION INTRAVENOUS PRN
Status: DISCONTINUED | OUTPATIENT
Start: 2022-06-24 | End: 2022-06-28 | Stop reason: HOSPADM

## 2022-06-24 RX ORDER — ATORVASTATIN CALCIUM 40 MG/1
40 TABLET, FILM COATED ORAL NIGHTLY
Status: DISCONTINUED | OUTPATIENT
Start: 2022-06-24 | End: 2022-06-28 | Stop reason: HOSPADM

## 2022-06-24 RX ORDER — SODIUM CHLORIDE 0.9 % (FLUSH) 0.9 %
5-40 SYRINGE (ML) INJECTION EVERY 12 HOURS SCHEDULED
Status: DISCONTINUED | OUTPATIENT
Start: 2022-06-24 | End: 2022-06-28 | Stop reason: HOSPADM

## 2022-06-24 RX ORDER — ASPIRIN 81 MG/1
81 TABLET ORAL DAILY
Status: DISCONTINUED | OUTPATIENT
Start: 2022-06-25 | End: 2022-06-28 | Stop reason: HOSPADM

## 2022-06-24 RX ORDER — SODIUM CHLORIDE 9 MG/ML
INJECTION, SOLUTION INTRAVENOUS CONTINUOUS
Status: ACTIVE | OUTPATIENT
Start: 2022-06-24 | End: 2022-06-24

## 2022-06-24 RX ORDER — SODIUM CHLORIDE 0.9 % (FLUSH) 0.9 %
5-40 SYRINGE (ML) INJECTION EVERY 12 HOURS SCHEDULED
Status: DISCONTINUED | OUTPATIENT
Start: 2022-06-24 | End: 2022-06-24 | Stop reason: SDUPTHER

## 2022-06-24 RX ORDER — SODIUM CHLORIDE 9 MG/ML
INJECTION, SOLUTION INTRAVENOUS PRN
Status: DISCONTINUED | OUTPATIENT
Start: 2022-06-24 | End: 2022-06-24 | Stop reason: SDUPTHER

## 2022-06-24 RX ORDER — 0.9 % SODIUM CHLORIDE 0.9 %
500 INTRAVENOUS SOLUTION INTRAVENOUS ONCE
Status: COMPLETED | OUTPATIENT
Start: 2022-06-24 | End: 2022-06-25

## 2022-06-24 RX ORDER — SODIUM CHLORIDE 0.9 % (FLUSH) 0.9 %
5-40 SYRINGE (ML) INJECTION PRN
Status: DISCONTINUED | OUTPATIENT
Start: 2022-06-24 | End: 2022-06-28 | Stop reason: HOSPADM

## 2022-06-24 RX ORDER — SODIUM CHLORIDE 9 MG/ML
INJECTION, SOLUTION INTRAVENOUS CONTINUOUS
Status: DISCONTINUED | OUTPATIENT
Start: 2022-06-24 | End: 2022-06-24

## 2022-06-24 RX ADMIN — LEVOTHYROXINE SODIUM 125 MCG: 0.12 TABLET ORAL at 06:07

## 2022-06-24 RX ADMIN — CARVEDILOL 6.25 MG: 6.25 TABLET, FILM COATED ORAL at 07:50

## 2022-06-24 RX ADMIN — SODIUM CHLORIDE: 9 INJECTION, SOLUTION INTRAVENOUS at 06:07

## 2022-06-24 RX ADMIN — CARVEDILOL 6.25 MG: 6.25 TABLET, FILM COATED ORAL at 20:26

## 2022-06-24 RX ADMIN — AZITHROMYCIN MONOHYDRATE 500 MG: 250 TABLET ORAL at 23:17

## 2022-06-24 RX ADMIN — Medication 100 MG: at 20:28

## 2022-06-24 RX ADMIN — ALPRAZOLAM 0.25 MG: 0.25 TABLET ORAL at 13:32

## 2022-06-24 RX ADMIN — ATORVASTATIN CALCIUM 40 MG: 40 TABLET, FILM COATED ORAL at 20:27

## 2022-06-24 RX ADMIN — IOPAMIDOL 100 ML: 755 INJECTION, SOLUTION INTRAVENOUS at 09:44

## 2022-06-24 RX ADMIN — SODIUM CHLORIDE 500 ML: 9 INJECTION, SOLUTION INTRAVENOUS at 23:16

## 2022-06-24 RX ADMIN — TICAGRELOR 90 MG: 90 TABLET ORAL at 20:28

## 2022-06-24 RX ADMIN — SODIUM CHLORIDE: 9 INJECTION, SOLUTION INTRAVENOUS at 15:56

## 2022-06-24 RX ADMIN — ASPIRIN 325 MG: 325 TABLET ORAL at 07:50

## 2022-06-24 RX ADMIN — FUROSEMIDE 40 MG: 40 TABLET ORAL at 13:32

## 2022-06-24 RX ADMIN — HYDROCODONE BITARTRATE AND ACETAMINOPHEN 1 TABLET: 5; 325 TABLET ORAL at 15:55

## 2022-06-24 RX ADMIN — SODIUM CHLORIDE, PRESERVATIVE FREE 10 ML: 5 INJECTION INTRAVENOUS at 08:11

## 2022-06-24 RX ADMIN — CEFTRIAXONE SODIUM 1000 MG: 1 INJECTION, POWDER, FOR SOLUTION INTRAMUSCULAR; INTRAVENOUS at 20:25

## 2022-06-24 ASSESSMENT — PAIN DESCRIPTION - DESCRIPTORS
DESCRIPTORS: ACHING;SORE

## 2022-06-24 ASSESSMENT — PAIN DESCRIPTION - PAIN TYPE: TYPE: ACUTE PAIN

## 2022-06-24 ASSESSMENT — PAIN - FUNCTIONAL ASSESSMENT
PAIN_FUNCTIONAL_ASSESSMENT: ACTIVITIES ARE NOT PREVENTED
PAIN_FUNCTIONAL_ASSESSMENT: ACTIVITIES ARE NOT PREVENTED

## 2022-06-24 ASSESSMENT — PAIN DESCRIPTION - ONSET
ONSET: AWAKENED FROM SLEEP
ONSET: GRADUAL

## 2022-06-24 ASSESSMENT — PAIN SCALES - GENERAL
PAINLEVEL_OUTOF10: 5
PAINLEVEL_OUTOF10: 2
PAINLEVEL_OUTOF10: 4

## 2022-06-24 ASSESSMENT — PAIN DESCRIPTION - LOCATION
LOCATION: HIP
LOCATION: ARM
LOCATION: ARM

## 2022-06-24 ASSESSMENT — PAIN DESCRIPTION - FREQUENCY
FREQUENCY: INTERMITTENT
FREQUENCY: INTERMITTENT

## 2022-06-24 ASSESSMENT — PAIN DESCRIPTION - ORIENTATION
ORIENTATION: RIGHT;LEFT
ORIENTATION: RIGHT
ORIENTATION: RIGHT

## 2022-06-24 NOTE — PROCEDURES
[address] [place] [address]                            CARDIAC CATHETERIZATION    PATIENT NAME: Juli Moreno                    :        1934  MED REC NO:   [id number]                           ROOM:       0007  ACCOUNT NO:   [de-identified]                           ADMIT DATE: 2022  PROVIDER:     Anastasia Rey MD    DATE OF PROCEDURE:  2022    INDICATION FOR PROCEDURE:  1.  Critical left main coronary artery stenosis, calcified with an  ulcerated plaque. 2.  Severe ischemic cardiomyopathy. 3.  Acute-on-chronic congestive heart failure with reduced ejection  fraction. 4.  Elevated troponin. 5.  Dyspnea, fatigue, angina, and angina equivalent symptoms. 6.  Abnormal EKG. PROCEDURES PERFORMED:  1. Left cardiac catheterization with selective coronary angiogram.  2.  Successful placement with subsequent explant of Impella CP  percutaneous left ventricular assist device. 3.  Successful complex PCI with Impella support, rotational atherectomy,  PCI, and stenting with IVUS guidance. 4.  Intravascular ultrasound of the left main coronary artery and the  left anterior descending artery. DESCRIPTION OF PROCEDURE/INTERVENTION DETAILS:  After informed consent,  the patient was brought to the cardiac catheterization room. She was  prepped and draped in a sterile fashion. 2% lidocaine was injected in  the skin and subcutaneous tissue overlying the right groin area. Under  ultrasound and fluoroscopy guidance, access was obtained in the right  common femoral artery. Micropuncture kit was utilized. Common femoral  artery angiogram confirmed good stick. I used 6-[place] sheath for  dilation. I then proceeded with preclose. Two Perclose ProGlide  closure devices were utilized, not fully deployed. I then advanced the  14-[place] Impella sheath under fluoroscopy. Heparin was given. ACT was  confirmed to be above 250. 5-Yemeni pigtail catheter was used to cross  the aortic valve. The wire was exchanged to the 0.018 Impella wire. The pigtail catheter was removed. I then advanced the Impella under  fluoroscopy guidance. Positioning was confirmed under fluoroscopy. Wire was removed. The Impella was initiated. Good waveform confirmed  with cardiac output of 3.6 liters per minute. I then obtained access  within the diaphragm of the Impella sheath using micropuncture kit. 7-Yemeni sheath was inserted and flushed with normal saline. 7-Yemeni  EBU 3.5 guide catheter was used to cannulate the left main coronary  artery. [address] wire was used to cross the lesion and wire left main  into LAD. I then proceeded with rotational atherectomy. 1.5 mm connie  was utilized. Katherynyth Dom was tested outside the patient's body, then was  advanced under fluoroscopy. Two runs were completed. The connie was then  removed. I did exchange the wire to a Runthrough wire over Teleport  microcatheter. Over the Runthrough wire, I proceeded with predilation  using 3.5 x 15 mm noncompliant balloon. I then proceeded with stenting. Resolute [place] 4.0 mm x 30 mm drug-eluting stent was successfully  deployed. I then did obtain multiple views prior to deployment to  ensure full coverage of the full length of the left main and target  lesion area including the ostium of the LAD and diseased part of  proximal LAD. Stent was successfully deployed, then was postdilated  using 4.5 mm x 8 mm noncompliant balloon. Prior to postdilation, I did  perform IVUS, which revealed the stent to be well apposed and well  expanded in the left anterior descending artery except in the ostium of  the LAD. Based on IVUS findings, post dilation was planned. Again  first 4.5 x 8 mm noncompliant balloon was used for postdilation in the  area of the ostium of the left anterior descending artery as well as in  the left main.   Further postdilation in the left main coronary artery  was done using 5 mm x 8 mm noncompliant balloon. Repeat angiogram after  balloon and wire were removed revealed well-expanded stent, 0% residual  stenosis, KEO-3 flow. No complications including no dissection, distal  embolization, or perforation. MEDICATIONS:  See MAR. COMPLICATIONS:  None. ESTIMATED BLOOD LOSS:  Less than 50 mL. ACCESS:  Right common femoral artery access. The Impella device was  successfully removed. The sheath was successfully removed with  subsequent deployment of previously deployed ProGlide closure device,  one of the sutures broke, I had to follow that with the 8-[place] citizen  Angio-Seal.  Hemostasis was achieved. FemoStop will be kept for the  next 1-2 hours at low pressure. CONCLUSION:  Successful IVUS-guided PCI and stenting of the left main  coronary artery with Impella support. Rotational atherectomy with 1.5  mm connie with subsequent stenting. RECOMMENDATIONS:  Transfer to the intensive care unit for close  observation. Access site care. Strict bedrest for 4 hours  postprocedure. Aggressive risk factor modification. Aspirin 81 mg p.o.  daily. Brilinta 90 mg p.o. b.i.d. High intensity statin therapy. Resume Lasix, change to oral.  Resume Aldactone in the a.m. if  creatinine and potassium are stable. Guideline-directed medical therapy  for congestive heart failure with reduced ejection fraction.         Yesenia ePrez MD    D: 06/24/2022 11:09:44       T: 06/24/2022 11:16:04     [email or web address]  Job#: [id number]     Doc#: [id number]    CC:

## 2022-06-24 NOTE — PROGRESS NOTES
[facility] THERAPY MISSED TREATMENT NOTE  [facility] ICU 4D  4D-007-A      Date: 2022  Patient Name: Camille Erickson        CSN: [id number]   : 1934  (80 y.o.)  Gender: female   Referring Practitioner: Dr. Joey Rothman MD  Diagnosis: Pericardial Effusion         REASON FOR MISSED TREATMENT: Hold session this date d/t patient having cardiac catheterization and on strick bed rest for 4hrs following procedure. Will attempt at next available time.

## 2022-06-24 NOTE — PLAN OF CARE
Problem: Cardiovascular - Adult  Goal: Absence of cardiac dysrhythmias or at baseline  Outcome: Not Progressing  Flowsheets (Taken 6/24/2022 0415)  Absence of cardiac dysrhythmias or at baseline: Monitor cardiac rate and rhythm     Problem: Discharge Planning  Goal: Discharge to home or other facility with appropriate resources  Outcome: Progressing  Flowsheets (Taken 6/23/2022 2045)  Discharge to home or other facility with appropriate resources: Identify barriers to discharge with patient and caregiver     Problem: Safety - Adult  Goal: Free from fall injury  Outcome: Progressing  Flowsheets (Taken 6/22/2022 1029 by Marianne Guaman, RN)  Free From Fall Injury: Instruct family/caregiver on patient safety     Problem: Skin/Tissue Integrity  Goal: Absence of new skin breakdown  Description: 1. Monitor for areas of redness and/or skin breakdown  2. Assess vascular access sites hourly  3. Every 4-6 hours minimum:  Change oxygen saturation probe site  4. Every 4-6 hours:  If on nasal continuous positive airway pressure, respiratory therapy assess nares and determine need for appliance change or resting period. Outcome: Progressing  Note: Patient has a skin ulcer and is turning and offloading off bottom.      Problem: ABCDS Injury Assessment  Goal: Absence of physical injury  Outcome: Progressing  Flowsheets (Taken 6/22/2022 1029 by Marianne Guaman, RN)  Absence of Physical Injury: Implement safety measures based on patient assessment     Problem: Respiratory - Adult  Goal: Achieves optimal ventilation and oxygenation  Outcome: Progressing  Flowsheets (Taken 6/23/2022 2045)  Achieves optimal ventilation and oxygenation:   Assess for changes in respiratory status   Assess for changes in mentation and behavior     Problem: Cardiovascular - Adult  Goal: Maintains optimal cardiac output and hemodynamic stability  Outcome: Progressing  Flowsheets (Taken 6/24/2022 0415)  Maintains optimal cardiac output and hemodynamic stability:   Monitor blood pressure and heart rate   Monitor urine output and notify Licensed Independent Practitioner for values outside of normal range     Problem: Chronic Conditions and Co-morbidities  Goal: Patient's chronic conditions and co-morbidity symptoms are monitored and maintained or improved  Outcome: Progressing  Flowsheets (Taken 6/23/2022 2045)  Care Plan - Patient's Chronic Conditions and Co-Morbidity Symptoms are Monitored and Maintained or Improved: Monitor and assess patient's chronic conditions and comorbid symptoms for stability, deterioration, or improvement     Problem: Pain  Goal: Verbalizes/displays adequate comfort level or baseline comfort level  Outcome: Progressing  Flowsheets (Taken 6/23/2022 0414 by Hilary Robles RN)  Verbalizes/displays adequate comfort level or baseline comfort level:   Encourage patient to monitor pain and request assistance   Assess pain using appropriate pain scale   Administer analgesics based on type and severity of pain and evaluate response   Implement non-pharmacological measures as appropriate and evaluate response     Problem: Genitourinary - Adult  Goal: Absence of urinary retention  Recent Flowsheet Documentation  Taken 6/23/2022 2045 by Janet Doshi RN  Absence of urinary retention:   Assess patients ability to void and empty bladder   Monitor intake/output and perform bladder scan as needed  Goal: Urinary catheter remains patent  Recent Flowsheet Documentation  Taken 6/23/2022 2045 by Janet Doshi RN  Urinary catheter remains patent: Assess patency of urinary catheter     Problem: Metabolic/Fluid and Electrolytes - Adult  Goal: Electrolytes maintained within normal limits  Recent Flowsheet Documentation  Taken 6/23/2022 2045 by Janet Doshi RN  Electrolytes maintained within normal limits:   Monitor labs and assess patient for signs and symptoms of electrolyte imbalances   Administer electrolyte replacement as ordered  Goal: Hemodynamic stability and optimal renal function maintained  Recent Flowsheet Documentation  Taken 6/23/2022 2045 by Janet Doshi RN  Hemodynamic stability and optimal renal function maintained:   Monitor labs and assess for signs and symptoms of volume excess or deficit   Monitor intake, output and patient weight  Goal: Glucose maintained within prescribed range  Recent Flowsheet Documentation  Taken 6/23/2022 2045 by Janet Doshi RN  Glucose maintained within prescribed range: Monitor blood glucose as ordered     Problem: Hematologic - Adult  Goal: Maintains hematologic stability  Recent Flowsheet Documentation  Taken 6/23/2022 2045 by Janet Doshi RN  Maintains hematologic stability: Assess for signs and symptoms of bleeding or hemorrhage

## 2022-06-24 NOTE — H&P
[facility]  Sedation/Analgesia History & Physical    Pt Name: Janet Irwin  Account number: [de-identified]  MRN: [id number]  YOB: 1934  Provider Performing Procedure: Danyelle Reece MD MD  Referring Provider: Marah Irving MD   Primary Care Physician: SERGE Castillo - JASEN  Date: 6/24/2022    PRE-PROCEDURE    Code Status: FULL CODE  Brief History/Pre-Procedure Diagnosis:     Critical LM stenosis (ulcerated lesion)   Severe ischemic cardiomyopathy  Acute on chronic HFrEF     Elevated Troponin  Dyspnea, fatigue, angina/angina equivalent symptoms   Abnormal EKG     Consent: : I have discussed with the patient and her family risks, benefits, and alternatives (and relevant risks, benefits, and side effects related to alternatives or not receiving care), and likelihood of the success. The patient and/or representative appear to understand and agree to proceed. The discussion encompasses risks, benefits, and side effects related to the alternatives and the risks related to not receiving the proposed care, treatment, and services. The indication, risks and benefits of the procedure and possible therapeutic consequences and alternatives were discussed with the patient. The patient was given the opportunity to ask questions and to have them answered to his/her satisfaction.  Risks of the procedure include but are not limited to the following: Bleeding, hematoma including retroperitoneal hemmorhage, infection, pain and discomfort, injury to the aorta and other blood vessels, rhythm disturbance, low blood pressure, myocardial infarction, stroke, kidney damage/failure, myocardial perforation, allergic reactions to sedatives/contrast material, loss of pulse/vascular compromise requiring surgery, aneurysm/pseudoaneurysm formation, possible loss of a limb/hand/leg, needing blood transfusion, requiring emergent open heart surgery or emergent coronary intervention, the need for ampule, 1 ampule, Inhalation, Q4H PRN, Gisell Kathleen MD    [Held by provider] losartan (COZAAR) tablet 25 mg, 25 mg, Oral, Daily, Gisell Kathleen MD, 25 mg at 06/23/22 0820    ondansetron (ZOFRAN-ODT) disintegrating tablet 4 mg, 4 mg, Oral, Q6H PRN **OR** ondansetron (ZOFRAN) injection 4 mg, 4 mg, IntraVENous, Q6H PRN, Gisell Kathleen MD, 4 mg at 06/22/22 1331  Prior to Admission medications    Medication Sig Start Date End Date Taking?  Authorizing Provider   lubiprostone (AMITIZA) 24 MCG capsule Take 24 mcg by mouth daily (with breakfast)    Yes Historical Provider, MD   NONFORMULARY 1 spray by Nasal route daily Olive leaf nasal spray   Yes Historical Provider, MD   levothyroxine (SYNTHROID) 125 MCG tablet Take 125 mcg by mouth Daily Take 0.5 tabs by mouth daily   Yes Historical Provider, MD   VITAMIN D, CHOLECALCIFEROL, PO Take 1 tablet by mouth daily   Yes Historical Provider, MD   dimenhyDRINATE (DRAMAMINE) 50 MG tablet Take 50 mg by mouth every 6-8 hours as needed (anxiety)   Yes Historical Provider, MD   carvedilol (COREG) 6.25 MG tablet Take 1 tablet by mouth 2 times daily 6/16/22   Baudilio Awan PA-C   docusate (COLACE) 50 MG/5ML liquid 10 mLs by Per G Tube route 2 times daily  Patient taking differently: Take 100 mg by mouth 2 times daily  3/29/22   SERGE Rashid CNP   ondansetron (ZOFRAN-ODT) 4 MG disintegrating tablet Take 1 tablet by mouth every 8 hours as needed for Nausea or Vomiting 3/29/22   SERGE Isbell CNP   OXYGEN Inhale into the lungs as needed (to keep sats > 90%)    Historical Provider, MD   fexofenadine (ALLEGRA) 180 MG tablet Take 180 mg by mouth daily     Historical Provider, MD   Multiple Vitamin (MULTI VITAMIN DAILY PO) Take 1 tablet by mouth daily     Historical Provider, MD   Probiotic Acidophilus (FLORANEX) TABS Take 1 tablet by mouth daily     Historical Provider, MD   apixaban (ELIQUIS) 2.5 MG TABS tablet Take 1 tablet by mouth 2 times daily 12/29/21   SERGE Sanford - CNP   acetaminophen (TYLENOL) 325 MG tablet Take 650 mg by mouth every 6 hours as needed for Pain     Historical Provider, MD     Additional information:       VITAL SIGNS   Vitals:    06/24/22 0409   BP: 118/63   Pulse: 79   Resp: 16   Temp: 98.1 °F (36.7 °C)   SpO2: 96%       PHYSICAL:   General: No acute distress  HEENT:  Unremarkable for age  Neck: without increased JVD, carotid pulses 2+ bilaterally without bruits  Heart: RRR, S1 & S2 WNL. Systolic murmurs   Lungs: Clear to auscultation    Abdomen: BS present, without HSM, masses, or tenderness    Extremities: without C,C,E.  Pulses 2+ bilaterally   Mental Status: Alert & Oriented        PLANNED PROCEDURE   [x]Cath  [x]PCI                []Pacemaker/AICD  []ORI             []Cardioversion []Peripheral angiography/PTA  [x]Other: Impella       SEDATION  Planned agent:[x]Midazolam []Meperidine []Sublimaze []Morphine  []Diazepam  []Other:     ASA Classification:  []1 []2 []3 [x]4 []5   Class 1: A normal healthy patient  Class 2: Pt with mild to moderate systemic disease  Class 3: Severe systemic disease or disturbance  Class 4: Severe systemic disorders that are already life threatening. Class 5: Moribund pt with little chances of survival, for more than 24 hours. Mallampati I Airway Classification:   []1 []2 [x]3 []4     [x]Pre-procedure diagnostic studies complete and results available. Comment:    [x]Previous sedation/anesthesia experiences assessed. Comment:    [x]The patient is an appropriate candidate to undergo the planned procedure sedation and anesthesia. (Refer to nursing sedation/analgesia documentation record)  [x]Formulation and discussion of sedation/procedure plan, risks, and expectations with patient and/or responsible adult completed. [x]Patient examined immediately prior to the procedure.  (Refer to nursing sedation/analgesia documentation record)      Bryan Calvillo MD, [facility]    Electronically signed

## 2022-06-24 NOTE — FLOWSHEET NOTE
Prayer card is given to pt's family. Follow up is needed.       06/24/22 1445   Encounter Summary   Service Provided For: Family   Referral/Consult From: Rounding   Last Encounter  06/24/22   Complexity of Encounter Low   Begin Time 1310   End Time  1320   Total Time Calculated 10 min   Encounter    Type Initial Screen/Assessment   Spiritual/Emotional needs   Type Spiritual Support

## 2022-06-24 NOTE — BRIEF OP NOTE
[address] [address]  Sedation/Analgesia Post Sedation Record    Pt Name: Janet Irwin  Account number: [de-identified]  MRN: [id number]  YOB: 1934  Procedure Performed By: Danyelle Reece MD MD   Primary Care Physician: Nelia Grullon APRN - CNP  Date: 6/24/2022    POST-PROCEDURE    Physicians/Assistants: Danyelle Reece MD MD     Procedure Performed:Cath/ PCI      Sedation/Anesthesia: Versed/ Fentanyl and 2% xylocaine local anesthesia. Estimated Blood Loss: < 50 ml. Specimens Removed: None         Disposition of Specimen: N/A        Complications: No Immediate Complications. Post-procedure Diagnosis/Findings:        Successful IVUS guided PCI/MARCIA of the Left Main coronary artery with Impella support (Rotational Atherectomy 1.5 mm connie, 4.00 mm*30 mm Resolute [place] MARCIA, post dilation with a 4.5 mm*8 mm NC Balloon in ostial LAD and LM with further post dilation using a 5 mm*8 mm NC Balloon in the LM)    Recommendations:  Transfer to ICU for close observation post complex PCI   Access site care  Strict bed rest for 4 hours post procedure    Optimize medical therapy for Coronary Artery Disease  Aggressive risk factor modification   Antiplatelet therapy: ASA 81 mg PO daily and Brilinta 90 mg po BID    High intensity statin therapy  Resume Lasix, change to PO  May resume Aldactone in AM if Cr and K are stable   GDMT for HFrEF   AM Labs: BMP, CBC     Above findings and plan of care were discussed with patient and her family, questions were answered, agreeable with plan.        Danyelle Reece MD, Susy Jolly   Electronically signed 6/24/2022 at 10:44 AM  Interventional Cardiology

## 2022-06-24 NOTE — CARE COORDINATION
Collaborative Discharge Planning    Roberta Cha  :  1934  MRN:  [id number]    ADMIT DATE:  2022      Discharge Planning Discharge Planning  Type of Residence: House  Living Arrangements: Children,Family Members  Support Systems: Children,Family Members  Current Services Prior To Admission: Home Care  Potential Assistance Needed: Home Care  Meds-to-Beds: Does the patient want to have any new prescriptions delivered to bedside prior to discharge?: Yes  Type of Home Care Services: None  Patient expects to be discharged to[de-identified] House  Follow Up Appointment: Best Day/Time :   White Board Notes /Social Work Whiteboard Notes  /Social Work Whiteboard: ; SW - Current with [facility], PT,OT and RN. Home with daughter. Discharge Plan Home with home health  Plans home w daughter Sierra Rdz (nsg, therapy), new [facility] (faxed cardiac cath report), new CHF Clinic; has oxygen, walker  Discharge Milestones and Delays: Clinical status  CHF       Cardiac Cath; critical left main disease, EF 20-25%     1. Left cardiac catheterization with selective coronary angiogram.  2.  Successful placement with subsequent explant of Impella CP  percutaneous left ventricular assist device. 3.  Successful complex PCI with Impella support, rotational atherectomy,  PCI, and stenting with IVUS guidance. 4.  Intravascular ultrasound of the left main coronary artery and the  left anterior descending artery.         IV AB, IVF, Oxygen 1L    Client transfer to ICU for monitoring      SIGNED:  Magui Solorio RN   2022, 8:37 AM

## 2022-06-24 NOTE — PROGRESS NOTES
rhonchi. Heart -  S1, S2, heard. Abdomen - soft, PEG in place pos bs. Neurological - alert, responsive and about her baseline. Musculoskeletal - no joint tenderness, deformity or swelling  Extremities - peripheral pulses normal, no pedal edema, no clubbing or cyanosis  Skin - normal coloration and turgor, no rashes, no suspicious skin lesions noted    Review of Labs and Diagnostic Testing:    CBC:   Recent Labs     06/24/22  0633   WBC 7.1   HGB 12.6   HCT 39.0   .2*        BMP:   Recent Labs     06/24/22  0750      K 3.9   CL 98   CO2 33   BUN 37*   CREATININE 1.1   CALCIUM 9.5   GLUCOSE 95     PT/INR:   Recent Labs     06/23/22  0520   INR 1.18*     APTT: No results for input(s): APTT in the last 72 hours. Lipids: No results for input(s): ALKPHOS, ALT, AST, BILITOT, BILIDIR, LABALBU, AMYLASE, LIPASE in the last 72 hours. Troponin: No results for input(s): TROPONINI in the last 72 hours. Imaging:  CTA CHEST W WO CONTRAST    Result Date: 6/20/2022  PROCEDURE: CTA CHEST W WO CONTRAST CLINICAL INFORMATION: shortness of breath, r/o PE. COMPARISON: CTA  chest dated 30 October 2021. TECHNIQUE: 3 mm axial images were obtained through the chest after the administration of IV contrast.  A non-contrast localizer was obtained. 3D reconstructions were performed on the scanner to include MIP coronal and sagittal images through the chest. Isovue was the intravenous contrast utilized. All CT scans at this facility use dose modulation, iterative reconstruction, and/or weight-based dosing when appropriate to reduce radiation dose to as low as reasonably achievable. FINDINGS: There is adequate opacification of the pulmonary arterial system. No pulmonary emboli are present. There is atherosclerotic calcification in the thoracic aorta. There is cardiomegaly. . There is a pericardial  effusion. There are bilateral pleural effusions right greater than left.  There is no mediastinal, axillary or hilar adenopathy. There is evidence for granulomatous disease in the right middle lobe and right hilum. . There are areas of atelectasis or infiltrate at both lung bases. .  There is thoracic spondylosis. .     1. No evidence of pulmonary embolism. 2. Cardiomegaly. 3. Bilateral pleural effusions right greater than left. Pericardial effusion. 4. Areas of atelectasis or infiltrate at both lung bases. 5. Evidence for granulomatous disease in the right middle lobe and right hilum. 6. Thoracic spondylosis. **This report has been created using voice recognition software. It may contain minor errors which are inherent in voice recognition technology. ** Final report electronically signed by DR Brynn De Anda on 6/20/2022 3:34 PM    XR CHEST PORTABLE    Result Date: 6/20/2022  PROCEDURE: XR CHEST PORTABLE CLINICAL INFORMATION: Shortness of breath. COMPARISON: Chest x-ray dated 21 December 2021. . TECHNIQUE: AP upright view of the chest. FINDINGS: The previously noted tracheotomy tube is no longer seen. There is mild cardiomegaly. . Is atherosclerotic calcification aortic arch. There is increased density throughout both lung fields especially in the right lower lobe. The pulmonary vascularity is increased. There is thoracic spondylosis. 1. Cardiomegaly and probable CHF. 2. Infiltrate and effusion at the right lung base. 3. Atherosclerotic calcification in the aortic arch. 4. The previously noted tracheotomy tube is no longer seen. **This report has been created using voice recognition software. It may contain minor errors which are inherent in voice recognition technology. ** Final report electronically signed by DR Brynn De Anda on 6/20/2022 2:37 PM      EKG:      Diet: Diet NPO Exceptions are: Sips of Water with Meds        Data:   Scheduled Meds: Scheduled Meds:   sodium chloride flush  5-40 mL IntraVENous 2 times per day    furosemide  40 mg Oral Daily    aspirin  325 mg Oral Once    HYDROcodone 5 mg - acetaminophen  1 tablet Oral Once    azithromycin  500 mg Oral Q24H    [Held by provider] spironolactone  25 mg Oral Daily    carvedilol  6.25 mg Oral BID    docusate  100 mg Oral BID    cetirizine  5 mg Oral Daily    levothyroxine  125 mcg Oral Daily    lubiprostone  24 mcg Oral Daily with breakfast    multivitamin  1 tablet Oral Daily    lactobacillus  1 capsule Oral Daily    Vitamin D  500 Units Oral Daily    [Held by provider] furosemide  20 mg IntraVENous q8h    cefTRIAXone (ROCEPHIN) IV  1,000 mg IntraVENous Q24H    [Held by provider] losartan  25 mg Oral Daily     Continuous Infusions:   sodium chloride       PRN Meds:.sodium chloride flush, sodium chloride, nitroGLYCERIN, acetaminophen, ipratropium-albuterol, ondansetron **OR** ondansetron  Continuous Infusions:   sodium chloride           Assessment   Principal Problem:    CHF (congestive heart failure), NYHA class I, acute on chronic, combined ([address]  Active Problems:    Cardiomyopathy ([address]    Severe malnutrition ([address]  Resolved Problems:    * No resolved hospital problems. *        Patient Active Problem List   Diagnosis    Sigmoid diverticulitis    Acute respiratory failure with hypoxia ([address]    COVID-19    Acute congestive heart failure (HCC)    C. difficile colitis    CHF (congestive heart failure) ([address]    Cardiomyopathy ([address]    Hyperkalemia    Chronic respiratory failure (HCC)    Decubitus ulcer    Ventilator dependence ([address]    Severe malnutrition (HCC)    Chronic systolic congestive heart failure (HCC)    PVC (premature ventricular contraction)    CHF (congestive heart failure), NYHA class I, acute on chronic, combined ([address]        Plan   Status post stenting of the left main coronary artery  We will follow the observational protocol  Plan to transfer to medical floor tomorrow  Continue supportive care. Dr. Lázaro Cruz covering the weekend.           Electronically signed by Laura Amaya MD on 6/24/2022 at 12:58 PM  Over 35 mins spent on this evaluation

## 2022-06-25 LAB
ANION GAP SERPL CALCULATED.3IONS-SCNC: 12 MEQ/L (ref 8–16)
BASOPHILS # BLD: 0.9 %
BASOPHILS ABSOLUTE: 0.1 THOU/MM3 (ref 0–0.1)
BUN BLDV-MCNC: 33 MG/DL (ref 7–22)
CALCIUM SERPL-MCNC: 9.3 MG/DL (ref 8.5–10.5)
CHLORIDE BLD-SCNC: 99 MEQ/L (ref 98–111)
CO2: 30 MEQ/L (ref 23–33)
CREAT SERPL-MCNC: 1.1 MG/DL (ref 0.4–1.2)
EKG ATRIAL RATE: 74 BPM
EKG P AXIS: 78 DEGREES
EKG P-R INTERVAL: 202 MS
EKG Q-T INTERVAL: 460 MS
EKG QRS DURATION: 152 MS
EKG QTC CALCULATION (BAZETT): 510 MS
EKG R AXIS: -61 DEGREES
EKG T AXIS: 91 DEGREES
EKG VENTRICULAR RATE: 74 BPM
EOSINOPHIL # BLD: 2.5 %
EOSINOPHILS ABSOLUTE: 0.2 THOU/MM3 (ref 0–0.4)
ERYTHROCYTE [DISTWIDTH] IN BLOOD BY AUTOMATED COUNT: 15 % (ref 11.5–14.5)
ERYTHROCYTE [DISTWIDTH] IN BLOOD BY AUTOMATED COUNT: 57.7 FL (ref 35–45)
GFR SERPL CREATININE-BSD FRML MDRD: 47 ML/MIN/1.73M2
GLUCOSE BLD-MCNC: 98 MG/DL (ref 70–108)
HCT VFR BLD CALC: 38 % (ref 37–47)
HEMOGLOBIN: 11.8 GM/DL (ref 12–16)
IMMATURE GRANS (ABS): 0.06 THOU/MM3 (ref 0–0.07)
IMMATURE GRANULOCYTES: 0.8 %
LYMPHOCYTES # BLD: 11.8 %
LYMPHOCYTES ABSOLUTE: 0.9 THOU/MM3 (ref 1–4.8)
MAGNESIUM: 1.9 MG/DL (ref 1.6–2.4)
MCH RBC QN AUTO: 32.4 PG (ref 26–33)
MCHC RBC AUTO-ENTMCNC: 31.1 GM/DL (ref 32.2–35.5)
MCV RBC AUTO: 104.4 FL (ref 81–99)
MONOCYTES # BLD: 13.1 %
MONOCYTES ABSOLUTE: 1 THOU/MM3 (ref 0.4–1.3)
NUCLEATED RED BLOOD CELLS: 0 /100 WBC
PLATELET # BLD: 135 THOU/MM3 (ref 130–400)
PMV BLD AUTO: 11.1 FL (ref 9.4–12.4)
POTASSIUM SERPL-SCNC: 3.8 MEQ/L (ref 3.5–5.2)
RBC # BLD: 3.64 MILL/MM3 (ref 4.2–5.4)
SEG NEUTROPHILS: 70.9 %
SEGMENTED NEUTROPHILS ABSOLUTE COUNT: 5.4 THOU/MM3 (ref 1.8–7.7)
SODIUM BLD-SCNC: 141 MEQ/L (ref 135–145)
WBC # BLD: 7.6 THOU/MM3 (ref 4.8–10.8)

## 2022-06-25 PROCEDURE — 36415 COLL VENOUS BLD VENIPUNCTURE: CPT

## 2022-06-25 PROCEDURE — 83735 ASSAY OF MAGNESIUM: CPT

## 2022-06-25 PROCEDURE — 2580000003 HC RX 258: Performed by: INTERNAL MEDICINE

## 2022-06-25 PROCEDURE — 93010 ELECTROCARDIOGRAM REPORT: CPT | Performed by: NUCLEAR MEDICINE

## 2022-06-25 PROCEDURE — 80048 BASIC METABOLIC PNL TOTAL CA: CPT

## 2022-06-25 PROCEDURE — 85025 COMPLETE CBC W/AUTO DIFF WBC: CPT

## 2022-06-25 PROCEDURE — 6370000000 HC RX 637 (ALT 250 FOR IP): Performed by: INTERNAL MEDICINE

## 2022-06-25 PROCEDURE — 94761 N-INVAS EAR/PLS OXIMETRY MLT: CPT

## 2022-06-25 PROCEDURE — 6370000000 HC RX 637 (ALT 250 FOR IP): Performed by: NURSE PRACTITIONER

## 2022-06-25 PROCEDURE — 6360000002 HC RX W HCPCS: Performed by: INTERNAL MEDICINE

## 2022-06-25 PROCEDURE — 2140000000 HC CCU INTERMEDIATE R&B

## 2022-06-25 PROCEDURE — 6360000002 HC RX W HCPCS: Performed by: NURSE PRACTITIONER

## 2022-06-25 PROCEDURE — 2580000003 HC RX 258: Performed by: NURSE PRACTITIONER

## 2022-06-25 PROCEDURE — 99232 SBSQ HOSP IP/OBS MODERATE 35: CPT | Performed by: NURSE PRACTITIONER

## 2022-06-25 RX ORDER — POTASSIUM CHLORIDE 7.45 MG/ML
10 INJECTION INTRAVENOUS
Status: COMPLETED | OUTPATIENT
Start: 2022-06-25 | End: 2022-06-25

## 2022-06-25 RX ORDER — SODIUM CHLORIDE 9 MG/ML
INJECTION, SOLUTION INTRAVENOUS CONTINUOUS
Status: ACTIVE | OUTPATIENT
Start: 2022-06-25 | End: 2022-06-25

## 2022-06-25 RX ORDER — CARVEDILOL 3.12 MG/1
3.12 TABLET ORAL 2 TIMES DAILY
Status: DISCONTINUED | OUTPATIENT
Start: 2022-06-25 | End: 2022-06-28 | Stop reason: HOSPADM

## 2022-06-25 RX ADMIN — ALPRAZOLAM 0.25 MG: 0.25 TABLET ORAL at 17:17

## 2022-06-25 RX ADMIN — TICAGRELOR 90 MG: 90 TABLET ORAL at 20:14

## 2022-06-25 RX ADMIN — CETIRIZINE HYDROCHLORIDE 5 MG: 5 TABLET ORAL at 11:18

## 2022-06-25 RX ADMIN — Medication 1 CAPSULE: at 11:18

## 2022-06-25 RX ADMIN — FUROSEMIDE 40 MG: 40 TABLET ORAL at 11:18

## 2022-06-25 RX ADMIN — ATORVASTATIN CALCIUM 40 MG: 40 TABLET, FILM COATED ORAL at 20:13

## 2022-06-25 RX ADMIN — Medication 100 MG: at 20:13

## 2022-06-25 RX ADMIN — SODIUM CHLORIDE: 9 INJECTION, SOLUTION INTRAVENOUS at 11:16

## 2022-06-25 RX ADMIN — CEFTRIAXONE SODIUM 1000 MG: 1 INJECTION, POWDER, FOR SOLUTION INTRAMUSCULAR; INTRAVENOUS at 20:02

## 2022-06-25 RX ADMIN — Medication 1 TABLET: at 11:18

## 2022-06-25 RX ADMIN — Medication 500 UNITS: at 11:18

## 2022-06-25 RX ADMIN — TICAGRELOR 90 MG: 90 TABLET ORAL at 11:18

## 2022-06-25 RX ADMIN — POTASSIUM CHLORIDE 10 MEQ: 7.46 INJECTION, SOLUTION INTRAVENOUS at 11:17

## 2022-06-25 RX ADMIN — SODIUM CHLORIDE, PRESERVATIVE FREE 10 ML: 5 INJECTION INTRAVENOUS at 11:17

## 2022-06-25 RX ADMIN — ALPRAZOLAM 0.25 MG: 0.25 TABLET ORAL at 06:35

## 2022-06-25 RX ADMIN — Medication 100 MG: at 11:18

## 2022-06-25 RX ADMIN — POTASSIUM CHLORIDE 10 MEQ: 7.46 INJECTION, SOLUTION INTRAVENOUS at 17:09

## 2022-06-25 RX ADMIN — LEVOTHYROXINE SODIUM 125 MCG: 0.12 TABLET ORAL at 06:18

## 2022-06-25 RX ADMIN — ASPIRIN 81 MG: 81 TABLET, COATED ORAL at 11:18

## 2022-06-25 RX ADMIN — CARVEDILOL 3.12 MG: 6.25 TABLET, FILM COATED ORAL at 20:13

## 2022-06-25 ASSESSMENT — PAIN SCALES - GENERAL
PAINLEVEL_OUTOF10: 4
PAINLEVEL_OUTOF10: 0

## 2022-06-25 NOTE — PLAN OF CARE
Problem: Discharge Planning  Goal: Discharge to home or other facility with appropriate resources  Outcome: Progressing  Flowsheets (Taken 6/24/2022 2000)  Discharge to home or other facility with appropriate resources:   Identify barriers to discharge with patient and caregiver   Arrange for needed discharge resources and transportation as appropriate   Identify discharge learning needs (meds, wound care, etc)   Refer to discharge planning if patient needs post-hospital services based on physician order or complex needs related to functional status, cognitive ability or social support system     Problem: Safety - Adult  Goal: Free from fall injury  Outcome: Progressing  Flowsheets (Taken 6/25/2022 0516)  Free From Fall Injury: Instruct family/caregiver on patient safety     Problem: Skin/Tissue Integrity  Goal: Absence of new skin breakdown  Description: 1. Monitor for areas of redness and/or skin breakdown  2. Assess vascular access sites hourly  3. Every 4-6 hours minimum:  Change oxygen saturation probe site  4. Every 4-6 hours:  If on nasal continuous positive airway pressure, respiratory therapy assess nares and determine need for appliance change or resting period. Outcome: Progressing  Note: Pt has had no new skin breakdown this shift. Pt is assisted with turning every two hours while in bed and is educated on the importance of relieving pressure to prevent skin breakdown. Low air loss mattress applied to bed to help reduce pressure injuries.         Problem: ABCDS Injury Assessment  Goal: Absence of physical injury  Outcome: Progressing  Flowsheets (Taken 6/25/2022 0516)  Absence of Physical Injury: Implement safety measures based on patient assessment     Problem: Respiratory - Adult  Goal: Achieves optimal ventilation and oxygenation  Outcome: Progressing  Flowsheets (Taken 6/24/2022 2000)  Achieves optimal ventilation and oxygenation:   Assess for changes in respiratory status   Assess for changes in mentation and behavior   Position to facilitate oxygenation and minimize respiratory effort   Oxygen supplementation based on oxygen saturation or arterial blood gases   Assess and instruct to report shortness of breath or any respiratory difficulty   Respiratory therapy support as indicated     Problem: Cardiovascular - Adult  Goal: Maintains optimal cardiac output and hemodynamic stability  Outcome: Progressing  Flowsheets (Taken 6/24/2022 2000)  Maintains optimal cardiac output and hemodynamic stability:   Monitor blood pressure and heart rate   Assess for signs of decreased cardiac output   Administer fluid and/or volume expanders as ordered     Problem: Genitourinary - Adult  Goal: Absence of urinary retention  Outcome: Progressing  Flowsheets (Taken 6/24/2022 2000)  Absence of urinary retention:   Assess patients ability to void and empty bladder   Monitor intake/output and perform bladder scan as needed     Problem: Metabolic/Fluid and Electrolytes - Adult  Goal: Electrolytes maintained within normal limits  Outcome: Progressing  Flowsheets (Taken 6/24/2022 2000)  Electrolytes maintained within normal limits: Monitor labs and assess patient for signs and symptoms of electrolyte imbalances     Problem: Hematologic - Adult  Goal: Maintains hematologic stability  Outcome: Progressing  Flowsheets (Taken 6/24/2022 2000)  Maintains hematologic stability: Assess for signs and symptoms of bleeding or hemorrhage     Problem: Chronic Conditions and Co-morbidities  Goal: Patient's chronic conditions and co-morbidity symptoms are monitored and maintained or improved  Outcome: Progressing  Flowsheets (Taken 6/24/2022 2000)  Care Plan - Patient's Chronic Conditions and Co-Morbidity Symptoms are Monitored and Maintained or Improved:   Monitor and assess patient's chronic conditions and comorbid symptoms for stability, deterioration, or improvement   Collaborate with multidisciplinary team to address chronic and comorbid conditions and prevent exacerbation or deterioration   Update acute care plan with appropriate goals if chronic or comorbid symptoms are exacerbated and prevent overall improvement and discharge     Problem: Pain  Goal: Verbalizes/displays adequate comfort level or baseline comfort level  Outcome: Progressing  Flowsheets (Taken 6/24/2022 2000)  Verbalizes/displays adequate comfort level or baseline comfort level:   Encourage patient to monitor pain and request assistance   Assess pain using appropriate pain scale   Administer analgesics based on type and severity of pain and evaluate response   Implement non-pharmacological measures as appropriate and evaluate response     Problem: Nutrition Deficit:  Goal: Optimize nutritional status  Outcome: Progressing  Flowsheets (Taken 6/25/2022 0518)  Nutrient intake appropriate for improving, restoring, or maintaining nutritional needs:   Assess nutritional status and recommend course of action   Monitor oral intake, labs, and treatment plans     Problem: Cardiovascular - Adult  Goal: Absence of cardiac dysrhythmias or at baseline  Recent Flowsheet Documentation  Taken 6/24/2022 2000 by Farida Urbano RN  Absence of cardiac dysrhythmias or at baseline:   Monitor cardiac rate and rhythm   Assess for signs of decreased cardiac output     Problem: Metabolic/Fluid and Electrolytes - Adult  Goal: Hemodynamic stability and optimal renal function maintained  Flowsheets (Taken 6/25/2022 0518)  Hemodynamic stability and optimal renal function maintained: Monitor labs and assess for signs and symptoms of volume excess or deficit     Care plan reviewed with patient. Patient verbalize understanding of the plan of care and contribute to goal setting.

## 2022-06-25 NOTE — PROGRESS NOTES
INTERNAL MEDICINE Progress Note  6/25/2022 12:12 PM     Dr Torsten Dneton IM coverage for Dr Norman Corona    [facility]   [facility]  PCP: SERGE Castillo - CNP  Interval History:   Seen in ICU, no CP  No SOB  No N/V    Objective:   Vitals: BP (!) 117/56   Pulse 77   Temp 97.4 °F (36.3 °C) (Oral)   Resp 25   Ht 5' (1.524 m)   Wt 104 lb 15 oz (47.6 kg)   SpO2 95%   BMI 20.49 kg/m²   General appearance: alert and cooperative with exam  HEENT: Head: atraumatic  Neck: no adenopathy, no carotid bruit and no JVD  Lungs: diminished breath sounds bibasilar  Heart: S1, S2 normal  Abdomen: soft, non-tender; bowel sounds normal; no masses,  no organomegaly  Extremities: no edema, redness or tenderness in the calves or thighs  Neurologic: Mental status: Alert, oriented, thought content appropriate      Medications:   Scheduled Meds:   carvedilol  3.125 mg Oral BID    potassium chloride  10 mEq IntraVENous Q1H    sodium chloride flush  5-40 mL IntraVENous 2 times per day    aspirin  81 mg Oral Daily    ticagrelor  90 mg Oral BID    atorvastatin  40 mg Oral Nightly    furosemide  40 mg Oral Daily    aspirin  325 mg Oral Once    HYDROcodone 5 mg - acetaminophen  1 tablet Oral Once    [Held by provider] spironolactone  25 mg Oral Daily    docusate  100 mg Oral BID    cetirizine  5 mg Oral Daily    levothyroxine  125 mcg Oral Daily    lubiprostone  24 mcg Oral Daily with breakfast    multivitamin  1 tablet Oral Daily    lactobacillus  1 capsule Oral Daily    Vitamin D  500 Units Oral Daily    [Held by provider] furosemide  20 mg IntraVENous q8h    cefTRIAXone (ROCEPHIN) IV  1,000 mg IntraVENous Q24H    [Held by provider] losartan  25 mg Oral Daily     Continuous Infusions:   sodium chloride 100 mL/hr at 06/25/22 1116    sodium chloride         Lab Results:   CBC:   Recent Labs     06/23/22  0520 06/24/22  0633 06/25/22  0426   WBC 6.4 7.1 7.6   HGB 12.7 12.6 11.8*    142 135     BMP:    Recent Labs 06/23/22  0520 06/24/22  0750 06/25/22  0426    142 141   K 3.8 3.9 3.8   CL 97* 98 99   CO2 32 33 30   BUN 32* 37* 33*   CREATININE 1.2 1.1 1.1   GLUCOSE 87 95 98     Lipids:   Recent Labs     06/23/22  0520   CHOL 123   HDL 46     INR:   Recent Labs     06/23/22  0520   INR 1.18*     Magnesium:    Lab Results   Component Value Date    MG 1.9 06/25/2022     Uric Acid:  No components found for: URIC  HgBA1c:  No results found for: LABA1C  TSH:    Lab Results   Component Value Date    TSH 2.180 06/20/2022     VITAMIN B12: No components found for: B12  FOLATE:    Lab Results   Component Value Date    FOLATE 10.3 11/06/2021     IRON:    Lab Results   Component Value Date    IRON 35 10/13/2021     FERRITIN:    Lab Results   Component Value Date    FERRITIN 604 10/15/2021     LEFT HEART Cath  DATE OF PROCEDURE:  06/24/2022     INDICATION FOR PROCEDURE:  1.  Critical left main coronary artery stenosis, calcified with an  ulcerated plaque. 2.  Severe ischemic cardiomyopathy. 3.  Acute-on-chronic congestive heart failure with reduced ejection  fraction. 4.  Elevated troponin. 5.  Dyspnea, fatigue, angina, and angina equivalent symptoms. 6.  Abnormal EKG.     PROCEDURES PERFORMED:  1. Left cardiac catheterization with selective coronary angiogram.  2.  Successful placement with subsequent explant of Impella CP  percutaneous left ventricular assist device. 3.  Successful complex PCI with Impella support, rotational atherectomy,  PCI, and stenting with IVUS guidance. 4.  Intravascular ultrasound of the left main coronary artery and the  left anterior descending artery.     Assessment and Plan:   1. CAD s/p successful PCI to left main  2. Ischemic cardiomyopathy EF 30 %  3. Paroxysmal a fib  4. HTN  5. Hypothyroidism    Cont DAPT  Statin, BB  ARNI as tolerated by bp  Cont care.   Cont thyroid supplement    Candi Cota MD, MD

## 2022-06-25 NOTE — FLOWSHEET NOTE
06/24/22 2244   Treatment Team Notification   Reason for Communication Evaluate  (Low BP's)   Team Member Name Dr. iWcho Cat Provider   Method of Communication Secure Message   Response See orders   Notification Time [id number]     2244- At this time this RN Massimo Dukess Dr. Jhon Lemus to inform him of the patient's new onset of hypotnesion. BP's ranging in the mid to low 14'L systolic and had MAP's in the 50's. 0000- See orders.

## 2022-06-25 NOTE — PROGRESS NOTES
Cardiology Progress Note      Patient:  Lisbet Fisher  YOB: 1934  MRN: [id number]   Acct: [de-identified]  Admit Date:  6/20/2022  Primary Cardiologist: Dr César Mtz  Seen by Dr Eleanor Adkins    Per prior cardiology consult note-  CHIEF COMPLAINT: SOB. Consult for: [facility] reccomendation        HPI:   This is a pleasant 80 y.o. female who  has a past medical history of HERIBERTO (acute kidney injury) ([facility] [address] Anxiety, Arthritis, Bronchitis, CHF (congestive heart failure) ([facility] [address] Chronic a-fib ([facility] [address] Diverticulitis of colon, Hyperkalemia, Hypertension, Neuromuscular dysfunction of bladder, Personal history of COVID-19, Pressure ulcer, and Protein-calorie malnutrition ([facility] [address]    She presents to [facility] with complaints of SOB and productive cough x3 days. She denies any increased swelling in her legs. She has a history of systolic HF with last echo 11/2021 showing EF 25%, severe global hypokinesis of LV. LV size was moderately increased. She had COVID requiring ICU, tracheostomy and PEG tube at the end of last year. She does follow with Cardiology as an outpatient. Pt states she had a cath many years ago and was told it was all good. No report in chart. Cardiology was consulted for recommendations of lifevest. Patient denies fever, chills, CP, abdominal pain, n/v/d/c. Admits to SOB, productive cough. CXR shows cardiomegaly with probable CHF. CTA chest was negative for PE. Demonstrated bilateral pleural effusions and a pericardial effusion. Troponin was elevated though is chronic. BNP 98412. She has no other acute complaints        Subjective (Events in last 24 hours):    Stable OVN; remains in ICU post procedure  Overall feeling better; discomfort from wound on tail bone  Denies chest discomfort or dyspnea  BP on lower side this AM; not drinking well; some nausea yesterday; encourage fluids  R groin and R radial sites without bleeding or hematoma  Anticipate TRO 3B stepdown today    6/24/22: s/p Impella assisted PCI LM and LAD    6/23/22: cath performed; High risk PCI vs CABG for LM/LAD/LCX disease    6/22/22:  Pt up in chair - she is lying on her side   She states no CP   SOB much improved from arrival   Discussed CHF and cath need - pt wants everything done at present   Plan for cath tomorrow   Tele SR no ectopy       Objective:   BP (!) 117/56   Pulse 77   Temp 97.4 °F (36.3 °C) (Oral)   Resp 25   Ht 5' (1.524 m)   Wt 104 lb 15 oz (47.6 kg)   SpO2 (!) 89%   BMI 20.49 kg/m²        TELEMETRY: SR 70's per bedside monitor    Physical Exam:  General Appearance: alert and oriented to person, place and time, in no acute distress, resting in bed  Cardiovascular: normal rate, regular rhythm, normal S1 and S2, no murmurs, rubs, clicks, or gallops, distal pulses intact,   Pulmonary/Chest: clear to auscultation bilaterally- no wheezes, rales or rhonchi, normal air movement, no respiratory distress, RA  Abdomen: soft, non-tender, non-distended, normal bowel sounds  Extremities: no cyanosis, clubbing or edema, pulses present 2+. R radial and R groin sites without bleeding or hematoma; mild ecchymosis R groin.    Skin: warm and dry, no rash or erythema  Musculoskeletal: normal range of motion, no joint swelling, deformity or tenderness  Neurological: alert, oriented, normal speech, no focal findings or movement disorder noted    Medications:    sodium chloride flush  5-40 mL IntraVENous 2 times per day    aspirin  81 mg Oral Daily    ticagrelor  90 mg Oral BID    atorvastatin  40 mg Oral Nightly    furosemide  40 mg Oral Daily    aspirin  325 mg Oral Once    HYDROcodone 5 mg - acetaminophen  1 tablet Oral Once    [Held by provider] spironolactone  25 mg Oral Daily    carvedilol  6.25 mg Oral BID    docusate  100 mg Oral BID    cetirizine  5 mg Oral Daily    levothyroxine  125 mcg Oral Daily    lubiprostone  24 mcg Oral Daily with breakfast    multivitamin  1 tablet Oral Daily    lactobacillus  1 capsule Oral Daily    Vitamin D  500 Units Oral Daily    [Held by provider] furosemide  20 mg IntraVENous q8h    cefTRIAXone (ROCEPHIN) IV  1,000 mg IntraVENous Q24H    [Held by provider] losartan  25 mg Oral Daily      sodium chloride       sodium chloride flush, 5-40 mL, PRN  sodium chloride, , PRN  HYDROcodone 5 mg - acetaminophen, 1 tablet, Q4H PRN  ALPRAZolam, 0.25 mg, BID PRN  nitroGLYCERIN, 0.4 mg, Q5 Min PRN  acetaminophen, 650 mg, Q6H PRN  ipratropium-albuterol, 1 ampule, Q4H PRN  ondansetron, 4 mg, Q6H PRN   Or  ondansetron, 4 mg, Q6H PRN        Diagnostics:  EK22:  EKG 12 lead  Order: [id number]   Status: Final result     Visible to patient: No (not released)     Next appt: 2022 at 09:00 AM in Cardiology SER[facility] Nelson - CNP)     0 Result Notes    Component Ref Range & Units 22 1632 22 0624 22 1400 3/27/22 1806 21 1451 10/25/21 2002 10/25/21 1944   Ventricular Rate BPM 74  114  88  73  82  84  79    Atrial Rate BPM 74   88  73  82  84  79    P-R Interval ms 202   206  190  182  220  222    QRS Duration ms 152  150  154  140  158  152  152    Q-T Interval ms 460  398  378  436  430  408  380    QTc Calculation (Bazett) ms 510  548  457  480  502  482  435    P Axis degrees 78   78  54  66  7  3    R Axis degrees -61  -62  -43  -58  -47  -43  -45    T Axis degrees 91  86  101  64  82  -53  -39    Resulting Agency  [address] [facility]             Narrative & Impression    Normal sinus rhythm  Possible Left atrial enlargement  Right bundle branch block  Left anterior fascicular block   Bifascicular block   LVH with repolarization abnormality ( R in aVL )  Abnormal ECG  When compared with ECG of 2022 06:24,  Sinus rhythm has replaced Atrial fibrillation  Ventricular rate has decreased BY  40 BPM  Criteria for Anterior infarct are no longer Present  Confirmed by ABDELBAKI, ZOHEIR ([id number]) on 2022 6:22:54 AM Echo:    Electronically signed by Ramón Hewitt MD (Interpreting   physician) on 2022 at 02:25 PM   ----------------------------------------------------------------    Findings    Mitral Valve   The mitral valve structure was normal with normal leaflet separation. Aortic Valve   The aortic valve was trileaflet with normal thickness and cuspal   separation. Tricuspid Valve   The tricuspid valve structure was normal with normal leaflet separation. Pulmonic Valve   The pulmonic valve leaflets were not well seen. Left Atrium   Left atrial size was normal.    Left Ventricle   Moderately dilated left ventricular size and severely reduced systolic   function. There was severe global hypokinesis. Wall thickness was within normal limits. Ejection fraction was estimated at 30-35%. Right Atrium   Right atrial size was normal.    Right Ventricle   The right ventricular size was normal with normal systolic function and   wall thickness. Pericardial Effusion   The pericardium was normal in appearance with a moderate,   non-hemodynamically significant pericardial effusion. Pleural Effusion   Left sided pleural effusion. Aorta / Great Vessels   IVC size is within normal limits with normal respiratory phasic changes. Cath: 22  CARDIAC CATHETERIZATION     PATIENT NAME: Koby Liu                    :        1934  MED REC NO:   [id number]                           ROOM:       [id number]  ACCOUNT NO:   [de-identified]                           ADMIT DATE: 2022  PROVIDER:     Anjana Hermosillo MD     DATE OF PROCEDURE:  2022     INDICATION FOR PROCEDURE:  1. Recently diagnosed severe cardiomyopathy, ejection fraction 25% on  echocardiogram.  No recent ischemic workup, suspected ischemic etiology. 2.  Acute congestive heart failure with reduced ejection fraction. Elevated Troponin, abnormal EKG  3.   Dyspnea on exertion, fatigue, angina equivalent symptoms.     PROCEDURES PERFORMED:  1. Left cardiac catheterization with selective coronary angiogram.  2.  Left ventriculogram.     DESCRIPTION OF PROCEDURE/DETAILS:  After informed consent, the patient  was brought to the cardiac catheterization room. She was prepped and  draped in a sterile fashion. 2% lidocaine was injected in the skin and  subcutaneous tissue overlying the right radial artery. Under ultrasound  guidance using modified Seldinger technique, access was obtained in the  right radial artery. 5/6 Slender sheath was inserted. Standard  antithrombotic/antispasmodic medications were given. I used 5-Liberian  JR4, 5-Liberian JL3.5 diagnostic catheters to complete the procedure.     FINDINGS:  HEMODYNAMICS:  Left ventricular end-diastolic pressure was 1 mmHg. No  significant pressure gradient across the aortic valve upon pullback.     LEFT VENTRICULOGRAM:  There is evidence of severe global hypokinesis,  ejection fraction estimated at 20%-25%.     CORONARY ANGIOGRAM:  1. RIGHT CORONARY ARTERY:  Right coronary artery is a relatively small  nondominant vessel with mild luminal irregularities. No evidence for  obstructive disease noted. 2.  LEFT MAIN CORONARY ARTERY:  Distal segment of left main coronary  artery has severe 60%-70% calcified lesion with findings consistent with  an ulcerated plaque. This is a trifurcation lesion. Left main gives  rise to ramus intermedius, left circumflex, and left anterior descending  arteries. 3.  LEFT CIRCUMFLEX ARTERY:  Dominant vessel. Luminal irregularities  were noted. Otherwise patent. 3.  RAMUS INTERMEDIUS:  This is an intermediate sized vessel with  luminal irregularities. 4.  LEFT ANTERIOR DESCENDING ARTERY:  Ostial LAD has involvement of the  left main lesion as detailed above, suspected to have 50%-60% ostial LAD  stenosis, otherwise left anterior descending artery has mild luminal  irregularities in mid segment.   Distal LAD has mild diffuse disease.     Abdominal aortogram was performed. I advanced the 5-Cymraes pigtail  catheter over the 0.035 J wire to the distal abdominal aorta. Aortogram  was completed. Distal abdominal aorta is patent. No significant  aneurysmal dilation. Common iliac, internal iliac, and external iliac  arteries on both sides are patent with no significant stenotic lesions.     MEDICATIONS:  See MAR.     COMPLICATIONS:  None.     ESTIMATED BLOOD LOSS:  Less than 50 mL.     ACCESS:  Right radial artery access. Vasc Band was applied. Hemostasis  was achieved.     IMPRESSION:  1. Severe left main stenosis. 2.  Severe ischemic cardiomyopathy.     RECOMMENDATIONS:  Bedrest for the next 2 hours. Access site care. Medical therapy and aggressive risk factor modification for coronary  artery disease. Continue on aspirin 81 mg p.o. daily. High intensity  statin therapy. The patient is chronically on Eliquis 2.5 mg p.o.  b.i.d. for her history of atrial fibrillation. Continue to hold Eliquis  at this time pending decision on the revascularization. The severe  distal left main disease has a complex lesion ulcerated plaque was  noted. This is a trifurcation lesion. The left main gives rise to left  anterior descending, ramus intermedius, and left circumflex arteries. This is a left-sided dominant system with dominant left circumflex  artery. Age and general medical status might prohibit the patient from  be a candidate for coronary artery bypass graft surgery. I discussed  with the patient and her family other treatment options including  high-risk PCI with Impella support versus medical therapy. We will  consult Cardiovascular Surgery for heart team discussion. Left  ventricular end-diastolic pressure was 1 mmHg. Creatinine is up to 1.2. She has no signs of volume overload at this time. Hold Lasix. Hold  Aldactone. Hold losartan. Gentle hydration with IV fluids.   Repeat  basic metabolic panel in the morning.     Chantelle Guerrero MD   D: 2022 13    PCI: 22:  [address] [place] [address]                             CARDIAC CATHETERIZATION     PATIENT NAME: Cheryl Dodge                    :        1934  MED REC NO:   [id number]                           ROOM:       [id number]  ACCOUNT NO:   [de-identified]                           ADMIT DATE: 2022  PROVIDER:     Danyelle Reece MD     DATE OF PROCEDURE:  2022     INDICATION FOR PROCEDURE:  1.  Critical left main coronary artery stenosis, calcified with an  ulcerated plaque. 2.  Severe ischemic cardiomyopathy. 3.  Acute-on-chronic congestive heart failure with reduced ejection  fraction. 4.  Elevated troponin. 5.  Dyspnea, fatigue, angina, and angina equivalent symptoms. 6.  Abnormal EKG.     PROCEDURES PERFORMED:  1. Left cardiac catheterization with selective coronary angiogram.  2.  Successful placement with subsequent explant of Impella CP  percutaneous left ventricular assist device. 3.  Successful complex PCI with Impella support, rotational atherectomy,  PCI, and stenting with IVUS guidance. 4.  Intravascular ultrasound of the left main coronary artery and the  left anterior descending artery.     DESCRIPTION OF PROCEDURE/INTERVENTION DETAILS:  After informed consent,  the patient was brought to the cardiac catheterization room. She was  prepped and draped in a sterile fashion. 2% lidocaine was injected in  the skin and subcutaneous tissue overlying the right groin area. Under  ultrasound and fluoroscopy guidance, access was obtained in the right  common femoral artery. Micropuncture kit was utilized. Common femoral  artery angiogram confirmed good stick. I used 6-Lebanese sheath for  dilation. I then proceeded with preclose. Two Perclose ProGlide  closure devices were utilized, not fully deployed. I then advanced the  14-Lebanese Impella sheath under fluoroscopy.   Heparin was given. ACT was  confirmed to be above 250. 5-Jamaican pigtail catheter was used to cross  the aortic valve. The wire was exchanged to the 0.018 Impella wire. The pigtail catheter was removed. I then advanced the Impella under  fluoroscopy guidance. Positioning was confirmed under fluoroscopy. Wire was removed. The Impella was initiated. Good waveform confirmed  with cardiac output of 3.6 liters per minute. I then obtained access  within the diaphragm of the Impella sheath using micropuncture kit. 7-Jamaican sheath was inserted and flushed with normal saline. 7-Jamaican  EBU 3.5 guide catheter was used to cannulate the left main coronary  artery. [address] wire was used to cross the lesion and wire left main  into LAD. I then proceeded with rotational atherectomy. 1.5 mm connie  was utilized. Shannon Pulido was tested outside the patient's body, then was  advanced under fluoroscopy. Two runs were completed. The connie was then  removed. I did exchange the wire to a Runthrough wire over Teleport  microcatheter. Over the Runthrough wire, I proceeded with predilation  using 3.5 x 15 mm noncompliant balloon. I then proceeded with stenting. Resolute Jame 4.0 mm x 30 mm drug-eluting stent was successfully  deployed. I then did obtain multiple views prior to deployment to  ensure full coverage of the full length of the left main and target  lesion area including the ostium of the LAD and diseased part of  proximal LAD. Stent was successfully deployed, then was postdilated  using 4.5 mm x 8 mm noncompliant balloon. Prior to postdilation, I did  perform IVUS, which revealed the stent to be well apposed and well  expanded in the left anterior descending artery except in the ostium of  the LAD. Based on IVUS findings, post dilation was planned. Again  first 4.5 x 8 mm noncompliant balloon was used for postdilation in the  area of the ostium of the left anterior descending artery as well as in  the left main. Further postdilation in the left main coronary artery  was done using 5 mm x 8 mm noncompliant balloon. Repeat angiogram after  balloon and wire were removed revealed well-expanded stent, 0% residual  stenosis, KEO-3 flow. No complications including no dissection, distal  embolization, or perforation.     MEDICATIONS:  See MAR.     COMPLICATIONS:  None.     ESTIMATED BLOOD LOSS:  Less than 50 mL.     ACCESS:  Right common femoral artery access. The Impella device was  successfully removed. The sheath was successfully removed with  subsequent deployment of previously deployed ProGlide closure device,  one of the sutures broke, I had to follow that with the 8-Kuwaiti  Angio-Seal.  Hemostasis was achieved. FemoStop will be kept for the  next 1-2 hours at low pressure.     CONCLUSION:  Successful IVUS-guided PCI and stenting of the left main  coronary artery with Impella support. Rotational atherectomy with 1.5  mm connie with subsequent stenting.     RECOMMENDATIONS:  Transfer to the intensive care unit for close  observation. Access site care. Strict bedrest for 4 hours  postprocedure. Aggressive risk factor modification. Aspirin 81 mg p.o.  daily. Brilinta 90 mg p.o. b.i.d. High intensity statin therapy. Resume Lasix, change to oral.  Resume Aldactone in the a.m. if  creatinine and potassium are stable. Guideline-directed medical therapy  for congestive heart failure with reduced ejection fraction.  Bee Nj MD   D: 06/24/2022     Lab Data:    Cardiac Enzymes:  No results for input(s): CKTOTAL, CKMB, CKMBINDEX, TROPONINI in the last 72 hours.     CBC:   Lab Results   Component Value Date    WBC 7.6 06/25/2022    RBC 3.64 06/25/2022    RBC 4.02 08/10/2011    HGB 11.8 06/25/2022    HCT 38.0 06/25/2022     06/25/2022       CMP:    Lab Results   Component Value Date     06/25/2022    K 3.8 06/25/2022    K 5.5 06/20/2022    CL 99 06/25/2022    CO2 30 06/25/2022    BUN 33 06/25/2022 CREATININE 1.1 06/25/2022    GFRAA >60 07/18/2019    LABGLOM 47 06/25/2022    GLUCOSE 98 06/25/2022    GLUCOSE 118 08/10/2011    CALCIUM 9.3 06/25/2022       Hepatic Function Panel:    Lab Results   Component Value Date    ALKPHOS 53 03/27/2022    ALT 7 03/27/2022    AST 22 03/27/2022    PROT 6.9 03/27/2022    BILITOT 0.2 03/27/2022    BILIDIR <0.2 11/08/2021    LABALBU 3.4 03/27/2022    LABALBU 3.9 08/10/2011       Magnesium:    Lab Results   Component Value Date    MG 2.1 12/12/2021       PT/INR:    Lab Results   Component Value Date    INR 1.18 06/23/2022       HgBA1c:  No results found for: LABA1C    FLP:    Lab Results   Component Value Date    TRIG 99 06/23/2022    HDL 46 06/23/2022    LDLCALC 57 06/23/2022       TSH:    Lab Results   Component Value Date    TSH 2.180 06/20/2022         Assessment/Plan:  · Acute on chronic systolic chf   ·  Bilateral pleural effusions right greater than left  · Suspected new CDMP EF 30-35% (improved from 20-25%)  · LV at discharge - ordered  · Cath 6/23/22  · Impella protected PCI LM and LAD 6/24  · Stable post procedure  · SR 70's; VSS  · R groin site  · Labs:  · K 3.8 - replace  · BUN/Creat: 33/1.1 (37/1.1, 32/1.2) - stable  · H & H: 11.8/38.0 ( 12.6/39.0, 12.7/41. 1) - stable  Cardiac Rehab: Yes    Follow-up in office in 2 weeks with Dr. Soledad Meza  No follow-up provider specified.      Discharge Medications for PCI/NSTEMI (performed):   ASA: yes  Statin: yes  P2Y12 Inhibitor: yes  Beta Blocker: yes  ACE / ARB: yes - currently on hold - lower BP  Diuretics: Lasix, Aldactone - yes; currently on hold; lower BP  Nitro SL: yes      Discharge Medications for Cardiomyopathy, CHF: EF 30 - 35%  Beta Blocker: yes  ACE Inhibitor/ARB: yes  Diuretics: yes  Aldactone: yes  Farxiga: check with insurance-- requested   CHF clinic at discharge  Life Vest at discharge; ordered  Hx:   Anemia   borderline BP   HLP  Prior Covid w/ trach  Hx AFB - Hx CV to SR    On eliquis 2.5 mg po BID PTA  Moderate MR prior echo - none this echo   CKD stage 3    BP on lower side this AM - aSx in bed. Not drinking well due to nausea yesterday. Improved today. Encurage po fluids. NS IV at 100 cc/hr x 6 hours then d/c. If BP stable > 371 systolic and remains clinically stable may TRO stepdown 3B. Cardiology will follow.      Electronically signed by Norva Bernheim, APRN - CNP on 6/25/2022 at 9:31 AM

## 2022-06-26 LAB
ANION GAP SERPL CALCULATED.3IONS-SCNC: 12 MEQ/L (ref 8–16)
BUN BLDV-MCNC: 32 MG/DL (ref 7–22)
CALCIUM SERPL-MCNC: 9.4 MG/DL (ref 8.5–10.5)
CHLORIDE BLD-SCNC: 99 MEQ/L (ref 98–111)
CO2: 30 MEQ/L (ref 23–33)
CREAT SERPL-MCNC: 1.3 MG/DL (ref 0.4–1.2)
ERYTHROCYTE [DISTWIDTH] IN BLOOD BY AUTOMATED COUNT: 15.2 % (ref 11.5–14.5)
ERYTHROCYTE [DISTWIDTH] IN BLOOD BY AUTOMATED COUNT: 57.2 FL (ref 35–45)
GFR SERPL CREATININE-BSD FRML MDRD: 39 ML/MIN/1.73M2
GLUCOSE BLD-MCNC: 99 MG/DL (ref 70–108)
HCT VFR BLD CALC: 36.2 % (ref 37–47)
HEMOGLOBIN: 11.6 GM/DL (ref 12–16)
MCH RBC QN AUTO: 32.4 PG (ref 26–33)
MCHC RBC AUTO-ENTMCNC: 32 GM/DL (ref 32.2–35.5)
MCV RBC AUTO: 101.1 FL (ref 81–99)
PLATELET # BLD: 134 THOU/MM3 (ref 130–400)
PMV BLD AUTO: 11.8 FL (ref 9.4–12.4)
POTASSIUM SERPL-SCNC: 3.8 MEQ/L (ref 3.5–5.2)
RBC # BLD: 3.58 MILL/MM3 (ref 4.2–5.4)
SODIUM BLD-SCNC: 141 MEQ/L (ref 135–145)
WBC # BLD: 7.9 THOU/MM3 (ref 4.8–10.8)

## 2022-06-26 PROCEDURE — 2580000003 HC RX 258: Performed by: INTERNAL MEDICINE

## 2022-06-26 PROCEDURE — 85027 COMPLETE CBC AUTOMATED: CPT

## 2022-06-26 PROCEDURE — 6370000000 HC RX 637 (ALT 250 FOR IP): Performed by: NURSE PRACTITIONER

## 2022-06-26 PROCEDURE — 80048 BASIC METABOLIC PNL TOTAL CA: CPT

## 2022-06-26 PROCEDURE — 6370000000 HC RX 637 (ALT 250 FOR IP): Performed by: INTERNAL MEDICINE

## 2022-06-26 PROCEDURE — 2140000000 HC CCU INTERMEDIATE R&B

## 2022-06-26 PROCEDURE — 36415 COLL VENOUS BLD VENIPUNCTURE: CPT

## 2022-06-26 PROCEDURE — 99232 SBSQ HOSP IP/OBS MODERATE 35: CPT | Performed by: NURSE PRACTITIONER

## 2022-06-26 RX ORDER — ZOLPIDEM TARTRATE 5 MG/1
5 TABLET ORAL NIGHTLY PRN
Status: DISCONTINUED | OUTPATIENT
Start: 2022-06-26 | End: 2022-06-28 | Stop reason: HOSPADM

## 2022-06-26 RX ADMIN — LEVOTHYROXINE SODIUM 125 MCG: 0.12 TABLET ORAL at 09:29

## 2022-06-26 RX ADMIN — CARVEDILOL 3.12 MG: 6.25 TABLET, FILM COATED ORAL at 08:11

## 2022-06-26 RX ADMIN — Medication 1 CAPSULE: at 08:12

## 2022-06-26 RX ADMIN — CETIRIZINE HYDROCHLORIDE 5 MG: 5 TABLET ORAL at 08:12

## 2022-06-26 RX ADMIN — SODIUM CHLORIDE, PRESERVATIVE FREE 10 ML: 5 INJECTION INTRAVENOUS at 20:17

## 2022-06-26 RX ADMIN — ASPIRIN 81 MG: 81 TABLET, COATED ORAL at 08:12

## 2022-06-26 RX ADMIN — TICAGRELOR 90 MG: 90 TABLET ORAL at 08:12

## 2022-06-26 RX ADMIN — SODIUM CHLORIDE, PRESERVATIVE FREE 10 ML: 5 INJECTION INTRAVENOUS at 08:11

## 2022-06-26 RX ADMIN — ACETAMINOPHEN 650 MG: 325 TABLET ORAL at 20:09

## 2022-06-26 RX ADMIN — Medication 500 UNITS: at 08:11

## 2022-06-26 RX ADMIN — Medication 1 TABLET: at 08:12

## 2022-06-26 RX ADMIN — TICAGRELOR 90 MG: 90 TABLET ORAL at 20:09

## 2022-06-26 RX ADMIN — ATORVASTATIN CALCIUM 40 MG: 40 TABLET, FILM COATED ORAL at 20:18

## 2022-06-26 RX ADMIN — FUROSEMIDE 40 MG: 40 TABLET ORAL at 09:29

## 2022-06-26 ASSESSMENT — PAIN SCALES - GENERAL
PAINLEVEL_OUTOF10: 5
PAINLEVEL_OUTOF10: 0
PAINLEVEL_OUTOF10: 5
PAINLEVEL_OUTOF10: 0
PAINLEVEL_OUTOF10: 5
PAINLEVEL_OUTOF10: 5

## 2022-06-26 ASSESSMENT — PAIN DESCRIPTION - LOCATION
LOCATION: BUTTOCKS
LOCATION: COCCYX

## 2022-06-26 ASSESSMENT — PAIN DESCRIPTION - PAIN TYPE: TYPE: ACUTE PAIN

## 2022-06-26 ASSESSMENT — PAIN SCALES - WONG BAKER
WONGBAKER_NUMERICALRESPONSE: 0
WONGBAKER_NUMERICALRESPONSE: 0

## 2022-06-26 ASSESSMENT — PAIN DESCRIPTION - ONSET: ONSET: ON-GOING

## 2022-06-26 ASSESSMENT — PAIN - FUNCTIONAL ASSESSMENT: PAIN_FUNCTIONAL_ASSESSMENT: ACTIVITIES ARE NOT PREVENTED

## 2022-06-26 ASSESSMENT — PAIN DESCRIPTION - FREQUENCY: FREQUENCY: INTERMITTENT

## 2022-06-26 ASSESSMENT — PAIN DESCRIPTION - ORIENTATION: ORIENTATION: RIGHT;LEFT

## 2022-06-26 ASSESSMENT — PAIN DESCRIPTION - DESCRIPTORS: DESCRIPTORS: ACHING

## 2022-06-26 NOTE — PROGRESS NOTES
Cardiology Progress Note      Patient:  Larry Heard  YOB: 1934  MRN: [id number]   Acct: [de-identified]  Admit Date:  6/20/2022  Primary Cardiologist: Dr Gavin Weinberg  Seen by Dr Jaren Marquez: \"Doing a little better today - just have pain on my tailbone\".       HPI:   This is a pleasant 80 y.o. female who  has a past medical history of HERIBERTO (acute kidney injury) ([address] Anxiety, Arthritis, Bronchitis, CHF (congestive heart failure) ([address] Chronic a-fib ([facility] Diverticulitis of colon, Hyperkalemia, Hypertension, Neuromuscular dysfunction of bladder, Personal history of COVID-19, Pressure ulcer, and Protein-calorie malnutrition ([facility] [address]    She presents to [facility] with complaints of SOB and productive cough x3 days. She denies any increased swelling in her legs. She has a history of systolic HF with last echo 11/2021 showing EF 25%, severe global hypokinesis of LV. LV size was moderately increased. She had COVID requiring ICU, tracheostomy and PEG tube at the end of last year. She does follow with Cardiology as an outpatient. Pt states she had a cath many years ago and was told it was all good. No report in chart. Cardiology was consulted for recommendations of lifevest. Patient denies fever, chills, CP, abdominal pain, n/v/d/c. Admits to SOB, productive cough. CXR shows cardiomegaly with probable CHF. CTA chest was negative for PE. Demonstrated bilateral pleural effusions and a pericardial effusion. Troponin was elevated though is chronic. BNP 57051. She has no other acute complaints        Subjective (Events in last 24 hours):    Stable OVN  TRO ICU late last evening  BP improved  Yet to be OOB - has known pressure sore on coccyx - reluctant to sit up in chair - yet to walk in room or montes  Denies chest pain or dyspnea  No pain or discomfort at right radial site or right groin site  Tolerating medications; no bleeding  Tolerating oral fluids and diet    6/25/22:  Stable OVN; remains in ICU post procedure  Overall feeling better; discomfort from wound on tail bone  Denies chest discomfort or dyspnea  BP on lower side this AM; not drinking well; some nausea yesterday; encourage fluids  R groin and R radial sites without bleeding or hematoma  Anticipate TRO 3B stepdown today    6/24/22: s/p Impella assisted PCI LM and LAD    6/23/22: cath performed; High risk PCI vs CABG for LM/LAD/LCX disease    6/22/22:  Pt up in chair - she is lying on her side   She states no CP   SOB much improved from arrival   Discussed CHF and cath need - pt wants everything done at present   Plan for cath tomorrow   Tele SR no ectopy       Objective:   BP (!) 103/40   Pulse 88   Temp 98.7 °F (37.1 °C) (Oral)   Resp 16   Ht 5' (1.524 m)   Wt 104 lb 15 oz (47.6 kg)   SpO2 97%   BMI 20.49 kg/m²        TELEMETRY: SR 70's per telemetry    Physical Exam:  General Appearance: alert and oriented to person, place and time, in no acute distress, resting in bed, no distress, pleasant and bright  Cardiovascular: normal rate, regular rhythm, normal S1 and S2, no murmurs, rubs, clicks, or gallops, distal pulses intact,   Pulmonary/Chest: clear to auscultation bilaterally- no wheezes, rales or rhonchi, normal air movement, no respiratory distress, RA  Abdomen: soft, non-tender, non-distended, normal bowel sounds  Extremities: no cyanosis, clubbing or edema, pulses present 2+. R radial and R groin sites without bleeding or hematoma; mild ecchymosis R groin.    Skin: warm and dry, no rash or erythema  Musculoskeletal: normal range of motion, no joint swelling, deformity or tenderness  Neurological: alert, oriented, normal speech, no focal findings or movement disorder noted    Medications:    carvedilol  3.125 mg Oral BID    sodium chloride flush  5-40 mL IntraVENous 2 times per day    aspirin  81 mg Oral Daily    ticagrelor  90 mg Oral BID    atorvastatin  40 mg Oral Nightly    furosemide  40 mg Oral Daily    aspirin  325 mg Oral Once  HYDROcodone 5 mg - acetaminophen  1 tablet Oral Once    [Held by provider] spironolactone  25 mg Oral Daily    docusate  100 mg Oral BID    cetirizine  5 mg Oral Daily    levothyroxine  125 mcg Oral Daily    lubiprostone  24 mcg Oral Daily with breakfast    multivitamin  1 tablet Oral Daily    lactobacillus  1 capsule Oral Daily    Vitamin D  500 Units Oral Daily    [Held by provider] furosemide  20 mg IntraVENous q8h    [Held by provider] losartan  25 mg Oral Daily      sodium chloride       sodium chloride flush, 5-40 mL, PRN  sodium chloride, , PRN  ALPRAZolam, 0.25 mg, BID PRN  nitroGLYCERIN, 0.4 mg, Q5 Min PRN  acetaminophen, 650 mg, Q6H PRN  ipratropium-albuterol, 1 ampule, Q4H PRN  ondansetron, 4 mg, Q6H PRN   Or  ondansetron, 4 mg, Q6H PRN        Diagnostics:  EK22:  EKG 12 lead  Order: [id number]   Status: Final result     Visible to patient: No (not released)     Next appt: 2022 at 09:00 AM in Cardiology SERGE Bill - CNP)     0 Result Notes    Component Ref Range & Units 22 1632 22 0624 22 1400 3/27/22 1806 21 1451 10/25/21 2002 10/25/21 1944   Ventricular Rate BPM 74  114  88  73  82  84  79    Atrial Rate BPM 74   88  73  82  84  79    P-R Interval ms 202   206  190  182  220  222    QRS Duration ms 152  150  154  140  158  152  152    Q-T Interval ms 460  398  378  436  430  408  380    QTc Calculation (Bazett) ms 510  548  457  480  502  482  435    P Axis degrees 78   78  54  66  7  3    R Axis degrees -61  -62  -43  -58  -47  -43  -45    T Axis degrees 91  86  101  64  82  -53  -39    Resulting Agency  [facility] STR MUSE             Narrative & Impression    Normal sinus rhythm  Possible Left atrial enlargement  Right bundle branch block  Left anterior fascicular block   Bifascicular block   LVH with repolarization abnormality ( R in aVL )  Abnormal ECG  When compared with ECG of 2022 06:24,  Sinus rhythm has replaced Atrial fibrillation  Ventricular rate has decreased BY  40 BPM  Criteria for Anterior infarct are no longer Present  Confirmed by Jaison Lentz ([id number]) on 2022 6:22:54 AM               Echo:    Electronically signed by Rosalio Welsh MD (Interpreting   physician) on 2022 at 02:25 PM   ----------------------------------------------------------------    Findings    Mitral Valve   The mitral valve structure was normal with normal leaflet separation. Aortic Valve   The aortic valve was trileaflet with normal thickness and cuspal   separation. Tricuspid Valve   The tricuspid valve structure was normal with normal leaflet separation. Pulmonic Valve   The pulmonic valve leaflets were not well seen. Left Atrium   Left atrial size was normal.    Left Ventricle   Moderately dilated left ventricular size and severely reduced systolic   function. There was severe global hypokinesis. Wall thickness was within normal limits. Ejection fraction was estimated at 30-35%. Right Atrium   Right atrial size was normal.    Right Ventricle   The right ventricular size was normal with normal systolic function and   wall thickness. Pericardial Effusion   The pericardium was normal in appearance with a moderate,   non-hemodynamically significant pericardial effusion. Pleural Effusion   Left sided pleural effusion. Aorta / Great Vessels   IVC size is within normal limits with normal respiratory phasic changes. Cath: 22  CARDIAC CATHETERIZATION     PATIENT NAME: Juli Moreno                    :        1934  MED REC NO:   [id number]                           ROOM:       [id number]  ACCOUNT NO:   [de-identified]                           ADMIT DATE: 2022  PROVIDER:     Anastasia Rey MD     DATE OF PROCEDURE:  2022     INDICATION FOR PROCEDURE:  1.   Recently diagnosed severe cardiomyopathy, ejection fraction 25% on  echocardiogram.  No recent ischemic workup, suspected ischemic etiology. 2.  Acute congestive heart failure with reduced ejection fraction. Elevated Troponin, abnormal EKG  3. Dyspnea on exertion, fatigue, angina equivalent symptoms.     PROCEDURES PERFORMED:  1. Left cardiac catheterization with selective coronary angiogram.  2.  Left ventriculogram.     DESCRIPTION OF PROCEDURE/DETAILS:  After informed consent, the patient  was brought to the cardiac catheterization room. She was prepped and  draped in a sterile fashion. 2% lidocaine was injected in the skin and  subcutaneous tissue overlying the right radial artery. Under ultrasound  guidance using modified Seldinger technique, access was obtained in the  right radial artery. 5/6 Slender sheath was inserted. Standard  antithrombotic/antispasmodic medications were given. I used 5-[place] citizen  JR4, 5-[place] citizen JL3.5 diagnostic catheters to complete the procedure.     FINDINGS:  HEMODYNAMICS:  Left ventricular end-diastolic pressure was 1 mmHg. No  significant pressure gradient across the aortic valve upon pullback.     LEFT VENTRICULOGRAM:  There is evidence of severe global hypokinesis,  ejection fraction estimated at 20%-25%.     CORONARY ANGIOGRAM:  1. RIGHT CORONARY ARTERY:  Right coronary artery is a relatively small  nondominant vessel with mild luminal irregularities. No evidence for  obstructive disease noted. 2.  LEFT MAIN CORONARY ARTERY:  Distal segment of left main coronary  artery has severe 60%-70% calcified lesion with findings consistent with  an ulcerated plaque. This is a trifurcation lesion. Left main gives  rise to ramus intermedius, left circumflex, and left anterior descending  arteries. 3.  LEFT CIRCUMFLEX ARTERY:  Dominant vessel. Luminal irregularities  were noted. Otherwise patent. 3.  RAMUS INTERMEDIUS:  This is an intermediate sized vessel with  luminal irregularities.   4.  LEFT ANTERIOR DESCENDING ARTERY:  Ostial LAD has involvement of the  left main lesion as detailed above, suspected to have 50%-60% ostial LAD  stenosis, otherwise left anterior descending artery has mild luminal  irregularities in mid segment. Distal LAD has mild diffuse disease.     Abdominal aortogram was performed. I advanced the 5-German pigtail  catheter over the 0.035 J wire to the distal abdominal aorta. Aortogram  was completed. Distal abdominal aorta is patent. No significant  aneurysmal dilation. Common iliac, internal iliac, and external iliac  arteries on both sides are patent with no significant stenotic lesions.     MEDICATIONS:  See MAR.     COMPLICATIONS:  None.     ESTIMATED BLOOD LOSS:  Less than 50 mL.     ACCESS:  Right radial artery access. Vasc Band was applied. Hemostasis  was achieved.     IMPRESSION:  1. Severe left main stenosis. 2.  Severe ischemic cardiomyopathy.     RECOMMENDATIONS:  Bedrest for the next 2 hours. Access site care. Medical therapy and aggressive risk factor modification for coronary  artery disease. Continue on aspirin 81 mg p.o. daily. High intensity  statin therapy. The patient is chronically on Eliquis 2.5 mg p.o.  b.i.d. for her history of atrial fibrillation. Continue to hold Eliquis  at this time pending decision on the revascularization. The severe  distal left main disease has a complex lesion ulcerated plaque was  noted. This is a trifurcation lesion. The left main gives rise to left  anterior descending, ramus intermedius, and left circumflex arteries. This is a left-sided dominant system with dominant left circumflex  artery. Age and general medical status might prohibit the patient from  be a candidate for coronary artery bypass graft surgery. I discussed  with the patient and her family other treatment options including  high-risk PCI with Impella support versus medical therapy. We will  consult Cardiovascular Surgery for heart team discussion.   Left  ventricular end-diastolic pressure was 1 mmHg. Creatinine is up to 1.2. She has no signs of volume overload at this time. Hold Lasix. Hold  Aldactone. Hold losartan. Gentle hydration with IV fluids. Repeat  basic metabolic panel in the morning.     Karlie García MD   D: 2022 13    PCI: 22:  Subha Hogue [address] [place] [address]. [place] [address]                             CARDIAC CATHETERIZATION     PATIENT NAME: Gin Hay                    :        1934  MED REC NO:   [id number]                           ROOM:       [id number]  ACCOUNT NO:   [de-identified]                           ADMIT DATE: 2022  PROVIDER:     Yissel Brizuela MD     DATE OF PROCEDURE:  2022     INDICATION FOR PROCEDURE:  1.  Critical left main coronary artery stenosis, calcified with an  ulcerated plaque. 2.  Severe ischemic cardiomyopathy. 3.  Acute-on-chronic congestive heart failure with reduced ejection  fraction. 4.  Elevated troponin. 5.  Dyspnea, fatigue, angina, and angina equivalent symptoms. 6.  Abnormal EKG.     PROCEDURES PERFORMED:  1. Left cardiac catheterization with selective coronary angiogram.  2.  Successful placement with subsequent explant of Impella CP  percutaneous left ventricular assist device. 3.  Successful complex PCI with Impella support, rotational atherectomy,  PCI, and stenting with IVUS guidance. 4.  Intravascular ultrasound of the left main coronary artery and the  left anterior descending artery.     DESCRIPTION OF PROCEDURE/INTERVENTION DETAILS:  After informed consent,  the patient was brought to the cardiac catheterization room. She was  prepped and draped in a sterile fashion. 2% lidocaine was injected in  the skin and subcutaneous tissue overlying the right groin area. Under  ultrasound and fluoroscopy guidance, access was obtained in the right  common femoral artery. Micropuncture kit was utilized. Common femoral  artery angiogram confirmed good stick.   I used 6-[place] sheath for  dilation. I then proceeded with preclose. Two Perclose ProGlide  closure devices were utilized, not fully deployed. I then advanced the  14-[place] Impella sheath under fluoroscopy. Heparin was given. ACT was  confirmed to be above 250. 5-[place] pigtail catheter was used to cross  the aortic valve. The wire was exchanged to the 0.018 Impella wire. The pigtail catheter was removed. I then advanced the Impella under  fluoroscopy guidance. Positioning was confirmed under fluoroscopy. Wire was removed. The Impella was initiated. Good waveform confirmed  with cardiac output of 3.6 liters per minute. I then obtained access  within the diaphragm of the Impella sheath using micropuncture kit. 7-[place] sheath was inserted and flushed with normal saline. 7-[place]  EBU 3.5 guide catheter was used to cannulate the left main coronary  artery. [address] wire was used to cross the lesion and wire left main  into LAD. I then proceeded with rotational atherectomy. 1.5 mm connie  was utilized. Logan Smiley was tested outside the patient's body, then was  advanced under fluoroscopy. Two runs were completed. The connie was then  removed. I did exchange the wire to a Runthrough wire over Teleport  microcatheter. Over the Runthrough wire, I proceeded with predilation  using 3.5 x 15 mm noncompliant balloon. I then proceeded with stenting. Resolute Jerome 4.0 mm x 30 mm drug-eluting stent was successfully  deployed. I then did obtain multiple views prior to deployment to  ensure full coverage of the full length of the left main and target  lesion area including the ostium of the LAD and diseased part of  proximal LAD. Stent was successfully deployed, then was postdilated  using 4.5 mm x 8 mm noncompliant balloon. Prior to postdilation, I did  perform IVUS, which revealed the stent to be well apposed and well  expanded in the left anterior descending artery except in the ostium of  the LAD. Based on IVUS findings, post dilation was planned. Again  first 4.5 x 8 mm noncompliant balloon was used for postdilation in the  area of the ostium of the left anterior descending artery as well as in  the left main. Further postdilation in the left main coronary artery  was done using 5 mm x 8 mm noncompliant balloon. Repeat angiogram after  balloon and wire were removed revealed well-expanded stent, 0% residual  stenosis, KEO-3 flow. No complications including no dissection, distal  embolization, or perforation.     MEDICATIONS:  See MAR.     COMPLICATIONS:  None.     ESTIMATED BLOOD LOSS:  Less than 50 mL.     ACCESS:  Right common femoral artery access. The Impella device was  successfully removed. The sheath was successfully removed with  subsequent deployment of previously deployed ProGlide closure device,  one of the sutures broke, I had to follow that with the 8-[place]  Angio-Seal.  Hemostasis was achieved. FemoStop will be kept for the  next 1-2 hours at low pressure.     CONCLUSION:  Successful IVUS-guided PCI and stenting of the left main  coronary artery with Impella support. Rotational atherectomy with 1.5  mm connie with subsequent stenting.     RECOMMENDATIONS:  Transfer to the intensive care unit for close  observation. Access site care. Strict bedrest for 4 hours  postprocedure. Aggressive risk factor modification. Aspirin 81 mg p.o.  daily. Brilinta 90 mg p.o. b.i.d. High intensity statin therapy. Resume Lasix, change to oral.  Resume Aldactone in the a.m. if  creatinine and potassium are stable. Guideline-directed medical therapy  for congestive heart failure with reduced ejection fraction.  Jessy Bowie MD   D: 06/24/2022     Lab Data:    Cardiac Enzymes:  No results for input(s): CKTOTAL, CKMB, CKMBINDEX, TROPONINI in the last 72 hours.     CBC:   Lab Results   Component Value Date    WBC 7.9 06/26/2022    RBC 3.58 06/26/2022    RBC 4.02 08/10/2011    HGB 11.6 06/26/2022 HCT 36.2 06/26/2022     06/26/2022       CMP:    Lab Results   Component Value Date     06/26/2022    K 3.8 06/26/2022    K 5.5 06/20/2022    CL 99 06/26/2022    CO2 30 06/26/2022    BUN 32 06/26/2022    CREATININE 1.3 06/26/2022    GFRAA >60 07/18/2019    LABGLOM 39 06/26/2022    GLUCOSE 99 06/26/2022    GLUCOSE 118 08/10/2011    CALCIUM 9.4 06/26/2022       Hepatic Function Panel:    Lab Results   Component Value Date    ALKPHOS 53 03/27/2022    ALT 7 03/27/2022    AST 22 03/27/2022    PROT 6.9 03/27/2022    BILITOT 0.2 03/27/2022    BILIDIR <0.2 11/08/2021    LABALBU 3.4 03/27/2022    LABALBU 3.9 08/10/2011       Magnesium:    Lab Results   Component Value Date    MG 1.9 06/25/2022       PT/INR:    Lab Results   Component Value Date    INR 1.18 06/23/2022       HgBA1c:  No results found for: LABA1C    FLP:    Lab Results   Component Value Date    TRIG 99 06/23/2022    HDL 46 06/23/2022    LDLCALC 57 06/23/2022       TSH:    Lab Results   Component Value Date    TSH 2.180 06/20/2022         Assessment/Plan:  · Acute on chronic systolic chf   ·  Bilateral pleural effusions right greater than left  · Suspected new CDMP EF 30-35% (improved from 20-25%)  · LV at discharge - ordered -approved  · Cath 6/23/22  · Impella protected PCI LM and LAD 6/24  · Stable post procedure  · SR 70's; VSS  · R groin site and right radial sites without bleeding or hematoma; and VI  · Labs:  · K 3.8 - replace -keep potassium greater than 4.0  · BUN/Creat: 32/1.3, (33/1.1 37/1.1, 32/1.2) - stable  · H & H: 11.6/36.2, (11.8/38.0, 12.6/39.0, 12.7/41. 1) - stable  Cardiac Rehab: Yes    Follow-up in office in 2 weeks with  [facility]  No follow-up provider specified.      Discharge Medications for PCI/NSTEMI (performed):   ASA: yes  Statin: yes  P2Y12 Inhibitor: yes  Beta Blocker: yes  ACE / ARB: yes - currently on hold - lower BP  Diuretics: Lasix, Aldactone - yes; currently on hold; lower BP  Nitro SL: yes      Discharge Medications for Cardiomyopathy, CHF: EF 30 - 35%  Beta Blocker: yes  ACE Inhibitor/ARB: yes  Diuretics: yes -currently on hold for low BP  Aldactone: yes -currently on hold as above  Alison [place]: check with insurance-- requested   CHF clinic at discharge  Life Vest at discharge; ordered -approved, rep aware  Hx:   Anemia   borderline BP   HLP  Prior Covid w/ trach  Hx AFB - Hx CV to SR    On eliquis 2.5 mg po BID PTA   -Clarify with Dr. Satay Fuentes regarding home dose therapies and risk for bleeding with triple therapy  Moderate MR prior echo - none this echo   CKD stage 3    BP improved but still remains on the lower side. Medications on hold as noted above. Oral intake encouraged. Needs to mobilize to assure can tolerate activity. Eliquis and DAPT dosing will need to be clarified with Dr. Satya Fuentes prior to discharge. High risk of bleeding with triple therapy. Cardiology will follow.      Electronically signed by Garner Gottron, APRN - CNP on 6/26/2022 at 7:28 AM

## 2022-06-26 NOTE — PROGRESS NOTES
INTERNAL MEDICINE Progress Note  6/26/2022 10:10 AM     Dr Waller Severe IM coverage for Dr Miller Render    Subjective:   Angel Herr Date: 6/20/2022  PCP: SERGE Bartlett CNP  Interval History:     no CP  No SOB  No N/V  Reports insomnia    Objective:   Vitals: BP (!) 118/54   Pulse 68   Temp 98.2 °F (36.8 °C) (Oral)   Resp 16   Ht 5' (1.524 m)   Wt 104 lb 15 oz (47.6 kg)   SpO2 92%   BMI 20.49 kg/m²   General appearance: alert and cooperative with exam  HEENT:  atraumatic  Neck:  no JVD  Lungs: diminished breath sounds bibasilar  Heart: S1, S2 normal  Abdomen: soft, non-tender; bowel sounds normal; no masses,  no organomegaly  Extremities: no edema,   Neurologic:  Alert, oriented, thought content appropriate      Medications:   Scheduled Meds:   carvedilol  3.125 mg Oral BID    sodium chloride flush  5-40 mL IntraVENous 2 times per day    aspirin  81 mg Oral Daily    ticagrelor  90 mg Oral BID    atorvastatin  40 mg Oral Nightly    furosemide  40 mg Oral Daily    aspirin  325 mg Oral Once    HYDROcodone 5 mg - acetaminophen  1 tablet Oral Once    [Held by provider] spironolactone  25 mg Oral Daily    docusate  100 mg Oral BID    cetirizine  5 mg Oral Daily    levothyroxine  125 mcg Oral Daily    lubiprostone  24 mcg Oral Daily with breakfast    multivitamin  1 tablet Oral Daily    lactobacillus  1 capsule Oral Daily    Vitamin D  500 Units Oral Daily    [Held by provider] furosemide  20 mg IntraVENous q8h    [Held by provider] losartan  25 mg Oral Daily     Continuous Infusions:   sodium chloride         Lab Results:   CBC:   Recent Labs     06/24/22  0633 06/25/22  0426 06/26/22  0447   WBC 7.1 7.6 7.9   HGB 12.6 11.8* 11.6*    135 134     BMP:    Recent Labs     06/24/22  0750 06/25/22  0426 06/26/22  0447    141 141   K 3.9 3.8 3.8   CL 98 99 99   CO2 33 30 30   BUN 37* 33* 32*   CREATININE 1.1 1.1 1.3*   GLUCOSE 95 98 99     Magnesium:    Lab Results   Component Value Date    MG 1.9 06/25/2022     TSH:    Lab Results   Component Value Date    TSH 2.180 06/20/2022     FOLATE:    Lab Results   Component Value Date    FOLATE 10.3 11/06/2021     IRON:    Lab Results   Component Value Date    IRON 35 10/13/2021     FERRITIN:    Lab Results   Component Value Date    FERRITIN 604 10/15/2021     LEFT HEART Cath  DATE OF PROCEDURE:  06/24/2022     INDICATION FOR PROCEDURE:  1.  Critical left main coronary artery stenosis, calcified with an  ulcerated plaque. 2.  Severe ischemic cardiomyopathy. 3.  Acute-on-chronic congestive heart failure with reduced ejection  fraction. 4.  Elevated troponin. 5.  Dyspnea, fatigue, angina, and angina equivalent symptoms. 6.  Abnormal EKG.     PROCEDURES PERFORMED:  1. Left cardiac catheterization with selective coronary angiogram.  2.  Successful placement with subsequent explant of Impella CP  percutaneous left ventricular assist device. 3.  Successful complex PCI with Impella support, rotational atherectomy,  PCI, and stenting with IVUS guidance. 4.  Intravascular ultrasound of the left main coronary artery and the  left anterior descending artery.     Assessment and Plan:   1. CAD s/p successful PCI to left main  2. Ischemic cardiomyopathy EF 30 %  3. Paroxysmal a fib  4. HTN  5.  Hypothyroidism    Cont DAPT  Statin, BB  Cont thyroid supplement  PT/OT    Karlene Perdomo MD, MD

## 2022-06-27 LAB
ANION GAP SERPL CALCULATED.3IONS-SCNC: 16 MEQ/L (ref 8–16)
BUN BLDV-MCNC: 34 MG/DL (ref 7–22)
CALCIUM SERPL-MCNC: 9.7 MG/DL (ref 8.5–10.5)
CHLORIDE BLD-SCNC: 99 MEQ/L (ref 98–111)
CO2: 28 MEQ/L (ref 23–33)
CREAT SERPL-MCNC: 1.5 MG/DL (ref 0.4–1.2)
GFR SERPL CREATININE-BSD FRML MDRD: 33 ML/MIN/1.73M2
GLUCOSE BLD-MCNC: 90 MG/DL (ref 70–108)
POTASSIUM SERPL-SCNC: 3.6 MEQ/L (ref 3.5–5.2)
SODIUM BLD-SCNC: 143 MEQ/L (ref 135–145)

## 2022-06-27 PROCEDURE — 6370000000 HC RX 637 (ALT 250 FOR IP): Performed by: INTERNAL MEDICINE

## 2022-06-27 PROCEDURE — 2140000000 HC CCU INTERMEDIATE R&B

## 2022-06-27 PROCEDURE — 6370000000 HC RX 637 (ALT 250 FOR IP): Performed by: STUDENT IN AN ORGANIZED HEALTH CARE EDUCATION/TRAINING PROGRAM

## 2022-06-27 PROCEDURE — 97530 THERAPEUTIC ACTIVITIES: CPT

## 2022-06-27 PROCEDURE — 36415 COLL VENOUS BLD VENIPUNCTURE: CPT

## 2022-06-27 PROCEDURE — 97116 GAIT TRAINING THERAPY: CPT

## 2022-06-27 PROCEDURE — 2580000003 HC RX 258: Performed by: INTERNAL MEDICINE

## 2022-06-27 PROCEDURE — 80048 BASIC METABOLIC PNL TOTAL CA: CPT

## 2022-06-27 PROCEDURE — 94760 N-INVAS EAR/PLS OXIMETRY 1: CPT

## 2022-06-27 PROCEDURE — 99232 SBSQ HOSP IP/OBS MODERATE 35: CPT | Performed by: STUDENT IN AN ORGANIZED HEALTH CARE EDUCATION/TRAINING PROGRAM

## 2022-06-27 PROCEDURE — 97535 SELF CARE MNGMENT TRAINING: CPT

## 2022-06-27 PROCEDURE — 6370000000 HC RX 637 (ALT 250 FOR IP): Performed by: NURSE PRACTITIONER

## 2022-06-27 PROCEDURE — 97162 PT EVAL MOD COMPLEX 30 MIN: CPT

## 2022-06-27 RX ORDER — ASPIRIN 81 MG/1
81 TABLET ORAL DAILY
Qty: 30 TABLET | Refills: 3 | OUTPATIENT
Start: 2022-06-28

## 2022-06-27 RX ORDER — FUROSEMIDE 20 MG/1
20 TABLET ORAL DAILY
Qty: 30 TABLET | Refills: 3 | OUTPATIENT
Start: 2022-06-28

## 2022-06-27 RX ORDER — NITROGLYCERIN 0.4 MG/1
TABLET SUBLINGUAL
Qty: 25 TABLET | Refills: 3 | OUTPATIENT
Start: 2022-06-27

## 2022-06-27 RX ORDER — ATORVASTATIN CALCIUM 40 MG/1
40 TABLET, FILM COATED ORAL NIGHTLY
Qty: 30 TABLET | Refills: 3 | OUTPATIENT
Start: 2022-06-27

## 2022-06-27 RX ORDER — CARVEDILOL 3.12 MG/1
3.12 TABLET ORAL 2 TIMES DAILY
Qty: 60 TABLET | Refills: 3 | OUTPATIENT
Start: 2022-06-27

## 2022-06-27 RX ADMIN — ATORVASTATIN CALCIUM 40 MG: 40 TABLET, FILM COATED ORAL at 21:49

## 2022-06-27 RX ADMIN — Medication 100 MG: at 21:49

## 2022-06-27 RX ADMIN — Medication 1 TABLET: at 09:24

## 2022-06-27 RX ADMIN — ASPIRIN 81 MG: 81 TABLET, COATED ORAL at 09:24

## 2022-06-27 RX ADMIN — SODIUM CHLORIDE, PRESERVATIVE FREE 10 ML: 5 INJECTION INTRAVENOUS at 21:50

## 2022-06-27 RX ADMIN — ACETAMINOPHEN 650 MG: 325 TABLET ORAL at 21:49

## 2022-06-27 RX ADMIN — LEVOTHYROXINE SODIUM 125 MCG: 0.12 TABLET ORAL at 06:50

## 2022-06-27 RX ADMIN — TICAGRELOR 90 MG: 90 TABLET ORAL at 21:49

## 2022-06-27 RX ADMIN — CETIRIZINE HYDROCHLORIDE 5 MG: 5 TABLET ORAL at 09:24

## 2022-06-27 RX ADMIN — CARVEDILOL 3.12 MG: 6.25 TABLET, FILM COATED ORAL at 09:25

## 2022-06-27 RX ADMIN — Medication 500 UNITS: at 09:24

## 2022-06-27 RX ADMIN — SODIUM CHLORIDE, PRESERVATIVE FREE 10 ML: 5 INJECTION INTRAVENOUS at 09:25

## 2022-06-27 RX ADMIN — CARVEDILOL 3.12 MG: 6.25 TABLET, FILM COATED ORAL at 17:02

## 2022-06-27 RX ADMIN — FUROSEMIDE 40 MG: 40 TABLET ORAL at 09:25

## 2022-06-27 RX ADMIN — TICAGRELOR 90 MG: 90 TABLET ORAL at 09:24

## 2022-06-27 RX ADMIN — Medication 1 CAPSULE: at 09:24

## 2022-06-27 RX ADMIN — APIXABAN 2.5 MG: 2.5 TABLET, FILM COATED ORAL at 21:49

## 2022-06-27 ASSESSMENT — PAIN - FUNCTIONAL ASSESSMENT: PAIN_FUNCTIONAL_ASSESSMENT: ACTIVITIES ARE NOT PREVENTED

## 2022-06-27 ASSESSMENT — PAIN DESCRIPTION - DESCRIPTORS: DESCRIPTORS: PRESSURE

## 2022-06-27 ASSESSMENT — PAIN DESCRIPTION - ONSET: ONSET: ON-GOING

## 2022-06-27 ASSESSMENT — PAIN DESCRIPTION - FREQUENCY: FREQUENCY: INTERMITTENT

## 2022-06-27 ASSESSMENT — PAIN SCALES - GENERAL
PAINLEVEL_OUTOF10: 0
PAINLEVEL_OUTOF10: 6
PAINLEVEL_OUTOF10: 0

## 2022-06-27 ASSESSMENT — PAIN DESCRIPTION - ORIENTATION: ORIENTATION: MID

## 2022-06-27 ASSESSMENT — PAIN DESCRIPTION - PAIN TYPE: TYPE: ACUTE PAIN

## 2022-06-27 ASSESSMENT — PAIN DESCRIPTION - LOCATION: LOCATION: COCCYX

## 2022-06-27 NOTE — CARE COORDINATION
6/27/22, 1:45 PM EDT    DISCHARGE ON GOING EVALUATION    [facility] day: 7  Location: -28/028-A Reason for admit: Pericardial effusion [I31.3]  Acute respiratory failure with hypoxia ([facility] [address] [J96.01]  CHF (congestive heart failure), NYHA class I, acute on chronic, combined ([address] [I50.43]  Pneumonia due to infectious organism, unspecified laterality, unspecified part of lung [J18.9]   Procedure:   6/20 CXR Portable: Cardiomegaly and probable CHF. Infiltrate and effusion at the right lung base. Atherosclerotic calcification in the aortic arch. The previously noted tracheotomy tube is no longer seen. 6/20 CTA chest: No PE. Cardiomegaly. Bilateral pleural effusions right greater than left. Pericardial effusion. Areas of atelectasis or infiltrate at both lung bases. Evidence for granulomatous disease in the right middle lobe and right hilum. Thoracic spondylosis. 6/21 Echo: EF 30-35%. Severe global hypokinesis. The pericardium was normal in appearance with a moderate, non-hemodynamically significant pericardial effusion. 6/24 Cardiac cath: Successful IVUS-guided PCI and stenting of the left main  coronary artery with Impella support. Rotational atherectomy with 1.5  mm connie with subsequent stenting. Barriers to Discharge: Transferred from  to 3B s/p cardiac cath/stenting with use of Impella. HF RN consulted. Life Vest ordered. Planning home today. BUN 34, creatinine 1.5. Stopping iv Lasix, changed to po. Brilinta. PCP: SERGE Johnson CNP  Readmission Risk Score: 19 ( )%  Patient Goals/Plan/Treatment Preferences: Planning home with daughter and [facility]. SW following. New BSC requested. Orders received. Printed order and documentation given to pt and family. Spoke with Karen Stout from [place], someone will be in to fit pt with Life Vest today. Plan home with cardiac rehab and HF clinic.  New Life Vest. Pt verbalized understanding and gave permission for possible discharge within 4 hours of receiving IMM.

## 2022-06-27 NOTE — PROGRESS NOTES
[address] [place] [address]  Occupational Therapy  Daily Note  Time:   Time In: 9336  Time Out: 1100  Timed Code Treatment Minutes: 15 Minutes  Minutes: 15          Date: 2022  Patient Name: Adry Newsome,   Gender: female      Room: 3B-28/028-A  MRN: [id number]  : 1934  (80 y.o.)  Referring Practitioner: Dr. Redd Rosado MD  Diagnosis: Pericardial Effusion  Additional Pertinent Hx: Pt admissions with known history of COVID and acute respiratory failure requiring trach and PEG. Patient was in the LTAC unit for a prolonged period of time. She was eventually transferred to the nursing home. She is now decannulated. She has been home with her daughter since April of this year. She uses a walker to ambulate. Patient started having increasing shortness of breath for the last 3 days. There is no fever or chills. Positive for cough productive sputum. No increased swelling. She is not on any diuretics at this time. But does have a known ejection fraction of 45%. Unsure why patient was not on LifeVest or defibrillator. Work-up in the ER she was noted to have fluid retention. She is being started on diuretics. Her breathing is now comfortable  On nasal oxygen at this time. She does not use any oxygen at home. Restrictions/Precautions:  Restrictions/Precautions: Fall Risk,General Precautions  Position Activity Restriction  Other position/activity restrictions: EF 25%, stage 3 wound on coccyx     SUBJECTIVE: Patient side lying in bed upon arrival.  Initially patient declined therapy at this time stating she needs to stay off wound on bottom however this therapist provided education and importance of activity as well as helps to relieve pressure being active to which patient agreeable to therapy. Patient pleasant and cooperative throughout session.     PAIN: 0/10:     Vitals: Vitals not assessed per clinical judgement, see nursing flowsheet    COGNITION: Decreased Problem Solving and Decreased Safety Awareness    ADL:   Grooming: Contact Guard Assistance. standing at sink to wash hands  Toileting: Contact Guard Assistance. Toilet Transfer: [address], with verbal cues  and with increased time for completion. Mulugeta Milner BALANCE:  Sitting Balance:  Supervision. Standing Balance: Contact Guard Assistance. Rw, no LOB    BED MOBILITY:  Supine to Sit: Minimal Assistance, X 1, with head of bed flat, with rail, with verbal cues , with increased time for completion      TRANSFERS:  Sit to Stand:  Contact Guard Assistance, X 1, with increased time for completion, cues for hand placement. Stand to Sit: Contact Guard Assistance, X 1, with increased time for completion, cues for hand placement, with verbal cues, to/from chair with arms. FUNCTIONAL MOBILITY:  Assistive Device: Rolling Walker  Assist Level:  Contact Guard Assistance. Distance: To and from bathroom within unit  Steady pace, no LOB     ASSESSMENT:     Activity Tolerance:  Patient tolerance of  treatment: good. Discharge Recommendations: Home with Home Health OT  Equipment Recommendations: Equipment Needed: No  Plan: Times per Week: 5x  Specific Instructions for Next Treatment: Functional mobility; ADLs and standing tolerance; UE exercises  Current Treatment Recommendations: Strengthening,Functional mobility training,Endurance training,Safety education & training,Self-Care / ADL  Plan Comment: Pt would benefit from continued skilled OT services when medically stable and discharged from Acute. HHOT recommended.     Patient Education  Patient Education: Role of OT, Plan of Care, ADL's, IADL's, Energy Conservation, Home Safety, Importance of Increasing Activity and Assistive Device Safety    Goals  Short Term Goals  Time Frame for Short term goals: By discharge  Short Term Goal 1: Pt will complete simple ADLs while standing for over 3 minute duration with CGA to increase her endurance for ease of doing sinkside grooming or dressing. Short Term Goal 2: Pt will complete toileting routine including clothing management with SBA to increase her independence with self care. Short Term Goal 3: Pt to navigate [facility] distances using aD with SBA, no LOB or increase need for rest breaks to complete ADL tasks in home wiht less caregiver burden  Short Term Goal 4: Pt will complete BUE ROM/moderate resistance exercises with any handout needed to increase her strength and enduranace for ease of doing ADLs and functional mobility. Additional Goals?: No    Following session, patient left in safe position with all fall risk precautions in place.

## 2022-06-27 NOTE — PROGRESS NOTES
Dr. Jeni Pfeiffer note       Internal Medicine              Patient:  Camille Erickson  YOB: 1934    MRN: [id number]   Acct:  [de-identified]   3B-28/028-A  Primary Care Physician: SERGE Sparks CNP    Admit Date: 6/20/2022           Subjective: doing well. Objective:      Physical Exam:    Vitals:  Patient Vitals for the past 24 hrs:   BP Temp Temp src Pulse Resp SpO2 Weight   06/27/22 1146 (!) 112/51 97.3 °F (36.3 °C) Oral 97 18 97 % --   06/27/22 0916 (!) 108/51 97.4 °F (36.3 °C) Oral 77 18 99 % --   06/27/22 0758 -- -- -- -- -- 96 % --   06/27/22 0600 -- -- -- -- -- -- 103 lb 11.2 oz (47 kg)   06/27/22 0448 (!) 101/55 97.3 °F (36.3 °C) Oral 74 18 96 % --   06/26/22 2357 (!) 112/57 98 °F (36.7 °C) Oral 79 18 92 % --   06/26/22 2000 (!) 106/39 97.6 °F (36.4 °C) Oral 88 20 93 % --   06/26/22 1527 99/62 98.4 °F (36.9 °C) Oral 72 18 98 % --     Weight: Weight: 103 lb 11.2 oz (47 kg)     24 hour intake/output:    Intake/Output Summary (Last 24 hours) at 6/27/2022 1519  Last data filed at 6/27/2022 1447  Gross per 24 hour   Intake 240 ml   Output 950 ml   Net -710 ml       General appearance - alert, and in no distress  Eyes - pupils equal and reactive,t  Mouth - mucous membranes moist,   Neck - supple, no significant adenopathy  Chest -diminished breath sounds bilaterally with some posterior rhonchi. Heart -  S1, S2, heard. Abdomen - soft, PEG in place pos bs. Neurological - alert, responsive and about her baseline.   Musculoskeletal - no joint tenderness, deformity or swelling  Extremities - peripheral pulses normal, no pedal edema, no clubbing or cyanosis  Skin - normal coloration and turgor, no rashes, no suspicious skin lesions noted    Review of Labs and Diagnostic Testing:    CBC:   Recent Labs     06/26/22  0447   WBC 7.9   HGB 11.6*   HCT 36.2*   .1*        BMP:   Recent Labs     06/27/22  1217      K 3.6   CL 99   CO2 28   BUN 34*   CREATININE 1.5*   CALCIUM 9.7   GLUCOSE 90     PT/INR:   No results for input(s): PROTIME, INR in the last 72 hours. APTT: No results for input(s): APTT in the last 72 hours. Lipids: No results for input(s): ALKPHOS, ALT, AST, BILITOT, BILIDIR, LABALBU, AMYLASE, LIPASE in the last 72 hours. Troponin: No results for input(s): TROPONINI in the last 72 hours. Imaging:  CTA CHEST W WO CONTRAST    Result Date: 6/20/2022  PROCEDURE: CTA CHEST W WO CONTRAST CLINICAL INFORMATION: shortness of breath, r/o PE. COMPARISON: CTA  chest dated 30 October 2021. TECHNIQUE: 3 mm axial images were obtained through the chest after the administration of IV contrast.  A non-contrast localizer was obtained. 3D reconstructions were performed on the scanner to include MIP coronal and sagittal images through the chest. Isovue was the intravenous contrast utilized. All CT scans at this facility use dose modulation, iterative reconstruction, and/or weight-based dosing when appropriate to reduce radiation dose to as low as reasonably achievable. FINDINGS: There is adequate opacification of the pulmonary arterial system. No pulmonary emboli are present. There is atherosclerotic calcification in the thoracic aorta. There is cardiomegaly. . There is a pericardial  effusion. There are bilateral pleural effusions right greater than left. There is no mediastinal, axillary or hilar adenopathy. There is evidence for granulomatous disease in the right middle lobe and right hilum. . There are areas of atelectasis or infiltrate at both lung bases. .  There is thoracic spondylosis. .     1. No evidence of pulmonary embolism. 2. Cardiomegaly. 3. Bilateral pleural effusions right greater than left. Pericardial effusion. 4. Areas of atelectasis or infiltrate at both lung bases. 5. Evidence for granulomatous disease in the right middle lobe and right hilum. 6. Thoracic spondylosis.  **This report has been created using voice recognition software. It may contain minor errors which are inherent in voice recognition technology. ** Final report electronically signed by DR Shellye Ahumada on 6/20/2022 3:34 PM    XR CHEST PORTABLE    Result Date: 6/20/2022  PROCEDURE: XR CHEST PORTABLE CLINICAL INFORMATION: Shortness of breath. COMPARISON: Chest x-ray dated 21 December 2021. . TECHNIQUE: AP upright view of the chest. FINDINGS: The previously noted tracheotomy tube is no longer seen. There is mild cardiomegaly. . Is atherosclerotic calcification aortic arch. There is increased density throughout both lung fields especially in the right lower lobe. The pulmonary vascularity is increased. There is thoracic spondylosis. 1. Cardiomegaly and probable CHF. 2. Infiltrate and effusion at the right lung base. 3. Atherosclerotic calcification in the aortic arch. 4. The previously noted tracheotomy tube is no longer seen. **This report has been created using voice recognition software. It may contain minor errors which are inherent in voice recognition technology. ** Final report electronically signed by DR Shellye Ahumada on 6/20/2022 2:37 PM      EKG:      Diet: ADULT DIET;  Regular        Data:   Scheduled Meds: Scheduled Meds:   apixaban  2.5 mg Oral BID    carvedilol  3.125 mg Oral BID    sodium chloride flush  5-40 mL IntraVENous 2 times per day    aspirin  81 mg Oral Daily    ticagrelor  90 mg Oral BID    atorvastatin  40 mg Oral Nightly    furosemide  40 mg Oral Daily    HYDROcodone 5 mg - acetaminophen  1 tablet Oral Once    [Held by provider] spironolactone  25 mg Oral Daily    docusate  100 mg Oral BID    cetirizine  5 mg Oral Daily    levothyroxine  125 mcg Oral Daily    lubiprostone  24 mcg Oral Daily with breakfast    multivitamin  1 tablet Oral Daily    lactobacillus  1 capsule Oral Daily    Vitamin D  500 Units Oral Daily    [Held by provider] losartan  25 mg Oral Daily     Continuous Infusions:   sodium chloride       PRN Meds:.zolpidem, sodium chloride flush, sodium chloride, ALPRAZolam, nitroGLYCERIN, acetaminophen, ipratropium-albuterol, ondansetron **OR** ondansetron  Continuous Infusions:   sodium chloride           Assessment   Principal Problem:    CHF (congestive heart failure), NYHA class I, acute on chronic, combined ([address]  Active Problems:    Cardiomyopathy ([address]    Severe malnutrition (HCC)  Resolved Problems:    * No resolved hospital problems.  *        Patient Active Problem List   Diagnosis    Sigmoid diverticulitis    Acute respiratory failure with hypoxia ([address]    COVID-19    Acute congestive heart failure (HCC)    C. difficile colitis    CHF (congestive heart failure) ([address]    Cardiomyopathy ([address]    Hyperkalemia    Chronic respiratory failure (HCC)    Decubitus ulcer    Ventilator dependence ([address]    Severe malnutrition (HCC)    Chronic systolic congestive heart failure (HCC)    PVC (premature ventricular contraction)    CHF (congestive heart failure), NYHA class I, acute on chronic, combined ([address]        Plan   Awaits life vest  Plan for discharge          Electronically signed by Amy Echavarria MD on 6/27/2022 at 3:19 PM  Over 35 mins spent on this evaluation

## 2022-06-27 NOTE — CARE COORDINATION
Janet Irwin requires a bedside commode due to being confined to one level of the home, and is physically incapable of utilizing regular toilet facilities. Current body weight is Weight: 103 lb 11.2 oz (47 kg).

## 2022-06-27 NOTE — PROGRESS NOTES
[facility]  INPATIENT PHYSICAL THERAPY  EVALUATION  [facility] CCU-STEPDOWN 3B - 3B-28/028-A    Time In: 2399  Time Out: 0820  Timed Code Treatment Minutes: 29 Minutes  Minutes: 39          Date: 2022  Patient Name: Mani Morel,  Gender:  female        MRN: [id number]  : 1934  (80 y.o.)      Referring Practitioner: Giovanna Bird MD  Diagnosis: pericardial effusion  Additional Pertinent Hx: Per H&P 1020: 70-year-old woman known to our service from prior admissions with known history of COVID and acute respiratory failure requiring trach and PEG. Patient was in the LTAC unit for a prolonged period of time. She was eventually transferred to the nursing home. She is now decannulated. She has been home with her daughter since April of this year. She uses a walker to ambulate. Patient started having increasing shortness of breath for the last 3 days. Positive for cough productive sputum. But does have a known ejection fraction of 45%. Unsure why patient was not on LifeVest or defibrillator. Work-up in the ER she was noted to have fluid retention. She is being started on diuretics. Pt s/p Left cardiac catheterization with selective coronary angiogram and Left ventriculogram. . Pt s/p Left cardiac catheterization with selective coronary angiogram, Successful placement with subsequent explant of Impella CPpercutaneous left ventricular assist device, Successful complex PCI with Impella support, rotational atherectomy,PCI, and stenting with IVUS guidance, Intravascular ultrasound of the left main coronary artery and theleft anterior descending artery DOS .      Restrictions/Precautions:  Restrictions/Precautions: Fall Risk,General Precautions  Position Activity Restriction  Other position/activity restrictions: EF 25%, stage 3 wound on coccyx    Subjective:  Chart Reviewed: Yes  Patient assessed for rehabilitation services?: Yes  Family / Caregiver Present: No  Subjective: RN approved session, pt pleasantly agress for PT ricky, states she has been up twice and it has gone well. Pt requesting to use the restroom. Pt denies concern with return home upon d/c with assist of her daughter. Pt states she is confident with stair entry and exit with assist of her daughter and use of RW. This PT did demo safe technique regarding  this, and recommended use of gait belt and assist of her daughter for stairs. Unable to safety assess stairs this date due to many obstacles and barriers in the hallway. General:  Overall Orientation Status: Within Functional Limits (not formally assessed)  Vision: Impaired  Vision Exceptions:  (needs glasses)  Hearing: Exceptions to [facility] [address]  Hearing Exceptions: Hard of hearing/hearing concerns     Pain: 6/10: pain in her bottom     Vitals: Oxygen: 93% baseline, maintained WFL entire sessoin   Heart Rate: 67 bpm, maintained WFL     Social/Functional History:    Lives With: Daughter  Type of Home: House  Home Layout: Two level,Laundry in basement,Able to Live on Main level with bedroom/bathroom  Home Access: Stairs to enter without rails  Entrance Stairs - Number of Steps: 1+1+1  Entrance Stairs - Rails: None  Home Equipment: Walker, rolling     Bathroom Shower/Tub: Tub/Shower unit,Shower chair with back  Bathroom Toilet: Standard  Bathroom Equipment: Grab bars in shower  Bathroom Accessibility: Accessible    Receives Help From: Family  ADL Assistance: Needs assistance     Homemaking Responsibilities: No  Ambulation Assistance: Independent  Transfer Assistance: Independent    Active : No  Occupation: Retired  Leisure & Hobbies: Going to [employer] or volunteering when feeling better  Additional Comments: Pt was using RW for ambulation. She has been at her daughter's home for 1 month. She had 3 months in a nursing home after a long hospitalization with COVID-19. Pt was weaned to not needing supplemental O2 when she returned home.   She had visiting nurses and therapy at home. Pt only does stairs with RW and assist of her daughter. OBJECTIVE:  Range of Motion:  Bilateral Lower Extremity: WFL    Strength:  Bilateral Lower Extremity: Impaired - Hip flexion 4/5, knee extension 4+/5, knee flexion 4+/5, DF 4/5, PF 4+/5    Balance:  Static Sitting Balance:  Stand By Assistance, Contact Guard Assistance  Dynamic Sitting Balance: Stand By Assistance, Contact Guard Assistance  Static Standing Balance: Contact Guard Assistance  Dynamic Standing Balance: Minimal Assistance    Min assist provided with the Tinetti. Pt completed functional tasks in bathroom with assist for stability and safety. Pt able to complete reaching tasks with uni UE release from AD. Pt stood for ~2 mins. Pt demos zero LOB. Pt required cues for alignment, use of hand rail, and min assist to don and doff her brief. Bed Mobility:  Supine to Sit: Contact Guard Assistance, with head of bed raised, with rail, with increased time for completion   *HOB~ 30degrees    Transfers:  Sit to Stand: Stand By Assistance, [employer], with increased time for completion, cues for hand placement  Stand to Sit:Stand By Assistance, [employer], with increased time for completion, cues for hand placement  CGA with toilet transfer and initial transfers, improved to close SBA with final sit to stand transfer    Ambulation:  [employer], with verbal cues , with increased time for completion  Distance: ~10'x2  Surface: Level Tile  Device:Rolling Walker  Gait Deviations: Forward Flexed Posture, Slow Tami, Decreased Step Length Bilaterally, Decreased Heel Strike Bilaterally, Mild Path Deviations, Unsteady Gait and Decreased Terminal Knee Extension  *Pt noted to ambulate slowly with decreased step length and increased trunk flexion. Cues to bring line of sight superiorly for increased scanning of her environment.      Exercise:  None this session     Functional Outcome Measures: Completed  Balance Score: 7  Gait Score: 6  Tinetti Total Score: 13/28 with high fall risk   AM-PAC Inpatient Mobility without Stair Climbing Raw Score : 15  AM-PAC Inpatient without Stair Climbing T-Scale Score : 43.03  Risk Indicators:  Less than/equal to 18 = high risk  19-23 Moderate risk  Greater than/equal to 24 = low risk    ASSESSMENT:  Activity Tolerance:  Patient tolerance of  treatment: good. Pt tolerated mobility well, noted to require cues for safety and technique and CGA for majority of mobility. Pt with decreased activity tolerance and overall deconditioning. Treatment Initiated: Treatment and education initiated within context of evaluation. Evaluation time included review of current medical information, gathering information related to past medical, social and functional history, completion of standardized testing, formal and informal observation of tasks, assessment of data and development of plan of care and goals. Treatment time included skilled education and facilitation of tasks to increase safety and independence with functional mobility for improved independence and quality of life. Assessment: Body Structures, Functions, Activity Limitations Requiring Skilled Therapeutic Intervention: Decreased functional mobility ,Decreased strength,Decreased safe awareness,Decreased balance,Decreased endurance  Assessment: This patient is a 80 y.o. who presents with pericardial effusion. This is a decline from the patient's baseline status of living with her daughter's assist, and use of RW. Pt scored a 13/28 on the Tinetti indicating a high fall risk. The patient is observed to have deficits in strength, balance, activity tolerance, and safety awareness and would benefit from skilled PT services to progress functional mobility, safety awareness, and to decrease overall risk of falls.  Pt would benefit from increased assistance, use of RW with all mobility, continued PT, and hands on physical assist with stair entry.     Therapy Prognosis: Good    Requires PT Follow-Up: Yes    Discharge Recommendations:  Discharge Recommendations: Continue to assess pending progress,Home with Home health PT    Patient Education:      . Patient Education  Education Given To: Patient  Education Provided: Role of Therapy,Plan of [facility] Training  Education Outcome: Continued education needed       Equipment Recommendations:  Equipment Needed: No (pt has a RW)    Plan:  Current Treatment Recommendations: Strengthening,Balance training,Functional mobility training,Transfer training,Endurance training,Neuromuscular re-education,Stair training,Gait training,Home exercise program,Safety education & training,Patient/Caregiver education & training,Therapeutic activities  Plan:  (5x GM)    Goals:  Patient goals : to get off of her walker  Short Term Goals  Time Frame for Short term goals: by hospital d/c  Short term goal 1: Pt to demo supine <->sit mod I for ease with getting in and out of bed  Short term goal 2: Pt to demo sit <->stand mod I with RW for ease with getting up from various surfaces safely  Short term goal 3: Pt to ambulate >=50' with RW and S for improved mobility in her environment  Short term goal 4: Pt to ascend/descend 3 steps wit RW and CGA for ease with home entry  Short term goal 5: Pt to increase Tinetti score to >=19/28 to progress to the moderate fall risk category  Long Term Goals  Time Frame for Long term goals : NA due to short ELOS    Following session, patient left in safe position with all fall risk precautions in place.

## 2022-06-27 NOTE — PLAN OF CARE
Problem: Discharge Planning  Goal: Discharge to home or other facility with appropriate resources  Outcome: Progressing     Problem: Safety - Adult  Goal: Free from fall injury  Outcome: Progressing  Flowsheets (Taken 6/27/2022 0428 by Darlene Gonzalez RN)  Free From Fall Injury:   Instruct family/caregiver on patient safety   Based on caregiver fall risk screen, instruct family/caregiver to ask for assistance with transferring infant if caregiver noted to have fall risk factors     Problem: ABCDS Injury Assessment  Goal: Absence of physical injury  Outcome: Progressing  Flowsheets (Taken 6/27/2022 0428 by Darlene Gonzalez RN)  Absence of Physical Injury: Implement safety measures based on patient assessment     Problem: Respiratory - Adult  Goal: Achieves optimal ventilation and oxygenation  Outcome: Progressing     Problem: Cardiovascular - Adult  Goal: Maintains optimal cardiac output and hemodynamic stability  Outcome: Progressing     Problem: Metabolic/Fluid and Electrolytes - Adult  Goal: Hemodynamic stability and optimal renal function maintained  Outcome: Progressing     Problem: Hematologic - Adult  Goal: Maintains hematologic stability  Outcome: Progressing     Problem: Chronic Conditions and Co-morbidities  Goal: Patient's chronic conditions and co-morbidity symptoms are monitored and maintained or improved  Outcome: Progressing     Problem: Pain  Goal: Verbalizes/displays adequate comfort level or baseline comfort level  Outcome: Adequate for Discharge

## 2022-06-27 NOTE — PROGRESS NOTES
Cardiology Progress Note      Patient:  Silvestre Theodore  YOB: 1934  MRN: [id number]   Acct: [de-identified]  Admit Date:  6/20/2022  Primary Cardiologist: Aletha Sawyer MD  Seen by Dr Willis Connor a little better today - just have pain on my tailbone\".       HPI:   This is a pleasant 87 y. o. female who  has a past medical history of HERIBERTO (acute kidney injury) ([facility] Anxiety, Arthritis, Bronchitis, CHF (congestive heart failure) ([facility] Chronic a-fib ([facility] Diverticulitis of colon, Hyperkalemia, Hypertension, Neuromuscular dysfunction of bladder, Personal history of COVID-19, Pressure ulcer, and Protein-calorie malnutrition ([facility]       She presents to [facility] with complaints of SOB and productive cough x3 days. She denies any increased swelling in her legs. She has a history of systolic HF with last echo 11/2021 showing EF 25%, severe global hypokinesis of LV. LV size was moderately increased. She had COVID requiring ICU, tracheostomy and PEG tube at the end of last year. She does follow with Cardiology as an outpatient. Pt states she had a cath many years ago and was told it was all good. No report in chart. Cardiology was consulted for recommendations of lifevest. Patient denies fever, chills, CP, abdominal pain, n/v/d/c. Admits to SOB, productive cough. CXR shows cardiomegaly with probable CHF. CTA chest was negative for PE. Demonstrated bilateral pleural effusions and a pericardial effusion. Troponin was elevated though is chronic. BNP 59284. Jessica Oliva has no other acute complaints    Subjective (Events in last 24 hours):   Pt awake, alert. NAD. Denies cp, breathing much improved. No signif swelling. D/w patient about lifevest and rehab to help get stronger. She has PT coming to home. Does feel better and some strength is returning already she thinks. Will have lifevest and f/u with CHF clinic.      Objective:   BP (!) 108/51   Pulse 77   Temp 97.4 °F (36.3 °C) (Oral)   Resp 18   Ht 5' (1.524 m)   Wt 103 lb 11.2 oz (47 kg)   SpO2 99%   BMI 20.25 kg/m²      vss  TELEMETRY: nsr    Physical Exam:  General Appearance: alert and oriented to person, place and time, in no acute distress  Cardiovascular: normal rate, regular rhythm, normal S1 and S2, no murmurs, rubs, clicks, or gallops, distal pulses intact, no carotid bruits, no JVD  Pulmonary/Chest: clear to auscultation bilaterally- no wheezes, rales or rhonchi, normal air movement, no respiratory distress  Abdomen: soft, non-tender, non-distended, normal bowel sounds, no masses   Extremities: no cyanosis, clubbing, minimal/no edema, pulse   Skin: warm and dry, no rash or erythema  Neurological: alert, oriented, normal speech, no focal findings or movement disorder noted    Medications:    carvedilol  3.125 mg Oral BID    sodium chloride flush  5-40 mL IntraVENous 2 times per day    aspirin  81 mg Oral Daily    ticagrelor  90 mg Oral BID    atorvastatin  40 mg Oral Nightly    furosemide  40 mg Oral Daily    aspirin  325 mg Oral Once    HYDROcodone 5 mg - acetaminophen  1 tablet Oral Once    [Held by provider] spironolactone  25 mg Oral Daily    docusate  100 mg Oral BID    cetirizine  5 mg Oral Daily    levothyroxine  125 mcg Oral Daily    lubiprostone  24 mcg Oral Daily with breakfast    multivitamin  1 tablet Oral Daily    lactobacillus  1 capsule Oral Daily    Vitamin D  500 Units Oral Daily    [Held by provider] furosemide  20 mg IntraVENous q8h    [Held by provider] losartan  25 mg Oral Daily      sodium chloride       zolpidem, 5 mg, Nightly PRN  sodium chloride flush, 5-40 mL, PRN  sodium chloride, , PRN  ALPRAZolam, 0.25 mg, BID PRN  nitroGLYCERIN, 0.4 mg, Q5 Min PRN  acetaminophen, 650 mg, Q6H PRN  ipratropium-albuterol, 1 ampule, Q4H PRN  ondansetron, 4 mg, Q6H PRN   Or  ondansetron, 4 mg, Q6H PRN        Diagnostics:  EKG:   Normal sinus rhythm  Possible Left atrial enlargement  Right bundle branch block  Left anterior fascicular block  ** Bifascicular block **  LVH with repolarization abnormality ( R in aVL )  Abnormal ECG  When compared with ECG of 23-JUN-2022 06:24,  Sinus rhythm has replaced Atrial fibrillation  Ventricular rate has decreased BY  40 BPM  Criteria for Anterior infarct are no longer Present  Confirmed by Zaria Muniz ([id number]) on 6/25/2022   Echo:   Conclusions      Summary   Limited study. Moderately dilated left ventricular size and severely reduced systolic   function. There was severe global hypokinesis. Wall thickness was within normal limits. Ejection fraction was estimated at 30-35%. IVC size is within normal limits with normal respiratory phasic changes. The pericardium was normal in appearance with a moderate,   non-hemodynamically significant pericardial effusion. Left sided pleural effusion. Ascites. Signature      ----------------------------------------------------------------   Electronically signed by Jonh Ridley MD (Interpreting   physician) on 06/21/2022   Stress:     Left Heart Cath:   ACCESS:  Right common femoral artery access. The Impella device was  successfully removed. The sheath was successfully removed with  subsequent deployment of previously deployed ProGlide closure device,  one of the sutures broke, I had to follow that with the 8-Kinyarwanda  Angio-Seal.  Hemostasis was achieved. FemoStop will be kept for the  next 1-2 hours at low pressure.     CONCLUSION:  Successful IVUS-guided PCI and stenting of the left main  coronary artery with Impella support. Rotational atherectomy with 1.5  mm connie with subsequent stenting.     RECOMMENDATIONS:  Transfer to the intensive care unit for close  observation. Access site care. Strict bedrest for 4 hours  postprocedure. Aggressive risk factor modification. Aspirin 81 mg p.o.  daily. Brilinta 90 mg p.o. b.i.d. High intensity statin therapy. Resume Lasix, change to oral.  Resume Aldactone in the a.m. if  creatinine and potassium are stable. Guideline-directed medical therapy  for congestive heart failure with reduced ejection fraction.           Serene Dalton MD     D: 06/24/2022   Lab Data:    Cardiac Enzymes:  No results for input(s): CKTOTAL, CKMB, CKMBINDEX, TROPONINI in the last 72 hours. CBC:   Lab Results   Component Value Date    WBC 7.9 06/26/2022    RBC 3.58 06/26/2022    RBC 4.02 08/10/2011    HGB 11.6 06/26/2022    HCT 36.2 06/26/2022     06/26/2022       CMP:    Lab Results   Component Value Date     06/26/2022    K 3.8 06/26/2022    K 5.5 06/20/2022    CL 99 06/26/2022    CO2 30 06/26/2022    BUN 32 06/26/2022    CREATININE 1.3 06/26/2022    GFRAA >60 07/18/2019    LABGLOM 39 06/26/2022    GLUCOSE 99 06/26/2022    GLUCOSE 118 08/10/2011    CALCIUM 9.4 06/26/2022       Hepatic Function Panel:    Lab Results   Component Value Date    ALKPHOS 53 03/27/2022    ALT 7 03/27/2022    AST 22 03/27/2022    PROT 6.9 03/27/2022    BILITOT 0.2 03/27/2022    BILIDIR <0.2 11/08/2021    LABALBU 3.4 03/27/2022    LABALBU 3.9 08/10/2011       Magnesium:    Lab Results   Component Value Date    MG 1.9 06/25/2022       PT/INR:    Lab Results   Component Value Date    INR 1.18 06/23/2022       HgBA1c:  No results found for: LABA1C    FLP:    Lab Results   Component Value Date    TRIG 99 06/23/2022    HDL 46 06/23/2022    LDLCALC 57 06/23/2022       TSH:    Lab Results   Component Value Date    TSH 2.180 06/20/2022         Assessment:  · Acute on chronic systolic chf   ? 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 Cath 6/23/22  ?  Impella protected PCI LM and LAD 6/24  § Stable post procedure  § SR 70's; VSS  § R groin site and right radial sites without bleeding or hematoma; and VI  § Labs:  § K 3.8 - replace -keep potassium greater than 4.0  § BUN/Creat: 32/1.3, (33/1.1 37/1.1, 32/1.2) - stable  § H & H: 11.6/36.2, (11.8/38.0, 12.6/39.0, 12.7/41. 1) - stable  · Cardiac Rehab: Yes  ·    · Follow-up in office in 2 weeks with Dr. Loc Hdz  · No follow-up provider specified.      · Discharge Medications for PCI/NSTEMI (performed):   · ASA: yes  · Statin: yes  · P2Y12 Inhibitor: yes  · Beta Blocker: yes  · ACE / ARB: yes - currently on hold - lower BP  · Diuretics: Lasix, Aldactone - yes; currently on hold; lower BP  · Nitro SL: yes  ·    ·    · Discharge Medications for Cardiomyopathy, CHF: EF 30 - 35%  · Beta Blocker: yes  · ACE Inhibitor/ARB: yes--on hold. L.ikely stop d/t BP. · Diuretics: yes lasix 40 daily. May need cut back or stopped. · Aldactone: yes -currently on hold d/t BP/kidney function  · Farxiga: check with insurance-- requested   · CHF clinic at discharge  · Life Vest at discharge; ordered -approved, rep aware  Hx:   Anemia   borderline BP   HLP  Prior Covid w/ trach  Hx AFB - Hx CV to SR               On eliquis 2.5 mg po BID PTA  Moderate MR prior echo - none this echo   CKD stage 3     BP improved but still remains on the lower side. likely would only discharge with coreg and lasix. May not tolerate losartan/aldactone with BP. Tolerating activity well. Was up and walked the halls today. High risk of bleeding with triple therapy. continue aspirin, brilinta, eliquis 2.5 for 2 weeks. Stop aspirin and continue brilinta and eliquis 2.5 BID. Check BMP today. Consider cutting back lasix if kidney function worse. May only need lasix 20 daily. Stop losartan, aldactone. BP and kidneys will likely not tolerate. Patient needs lifevest prior to discharge. She is currently on all tolerated CAD and CMP meds as above. F/u with Dr Cameron Arguello in office in 2-3 weeks. F/u with CHF clinic per their schedule.       Electronically signed by Jasson Bourgeois PA-C on 6/27/2022 at 11:01 AM

## 2022-06-27 NOTE — FLOWSHEET NOTE
Contacted rashel delgado for cardio re hold poac. Also left message with dr andrea's office [facility]) to clarify continuing with peg removal (scheduled for 7-11-22) with brilinta on board.

## 2022-06-27 NOTE — CARE COORDINATION
6/27/22, 9:23 AM EDT    DISCHARGE PLANNING EVALUATION    Spoke with [address] [place] [address] Dr [phone]. She reports Lily Keller has recently been enrolled with their program.  She does not have any current services through them. 6/27/22, 2:25 PM EDT    Patient goals/plan/ treatment preferences discussed by  and . Patient goals/plan/ treatment preferences reviewed with patient/ family. Patient/ family verbalize understanding of discharge plan and are in agreement with goal/plan/treatment preferences. Understanding was demonstrated using the teach back method. AVS provided by RN at time of discharge, which includes all necessary medical information pertaining to the patients current course of illness, treatment, post-discharge goals of care, and treatment preferences. Services At/After Discharge: Home Health, Nursing service, OT and PT       IMM Letter  IMM Letter given to Patient/Family/Significant other/Guardian/POA/by[de-identified] Abraham Davey RN CM  IMM Letter date given[de-identified] 06/27/22  IMM Letter time given[de-identified] 1400     Met with Kourtney this morning. She plans to return home where she lives with her daughter Steph Glover. When Steph Glover is at work the patients son Sukumar Zamora comes and stays with her. She is current with [facility] for RN, PT and OT. Made Mari at the [facility] agency aware of the discharge. Called Rekha at [employer] and made her aware of discharge also.

## 2022-06-27 NOTE — CARE COORDINATION
6/27/22, 7:09 AM EDT    DISCHARGE BARRIERS        Patient transferred to [facility]. Report given to unit SW, Angela, regarding discharge plan for this patient.

## 2022-06-27 NOTE — PROGRESS NOTES
Inpatient Cardiac Rehabilitation Consult    Received consult for Phase II Cardiac Rehabilitation. Patient is not a good candidate for cardiac rehab at this time, it would be suggestive to extend her physical therapy upon discharge.

## 2022-06-28 VITALS
SYSTOLIC BLOOD PRESSURE: 149 MMHG | HEIGHT: 60 IN | BODY MASS INDEX: 21.42 KG/M2 | DIASTOLIC BLOOD PRESSURE: 85 MMHG | TEMPERATURE: 98.3 F | HEART RATE: 64 BPM | RESPIRATION RATE: 18 BRPM | OXYGEN SATURATION: 96 % | WEIGHT: 109.13 LBS

## 2022-06-28 PROCEDURE — 97116 GAIT TRAINING THERAPY: CPT

## 2022-06-28 PROCEDURE — 6370000000 HC RX 637 (ALT 250 FOR IP): Performed by: INTERNAL MEDICINE

## 2022-06-28 PROCEDURE — 6370000000 HC RX 637 (ALT 250 FOR IP): Performed by: STUDENT IN AN ORGANIZED HEALTH CARE EDUCATION/TRAINING PROGRAM

## 2022-06-28 PROCEDURE — 2580000003 HC RX 258: Performed by: INTERNAL MEDICINE

## 2022-06-28 PROCEDURE — 97110 THERAPEUTIC EXERCISES: CPT

## 2022-06-28 PROCEDURE — 6370000000 HC RX 637 (ALT 250 FOR IP): Performed by: NURSE PRACTITIONER

## 2022-06-28 RX ORDER — ZOLPIDEM TARTRATE 5 MG/1
5 TABLET ORAL NIGHTLY PRN
Qty: 10 TABLET | Refills: 0 | Status: SHIPPED | OUTPATIENT
Start: 2022-06-28 | End: 2022-07-12

## 2022-06-28 RX ORDER — ASPIRIN 81 MG/1
81 TABLET ORAL DAILY
Qty: 30 TABLET | Refills: 3 | Status: SHIPPED | OUTPATIENT
Start: 2022-06-29 | End: 2022-07-14 | Stop reason: ALTCHOICE

## 2022-06-28 RX ORDER — CARVEDILOL 3.12 MG/1
3.12 TABLET ORAL 2 TIMES DAILY
Qty: 60 TABLET | Refills: 3 | Status: SHIPPED | OUTPATIENT
Start: 2022-06-28

## 2022-06-28 RX ORDER — NITROGLYCERIN 0.4 MG/1
TABLET SUBLINGUAL
Qty: 25 TABLET | Refills: 3 | Status: SHIPPED | OUTPATIENT
Start: 2022-06-28

## 2022-06-28 RX ORDER — ATORVASTATIN CALCIUM 40 MG/1
40 TABLET, FILM COATED ORAL NIGHTLY
Qty: 30 TABLET | Refills: 3 | Status: SHIPPED | OUTPATIENT
Start: 2022-06-28 | End: 2022-07-14 | Stop reason: SINTOL

## 2022-06-28 RX ORDER — ALPRAZOLAM 0.25 MG/1
0.25 TABLET ORAL 2 TIMES DAILY PRN
Qty: 40 TABLET | Refills: 0 | Status: SHIPPED | OUTPATIENT
Start: 2022-06-28 | End: 2022-07-28

## 2022-06-28 RX ORDER — CETIRIZINE HYDROCHLORIDE 5 MG/1
5 TABLET ORAL DAILY
Qty: 20 TABLET | Refills: 0 | Status: SHIPPED | OUTPATIENT
Start: 2022-06-29

## 2022-06-28 RX ORDER — FUROSEMIDE 40 MG/1
40 TABLET ORAL DAILY
Qty: 60 TABLET | Refills: 3 | Status: SHIPPED | OUTPATIENT
Start: 2022-06-29 | End: 2022-07-20 | Stop reason: DRUGHIGH

## 2022-06-28 RX ADMIN — Medication 100 MG: at 08:09

## 2022-06-28 RX ADMIN — Medication 1 CAPSULE: at 08:08

## 2022-06-28 RX ADMIN — LEVOTHYROXINE SODIUM 125 MCG: 0.12 TABLET ORAL at 06:27

## 2022-06-28 RX ADMIN — TICAGRELOR 90 MG: 90 TABLET ORAL at 08:08

## 2022-06-28 RX ADMIN — Medication 500 UNITS: at 08:07

## 2022-06-28 RX ADMIN — CARVEDILOL 3.12 MG: 6.25 TABLET, FILM COATED ORAL at 08:08

## 2022-06-28 RX ADMIN — ACETAMINOPHEN 650 MG: 325 TABLET ORAL at 08:07

## 2022-06-28 RX ADMIN — SODIUM CHLORIDE, PRESERVATIVE FREE 10 ML: 5 INJECTION INTRAVENOUS at 08:09

## 2022-06-28 RX ADMIN — ALPRAZOLAM 0.25 MG: 0.25 TABLET ORAL at 11:16

## 2022-06-28 RX ADMIN — ASPIRIN 81 MG: 81 TABLET, COATED ORAL at 08:08

## 2022-06-28 RX ADMIN — APIXABAN 2.5 MG: 2.5 TABLET, FILM COATED ORAL at 08:08

## 2022-06-28 RX ADMIN — Medication 1 TABLET: at 08:08

## 2022-06-28 RX ADMIN — FUROSEMIDE 40 MG: 40 TABLET ORAL at 08:08

## 2022-06-28 RX ADMIN — CETIRIZINE HYDROCHLORIDE 5 MG: 5 TABLET ORAL at 08:08

## 2022-06-28 ASSESSMENT — PAIN DESCRIPTION - LOCATION: LOCATION: COCCYX

## 2022-06-28 ASSESSMENT — PAIN SCALES - GENERAL
PAINLEVEL_OUTOF10: 0
PAINLEVEL_OUTOF10: 6

## 2022-06-28 NOTE — PROGRESS NOTES
[address] [address]  OCCUPATIONAL THERAPY MISSED TREATMENT NOTE  [facility] CCU-STEPDOWN 3B  3B-28/028-A      Date: 2022  Patient Name: Griffin Jacobs        CSN: [id number]   : 1934  (80 y.o.)  Gender: female   Referring Practitioner: Dr. Aliza Mathews MD  Diagnosis: Pericardial Effusion         REASON FOR MISSED TREATMENT: RN approved of OT session. On OT arrival PT in pt's room.  OT will try back later as able

## 2022-06-28 NOTE — PROGRESS NOTES
Notified Dr. Gloria Velasquez that patient would like protein shake and tray for walker at discharge. No new orders placed.

## 2022-06-28 NOTE — PROGRESS NOTES
[address] [address]  INPATIENT PHYSICAL THERAPY  DAILY NOTE  [facility] CCU-STEPDOWN 3B - 3B-28/028-A    Time In: 9030  Time Out: 1209  Timed Code Treatment Minutes: 25 Minutes  Minutes: 25          Date: 2022  Patient Name: Silvestre Theodore,  Gender:  female        MRN: [id number]  : 1934  (80 y.o.)     Referring Practitioner: Mellissa Lima MD  Diagnosis: pericardial effusion  Additional Pertinent Hx: Per H&P 1020: 31-year-old woman known to our service from prior admissions with known history of COVID and acute respiratory failure requiring trach and PEG. Patient was in the LTAC unit for a prolonged period of time. She was eventually transferred to the nursing home. She is now decannulated. She has been home with her daughter since April of this year. She uses a walker to ambulate. Patient started having increasing shortness of breath for the last 3 days. Positive for cough productive sputum. But does have a known ejection fraction of 45%. Unsure why patient was not on LifeVest or defibrillator. Work-up in the ER she was noted to have fluid retention. She is being started on diuretics. Pt s/p Left cardiac catheterization with selective coronary angiogram and Left ventriculogram. . Pt s/p Left cardiac catheterization with selective coronary angiogram, Successful placement with subsequent explant of Impella CPpercutaneous left ventricular assist device, Successful complex PCI with Impella support, rotational atherectomy,PCI, and stenting with IVUS guidance, Intravascular ultrasound of the left main coronary artery and theleft anterior descending artery DOS .      Prior Level of Function:  Lives With: Daughter  Type of Home: House  Home Layout: Two level,Laundry in basement,Able to Live on Main level with bedroom/bathroom  Home Access: Stairs to enter without rails  Entrance Stairs - Number of Steps: 1+1+1  Entrance Stairs - Rails: None  Home Equipment: Walker, rolling   Bathroom Shower/Tub: Tub/Shower unit,Shower chair with back  Bathroom Toilet: Standard  Bathroom Equipment: Grab bars in shower  Bathroom Accessibility: Accessible    Receives Help From: Family  ADL Assistance: Needs assistance  Homemaking Responsibilities: No  Ambulation Assistance: Independent  Transfer Assistance: Independent  Active : No  Additional Comments: Pt was using RW for ambulation. She has been at her daughter's home for 1 month. She had 3 months in a nursing home after a long hospitalization with COVID-19. Pt was weaned to not needing supplemental O2 when she returned home. She had visiting nurses and therapy at home. Pt only does stairs with RW and assist of her daughter. Restrictions/Precautions:  Restrictions/Precautions: Fall Risk,General Precautions  Position Activity Restriction  Other position/activity restrictions: EF 25%, stage 3 wound on coccyx, lifevest     SUBJECTIVE: RN approved session. Pt agreeable for PT treatment. Pt's daughter present and supportive, states pt had increased anxiety about going home and her new Lifevest, pt just had medication for anxiety. Pt declines stair trail this date. Pt and her daughter both state confidence with their technique with stairs. Demo and education on how to don and support at the gait belt provided to the pt and her daughter, along with general stair safety. They state confidence with this.      PAIN: 0/10: denies at rest, states discomfort in her bottom at end of session, increased time to reposition in the chair    Vitals: Heart Rate: 60's WFL    OBJECTIVE:  Bed Mobility:  Supine to Sit: Stand By Assistance, with rail, with increased time for completion    Transfers:  Sit to Stand: Stand By Assistance, with increased time for completion, cues for hand placement  Stand to Sit:Stand By Assistance, with increased time for completion, cues for hand placement    Ambulation:  Stand By Assistance, [address], with verbal cues , with increased time for completion  Distance: ~20'  Surface: Level Tile  Device:Rolling Walker  Gait Deviations: Forward Flexed Posture, Slow Tami, Decreased Step Length Bilaterally, Decreased Heel Strike Bilaterally, Mild Path Deviations, Unsteady Gait and Decreased Terminal Knee Extension  *Pt required intermittently CGA for stability, along with cues to bring her line of sight superiorly, and for path finding. Balance:  Static Sitting Balance:  Stand By Assistance  Dynamic Sitting Balance: Stand By Assistance  Static Standing Balance: Contact Guard Assistance    Exercise:  Patient was guided in 1 set(s) 10-12 reps of exercise to both lower extremities. Short arc quads, Hip abduction/adduction, Seated marches, Seated heel/toe raises, Long arc quads and abdominal isometric press. Exercises were completed for increased independence with functional mobility. *Cues and demo provided for appropriate completion of exercises. Cues to maintain within a tolerable range provided. Held heelslides secondary to increased discomfort in her bottom. Pt required max cues and redirection back to appropriate technique for exercise completion     Functional Outcome Measures: Completed  AM-PAC Inpatient Mobility without Stair Climbing Raw Score : 15  AM-PAC Inpatient without Stair Climbing T-Scale Score : 43.03    ASSESSMENT:  Assessment: Patient progressing toward established goals. Activity Tolerance:  Patient tolerance of  treatment: good. Pt noted to require cues and intermittent CGA to maintain safety with mobility. Pt and her daughter receptive to PT recommendation for continued PT, use of RW, and ambulating the stairs for home entry and exit with use of gait belt, RW and hands on assist. Pt and daughter deny other concerns or needs upon discharge home.    Discharge Recommendations: Continue to assess pending progress, 24 hour assistance or supervision and Home with Home Health PT  Plan: Current Treatment Recommendations: Strengthening,Balance training,Functional mobility training,Transfer training,Endurance training,Neuromuscular re-education,Stair training,Gait training,Home exercise program,Safety education & training,Patient/Caregiver education & training,Therapeutic activities  Plan:  (5x GM)    Patient Education  Patient Education: Plan of Care, Family Education, [employer] Mobility, Transfers, Reviewed Prior Education, Gait, Stairs, Use of Gait Belt, Verbal Exercise Instruction, Home Safety Education, Activity Pacing    Goals:  Patient goals : to get off of her walker  Short Term Goals  Time Frame for Short term goals: by hospital d/c  Short term goal 1: Pt to demo supine <->sit mod I for ease with getting in and out of bed  Short term goal 2: Pt to demo sit <->stand mod I with RW for ease with getting up from various surfaces safely  Short term goal 3: Pt to ambulate >=50' with RW and S for improved mobility in her environment  Short term goal 4: Pt to ascend/descend 3 steps wit RW and CGA for ease with home entry  Short term goal 5: Pt to increase Tinetti score to >=19/28 to progress to the moderate fall risk category  Long Term Goals  Time Frame for Long term goals : NA due to short ELOS    Following session, patient left in safe position with all fall risk precautions in place.

## 2022-06-28 NOTE — DISCHARGE SUMMARY
Dr. Nighat Pierre Discharge Summary  6/28/2022  3:14 PM    Patient:  Ben Dinh  YOB: 1934    MRN: [id number]   Acct: [de-identified]   [id number]   Primary Care Physician: SERGE Leyva CNP    Admit date:  6/20/2022    Discharge date:  6/28/2022    Discharge Diagnoses:    Patient Active Problem List   Diagnosis    Sigmoid diverticulitis    Acute respiratory failure with hypoxia ([address]    COVID-19    Acute congestive heart failure ([address]    C. difficile colitis    CHF (congestive heart failure) ([address]    Cardiomyopathy ([address]    Hyperkalemia    Chronic respiratory failure ([address]    Decubitus ulcer    Ventilator dependence ([address]    Severe malnutrition ([address]    Chronic systolic congestive heart failure ([address]    PVC (premature ventricular contraction)    CHF (congestive heart failure), NYHA class I, acute on chronic, combined ([address]       Discharge Medications:         Medication List      START taking these medications    ALPRAZolam 0.25 MG tablet  Commonly known as: XANAX  Take 1 tablet by mouth 2 times daily as needed for Anxiety for up to 30 days. aspirin 81 MG EC tablet  Take 1 tablet by mouth daily for 10 doses  Start taking on: June 29, 2022     atorvastatin 40 MG tablet  Commonly known as: LIPITOR  Take 1 tablet by mouth nightly     cetirizine 5 MG tablet  Commonly known as: ZYRTEC  Take 1 tablet by mouth daily  Start taking on: June 29, 2022  Replaces: fexofenadine 180 MG tablet     furosemide 40 MG tablet  Commonly known as: LASIX  Take 1 tablet by mouth daily  Start taking on: June 29, 2022     nitroGLYCERIN 0.4 MG SL tablet  Commonly known as: NITROSTAT  up to max of 3 total doses. If no relief after 1 dose, call 911. ticagrelor 90 MG Tabs tablet  Commonly known as: BRILINTA  Take 1 tablet by mouth 2 times daily     zolpidem 5 MG tablet  Commonly known as: AMBIEN  Take 1 tablet by mouth nightly as needed for Sleep for up to 14 days.         CHANGE how you take these medications    carvedilol 3.125 MG tablet  Commonly known as: COREG  Take 1 tablet by mouth 2 times daily  What changed:   · medication strength  · how much to take     docusate 50 MG/5ML liquid  Commonly known as: COLACE  10 mLs by Per G Tube route 2 times daily  What changed: how to take this        CONTINUE taking these medications    acetaminophen 325 MG tablet  Commonly known as: TYLENOL     apixaban 2.5 MG Tabs tablet  Commonly known as: ELIQUIS  Take 1 tablet by mouth 2 times daily     levothyroxine 125 MCG tablet  Commonly known as: SYNTHROID     lubiprostone 24 MCG capsule  Commonly known as: AMITIZA     MULTI VITAMIN DAILY PO     ondansetron 4 MG disintegrating tablet  Commonly known as: ZOFRAN-ODT  Take 1 tablet by mouth every 8 hours as needed for Nausea or Vomiting     OXYGEN     Probiotic Acidophilus Tabs     VITAMIN D (CHOLECALCIFEROL) PO        STOP taking these medications    dimenhyDRINATE 50 MG tablet  Commonly known as: DRAMAMINE     fexofenadine 180 MG tablet  Commonly known as: ALLEGRA  Replaced by: cetirizine 5 MG tablet     NONFORMULARY           Where to Get Your Medications      These medications were sent to [facility] [address], [address]  [address], [address] [address]    Phone: [phone]   · aspirin 81 MG EC tablet  · atorvastatin 40 MG tablet  · carvedilol 3.125 MG tablet  · cetirizine 5 MG tablet  · furosemide 40 MG tablet  · nitroGLYCERIN 0.4 MG SL tablet  · ticagrelor 90 MG Tabs tablet     You can get these medications from any pharmacy    Bring a paper prescription for each of these medications  · ALPRAZolam 0.25 MG tablet  · zolpidem 5 MG tablet       Scheduled Meds: Scheduled Meds:   apixaban  2.5 mg Oral BID    carvedilol  3.125 mg Oral BID    sodium chloride flush  5-40 mL IntraVENous 2 times per day    aspirin  81 mg Oral Daily    ticagrelor  90 mg Oral BID    atorvastatin  40 mg Oral Nightly    furosemide  40 mg Oral Daily    HYDROcodone 5 mg - acetaminophen  1 tablet Oral Once    [Held by provider] spironolactone  25 mg Oral Daily    docusate  100 mg Oral BID    cetirizine  5 mg Oral Daily    levothyroxine  125 mcg Oral Daily    lubiprostone  24 mcg Oral Daily with breakfast    multivitamin  1 tablet Oral Daily    lactobacillus  1 capsule Oral Daily    Vitamin D  500 Units Oral Daily    [Held by provider] losartan  25 mg Oral Daily     Continuous Infusions:   sodium chloride       PRN Meds:.zolpidem, sodium chloride flush, sodium chloride, ALPRAZolam, nitroGLYCERIN, acetaminophen, ondansetron **OR** ondansetron  Continuous Infusions:   sodium chloride         Intake/Output Summary (Last 24 hours) at 6/28/2022 1514  Last data filed at 6/28/2022 6731  Gross per 24 hour   Intake 440 ml   Output 750 ml   Net -310 ml       Diet:  ADULT DIET;  Regular    Activity:  Up ad sandra (Patient can move independently)    Follow-up:  in the next few weeks with SERGE Abdullahi CNP,       Consultants:  cardiology    Procedures:  Cath, pci    Diagnostic Test:    Objective:  Lab Results   Component Value Date    WBC 7.9 06/26/2022    RBC 3.58 06/26/2022    RBC 4.02 08/10/2011    HGB 11.6 06/26/2022    HCT 36.2 06/26/2022    .1 06/26/2022    MCH 32.4 06/26/2022    MCHC 32.0 06/26/2022    RDW 14.2 07/18/2019     06/26/2022    MPV 11.8 06/26/2022     Lab Results   Component Value Date     06/27/2022    K 3.6 06/27/2022    K 5.5 06/20/2022    CL 99 06/27/2022    CO2 28 06/27/2022    BUN 34 06/27/2022    CREATININE 1.5 06/27/2022    GLUCOSE 90 06/27/2022    GLUCOSE 118 08/10/2011    CALCIUM 9.7 06/27/2022     Lab Results   Component Value Date    CALCIUM 9.7 06/27/2022     No results found for: IONCA  Lab Results   Component Value Date    MG 1.9 06/25/2022     Lab Results   Component Value Date    PHOS 3.0 12/15/2021     No results found for: BNP  Lab Results   Component Value Date    ALKPHOS 53 03/27/2022    ALT 7 03/27/2022    AST 22 03/27/2022    PROT 6.9 03/27/2022    BILITOT 0.2 03/27/2022    BILIDIR <0.2 11/08/2021    LABALBU 3.4 03/27/2022    LABALBU 3.9 08/10/2011     Lab Results   Component Value Date    LACTA 1.0 12/19/2021     No results found for: AMYLASE  Lab Results   Component Value Date    LIPASE 114.4 03/29/2022     Lab Results   Component Value Date    CHOL 123 06/23/2022    TRIG 99 06/23/2022    HDL 46 06/23/2022    LDLCALC 57 06/23/2022     No results for input(s): POCGLU in the last 72 hours. No results for input(s): CKTOTAL, CKMB, TROPONINI in the last 72 hours. No results found for: LABA1C  Lab Results   Component Value Date    INR 1.18 06/23/2022     No results found for: PHART, PO2ART, KFL6JQM, VPA3ZJI, [facility] Course: clinical course has improved, with treatment. The patient admitted for underlying congestive failure with evidence of bilateral pleural effusion. History of cardiomyopathy post COVID infection. Patient was seen by cardiologist and was taken to cath with findings showing evidence of left main disease. Was recommended that the patient had multiple options including CABG, medication, or stent placement. The family had a discussion and opted for PCI. This was successfully done for the left main. She did well post procedure as medication therapy was continued as well. It was recommended that the patient also had a life jacket prior to discharge delayed the discharge of this morning. The  and has been placed and will be discharging home today.       Vitals: BP (!) 149/85   Pulse 64   Temp 98.3 °F (36.8 °C) (Oral)   Resp 18   Ht 5' (1.524 m)   Wt 109 lb 2 oz (49.5 kg)   SpO2 96%   BMI 21.31 kg/m²   Physical Exam:    General appearance - alert, and in no distress  Eyes - pupils equal and reactive,t  Mouth - mucous membranes moist,   Neck - supple, no significant adenopathy  Chest -diminished breath sounds bilaterally with some posterior rhonchi. Heart -  S1, S2, heard. Abdomen - soft, PEG in place pos bs. Neurological - alert, responsive and about her baseline. Musculoskeletal - no joint tenderness, deformity or swelling  Extremities - peripheral pulses normal, no pedal edema, no clubbing or cyanosis  Skin - normal coloration and turgor, no rashes, no suspicious skin lesions noted           Disposition: home    Condition: Stable        Gifty Garcia MD, MD     Copy: Primary Care Physician: Duane Hockey, APRN - [facility]  Internal Medicine  Over 30 mins spent on this discharge.

## 2022-06-28 NOTE — CARE COORDINATION
Collaborative Discharge Planning    Raven Zayas  :  1934  MRN:  [id number]    ADMIT DATE:  2022      Discharge Planning Discharge Planning  Type of Residence: House  Living Arrangements: Children,Family Members  Support Systems: Children,Family Members  Current Services Prior To Admission: Home Care  Potential Assistance Needed: Home Care  Meds-to-Beds: Does the patient want to have any new prescriptions delivered to bedside prior to discharge?: Yes  Type of Home Care Services: None  Patient expects to be discharged to[de-identified] House  Follow Up Appointment: Best Day/Time :   White Board Notes /Social Work Whiteboard Notes  /Social Work Whiteboard:  SW/CM: Current with [facility], PT,OT and RN. Home with daughter. Procedure    CXR Portable: Cardiomegaly and probable CHF. Infiltrate and effusion at the right lung base. Atherosclerotic calcification in the aortic arch. The previously noted tracheotomy tube is no longer seen.  CTA chest: No PE. Cardiomegaly. Bilateral pleural effusions right greater than left. Pericardial effusion. Areas of atelectasis or infiltrate at both lung bases. Evidence for granulomatous disease in the right middle lobe and right hilum. Thoracic spondylosis.  Echo: EF 30-35%. Severe global hypokinesis. The pericardium was normal in appearance with a moderate, non-hemodynamically significant pericardial effusion.  Cardiac cath: Successful IVUS-guided PCI and stenting of the left main  coronary artery with Impella support.  Rotational atherectomy with 1.5  mm connie with subsequent stenting. Discharge Plan Home with home health    Discharge Milestones and Delays: DME delay    Life Vest delivered late in day on , planning discharge today when physician rounds.     SIGNED:  Jerzy Horne RN   2022, 12:29 PM

## 2022-06-29 NOTE — PROGRESS NOTES
CLINICAL PHARMACY NOTE: MEDS TO BEDS    Total # of Prescriptions Filled: 6   The following medications were delivered to the patient:  Brilinta 90mg  Nitroglycerin 0.4mg  Carvedilol 3.125mg  Furosemide 40mg  Atorvastatin 40mg  Low dose aspirin 81mg    Additional Documentation:

## 2022-07-14 ENCOUNTER — OFFICE VISIT (OUTPATIENT)
Dept: CARDIOLOGY CLINIC | Age: 87
End: 2022-07-14
Payer: MEDICARE

## 2022-07-14 VITALS
HEIGHT: 60 IN | SYSTOLIC BLOOD PRESSURE: 92 MMHG | OXYGEN SATURATION: 99 % | DIASTOLIC BLOOD PRESSURE: 47 MMHG | WEIGHT: 102 LBS | BODY MASS INDEX: 20.03 KG/M2 | HEART RATE: 77 BPM

## 2022-07-14 DIAGNOSIS — I25.5 ISCHEMIC CARDIOMYOPATHY: ICD-10-CM

## 2022-07-14 DIAGNOSIS — I50.43 ACUTE ON CHRONIC COMBINED SYSTOLIC AND DIASTOLIC CONGESTIVE HEART FAILURE (HCC): Primary | ICD-10-CM

## 2022-07-14 DIAGNOSIS — I25.10 CORONARY ARTERY DISEASE INVOLVING NATIVE CORONARY ARTERY OF NATIVE HEART WITHOUT ANGINA PECTORIS: ICD-10-CM

## 2022-07-14 PROCEDURE — G8420 CALC BMI NORM PARAMETERS: HCPCS | Performed by: STUDENT IN AN ORGANIZED HEALTH CARE EDUCATION/TRAINING PROGRAM

## 2022-07-14 PROCEDURE — 1090F PRES/ABSN URINE INCON ASSESS: CPT | Performed by: STUDENT IN AN ORGANIZED HEALTH CARE EDUCATION/TRAINING PROGRAM

## 2022-07-14 PROCEDURE — 1036F TOBACCO NON-USER: CPT | Performed by: STUDENT IN AN ORGANIZED HEALTH CARE EDUCATION/TRAINING PROGRAM

## 2022-07-14 PROCEDURE — 99214 OFFICE O/P EST MOD 30 MIN: CPT | Performed by: STUDENT IN AN ORGANIZED HEALTH CARE EDUCATION/TRAINING PROGRAM

## 2022-07-14 PROCEDURE — G8427 DOCREV CUR MEDS BY ELIG CLIN: HCPCS | Performed by: STUDENT IN AN ORGANIZED HEALTH CARE EDUCATION/TRAINING PROGRAM

## 2022-07-14 PROCEDURE — 1111F DSCHRG MED/CURRENT MED MERGE: CPT | Performed by: STUDENT IN AN ORGANIZED HEALTH CARE EDUCATION/TRAINING PROGRAM

## 2022-07-14 PROCEDURE — 1123F ACP DISCUSS/DSCN MKR DOCD: CPT | Performed by: STUDENT IN AN ORGANIZED HEALTH CARE EDUCATION/TRAINING PROGRAM

## 2022-07-14 RX ORDER — ZOLPIDEM TARTRATE 5 MG/1
5 TABLET ORAL NIGHTLY PRN
Status: ON HOLD | COMMUNITY
End: 2022-11-04 | Stop reason: ALTCHOICE

## 2022-07-14 RX ORDER — ROSUVASTATIN CALCIUM 20 MG/1
20 TABLET, COATED ORAL DAILY
Qty: 30 TABLET | Refills: 3 | Status: SHIPPED | OUTPATIENT
Start: 2022-07-14

## 2022-07-14 NOTE — PROGRESS NOTES
Pt C/O sob, HA, dizziness, few HP, some fatigue      Pt denies CP, heart palpitations, swelling      Pt wearing Life VEST

## 2022-07-14 NOTE — PROGRESS NOTES
[facility] PROFESSIONAL SERVICES  [facility]   [address]    [address] [address]   Dept: [phone]   Dept Fax: [phone]: [phone]      Chief Complaint   Patient presents with    Follow-Up from Hospital     stents      Cardiologist:  Dr. Stephie Suresh  79 yo female presents for hfu. Had [facility] with PCI to LM and LAD. Acute on chronic systolic CHF, pleural effusions, ICMP. Lifevest. Hx of anemia, COVID s/p trach and removal, afib, CKD3,  HLD. Still weak. No chest pain. Breathing has been okay. No dizziness. Having issues with muscle weakness and some soreness. occ but minimal dizziness. No swelling. Overall,  Not feeling the best still. Family helps with meds and some ADLs at home. She still gets up and goes about on her own. General:   No fever, no chills, no weight loss, no fatigue  Pulmonary:    No dyspnea, no wheezing  Cardiac:    Denies recent chest pain   GI:     No nausea or vomiting, no abdominal pain  Neuro:     No dizziness or light headedness  Musculoskeletal:  No recent active issues  Extremities:   No edema      Past Medical History:   Diagnosis Date    HERIBERTO (acute kidney injury) ([facility]     Anxiety     Arthritis     Bronchitis     [facility]est[facility]) [facility]     Chronic a-fib ([facility]     Diverticulitis of colon     Hyperkalemia     Hypertension     Neuromuscular dysfunction of bladder     Personal history of COVID-19     Pressure ulcer     Protein-calorie malnutrition (HCC)        Allergies   Allergen Reactions    Sulfa Antibiotics     Metronidazole Rash and Hives       Current Outpatient Medications   Medication Sig Dispense Refill    zolpidem (AMBIEN) 5 MG tablet Take 5 mg by mouth nightly as needed for Sleep.  ALPRAZolam (XANAX) 0.25 MG tablet Take 1 tablet by mouth 2 times daily as needed for Anxiety for up to 30 days.  40 tablet 0    aspirin 81 MG EC tablet Take 1 tablet by mouth daily for 10 doses 30 tablet 3    atorvastatin (LIPITOR) 40 MG tablet Take 1 tablet by mouth nightly 30 tablet 3    carvedilol (COREG) 3.125 MG tablet Take 1 tablet by mouth 2 times daily 60 tablet 3    cetirizine (ZYRTEC) 5 MG tablet Take 1 tablet by mouth daily 20 tablet 0    furosemide (LASIX) 40 MG tablet Take 1 tablet by mouth daily 60 tablet 3    nitroGLYCERIN (NITROSTAT) 0.4 MG SL tablet up to max of 3 total doses. If no relief after 1 dose, call 911. 25 tablet 3    ticagrelor (BRILINTA) 90 MG TABS tablet Take 1 tablet by mouth 2 times daily 60 tablet 1    lubiprostone (AMITIZA) 24 MCG capsule Take 24 mcg by mouth daily (with breakfast)       levothyroxine (SYNTHROID) 125 MCG tablet Take 125 mcg by mouth Daily Take 0.5 tabs by mouth daily      VITAMIN D, CHOLECALCIFEROL, PO Take 1 tablet by mouth daily      docusate (COLACE) 50 MG/5ML liquid 10 mLs by Per G Tube route 2 times daily (Patient taking differently: Take 100 mg by mouth 2 times daily ) 600 mL 2    ondansetron (ZOFRAN-ODT) 4 MG disintegrating tablet Take 1 tablet by mouth every 8 hours as needed for Nausea or Vomiting      OXYGEN Inhale into the lungs as needed (to keep sats > 90%)      Multiple Vitamin (MULTI VITAMIN DAILY PO) Take 1 tablet by mouth daily       Probiotic Acidophilus (FLORANEX) TABS Take 1 tablet by mouth daily       apixaban (ELIQUIS) 2.5 MG TABS tablet Take 1 tablet by mouth 2 times daily 60 tablet     acetaminophen (TYLENOL) 325 MG tablet Take 650 mg by mouth every 6 hours as needed for Pain        No current facility-administered medications for this visit. Social History     Socioeconomic History    Marital status:       Spouse name: None    Number of children: None    Years of education: None    Highest education level: None   Occupational History    None   Tobacco Use    Smoking status: Former Smoker     Packs/day: 1.00     Years: 15.00     Pack years: 15.00     Types: Cigarettes     Quit date: 12/10/1977     Years since quittin.6    Smokeless tobacco: Never Used   Vaping Use    Vaping Use: Never used   Substance and Sexual Activity    Alcohol use: No    Drug use: No    Sexual activity: None   Other Topics Concern    None   Social History Narrative    None     Social Determinants of Health     Financial Resource Strain:     Difficulty of Paying Living Expenses: Not on file   Food Insecurity:     Worried About Running Out of Food in the Last Year: Not on file    Caitlin of Food in the Last Year: Not on file   Transportation Needs:     Lack of Transportation (Medical): Not on file    Lack of Transportation (Non-Medical): Not on file   Physical Activity:     Days of Exercise per Week: Not on file    Minutes of Exercise per Session: Not on file   Stress:     Feeling of Stress : Not on file   Social Connections:     Frequency of Communication with Friends and Family: Not on file    Frequency of Social Gatherings with Friends and Family: Not on file    Attends [employer] Services: Not on file    Active Member of [address] [place] [address] or Organizations: Not on file    Attends Club or Organization Meetings: Not on file    Marital Status: Not on file   Intimate Partner Violence:     Fear of Current or Ex-Partner: Not on file    Emotionally Abused: Not on file    Physically Abused: Not on file    Sexually Abused: Not on file   Housing Stability:     Unable to Pay for Housing in the Last Year: Not on file    Number of [place] in the Last Year: Not on file    Unstable Housing in the Last Year: Not on file       History reviewed. No pertinent family history. Blood pressure (!) 92/47, pulse 77, height 5' (1.524 m), weight 102 lb (46.3 kg), SpO2 99 %.     General:   Well developed, well nourished  Lungs:   Clear to auscultation, no rales  Heart:    RRR, Normal S1 S2, No murmur, rubs, or gallops  Abdomen:   Soft, non tender, no organomegalies, positive bowel sounds  Extremities:   No edema, no cyanosis, good peripheral pulses  Neurological:   Awake, alert, oriented. No obvious focal deficits  Musculoskeletal:  No obvious deformities  lifevest in place. Tolerating okay. EKG:          Diagnosis Orders   1. Acute on chronic combined systolic and diastolic congestive heart failure ([address]     2. Ischemic cardiomyopathy     3. Coronary artery disease involving native coronary artery of native heart without angina pectoris         No orders of the defined types were placed in this encounter. Assessment/Plan:   CHF/ICMP- still weak. Unable to titrate any CMP meds. Doing okay with coreg 3.125, has only been once daily d/t to BP. Will try to inrease to BID. Cannot add anything else however given low BP. Slight dizziness at times. Cad s/p PCI-continue coreg, brilinta, aspirin. Switch lipitor to crstor as may be contributing to her weakness. Cannot stop brilinta at this time d/t recent cardiac stents. Can stop eliquis for a short time. Patient understands risk of CVA/clots if stopping OAC, even for a short time. She also understands the risk of stent thrombosis, etc if brilinta is stopped. F/u with CHF next week. They will schedule repeat echo. Disposition:   F/u with Dr Soledad Meza as scheduled.    Continue Dr Velasquez Sor current treatment plan  Follow up with Dr Soledad Meza as scheduled or sooner if needed

## 2022-07-18 NOTE — PROGRESS NOTES
Heart Failure Clinic       Visit Date: 7/19/2022  Cardiologist:  Dr. Cate Saab  Primary Care Physician: Dr. Rickey Olsen, APRN - CNP    Sangeeta Young is a 80 y.o. female who presents today for:  Chief Complaint   Patient presents with    Congestive Heart Failure       HPI:   Sangeeta Young is a 80 y.o. female who presents to the office for a NEW patient visit in the heart failure clinic. Accompanied by dtr, Audrey  Back home since discharge in June Lives w/ dtr Audrey    TYPE HF: HFrEF (30-35%) since 10/2021   Cause: ICM/NICM  Device: Lifevest   HX: HTN, Afib (Eliquis), Covid (trach/PEG - extensive admission - 10/2021-2/2022), Trach removed 5/2022. Home from NH in 5/2022     Dry Wt:  110    Hospitalization:  6/2022 - admitted CHF. Worsening SOB. LHC showing Left main disease - opted for PCI (Impella support). LVEDP     Today: Home w/ dtr - OT signed off yesterday  PT still following  Nursing comes twice weekly  \"Feeling a lot better\" over past month - dtr agrees.   Dtr states no complaints since been home prior to was always c/o not breathing    Patient has:  Chest Pain: no  SOB: yes but improving  Orthopnea/PND: no - in bed - previously did have issues   ANA: no  Edema: none - never been issues  Fatigue: improving  Abdominal bloating: no  Cough: no  Appetite: good  Home weight: stable - 103# home  Home blood pressure: low 100s    Past Medical History:   Diagnosis Date    HERIBERTO (acute kidney injury) ([address]     Anxiety     Arthritis     Bronchitis     CHF (congestive heart failure) (HCC)     Chronic a-fib (HCC)     Diverticulitis of colon     Hyperkalemia     Hypertension     Neuromuscular dysfunction of bladder     Personal history of COVID-19     Pressure ulcer     Protein-calorie malnutrition ([address]      Past Surgical History:   Procedure Laterality Date    APPENDECTOMY      CHOLECYSTECTOMY      GASTROSTOMY TUBE PLACEMENT N/A 11/17/2021    EGD PEG TUBE PLACEMENT performed by Kerry Irby MD at [facility]    PRESSURE ULCER DEBRIDEMENT N/A 2021    DECUBITUS ULCER DEBRIDEMENT REPAIR performed by Brian Arthur MD at [address]       History reviewed. No pertinent family history. Social History     Tobacco Use    Smoking status: Former     Packs/day: 1.00     Years: 15.00     Pack years: 15.00     Types: Cigarettes     Quit date: 12/10/1977     Years since quittin.6    Smokeless tobacco: Never   Substance Use Topics    Alcohol use: No     Current Outpatient Medications   Medication Sig Dispense Refill    zolpidem (AMBIEN) 5 MG tablet Take 5 mg by mouth nightly as needed for Sleep. rosuvastatin (CRESTOR) 20 MG tablet Take 1 tablet by mouth daily 30 tablet 3    ALPRAZolam (XANAX) 0.25 MG tablet Take 1 tablet by mouth 2 times daily as needed for Anxiety for up to 30 days. 40 tablet 0    carvedilol (COREG) 3.125 MG tablet Take 1 tablet by mouth 2 times daily 60 tablet 3    cetirizine (ZYRTEC) 5 MG tablet Take 1 tablet by mouth daily 20 tablet 0    furosemide (LASIX) 40 MG tablet Take 1 tablet by mouth daily 60 tablet 3    nitroGLYCERIN (NITROSTAT) 0.4 MG SL tablet up to max of 3 total doses.  If no relief after 1 dose, call 911. 25 tablet 3    ticagrelor (BRILINTA) 90 MG TABS tablet Take 1 tablet by mouth 2 times daily 60 tablet 1    lubiprostone (AMITIZA) 24 MCG capsule Take 24 mcg by mouth daily (with breakfast)       levothyroxine (SYNTHROID) 125 MCG tablet Take 125 mcg by mouth Daily Take 0.5 tabs by mouth daily      VITAMIN D, CHOLECALCIFEROL, PO Take 1 tablet by mouth daily      docusate (COLACE) 50 MG/5ML liquid 10 mLs by Per G Tube route 2 times daily (Patient taking differently: Take 100 mg by mouth in the morning and 100 mg before bedtime.) 600 mL 2    ondansetron (ZOFRAN-ODT) 4 MG disintegrating tablet Take 1 tablet by mouth every 8 hours as needed for Nausea or Vomiting      Multiple Vitamin (MULTI VITAMIN DAILY PO) Take 1 tablet by mouth daily       Probiotic Acidophilus (FLORANEX) TABS Take 1 tablet by mouth daily       apixaban (ELIQUIS) 2.5 MG TABS tablet Take 1 tablet by mouth 2 times daily 60 tablet     acetaminophen (TYLENOL) 325 MG tablet Take 650 mg by mouth every 6 hours as needed for Pain        No current facility-administered medications for this visit. Allergies   Allergen Reactions    Sulfa Antibiotics     Metronidazole Rash and Hives       SUBJECTIVE:   Review of Systems   Constitutional:  Positive for fatigue (improving). Negative for activity change and appetite change. Respiratory:  Negative for cough and shortness of breath. Cardiovascular:  Negative for chest pain, palpitations and leg swelling. Gastrointestinal:  Negative for abdominal distention. Neurological:  Negative for weakness, light-headedness and headaches. Hematological:  Negative for adenopathy. Psychiatric/Behavioral:  Negative for sleep disturbance. OBJECTIVE:   Today's Vitals:  BP (!) 100/44   Pulse 84   Ht 5' (1.524 m)   Wt 110 lb 2 oz (50 kg)   SpO2 97%   BMI 21.51 kg/m²     Physical Exam  Vitals reviewed. Constitutional:       General: She is not in acute distress. Appearance: Normal appearance. She is well-developed. She is not diaphoretic. HENT:      Head: Normocephalic and atraumatic. Eyes:      Conjunctiva/sclera: Conjunctivae normal.   Neck:      Comments: No JVD  Cardiovascular:      Rate and Rhythm: Normal rate and regular rhythm. Heart sounds: Normal heart sounds. No murmur heard. Comments: Lifevest on  Pulmonary:      Effort: Pulmonary effort is normal. No respiratory distress. Breath sounds: Normal breath sounds. No wheezing or rales. Abdominal:      General: Bowel sounds are normal. There is no distension. Palpations: Abdomen is soft. Tenderness: There is no abdominal tenderness. Musculoskeletal:         General: Normal range of motion. Cervical back: Normal range of motion and neck supple.       Right and potassium are stable. Guideline-directed medical therapy  for congestive heart failure with reduced ejection fraction. Teja Rasmussen MD     D: 06/24/2022 11:09:44           Results reviewed:  BNP: No results found for: BNP  CBC:   Lab Results   Component Value Date/Time    WBC 7.9 06/26/2022 04:47 AM    RBC 3.58 06/26/2022 04:47 AM    RBC 4.02 08/10/2011 05:24 AM    HGB 11.6 06/26/2022 04:47 AM    HCT 36.2 06/26/2022 04:47 AM     06/26/2022 04:47 AM     CMP:    Lab Results   Component Value Date/Time     06/27/2022 12:17 PM    K 3.6 06/27/2022 12:17 PM    K 5.5 06/20/2022 02:19 PM    CL 99 06/27/2022 12:17 PM    CO2 28 06/27/2022 12:17 PM    BUN 34 06/27/2022 12:17 PM    CREATININE 1.5 06/27/2022 12:17 PM    GFRAA >60 07/18/2019 08:16 AM    LABGLOM 33 06/27/2022 12:17 PM    GLUCOSE 90 06/27/2022 12:17 PM    GLUCOSE 118 08/10/2011 05:24 AM    CALCIUM 9.7 06/27/2022 12:17 PM     Hepatic Function Panel:    Lab Results   Component Value Date/Time    ALKPHOS 53 03/27/2022 06:58 PM    ALT 7 03/27/2022 06:58 PM    AST 22 03/27/2022 06:58 PM    PROT 6.9 03/27/2022 06:58 PM    BILITOT 0.2 03/27/2022 06:58 PM    BILIDIR <0.2 11/08/2021 05:10 AM    LABALBU 3.4 03/27/2022 06:58 PM    LABALBU 3.9 08/10/2011 05:24 AM     Magnesium:    Lab Results   Component Value Date/Time    MG 1.9 06/25/2022 04:26 AM     PT/INR:    Lab Results   Component Value Date/Time    INR 1.18 06/23/2022 05:20 AM     Lipids:    Lab Results   Component Value Date/Time    TRIG 99 06/23/2022 05:20 AM    HDL 46 06/23/2022 05:20 AM    LDLCALC 57 06/23/2022 05:20 AM       ASSESSMENT AND PLAN:      Diagnosis Orders   1. CHF (congestive heart failure), NYHA class II, chronic, systolic (HCC)  Echo limited    Basic Metabolic Panel    Brain Natriuretic Peptide    Magnesium      2. Ischemic cardiomyopathy        3. Coronary artery disease involving native coronary artery of native heart without angina pectoris        4.  S/P drug eluting coronary stent placement        5. History of COVID-19        6. Hypotension, unspecified hypotension type          Continue:  GDMT:   ACE/ARB/ARNI - None   BB - Coreg 3.125 BID   SGLT2 -  no  AA - no  Diuretic - Lasix 40/day  Vasodilator - no  Other - Brilinta  HFrEF (30-35)  CAD s/p Left main PCI w/ Impella assist  ICM  Extensive Covid admission 10/2021  Hypotension  Stable, appears Euvolemic  Lab reviewed - getting little dry last labs - 6/27/22  Repeat blood work today   BP runs low  Unable to fully optimize meds d/t hypotension  No med changes today - pending blood work  Continue diet/fluid adherence  Continue daily wts. Repeat ECHO in 90 days - around 9/21/22  Continue Lifevest  New HF Pamphlet given and ZONE sheet reviewed, patient voices understanding. Questions answered. F/U w/ Cardiology/[facility] in 3 months  F/U in clinic in 6 months    Tolerating above noted HF meds, no ill side effects noted. Will continue to monitor kidney function and electrolytes. Will optimize as tolerated. Pt is compliant w/ medications. Total visit time of 40 minutes has been spent with patient on education of symptoms, management, medication, and plan of care; as well as review of chart: labs, ECHO, radiology reports, etc.   I personally spent more then 50% of the appt time face to face with the patient. Daily weights  Fluid restriction of 2 Liters per day  Limit sodium in diet to around 9752-0624 mg/day  Monitor BP  Activity as tolerated     Patient was instructed to call the [facility] for any changes in symptoms as noted in AVS.      No follow-ups on file. or sooner if needed     Patient given educational materials - see patient instructions. We discussed the importance of weighing oneself and recording daily. We also discussed the importance of a low sodium diet, higher sodium foods to avoid and better low sodium food options.    Patient verbalizes understanding of plan of care using teach back method, and is agreeable to the treatment plan.        Electronically signed by SERGE Howe CNP on 7/19/2022 at 10:21 AM

## 2022-07-19 ENCOUNTER — OFFICE VISIT (OUTPATIENT)
Dept: CARDIOLOGY CLINIC | Age: 87
End: 2022-07-19
Payer: MEDICARE

## 2022-07-19 ENCOUNTER — TELEPHONE (OUTPATIENT)
Dept: CARDIOLOGY CLINIC | Age: 87
End: 2022-07-19

## 2022-07-19 VITALS
OXYGEN SATURATION: 97 % | SYSTOLIC BLOOD PRESSURE: 100 MMHG | DIASTOLIC BLOOD PRESSURE: 44 MMHG | HEIGHT: 60 IN | BODY MASS INDEX: 21.62 KG/M2 | WEIGHT: 110.13 LBS | HEART RATE: 84 BPM

## 2022-07-19 DIAGNOSIS — I25.5 ISCHEMIC CARDIOMYOPATHY: ICD-10-CM

## 2022-07-19 DIAGNOSIS — I95.9 HYPOTENSION, UNSPECIFIED HYPOTENSION TYPE: ICD-10-CM

## 2022-07-19 DIAGNOSIS — I25.10 CORONARY ARTERY DISEASE INVOLVING NATIVE CORONARY ARTERY OF NATIVE HEART WITHOUT ANGINA PECTORIS: ICD-10-CM

## 2022-07-19 DIAGNOSIS — I50.22 CHF (CONGESTIVE HEART FAILURE), NYHA CLASS II, CHRONIC, SYSTOLIC (HCC): Primary | ICD-10-CM

## 2022-07-19 DIAGNOSIS — Z86.16 HISTORY OF COVID-19: ICD-10-CM

## 2022-07-19 DIAGNOSIS — Z95.5 S/P DRUG ELUTING CORONARY STENT PLACEMENT: ICD-10-CM

## 2022-07-19 LAB
ANION GAP SERPL CALCULATED.3IONS-SCNC: 12 MEQ/L (ref 8–16)
BUN BLDV-MCNC: 45 MG/DL (ref 7–22)
CALCIUM SERPL-MCNC: 9.8 MG/DL (ref 8.5–10.5)
CHLORIDE BLD-SCNC: 99 MEQ/L (ref 98–111)
CO2: 30 MEQ/L (ref 23–33)
CREAT SERPL-MCNC: 1.3 MG/DL (ref 0.4–1.2)
GFR SERPL CREATININE-BSD FRML MDRD: 39 ML/MIN/1.73M2
GLUCOSE BLD-MCNC: 92 MG/DL (ref 70–108)
MAGNESIUM: 2 MG/DL (ref 1.6–2.4)
POTASSIUM SERPL-SCNC: 3.9 MEQ/L (ref 3.5–5.2)
PRO-BNP: 9323 PG/ML (ref 0–1800)
SODIUM BLD-SCNC: 141 MEQ/L (ref 135–145)

## 2022-07-19 PROCEDURE — 1090F PRES/ABSN URINE INCON ASSESS: CPT | Performed by: NURSE PRACTITIONER

## 2022-07-19 PROCEDURE — G8420 CALC BMI NORM PARAMETERS: HCPCS | Performed by: NURSE PRACTITIONER

## 2022-07-19 PROCEDURE — 1036F TOBACCO NON-USER: CPT | Performed by: NURSE PRACTITIONER

## 2022-07-19 PROCEDURE — 1123F ACP DISCUSS/DSCN MKR DOCD: CPT | Performed by: NURSE PRACTITIONER

## 2022-07-19 PROCEDURE — G8427 DOCREV CUR MEDS BY ELIG CLIN: HCPCS | Performed by: NURSE PRACTITIONER

## 2022-07-19 PROCEDURE — 99215 OFFICE O/P EST HI 40 MIN: CPT | Performed by: NURSE PRACTITIONER

## 2022-07-19 PROCEDURE — 36415 COLL VENOUS BLD VENIPUNCTURE: CPT | Performed by: NURSE PRACTITIONER

## 2022-07-19 PROCEDURE — 1111F DSCHRG MED/CURRENT MED MERGE: CPT | Performed by: NURSE PRACTITIONER

## 2022-07-19 ASSESSMENT — ENCOUNTER SYMPTOMS
ABDOMINAL DISTENTION: 0
COUGH: 0
SHORTNESS OF BREATH: 0

## 2022-07-19 NOTE — PROGRESS NOTES
Venipuncture obtained from [facility]. Patient tolerated procedure without complications or complaints.

## 2022-07-19 NOTE — TELEPHONE ENCOUNTER
Labs reviewed = creat/BUN up slightly 1.3/45  [facility] however previous last month was 25K  Decrease Lasix to alternating 40 mg w/ 20 mg from 40/day. Fax lab slip to [facility] = repeat BMP in 3-4 weeks.

## 2022-07-19 NOTE — PATIENT INSTRUCTIONS
You may receive a survey regarding the care you received during your visit. Your input is valuable to us. We encourage you to complete and return your survey. We hope you will choose us in the future for your healthcare needs. Continue:  Continue current medications  Daily weights and record  Fluid restriction of 2 Liters per day  Limit sodium in diet to around 7741-2890 mg/day  Monitor BP  Activity as tolerated     Call the Heart Failure Clinic for any of the following symptoms: [phone]  Weight gain of 2-3 pounds in 1 day or 5 pounds in 1 week  Increased shortness of breath  Shortness of breath while laying down  Cough  Chest pain  Swelling in feet, ankles or legs  Tenderness or bloating in the abdomen  Fatigue   Decreased appetite or nausea   Confusion      Will call w/ blood work results and any med changes later today or tomorrow.

## 2022-07-20 RX ORDER — FUROSEMIDE 40 MG/1
40 TABLET ORAL SEE ADMIN INSTRUCTIONS
Qty: 30 TABLET | Refills: 0 | Status: SHIPPED
Start: 2022-07-20

## 2022-08-09 ENCOUNTER — NURSE ONLY (OUTPATIENT)
Dept: LAB | Age: 87
End: 2022-08-09

## 2022-08-09 DIAGNOSIS — I50.22 CHF (CONGESTIVE HEART FAILURE), NYHA CLASS II, CHRONIC, SYSTOLIC (HCC): ICD-10-CM

## 2022-08-09 LAB
ANION GAP SERPL CALCULATED.3IONS-SCNC: 17 MEQ/L (ref 8–16)
BUN BLDV-MCNC: 40 MG/DL (ref 7–22)
CALCIUM SERPL-MCNC: 9.7 MG/DL (ref 8.5–10.5)
CHLORIDE BLD-SCNC: 105 MEQ/L (ref 98–111)
CO2: 23 MEQ/L (ref 23–33)
CREAT SERPL-MCNC: 1.2 MG/DL (ref 0.4–1.2)
GFR SERPL CREATININE-BSD FRML MDRD: 42 ML/MIN/1.73M2
GLUCOSE BLD-MCNC: 86 MG/DL (ref 70–108)
POTASSIUM SERPL-SCNC: 4.6 MEQ/L (ref 3.5–5.2)
SODIUM BLD-SCNC: 145 MEQ/L (ref 135–145)

## 2022-09-26 ENCOUNTER — HOSPITAL ENCOUNTER (OUTPATIENT)
Dept: NON INVASIVE DIAGNOSTICS | Age: 87
Discharge: HOME | End: 2022-09-26
Payer: MEDICARE

## 2022-09-26 ENCOUNTER — TELEPHONE (OUTPATIENT)
Dept: CARDIOLOGY CLINIC | Age: 87
End: 2022-09-26

## 2022-09-26 DIAGNOSIS — I50.22 CHF (CONGESTIVE HEART FAILURE), NYHA CLASS II, CHRONIC, SYSTOLIC (HCC): Primary | ICD-10-CM

## 2022-09-26 DIAGNOSIS — I50.22 CHF (CONGESTIVE HEART FAILURE), NYHA CLASS II, CHRONIC, SYSTOLIC (HCC): ICD-10-CM

## 2022-09-26 PROCEDURE — 93307 TTE W/O DOPPLER COMPLETE: CPT

## 2022-09-27 NOTE — TELEPHONE ENCOUNTER
F/u 90 day post PCI reviewed = unfortunately no improvement, EF 25%. Has been unable tolerate GDMT d/t low BPs. Currently wearing Lifevest - recommend referral to [facility] to eval for ICD.      SERGIO has appt w/ Erlindai next month

## 2022-09-27 NOTE — TELEPHONE ENCOUNTER
Patient Joel Calle on HIPAA, notified and verbalized understanding.    Will talk with patient and call office back if agreeable to referral/ICD

## 2022-10-10 ENCOUNTER — HOSPITAL ENCOUNTER (OUTPATIENT)
Dept: WOMENS IMAGING | Age: 87
Discharge: HOME | End: 2022-10-10
Payer: MEDICARE

## 2022-10-10 DIAGNOSIS — Z12.31 VISIT FOR SCREENING MAMMOGRAM: ICD-10-CM

## 2022-10-10 PROCEDURE — 77063 BREAST TOMOSYNTHESIS BI: CPT

## 2022-10-18 NOTE — PROGRESS NOTES
[address] ) [address] [facility]  Dept: [phone]  Cardiac Electrophysiology: Consultation Note  Patient's demographics:  Date:   10/19/2022  Patient name:              Vicenta Gutiérrez  YOB: 1934  Sex:    female   MRN:   [id number]    Primary Care Physician:  Romero Roberts APRN - CNP    Referring Physician:  Susan Kelsey MD    Reason for Consultation:  Non-ischemic cardiomyopathy, evaluation for ICD implantation for prevention of sudden cardiac death. Clinical Summary:  87/F had severe COVID-19 infection in 10/2021 requiring tracheostomy and temporary ventilatory assistance. At the time she was noted to have severe left ventricular systolic dysfunction, ejection fraction 35%. She has been managed medically and has been intolerant to GDMT due to low blood pressure. She has been reasonably well in terms of recovery from respiratory failure. Her tracheostomy was closed and she has returned to her home. She is living in independent life, currently living with her daughter. The patient was admitted to [facility] in June 2022 with shortness of breath. She was noted to have an LVEF of 30% and underwent coronary angiogram that showed significant left main disease and underwent complex PCI by Dr. Donna Jung.  She has done fairly well since then. She is able to walk half a block slowly. Reports shortness of breath with physical activity without orthopnea, proximal nocturnal dyspnea or lower extremity swelling. No chest pain or palpitations. No history of syncope. Most recent echocardiogram showed an LVEF 25%. She was referred here for evaluation of ICD implantation for primary prevention of sudden cardiac death.   Medical Hx: NICM dx 10/2021 following COVID-19 infection (LVEF 35 =>25%), CAD/complex PCI-LM (6/24/2022), permanent atrial fibrillation, premature ventricular complexes, hx chronic respirator failure/trache- now closed (10/2021), malnutrition/PEG tube, decubitus ulcer and Life Vest.       Review of systems:  Constitutional: Negative for chills and fever  HENT: Negative for congestion, sinus pressure, sneezing and sore throat. Eyes: Negative for pain, discharge, redness and itching. Respiratory: Negative for apnea, cough  Gastrointestinal: Negative for blood in stool, constipation, diarrhea   Endocrine: Negative for cold intolerance, heat intolerance, polydipsia. Genitourinary: Negative for dysuria, enuresis, flank pain and hematuria. Musculoskeletal: Negative for arthralgias, joint swelling and neck pain. Neurological: Negative for numbness and headaches. Psychiatric/Behavioral: Negative for agitation, confusion, decreased concentration and dysphoric mood. Past Medical History[de-identified]  Past Medical History:   Diagnosis Date    HERIBERTO (acute kidney injury) ([facility]     Anxiety     Arthritis     Bronchitis     CHF (congestive heart failure) ([facility])     Chronic a-fib ([facility])     Diverticulitis of colon     Hyperkalemia     Hypertension     Neuromuscular dysfunction of bladder     Personal history of COVID-19     Pressure ulcer     Protein-calorie malnutrition ([facility]        Past Surgical History:  Past Surgical History:   Procedure Laterality Date    APPENDECTOMY      CHOLECYSTECTOMY      GASTROSTOMY TUBE PLACEMENT N/A 2021    EGD PEG TUBE PLACEMENT performed by Osman Thompson MD at [facility] N/A 2021    DECUBITUS ULCER DEBRIDEMENT REPAIR performed by Satya Hoskins MD at [facility]         Family History:  No family history on file. Social History:  Social History     Socioeconomic History    Marital status:     Tobacco Use    Smoking status: Former     Packs/day: 1.00     Years: 15.00     Pack years: 15.00     Types: Cigarettes     Quit date: 12/10/1977     Years since quittin.8    Smokeless tobacco: Never   Vaping Use Vaping Use: Never used   Substance and Sexual Activity    Alcohol use: No    Drug use: No        Allergies: Allergies   Allergen Reactions    Sulfa Antibiotics     Metronidazole Rash and Hives        Medications:  Current Outpatient Medications   Medication Sig Dispense Refill    ticagrelor (BRILINTA) 90 MG TABS tablet Take 1 tablet by mouth 2 times daily 180 tablet 3    furosemide (LASIX) 40 MG tablet Take 1 tablet by mouth See Admin Instructions 40 mg. every other day alternating with 20 mg .daily 30 tablet 0    zolpidem (AMBIEN) 5 MG tablet Take 5 mg by mouth nightly as needed for Sleep. rosuvastatin (CRESTOR) 20 MG tablet Take 1 tablet by mouth daily 30 tablet 3    carvedilol (COREG) 3.125 MG tablet Take 1 tablet by mouth 2 times daily 60 tablet 3    cetirizine (ZYRTEC) 5 MG tablet Take 1 tablet by mouth daily 20 tablet 0    nitroGLYCERIN (NITROSTAT) 0.4 MG SL tablet up to max of 3 total doses. If no relief after 1 dose, call 911. 25 tablet 3    lubiprostone (AMITIZA) 24 MCG capsule Take 24 mcg by mouth daily (with breakfast)       levothyroxine (SYNTHROID) 125 MCG tablet Take 125 mcg by mouth Daily Take 0.5 tabs by mouth daily      VITAMIN D, CHOLECALCIFEROL, PO Take 1 tablet by mouth daily      docusate (COLACE) 50 MG/5ML liquid 10 mLs by Per G Tube route 2 times daily (Patient taking differently: Take 100 mg by mouth in the morning and 100 mg before bedtime.) 600 mL 2    ondansetron (ZOFRAN-ODT) 4 MG disintegrating tablet Take 1 tablet by mouth every 8 hours as needed for Nausea or Vomiting      Multiple Vitamin (MULTI VITAMIN DAILY PO) Take 1 tablet by mouth daily       Probiotic Acidophilus (FLORANEX) TABS Take 1 tablet by mouth daily       apixaban (ELIQUIS) 2.5 MG TABS tablet Take 1 tablet by mouth 2 times daily 60 tablet     acetaminophen (TYLENOL) 325 MG tablet Take 650 mg by mouth every 6 hours as needed for Pain        No current facility-administered medications for this visit.        Physical Examination:  Vitals:    10/19/22 1235   BP: 127/63   Pulse: 75     Body mass index is 22.5 kg/m². GENERAL: Alert and oriented. No distress. EYES: No pallor or icterus. ENT: No cyanosis. No thyromegaly or cervical LAP. VESSELS: No jugular venous distension or carotid bruits. HEART: Normal S1/S2. No murmur, rub or gallop. LUNGS: Clear to auscultation. ABDOMEN: Soft and non-tender. EXTREMITIES: No lower extremity edema. Feet are warm. NEUROLOGICAL: Grossly normal.     Laboratory And Diagnostic Data  I have personally reviewed and interpreted the results of the following diagnostic testing    Lab Results   Component Value Date    WBC 7.9 06/26/2022    HGB 11.6 (L) 06/26/2022    HCT 36.2 (L) 06/26/2022     06/26/2022    CHOL 123 06/23/2022    TRIG 99 06/23/2022    HDL 46 06/23/2022    ALT 7 (L) 03/27/2022    AST 22 03/27/2022     08/09/2022    K 4.6 08/09/2022     08/09/2022    CREATININE 1.2 08/09/2022    BUN 40 (H) 08/09/2022    CO2 23 08/09/2022    TSH 2.180 06/20/2022    INR 1.18 (H) 06/23/2022     Echocardiogram 9/26/22: LVEF 25%, LVEDD 5.8 cm, LVWT 1 cm, LAD/ARACELIS 5.8 cm. No significant valvular abnormalities. Smal circumferential effusion. TTE 10/11/2021: LVEF 25-30%. ECG Sinus rhythm and bifascicular block. Coronary angiogram 6/24/2022 IVUS guided complex PCI to left main under Impella support. Impression:  Chronic systolic heart failure, LVEF 25, NYHA class IVq on maximally tolerated guideline directed medical therapy. Mixed cardiomyopathy, predominant NICM  Bifascicular block, QRS duration 142 ms. CAD/complex PCI-LM (6/24/2022). On Brilinta. PAF, on apixaban. Low burden premature ventricular complexes, h  Malnutrition/PEG tube. Non-infected decubitus ulcer, planned to have a skin flap. Life Vest, no therapies.      Assessment And Recommendations:  Unfortunately, the patient has not tolerated guideline directed medical therapy including Entresto and spironolactone because of low blood pressure. She is currently on carvedilol. Her LVEF has been less than 35% for at least more than a year now. She is a risk of sudden cardiac death. Has recently improved functional class due to her age and multiple comorbidities. Discussed the role of AICD and the primary prevention of sudden cardiac death in the setting of cardiomyopathy. She has thought abut it for quite some time and finally decide to have one. Currently wearing Life Vest. No therapies. Discussed risk and benefits of a dual chmaber ICD implantation and she wishes to proceed. Questions answered. Thank you for allowing me to participate in the care of your patient. Please call me if you have any questions. **This report has been created using voice recognition software. It may contain minor errors which are inherent in voice recognition technology. **       Alfredo Rivers MD, MRCP, [facility], [facility] on 10/19/2022 at 1:32 PM

## 2022-10-19 ENCOUNTER — OFFICE VISIT (OUTPATIENT)
Dept: CARDIOLOGY CLINIC | Age: 87
End: 2022-10-19
Payer: MEDICARE

## 2022-10-19 ENCOUNTER — TELEPHONE (OUTPATIENT)
Dept: CARDIOLOGY CLINIC | Age: 87
End: 2022-10-19

## 2022-10-19 VITALS
HEIGHT: 60 IN | WEIGHT: 115.2 LBS | SYSTOLIC BLOOD PRESSURE: 127 MMHG | HEART RATE: 75 BPM | DIASTOLIC BLOOD PRESSURE: 63 MMHG | BODY MASS INDEX: 22.62 KG/M2

## 2022-10-19 DIAGNOSIS — I50.22 CHF (CONGESTIVE HEART FAILURE), NYHA CLASS II, CHRONIC, SYSTOLIC (HCC): Primary | ICD-10-CM

## 2022-10-19 PROCEDURE — 93000 ELECTROCARDIOGRAM COMPLETE: CPT | Performed by: INTERNAL MEDICINE

## 2022-10-19 PROCEDURE — G8420 CALC BMI NORM PARAMETERS: HCPCS | Performed by: INTERNAL MEDICINE

## 2022-10-19 PROCEDURE — 1090F PRES/ABSN URINE INCON ASSESS: CPT | Performed by: INTERNAL MEDICINE

## 2022-10-19 PROCEDURE — 99204 OFFICE O/P NEW MOD 45 MIN: CPT | Performed by: INTERNAL MEDICINE

## 2022-10-19 PROCEDURE — G8484 FLU IMMUNIZE NO ADMIN: HCPCS | Performed by: INTERNAL MEDICINE

## 2022-10-19 PROCEDURE — G8427 DOCREV CUR MEDS BY ELIG CLIN: HCPCS | Performed by: INTERNAL MEDICINE

## 2022-10-19 PROCEDURE — 1123F ACP DISCUSS/DSCN MKR DOCD: CPT | Performed by: INTERNAL MEDICINE

## 2022-10-19 PROCEDURE — 1036F TOBACCO NON-USER: CPT | Performed by: INTERNAL MEDICINE

## 2022-10-19 NOTE — TELEPHONE ENCOUNTER
Procedure: new dual icd mdt  Date: 11.04.2022  Arrival Time: 5am  Meds to Hold: Eliquis 1 day prior, Brilinta 2 days    Dr. John Dumont-  please see meds-  do you agree? Instructions verbalized to the patient. Instructions given to the patient.

## 2022-10-20 NOTE — TELEPHONE ENCOUNTER
Updated Len Steve ([facility]) with new instructions-    Patient has an apt with Dr. Ousmane Allen- 10.21.2022 and she will  the new instructions at that time.

## 2022-10-21 ENCOUNTER — OFFICE VISIT (OUTPATIENT)
Dept: CARDIOLOGY CLINIC | Age: 87
End: 2022-10-21
Payer: MEDICARE

## 2022-10-21 ENCOUNTER — TELEPHONE (OUTPATIENT)
Dept: CARDIOLOGY CLINIC | Age: 87
End: 2022-10-21

## 2022-10-21 VITALS
HEIGHT: 60 IN | SYSTOLIC BLOOD PRESSURE: 121 MMHG | WEIGHT: 115.6 LBS | DIASTOLIC BLOOD PRESSURE: 58 MMHG | BODY MASS INDEX: 22.7 KG/M2 | HEART RATE: 67 BPM

## 2022-10-21 DIAGNOSIS — I10 PRIMARY HYPERTENSION: ICD-10-CM

## 2022-10-21 DIAGNOSIS — I42.0 DILATED CARDIOMYOPATHY (HCC): ICD-10-CM

## 2022-10-21 DIAGNOSIS — I25.10 CORONARY ARTERY DISEASE INVOLVING NATIVE CORONARY ARTERY OF NATIVE HEART WITHOUT ANGINA PECTORIS: Primary | ICD-10-CM

## 2022-10-21 PROCEDURE — 1090F PRES/ABSN URINE INCON ASSESS: CPT | Performed by: NUCLEAR MEDICINE

## 2022-10-21 PROCEDURE — G8484 FLU IMMUNIZE NO ADMIN: HCPCS | Performed by: NUCLEAR MEDICINE

## 2022-10-21 PROCEDURE — G8427 DOCREV CUR MEDS BY ELIG CLIN: HCPCS | Performed by: NUCLEAR MEDICINE

## 2022-10-21 PROCEDURE — 99214 OFFICE O/P EST MOD 30 MIN: CPT | Performed by: NUCLEAR MEDICINE

## 2022-10-21 PROCEDURE — 1123F ACP DISCUSS/DSCN MKR DOCD: CPT | Performed by: NUCLEAR MEDICINE

## 2022-10-21 PROCEDURE — 1036F TOBACCO NON-USER: CPT | Performed by: NUCLEAR MEDICINE

## 2022-10-21 PROCEDURE — G8420 CALC BMI NORM PARAMETERS: HCPCS | Performed by: NUCLEAR MEDICINE

## 2022-10-21 RX ORDER — VALSARTAN 40 MG/1
40 TABLET ORAL DAILY
COMMUNITY
End: 2022-10-21 | Stop reason: SDUPTHER

## 2022-10-21 RX ORDER — VALSARTAN 40 MG/1
40 TABLET ORAL DAILY
Qty: 90 TABLET | Refills: 1 | Status: SHIPPED | OUTPATIENT
Start: 2022-10-21

## 2022-10-21 NOTE — TELEPHONE ENCOUNTER
Newton Conley this is an FYI from Friday, change in pt instructions     discussed with [facility] v/o ok to leave pt on Brilinta for this implant. Correction from previous note, implant is 11/4, wound check is 11/15.   Daughter Gerrianne Aase aware, do not stop the brilinta, last dose of eliquis will be Wednesday, Nov 2

## 2022-10-21 NOTE — TELEPHONE ENCOUNTER
FYI    Call from Danette Smith, pt scheduled for dual ICD 11/15/22  With instructions to stop Brilinta 2 days prior    Per Danette Smith, pt has left main stent 3 months ago and doesn't want Brilinta stopped. Ok to continue with implant on (do not hold) Brilinta? Danette Smith ok with holding Eliquis         CONCLUSION:  Successful IVUS-guided PCI and stenting of the left main  coronary artery with Impella support. Rotational atherectomy with 1.5  mm connie with subsequent stenting. RECOMMENDATIONS:  Transfer to the intensive care unit for close  observation. Access site care. Strict bedrest for 4 hours  postprocedure. Aggressive risk factor modification. Aspirin 81 mg p.o.  daily. Brilinta 90 mg p.o. b.i.d. High intensity statin therapy. Resume Lasix, change to oral.  Resume Aldactone in the a.m. if  creatinine and potassium are stable. Guideline-directed medical therapy  for congestive heart failure with reduced ejection fraction.            Warren Palma MD     D: 06/24/2022 11:09:44       T: 06/24/2022 11:16:04     [id number]

## 2022-10-21 NOTE — PROGRESS NOTES
[address] [facility] [address]  [address] [place] [id number]  Dept: [phone]  Dept Fax: [phone]  Loc: [phone]    Visit Date: 10/21/2022    Katie Bergeron is a 80 y.o. female who presents todayfor:  Chief Complaint   Patient presents with    Follow-up     4 month follow up    Coronary Artery Disease    Hyperlipidemia    Hypertension     Had MI   Had LM stent   Going for ICD  EF25%  Going for ICD  Does have a PEG tube  She doesn't use it   Not the best historian   No chest pain   Some baseline dyspnea  Does have renal disease  No ACEI   No ARB   Limited patient   Also does have a wound   Followed by Juanita Cummings       HPI:  HPI  Past Medical History:   Diagnosis Date    HERIBERTO (acute kidney injury) ([facility]     Anxiety     Arthritis     Bronchitis     CHF (congestive heart failure) ([facility] [address] [facility]     Chronic a-fib (HCC)     Diverticulitis of colon     Hyperkalemia     Hypertension     Neuromuscular dysfunction of bladder     Personal history of COVID-19     Pressure ulcer     Protein-calorie malnutrition ([facility] [address] [facility]       Past Surgical History:   Procedure Laterality Date    APPENDECTOMY      CHOLECYSTECTOMY      GASTROSTOMY TUBE PLACEMENT N/A 2021    EGD PEG TUBE PLACEMENT performed by Soren Angel MD at [facility] N/A 2021    DECUBITUS ULCER DEBRIDEMENT REPAIR performed by Anitha Reynaga MD at [facility]       No family history on file.   Social History     Tobacco Use    Smoking status: Former     Packs/day: 1.00     Years: 15.00     Pack years: 15.00     Types: Cigarettes     Quit date: 12/10/1977     Years since quittin.8    Smokeless tobacco: Never   Substance Use Topics    Alcohol use: No      Current Outpatient Medications   Medication Sig Dispense Refill    ticagrelor (BRILINTA) 90 MG TABS tablet Take 1 tablet by mouth 2 times daily 180 tablet 3    furosemide (LASIX) 40 MG tablet Take 1 tablet by mouth See Admin Instructions 40 mg. every other day alternating with 20 mg .daily 30 tablet 0    zolpidem (AMBIEN) 5 MG tablet Take 5 mg by mouth nightly as needed for Sleep. rosuvastatin (CRESTOR) 20 MG tablet Take 1 tablet by mouth daily 30 tablet 3    carvedilol (COREG) 3.125 MG tablet Take 1 tablet by mouth 2 times daily 60 tablet 3    cetirizine (ZYRTEC) 5 MG tablet Take 1 tablet by mouth daily 20 tablet 0    nitroGLYCERIN (NITROSTAT) 0.4 MG SL tablet up to max of 3 total doses. If no relief after 1 dose, call 911. 25 tablet 3    lubiprostone (AMITIZA) 24 MCG capsule Take 24 mcg by mouth daily (with breakfast)       levothyroxine (SYNTHROID) 125 MCG tablet Take 125 mcg by mouth Daily Take 0.5 tabs by mouth daily      VITAMIN D, CHOLECALCIFEROL, PO Take 1 tablet by mouth daily      docusate (COLACE) 50 MG/5ML liquid 10 mLs by Per G Tube route 2 times daily (Patient taking differently: Take 100 mg by mouth 2 times daily) 600 mL 2    ondansetron (ZOFRAN-ODT) 4 MG disintegrating tablet Take 1 tablet by mouth every 8 hours as needed for Nausea or Vomiting      Multiple Vitamin (MULTI VITAMIN DAILY PO) Take 1 tablet by mouth daily       Probiotic Acidophilus (FLORANEX) TABS Take 1 tablet by mouth daily       apixaban (ELIQUIS) 2.5 MG TABS tablet Take 1 tablet by mouth 2 times daily 60 tablet     acetaminophen (TYLENOL) 325 MG tablet Take 650 mg by mouth every 6 hours as needed for Pain        No current facility-administered medications for this visit.      Allergies   Allergen Reactions    Sulfa Antibiotics     Metronidazole Rash and Hives     Health Maintenance   Topic Date Due    Pneumococcal 65+ years Vaccine (1 - PCV) Never done    Depression Screen  Never done    COVID-19 Vaccine (3 - Booster for Pollenizer series) 04/19/2021    Annual Wellness Visit (AWV)  Never done    Flu vaccine (1) 08/01/2022    Lipids  06/23/2023    DTaP/Tdap/Td vaccine (2 - Td or Tdap) 01/23/2031    Shingles vaccine  Completed Hepatitis A vaccine  Aged Out    Hib vaccine  Aged Out    Meningococcal (ACWY) vaccine  Aged Out       Subjective:  Review of Systems  General:   No fever, no chills, some fatigue or weight loss  Pulmonary:    some dyspnea, no wheezing  Cardiac:    Denies recent chest pain,   GI:     No nausea or vomiting, no abdominal pain  Neuro:    No dizziness or light headedness,   Musculoskeletal:  No recent active issues  Extremities:   No edema, no obvious claudication     Objective:  Physical Exam  BP (!) 121/58   Pulse 67   Ht 5' (1.524 m)   Wt 115 lb 9.6 oz (52.4 kg)   BMI 22.58 kg/m²   General:   Well developed, well nourished  Lungs:   Clear to auscultation  Heart:    Normal S1 S2, Slight murmur. no rubs, no gallops  Abdomen:   Soft, non tender, no organomegalies, positive bowel sounds  Extremities:   No edema, no cyanosis, good peripheral pulses  Neurological:   Awake, alert, oriented. No obvious focal deficits  Musculoskelatal:  No obvious deformities    Assessment:      Diagnosis Orders   1. Coronary artery disease involving native coronary artery of native heart without angina pectoris        2. Primary hypertension        3. Dilated cardiomyopathy ([facility] [address] [facility]        As above  She cant stop thinners   Might be able to stop eliquis couple days      Plan:  No follow-ups on file. Discussed  Discussed thinners  She couldn't tolerate ACEI   Running lower BP  Continue risk factor modification and medical management  Thank you for allowing me to participate in the care of your patient. Please don't hesitate to contact me regarding any further issues related to the patient care    Orders Placed:  No orders of the defined types were placed in this encounter. Medications Prescribed:  No orders of the defined types were placed in this encounter. Discussed use, benefit, and side effects of prescribed medications. All patient questions answered. Pt voicedunderstanding.  Instructed to continue current medications, diet and exercise. Continue risk factor modification and medical management. Patient agreed with treatment plan. Follow up as directed.     Electronically signedby Rhina Meraz MD on 10/21/2022 at 1:16 PM

## 2022-11-03 ENCOUNTER — PREP FOR PROCEDURE (OUTPATIENT)
Dept: CARDIOLOGY | Age: 87
End: 2022-11-03

## 2022-11-03 RX ORDER — SODIUM CHLORIDE 0.9 % (FLUSH) 0.9 %
5-40 SYRINGE (ML) INJECTION PRN
Status: CANCELLED | OUTPATIENT
Start: 2022-11-03

## 2022-11-03 RX ORDER — SODIUM CHLORIDE 0.9 % (FLUSH) 0.9 %
5-40 SYRINGE (ML) INJECTION EVERY 12 HOURS SCHEDULED
Status: CANCELLED | OUTPATIENT
Start: 2022-11-03

## 2022-11-03 RX ORDER — CHLORHEXIDINE GLUCONATE 4 G/100ML
SOLUTION TOPICAL ONCE
Status: CANCELLED | OUTPATIENT
Start: 2022-11-03 | End: 2022-11-03

## 2022-11-03 RX ORDER — SODIUM CHLORIDE 9 MG/ML
INJECTION, SOLUTION INTRAVENOUS PRN
Status: CANCELLED | OUTPATIENT
Start: 2022-11-03

## 2022-11-03 RX ORDER — SODIUM CHLORIDE 9 MG/ML
INJECTION, SOLUTION INTRAVENOUS CONTINUOUS
Status: CANCELLED | OUTPATIENT
Start: 2022-11-03

## 2022-11-04 ENCOUNTER — APPOINTMENT (OUTPATIENT)
Dept: GENERAL RADIOLOGY | Age: 87
End: 2022-11-04
Attending: INTERNAL MEDICINE
Payer: MEDICARE

## 2022-11-04 ENCOUNTER — HOSPITAL ENCOUNTER (OUTPATIENT)
Dept: INPATIENT UNIT | Age: 87
Discharge: HOME | End: 2022-11-04
Attending: INTERNAL MEDICINE | Admitting: INTERNAL MEDICINE
Payer: MEDICARE

## 2022-11-04 VITALS
HEART RATE: 60 BPM | RESPIRATION RATE: 18 BRPM | OXYGEN SATURATION: 98 % | BODY MASS INDEX: 21.4 KG/M2 | HEIGHT: 60 IN | DIASTOLIC BLOOD PRESSURE: 62 MMHG | SYSTOLIC BLOOD PRESSURE: 110 MMHG | WEIGHT: 109 LBS

## 2022-11-04 LAB
ABO: NORMAL
ANION GAP SERPL CALCULATED.3IONS-SCNC: 14 MEQ/L (ref 8–16)
ANTIBODY SCREEN: NORMAL
APTT: 38.2 SECONDS (ref 22–38)
BASOPHILS # BLD: 0.7 %
BASOPHILS ABSOLUTE: 0 THOU/MM3 (ref 0–0.1)
BUN BLDV-MCNC: 74 MG/DL (ref 7–22)
CALCIUM SERPL-MCNC: 9.4 MG/DL (ref 8.5–10.5)
CHLORIDE BLD-SCNC: 98 MEQ/L (ref 98–111)
CO2: 25 MEQ/L (ref 23–33)
CREAT SERPL-MCNC: 1.7 MG/DL (ref 0.4–1.2)
EKG ATRIAL RATE: 62 BPM
EKG P AXIS: 74 DEGREES
EKG P-R INTERVAL: 204 MS
EKG Q-T INTERVAL: 474 MS
EKG QRS DURATION: 156 MS
EKG QTC CALCULATION (BAZETT): 481 MS
EKG R AXIS: -62 DEGREES
EKG T AXIS: 88 DEGREES
EKG VENTRICULAR RATE: 62 BPM
EOSINOPHIL # BLD: 2 %
EOSINOPHILS ABSOLUTE: 0.1 THOU/MM3 (ref 0–0.4)
ERYTHROCYTE [DISTWIDTH] IN BLOOD BY AUTOMATED COUNT: 14.9 % (ref 11.5–14.5)
ERYTHROCYTE [DISTWIDTH] IN BLOOD BY AUTOMATED COUNT: 55.8 FL (ref 35–45)
GFR SERPL CREATININE-BSD FRML MDRD: 29 ML/MIN/1.73M2
GLUCOSE BLD-MCNC: 80 MG/DL (ref 70–108)
HCT VFR BLD CALC: 36 % (ref 37–47)
HEMOGLOBIN: 11.6 GM/DL (ref 12–16)
IMMATURE GRANS (ABS): 0.02 THOU/MM3 (ref 0–0.07)
IMMATURE GRANULOCYTES: 0.4 %
INR BLD: 1.09 (ref 0.85–1.13)
LYMPHOCYTES # BLD: 31 %
LYMPHOCYTES ABSOLUTE: 1.7 THOU/MM3 (ref 1–4.8)
MCH RBC QN AUTO: 32.7 PG (ref 26–33)
MCHC RBC AUTO-ENTMCNC: 32.2 GM/DL (ref 32.2–35.5)
MCV RBC AUTO: 101.4 FL (ref 81–99)
MONOCYTES # BLD: 14.2 %
MONOCYTES ABSOLUTE: 0.8 THOU/MM3 (ref 0.4–1.3)
NUCLEATED RED BLOOD CELLS: 0 /100 WBC
PLATELET # BLD: 129 THOU/MM3 (ref 130–400)
PMV BLD AUTO: 11.6 FL (ref 9.4–12.4)
POTASSIUM REFLEX MAGNESIUM: 4.2 MEQ/L (ref 3.5–5.2)
RBC # BLD: 3.55 MILL/MM3 (ref 4.2–5.4)
RH FACTOR: NORMAL
SEG NEUTROPHILS: 51.7 %
SEGMENTED NEUTROPHILS ABSOLUTE COUNT: 2.9 THOU/MM3 (ref 1.8–7.7)
SODIUM BLD-SCNC: 137 MEQ/L (ref 135–145)
WBC # BLD: 5.6 THOU/MM3 (ref 4.8–10.8)

## 2022-11-04 PROCEDURE — 6370000000 HC RX 637 (ALT 250 FOR IP): Performed by: INTERNAL MEDICINE

## 2022-11-04 PROCEDURE — 86901 BLOOD TYPING SEROLOGIC RH(D): CPT

## 2022-11-04 PROCEDURE — 33249 INSJ/RPLCMT DEFIB W/LEAD(S): CPT | Performed by: INTERNAL MEDICINE

## 2022-11-04 PROCEDURE — 86900 BLOOD TYPING SEROLOGIC ABO: CPT

## 2022-11-04 PROCEDURE — 6360000002 HC RX W HCPCS: Performed by: PHYSICIAN ASSISTANT

## 2022-11-04 PROCEDURE — 36415 COLL VENOUS BLD VENIPUNCTURE: CPT

## 2022-11-04 PROCEDURE — 2580000003 HC RX 258: Performed by: PHYSICIAN ASSISTANT

## 2022-11-04 PROCEDURE — 93010 ELECTROCARDIOGRAM REPORT: CPT | Performed by: INTERNAL MEDICINE

## 2022-11-04 PROCEDURE — C1892 INTRO/SHEATH,FIXED,PEEL-AWAY: HCPCS

## 2022-11-04 PROCEDURE — C1898 LEAD, PMKR, OTHER THAN TRANS: HCPCS

## 2022-11-04 PROCEDURE — 33249 INSJ/RPLCMT DEFIB W/LEAD(S): CPT

## 2022-11-04 PROCEDURE — 86850 RBC ANTIBODY SCREEN: CPT

## 2022-11-04 PROCEDURE — C1894 INTRO/SHEATH, NON-LASER: HCPCS

## 2022-11-04 PROCEDURE — 6360000002 HC RX W HCPCS

## 2022-11-04 PROCEDURE — 86905 BLOOD TYPING RBC ANTIGENS: CPT

## 2022-11-04 PROCEDURE — 85730 THROMBOPLASTIN TIME PARTIAL: CPT

## 2022-11-04 PROCEDURE — 93005 ELECTROCARDIOGRAM TRACING: CPT | Performed by: PHYSICIAN ASSISTANT

## 2022-11-04 PROCEDURE — A4217 STERILE WATER/SALINE, 500 ML: HCPCS | Performed by: PHYSICIAN ASSISTANT

## 2022-11-04 PROCEDURE — 86922 COMPATIBILITY TEST ANTIGLOB: CPT

## 2022-11-04 PROCEDURE — 80048 BASIC METABOLIC PNL TOTAL CA: CPT

## 2022-11-04 PROCEDURE — 2500000003 HC RX 250 WO HCPCS

## 2022-11-04 PROCEDURE — 71046 X-RAY EXAM CHEST 2 VIEWS: CPT

## 2022-11-04 PROCEDURE — 85025 COMPLETE CBC W/AUTO DIFF WBC: CPT

## 2022-11-04 PROCEDURE — 85610 PROTHROMBIN TIME: CPT

## 2022-11-04 PROCEDURE — C1777 LEAD, AICD, ENDO SINGLE COIL: HCPCS

## 2022-11-04 PROCEDURE — C1722 AICD, SINGLE CHAMBER: HCPCS

## 2022-11-04 RX ORDER — CHLORHEXIDINE GLUCONATE 4 G/100ML
SOLUTION TOPICAL ONCE
Status: DISCONTINUED | OUTPATIENT
Start: 2022-11-04 | End: 2022-11-04 | Stop reason: HOSPADM

## 2022-11-04 RX ORDER — ACETAMINOPHEN 325 MG/1
650 TABLET ORAL EVERY 4 HOURS PRN
Status: DISCONTINUED | OUTPATIENT
Start: 2022-11-04 | End: 2022-11-04 | Stop reason: HOSPADM

## 2022-11-04 RX ORDER — SODIUM CHLORIDE 0.9 % (FLUSH) 0.9 %
5-40 SYRINGE (ML) INJECTION EVERY 12 HOURS SCHEDULED
Status: DISCONTINUED | OUTPATIENT
Start: 2022-11-04 | End: 2022-11-04 | Stop reason: HOSPADM

## 2022-11-04 RX ORDER — SODIUM CHLORIDE 0.9 % (FLUSH) 0.9 %
5-40 SYRINGE (ML) INJECTION PRN
Status: DISCONTINUED | OUTPATIENT
Start: 2022-11-04 | End: 2022-11-04 | Stop reason: HOSPADM

## 2022-11-04 RX ORDER — SODIUM CHLORIDE 9 MG/ML
INJECTION, SOLUTION INTRAVENOUS PRN
Status: DISCONTINUED | OUTPATIENT
Start: 2022-11-04 | End: 2022-11-04 | Stop reason: HOSPADM

## 2022-11-04 RX ORDER — SODIUM CHLORIDE 9 MG/ML
INJECTION, SOLUTION INTRAVENOUS CONTINUOUS
Status: DISCONTINUED | OUTPATIENT
Start: 2022-11-04 | End: 2022-11-04 | Stop reason: HOSPADM

## 2022-11-04 RX ADMIN — VANCOMYCIN HYDROCHLORIDE 250 MG: 1 INJECTION, POWDER, LYOPHILIZED, FOR SOLUTION INTRAVENOUS at 08:40

## 2022-11-04 RX ADMIN — ACETAMINOPHEN 650 MG: 325 TABLET ORAL at 10:10

## 2022-11-04 RX ADMIN — SODIUM CHLORIDE: 9 INJECTION, SOLUTION INTRAVENOUS at 05:44

## 2022-11-04 RX ADMIN — CEFAZOLIN 2000 MG: 10 INJECTION, POWDER, FOR SOLUTION INTRAVENOUS at 06:48

## 2022-11-04 ASSESSMENT — PAIN DESCRIPTION - ORIENTATION: ORIENTATION: LEFT

## 2022-11-04 ASSESSMENT — LIFESTYLE VARIABLES
HOW MANY STANDARD DRINKS CONTAINING ALCOHOL DO YOU HAVE ON A TYPICAL DAY: PATIENT DOES NOT DRINK
HOW OFTEN DO YOU HAVE A DRINK CONTAINING ALCOHOL: NEVER

## 2022-11-04 ASSESSMENT — PAIN SCALES - GENERAL: PAINLEVEL_OUTOF10: 7

## 2022-11-04 ASSESSMENT — PAIN DESCRIPTION - LOCATION: LOCATION: CHEST

## 2022-11-04 ASSESSMENT — PAIN DESCRIPTION - DESCRIPTORS: DESCRIPTORS: STABBING

## 2022-11-04 NOTE — BRIEF OP NOTE
Brief Postoperative Note    Date:   11/4/2022  Patient name: Marie Mccarty  YOB: 1934  Sex: female   MRN:   [id number]    PCP: SERGE Gilliam CNP     Procedure:Dual Chamber AICD implantation    Pre-Op Diagnosis: Ischemic cardiomyopathy. Post-Op Diagnosis: Same    Surgeon: Giovana Moran MD, MRCP, [address], [place]    Assistant: Mauricio Bautista. Anesthesia/sedation:  Local lidocaine/fentanyl and midazolam as needed. Estimated Blood Loss (mL): Minimal    Complications: None    Recommendations:  See Epic orders. Bed rest for 2 hours. Keep pocket site dry. No shower for 7 days ((sponge bath and tub bath OK). ICE packs locally. Monitor site for bleeding or swelling. Pressure dressing x 24 hours. Chest x-ray PA and lateral views. Follow up in device clinic in 1 week.        Electronically signed by Lorraine Smith MD, Lety Lanier on 11/4/2022 at 9:15 AM

## 2022-11-04 NOTE — PROCEDURES
[facility]  Dept: [phone]  ELECTROPHYSIOLOGY PROCEDURE NOTE  Patients demographics:  Date:   11/4/2022  Patient name:              Sarath Youngblood  YOB: 1934  Sex: female   MRN:   [id number]    Primary care provider    SERGE Angela - CNP     Cardiologist:  Ella Harley MD    Procedure planned:  Dual chamber automated implantable cardioverter defibrillator (AICD) implantation for primary prevention of sudden cardiac death. Indications for the procedure:  Chronic systolic heart failure, LVEF 25%, NYHA class García on maximally tolerated guideline directed medical therapy. QRS duration normal.      Brief clinical summary:  87/F with Mixed cardiomyopathy (predominant NICM) intolerant to optimal GDMT due to low blood pressure, electively presents for dual chamber ICD implantation for primary prevention of sudden cardiac death. Medical Hx: NICM dx 10/2021 following COVID-19 infection (LVEF 35 =>25%), CAD/complex PCI-LM (6/24/2022), persistent atrial fibrillation, premature ventricular complexes, hx chronic respirator failure/trache- now closed (10/2021), malnutrition/PEG tube, decubitus ulcer- grade III and Life Vest.      Procedure performed:  Dual chamber AICD implantation. Ultrasound guided left axillary vein accesses. Fluoroscopy of the leads. Procedure details: The patient was brought to the electrophysiology lab in a fasting and non-sedated state. She was prepped and draped in the usual sterile fashion. Conscious sedation was administered as needed. The left pectoral region was liberally infiltrated with 2% lidocaine for local anesthesia. Left axillary vein was cannulated x2 under ultrasound guidance using micro-puncture needle and 2 J-tip 0.035\" guidewires were exchanged for micro guidewires and advanced into IVC.  A 4 cm long incision was made in the left pectoral region and a subcutaneous pocket was made. Two hemostatic peel away sheaths were advanced into the axillary/subclavian vein over the guide wires. Through 9-Sammarinese sheath AICD lead was inserted and advanced under fluoroscopic guidance and positioned into the right ventricular apex. In an identical fashion, the right atrial lead was advanced through 7-Sammarinese sheath and positioned into the high right atrial septum. The electric parameters of the leads were checked using external analyzing system and noted to be adequate. High output pacing with 10 volts was performed with both the leads and there was no phrenic nerve capture. The leads were secured to the pectoral fascia using 0 Ethibond over the suture sleeves. The pocket was thoroughly irrigated with antibiotic solution. The leads were attached to the pulse generator and lead parameters were tested one more time. The leads were wrapped around the device and the device was placed inside the pocket. The pocket was closed in 3 layers using, 2-0, 3-0 and 4-0 Vicryl sutures. Steri-strips were applied to incision and site covered with by a Safeguard-cool pressure dressing. The fluoroscopy of the chest showed no pneumothorax or pericardial or pleural effusion. The patient's hemodynamics were monitored throughout the procedure. She tolerated the procedure well. Medications:  Midazolam 2 mg intravenously. Fentanyl 175 mcg intravenously. Cefazolin 2 gm intravenously for surgical prophylaxis. Lidocaine/Marcaine 20 cc ID/SQ for local anaesthesia. Fluoroscopic time:   5.5 minutes. Blood loss:  Minimal.    Complications:  None. Device details:  Pulse generator: Medtronic, model [id number] and serial [id number]. Right atrial lead:  Medtronic, model [id number] and serial [id number]. Right ventricular lead: Medtronic, model [id number] and serial [id number]. Intraoperative electric parameters:  RA lead: [email or web address]. RV lead: [email or web address].   impedance 57 ohms. Tachycardia settings:   bpm (monitor) and VF >200 bpm (direct shock)    Bradycardia settings:  MVP  pulses per minute     Defibrillation threshold testing[de-identified]  Not performed    Summary:  Successful dual chamber AICD implantation for primary prevention of sudden cardiac death  Normal pacemaker functions (DFT not performed)     RECOMMENDATIONS:    Observation for 2 hours. Chest x-ray (2 views) and device check in 2 hours. Post operative care and follow up per protocol.          Electronically signed by Sharon Zhang MD, Para  on 11/4/2022 at 9:45 AM

## 2022-11-04 NOTE — FLOWSHEET NOTE
11/04/22 1215   Treatment Team Notification   Reason for Communication Review case   Team Member Name [facility]   Treatment Team Role Attending Provider   Method of Communication Call   Response Other (Comment)   Notified that chest xray was completed, states that the xray looks good but that he wants to see her before discharge.

## 2022-11-04 NOTE — DISCHARGE INSTRUCTIONS
You should gradually return to your normal activities. For the first 4 weeks:     Do not lift more than 10 pounds     Do not swim, golf, bowl, or vacuum. Do not raise your device side arm above your shoulder for 30 days. At your 2 week follow up visit, ask your doctor which of the above activities you can resume. ALWAYS avoid any contact sports or hard blows to chest or abdomin. Avoid sleeping on the left side of face and face down. WOUND CARE      Remove safeguard tomorrow 11/5/2022, leave steri strips on. Keep incision dry for at least 7 days, and than you may shower. Spongue bathes around the device insertion site for the first week, keeping the incision dry. Do not apply any ointment, talc or lotion to the incision. Do not scratch or rub incision with your hands. The Steri-strips (tape strips) will be removed at your follow up visit, if they have not yet peeled off  on their own. Call your doctor immediately if your incision looks red, becomes swollen or tender, or begins to ooze fluid. Also, notify your doctor at once if you develop a temperature greater than 100 degrees Fahrenheit. WHAT TO DO IF YOU GET A SHOCK OR PASS OUT (DEFIBRILLATORS ONLY)     If you get a shock one time and feel normal afterwards, make an appointment to see your doctor. If you get a shock more than once over a few minutes or if you do not feel normal after one shock, call 911 and notify doctor. MAGNETS AND ELECTROMAGNETIC INTERFERENCE     Some devices or equipment that we encounter send out electrical signals that can:     Prevent your defibrillator from detecting life-threatening rhythm abnormalities in your heart.          You must avoid              Airport magnet security wands              Diathermy or other heart treatments               ARC or resistance welding              Electrical power generator plants              Electric cautery that is used for surgical procedures              MRI scans              Radio or television transmitting towers        Special procedures: You must stay at arm's length from magnets of any kind        Cellular phones should be used on the ear opposite to your device. Do not keep cellular phones in a pocket over your defibrillator        To avoid any interference with your device, you must pass through any security devices or bautista (airports, department stores, and other public places) within 10 seconds. Microwave ovens, televisions, electric tools and gardening equipment should not cause problems. If you have any questions about equipment not listed, call the  of your device or the pacemaker/Implantable Cardioverter-Defibrillator (ICD) clinic at [facility]. GENERAL INSTRUCTIONS     Always carry your device Identification card with you. The device  will mail a permanent ID card to you in 6-8 weeks. Keep your follow up appointment with your doctor and the device clinic to be sure your device and the leads are working properly. Always take your medications at the times prescribed by your doctor. Always tell medical personnel that you have a device. Memorize the name of the [address] [place] [address]. [address] [place] [address], [employer], [employer] , [employer]    Follow up appointments     Make a follow up appointment with your doctor for 1-2 weeks after your implantation. If you have any problems or questions about your device, call the Pacemaker [address] [facility]. Clinic hours:  Monday through Friday 8AM to 4 Pm. IN case of an emergency , call 911. SEDATION/ANALGESIA INFORMATION/HOME GOING ADVICE    SEDATION / ANALGESIA INFORMATION / HOME GOING ADVICE  You have received the sedation/analgesia medication during your visit     Sedation/analgesia is used during short medical procedures under controlled supervision. The medication will produce a strong relaxation. You will be able to hear, speak and follow instructions, but your memory and alertness will be decreased. You will be able to swallow and breathe on your own. During sedation/analgesia your blood pressure, heart and breathing will be watched closely. After the procedure, you may not remember what was said or done. You may have the following effects from the medication. \"           Drowsiness, dizziness, sleepiness or confusion. \"           Difficulty remembering or delayed reaction times. \"           Loss of fine muscle control or difficulty with your balance especially while walking. \"           Difficulty focusing or blurred vision. You may not be aware of slight changes in your behavior and/or your reaction time because of the medication used during the procedure. Therefore you should follow these instructions. \"           Have someone responsible help you with your care. \"           Do not drive for 24 hours. \"           Do not operate equipment for 24 hours (lawnmowers, power tools, kitchen accessories, stove). \"           Do not drink any alcoholic beverages for a minimum of 24 hours. \"           Do not make important personal, legal or business decisions for 24 hours. \"           You may experience dizziness or lightheadedness. Move slowly and carefully, do not make sudden position changes. \"           Drink extra amounts of fluids today. \"           Increase your diet as tolerated (unless you have received specific instructions from your doctor). \"           If you feel nauseated, continue with liquids until the nausea is gone. \"           Notify your physician if you have not urinated within 8 hours after the procedure. \"           Resume your medications unless otherwise instructed. Contact your physician if you have any questions or concerns.      IF YOU REPORT TO AN EMERGENCY ROOM, [address] [place] [address] WITHIN 24 HOURS AFTER YOUR PROCEDURE, BRING THIS SHEET AND YOUR AFTER VISIT SUMMARY WITH YOU AND GIVE IT TO THE PHYSICIAN OR NURSE ATTENDING YOU.

## 2022-11-04 NOTE — H&P
[address] [facility] () [address] [facility] [address]  H&P and SEDATION/ANALGESIA     Patient demographics:  Date:   11/4/2022  Patient name: Rachelle Goldberg  YOB: 1934  Sex: female   MRN:   [id number]    Reason for admission or planned procedure:  Dual chamber ICD implantation. Code Status: Full Code    Consent:The risk and benefits of dual chamber ICD implantation including pneumothorax, hemothorax, bleeding, vascular complications, infection, pericardial tamponade requiring emergency pericardiocentesis, MI, death, exposure to radiation, lead dislodgement requiring reposition, future operations for generator change or device revision or upgrade were discussed with the patient. She verbalized understanding of the discussion. Her questions were answered. The patient wishes to proceed. Brief clinical summary:  87/F had severe COVID-19 infection in 10/2021 requiring tracheostomy and temporary ventilatory assistance. At the time she was noted to have severe left ventricular systolic dysfunction, ejection fraction 35%. She has been managed medically and has been intolerant to GDMT due to low blood pressure. She has been reasonably well in terms of recovery from respiratory failure. Her tracheostomy was closed and she has returned to her home. She is living in independent life, currently living with her daughter. The patient was admitted to [facility] in June 2022 with shortness of breath. She was noted to have an LVEF of 30% and underwent coronary angiogram that showed significant left main disease and underwent complex PCI by Dr. Naomy Guerrier.  She has done fairly well since then. She is able to walk half a block slowly. Reports shortness of breath with physical activity without orthopnea, proximal nocturnal dyspnea or lower extremity swelling. No chest pain or palpitations. No history of syncope.   Most recent echocardiogram showed an LVEF 25%. She was referred here for evaluation of ICD implantation for primary prevention of sudden cardiac death. Medical Hx: NICM dx 10/2021 following COVID-19 infection (LVEF 35 =>25%), CAD/complex PCI-LM (6/24/2022), permanent atrial fibrillation, premature ventricular complexes, hx chronic respirator failure/trache- now closed (10/2021), malnutrition/PEG tube, decubitus ulcer and Life Vest.     Past Medical History:  Past Medical History:   Diagnosis Date    HERIBERTO (acute kidney injury) ([facility] [address]     Anxiety     Arthritis     Bronchitis     CHF (congestive heart failure) ([facility] [address]     Chronic a-fib ([facility] [address]     COVID     October 2021    Diverticulitis of colon     Hyperkalemia     Hypertension     Neuromuscular dysfunction of bladder     Personal history of COVID-19     Pressure ulcer     Protein-calorie malnutrition ([facility] [address]        Past Surgical History:  Past Surgical History:   Procedure Laterality Date    APPENDECTOMY      CHOLECYSTECTOMY      GASTROSTOMY TUBE PLACEMENT N/A 11/17/2021    EGD PEG TUBE PLACEMENT performed by Shamar Tan MD at [facility] N/A 12/23/2021    DECUBITUS ULCER DEBRIDEMENT REPAIR performed by Pelon Carbone MD at [facility]          Allergies:    Allergies as of 11/04/2022 - Fully Reviewed 11/04/2022   Allergen Reaction Noted    Sulfa antibiotics  12/10/2012    Metronidazole Rash and Hives 10/11/2021     Additional information:       Medications     Current Facility-Administered Medications:     0.9 % sodium chloride infusion, , IntraVENous, Continuous, Ismael aRy PA-C, Last Rate: 75 mL/hr at 11/04/22 0544, New Bag at 11/04/22 0544    sodium chloride flush 0.9 % injection 5-40 mL, 5-40 mL, IntraVENous, 2 times per day, Ismael Ray PA-C    sodium chloride flush 0.9 % injection 5-40 mL, 5-40 mL, IntraVENous, PRN, Karina Boothe PA-C    0.9 % sodium chloride infusion, , IntraVENous, PRN, Ismael Ray PA-C chlorhexidine (HIBICLENS) 4 % liquid, , Topical, Once, Karina Boothe PA-C    vancomycin (VANCOCIN) 250 mg in sodium chloride 0.9 % 250 mL irrigation, 250 mg, Irrigation, Once, Sandip Gross PA-C  Prior to Admission medications    Medication Sig Start Date End Date Taking? Authorizing Provider   valsartan (DIOVAN) 40 MG tablet Take 1 tablet by mouth daily 10/21/22   Bea Diego MD   ticagrelor (BRILINTA) 90 MG TABS tablet Take 1 tablet by mouth 2 times daily 8/29/22   Thom Blackwell MD   furosemide (LASIX) 40 MG tablet Take 1 tablet by mouth See Admin Instructions 40 mg. every other day alternating with 20 mg .daily 7/20/22   SERGE Lugo CNP   rosuvastatin (CRESTOR) 20 MG tablet Take 1 tablet by mouth daily 7/14/22   Kelsie Ness PA-C   carvedilol (COREG) 3.125 MG tablet Take 1 tablet by mouth 2 times daily 6/28/22   Manju Winters MD   cetirizine (ZYRTEC) 5 MG tablet Take 1 tablet by mouth daily 6/29/22   Manju Winters MD   nitroGLYCERIN (NITROSTAT) 0.4 MG SL tablet up to max of 3 total doses.  If no relief after 1 dose, call 911. 6/28/22   Manju Winters MD   lubiprostone (AMITIZA) 24 MCG capsule Take 24 mcg by mouth daily (with breakfast)     Historical Provider, MD   levothyroxine (SYNTHROID) 125 MCG tablet Take 125 mcg by mouth Daily Take 0.5 tabs by mouth daily    Historical Provider, MD   VITAMIN D, CHOLECALCIFEROL, PO Take 1 tablet by mouth daily    Historical Provider, MD   docusate (COLACE) 50 MG/5ML liquid 10 mLs by Per G Tube route 2 times daily  Patient taking differently: Take 100 mg by mouth 2 times daily 3/29/22   SERGE Villarreal CNP   ondansetron (ZOFRAN-ODT) 4 MG disintegrating tablet Take 1 tablet by mouth every 8 hours as needed for Nausea or Vomiting 3/29/22   SERGE Quintero CNP   Multiple Vitamin (MULTI VITAMIN DAILY PO) Take 1 tablet by mouth daily     Historical Provider, MD   Probiotic Acidophilus (FLORANEX) TABS Take 1 tablet by mouth daily Historical Provider, MD   apixaban (ELIQUIS) 2.5 MG TABS tablet Take 1 tablet by mouth 2 times daily 12/29/21   Scot Lokesh, APRN - CNP   acetaminophen (TYLENOL) 325 MG tablet Take 650 mg by mouth every 6 hours as needed for Pain     Historical Provider, MD     Aspirin  [x] 81 mg  [] 325 mg  [] None  Antiplatelet drug therapy use last 5 days  []No [x]Yes  Coumadin Use Last 5 Days [x]No []Yes  Other anticoagulant use last 5 days  [x]No []Yes  Additional information: Per medical records       Vitals: There were no vitals filed for this visit. Physical Examination:   GENERAL: Alert and oriented. No distress. EYES: No pallor or icterus. ENT: No central cyanosis. VESSELS: No jugular venous distension or carotid bruits. HEART: Normal S1/S2. No murmur, rub or gallop. LUNGS: Clear to auscultation. ABDOMEN: Soft and non-tender. EXTREMITIES: No lower extremity edema. Feet are warm. NEUROLOGICAL: Grossly intact. Procedure Plannd:   [] AF ablation [] Atrial Flutter ablation [] SVT ablation [] PVC/VT ablation [] EP study  [] Pacemaker implantation [x] AICD implantation [] BiV PM/ICD implantation   [] Generator change [] Loop recorder implantation [] Loop recorder explantation. [] Micra leadless pacemaker implantation. [] His bundle/Left bundle pacemaker implantation. [] Lead revision. [] Pocket Revision. [] ORI. [] Cardioversion    []Other:       Planned Sedation/Analgesia:  [] General anesthesia. [] Midazolam [] Fentanyl [] Propofol [] Propofol with anesthesia team.    []Midazolam []Meperidine []Sublimaze []Morphine [] Diazepam    []Other:       ASA Classification  []1 []2 [x]3 []4 []5  Class 1: A normal healthy patient  Class 2: Pt with mild to moderate systemic disease  Class 3: Severe systemic disease or disturbance  Class 4: Severe systemic disorders that are already life threatening. Class 5: Moribund pt with little chances of survival, for more than 24 hours.   Mallampati Airway Classification: []1 []2 [x]3 []4    [x]Pre-procedure diagnostic studies complete and results available. Comment:    [x]Previous sedation/anesthesia experiences assessed. Comment:    [x]The patient is an appropriate candidate to undergo the planned procedure sedation and anesthesia. (Refer to nursing sedation/analgesia documentation record)  [x]Formulation and discussion of sedation/procedure plan, risks, and expectations with patient and/or responsible adult completed. [x]Patient examined immediately prior to the procedure.  (Refer to nursing sedation/analgesia documentation record)        Jeanne Mchugh MD MD [facility]  Electronically signed 11/4/2022 at 7:53 AM

## 2022-11-04 NOTE — PROGRESS NOTES
Discharge teaching and instructions completed with patient and family using teachback method. AVS reviewed. Patient and family voiced understanding regarding medications, follow up appointments, and care of self at home. Discharged in a wheelchair to home with support per family.

## 2022-11-04 NOTE — FLOWSHEET NOTE
11/04/22 1350   Fall Risk Interventions   Hourly Visual Checks Awake; In bed  (Dr. Ely Cummings in to see)   Patient stated that she wanted to have the rest of the fluid removed from safeguard if possible. Dr. Ely Cummings directed me to remove 15ml, and to leave the last 15ml in. Patient did get more relief with that.

## 2022-11-15 ENCOUNTER — NURSE ONLY (OUTPATIENT)
Dept: CARDIOLOGY CLINIC | Age: 87
End: 2022-11-15
Payer: MEDICARE

## 2022-11-15 DIAGNOSIS — Z95.810 ICD (IMPLANTABLE CARDIOVERTER-DEFIBRILLATOR) IN PLACE: Primary | ICD-10-CM

## 2022-11-15 PROCEDURE — 93283 PRGRMG EVAL IMPLANTABLE DFB: CPT | Performed by: INTERNAL MEDICINE

## 2022-11-15 NOTE — PROGRESS NOTES
In Office Medtronic Dual ICD   Pt of [place]    Implanted 11/4/22    Steri strips intact with old drainage, swelling around device, bruising, tender to touch. Old steri-strips removed. Incision cleaned. Picture taken. New steri-strips applied. If, at any point, there is any increased redness, swelling, increased discomfort, fever, or the incision opens up, the patient is aware to go to the emergency room    Reviewed monitor. Reviewed incision care and shock plan.      Battery 12.5 years     Presenting rhythm AS VS    A Impedance 380  RV Impedance 342    RV shock 49    P wave sensing 3.1  R wave sensing 3.6    A Threshold 0.50 @ 0.40  A Amplitude 3.5 @ 0.40  RV Thresholds 0.75 @ 0.40  RV Amplitude 3.5 @ 0.40    A Paced 3.2%  V Paced <0.1%    Programmed Mode AAIR <=> DDDR     Afib [place] 0%    Episodes:   none    Optivol initalizing

## 2023-01-17 ENCOUNTER — TELEPHONE (OUTPATIENT)
Dept: CARDIOLOGY CLINIC | Age: 88
End: 2023-01-17

## 2023-01-17 NOTE — TELEPHONE ENCOUNTER
CL download never received. Call to [facility]. Straight to     Call to daughter, Tiffany Jung. Thought she got the monitor months ago. She will talk to [facility] and try to get the monitor set up.

## 2023-01-19 ENCOUNTER — OFFICE VISIT (OUTPATIENT)
Dept: CARDIOLOGY CLINIC | Age: 88
End: 2023-01-19
Payer: MEDICARE

## 2023-01-19 ENCOUNTER — TELEPHONE (OUTPATIENT)
Dept: CARDIOLOGY CLINIC | Age: 88
End: 2023-01-19

## 2023-01-19 VITALS
SYSTOLIC BLOOD PRESSURE: 104 MMHG | OXYGEN SATURATION: 99 % | HEART RATE: 56 BPM | WEIGHT: 117 LBS | DIASTOLIC BLOOD PRESSURE: 60 MMHG | BODY MASS INDEX: 22.85 KG/M2

## 2023-01-19 DIAGNOSIS — I25.10 CORONARY ARTERY DISEASE INVOLVING NATIVE CORONARY ARTERY OF NATIVE HEART WITHOUT ANGINA PECTORIS: ICD-10-CM

## 2023-01-19 DIAGNOSIS — Z95.810 ICD (IMPLANTABLE CARDIOVERTER-DEFIBRILLATOR) IN PLACE: ICD-10-CM

## 2023-01-19 DIAGNOSIS — I25.5 ISCHEMIC CARDIOMYOPATHY: ICD-10-CM

## 2023-01-19 DIAGNOSIS — I50.22 CHF (CONGESTIVE HEART FAILURE), NYHA CLASS II, CHRONIC, SYSTOLIC (HCC): Primary | ICD-10-CM

## 2023-01-19 LAB
ANION GAP SERPL CALCULATED.3IONS-SCNC: 10 MEQ/L (ref 8–16)
BUN BLDV-MCNC: 53 MG/DL (ref 7–22)
CALCIUM SERPL-MCNC: 9 MG/DL (ref 8.5–10.5)
CHLORIDE BLD-SCNC: 106 MEQ/L (ref 98–111)
CO2: 27 MEQ/L (ref 23–33)
CREAT SERPL-MCNC: 1.3 MG/DL (ref 0.4–1.2)
GFR SERPL CREATININE-BSD FRML MDRD: 40 ML/MIN/1.73M2
GLUCOSE BLD-MCNC: 80 MG/DL (ref 70–108)
POTASSIUM SERPL-SCNC: 4.6 MEQ/L (ref 3.5–5.2)
PRO-BNP: 8075 PG/ML (ref 0–449)
SODIUM BLD-SCNC: 143 MEQ/L (ref 135–145)

## 2023-01-19 PROCEDURE — 36415 COLL VENOUS BLD VENIPUNCTURE: CPT | Performed by: NURSE PRACTITIONER

## 2023-01-19 PROCEDURE — G8427 DOCREV CUR MEDS BY ELIG CLIN: HCPCS | Performed by: NURSE PRACTITIONER

## 2023-01-19 PROCEDURE — 1090F PRES/ABSN URINE INCON ASSESS: CPT | Performed by: NURSE PRACTITIONER

## 2023-01-19 PROCEDURE — G8484 FLU IMMUNIZE NO ADMIN: HCPCS | Performed by: NURSE PRACTITIONER

## 2023-01-19 PROCEDURE — 1123F ACP DISCUSS/DSCN MKR DOCD: CPT | Performed by: NURSE PRACTITIONER

## 2023-01-19 PROCEDURE — 99214 OFFICE O/P EST MOD 30 MIN: CPT | Performed by: NURSE PRACTITIONER

## 2023-01-19 PROCEDURE — 1036F TOBACCO NON-USER: CPT | Performed by: NURSE PRACTITIONER

## 2023-01-19 PROCEDURE — G8420 CALC BMI NORM PARAMETERS: HCPCS | Performed by: NURSE PRACTITIONER

## 2023-01-19 RX ORDER — FUROSEMIDE 20 MG/1
20 TABLET ORAL DAILY
Qty: 30 TABLET | Refills: 5 | Status: SHIPPED | OUTPATIENT
Start: 2023-01-19

## 2023-01-19 ASSESSMENT — ENCOUNTER SYMPTOMS
COUGH: 0
SHORTNESS OF BREATH: 0
ABDOMINAL DISTENTION: 0

## 2023-01-19 NOTE — PROGRESS NOTES
Venipuncture obtained from [facility]. Patient tolerated procedure without complications or complaints.

## 2023-01-19 NOTE — PATIENT INSTRUCTIONS
You may receive a survey regarding the care you received during your visit. Your input is valuable to us. We encourage you to complete and return your survey. We hope you will choose us in the future for your healthcare needs.     Continue:  Continue current medications  Daily weights and record  Fluid restriction of 2 Liters per day  Limit sodium in diet to around 2205-5788 mg/day  Monitor BP  Activity as tolerated     Call the Heart Failure Clinic for any of the following symptoms: [phone]  Weight gain of 2-3 pounds in 1 day or 5 pounds in 1 week  Increased shortness of breath  Shortness of breath while laying down  Cough  Chest pain  Swelling in feet, ankles or legs  Tenderness or bloating in the abdomen  Fatigue   Decreased appetite or nausea   Confusion

## 2023-01-19 NOTE — TELEPHONE ENCOUNTER
Labs reviewed - kidney function improved but still little dry. Decrease Lasix to 20 mg/day - New RX with lower dose sent in.

## 2023-01-19 NOTE — PROGRESS NOTES
Heart Failure Clinic       Visit Date: 1/19/2023  Cardiologist:  Dr. Winslow [place]  Primary Care Physician: Dr. Claudy Garces, APRN - CNP    Marylee Estelle is a 80 y.o. female who presents today for:  Chief Complaint   Patient presents with    Congestive Heart Failure       HPI:   Marylee Estelle is a 80 y.o. female who presents to the office for a follow up patient visit in the heart failure clinic. Accompanied by dtr, [address]  Lives w/ dtr Audrey    TYPE HF: HFrEF (30-35%) since 10/2021   Cause: ICM/NICM  Device: Dual ICD (11/2022, [place])  HX: HTN, Afib (Eliquis), Covid (trach/PEG - extensive admission - 10/2021-2/2022), Trach removed 5/2022. CAD s/p PCI LM, Impella support (6/2022)    Dry Wt:  110    Hospitalization:  6/2022 - admitted CHF. Worsening SOB. [facility] showing Left main disease - opted for PCI (Impella support). OV 7/2022 - 110# Home w/ dtr - OT signed off yesterday  PT still following  Nursing comes twice weekly  \"Feeling a lot better\" over past month - dtr agrees. Dtr states no complaints since been home prior to was always c/o not breathing  TODAY:  117#  Walked from parking lot - did well. Walks outside about 0.5/mile when able. No fluid on exam  Notes urinates a lot at night. Inquiring about decreasing diuretic. Also inquiring about PEG removal - will need anticoags held.    Following w/ ID for sacral wound    Patient has:  Chest Pain: no  SOB: stable  Orthopnea/PND: no   ANA: no  Edema: none - never been issues  Fatigue: stable  Abdominal bloating: no  Cough: no  Appetite: good  Home weight: stable  Home blood pressure: low 100s    Past Medical History:   Diagnosis Date    HERIBERTO (acute kidney injury) ([facility] [address]     Anxiety     Arthritis     Bronchitis     CHF (congestive heart failure) ([facility] [address]     Chronic a-fib ([facility] [address]     COVID     October 2021    Diverticulitis of colon     Hyperkalemia     Hypertension     Neuromuscular dysfunction of bladder     Personal history of COVID-19     Pressure ulcer Protein-calorie malnutrition ([facility] [address]      Past Surgical History:   Procedure Laterality Date    APPENDECTOMY      CHOLECYSTECTOMY      GASTROSTOMY TUBE PLACEMENT N/A 2021    EGD PEG TUBE PLACEMENT performed by Chase Cheung MD at [facility] N/A 2021    DECUBITUS ULCER DEBRIDEMENT REPAIR performed by Marely Villasenor MD at [facility]       History reviewed. No pertinent family history. Social History     Tobacco Use    Smoking status: Former     Packs/day: 1.00     Years: 15.00     Pack years: 15.00     Types: Cigarettes     Quit date: 12/10/1977     Years since quittin.1    Smokeless tobacco: Never   Substance Use Topics    Alcohol use: No     Current Outpatient Medications   Medication Sig Dispense Refill    UNABLE TO FIND CardioPlus      valsartan (DIOVAN) 40 MG tablet Take 1 tablet by mouth daily 90 tablet 1    ticagrelor (BRILINTA) 90 MG TABS tablet Take 1 tablet by mouth 2 times daily 180 tablet 3    furosemide (LASIX) 40 MG tablet Take 1 tablet by mouth See Admin Instructions 40 mg. every other day alternating with 20 mg .daily 30 tablet 0    rosuvastatin (CRESTOR) 20 MG tablet Take 1 tablet by mouth daily 30 tablet 3    carvedilol (COREG) 3.125 MG tablet Take 1 tablet by mouth 2 times daily 60 tablet 3    cetirizine (ZYRTEC) 5 MG tablet Take 1 tablet by mouth daily 20 tablet 0    nitroGLYCERIN (NITROSTAT) 0.4 MG SL tablet up to max of 3 total doses.  If no relief after 1 dose, call 911. 25 tablet 3    lubiprostone (AMITIZA) 24 MCG capsule Take 24 mcg by mouth daily (with breakfast)       levothyroxine (SYNTHROID) 125 MCG tablet Take 125 mcg by mouth Daily Take 0.5 tabs by mouth daily      VITAMIN D, CHOLECALCIFEROL, PO Take 1 tablet by mouth daily      docusate (COLACE) 50 MG/5ML liquid 10 mLs by Per G Tube route 2 times daily 600 mL 2    ondansetron (ZOFRAN-ODT) 4 MG disintegrating tablet Take 1 tablet by mouth every 8 hours as needed for Nausea or Vomiting      Multiple Vitamin (MULTI VITAMIN DAILY PO) Take 1 tablet by mouth daily       Probiotic Acidophilus (FLORANEX) TABS Take 1 tablet by mouth daily       apixaban (ELIQUIS) 2.5 MG TABS tablet Take 1 tablet by mouth 2 times daily 60 tablet     acetaminophen (TYLENOL) 325 MG tablet Take 650 mg by mouth every 6 hours as needed for Pain        No current facility-administered medications for this visit. Allergies   Allergen Reactions    Sulfa Antibiotics     Metronidazole Rash and Hives       SUBJECTIVE:   Review of Systems   Constitutional:  Positive for fatigue (improving). Negative for activity change and appetite change. Respiratory:  Negative for cough and shortness of breath. Cardiovascular:  Negative for chest pain, palpitations and leg swelling. Gastrointestinal:  Negative for abdominal distention. Neurological:  Negative for weakness, light-headedness and headaches. Hematological:  Negative for adenopathy. Psychiatric/Behavioral:  Negative for sleep disturbance. OBJECTIVE:   Today's Vitals:  /60   Pulse 56   Wt 117 lb (53.1 kg)   SpO2 99%   BMI 22.85 kg/m²     Physical Exam  Vitals reviewed. Constitutional:       General: She is not in acute distress. Appearance: Normal appearance. She is well-developed. She is not diaphoretic. HENT:      Head: Normocephalic and atraumatic. Eyes:      Conjunctiva/sclera: Conjunctivae normal.   Neck:      Comments: No JVD  Cardiovascular:      Rate and Rhythm: Normal rate and regular rhythm. Heart sounds: Normal heart sounds. No murmur heard. Comments: Dual ICD  Pulmonary:      Effort: Pulmonary effort is normal. No respiratory distress. Breath sounds: Normal breath sounds. No wheezing or rales. Abdominal:      General: Bowel sounds are normal. There is no distension. Palpations: Abdomen is soft. Tenderness: There is no abdominal tenderness.    Musculoskeletal:         General: Normal range of motion. Cervical back: Normal range of motion and neck supple. Right lower leg: No edema. Left lower leg: No edema. Skin:     General: Skin is warm and dry. Capillary Refill: Capillary refill takes less than 2 seconds. Neurological:      Mental Status: She is alert and oriented to person, place, and time. Coordination: Coordination normal.   Psychiatric:         Behavior: Behavior normal.         Wt Readings from Last 3 Encounters:   01/19/23 117 lb (53.1 kg)   11/04/22 109 lb (49.4 kg)   10/21/22 115 lb 9.6 oz (52.4 kg)     BP Readings from Last 3 Encounters:   01/19/23 104/60   11/04/22 110/62   10/21/22 (!) 121/58     Pulse Readings from Last 3 Encounters:   01/19/23 56   11/04/22 60   10/21/22 67     Body mass index is 22.85 kg/m². ECHO:    Summary   Limited study. Moderately dilated left ventricular size and severely reduced systolic   function. There was severe global hypokinesis. Wall thickness was within normal limits. Ejection fraction was estimated at 30-35%. IVC size is within normal limits with normal respiratory phasic changes. The pericardium was normal in appearance with a moderate,   non-hemodynamically significant pericardial effusion. Left sided pleural effusion. Ascites. Signatur      ----------------------------------------------------------------   Electronically signed by Madhu Talbot MD (Interpreting   physician) on 06/21/2022 at 02:25 PM    11/2021 - EF 25%  10/2021 - EF 25%      CATH/STRESS:   CONCLUSION:  Successful IVUS-guided PCI and stenting of the left main  coronary artery with Impella support. Rotational atherectomy with 1.5  mm connie with subsequent stenting. RECOMMENDATIONS:  Transfer to the intensive care unit for close  observation. Access site care. Strict bedrest for 4 hours  postprocedure. Aggressive risk factor modification. Aspirin 81 mg p.o.  daily. Brilinta 90 mg p.o. b.i.d.   High intensity statin therapy. Resume Lasix, change to oral.  Resume Aldactone in the a.m. if  creatinine and potassium are stable. Guideline-directed medical therapy  for congestive heart failure with reduced ejection fraction. Maria R Garcia MD     D: 06/24/2022 11:09:44           Results reviewed:  BNP: No results found for: BNP  CBC:   Lab Results   Component Value Date/Time    WBC 5.6 11/04/2022 05:35 AM    RBC 3.55 11/04/2022 05:35 AM    RBC 4.02 08/10/2011 05:24 AM    HGB 11.6 11/04/2022 05:35 AM    HCT 36.0 11/04/2022 05:35 AM     11/04/2022 05:35 AM     CMP:    Lab Results   Component Value Date/Time     11/04/2022 05:35 AM    K 4.2 11/04/2022 05:35 AM    CL 98 11/04/2022 05:35 AM    CO2 25 11/04/2022 05:35 AM    BUN 74 11/04/2022 05:35 AM    CREATININE 1.7 11/04/2022 05:35 AM    GFRAA >60 07/18/2019 08:16 AM    LABGLOM 29 11/04/2022 05:35 AM    GLUCOSE 80 11/04/2022 05:35 AM    GLUCOSE 118 08/10/2011 05:24 AM    CALCIUM 9.4 11/04/2022 05:35 AM     Hepatic Function Panel:    Lab Results   Component Value Date/Time    ALKPHOS 53 03/27/2022 06:58 PM    ALT 7 03/27/2022 06:58 PM    AST 22 03/27/2022 06:58 PM    PROT 6.9 03/27/2022 06:58 PM    BILITOT 0.2 03/27/2022 06:58 PM    BILIDIR <0.2 11/08/2021 05:10 AM    LABALBU 3.4 03/27/2022 06:58 PM    LABALBU 3.9 08/10/2011 05:24 AM     Magnesium:    Lab Results   Component Value Date/Time    MG 2.0 07/19/2022 10:04 AM     PT/INR:    Lab Results   Component Value Date/Time    INR 1.09 11/04/2022 05:35 AM     Lipids:    Lab Results   Component Value Date/Time    TRIG 99 06/23/2022 05:20 AM    HDL 46 06/23/2022 05:20 AM    LDLCALC 57 06/23/2022 05:20 AM       ASSESSMENT AND PLAN:      Diagnosis Orders   1. CHF (congestive heart failure), NYHA class II, chronic, systolic (HCC)  Brain Natriuretic Peptide    Basic Metabolic Panel      2. Coronary artery disease involving native coronary artery of native heart without angina pectoris        3.  Ischemic cardiomyopathy 4. ICD (implantable cardioverter-defibrillator) in place            Continue:  GDMT:   ACE/ARB/ARNI - Diovan 40/day   BB - Coreg 3.125 BID   SGLT2 -  no  AA - no  Diuretic - Lasix 40 alternating 20  Vasodilator - no  Other - Brilinta, Eliquis  HFrEF (30-35)  CAD s/p Left main PCI w/ Impella assist  ICM  S/p Dual ICD (11/2022)  Extensive Covid admission 10/2021      Stable, appears Euvolemic  Lab reviewed from 11/2022 - dry - creat up 1.7/BUN 74  Repeat blood work today - BMP, BNP  BP/HR stable - runs low end. Unable to fully optimize meds d/t hypotension  No med changes today - pending blood work  Continue diet/fluid adherence  Continue daily wts. F/U w/ Cardiology/[facility] in 3 months  F/U in clinic in 9 months    Tolerating above noted HF meds, no ill side effects noted. Will continue to monitor kidney function and electrolytes. Will optimize as tolerated. Pt is compliant w/ medications. Total visit time of 30 minutes has been spent with patient on education of symptoms, management, medication, and plan of care; as well as review of chart: labs, ECHO, radiology reports, etc.   I personally spent more then 50% of the appt time face to face with the patient. Daily weights  Fluid restriction of 2 Liters per day  Limit sodium in diet to around 9503-4065 mg/day  Monitor BP  Activity as tolerated     Patient was instructed to call the [employer] for any changes in symptoms as noted in AVS.      No follow-ups on file. or sooner if needed     Patient given educational materials - see patient instructions. We discussed the importance of weighing oneself and recording daily. We also discussed the importance of a low sodium diet, higher sodium foods to avoid and better low sodium food options. Patient verbalizes understanding of plan of care using teach back method, and is agreeable to the treatment plan.        Electronically signed by SERGE Cottrell CNP on 1/19/2023 at 11:05 AM

## 2023-01-22 ENCOUNTER — TELEPHONE (OUTPATIENT)
Dept: CARDIOLOGY CLINIC | Age: 88
End: 2023-01-22

## 2023-01-23 NOTE — TELEPHONE ENCOUNTER
Please let pt/dtr know I spoke w/ Dr Eugenia Tyler about holding Plavix for potential PEG removal - see response below.     Complex left main PCI in setting of elevated troponin, suspected ACS  Unless there is an emergency, I would continue DAPT without interruption for one year post PCI    June would be the one year [place] - she can reach out to GI/Dr Luis Daniel Otero then regarding eval for PEG removal.

## 2023-02-15 ENCOUNTER — NURSE ONLY (OUTPATIENT)
Dept: CARDIOLOGY CLINIC | Age: 88
End: 2023-02-15
Payer: MEDICARE

## 2023-02-15 DIAGNOSIS — Z95.810 ICD (IMPLANTABLE CARDIOVERTER-DEFIBRILLATOR) IN PLACE: ICD-10-CM

## 2023-02-15 PROCEDURE — 93283 PRGRMG EVAL IMPLANTABLE DFB: CPT | Performed by: NUCLEAR MEDICINE

## 2023-02-15 NOTE — PROGRESS NOTES
Medtronic dual ICD in office     . [employer] Battery longevity:  11.8 years   Presenting rhythm  AS VS   Optivol WNL  PVC's    Atrial impedance 399  RV impedance 399  Shock 54    P wave sensing 2.5  R wave sensing 5.1    21.3 % atrial paced  0.1 % RV paced     Atrial threshold 0.5 V  at 0.4ms  amplitude decreased to 2  RV threshold 0.5 V at 0.4ms chronic amplitude decreased to 2.5  Mode switches   known/eliquis

## 2023-03-14 PROCEDURE — G2066 INTER DEVC REMOTE 30D: HCPCS | Performed by: NUCLEAR MEDICINE

## 2023-03-14 PROCEDURE — 93297 REM INTERROG DEV EVAL ICPMS: CPT | Performed by: NUCLEAR MEDICINE

## 2023-03-15 ENCOUNTER — PROCEDURE VISIT (OUTPATIENT)
Dept: CARDIOLOGY CLINIC | Age: 88
End: 2023-03-15
Payer: MEDICARE

## 2023-03-15 DIAGNOSIS — I50.22 CHF (CONGESTIVE HEART FAILURE), NYHA CLASS II, CHRONIC, SYSTOLIC (HCC): Primary | ICD-10-CM

## 2023-03-15 NOTE — PROGRESS NOTES
[facility] Medtronic Dual ICD Optivol  Pt of Baki    Battery 11.8 years    Optivol WNL    Hx afib - taking eliquis  Episodes  2/26/23 1640-- 1 min 39 seconds afib

## 2023-04-19 ENCOUNTER — PROCEDURE VISIT (OUTPATIENT)
Dept: CARDIOLOGY CLINIC | Age: 88
End: 2023-04-19

## 2023-04-19 DIAGNOSIS — I50.22 CHF (CONGESTIVE HEART FAILURE), NYHA CLASS II, CHRONIC, SYSTOLIC (HCC): Primary | ICD-10-CM

## 2023-04-19 NOTE — PROGRESS NOTES
[facility] Medtronic Dual ICD Optivol  Pt of Baki    Battery 11.9 years    Optivol WNL    Pt taking eliquis    Episodes  10 episodes afib

## 2023-04-21 ENCOUNTER — HOSPITAL ENCOUNTER (INPATIENT)
Age: 88
LOS: 3 days | Discharge: HOME HEALTH | DRG: 391 | End: 2023-04-24
Attending: STUDENT IN AN ORGANIZED HEALTH CARE EDUCATION/TRAINING PROGRAM | Admitting: STUDENT IN AN ORGANIZED HEALTH CARE EDUCATION/TRAINING PROGRAM
Payer: MEDICARE

## 2023-04-21 ENCOUNTER — APPOINTMENT (OUTPATIENT)
Dept: CT IMAGING | Age: 88
DRG: 391 | End: 2023-04-21
Payer: MEDICARE

## 2023-04-21 ENCOUNTER — OFFICE VISIT (OUTPATIENT)
Dept: CARDIOLOGY CLINIC | Age: 88
End: 2023-04-21
Payer: MEDICARE

## 2023-04-21 VITALS
HEIGHT: 60 IN | WEIGHT: 121 LBS | BODY MASS INDEX: 23.75 KG/M2 | HEART RATE: 66 BPM | SYSTOLIC BLOOD PRESSURE: 117 MMHG | DIASTOLIC BLOOD PRESSURE: 57 MMHG

## 2023-04-21 DIAGNOSIS — K57.32 DIVERTICULITIS OF COLON: ICD-10-CM

## 2023-04-21 DIAGNOSIS — T85.848A PAIN AROUND PERCUTANEOUS ENDOSCOPIC GASTROSTOMY (PEG) TUBE SITE, INITIAL ENCOUNTER: ICD-10-CM

## 2023-04-21 DIAGNOSIS — I48.21 PERMANENT ATRIAL FIBRILLATION (HCC): ICD-10-CM

## 2023-04-21 DIAGNOSIS — I31.39 PERICARDIAL EFFUSION: ICD-10-CM

## 2023-04-21 DIAGNOSIS — I50.43 CHF (CONGESTIVE HEART FAILURE), NYHA CLASS I, ACUTE ON CHRONIC, COMBINED (HCC): ICD-10-CM

## 2023-04-21 DIAGNOSIS — R19.7 DIARRHEA, UNSPECIFIED TYPE: Primary | ICD-10-CM

## 2023-04-21 DIAGNOSIS — I10 PRIMARY HYPERTENSION: Primary | ICD-10-CM

## 2023-04-21 DIAGNOSIS — I25.10 CORONARY ARTERY DISEASE INVOLVING NATIVE CORONARY ARTERY OF NATIVE HEART WITHOUT ANGINA PECTORIS: ICD-10-CM

## 2023-04-21 DIAGNOSIS — N32.89 BLADDER WALL THICKENING: ICD-10-CM

## 2023-04-21 DIAGNOSIS — N73.9 PELVIC ABSCESS IN FEMALE: ICD-10-CM

## 2023-04-21 DIAGNOSIS — D68.9 COAGULOPATHY (HCC): ICD-10-CM

## 2023-04-21 PROBLEM — K57.92 ACUTE DIVERTICULITIS: Status: ACTIVE | Noted: 2023-04-21

## 2023-04-21 LAB
ALBUMIN SERPL BCG-MCNC: 4.2 G/DL (ref 3.5–5.1)
ALP SERPL-CCNC: 59 U/L (ref 38–126)
ALT SERPL W/O P-5'-P-CCNC: 13 U/L (ref 11–66)
ANION GAP SERPL CALC-SCNC: 15 MEQ/L (ref 8–16)
AST SERPL-CCNC: 41 U/L (ref 5–40)
BACTERIA URNS QL MICRO: ABNORMAL /HPF
BASOPHILS ABSOLUTE: 0 THOU/MM3 (ref 0–0.1)
BASOPHILS NFR BLD AUTO: 0.3 %
BILIRUB SERPL-MCNC: 0.6 MG/DL (ref 0.3–1.2)
BILIRUB UR QL STRIP.AUTO: NEGATIVE
BUN SERPL-MCNC: 39 MG/DL (ref 7–22)
CALCIUM SERPL-MCNC: 9.6 MG/DL (ref 8.5–10.5)
CASTS #/AREA URNS LPF: ABNORMAL /LPF
CASTS 2: ABNORMAL /LPF
CHARACTER UR: CLEAR
CHLORIDE SERPL-SCNC: 101 MEQ/L (ref 98–111)
CO2 SERPL-SCNC: 22 MEQ/L (ref 23–33)
COLOR: YELLOW
CREAT SERPL-MCNC: 1.4 MG/DL (ref 0.4–1.2)
CRP SERPL-MCNC: 9.77 MG/DL (ref 0–1)
CRYSTALS URNS MICRO: ABNORMAL
DEPRECATED RDW RBC AUTO: 58.1 FL (ref 35–45)
EOSINOPHIL NFR BLD AUTO: 1.1 %
EOSINOPHILS ABSOLUTE: 0.1 THOU/MM3 (ref 0–0.4)
EPITHELIAL CELLS, UA: ABNORMAL /HPF
ERYTHROCYTE [DISTWIDTH] IN BLOOD BY AUTOMATED COUNT: 15.3 % (ref 11.5–14.5)
GFR SERPL CREATININE-BSD FRML MDRD: 36 ML/MIN/1.73M2
GLUCOSE SERPL-MCNC: 139 MG/DL (ref 70–108)
GLUCOSE UR QL STRIP.AUTO: NEGATIVE MG/DL
HCT VFR BLD AUTO: 33.4 % (ref 37–47)
HGB BLD-MCNC: 10.2 GM/DL (ref 12–16)
HGB UR QL STRIP.AUTO: ABNORMAL
IMM GRANULOCYTES # BLD AUTO: 0.1 THOU/MM3 (ref 0–0.07)
IMM GRANULOCYTES NFR BLD AUTO: 0.9 %
KETONES UR QL STRIP.AUTO: NEGATIVE
LYMPHOCYTES ABSOLUTE: 1.3 THOU/MM3 (ref 1–4.8)
LYMPHOCYTES NFR BLD AUTO: 10.9 %
MAGNESIUM SERPL-MCNC: 2.1 MG/DL (ref 1.6–2.4)
MCH RBC QN AUTO: 31.6 PG (ref 26–33)
MCHC RBC AUTO-ENTMCNC: 30.5 GM/DL (ref 32.2–35.5)
MCV RBC AUTO: 103.4 FL (ref 81–99)
MISCELLANEOUS 2: ABNORMAL
MONOCYTES ABSOLUTE: 1.9 THOU/MM3 (ref 0.4–1.3)
MONOCYTES NFR BLD AUTO: 16.1 %
NEUTROPHILS NFR BLD AUTO: 70.7 %
NITRITE UR QL STRIP: NEGATIVE
NRBC BLD AUTO-RTO: 0 /100 WBC
OSMOLALITY SERPL CALC.SUM OF ELEC: 287.3 MOSMOL/KG (ref 275–300)
PH UR STRIP.AUTO: 6 [PH] (ref 5–9)
PHOSPHATE SERPL-MCNC: 3.4 MG/DL (ref 2.4–4.7)
PLATELET # BLD AUTO: 130 THOU/MM3 (ref 130–400)
PMV BLD AUTO: 11.7 FL (ref 9.4–12.4)
POTASSIUM SERPL-SCNC: 5.3 MEQ/L (ref 3.5–5.2)
PROT SERPL-MCNC: 7.9 G/DL (ref 6.1–8)
PROT UR STRIP.AUTO-MCNC: ABNORMAL MG/DL
RBC # BLD AUTO: 3.23 MILL/MM3 (ref 4.2–5.4)
RBC URINE: ABNORMAL /HPF
RENAL EPI CELLS #/AREA URNS HPF: ABNORMAL /[HPF]
SEGMENTED NEUTROPHILS ABSOLUTE COUNT: 8.3 THOU/MM3 (ref 1.8–7.7)
SODIUM SERPL-SCNC: 138 MEQ/L (ref 135–145)
SP GR UR REFRACT.AUTO: 1 (ref 1–1.03)
UROBILINOGEN, URINE: 0.2 EU/DL (ref 0–1)
WBC # BLD AUTO: 11.8 THOU/MM3 (ref 4.8–10.8)
WBC #/AREA URNS HPF: ABNORMAL /HPF
WBC #/AREA URNS HPF: ABNORMAL /[HPF]
YEAST LIKE FUNGI URNS QL MICRO: ABNORMAL

## 2023-04-21 PROCEDURE — 93005 ELECTROCARDIOGRAM TRACING: CPT | Performed by: PHYSICIAN ASSISTANT

## 2023-04-21 PROCEDURE — 2580000003 HC RX 258: Performed by: STUDENT IN AN ORGANIZED HEALTH CARE EDUCATION/TRAINING PROGRAM

## 2023-04-21 PROCEDURE — 6360000002 HC RX W HCPCS: Performed by: STUDENT IN AN ORGANIZED HEALTH CARE EDUCATION/TRAINING PROGRAM

## 2023-04-21 PROCEDURE — 96361 HYDRATE IV INFUSION ADD-ON: CPT

## 2023-04-21 PROCEDURE — 2580000003 HC RX 258

## 2023-04-21 PROCEDURE — 81001 URINALYSIS AUTO W/SCOPE: CPT

## 2023-04-21 PROCEDURE — 99223 1ST HOSP IP/OBS HIGH 75: CPT | Performed by: STUDENT IN AN ORGANIZED HEALTH CARE EDUCATION/TRAINING PROGRAM

## 2023-04-21 PROCEDURE — 99285 EMERGENCY DEPT VISIT HI MDM: CPT

## 2023-04-21 PROCEDURE — 85025 COMPLETE CBC W/AUTO DIFF WBC: CPT

## 2023-04-21 PROCEDURE — 96360 HYDRATION IV INFUSION INIT: CPT

## 2023-04-21 PROCEDURE — 99214 OFFICE O/P EST MOD 30 MIN: CPT | Performed by: NUCLEAR MEDICINE

## 2023-04-21 PROCEDURE — APPSS30 APP SPLIT SHARED TIME 16-30 MINUTES: Performed by: NURSE PRACTITIONER

## 2023-04-21 PROCEDURE — 36415 COLL VENOUS BLD VENIPUNCTURE: CPT

## 2023-04-21 PROCEDURE — 83735 ASSAY OF MAGNESIUM: CPT

## 2023-04-21 PROCEDURE — 2580000003 HC RX 258: Performed by: PHYSICIAN ASSISTANT

## 2023-04-21 PROCEDURE — 6370000000 HC RX 637 (ALT 250 FOR IP)

## 2023-04-21 PROCEDURE — 1200000003 HC TELEMETRY R&B

## 2023-04-21 PROCEDURE — 6370000000 HC RX 637 (ALT 250 FOR IP): Performed by: PHYSICIAN ASSISTANT

## 2023-04-21 PROCEDURE — 74177 CT ABD & PELVIS W/CONTRAST: CPT

## 2023-04-21 PROCEDURE — G8427 DOCREV CUR MEDS BY ELIG CLIN: HCPCS | Performed by: NUCLEAR MEDICINE

## 2023-04-21 PROCEDURE — G8420 CALC BMI NORM PARAMETERS: HCPCS | Performed by: NUCLEAR MEDICINE

## 2023-04-21 PROCEDURE — 80053 COMPREHEN METABOLIC PANEL: CPT

## 2023-04-21 PROCEDURE — 1090F PRES/ABSN URINE INCON ASSESS: CPT | Performed by: NUCLEAR MEDICINE

## 2023-04-21 PROCEDURE — 84100 ASSAY OF PHOSPHORUS: CPT

## 2023-04-21 PROCEDURE — 1036F TOBACCO NON-USER: CPT | Performed by: NUCLEAR MEDICINE

## 2023-04-21 PROCEDURE — 6360000004 HC RX CONTRAST MEDICATION: Performed by: PHYSICIAN ASSISTANT

## 2023-04-21 PROCEDURE — 1123F ACP DISCUSS/DSCN MKR DOCD: CPT | Performed by: NUCLEAR MEDICINE

## 2023-04-21 PROCEDURE — 86140 C-REACTIVE PROTEIN: CPT

## 2023-04-21 RX ORDER — POLYETHYLENE GLYCOL 3350 17 G/17G
17 POWDER, FOR SOLUTION ORAL DAILY PRN
Status: DISCONTINUED | OUTPATIENT
Start: 2023-04-21 | End: 2023-04-24 | Stop reason: HOSPADM

## 2023-04-21 RX ORDER — ACETAMINOPHEN 325 MG/1
650 TABLET ORAL EVERY 6 HOURS PRN
Status: DISCONTINUED | OUTPATIENT
Start: 2023-04-21 | End: 2023-04-24 | Stop reason: HOSPADM

## 2023-04-21 RX ORDER — CETIRIZINE HYDROCHLORIDE 10 MG/1
5 TABLET ORAL DAILY
Status: DISCONTINUED | OUTPATIENT
Start: 2023-04-22 | End: 2023-04-24 | Stop reason: HOSPADM

## 2023-04-21 RX ORDER — SODIUM CHLORIDE 0.9 % (FLUSH) 0.9 %
5-40 SYRINGE (ML) INJECTION PRN
Status: DISCONTINUED | OUTPATIENT
Start: 2023-04-21 | End: 2023-04-24 | Stop reason: HOSPADM

## 2023-04-21 RX ORDER — SODIUM CHLORIDE 9 MG/ML
INJECTION, SOLUTION INTRAVENOUS PRN
Status: DISCONTINUED | OUTPATIENT
Start: 2023-04-21 | End: 2023-04-24 | Stop reason: HOSPADM

## 2023-04-21 RX ORDER — LEVOTHYROXINE SODIUM 0.12 MG/1
62.5 TABLET ORAL DAILY
Status: DISCONTINUED | OUTPATIENT
Start: 2023-04-22 | End: 2023-04-24 | Stop reason: HOSPADM

## 2023-04-21 RX ORDER — ONDANSETRON 2 MG/ML
4 INJECTION INTRAMUSCULAR; INTRAVENOUS EVERY 6 HOURS PRN
Status: DISCONTINUED | OUTPATIENT
Start: 2023-04-21 | End: 2023-04-24 | Stop reason: HOSPADM

## 2023-04-21 RX ORDER — ONDANSETRON 4 MG/1
4 TABLET, ORALLY DISINTEGRATING ORAL ONCE
Status: COMPLETED | OUTPATIENT
Start: 2023-04-21 | End: 2023-04-21

## 2023-04-21 RX ORDER — DICYCLOMINE HYDROCHLORIDE 10 MG/1
20 CAPSULE ORAL
Status: DISCONTINUED | OUTPATIENT
Start: 2023-04-21 | End: 2023-04-24 | Stop reason: HOSPADM

## 2023-04-21 RX ORDER — SODIUM CHLORIDE 0.9 % (FLUSH) 0.9 %
5-40 SYRINGE (ML) INJECTION EVERY 12 HOURS SCHEDULED
Status: DISCONTINUED | OUTPATIENT
Start: 2023-04-21 | End: 2023-04-24 | Stop reason: HOSPADM

## 2023-04-21 RX ORDER — LUBIPROSTONE 24 UG/1
24 CAPSULE ORAL
Status: DISCONTINUED | OUTPATIENT
Start: 2023-04-22 | End: 2023-04-21

## 2023-04-21 RX ORDER — ROSUVASTATIN CALCIUM 20 MG/1
20 TABLET, COATED ORAL NIGHTLY
Status: DISCONTINUED | OUTPATIENT
Start: 2023-04-21 | End: 2023-04-23

## 2023-04-21 RX ORDER — CARVEDILOL 3.12 MG/1
3.12 TABLET ORAL 2 TIMES DAILY WITH MEALS
Status: DISCONTINUED | OUTPATIENT
Start: 2023-04-21 | End: 2023-04-24 | Stop reason: HOSPADM

## 2023-04-21 RX ORDER — ONDANSETRON 4 MG/1
4 TABLET, ORALLY DISINTEGRATING ORAL EVERY 8 HOURS PRN
Status: DISCONTINUED | OUTPATIENT
Start: 2023-04-21 | End: 2023-04-24 | Stop reason: HOSPADM

## 2023-04-21 RX ORDER — 0.9 % SODIUM CHLORIDE 0.9 %
1000 INTRAVENOUS SOLUTION INTRAVENOUS ONCE
Status: COMPLETED | OUTPATIENT
Start: 2023-04-21 | End: 2023-04-21

## 2023-04-21 RX ORDER — ACETAMINOPHEN 650 MG/1
650 SUPPOSITORY RECTAL EVERY 6 HOURS PRN
Status: DISCONTINUED | OUTPATIENT
Start: 2023-04-21 | End: 2023-04-24 | Stop reason: HOSPADM

## 2023-04-21 RX ORDER — VALSARTAN 40 MG/1
40 TABLET ORAL DAILY
Status: DISCONTINUED | OUTPATIENT
Start: 2023-04-22 | End: 2023-04-24 | Stop reason: HOSPADM

## 2023-04-21 RX ADMIN — ONDANSETRON 4 MG: 4 TABLET, ORALLY DISINTEGRATING ORAL at 15:24

## 2023-04-21 RX ADMIN — TICAGRELOR 90 MG: 90 TABLET ORAL at 21:33

## 2023-04-21 RX ADMIN — ROSUVASTATIN CALCIUM 20 MG: 20 TABLET, FILM COATED ORAL at 21:33

## 2023-04-21 RX ADMIN — SODIUM CHLORIDE, PRESERVATIVE FREE 10 ML: 5 INJECTION INTRAVENOUS at 21:25

## 2023-04-21 RX ADMIN — SODIUM CHLORIDE 1000 ML: 9 INJECTION, SOLUTION INTRAVENOUS at 14:53

## 2023-04-21 RX ADMIN — IOPAMIDOL 80 ML: 755 INJECTION, SOLUTION INTRAVENOUS at 15:50

## 2023-04-21 RX ADMIN — PIPERACILLIN AND TAZOBACTAM 4500 MG: 4; .5 INJECTION, POWDER, LYOPHILIZED, FOR SOLUTION INTRAVENOUS at 21:25

## 2023-04-21 RX ADMIN — VANCOMYCIN HYDROCHLORIDE 1250 MG: 5 INJECTION, POWDER, LYOPHILIZED, FOR SOLUTION INTRAVENOUS at 22:42

## 2023-04-21 ASSESSMENT — ENCOUNTER SYMPTOMS
BACK PAIN: 0
DIARRHEA: 1
COUGH: 0
SHORTNESS OF BREATH: 0
SORE THROAT: 0
RHINORRHEA: 0
VOMITING: 0
ABDOMINAL PAIN: 1
NAUSEA: 1
PHOTOPHOBIA: 0
BLOOD IN STOOL: 0

## 2023-04-21 ASSESSMENT — PAIN SCALES - GENERAL: PAINLEVEL_OUTOF10: 0

## 2023-04-21 NOTE — PROGRESS NOTES
[id number] [facility] [address] [address]  [place] [address]  Dept: [phone]  Dept Fax: [phone]  Loc: [phone]    Visit Date: 2023    Nick Wray is a 80 y.o. female who presents todayfor:  Chief Complaint   Patient presents with    Cardiac Clearance     Dental Work with [facility]     Hypertension    Coronary Artery Disease    Hyperlipidemia   Very complicated  Very sick   Diarrhea lately after ABX  ? ? C diff  Peg tube still in   Had a recent ICD  Know LM stent   No chest pain   More fatigue   More dyspnea  Looks very sick   BP is stable   No dizziness  No syncope        HPI:  HPI  Past Medical History:   Diagnosis Date    HERIBERTO (acute kidney injury) ([address]     Anxiety     Arthritis     Bronchitis     CHF (congestive heart failure) ([address]     Chronic a-fib ([facility] [address]     COVID     2021    Diverticulitis of colon     Hyperkalemia     Hypertension     Medtronic cobalt dual ICD  2/15/2023    Neuromuscular dysfunction of bladder     Personal history of COVID-19     Pressure ulcer     Protein-calorie malnutrition ([facility] [address]       Past Surgical History:   Procedure Laterality Date    APPENDECTOMY      CHOLECYSTECTOMY      GASTROSTOMY TUBE PLACEMENT N/A 2021    EGD PEG TUBE PLACEMENT performed by Ardean Schlatter, MD at [facility] N/A 2021    DECUBITUS ULCER DEBRIDEMENT REPAIR performed by Jose G Carlton MD at [facility]       No family history on file.   Social History     Tobacco Use    Smoking status: Former     Packs/day: 1.00     Years: 15.00     Pack years: 15.00     Types: Cigarettes     Quit date: 12/10/1977     Years since quittin.3    Smokeless tobacco: Never   Substance Use Topics    Alcohol use: No      Current Outpatient Medications   Medication Sig Dispense Refill    UNABLE TO FIND CardioPlus      furosemide (LASIX) 20 MG tablet Take 1 tablet by mouth daily 30 tablet

## 2023-04-22 PROBLEM — R19.7 DIARRHEA: Status: ACTIVE | Noted: 2023-04-22

## 2023-04-22 LAB
ANION GAP SERPL CALC-SCNC: 12 MEQ/L (ref 8–16)
BUN SERPL-MCNC: 28 MG/DL (ref 7–22)
C CAYETANENSIS DNA SPEC QL NAA+PROBE: NOT DETECTED
C DIFF GDH STL QL: NEGATIVE
C DIFF TOX GENS STL QL NAA+PROBE: NORMAL
CALCIUM SERPL-MCNC: 8.7 MG/DL (ref 8.5–10.5)
CAMPY SP DNA.DIARRHEA STL QL NAA+PROBE: NOT DETECTED
CHLORIDE SERPL-SCNC: 110 MEQ/L (ref 98–111)
CO2 SERPL-SCNC: 21 MEQ/L (ref 23–33)
CREAT SERPL-MCNC: 1.4 MG/DL (ref 0.4–1.2)
CRYPTOSP DNA SPEC QL NAA+PROBE: NOT DETECTED
DEPRECATED RDW RBC AUTO: 57.7 FL (ref 35–45)
E COLI O157H7 DNA SPEC QL NAA+PROBE: NORMAL
E HISTOLYT DNA SPEC QL NAA+PROBE: NOT DETECTED
EAEC PAA PLAS AGGR+AATA ST NAA+NON-PRB: NOT DETECTED
EC STX1+STX2 + H7 FLIC SPEC NAA+PROBE: NOT DETECTED
EPEC EAE GENE STL QL NAA+NON-PROBE: NOT DETECTED
ERYTHROCYTE [DISTWIDTH] IN BLOOD BY AUTOMATED COUNT: 15.1 % (ref 11.5–14.5)
ETEC LTA+ST1A+ST1B TOX ST NAA+NON-PROBE: NOT DETECTED
G LAMBLIA DNA SPEC QL NAA+PROBE: NOT DETECTED
GFR SERPL CREATININE-BSD FRML MDRD: 36 ML/MIN/1.73M2
GLUCOSE SERPL-MCNC: 93 MG/DL (ref 70–108)
HADV DNA SPEC QL NAA+PROBE: NOT DETECTED
HASTV RNA SPEC QL NAA+PROBE: NOT DETECTED
HCT VFR BLD AUTO: 32.2 % (ref 37–47)
HGB BLD-MCNC: 9.9 GM/DL (ref 12–16)
MAGNESIUM SERPL-MCNC: 2 MG/DL (ref 1.6–2.4)
MCH RBC QN AUTO: 31.7 PG (ref 26–33)
MCHC RBC AUTO-ENTMCNC: 30.7 GM/DL (ref 32.2–35.5)
MCV RBC AUTO: 103.2 FL (ref 81–99)
MRSA DNA SPEC QL NAA+PROBE: NEGATIVE
NOROVIRUS GI + GII RNA STL NAA+PROBE: NOT DETECTED
OSMOLALITY SERPL CALC.SUM OF ELEC: 290.1 MOSMOL/KG (ref 275–300)
P SHIGELLOIDES DNA STL QL NAA+PROBE: NOT DETECTED
PHOSPHATE SERPL-MCNC: 3.5 MG/DL (ref 2.4–4.7)
PLATELET # BLD AUTO: 128 THOU/MM3 (ref 130–400)
PMV BLD AUTO: 11.8 FL (ref 9.4–12.4)
POTASSIUM SERPL-SCNC: 4.2 MEQ/L (ref 3.5–5.2)
RBC # BLD AUTO: 3.12 MILL/MM3 (ref 4.2–5.4)
RV RNA SPEC QL NAA+PROBE: NOT DETECTED
SALMONELLA DNA SPEC QL NAA+PROBE: NOT DETECTED
SAPOVIRUS RNA SPEC QL NAA+PROBE: NOT DETECTED
SHIGELLA SP+EIEC IPAH ST NAA+NON-PROBE: NOT DETECTED
SODIUM SERPL-SCNC: 143 MEQ/L (ref 135–145)
V CHOLERAE DNA SPEC QL NAA+PROBE: NOT DETECTED
VIBRIO DNA SPEC NAA+PROBE: NOT DETECTED
WBC # BLD AUTO: 8.8 THOU/MM3 (ref 4.8–10.8)
Y ENTERO RECN STL QL NAA+PROBE: NOT DETECTED

## 2023-04-22 PROCEDURE — 1200000003 HC TELEMETRY R&B

## 2023-04-22 PROCEDURE — 99232 SBSQ HOSP IP/OBS MODERATE 35: CPT | Performed by: SURGERY

## 2023-04-22 PROCEDURE — 6370000000 HC RX 637 (ALT 250 FOR IP)

## 2023-04-22 PROCEDURE — 80048 BASIC METABOLIC PNL TOTAL CA: CPT

## 2023-04-22 PROCEDURE — 36415 COLL VENOUS BLD VENIPUNCTURE: CPT

## 2023-04-22 PROCEDURE — 6360000002 HC RX W HCPCS: Performed by: STUDENT IN AN ORGANIZED HEALTH CARE EDUCATION/TRAINING PROGRAM

## 2023-04-22 PROCEDURE — 2580000003 HC RX 258

## 2023-04-22 PROCEDURE — 99232 SBSQ HOSP IP/OBS MODERATE 35: CPT | Performed by: STUDENT IN AN ORGANIZED HEALTH CARE EDUCATION/TRAINING PROGRAM

## 2023-04-22 PROCEDURE — 87449 NOS EACH ORGANISM AG IA: CPT

## 2023-04-22 PROCEDURE — 84100 ASSAY OF PHOSPHORUS: CPT

## 2023-04-22 PROCEDURE — 87641 MR-STAPH DNA AMP PROBE: CPT

## 2023-04-22 PROCEDURE — 93010 ELECTROCARDIOGRAM REPORT: CPT | Performed by: INTERNAL MEDICINE

## 2023-04-22 PROCEDURE — 85027 COMPLETE CBC AUTOMATED: CPT

## 2023-04-22 PROCEDURE — 2580000003 HC RX 258: Performed by: STUDENT IN AN ORGANIZED HEALTH CARE EDUCATION/TRAINING PROGRAM

## 2023-04-22 PROCEDURE — 87507 IADNA-DNA/RNA PROBE TQ 12-25: CPT

## 2023-04-22 PROCEDURE — 83735 ASSAY OF MAGNESIUM: CPT

## 2023-04-22 RX ORDER — LOPERAMIDE HYDROCHLORIDE 2 MG/1
2 CAPSULE ORAL 4 TIMES DAILY PRN
Status: DISCONTINUED | OUTPATIENT
Start: 2023-04-22 | End: 2023-04-24 | Stop reason: HOSPADM

## 2023-04-22 RX ADMIN — TICAGRELOR 90 MG: 90 TABLET ORAL at 21:56

## 2023-04-22 RX ADMIN — APIXABAN 2.5 MG: 2.5 TABLET, FILM COATED ORAL at 21:55

## 2023-04-22 RX ADMIN — PIPERACILLIN AND TAZOBACTAM 3375 MG: 3; .375 INJECTION, POWDER, LYOPHILIZED, FOR SOLUTION INTRAVENOUS at 11:17

## 2023-04-22 RX ADMIN — SODIUM CHLORIDE, PRESERVATIVE FREE 10 ML: 5 INJECTION INTRAVENOUS at 21:58

## 2023-04-22 RX ADMIN — DICYCLOMINE HYDROCHLORIDE 20 MG: 10 CAPSULE ORAL at 21:55

## 2023-04-22 RX ADMIN — PIPERACILLIN AND TAZOBACTAM 3375 MG: 3; .375 INJECTION, POWDER, LYOPHILIZED, FOR SOLUTION INTRAVENOUS at 18:37

## 2023-04-22 RX ADMIN — TICAGRELOR 90 MG: 90 TABLET ORAL at 14:13

## 2023-04-22 RX ADMIN — LEVOTHYROXINE SODIUM 62.5 MCG: 0.12 TABLET ORAL at 05:04

## 2023-04-22 RX ADMIN — CETIRIZINE HYDROCHLORIDE 5 MG: 10 TABLET ORAL at 11:06

## 2023-04-22 RX ADMIN — APIXABAN 2.5 MG: 2.5 TABLET, FILM COATED ORAL at 11:06

## 2023-04-22 RX ADMIN — DICYCLOMINE HYDROCHLORIDE 20 MG: 10 CAPSULE ORAL at 05:04

## 2023-04-22 RX ADMIN — SODIUM CHLORIDE, PRESERVATIVE FREE 10 ML: 5 INJECTION INTRAVENOUS at 11:06

## 2023-04-22 RX ADMIN — CARVEDILOL 3.12 MG: 3.12 TABLET, FILM COATED ORAL at 17:00

## 2023-04-22 RX ADMIN — ACETAMINOPHEN 650 MG: 325 TABLET ORAL at 02:30

## 2023-04-22 RX ADMIN — LOPERAMIDE HYDROCHLORIDE 2 MG: 2 CAPSULE ORAL at 14:13

## 2023-04-22 RX ADMIN — ACETAMINOPHEN 650 MG: 325 TABLET ORAL at 18:24

## 2023-04-22 RX ADMIN — PIPERACILLIN AND TAZOBACTAM 3375 MG: 3; .375 INJECTION, POWDER, LYOPHILIZED, FOR SOLUTION INTRAVENOUS at 02:37

## 2023-04-22 RX ADMIN — DICYCLOMINE HYDROCHLORIDE 20 MG: 10 CAPSULE ORAL at 18:24

## 2023-04-22 RX ADMIN — ROSUVASTATIN CALCIUM 20 MG: 20 TABLET, FILM COATED ORAL at 21:55

## 2023-04-22 ASSESSMENT — PAIN DESCRIPTION - DESCRIPTORS
DESCRIPTORS: ACHING
DESCRIPTORS: ACHING

## 2023-04-22 ASSESSMENT — PAIN DESCRIPTION - LOCATION
LOCATION: ABDOMEN
LOCATION: ABDOMEN
LOCATION: ABDOMEN;COCCYX

## 2023-04-22 ASSESSMENT — PAIN SCALES - WONG BAKER: WONGBAKER_NUMERICALRESPONSE: 2

## 2023-04-22 ASSESSMENT — PAIN SCALES - GENERAL
PAINLEVEL_OUTOF10: 2
PAINLEVEL_OUTOF10: 5
PAINLEVEL_OUTOF10: 4
PAINLEVEL_OUTOF10: 3

## 2023-04-22 ASSESSMENT — PAIN DESCRIPTION - ORIENTATION
ORIENTATION: LEFT;LOWER
ORIENTATION: LOWER;LEFT

## 2023-04-23 LAB
ANION GAP SERPL CALC-SCNC: 11 MEQ/L (ref 8–16)
BUN SERPL-MCNC: 24 MG/DL (ref 7–22)
CALCIUM SERPL-MCNC: 8.7 MG/DL (ref 8.5–10.5)
CHLORIDE SERPL-SCNC: 108 MEQ/L (ref 98–111)
CO2 SERPL-SCNC: 25 MEQ/L (ref 23–33)
CREAT SERPL-MCNC: 1.5 MG/DL (ref 0.4–1.2)
DEPRECATED RDW RBC AUTO: 58.8 FL (ref 35–45)
ERYTHROCYTE [DISTWIDTH] IN BLOOD BY AUTOMATED COUNT: 15.3 % (ref 11.5–14.5)
GFR SERPL CREATININE-BSD FRML MDRD: 33 ML/MIN/1.73M2
GLUCOSE SERPL-MCNC: 93 MG/DL (ref 70–108)
HCT VFR BLD AUTO: 32.6 % (ref 37–47)
HGB BLD-MCNC: 9.8 GM/DL (ref 12–16)
MAGNESIUM SERPL-MCNC: 1.9 MG/DL (ref 1.6–2.4)
MCH RBC QN AUTO: 31.3 PG (ref 26–33)
MCHC RBC AUTO-ENTMCNC: 30.1 GM/DL (ref 32.2–35.5)
MCV RBC AUTO: 104.2 FL (ref 81–99)
PHOSPHATE SERPL-MCNC: 3.4 MG/DL (ref 2.4–4.7)
PLATELET # BLD AUTO: 137 THOU/MM3 (ref 130–400)
PMV BLD AUTO: 11.6 FL (ref 9.4–12.4)
POTASSIUM SERPL-SCNC: 3.6 MEQ/L (ref 3.5–5.2)
RBC # BLD AUTO: 3.13 MILL/MM3 (ref 4.2–5.4)
SODIUM SERPL-SCNC: 144 MEQ/L (ref 135–145)
WBC # BLD AUTO: 8.9 THOU/MM3 (ref 4.8–10.8)

## 2023-04-23 PROCEDURE — 2500000003 HC RX 250 WO HCPCS

## 2023-04-23 PROCEDURE — 6370000000 HC RX 637 (ALT 250 FOR IP)

## 2023-04-23 PROCEDURE — 6370000000 HC RX 637 (ALT 250 FOR IP): Performed by: STUDENT IN AN ORGANIZED HEALTH CARE EDUCATION/TRAINING PROGRAM

## 2023-04-23 PROCEDURE — 6360000002 HC RX W HCPCS: Performed by: STUDENT IN AN ORGANIZED HEALTH CARE EDUCATION/TRAINING PROGRAM

## 2023-04-23 PROCEDURE — 84100 ASSAY OF PHOSPHORUS: CPT

## 2023-04-23 PROCEDURE — 36415 COLL VENOUS BLD VENIPUNCTURE: CPT

## 2023-04-23 PROCEDURE — 2580000003 HC RX 258: Performed by: STUDENT IN AN ORGANIZED HEALTH CARE EDUCATION/TRAINING PROGRAM

## 2023-04-23 PROCEDURE — 85027 COMPLETE CBC AUTOMATED: CPT

## 2023-04-23 PROCEDURE — 83735 ASSAY OF MAGNESIUM: CPT

## 2023-04-23 PROCEDURE — 80048 BASIC METABOLIC PNL TOTAL CA: CPT

## 2023-04-23 PROCEDURE — 1200000003 HC TELEMETRY R&B

## 2023-04-23 PROCEDURE — 99232 SBSQ HOSP IP/OBS MODERATE 35: CPT | Performed by: STUDENT IN AN ORGANIZED HEALTH CARE EDUCATION/TRAINING PROGRAM

## 2023-04-23 PROCEDURE — 2580000003 HC RX 258

## 2023-04-23 PROCEDURE — 99232 SBSQ HOSP IP/OBS MODERATE 35: CPT | Performed by: SURGERY

## 2023-04-23 RX ORDER — POTASSIUM CHLORIDE 20 MEQ/1
40 TABLET, EXTENDED RELEASE ORAL ONCE
Status: COMPLETED | OUTPATIENT
Start: 2023-04-23 | End: 2023-04-23

## 2023-04-23 RX ORDER — LANOLIN ALCOHOL/MO/W.PET/CERES
400 CREAM (GRAM) TOPICAL ONCE
Status: COMPLETED | OUTPATIENT
Start: 2023-04-23 | End: 2023-04-23

## 2023-04-23 RX ORDER — ROSUVASTATIN CALCIUM 10 MG/1
10 TABLET, COATED ORAL NIGHTLY
Status: DISCONTINUED | OUTPATIENT
Start: 2023-04-23 | End: 2023-04-24 | Stop reason: HOSPADM

## 2023-04-23 RX ORDER — BUMETANIDE 0.25 MG/ML
1 INJECTION INTRAMUSCULAR; INTRAVENOUS ONCE
Status: COMPLETED | OUTPATIENT
Start: 2023-04-23 | End: 2023-04-23

## 2023-04-23 RX ADMIN — DICYCLOMINE HYDROCHLORIDE 20 MG: 10 CAPSULE ORAL at 06:07

## 2023-04-23 RX ADMIN — PIPERACILLIN AND TAZOBACTAM 3375 MG: 3; .375 INJECTION, POWDER, LYOPHILIZED, FOR SOLUTION INTRAVENOUS at 03:31

## 2023-04-23 RX ADMIN — CARVEDILOL 3.12 MG: 3.12 TABLET, FILM COATED ORAL at 08:56

## 2023-04-23 RX ADMIN — LOPERAMIDE HYDROCHLORIDE 2 MG: 2 CAPSULE ORAL at 14:57

## 2023-04-23 RX ADMIN — TICAGRELOR 90 MG: 90 TABLET ORAL at 20:27

## 2023-04-23 RX ADMIN — ROSUVASTATIN CALCIUM 10 MG: 20 TABLET, FILM COATED ORAL at 20:27

## 2023-04-23 RX ADMIN — POTASSIUM CHLORIDE 40 MEQ: 1500 TABLET, EXTENDED RELEASE ORAL at 08:56

## 2023-04-23 RX ADMIN — BUMETANIDE 1 MG: 0.25 INJECTION INTRAMUSCULAR; INTRAVENOUS at 14:55

## 2023-04-23 RX ADMIN — DICYCLOMINE HYDROCHLORIDE 20 MG: 10 CAPSULE ORAL at 20:26

## 2023-04-23 RX ADMIN — SODIUM CHLORIDE, PRESERVATIVE FREE 10 ML: 5 INJECTION INTRAVENOUS at 08:57

## 2023-04-23 RX ADMIN — DICYCLOMINE HYDROCHLORIDE 20 MG: 10 CAPSULE ORAL at 16:03

## 2023-04-23 RX ADMIN — APIXABAN 2.5 MG: 2.5 TABLET, FILM COATED ORAL at 20:27

## 2023-04-23 RX ADMIN — LEVOTHYROXINE SODIUM 62.5 MCG: 0.12 TABLET ORAL at 06:10

## 2023-04-23 RX ADMIN — APIXABAN 2.5 MG: 2.5 TABLET, FILM COATED ORAL at 08:56

## 2023-04-23 RX ADMIN — Medication 400 MG: at 08:56

## 2023-04-23 RX ADMIN — CETIRIZINE HYDROCHLORIDE 5 MG: 10 TABLET ORAL at 08:56

## 2023-04-23 RX ADMIN — CARVEDILOL 3.12 MG: 3.12 TABLET, FILM COATED ORAL at 16:03

## 2023-04-23 RX ADMIN — TICAGRELOR 90 MG: 90 TABLET ORAL at 08:56

## 2023-04-23 RX ADMIN — PIPERACILLIN AND TAZOBACTAM 3375 MG: 3; .375 INJECTION, POWDER, LYOPHILIZED, FOR SOLUTION INTRAVENOUS at 15:42

## 2023-04-23 RX ADMIN — DICYCLOMINE HYDROCHLORIDE 20 MG: 10 CAPSULE ORAL at 11:49

## 2023-04-23 RX ADMIN — SODIUM CHLORIDE, PRESERVATIVE FREE 10 ML: 5 INJECTION INTRAVENOUS at 20:37

## 2023-04-23 ASSESSMENT — PAIN DESCRIPTION - ORIENTATION
ORIENTATION: LEFT;LOWER
ORIENTATION: LEFT;LOWER

## 2023-04-23 ASSESSMENT — PAIN DESCRIPTION - DESCRIPTORS
DESCRIPTORS: ACHING
DESCRIPTORS: ACHING

## 2023-04-23 ASSESSMENT — PAIN DESCRIPTION - LOCATION
LOCATION: ABDOMEN
LOCATION: ABDOMEN

## 2023-04-23 ASSESSMENT — PAIN SCALES - GENERAL
PAINLEVEL_OUTOF10: 5
PAINLEVEL_OUTOF10: 3

## 2023-04-24 VITALS
BODY MASS INDEX: 23.33 KG/M2 | RESPIRATION RATE: 18 BRPM | SYSTOLIC BLOOD PRESSURE: 121 MMHG | HEIGHT: 60 IN | TEMPERATURE: 97.7 F | WEIGHT: 118.83 LBS | OXYGEN SATURATION: 99 % | HEART RATE: 77 BPM | DIASTOLIC BLOOD PRESSURE: 58 MMHG

## 2023-04-24 LAB
ANION GAP SERPL CALC-SCNC: 15 MEQ/L (ref 8–16)
BUN SERPL-MCNC: 24 MG/DL (ref 7–22)
CALCIUM SERPL-MCNC: 9.1 MG/DL (ref 8.5–10.5)
CHLORIDE SERPL-SCNC: 110 MEQ/L (ref 98–111)
CO2 SERPL-SCNC: 20 MEQ/L (ref 23–33)
CREAT SERPL-MCNC: 1.5 MG/DL (ref 0.4–1.2)
DEPRECATED RDW RBC AUTO: 56.5 FL (ref 35–45)
ERYTHROCYTE [DISTWIDTH] IN BLOOD BY AUTOMATED COUNT: 15 % (ref 11.5–14.5)
GFR SERPL CREATININE-BSD FRML MDRD: 33 ML/MIN/1.73M2
GLUCOSE SERPL-MCNC: 94 MG/DL (ref 70–108)
HCT VFR BLD AUTO: 32.1 % (ref 37–47)
HGB BLD-MCNC: 9.8 GM/DL (ref 12–16)
LV EF: 20 %
LVEF MODALITY: NORMAL
MAGNESIUM SERPL-MCNC: 1.8 MG/DL (ref 1.6–2.4)
MCH RBC QN AUTO: 31.3 PG (ref 26–33)
MCHC RBC AUTO-ENTMCNC: 30.5 GM/DL (ref 32.2–35.5)
MCV RBC AUTO: 102.6 FL (ref 81–99)
PHOSPHATE SERPL-MCNC: 3.4 MG/DL (ref 2.4–4.7)
PLATELET # BLD AUTO: 151 THOU/MM3 (ref 130–400)
PMV BLD AUTO: 11.6 FL (ref 9.4–12.4)
POTASSIUM SERPL-SCNC: 4.2 MEQ/L (ref 3.5–5.2)
RBC # BLD AUTO: 3.13 MILL/MM3 (ref 4.2–5.4)
SODIUM SERPL-SCNC: 145 MEQ/L (ref 135–145)
WBC # BLD AUTO: 7.3 THOU/MM3 (ref 4.8–10.8)

## 2023-04-24 PROCEDURE — 6370000000 HC RX 637 (ALT 250 FOR IP)

## 2023-04-24 PROCEDURE — 92610 EVALUATE SWALLOWING FUNCTION: CPT

## 2023-04-24 PROCEDURE — 80048 BASIC METABOLIC PNL TOTAL CA: CPT

## 2023-04-24 PROCEDURE — 2580000003 HC RX 258: Performed by: STUDENT IN AN ORGANIZED HEALTH CARE EDUCATION/TRAINING PROGRAM

## 2023-04-24 PROCEDURE — 99239 HOSP IP/OBS DSCHRG MGMT >30: CPT | Performed by: STUDENT IN AN ORGANIZED HEALTH CARE EDUCATION/TRAINING PROGRAM

## 2023-04-24 PROCEDURE — 36415 COLL VENOUS BLD VENIPUNCTURE: CPT

## 2023-04-24 PROCEDURE — 6360000002 HC RX W HCPCS: Performed by: STUDENT IN AN ORGANIZED HEALTH CARE EDUCATION/TRAINING PROGRAM

## 2023-04-24 PROCEDURE — 2580000003 HC RX 258

## 2023-04-24 PROCEDURE — 84100 ASSAY OF PHOSPHORUS: CPT

## 2023-04-24 PROCEDURE — 6360000002 HC RX W HCPCS

## 2023-04-24 PROCEDURE — 83735 ASSAY OF MAGNESIUM: CPT

## 2023-04-24 PROCEDURE — 93307 TTE W/O DOPPLER COMPLETE: CPT

## 2023-04-24 PROCEDURE — 85027 COMPLETE CBC AUTOMATED: CPT

## 2023-04-24 RX ORDER — MAGNESIUM SULFATE IN WATER 40 MG/ML
2000 INJECTION, SOLUTION INTRAVENOUS ONCE
Status: COMPLETED | OUTPATIENT
Start: 2023-04-24 | End: 2023-04-24

## 2023-04-24 RX ORDER — VALSARTAN 40 MG/1
40 TABLET ORAL DAILY
Qty: 90 TABLET | Refills: 1 | Status: SHIPPED | OUTPATIENT
Start: 2023-04-24

## 2023-04-24 RX ORDER — GREEN TEA/HOODIA GORDONII 315-12.5MG
1 CAPSULE ORAL 2 TIMES DAILY
Qty: 60 TABLET | Refills: 1 | Status: SHIPPED | OUTPATIENT
Start: 2023-04-24

## 2023-04-24 RX ORDER — LOPERAMIDE HYDROCHLORIDE 2 MG/1
2 CAPSULE ORAL 4 TIMES DAILY PRN
Qty: 40 CAPSULE | Refills: 1 | Status: SHIPPED | OUTPATIENT
Start: 2023-04-24 | End: 2023-05-04

## 2023-04-24 RX ORDER — AMOXICILLIN AND CLAVULANATE POTASSIUM 500; 125 MG/1; MG/1
1 TABLET, FILM COATED ORAL 3 TIMES DAILY
Qty: 12 TABLET | Refills: 0 | Status: SHIPPED | OUTPATIENT
Start: 2023-04-25 | End: 2023-04-29

## 2023-04-24 RX ORDER — DICYCLOMINE HYDROCHLORIDE 10 MG/1
20 CAPSULE ORAL
Qty: 120 CAPSULE | Refills: 3 | Status: SHIPPED | OUTPATIENT
Start: 2023-04-24

## 2023-04-24 RX ORDER — LUBIPROSTONE 24 UG/1
24 CAPSULE ORAL
Qty: 60 CAPSULE | Refills: 3 | Status: SHIPPED | OUTPATIENT
Start: 2023-04-24

## 2023-04-24 RX ORDER — LACTOSE-REDUCED FOOD
1 LIQUID (ML) ORAL
Qty: 90 EACH | Refills: 2 | Status: SHIPPED | OUTPATIENT
Start: 2023-04-24 | End: 2023-05-24

## 2023-04-24 RX ORDER — CARVEDILOL 3.12 MG/1
3.12 TABLET ORAL 2 TIMES DAILY
Qty: 60 TABLET | Refills: 3 | Status: SHIPPED | OUTPATIENT
Start: 2023-04-24

## 2023-04-24 RX ADMIN — PIPERACILLIN AND TAZOBACTAM 3375 MG: 3; .375 INJECTION, POWDER, LYOPHILIZED, FOR SOLUTION INTRAVENOUS at 15:26

## 2023-04-24 RX ADMIN — TICAGRELOR 90 MG: 90 TABLET ORAL at 09:29

## 2023-04-24 RX ADMIN — CARVEDILOL 3.12 MG: 3.12 TABLET, FILM COATED ORAL at 09:23

## 2023-04-24 RX ADMIN — CETIRIZINE HYDROCHLORIDE 5 MG: 10 TABLET ORAL at 09:22

## 2023-04-24 RX ADMIN — PIPERACILLIN AND TAZOBACTAM 3375 MG: 3; .375 INJECTION, POWDER, LYOPHILIZED, FOR SOLUTION INTRAVENOUS at 02:57

## 2023-04-24 RX ADMIN — MAGNESIUM SULFATE HEPTAHYDRATE 2000 MG: 40 INJECTION, SOLUTION INTRAVENOUS at 09:32

## 2023-04-24 RX ADMIN — LEVOTHYROXINE SODIUM 62.5 MCG: 0.12 TABLET ORAL at 06:03

## 2023-04-24 RX ADMIN — SODIUM CHLORIDE, PRESERVATIVE FREE 10 ML: 5 INJECTION INTRAVENOUS at 09:28

## 2023-04-24 RX ADMIN — APIXABAN 2.5 MG: 2.5 TABLET, FILM COATED ORAL at 09:23

## 2023-04-24 RX ADMIN — DICYCLOMINE HYDROCHLORIDE 20 MG: 10 CAPSULE ORAL at 17:33

## 2023-04-24 RX ADMIN — DICYCLOMINE HYDROCHLORIDE 20 MG: 10 CAPSULE ORAL at 06:02

## 2023-04-24 RX ADMIN — CARVEDILOL 3.12 MG: 3.12 TABLET, FILM COATED ORAL at 17:34

## 2023-04-24 ASSESSMENT — PAIN SCALES - GENERAL
PAINLEVEL_OUTOF10: 4
PAINLEVEL_OUTOF10: 5
PAINLEVEL_OUTOF10: 0
PAINLEVEL_OUTOF10: 0

## 2023-04-24 ASSESSMENT — PAIN DESCRIPTION - LOCATION: LOCATION: ABDOMEN

## 2023-04-24 ASSESSMENT — PAIN DESCRIPTION - DESCRIPTORS: DESCRIPTORS: CRAMPING

## 2023-04-24 NOTE — PROGRESS NOTES
Comprehensive Nutrition Assessment    Type and Reason for Visit:  Initial, Positive Nutrition Screen (weight loss and decreased oral intake)    Nutrition Recommendations/Plan:   Continue diet as ordered and advance as tolerated. ONS ordered: ensure clear TID and Amrit BID to support healing  Provided low fiber nutrition recommendations- handouts provided. Monitoring all aspects of nutrition and will provide further nutrition recommendations as necessary and appropriate. Malnutrition Assessment:  Malnutrition Status: At risk for malnutrition (Comment) (04/24/23 1215)    Context:  Chronic Illness     Findings of the 6 clinical characteristics of malnutrition:  Energy Intake:  75% or less estimated energy requirements for 1 month or longer  Weight Loss:   (no recent weight loss; note 13% wt loss over 1 year)     Body Fat Loss:  Mild body fat loss (suspect age related losses) Orbital   Muscle Mass Loss:  Mild muscle mass loss (suspect age related losses) Temples (temporalis), Clavicles (pectoralis & deltoids)  Fluid Accumulation:  No significant fluid accumulation     Strength:  Not Performed    Nutrition Assessment:     Pt. nutritionally compromised AEB weight loss and decreased intake. At risk for further nutrition compromise r/t admit with diverticulitis-conservation treatment, recent tooth abscess and was on antibiotics and underlying medical condition (PMH: CHF, HTN, malnutrition, peg placement 11/17/21, severe covid with intubation). Nutrition Related Findings:    Pt. Report/Treatments/Miscellaneous: assessed patient at bedside this morning- reports feeling better and wants to go home. Reports good acceptance of ensure clear and would like to continue you this at this time. Provided low fiber recommendations and discussed to slowly incorporate higher fiber foods back into diet once recovered from diverticulitis. Pt reports flushing peg- planning to remove peg tube 7/2022.  Peg was initially

## 2023-04-24 NOTE — PROGRESS NOTES
----- Message from INES Yip CNP sent at 6/29/2022  9:36 AM EDT -----  Thyroid ultrasound shows a nodule in right thyroid lobe - recommend further evaluation with a needle aspiration.  Will place that order now Limited Echo done bedside Dr. Anne Argueta to read

## 2023-04-24 NOTE — PLAN OF CARE
Problem: Discharge Planning  Goal: Discharge to home or other facility with appropriate resources  4/23/2023 2252 by Danisha Dickinson RN  Patient will discharge home with family  Outcome: Progressing  Problem: Musculoskeletal - Adult  Goal: Return mobility to safest level of function  4/23/2023 2252 by Danisha Dickinson RN  Outcome: Progressing  4/23/2023 2251 by Danisha Dickinson RN  Outcome: Progressing  Flowsheets (Taken 4/23/2023 2038)  Return Mobility to Safest Level of Function: Assess patient stability and activity tolerance for standing, transferring and ambulating with or without assistive devices

## 2023-04-24 NOTE — PLAN OF CARE
Problem: Discharge Planning  Goal: Discharge to home or other facility with appropriate resources  Outcome: Progressing     Problem: Skin/Tissue Integrity - Adult  Goal: Skin integrity remains intact  Outcome: Progressing  Goal: Incisions, wounds, or drain sites healing without S/S of infection  Outcome: Progressing  Goal: Oral mucous membranes remain intact  Outcome: Progressing     Problem: Musculoskeletal - Adult  Goal: Return mobility to safest level of function  Outcome: Progressing  Goal: Maintain proper alignment of affected body part  Outcome: Progressing  Goal: Return ADL status to a safe level of function  Outcome: Progressing     Problem: Safety - Adult  Goal: Free from fall injury  Outcome: Progressing     Problem: Pain  Goal: Verbalizes/displays adequate comfort level or baseline comfort level  Outcome: Progressing     Problem: Nutrition Deficit:  Goal: Optimize nutritional status  4/24/2023 1633 by Yaron Pina RN  Outcome: Progressing  Flowsheets (Taken 4/24/2023 1229 by Marci Castillo RD)  Nutrient intake appropriate for improving, restoring, or maintaining nutritional needs:   Assess nutritional status and recommend course of action   Monitor oral intake, labs, and treatment plans   Provide specific nutrition education to patient or family as appropriate  4/24/2023 1229 by Marci Castillo RD  Flowsheets (Taken 4/24/2023 1229)  Nutrient intake appropriate for improving, restoring, or maintaining nutritional needs:   Assess nutritional status and recommend course of action   Monitor oral intake, labs, and treatment plans   Provide specific nutrition education to patient or family as appropriate

## 2023-04-24 NOTE — PLAN OF CARE
Problem: Discharge Planning  Goal: Discharge to home or other facility with appropriate resources  4/24/2023 1829 by Alie Cruz RN  Outcome: Completed  4/24/2023 1633 by Alie Cruz RN  Outcome: Progressing     Problem: Skin/Tissue Integrity - Adult  Goal: Skin integrity remains intact  4/24/2023 1829 by Alie Cruz RN  Outcome: Completed  4/24/2023 1633 by Alie Cruz RN  Outcome: Progressing  Goal: Incisions, wounds, or drain sites healing without S/S of infection  4/24/2023 1829 by Alie Cruz RN  Outcome: Completed  4/24/2023 1633 by Alie Cruz RN  Outcome: Progressing  Goal: Oral mucous membranes remain intact  4/24/2023 1829 by Alie Cruz RN  Outcome: Completed  4/24/2023 1633 by Alie Cruz RN  Outcome: Progressing     Problem: Musculoskeletal - Adult  Goal: Return mobility to safest level of function  4/24/2023 1829 by Alie Cruz RN  Outcome: Completed  4/24/2023 1633 by Alie Cruz RN  Outcome: Progressing  Goal: Maintain proper alignment of affected body part  4/24/2023 1829 by Alie Cruz RN  Outcome: Completed  4/24/2023 1633 by Alie Cruz RN  Outcome: Progressing  Goal: Return ADL status to a safe level of function  4/24/2023 1829 by Alie Cruz RN  Outcome: Completed  4/24/2023 1633 by Alie Cruz RN  Outcome: Progressing     Problem: Safety - Adult  Goal: Free from fall injury  4/24/2023 1829 by Alie Cruz RN  Outcome: Completed  4/24/2023 1633 by Alie Cruz RN  Outcome: Progressing     Problem: Pain  Goal: Verbalizes/displays adequate comfort level or baseline comfort level  4/24/2023 1829 by Alie Cruz RN  Outcome: Completed  4/24/2023 1633 by Alie Cruz RN  Outcome: Progressing     Problem: Nutrition Deficit:  Goal: Optimize nutritional status  4/24/2023 1829 by Chey Simpson

## 2023-04-24 NOTE — CARE COORDINATION
DISCHARGE PLANNING EVALUATION  4/24/23, 2:16 PM EDT    Reason for Referral:  Current with [facility] and [facility]  Mental Status:  Patient answered questions appropriately   Decision Making:  Makes own decisions also has family support. Family/Social/Home Environment:  Stated that home is one floor with a couple steps inside. Patient uses a cane for ambulation. Patient has a bathtub with a grab bar and stool inside. Patient goes to  3 days a week. Current Services including food security, transportation and housekeeping:  Daughter assists as needed. Current Equipment: cane, shower stool  Payment Source: Medicare  Concerns or Barriers to Discharge:  NA  Post-acute [facility]) provider list was provided to patient. Patient was informed of their freedom to choose [facility] provider. Discussed and offered to show the patient the relevant [facility] Providers quality and resource use measures on Medicare Compare web site via computer based on patient's goals of care and treatment preferences. Questions regarding selection process were answered. Teach Back Method used with  Kourtney regarding care plan and  plan for discharge. Patient and Pam Garcia daughter verbalized understanding of the plan of care and contribute to goal setting. Patient goals, treatment preferences and discharge plan:  Patient stated that she is current with [facility], called and confirmed with Cony Mcmullen from [facility] and she is current for RN. Stated that they are current with [facility] and  is Xuan Rehman. Called and left a message for Xuan Rehman. Daughter concerned that an order for Ensure Clear needs faxed to . Will discuss with [facility] . No return call from , will fax order once confirm with her what is needed. RN has fax number for script and is awaiting it from provider. Spoke with [facility] and she is aware of discharge.      Electronically signed by BELEM Stone on 4/24/2023 at

## 2023-04-24 NOTE — CARE COORDINATION
Case Management Assessment  Initial Evaluation    Date/Time of Evaluation: 4/24/2023 3:33 PM  Assessment Completed by: Anjum Chappell RN    If patient is discharged prior to next notation, then this note serves as note for discharge by case management. Patient Name: Gillian Kussmaul                   YOB: 1934  Diagnosis: Coagulopathy [facility]) [D68.9]  Diverticulitis of colon [K57.32]  Bladder wall thickening [N32.89]  Pelvic abscess in female [N73.9]  Acute diverticulitis [K57.92]  Diarrhea, unspecified type [R19.7]                   Date / Time: 4/21/2023  1:23 PM  [facility]     Patient Admission Status: Inpatient   Readmission Risk Low 0-14, Mod 15-19), High > 20: Readmission Risk Score: 17.9    Current PCP: SERGE Taylor CNP  PCP verified by CM? Yes    Chart Reviewed: Yes      History Provided by: Child/Family  Patient Orientation: Alert and Oriented    Patient Cognition: Alert    Hospitalization in the last 30 days (Readmission):  No    If yes, Readmission Assessment in CM Navigator will be completed. Advance Directives:      Code Status: Full Code   Patient's Primary Decision Maker is: Legal Next of Kin    Primary Decision MakeMaria Angeles Child - [phone]    Discharge Planning:    Patient lives with: Children Type of Home: House  Primary Care Giver: Family  Patient Support Systems include: BRAD/Passport, Home Care Staff, Family Members, Children   Current Financial resources: Medicare  Current community resources: ECF/Home Care  Current services prior to admission: BRAD/Passport, Home Care            Current DME:              Type of Home Care services:  Nursing Services    ADLS  Prior functional level: Assistance with the following:, Cooking, Housework, Shopping  Current functional level: Assistance with the following:, Horace Vee, Shopping    Family can provide assistance at DC:  Yes  Would you like Case Management to discuss the discharge plan with any

## 2023-04-24 NOTE — DISCHARGE SUMMARY
Internal Medicine Resident Discharge Summary      Patient Identification:   Phil Segovia   : 1934  MRN: [id number]   Account: [de-identified]   Patient's PCP: SERGE Connelly - JASEN    Admit Date: 2023   Discharge Date:   2023    Admitting Physician: Jose Chew DO  Discharge Physician: Keara Coleman MD       Discharge Diagnoses:    Acute diarrhea:   Profuse watery diarrhea 3-4x daily for last 7 days. Recent abx usage for tooth abscess. Hx of C. Difficile  and . CT A/P shows significant stool in bowel, could also be overflow diarrhea. On bentyl and loperamide. GI panel and Cdiff toxin/antigen negative.    -Discharged on bentyl and loperamide. Acute uncomplicated diverticulitis: Modified Hinchey stage 1a  LUQ, LLQ, and RLQ tenderness to light palpation. Abdominal pain for last week. Nonbloody BM. Afebrile. WBC 11.8. CRP 9.77. Hx of acute diverticulitis 10/21, 3/15, . CT A/P: Left: Acute diverticulitis wo drainable fluid collection. Inflammation and colitis extends to involve the sigmoid colon. Zosyn 4.5g -.   -Augmentin on DC for a total of 7 days. Large Pericardial effusion:  Hemodynamically stable currently. CT A/P: Severe cardiomegaly. Interval worsening of pericardial effusion which now measures up to 2.3 cm stable right lower and middle lobe nodules. No new infiltrates or effusions  TTE :  Small to mod circumferential pericardial effusion with no tamponade physiology. Bedside echo: Pericardial effusion present. TTE 23: Small to moderate circumferential pericardial effusion with no evidence of hemodynamic compromise.   -Lasix 20mg daily. Stage IV decubitus ulcer sacral region 2022:  Roughly 2cm x 1cm ulcer near sacral region, ?pus surrounding. Tender to palpation. CT A/P: Focal enhancing collection posterior and inferior to the coccyx concerning for a focal abscess. Being followed OP by Dr. Pino Holes ID.

## 2023-04-24 NOTE — PROGRESS NOTES
[facility]  SPEECH THERAPY  STRZ MED SURG 8A  Clinical Swallow Evaluation      SLP Individual Minutes  Time In: 1120  Time Out: 8498  Minutes: 8  Timed Code Treatment Minutes: 0 Minutes       Date: 2023  Patient Name: Konstantin Silva      CSN: [id number]   : 1934  (80 y.o.)  Gender: female   Referring Physician:  Tasneem Muller DO    Diagnosis: Acute diverticulitis    History of Present Illness/Injury: Patient presents to [facility] with above dx. Per physician H+P:\" Konstantin Silva is a 80 y.o. female with PMHx of Afib, CKD, stage IV decubitus ulcer, pericardial effusion, HFrEF 25%, s/p trach removal, PEG tube placement who presents to [facility] on 23 for evaluation of diarrhea. Pt was at Dr. Byron Harris clinic when she was instructed to go to ED for evaluation of diarrhea. Patient was recently on antibiotics for tooth abscess. Has been having diarrhea for last 7 days, profuse watery nonbloody diarrhea. Has 3-4 bowel movements daily. States she has diffuse abdominal pain and pain is similar to previous diverticulitis symptoms. In the ED patient /62, HR 75, afebrile, RR 18 satting well on RA. BMP was concerning for potassium of 5.3 which was hemolyzed, BUN/CR of 39/1.4 at baseline of creatinine. WBC of 11.8 with H&H of 10.2/33. 4. UA was negative. EKG showed sinus rhythm with first-degree AV block and RBBB. CT A/P was concerning for acute diverticulitis, large pericardial effusion interval development, asymmetric thickening of bladder wall concerning for neoplasm, focal enhancing collection posterior and inferior to the coccyx concerning for focal abscess, multiple irregular hypodense lesions in the spleen. Patient was given 1 L fluid bolus and Zofran. \"    ST consulted to further assess swallow integrity and assist in development of POC.      Past Medical History:   Diagnosis Date    HERIBERTO (acute kidney injury) ([facility] [address]     Anxiety     Arthritis     Bronchitis     CHF

## 2023-04-25 ENCOUNTER — TELEPHONE (OUTPATIENT)
Dept: INTERNAL MEDICINE CLINIC | Age: 88
End: 2023-04-25

## 2023-04-25 NOTE — TELEPHONE ENCOUNTER
Outpatient dietitian callback to patient's daughter Haseeb Kay of wanting to know how to get Luis Clarke. Explained can get at [employer] or [employer] and will mail coupons to her. Pt's daughter appreciative.

## 2023-04-25 NOTE — CARE COORDINATION
4/25/23, 7:53 AM EDT  Late Entry  Patient goals/plan/ treatment preferences discussed by  and . Patient goals/plan/ treatment preferences reviewed with patient/ family. Patient/ family verbalize understanding of discharge plan and are in agreement with goal/plan/treatment preferences. Understanding was demonstrated using the teach back method. AVS provided by RN at time of discharge, which includes all necessary medical information pertaining to the patients current course of illness, treatment, post-discharge goals of care, and treatment preferences.      Services At/After Discharge: Home Health and Nursing service       IMM Letter  IMM Letter given to Patient/Family/Significant other/Guardian/POA/by[de-identified] Rustam Archibald RN CM  IMM Letter date given[de-identified] 04/24/23  IMM Letter time given[de-identified] 7418

## 2023-04-26 ENCOUNTER — TELEPHONE (OUTPATIENT)
Dept: CARDIOLOGY CLINIC | Age: 88
End: 2023-04-26

## 2023-04-26 NOTE — PROGRESS NOTES
[facility] PROFESSIONAL SERVICES  [facility]   [address]   [address] [address]   Dept: [phone]   Dept Fax: [phone]   Loc: [phone]      Chief Complaint   Patient presents with    Follow-up     Cardiologist:  Dr. Julio Cesar Orozco  81 yo female presents for hfu. Had acute diverticulitis. Hx of LHC with PCI to LM and LAD. Acute on chronic systolic CHF, pleural effusions, ICMP, now with ICD. Hx of anemia, COVID s/p trach and removal, afib, CKD3,  HLD. BP is very low in office in today. No dizziness, some + lightheaded. Some SOB. No chest pain. Overall has been feeling okay. Her BP at home have been 110-140s. Still some diarrhea but has improved.      General:   No fever, no chills, no weight loss, no fatigue  Pulmonary:    + dyspnea, no wheezing  Cardiac:    Denies recent chest pain   GI:     No nausea or vomiting, no abdominal pain  Neuro:     + dizziness or light headedness  Musculoskeletal:  No recent active issues  Extremities:   No edema      Past Medical History:   Diagnosis Date    HERIBERTO (acute kidney injury) ([address]     Anxiety     Arthritis     Bronchitis     CHF (congestive heart failure) ([facility])     Chronic a-fib ([address]     COVID     October 2021    Diverticulitis of colon     Hyperkalemia     Hypertension     Medtronic cobalt dual ICD  2/15/2023    Neuromuscular dysfunction of bladder     Personal history of COVID-19     Pressure ulcer     Protein-calorie malnutrition (HCC)        Allergies   Allergen Reactions    Sulfa Antibiotics     Metronidazole Rash and Hives       Current Outpatient Medications   Medication Sig Dispense Refill    loperamide (IMODIUM) 2 MG capsule Take 1 capsule by mouth 4 times daily as needed for Diarrhea 40 capsule 1    Probiotic Acidophilus (FLORANEX) TABS Take 1 tablet by mouth 2 times daily 60 tablet 1    valsartan (DIOVAN) 40 MG tablet Take 1 tablet by mouth daily Check BP before taking medication, Do not take if systolic BP (SBP) is less

## 2023-04-26 NOTE — TELEPHONE ENCOUNTER
Patient's daughter Janace Lesches (on hipaa) is calling to schedule a hfu appt. The discharge papers say she needs an appt within 3 days.  Please advise

## 2023-04-27 ENCOUNTER — OFFICE VISIT (OUTPATIENT)
Dept: CARDIOLOGY CLINIC | Age: 88
End: 2023-04-27
Payer: MEDICARE

## 2023-04-27 VITALS
WEIGHT: 112.4 LBS | BODY MASS INDEX: 22.07 KG/M2 | HEART RATE: 80 BPM | SYSTOLIC BLOOD PRESSURE: 100 MMHG | HEIGHT: 60 IN | DIASTOLIC BLOOD PRESSURE: 49 MMHG | OXYGEN SATURATION: 98 %

## 2023-04-27 DIAGNOSIS — I25.5 ISCHEMIC CARDIOMYOPATHY: ICD-10-CM

## 2023-04-27 DIAGNOSIS — I25.10 CORONARY ARTERY DISEASE INVOLVING NATIVE CORONARY ARTERY OF NATIVE HEART WITHOUT ANGINA PECTORIS: Primary | ICD-10-CM

## 2023-04-27 DIAGNOSIS — I50.22 CHRONIC SYSTOLIC CONGESTIVE HEART FAILURE (HCC): ICD-10-CM

## 2023-04-27 DIAGNOSIS — Z95.810 ICD (IMPLANTABLE CARDIOVERTER-DEFIBRILLATOR) IN PLACE: ICD-10-CM

## 2023-04-27 PROCEDURE — 1036F TOBACCO NON-USER: CPT | Performed by: STUDENT IN AN ORGANIZED HEALTH CARE EDUCATION/TRAINING PROGRAM

## 2023-04-27 PROCEDURE — 1090F PRES/ABSN URINE INCON ASSESS: CPT | Performed by: STUDENT IN AN ORGANIZED HEALTH CARE EDUCATION/TRAINING PROGRAM

## 2023-04-27 PROCEDURE — G8427 DOCREV CUR MEDS BY ELIG CLIN: HCPCS | Performed by: STUDENT IN AN ORGANIZED HEALTH CARE EDUCATION/TRAINING PROGRAM

## 2023-04-27 PROCEDURE — 99214 OFFICE O/P EST MOD 30 MIN: CPT | Performed by: STUDENT IN AN ORGANIZED HEALTH CARE EDUCATION/TRAINING PROGRAM

## 2023-04-27 PROCEDURE — 1123F ACP DISCUSS/DSCN MKR DOCD: CPT | Performed by: STUDENT IN AN ORGANIZED HEALTH CARE EDUCATION/TRAINING PROGRAM

## 2023-04-27 PROCEDURE — G8420 CALC BMI NORM PARAMETERS: HCPCS | Performed by: STUDENT IN AN ORGANIZED HEALTH CARE EDUCATION/TRAINING PROGRAM

## 2023-04-27 PROCEDURE — 1111F DSCHRG MED/CURRENT MED MERGE: CPT | Performed by: STUDENT IN AN ORGANIZED HEALTH CARE EDUCATION/TRAINING PROGRAM

## 2023-04-28 LAB
EKG ATRIAL RATE: 78 BPM
EKG P AXIS: 66 DEGREES
EKG P-R INTERVAL: 222 MS
EKG Q-T INTERVAL: 436 MS
EKG QRS DURATION: 154 MS
EKG QTC CALCULATION (BAZETT): 497 MS
EKG R AXIS: -53 DEGREES
EKG T AXIS: 90 DEGREES
EKG VENTRICULAR RATE: 78 BPM

## 2023-05-19 ENCOUNTER — OFFICE VISIT (OUTPATIENT)
Dept: UROLOGY | Age: 88
End: 2023-05-19
Payer: MEDICARE

## 2023-05-19 VITALS — RESPIRATION RATE: 15 BRPM | WEIGHT: 112 LBS | BODY MASS INDEX: 21.99 KG/M2 | HEIGHT: 60 IN

## 2023-05-19 DIAGNOSIS — N32.89 BLADDER WALL THICKENING: Primary | ICD-10-CM

## 2023-05-19 PROCEDURE — 1111F DSCHRG MED/CURRENT MED MERGE: CPT | Performed by: UROLOGY

## 2023-05-19 PROCEDURE — G8427 DOCREV CUR MEDS BY ELIG CLIN: HCPCS | Performed by: UROLOGY

## 2023-05-19 PROCEDURE — 1036F TOBACCO NON-USER: CPT | Performed by: UROLOGY

## 2023-05-19 PROCEDURE — 99204 OFFICE O/P NEW MOD 45 MIN: CPT | Performed by: UROLOGY

## 2023-05-19 PROCEDURE — 1090F PRES/ABSN URINE INCON ASSESS: CPT | Performed by: UROLOGY

## 2023-05-19 PROCEDURE — 1123F ACP DISCUSS/DSCN MKR DOCD: CPT | Performed by: UROLOGY

## 2023-05-19 PROCEDURE — G8420 CALC BMI NORM PARAMETERS: HCPCS | Performed by: UROLOGY

## 2023-05-19 RX ORDER — OXYBUTYNIN CHLORIDE 5 MG/1
5 TABLET, EXTENDED RELEASE ORAL DAILY
Qty: 30 TABLET | Refills: 3 | Status: SHIPPED | OUTPATIENT
Start: 2023-05-19

## 2023-05-19 NOTE — PROGRESS NOTES
History     Tobacco Use   Smoking Status Former    Packs/day: 1.00    Years: 15.00    Pack years: 15.00    Types: Cigarettes    Quit date: 12/10/1977    Years since quittin.4   Smokeless Tobacco Never       Social History     Substance and Sexual Activity   Alcohol Use No       REVIEW OF SYSTEMS:  Constitutional: negative  Eyes: negative  Respiratory: negative  Cardiovascular: negative  Gastrointestinal: negative  Genitourinary: negative  Musculoskeletal: negative  Skin: negative   Neurological: negative  Hematological/Lymphatic: negative  Psychological: negative    Physical Exam:    This a 80 y.o. female      Vitals:    23 0917   Resp: 15     Constitutional: Patient in no acute distress   Neuro: alert and oriented to person place and time. Psych: Mood and affect normal.  Head: atraumatic normocephalic  Eyes: EOMi  HEENT: neck supple, trachea midline  Lungs: Respiratory effort normal  Cardiovascular:  Normal peripheral pulses  Abdomen: Soft, non-tender, non-distended, No CVA  Bladder: non-tender and not distended. FROMx4, no cyanosis clubbing edema  Skin: warm and dry      Assessment and Plan      1. Bladder wall thickening           Plan:      No follow-ups on file.   Bladder wall thickening - cystoscopy   Urge incontinence - oxybutynin 5 mg ER

## 2023-05-24 ENCOUNTER — PROCEDURE VISIT (OUTPATIENT)
Dept: CARDIOLOGY CLINIC | Age: 88
End: 2023-05-24
Payer: MEDICARE

## 2023-05-24 DIAGNOSIS — Z95.810 ICD (IMPLANTABLE CARDIOVERTER-DEFIBRILLATOR) IN PLACE: Primary | ICD-10-CM

## 2023-05-24 PROCEDURE — 93296 REM INTERROG EVL PM/IDS: CPT | Performed by: NUCLEAR MEDICINE

## 2023-05-24 PROCEDURE — 93295 DEV INTERROG REMOTE 1/2/MLT: CPT | Performed by: NUCLEAR MEDICINE

## 2023-05-24 NOTE — PROGRESS NOTES
[facility]     . Marget Ravalli Battery longevity:  11.8 years   Presenting rhythm  AS VS   Optivol WNL    Atrial impedance 437  RV impedance 361  Shock 51    P wave sensing 1.6  R wave sensing 3.1    29.7 % atrial paced  0.2 % RV paced     Atrial threshold 1.125 V  at 0.4ms  RV threshold 2 V at 0.4ms  Mode switches   <0.1  eliquis

## 2023-06-26 ENCOUNTER — TELEPHONE (OUTPATIENT)
Dept: CARDIOLOGY CLINIC | Age: 88
End: 2023-06-26

## 2023-06-28 ENCOUNTER — PROCEDURE VISIT (OUTPATIENT)
Dept: CARDIOLOGY CLINIC | Age: 88
End: 2023-06-28
Payer: MEDICARE

## 2023-06-28 DIAGNOSIS — I50.43 CHF (CONGESTIVE HEART FAILURE), NYHA CLASS I, ACUTE ON CHRONIC, COMBINED (HCC): Primary | ICD-10-CM

## 2023-06-28 PROCEDURE — G2066 INTER DEVC REMOTE 30D: HCPCS | Performed by: NUCLEAR MEDICINE

## 2023-06-28 PROCEDURE — 93297 REM INTERROG DEV EVAL ICPMS: CPT | Performed by: NUCLEAR MEDICINE

## 2023-07-10 ENCOUNTER — HOSPITAL ENCOUNTER (OUTPATIENT)
Age: 88
Setting detail: OUTPATIENT SURGERY
Discharge: HOME | End: 2023-07-10
Attending: INTERNAL MEDICINE | Admitting: INTERNAL MEDICINE
Payer: MEDICARE

## 2023-07-10 VITALS
TEMPERATURE: 97.9 F | OXYGEN SATURATION: 100 % | RESPIRATION RATE: 18 BRPM | WEIGHT: 101 LBS | DIASTOLIC BLOOD PRESSURE: 52 MMHG | HEART RATE: 58 BPM | BODY MASS INDEX: 19.83 KG/M2 | HEIGHT: 60 IN | SYSTOLIC BLOOD PRESSURE: 137 MMHG

## 2023-07-10 PROCEDURE — 3609013300 HC EGD TUBE PLACEMENT: Performed by: INTERNAL MEDICINE

## 2023-07-10 RX ORDER — SODIUM CHLORIDE 0.9 % (FLUSH) 0.9 %
5-40 SYRINGE (ML) INJECTION PRN
Status: CANCELLED | OUTPATIENT
Start: 2023-07-10

## 2023-07-10 RX ORDER — SODIUM CHLORIDE 9 MG/ML
25 INJECTION, SOLUTION INTRAVENOUS PRN
Status: CANCELLED | OUTPATIENT
Start: 2023-07-10

## 2023-07-10 RX ORDER — SODIUM CHLORIDE 0.9 % (FLUSH) 0.9 %
5-40 SYRINGE (ML) INJECTION EVERY 12 HOURS SCHEDULED
Status: DISCONTINUED | OUTPATIENT
Start: 2023-07-10 | End: 2023-07-10 | Stop reason: HOSPADM

## 2023-07-10 RX ORDER — SODIUM CHLORIDE 0.9 % (FLUSH) 0.9 %
5-40 SYRINGE (ML) INJECTION EVERY 12 HOURS SCHEDULED
Status: CANCELLED | OUTPATIENT
Start: 2023-07-10

## 2023-07-10 RX ORDER — SODIUM CHLORIDE 0.9 % (FLUSH) 0.9 %
5-40 SYRINGE (ML) INJECTION PRN
Status: DISCONTINUED | OUTPATIENT
Start: 2023-07-10 | End: 2023-07-10 | Stop reason: HOSPADM

## 2023-07-10 RX ORDER — SODIUM CHLORIDE 9 MG/ML
25 INJECTION, SOLUTION INTRAVENOUS PRN
Status: DISCONTINUED | OUTPATIENT
Start: 2023-07-10 | End: 2023-07-10 | Stop reason: HOSPADM

## 2023-07-10 ASSESSMENT — PAIN - FUNCTIONAL ASSESSMENT: PAIN_FUNCTIONAL_ASSESSMENT: NONE - DENIES PAIN

## 2023-07-10 NOTE — OP NOTE
[place] [address]                                OPERATIVE REPORT    PATIENT NAME: Sushma Joshi                    :        1934  MED REC NO:   [id number]                           ROOM:  ACCOUNT NO:   [de-identified]                           ADMIT DATE: 07/10/2023  PROVIDER:     Sage Sanchez. Vida Silva M.D.    Aparna Meredith:  07/10/2023    SURGEON:  Van Meckel, MD    PROCEDURE:  PEG tube removal.    INDICATION:  PEG no longer needed. DESCRIPTION OF PROCEDURE:  Prior to procedure, risks, benefits,  complications, indications, and alternatives of PEG removal explained to  the patient and daughter. She understood and agreed to procedure. All  questions were answered. With the patient in the supine position, PEG tube site was cleansed with  Betadine solution. The PEG tube was removed with traction. This was  removed successfully. The PEG tube site was then tacked without  bleeding. IMPRESSION:  PEG tube removal.    PLAN:  1. The patient is to use sterile dressing with antibiotic ointment  b.i.d. or as needed until the PEG site is healed. 2.  Followup in the office as needed. 3.  Estimated blood loss less than 10 mL. 4.  Resume aspirin and Brilinta on 2023. ANDRZEJ Silva M.D.    D: 07/10/2023 10:09:49       T: 07/10/2023 11:56:25     CARMELA/BALWINDER_MARIA DEL ROSARIO_MICHAEL  Job#: [id number]     Doc#: [id number]    CC:  Xander Barnes M.D.

## 2023-07-10 NOTE — BRIEF OP NOTE
Brief Postoperative Note      Patient: Alesia Barth  YOB: 1934  MRN: [id number]    Date of Procedure: 7/10/2023    Pre-Op Diagnosis Codes:     * Presence of externally removable percutaneous endoscopic gastrostomy (PEG) tube ([address]) [Z93.1]    Post-Op Diagnosis: Same       Procedure(s):  PEG REMOVAL    Surgeon(s):   Axel Ernst MD    Assistant:  * No surgical staff found *    Anesthesia: None    Estimated Blood Loss (mL): Minimal    Complications: None    Specimens:   * No specimens in log *    Implants:  * No surgical log found *      Drains: * No LDAs found *    Findings: above      Electronically signed by Axel Ernst MD on 7/10/2023 at 10:05 AM

## 2023-07-10 NOTE — PROGRESS NOTES
Gastrostomy tube removed per Dr Rito Pal. Pt tolerated well. Site covered with ZOHAIB and covered with DSD- secured with tape. Discharge instructions reviewed with pt and daughter- verbalized understanding.

## 2023-07-10 NOTE — H&P
[place] [address]                              HISTORY AND PHYSICAL    PATIENT NAME: Claudia Moritz                    :        1934  MED REC NO:   [id number]                           ROOM:  ACCOUNT NO:   [de-identified]                           ADMIT DATE: 07/10/2023  PROVIDER:     Rosa Christianson M.D. REASON FOR VISIT:  PEG tube is no longer needed. HISTORY OF PRESENT ILLNESS:  The patient is an 80-year-old female who  had a PEG tube placed in the past.  This is no longer being used. They  have requested it being removed, which we are planning today. PAST MEDICAL HISTORY:  As previously listed and unchanged. MEDICATIONS:  As previously listed and unchanged. ALLERGIES:  AS PREVIOUSLY LISTED AND UNCHANGED. SOCIAL HISTORY:  As previously listed and unchanged. FAMILY HISTORY:  As previously listed and unchanged. REVIEW OF SYSTEMS:  As previously listed and unchanged. PHYSICAL EXAMINATION:  GENERAL:  Awake, alert, and oriented x3. VITAL SIGNS:  Heart rate 70, respiratory rate 16. The patient is  afebrile. HEENT:  Normocephalic and atraumatic. NECK:  Supple. LUNGS:  Clear. HEART:  Regular rate. ABDOMEN:  Soft. PEG tube is intact. RECTAL:  Not indicated. EXTREMITIES:  Without edema. NEUROLOGIC:  Awake, alert, and oriented x3. IMPRESSION:  PEG tube is no longer needed. PLAN:  PEG tube removal.        ANDRZEJ Christianson M.D.    D: 07/10/2023 9:59:41       T: 07/10/2023 10:21:04     [id number]  Job#: [id number]     Doc#: [id number]    CC:

## 2023-07-10 NOTE — OP NOTE
Operative Note      Patient: Charmaine Frazier  YOB: 1934  MRN: [id number]    Date of Procedure: 7/10/2023    Pre-Op Diagnosis Codes:     * Presence of externally removable percutaneous endoscopic gastrostomy (PEG) tube ([address]) [Z93.1]    Post-Op Diagnosis: Same       Procedure(s):  PEG REMOVAL    Surgeon(s):   Dulce Maria Bonilla MD    Assistant:   * No surgical staff found *    Anesthesia: None    Estimated Blood Loss (mL): Minimal    Complications: None    Specimens:   * No specimens in log *    Implants:  * No surgical log found *      Drains: * No LDAs found *    Findings: above        Detailed Description of Procedure:   dictated    Electronically signed by Dulce Maria Bonilla MD on 7/10/2023 at 10:05 AM

## 2023-07-24 ENCOUNTER — HOSPITAL ENCOUNTER (OUTPATIENT)
Dept: UROLOGY | Age: 88
Discharge: HOME | End: 2023-07-24

## 2023-07-26 ENCOUNTER — APPOINTMENT (OUTPATIENT)
Dept: CT IMAGING | Age: 88
End: 2023-07-26
Payer: MEDICARE

## 2023-07-26 ENCOUNTER — HOSPITAL ENCOUNTER (INPATIENT)
Age: 88
LOS: 3 days | Discharge: HOME | End: 2023-07-29
Attending: EMERGENCY MEDICINE | Admitting: STUDENT IN AN ORGANIZED HEALTH CARE EDUCATION/TRAINING PROGRAM
Payer: MEDICARE

## 2023-07-26 ENCOUNTER — APPOINTMENT (OUTPATIENT)
Dept: GENERAL RADIOLOGY | Age: 88
End: 2023-07-26
Payer: MEDICARE

## 2023-07-26 DIAGNOSIS — K57.92 ACUTE DIVERTICULITIS: Primary | ICD-10-CM

## 2023-07-26 LAB
ABO: NORMAL
ALBUMIN SERPL BCG-MCNC: 4 G/DL (ref 3.5–5.1)
ALP SERPL-CCNC: 59 U/L (ref 38–126)
ALT SERPL W/O P-5'-P-CCNC: 10 U/L (ref 11–66)
AMORPH SED URNS QL MICRO: ABNORMAL
ANION GAP SERPL CALC-SCNC: 13 MEQ/L (ref 8–16)
ANTIBODY SCREEN: NORMAL
APTT PPP: 34.3 SECONDS (ref 22–38)
AST SERPL-CCNC: 33 U/L (ref 5–40)
BACTERIA URNS QL MICRO: ABNORMAL /HPF
BASOPHILS ABSOLUTE: 0 THOU/MM3 (ref 0–0.1)
BASOPHILS NFR BLD AUTO: 0.2 %
BILIRUB SERPL-MCNC: 0.5 MG/DL (ref 0.3–1.2)
BILIRUB UR QL STRIP.AUTO: NEGATIVE
BUN SERPL-MCNC: 54 MG/DL (ref 7–22)
CALCIUM SERPL-MCNC: 9.5 MG/DL (ref 8.5–10.5)
CASTS #/AREA URNS LPF: ABNORMAL /LPF
CASTS 2: ABNORMAL /LPF
CHARACTER UR: ABNORMAL
CHLORIDE SERPL-SCNC: 101 MEQ/L (ref 98–111)
CO2 SERPL-SCNC: 23 MEQ/L (ref 23–33)
COLOR: YELLOW
CREAT SERPL-MCNC: 1.6 MG/DL (ref 0.4–1.2)
CRYSTALS URNS MICRO: ABNORMAL
DEPRECATED RDW RBC AUTO: 57.3 FL (ref 35–45)
EKG ATRIAL RATE: 63 BPM
EKG P AXIS: 56 DEGREES
EKG P-R INTERVAL: 196 MS
EKG Q-T INTERVAL: 470 MS
EKG QRS DURATION: 158 MS
EKG QTC CALCULATION (BAZETT): 480 MS
EKG R AXIS: -40 DEGREES
EKG T AXIS: 47 DEGREES
EKG VENTRICULAR RATE: 63 BPM
EOSINOPHIL NFR BLD AUTO: 0.3 %
EOSINOPHILS ABSOLUTE: 0 THOU/MM3 (ref 0–0.4)
EPITHELIAL CELLS, UA: ABNORMAL /HPF
ERYTHROCYTE [DISTWIDTH] IN BLOOD BY AUTOMATED COUNT: 15.5 % (ref 11.5–14.5)
GFR SERPL CREATININE-BSD FRML MDRD: 31 ML/MIN/1.73M2
GLUCOSE SERPL-MCNC: 109 MG/DL (ref 70–108)
GLUCOSE UR QL STRIP.AUTO: NEGATIVE MG/DL
HCT VFR BLD AUTO: 29.8 % (ref 37–47)
HGB BLD-MCNC: 9.3 GM/DL (ref 12–16)
HGB UR QL STRIP.AUTO: NEGATIVE
IMM GRANULOCYTES # BLD AUTO: 0.09 THOU/MM3 (ref 0–0.07)
IMM GRANULOCYTES NFR BLD AUTO: 0.8 %
INR PPP: 1.84 (ref 0.85–1.13)
KETONES UR QL STRIP.AUTO: NEGATIVE
LACTIC ACID, SEPSIS: 0.8 MMOL/L (ref 0.5–1.9)
LIPASE SERPL-CCNC: 65.8 U/L (ref 5.6–51.3)
LYMPHOCYTES ABSOLUTE: 1 THOU/MM3 (ref 1–4.8)
LYMPHOCYTES NFR BLD AUTO: 9.6 %
MCH RBC QN AUTO: 31.5 PG (ref 26–33)
MCHC RBC AUTO-ENTMCNC: 31.2 GM/DL (ref 32.2–35.5)
MCV RBC AUTO: 101 FL (ref 81–99)
MISCELLANEOUS 2: ABNORMAL
MONOCYTES ABSOLUTE: 1.5 THOU/MM3 (ref 0.4–1.3)
MONOCYTES NFR BLD AUTO: 14.2 %
MUCOUS THREADS URNS QL MICRO: ABNORMAL
NEUTROPHILS NFR BLD AUTO: 74.9 %
NITRITE UR QL STRIP: NEGATIVE
NRBC BLD AUTO-RTO: 0 /100 WBC
OSMOLALITY SERPL CALC.SUM OF ELEC: 289.2 MOSMOL/KG (ref 275–300)
PH UR STRIP.AUTO: 5 [PH] (ref 5–9)
PLATELET # BLD AUTO: 129 THOU/MM3 (ref 130–400)
PMV BLD AUTO: 12.3 FL (ref 9.4–12.4)
POTASSIUM SERPL-SCNC: 3.7 MEQ/L (ref 3.5–5.2)
PROT SERPL-MCNC: 7.8 G/DL (ref 6.1–8)
PROT UR STRIP.AUTO-MCNC: 30 MG/DL
RBC # BLD AUTO: 2.95 MILL/MM3 (ref 4.2–5.4)
RBC URINE: ABNORMAL /HPF
RENAL EPI CELLS #/AREA URNS HPF: ABNORMAL /[HPF]
RH FACTOR: NORMAL
SEGMENTED NEUTROPHILS ABSOLUTE COUNT: 8 THOU/MM3 (ref 1.8–7.7)
SODIUM SERPL-SCNC: 137 MEQ/L (ref 135–145)
SP GR UR REFRACT.AUTO: 1.01 (ref 1–1.03)
TROPONIN, HIGH SENSITIVITY: 30 NG/L (ref 0–12)
UROBILINOGEN, URINE: 0.2 EU/DL (ref 0–1)
WBC # BLD AUTO: 10.7 THOU/MM3 (ref 4.8–10.8)
WBC #/AREA URNS HPF: ABNORMAL /HPF
WBC #/AREA URNS HPF: ABNORMAL /[HPF]
YEAST LIKE FUNGI URNS QL MICRO: ABNORMAL

## 2023-07-26 PROCEDURE — 6360000004 HC RX CONTRAST MEDICATION: Performed by: EMERGENCY MEDICINE

## 2023-07-26 PROCEDURE — 86850 RBC ANTIBODY SCREEN: CPT

## 2023-07-26 PROCEDURE — 36415 COLL VENOUS BLD VENIPUNCTURE: CPT

## 2023-07-26 PROCEDURE — 93010 ELECTROCARDIOGRAM REPORT: CPT | Performed by: INTERNAL MEDICINE

## 2023-07-26 PROCEDURE — 87040 BLOOD CULTURE FOR BACTERIA: CPT

## 2023-07-26 PROCEDURE — 96375 TX/PRO/DX INJ NEW DRUG ADDON: CPT

## 2023-07-26 PROCEDURE — 6360000002 HC RX W HCPCS: Performed by: STUDENT IN AN ORGANIZED HEALTH CARE EDUCATION/TRAINING PROGRAM

## 2023-07-26 PROCEDURE — 83690 ASSAY OF LIPASE: CPT

## 2023-07-26 PROCEDURE — 81001 URINALYSIS AUTO W/SCOPE: CPT

## 2023-07-26 PROCEDURE — 87077 CULTURE AEROBIC IDENTIFY: CPT

## 2023-07-26 PROCEDURE — 74177 CT ABD & PELVIS W/CONTRAST: CPT

## 2023-07-26 PROCEDURE — 83605 ASSAY OF LACTIC ACID: CPT

## 2023-07-26 PROCEDURE — 85610 PROTHROMBIN TIME: CPT

## 2023-07-26 PROCEDURE — 85730 THROMBOPLASTIN TIME PARTIAL: CPT

## 2023-07-26 PROCEDURE — 86901 BLOOD TYPING SEROLOGIC RH(D): CPT

## 2023-07-26 PROCEDURE — 71045 X-RAY EXAM CHEST 1 VIEW: CPT

## 2023-07-26 PROCEDURE — 85025 COMPLETE CBC W/AUTO DIFF WBC: CPT

## 2023-07-26 PROCEDURE — 99223 1ST HOSP IP/OBS HIGH 75: CPT | Performed by: STUDENT IN AN ORGANIZED HEALTH CARE EDUCATION/TRAINING PROGRAM

## 2023-07-26 PROCEDURE — 2580000003 HC RX 258: Performed by: EMERGENCY MEDICINE

## 2023-07-26 PROCEDURE — 87186 SC STD MICRODIL/AGAR DIL: CPT

## 2023-07-26 PROCEDURE — 96365 THER/PROPH/DIAG IV INF INIT: CPT

## 2023-07-26 PROCEDURE — 87086 URINE CULTURE/COLONY COUNT: CPT

## 2023-07-26 PROCEDURE — 6360000002 HC RX W HCPCS: Performed by: EMERGENCY MEDICINE

## 2023-07-26 PROCEDURE — 84484 ASSAY OF TROPONIN QUANT: CPT

## 2023-07-26 PROCEDURE — 99285 EMERGENCY DEPT VISIT HI MDM: CPT

## 2023-07-26 PROCEDURE — 93005 ELECTROCARDIOGRAM TRACING: CPT | Performed by: EMERGENCY MEDICINE

## 2023-07-26 PROCEDURE — 1200000003 HC TELEMETRY R&B

## 2023-07-26 PROCEDURE — 80053 COMPREHEN METABOLIC PANEL: CPT

## 2023-07-26 PROCEDURE — 86900 BLOOD TYPING SEROLOGIC ABO: CPT

## 2023-07-26 PROCEDURE — 51701 INSERT BLADDER CATHETER: CPT

## 2023-07-26 RX ORDER — 0.9 % SODIUM CHLORIDE 0.9 %
30 INTRAVENOUS SOLUTION INTRAVENOUS ONCE
Status: COMPLETED | OUTPATIENT
Start: 2023-07-26 | End: 2023-07-26

## 2023-07-26 RX ORDER — MORPHINE SULFATE 2 MG/ML
2 INJECTION, SOLUTION INTRAMUSCULAR; INTRAVENOUS ONCE
Status: COMPLETED | OUTPATIENT
Start: 2023-07-26 | End: 2023-07-26

## 2023-07-26 RX ORDER — ONDANSETRON 2 MG/ML
4 INJECTION INTRAMUSCULAR; INTRAVENOUS ONCE
Status: COMPLETED | OUTPATIENT
Start: 2023-07-26 | End: 2023-07-26

## 2023-07-26 RX ADMIN — PIPERACILLIN AND TAZOBACTAM 4500 MG: 4; .5 INJECTION, POWDER, LYOPHILIZED, FOR SOLUTION INTRAVENOUS at 17:38

## 2023-07-26 RX ADMIN — HYDROMORPHONE HYDROCHLORIDE 0.5 MG: 1 INJECTION, SOLUTION INTRAMUSCULAR; INTRAVENOUS; SUBCUTANEOUS at 23:20

## 2023-07-26 RX ADMIN — MORPHINE SULFATE 2 MG: 2 INJECTION, SOLUTION INTRAMUSCULAR; INTRAVENOUS at 17:42

## 2023-07-26 RX ADMIN — ONDANSETRON 4 MG: 2 INJECTION INTRAMUSCULAR; INTRAVENOUS at 17:42

## 2023-07-26 RX ADMIN — SODIUM CHLORIDE 1000 ML: 9 INJECTION, SOLUTION INTRAVENOUS at 16:56

## 2023-07-26 RX ADMIN — IOPAMIDOL 80 ML: 755 INJECTION, SOLUTION INTRAVENOUS at 18:26

## 2023-07-26 ASSESSMENT — PAIN SCALES - GENERAL
PAINLEVEL_OUTOF10: 8
PAINLEVEL_OUTOF10: 7

## 2023-07-26 ASSESSMENT — PAIN - FUNCTIONAL ASSESSMENT: PAIN_FUNCTIONAL_ASSESSMENT: 0-10

## 2023-07-26 ASSESSMENT — PAIN DESCRIPTION - LOCATION: LOCATION: ABDOMEN

## 2023-07-26 NOTE — ED TRIAGE NOTES
Pt presents to the ED from home with complaints of abdominal pain. Family at bedside states pain started a few days ago. Pt has a hx of diverticulitis and recently got over c-diff. Family states her last BM was today however it was not a lot and pt also has a hx of being impacted. Pt states she is in severe pain.

## 2023-07-26 NOTE — ED PROVIDER NOTES
[address] ENCOUNTER        PATIENT NAME: Charmaine Frazier  MRN: [id number]  : 1934  GIL: 2023  PROVIDER: Otis Joya MD    CHIEF COMPLAINT       Chief Complaint   Patient presents with    Abdominal Pain       Patient is seen and evaluated in a timely fashion. Nurses Notes are reviewed and I agree except as noted in the HPI. HISTORY OF PRESENT ILLNESS   Charmaine Frazier is a 80 y.o. female who presents to Emergency Department with Abdominal Pain     LLQ pain x 3 days. Pain is sharp, persistent, at 10/10. Pain does not radiate. Pain is associated with nausea. Last BM was this morning. PMH of HERIBERTO, CHF, chronic A-fib on Eliquis, AICD status, diverticulitis, C. difficile colitis, decub ulcer, and protein calorie malnutrition with PEG tube for about a year (removed 2 weeks ago). No fever or chills. No chest pain. No SOB. No urine symptoms. Prior abdominal surgeries include appendectomy, cholecystectomy, and PEG tube placement. This HPI was provided by patient and daughter. PAST MEDICAL HISTORY    has a past medical history of HERIBERTO (acute kidney injury) ([address]), Anxiety, Arthritis, Bronchitis, CHF (congestive heart failure) ([address]), Chronic a-fib ([address]), COVID, Diverticulitis of colon, Hyperkalemia, Hypertension, [employer] cobalt dual ICD , Neuromuscular dysfunction of bladder, Personal history of COVID-19, Pressure ulcer, and Protein-calorie malnutrition ([address]). SURGICAL HISTORY      has a past surgical history that includes Appendectomy; Cholecystectomy; Gastrostomy tube placement (N/A, 2021); Pressure ulcer debridement (N/A, 2021); tracheostomy; and Gastrostomy tube placement (N/A, 7/10/2023).     CURRENT MEDICATIONS       Previous Medications    ACETAMINOPHEN (TYLENOL) 325 MG TABLET    Take 2 tablets by mouth every 6 hours as needed for Pain    APIXABAN (ELIQUIS) 2.5 MG TABS TABLET    Take 1 tablet by mouth 2 times daily    CARVEDILOL 98% 99% 99%   Weight: 101 lb (45.8 kg)     Height: 5' (1.524 m)         1) Number and Complexity of Problems        Problem List This Visit:   Abdominal Pain      Pertinent Comorbid Conditions:    See HPI, PMH and PSH    2)  Data Reviewed (none if left blank)    External Documentation Reviewed:   Relevant record in care everywhere is reviewed. Previous patient encounter documents & history available on EMR was reviewed:   Yes (for relevant previous encounters)    See Formal Diagnostic Results above for the lab and radiology tests and orders. 3)  Treatment and Disposition    ED Reassessment:    Stable ED stay    Case discussed with consulting clinician:    Hospital medicine    Shared Decision-Making:   Treatment plan and disposition discussed with the patient/family, questions answered     Code Status:    Reviewed with patient and/or family as   Full code    ED Medications administered this visit:  (None if blank)  Medications   piperacillin-tazobactam (ZOSYN) 4,500 mg in sodium chloride 0.9 % 100 mL IVPB (mini-bag) (0 mg IntraVENous Stopped 7/26/23 1808)   sodium chloride 0.9 % bolus 1,365 mL (0 mLs IntraVENous Stopped 7/26/23 1851)   morphine (PF) injection 2 mg (2 mg IntraVENous Given 7/26/23 1742)   ondansetron (ZOFRAN) injection 4 mg (4 mg IntraVENous Given 7/26/23 1742)   iopamidol (ISOVUE-370) 76 % injection 80 mL (80 mLs IntraVENous Given 7/26/23 1826)       PROCEDURES:   None     CRITICAL CARE:   None    FINAL IMPRESSION      1. Acute diverticulitis          DISPOSITION/PLAN   DISPOSITION Decision To Admit 07/26/2023 08:20:23 PM      OUTPATIENT FOLLOW UP THE PATIENT:  No follow-up provider specified.   DISCHARGE MEDICATIONS:(None if blank)  New Prescriptions    No medications on file         MD Steve Agee MD  07/26/23 2022

## 2023-07-27 LAB
ANION GAP SERPL CALC-SCNC: 11 MEQ/L (ref 8–16)
BUN SERPL-MCNC: 45 MG/DL (ref 7–22)
CALCIUM SERPL-MCNC: 9 MG/DL (ref 8.5–10.5)
CHLORIDE SERPL-SCNC: 109 MEQ/L (ref 98–111)
CO2 SERPL-SCNC: 24 MEQ/L (ref 23–33)
CREAT SERPL-MCNC: 1.4 MG/DL (ref 0.4–1.2)
CREAT UR-MCNC: 34.6 MG/DL
DEPRECATED RDW RBC AUTO: 57.2 FL (ref 35–45)
EKG ATRIAL RATE: 51 BPM
EKG P AXIS: 54 DEGREES
EKG P-R INTERVAL: 206 MS
EKG Q-T INTERVAL: 510 MS
EKG QRS DURATION: 152 MS
EKG QTC CALCULATION (BAZETT): 470 MS
EKG R AXIS: -52 DEGREES
EKG T AXIS: 69 DEGREES
EKG VENTRICULAR RATE: 51 BPM
EOSINOPHIL SMEAR, URINE: NORMAL
ERYTHROCYTE [DISTWIDTH] IN BLOOD BY AUTOMATED COUNT: 15.4 % (ref 11.5–14.5)
FERRITIN SERPL IA-MCNC: 909 NG/ML (ref 10–291)
FOLATE SERPL-MCNC: > 20 NG/ML (ref 4.8–24.2)
GFR SERPL CREATININE-BSD FRML MDRD: 36 ML/MIN/1.73M2
GLUCOSE BLD STRIP.AUTO-MCNC: 84 MG/DL (ref 70–108)
GLUCOSE BLD STRIP.AUTO-MCNC: 84 MG/DL (ref 70–108)
GLUCOSE BLD STRIP.AUTO-MCNC: 87 MG/DL (ref 70–108)
GLUCOSE SERPL-MCNC: 93 MG/DL (ref 70–108)
HCT VFR BLD AUTO: 25 % (ref 37–47)
HGB BLD-MCNC: 7.8 GM/DL (ref 12–16)
HGB RETIC QN AUTO: 28.9 PG (ref 28.2–35.7)
IMM RETICS NFR: 22.6 % (ref 3–15.9)
IRON SATN MFR SERPL: 11 % (ref 20–50)
IRON SERPL-MCNC: 21 UG/DL (ref 50–170)
LACTATE SERPL-SCNC: 0.7 MMOL/L (ref 0.5–2)
LV EF: 25 %
LVEF MODALITY: NORMAL
MAGNESIUM SERPL-MCNC: 2.3 MG/DL (ref 1.6–2.4)
MCH RBC QN AUTO: 31.6 PG (ref 26–33)
MCHC RBC AUTO-ENTMCNC: 31.2 GM/DL (ref 32.2–35.5)
MCV RBC AUTO: 101.2 FL (ref 81–99)
PLATELET # BLD AUTO: 115 THOU/MM3 (ref 130–400)
PMV BLD AUTO: 12.5 FL (ref 9.4–12.4)
POTASSIUM SERPL-SCNC: 3.9 MEQ/L (ref 3.5–5.2)
PROT UR-MCNC: 45.9 MG/DL
PROT/CREAT 24H UR: 1.33 MG/G{CREAT}
RBC # BLD AUTO: 2.47 MILL/MM3 (ref 4.2–5.4)
RETICS # AUTO: 69 THOU/MM3 (ref 20–115)
RETICS/RBC NFR AUTO: 2.6 % (ref 0.5–2)
SODIUM SERPL-SCNC: 144 MEQ/L (ref 135–145)
SODIUM UR-SCNC: 61 MEQ/L
TIBC SERPL-MCNC: 186 UG/DL (ref 171–450)
TROPONIN, HIGH SENSITIVITY: 33 NG/L (ref 0–12)
TROPONIN, HIGH SENSITIVITY: 36 NG/L (ref 0–12)
TROPONIN, HIGH SENSITIVITY: 37 NG/L (ref 0–12)
UUN 24H UR-MCNC: 575 MG/DL
VIT B12 SERPL-MCNC: 1074 PG/ML (ref 211–911)
WBC # BLD AUTO: 8.3 THOU/MM3 (ref 4.8–10.8)

## 2023-07-27 PROCEDURE — 93005 ELECTROCARDIOGRAM TRACING: CPT | Performed by: STUDENT IN AN ORGANIZED HEALTH CARE EDUCATION/TRAINING PROGRAM

## 2023-07-27 PROCEDURE — 97166 OT EVAL MOD COMPLEX 45 MIN: CPT

## 2023-07-27 PROCEDURE — 85046 RETICYTE/HGB CONCENTRATE: CPT

## 2023-07-27 PROCEDURE — 80048 BASIC METABOLIC PNL TOTAL CA: CPT

## 2023-07-27 PROCEDURE — 83550 IRON BINDING TEST: CPT

## 2023-07-27 PROCEDURE — 85027 COMPLETE CBC AUTOMATED: CPT

## 2023-07-27 PROCEDURE — 6370000000 HC RX 637 (ALT 250 FOR IP): Performed by: STUDENT IN AN ORGANIZED HEALTH CARE EDUCATION/TRAINING PROGRAM

## 2023-07-27 PROCEDURE — 1200000003 HC TELEMETRY R&B

## 2023-07-27 PROCEDURE — 93307 TTE W/O DOPPLER COMPLETE: CPT

## 2023-07-27 PROCEDURE — 84300 ASSAY OF URINE SODIUM: CPT

## 2023-07-27 PROCEDURE — 82607 VITAMIN B-12: CPT

## 2023-07-27 PROCEDURE — 82570 ASSAY OF URINE CREATININE: CPT

## 2023-07-27 PROCEDURE — 89190 NASAL SMEAR FOR EOSINOPHILS: CPT

## 2023-07-27 PROCEDURE — 84484 ASSAY OF TROPONIN QUANT: CPT

## 2023-07-27 PROCEDURE — 82948 REAGENT STRIP/BLOOD GLUCOSE: CPT

## 2023-07-27 PROCEDURE — 6360000002 HC RX W HCPCS: Performed by: STUDENT IN AN ORGANIZED HEALTH CARE EDUCATION/TRAINING PROGRAM

## 2023-07-27 PROCEDURE — 99232 SBSQ HOSP IP/OBS MODERATE 35: CPT | Performed by: STUDENT IN AN ORGANIZED HEALTH CARE EDUCATION/TRAINING PROGRAM

## 2023-07-27 PROCEDURE — 99222 1ST HOSP IP/OBS MODERATE 55: CPT | Performed by: SURGERY

## 2023-07-27 PROCEDURE — 82728 ASSAY OF FERRITIN: CPT

## 2023-07-27 PROCEDURE — 2580000003 HC RX 258: Performed by: STUDENT IN AN ORGANIZED HEALTH CARE EDUCATION/TRAINING PROGRAM

## 2023-07-27 PROCEDURE — 83605 ASSAY OF LACTIC ACID: CPT

## 2023-07-27 PROCEDURE — 83735 ASSAY OF MAGNESIUM: CPT

## 2023-07-27 PROCEDURE — 82746 ASSAY OF FOLIC ACID SERUM: CPT

## 2023-07-27 PROCEDURE — 93010 ELECTROCARDIOGRAM REPORT: CPT | Performed by: INTERNAL MEDICINE

## 2023-07-27 PROCEDURE — 84540 ASSAY OF URINE/UREA-N: CPT

## 2023-07-27 PROCEDURE — 83540 ASSAY OF IRON: CPT

## 2023-07-27 PROCEDURE — 84156 ASSAY OF PROTEIN URINE: CPT

## 2023-07-27 PROCEDURE — 97535 SELF CARE MNGMENT TRAINING: CPT

## 2023-07-27 PROCEDURE — 97110 THERAPEUTIC EXERCISES: CPT

## 2023-07-27 PROCEDURE — 36415 COLL VENOUS BLD VENIPUNCTURE: CPT

## 2023-07-27 RX ORDER — SODIUM CHLORIDE, SODIUM LACTATE, POTASSIUM CHLORIDE, CALCIUM CHLORIDE 600; 310; 30; 20 MG/100ML; MG/100ML; MG/100ML; MG/100ML
INJECTION, SOLUTION INTRAVENOUS CONTINUOUS
Status: DISCONTINUED | OUTPATIENT
Start: 2023-07-27 | End: 2023-07-28

## 2023-07-27 RX ORDER — ROSUVASTATIN CALCIUM 20 MG/1
20 TABLET, COATED ORAL DAILY
Status: DISCONTINUED | OUTPATIENT
Start: 2023-07-27 | End: 2023-07-29 | Stop reason: HOSPADM

## 2023-07-27 RX ORDER — ONDANSETRON 4 MG/1
4 TABLET, ORALLY DISINTEGRATING ORAL EVERY 8 HOURS PRN
Status: DISCONTINUED | OUTPATIENT
Start: 2023-07-27 | End: 2023-07-29 | Stop reason: HOSPADM

## 2023-07-27 RX ORDER — LACTOBACILLUS RHAMNOSUS GG 10B CELL
1 CAPSULE ORAL 2 TIMES DAILY
Status: DISCONTINUED | OUTPATIENT
Start: 2023-07-27 | End: 2023-07-29 | Stop reason: HOSPADM

## 2023-07-27 RX ORDER — CETIRIZINE HYDROCHLORIDE 5 MG/1
5 TABLET ORAL DAILY
Status: DISCONTINUED | OUTPATIENT
Start: 2023-07-27 | End: 2023-07-29 | Stop reason: HOSPADM

## 2023-07-27 RX ORDER — OXYBUTYNIN CHLORIDE 5 MG/1
5 TABLET, EXTENDED RELEASE ORAL DAILY
Status: DISCONTINUED | OUTPATIENT
Start: 2023-07-27 | End: 2023-07-29 | Stop reason: HOSPADM

## 2023-07-27 RX ORDER — CARVEDILOL 3.12 MG/1
3.12 TABLET ORAL 2 TIMES DAILY WITH MEALS
Status: DISCONTINUED | OUTPATIENT
Start: 2023-07-27 | End: 2023-07-29 | Stop reason: HOSPADM

## 2023-07-27 RX ORDER — POLYETHYLENE GLYCOL 3350 17 G/17G
17 POWDER, FOR SOLUTION ORAL DAILY PRN
Status: DISCONTINUED | OUTPATIENT
Start: 2023-07-27 | End: 2023-07-29 | Stop reason: HOSPADM

## 2023-07-27 RX ORDER — SODIUM CHLORIDE 9 MG/ML
INJECTION, SOLUTION INTRAVENOUS PRN
Status: DISCONTINUED | OUTPATIENT
Start: 2023-07-27 | End: 2023-07-29 | Stop reason: HOSPADM

## 2023-07-27 RX ORDER — LEVOTHYROXINE SODIUM 0.12 MG/1
125 TABLET ORAL DAILY
Status: DISCONTINUED | OUTPATIENT
Start: 2023-07-27 | End: 2023-07-29 | Stop reason: HOSPADM

## 2023-07-27 RX ORDER — ONDANSETRON 2 MG/ML
4 INJECTION INTRAMUSCULAR; INTRAVENOUS EVERY 6 HOURS PRN
Status: DISCONTINUED | OUTPATIENT
Start: 2023-07-27 | End: 2023-07-29 | Stop reason: HOSPADM

## 2023-07-27 RX ORDER — SODIUM CHLORIDE 0.9 % (FLUSH) 0.9 %
10 SYRINGE (ML) INJECTION EVERY 12 HOURS SCHEDULED
Status: DISCONTINUED | OUTPATIENT
Start: 2023-07-27 | End: 2023-07-29 | Stop reason: HOSPADM

## 2023-07-27 RX ORDER — FUROSEMIDE 20 MG/1
20 TABLET ORAL DAILY
Status: DISCONTINUED | OUTPATIENT
Start: 2023-07-27 | End: 2023-07-29 | Stop reason: HOSPADM

## 2023-07-27 RX ORDER — ACETAMINOPHEN 325 MG/1
650 TABLET ORAL EVERY 6 HOURS PRN
Status: DISCONTINUED | OUTPATIENT
Start: 2023-07-27 | End: 2023-07-29 | Stop reason: HOSPADM

## 2023-07-27 RX ORDER — SODIUM CHLORIDE 0.9 % (FLUSH) 0.9 %
10 SYRINGE (ML) INJECTION PRN
Status: DISCONTINUED | OUTPATIENT
Start: 2023-07-27 | End: 2023-07-29 | Stop reason: HOSPADM

## 2023-07-27 RX ORDER — VALSARTAN 40 MG/1
40 TABLET ORAL DAILY
Status: DISCONTINUED | OUTPATIENT
Start: 2023-07-27 | End: 2023-07-29 | Stop reason: HOSPADM

## 2023-07-27 RX ORDER — ACETAMINOPHEN 650 MG/1
650 SUPPOSITORY RECTAL EVERY 6 HOURS PRN
Status: DISCONTINUED | OUTPATIENT
Start: 2023-07-27 | End: 2023-07-29 | Stop reason: HOSPADM

## 2023-07-27 RX ADMIN — Medication 1 CAPSULE: at 07:57

## 2023-07-27 RX ADMIN — CETIRIZINE HYDROCHLORIDE 5 MG: 5 TABLET ORAL at 07:58

## 2023-07-27 RX ADMIN — TICAGRELOR 90 MG: 90 TABLET ORAL at 22:36

## 2023-07-27 RX ADMIN — ACETAMINOPHEN 650 MG: 325 TABLET ORAL at 05:40

## 2023-07-27 RX ADMIN — ROSUVASTATIN CALCIUM 20 MG: 20 TABLET, FILM COATED ORAL at 07:58

## 2023-07-27 RX ADMIN — PIPERACILLIN AND TAZOBACTAM 3375 MG: 3; .375 INJECTION, POWDER, FOR SOLUTION INTRAVENOUS at 05:49

## 2023-07-27 RX ADMIN — OXYBUTYNIN CHLORIDE 5 MG: 5 TABLET, EXTENDED RELEASE ORAL at 07:58

## 2023-07-27 RX ADMIN — APIXABAN 2.5 MG: 2.5 TABLET, FILM COATED ORAL at 22:36

## 2023-07-27 RX ADMIN — APIXABAN 2.5 MG: 2.5 TABLET, FILM COATED ORAL at 07:58

## 2023-07-27 RX ADMIN — TICAGRELOR 90 MG: 90 TABLET ORAL at 07:57

## 2023-07-27 RX ADMIN — LEVOTHYROXINE SODIUM 125 MCG: 0.12 TABLET ORAL at 05:40

## 2023-07-27 RX ADMIN — PIPERACILLIN AND TAZOBACTAM 3375 MG: 3; .375 INJECTION, POWDER, FOR SOLUTION INTRAVENOUS at 17:00

## 2023-07-27 RX ADMIN — Medication 1 CAPSULE: at 22:36

## 2023-07-27 RX ADMIN — SODIUM CHLORIDE, POTASSIUM CHLORIDE, SODIUM LACTATE AND CALCIUM CHLORIDE: 600; 310; 30; 20 INJECTION, SOLUTION INTRAVENOUS at 03:46

## 2023-07-27 RX ADMIN — CARVEDILOL 3.12 MG: 3.12 TABLET, FILM COATED ORAL at 07:58

## 2023-07-27 ASSESSMENT — ENCOUNTER SYMPTOMS
CHEST TIGHTNESS: 0
CONSTIPATION: 1
ANAL BLEEDING: 0
DIARRHEA: 1
BLOOD IN STOOL: 0
ABDOMINAL PAIN: 1
ABDOMINAL DISTENTION: 0

## 2023-07-27 ASSESSMENT — LIFESTYLE VARIABLES
HOW OFTEN DO YOU HAVE A DRINK CONTAINING ALCOHOL: NEVER
HOW MANY STANDARD DRINKS CONTAINING ALCOHOL DO YOU HAVE ON A TYPICAL DAY: PATIENT DOES NOT DRINK

## 2023-07-27 ASSESSMENT — PAIN SCALES - GENERAL: PAINLEVEL_OUTOF10: 0

## 2023-07-27 NOTE — PLAN OF CARE
Problem: Skin/Tissue Integrity  Goal: Absence of new skin breakdown  Description: 1. Monitor for areas of redness and/or skin breakdown  2. Assess vascular access sites hourly  3. Every 4-6 hours minimum:  Change oxygen saturation probe site  4. Every 4-6 hours:  If on nasal continuous positive airway pressure, respiratory therapy assess nares and determine need for appliance change or resting period.   Outcome: Progressing     Problem: Pain  Goal: Verbalizes/displays adequate comfort level or baseline comfort level  Outcome: Progressing     Problem: Discharge Planning  Goal: Discharge to home or other facility with appropriate resources  Outcome: Progressing  Flowsheets (Taken 7/27/2023 6441)  Discharge to home or other facility with appropriate resources: Refer to discharge planning if patient needs post-hospital services based on physician order or complex needs related to functional status, cognitive ability or social support system

## 2023-07-27 NOTE — ACP (ADVANCE CARE PLANNING)
Advance Care Planning     Advance Care Planning Inpatient Note  [facility] Department    Today's Date: 7/27/2023  Unit: [facility] 8A    Received request from IDT Member. Upon review of chart and communication with care team, patient's decision making abilities are not in question. . Patient and Child/Children was/were present in the room during visit. Goals of ACP Conversation:  Discuss advance care planning documents    Health Care Decision Makers:       Primary Decision Maker: Yared Cristianadelina - [phone]    Secondary Decision Maker: Nakita Brian - Child - [phone]    Supplemental (Other) Decision Maker: Kieran Easley Child - [phone]  Summary:  Completed [address]    Advance Care Planning Documents (Patient Wishes):  Healthcare Power of /Advance Directive Appointment of Health Care Agent     Assessment:  Pt is an 88y. o. female, lying in bed visiting with a daughter, in 10A-14. Daughter Darian Morales shared that they wanted to complete AD's for Glenna Vizcaino, as they have handled things on the Durable side, but not Healthcare. Following completion of documents and copies distributed, Glenna Vizcaino shared a decision conflict between doctors about a potential colonoscopy 'I'm so confused' she shared.  advised her to be up front with her doctors on the concern and to not be afraid to question if she has them to ask.  prayed for Kourtney to end the time. Interventions:  Provided education on documents for clarity and greater understanding  Discussed and provided education on state decision maker hierarchy  Assisted in the completion of documents according to patient's wishes at this time    Care Preferences Communicated:   No    Outcomes/Plan:  New advance directive completed.   Returned original document(s) to patient, as well as copies for distribution to appointed agents  Copy of advance directive given to staff to scan into medical record.     Electronically signed by Andrea Hill on 7/27/2023 at 5:41 PM

## 2023-07-27 NOTE — H&P
HERIBERTO.  Chest pain-free. No EKG changes. T wave inversions present in the prior EKGs. Awaiting echocardiogram.  Chronic systolic heart failure: Secondary to ICM. TTE 4/2023 shows EF at 20%. Severe global hypokinesis of the LV. LV size and thickness increased. Systolic function severely reduced. LAE. Small to moderate circumferential pericardial effusion with no evidence of hemodynamic compromise. S/p AICD. Continue beta-blocker, ARB. We will continue to hold Lasix due to HERIBERTO as above  CLD. Na/Fluid restricted diet. Daily weights, strict I&O's  Obstructive CAD s/p PCI to LM/LAD: ICMP. S/p Impella supported complex PCI 6/2022. No current chest pain. Continue BB/ARB/Brilinta/Statin  Paroxysmal atrial fibrillation: AOT6HI4-BEEp Score: 5. Rate control with Coreg. [address] with low-dose Eliquis. Currently in sinus rhythm. Maintain telemetry. Primary hypertension: Continue Coreg with hold parameters. Hold valsartan due to HERIBERTO as above. Continue to monitor  HLD: Continue Crestor  UTI. Gram-negative bacilli. Continue antibiotics  Chronic macrocytic anemia: Hemoglobin 9.3, .0. Stable at baseline. No previous iron studies/B12 or folate. We will reorder. Follow-up with PCP as outpatient. Hypothyroidism: Continue home Synthroid  Hx of IBS: Amitiza home medication. Noted. Recommend follow-up with GI as outpatient. Hx of protein calorie malnutrition: Had PEG tube, was removed recently. Dietician consult; appreciate reccomendations once tolerating full diet. Physical deconditioning: PT/OT  Goals of Care: Palliative care consulted, Discussed with daughter and patient, they will think about it.         =======================================================================      Chief Complaint:  Abdominal pain    History Of Present Illness:  Kip Robles is a 80 y.o. female with PMHx as seen below who presents to [address] with abdominal pain.   Patient states that she has had some Psychiatric: Alert and oriented, normal insight and thought content. Capillary Refill: Brisk,< 3 seconds. Peripheral Pulses: +2 palpable, equal bilaterally. Labs:     Recent Labs     07/26/23  1650 07/27/23  0355   WBC 10.7 8.3   HGB 9.3* 7.8*   HCT 29.8* 25.0*   * 115*       Recent Labs     07/26/23  1650 07/27/23  0355    144   K 3.7 3.9    109   CO2 23 24   BUN 54* 45*   CREATININE 1.6* 1.4*   CALCIUM 9.5 9.0       Recent Labs     07/26/23  1650   AST 33   ALT 10*   BILITOT 0.5   ALKPHOS 59       Recent Labs     07/26/23  1650   INR 1.84*       No results for input(s): TROPONINT in the last 72 hours. No results for input(s): PROCAL in the last 72 hours. Lab Results   Component Value Date/Time    NITRU NEGATIVE 07/26/2023 05:00 PM    WBCUA 10-15 07/26/2023 05:00 PM    BACTERIA FEW 07/26/2023 05:00 PM    RBCUA 0-2 07/26/2023 05:00 PM    BLOODU NEGATIVE 07/26/2023 05:00 PM    SPECGRAV 1.017 12/18/2021 02:15 PM    GLUCOSEU NEGATIVE 07/26/2023 05:00 PM         Radiology:   CT ABDOMEN PELVIS W IV CONTRAST Additional Contrast? None   Final Result   1. Probable diverticulitis in the distal descending/proximal sigmoid    colon. A component of stercoral colitis is not excluded given underlying    moderate-large colonic stool burden. No abscess or perforation. 2. Stable cardiomegaly. This document has been electronically signed by: Birgit Belal MD on    07/26/2023 07:04 PM      All CTs at this facility use dose modulation techniques and iterative    reconstructions, and/or weight-based dosing   when appropriate to reduce radiation to a low as reasonably achievable. XR CHEST PORTABLE   Final Result   1. Interval left AICD placement leads in the right atrium and right    ventricle. 2. Stable cardiomegaly. No other acute findings.       This document has been electronically signed by: Birgit Bella MD on    07/26/2023 05:53 PM             Past Social History  The patient currently

## 2023-07-27 NOTE — ED NOTES
ED to inpatient nurses report    Chief Complaint   Patient presents with    Abdominal Pain      Present to ED from home  LOC: alert and orientated to name, place, date  Vital signs   Vitals:    07/26/23 1851 07/26/23 2048 07/26/23 2303 07/26/23 2323   BP: (!) 104/41 (!) 122/49 (!) 130/47 (!) 137/42   Pulse: 78 83 51 52   Resp: 22 20 18 24   Temp:       TempSrc:       SpO2: 99% 99% 96% 96%   Weight:       Height:          Oxygen Baseline RA    Current needs required RA Bipap/Cpap No  LDAs: 20g Right antecubital   Peripheral IV 07/26/23 Right Antecubital (Active)   Site Assessment Clean, dry & intact 07/26/23 1654   Infiltration Assessment 0 07/26/23 1654     Mobility: Requires assistance * 1  Pending ED orders: None  Present condition: Stable    C-SSRS Risk of Suicide: No Risk  Swallow Screening    Preferred Language: [facility]     Electronically signed by Blanca Murcia RN on 7/26/2023 at 11:28 PM       Cecelia Ellis  07/26/23 1927

## 2023-07-27 NOTE — CONSULTS
Melva Chavez MD  General Surgery Consult   Pt Name: Laurene Cogan  MRN: [id number]  YOB: 1934  Date of evaluation: 7/27/2023  Primary Care Physician: SERGE Owen - CNP  Consulting Physician:  Dr. Jadon Chisholm  Reason for evaluation: diverticular disease      Chief complaint:      Chief Complaint   Patient presents with    Abdominal Pain        SUBJECTIVE:     HPI:    Bella Wall is a 80 y. o.female who is medically complex, she has significant coronary artery disease with a defibrillator. Patient had a prolonged hospital course due to [facility] [place] in 2021 with large sacral wound respiratory failure trach and feeding tube. Patient has a remote history of significant diverticular disease approximately 15 years ago. She said she had colonoscopy at that time which was told that she had significant narrowing of her colon for approximately a foot. They discussed surgery at that time but she elected not to have it done. She had not had any flares for couple of years. However in the last year and a half she has had a couple bouts of chronic abdominal pain CTs demonstrated localized diverticulitis at the junction of the descending and sigmoid colon. Patient recently had abdominal pain they thought it was from her PEG tube was removed. However she continues to have low left abdominal pain. She had not had a bowel movement in many days. Her family gave her a aggressive bowel regiment with mag citrate. It did not help she presented the emergency department. Now today she had a large bowel movement. I have reviewed her CT scan both this admission and last.  She does have significant stool burden in the junction between her descending and sigmoid colon. The most recent scan shows impending diarrhea with pure liquid stool behind the stool burden. Review of Systems:  Review of Systems   Constitutional:  Positive for appetite change. Negative for activity change, fatigue and fever. HENT: Negative. XR CHEST PORTABLE   Final Result   1. Interval left AICD placement leads in the right atrium and right    ventricle. 2. Stable cardiomegaly. No other acute findings. This document has been electronically signed by: Anabel Rm MD on    07/26/2023 05:53 PM          IMPRESSIONS:   Patient at this time has a fairly focal diverticular disease that can been seen on scan. However when looking at this there does seem to be significant constipation in the area. It is likely the patient has developed a stricture and this is the cause of her recurrent disease process. At this time I am not certain if the patient would be a good surgical candidate or not with her underlying comorbidities, I did discuss if we did surgery would be important to know where the stricture started and ended to ensure that that segment of bowel could be removed. We also discussed that there is a chance that she would get a colostomy bag. She expressed an understanding. The patient has seen multiple GI doctors in the past, she requested Dr. Mary Emerson take over her care at this time. We will place a consult for Dr. Mary Emerson. I am hopeful that he will be able to do a colonoscopy 1 to ensure there is no malignancy prior to any surgical invention as well as if she does have significant stricturing he would be able to potentially tattoo that so ensure that the appropriate segment of colon was resected.     Electronically signed by Tre Tapia MD on 7/27/2023 at 3:40 PM

## 2023-07-27 NOTE — PROGRESS NOTES
Spoke with daughter, Clemencia Thomas at this time. She was informed of floor and room number where patient is. She stated that she takes care of patient's wound that is located on her coccyx, along with a home health nurse. She cleanses it with wound spray, then packs with silver alginate and covers with foam dressing. She also was concerned with how the PEG site looked and wanted us to look at it.

## 2023-07-27 NOTE — ED NOTES
Pt resting in bed. Voiced having some pain and dry mouth.   Will monitor      Pietro Malcolm RN  07/26/23 2049

## 2023-07-27 NOTE — PROGRESS NOTES
Hospitalist progress note          Name: Laney Mercedes, female, : 1934, MRN: [id number]    PCP: SERGE Dumont CNP    Date of Admission: 2023  Date of Service: Pt seen/examined on 23  and Admitted to Inpatient with expected LOS less than two midnights due to medical therapy. Assessment and Plan:  Acute versus smoldering uncomplicated diverticulitis: P/W 3-day history LLQ pain. Sharp, 10/10, radiates to the left upper quadrant and down towards the umbilicus. Patient was in the hospital in April with similar symptoms was discharged on Augmentin. CT abdomen pelvis showed large amount of colonic stool burden. There is pericolonic stranding and wall thickening of the distal descending and proximal sigmoid colon. Noted underlying diverticular disease. No bowel obstruction, pneumoperitoneum or pneumatosis is seen. No free fluid noticed in the pelvis. Endings consistent with probable diverticulitis. Clear liquids, patient and family does not want NPO  Continue Zosyn empirically  We will consult general surgery given the smoldering nature as patient may be a surgical candidate if not then we will continue IV antibiotics for 5 to 7 days. Antiemetics and Dilaudid pain panel  Recent C Diff Colitis- Was recently treated with Fidaxomicin. Patient became constipated, but had normal bowel movement today. HERIBERTO on CKD stage III: Resolved. Creatinine 1.6 BUN 54. Baseline ~1.3 to 1.5  BUN:Cr >20 C/w prerenal etiology. Continue fluids. Avoid nephrotoxic substances  Daily weights, strict I's and O's  Stage IV sacral decubitus ulcer, POA: Follows with Dr. Umana Heart as outpatient. Wound ostomy consulted for continuity of care. Elevated troponin: Troponin 30 on arrival.  Went up to 36. Delta equal or greater than 6 but patient does not have any chest pain. Not any candidate for invasive work-up. We will continue medical management.   Troponin elevation likely from the diverticulitis and

## 2023-07-27 NOTE — PROGRESS NOTES
[facility] Continence Nurse  Progress Note       Soledad Pérez  AGE: 80 y.o. GENDER: female  : 1934  UNIT: 8A-15/015-A  TODAY'S DATE:  2023  ADMISSION DATE: 2023  4:30 PM  Subjective:     Reason for [facility] Evaluation and Assessment: Sacral ulcer      Soledad Pérez is a 80 y.o. female referred by:   [x] Physician/PA/APRN  [] Nursing  [] Other:     Wound Identification:  Wound Type: pressure  Contributing Factors: chronic pressure, decreased mobility, and shear force    Objective:     Darío Risk Score: Darío Scale Score: 17    Assessment:     Encounter: Present to patient room. Patient in bed upon arrival. Assessment and photo to follow. Patient follows with Dr Domitila Marquez. Patient rolled onto right side. Dressing removed. Patient has chronic stage 4 pressure injury. Cleansed wound with normal saline and gauze. Pat dry with clean gauze. Opticell Ag cut to fit wound, covered with bordered foam dressing. Staff to change every other day. Patient has follow up with Dr Domitila Marquez soon. Reviewed findings with daughter. Patient in bed, call light in reach. Will continue to follow and assess wound. Call with concerns and for wound evolution. 23    Wound type: Stage 4 pressure injury, POA  Wound size: 1.4cm x 1.2cm x 0.4cm  Undermining or Tunneling: None  Wound assessment/color: Red, small amount slough  Drainage amount: Small  Drainage description: Serosanguinous  Odor: None  Margins: Attached  Nida wound: Scarring, intact  Exposed structure: None      Plan:     Treatment Recommendations:   Sacral wound- Clean wound with normal saline or wound cleanser and gauze. Pat dry with clean gauze. Apply cut piece of Opticell Ag to wound, cover with bordered foam dressing. Change every other day. If Opticell is adhered to wound, wet with saline and allow to form gel. Wipe clean with dry gauze.         Specialty Bed Required :   [x] Low Air Loss   [x] Pressure Redistribution  [] Fluid Immersion- [place]  []

## 2023-07-27 NOTE — H&P
Internal Medicine Resident History and Physical          Name: Jazzy Newton, female, : 1934, MRN: [id number]    PCP: SERGE Garnica CNP    Date of Admission: 2023  Date of Service: Pt seen/examined on 23  and Admitted to Inpatient with expected LOS less than two midnights due to medical therapy. Assessment and Plan:  Acute uncomplicated diverticulitis: P/W 3-day history LLQ pain. Sharp, 10/10, radiates to the left upper quadrant and down towards the umbilicus. Slight alleviation of pain with defecation. No other alleviating factors identified. Recently had PEG tube removed earlier this month. CT abdomen pelvis showed large amount of colonic stool burden. There is pericolonic stranding and wall thickening of the distal descending and proximal sigmoid colon. Noted underlying diverticular disease. No bowel obstruction, pneumoperitoneum or pneumatosis is seen. No free fluid noticed in the pelvis. Endings consistent with probable diverticulitis. Clear liquid diet for now. Continue Zosyn empirically  IV fluids at 50 cc an hour  Antiemetics and Dilaudid pain panel  HERIBERTO on CKD stage III: Creatinine 1.6 BUN 54. Baseline ~1.3.  BUN:Cr >20 C/w prerenal etiology. Gentle IV fluids as above. Hold nephrotoxic substances  Daily weights, strict I's and O's  Urine studies pending  Stage IV sacral decubitus ulcer, POA: Follows with Dr. Markel Rashid as outpatient. Wound ostomy consulted for continuity of care. Elevated troponin: Troponin 30 on arrival. EKG shows SR with PAC. No ST segment changes to indicate acute ischemia. Trend troponin. Repeat EKG in AM. Limited Echo ordered given significant cardiac history to evaluate EF and RWMA. Chronic systolic heart failure: Secondary to ICM. TTE 2023 shows EF at 20%. Severe global hypokinesis of the LV. LV size and thickness increased. Systolic function severely reduced. LAE.   Small to moderate circumferential pericardial effusion

## 2023-07-27 NOTE — PALLIATIVE CARE
Initial Evaluation        Patient:   Haydee Jacobo  YOB: 1934  Age:  80 y.o. Room:  [facility]15/015  MRN:  [id number]   Acct: [de-identified]    Date of Admission:  7/26/2023  4:30 PM  Date of Service:  7/27/2023  Completed By:  Emma Beckford RN                 Reason for Palliative Care Evaluation:-               [x] Code Status Discussion              [x] Goals of Care              [] Pain/Symptom Management               [] Emotional Support              [] Other:                    Current Issues:-   [x]  Pain  []  Fatigue  []  Nausea  []  Anxiety  []  Depression  []  Shortness of Breath  []  Constipation  []  Appetite  []  Other:              Advance Directives:-    [] [address] [place] DNR Form  [] Living Will  [] Medical POA              Current Code Status:-     [x] Full Resuscitation  [] DNR-Comfort Care-Arrest  [] DNR-Comfort Care       [] Limited Resuscitation             [] No CPR            [] No shock            [] No ET intubation/reintubation            [] No resuscitative medications            [] Other limitation:               Palliative Performance Status:-      [] 100%  Full ambulation; normal activity and work; no evidence of disease; able to do own self care; normal intake; fully conscious     [] 90%   Full ambulation; normal activity and work; some evidence of disease; able to do own self care; normal intake; fully conscious    [] 80%   Full ambulation; normal activity with effort; some evidence of disease; able to do own self care; normal or reduced intake; fully conscious    [x] 70%  Ambulation reduced; unable to perform normal job/work; significant  disease; able to do own self care; normal or reduced intake; fully conscious    [] 60%  Ambulation reduced; Significant disease;Can't do hobbies/housework; intake normal or reduced; occasional assist; LOC full/confusion    [] 50%  Mainly sit/lie;  Extensive disease; Can't do any work; Considerable assist; intake normal or reduced;

## 2023-07-27 NOTE — PROGRESS NOTES
[facility]  INPATIENT OCCUPATIONAL THERAPY  [facility] MED SURG 8A  EVALUATION    Time:    Time In: 1350  Time Out: 1426  Timed Code Treatment Minutes: 26 Minutes  Minutes: 36          Date: 2023  Patient Name: Laurene Cogan,   Gender: female      MRN: [id number]  : 1934  (80 y.o.)  Referring Practitioner: Pam Holley DO  Diagnosis: acute diverticulitis  Additional Pertinent Hx: Per ER note on 2023:88 y.o. female who presents to Emergency Department with Abdominal Pain     LLQ pain x 3 days. Pain is sharp, persistent, at 10/10. Pain does not radiate. Pain is associated with nausea. Last BM was this morning. Restrictions/Precautions:  Restrictions/Precautions: Fall Risk    Subjective  Chart Reviewed: Yes, Orders, Progress Notes, History and Physical  Patient assessed for rehabilitation services?: Yes  Family / Caregiver Present: Yes (2 daughters)    Subjective: requesting to use toilet, little impulsive    Pain: \"soreness\"/10: no number given in abdomen    Vitals: Vitals not assessed per clinical judgement, see nursing flowsheet    Social/Functional History:  Lives With: Daughter  Type of Home: House  Home Layout: One level, Laundry in basement  Home Access: Stairs to enter without rails (2+2)  Home Equipment: Jey Raider, rolling, Rollator   Bathroom Shower/Tub: Walk-in shower  Bathroom Toilet: Handicap height  Bathroom Equipment: Shower chair       ADL Assistance: Independent  Homemaking Assistance: Independent  Ambulation Assistance: Independent  Transfer Assistance: Independent    Active : No  Patient's  Info: Daughter provides transport     Additional Comments: Pt goes to adult day care 2x/wk. Family reports Pt is very active PLOF. Pt has a life alert.     VISION:Corrected    HEARING:  WFL    COGNITION: Decreased Safety Awareness and Impulsive    RANGE OF MOTION:  Bilateral Upper Extremity:  WFL    STRENGTH:  Bilateral Upper Extremity:  WFL    SENSATION: Therapy, Plan of Care, Transfer Training, ADL Adaptive Strategies  Education Method: Demonstration, Verbal  Barriers to Learning: None  Education Outcome: Continued education needed    Equipment Recommendations:  Equipment Needed: No    Plan:  Times Per Week: 5x  Current Treatment Recommendations: Functional mobility training, Balance training, Endurance training, Safety education & training, Self-Care / ADL. See long-term goal time frame for expected duration of plan of care. If no long-term goals established, a short length of stay is anticipated. Goals:  Patient goals : go home  Short Term Goals  Time Frame for Short Term Goals: until discharge  Short Term Goal 1: Pt will complete various t/fs including toilet with S & 0 vcs for safety  Short Term Goal 2: Pt will complete mobility to/from bathroom & with retrieval of ADL items with cane, S, & 0 vcs for safety  Short Term Goal 3: Pt will tolerate standing 4-5 min with S for increased ease of sinkside grooming  Long Term Goals  Time Frame for Long Term Goals : No LTG set d/t short ELOS         Following session, patient left in safe position with all fall risk precautions in place.

## 2023-07-27 NOTE — ED NOTES
Report received from [place], [address] [facility], [address] [place] [address]  07/26/23 4448

## 2023-07-27 NOTE — CARE COORDINATION
DISCHARGE PLANNING EVALUATION  7/27/23, 10:58 AM EDT    Reason for Referral: [facility]  Mental Status: Patient is alert and oriented  Decision Making: Patient makes own decisions with support from daughter  Family/Social/Home Environment: Spoke with patient, assessment completed. Patient lives with daughter. She states she has a tub/shower with a shower chair and grab bar. She is independent with ADL's, but showers when her daughter is present if help is needed. Daughter transports to appointments. She is current with [facility], nurse every other day for a \"bed sore\". She attends adult  and exercises there. She is current with [employer]. Current Services including food security, transportation and housekeeping: see above  Current Equipment:cane, has walker if needed  Payment Source:Medicare and BCBS  Concerns or Barriers to Discharge: none at this time  [facility] provider list was provided to patient. Patient was informed of their freedom to choose [facility] provider. Discussed and offered to show the patient the relevant [facility] Providers quality and resource use measures on Medicare Compare web site via computer based on patient's goals of care and treatment preferences. Questions regarding selection process were answered. Teach Back Method used with patient regarding care plan and discharge planning. Patient  verbalized understanding of the plan of care and contribute to goal setting. Patient goals, treatment preferences and discharge plan: Plan home with daughter and resume [address] [facility]. She stated nurse visit once per week. Spoke with Asmita Diez CM ([phone]). Patient has a nurse (medicaid) , adult  3x's per week, ERS and home delivered meals.     Electronically signed by BELEM Villagran on 7/27/2023 at 10:58 AM

## 2023-07-27 NOTE — ED NOTES
Pt medicated per [facility] and updated on bed assignment at this time      Omar Brower RN  07/26/23 3607

## 2023-07-27 NOTE — PROGRESS NOTES
Patient's Daughter Holli Miller came to desk requesting patient have external catheter due to wound to coccyx/sacrum and patient having urinary incontinence and soiling wound dressing frequently. Patient is able to ambulate to bathroom however patient is leaking urine. Dr. Josue Devine at nursing station and agrees patient can have external catheter. Will continue to offer the toilet to patient during hourly rounds.

## 2023-07-27 NOTE — CARE COORDINATION
Case Management Assessment  Initial Evaluation    Date/Time of Evaluation: 7/27/2023 1:31 PM  Assessment Completed by: Ellie Brar RN    If patient is discharged prior to next notation, then this note serves as note for discharge by case management. Patient Name: Leena Chavez                   YOB: 1934  Diagnosis: Acute diverticulitis [K57.92]                   Date / Time: 7/26/2023  4:30 PM  Location: [address] [place] [address]     Patient Admission Status: Inpatient   Readmission Risk Low 0-14, Mod 15-19), High > 20: Readmission Risk Score: 18.1    Current PCP: SERGE Smith CNP  PCP verified by CM? Yes    Chart Reviewed: Yes      History Provided by: Patient  Patient Orientation: Alert and Oriented    Patient Cognition: Alert    Hospitalization in the last 30 days (Readmission):  No    If yes, Readmission Assessment in CM Navigator will be completed. Advance Directives:      Code Status: Full Code   Patient's Primary Decision Maker is: Legal Next of Kin    Primary Decision MakerMiguelito Velasco  Dawna - [phone]    Discharge Planning:    Patient lives with: Children Type of Home: House  Primary Care Giver: Self  Patient Support Systems include: Children, Home Care Staff, BRAD/Passport   Current Financial resources: Medicare  Current community resources: ECF/Home Care  Current services prior to admission: Home Care, BRAD/Passport, Durable Medical Equipment, Other (Comment) (Adult )            Current DME: Cane, Other (Comment) (grab bars)            Type of Home Care services:  Nursing Services (declines any therapy services)    ADLS  Prior functional level: Assistance with the following:, Bathing, Cooking, Housework, Shopping  Current functional level: Assistance with the following:, Bathing, Housework, Shopping, Mobility    Family can provide assistance at DC:  Yes  Would you like Case Management to discuss the discharge plan with any other family members/significant others, and if

## 2023-07-27 NOTE — PROGRESS NOTES
Pharmacy Renal Adjustment Per P&T Protocol    Uriel Cruz is a 80 y.o. female. Pharmacy has renally adjusted Zosyn per P&T Protocol. Recent Labs     07/26/23  1650   BUN 54*       Recent Labs     07/26/23  1650   CREATININE 1.6*       Estimated Creatinine Clearance: 17 mL/min (A) (based on SCr of 1.6 mg/dL (H)). Height:   Ht Readings from Last 1 Encounters:   07/26/23 5' (1.524 m)     Weight:  Wt Readings from Last 1 Encounters:   07/26/23 101 lb (45.8 kg)       Plan: Adjust the following medications based on renal function:           Zosyn to be 3375mg q12h for CrCl 17    Sylvia Lujan  7/27/2023  12:47 AM

## 2023-07-27 NOTE — PALLIATIVE CARE
Follow Up / Progress Note        Patient:   Uriel Cruz  YOB: 1934  Age:  80 y.o. Room:  [facility]15/015-A  MRN:  [id number]         Family/Patient Discussion:  Returned to unit to speak with patient and family. Patient resting in bed. Daughters, Richa Oliveira and Nataliia Azar, and grandson at bedside. Discussed ACP documents. Richa Oliveira stated the patient does have POA papers, though they are from [address] [place] [address] Street is the only person listed. Offered for patient to complete new documentation while she is admitted. Patient agreed to review forms and to further discuss with family. Also discussed code status with patient/family. Educated on FULL code including chest compressions with possible rib fx and organ damage, intubation, defibrillation, and resuscitative medications. Also educated on [facility] and [facility]. Patient stated, \"I think that middle one you said is what I'd want\". Richa Oliveira then interrupted patient and stated they would further discuss as family. Encouraged patient/family to call if they had further questions. Informed patient she would remain a FULL code unless she decided otherwise. Patient and family denied further questions. Plan/Follow-Up:  Patient and family to further discuss code status. Spiritual care consulted for ACP documents. Will follow PRN, please call if further needs arise.           Electronically signed by Samina Ross RN on 7/27/2023 at 12:35 PM             Palliative Care Office: [phone]

## 2023-07-28 PROBLEM — E43 SEVERE MALNUTRITION (HCC): Chronic | Status: ACTIVE | Noted: 2021-12-21

## 2023-07-28 LAB
ANION GAP SERPL CALC-SCNC: 10 MEQ/L (ref 8–16)
BACTERIA BLD AEROBE CULT: NORMAL
BACTERIA BLD AEROBE CULT: NORMAL
BACTERIA UR CULT: ABNORMAL
BACTERIA UR CULT: ABNORMAL
BUN SERPL-MCNC: 26 MG/DL (ref 7–22)
CALCIUM SERPL-MCNC: 9 MG/DL (ref 8.5–10.5)
CHLORIDE SERPL-SCNC: 107 MEQ/L (ref 98–111)
CO2 SERPL-SCNC: 26 MEQ/L (ref 23–33)
CREAT SERPL-MCNC: 1.3 MG/DL (ref 0.4–1.2)
DEPRECATED RDW RBC AUTO: 57.1 FL (ref 35–45)
ERYTHROCYTE [DISTWIDTH] IN BLOOD BY AUTOMATED COUNT: 15.1 % (ref 11.5–14.5)
GFR SERPL CREATININE-BSD FRML MDRD: 39 ML/MIN/1.73M2
GLUCOSE BLD STRIP.AUTO-MCNC: 100 MG/DL (ref 70–108)
GLUCOSE BLD STRIP.AUTO-MCNC: 72 MG/DL (ref 70–108)
GLUCOSE BLD STRIP.AUTO-MCNC: 90 MG/DL (ref 70–108)
GLUCOSE BLD STRIP.AUTO-MCNC: 96 MG/DL (ref 70–108)
GLUCOSE SERPL-MCNC: 86 MG/DL (ref 70–108)
HCT VFR BLD AUTO: 28.5 % (ref 37–47)
HGB BLD-MCNC: 8.6 GM/DL (ref 12–16)
MAGNESIUM SERPL-MCNC: 2.4 MG/DL (ref 1.6–2.4)
MCH RBC QN AUTO: 31.2 PG (ref 26–33)
MCHC RBC AUTO-ENTMCNC: 30.2 GM/DL (ref 32.2–35.5)
MCV RBC AUTO: 103.3 FL (ref 81–99)
ORGANISM: ABNORMAL
PLATELET # BLD AUTO: 134 THOU/MM3 (ref 130–400)
PMV BLD AUTO: 12.7 FL (ref 9.4–12.4)
POTASSIUM SERPL-SCNC: 3.7 MEQ/L (ref 3.5–5.2)
RBC # BLD AUTO: 2.76 MILL/MM3 (ref 4.2–5.4)
SODIUM SERPL-SCNC: 143 MEQ/L (ref 135–145)
WBC # BLD AUTO: 6.2 THOU/MM3 (ref 4.8–10.8)

## 2023-07-28 PROCEDURE — 1200000003 HC TELEMETRY R&B

## 2023-07-28 PROCEDURE — 2580000003 HC RX 258: Performed by: STUDENT IN AN ORGANIZED HEALTH CARE EDUCATION/TRAINING PROGRAM

## 2023-07-28 PROCEDURE — 36415 COLL VENOUS BLD VENIPUNCTURE: CPT

## 2023-07-28 PROCEDURE — 6370000000 HC RX 637 (ALT 250 FOR IP): Performed by: STUDENT IN AN ORGANIZED HEALTH CARE EDUCATION/TRAINING PROGRAM

## 2023-07-28 PROCEDURE — 6360000002 HC RX W HCPCS

## 2023-07-28 PROCEDURE — 85027 COMPLETE CBC AUTOMATED: CPT

## 2023-07-28 PROCEDURE — 97535 SELF CARE MNGMENT TRAINING: CPT

## 2023-07-28 PROCEDURE — 83735 ASSAY OF MAGNESIUM: CPT

## 2023-07-28 PROCEDURE — 2580000003 HC RX 258

## 2023-07-28 PROCEDURE — 80048 BASIC METABOLIC PNL TOTAL CA: CPT

## 2023-07-28 PROCEDURE — 99232 SBSQ HOSP IP/OBS MODERATE 35: CPT | Performed by: SURGERY

## 2023-07-28 PROCEDURE — 99233 SBSQ HOSP IP/OBS HIGH 50: CPT

## 2023-07-28 PROCEDURE — 6360000002 HC RX W HCPCS: Performed by: STUDENT IN AN ORGANIZED HEALTH CARE EDUCATION/TRAINING PROGRAM

## 2023-07-28 PROCEDURE — 82948 REAGENT STRIP/BLOOD GLUCOSE: CPT

## 2023-07-28 RX ADMIN — MEROPENEM 500 MG: 500 INJECTION, POWDER, FOR SOLUTION INTRAVENOUS at 22:22

## 2023-07-28 RX ADMIN — Medication 1 CAPSULE: at 22:16

## 2023-07-28 RX ADMIN — CARVEDILOL 3.12 MG: 3.12 TABLET, FILM COATED ORAL at 09:00

## 2023-07-28 RX ADMIN — MEROPENEM 1000 MG: 1 INJECTION, POWDER, FOR SOLUTION INTRAVENOUS at 10:43

## 2023-07-28 RX ADMIN — LEVOTHYROXINE SODIUM 125 MCG: 0.12 TABLET ORAL at 06:09

## 2023-07-28 RX ADMIN — Medication 1 CAPSULE: at 08:59

## 2023-07-28 RX ADMIN — ROSUVASTATIN CALCIUM 20 MG: 20 TABLET, FILM COATED ORAL at 09:00

## 2023-07-28 RX ADMIN — APIXABAN 2.5 MG: 2.5 TABLET, FILM COATED ORAL at 22:24

## 2023-07-28 RX ADMIN — OXYBUTYNIN CHLORIDE 5 MG: 5 TABLET, EXTENDED RELEASE ORAL at 09:00

## 2023-07-28 RX ADMIN — TICAGRELOR 90 MG: 90 TABLET ORAL at 08:59

## 2023-07-28 RX ADMIN — SODIUM CHLORIDE, PRESERVATIVE FREE 10 ML: 5 INJECTION INTRAVENOUS at 22:21

## 2023-07-28 RX ADMIN — TICAGRELOR 90 MG: 90 TABLET ORAL at 22:16

## 2023-07-28 RX ADMIN — CETIRIZINE HYDROCHLORIDE 5 MG: 5 TABLET ORAL at 09:00

## 2023-07-28 RX ADMIN — CARVEDILOL 3.12 MG: 3.12 TABLET, FILM COATED ORAL at 16:56

## 2023-07-28 RX ADMIN — APIXABAN 2.5 MG: 2.5 TABLET, FILM COATED ORAL at 09:00

## 2023-07-28 RX ADMIN — PIPERACILLIN AND TAZOBACTAM 3375 MG: 3; .375 INJECTION, POWDER, FOR SOLUTION INTRAVENOUS at 04:44

## 2023-07-28 ASSESSMENT — PAIN SCALES - GENERAL
PAINLEVEL_OUTOF10: 0
PAINLEVEL_OUTOF10: 0

## 2023-07-28 NOTE — PROGRESS NOTES
Patient noted to have more increased confusion this morning. Patient states \" Staff is being mean to her and giving her antibiotics she doesn't need. \" This nurse explained to patient that she has a urinary tract infection and that she is receiving the antibiotics for her infection. \" Patient still very upset. Daughter Jem Ryan called at this time and message left. Daughter is usually able to redirect patient.

## 2023-07-28 NOTE — PROGRESS NOTES
[address]  Occupational Therapy  Daily Note  Time:   Time In: 07  Time Out: 9637  Timed Code Treatment Minutes: 54 Minutes  Minutes: 55          Date: 2023  Patient Name: Leena Chavez,   Gender: female      Room: [facility]15/015-A  MRN: [id number]  : 1934  (80 y.o.)  Referring Practitioner: Feli Sarabia DO  Diagnosis: acute diverticulitis  Additional Pertinent Hx: Per ER note on 2023:88 y.o. female who presents to Emergency Department with Abdominal Pain     LLQ pain x 3 days. Pain is sharp, persistent, at 10/10. Pain does not radiate. Pain is associated with nausea. Last BM was this morning. Restrictions/Precautions:  Restrictions/Precautions: Fall Risk \    SUBJECTIVE: Pt. In room and agreeable to participate in OT session. Pt. Renata Pascual on bladder infection RN notified and addressed. PAIN: sore near wound-no rating given    Vitals: Vitals not assessed per clinical judgement, see nursing flowsheet    COGNITION: Decreased Recall, Decreased Safety Awareness, and Impulsive    ADL:   Grooming: Stand By Assistance and with set-up. Bathing: Stand By Assistance, with set-up, and with increased time for completion. To sponge bath  Upper Extremity Dressing: Minimal Assistance. To don hospital gown  Lower Extremity Dressing: Stand By Assistance and with set-up. To doff soiled brief from the night and to don clean brief  Footwear Management: Stand By Assistance. To doff and don socks  Toileting: Stand By Assistance. X 2  Toilet Transfer: Stand By Assistance. X 2 . BALANCE:  Sitting Balance:  Supervision. Seated on EOB  Standing Balance: Stand By Assistance. BED MOBILITY:  Supine to Sit: Stand By Assistance    Scooting: Stand By Assistance      TRANSFERS:  Sit to Stand:  Stand By Assistance. Stand to Sit: Stand By Assistance. FUNCTIONAL MOBILITY:  Assistive Device: Rolling Walker  Assist Level:  Contact Guard Assistance. Distance: To and from bathroom and in hallway   Impulsive at times due to urgency to toilet frequently      ASSESSMENT:     Activity Tolerance:  Patient tolerance of  treatment: Good treatment tolerance      Discharge Recommendations: 24 hour assistance or supervision and Home with assist as needed  Equipment Recommendations: Equipment Needed: No  Plan: Times Per Week: 5x  Current Treatment Recommendations: Functional mobility training, Balance training, Endurance training, Safety education & training, Self-Care / ADL    Patient Education  Patient Education: ADL's and Assistive Device Safety    Goals  Short Term Goals  Time Frame for Short Term Goals: until discharge  Short Term Goal 1: Pt will complete various t/fs including toilet with S & 0 vcs for safety  Short Term Goal 2: Pt will complete mobility to/from bathroom & with retrieval of ADL items with cane, S, & 0 vcs for safety  Short Term Goal 3: Pt will tolerate standing 4-5 min with S for increased ease of sinkside grooming  Long Term Goals  Time Frame for Long Term Goals : No LTG set d/t short ELOS    Following session, patient left in safe position with all fall risk precautions in place.

## 2023-07-28 NOTE — PLAN OF CARE
Problem: Discharge Planning  Goal: Discharge to home or other facility with appropriate resources  Outcome: Progressing     Problem: Pain  Goal: Verbalizes/displays adequate comfort level or baseline comfort level  Outcome: Progressing     Problem: Skin/Tissue Integrity  Goal: Absence of new skin breakdown  Description: 1. Monitor for areas of redness and/or skin breakdown  2. Assess vascular access sites hourly  3. Every 4-6 hours minimum:  Change oxygen saturation probe site  4. Every 4-6 hours:  If on nasal continuous positive airway pressure, respiratory therapy assess nares and determine need for appliance change or resting period.   Outcome: Progressing     Problem: Chronic Conditions and Co-morbidities  Goal: Patient's chronic conditions and co-morbidity symptoms are monitored and maintained or improved  Outcome: Progressing  Flowsheets (Taken 7/27/2023 2015)  Care Plan - Patient's Chronic Conditions and Co-Morbidity Symptoms are Monitored and Maintained or Improved: Monitor and assess patient's chronic conditions and comorbid symptoms for stability, deterioration, or improvement     Problem: Safety - Adult  Goal: Free from fall injury  Outcome: Progressing

## 2023-07-28 NOTE — PROGRESS NOTES
Hospitalist Progress Note      Patient:  Ely Perea    Unit/Bed:8A-15/015-A  YOB: 1934  MRN: [id number]   Acct: [de-identified]   PCP: SERGE Gates CNP  Date of Admission: 7/26/2023    Assessment/Plan:    Acute versus smoldering uncomplicated diverticulitis: P/W 3-day history LLQ pain. Sharp, 10/10, radiates to the left upper quadrant and down towards the umbilicus. Patient was in the hospital in April with similar symptoms was discharged on Augmentin. CT abdomen pelvis showed large amount of colonic stool burden. There is pericolonic stranding and wall thickening of the distal descending and proximal sigmoid colon. Noted underlying diverticular disease. No bowel obstruction, pneumoperitoneum or pneumatosis is seen. No free fluid noticed in the pelvis. Findings consistent with probable diverticulitis. Patient was started empirically on Zosyn, general surgery concern for stricture which may be a causing diverticulitis. Recommending colonoscopy prior to any surgical intervention. This morning patient has had multiple bowel movements, no longer constipated, has no abdominal pain. We will advance diet as tolerated. Continuing meropenem now, will plan to transition to Augmentin at discharge. Likely discharge tomorrow. Recent C Diff Colitis- Was recently treated with Fidaxomicin. Patient became constipated, but had normal bowel movement today. UTI: Urine culture showing E. coli, ESBL . Initially on Zosyn. Transition to meropenem. Is sensitive to Macrobid. We will plan to transition to oral antibiotics at discharge. HERIBERTO on CKD stage III: Resolved. Creatinine 1.6 BUN 54. Baseline ~1.3 to 1.5  BUN:Cr >20 C/w prerenal etiology. Treated with IV fluids. Avoid nephrotoxic agents, renally dose medications. Creatinine down to 1.3 this morning which is at baseline. We will discontinue IV fluids. iterative reconstructions, and/or weight-based dosing when appropriate to reduce radiation to a low as reasonably achievable. XR CHEST PORTABLE    Result Date: 7/26/2023  1 view chest x-ray Comparison: CR/SR - XR CHEST PORTABLE - 06/20/2022 02:28 PM EDT Findings: No consolidation or effusion. No pneumothorax. Heart remains enlarged. No pulmonary vascular congestion. Interval left AICD placement with leads in the right atrium and right ventricle. Calcification in the thoracic aorta. No acute fracture. 1. Interval left AICD placement leads in the right atrium and right ventricle. 2. Stable cardiomegaly. No other acute findings.  This document has been electronically signed by: Krishna Perez MD on 07/26/2023 05:53 PM      Electronically signed by SERGE Sparrow CNP on 7/28/2023 at 2:33 PM

## 2023-07-28 NOTE — PROGRESS NOTES
MD CATHIE Redd DR GENERAL SURGERY   General Surgery Daily Progress Note    Pt Name: Jazzy Newton  Medical Record Number: [id number]  Date of Birth 11/29/1934   Today's Date: 7/28/2023  Chief complaint: feeling better  [address] day # 2   Diverticular disease  Sterol ulcer  Abdominal pain- resolved   has a past medical history of HERIBERTO (acute kidney injury) ([address]), Anxiety, Arthritis, Bronchitis, CAD (coronary artery disease), CHF (congestive heart failure) ([address]), Chronic a-fib ([address]), COVID, Diverticulitis of colon, Hyperkalemia, Hypertension, Medtronic cobalt dual ICD , Neuromuscular dysfunction of bladder, Personal history of COVID-19, Pressure ulcer, and Protein-calorie malnutrition ([address]). PLAN   Today on rounds patient states she feels better, she would not want surgery anyway. I have spoke with Dr. Jody Currie with GI. Dr. Kristopher Shields did not want to see the patient since Dr. Alberto Mcfarland has seen recently. Since patient is feeling better if she would desire to have any surgical intervention is to be done as an outpatient, GI is more than welcome to see her as an outpatient. Since the patient has seen multiple GI providers in the past I had left the decision on which provider they would like to see up to the family. Any provider would be more than capable to scope the patient. Since the patient is feeling better I think she is fine for discharge. I would recommend the patient be on a low residual diet to prevent more episodes of constipation. Claudene Settler is doing better, she is better since she had her bowel function. .  We discussed that likely was happening if she gets constipated at where her stricture is that she was told she had in the past and this causes her symptoms. She states that that is probably why one of the GI providers in the past had told her that she should not eat anything that is high in fiber.   CURRENT MEDICATIONS   Scheduled Meds:   meropenem  500 mg IntraVENous Behavior: Behavior normal.       In: 2669.5 [P.O.:200; I.V.:1321.7]  Out: 400 [Urine:400]  Date 07/28/23 0000 - 07/28/23 2359   Shift 6769-4470 0122-6011 9875-8052 24 Hour Total   INTAKE   P.O.(mL/kg/hr) 200(0.5)   200   I. V.(mL/kg) 725. 8(15.9)   725. 8(15.9)   Shift Total(mL/kg) 925. 8(20.2)   925. 8(20.2)   OUTPUT   Urine(mL/kg/hr) 400(1.1)   400   Shift Total(mL/kg) 400(8.7)   400(8.7)   Weight (kg) 45.8 45.8 45.8 45.8     LABS     Recent Labs     07/26/23  1650 07/27/23  0355 07/28/23  0547   WBC 10.7 8.3 6.2   HGB 9.3* 7.8* 8.6*   HCT 29.8* 25.0* 28.5*   * 115* 134    144 143   K 3.7 3.9 3.7    109 107   CO2 23 24 26   BUN 54* 45* 26*   CREATININE 1.6* 1.4* 1.3*   MG  --  2.3 2.4   CALCIUM 9.5 9.0 9.0      Recent Labs     07/26/23  1650   INR 1.84*     Recent Labs     07/26/23  1650 07/27/23  1021   AST 33  --    ALT 10*  --    BILITOT 0.5  --    LIPASE 65.8*  --    LACTA  --  0.7     No results for input(s): TROPONINT in the last 72 hours. RADIOLOGY     CT ABDOMEN PELVIS W IV CONTRAST Additional Contrast? None   Final Result   1. Probable diverticulitis in the distal descending/proximal sigmoid    colon. A component of stercoral colitis is not excluded given underlying    moderate-large colonic stool burden. No abscess or perforation. 2. Stable cardiomegaly. This document has been electronically signed by: Arnaldo Charlton MD on    07/26/2023 07:04 PM      All CTs at this facility use dose modulation techniques and iterative    reconstructions, and/or weight-based dosing   when appropriate to reduce radiation to a low as reasonably achievable. XR CHEST PORTABLE   Final Result   1. Interval left AICD placement leads in the right atrium and right    ventricle. 2. Stable cardiomegaly. No other acute findings.       This document has been electronically signed by: Arnaldo Charltno MD on    07/26/2023 05:53 PM          Electronically signed by Susanna Oliva MD on 7/28/2023 at 3:12

## 2023-07-28 NOTE — PLAN OF CARE
Problem: Discharge Planning  Goal: Discharge to home or other facility with appropriate resources  7/28/2023 0258 by Sakshi Roberto RN  Outcome: Progressing  7/28/2023 0238 by Sakshi Roberto RN  Outcome: Progressing     Problem: Pain  Goal: Verbalizes/displays adequate comfort level or baseline comfort level  7/28/2023 0258 by Sakshi Roberto RN  Outcome: Progressing  7/28/2023 0238 by Sakshi Roberto RN  Outcome: Progressing     Problem: Skin/Tissue Integrity  Goal: Absence of new skin breakdown  Description: 1. Monitor for areas of redness and/or skin breakdown  2. Assess vascular access sites hourly  3. Every 4-6 hours minimum:  Change oxygen saturation probe site  4. Every 4-6 hours:  If on nasal continuous positive airway pressure, respiratory therapy assess nares and determine need for appliance change or resting period.   7/28/2023 0258 by Sakshi Roberto RN  Outcome: Progressing  7/28/2023 0238 by Sakshi Roberto RN  Outcome: Progressing     Problem: Chronic Conditions and Co-morbidities  Goal: Patient's chronic conditions and co-morbidity symptoms are monitored and maintained or improved  7/28/2023 0258 by Sakshi Roberto RN  Outcome: Progressing  7/28/2023 0238 by Sakshi Roberto RN  Outcome: Progressing  Flowsheets (Taken 7/27/2023 2015)  Care Plan - Patient's Chronic Conditions and Co-Morbidity Symptoms are Monitored and Maintained or Improved: Monitor and assess patient's chronic conditions and comorbid symptoms for stability, deterioration, or improvement     Problem: Safety - Adult  Goal: Free from fall injury  7/28/2023 0258 by Sakshi Roberto RN  Outcome: Progressing  7/28/2023 0238 by Sakshi Roberto RN  Outcome: Progressing

## 2023-07-28 NOTE — PROGRESS NOTES
Comprehensive Nutrition Assessment    Type and Reason for Visit:  Initial, Positive Nutrition Screen, Wound, Consult (Daughter requesting Amrit and coupons, as well as clear ensure with coupons for home going; Oral Nutrition Supplements)    Nutrition Recommendations/Plan:   Recommend advance diet as tolerated. Send Ensure Clear TID, Amrit BID. Recommend MVI. Encouraged continued compliance with diverticular diet (at discharge). Encouraged continued ONS use at discharge. Provided pt. with coupons for both Ensure Clear and Amrit. Malnutrition Assessment:  Malnutrition Status:  Severe malnutrition (07/28/23 1120)    Context:  Chronic Illness     Findings of the 6 clinical characteristics of malnutrition:  Energy Intake:  75% or less estimated energy requirements for 1 month or longer  Weight Loss:   (-15.1% in 3.5 months)     Body Fat Loss:  Mild body fat loss Orbital, Fat Overlying Ribs, Buccal region   Muscle Mass Loss:  Mild muscle mass loss Calf (gastrocnemius)  Fluid Accumulation:  Unable to assess     Strength:  Not Performed    Nutrition Assessment:    Pt. severely malnourished AEB criteria listed above. At risk for further nutritional compromise r/t altered GI function, admit with acute diveriticulitis, states recent Cdiff infection, increased nutrient needs for wound healing and underlying medical condition (hx CKD, CAD, CHF, COVID '21, pressure ulcer, PEG tube - 11/21 - removed 7/23, IBS, reports hx diverticulitis). Nutrition Related Findings:      Wound Type:  (chronic stage IV; wound care following)     Pt. Report/Treatments/Miscellaneous: pt. Seen - admit with abdominal pain; CT scan showed significant stool burden, impending diarrhea behind stool burden; pt.  States recent bout of CDiff; states only tolerates certain foods like rice, toast, chicken noodle soup w/ altered GI function; states drinks 2 Ensure Clears/day; Amrit on and off; Surgery c/s noted; requesting pureed diet when

## 2023-07-29 VITALS
WEIGHT: 100.9 LBS | RESPIRATION RATE: 17 BRPM | TEMPERATURE: 97.6 F | DIASTOLIC BLOOD PRESSURE: 57 MMHG | HEART RATE: 71 BPM | OXYGEN SATURATION: 100 % | BODY MASS INDEX: 19.81 KG/M2 | SYSTOLIC BLOOD PRESSURE: 126 MMHG | HEIGHT: 60 IN

## 2023-07-29 LAB
ANION GAP SERPL CALC-SCNC: 11 MEQ/L (ref 8–16)
BUN SERPL-MCNC: 18 MG/DL (ref 7–22)
CALCIUM SERPL-MCNC: 8.9 MG/DL (ref 8.5–10.5)
CHLORIDE SERPL-SCNC: 108 MEQ/L (ref 98–111)
CO2 SERPL-SCNC: 23 MEQ/L (ref 23–33)
CREAT SERPL-MCNC: 1.1 MG/DL (ref 0.4–1.2)
DEPRECATED RDW RBC AUTO: 56.1 FL (ref 35–45)
ERYTHROCYTE [DISTWIDTH] IN BLOOD BY AUTOMATED COUNT: 15 % (ref 11.5–14.5)
GFR SERPL CREATININE-BSD FRML MDRD: 48 ML/MIN/1.73M2
GLUCOSE BLD STRIP.AUTO-MCNC: 107 MG/DL (ref 70–108)
GLUCOSE BLD STRIP.AUTO-MCNC: 89 MG/DL (ref 70–108)
GLUCOSE SERPL-MCNC: 85 MG/DL (ref 70–108)
HCT VFR BLD AUTO: 27.4 % (ref 37–47)
HGB BLD-MCNC: 8.3 GM/DL (ref 12–16)
MAGNESIUM SERPL-MCNC: 2.2 MG/DL (ref 1.6–2.4)
MCH RBC QN AUTO: 31.7 PG (ref 26–33)
MCHC RBC AUTO-ENTMCNC: 30.3 GM/DL (ref 32.2–35.5)
MCV RBC AUTO: 104.6 FL (ref 81–99)
PLATELET # BLD AUTO: 140 THOU/MM3 (ref 130–400)
PMV BLD AUTO: 12.1 FL (ref 9.4–12.4)
POTASSIUM SERPL-SCNC: 3.4 MEQ/L (ref 3.5–5.2)
RBC # BLD AUTO: 2.62 MILL/MM3 (ref 4.2–5.4)
SODIUM SERPL-SCNC: 142 MEQ/L (ref 135–145)
WBC # BLD AUTO: 5 THOU/MM3 (ref 4.8–10.8)

## 2023-07-29 PROCEDURE — 80048 BASIC METABOLIC PNL TOTAL CA: CPT

## 2023-07-29 PROCEDURE — 99239 HOSP IP/OBS DSCHRG MGMT >30: CPT

## 2023-07-29 PROCEDURE — 6370000000 HC RX 637 (ALT 250 FOR IP): Performed by: STUDENT IN AN ORGANIZED HEALTH CARE EDUCATION/TRAINING PROGRAM

## 2023-07-29 PROCEDURE — 83735 ASSAY OF MAGNESIUM: CPT

## 2023-07-29 PROCEDURE — 85027 COMPLETE CBC AUTOMATED: CPT

## 2023-07-29 PROCEDURE — 6370000000 HC RX 637 (ALT 250 FOR IP)

## 2023-07-29 PROCEDURE — 6360000002 HC RX W HCPCS

## 2023-07-29 PROCEDURE — 2580000003 HC RX 258: Performed by: STUDENT IN AN ORGANIZED HEALTH CARE EDUCATION/TRAINING PROGRAM

## 2023-07-29 PROCEDURE — 36415 COLL VENOUS BLD VENIPUNCTURE: CPT

## 2023-07-29 PROCEDURE — 2580000003 HC RX 258

## 2023-07-29 PROCEDURE — 82948 REAGENT STRIP/BLOOD GLUCOSE: CPT

## 2023-07-29 RX ORDER — AMOXICILLIN AND CLAVULANATE POTASSIUM 500; 125 MG/1; MG/1
1 TABLET, FILM COATED ORAL EVERY 12 HOURS SCHEDULED
Status: DISCONTINUED | OUTPATIENT
Start: 2023-07-29 | End: 2023-07-29 | Stop reason: HOSPADM

## 2023-07-29 RX ORDER — POTASSIUM CHLORIDE 20 MEQ/1
40 TABLET, EXTENDED RELEASE ORAL ONCE
Status: COMPLETED | OUTPATIENT
Start: 2023-07-29 | End: 2023-07-29

## 2023-07-29 RX ORDER — GRANULES FOR ORAL 3 G/1
3 POWDER ORAL ONCE
Status: COMPLETED | OUTPATIENT
Start: 2023-07-29 | End: 2023-07-29

## 2023-07-29 RX ORDER — AMOXICILLIN AND CLAVULANATE POTASSIUM 500; 125 MG/1; MG/1
1 TABLET, FILM COATED ORAL EVERY 12 HOURS SCHEDULED
Qty: 14 TABLET | Refills: 0 | Status: SHIPPED | OUTPATIENT
Start: 2023-07-29 | End: 2023-08-05

## 2023-07-29 RX ADMIN — ACETAMINOPHEN 650 MG: 325 TABLET ORAL at 01:32

## 2023-07-29 RX ADMIN — MEROPENEM 500 MG: 500 INJECTION, POWDER, FOR SOLUTION INTRAVENOUS at 09:55

## 2023-07-29 RX ADMIN — LEVOTHYROXINE SODIUM 125 MCG: 0.12 TABLET ORAL at 06:19

## 2023-07-29 RX ADMIN — TICAGRELOR 90 MG: 90 TABLET ORAL at 08:21

## 2023-07-29 RX ADMIN — CARVEDILOL 3.12 MG: 3.12 TABLET, FILM COATED ORAL at 08:21

## 2023-07-29 RX ADMIN — POTASSIUM CHLORIDE 40 MEQ: 1500 TABLET, EXTENDED RELEASE ORAL at 09:54

## 2023-07-29 RX ADMIN — ROSUVASTATIN CALCIUM 20 MG: 20 TABLET, FILM COATED ORAL at 08:21

## 2023-07-29 RX ADMIN — OXYBUTYNIN CHLORIDE 5 MG: 5 TABLET, EXTENDED RELEASE ORAL at 08:21

## 2023-07-29 RX ADMIN — CETIRIZINE HYDROCHLORIDE 5 MG: 5 TABLET ORAL at 08:21

## 2023-07-29 RX ADMIN — SODIUM CHLORIDE, PRESERVATIVE FREE 10 ML: 5 INJECTION INTRAVENOUS at 08:21

## 2023-07-29 RX ADMIN — APIXABAN 2.5 MG: 2.5 TABLET, FILM COATED ORAL at 08:21

## 2023-07-29 RX ADMIN — GRANULES FOR ORAL SOLUTION 1 PACKET: 3 POWDER ORAL at 12:37

## 2023-07-29 RX ADMIN — Medication 1 CAPSULE: at 08:21

## 2023-07-29 ASSESSMENT — PAIN SCALES - GENERAL
PAINLEVEL_OUTOF10: 10
PAINLEVEL_OUTOF10: 0

## 2023-07-29 ASSESSMENT — PAIN DESCRIPTION - DESCRIPTORS: DESCRIPTORS: ACHING

## 2023-07-29 ASSESSMENT — PAIN DESCRIPTION - LOCATION: LOCATION: LEG

## 2023-07-29 ASSESSMENT — PAIN DESCRIPTION - ORIENTATION: ORIENTATION: LEFT;RIGHT

## 2023-07-29 NOTE — PLAN OF CARE
Problem: Discharge Planning  Goal: Discharge to home or other facility with appropriate resources  Outcome: Progressing  Flowsheets (Taken 7/28/2023 2000)  Discharge to home or other facility with appropriate resources:   Identify barriers to discharge with patient and caregiver   Arrange for needed discharge resources and transportation as appropriate   Identify discharge learning needs (meds, wound care, etc)     Problem: Pain  Goal: Verbalizes/displays adequate comfort level or baseline comfort level  Outcome: Progressing  Flowsheets (Taken 7/28/2023 2000)  Verbalizes/displays adequate comfort level or baseline comfort level:   Encourage patient to monitor pain and request assistance   Assess pain using appropriate pain scale   Administer analgesics based on type and severity of pain and evaluate response     Problem: Skin/Tissue Integrity  Goal: Absence of new skin breakdown  Description: 1. Monitor for areas of redness and/or skin breakdown  2. Assess vascular access sites hourly  3. Every 4-6 hours minimum:  Change oxygen saturation probe site  4. Every 4-6 hours:  If on nasal continuous positive airway pressure, respiratory therapy assess nares and determine need for appliance change or resting period.   Outcome: Progressing     Problem: Safety - Adult  Goal: Free from fall injury  Outcome: Progressing

## 2023-07-29 NOTE — DISCHARGE INSTRUCTIONS
Recommended to follow a low residue, low fiber diet. Get repeat lab work in a few days and follow-up with your PCP this week.

## 2023-07-29 NOTE — PLAN OF CARE
Problem: Discharge Planning  Goal: Discharge to home or other facility with appropriate resources  7/29/2023 1346 by Claudio Sims RN  Outcome: Completed  7/29/2023 0406 by January Thompson RN  Outcome: Progressing  Flowsheets (Taken 7/28/2023 2000)  Discharge to home or other facility with appropriate resources:   Identify barriers to discharge with patient and caregiver   Arrange for needed discharge resources and transportation as appropriate   Identify discharge learning needs (meds, wound care, etc)     Problem: Pain  Goal: Verbalizes/displays adequate comfort level or baseline comfort level  7/29/2023 1346 by Claudio Sims RN  Outcome: Completed  7/29/2023 0406 by January Thompson RN  Outcome: Progressing  Flowsheets (Taken 7/28/2023 2000)  Verbalizes/displays adequate comfort level or baseline comfort level:   Encourage patient to monitor pain and request assistance   Assess pain using appropriate pain scale   Administer analgesics based on type and severity of pain and evaluate response     Problem: Skin/Tissue Integrity  Goal: Absence of new skin breakdown  Description: 1. Monitor for areas of redness and/or skin breakdown  2. Assess vascular access sites hourly  3. Every 4-6 hours minimum:  Change oxygen saturation probe site  4. Every 4-6 hours:  If on nasal continuous positive airway pressure, respiratory therapy assess nares and determine need for appliance change or resting period.   7/29/2023 1346 by Claudio Sims RN  Outcome: Completed  7/29/2023 0406 by January Thompson RN  Outcome: Progressing     Problem: Chronic Conditions and Co-morbidities  Goal: Patient's chronic conditions and co-morbidity symptoms are monitored and maintained or improved  Outcome: Completed     Problem: Safety - Adult  Goal: Free from fall injury  7/29/2023 1346 by Claudio Sims RN  Outcome: Completed  7/29/2023 0406 by January Thompson RN  Outcome: Progressing     Problem: Nutrition Deficit:  Goal: Optimize nutritional status  Outcome: Completed     Problem: Skin/Tissue Integrity - Adult  Goal: Skin integrity remains intact  Outcome: Completed     Problem: Infection - Adult  Goal: Absence of infection at discharge  Outcome: Completed  Goal: Absence of infection during hospitalization  Outcome: Completed     Problem: Metabolic/Fluid and Electrolytes - Adult  Goal: Electrolytes maintained within normal limits  Outcome: Completed  Goal: Hemodynamic stability and optimal renal function maintained  Outcome: Completed     Problem: Hematologic - Adult  Goal: Maintains hematologic stability  Outcome: Completed     Problem: Gastrointestinal - Adult  Goal: Minimal or absence of nausea and vomiting  Outcome: Completed  Goal: Maintains or returns to baseline bowel function  Outcome: Completed

## 2023-07-29 NOTE — DISCHARGE SUMMARY
Hospitalist Discharge Summary    Patient: Christo Bryant  YOB: 1934  MRN: [id number]   Acct: [de-identified]    Primary Care Physician: SERGE Parsons CNP    Admit date  7/26/2023    Discharge date: 7/29/2023     Discharge Assessment and Plan, Hospital course:    Acute versus smoldering uncomplicated diverticulitis: P/W 3-day history LLQ pain. Sharp, 10/10, radiates to the left upper quadrant and down towards the umbilicus. Patient was in the hospital in April with similar symptoms was discharged on Augmentin. CT abdomen pelvis showed large amount of colonic stool burden. There is pericolonic stranding and wall thickening of the distal descending and proximal sigmoid colon. Noted underlying diverticular disease. No bowel obstruction, pneumoperitoneum or pneumatosis is seen. No free fluid noticed in the pelvis. Findings consistent with probable diverticulitis. Patient was started empirically on Zosyn, per general surgery, concern for stricture which may be a causing diverticulitis. Morning of 7/28 patient has had multiple bowel movements, no longer constipated, has no abdominal pain. Was treated with meropenem. Diet was advanced. Patient tolerated regular diet. Continues to have no abdominal pain prior to discharge. Transition to Augmentin for 7 more days, to complete 10-day course. Will follow-up with GI for possible colonoscopy, general surgery for further evaluation of stricture. Recent C Diff Colitis- Was recently treated with Fidaxomicin. Patient became constipated, but had normal bowel movement today. UTI: Urine culture showing E. coli, ESBL . Initially on Zosyn. Transitioned to meropenem while inpatient. Discussed with pharmacy. We will give one-time dose of fosfomycin. Follow-up with PCP. HERIBERTO on CKD stage III: Resolved. Creatinine 1.6 BUN 54. Baseline ~1.3 to 1.5  BUN:Cr >20 C/w prerenal etiology. Treated with IV fluids.   Avoid nephrotoxic agents, 4:50 PM   Result Value Ref Range    Lactic Acid, Sepsis 0.8 0.5 - 1.9 mmol/L   Anion Gap    Collection Time: 07/26/23  4:50 PM   Result Value Ref Range    Anion Gap 13.0 8.0 - 16.0 meq/L   Glomerular Filtration Rate, Estimated    Collection Time: 07/26/23  4:50 PM   Result Value Ref Range    Est, Glom Filt Rate 31 (A) >60 ml/min/1.73m2   Osmolality    Collection Time: 07/26/23  4:50 PM   Result Value Ref Range    Osmolality Calc 289.2 275.0 - 300.0 mOsmol/kg   EKG Emergency    Collection Time: 07/26/23  4:50 PM   Result Value Ref Range    Ventricular Rate 63 BPM    Atrial Rate 63 BPM    P-R Interval 196 ms    QRS Duration 158 ms    Q-T Interval 470 ms    QTc Calculation (Bazett) 480 ms    P Axis 56 degrees    R Axis -40 degrees    T Axis 47 degrees   Culture, Blood 2    Collection Time: 07/26/23  5:00 PM    Specimen: Blood   Result Value Ref Range    Blood Culture, Routine No growth 24 hours. No growth 48 hours. Urine with Reflexed Micro    Collection Time: 07/26/23  5:00 PM   Result Value Ref Range    Glucose, Ur NEGATIVE NEGATIVE mg/dl    Bilirubin Urine NEGATIVE NEGATIVE    Ketones, Urine NEGATIVE NEGATIVE    Specific Gravity, Urine 1.014 1.002 - 1.030    Blood, Urine NEGATIVE NEGATIVE    pH, UA 5.0 5.0 - 9.0    Protein, UA 30 (A) NEGATIVE    Urobilinogen, Urine 0.2 0.0 - 1.0 eu/dl    Nitrite, Urine NEGATIVE NEGATIVE    Leukocyte Esterase, Urine TRACE (A) NEGATIVE    Color, UA YELLOW STRAW-YELLOW    Character, Urine SL CLOUDY CLEAR-SL CLOUD    RBC, UA 0-2 0-2/hpf /hpf    WBC, UA 10-15 0-4/hpf /hpf    Epithelial Cells, UA 3-5 3-5/hpf /hpf    Amorphous, UA URATES NONE SEEN    Mucus, UA THREADS NONE SEEN/THREA    Bacteria, UA FEW FEW/NONE SEEN /hpf    Casts UA 0-4 FINE GRAN NONE SEEN /lpf    Crystals, UA NONE SEEN NONE SEEN    Renal Epithelial, UA NONE SEEN NONE SEEN    Yeast, UA NONE SEEN NONE SEEN    CASTS 2 0-4 C. GRAN NONE SEEN /lpf    MISCELLANEOUS 2 NONE SEEN    Culture, Reflexed, Urine    Collection Time:

## 2023-07-29 NOTE — PROGRESS NOTES
Physician Progress Note      Janusz Galaviz  CSN #:                  [id number]  :                       1934  ADMIT DATE:       2023 4:30 PM  DISCH DATE:  RESPONDING  PROVIDER #:        Ellie Holman CNP          QUERY TEXT:    Pt admitted with diverticulitis and has hx of malnutrition documented. Patient evaluated by dietician and met AND/ASPEN criteria for severe   malnutrition. Please document in progress note and/or discharge summary if you   are treating: The medical record reflects the following:    Risk Factors: wounds, unplanned weight loss  Clinical Indicators: severe malnutrition per dietician per AND/ASPEN criteria   as evidenced by Energy Intake:  75% or less estimated energy requirements for   1 month or longer, 15.1% weight loss in 3.5 months, Mild body fat loss   Orbital, Fat Overlying Ribs, Buccal region, Mild muscle mass loss Calf   (gastrocnemius)  Treatment: Dietician consult, Ensure Clear TID and Amrit BID    ASPEN Criteria:    https://[email or web address] onlinelibrary. [place]. com/doi/full/10.1177/123209261281237  5    Thank you! Katie Salcido, CRCR  RN Clinical   Options provided:  -- Severe Malnutrition  -- Other - I will add my own diagnosis  -- Disagree - Not applicable / Not valid  -- Disagree - Clinically unable to determine / Unknown  -- Refer to Clinical Documentation Reviewer    PROVIDER RESPONSE TEXT:    This patient has severe malnutrition.     Query created by: Annalee Lino on 2023 3:17 PM      Electronically signed by:  Ellie Holman CNP 2023 11:19 AM

## 2023-07-30 PROBLEM — E78.5 HYPERLIPIDEMIA: Status: ACTIVE | Noted: 2023-07-30

## 2023-07-30 PROBLEM — N18.9 ACUTE KIDNEY INJURY SUPERIMPOSED ON CKD (HCC): Status: ACTIVE | Noted: 2023-07-30

## 2023-07-30 PROBLEM — Z87.19 HISTORY OF IBS: Status: ACTIVE | Noted: 2023-07-30

## 2023-07-30 PROBLEM — R53.81 PHYSICAL DECONDITIONING: Status: ACTIVE | Noted: 2023-07-30

## 2023-07-30 PROBLEM — E03.9 HYPOTHYROIDISM: Status: ACTIVE | Noted: 2023-07-30

## 2023-07-30 PROBLEM — Z86.19 HISTORY OF CLOSTRIDIOIDES DIFFICILE COLITIS: Status: ACTIVE | Noted: 2023-07-30

## 2023-07-30 PROBLEM — R79.89 ELEVATED TROPONIN: Status: ACTIVE | Noted: 2023-07-30

## 2023-07-30 PROBLEM — R77.8 ELEVATED TROPONIN: Status: ACTIVE | Noted: 2023-07-30

## 2023-07-30 PROBLEM — I48.0 PAROXYSMAL ATRIAL FIBRILLATION (HCC): Status: ACTIVE | Noted: 2023-07-30

## 2023-07-30 PROBLEM — L89.154 SACRAL DECUBITUS ULCER, STAGE IV (HCC): Status: ACTIVE | Noted: 2023-07-30

## 2023-07-30 PROBLEM — D53.9 MACROCYTIC ANEMIA: Status: ACTIVE | Noted: 2023-07-30

## 2023-07-30 PROBLEM — Z86.79: Status: ACTIVE | Noted: 2023-07-30

## 2023-07-30 PROBLEM — I10 PRIMARY HYPERTENSION: Status: ACTIVE | Noted: 2023-07-30

## 2023-07-30 PROBLEM — N17.9 ACUTE KIDNEY INJURY SUPERIMPOSED ON CKD (HCC): Status: ACTIVE | Noted: 2023-07-30

## 2023-07-31 LAB
BACTERIA BLD AEROBE CULT: NORMAL
BACTERIA BLD AEROBE CULT: NORMAL

## 2023-07-31 NOTE — CARE COORDINATION
7/31/23, 9:23 AM EDT  Late Entry  Patient goals/plan/ treatment preferences discussed by  and . Patient goals/plan/ treatment preferences reviewed with patient/ family. Patient/ family verbalize understanding of discharge plan and are in agreement with goal/plan/treatment preferences. Understanding was demonstrated using the teach back method. AVS provided by RN at time of discharge, which includes all necessary medical information pertaining to the patients current course of illness, treatment, post-discharge goals of care, and treatment preferences. Services At/After Discharge: Home Health and Nursing service       IMM Letter  IMM Letter given to Patient/Family/Significant other/Guardian/POA/by[de-identified] Nirali Boss RN   IMM Letter date given[de-identified] 07/28/23  IMM Letter time given[de-identified] 4880     Patient discharged on 7/29 to home with daughter and resume [facility]. Spoke with Anahi Jose from [facility] informed patient was discharged 7/29. Spoke with Brook luna, informed patient was discharged 7/29.

## 2023-08-02 ENCOUNTER — PROCEDURE VISIT (OUTPATIENT)
Dept: CARDIOLOGY CLINIC | Age: 88
End: 2023-08-02
Payer: MEDICARE

## 2023-08-02 DIAGNOSIS — Z95.810 ICD (IMPLANTABLE CARDIOVERTER-DEFIBRILLATOR) IN PLACE: Primary | ICD-10-CM

## 2023-08-02 PROCEDURE — G2066 INTER DEVC REMOTE 30D: HCPCS | Performed by: INTERNAL MEDICINE

## 2023-08-02 PROCEDURE — 93297 REM INTERROG DEV EVAL ICPMS: CPT | Performed by: INTERNAL MEDICINE

## 2023-08-04 LAB
EKG ATRIAL RATE: 51 BPM
EKG ATRIAL RATE: 63 BPM
EKG P AXIS: 54 DEGREES
EKG P AXIS: 56 DEGREES
EKG P-R INTERVAL: 196 MS
EKG P-R INTERVAL: 206 MS
EKG Q-T INTERVAL: 470 MS
EKG Q-T INTERVAL: 510 MS
EKG QRS DURATION: 152 MS
EKG QRS DURATION: 158 MS
EKG QTC CALCULATION (BAZETT): 470 MS
EKG QTC CALCULATION (BAZETT): 480 MS
EKG R AXIS: -40 DEGREES
EKG R AXIS: -52 DEGREES
EKG T AXIS: 47 DEGREES
EKG T AXIS: 69 DEGREES
EKG VENTRICULAR RATE: 51 BPM
EKG VENTRICULAR RATE: 63 BPM

## 2023-08-28 ENCOUNTER — PROCEDURE VISIT (OUTPATIENT)
Dept: UROLOGY | Age: 88
End: 2023-08-28
Payer: MEDICARE

## 2023-08-28 VITALS — HEIGHT: 60 IN | RESPIRATION RATE: 16 BRPM | WEIGHT: 100 LBS | BODY MASS INDEX: 19.63 KG/M2

## 2023-08-28 DIAGNOSIS — N39.41 URGE INCONTINENCE: ICD-10-CM

## 2023-08-28 DIAGNOSIS — N32.89 BLADDER WALL THICKENING: Primary | ICD-10-CM

## 2023-08-28 PROCEDURE — 1090F PRES/ABSN URINE INCON ASSESS: CPT | Performed by: UROLOGY

## 2023-08-28 PROCEDURE — 1123F ACP DISCUSS/DSCN MKR DOCD: CPT | Performed by: UROLOGY

## 2023-08-28 PROCEDURE — 0509F URINE INCON PLAN DOCD: CPT | Performed by: UROLOGY

## 2023-08-28 PROCEDURE — G8420 CALC BMI NORM PARAMETERS: HCPCS | Performed by: UROLOGY

## 2023-08-28 PROCEDURE — 99214 OFFICE O/P EST MOD 30 MIN: CPT | Performed by: UROLOGY

## 2023-08-28 PROCEDURE — 52000 CYSTOURETHROSCOPY: CPT | Performed by: UROLOGY

## 2023-08-28 PROCEDURE — 1111F DSCHRG MED/CURRENT MED MERGE: CPT | Performed by: UROLOGY

## 2023-08-28 PROCEDURE — 1036F TOBACCO NON-USER: CPT | Performed by: UROLOGY

## 2023-08-28 PROCEDURE — G8427 DOCREV CUR MEDS BY ELIG CLIN: HCPCS | Performed by: UROLOGY

## 2023-08-28 RX ORDER — OMEPRAZOLE 10 MG/1
10 CAPSULE, DELAYED RELEASE ORAL DAILY
COMMUNITY

## 2023-08-28 RX ORDER — OXYBUTYNIN CHLORIDE 10 MG/1
10 TABLET, EXTENDED RELEASE ORAL DAILY
Qty: 30 TABLET | Refills: 11 | Status: SHIPPED | OUTPATIENT
Start: 2023-08-28

## 2023-08-28 RX ORDER — DOXYCYCLINE HYCLATE 100 MG
100 TABLET ORAL 2 TIMES DAILY
Qty: 6 TABLET | Refills: 0 | Status: SHIPPED | OUTPATIENT
Start: 2023-08-28 | End: 2023-08-31

## 2023-08-28 NOTE — PROGRESS NOTES
UNABLE TO FIND CardioPlus      furosemide (LASIX) 20 MG tablet Take 1 tablet by mouth daily 30 tablet 5    ticagrelor (BRILINTA) 90 MG TABS tablet Take 1 tablet by mouth 2 times daily 180 tablet 3    rosuvastatin (CRESTOR) 20 MG tablet Take 1 tablet by mouth daily 30 tablet 3    cetirizine (ZYRTEC) 5 MG tablet Take 1 tablet by mouth daily 20 tablet 0    nitroGLYCERIN (NITROSTAT) 0.4 MG SL tablet up to max of 3 total doses. If no relief after 1 dose, call 911. 25 tablet 3    levothyroxine (SYNTHROID) 125 MCG tablet Take 1 tablet by mouth Daily Take 0.5 tabs by mouth daily      VITAMIN D, CHOLECALCIFEROL, PO Take 1 tablet by mouth daily      ondansetron (ZOFRAN-ODT) 4 MG disintegrating tablet Take 1 tablet by mouth every 8 hours as needed for Nausea or Vomiting      Multiple Vitamin (MULTI VITAMIN DAILY PO) Take 1 tablet by mouth daily       apixaban (ELIQUIS) 2.5 MG TABS tablet Take 1 tablet by mouth 2 times daily 60 tablet     acetaminophen (TYLENOL) 325 MG tablet Take 2 tablets by mouth every 6 hours as needed for Pain         Sulfa antibiotics and Metronidazole  Social History     Tobacco Use   Smoking Status Former    Packs/day: 1.00    Years: 15.00    Pack years: 15.00    Types: Cigarettes    Quit date: 12/10/1977    Years since quittin.7   Smokeless Tobacco Never       Social History     Substance and Sexual Activity   Alcohol Use No       REVIEW OF SYSTEMS:  Constitutional: negative  Eyes: negative  Respiratory: negative  Cardiovascular: negative  Gastrointestinal: negative  Genitourinary: negative  Musculoskeletal: negative  Skin: negative   Neurological: negative  Hematological/Lymphatic: negative  Psychological: negative    Physical Exam:    This a 80 y.o. female      Vitals:    23 0907   Resp: 16     Constitutional: Patient in no acute distress   Neuro: alert and oriented to person place and time.     Psych: Mood and affect normal.  Head: atraumatic normocephalic  Eyes: EOMi  HEENT: neck

## 2023-08-29 PROBLEM — R79.89 ELEVATED TROPONIN: Status: RESOLVED | Noted: 2023-07-30 | Resolved: 2023-08-29

## 2023-08-29 PROBLEM — R77.8 ELEVATED TROPONIN: Status: RESOLVED | Noted: 2023-07-30 | Resolved: 2023-08-29

## 2023-09-05 NOTE — TELEPHONE ENCOUNTER
Veronique Yuen called requesting a refill on the following medications:  Requested Prescriptions     Pending Prescriptions Disp Refills    oxybutynin (DITROPAN-XL) 5 MG extended release tablet [Pharmacy Med Name: Oxybutynin Chloride ER 5 MG Oral Tablet Extended Release 24 Hour] 30 tablet 0     Sig: Take 1 tablet by mouth once daily     Pharmacy verified:  [email or web address]      Date of last visit: 08/28/2023  Date of next visit (if applicable): 53/0/1410

## 2023-09-06 RX ORDER — OXYBUTYNIN CHLORIDE 5 MG/1
10 TABLET, EXTENDED RELEASE ORAL DAILY
Qty: 30 TABLET | Refills: 2 | OUTPATIENT
Start: 2023-09-06

## 2023-09-11 ENCOUNTER — TELEPHONE (OUTPATIENT)
Dept: CARDIOLOGY CLINIC | Age: 88
End: 2023-09-11

## 2023-09-11 DIAGNOSIS — I50.43 CHF (CONGESTIVE HEART FAILURE), NYHA CLASS I, ACUTE ON CHRONIC, COMBINED (HCC): Primary | ICD-10-CM

## 2023-09-11 RX ORDER — FUROSEMIDE 20 MG/1
20 TABLET ORAL DAILY PRN
Qty: 30 TABLET | Refills: 1 | Status: SHIPPED | OUTPATIENT
Start: 2023-09-11

## 2023-09-11 NOTE — TELEPHONE ENCOUNTER
Labs reviewed - creat 1.6/BUN 50 (baseline normal)   Stop Lasix 20/day - change to PRN   BMP in 3 weeks

## 2023-09-11 NOTE — TELEPHONE ENCOUNTER
Patient dtr, Clemencia Thomas, called in. Patient seen wound dr, ordered blood work.   Labs under tab  Is taking Lasix 20mg/day, dtr asking if changes should be made d/t kidney function  Also said they were going to be sending the labs to PCP  Dtr also mentioned some blood in urine this morning- will let PCP know

## 2023-09-13 ENCOUNTER — PROCEDURE VISIT (OUTPATIENT)
Dept: CARDIOLOGY CLINIC | Age: 88
End: 2023-09-13
Payer: MEDICARE

## 2023-09-13 DIAGNOSIS — Z95.810 ICD (IMPLANTABLE CARDIOVERTER-DEFIBRILLATOR) IN PLACE: Primary | ICD-10-CM

## 2023-09-13 PROCEDURE — 93295 DEV INTERROG REMOTE 1/2/MLT: CPT | Performed by: NUCLEAR MEDICINE

## 2023-09-13 PROCEDURE — 93296 REM INTERROG EVL PM/IDS: CPT | Performed by: NUCLEAR MEDICINE

## 2023-09-13 NOTE — PROGRESS NOTES
E-Diversify Yourself Medtronic Dual ICD   Pt of [place]    Battery 11.5 years     Presenting rhythm ASVS    A Impedance 399  RV Impedance 399    RV shock 54  SVC shock -    P wave sensing 1.6  R wave sensing 2.4    A Threshold 1 @ 0.40  A Amplitude 1.5 @ 0.40  RV Thresholds 0.75 @ 0.40  RV Amplitude 2.0 @ 0.40    A Paced 20.5%  V Paced 0.1%    Programmed Mode AAIR <=> DDDR     Afib Rolla 0%    Episodes:   none    Optivol WNL

## 2023-09-18 RX ORDER — TICAGRELOR 90 MG/1
90 TABLET ORAL 2 TIMES DAILY
Qty: 180 TABLET | Refills: 0 | Status: SHIPPED | OUTPATIENT
Start: 2023-09-18

## 2023-09-21 ENCOUNTER — NURSE ONLY (OUTPATIENT)
Dept: LAB | Age: 88
End: 2023-09-21

## 2023-09-21 DIAGNOSIS — I50.43 CHF (CONGESTIVE HEART FAILURE), NYHA CLASS I, ACUTE ON CHRONIC, COMBINED (HCC): ICD-10-CM

## 2023-09-21 LAB
ANION GAP SERPL CALC-SCNC: 10 MEQ/L (ref 8–16)
BUN SERPL-MCNC: 51 MG/DL (ref 7–22)
CALCIUM SERPL-MCNC: 9.6 MG/DL (ref 8.5–10.5)
CHLORIDE SERPL-SCNC: 105 MEQ/L (ref 98–111)
CO2 SERPL-SCNC: 25 MEQ/L (ref 23–33)
CREAT SERPL-MCNC: 1.3 MG/DL (ref 0.4–1.2)
GFR SERPL CREATININE-BSD FRML MDRD: 39 ML/MIN/1.73M2
GLUCOSE SERPL-MCNC: 93 MG/DL (ref 70–108)
POTASSIUM SERPL-SCNC: 4.7 MEQ/L (ref 3.5–5.2)
SODIUM SERPL-SCNC: 140 MEQ/L (ref 135–145)

## 2023-10-12 ENCOUNTER — TELEPHONE (OUTPATIENT)
Dept: CARDIOLOGY CLINIC | Age: 88
End: 2023-10-12

## 2023-10-12 ENCOUNTER — OFFICE VISIT (OUTPATIENT)
Dept: CARDIOLOGY CLINIC | Age: 88
End: 2023-10-12
Payer: MEDICARE

## 2023-10-12 VITALS
DIASTOLIC BLOOD PRESSURE: 60 MMHG | BODY MASS INDEX: 21.48 KG/M2 | WEIGHT: 110 LBS | OXYGEN SATURATION: 97 % | SYSTOLIC BLOOD PRESSURE: 144 MMHG | HEART RATE: 80 BPM

## 2023-10-12 DIAGNOSIS — I25.10 CORONARY ARTERY DISEASE INVOLVING NATIVE CORONARY ARTERY OF NATIVE HEART WITHOUT ANGINA PECTORIS: ICD-10-CM

## 2023-10-12 DIAGNOSIS — I50.22 CHF (CONGESTIVE HEART FAILURE), NYHA CLASS II, CHRONIC, SYSTOLIC (HCC): Primary | ICD-10-CM

## 2023-10-12 DIAGNOSIS — Z95.810 ICD (IMPLANTABLE CARDIOVERTER-DEFIBRILLATOR) IN PLACE: ICD-10-CM

## 2023-10-12 DIAGNOSIS — I25.5 ISCHEMIC CARDIOMYOPATHY: ICD-10-CM

## 2023-10-12 PROCEDURE — 1036F TOBACCO NON-USER: CPT | Performed by: NURSE PRACTITIONER

## 2023-10-12 PROCEDURE — 1123F ACP DISCUSS/DSCN MKR DOCD: CPT | Performed by: NURSE PRACTITIONER

## 2023-10-12 PROCEDURE — G8484 FLU IMMUNIZE NO ADMIN: HCPCS | Performed by: NURSE PRACTITIONER

## 2023-10-12 PROCEDURE — G8427 DOCREV CUR MEDS BY ELIG CLIN: HCPCS | Performed by: NURSE PRACTITIONER

## 2023-10-12 PROCEDURE — 99214 OFFICE O/P EST MOD 30 MIN: CPT | Performed by: NURSE PRACTITIONER

## 2023-10-12 PROCEDURE — 1090F PRES/ABSN URINE INCON ASSESS: CPT | Performed by: NURSE PRACTITIONER

## 2023-10-12 PROCEDURE — G8420 CALC BMI NORM PARAMETERS: HCPCS | Performed by: NURSE PRACTITIONER

## 2023-10-12 RX ORDER — OMEPRAZOLE 20 MG/1
20 CAPSULE, DELAYED RELEASE ORAL DAILY
COMMUNITY
Start: 2023-10-10

## 2023-10-12 ASSESSMENT — ENCOUNTER SYMPTOMS
SHORTNESS OF BREATH: 0
ABDOMINAL DISTENTION: 0
COUGH: 0

## 2023-10-12 NOTE — PATIENT INSTRUCTIONS
You may receive a survey regarding the care you received during your visit. Your input is valuable to us. We encourage you to complete and return your survey. We hope you will choose us in the future for your healthcare needs. Your nurses today were Edith and TRGoGardenve.   Office hours:   Mon-Thurs 8-4:30  Friday 8-12  Phone: [phone]    Continue:  Continue current medications  Daily weights and record  Fluid restriction of 2 Liters per day  Limit sodium in diet to around 4014-3503 mg/day  Monitor BP  Activity as tolerated     Call the [address] for any of the following symptoms:   Weight gain of 2-3 pounds in 1 day or 5 pounds in 1 week  Increased shortness of breath  Shortness of breath while laying down  Cough  Chest pain  Swelling in feet, ankles or legs  Bloating in abdomen  Fatigue

## 2023-10-12 NOTE — PROGRESS NOTES
Heart Failure Clinic       Visit Date: 10/12/2023  Cardiologist:  Dr. Ruth Ann Delgado  Primary Care Physician: Dr. Isha Young, APRN - CNP    Solange Gandhi is a 80 y.o. female who presents today for:  Chief Complaint   Patient presents with    Congestive Heart Failure       HPI:   Solange Gandhi is a 80 y.o. female who presents to the office for a follow up patient visit in the heart failure clinic. Accompanied by dtr, Valdez Duncan  Lives w/ dtr Audrey    TYPE HF: HFrEF (30-35%) since 10/2021   Cause: ICM/NICM  Device: Dual ICD (11/2022, Hakim)  HX: HTN, Afib (Eliquis), Covid (trach/PEG - extensive admission - 10/2021-2/2022), Trach removed 5/2022. CAD s/p PCI LM, Impella support (6/2022)    Dry Wt:  110    Hospitalization:  6/2022 - admitted CHF. Worsening SOB. [facility] showing Left main disease - opted for PCI (Impella support). OV 7/2022 - 110# Home w/ dtr - OT signed off yesterday  PT still following  Nursing comes twice weekly  \"Feeling a lot better\" over past month - dtr agrees. Dtr states no complaints since been home prior to was always c/o not breathing  OV 1/2023 - 117#  Walked from parking lot - did well. Walks outside about 0.5/mile when able. No fluid on exam  Notes urinates a lot at night. Inquiring about decreasing diuretic. Also inquiring about PEG removal - will need anticoags held. Following w/ ID for sacral wound      9/2023 - Labs reviewed - little improvement creat 1.3/51 (was 1.6/50 - Lasix changed to PRN)  Check taking Lasix PRN, Encourage drink enough fluids t/o day  TODAY 10/2023 - 110#  Nervous w/ PRN Lasix - has taken once since change - needs little reassurance, education on use  No fluid on exam today   BP little elevated - walked from entrance.     Walks every day - approx 1/4 mile  Some bladder issues/tickening - seeing urology = some UTIs  Overall feels same over past year      Past Medical History:   Diagnosis Date    HERIBERTO (acute kidney injury) ([address])     Anxiety     Arthritis

## 2023-10-16 ENCOUNTER — OFFICE VISIT (OUTPATIENT)
Dept: UROLOGY | Age: 88
End: 2023-10-16
Payer: MEDICARE

## 2023-10-16 VITALS — HEIGHT: 60 IN | BODY MASS INDEX: 21.6 KG/M2 | RESPIRATION RATE: 18 BRPM | WEIGHT: 110 LBS

## 2023-10-16 DIAGNOSIS — N32.89 BLADDER WALL THICKENING: Primary | ICD-10-CM

## 2023-10-16 DIAGNOSIS — N39.41 URGE INCONTINENCE: ICD-10-CM

## 2023-10-16 LAB
BILIRUBIN URINE: NEGATIVE
BLOOD URINE, POC: ABNORMAL
CHARACTER, URINE: CLEAR
COLOR, URINE: YELLOW
GLUCOSE URINE: NEGATIVE MG/DL
KETONES, URINE: NEGATIVE
LEUKOCYTE CLUMPS, URINE: NEGATIVE
NITRITE, URINE: NEGATIVE
PH, URINE: 5.5 (ref 5–9)
POST VOID RESIDUAL (PVR): 5 ML
PROTEIN, URINE: 100 MG/DL
SPECIFIC GRAVITY, URINE: 1.02 (ref 1–1.03)
UROBILINOGEN, URINE: 0.2 EU/DL (ref 0–1)

## 2023-10-16 PROCEDURE — 1090F PRES/ABSN URINE INCON ASSESS: CPT | Performed by: NURSE PRACTITIONER

## 2023-10-16 PROCEDURE — G8427 DOCREV CUR MEDS BY ELIG CLIN: HCPCS | Performed by: NURSE PRACTITIONER

## 2023-10-16 PROCEDURE — 99213 OFFICE O/P EST LOW 20 MIN: CPT | Performed by: NURSE PRACTITIONER

## 2023-10-16 PROCEDURE — 0509F URINE INCON PLAN DOCD: CPT | Performed by: NURSE PRACTITIONER

## 2023-10-16 PROCEDURE — 81003 URINALYSIS AUTO W/O SCOPE: CPT | Performed by: NURSE PRACTITIONER

## 2023-10-16 PROCEDURE — G8420 CALC BMI NORM PARAMETERS: HCPCS | Performed by: NURSE PRACTITIONER

## 2023-10-16 PROCEDURE — 1036F TOBACCO NON-USER: CPT | Performed by: NURSE PRACTITIONER

## 2023-10-16 PROCEDURE — 1123F ACP DISCUSS/DSCN MKR DOCD: CPT | Performed by: NURSE PRACTITIONER

## 2023-10-16 PROCEDURE — G8484 FLU IMMUNIZE NO ADMIN: HCPCS | Performed by: NURSE PRACTITIONER

## 2023-10-16 PROCEDURE — 51798 US URINE CAPACITY MEASURE: CPT | Performed by: NURSE PRACTITIONER

## 2023-10-16 RX ORDER — MIRABEGRON 50 MG/1
50 TABLET, FILM COATED, EXTENDED RELEASE ORAL DAILY
Qty: 30 TABLET | Refills: 3 | Status: SHIPPED | OUTPATIENT
Start: 2023-10-16

## 2023-10-18 ENCOUNTER — PROCEDURE VISIT (OUTPATIENT)
Dept: CARDIOLOGY CLINIC | Age: 88
End: 2023-10-18

## 2023-10-18 DIAGNOSIS — I50.43 CHF (CONGESTIVE HEART FAILURE), NYHA CLASS I, ACUTE ON CHRONIC, COMBINED (HCC): Primary | ICD-10-CM

## 2023-10-27 ENCOUNTER — OFFICE VISIT (OUTPATIENT)
Dept: CARDIOLOGY CLINIC | Age: 88
End: 2023-10-27
Payer: MEDICARE

## 2023-10-27 VITALS
HEIGHT: 60 IN | BODY MASS INDEX: 21.87 KG/M2 | WEIGHT: 111.4 LBS | HEART RATE: 80 BPM | SYSTOLIC BLOOD PRESSURE: 120 MMHG | DIASTOLIC BLOOD PRESSURE: 54 MMHG

## 2023-10-27 DIAGNOSIS — Z95.810 ICD (IMPLANTABLE CARDIOVERTER-DEFIBRILLATOR) IN PLACE: ICD-10-CM

## 2023-10-27 DIAGNOSIS — I42.0 DILATED CARDIOMYOPATHY (HCC): Primary | ICD-10-CM

## 2023-10-27 DIAGNOSIS — I10 PRIMARY HYPERTENSION: ICD-10-CM

## 2023-10-27 PROCEDURE — 1036F TOBACCO NON-USER: CPT | Performed by: NUCLEAR MEDICINE

## 2023-10-27 PROCEDURE — 99213 OFFICE O/P EST LOW 20 MIN: CPT | Performed by: NUCLEAR MEDICINE

## 2023-10-27 PROCEDURE — 1123F ACP DISCUSS/DSCN MKR DOCD: CPT | Performed by: NUCLEAR MEDICINE

## 2023-10-27 PROCEDURE — G8427 DOCREV CUR MEDS BY ELIG CLIN: HCPCS | Performed by: NUCLEAR MEDICINE

## 2023-10-27 PROCEDURE — G8420 CALC BMI NORM PARAMETERS: HCPCS | Performed by: NUCLEAR MEDICINE

## 2023-10-27 PROCEDURE — G8484 FLU IMMUNIZE NO ADMIN: HCPCS | Performed by: NUCLEAR MEDICINE

## 2023-10-27 PROCEDURE — 1090F PRES/ABSN URINE INCON ASSESS: CPT | Performed by: NUCLEAR MEDICINE

## 2023-10-27 NOTE — PROGRESS NOTES
6 month follow up. C/o occasional dizziness and lightheaded. Needs clearance for 2 teeth to be extracted and a spot removed on her face.
Placed:  No orders of the defined types were placed in this encounter. Prescribed:  No orders of the defined types were placed in this encounter. Discussed use, benefit, and side effects of prescribed medications. All patient questions answered. Pt voicedunderstanding. Instructed to continue current medications, diet and exercise. Continue risk factor modification and medical management. Patient agreed with treatment plan. Follow up as directed.     Electronically signedby Leopoldo Denver, MD on 10/27/2023 at 12:46 PM

## 2023-11-22 ENCOUNTER — PROCEDURE VISIT (OUTPATIENT)
Dept: CARDIOLOGY CLINIC | Age: 88
End: 2023-11-22
Payer: MEDICARE

## 2023-11-22 DIAGNOSIS — I50.43 CHF (CONGESTIVE HEART FAILURE), NYHA CLASS I, ACUTE ON CHRONIC, COMBINED (HCC): Primary | ICD-10-CM

## 2023-11-22 PROCEDURE — 93297 REM INTERROG DEV EVAL ICPMS: CPT | Performed by: NUCLEAR MEDICINE

## 2023-11-22 PROCEDURE — G2066 INTER DEVC REMOTE 30D: HCPCS | Performed by: NUCLEAR MEDICINE

## 2023-11-22 NOTE — PROGRESS NOTES
DR Susy Odom PT   [employer] Stout Socks OPTIVOL REMOTE   BATTERY 11.4 YRS REMAINING    OPTIVOL WNL\    PT HAS AN ALERT ON HER DEVICE / SEE BELOW /  I DID CALL LEA AT [facility] WITH [employer] AND HE SAID THE DEVICE IS WORKING JUST FINE, FIRMWARE WAS UPDATED, IT IS A DIAGNOSTIC RESET AND THE  ENGINEERS DO NOT KNOW WHAT IS CAUSING IT, BUT THIS DEVICE IS FUNCTIONING PROPERLY WITH NO ISSUES

## 2023-11-27 DIAGNOSIS — I50.22 CHF (CONGESTIVE HEART FAILURE), NYHA CLASS II, CHRONIC, SYSTOLIC (HCC): ICD-10-CM

## 2023-11-27 LAB
ANION GAP SERPL CALC-SCNC: 9 MEQ/L (ref 8–16)
BUN SERPL-MCNC: 33 MG/DL (ref 7–22)
CALCIUM SERPL-MCNC: 9.1 MG/DL (ref 8.5–10.5)
CHLORIDE SERPL-SCNC: 104 MEQ/L (ref 98–111)
CO2 SERPL-SCNC: 26 MEQ/L (ref 23–33)
CREAT SERPL-MCNC: 1.3 MG/DL (ref 0.4–1.2)
GFR SERPL CREATININE-BSD FRML MDRD: 39 ML/MIN/1.73M2
GLUCOSE SERPL-MCNC: 67 MG/DL (ref 70–108)
POTASSIUM SERPL-SCNC: 4.7 MEQ/L (ref 3.5–5.2)
SODIUM SERPL-SCNC: 139 MEQ/L (ref 135–145)

## 2023-12-27 ENCOUNTER — PROCEDURE VISIT (OUTPATIENT)
Dept: CARDIOLOGY CLINIC | Age: 88
End: 2023-12-27
Payer: MEDICARE

## 2023-12-27 DIAGNOSIS — Z95.810 ICD (IMPLANTABLE CARDIOVERTER-DEFIBRILLATOR) IN PLACE: Primary | ICD-10-CM

## 2023-12-27 PROCEDURE — 93296 REM INTERROG EVL PM/IDS: CPT | Performed by: NUCLEAR MEDICINE

## 2023-12-27 PROCEDURE — 93295 DEV INTERROG REMOTE 1/2/MLT: CPT | Performed by: NUCLEAR MEDICINE

## 2023-12-27 NOTE — PROGRESS NOTES
DR Coty Ray PT   [employer] DUAL ICD REMOTE   BATTERY 11.4 YRS REMAINING      AAIR<=>DDDR     A PACED 13.5%  V PACED <0.1%    P WAVES 2  RV WAVES 2.9    ATRIAL IMPEDENCE 380  VENT IMPEDENCE 342  RV 57    ATRIAL THRESHOLD 0.875 @ 0.4  VENT THRESHOLD 0.75 @ 0.4  ATRIAL AMPLITUDE 1.5 @ 0.4  VENT AMPLITUDE 2 @ 0.4    OPTIVOL WNL

## 2024-01-31 ENCOUNTER — PROCEDURE VISIT (OUTPATIENT)
Dept: CARDIOLOGY CLINIC | Age: 89
End: 2024-01-31
Payer: MEDICARE

## 2024-01-31 DIAGNOSIS — I50.43 CHF (CONGESTIVE HEART FAILURE), NYHA CLASS I, ACUTE ON CHRONIC, COMBINED (HCC): Primary | ICD-10-CM

## 2024-01-31 PROCEDURE — 93297 REM INTERROG DEV EVAL ICPMS: CPT | Performed by: NUCLEAR MEDICINE

## 2024-02-21 ENCOUNTER — OFFICE VISIT (OUTPATIENT)
Dept: CARDIOLOGY CLINIC | Age: 89
End: 2024-02-21
Payer: MEDICARE

## 2024-02-21 ENCOUNTER — NURSE ONLY (OUTPATIENT)
Dept: CARDIOLOGY CLINIC | Age: 89
End: 2024-02-21
Payer: MEDICARE

## 2024-02-21 VITALS
DIASTOLIC BLOOD PRESSURE: 60 MMHG | RESPIRATION RATE: 16 BRPM | HEART RATE: 75 BPM | HEIGHT: 60 IN | WEIGHT: 112.8 LBS | SYSTOLIC BLOOD PRESSURE: 122 MMHG | BODY MASS INDEX: 22.15 KG/M2 | OXYGEN SATURATION: 97 %

## 2024-02-21 DIAGNOSIS — I25.10 CORONARY ARTERY DISEASE INVOLVING NATIVE CORONARY ARTERY OF NATIVE HEART WITHOUT ANGINA PECTORIS: ICD-10-CM

## 2024-02-21 DIAGNOSIS — Z95.810 ICD (IMPLANTABLE CARDIOVERTER-DEFIBRILLATOR) IN PLACE: Primary | ICD-10-CM

## 2024-02-21 DIAGNOSIS — I50.22 CHF (CONGESTIVE HEART FAILURE), NYHA CLASS II, CHRONIC, SYSTOLIC (HCC): Primary | ICD-10-CM

## 2024-02-21 DIAGNOSIS — I25.5 ISCHEMIC CARDIOMYOPATHY: ICD-10-CM

## 2024-02-21 PROCEDURE — 1036F TOBACCO NON-USER: CPT | Performed by: NURSE PRACTITIONER

## 2024-02-21 PROCEDURE — 93283 PRGRMG EVAL IMPLANTABLE DFB: CPT | Performed by: INTERNAL MEDICINE

## 2024-02-21 PROCEDURE — G8420 CALC BMI NORM PARAMETERS: HCPCS | Performed by: NURSE PRACTITIONER

## 2024-02-21 PROCEDURE — 99214 OFFICE O/P EST MOD 30 MIN: CPT | Performed by: NURSE PRACTITIONER

## 2024-02-21 PROCEDURE — G8427 DOCREV CUR MEDS BY ELIG CLIN: HCPCS | Performed by: NURSE PRACTITIONER

## 2024-02-21 PROCEDURE — 1123F ACP DISCUSS/DSCN MKR DOCD: CPT | Performed by: NURSE PRACTITIONER

## 2024-02-21 PROCEDURE — G8484 FLU IMMUNIZE NO ADMIN: HCPCS | Performed by: NURSE PRACTITIONER

## 2024-02-21 PROCEDURE — 1090F PRES/ABSN URINE INCON ASSESS: CPT | Performed by: NURSE PRACTITIONER

## 2024-02-21 RX ORDER — ESOMEPRAZOLE MAGNESIUM 20 MG/1
20 GRANULE, DELAYED RELEASE ORAL DAILY
COMMUNITY

## 2024-02-21 ASSESSMENT — ENCOUNTER SYMPTOMS
COUGH: 0
SHORTNESS OF BREATH: 0
ABDOMINAL DISTENTION: 0

## 2024-02-21 NOTE — PROGRESS NOTES
DR LORENZ PT   [employer] DUAL ICD CHECK IN OFFICE   BATTERY 11.2 YRS REMAINING    KNOWN AFIB/ ELIQUIS   AFIB BURDEN <0.1%      AAIR <=>DDDR     A PACED 14.4%  V PACED <0.1%    PRESENTS IN [facility]   UNDERLYING AS VS      ATRIAL IMPEDENCE 437  RV DEFIB 61  VENT IMPEDENCE 380    P WAVES 2.8  RV WAVES 2.4  ATRIAL THRESHOLD 0.75 @ 0.4  RV THRESHOLD 0.75 @ 0.4

## 2024-02-21 NOTE — PROGRESS NOTES
Heart Failure Clinic       Visit Date: 2/21/2024  Cardiologist:  Dr. Sosa  Primary Care Physician: Dr. Nathan, Jennifer JESSICA, APRN - CNP    Kourtney Rankin is a 89 y.o. female who presents today for:  Chief Complaint   Patient presents with    Congestive Heart Failure       HPI:   Kourtney Rankin is a 89 y.o. female who presents to the office for a follow up patient visit in the heart failure clinic.  Accompanied by dtr, Audrey  Lives w/ dtr Audrey    TYPE HF: HFrEF (30-35%) since 10/2021   Cause: ICM/NICM  Device: Dual ICD (11/2022, [employer]akim)  HX: HTN, Afib (Eliquis), Covid (trach/PEG - extensive admission - 10/2021-2/2022), Trach removed 5/2022.  CAD s/p PCI LM, Impella support (6/2022)    Dry Wt:  110    Hospitalization:  6/2022 - admitted CHF. Worsening SOB.  C showing Left main disease - opted for PCI (Impella support).      OV 7/2022 - 110# Home w/ dtr - OT signed off yesterday  PT still following  Nursing comes twice weekly  \"Feeling a lot better\" over past month - dtr agrees.  Dtr states no complaints since been home prior to was always c/o not breathing  OV 1/2023 - 117#  Walked from parking lot - did well.  Walks outside about 0.5/mile when able.   No fluid on exam  Notes urinates a lot at night.  Inquiring about decreasing diuretic.   Also inquiring about PEG removal - will need anticoags held.   Following w/ ID for sacral wound      9/2023 - Labs reviewed - little improvement creat 1.3/51 (was 1.6/50 - Lasix changed to PRN)  Check taking Lasix PRN, Encourage drink enough fluids t/o day  10/2023 - 110#  Nervous w/ PRN Lasix - has taken once since change - needs little reassurance, education on use  No fluid on exam today   BP little elevated - walked from entrance.    Walks every day - approx 1/4 mile  Some bladder issues/tickening - seeing urology = some UTIs  Overall feels same over past year      TODAY 2/2024 - 112#  Doing well - only needed Lasix 2x past 6 mths  BPs good at home - 120s.  Breathing

## 2024-02-21 NOTE — PATIENT INSTRUCTIONS
You may receive a survey regarding the care you received during your visit.  Your input is valuable to us.  We encourage you to complete and return your survey.  We hope you will choose us in the future for your healthcare needs.    Your nurses today were Pamela.  Office hours:   Mon-Thurs 8-4:30  Friday 8-12  Phone: [phone]    Continue:  Continue current medications  Daily weights and record  Fluid restriction of 2 Liters per day  Limit sodium in diet to around 6874-2174 mg/day  Monitor BP  Activity as tolerated     Call the Heart Failure Clinic for any of the following symptoms:   Weight gain of 2-3 pounds in 1 day or 5 pounds in 1 week  Increased shortness of breath  Shortness of breath while laying down  Cough  Chest pain  Swelling in feet, ankles or legs  Bloating in abdomen  Fatigue

## 2024-03-04 NOTE — TELEPHONE ENCOUNTER
Kourtney Rankin called requesting a refill on the following medications:  Requested Prescriptions     Pending Prescriptions Disp Refills    MYRBETRIQ 50 MG TB24 [Pharmacy Med Name: Myrbetriq 50 MG Oral Tablet Extended Release 24 Hour] 30 tablet 0     Sig: Take 1 tablet by mouth once daily     Pharmacy verified:  .[email or web address]      Date of last visit: 10/16/2023  Date of next visit (if applicable): 3/5/2024

## 2024-03-05 ENCOUNTER — OFFICE VISIT (OUTPATIENT)
Dept: UROLOGY | Age: 89
End: 2024-03-05

## 2024-03-05 VITALS
SYSTOLIC BLOOD PRESSURE: 115 MMHG | RESPIRATION RATE: 16 BRPM | HEIGHT: 60 IN | BODY MASS INDEX: 21.99 KG/M2 | WEIGHT: 112 LBS | DIASTOLIC BLOOD PRESSURE: 50 MMHG

## 2024-03-05 DIAGNOSIS — N39.41 URGE INCONTINENCE: ICD-10-CM

## 2024-03-05 DIAGNOSIS — N32.89 BLADDER WALL THICKENING: Primary | ICD-10-CM

## 2024-03-05 LAB
BILIRUBIN URINE: NEGATIVE
BLOOD URINE, POC: ABNORMAL
CHARACTER, URINE: CLEAR
COLOR, URINE: YELLOW
GLUCOSE URINE: NEGATIVE MG/DL
KETONES, URINE: NEGATIVE
LEUKOCYTE CLUMPS, URINE: NEGATIVE
NITRITE, URINE: NEGATIVE
PH, URINE: 5 (ref 5–9)
POST VOID RESIDUAL (PVR): 26 ML
PROTEIN, URINE: 30 MG/DL
SPECIFIC GRAVITY, URINE: 1.01 (ref 1–1.03)
UROBILINOGEN, URINE: 0.2 EU/DL (ref 0–1)

## 2024-03-05 RX ORDER — MIRABEGRON 50 MG/1
50 TABLET, FILM COATED, EXTENDED RELEASE ORAL DAILY
Qty: 90 TABLET | Refills: 3 | Status: SHIPPED | OUTPATIENT
Start: 2024-03-05 | End: 2024-06-03

## 2024-03-05 RX ORDER — MIRABEGRON 50 MG/1
50 TABLET, FILM COATED, EXTENDED RELEASE ORAL DAILY
Qty: 30 TABLET | Refills: 5 | Status: ON HOLD | OUTPATIENT
Start: 2024-03-05

## 2024-03-26 ENCOUNTER — NURSE ONLY (OUTPATIENT)
Dept: LAB | Age: 89
End: 2024-03-26

## 2024-03-26 DIAGNOSIS — I50.22 CHF (CONGESTIVE HEART FAILURE), NYHA CLASS II, CHRONIC, SYSTOLIC (HCC): ICD-10-CM

## 2024-03-26 LAB
ANION GAP SERPL CALC-SCNC: 17 MEQ/L (ref 8–16)
BUN SERPL-MCNC: 41 MG/DL (ref 7–22)
CALCIUM SERPL-MCNC: 9.4 MG/DL (ref 8.5–10.5)
CHLORIDE SERPL-SCNC: 107 MEQ/L (ref 98–111)
CHOLEST SERPL-MCNC: 177 MG/DL (ref 100–199)
CO2 SERPL-SCNC: 20 MEQ/L (ref 23–33)
CREAT SERPL-MCNC: 1.4 MG/DL (ref 0.4–1.2)
GFR SERPL CREATININE-BSD FRML MDRD: 36 ML/MIN/1.73M2
GLUCOSE SERPL-MCNC: 85 MG/DL (ref 70–108)
HDLC SERPL-MCNC: 45 MG/DL
LDLC SERPL CALC-MCNC: 116 MG/DL
POTASSIUM SERPL-SCNC: 5.1 MEQ/L (ref 3.5–5.2)
SODIUM SERPL-SCNC: 144 MEQ/L (ref 135–145)
TRIGL SERPL-MCNC: 82 MG/DL (ref 0–199)

## 2024-03-27 ENCOUNTER — PROCEDURE VISIT (OUTPATIENT)
Dept: CARDIOLOGY CLINIC | Age: 89
End: 2024-03-27
Payer: MEDICARE

## 2024-03-27 ENCOUNTER — TELEPHONE (OUTPATIENT)
Dept: CARDIOLOGY CLINIC | Age: 89
End: 2024-03-27

## 2024-03-27 DIAGNOSIS — I50.22 CHF (CONGESTIVE HEART FAILURE), NYHA CLASS II, CHRONIC, SYSTOLIC (HCC): Primary | ICD-10-CM

## 2024-03-27 PROCEDURE — 93297 REM INTERROG DEV EVAL ICPMS: CPT | Performed by: NUCLEAR MEDICINE

## 2024-03-27 NOTE — TELEPHONE ENCOUNTER
[facility] Medtronic Dual ICD Optivol  Pt of Ian/ Rekha Zimmer     Battery 11.2 years    Optivol elevated.

## 2024-03-27 NOTE — TELEPHONE ENCOUNTER
Notified patient's daughter no symptoms but does not weigh daily. Will start..  Verbalized understanding.      Patient's stomach has been upset since starting entresto.  Following a soft diet, banana's ( usually half of one per day) and rice but does not eat much of anything.    Lab slip faxed to [facility]

## 2024-03-27 NOTE — TELEPHONE ENCOUNTER
Ok - if want try HALF tab Entresto BID and see if helps abd upset.  If doesn't let us know we could change back to previous (I believe Losartan)

## 2024-03-27 NOTE — PROGRESS NOTES
[facility] Medtronic Dual ICD Optivol  Pt of Ian/ Rekha Zimmer     Battery 11.2 years    Optivol elevated.   Will send to [facility] to address

## 2024-03-27 NOTE — TELEPHONE ENCOUNTER
Call and check symptoms - optivol elevated.  If so take PRN Lasix for next 3 days.  Also labs resulted - look good except K+ on high side normal (started Entresto last month).   Avoid high K+ foods.    Repeat BMP in 4-5 weeks.  Mail slip

## 2024-03-28 NOTE — TELEPHONE ENCOUNTER
Spoke with dtr  Patient has diverticulitis   She will talk to patient about trying 1/2 tab but not sure its her heart meds

## 2024-04-25 ENCOUNTER — HOSPITAL ENCOUNTER (INPATIENT)
Age: 89
LOS: 13 days | Discharge: HOME | End: 2024-05-09
Attending: EMERGENCY MEDICINE
Payer: MEDICARE

## 2024-04-25 ENCOUNTER — APPOINTMENT (OUTPATIENT)
Dept: CT IMAGING | Age: 89
End: 2024-04-25
Payer: MEDICARE

## 2024-04-25 DIAGNOSIS — E44.0 MODERATE MALNUTRITION (HCC): ICD-10-CM

## 2024-04-25 DIAGNOSIS — E83.42 HYPOMAGNESEMIA: ICD-10-CM

## 2024-04-25 DIAGNOSIS — D53.9 MACROCYTIC ANEMIA: ICD-10-CM

## 2024-04-25 DIAGNOSIS — K57.32 DIVERTICULITIS OF COLON: ICD-10-CM

## 2024-04-25 DIAGNOSIS — N32.1 COLOVESICAL FISTULA: Primary | ICD-10-CM

## 2024-04-25 DIAGNOSIS — G89.18 ACUTE POSTOPERATIVE PAIN: ICD-10-CM

## 2024-04-25 LAB
ALBUMIN SERPL BCG-MCNC: 4.2 G/DL (ref 3.5–5.1)
ALP SERPL-CCNC: 68 U/L (ref 38–126)
ALT SERPL W/O P-5'-P-CCNC: 8 U/L (ref 11–66)
ANION GAP SERPL CALC-SCNC: 12 MEQ/L (ref 8–16)
AST SERPL-CCNC: 26 U/L (ref 5–40)
BASOPHILS ABSOLUTE: 0 THOU/MM3 (ref 0–0.1)
BASOPHILS NFR BLD AUTO: 0.3 %
BILIRUB SERPL-MCNC: 0.4 MG/DL (ref 0.3–1.2)
BUN SERPL-MCNC: 62 MG/DL (ref 7–22)
CALCIUM SERPL-MCNC: 9.3 MG/DL (ref 8.5–10.5)
CHLORIDE SERPL-SCNC: 101 MEQ/L (ref 98–111)
CO2 SERPL-SCNC: 24 MEQ/L (ref 23–33)
CREAT SERPL-MCNC: 1.7 MG/DL (ref 0.4–1.2)
DEPRECATED RDW RBC AUTO: 57.1 FL (ref 35–45)
EOSINOPHIL NFR BLD AUTO: 0.6 %
EOSINOPHILS ABSOLUTE: 0.1 THOU/MM3 (ref 0–0.4)
ERYTHROCYTE [DISTWIDTH] IN BLOOD BY AUTOMATED COUNT: 15.1 % (ref 11.5–14.5)
GFR SERPL CREATININE-BSD FRML MDRD: 28 ML/MIN/1.73M2
GLUCOSE SERPL-MCNC: 106 MG/DL (ref 70–108)
HCT VFR BLD AUTO: 33.4 % (ref 37–47)
HGB BLD-MCNC: 10.6 GM/DL (ref 12–16)
IMM GRANULOCYTES # BLD AUTO: 0.19 THOU/MM3 (ref 0–0.07)
IMM GRANULOCYTES NFR BLD AUTO: 1.6 %
LACTIC ACID, SEPSIS: 0.9 MMOL/L (ref 0.5–1.9)
LYMPHOCYTES ABSOLUTE: 1.5 THOU/MM3 (ref 1–4.8)
LYMPHOCYTES NFR BLD AUTO: 12.8 %
MCH RBC QN AUTO: 32.7 PG (ref 26–33)
MCHC RBC AUTO-ENTMCNC: 31.7 GM/DL (ref 32.2–35.5)
MCV RBC AUTO: 103.1 FL (ref 81–99)
MONOCYTES ABSOLUTE: 1.9 THOU/MM3 (ref 0.4–1.3)
MONOCYTES NFR BLD AUTO: 16.2 %
NEUTROPHILS NFR BLD AUTO: 68.5 %
NRBC BLD AUTO-RTO: 0 /100 WBC
OSMOLALITY SERPL CALC.SUM OF ELEC: 291.9 MOSMOL/KG (ref 275–300)
PLATELET # BLD AUTO: 142 THOU/MM3 (ref 130–400)
PMV BLD AUTO: 12.6 FL (ref 9.4–12.4)
POTASSIUM SERPL-SCNC: 4.8 MEQ/L (ref 3.5–5.2)
PROT SERPL-MCNC: 7.8 G/DL (ref 6.1–8)
RBC # BLD AUTO: 3.24 MILL/MM3 (ref 4.2–5.4)
SEGMENTED NEUTROPHILS ABSOLUTE COUNT: 8.2 THOU/MM3 (ref 1.8–7.7)
SODIUM SERPL-SCNC: 137 MEQ/L (ref 135–145)
WBC # BLD AUTO: 11.9 THOU/MM3 (ref 4.8–10.8)

## 2024-04-25 PROCEDURE — 80053 COMPREHEN METABOLIC PANEL: CPT

## 2024-04-25 PROCEDURE — 93005 ELECTROCARDIOGRAM TRACING: CPT

## 2024-04-25 PROCEDURE — 96365 THER/PROPH/DIAG IV INF INIT: CPT

## 2024-04-25 PROCEDURE — 74176 CT ABD & PELVIS W/O CONTRAST: CPT

## 2024-04-25 PROCEDURE — 2580000003 HC RX 258

## 2024-04-25 PROCEDURE — 96375 TX/PRO/DX INJ NEW DRUG ADDON: CPT

## 2024-04-25 PROCEDURE — 83605 ASSAY OF LACTIC ACID: CPT

## 2024-04-25 PROCEDURE — 6370000000 HC RX 637 (ALT 250 FOR IP)

## 2024-04-25 PROCEDURE — 6360000002 HC RX W HCPCS

## 2024-04-25 PROCEDURE — 99285 EMERGENCY DEPT VISIT HI MDM: CPT

## 2024-04-25 PROCEDURE — 36415 COLL VENOUS BLD VENIPUNCTURE: CPT

## 2024-04-25 PROCEDURE — 87086 URINE CULTURE/COLONY COUNT: CPT

## 2024-04-25 PROCEDURE — 85025 COMPLETE CBC W/AUTO DIFF WBC: CPT

## 2024-04-25 RX ORDER — 0.9 % SODIUM CHLORIDE 0.9 %
250 INTRAVENOUS SOLUTION INTRAVENOUS ONCE
Status: COMPLETED | OUTPATIENT
Start: 2024-04-25 | End: 2024-04-25

## 2024-04-25 RX ORDER — ONDANSETRON 4 MG/1
4 TABLET, ORALLY DISINTEGRATING ORAL ONCE
Status: COMPLETED | OUTPATIENT
Start: 2024-04-25 | End: 2024-04-25

## 2024-04-25 RX ORDER — FENTANYL CITRATE 50 UG/ML
25 INJECTION, SOLUTION INTRAMUSCULAR; INTRAVENOUS ONCE
Status: COMPLETED | OUTPATIENT
Start: 2024-04-25 | End: 2024-04-25

## 2024-04-25 RX ADMIN — SODIUM CHLORIDE 250 ML: 9 INJECTION, SOLUTION INTRAVENOUS at 22:50

## 2024-04-25 RX ADMIN — FENTANYL CITRATE 25 MCG: 50 INJECTION INTRAMUSCULAR; INTRAVENOUS at 22:52

## 2024-04-25 RX ADMIN — ONDANSETRON 4 MG: 4 TABLET, ORALLY DISINTEGRATING ORAL at 22:51

## 2024-04-25 ASSESSMENT — PAIN DESCRIPTION - LOCATION
LOCATION: GROIN

## 2024-04-25 ASSESSMENT — PAIN SCALES - GENERAL
PAINLEVEL_OUTOF10: 7
PAINLEVEL_OUTOF10: 6
PAINLEVEL_OUTOF10: 10

## 2024-04-25 ASSESSMENT — PAIN DESCRIPTION - DESCRIPTORS
DESCRIPTORS: SORE;STABBING
DESCRIPTORS: SHARP;SORE

## 2024-04-25 ASSESSMENT — PAIN - FUNCTIONAL ASSESSMENT
PAIN_FUNCTIONAL_ASSESSMENT: 0-10
PAIN_FUNCTIONAL_ASSESSMENT: 0-10

## 2024-04-26 ENCOUNTER — APPOINTMENT (OUTPATIENT)
Dept: CT IMAGING | Age: 89
End: 2024-04-26
Payer: MEDICARE

## 2024-04-26 PROBLEM — R10.30 LOWER ABDOMINAL PAIN: Status: ACTIVE | Noted: 2024-04-26

## 2024-04-26 PROBLEM — N32.1 COLOVESICAL FISTULA: Status: ACTIVE | Noted: 2024-04-26

## 2024-04-26 LAB
ANION GAP SERPL CALC-SCNC: 14 MEQ/L (ref 8–16)
APTT PPP: 41.7 SECONDS (ref 22–38)
BACTERIA URNS QL MICRO: ABNORMAL /HPF
BILIRUB UR QL STRIP.AUTO: NEGATIVE
BUN SERPL-MCNC: 51 MG/DL (ref 7–22)
CALCIUM SERPL-MCNC: 8.8 MG/DL (ref 8.5–10.5)
CASTS #/AREA URNS LPF: ABNORMAL /LPF
CASTS 2: ABNORMAL /LPF
CHARACTER UR: CLEAR
CHLORIDE SERPL-SCNC: 108 MEQ/L (ref 98–111)
CK SERPL-CCNC: 57 U/L (ref 30–135)
CO2 SERPL-SCNC: 20 MEQ/L (ref 23–33)
COLOR: YELLOW
CREAT SERPL-MCNC: 1.5 MG/DL (ref 0.4–1.2)
CRYSTALS URNS MICRO: ABNORMAL
DEPRECATED RDW RBC AUTO: 58.6 FL (ref 35–45)
EKG ATRIAL RATE: 66 BPM
EKG P AXIS: 53 DEGREES
EKG P-R INTERVAL: 224 MS
EKG Q-T INTERVAL: 430 MS
EKG QRS DURATION: 150 MS
EKG QTC CALCULATION (BAZETT): 450 MS
EKG R AXIS: -38 DEGREES
EKG T AXIS: 22 DEGREES
EKG VENTRICULAR RATE: 66 BPM
EPITHELIAL CELLS, UA: ABNORMAL /HPF
ERYTHROCYTE [DISTWIDTH] IN BLOOD BY AUTOMATED COUNT: 15 % (ref 11.5–14.5)
GFR SERPL CREATININE-BSD FRML MDRD: 33 ML/MIN/1.73M2
GLUCOSE SERPL-MCNC: 97 MG/DL (ref 70–108)
GLUCOSE UR QL STRIP.AUTO: NEGATIVE MG/DL
HCT VFR BLD AUTO: 30.4 % (ref 37–47)
HGB BLD-MCNC: 9.4 GM/DL (ref 12–16)
HGB UR QL STRIP.AUTO: ABNORMAL
INR PPP: 1.61 (ref 0.85–1.13)
KETONES UR QL STRIP.AUTO: NEGATIVE
LACTIC ACID, SEPSIS: 0.7 MMOL/L (ref 0.5–1.9)
MAGNESIUM SERPL-MCNC: 2.1 MG/DL (ref 1.6–2.4)
MCH RBC QN AUTO: 33.2 PG (ref 26–33)
MCHC RBC AUTO-ENTMCNC: 30.9 GM/DL (ref 32.2–35.5)
MCV RBC AUTO: 107.4 FL (ref 81–99)
MISCELLANEOUS 2: ABNORMAL
NITRITE UR QL STRIP: NEGATIVE
OSMOLALITY SERPL CALC.SUM OF ELEC: 296.7 MOSMOL/KG (ref 275–300)
PH UR STRIP.AUTO: 7 [PH] (ref 5–9)
PLATELET # BLD AUTO: 121 THOU/MM3 (ref 130–400)
PMV BLD AUTO: 12.5 FL (ref 9.4–12.4)
POTASSIUM SERPL-SCNC: 4.4 MEQ/L (ref 3.5–5.2)
PROT UR STRIP.AUTO-MCNC: NEGATIVE MG/DL
RBC # BLD AUTO: 2.83 MILL/MM3 (ref 4.2–5.4)
RBC URINE: ABNORMAL /HPF
RENAL EPI CELLS #/AREA URNS HPF: ABNORMAL /[HPF]
SODIUM SERPL-SCNC: 142 MEQ/L (ref 135–145)
SP GR UR REFRACT.AUTO: < 1.005 (ref 1–1.03)
UROBILINOGEN, URINE: 0.2 EU/DL (ref 0–1)
WBC # BLD AUTO: 14.6 THOU/MM3 (ref 4.8–10.8)
WBC #/AREA URNS HPF: ABNORMAL /HPF
WBC #/AREA URNS HPF: ABNORMAL /[HPF]
YEAST LIKE FUNGI URNS QL MICRO: ABNORMAL

## 2024-04-26 PROCEDURE — 2580000003 HC RX 258: Performed by: STUDENT IN AN ORGANIZED HEALTH CARE EDUCATION/TRAINING PROGRAM

## 2024-04-26 PROCEDURE — 85610 PROTHROMBIN TIME: CPT

## 2024-04-26 PROCEDURE — 74176 CT ABD & PELVIS W/O CONTRAST: CPT

## 2024-04-26 PROCEDURE — 87040 BLOOD CULTURE FOR BACTERIA: CPT

## 2024-04-26 PROCEDURE — 1200000003 HC TELEMETRY R&B

## 2024-04-26 PROCEDURE — 85730 THROMBOPLASTIN TIME PARTIAL: CPT

## 2024-04-26 PROCEDURE — 85027 COMPLETE CBC AUTOMATED: CPT

## 2024-04-26 PROCEDURE — 82550 ASSAY OF CK (CPK): CPT

## 2024-04-26 PROCEDURE — 81001 URINALYSIS AUTO W/SCOPE: CPT

## 2024-04-26 PROCEDURE — 6370000000 HC RX 637 (ALT 250 FOR IP): Performed by: STUDENT IN AN ORGANIZED HEALTH CARE EDUCATION/TRAINING PROGRAM

## 2024-04-26 PROCEDURE — 99223 1ST HOSP IP/OBS HIGH 75: CPT | Performed by: HOSPITALIST

## 2024-04-26 PROCEDURE — 2580000003 HC RX 258: Performed by: EMERGENCY MEDICINE

## 2024-04-26 PROCEDURE — 36415 COLL VENOUS BLD VENIPUNCTURE: CPT

## 2024-04-26 PROCEDURE — 93010 ELECTROCARDIOGRAM REPORT: CPT | Performed by: NUCLEAR MEDICINE

## 2024-04-26 PROCEDURE — 83605 ASSAY OF LACTIC ACID: CPT

## 2024-04-26 PROCEDURE — 6360000002 HC RX W HCPCS: Performed by: STUDENT IN AN ORGANIZED HEALTH CARE EDUCATION/TRAINING PROGRAM

## 2024-04-26 PROCEDURE — 80048 BASIC METABOLIC PNL TOTAL CA: CPT

## 2024-04-26 PROCEDURE — 83735 ASSAY OF MAGNESIUM: CPT

## 2024-04-26 PROCEDURE — 6360000002 HC RX W HCPCS: Performed by: EMERGENCY MEDICINE

## 2024-04-26 PROCEDURE — 6360000002 HC RX W HCPCS

## 2024-04-26 RX ORDER — LEVOTHYROXINE SODIUM 0.12 MG/1
62.5 TABLET ORAL
Status: DISCONTINUED | OUTPATIENT
Start: 2024-04-26 | End: 2024-05-09 | Stop reason: HOSPADM

## 2024-04-26 RX ORDER — HEPARIN SODIUM 1000 [USP'U]/ML
60 INJECTION, SOLUTION INTRAVENOUS; SUBCUTANEOUS ONCE
Status: DISCONTINUED | OUTPATIENT
Start: 2024-04-26 | End: 2024-04-26

## 2024-04-26 RX ORDER — MORPHINE SULFATE 2 MG/ML
2 INJECTION, SOLUTION INTRAMUSCULAR; INTRAVENOUS ONCE
Status: COMPLETED | OUTPATIENT
Start: 2024-04-26 | End: 2024-04-26

## 2024-04-26 RX ORDER — SODIUM CHLORIDE 9 MG/ML
INJECTION, SOLUTION INTRAVENOUS CONTINUOUS
Status: ACTIVE | OUTPATIENT
Start: 2024-04-26 | End: 2024-04-28

## 2024-04-26 RX ORDER — SODIUM CHLORIDE 0.9 % (FLUSH) 0.9 %
10 SYRINGE (ML) INJECTION PRN
Status: DISCONTINUED | OUTPATIENT
Start: 2024-04-26 | End: 2024-05-02

## 2024-04-26 RX ORDER — POTASSIUM CHLORIDE 7.45 MG/ML
10 INJECTION INTRAVENOUS PRN
Status: DISCONTINUED | OUTPATIENT
Start: 2024-04-26 | End: 2024-04-26

## 2024-04-26 RX ORDER — HEPARIN SODIUM 1000 [USP'U]/ML
30 INJECTION, SOLUTION INTRAVENOUS; SUBCUTANEOUS PRN
Status: DISCONTINUED | OUTPATIENT
Start: 2024-04-26 | End: 2024-04-29 | Stop reason: SDUPTHER

## 2024-04-26 RX ORDER — ACETAMINOPHEN 325 MG/1
650 TABLET ORAL EVERY 6 HOURS PRN
Status: DISCONTINUED | OUTPATIENT
Start: 2024-04-26 | End: 2024-05-02

## 2024-04-26 RX ORDER — ACETAMINOPHEN 650 MG/1
650 SUPPOSITORY RECTAL EVERY 6 HOURS PRN
Status: DISCONTINUED | OUTPATIENT
Start: 2024-04-26 | End: 2024-05-02

## 2024-04-26 RX ORDER — CETIRIZINE HYDROCHLORIDE 5 MG/1
5 TABLET ORAL DAILY
Status: DISCONTINUED | OUTPATIENT
Start: 2024-04-26 | End: 2024-05-09 | Stop reason: HOSPADM

## 2024-04-26 RX ORDER — ONDANSETRON 4 MG/1
4 TABLET, ORALLY DISINTEGRATING ORAL EVERY 8 HOURS PRN
Status: DISCONTINUED | OUTPATIENT
Start: 2024-04-26 | End: 2024-05-02

## 2024-04-26 RX ORDER — PANTOPRAZOLE SODIUM 40 MG/1
40 TABLET, DELAYED RELEASE ORAL
Status: DISCONTINUED | OUTPATIENT
Start: 2024-04-26 | End: 2024-05-09 | Stop reason: HOSPADM

## 2024-04-26 RX ORDER — SODIUM CHLORIDE 0.9 % (FLUSH) 0.9 %
10 SYRINGE (ML) INJECTION EVERY 12 HOURS SCHEDULED
Status: DISCONTINUED | OUTPATIENT
Start: 2024-04-26 | End: 2024-05-02

## 2024-04-26 RX ORDER — POLYETHYLENE GLYCOL 3350 17 G/17G
17 POWDER, FOR SOLUTION ORAL DAILY PRN
Status: DISCONTINUED | OUTPATIENT
Start: 2024-04-26 | End: 2024-05-02

## 2024-04-26 RX ORDER — ONDANSETRON 2 MG/ML
4 INJECTION INTRAMUSCULAR; INTRAVENOUS EVERY 6 HOURS PRN
Status: DISCONTINUED | OUTPATIENT
Start: 2024-04-26 | End: 2024-05-03 | Stop reason: ALTCHOICE

## 2024-04-26 RX ORDER — TROSPIUM CHLORIDE 20 MG/1
20 TABLET, FILM COATED ORAL NIGHTLY
Status: DISCONTINUED | OUTPATIENT
Start: 2024-04-26 | End: 2024-05-09 | Stop reason: HOSPADM

## 2024-04-26 RX ORDER — CARVEDILOL 3.12 MG/1
3.12 TABLET ORAL 2 TIMES DAILY
Status: DISCONTINUED | OUTPATIENT
Start: 2024-04-26 | End: 2024-05-09

## 2024-04-26 RX ORDER — SODIUM CHLORIDE 9 MG/ML
INJECTION, SOLUTION INTRAVENOUS PRN
Status: DISCONTINUED | OUTPATIENT
Start: 2024-04-26 | End: 2024-05-02

## 2024-04-26 RX ORDER — POTASSIUM CHLORIDE 20 MEQ/1
40 TABLET, EXTENDED RELEASE ORAL PRN
Status: DISCONTINUED | OUTPATIENT
Start: 2024-04-26 | End: 2024-04-26

## 2024-04-26 RX ORDER — HEPARIN SODIUM 10000 [USP'U]/100ML
5-30 INJECTION, SOLUTION INTRAVENOUS CONTINUOUS
Status: DISPENSED | OUTPATIENT
Start: 2024-04-26 | End: 2024-04-30

## 2024-04-26 RX ORDER — MORPHINE SULFATE 2 MG/ML
2 INJECTION, SOLUTION INTRAMUSCULAR; INTRAVENOUS EVERY 4 HOURS PRN
Status: DISCONTINUED | OUTPATIENT
Start: 2024-04-26 | End: 2024-05-02

## 2024-04-26 RX ORDER — HEPARIN SODIUM 1000 [USP'U]/ML
60 INJECTION, SOLUTION INTRAVENOUS; SUBCUTANEOUS PRN
Status: DISCONTINUED | OUTPATIENT
Start: 2024-04-26 | End: 2024-04-29 | Stop reason: SDUPTHER

## 2024-04-26 RX ADMIN — Medication 1250 MG: at 13:00

## 2024-04-26 RX ADMIN — PIPERACILLIN AND TAZOBACTAM 4500 MG: 4; .5 INJECTION, POWDER, LYOPHILIZED, FOR SOLUTION INTRAVENOUS at 02:06

## 2024-04-26 RX ADMIN — SODIUM CHLORIDE: 9 INJECTION, SOLUTION INTRAVENOUS at 14:58

## 2024-04-26 RX ADMIN — CETIRIZINE HYDROCHLORIDE 5 MG: 5 TABLET ORAL at 11:18

## 2024-04-26 RX ADMIN — ACETAMINOPHEN 650 MG: 325 TABLET ORAL at 23:36

## 2024-04-26 RX ADMIN — HEPARIN SODIUM 12 UNITS/KG/HR: 10000 INJECTION, SOLUTION INTRAVENOUS at 11:37

## 2024-04-26 RX ADMIN — SODIUM CHLORIDE, PRESERVATIVE FREE 10 ML: 5 INJECTION INTRAVENOUS at 11:19

## 2024-04-26 RX ADMIN — HEPARIN SODIUM 1500 UNITS: 1000 INJECTION INTRAVENOUS; SUBCUTANEOUS at 18:59

## 2024-04-26 RX ADMIN — CARVEDILOL 3.12 MG: 3.12 TABLET, FILM COATED ORAL at 11:18

## 2024-04-26 RX ADMIN — SODIUM CHLORIDE: 9 INJECTION, SOLUTION INTRAVENOUS at 05:48

## 2024-04-26 RX ADMIN — CARVEDILOL 3.12 MG: 3.12 TABLET, FILM COATED ORAL at 22:28

## 2024-04-26 RX ADMIN — LEVOTHYROXINE SODIUM 62.5 MCG: 0.12 TABLET ORAL at 11:19

## 2024-04-26 RX ADMIN — PIPERACILLIN AND TAZOBACTAM 3375 MG: 3; .375 INJECTION, POWDER, LYOPHILIZED, FOR SOLUTION INTRAVENOUS at 15:02

## 2024-04-26 RX ADMIN — MORPHINE SULFATE 2 MG: 2 INJECTION, SOLUTION INTRAMUSCULAR; INTRAVENOUS at 02:37

## 2024-04-26 ASSESSMENT — PAIN SCALES - GENERAL
PAINLEVEL_OUTOF10: 8

## 2024-04-26 ASSESSMENT — PAIN DESCRIPTION - LOCATION
LOCATION: GROIN
LOCATION: ABDOMEN
LOCATION: ABDOMEN

## 2024-04-26 ASSESSMENT — PAIN - FUNCTIONAL ASSESSMENT: PAIN_FUNCTIONAL_ASSESSMENT: NONE - DENIES PAIN

## 2024-04-26 ASSESSMENT — PAIN DESCRIPTION - DESCRIPTORS: DESCRIPTORS: SORE;STABBING

## 2024-04-26 NOTE — PROGRESS NOTES
Pharmacy Note - Extended Infusion Beta-Lactam Dose Adjustment    Piperacillin/Tazobactam 3375 mg q6h intermittent infusion ordered for the treatment of Intra-abdominal Infection. Per [facility] Extended Infusion Beta-Lactam Policy, this will be changed to 3375 mg q12h extended infusion     Estimated Creatinine Clearance: Estimated Creatinine Clearance: 16 mL/min (A) (based on SCr of 1.7 mg/dL (H)).    Dialysis Status, HERIBERTO, CKD: HERIBERTO on CKD    BMI: Body mass index is 21.09 kg/m².    Rationale for Adjustment: Dose adjusted per [facility] Extended Infusion Policy based on renal function and indication. The above medication is renally eliminated and demonstrates time-dependent effects on bacterial eradication. Extended-infusion dosing strategy aims to enhance microbiologic and clinical efficacy.     Pharmacy will monitor renal function daily and adjust dose as necessary.      Please call with any questions.    Thank you,  Leigh Tejeda, formerly [facility] PharmD  4/26/2024   5:38 AM

## 2024-04-26 NOTE — CARE COORDINATION
DISCHARGE PLANNING EVALUATION  4/26/24, 3:02 PM EDT    Reason for Referral: [facility]   Decision Maker: Independent with decision making.   Current Services: [facility] for RN only.  Orders are current.  Will need new orders if adding PT or OT.   New Services Requested: Not at this time.   Family/ Social/ Home environment: From home with daughter who is supportive.  Uses a walker at home.  Daughter provides transport.    Payment Source: Medicare  Transportation at Discharge: daughter  Post-acute (PAC) provider list was provided to patient. Patient was informed of their freedom to choose PAC provider. Discussed and offered to show the patient the relevant PAC Providers quality and resource use measures on Medicare Compare web site via computer based on patient's goals of care and treatment preferences. Questions regarding selection process were answered.      Teach Back Method used with Kourtney regarding care plan and discharge planning.   Patient verbalized understanding of the plan of care and contribute to goal setting.       Patient preferences and discharge plan: Home with daughter and current [facility] for nursing.      Electronically signed by BELEM Oliva on 4/26/2024 at 3:02 PM

## 2024-04-26 NOTE — H&P
Internal Medicine Resident History and Physical          Name: Kourtney Rankin, female, : 1934, MRN: [id number]    PCP: Jennifer Nathan, APRN - CNP    Date of Admission: 2024  Date of Service: Pt seen/examined on 24  and Admitted to Inpatient with expected LOS greater than two midnights due to medical therapy.     Assessment and Plan:    ?Colovesicular fistula: CTAP without contrast demonstrated air seen at the anterior superior aspect of the bladder and may be outside the bladder.  Air present within the bladder which is concerning for colovesical fistula.  Recommending repeat imaging with IV/oral contrast.  Denies pneumaturia/fecaluria  Continue with IVF. Repeat BMP in AM. If improvement in Cr in AM consider additional imaging with IV/Oral Contrast.  Continue Zosyn for now  HERIBERTO on CKD Stage III: Cr 1.7 on baseline 1.3. BUN:Cr >20. Endorses poor oral intake. Additional urine studies pending. Continue IVF. Avoid nephrotoxic substances. Daily wts/I&O's   Chronic systolic heart failure: TTE 2023 shows EF 25%.  LV size moderately increased.  Severe global hypokinesis of the LV, systolic function severely reduced.  LAD.  Small circumferential pericardial effusion without hemodynamic compromise.  Continue home medications. Na/Fluid restricted diet when tolerating PO. Daily wts/I&Os.  Obstructive CAD S/p PCI to LM/LAD: S/p Impella supported complex PCI 2022. Hold DAPT in the setting of potential surgical intervention. Continue other home medications as tolerated.  Paroxysmal atrial fibrillation: [id number] Score: 5. Rate control with Coreg. OAC with low-dose Eliquis.  Maintain telemetry. Keep K > 4, Mg > 2  Continue Heparin gtt pending additional imaging +/- surgical intervention  Transition back to OAC when appropriate  Primary hypertension: Continue Coreg with hold parameters.   Chronic macrocytic anemia: Hgb 10.6, .1.  Stable, at baseline.   Monitor.  Hypothyroidism: Continue home Synthroid  Hx of IBS: Noted. No longer on Amitiza.  S/p PEG tube removed: Noted in 2023.  Chronic sacral decubitus ulcer: Noted. F/w Dr. Aguirre as OP   Hx of frequent episodes of diverticulitis: Noted.   Physical Deconditioning: PT/OT      =======================================================================  Chief Complaint:  Abdominal pain, dysuria    History Of Present Illness:  Kourtney Rankin is a 89 y.o. female with past medical history described below who presents to [facility] with complaints of chronic abdominal pain and dysuria.  Patient states she has had this problem for quite a while however the last few weeks its gotten worse.  She states that when she urinates it burns.  She was evaluated by her family doctor and was given a cream.  She says that the cream does not really help.  She states that when her urine was tested they did not find any evidence of infection.  She states her abdominal pain comes and goes and stays mainly around her umbilicus and left lower quadrants.  She has extensive history of diverticulitis.  Upon arrival patient was noted to be afebrile. VSS.  Laboratory workup was largely noncontributory.  Creatinine noted to be elevated above baseline at 1.7.  Chronic macrocytic anemia was appreciated.  Slight leukocytosis with a WBC of 11.9.  LFTs WNL.  UA showed moderate blood with large LE, no bacteria.  CT abdomen pelvis without contrast demonstrated wall thickening the bladder with perivesicular stranding consistent with history of cystitis.  There is evidence of chronic/recurrent diverticulitis.  Air was seen in the anterior superior aspect of the bladder and may be outside of the bladder.  Air present within the bladder.  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  Recommending repeat examination with IV/oral contrast.  Findings may also be secondary to recent instrumentation/emphysematous cystitis.  Patient was given IV fluids as well

## 2024-04-26 NOTE — ED NOTES
Pt is resting in bed with family at bedside. Pt hooked up to external catheter and pt cleaned up due to stool incontinence. Pt is breathing regular and unlabored on room air. Pt has no signs of distress to note at this time. Call light within reach.

## 2024-04-26 NOTE — ED TRIAGE NOTES
Pt presents to the ED via walk-in from home with c/o pain in groin area and pt states the pain feels like it moves around there, pain rated 10/10 and intermittent, pt describes pain as sharp and causes pt feel nauseous. Pt went to family  4/1 for concern for infection, urine looked fine,  Prescribed Vagasil for possible yeast infection, pt took that medication yesterday. Pt states she has pain bad when urinating, denies urinary retention. Pt states eating can make the pain worse, she has not been eating as much. Pt states she has been hydrating. Pt VSS and A&O x4.

## 2024-04-26 NOTE — ED NOTES
ED to inpatient nurses report      Chief Complaint:  Chief Complaint   Patient presents with    Vaginitis     Present to ED from: home with daughter    MOA:     LOC: alert and orientated to name, place, date  Mobility: Requires assistance * 1  Oxygen Baseline: room air    Current needs required: none     Code Status:   Full Code    What abnormal results were found and what did you give/do to treat them? Abdominal CT- antibiotics  Any procedures or intervention occur? N/a    Mental Status:  Level of Consciousness: Alert (0)    Psych Assessment:        Vitals:  Patient Vitals for the past 24 hrs:   BP Temp Temp src Pulse Resp SpO2 Height Weight   04/26/24 0549 (!) 113/50 -- -- 76 24 100 % -- --   04/26/24 0450 (!) 110/50 -- -- 75 20 98 % -- --   04/26/24 0353 (!) 125/52 -- -- 78 26 100 % -- --   04/26/24 0249 93/66 -- -- 81 15 100 % -- --   04/26/24 0205 (!) 145/60 -- -- 66 20 99 % -- --   04/26/24 0044 119/77 -- -- 71 21 98 % -- --   04/26/24 0003 (!) 139/58 -- -- 63 20 98 % -- --   04/25/24 2305 (!) 145/51 -- -- 79 23 98 % -- --   04/25/24 2126 (!) 135/55 98 °F (36.7 °C) Oral 66 17 98 % 1.524 m (5') 49 kg (108 lb)        LDAs:   Peripheral IV 04/25/24 Right Antecubital (Active)   Site Assessment Clean, dry & intact 04/26/24 0044   Line Status Normal saline locked 04/26/24 0044   Phlebitis Assessment No symptoms 04/26/24 0044   Infiltration Assessment 0 04/26/24 0044   Dressing Status Clean, dry & intact 04/26/24 0044       Ambulatory Status:  Presents to emergency department  because of falls (Syncope, seizure, or loss of consciousness): No, Age > 70: Yes, Altered Mental Status, Intoxication with alcohol or substance confusion (Disorientation, impaired judgment, poor safety awaremess, or inability to follow instructions): No, Impaired Mobility: Ambulates or transfers with assistive devices or assistance; Unable to ambulate or transer.: Yes, Nursing Judgement: No    Diagnosis:  DISPOSITION Admitted 04/26/2024  malnutrition (HCC)            Electronically signed by Marcial Ramey RN on 4/26/2024 at 6:43 AM

## 2024-04-26 NOTE — PROGRESS NOTES
[facility]   Pharmacy Pharmacokinetic Monitoring Service - Vancomycin     Kourtney Rankin is a 89 y.o. female starting on vancomycin therapy for IAI. Pharmacy consulted by Dr. David for monitoring and adjustment.    Target Concentration: Dosing based on anticipated concentration < 15 mg/L due to renal impairment/insufficiency    Additional Antimicrobials: Zosyn    Pertinent Laboratory Values:   Wt Readings from Last 1 Encounters:   04/25/24 49 kg (108 lb)     Temp Readings from Last 1 Encounters:   04/26/24 97.9 °F (36.6 °C) (Oral)     Estimated Creatinine Clearance: 18 mL/min (A) (based on SCr of 1.5 mg/dL (H)).  Recent Labs     04/25/24  2134 04/26/24  0737   CREATININE 1.7* 1.5*   BUN 62* 51*   WBC 11.9* 14.6*     Pertinent Cultures:  Date Source Results   4/26/24 BX x 2 pending   MRSA Nasal Swab: N/A. Non-respiratory infection.    Plan:  Concentration-guided dosing due to renal impairment/insufficiency  Start vancomycin 1250 mg x 1 loading dose  Renal labs as indicated   Vancomycin concentration ordered for 4/27/24 @ 1200   Pharmacy will continue to monitor patient and adjust therapy as indicated    Thank you for the consult,  Niki Brennan, PharmD  PGY1 Resident

## 2024-04-26 NOTE — ED NOTES
Pt is resting in bed with family at bedside, no requests at this time. Pt is breathing regular and unlabored on room air. Pt states her pain comes and goes and right now pain is a 8/10 in groin area, no other signs of distress to note at this time. Call light within reach.

## 2024-04-26 NOTE — ED NOTES
Report received from ROLF Tomas. Patient resting in bed. Respirations easy and unlabored. No distress noted. Call light within reach.

## 2024-04-26 NOTE — ED PROVIDER NOTES
[facility]  EMERGENCY DEPARTMENT ENCOUNTER          Pt Name: Kourtney Rankin  MRN: [id number]  Birthdate 11/29/1934  Date of evaluation: 4/25/2024  Treating Resident Physician: Stanley Campbell MD  Supervising Physician: Lori Rojo MD    History obtained from child and the patient.     CHIEF COMPLAINT       Chief Complaint   Patient presents with    Vaginitis       HISTORY OF PRESENT ILLNESS    Kourtney Rankin is a 89 y.o. female with an  Medical history including CHF with EF of 20% on defibrillator, diverticulosis who presents to ED with chief complaint of abdominal pain and urinary symptoms.  Patient states symptoms have been ongoing for weeks with worsening of symptoms in the last 2 weeks.  Patient describes abdominal pain as colicky worse in the periumbilical and left lower quadrants.  Patient also has been having pain on the flanks radiating to the groin.  Patient states burning with urination, but unsure about hematuria.  Daughter endorses foul-smelling urination.  Patient has been having bowel movements but unsure about melena/hematochezia.  Patient states recently treated with Vagisil and cranberry juice with worsening of symptoms.  Patient denies history of kidney stones.  Patient denies fever, melena/hematochezia, history of kidney stones, focal weakness, fall/trauma.    Triage notes and Nursing notes were reviewed by myself.  Any discrepancies are addressed above.    PAST MEDICAL HISTORY     Past Medical History:   Diagnosis Date    HERIBERTO (acute kidney injury) (HCC)     Anxiety     Arthritis     Bronchitis     CAD (coronary artery disease)     CHF (congestive heart failure) (HCC)     Chronic a-fib (HCC)     COVID     October 2021    Diverticulitis of colon     Hyperkalemia     Hypertension     Medtronic cobalt dual ICD  02/15/2023    Neuromuscular dysfunction of bladder     Personal history of COVID-19     Pressure ulcer     Protein-calorie malnutrition (HCC)

## 2024-04-26 NOTE — PLAN OF CARE
PLAN OF CARE UPDATE    Name: Kourtney Rankin  : 1934  MRN: [id number]  Date of Service: 24      2:22 PM: Patient seen and evaluated at bedside.  Updated plan on repeating CT with oral contrast.  Told her plan to potentially consult general surgery depending on CT.  She is hesitant of wanting any kind of surgery at this time, however is open to talking with general surgery.  Patient's pain is well-controlled at this time.  Continue with IV antibiotics.  All questions answered.  Nursing staff at bedside during my evaluation.    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 Recent history of significant dysuria, UA per patient was negative outpatient and not started on antibiotics. Symptoms worsened and pt developed worsening suprapubic/left sided abdominal pain. Denying diarrhea, blood in stool, fecal material in urine, blood in urine. CTAP w/o contrast: Wall thickening of bladder, perivesicular stranding, evidence of chronic/recurrent diverticulitis, air seen at anterior superior aspect of bladder and may be outside bladder, air present within bladder, concerning for colovesical fistula. UA here +ve moderate blood, large LE.  Plan  - Repeat CT with oral contrast  - pain well controlled at this time, can add on medications as necessary  - Consider general surgery consult depending on repeat CT, pt hesitant about wanting surgery at this time  - Zosyn + vancomycin  - Daily BMP/CBC    #HERIBERTO on CKD III: Cr 1.7 on baseline 1.3. BUN:Cr >20. Endorses poor oral intake. Urine studies pending, may be skewed since pt received fluids.   Repeat creatine improved at 1.5.  Plan  - Avoid nephrotoxic agents  - Continue light fluids, monitor fluid status closely  - Urine studies pending  - Daily BMP    #Ischemic Cardiomyopathy, EF 25% on 2023, not in exacerbation: Echo 2023 shows severe global hypokinesis of LV, systolic

## 2024-04-26 NOTE — CARE COORDINATION
Case Management Assessment Initial Evaluation    Date/Time of Evaluation: 2024 12:03 PM  Assessment Completed by: Mercedes Ku RN    If patient is discharged prior to next notation, then this note serves as note for discharge by case management.    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    Procedures: none    Imagin/25 CT Abdomen Pelvis WO Contrast Wall thickening of the bladder with perivesicular stranding is consistent   with the given history of cystitis. There is evidence chronic/recurrent   diverticulitis. Air is seen at the anterior superior aspect of the bladder   and may be outside of the bladder. Air is also present within the bladder.   This is concerning for a colovesical fistula. An examination with IV and   oral contrast may be helpful. Air in the bladder may also be secondary to   recent instrumentation or emphysematous cystitis.   Patient Goals/Plan/Treatment Preferences: Met with Kourtney. She currently lives home with her daughter. She is independent.She uses a walker and is current with [facility]. Plan is to return home at discharge and resume .  consult in place.        24 1211   Service Assessment   Patient Orientation Alert and Oriented   Cognition Alert   History Provided By Patient   Primary Caregiver Self   Support Systems Children   Patient's Healthcare Decision Maker is: Named in Scanned ACP Document   PCP Verified by CM Yes   Last Visit to PCP

## 2024-04-26 NOTE — ED PROVIDER NOTES

## 2024-04-26 NOTE — ED NOTES
Med rec completed at this time. Patient resting in bed. Respirations easy and unlabored. No distress noted. Call light within reach.

## 2024-04-27 ENCOUNTER — APPOINTMENT (OUTPATIENT)
Dept: CT IMAGING | Age: 89
End: 2024-04-27
Payer: MEDICARE

## 2024-04-27 LAB
ANION GAP SERPL CALC-SCNC: 13 MEQ/L (ref 8–16)
APTT PPP: 129.3 SECONDS (ref 22–38)
APTT PPP: 44.9 SECONDS (ref 22–38)
APTT PPP: 75.8 SECONDS (ref 22–38)
APTT PPP: 90.8 SECONDS (ref 22–38)
BACTERIA UR CULT: ABNORMAL
BUN SERPL-MCNC: 36 MG/DL (ref 7–22)
CALCIUM SERPL-MCNC: 8.4 MG/DL (ref 8.5–10.5)
CHLORIDE SERPL-SCNC: 109 MEQ/L (ref 98–111)
CO2 SERPL-SCNC: 20 MEQ/L (ref 23–33)
CREAT SERPL-MCNC: 1.5 MG/DL (ref 0.4–1.2)
DEPRECATED RDW RBC AUTO: 58.4 FL (ref 35–45)
ERYTHROCYTE [DISTWIDTH] IN BLOOD BY AUTOMATED COUNT: 15.1 % (ref 11.5–14.5)
GFR SERPL CREATININE-BSD FRML MDRD: 33 ML/MIN/1.73M2
GLUCOSE SERPL-MCNC: 82 MG/DL (ref 70–108)
HCT VFR BLD AUTO: 30 % (ref 37–47)
HGB BLD-MCNC: 9.4 GM/DL (ref 12–16)
MCH RBC QN AUTO: 33.2 PG (ref 26–33)
MCHC RBC AUTO-ENTMCNC: 31.3 GM/DL (ref 32.2–35.5)
MCV RBC AUTO: 106 FL (ref 81–99)
ORGANISM: ABNORMAL
PLATELET # BLD AUTO: 131 THOU/MM3 (ref 130–400)
PMV BLD AUTO: 12 FL (ref 9.4–12.4)
POTASSIUM SERPL-SCNC: 4.4 MEQ/L (ref 3.5–5.2)
RBC # BLD AUTO: 2.83 MILL/MM3 (ref 4.2–5.4)
SODIUM SERPL-SCNC: 142 MEQ/L (ref 135–145)
VANCOMYCIN SERPL-MCNC: 9.4 UG/ML (ref 0.1–39.9)
WBC # BLD AUTO: 9.7 THOU/MM3 (ref 4.8–10.8)

## 2024-04-27 PROCEDURE — 6360000002 HC RX W HCPCS: Performed by: STUDENT IN AN ORGANIZED HEALTH CARE EDUCATION/TRAINING PROGRAM

## 2024-04-27 PROCEDURE — 6370000000 HC RX 637 (ALT 250 FOR IP): Performed by: STUDENT IN AN ORGANIZED HEALTH CARE EDUCATION/TRAINING PROGRAM

## 2024-04-27 PROCEDURE — 1200000003 HC TELEMETRY R&B

## 2024-04-27 PROCEDURE — 85730 THROMBOPLASTIN TIME PARTIAL: CPT

## 2024-04-27 PROCEDURE — 99222 1ST HOSP IP/OBS MODERATE 55: CPT

## 2024-04-27 PROCEDURE — 2580000003 HC RX 258: Performed by: PHARMACIST

## 2024-04-27 PROCEDURE — 85027 COMPLETE CBC AUTOMATED: CPT

## 2024-04-27 PROCEDURE — 6360000002 HC RX W HCPCS: Performed by: PHARMACIST

## 2024-04-27 PROCEDURE — 74176 CT ABD & PELVIS W/O CONTRAST: CPT

## 2024-04-27 PROCEDURE — 36415 COLL VENOUS BLD VENIPUNCTURE: CPT

## 2024-04-27 PROCEDURE — 80202 ASSAY OF VANCOMYCIN: CPT

## 2024-04-27 PROCEDURE — 2580000003 HC RX 258: Performed by: STUDENT IN AN ORGANIZED HEALTH CARE EDUCATION/TRAINING PROGRAM

## 2024-04-27 PROCEDURE — 80048 BASIC METABOLIC PNL TOTAL CA: CPT

## 2024-04-27 PROCEDURE — 99233 SBSQ HOSP IP/OBS HIGH 50: CPT | Performed by: HOSPITALIST

## 2024-04-27 RX ADMIN — CARVEDILOL 3.12 MG: 3.12 TABLET, FILM COATED ORAL at 10:16

## 2024-04-27 RX ADMIN — VANCOMYCIN HYDROCHLORIDE 1000 MG: 1 INJECTION, POWDER, LYOPHILIZED, FOR SOLUTION INTRAVENOUS at 14:21

## 2024-04-27 RX ADMIN — PANTOPRAZOLE SODIUM 40 MG: 40 TABLET, DELAYED RELEASE ORAL at 06:04

## 2024-04-27 RX ADMIN — PIPERACILLIN AND TAZOBACTAM 3375 MG: 3; .375 INJECTION, POWDER, LYOPHILIZED, FOR SOLUTION INTRAVENOUS at 03:19

## 2024-04-27 RX ADMIN — HEPARIN SODIUM 1500 UNITS: 1000 INJECTION INTRAVENOUS; SUBCUTANEOUS at 02:05

## 2024-04-27 RX ADMIN — HEPARIN SODIUM 18 UNITS/KG/HR: 10000 INJECTION, SOLUTION INTRAVENOUS at 20:34

## 2024-04-27 RX ADMIN — LEVOTHYROXINE SODIUM 62.5 MCG: 0.12 TABLET ORAL at 06:04

## 2024-04-27 RX ADMIN — ACETAMINOPHEN 650 MG: 325 TABLET ORAL at 06:03

## 2024-04-27 RX ADMIN — PIPERACILLIN AND TAZOBACTAM 3375 MG: 3; .375 INJECTION, POWDER, LYOPHILIZED, FOR SOLUTION INTRAVENOUS at 15:44

## 2024-04-27 RX ADMIN — SODIUM CHLORIDE, PRESERVATIVE FREE 10 ML: 5 INJECTION INTRAVENOUS at 10:16

## 2024-04-27 RX ADMIN — CARVEDILOL 3.12 MG: 3.12 TABLET, FILM COATED ORAL at 20:28

## 2024-04-27 RX ADMIN — ACETAMINOPHEN 650 MG: 325 TABLET ORAL at 20:28

## 2024-04-27 RX ADMIN — CETIRIZINE HYDROCHLORIDE 5 MG: 5 TABLET ORAL at 10:16

## 2024-04-27 RX ADMIN — TROSPIUM CHLORIDE 20 MG: 20 TABLET, FILM COATED ORAL at 20:28

## 2024-04-27 RX ADMIN — SODIUM CHLORIDE, PRESERVATIVE FREE 10 ML: 5 INJECTION INTRAVENOUS at 20:35

## 2024-04-27 RX ADMIN — SODIUM CHLORIDE: 9 INJECTION, SOLUTION INTRAVENOUS at 11:26

## 2024-04-27 ASSESSMENT — PAIN SCALES - GENERAL
PAINLEVEL_OUTOF10: 8
PAINLEVEL_OUTOF10: 5
PAINLEVEL_OUTOF10: 5
PAINLEVEL_OUTOF10: 0
PAINLEVEL_OUTOF10: 8

## 2024-04-27 ASSESSMENT — PAIN DESCRIPTION - PAIN TYPE: TYPE: CHRONIC PAIN

## 2024-04-27 ASSESSMENT — PAIN DESCRIPTION - ORIENTATION
ORIENTATION: MID
ORIENTATION: RIGHT;LEFT

## 2024-04-27 ASSESSMENT — PAIN DESCRIPTION - LOCATION
LOCATION: ABDOMEN

## 2024-04-27 ASSESSMENT — PAIN DESCRIPTION - ONSET: ONSET: GRADUAL

## 2024-04-27 ASSESSMENT — PAIN DESCRIPTION - DESCRIPTORS
DESCRIPTORS: ACHING
DESCRIPTORS: ACHING

## 2024-04-27 ASSESSMENT — PAIN DESCRIPTION - FREQUENCY: FREQUENCY: INTERMITTENT

## 2024-04-27 NOTE — PLAN OF CARE
Problem: Discharge Planning  Goal: Discharge to home or other facility with appropriate resources  Outcome: Progressing  Flowsheets (Taken 4/27/2024 1229)  Discharge to home or other facility with appropriate resources:   Identify barriers to discharge with patient and caregiver   Arrange for needed discharge resources and transportation as appropriate   Identify discharge learning needs (meds, wound care, etc)   Refer to discharge planning if patient needs post-hospital services based on physician order or complex needs related to functional status, cognitive ability or social support system     Problem: Chronic Conditions and Co-morbidities  Goal: Patient's chronic conditions and co-morbidity symptoms are monitored and maintained or improved  Outcome: Progressing  Flowsheets (Taken 4/27/2024 1229)  Care Plan - Patient's Chronic Conditions and Co-Morbidity Symptoms are Monitored and Maintained or Improved:   Monitor and assess patient's chronic conditions and comorbid symptoms for stability, deterioration, or improvement   Collaborate with multidisciplinary team to address chronic and comorbid conditions and prevent exacerbation or deterioration   Update acute care plan with appropriate goals if chronic or comorbid symptoms are exacerbated and prevent overall improvement and discharge     Problem: Skin/Tissue Integrity  Goal: Absence of new skin breakdown  Outcome: Progressing  Note: No signs of new skin breakdown with each assessment. Skin remains warm, dry, intact. Mucous membranes pink & moist. Patient is able to turn self.        Problem: Safety - Adult  Goal: Free from fall injury  Outcome: Progressing  Flowsheets (Taken 4/27/2024 1229)  Free From Fall Injury: Instruct family/caregiver on patient safety     Problem: ABCDS Injury Assessment  Goal: Absence of physical injury  Outcome: Progressing  Flowsheets (Taken 4/27/2024 1229)  Absence of Physical Injury: Implement safety measures based on patient  assessment     Problem: Pain  Goal: Verbalizes/displays adequate comfort level or baseline comfort level  Outcome: Progressing  Flowsheets (Taken 4/27/2024 1229)  Verbalizes/displays adequate comfort level or baseline comfort level:   Encourage patient to monitor pain and request assistance   Assess pain using appropriate pain scale   Administer analgesics based on type and severity of pain and evaluate response   Implement non-pharmacological measures as appropriate and evaluate response   Consider cultural and social influences on pain and pain management   Notify Licensed Independent Practitioner if interventions unsuccessful or patient reports new pain     Care plan reviewed with patient and family.  Patient and family verbalize understanding of the plan of care and contribute to goal setting.

## 2024-04-27 NOTE — CONSULTS
[facility]  [facility]  [address] [address]  Dept: [phone]  Loc: [phone]  Visit Date: 4/25/2024    Urology Consult Note    Reason for Consult:  Concern for colovesicular fistula  Requesting Physician:  Dr. David     History Obtained From:  patient, EMR    Chief Complaint: Abdominal pain, dysuria     HISTORY OF PRESENT ILLNESS:      Ms. Rankin is an 89-year-old female that presented to [facility] on 4/25/2025 for the evaluation of chronic abdominal pain and dysuria.  Abdominal pain most predominant around the umbilicus and left lower quadrant.  CT imaging obtained and remarkable for small bilateral renal cysts. It also demonstrated air density within the urinary bladder as well as asymmetric bladder wall thickening along the left lateral dome of the urinary bladder which also contains air density suspicious for colovesicular fistula at this location. Mural wall thickening is noted in the sigmoid colon and there is pericolonic fat stranding and inflammation in this area with differential considerations including diverticulitis with colovesicular fistula versus colonic neoplasm with   colovesicular fistula. Denies dysuria, urine in the stool, and stool in the urine.      The patient is known to [facility].  She is currently taking Myrbetriq for management of her urge urinary incontinence.  Cystoscopy in August 2023 was unremarkable.    Medical history notable for diverticulitis, HTN, chronic macrocytic anemia, hypothyroidism, chronic sacral decubitus ulcer, hx of IBS, and physical deconditioning.     Past Medical History:        Diagnosis Date    HERIBERTO (acute kidney injury) (HCC)     Anxiety     Arthritis     Bronchitis     CAD (coronary artery disease)     CHF (congestive heart failure) ([facility])     Chronic a-fib (HCC)     COVID     October 2021    Diverticulitis of colon     Hyperkalemia     Hypertension     Medtronic cobalt dual ICD  02/15/2023    LIMITATIONS: Evaluation of the solid organs is limited without IV contrast.     Lung bases: Dependent atelectasis is present. Cardiomegaly is observed. A small pericardial effusion is identified.     Liver/gallbladder/bilary tree: The gallbladder is absent. Hepatomegaly and hepatic steatosis are observed. Biliary ductal dilatation previously identified is difficult to appreciate without IV contrast. Pancreas: Unremarkable noncontrast appearance.  Spleen : Stable noncontrast appearance.  Adrenal glands: Unremarkable noncontrast appearance.     Kidneys/ ureters/ bladder: Simple bilateral renal cysts are stable accounting for lack of IV contrast. No hydronephrosis or hydroureter is visualized. Air density is noted within the urinary bladder and there is asymmetric bladder wall thickening near   the left dome of the urinary bladder which measures up to 25 mm in thickness (series 601, image 32 and series 301, image 72).      Gastrointestinal:  The sigmoid colon is intimately associated with the dome of the urinary bladder where there is asymmetric bladder wall thickening (series 301, image 72). No oral contrast material is still within the cecum and has not yet migrated   distally to the sigmoid colon. Colonic diverticulosis is noted within the sigmoid colon and there is mural wall thickening within the sigmoid colon at that location. Mild pericolonic fat stranding is observed. No bowel obstruction is identified. No free   fluid, fluid collection, or free air is observed.     Retroperitoneum / lymph nodes: The aorta is not dilated. No lymphadenopathy is visualized accounting for lack of IV contrast.     Pelvis: No adnexal lesions are identified.     Musculoskeletal: Multilevel degenerative disc disease is observed. The visualized skeletal structures appear intact.        IMPRESSION:  1. Once again there is air density within the urinary bladder as well as asymmetric bladder wall thickening along the left lateral dome

## 2024-04-27 NOTE — SIGNIFICANT EVENT
Attending Dr. Allen, Dr Aguilar, and I spoke with the patient extensively about current disease process and options available to her.  Daughter was at bedside during the entire conversation.  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  Discussed the potential treatment options of surgery for most definitive treatment versus medical management and likely chronic/lifelong antibiotic therapy to prevent significant numbers of UTI/sepsis.  Bright and daughter expressed concern about antibiotics as she has had C. difficile multiple times in the past after antibiotic use.  Is also worried about surgery and if her heart could handle that procedure.  Will plan on optimizing fluid status to ensure the least amount of stress on heart as possible, currently not in a decompensated/acute exacerbated state at this point.  Discussed the risks of bleeding, infection, stroke, major cardiovascular event that could occur perioperatively.    At this point, patient would like to move forward with surgery at the appropriate time.  Will need washout period of Eliquis and Brilinta, patient hemodynamically stable and no need for emergent surgery at this point.  Continuing with antibiotics and optimizing fluid status.  Can give full cardiac risk assessment in the coming days.    Electronically Signed by  Luis David DO, MBA  PGY-1 Internal Medicine Resident  [facility]  On 4/27/2024 at 2:32 PM

## 2024-04-27 NOTE — PROGRESS NOTES
[facility]   Pharmacy Pharmacokinetic Monitoring Service - Vancomycin    Indication: Intra-abdominal infection   Target Concentration: Dosing based on anticipated concentration < 15 mg/L due to renal impairment/insufficiency  Day of Therapy: 2  Additional Antimicrobials: Zosyn    Pertinent Laboratory Values:   Wt Readings from Last 1 Encounters:   04/25/24 49 kg (108 lb)     Temp Readings from Last 1 Encounters:   04/27/24 97.3 °F (36.3 °C) (Oral)     Estimated Creatinine Clearance: 18 mL/min (A) (based on SCr of 1.5 mg/dL (H)).  Recent Labs     04/26/24  0737 04/27/24  0650   CREATININE 1.5* 1.5*   BUN 51* 36*   WBC 14.6* 9.7     Pertinent Cultures:  Date Source Results   4/26/24 BC X 4 NG    MRSA Nasal Swab: N/A. Non-respiratory infection.    Recent vancomycin administrations                     vancomycin (VANCOCIN) 1250 mg in sodium chloride 0.9% 250 mL IVPB (mg) 1,250 mg New Bag 04/26/24 1300                    Assessment:  Date/Time Current Dose Concentration Timing of Concentration (h)   4/27/2024 1130 1250 mg X 1 4/26 1300 9.4 22 h 30 m   Note: Serum concentrations collected for AUC dosing may appear elevated if collected in close proximity to the dose administered, this is not necessarily an indication of toxicity    Plan:  sCr stable and near baseline sCr ~ 1.3  Vancomycin 1000 mg IV X 1   Repeat vancomycin concentration ordered for 4/28/24 @ 1400   Pharmacy will continue to monitor patient and adjust therapy as indicated    Thank you for the consult,  Noris Nathan PharmD 4/27/2024 1:05 PM

## 2024-04-27 NOTE — PROGRESS NOTES
states that when her urine was tested they did not find any evidence of infection.  She states her abdominal pain comes and goes and stays mainly around her umbilicus and left lower quadrants.  She has extensive history of diverticulitis.  Upon arrival patient was noted to be afebrile. VSS.  Laboratory workup was largely noncontributory.  Creatinine noted to be elevated above baseline at 1.7.  Chronic macrocytic anemia was appreciated.  Slight leukocytosis with a WBC of 11.9.  LFTs WNL.  UA showed moderate blood with large LE, no bacteria.  CT abdomen pelvis without contrast demonstrated wall thickening the bladder with perivesicular stranding consistent with history of cystitis.  There is evidence of chronic/recurrent diverticulitis.  Air was seen in the anterior superior aspect of the bladder and may be outside of the bladder.  Air present within the bladder.  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  Recommending repeat examination with IV/oral contrast.  Findings may also be secondary to recent instrumentation/emphysematous cystitis.  Patient was given IV fluids as well as IV antibiotics.  She endorses abdominal pain and pain on the sacral wound.  She denies fever, headache, chills, chest pain, shortness of breath.  Patient  was admitted to hospital service for further evaluation management.  For additional information please see assessment plan above. \"    Subjective (past 24 hours):   Seen and evaluated bedside.  Clinical course pending repeat CT AP with rectal contrast today.  Urology and general surgery to evaluate.  Continue with antibiotics.  All questions answered.      Past medical history, family history, social history and allergies reviewed again and is unchanged since admission.    ROS (12 point review of systems completed.  Pertinent positives noted. Otherwise ROS is negative)     Medications:  Reviewed    Infusion Medications    sodium chloride      sodium chloride 75 mL/hr at 04/26/24 1458    heparin    All CTs at this facility use dose modulation techniques and iterative    reconstructions, and/or weight-based dosing   when appropriate to reduce radiation to a low as reasonably achievable.      CT ABDOMEN PELVIS WO CONTRAST Additional Contrast? Rectal    (Results Pending)     CT ABDOMEN PELVIS WO CONTRAST Additional Contrast? Oral    Result Date: 4/26/2024  PROCEDURE: CT ABDOMEN PELVIS WO CONTRAST CLINICAL INFORMATION: Possible colovesicular fistula. COMPARISON: 4/25/2024. TECHNIQUE: 5 mm axial imaging through the abdomen and pelvis without IV contrast.  Coronal and sagittal reconstructions were performed. All CT scans at this facility use dose modulation, iterative reconstruction, and/or weight based dosing when appropriate to reduce the radiation dose to as low as reasonably achievable. FINDINGS: LIMITATIONS: Evaluation of the solid organs is limited without IV contrast. Lung bases: Dependent atelectasis is present. Cardiomegaly is observed. A small pericardial effusion is identified. Liver/gallbladder/bilary tree: The gallbladder is absent. Hepatomegaly and hepatic steatosis are observed. Biliary ductal dilatation previously identified is difficult to appreciate without IV contrast. Pancreas: Unremarkable noncontrast appearance. Spleen : Stable noncontrast appearance. Adrenal glands: Unremarkable noncontrast appearance. Kidneys/ ureters/ bladder: Simple bilateral renal cysts are stable accounting for lack of IV contrast. No hydronephrosis or hydroureter is visualized. Air density is noted within the urinary bladder and there is asymmetric bladder wall thickening near the left dome of the urinary bladder which measures up to 25 mm in thickness (series 601, image 32 and series 301, image 72). Gastrointestinal:  The sigmoid colon is intimately associated with the dome of the urinary bladder where there is asymmetric bladder wall thickening (series 301, image 72). No oral contrast material is still within the

## 2024-04-27 NOTE — CONSULTS
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  PLANS:   Further recommendations to be made pending CT with rectal contrast results.   Pt advanced age and poses increased risk of poor outcomes  If considered, would need to be a combined case with urology. Likely as an open procedure to minimize operative time and risk.   SUBJECTIVE:   History of Chief Complaint:    Kourtney is a 89 y.o.female who presents with abdominal pain and dysuria. She has been being treated for UTI's in the past and follows with urology. She had a cystoscopy last year with no issues. Pain is moderate aching in the bilateral lower quadrants. She denies any passage of stool in her urine. Does have a history of recurrent diverticulitis. Denies any fever, chills, constipation or diarrhea.  Past Medical History   has a past medical history of HERIBERTO (acute kidney injury) ([facility]), Anxiety, Arthritis, Bronchitis, CAD (coronary artery disease), CHF (congestive heart failure) (HCC), Chronic a-fib ([facility]), COVID, Diverticulitis of colon, Hyperkalemia, Hypertension, Medtronic cobalt dual ICD , Neuromuscular dysfunction of bladder, Personal history of COVID-19, Pressure ulcer, and Protein-calorie malnutrition ([facility]).  Past Surgical History   has a past surgical history that includes Appendectomy; Cholecystectomy; Gastrostomy tube placement (N/A, 11/17/2021); Pressure ulcer debridement (N/A, 12/23/2021);  also be secondary to    recent instrumentation or emphysematous cystitis.      This document has been electronically signed by: Donna Gutierrez MD    on 04/26/2024 12:56 AM      All CTs at this facility use dose modulation techniques and iterative    reconstructions, and/or weight-based dosing   when appropriate to reduce radiation to a low as reasonably achievable.      CT ABDOMEN PELVIS WO CONTRAST Additional Contrast? Rectal    (Results Pending)       Thank you for the interesting evaluation. Further recommendations to follow.    Electronically signed by Scar Rodriguez DO on 4/27/2024 at 9:55 AM

## 2024-04-28 LAB
ANION GAP SERPL CALC-SCNC: 21 MEQ/L (ref 8–16)
APTT PPP: 57.7 SECONDS (ref 22–38)
APTT PPP: 60.1 SECONDS (ref 22–38)
APTT PPP: 97.8 SECONDS (ref 22–38)
APTT PPP: > 200 SECONDS (ref 22–38)
BUN SERPL-MCNC: 29 MG/DL (ref 7–22)
CALCIUM SERPL-MCNC: 8.5 MG/DL (ref 8.5–10.5)
CHLORIDE SERPL-SCNC: 110 MEQ/L (ref 98–111)
CO2 SERPL-SCNC: 14 MEQ/L (ref 23–33)
CREAT SERPL-MCNC: 1.5 MG/DL (ref 0.4–1.2)
DEPRECATED RDW RBC AUTO: 57.6 FL (ref 35–45)
ERYTHROCYTE [DISTWIDTH] IN BLOOD BY AUTOMATED COUNT: 14.7 % (ref 11.5–14.5)
GFR SERPL CREATININE-BSD FRML MDRD: 33 ML/MIN/1.73M2
GLUCOSE SERPL-MCNC: 70 MG/DL (ref 70–108)
HCT VFR BLD AUTO: 28.9 % (ref 37–47)
HGB BLD-MCNC: 8.8 GM/DL (ref 12–16)
MCH RBC QN AUTO: 32.6 PG (ref 26–33)
MCHC RBC AUTO-ENTMCNC: 30.4 GM/DL (ref 32.2–35.5)
MCV RBC AUTO: 107 FL (ref 81–99)
PLATELET # BLD AUTO: 129 THOU/MM3 (ref 130–400)
PMV BLD AUTO: 12.4 FL (ref 9.4–12.4)
POTASSIUM SERPL-SCNC: 4.4 MEQ/L (ref 3.5–5.2)
RBC # BLD AUTO: 2.7 MILL/MM3 (ref 4.2–5.4)
SODIUM SERPL-SCNC: 145 MEQ/L (ref 135–145)
VANCOMYCIN SERPL-MCNC: 10.7 UG/ML (ref 0.1–39.9)
WBC # BLD AUTO: 4.9 THOU/MM3 (ref 4.8–10.8)

## 2024-04-28 PROCEDURE — 97116 GAIT TRAINING THERAPY: CPT

## 2024-04-28 PROCEDURE — 6360000002 HC RX W HCPCS: Performed by: HOSPITALIST

## 2024-04-28 PROCEDURE — 80202 ASSAY OF VANCOMYCIN: CPT

## 2024-04-28 PROCEDURE — 97110 THERAPEUTIC EXERCISES: CPT

## 2024-04-28 PROCEDURE — 36415 COLL VENOUS BLD VENIPUNCTURE: CPT

## 2024-04-28 PROCEDURE — 97162 PT EVAL MOD COMPLEX 30 MIN: CPT

## 2024-04-28 PROCEDURE — 99233 SBSQ HOSP IP/OBS HIGH 50: CPT | Performed by: HOSPITALIST

## 2024-04-28 PROCEDURE — 99231 SBSQ HOSP IP/OBS SF/LOW 25: CPT | Performed by: NURSE PRACTITIONER

## 2024-04-28 PROCEDURE — 2580000003 HC RX 258

## 2024-04-28 PROCEDURE — 99231 SBSQ HOSP IP/OBS SF/LOW 25: CPT | Performed by: SURGERY

## 2024-04-28 PROCEDURE — 97166 OT EVAL MOD COMPLEX 45 MIN: CPT

## 2024-04-28 PROCEDURE — 6370000000 HC RX 637 (ALT 250 FOR IP): Performed by: STUDENT IN AN ORGANIZED HEALTH CARE EDUCATION/TRAINING PROGRAM

## 2024-04-28 PROCEDURE — 1200000003 HC TELEMETRY R&B

## 2024-04-28 PROCEDURE — 6360000002 HC RX W HCPCS: Performed by: STUDENT IN AN ORGANIZED HEALTH CARE EDUCATION/TRAINING PROGRAM

## 2024-04-28 PROCEDURE — 85730 THROMBOPLASTIN TIME PARTIAL: CPT

## 2024-04-28 PROCEDURE — 97535 SELF CARE MNGMENT TRAINING: CPT

## 2024-04-28 PROCEDURE — 2580000003 HC RX 258: Performed by: STUDENT IN AN ORGANIZED HEALTH CARE EDUCATION/TRAINING PROGRAM

## 2024-04-28 PROCEDURE — 80048 BASIC METABOLIC PNL TOTAL CA: CPT

## 2024-04-28 PROCEDURE — 85027 COMPLETE CBC AUTOMATED: CPT

## 2024-04-28 RX ADMIN — HEPARIN SODIUM 13 UNITS/KG/HR: 10000 INJECTION, SOLUTION INTRAVENOUS at 06:41

## 2024-04-28 RX ADMIN — ACETAMINOPHEN 650 MG: 325 TABLET ORAL at 08:56

## 2024-04-28 RX ADMIN — TROSPIUM CHLORIDE 20 MG: 20 TABLET, FILM COATED ORAL at 21:05

## 2024-04-28 RX ADMIN — CARVEDILOL 3.12 MG: 3.12 TABLET, FILM COATED ORAL at 23:41

## 2024-04-28 RX ADMIN — PIPERACILLIN AND TAZOBACTAM 3375 MG: 3; .375 INJECTION, POWDER, LYOPHILIZED, FOR SOLUTION INTRAVENOUS at 02:39

## 2024-04-28 RX ADMIN — SODIUM CHLORIDE, PRESERVATIVE FREE 10 ML: 5 INJECTION INTRAVENOUS at 23:42

## 2024-04-28 RX ADMIN — ACETAMINOPHEN 650 MG: 325 TABLET ORAL at 02:43

## 2024-04-28 RX ADMIN — PANTOPRAZOLE SODIUM 40 MG: 40 TABLET, DELAYED RELEASE ORAL at 06:09

## 2024-04-28 RX ADMIN — Medication 1250 MG: at 17:22

## 2024-04-28 RX ADMIN — LEVOTHYROXINE SODIUM 62.5 MCG: 0.12 TABLET ORAL at 06:09

## 2024-04-28 RX ADMIN — CARVEDILOL 3.12 MG: 3.12 TABLET, FILM COATED ORAL at 08:53

## 2024-04-28 RX ADMIN — CETIRIZINE HYDROCHLORIDE 5 MG: 5 TABLET ORAL at 08:53

## 2024-04-28 RX ADMIN — PIPERACILLIN AND TAZOBACTAM 3375 MG: 3; .375 INJECTION, POWDER, LYOPHILIZED, FOR SOLUTION INTRAVENOUS at 14:27

## 2024-04-28 RX ADMIN — SODIUM CHLORIDE: 9 INJECTION, SOLUTION INTRAVENOUS at 03:37

## 2024-04-28 ASSESSMENT — PAIN DESCRIPTION - ORIENTATION
ORIENTATION: LOWER
ORIENTATION: LOWER

## 2024-04-28 ASSESSMENT — PAIN DESCRIPTION - LOCATION
LOCATION: ABDOMEN

## 2024-04-28 ASSESSMENT — PAIN SCALES - GENERAL
PAINLEVEL_OUTOF10: 4
PAINLEVEL_OUTOF10: 0
PAINLEVEL_OUTOF10: 9
PAINLEVEL_OUTOF10: 6
PAINLEVEL_OUTOF10: 4

## 2024-04-28 ASSESSMENT — PAIN DESCRIPTION - DESCRIPTORS
DESCRIPTORS: DISCOMFORT
DESCRIPTORS: STABBING

## 2024-04-28 ASSESSMENT — PAIN DESCRIPTION - PAIN TYPE: TYPE: CHRONIC PAIN

## 2024-04-28 NOTE — PROGRESS NOTES
[facility]  INPATIENT PHYSICAL THERAPY  EVALUATION  [facility] 4A - 4A-16/016-A    Time In: 906  Time Out: 09  Timed Code Treatment Minutes: 24 Minutes  Minutes: 43          Date: 2024  Patient Name: Kourtney Rankin,  Gender:  female        MRN: [id number]  : 1934  (89 y.o.)      Referring Practitioner: Prieto Hernandez DO  Diagnosis: diverticulitis of colon  Additional Pertinent Hx: Per chart review: Kourtney Rankin is a 89 y.o. female with past medical history described below who presents to [facility] with complaints of chronic abdominal pain and dysuria.  Patient states she has had this problem for quite a while however the last few weeks its gotten worse.  She states that when she urinates it burns.  She was evaluated by her family doctor and was given a cream.  She says that the cream does not really help.  She states that when her urine was tested they did not find any evidence of infection.  She states her abdominal pain comes and goes and stays mainly around her umbilicus and left lower quadrants.  She has extensive history of diverticulitis.  Upon arrival patient was noted to be afebrile. VSS.  Laboratory workup was largely noncontributory.  Creatinine noted to be elevated above baseline at 1.7.  Chronic macrocytic anemia was appreciated.  Slight leukocytosis with a WBC of 11.9.  LFTs WNL.  UA showed moderate blood with large LE, no bacteria.  CT abdomen pelvis without contrast demonstrated wall thickening the bladder with perivesicular stranding consistent with history of cystitis.  There is evidence of chronic/recurrent diverticulitis.  Air was seen in the anterior superior aspect of the bladder and may be outside of the bladder.  Air present within the bladder.  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  Recommending repeat examination with IV/oral contrast.  Findings may also be secondary to recent instrumentation/emphysematous cystitis.  Patient was

## 2024-04-28 NOTE — PROGRESS NOTES
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  PLAN   Will need to coordinate with urology for a combined procedure  Will need bowel prep prior - initiation to depend on available timing.   SUBJECTIVE   Kourtney is doing fair. She denies any nausea or vomiting, has not passed flatus not had a bowel movement. She is tolerating a ADULT DIET; Regular; Low Sodium (2 gm). Her pain is well controlled on current medications. She has been ambulating in the halls.   CURRENT MEDICATIONS   Scheduled Meds   [Held by provider] ticagrelor  90 mg Oral BID    [Held by provider] apixaban  2.5 mg Oral BID    carvedilol  3.125 mg Oral BID    cetirizine  5 mg Oral Daily    levothyroxine  62.5 mcg Oral QAM AC    trospium  20 mg Oral Nightly    pantoprazole  40 mg Oral QAM AC    sodium chloride flush  10 mL IntraVENous 2 times per day    [Held by provider] sacubitril-valsartan  1 tablet Oral BID    piperacillin-tazobactam  3,375 mg IntraVENous Q12H    vancomycin (VANCOCIN) intermittent dosing (placeholder)   Other RX Placeholder     Continuous Infusions:   sodium chloride      heparin (PORCINE) Infusion 13 Units/kg/hr (04/28/24 0641)     PRN Meds:.sodium chloride flush, sodium chloride, ondansetron **OR** ondansetron, polyethylene glycol, acetaminophen **OR** acetaminophen,  stranding is consistent    with the given history of cystitis. There is evidence chronic/recurrent    diverticulitis. Air is seen at the anterior superior aspect of the bladder    and may be outside of the bladder. Air is also present within the bladder.    This is concerning for a colovesical fistula. An examination with IV and    oral contrast may be helpful. Air in the bladder may also be secondary to    recent instrumentation or emphysematous cystitis.      This document has been electronically signed by: Donna Gutierrez MD    on 04/26/2024 12:56 AM      All CTs at this facility use dose modulation techniques and iterative    reconstructions, and/or weight-based dosing   when appropriate to reduce radiation to a low as reasonably achievable.            Electronically signed by Scar Rodriguez DO on 4/28/2024 at 10:14 AM

## 2024-04-28 NOTE — PROGRESS NOTES
[facility]  INPATIENT OCCUPATIONAL THERAPY  [facility] 4A  EVALUATION    Time:   Time In: 1443  Time Out: 1516  Timed Code Treatment Minutes: 23 Minutes  Minutes: 33          Date: 2024  Patient Name: Kourtney Rankin,   Gender: female      MRN: [id number]  : 1934  (89 y.o.)  Referring Practitioner: Prieto Hernandez DO  Diagnosis: colovesical fistula  Additional Pertinent Hx: Kourtney Rankin is a 89 y.o. female with past medical history described below who presents to [facility] with complaints of chronic abdominal pain and dysuria.  Patient states she has had this problem for quite a while however the last few weeks its gotten worse.  She states that when she urinates it burns.  She was evaluated by her family doctor and was given a cream.  She says that the cream does not really help.  She states that when her urine was tested they did not find any evidence of infection.  She states her abdominal pain comes and goes and stays mainly around her umbilicus and left lower quadrants.  She has extensive history of diverticulitis.  Upon arrival patient was noted to be afebrile. VSS.  Laboratory workup was largely noncontributory.  Creatinine noted to be elevated above baseline at 1.7.  Chronic macrocytic anemia was appreciated.  Slight leukocytosis with a WBC of 11.9.  LFTs WNL.  UA showed moderate blood with large LE, no bacteria.  CT abdomen pelvis without contrast demonstrated wall thickening the bladder with perivesicular stranding consistent with history of cystitis.  There is evidence of chronic/recurrent diverticulitis.  Air was seen in the anterior superior aspect of the bladder and may be outside of the bladder.  Air present within the bladder.  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  Findings may also be secondary to recent instrumentation/emphysematous cystitis.    Restrictions/Precautions:  Restrictions/Precautions: Contact Precautions, General Precautions, Fall

## 2024-04-28 NOTE — PROGRESS NOTES
aPTT>200 at this time. Pharmacist called for recommendations for Heparin gtt. Per recommendations, heparin gtt paused and stat redraw of aPTT ordered.

## 2024-04-28 NOTE — PROGRESS NOTES
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 Recent history of significant dysuria, UA per patient was negative outpatient and not started on antibiotics. Symptoms worsened and pt developed worsening suprapubic/left sided abdominal pain. Denying diarrhea, blood in stool, fecal material in urine, blood in urine. CTAP w/o contrast: Wall thickening of bladder, perivesicular stranding, evidence of chronic/recurrent diverticulitis, air seen at anterior superior aspect of bladder and may be outside bladder, air present within bladder, concerning for colovesical fistula. UA here +ve moderate blood, large LE. Repeat CT w/ oral contrast --> did not reach sigmoid colon.  General surgery, Dr. Rodriguez, and urology Dr. Valdes, consulted.  Patient is leaning towards surgical intervention at this time.  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    Plan  - pain well controlled at this time, adjust as needed analgesics as needed  - General surgery and Urology following, tentative plan for surgical intervention after washout of Eliquis and Brilinta.  - Continue Zosyn + vancomycin  - Daily BMP/CBC     #Ischemic Cardiomyopathy, EF 25% on 7/2023, not in exacerbation: Echo 7/2023 shows severe global hypokinesis of LV, systolic function severely reduced, LAD.  History of PCI to LM/LAD, s/p Impella supported complex PCI 6/2022.  GDMT: Entresto, Coreg.  Not requiring O2 at this time.  Plan  -Continue Coreg, hold Entresto in setting of HERIBERTO and

## 2024-04-28 NOTE — PROGRESS NOTES
Urology Progress Note      Urologic Impression/Plan:  Probable colovesical fistula- CT with rectal contrast 24 with 2 areas concerning for colovesicular fistula. Surgical repair will need to be combined case with Gen surgery.      Pt denies new symptoms.  Continue antibiotics  Coordination of surgical timing    Chief Complaint: Abdominal pain, dysuria  Reason for consultation:  Concern for colovesicular fistula    Subjective:   Pt sitting up in bedside chair.  Denies new complaints.  Having pain in abdomen with urination.  Urinating frequently.  Denies visible debris in urine.     Diet:  ADULT DIET; Regular; Low Sodium (2 gm)   Activity:  Up with assistance         Vitals:  BP (!) 132/58   Pulse 80   Temp 97.3 °F (36.3 °C) (Oral)   Resp 16   Ht 1.524 m (5')   Wt 55.9 kg (123 lb 3.8 oz)   SpO2 100%   Breastfeeding No   BMI 24.07 kg/m²   Temp  Av.6 °F (36.4 °C)  Min: 97.3 °F (36.3 °C)  Max: 97.9 °F (36.6 °C)    Intake/Output Summary (Last 24 hours) at 2024 1052  Last data filed at 2024 0614  Gross per 24 hour   Intake 3124.33 ml   Output 1050 ml   Net 2074.33 ml       Social History     Socioeconomic History    Marital status:      Spouse name: Jose    Number of children: 5    Years of education: Not on file    Highest education level: Not on file   Occupational History    Not on file   Tobacco Use    Smoking status: Former     Current packs/day: 0.00     Average packs/day: 1 pack/day for 15.0 years (15.0 ttl pk-yrs)     Types: Cigarettes     Start date: 12/10/1962     Quit date: 12/10/1977     Years since quittin.4    Smokeless tobacco: Never   Vaping Use    Vaping Use: Never used   Substance and Sexual Activity    Alcohol use: No    Drug use: No    Sexual activity: Not Currently   Other Topics Concern    Not on file   Social History Narrative    Not on file     Social Determinants of Health     Financial Resource Strain: Not on file   Food Insecurity: No Food Insecurity

## 2024-04-29 PROBLEM — E44.0 MODERATE MALNUTRITION (HCC): Status: ACTIVE | Noted: 2021-12-21

## 2024-04-29 LAB
ANION GAP SERPL CALC-SCNC: 13 MEQ/L (ref 8–16)
APTT PPP: 54.2 SECONDS (ref 22–38)
APTT PPP: 73.5 SECONDS (ref 22–38)
APTT PPP: > 200 SECONDS (ref 22–38)
BUN SERPL-MCNC: 24 MG/DL (ref 7–22)
CALCIUM SERPL-MCNC: 8.8 MG/DL (ref 8.5–10.5)
CHLORIDE SERPL-SCNC: 108 MEQ/L (ref 98–111)
CO2 SERPL-SCNC: 19 MEQ/L (ref 23–33)
CREAT SERPL-MCNC: 1.5 MG/DL (ref 0.4–1.2)
DEPRECATED RDW RBC AUTO: 56.6 FL (ref 35–45)
ERYTHROCYTE [DISTWIDTH] IN BLOOD BY AUTOMATED COUNT: 14.6 % (ref 11.5–14.5)
GFR SERPL CREATININE-BSD FRML MDRD: 33 ML/MIN/1.73M2
GLUCOSE SERPL-MCNC: 89 MG/DL (ref 70–108)
HCT VFR BLD AUTO: 27.4 % (ref 37–47)
HEPARIN UNFRACTIONATED: 0.46 U/ML (ref 0.3–0.7)
HEPARIN UNFRACTIONATED: 0.49 U/ML (ref 0.3–0.7)
HGB BLD-MCNC: 8.5 GM/DL (ref 12–16)
MCH RBC QN AUTO: 32.8 PG (ref 26–33)
MCHC RBC AUTO-ENTMCNC: 31 GM/DL (ref 32.2–35.5)
MCV RBC AUTO: 105.8 FL (ref 81–99)
PLATELET # BLD AUTO: 136 THOU/MM3 (ref 130–400)
PMV BLD AUTO: 12.3 FL (ref 9.4–12.4)
POTASSIUM SERPL-SCNC: 3.8 MEQ/L (ref 3.5–5.2)
RBC # BLD AUTO: 2.59 MILL/MM3 (ref 4.2–5.4)
SODIUM SERPL-SCNC: 140 MEQ/L (ref 135–145)
WBC # BLD AUTO: 5.3 THOU/MM3 (ref 4.8–10.8)

## 2024-04-29 PROCEDURE — 1200000003 HC TELEMETRY R&B

## 2024-04-29 PROCEDURE — 2580000003 HC RX 258

## 2024-04-29 PROCEDURE — 6360000002 HC RX W HCPCS

## 2024-04-29 PROCEDURE — 6360000002 HC RX W HCPCS: Performed by: STUDENT IN AN ORGANIZED HEALTH CARE EDUCATION/TRAINING PROGRAM

## 2024-04-29 PROCEDURE — 99232 SBSQ HOSP IP/OBS MODERATE 35: CPT | Performed by: HOSPITALIST

## 2024-04-29 PROCEDURE — 85520 HEPARIN ASSAY: CPT

## 2024-04-29 PROCEDURE — 99231 SBSQ HOSP IP/OBS SF/LOW 25: CPT | Performed by: SURGERY

## 2024-04-29 PROCEDURE — 2580000003 HC RX 258: Performed by: STUDENT IN AN ORGANIZED HEALTH CARE EDUCATION/TRAINING PROGRAM

## 2024-04-29 PROCEDURE — 85730 THROMBOPLASTIN TIME PARTIAL: CPT

## 2024-04-29 PROCEDURE — 97116 GAIT TRAINING THERAPY: CPT

## 2024-04-29 PROCEDURE — 97530 THERAPEUTIC ACTIVITIES: CPT

## 2024-04-29 PROCEDURE — 97110 THERAPEUTIC EXERCISES: CPT

## 2024-04-29 PROCEDURE — 85027 COMPLETE CBC AUTOMATED: CPT

## 2024-04-29 PROCEDURE — 6370000000 HC RX 637 (ALT 250 FOR IP): Performed by: STUDENT IN AN ORGANIZED HEALTH CARE EDUCATION/TRAINING PROGRAM

## 2024-04-29 PROCEDURE — 80048 BASIC METABOLIC PNL TOTAL CA: CPT

## 2024-04-29 PROCEDURE — 36415 COLL VENOUS BLD VENIPUNCTURE: CPT

## 2024-04-29 RX ORDER — HEPARIN SODIUM 1000 [USP'U]/ML
60 INJECTION, SOLUTION INTRAVENOUS; SUBCUTANEOUS PRN
Status: DISCONTINUED | OUTPATIENT
Start: 2024-04-29 | End: 2024-05-02

## 2024-04-29 RX ORDER — HEPARIN SODIUM 1000 [USP'U]/ML
30 INJECTION, SOLUTION INTRAVENOUS; SUBCUTANEOUS PRN
Status: DISCONTINUED | OUTPATIENT
Start: 2024-04-29 | End: 2024-05-02

## 2024-04-29 RX ADMIN — TROSPIUM CHLORIDE 20 MG: 20 TABLET, FILM COATED ORAL at 19:48

## 2024-04-29 RX ADMIN — PIPERACILLIN AND TAZOBACTAM 3375 MG: 3; .375 INJECTION, POWDER, LYOPHILIZED, FOR SOLUTION INTRAVENOUS at 20:13

## 2024-04-29 RX ADMIN — CETIRIZINE HYDROCHLORIDE 5 MG: 5 TABLET ORAL at 08:40

## 2024-04-29 RX ADMIN — PANTOPRAZOLE SODIUM 40 MG: 40 TABLET, DELAYED RELEASE ORAL at 03:12

## 2024-04-29 RX ADMIN — SODIUM CHLORIDE, PRESERVATIVE FREE 10 ML: 5 INJECTION INTRAVENOUS at 19:48

## 2024-04-29 RX ADMIN — LEVOTHYROXINE SODIUM 62.5 MCG: 0.12 TABLET ORAL at 03:12

## 2024-04-29 RX ADMIN — ACETAMINOPHEN 650 MG: 325 TABLET ORAL at 20:06

## 2024-04-29 RX ADMIN — PIPERACILLIN AND TAZOBACTAM 3375 MG: 3; .375 INJECTION, POWDER, LYOPHILIZED, FOR SOLUTION INTRAVENOUS at 03:10

## 2024-04-29 RX ADMIN — CARVEDILOL 3.12 MG: 3.12 TABLET, FILM COATED ORAL at 19:48

## 2024-04-29 RX ADMIN — HEPARIN SODIUM 15 UNITS/KG/HR: 10000 INJECTION, SOLUTION INTRAVENOUS at 15:46

## 2024-04-29 RX ADMIN — CARVEDILOL 3.12 MG: 3.12 TABLET, FILM COATED ORAL at 08:40

## 2024-04-29 RX ADMIN — VANCOMYCIN HYDROCHLORIDE 1250 MG: 5 INJECTION, POWDER, LYOPHILIZED, FOR SOLUTION INTRAVENOUS at 17:31

## 2024-04-29 ASSESSMENT — PAIN SCALES - GENERAL
PAINLEVEL_OUTOF10: 0
PAINLEVEL_OUTOF10: 4
PAINLEVEL_OUTOF10: 3
PAINLEVEL_OUTOF10: 2
PAINLEVEL_OUTOF10: 0
PAINLEVEL_OUTOF10: 0
PAINLEVEL_OUTOF10: 3
PAINLEVEL_OUTOF10: 0

## 2024-04-29 ASSESSMENT — PAIN DESCRIPTION - DESCRIPTORS
DESCRIPTORS: DISCOMFORT
DESCRIPTORS: DISCOMFORT

## 2024-04-29 ASSESSMENT — PAIN SCALES - WONG BAKER
WONGBAKER_NUMERICALRESPONSE: NO HURT

## 2024-04-29 ASSESSMENT — PAIN DESCRIPTION - LOCATION
LOCATION: ABDOMEN
LOCATION: ABDOMEN

## 2024-04-29 ASSESSMENT — PAIN DESCRIPTION - ORIENTATION
ORIENTATION: LOWER
ORIENTATION: LOWER

## 2024-04-29 ASSESSMENT — PAIN DESCRIPTION - PAIN TYPE: TYPE: CHRONIC PAIN

## 2024-04-29 ASSESSMENT — PAIN DESCRIPTION - FREQUENCY: FREQUENCY: INTERMITTENT

## 2024-04-29 ASSESSMENT — PAIN DESCRIPTION - ONSET: ONSET: ON-GOING

## 2024-04-29 NOTE — PLAN OF CARE
Problem: Discharge Planning  Goal: Discharge to home or other facility with appropriate resources  4/29/2024 1253 by Mildred Bland RN  Outcome: Progressing  Flowsheets (Taken 4/29/2024 1253)  Discharge to home or other facility with appropriate resources: Identify barriers to discharge with patient and caregiver     Problem: Chronic Conditions and Co-morbidities  Goal: Patient's chronic conditions and co-morbidity symptoms are monitored and maintained or improved  4/29/2024 1253 by Mildred Bland RN  Outcome: Progressing  Flowsheets (Taken 4/29/2024 1253)  Care Plan - Patient's Chronic Conditions and Co-Morbidity Symptoms are Monitored and Maintained or Improved: Monitor and assess patient's chronic conditions and comorbid symptoms for stability, deterioration, or improvement     Problem: Skin/Tissue Integrity  Goal: Absence of new skin breakdown  Description: 1.  Monitor for areas of redness and/or skin breakdown  2.  Assess vascular access sites hourly  3.  Every 4-6 hours minimum:  Change oxygen saturation probe site  4.  Every 4-6 hours:  If on nasal continuous positive airway pressure, respiratory therapy assess nares and determine need for appliance change or resting period.  4/29/2024 1253 by Mildred Bland RN  Outcome: Progressing     Problem: Safety - Adult  Goal: Free from fall injury  4/29/2024 1253 by Mildred Bland RN  Outcome: Progressing  Flowsheets (Taken 4/29/2024 1253)  Free From Fall Injury: Instruct family/caregiver on patient safety     Problem: ABCDS Injury Assessment  Goal: Absence of physical injury  4/29/2024 1253 by Mildred Bland RN  Outcome: Progressing  Flowsheets (Taken 4/29/2024 1253)  Absence of Physical Injury: Implement safety measures based on patient assessment     Problem: Pain  Goal: Verbalizes/displays adequate comfort level or baseline comfort level  4/29/2024 1253 by Mildred Bland RN  Outcome: Progressing  Flowsheets (Taken 4/29/2024 0836)  Verbalizes/displays adequate

## 2024-04-29 NOTE — CARE COORDINATION
4/29/24, 1:19 PM EDT    DISCHARGE PLANNING EVALUATION    Met with Kourtney today.  Verified her plan is to go home with her daughter and current [facility]. She was agreeable to a Spiritual Care consult as she is very anxious about her upcoming surgery.

## 2024-04-29 NOTE — PLAN OF CARE
Problem: Discharge Planning  Goal: Discharge to home or other facility with appropriate resources  4/28/2024 2258 by Christy Gonzales, RN  Outcome: Progressing  Flowsheets (Taken 4/28/2024 2258)  Discharge to home or other facility with appropriate resources:   Identify barriers to discharge with patient and caregiver   Arrange for needed discharge resources and transportation as appropriate   Identify discharge learning needs (meds, wound care, etc)   Refer to discharge planning if patient needs post-hospital services based on physician order or complex needs related to functional status, cognitive ability or social support system     Problem: Chronic Conditions and Co-morbidities  Goal: Patient's chronic conditions and co-morbidity symptoms are monitored and maintained or improved  4/28/2024 2258 by Christy Gonzales, RN  Outcome: Progressing  Flowsheets (Taken 4/28/2024 2258)  Care Plan - Patient's Chronic Conditions and Co-Morbidity Symptoms are Monitored and Maintained or Improved:   Monitor and assess patient's chronic conditions and comorbid symptoms for stability, deterioration, or improvement   Collaborate with multidisciplinary team to address chronic and comorbid conditions and prevent exacerbation or deterioration   Update acute care plan with appropriate goals if chronic or comorbid symptoms are exacerbated and prevent overall improvement and discharge     Problem: Skin/Tissue Integrity  Goal: Absence of new skin breakdown  Description: 1.  Monitor for areas of redness and/or skin breakdown  2.  Assess vascular access sites hourly  3.  Every 4-6 hours minimum:  Change oxygen saturation probe site  4.  Every 4-6 hours:  If on nasal continuous positive airway pressure, respiratory therapy assess nares and determine need for appliance change or resting period.  4/28/2024 2258 by Christy Gonzales, RN  Outcome: Progressing     Problem: Safety - Adult  Goal: Free from fall injury  4/28/2024 2258 by  Christy Gonzales, RN  Outcome: Progressing  Flowsheets (Taken 4/28/2024 2258)  Free From Fall Injury: Instruct family/caregiver on patient safety     Problem: ABCDS Injury Assessment  Goal: Absence of physical injury  4/28/2024 2258 by Christy Gonzales, RN  Outcome: Progressing  Flowsheets (Taken 4/28/2024 2258)  Absence of Physical Injury: Implement safety measures based on patient assessment     Problem: Pain  Goal: Verbalizes/displays adequate comfort level or baseline comfort level  4/28/2024 2258 by Christy Gonzales, RN  Outcome: Progressing  Flowsheets (Taken 4/28/2024 2258)  Verbalizes/displays adequate comfort level or baseline comfort level:   Assess pain using appropriate pain scale   Encourage patient to monitor pain and request assistance   Administer analgesics based on type and severity of pain and evaluate response   Implement non-pharmacological measures as appropriate and evaluate response   Consider cultural and social influences on pain and pain management   Notify Licensed Independent Practitioner if interventions unsuccessful or patient reports new pain   Care plan reviewed with patient and family.  Patient and family verbalize understanding of the plan of care and contribute to goal setting.

## 2024-04-29 NOTE — PROGRESS NOTES
Vancomycin Day: 4  Current Regimen: 1250 mg IV x 1 dose    Patient's labs, cultures, vitals, and vancomycin regimen reviewed.   SCr stable  U/O adequate (> 0.5 to 1.0 mL/kg/hr)    Plan:   No change today  Level ordered for 4/30/24 @ 0400    Brad Meadows ([place]) , PharmD 4/29/2024 2:07 PM

## 2024-04-29 NOTE — PROGRESS NOTES
[facility]  INPATIENT PHYSICAL THERAPY  DAILY NOTE  [facility] 4A - 4A-16/016-A  Time In: 1309  Time Out: 1350  Timed Code Treatment Minutes: 41 Minutes  Minutes: 41          Date: 2024  Patient Name: Kourtney Rankin,  Gender:  female        MRN: [id number]  : 1934  (89 y.o.)     Referring Practitioner: Prieto Hernandez DO  Diagnosis: diverticulitis of colon  Additional Pertinent Hx: Per chart review: Kourtney Rankin is a 89 y.o. female with past medical history described below who presents to [facility] with complaints of chronic abdominal pain and dysuria.  Patient states she has had this problem for quite a while however the last few weeks its gotten worse.  She states that when she urinates it burns.  She was evaluated by her family doctor and was given a cream.  She says that the cream does not really help.  She states that when her urine was tested they did not find any evidence of infection.  She states her abdominal pain comes and goes and stays mainly around her umbilicus and left lower quadrants.  She has extensive history of diverticulitis.  Upon arrival patient was noted to be afebrile. VSS.  Laboratory workup was largely noncontributory.  Creatinine noted to be elevated above baseline at 1.7.  Chronic macrocytic anemia was appreciated.  Slight leukocytosis with a WBC of 11.9.  LFTs WNL.  UA showed moderate blood with large LE, no bacteria.  CT abdomen pelvis without contrast demonstrated wall thickening the bladder with perivesicular stranding consistent with history of cystitis.  There is evidence of chronic/recurrent diverticulitis.  Air was seen in the anterior superior aspect of the bladder and may be outside of the bladder.  Air present within the bladder.  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  Recommending repeat examination with IV/oral contrast.  Findings may also be secondary to recent instrumentation/emphysematous cystitis.  Patient was given

## 2024-04-29 NOTE — PROGRESS NOTES
04/29/24 1040   Encounter Summary   Encounter Overview/Reason Initial Encounter;Spiritual/Emotional Needs   Service Provided For Patient   Referral/Consult From Rounding   Support System Children   Last Encounter  04/29/24   Complexity of Encounter Moderate   Begin Time 1030   End Time  1040   Total Time Calculated 10 min   Spiritual/Emotional needs   Type Spiritual Support   Assessment/Intervention/Outcome   Assessment Coping   Intervention Nurtured Hope;Prayer (assurance of)/[place]   Outcome Comfort     Assessment:  In my encounter with the 89 yr old patient, while rounding  the unit 4A,  I provided spiritual care to patient through conversation, I also came to assess the patient's spiritual needs present. The pt was admitted due to colovesical fistula.     Interventions:  I provided prayer, emotional support and words of comfort.  provided a listening presence and encouraged pt to share their beliefs and how I can support them during their hospitalization.     Outcomes:  The patient was encouraged and didn't share any further spiritual needs at this time.     Plan:  Chaplains will follow-up at a later time for assessment of any spiritual care needs present.

## 2024-04-29 NOTE — PROGRESS NOTES
This RN spoke with patient's daughter Audrey regarding patient's status and plan of care. All questions answered at this time. Audrey encouraged to call unit with any other questions or concerns.

## 2024-04-29 NOTE — PROGRESS NOTES
04/29/24 1312   Encounter Summary   Encounter Overview/Reason Spiritual/Emotional Needs   Service Provided For Patient   Referral/Consult From Rounding   Support System Children   Last Encounter  04/29/24   Complexity of Encounter Moderate   Begin Time 1300   End Time  1312   Total Time Calculated 12 min   Spiritual/Emotional needs   Type Emotional Distress;Spiritual Support   Assessment/Intervention/Outcome   Assessment Coping   Intervention Nurtured Hope;Prayer (assurance of)/[place];Sustaining Presence/Ministry of presence;Active listening   Outcome Comfort;Engaged in conversation     Assessment:  In my encounter with the 89 yr old patient, while rounding the unit 4A,  I provided spiritual care to patient through conversation, I also came to assess the patient's spiritual needs present. The pt was admitted due to colovesical fistula.     Interventions:  I provided prayer, emotional support and words of comfort.  provided a listening presence and encouraged pt to share their beliefs and how I can support them during their hospitalization.     Outcomes:  The patient was encouraged and didn't share any further spiritual needs at this time.     Plan:  Chaplains will follow-up at a later time for assessment of any spiritual care needs present.   [place]

## 2024-04-29 NOTE — PROGRESS NOTES
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 Recent history of significant dysuria, UA per patient was negative outpatient and not started on antibiotics. Symptoms worsened and pt developed worsening suprapubic/left sided abdominal pain. Denying diarrhea, blood in stool, fecal material in urine, blood in urine. CTAP w/o contrast: Wall thickening of bladder, perivesicular stranding, evidence of chronic/recurrent diverticulitis, air seen at anterior superior aspect of bladder and may be outside bladder, air present within bladder, concerning for colovesical fistula. UA here +ve moderate blood, large LE. Repeat CT w/ oral contrast --> did not reach sigmoid colon.  General surgery, Dr. Rodriguez, and urology Dr. Valdes, consulted.  Patient is leaning towards surgical intervention at this time.  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    Plan  - pain well controlled at this time, adjust as needed analgesics as needed  - General surgery and Urology following, tentative plan for surgical intervention after washout of Eliquis and Brilinta.  - Continue Zosyn + vancomycin  - Daily BMP/CBC  -Cardiac Risk Assessment  -RCRI Score of 3 indicative of a Class IV Risk. This is indicative of a 15% 30-day risk of death, MI, or cardiac arrest.   -Patient is not symptomatically short of breath, no significant fluid overload or LE edema. She is not in an acute heart failure exacerbation and is euvolemic at this time.  -Will continue to  optimize fluid status and monitor vitals closely.     #Ischemic Cardiomyopathy, EF 25% on 7/2023, not in exacerbation: Echo 7/2023 shows severe global hypokinesis of LV, systolic function severely reduced, LAD.  History of PCI to LM/LAD, s/p Impella supported complex PCI 6/2022.  GDMT: Entresto, Coreg.  Not requiring O2 at this time.  Plan  -Continue Coreg, hold Entresto in setting of HERIBERTO and borderline BP  -Patient getting mild fluids, monitor fluid status closely  -Holding DAPT in setting of potential surgery     #Paroxysmal atrial fibrillation: [id number] 5.  Rate controlled Coreg.  OAC low-dose Eliquis. Currently NSR.  Plan  - Continue to hold Eliquis in setting of upcoming surgery, continue heparin gtt.  - Continue Coreg with hold parameters  - Continue telemetry  - Keep K>4, Mg >2     #CKD III, stable: Creatinine 1.7 on presentation.  Baseline creatinine 1.2-1.6.  BUN:Cr >20. Endorses poor oral intake. Urine studies pending, may be skewed since pt received fluids.   Creatitine stable at 1.5 today.   Plan  - Avoid nephrotoxic agents, renally dose medications if indicated  - Continue light fluids, monitor fluid status closely  - Daily BMP  - Follow urine studies, still pending    #Physical deconditioning: Continue PT/OT    Chronic  #Hypertension: Coreg with hold parameters  #Chronic macrocytic anemia: Hemoglobin 10.6, .1.  Baseline hemoglobin 8.3-11.6.  Stable, at baseline, continue to monitor with daily CBC.  #Hypothyroidism: Continue home Synthroid  #History of IBS: Noted, no longer on Amitiza  #S/p PEG tube removed: Noted in 2023  #Chronic sacral decubitus ulcer: Noted, follows with Dr. Aguirre outpatient  #Hx of frequent episodes of diverticulitis: Noted       LDA: []CVC / []PICC / []Midline / []Hendrix / []Drains / []Mediport / [x]Peripheral Line  Antibiotics: vanc + zosyn  Steroids: none  Labs (still needed?): [x]Yes / []No  IVF (still needed?): [x]Yes / []No    Level of care: [x]Step Down /

## 2024-04-29 NOTE — PROGRESS NOTES
DO CATHIE Salas DR GENERAL SURGERY   General Surgery Daily Progress Note    Pt Name: Kourtney Rankin  Medical Record Number: [id number]  Date of Birth 11/29/1934   Today's Date: 4/29/2024  Chief complaint: lower abd pain  ASSESSMENT   Hospital day # 3   [place] vesicle fistula likely related to diverticular disease   has a past medical history of HERIBERTO (acute kidney injury) ([facility]), Anxiety, Arthritis, Bronchitis, CAD (coronary artery disease), CHF (congestive heart failure) ([facility]), Chronic a-fib ([facility]), COVID, Diverticulitis of colon, Hyperkalemia, Hypertension, Medtronic cobalt dual ICD , Neuromuscular dysfunction of bladder, Personal history of COVID-19, Pressure ulcer, and Protein-calorie malnutrition ([facility]).  PLAN   Plan for surgical intervention 3/1 @ 730 am  Bowel prep tomorrow   SUBJECTIVE   Kourtney is doing fair. She denies any nausea or vomiting, has not passed flatus not had a bowel movement. She is tolerating a ADULT DIET; Regular; Low Sodium (2 gm)  ADULT ORAL NUTRITION SUPPLEMENT; Breakfast, Lunch, Dinner; Clear Liquid Oral Supplement  ADULT ORAL NUTRITION SUPPLEMENT; Lunch, Dinner; Other Oral Supplement; Cottage cheese. Her pain is well controlled on current medications. She has been ambulating in the halls.   CURRENT MEDICATIONS   Scheduled Meds   vancomycin  1,250 mg IntraVENous Once    [Held by provider] ticagrelor  90 mg Oral BID    [Held by provider] apixaban  2.5 mg Oral BID    carvedilol  3.125 mg Oral BID    cetirizine  5 mg Oral Daily    levothyroxine  62.5 mcg Oral QAM AC    trospium  20 mg Oral Nightly    pantoprazole  40 mg Oral QAM AC    sodium chloride flush  10 mL IntraVENous 2 times per day    [Held by provider] sacubitril-valsartan  1 tablet Oral BID    piperacillin-tazobactam  3,375 mg IntraVENous Q12H    vancomycin (VANCOCIN) intermittent dosing (placeholder)   Other RX Placeholder     Continuous Infusions:   sodium chloride      heparin (PORCINE) Infusion 15 Units/kg/hr (04/28/24  1301)     PRN Meds:.heparin (porcine), heparin (porcine), sodium chloride flush, sodium chloride, ondansetron **OR** ondansetron, polyethylene glycol, acetaminophen **OR** acetaminophen, morphine  OBJECTIVE   CURRENT VITALS:  height is 1.524 m (5') and weight is 53.7 kg (118 lb 6.2 oz). Her oral temperature is 97.4 °F (36.3 °C). Her blood pressure is 142/54 (abnormal) and her pulse is 80. Her respiration is 15 and oxygen saturation is 100%.   Temperature Range (24h):Temp: 97.4 °F (36.3 °C) Temp  Av.9 °F (36.6 °C)  Min: 97.4 °F (36.3 °C)  Max: 98.1 °F (36.7 °C)  BP Range (24h): Systolic (24hrs), Av , Min:126 , Max:147     Diastolic (24hrs), Av, Min:46, Max:62    Pulse Range (24h): Pulse  Av.2  Min: 74  Max: 80  Respiration Range (24h): Resp  Avg: 15  Min: 14  Max: 16  Current Pulse Ox (24h):  SpO2: 100 %  Pulse Ox Range (24h):  SpO2  Av.6 %  Min: 99 %  Max: 100 %  Oxygen Amount and Delivery:    Incentive Spirometry Tx:       Achieved Volume (mL): 1500 mL    GENERAL: alert, no distress  LUNGS: clear to ausculation, without wheezes, rales or rhonci  HEART: normal rate and regular rhythm  ABDOMEN: soft, non-tender, non-distended, bowel sounds present in all 4 quadrants, and no guarding or peritoneal signs  EXTREMITY: no cyanosis, clubbing or edema  In:  [P.O.:680; I.V.:962.7]  Out: -   Date 24 - 24   Shift 3448-4910 4401-1955 1642-9787 24 Hour Total   INTAKE   P.O.(mL/kg/hr)  100  100   IV Piggyback(mL/kg)  50.1(0.9)  50.1(0.9)   Shift Total(mL/kg)  150.1(2.8)  150.1(2.8)   OUTPUT   Shift Total(mL/kg)       Weight (kg) 53.7 53.7 53.7 53.7       LABS     Recent Labs     24  0650 24  0530 24  0435   WBC 9.7 4.9 5.3   HGB 9.4* 8.8* 8.5*   HCT 30.0* 28.9* 27.4*    129* 136    145 140   K 4.4 4.4 3.8    110 108   CO2 20* 14* 19*   BUN 36* 29* 24*   CREATININE 1.5* 1.5* 1.5*   CALCIUM 8.4* 8.5 8.8      No results for input(s): \"PTT\", \"INR\"

## 2024-04-29 NOTE — PROGRESS NOTES
Comprehensive Nutrition Assessment    Type and Reason for Visit: Initial, Positive Nutrition Screen (MST score 1 & patient reported bed sore)    Nutrition Recommendations/Plan:   Continue diet as ordered.   Initiate Ensure Clear TID and continue ONS at discharge.   Initiate Cottage Cheese BID.      Malnutrition Assessment:  Malnutrition Status: Moderate malnutrition  Context: Chronic Illness       Findings of the 6 clinical characteristics of malnutrition:  Energy Intake:  75% or less estimated energy requirements for 1 month or longer (eats several small meals per day related to diverticulitis)  Weight Loss:  No significant weight loss     Body Fat Loss:  Mild body fat loss (moderate orbital and triceps fat losses) Buccal region   Muscle Mass Loss:   (moderate temple and clavicle muscle losses)    Fluid Accumulation:  Unable to assess     Strength:  Not Performed    Nutrition Assessment:    Pt. moderately malnourished AEB criteria as listed above. At risk for further nutrition compromise r/t admitted due to abd pain and dysuria.  Air was seen in the anterior superior aspect of the bladder and may be outside of the bladder- concern for colovesicular fistula.Gen surg following, plan for coordinated procedure with urology. Noted underlying medical condition (PMHx HERIBERTO, bronchitis, CAD, CHF, Afib, diverticulitis, HTN, pressure ulcer, malnutrition).    Nutrition Related Findings:    Patient/ Family Comments: Per patient her appetite has been poor recently related to diverticulitis. Per patient she usually eats several small meals per day. She usually drinks Ensure Clear at home and would like to continue to received those. Per patient she likes Cottage Cheese and would be willing to eat some during admission to increased protein intakes.   Edema: none,per flowsheet  GI Function: LBM 04/29/24 per flowsheet  Wound:  (patient reports a bed sore on her bottom)      Labs:   No results found for: \"GLUF\", \"LABA1C\"  Recent

## 2024-04-29 NOTE — CARE COORDINATION
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 Once again there is air density within the urinary bladder as well as asymmetric bladder wall thickening along the left lateral dome of the urinary bladder which also contains air density (series 301, image 72) suspicious for colovesicular fistula at   this location. Mural wall thickening is noted in the sigmoid colon and there is pericolonic fat stranding and inflammation in this area with differential considerations including diverticulitis with colovesicular fistula versus colonic neoplasm with   colovesicular fistula. No bowel obstruction is observed. No free fluid, fluid collection, or free air is observed.     2. Chronic findings are discussed.     4/27 CT Abdomen Pelvis WO Contrast    1. Abnormal distal descending colon and sigmoid colon. The differential diagnosis includes acute diverticulitis and/or chronic diverticulitis. Tumor is also a consideration.     2. There does appear to be a colovesicular fistula in 2 places. This is along the superior surface of the bladder and the anterior surface of the bladder. There is associated bladder wall thickening and an air-fluid level in the bladder. Contrast could   not be demonstrated within the bladder.       Barriers to Discharge: Urology, General Surgery following, PT/OT, Heparin drip, IV Zosyn, IV Vancomycin.     PCP: Jennifer Nathan, SERGE - CNP  Readmission Risk Score: 19.9    Patient Goals/Plan/Treatment Preferences: From home with daughter, current [facility]. SW following. Will follow for needs post surgery.

## 2024-04-29 NOTE — PROGRESS NOTES
Urology Progress Note      Urologic Impression/Plan:  URO available to assist in OR Tues and Weds this week  Scheduling per gen/surg    Probable colovesical fistula- CT with rectal contrast 24 with 2 areas concerning for colovesicular fistula. Surgical repair will need to be combined case with Gen surgery.      Pt denies new symptoms.  Continue antibiotics  Coordination of surgical timing    Chief Complaint: Abdominal pain, dysuria  Reason for consultation:  Concern for colovesicular fistula    Subjective:   Pt sitting up in bedside chair.  Denies new complaints.  Having pain in abdomen with urination.  Urinating frequently.  Denies visible debris in urine.     Diet:  ADULT DIET; Regular; Low Sodium (2 gm)   Activity:  Up with assistance         Vitals:  BP (!) 142/54   Pulse 80   Temp 97.4 °F (36.3 °C) (Oral)   Resp 15   Ht 1.524 m (5')   Wt 53.7 kg (118 lb 6.2 oz)   SpO2 100%   Breastfeeding No   BMI 23.12 kg/m²   Temp  Av.8 °F (36.6 °C)  Min: 97.4 °F (36.3 °C)  Max: 98.1 °F (36.7 °C)    Intake/Output Summary (Last 24 hours) at 2024 1154  Last data filed at 2024 0933  Gross per 24 hour   Intake 1749.01 ml   Output --   Net 1749.01 ml         Social History     Socioeconomic History    Marital status:      Spouse name: Jose    Number of children: 5    Years of education: Not on file    Highest education level: Not on file   Occupational History    Not on file   Tobacco Use    Smoking status: Former     Current packs/day: 0.00     Average packs/day: 1 pack/day for 15.0 years (15.0 ttl pk-yrs)     Types: Cigarettes     Start date: 12/10/1962     Quit date: 12/10/1977     Years since quittin.4    Smokeless tobacco: Never   Vaping Use    Vaping Use: Never used   Substance and Sexual Activity    Alcohol use: No    Drug use: No    Sexual activity: Not Currently   Other Topics Concern    Not on file   Social History Narrative    Not on file     Social Determinants of Health

## 2024-04-30 ENCOUNTER — ANESTHESIA EVENT (OUTPATIENT)
Dept: OPERATING ROOM | Age: 89
End: 2024-04-30
Payer: MEDICARE

## 2024-04-30 LAB
ANION GAP SERPL CALC-SCNC: 14 MEQ/L (ref 8–16)
BUN SERPL-MCNC: 23 MG/DL (ref 7–22)
CALCIUM SERPL-MCNC: 9.1 MG/DL (ref 8.5–10.5)
CHLORIDE SERPL-SCNC: 108 MEQ/L (ref 98–111)
CO2 SERPL-SCNC: 18 MEQ/L (ref 23–33)
CREAT SERPL-MCNC: 1.5 MG/DL (ref 0.4–1.2)
DEPRECATED RDW RBC AUTO: 57.3 FL (ref 35–45)
ERYTHROCYTE [DISTWIDTH] IN BLOOD BY AUTOMATED COUNT: 14.7 % (ref 11.5–14.5)
GFR SERPL CREATININE-BSD FRML MDRD: 33 ML/MIN/1.73M2
GLUCOSE SERPL-MCNC: 95 MG/DL (ref 70–108)
HCT VFR BLD AUTO: 29.2 % (ref 37–47)
HEPARIN UNFRACTIONATED: 0.46 U/ML (ref 0.3–0.7)
HGB BLD-MCNC: 9.1 GM/DL (ref 12–16)
MCH RBC QN AUTO: 33 PG (ref 26–33)
MCHC RBC AUTO-ENTMCNC: 31.2 GM/DL (ref 32.2–35.5)
MCV RBC AUTO: 105.8 FL (ref 81–99)
PLATELET # BLD AUTO: 145 THOU/MM3 (ref 130–400)
PMV BLD AUTO: 12.1 FL (ref 9.4–12.4)
POTASSIUM SERPL-SCNC: 3.7 MEQ/L (ref 3.5–5.2)
RBC # BLD AUTO: 2.76 MILL/MM3 (ref 4.2–5.4)
SODIUM SERPL-SCNC: 140 MEQ/L (ref 135–145)
VANCOMYCIN SERPL-MCNC: 21.4 UG/ML (ref 0.1–39.9)
WBC # BLD AUTO: 3.6 THOU/MM3 (ref 4.8–10.8)

## 2024-04-30 PROCEDURE — 85520 HEPARIN ASSAY: CPT

## 2024-04-30 PROCEDURE — 99233 SBSQ HOSP IP/OBS HIGH 50: CPT | Performed by: SURGERY

## 2024-04-30 PROCEDURE — 6370000000 HC RX 637 (ALT 250 FOR IP): Performed by: STUDENT IN AN ORGANIZED HEALTH CARE EDUCATION/TRAINING PROGRAM

## 2024-04-30 PROCEDURE — 1200000003 HC TELEMETRY R&B

## 2024-04-30 PROCEDURE — 6360000002 HC RX W HCPCS: Performed by: STUDENT IN AN ORGANIZED HEALTH CARE EDUCATION/TRAINING PROGRAM

## 2024-04-30 PROCEDURE — 97110 THERAPEUTIC EXERCISES: CPT

## 2024-04-30 PROCEDURE — 99232 SBSQ HOSP IP/OBS MODERATE 35: CPT | Performed by: HOSPITALIST

## 2024-04-30 PROCEDURE — 2580000003 HC RX 258: Performed by: STUDENT IN AN ORGANIZED HEALTH CARE EDUCATION/TRAINING PROGRAM

## 2024-04-30 PROCEDURE — 6370000000 HC RX 637 (ALT 250 FOR IP): Performed by: SURGERY

## 2024-04-30 PROCEDURE — 80202 ASSAY OF VANCOMYCIN: CPT

## 2024-04-30 PROCEDURE — 97530 THERAPEUTIC ACTIVITIES: CPT

## 2024-04-30 PROCEDURE — 85027 COMPLETE CBC AUTOMATED: CPT

## 2024-04-30 PROCEDURE — 80048 BASIC METABOLIC PNL TOTAL CA: CPT

## 2024-04-30 PROCEDURE — 36415 COLL VENOUS BLD VENIPUNCTURE: CPT

## 2024-04-30 RX ADMIN — NALOXEGOL OXALATE 12.5 MG: 12.5 TABLET, FILM COATED ORAL at 14:26

## 2024-04-30 RX ADMIN — CETIRIZINE HYDROCHLORIDE 5 MG: 5 TABLET ORAL at 08:20

## 2024-04-30 RX ADMIN — PIPERACILLIN AND TAZOBACTAM 3375 MG: 3; .375 INJECTION, POWDER, LYOPHILIZED, FOR SOLUTION INTRAVENOUS at 20:07

## 2024-04-30 RX ADMIN — PANTOPRAZOLE SODIUM 40 MG: 40 TABLET, DELAYED RELEASE ORAL at 03:30

## 2024-04-30 RX ADMIN — CARVEDILOL 3.12 MG: 3.12 TABLET, FILM COATED ORAL at 08:20

## 2024-04-30 RX ADMIN — CARVEDILOL 3.12 MG: 3.12 TABLET, FILM COATED ORAL at 20:00

## 2024-04-30 RX ADMIN — TROSPIUM CHLORIDE 20 MG: 20 TABLET, FILM COATED ORAL at 20:00

## 2024-04-30 RX ADMIN — LEVOTHYROXINE SODIUM 62.5 MCG: 0.12 TABLET ORAL at 03:30

## 2024-04-30 RX ADMIN — ACETAMINOPHEN 650 MG: 325 TABLET ORAL at 23:00

## 2024-04-30 RX ADMIN — PIPERACILLIN AND TAZOBACTAM 3375 MG: 3; .375 INJECTION, POWDER, LYOPHILIZED, FOR SOLUTION INTRAVENOUS at 08:19

## 2024-04-30 ASSESSMENT — PAIN SCALES - GENERAL
PAINLEVEL_OUTOF10: 0
PAINLEVEL_OUTOF10: 3
PAINLEVEL_OUTOF10: 0
PAINLEVEL_OUTOF10: 0

## 2024-04-30 ASSESSMENT — PAIN SCALES - WONG BAKER

## 2024-04-30 ASSESSMENT — PAIN DESCRIPTION - ONSET: ONSET: ON-GOING

## 2024-04-30 ASSESSMENT — PAIN DESCRIPTION - PAIN TYPE: TYPE: CHRONIC PAIN

## 2024-04-30 ASSESSMENT — PAIN DESCRIPTION - LOCATION: LOCATION: BUTTOCKS

## 2024-04-30 ASSESSMENT — PAIN DESCRIPTION - FREQUENCY: FREQUENCY: CONTINUOUS

## 2024-04-30 ASSESSMENT — PAIN DESCRIPTION - ORIENTATION: ORIENTATION: OUTER

## 2024-04-30 ASSESSMENT — PAIN DESCRIPTION - DESCRIPTORS: DESCRIPTORS: DISCOMFORT

## 2024-04-30 ASSESSMENT — PAIN - FUNCTIONAL ASSESSMENT: PAIN_FUNCTIONAL_ASSESSMENT: ACTIVITIES ARE NOT PREVENTED

## 2024-04-30 NOTE — PROGRESS NOTES
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  PLAN   Plan for surgical intervention 3/1 @ 730 am  NPO at midnight  Hold heparin at midnight  Potential diagnostic and therapeutic modalities were discussed with the patient including surgical and nonsurgical options. The risks, complications and benefits of the options were discussed. Complications including, but not limited to, bleeding, wound infection, anastomotic leak, blood clots, bowel obstruction, other organ injury, injury to surrounding structures, intra-abdominal abscess, and need for colostomy were reviewed. The patient was given an opportunity to ask questions. Once answered, the patient is agreeable to proceed with surgery.   SUBJECTIVE   Kourtney is doing fair. She denies any nausea or vomiting, has not passed flatus not had a bowel movement. She is tolerating a ADULT ORAL NUTRITION SUPPLEMENT; Breakfast, Lunch, Dinner; Clear Liquid Oral Supplement  ADULT ORAL NUTRITION SUPPLEMENT; Lunch, Dinner; Other Oral Supplement; Cottage cheese  ADULT DIET; Clear Liquid; Low Sodium (2 gm). Her pain is well controlled on current medications. She has been ambulating in the halls.   CURRENT MEDICATIONS   Scheduled Meds   naloxegol  12.5 mg Oral QAM    [Held by provider] ticagrelor  90 mg Oral BID    [Held  lateral dome of the urinary bladder which also contains air density (series 301, image 72) suspicious for colovesicular fistula at    this location. Mural wall thickening is noted in the sigmoid colon and there is pericolonic fat stranding and inflammation in this area with differential considerations including diverticulitis with colovesicular fistula versus colonic neoplasm with    colovesicular fistula. No bowel obstruction is observed. No free fluid, fluid collection, or free air is observed.      2. Chronic findings are discussed.            **This report has been created using voice recognition software.  It may contain minor errors which are inherent in voice recognition technology.**      Final report electronically signed by Dr Jenny Carlin on 4/26/2024 2:32 PM      CT ABDOMEN PELVIS WO CONTRAST Additional Contrast? None   Final Result   Impression:   Wall thickening of the bladder with perivesicular stranding is consistent    with the given history of cystitis. There is evidence chronic/recurrent    diverticulitis. Air is seen at the anterior superior aspect of the bladder    and may be outside of the bladder. Air is also present within the bladder.    This is concerning for a colovesical fistula. An examination with IV and    oral contrast may be helpful. Air in the bladder may also be secondary to    recent instrumentation or emphysematous cystitis.      This document has been electronically signed by: Donna Gutierrez MD    on 04/26/2024 12:56 AM      All CTs at this facility use dose modulation techniques and iterative    reconstructions, and/or weight-based dosing   when appropriate to reduce radiation to a low as reasonably achievable.            Electronically signed by Scar Rodriguez DO on 4/30/2024 at 6:59 AM

## 2024-04-30 NOTE — PROGRESS NOTES
[facility]   Pharmacy Pharmacokinetic Monitoring Service - Vancomycin    Indication: IAI  Target Concentration: Dosing based on anticipated concentration < 15 mg/L due to renal impairment/insufficiency  Day of Therapy: 4  Additional Antimicrobials: Zosyn    Pertinent Laboratory Values:   Wt Readings from Last 1 Encounters:   04/30/24 53.7 kg (118 lb 6.2 oz)     Temp Readings from Last 1 Encounters:   04/30/24 98.2 °F (36.8 °C) (Oral)     Estimated Creatinine Clearance: 18 mL/min (A) (based on SCr of 1.5 mg/dL (H)).  Recent Labs     04/29/24  0435 04/30/24  0846   CREATININE 1.5* 1.5*   BUN 24* 23*   WBC 5.3 3.6*     Pertinent Cultures:  Date Source Results   4/26/24 BC X 4 NGTD   MRSA Nasal Swab: N/A. Non-respiratory infection.    Recent vancomycin administrations                     vancomycin (VANCOCIN) 1250 mg in sodium chloride 0.9% 250 mL IVPB (mg) 1,250 mg New Bag 04/29/24 1731    vancomycin (VANCOCIN) 1250 mg in sodium chloride 0.9% 250 mL IVPB (mg) 1,250 mg New Bag 04/28/24 1722    vancomycin (VANCOCIN) 1,000 mg in sodium chloride 0.9 % 250 mL IVPB (Ppax0Wpb) (mg) 1,000 mg New Bag 04/27/24 1421                    Assessment:  Date/Time Current Dose Concentration Timing of Concentration (h)   4/30/24 Intermittent; last dose 1250 mg on 4/29 at 1731 21.4 15 hours post dose   Note: Serum concentrations collected for AUC dosing may appear elevated if collected in close proximity to the dose administered, this is not necessarily an indication of toxicity    Plan:  Current dosing regimen is supra-therapeutic  No dose ordered for today. Will recheck vancomycin level in the morning then reassess  Repeat vancomycin concentration ordered for 05/01/24 @ 0600   Pharmacy will continue to monitor patient and adjust therapy as indicated    Thank you for the consult,  Niki Brennan, PharmD  PGY1 Resident

## 2024-04-30 NOTE — PROGRESS NOTES
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 Recent history of significant dysuria, UA per patient was negative outpatient and not started on antibiotics. Symptoms worsened and pt developed worsening suprapubic/left sided abdominal pain. Denying diarrhea, blood in stool, fecal material in urine, blood in urine. CTAP w/o contrast: Wall thickening of bladder, perivesicular stranding, evidence of chronic/recurrent diverticulitis, air seen at anterior superior aspect of bladder and may be outside bladder, air present within bladder, concerning for colovesical fistula. UA here +ve moderate blood, large LE. Repeat CT w/ oral contrast --> did not reach sigmoid colon.  General surgery, Dr. Rodriguez, and urology Dr. Valdes, consulted.  Patient is leaning towards surgical intervention at this time.  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    Plan  - pain well controlled at this time, adjust as needed analgesics as needed  - General surgery and Urology following, tentative plan for surgical intervention after washout of Eliquis and Brilinta. Surgery planned for 5/1/24 at 7:30AM at this time   -Bowel prep today per surgery  - Continue Zosyn + vancomycin  - Daily BMP/CBC  -Cardiac Risk Assessment  -RCRI Score of 3 indicative of a Class IV Risk. This is indicative of a 15% 30-day risk of death, MI, or cardiac arrest.   -Patient is not symptomatically short of breath, no significant fluid overload or LE edema. She is not in an acute

## 2024-04-30 NOTE — PROGRESS NOTES
IV fluids as well as IV antibiotics.  She endorses abdominal pain and pain on the sacral wound.  She denies fever, headache, chills, chest pain, shortness of breath.  Patient  was admitted to hospital service for further evaluation management. Patient will be undergoing surgery 4-     Prior Level of Function:  Lives With: Daughter  Type of Home: House  Home Layout: One level  Home Access: Stairs to enter without rails  Entrance Stairs - Number of Steps: 2 separate platform steps  Home Equipment: Walker, rolling, Cane, Wheelchair-manual, Lift chair (normally uses cane indoors and outdoors)   Bathroom Shower/Tub: Tub/Shower unit, Shower chair with back  Bathroom Toilet: Standard  Bathroom Equipment: Grab bars in shower  Bathroom Accessibility: Accessible    Receives Help From: Family  ADL Assistance: Independent  Homemaking Assistance: Independent  Homemaking Responsibilities: Yes  Ambulation Assistance: Independent (uses cane)  Transfer Assistance: Independent  Active : No  Additional Comments: Pt was going to adult day care 3x/week    Restrictions/Precautions:  Restrictions/Precautions: Contact Precautions, General Precautions, Fall Risk     SUBJECTIVE: Ok to see pt per nursing. Pt in bed when PT arrived, untouched breakfast, however pt would rather do therapy and then complete eat sitting up. IV site was bleeding during  session, nursing aware and in room to assess.     PAIN: denies    Vitals: Blood Pressure: 141/61 reports dizziness/lightheadedness with gait    OBJECTIVE:  Bed Mobility:  Rolling to Left: Supervision, X 1, with head of bed raised   Supine to Sit: Supervision, X 1, with head of bed raised  Good demo  Transfers:  Sit to Stand: Stand By Assistance, X 1  Stand to Sit:Supervision, Stand By Assistance  SC for support as needed, completed from various surfaces and heights during session, no LOB noted  Ambulation:  Stand By Assistance, X 1, with cues for safety, with verbal cues , with

## 2024-05-01 ENCOUNTER — ANESTHESIA (OUTPATIENT)
Dept: OPERATING ROOM | Age: 89
End: 2024-05-01
Payer: MEDICARE

## 2024-05-01 LAB
ANION GAP SERPL CALC-SCNC: 14 MEQ/L (ref 8–16)
BACTERIA BLD AEROBE CULT: NORMAL
BUN SERPL-MCNC: 22 MG/DL (ref 7–22)
CALCIUM SERPL-MCNC: 8.7 MG/DL (ref 8.5–10.5)
CHLORIDE SERPL-SCNC: 108 MEQ/L (ref 98–111)
CO2 SERPL-SCNC: 19 MEQ/L (ref 23–33)
CREAT SERPL-MCNC: 1.5 MG/DL (ref 0.4–1.2)
DEPRECATED RDW RBC AUTO: 55.6 FL (ref 35–45)
ERYTHROCYTE [DISTWIDTH] IN BLOOD BY AUTOMATED COUNT: 14.7 % (ref 11.5–14.5)
GFR SERPL CREATININE-BSD FRML MDRD: 33 ML/MIN/1.73M2
GLUCOSE SERPL-MCNC: 91 MG/DL (ref 70–108)
HCT VFR BLD AUTO: 27.8 % (ref 37–47)
HEPARIN UNFRACTIONATED: 0.05 U/ML (ref 0.3–0.7)
HEPARIN UNFRACTIONATED: 0.34 U/ML (ref 0.3–0.7)
HEPARIN UNFRACTIONATED: 0.47 U/ML (ref 0.3–0.7)
HGB BLD-MCNC: 9 GM/DL (ref 12–16)
MAGNESIUM SERPL-MCNC: 1.7 MG/DL (ref 1.6–2.4)
MCH RBC QN AUTO: 33.3 PG (ref 26–33)
MCHC RBC AUTO-ENTMCNC: 32.4 GM/DL (ref 32.2–35.5)
MCV RBC AUTO: 103 FL (ref 81–99)
PLATELET # BLD AUTO: 133 THOU/MM3 (ref 130–400)
PMV BLD AUTO: 11.6 FL (ref 9.4–12.4)
POTASSIUM SERPL-SCNC: 3.2 MEQ/L (ref 3.5–5.2)
POTASSIUM SERPL-SCNC: 3.7 MEQ/L (ref 3.5–5.2)
RBC # BLD AUTO: 2.7 MILL/MM3 (ref 4.2–5.4)
SODIUM SERPL-SCNC: 141 MEQ/L (ref 135–145)
VANCOMYCIN SERPL-MCNC: 14.3 UG/ML (ref 0.1–39.9)
WBC # BLD AUTO: 4.4 THOU/MM3 (ref 4.8–10.8)

## 2024-05-01 PROCEDURE — 1200000003 HC TELEMETRY R&B

## 2024-05-01 PROCEDURE — 99232 SBSQ HOSP IP/OBS MODERATE 35: CPT | Performed by: HOSPITALIST

## 2024-05-01 PROCEDURE — 6370000000 HC RX 637 (ALT 250 FOR IP): Performed by: STUDENT IN AN ORGANIZED HEALTH CARE EDUCATION/TRAINING PROGRAM

## 2024-05-01 PROCEDURE — 6370000000 HC RX 637 (ALT 250 FOR IP)

## 2024-05-01 PROCEDURE — 2580000003 HC RX 258

## 2024-05-01 PROCEDURE — 80048 BASIC METABOLIC PNL TOTAL CA: CPT

## 2024-05-01 PROCEDURE — 85520 HEPARIN ASSAY: CPT

## 2024-05-01 PROCEDURE — 97110 THERAPEUTIC EXERCISES: CPT

## 2024-05-01 PROCEDURE — 6360000002 HC RX W HCPCS

## 2024-05-01 PROCEDURE — 6360000002 HC RX W HCPCS: Performed by: STUDENT IN AN ORGANIZED HEALTH CARE EDUCATION/TRAINING PROGRAM

## 2024-05-01 PROCEDURE — 97116 GAIT TRAINING THERAPY: CPT

## 2024-05-01 PROCEDURE — 84132 ASSAY OF SERUM POTASSIUM: CPT

## 2024-05-01 PROCEDURE — 80202 ASSAY OF VANCOMYCIN: CPT

## 2024-05-01 PROCEDURE — 85027 COMPLETE CBC AUTOMATED: CPT

## 2024-05-01 PROCEDURE — 83735 ASSAY OF MAGNESIUM: CPT

## 2024-05-01 PROCEDURE — 6370000000 HC RX 637 (ALT 250 FOR IP): Performed by: SURGERY

## 2024-05-01 PROCEDURE — 99232 SBSQ HOSP IP/OBS MODERATE 35: CPT | Performed by: SURGERY

## 2024-05-01 PROCEDURE — 2580000003 HC RX 258: Performed by: STUDENT IN AN ORGANIZED HEALTH CARE EDUCATION/TRAINING PROGRAM

## 2024-05-01 PROCEDURE — 97530 THERAPEUTIC ACTIVITIES: CPT

## 2024-05-01 PROCEDURE — 36415 COLL VENOUS BLD VENIPUNCTURE: CPT

## 2024-05-01 RX ORDER — POLYETHYLENE GLYCOL 3350 17 G/17G
238 POWDER, FOR SOLUTION ORAL ONCE
Status: COMPLETED | OUTPATIENT
Start: 2024-05-01 | End: 2024-05-01

## 2024-05-01 RX ORDER — POTASSIUM CHLORIDE 7.45 MG/ML
10 INJECTION INTRAVENOUS ONCE
Status: DISCONTINUED | OUTPATIENT
Start: 2024-05-01 | End: 2024-05-01 | Stop reason: CLARIF

## 2024-05-01 RX ORDER — POTASSIUM CHLORIDE 7.45 MG/ML
10 INJECTION INTRAVENOUS
Status: DISCONTINUED | OUTPATIENT
Start: 2024-05-01 | End: 2024-05-01

## 2024-05-01 RX ORDER — POTASSIUM CHLORIDE 20 MEQ/1
40 TABLET, EXTENDED RELEASE ORAL ONCE
Status: COMPLETED | OUTPATIENT
Start: 2024-05-01 | End: 2024-05-01

## 2024-05-01 RX ORDER — HEPARIN SODIUM 10000 [USP'U]/100ML
5-30 INJECTION, SOLUTION INTRAVENOUS CONTINUOUS
Status: DISCONTINUED | OUTPATIENT
Start: 2024-05-01 | End: 2024-05-02

## 2024-05-01 RX ORDER — HEPARIN SODIUM 10000 [USP'U]/100ML
5-30 INJECTION, SOLUTION INTRAVENOUS CONTINUOUS
Status: DISCONTINUED | OUTPATIENT
Start: 2024-05-01 | End: 2024-05-01 | Stop reason: CLARIF

## 2024-05-01 RX ADMIN — PIPERACILLIN AND TAZOBACTAM 3375 MG: 3; .375 INJECTION, POWDER, LYOPHILIZED, FOR SOLUTION INTRAVENOUS at 20:10

## 2024-05-01 RX ADMIN — POLYETHYLENE GLYCOL 3350 238 G: 17 POWDER, FOR SOLUTION ORAL at 11:39

## 2024-05-01 RX ADMIN — CARVEDILOL 3.12 MG: 3.12 TABLET, FILM COATED ORAL at 09:11

## 2024-05-01 RX ADMIN — CARVEDILOL 3.12 MG: 3.12 TABLET, FILM COATED ORAL at 20:05

## 2024-05-01 RX ADMIN — TROSPIUM CHLORIDE 20 MG: 20 TABLET, FILM COATED ORAL at 20:05

## 2024-05-01 RX ADMIN — VANCOMYCIN HYDROCHLORIDE 750 MG: 1 INJECTION, POWDER, LYOPHILIZED, FOR SOLUTION INTRAVENOUS at 13:40

## 2024-05-01 RX ADMIN — SODIUM CHLORIDE, PRESERVATIVE FREE 10 ML: 5 INJECTION INTRAVENOUS at 09:07

## 2024-05-01 RX ADMIN — HEPARIN SODIUM 18 UNITS/KG/HR: 10000 INJECTION, SOLUTION INTRAVENOUS at 09:19

## 2024-05-01 RX ADMIN — PIPERACILLIN AND TAZOBACTAM 3375 MG: 3; .375 INJECTION, POWDER, LYOPHILIZED, FOR SOLUTION INTRAVENOUS at 09:08

## 2024-05-01 RX ADMIN — CETIRIZINE HYDROCHLORIDE 5 MG: 5 TABLET ORAL at 09:11

## 2024-05-01 RX ADMIN — NALOXEGOL OXALATE 12.5 MG: 12.5 TABLET, FILM COATED ORAL at 09:11

## 2024-05-01 RX ADMIN — POTASSIUM CHLORIDE 40 MEQ: 1500 TABLET, EXTENDED RELEASE ORAL at 09:06

## 2024-05-01 ASSESSMENT — PAIN DESCRIPTION - LOCATION: LOCATION: ABDOMEN

## 2024-05-01 ASSESSMENT — PAIN SCALES - WONG BAKER
WONGBAKER_NUMERICALRESPONSE: HURTS A LITTLE BIT

## 2024-05-01 ASSESSMENT — PAIN DESCRIPTION - DESCRIPTORS: DESCRIPTORS: DISCOMFORT;ACHING

## 2024-05-01 ASSESSMENT — PAIN DESCRIPTION - PAIN TYPE: TYPE: CHRONIC PAIN

## 2024-05-01 ASSESSMENT — PAIN - FUNCTIONAL ASSESSMENT: PAIN_FUNCTIONAL_ASSESSMENT: ACTIVITIES ARE NOT PREVENTED

## 2024-05-01 ASSESSMENT — PAIN DESCRIPTION - ORIENTATION: ORIENTATION: LOWER

## 2024-05-01 ASSESSMENT — PAIN SCALES - GENERAL
PAINLEVEL_OUTOF10: 3
PAINLEVEL_OUTOF10: 0

## 2024-05-01 NOTE — PLAN OF CARE
Problem: Discharge Planning  Goal: Discharge to home or other facility with appropriate resources  Outcome: Progressing  Flowsheets (Taken 5/1/2024 0030)  Discharge to home or other facility with appropriate resources:   Identify barriers to discharge with patient and caregiver   Identify discharge learning needs (meds, wound care, etc)     Problem: Chronic Conditions and Co-morbidities  Goal: Patient's chronic conditions and co-morbidity symptoms are monitored and maintained or improved  Outcome: Progressing  Flowsheets (Taken 5/1/2024 0030)  Care Plan - Patient's Chronic Conditions and Co-Morbidity Symptoms are Monitored and Maintained or Improved:   Monitor and assess patient's chronic conditions and comorbid symptoms for stability, deterioration, or improvement   Collaborate with multidisciplinary team to address chronic and comorbid conditions and prevent exacerbation or deterioration   Update acute care plan with appropriate goals if chronic or comorbid symptoms are exacerbated and prevent overall improvement and discharge     Problem: Skin/Tissue Integrity  Goal: Absence of new skin breakdown  Description: 1.  Monitor for areas of redness and/or skin breakdown  2.  Assess vascular access sites hourly  3.  Every 4-6 hours minimum:  Change oxygen saturation probe site  4.  Every 4-6 hours:  If on nasal continuous positive airway pressure, respiratory therapy assess nares and determine need for appliance change or resting period.  Outcome: Progressing  Note: No signs and/or symptoms of infection noted during this shift. Patient afebrile during this shift. No new skin breakdown noted during this shift. Patient able to turn self in bed; staff assistance provided as needed. Foot of bed elevated.      Problem: Safety - Adult  Goal: Free from fall injury  Outcome: Progressing  Flowsheets (Taken 5/1/2024 0030)  Free From Fall Injury: Instruct family/caregiver on patient safety  Note: Patient educated on and  encouraged to use the call light for assistance from staff with needs. Patient verbalizes an understanding of this. Bed wheels locked and bed in lowest position; bed alarm on. Patient possessions within reach; pathways clear at this time.      Problem: ABCDS Injury Assessment  Goal: Absence of physical injury  Outcome: Progressing  Flowsheets (Taken 5/1/2024 0030)  Absence of Physical Injury: Implement safety measures based on patient assessment     Problem: Pain  Goal: Verbalizes/displays adequate comfort level or baseline comfort level  Outcome: Progressing  Flowsheets (Taken 5/1/2024 0030)  Verbalizes/displays adequate comfort level or baseline comfort level:   Encourage patient to monitor pain and request assistance   Assess pain using appropriate pain scale   Administer analgesics based on type and severity of pain and evaluate response   Implement non-pharmacological measures as appropriate and evaluate response   Consider cultural and social influences on pain and pain management     Problem: Nutrition Deficit:  Goal: Optimize nutritional status  Outcome: Progressing  Flowsheets (Taken 5/1/2024 0030)  Nutrient intake appropriate for improving, restoring, or maintaining nutritional needs:   Assess nutritional status and recommend course of action   Recommend appropriate diets, oral nutritional supplements, and vitamin/mineral supplements   Monitor oral intake, labs, and treatment plans   Order, calculate, and assess calorie counts as needed   Provide specific nutrition education to patient or family as appropriate     Care plan reviewed with patient. Patient verbalizes understanding of the plan of care and contributes to goal setting.

## 2024-05-01 NOTE — PROGRESS NOTES
[facility]   Pharmacy Pharmacokinetic Monitoring Service - Vancomycin    Indication: IAI  Target Concentration: Dosing based on anticipated concentration < 15 mg/L due to renal impairment/insufficiency  Day of Therapy: 6  Additional Antimicrobials: Zosyn    Pertinent Laboratory Values:   Wt Readings from Last 1 Encounters:   05/01/24 53.7 kg (118 lb 4.8 oz)     Temp Readings from Last 1 Encounters:   05/01/24 97.6 °F (36.4 °C) (Oral)     Estimated Creatinine Clearance: 18 mL/min (A) (based on SCr of 1.5 mg/dL (H)).  Recent Labs     04/30/24  0846 05/01/24  0359   CREATININE 1.5* 1.5*   BUN 23* 22   WBC 3.6* 4.4*     Pertinent Cultures:  Date Source Results   4/26/24 BC X 4 NGTD   MRSA Nasal Swab: N/A. Non-respiratory infection.    Recent vancomycin administrations                     vancomycin (VANCOCIN) 1250 mg in sodium chloride 0.9% 250 mL IVPB (mg) 1,250 mg New Bag 04/29/24 1731    vancomycin (VANCOCIN) 1250 mg in sodium chloride 0.9% 250 mL IVPB (mg) 1,250 mg New Bag 04/28/24 1722                    Assessment:  Date/Time Current Dose Concentration Timing of Concentration (h)   05/01/24 Intermittent; last dose 1250 mg on 4/29 at 1731  14.3 34 hours post dose   Note: Serum concentrations collected for AUC dosing may appear elevated if collected in close proximity to the dose administered, this is not necessarily an indication of toxicity    Plan:  Give vancomycin 750 mg x 1 dose   Repeat vancomycin concentration ordered for 05/02/24 @ 0600   Pharmacy will continue to monitor patient and adjust therapy as indicated    Thank you for the consult,  Niki Brennan, PharmD  PGY1 Resident

## 2024-05-01 NOTE — PROGRESS NOTES
cefOXitin, 2,000 mg, IntraVENous, On Call to OR    naloxegol, 12.5 mg, Oral, QAM    [Held by provider] ticagrelor, 90 mg, Oral, BID    [Held by provider] apixaban, 2.5 mg, Oral, BID    carvedilol, 3.125 mg, Oral, BID    cetirizine, 5 mg, Oral, Daily    levothyroxine, 62.5 mcg, Oral, QAM AC    trospium, 20 mg, Oral, Nightly    pantoprazole, 40 mg, Oral, QAM AC    sodium chloride flush, 10 mL, IntraVENous, 2 times per day    [Held by provider] sacubitril-valsartan, 1 tablet, Oral, BID    piperacillin-tazobactam, 3,375 mg, IntraVENous, Q12H    vancomycin (VANCOCIN) intermittent dosing (placeholder), , Other, RX Placeholder         Intake/Output Summary (Last 24 hours) at 5/1/2024 1652  Last data filed at 5/1/2024 1549  Gross per 24 hour   Intake 701.07 ml   Output 750 ml   Net -48.93 ml       Exam:  /61   Pulse 93   Temp 98 °F (36.7 °C) (Oral)   Resp 18   Ht 1.524 m (5')   Wt 53.7 kg (118 lb 4.8 oz)   SpO2 100%   Breastfeeding No   BMI 23.10 kg/m²     General: No distress, appears stated age.  Pleasant, elderly  Eyes:  PERRL. Conjunctivae/corneas clear.  HENT: Head normal appearing. Nares normal. Oral mucosa moist.  Hearing intact.   Neck: Supple, with full range of motion. Trachea midline.  No gross JVD appreciated.  Respiratory:  Normal effort. Clear to auscultation, without rales or wheezes or rhonchi.  Cardiovascular: Normal rate, regular rhythm with normal S1/S2 without murmurs.    No lower extremity edema.   Abdomen: Soft, non-tender, non-distended with normal bowel sounds.  Musculoskeletal: No joint swelling or tenderness. Normal tone. No abnormal movements.   Skin: Warm and dry. No rashes or lesions.  Neurologic:  No focal sensory/motor deficits in the upper or lower extremities. Cranial nerves:  grossly non-focal 2-12.     Psychiatric: Alert and oriented, normal insight and thought content.   Capillary Refill: Brisk,< 3 seconds.  Peripheral Pulses: +2 palpable, equal bilaterally.       Labs:

## 2024-05-01 NOTE — PROGRESS NOTES
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  PLAN   Plan for surgical intervention 5/2 @ 730  NPO at midnight  Hold heparin at midnight  Potential diagnostic and therapeutic modalities were discussed with the patient including surgical and nonsurgical options. The risks, complications and benefits of the options were discussed. Complications including, but not limited to, bleeding, wound infection, anastomotic leak, blood clots, bowel obstruction, other organ injury, injury to surrounding structures, intra-abdominal abscess, and need for colostomy were reviewed. The patient was given an opportunity to ask questions. Once answered, the patient is agreeable to proceed with surgery.   SUBJECTIVE   Kourtney is doing fair. She denies any nausea or vomiting, has not passed flatus not had a bowel movement. She is tolerating a ADULT DIET; Clear Liquid  Diet NPO Exceptions are: Sips of Water with Meds. Her pain is well controlled on current medications. She has been ambulating in the halls.   CURRENT MEDICATIONS   Scheduled Meds   cefOXitin  2,000 mg IntraVENous On Call to OR    vancomycin  750 mg IntraVENous Once    naloxegol  12.5 mg Oral QAM    [Held by provider] ticagrelor  90 mg Oral BID    [Held by provider] apixaban  2.5 mg Oral BID    carvedilol  3.125 mg

## 2024-05-01 NOTE — CARE COORDINATION
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     PCP: Jennifer Nathan APRN - CNP  Readmission Risk Score: 19.5    Patient Goals/Plan/Treatment Preferences: Plans home with daughter. Resume [facility]. SW following. Refer to SW note.

## 2024-05-02 LAB
ANION GAP SERPL CALC-SCNC: 14 MEQ/L (ref 8–16)
BUN SERPL-MCNC: 20 MG/DL (ref 7–22)
CALCIUM SERPL-MCNC: 8.9 MG/DL (ref 8.5–10.5)
CHLORIDE SERPL-SCNC: 110 MEQ/L (ref 98–111)
CO2 SERPL-SCNC: 18 MEQ/L (ref 23–33)
CREAT SERPL-MCNC: 1.6 MG/DL (ref 0.4–1.2)
DEPRECATED RDW RBC AUTO: 57.4 FL (ref 35–45)
ERYTHROCYTE [DISTWIDTH] IN BLOOD BY AUTOMATED COUNT: 14.7 % (ref 11.5–14.5)
GFR SERPL CREATININE-BSD FRML MDRD: 31 ML/MIN/1.73M2
GLUCOSE SERPL-MCNC: 94 MG/DL (ref 70–108)
HCT VFR BLD AUTO: 26.5 % (ref 37–47)
HGB BLD-MCNC: 8.2 GM/DL (ref 12–16)
MCH RBC QN AUTO: 32.8 PG (ref 26–33)
MCHC RBC AUTO-ENTMCNC: 30.9 GM/DL (ref 32.2–35.5)
MCV RBC AUTO: 106 FL (ref 81–99)
PLATELET # BLD AUTO: 116 THOU/MM3 (ref 130–400)
PMV BLD AUTO: 12 FL (ref 9.4–12.4)
POTASSIUM SERPL-SCNC: 3.7 MEQ/L (ref 3.5–5.2)
RBC # BLD AUTO: 2.5 MILL/MM3 (ref 4.2–5.4)
SELECTED GEL ANTIBODY SCREEN: NORMAL
SODIUM SERPL-SCNC: 142 MEQ/L (ref 135–145)
VANCOMYCIN SERPL-MCNC: 17.6 UG/ML (ref 0.1–39.9)
WBC # BLD AUTO: 4.9 THOU/MM3 (ref 4.8–10.8)

## 2024-05-02 PROCEDURE — 36415 COLL VENOUS BLD VENIPUNCTURE: CPT

## 2024-05-02 PROCEDURE — 6360000002 HC RX W HCPCS: Performed by: ANESTHESIOLOGY

## 2024-05-02 PROCEDURE — 86905 BLOOD TYPING RBC ANTIGENS: CPT

## 2024-05-02 PROCEDURE — 2500000003 HC RX 250 WO HCPCS: Performed by: NURSE ANESTHETIST, CERTIFIED REGISTERED

## 2024-05-02 PROCEDURE — 3600000014 HC SURGERY LEVEL 4 ADDTL 15MIN: Performed by: SURGERY

## 2024-05-02 PROCEDURE — 6360000002 HC RX W HCPCS: Performed by: SURGERY

## 2024-05-02 PROCEDURE — 86901 BLOOD TYPING SEROLOGIC RH(D): CPT

## 2024-05-02 PROCEDURE — 3600000004 HC SURGERY LEVEL 4 BASE: Performed by: SURGERY

## 2024-05-02 PROCEDURE — 2580000003 HC RX 258: Performed by: NURSE ANESTHETIST, CERTIFIED REGISTERED

## 2024-05-02 PROCEDURE — 6360000002 HC RX W HCPCS

## 2024-05-02 PROCEDURE — 6370000000 HC RX 637 (ALT 250 FOR IP): Performed by: SURGERY

## 2024-05-02 PROCEDURE — 3700000000 HC ANESTHESIA ATTENDED CARE: Performed by: SURGERY

## 2024-05-02 PROCEDURE — 80048 BASIC METABOLIC PNL TOTAL CA: CPT

## 2024-05-02 PROCEDURE — 86922 COMPATIBILITY TEST ANTIGLOB: CPT

## 2024-05-02 PROCEDURE — 7100000000 HC PACU RECOVERY - FIRST 15 MIN: Performed by: SURGERY

## 2024-05-02 PROCEDURE — 3700000001 HC ADD 15 MINUTES (ANESTHESIA): Performed by: SURGERY

## 2024-05-02 PROCEDURE — 97535 SELF CARE MNGMENT TRAINING: CPT

## 2024-05-02 PROCEDURE — 80202 ASSAY OF VANCOMYCIN: CPT

## 2024-05-02 PROCEDURE — 86900 BLOOD TYPING SEROLOGIC ABO: CPT

## 2024-05-02 PROCEDURE — 7100000001 HC PACU RECOVERY - ADDTL 15 MIN: Performed by: SURGERY

## 2024-05-02 PROCEDURE — 88307 TISSUE EXAM BY PATHOLOGIST: CPT

## 2024-05-02 PROCEDURE — 2720000010 HC SURG SUPPLY STERILE: Performed by: SURGERY

## 2024-05-02 PROCEDURE — 2580000003 HC RX 258: Performed by: SURGERY

## 2024-05-02 PROCEDURE — 1200000003 HC TELEMETRY R&B

## 2024-05-02 PROCEDURE — 99232 SBSQ HOSP IP/OBS MODERATE 35: CPT | Performed by: HOSPITALIST

## 2024-05-02 PROCEDURE — P9016 RBC LEUKOCYTES REDUCED: HCPCS

## 2024-05-02 PROCEDURE — 2709999900 HC NON-CHARGEABLE SUPPLY: Performed by: SURGERY

## 2024-05-02 PROCEDURE — 6360000002 HC RX W HCPCS: Performed by: STUDENT IN AN ORGANIZED HEALTH CARE EDUCATION/TRAINING PROGRAM

## 2024-05-02 PROCEDURE — P9045 ALBUMIN (HUMAN), 5%, 250 ML: HCPCS | Performed by: NURSE ANESTHETIST, CERTIFIED REGISTERED

## 2024-05-02 PROCEDURE — 0DTN0ZZ RESECTION OF SIGMOID COLON, OPEN APPROACH: ICD-10-PCS | Performed by: STUDENT IN AN ORGANIZED HEALTH CARE EDUCATION/TRAINING PROGRAM

## 2024-05-02 PROCEDURE — 97530 THERAPEUTIC ACTIVITIES: CPT

## 2024-05-02 PROCEDURE — 6360000002 HC RX W HCPCS: Performed by: NURSE ANESTHETIST, CERTIFIED REGISTERED

## 2024-05-02 PROCEDURE — 85027 COMPLETE CBC AUTOMATED: CPT

## 2024-05-02 PROCEDURE — 2580000003 HC RX 258: Performed by: STUDENT IN AN ORGANIZED HEALTH CARE EDUCATION/TRAINING PROGRAM

## 2024-05-02 PROCEDURE — 30233N1 TRANSFUSION OF NONAUTOLOGOUS RED BLOOD CELLS INTO PERIPHERAL VEIN, PERCUTANEOUS APPROACH: ICD-10-PCS | Performed by: STUDENT IN AN ORGANIZED HEALTH CARE EDUCATION/TRAINING PROGRAM

## 2024-05-02 PROCEDURE — 86885 COOMBS TEST INDIRECT QUAL: CPT

## 2024-05-02 PROCEDURE — 88304 TISSUE EXAM BY PATHOLOGIST: CPT

## 2024-05-02 PROCEDURE — 6370000000 HC RX 637 (ALT 250 FOR IP): Performed by: STUDENT IN AN ORGANIZED HEALTH CARE EDUCATION/TRAINING PROGRAM

## 2024-05-02 RX ORDER — ALBUMIN, HUMAN INJ 5% 5 %
SOLUTION INTRAVENOUS PRN
Status: DISCONTINUED | OUTPATIENT
Start: 2024-05-02 | End: 2024-05-02 | Stop reason: SDUPTHER

## 2024-05-02 RX ORDER — FENTANYL CITRATE 50 UG/ML
INJECTION, SOLUTION INTRAMUSCULAR; INTRAVENOUS PRN
Status: DISCONTINUED | OUTPATIENT
Start: 2024-05-02 | End: 2024-05-02 | Stop reason: SDUPTHER

## 2024-05-02 RX ORDER — DROPERIDOL 2.5 MG/ML
0.62 INJECTION, SOLUTION INTRAMUSCULAR; INTRAVENOUS ONCE
Status: COMPLETED | OUTPATIENT
Start: 2024-05-02 | End: 2024-05-02

## 2024-05-02 RX ORDER — SODIUM CHLORIDE 0.9 % (FLUSH) 0.9 %
5-40 SYRINGE (ML) INJECTION EVERY 12 HOURS SCHEDULED
Status: DISCONTINUED | OUTPATIENT
Start: 2024-05-02 | End: 2024-05-09 | Stop reason: HOSPADM

## 2024-05-02 RX ORDER — PHENYLEPHRINE HCL IN 0.9% NACL 1 MG/10 ML
SYRINGE (ML) INTRAVENOUS PRN
Status: DISCONTINUED | OUTPATIENT
Start: 2024-05-02 | End: 2024-05-02 | Stop reason: SDUPTHER

## 2024-05-02 RX ORDER — LIDOCAINE HCL/PF 100 MG/5ML
SYRINGE (ML) INJECTION PRN
Status: DISCONTINUED | OUTPATIENT
Start: 2024-05-02 | End: 2024-05-02 | Stop reason: SDUPTHER

## 2024-05-02 RX ORDER — HEPARIN SODIUM 5000 [USP'U]/ML
5000 INJECTION, SOLUTION INTRAVENOUS; SUBCUTANEOUS 2 TIMES DAILY
Status: DISCONTINUED | OUTPATIENT
Start: 2024-05-03 | End: 2024-05-06

## 2024-05-02 RX ORDER — ROCURONIUM BROMIDE 10 MG/ML
INJECTION, SOLUTION INTRAVENOUS PRN
Status: DISCONTINUED | OUTPATIENT
Start: 2024-05-02 | End: 2024-05-02 | Stop reason: SDUPTHER

## 2024-05-02 RX ORDER — SODIUM CHLORIDE 9 MG/ML
INJECTION, SOLUTION INTRAVENOUS CONTINUOUS PRN
Status: DISCONTINUED | OUTPATIENT
Start: 2024-05-02 | End: 2024-05-02 | Stop reason: SDUPTHER

## 2024-05-02 RX ORDER — FENTANYL CITRATE 50 UG/ML
INJECTION, SOLUTION INTRAMUSCULAR; INTRAVENOUS
Status: COMPLETED
Start: 2024-05-02 | End: 2024-05-02

## 2024-05-02 RX ORDER — FENTANYL CITRATE 50 UG/ML
50 INJECTION, SOLUTION INTRAMUSCULAR; INTRAVENOUS EVERY 5 MIN PRN
Status: COMPLETED | OUTPATIENT
Start: 2024-05-02 | End: 2024-05-02

## 2024-05-02 RX ORDER — DEXAMETHASONE SODIUM PHOSPHATE 10 MG/ML
INJECTION, EMULSION INTRAMUSCULAR; INTRAVENOUS PRN
Status: DISCONTINUED | OUTPATIENT
Start: 2024-05-02 | End: 2024-05-02 | Stop reason: SDUPTHER

## 2024-05-02 RX ORDER — DROPERIDOL 2.5 MG/ML
INJECTION, SOLUTION INTRAMUSCULAR; INTRAVENOUS
Status: COMPLETED
Start: 2024-05-02 | End: 2024-05-02

## 2024-05-02 RX ORDER — SODIUM CHLORIDE 9 MG/ML
INJECTION, SOLUTION INTRAVENOUS PRN
Status: DISCONTINUED | OUTPATIENT
Start: 2024-05-02 | End: 2024-05-09 | Stop reason: HOSPADM

## 2024-05-02 RX ORDER — MORPHINE SULFATE 2 MG/ML
2 INJECTION, SOLUTION INTRAMUSCULAR; INTRAVENOUS ONCE
Status: COMPLETED | OUTPATIENT
Start: 2024-05-02 | End: 2024-05-02

## 2024-05-02 RX ORDER — PROPOFOL 10 MG/ML
INJECTION, EMULSION INTRAVENOUS PRN
Status: DISCONTINUED | OUTPATIENT
Start: 2024-05-02 | End: 2024-05-02 | Stop reason: SDUPTHER

## 2024-05-02 RX ORDER — SODIUM CHLORIDE 9 MG/ML
INJECTION, SOLUTION INTRAVENOUS CONTINUOUS
Status: DISCONTINUED | OUTPATIENT
Start: 2024-05-02 | End: 2024-05-06

## 2024-05-02 RX ORDER — OXYCODONE HYDROCHLORIDE 5 MG/1
5 TABLET ORAL EVERY 4 HOURS PRN
Status: DISCONTINUED | OUTPATIENT
Start: 2024-05-02 | End: 2024-05-09 | Stop reason: HOSPADM

## 2024-05-02 RX ORDER — MORPHINE SULFATE 2 MG/ML
2 INJECTION, SOLUTION INTRAMUSCULAR; INTRAVENOUS
Status: DISCONTINUED | OUTPATIENT
Start: 2024-05-02 | End: 2024-05-03

## 2024-05-02 RX ORDER — PROCHLORPERAZINE EDISYLATE 5 MG/ML
10 INJECTION INTRAMUSCULAR; INTRAVENOUS EVERY 6 HOURS PRN
Status: DISCONTINUED | OUTPATIENT
Start: 2024-05-02 | End: 2024-05-09 | Stop reason: HOSPADM

## 2024-05-02 RX ORDER — SODIUM CHLORIDE 0.9 % (FLUSH) 0.9 %
5-40 SYRINGE (ML) INJECTION PRN
Status: DISCONTINUED | OUTPATIENT
Start: 2024-05-02 | End: 2024-05-09 | Stop reason: HOSPADM

## 2024-05-02 RX ORDER — ONDANSETRON 2 MG/ML
INJECTION INTRAMUSCULAR; INTRAVENOUS PRN
Status: DISCONTINUED | OUTPATIENT
Start: 2024-05-02 | End: 2024-05-02 | Stop reason: SDUPTHER

## 2024-05-02 RX ADMIN — FENTANYL CITRATE 50 MCG: 50 INJECTION, SOLUTION INTRAMUSCULAR; INTRAVENOUS at 17:33

## 2024-05-02 RX ADMIN — CEFOXITIN 2000 MG: 2 INJECTION, POWDER, FOR SOLUTION INTRAVENOUS at 15:53

## 2024-05-02 RX ADMIN — SODIUM CHLORIDE, PRESERVATIVE FREE 10 ML: 5 INJECTION INTRAVENOUS at 19:58

## 2024-05-02 RX ADMIN — SODIUM CHLORIDE: 9 INJECTION, SOLUTION INTRAVENOUS at 15:35

## 2024-05-02 RX ADMIN — FENTANYL CITRATE 25 MCG: 50 INJECTION, SOLUTION INTRAMUSCULAR; INTRAVENOUS at 16:08

## 2024-05-02 RX ADMIN — ONDANSETRON 4 MG: 2 INJECTION INTRAMUSCULAR; INTRAVENOUS at 17:01

## 2024-05-02 RX ADMIN — NALOXEGOL OXALATE 12.5 MG: 12.5 TABLET, FILM COATED ORAL at 08:06

## 2024-05-02 RX ADMIN — CARVEDILOL 3.12 MG: 3.12 TABLET, FILM COATED ORAL at 08:05

## 2024-05-02 RX ADMIN — Medication 100 MCG: at 16:41

## 2024-05-02 RX ADMIN — CETIRIZINE HYDROCHLORIDE 5 MG: 5 TABLET ORAL at 08:06

## 2024-05-02 RX ADMIN — ROCURONIUM BROMIDE 50 MG: 10 INJECTION INTRAVENOUS at 15:45

## 2024-05-02 RX ADMIN — FENTANYL CITRATE 25 MCG: 50 INJECTION, SOLUTION INTRAMUSCULAR; INTRAVENOUS at 16:01

## 2024-05-02 RX ADMIN — ALBUMIN (HUMAN) 250 ML: 12.5 SOLUTION INTRAVENOUS at 16:39

## 2024-05-02 RX ADMIN — CARVEDILOL 3.12 MG: 3.12 TABLET, FILM COATED ORAL at 19:58

## 2024-05-02 RX ADMIN — PROCHLORPERAZINE EDISYLATE 10 MG: 5 INJECTION INTRAMUSCULAR; INTRAVENOUS at 21:23

## 2024-05-02 RX ADMIN — DEXAMETHASONE SODIUM PHOSPHATE 8 MG: 10 INJECTION, EMULSION INTRAMUSCULAR; INTRAVENOUS at 16:07

## 2024-05-02 RX ADMIN — HYDROMORPHONE HYDROCHLORIDE 1 MG: 1 INJECTION, SOLUTION INTRAMUSCULAR; INTRAVENOUS; SUBCUTANEOUS at 23:11

## 2024-05-02 RX ADMIN — MORPHINE SULFATE 2 MG: 2 INJECTION, SOLUTION INTRAMUSCULAR; INTRAVENOUS at 18:38

## 2024-05-02 RX ADMIN — PROPOFOL 100 MG: 10 INJECTION, EMULSION INTRAVENOUS at 15:45

## 2024-05-02 RX ADMIN — PIPERACILLIN AND TAZOBACTAM 3375 MG: 3; .375 INJECTION, POWDER, LYOPHILIZED, FOR SOLUTION INTRAVENOUS at 19:54

## 2024-05-02 RX ADMIN — DROPERIDOL 0.62 MG: 2.5 INJECTION, SOLUTION INTRAMUSCULAR; INTRAVENOUS at 17:27

## 2024-05-02 RX ADMIN — Medication 100 MG: at 15:45

## 2024-05-02 RX ADMIN — PANTOPRAZOLE SODIUM 40 MG: 40 TABLET, DELAYED RELEASE ORAL at 03:09

## 2024-05-02 RX ADMIN — PIPERACILLIN AND TAZOBACTAM 3375 MG: 3; .375 INJECTION, POWDER, LYOPHILIZED, FOR SOLUTION INTRAVENOUS at 08:17

## 2024-05-02 RX ADMIN — FENTANYL CITRATE 50 MCG: 50 INJECTION, SOLUTION INTRAMUSCULAR; INTRAVENOUS at 17:28

## 2024-05-02 RX ADMIN — OXYCODONE 5 MG: 5 TABLET ORAL at 19:58

## 2024-05-02 RX ADMIN — MORPHINE SULFATE 2 MG: 2 INJECTION, SOLUTION INTRAMUSCULAR; INTRAVENOUS at 20:56

## 2024-05-02 RX ADMIN — SUGAMMADEX 200 MG: 100 INJECTION, SOLUTION INTRAVENOUS at 17:13

## 2024-05-02 RX ADMIN — LEVOTHYROXINE SODIUM 62.5 MCG: 0.12 TABLET ORAL at 03:09

## 2024-05-02 RX ADMIN — SODIUM CHLORIDE: 9 INJECTION, SOLUTION INTRAVENOUS at 21:45

## 2024-05-02 RX ADMIN — SODIUM CHLORIDE: 9 INJECTION, SOLUTION INTRAVENOUS at 15:55

## 2024-05-02 RX ADMIN — SODIUM CHLORIDE: 9 INJECTION, SOLUTION INTRAVENOUS at 18:34

## 2024-05-02 RX ADMIN — FENTANYL CITRATE 25 MCG: 50 INJECTION, SOLUTION INTRAMUSCULAR; INTRAVENOUS at 15:45

## 2024-05-02 RX ADMIN — FENTANYL CITRATE 25 MCG: 50 INJECTION, SOLUTION INTRAMUSCULAR; INTRAVENOUS at 16:48

## 2024-05-02 RX ADMIN — FENTANYL CITRATE 50 MCG: 50 INJECTION INTRAMUSCULAR; INTRAVENOUS at 17:28

## 2024-05-02 RX ADMIN — MORPHINE SULFATE 2 MG: 2 INJECTION, SOLUTION INTRAMUSCULAR; INTRAVENOUS at 17:45

## 2024-05-02 RX ADMIN — ACETAMINOPHEN 650 MG: 325 TABLET ORAL at 08:33

## 2024-05-02 RX ADMIN — TROSPIUM CHLORIDE 20 MG: 20 TABLET, FILM COATED ORAL at 19:58

## 2024-05-02 ASSESSMENT — PAIN - FUNCTIONAL ASSESSMENT
PAIN_FUNCTIONAL_ASSESSMENT: PREVENTS OR INTERFERES SOME ACTIVE ACTIVITIES AND ADLS
PAIN_FUNCTIONAL_ASSESSMENT: PREVENTS OR INTERFERES SOME ACTIVE ACTIVITIES AND ADLS
PAIN_FUNCTIONAL_ASSESSMENT: PREVENTS OR INTERFERES WITH MANY ACTIVE NOT PASSIVE ACTIVITIES
PAIN_FUNCTIONAL_ASSESSMENT: PREVENTS OR INTERFERES SOME ACTIVE ACTIVITIES AND ADLS
PAIN_FUNCTIONAL_ASSESSMENT: PREVENTS OR INTERFERES WITH MANY ACTIVE NOT PASSIVE ACTIVITIES

## 2024-05-02 ASSESSMENT — PAIN SCALES - GENERAL
PAINLEVEL_OUTOF10: 8
PAINLEVEL_OUTOF10: 10
PAINLEVEL_OUTOF10: 4
PAINLEVEL_OUTOF10: 0
PAINLEVEL_OUTOF10: 10
PAINLEVEL_OUTOF10: 9
PAINLEVEL_OUTOF10: 0
PAINLEVEL_OUTOF10: 10
PAINLEVEL_OUTOF10: 8
PAINLEVEL_OUTOF10: 10

## 2024-05-02 ASSESSMENT — PAIN DESCRIPTION - PAIN TYPE
TYPE: SURGICAL PAIN

## 2024-05-02 ASSESSMENT — PAIN SCALES - WONG BAKER
WONGBAKER_NUMERICALRESPONSE: HURTS A LITTLE BIT
WONGBAKER_NUMERICALRESPONSE: NO HURT
WONGBAKER_NUMERICALRESPONSE: HURTS A LITTLE BIT
WONGBAKER_NUMERICALRESPONSE: HURTS LITTLE MORE
WONGBAKER_NUMERICALRESPONSE: HURTS A LITTLE BIT
WONGBAKER_NUMERICALRESPONSE: NO HURT

## 2024-05-02 ASSESSMENT — PAIN DESCRIPTION - FREQUENCY
FREQUENCY: CONTINUOUS

## 2024-05-02 ASSESSMENT — PAIN DESCRIPTION - ORIENTATION
ORIENTATION: RIGHT
ORIENTATION: MID
ORIENTATION: LOWER
ORIENTATION: RIGHT;LEFT
ORIENTATION: LOWER

## 2024-05-02 ASSESSMENT — PAIN DESCRIPTION - LOCATION
LOCATION: ABDOMEN

## 2024-05-02 ASSESSMENT — ENCOUNTER SYMPTOMS: SHORTNESS OF BREATH: 1

## 2024-05-02 ASSESSMENT — PAIN DESCRIPTION - DESCRIPTORS
DESCRIPTORS: ACHING

## 2024-05-02 ASSESSMENT — PAIN DESCRIPTION - ONSET
ONSET: ON-GOING

## 2024-05-02 NOTE — PROGRESS NOTES
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  Recent history of significant dysuria, UA per patient was negative.  Patient not started on any antibiotics.  Her symptoms worsened and patient developed worsening suprapubic/left-sided abdominal pain.  Denies diarrhea, hematochezia, fecal material in the urine, hematuria. CTAP w/o contrast: Wall thickening of bladder, perivesicular stranding, evidence of chronic/recurrent diverticulitis, air seen at anterior superior aspect of bladder and may be outside bladder, air present within bladder, concerning for colovesical fistula. UA here +ve moderate blood, large LE. Repeat CT w/ oral contrast --> did not reach sigmoid colon. General surgery, Dr. Rodriguez, and urology Dr. Valdes, consulted. Patient is leaning towards surgical intervention at this time. 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   General surgery and urology following: Plan for surgery today 5/2/2024  Continue Zosyn and vancomycin  Cardiac risk assessment shows RCRI score of 3 indicative of a class IV risk, may proceed with procedure however have extensive conversation with patient about risk factors.  Ischemic cardiomyopathy EF 25% on 7/2023, not in exacerbation: Echo 7/2023 shows severe global hypokinesis of LV, systolic function severely reduced, LAD.  History of PCI to LM/LAD, s/p Impella supported complex PCI 6/2022.  GDMT: Entresto, Coreg.  Not requiring O2 at this

## 2024-05-02 NOTE — PROGRESS NOTES
1724: Pt arrives to pacu, alert upon arrival. Pt on room air, respirations easy and unlabored. C/o pain. VSS  1727: Pt c/o nausea, 0.625mg of droperidol administered  1728: Pt c/o pain 10/10, 50mcg of fentanyl administered  1733: No change in pain, 50mcg of fentanyl given  1745: 2mg of morphine administered  1750: Report given to Terri on 4A  1752: Pt sleeping at this time, no signs of distress  1755: Updated daughter Audrey at this time  1805: Pt meets criteria for discharge from pacu, awaiting transport  1812: Pt transported back to  in stable condition. VSS

## 2024-05-02 NOTE — PLAN OF CARE
Problem: Discharge Planning  Goal: Discharge to home or other facility with appropriate resources  5/1/2024 2153 by Christy Gonzales, RN  Outcome: Progressing  Flowsheets (Taken 5/1/2024 2153)  Discharge to home or other facility with appropriate resources:   Identify barriers to discharge with patient and caregiver   Arrange for needed discharge resources and transportation as appropriate   Identify discharge learning needs (meds, wound care, etc)   Refer to discharge planning if patient needs post-hospital services based on physician order or complex needs related to functional status, cognitive ability or social support system     Problem: Chronic Conditions and Co-morbidities  Goal: Patient's chronic conditions and co-morbidity symptoms are monitored and maintained or improved  5/1/2024 2153 by Christy Gonzales, RN  Outcome: Progressing  Flowsheets (Taken 5/1/2024 2153)  Care Plan - Patient's Chronic Conditions and Co-Morbidity Symptoms are Monitored and Maintained or Improved:   Monitor and assess patient's chronic conditions and comorbid symptoms for stability, deterioration, or improvement   Collaborate with multidisciplinary team to address chronic and comorbid conditions and prevent exacerbation or deterioration   Update acute care plan with appropriate goals if chronic or comorbid symptoms are exacerbated and prevent overall improvement and discharge     Problem: Skin/Tissue Integrity  Goal: Absence of new skin breakdown  Description: 1.  Monitor for areas of redness and/or skin breakdown  2.  Assess vascular access sites hourly  3.  Every 4-6 hours minimum:  Change oxygen saturation probe site  4.  Every 4-6 hours:  If on nasal continuous positive airway pressure, respiratory therapy assess nares and determine need for appliance change or resting period.  5/1/2024 2153 by Christy Gonzales, RN  Outcome: Progressing     Problem: Safety - Adult  Goal: Free from fall injury  5/1/2024 2153 by

## 2024-05-02 NOTE — CARE COORDINATION
5/2/24, 2:55 PM EDT    DISCHARGE PLANNING EVALUATION    Met with Kourtney earlier today.  She continues to plan on returning home with her daughter Audrey and [facility].  She denies other needs at this time.

## 2024-05-02 NOTE — PROGRESS NOTES
[facility]  [facility] OR  Occupational Therapy  Daily Note  Time:   Time In: 1100  Time Out: 1126  Timed Code Treatment Minutes: 26 Minutes  Minutes: 26          Date: 2024  Patient Name: Kourtney Rankin,   Gender: female      Room: [facility] OR (General) POOL [facility]ARBY  MRN: [id number]  : 1934  (89 y.o.)  Referring Practitioner: Prieto Hernandez DO  Diagnosis: colovesical fistula  Additional Pertinent Hx: Kourtney Rankin is a 89 y.o. female with past medical history described below who presents to [facility] with complaints of chronic abdominal pain and dysuria.  Patient states she has had this problem for quite a while however the last few weeks its gotten worse.  She states that when she urinates it burns.  She was evaluated by her family doctor and was given a cream.  She says that the cream does not really help.  She states that when her urine was tested they did not find any evidence of infection.  She states her abdominal pain comes and goes and stays mainly around her umbilicus and left lower quadrants.  She has extensive history of diverticulitis.  Upon arrival patient was noted to be afebrile. VSS.  Laboratory workup was largely noncontributory.  Creatinine noted to be elevated above baseline at 1.7.  Chronic macrocytic anemia was appreciated.  Slight leukocytosis with a WBC of 11.9.  LFTs WNL.  UA showed moderate blood with large LE, no bacteria.  CT abdomen pelvis without contrast demonstrated wall thickening the bladder with perivesicular stranding consistent with history of cystitis.  There is evidence of chronic/recurrent diverticulitis.  Air was seen in the anterior superior aspect of the bladder and may be outside of the bladder.  Air present within the bladder.  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  Findings may also be secondary to recent instrumentation/emphysematous cystitis.    Restrictions/Precautions:  Restrictions/Precautions: Contact Precautions, General Precautions,

## 2024-05-02 NOTE — CARE COORDINATION
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 IV zosyn and vanc.     PCP: Jennifer Nathan APRN - CNP  Readmission Risk Score: 20.2    Patient Goals/Plan/Treatment Preferences: Plans home with daughter. Resume [facility]. SW following.    2022 22:03

## 2024-05-02 NOTE — ANESTHESIA PRE PROCEDURE
PACEMAKER PLACEMENT     • PRESSURE ULCER DEBRIDEMENT N/A 2021    DECUBITUS ULCER DEBRIDEMENT REPAIR performed by Claudio Stout MD at [facility] OR   • TRACHEOSTOMY         Social History:    Social History     Tobacco Use   • Smoking status: Former     Current packs/day: 0.00     Average packs/day: 1 pack/day for 15.0 years (15.0 ttl pk-yrs)     Types: Cigarettes     Start date: 12/10/1962     Quit date: 12/10/1977     Years since quittin.4   • Smokeless tobacco: Never   Substance Use Topics   • Alcohol use: No                                Counseling given: Not Answered      Vital Signs (Current):   Vitals:    24 0027 24 0300 24 0757 24 1242   BP: (!) 140/67 (!) 132/48 (!) 143/49 (!) 128/49   Pulse: 80 81 80 74   Resp: 18  16   Temp: 98.4 °F (36.9 °C) 98.1 °F (36.7 °C) 98.1 °F (36.7 °C) 97.9 °F (36.6 °C)   TempSrc: Oral Oral Oral Oral   SpO2: 97% 100% 100% 100%   Weight:  50 kg (110 lb 3.7 oz)     Height:                                                  BP Readings from Last 3 Encounters:   24 (!) 128/49   24 (!) 115/50   24 122/60       NPO Status:                                                                                 BMI:   Wt Readings from Last 3 Encounters:   24 50 kg (110 lb 3.7 oz)   24 50.8 kg (112 lb)   24 51.2 kg (112 lb 12.8 oz)     Body mass index is 21.53 kg/m².    CBC:   Lab Results   Component Value Date/Time    WBC 4.9 2024 03:39 AM    RBC 2.50 2024 03:39 AM    RBC 4.02 08/10/2011 05:24 AM    HGB 8.2 2024 03:39 AM    HGB 12.9 08/10/2011 05:24 AM    HCT 26.5 2024 03:39 AM    .0 2024 03:39 AM    RDW 14.2 2019 08:16 AM     2024 03:39 AM       CMP:   Lab Results   Component Value Date/Time     2024 03:39 AM    K 3.7 2024 03:39 AM    K 4.8 2024 09:34 PM     2024 03:39 AM    CO2 18 2024 03:39 AM    BUN 20 2024 03:39 AM

## 2024-05-02 NOTE — BRIEF OP NOTE
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 No evidence of bladder fistula       Procedure(s):  OPEN SIGMOID COLECTOMY      Surgeon(s):  Scar Rodriguez DO Murtagh, Daniel S Jr., MD    Assistant:  First Assistant: Armen Doe RN    Anesthesia: General    Estimated Blood Loss (mL): 100    Complications: None    Specimens:   ID Type Source Tests Collected by Time Destination   A : sigmoid colon and anastomonic rings Tissue Colon SURGICAL PATHOLOGY Scar Rodriguez DO 5/2/2024 1645        Implants:  * No implants in log *      Drains:   NG/OG/NJ/NE Tube Orogastric 18 fr Center mouth (Active)       Urinary Catheter 05/02/24 2 Way (Active)       External Urinary Catheter (Active)   Site Assessment Clean,dry & intact 05/01/24 2000   Placement Replaced 04/28/24 2315   Securement Method Other (Comment) 04/27/24 1020   Catheter Care Catheter/Wick replaced 04/28/24 2315   Perineal Care Yes 04/28/24 2315   Suction 40 mmgHg continuous 04/28/24 2315   Urine Color Yellow 04/28/24 2315   Urine Appearance Clear 04/28/24 2315   Output (mL) 0 mL 05/02/24 0027       Findings:  Infection Present At Time Of Surgery (PATOS) (choose all levels that have infection present):  No infection present    Electronically signed by Scar Rodriguez DO on 5/2/2024 at 5:07 PM

## 2024-05-02 NOTE — PROGRESS NOTES
[facility]   Pharmacy Pharmacokinetic Monitoring Service - Vancomycin    Indication: Intraabdominal infection   Target Concentration: Goal AUC/GABRIELLA 400-600 mg*hr/L  Day of Therapy: 7  Additional Antimicrobials: Zosyn    Pertinent Laboratory Values:   Wt Readings from Last 1 Encounters:   05/02/24 50 kg (110 lb 3.7 oz)     Temp Readings from Last 1 Encounters:   05/02/24 98.1 °F (36.7 °C) (Oral)     Estimated Creatinine Clearance: 17 mL/min (A) (based on SCr of 1.6 mg/dL (H)).  Recent Labs     05/01/24  0359 05/02/24  0339   CREATININE 1.5* 1.6*   BUN 22 20   WBC 4.4* 4.9     Pertinent Cultures:  Date Source Results   4/26/24 BC X 4 NGTD   MRSA Nasal Swab: N/A. Non-respiratory infection.    Recent vancomycin administrations                     vancomycin (VANCOCIN) 750 mg in sodium chloride 0.9 % 250 mL IVPB (mg) 750 mg New Bag 05/01/24 1340    vancomycin (VANCOCIN) 1250 mg in sodium chloride 0.9% 250 mL IVPB (mg) 1,250 mg New Bag 04/29/24 1731                    Assessment:  Date/Time Current Dose Concentration Timing of Concentration (h)   05/02/24 Intermittent; last dose 750 mg on 05/01 at 1340 17.6 14 hours post dose   Note: Serum concentrations collected for AUC dosing may appear elevated if collected in close proximity to the dose administered, this is not necessarily an indication of toxicity    Plan:  Current dosing regimen is therapeutic. Will start using dosing recommendations based on Bayesian software   Start vancomycin 750 mg Q36H                   -Anticipated  mg/L*h and steady state trough 15.8 mg/L  Repeat vancomycin concentration not ordered at this time  Pharmacy will continue to monitor patient and adjust therapy as indicated    Thank you for the consult,  Niki Brennan, PharmD  PGY1 Resident

## 2024-05-02 NOTE — PLAN OF CARE
Care plan reviewed with patient and family.  Patient and family verbalize understanding of the plan of care and contribute to goal setting.   Problem: Discharge Planning  Goal: Discharge to home or other facility with appropriate resources  Outcome: Progressing     Problem: Chronic Conditions and Co-morbidities  Goal: Patient's chronic conditions and co-morbidity symptoms are monitored and maintained or improved  Outcome: Progressing     Problem: Skin/Tissue Integrity  Goal: Absence of new skin breakdown  Description: 1.  Monitor for areas of redness and/or skin breakdown  2.  Assess vascular access sites hourly  3.  Every 4-6 hours minimum:  Change oxygen saturation probe site  4.  Every 4-6 hours:  If on nasal continuous positive airway pressure, respiratory therapy assess nares and determine need for appliance change or resting period.  Outcome: Progressing     Problem: Safety - Adult  Goal: Free from fall injury  Outcome: Progressing     Problem: ABCDS Injury Assessment  Goal: Absence of physical injury  Outcome: Progressing     Problem: Pain  Goal: Verbalizes/displays adequate comfort level or baseline comfort level  Outcome: Progressing     Problem: Nutrition Deficit:  Goal: Optimize nutritional status  Outcome: Progressing

## 2024-05-02 NOTE — ANESTHESIA POSTPROCEDURE EVALUATION
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    Erma Phase I: Erma Score: 9    Erma Phase II:      Anesthesia Post Evaluation    Patient location during evaluation: PACU  Patient participation: complete - patient participated  Level of consciousness: awake  Airway patency: patent  Nausea & Vomiting: no vomiting and no nausea  Cardiovascular status: hemodynamically stable  Respiratory status: acceptable  Hydration status: stable  Pain management: adequate    No notable events documented.

## 2024-05-03 PROBLEM — E44.0 MODERATE MALNUTRITION (HCC): Status: RESOLVED | Noted: 2021-12-21 | Resolved: 2024-05-03

## 2024-05-03 PROBLEM — E43 SEVERE MALNUTRITION (HCC): Status: ACTIVE | Noted: 2024-05-03

## 2024-05-03 LAB
ANION GAP SERPL CALC-SCNC: 18 MEQ/L (ref 8–16)
BUN SERPL-MCNC: 20 MG/DL (ref 7–22)
CALCIUM SERPL-MCNC: 8.2 MG/DL (ref 8.5–10.5)
CHLORIDE SERPL-SCNC: 110 MEQ/L (ref 98–111)
CO2 SERPL-SCNC: 14 MEQ/L (ref 23–33)
CREAT SERPL-MCNC: 1.6 MG/DL (ref 0.4–1.2)
DEPRECATED RDW RBC AUTO: 57.7 FL (ref 35–45)
ERYTHROCYTE [DISTWIDTH] IN BLOOD BY AUTOMATED COUNT: 14.9 % (ref 11.5–14.5)
GFR SERPL CREATININE-BSD FRML MDRD: 31 ML/MIN/1.73M2
GLUCOSE BLD STRIP.AUTO-MCNC: 92 MG/DL (ref 70–108)
GLUCOSE SERPL-MCNC: 117 MG/DL (ref 70–108)
HCT VFR BLD AUTO: 25.9 % (ref 37–47)
HGB BLD-MCNC: 8 GM/DL (ref 12–16)
MCH RBC QN AUTO: 33.2 PG (ref 26–33)
MCHC RBC AUTO-ENTMCNC: 30.9 GM/DL (ref 32.2–35.5)
MCV RBC AUTO: 107.5 FL (ref 81–99)
PATHOLOGIST REVIEW: ABNORMAL
PLATELET # BLD AUTO: 125 THOU/MM3 (ref 130–400)
PMV BLD AUTO: 12 FL (ref 9.4–12.4)
POTASSIUM SERPL-SCNC: 4.2 MEQ/L (ref 3.5–5.2)
RBC # BLD AUTO: 2.41 MILL/MM3 (ref 4.2–5.4)
SODIUM SERPL-SCNC: 142 MEQ/L (ref 135–145)
WBC # BLD AUTO: 33.6 THOU/MM3 (ref 4.8–10.8)

## 2024-05-03 PROCEDURE — 85027 COMPLETE CBC AUTOMATED: CPT

## 2024-05-03 PROCEDURE — 2580000003 HC RX 258: Performed by: SURGERY

## 2024-05-03 PROCEDURE — 80048 BASIC METABOLIC PNL TOTAL CA: CPT

## 2024-05-03 PROCEDURE — 6370000000 HC RX 637 (ALT 250 FOR IP): Performed by: SURGERY

## 2024-05-03 PROCEDURE — 6370000000 HC RX 637 (ALT 250 FOR IP)

## 2024-05-03 PROCEDURE — 99024 POSTOP FOLLOW-UP VISIT: CPT | Performed by: SURGERY

## 2024-05-03 PROCEDURE — 36415 COLL VENOUS BLD VENIPUNCTURE: CPT

## 2024-05-03 PROCEDURE — 6360000002 HC RX W HCPCS: Performed by: SURGERY

## 2024-05-03 PROCEDURE — 99232 SBSQ HOSP IP/OBS MODERATE 35: CPT | Performed by: HOSPITALIST

## 2024-05-03 PROCEDURE — 6360000002 HC RX W HCPCS

## 2024-05-03 PROCEDURE — 1200000003 HC TELEMETRY R&B

## 2024-05-03 PROCEDURE — 82948 REAGENT STRIP/BLOOD GLUCOSE: CPT

## 2024-05-03 RX ORDER — ACETAMINOPHEN 650 MG/1
650 SUPPOSITORY RECTAL EVERY 4 HOURS PRN
Status: DISCONTINUED | OUTPATIENT
Start: 2024-05-03 | End: 2024-05-09 | Stop reason: HOSPADM

## 2024-05-03 RX ORDER — ACETAMINOPHEN 325 MG/1
650 TABLET ORAL EVERY 4 HOURS PRN
Status: DISCONTINUED | OUTPATIENT
Start: 2024-05-03 | End: 2024-05-09 | Stop reason: HOSPADM

## 2024-05-03 RX ADMIN — NALOXEGOL OXALATE 12.5 MG: 12.5 TABLET, FILM COATED ORAL at 08:50

## 2024-05-03 RX ADMIN — SODIUM CHLORIDE: 9 INJECTION, SOLUTION INTRAVENOUS at 08:29

## 2024-05-03 RX ADMIN — SODIUM CHLORIDE: 9 INJECTION, SOLUTION INTRAVENOUS at 21:49

## 2024-05-03 RX ADMIN — HYDROMORPHONE HYDROCHLORIDE 1 MG: 1 INJECTION, SOLUTION INTRAMUSCULAR; INTRAVENOUS; SUBCUTANEOUS at 23:08

## 2024-05-03 RX ADMIN — TROSPIUM CHLORIDE 20 MG: 20 TABLET, FILM COATED ORAL at 20:13

## 2024-05-03 RX ADMIN — PIPERACILLIN AND TAZOBACTAM 3375 MG: 3; .375 INJECTION, POWDER, LYOPHILIZED, FOR SOLUTION INTRAVENOUS at 20:13

## 2024-05-03 RX ADMIN — PANTOPRAZOLE SODIUM 40 MG: 40 TABLET, DELAYED RELEASE ORAL at 05:16

## 2024-05-03 RX ADMIN — CETIRIZINE HYDROCHLORIDE 5 MG: 5 TABLET ORAL at 08:49

## 2024-05-03 RX ADMIN — OXYCODONE 5 MG: 5 TABLET ORAL at 15:02

## 2024-05-03 RX ADMIN — ACETAMINOPHEN 650 MG: 650 SUPPOSITORY RECTAL at 23:05

## 2024-05-03 RX ADMIN — OXYCODONE 5 MG: 5 TABLET ORAL at 08:49

## 2024-05-03 RX ADMIN — PROCHLORPERAZINE EDISYLATE 10 MG: 5 INJECTION INTRAMUSCULAR; INTRAVENOUS at 22:00

## 2024-05-03 RX ADMIN — CARVEDILOL 3.12 MG: 3.12 TABLET, FILM COATED ORAL at 23:00

## 2024-05-03 RX ADMIN — HEPARIN SODIUM 5000 UNITS: 5000 INJECTION INTRAVENOUS; SUBCUTANEOUS at 20:14

## 2024-05-03 RX ADMIN — HEPARIN SODIUM 5000 UNITS: 5000 INJECTION INTRAVENOUS; SUBCUTANEOUS at 08:49

## 2024-05-03 RX ADMIN — OXYCODONE 5 MG: 5 TABLET ORAL at 20:13

## 2024-05-03 RX ADMIN — PIPERACILLIN AND TAZOBACTAM 3375 MG: 3; .375 INJECTION, POWDER, LYOPHILIZED, FOR SOLUTION INTRAVENOUS at 10:37

## 2024-05-03 RX ADMIN — MORPHINE SULFATE 2 MG: 2 INJECTION, SOLUTION INTRAMUSCULAR; INTRAVENOUS at 03:30

## 2024-05-03 RX ADMIN — HYDROMORPHONE HYDROCHLORIDE 1 MG: 1 INJECTION, SOLUTION INTRAMUSCULAR; INTRAVENOUS; SUBCUTANEOUS at 05:17

## 2024-05-03 RX ADMIN — LEVOTHYROXINE SODIUM 62.5 MCG: 0.12 TABLET ORAL at 05:16

## 2024-05-03 RX ADMIN — ACETAMINOPHEN 650 MG: 650 SUPPOSITORY RECTAL at 05:35

## 2024-05-03 ASSESSMENT — PAIN DESCRIPTION - LOCATION
LOCATION: ABDOMEN

## 2024-05-03 ASSESSMENT — PAIN DESCRIPTION - ORIENTATION
ORIENTATION: LOWER
ORIENTATION: LEFT;MID

## 2024-05-03 ASSESSMENT — PAIN - FUNCTIONAL ASSESSMENT
PAIN_FUNCTIONAL_ASSESSMENT: ACTIVITIES ARE NOT PREVENTED
PAIN_FUNCTIONAL_ASSESSMENT: PREVENTS OR INTERFERES SOME ACTIVE ACTIVITIES AND ADLS
PAIN_FUNCTIONAL_ASSESSMENT: ACTIVITIES ARE NOT PREVENTED

## 2024-05-03 ASSESSMENT — PAIN DESCRIPTION - PAIN TYPE: TYPE: ACUTE PAIN;SURGICAL PAIN

## 2024-05-03 ASSESSMENT — PAIN DESCRIPTION - DESCRIPTORS
DESCRIPTORS: ACHING

## 2024-05-03 ASSESSMENT — PAIN SCALES - GENERAL
PAINLEVEL_OUTOF10: 9
PAINLEVEL_OUTOF10: 7
PAINLEVEL_OUTOF10: 9
PAINLEVEL_OUTOF10: 9
PAINLEVEL_OUTOF10: 7
PAINLEVEL_OUTOF10: 8
PAINLEVEL_OUTOF10: 8
PAINLEVEL_OUTOF10: 9

## 2024-05-03 ASSESSMENT — PAIN DESCRIPTION - ONSET: ONSET: ON-GOING

## 2024-05-03 ASSESSMENT — PAIN DESCRIPTION - FREQUENCY: FREQUENCY: INTERMITTENT

## 2024-05-03 NOTE — PROGRESS NOTES
Luz Estrada MD for Dr. [facility]   General Surgery Daily Progress Note    Pt Name: Kourtney Rankin  Medical Record Number: [id number]  Date of Birth 11/29/1934   Today's Date: 5/3/2024  Chief complaint: lower abd pain  ASSESSMENT   Hospital day # 7 POD#1 sigmoid colectomy  [place] vesicle fistula  not identified at surgery  Post op leukocytosis   has a past medical history of HERIBERTO (acute kidney injury) ([facility]), Anxiety, Arthritis, Bronchitis, CAD (coronary artery disease), CHF (congestive heart failure) ([facility]), Chronic a-fib ([facility]), COVID, Diverticulitis of colon, Hyperkalemia, Hypertension, Medtronic cobalt dual ICD , Neuromuscular dysfunction of bladder, Personal history of COVID-19, Pressure ulcer, and Protein-calorie malnutrition ([facility]).  PLAN   Continue ice chip only today  NPO at midnight  Anemia- trend  Up out of bed- PT/OT  Continue to hold blood thinners today  Continue antibiotics with leukocytosis  SUBJECTIVE   Kourtney is doing fair. She denies any nausea or vomiting, has not passed flatus not had a bowel movement. Taking some ice chips and voiding   CURRENT MEDICATIONS   Scheduled Meds   sodium chloride flush  5-40 mL IntraVENous 2 times per day    heparin (porcine)  5,000 Units SubCUTAneous BID    naloxegol  12.5 mg Oral QAM    [Held by provider] ticagrelor  90 mg Oral BID    [Held by provider] apixaban  2.5 mg Oral BID    carvedilol  3.125 mg Oral BID    cetirizine  5 mg Oral Daily    levothyroxine  62.5 mcg Oral QAM AC    trospium  20 mg Oral Nightly    pantoprazole  40 mg Oral QAM AC    [Held by provider] sacubitril-valsartan  1 tablet Oral BID    piperacillin-tazobactam  3,375 mg IntraVENous Q12H     Continuous Infusions:   sodium chloride 100 mL/hr at 05/03/24 0829    sodium chloride       PRN Meds:.HYDROmorphone, acetaminophen, acetaminophen, sodium chloride flush, sodium chloride, oxyCODONE, prochlorperazine, [DISCONTINUED] ondansetron **OR** ondansetron  OBJECTIVE   CURRENT VITALS:

## 2024-05-03 NOTE — OP NOTE
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    POSTOPERATIVE DIAGNOSIS:  Diverticulitis with no evidence of bladder fistula.    PROCEDURE PERFORMED:  Sigmoid colectomy with primary anastomosis.    ANESTHESIA:  General.    ESTIMATED BLOOD LOSS:  100 mL.    SPECIMEN:  Sigmoid colon sent to Pathology for analysis.    COMPLICATIONS:  None immediately appreciated.    DISCUSSION:  The patient is an 89-year-old female, who presented to the hospital with recurrent diverticular disease and suspected colovesical fistula present on imaging.  After history and physical examination was performed, potential diagnostic and therapeutic modalities were discussed with the patient and her family; operative and nonoperative management discussed; risks, complications, and benefits were reviewed.  They were given the opportunity to ask questions and once they were answered, informed consent was obtained.  The patient was brought to the operating room on 2024 for procedure.    OPERATIVE FINDINGS:  At the time of exploration, the patient was found to have a sigmoid colon, which had been thickened both acutely and chronically, was adherent to the backside of the bladder and the left fallopian tube.  This was mobilized off this, and then the bladder itself was inspected.  Dr. Francois actually came into the case and performed the hydrodistention with no evidence of bladder leak evident.  The remainder of the sigmoid colectomy was performed as described below.    DESCRIPTION  stitch.  Wounds were then cleansed, sterile dressings were applied.  The patient was brought out of anesthesia, transferred to the PACU in stable and satisfactory condition.  No immediate complication evident.  All sponge, instrument, and needle counts were correct at the completion of the procedure.    Postoperative findings were discussed with the patient's family.  She will be admitted to the hospital for postoperative management with discharge pending her progression.          SCARLETT LAGUERRE DO      D:  05/02/2024 17:16:31     T:  05/02/2024 23:48:17     DENNY/VASHTI  Job #:  [id number]     Doc#:  [id number]

## 2024-05-03 NOTE — CONSULTS
Irwin Francois Jr, MD   Urology Intraoperative Consult Note     Patient:  Kourtney Rankin  MRN: [id number]  YOB: 1934    ATTENDING: Irwin Francois Jr, MD     CHIEF COMPLAINT:  CV fistula    HISTORY OF PRESENT ILLNESS:   The patient is a 89 y.o. female who presents with history of a CV fistula, taken back to OR by Dr. Rodriguez, Urology consulted to close bladder.  Upon entering the OR we evaluated the fistula and it was located on the left pelvic wall.  We retrofilled the bladder with methylene blue and the bladder filled midline away from the area of concern.  There was no extravasation of fluid, the fistula was not connected with the bladder.  The bladder appeared intact without fistulization.  Urology exited the room and Dr. Rodriguez continued with his procedure.     Patient's old records, notes and chart reviewed and summarized above.    Past Medical History:    Past Medical History:   Diagnosis Date    HERIBERTO (acute kidney injury) (HCC)     Anxiety     Arthritis     Bronchitis     CAD (coronary artery disease)     CHF (congestive heart failure) (HCC)     Chronic a-fib (HCC)     COVID     October 2021    Diverticulitis of colon     Hyperkalemia     Hypertension     Medtronic cobalt dual ICD  02/15/2023    Neuromuscular dysfunction of bladder     Personal history of COVID-19     Pressure ulcer     Protein-calorie malnutrition (HCC)        Past Surgical History:    Past Surgical History:   Procedure Laterality Date    ABDOMEN SURGERY      APPENDECTOMY      CHOLECYSTECTOMY      GASTROSTOMY TUBE PLACEMENT N/A 11/17/2021    EGD PEG TUBE PLACEMENT performed by Marvin Carbajal MD at [facility]    GASTROSTOMY TUBE PLACEMENT N/A 07/10/2023    PEG REMOVAL performed by Marvin Carbajal MD at [facility]    PACEMAKER PLACEMENT      PRESSURE ULCER DEBRIDEMENT N/A 12/23/2021    DECUBITUS ULCER DEBRIDEMENT REPAIR performed by Claudio Stout MD at [facility] OR    TRACHEOSTOMY           Medications:   Transportation     Lack of Transportation (Medical): No     Lack of Transportation (Non-Medical): No   Physical Activity: Not on file   Stress: Not on file   Social Connections: Not on file   Intimate Partner Violence: Not on file   Housing Stability: Low Risk  (4/26/2024)    Housing Stability Vital Sign     Unable to Pay for Housing in the Last Year: No     Number of Places Lived in the Last Year: 1     Unstable Housing in the Last Year: No     Family History:  History reviewed. No pertinent family history.  Previous Urologic Family history: none    REVIEW OF SYSTEMS:  Patient on OR table intubated and sedated.    Physical Exam:    This a 89 y.o. male   Patient Vitals for the past 24 hrs:   BP Temp Temp src Pulse Resp SpO2   05/03/24 0841 (!) 101/41 98.1 °F (36.7 °C) Oral 100 18 --   05/03/24 0547 -- -- -- -- 18 --   05/03/24 0532 -- (!) 100.6 °F (38.1 °C) Rectal -- -- --   05/03/24 0517 -- -- -- -- 18 --   05/03/24 0400 -- -- -- -- 18 --   05/03/24 0338 90/75 98.4 °F (36.9 °C) Oral (!) 106 18 98 %   05/03/24 0330 -- -- -- -- 18 --   05/02/24 2341 -- -- -- -- 18 --   05/02/24 2318 (!) 136/47 98.6 °F (37 °C) Oral (!) 103 18 98 %   05/02/24 2311 -- -- -- -- 18 --   05/02/24 2142 (!) 125/48 97.5 °F (36.4 °C) Oral 94 18 96 %   05/02/24 2127 (!) 130/46 97.5 °F (36.4 °C) Oral 95 18 95 %   05/02/24 2126 -- -- -- -- 18 --   05/02/24 2122 (!) 130/46 -- -- 94 -- 96 %   05/02/24 2105 (!) 148/60 -- -- 95 -- 96 %   05/02/24 2056 -- -- -- -- 18 --   05/02/24 2010 125/88 98.6 °F (37 °C) Oral 97 18 98 %   05/02/24 1958 -- -- -- -- 18 --   05/02/24 1939 (!) 154/55 98.6 °F (37 °C) Oral 86 18 97 %   05/02/24 1938 (!) 151/58 98.6 °F (37 °C) -- 87 18 99 %   05/02/24 1915 (!) 149/56 -- -- 84 16 95 %   05/02/24 1908 -- -- -- -- 18 --   05/02/24 1900 (!) 143/58 -- -- 86 16 95 %   05/02/24 1845 (!) 151/57 -- -- 86 16 97 %   05/02/24 1830 (!) 159/60 -- -- 82 18 97 %   05/02/24 1815 (!) 162/65 97.5 °F (36.4 °C) Oral -- 18 98 %   05/02/24

## 2024-05-03 NOTE — PROGRESS NOTES
Comprehensive Nutrition Assessment    Type and Reason for Visit: Reassess    Nutrition Recommendations/Plan:   Advance diet as medically appropriate.   Initiate Ensure Clear and Cottage Cheese BID when diet advances.      Malnutrition Assessment:  Malnutrition Status: Severe malnutrition  Context: Chronic Illness       Findings of the 6 clinical characteristics of malnutrition:  Energy Intake:  75% or less estimated energy requirements for 1 month or longer (eats several small meals per day related to diverticulitis)  Weight Loss:  No significant weight loss     Body Fat Loss:  Mild body fat loss (moderate orbital and triceps fat losses) Buccal region   Muscle Mass Loss:   (moderate temple and clavicle muscle losses)    Fluid Accumulation:  Unable to assess     Strength:  Not Performed    Nutrition Assessment:    Pt. with no improvement from a nutritional standpoint AEB NPO. At risk for further nutrition compromise r/t severe malnutrition; admitted due to abd pain and dysuria. Went to OR on 5/2 for sigmoid colectomy with post op leukocytosis. Noted underlying medical condition (PMHx HERIBERTO, bronchitis, CAD, CHF, Afib, diverticulitis, HTN, pressure ulcer, malnutrition).     Nutrition Related Findings:    Patient/ Family Comments: Per patient her appetite is ok. She reports some nausea. She is currently NPO with ice chips only, but she reports she really wants some chicken broth- she thinks she will feel better after having a little bit more in her stomach.   Edema:  trace edema ,per flowsheet  GI Function: LBM 05/01/24 per flowsheet  Wound:  (patient reports a bed sore on her bottom. Sigmoid colectomy, abd incision on 5/2)       Labs:   No results found for: \"GLUF\", \"LABA1C\"  Recent Labs     05/01/24  0359 05/02/24  0339 05/03/24  0354    142 142   K 3.2* 3.7 4.2    110 110   GLUCOSE 91 94 117*   BUN 22 20 20   CREATININE 1.5* 1.6* 1.6*   MG 1.7  --   --      Medications: heparin, carvedilol, cetirizine,

## 2024-05-03 NOTE — CARE COORDINATION
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      PCP: Jennifer Nathan, SERGE - CNP  Readmission Risk Score: 22.3    Patient Goals/Plan/Treatment Preferences: Plans return home with daughter. Resume [facility]. SW following.

## 2024-05-03 NOTE — PLAN OF CARE
Problem: Discharge Planning  Goal: Discharge to home or other facility with appropriate resources  5/2/2024 2255 by Lyric Baez RN  Outcome: Progressing  Flowsheets (Taken 5/2/2024 2255)  Discharge to home or other facility with appropriate resources:   Identify barriers to discharge with patient and caregiver   Arrange for needed discharge resources and transportation as appropriate     Problem: Chronic Conditions and Co-morbidities  Goal: Patient's chronic conditions and co-morbidity symptoms are monitored and maintained or improved  5/2/2024 2255 by Lyric Baez RN  Outcome: Progressing  Flowsheets (Taken 5/2/2024 2255)  Care Plan - Patient's Chronic Conditions and Co-Morbidity Symptoms are Monitored and Maintained or Improved:   Monitor and assess patient's chronic conditions and comorbid symptoms for stability, deterioration, or improvement   Collaborate with multidisciplinary team to address chronic and comorbid conditions and prevent exacerbation or deterioration     Problem: Skin/Tissue Integrity  Goal: Absence of new skin breakdown  Description: 1.  Monitor for areas of redness and/or skin breakdown  2.  Assess vascular access sites hourly  3.  Every 4-6 hours minimum:  Change oxygen saturation probe site  4.  Every 4-6 hours:  If on nasal continuous positive airway pressure, respiratory therapy assess nares and determine need for appliance change or resting period.  5/2/2024 2255 by Lyric Baez RN  Outcome: Progressing     Problem: Safety - Adult  Goal: Free from fall injury  5/2/2024 2255 by Lyric Baez RN  Outcome: Progressing  Flowsheets (Taken 5/2/2024 2255)  Free From Fall Injury: Instruct family/caregiver on patient safety     Problem: ABCDS Injury Assessment  Goal: Absence of physical injury  5/2/2024 2255 by Lyric Baez, RN  Outcome: Progressing  Flowsheets (Taken 5/2/2024 2255)  Absence of Physical Injury: Implement safety measures based on patient assessment     Problem:

## 2024-05-03 NOTE — PROGRESS NOTES
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  Recent history of significant dysuria, UA per patient was negative.  Patient not started on any antibiotics.  Her symptoms worsened and patient developed worsening suprapubic/left-sided abdominal pain.  Denies diarrhea, hematochezia, fecal material in the urine, hematuria. CTAP w/o contrast: Wall thickening of bladder, perivesicular stranding, evidence of chronic/recurrent diverticulitis, air seen at anterior superior aspect of bladder and may be outside bladder, air present within bladder, concerning for colovesical fistula. UA here +ve moderate blood, large LE. Repeat CT w/ oral contrast --> did not reach sigmoid colon. General surgery, Dr. Rodriguez, and urology Dr. Valdes, consulted. Patient is leaning towards surgical intervention at this time. 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   General surgery and urology following: Plan for surgery today 5/2/2024  Continue Zosyn, plan to discontinue Zosyn tomorrow 5//2024  Cardiac risk assessment shows RCRI score of 3 indicative of a class IV risk, may proceed with procedure however have extensive conversation with patient about risk factors.  Ischemic cardiomyopathy EF 25% on 7/2023, not in exacerbation: Echo 7/2023 shows severe global hypokinesis of LV, systolic function severely reduced, LAD.  History of PCI to LM/LAD, s/p Impella supported complex PCI 6/2022.  GDMT: Entresto, Coreg.  Not  Pulses: +2 palpable, equal bilaterally.       Labs:   Recent Labs     05/01/24  0359 05/02/24  0339 05/03/24  0354   WBC 4.4* 4.9 33.6*   HGB 9.0* 8.2* 8.0*   HCT 27.8* 26.5* 25.9*    116* 125*     Recent Labs     05/01/24  0359 05/01/24  1711 05/02/24  0339 05/03/24  0354     --  142 142   K 3.2* 3.7 3.7 4.2     --  110 110   CO2 19*  --  18* 14*   BUN 22  --  20 20   CREATININE 1.5*  --  1.6* 1.6*   CALCIUM 8.7  --  8.9 8.2*     No results for input(s): \"AST\", \"ALT\", \"BILIDIR\", \"BILITOT\", \"ALKPHOS\" in the last 72 hours.  No results for input(s): \"INR\" in the last 72 hours.  No results for input(s): \"TROPONINT\" in the last 72 hours.  No results for input(s): \"PROCAL\" in the last 72 hours.   Lab Results   Component Value Date/Time    NITRU NEGATIVE 04/26/2024 12:07 AM    WBCUA 15-25 04/26/2024 12:07 AM    BACTERIA NONE SEEN 04/26/2024 12:07 AM    RBCUA 3-5 04/26/2024 12:07 AM    BLOODU MODERATE 04/26/2024 12:07 AM    GLUCOSEU NEGATIVE 04/26/2024 12:07 AM       Radiology: No results found.  see assessment and plan for discussion of pertinent imaging.       DVT prophylaxis:    [] Lovenox  [x] SCDs  [] SQ Heparin  [] Encourage ambulation   [] Already on Anticoagulation  Diet: Diet NPO Exceptions are: Sips of Water with Meds, Ice Chips  Code Status: Full Code  PT/OT: Following  Tele: Yes  IVF: No    Electronically signed by Errol Frias DO on 5/3/2024 at 4:19 PM    Case was discussed with Attending, Dr. Allen

## 2024-05-04 LAB
ANION GAP SERPL CALC-SCNC: 15 MEQ/L (ref 8–16)
BUN SERPL-MCNC: 23 MG/DL (ref 7–22)
CALCIUM SERPL-MCNC: 7.8 MG/DL (ref 8.5–10.5)
CHLORIDE SERPL-SCNC: 112 MEQ/L (ref 98–111)
CO2 SERPL-SCNC: 15 MEQ/L (ref 23–33)
CREAT SERPL-MCNC: 2 MG/DL (ref 0.4–1.2)
DEPRECATED RDW RBC AUTO: 61 FL (ref 35–45)
ERYTHROCYTE [DISTWIDTH] IN BLOOD BY AUTOMATED COUNT: 15.5 % (ref 11.5–14.5)
GFR SERPL CREATININE-BSD FRML MDRD: 23 ML/MIN/1.73M2
GLUCOSE SERPL-MCNC: 88 MG/DL (ref 70–108)
HCT VFR BLD AUTO: 21.2 % (ref 37–47)
HCT VFR BLD AUTO: 22.4 % (ref 37–47)
HCT VFR BLD AUTO: 24.8 % (ref 37–47)
HCT VFR BLD AUTO: 27.4 % (ref 37–47)
HGB BLD-MCNC: 6.4 GM/DL (ref 12–16)
HGB BLD-MCNC: 6.7 GM/DL (ref 12–16)
HGB BLD-MCNC: 7.8 GM/DL (ref 12–16)
HGB BLD-MCNC: 8.1 GM/DL (ref 12–16)
MCH RBC QN AUTO: 33.3 PG (ref 26–33)
MCHC RBC AUTO-ENTMCNC: 29.9 GM/DL (ref 32.2–35.5)
MCV RBC AUTO: 111.4 FL (ref 81–99)
PATHOLOGIST REVIEW: ABNORMAL
PLATELET # BLD AUTO: 105 THOU/MM3 (ref 130–400)
PMV BLD AUTO: 12.1 FL (ref 9.4–12.4)
POTASSIUM SERPL-SCNC: 4.2 MEQ/L (ref 3.5–5.2)
RBC # BLD AUTO: 2.01 MILL/MM3 (ref 4.2–5.4)
REASON FOR REJECTION: NORMAL
REJECTED TEST: NORMAL
SODIUM SERPL-SCNC: 142 MEQ/L (ref 135–145)
WBC # BLD AUTO: 20.1 THOU/MM3 (ref 4.8–10.8)

## 2024-05-04 PROCEDURE — 85014 HEMATOCRIT: CPT

## 2024-05-04 PROCEDURE — 36430 TRANSFUSION BLD/BLD COMPNT: CPT

## 2024-05-04 PROCEDURE — 99232 SBSQ HOSP IP/OBS MODERATE 35: CPT | Performed by: HOSPITALIST

## 2024-05-04 PROCEDURE — 36415 COLL VENOUS BLD VENIPUNCTURE: CPT

## 2024-05-04 PROCEDURE — 1200000003 HC TELEMETRY R&B

## 2024-05-04 PROCEDURE — 6370000000 HC RX 637 (ALT 250 FOR IP): Performed by: SURGERY

## 2024-05-04 PROCEDURE — 2580000003 HC RX 258: Performed by: SURGERY

## 2024-05-04 PROCEDURE — 85018 HEMOGLOBIN: CPT

## 2024-05-04 PROCEDURE — APPSS30 APP SPLIT SHARED TIME 16-30 MINUTES: Performed by: NURSE PRACTITIONER

## 2024-05-04 PROCEDURE — 6370000000 HC RX 637 (ALT 250 FOR IP)

## 2024-05-04 PROCEDURE — 6360000002 HC RX W HCPCS: Performed by: SURGERY

## 2024-05-04 PROCEDURE — 85027 COMPLETE CBC AUTOMATED: CPT

## 2024-05-04 PROCEDURE — 80048 BASIC METABOLIC PNL TOTAL CA: CPT

## 2024-05-04 PROCEDURE — 99024 POSTOP FOLLOW-UP VISIT: CPT | Performed by: NURSE PRACTITIONER

## 2024-05-04 RX ORDER — FLUTICASONE PROPIONATE 50 MCG
1 SPRAY, SUSPENSION (ML) NASAL DAILY
Status: DISCONTINUED | OUTPATIENT
Start: 2024-05-04 | End: 2024-05-09 | Stop reason: HOSPADM

## 2024-05-04 RX ORDER — SODIUM CHLORIDE 9 MG/ML
INJECTION, SOLUTION INTRAVENOUS PRN
Status: DISCONTINUED | OUTPATIENT
Start: 2024-05-04 | End: 2024-05-09 | Stop reason: HOSPADM

## 2024-05-04 RX ADMIN — FLUTICASONE PROPIONATE 1 SPRAY: 50 SPRAY, METERED NASAL at 22:22

## 2024-05-04 RX ADMIN — SODIUM CHLORIDE: 9 INJECTION, SOLUTION INTRAVENOUS at 08:07

## 2024-05-04 RX ADMIN — PANTOPRAZOLE SODIUM 40 MG: 40 TABLET, DELAYED RELEASE ORAL at 03:39

## 2024-05-04 RX ADMIN — SODIUM CHLORIDE, PRESERVATIVE FREE 10 ML: 5 INJECTION INTRAVENOUS at 21:04

## 2024-05-04 RX ADMIN — OXYCODONE 5 MG: 5 TABLET ORAL at 03:39

## 2024-05-04 RX ADMIN — ACETAMINOPHEN 650 MG: 325 TABLET ORAL at 21:04

## 2024-05-04 RX ADMIN — CARVEDILOL 3.12 MG: 3.12 TABLET, FILM COATED ORAL at 08:10

## 2024-05-04 RX ADMIN — CETIRIZINE HYDROCHLORIDE 5 MG: 5 TABLET ORAL at 09:36

## 2024-05-04 RX ADMIN — PIPERACILLIN AND TAZOBACTAM 3375 MG: 3; .375 INJECTION, POWDER, LYOPHILIZED, FOR SOLUTION INTRAVENOUS at 20:01

## 2024-05-04 RX ADMIN — OXYCODONE 5 MG: 5 TABLET ORAL at 12:20

## 2024-05-04 RX ADMIN — LEVOTHYROXINE SODIUM 62.5 MCG: 0.12 TABLET ORAL at 03:40

## 2024-05-04 RX ADMIN — CARVEDILOL 3.12 MG: 3.12 TABLET, FILM COATED ORAL at 21:04

## 2024-05-04 RX ADMIN — SODIUM CHLORIDE, PRESERVATIVE FREE 10 ML: 5 INJECTION INTRAVENOUS at 08:11

## 2024-05-04 RX ADMIN — TROSPIUM CHLORIDE 20 MG: 20 TABLET, FILM COATED ORAL at 21:07

## 2024-05-04 RX ADMIN — NALOXEGOL OXALATE 12.5 MG: 12.5 TABLET, FILM COATED ORAL at 09:36

## 2024-05-04 RX ADMIN — PIPERACILLIN AND TAZOBACTAM 3375 MG: 3; .375 INJECTION, POWDER, LYOPHILIZED, FOR SOLUTION INTRAVENOUS at 08:14

## 2024-05-04 RX ADMIN — OXYCODONE 5 MG: 5 TABLET ORAL at 21:04

## 2024-05-04 ASSESSMENT — PAIN SCALES - GENERAL
PAINLEVEL_OUTOF10: 6
PAINLEVEL_OUTOF10: 3
PAINLEVEL_OUTOF10: 7
PAINLEVEL_OUTOF10: 9
PAINLEVEL_OUTOF10: 2
PAINLEVEL_OUTOF10: 7
PAINLEVEL_OUTOF10: 2

## 2024-05-04 ASSESSMENT — PAIN DESCRIPTION - PAIN TYPE
TYPE: ACUTE PAIN;SURGICAL PAIN
TYPE: SURGICAL PAIN
TYPE: ACUTE PAIN;SURGICAL PAIN

## 2024-05-04 ASSESSMENT — PAIN DESCRIPTION - DESCRIPTORS
DESCRIPTORS: ACHING;BURNING
DESCRIPTORS: ACHING

## 2024-05-04 ASSESSMENT — PAIN DESCRIPTION - LOCATION
LOCATION: ABDOMEN

## 2024-05-04 ASSESSMENT — PAIN DESCRIPTION - ONSET
ONSET: ON-GOING

## 2024-05-04 ASSESSMENT — PAIN DESCRIPTION - FREQUENCY
FREQUENCY: INTERMITTENT
FREQUENCY: CONTINUOUS
FREQUENCY: INTERMITTENT

## 2024-05-04 ASSESSMENT — PAIN - FUNCTIONAL ASSESSMENT
PAIN_FUNCTIONAL_ASSESSMENT: ACTIVITIES ARE NOT PREVENTED
PAIN_FUNCTIONAL_ASSESSMENT: PREVENTS OR INTERFERES SOME ACTIVE ACTIVITIES AND ADLS
PAIN_FUNCTIONAL_ASSESSMENT: PREVENTS OR INTERFERES SOME ACTIVE ACTIVITIES AND ADLS
PAIN_FUNCTIONAL_ASSESSMENT: ACTIVITIES ARE NOT PREVENTED
PAIN_FUNCTIONAL_ASSESSMENT: PREVENTS OR INTERFERES SOME ACTIVE ACTIVITIES AND ADLS
PAIN_FUNCTIONAL_ASSESSMENT: PREVENTS OR INTERFERES SOME ACTIVE ACTIVITIES AND ADLS

## 2024-05-04 ASSESSMENT — PAIN DESCRIPTION - ORIENTATION
ORIENTATION: LEFT
ORIENTATION: LOWER

## 2024-05-04 ASSESSMENT — PAIN SCALES - WONG BAKER: WONGBAKER_NUMERICALRESPONSE: NO HURT

## 2024-05-04 NOTE — PLAN OF CARE
Problem: Discharge Planning  Goal: Discharge to home or other facility with appropriate resources  Outcome: Progressing  Flowsheets (Taken 5/2/2024 2255 by Lyric Baez, RN)  Discharge to home or other facility with appropriate resources:   Identify barriers to discharge with patient and caregiver   Arrange for needed discharge resources and transportation as appropriate     Problem: Chronic Conditions and Co-morbidities  Goal: Patient's chronic conditions and co-morbidity symptoms are monitored and maintained or improved  Outcome: Progressing  Flowsheets (Taken 5/2/2024 2255 by Lyric Baez, RN)  Care Plan - Patient's Chronic Conditions and Co-Morbidity Symptoms are Monitored and Maintained or Improved:   Monitor and assess patient's chronic conditions and comorbid symptoms for stability, deterioration, or improvement   Collaborate with multidisciplinary team to address chronic and comorbid conditions and prevent exacerbation or deterioration     Problem: Skin/Tissue Integrity  Goal: Absence of new skin breakdown  Description: 1.  Monitor for areas of redness and/or skin breakdown  2.  Assess vascular access sites hourly  3.  Every 4-6 hours minimum:  Change oxygen saturation probe site  4.  Every 4-6 hours:  If on nasal continuous positive airway pressure, respiratory therapy assess nares and determine need for appliance change or resting period.  Outcome: Progressing     Problem: Safety - Adult  Goal: Free from fall injury  Outcome: Progressing  Flowsheets (Taken 5/2/2024 2255 by Lyric Baez, RN)  Free From Fall Injury: Instruct family/caregiver on patient safety     Problem: ABCDS Injury Assessment  Goal: Absence of physical injury  Outcome: Progressing  Flowsheets (Taken 5/2/2024 2255 by Lyric Baez, RN)  Absence of Physical Injury: Implement safety measures based on patient assessment     Problem: Pain  Goal: Verbalizes/displays adequate comfort level or baseline comfort level  Outcome:  Progressing  Flowsheets (Taken 5/2/2024 2255 by Lyric Baez, RN)  Verbalizes/displays adequate comfort level or baseline comfort level:   Encourage patient to monitor pain and request assistance   Assess pain using appropriate pain scale     Problem: Nutrition Deficit:  Goal: Optimize nutritional status  Outcome: Progressing  Flowsheets (Taken 5/3/2024 1110 by Navya Franks, RD)  Nutrient intake appropriate for improving, restoring, or maintaining nutritional needs:   Recommend appropriate diets, oral nutritional supplements, and vitamin/mineral supplements   Assess nutritional status and recommend course of action   Monitor oral intake, labs, and treatment plans  Care plan reviewed with patient and family.  Patient and family verbalize understanding of the plan of care and contribute to goal setting.

## 2024-05-04 NOTE — PROGRESS NOTES
Dr Heena Ness covering   for Dr. [facility]   General Surgery Daily Progress Note    Pt Name: Kourtney Rankin  Medical Record Number: [id number]  Date of Birth 11/29/1934   Today's Date: 5/4/2024  Chief complaint: lower abd pain  ASSESSMENT   Hospital day # 8 POD# 2 sigmoid colectomy  Johnson vesicle fistula  not identified at surgery  Post op leukocytosis trending down   Acute postoperative anemia hgb 6.4  HERIBERTO Creatinine 2.0  Passing flatus no bowel movement yet    has a past medical history of HERIBERTO (acute kidney injury) ([facility]), Anxiety, Arthritis, Bronchitis, CAD (coronary artery disease), CHF (congestive heart failure) (HCC), Chronic a-fib ([facility]), COVID, Diverticulitis of colon, Hyperkalemia, Hypertension, Medtronic cobalt dual ICD , Neuromuscular dysfunction of bladder, Personal history of COVID-19, Pressure ulcer, and Protein-calorie malnutrition ([facility]).  PLAN   Continue ice chip only today, go slow to feed  Anemia- trend medicine to manage PRBC transfusion   Up out of bed- PT/OT  Continue to hold blood thinners today  Continue antibiotics with leukocytosis  GI prophylaxis   Medical management from Hospitalist team   SUBJECTIVE   Kourtney is doing fair. She denies any nausea or vomiting, She is passing  flatus however has not had a bowel movement. Taking some ice chips.   CURRENT MEDICATIONS   Scheduled Meds   sodium chloride flush  5-40 mL IntraVENous 2 times per day    [Held by provider] heparin (porcine)  5,000 Units SubCUTAneous BID    naloxegol  12.5 mg Oral QAM    [Held by provider] ticagrelor  90 mg Oral BID    [Held by provider] apixaban  2.5 mg Oral BID    carvedilol  3.125 mg Oral BID    cetirizine  5 mg Oral Daily    levothyroxine  62.5 mcg Oral QAM AC    trospium  20 mg Oral Nightly    pantoprazole  40 mg Oral QAM AC    [Held by provider] sacubitril-valsartan  1 tablet Oral BID    piperacillin-tazobactam  3,375 mg IntraVENous Q12H     Continuous Infusions:   sodium chloride      sodium  144.4

## 2024-05-04 NOTE — PROGRESS NOTES
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  Recent history of significant dysuria, UA per patient was negative.  Patient not started on any antibiotics.  Her symptoms worsened and patient developed worsening suprapubic/left-sided abdominal pain.  Denies diarrhea, hematochezia, fecal material in the urine, hematuria. CTAP w/o contrast: Wall thickening of bladder, perivesicular stranding, evidence of chronic/recurrent diverticulitis, air seen at anterior superior aspect of bladder and may be outside bladder, air present within bladder, concerning for colovesical fistula. UA here +ve moderate blood, large LE. Repeat CT w/ oral contrast --> did not reach sigmoid colon. General surgery, Dr. Rodriguez, and urology Dr. Valdes, consulted. Patient is leaning towards surgical intervention at this time. 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   Discontinue Zosyn tomorrow 5/5/2024 if appropriate.  Cardiac risk assessment shows RCRI score of 3 indicative of a class IV risk, may proceed with procedure however have extensive conversation with patient about risk factors.  Anemia suspect secondary to procedure: Patient this morning had a hemoglobin of 6.7 which is a significant drop from 8 yesterday 5/3/2024.  Repeat hemoglobin 6.4.  Hypotensive tachycardic.  Ordered 1 unit PRBC  Blood consent obtained  Will recheck H&H later in day, if patient is still slowly trending

## 2024-05-04 NOTE — CONSENT
Informed Consent for Blood Component Transfusion Note    I have discussed with the patient the rationale for blood component transfusion; its benefits in treating or preventing fatigue, organ damage, or death; and its risk which includes mild transfusion reactions, rare risk of blood borne infection, or more serious but rare reactions. I have discussed the alternatives to transfusion, including the risk and consequences of not receiving transfusion. The patient had an opportunity to ask questions and had agreed to proceed with transfusion of blood components.    Electronically signed by Errol Frias DO on 5/4/24 at 10:10 AM EDT

## 2024-05-05 LAB
ANION GAP SERPL CALC-SCNC: 15 MEQ/L (ref 8–16)
BUN SERPL-MCNC: 21 MG/DL (ref 7–22)
CALCIUM SERPL-MCNC: 8.4 MG/DL (ref 8.5–10.5)
CHLORIDE SERPL-SCNC: 114 MEQ/L (ref 98–111)
CO2 SERPL-SCNC: 16 MEQ/L (ref 23–33)
CREAT SERPL-MCNC: 1.6 MG/DL (ref 0.4–1.2)
DEPRECATED RDW RBC AUTO: 63.4 FL (ref 35–45)
ERYTHROCYTE [DISTWIDTH] IN BLOOD BY AUTOMATED COUNT: 16.2 % (ref 11.5–14.5)
GFR SERPL CREATININE-BSD FRML MDRD: 31 ML/MIN/1.73M2
GLUCOSE BLD STRIP.AUTO-MCNC: 88 MG/DL (ref 70–108)
GLUCOSE SERPL-MCNC: 66 MG/DL (ref 70–108)
HCT VFR BLD AUTO: 23.7 % (ref 37–47)
HGB BLD-MCNC: 7.3 GM/DL (ref 12–16)
MCH RBC QN AUTO: 33.2 PG (ref 26–33)
MCHC RBC AUTO-ENTMCNC: 30.8 GM/DL (ref 32.2–35.5)
MCV RBC AUTO: 107.7 FL (ref 81–99)
PLATELET # BLD AUTO: 107 THOU/MM3 (ref 130–400)
PMV BLD AUTO: 12 FL (ref 9.4–12.4)
POTASSIUM SERPL-SCNC: 3.9 MEQ/L (ref 3.5–5.2)
RBC # BLD AUTO: 2.2 MILL/MM3 (ref 4.2–5.4)
SODIUM SERPL-SCNC: 145 MEQ/L (ref 135–145)
WBC # BLD AUTO: 18 THOU/MM3 (ref 4.8–10.8)

## 2024-05-05 PROCEDURE — 6370000000 HC RX 637 (ALT 250 FOR IP): Performed by: SURGERY

## 2024-05-05 PROCEDURE — 2580000003 HC RX 258: Performed by: SURGERY

## 2024-05-05 PROCEDURE — 36415 COLL VENOUS BLD VENIPUNCTURE: CPT

## 2024-05-05 PROCEDURE — 85027 COMPLETE CBC AUTOMATED: CPT

## 2024-05-05 PROCEDURE — 80048 BASIC METABOLIC PNL TOTAL CA: CPT

## 2024-05-05 PROCEDURE — 6360000002 HC RX W HCPCS

## 2024-05-05 PROCEDURE — 99024 POSTOP FOLLOW-UP VISIT: CPT | Performed by: SURGERY

## 2024-05-05 PROCEDURE — 82948 REAGENT STRIP/BLOOD GLUCOSE: CPT

## 2024-05-05 PROCEDURE — 99233 SBSQ HOSP IP/OBS HIGH 50: CPT | Performed by: HOSPITALIST

## 2024-05-05 PROCEDURE — 6360000002 HC RX W HCPCS: Performed by: SURGERY

## 2024-05-05 PROCEDURE — 2580000003 HC RX 258: Performed by: HOSPITALIST

## 2024-05-05 PROCEDURE — 1200000003 HC TELEMETRY R&B

## 2024-05-05 RX ADMIN — PANTOPRAZOLE SODIUM 40 MG: 40 TABLET, DELAYED RELEASE ORAL at 06:45

## 2024-05-05 RX ADMIN — TROSPIUM CHLORIDE 20 MG: 20 TABLET, FILM COATED ORAL at 20:42

## 2024-05-05 RX ADMIN — SODIUM CHLORIDE, PRESERVATIVE FREE 10 ML: 5 INJECTION INTRAVENOUS at 20:40

## 2024-05-05 RX ADMIN — PIPERACILLIN AND TAZOBACTAM 3375 MG: 3; .375 INJECTION, POWDER, LYOPHILIZED, FOR SOLUTION INTRAVENOUS at 09:39

## 2024-05-05 RX ADMIN — SODIUM CHLORIDE, PRESERVATIVE FREE 10 ML: 5 INJECTION INTRAVENOUS at 09:41

## 2024-05-05 RX ADMIN — OXYCODONE 5 MG: 5 TABLET ORAL at 22:57

## 2024-05-05 RX ADMIN — CARVEDILOL 3.12 MG: 3.12 TABLET, FILM COATED ORAL at 20:41

## 2024-05-05 RX ADMIN — SODIUM CHLORIDE: 9 INJECTION, SOLUTION INTRAVENOUS at 13:32

## 2024-05-05 RX ADMIN — PROCHLORPERAZINE EDISYLATE 10 MG: 5 INJECTION INTRAMUSCULAR; INTRAVENOUS at 06:44

## 2024-05-05 RX ADMIN — NALOXEGOL OXALATE 12.5 MG: 12.5 TABLET, FILM COATED ORAL at 09:41

## 2024-05-05 RX ADMIN — FLUTICASONE PROPIONATE 1 SPRAY: 50 SPRAY, METERED NASAL at 09:40

## 2024-05-05 RX ADMIN — CARVEDILOL 3.12 MG: 3.12 TABLET, FILM COATED ORAL at 09:42

## 2024-05-05 RX ADMIN — LEVOTHYROXINE SODIUM 62.5 MCG: 0.12 TABLET ORAL at 06:44

## 2024-05-05 RX ADMIN — CETIRIZINE HYDROCHLORIDE 5 MG: 5 TABLET ORAL at 09:41

## 2024-05-05 RX ADMIN — OXYCODONE 5 MG: 5 TABLET ORAL at 13:35

## 2024-05-05 RX ADMIN — PROCHLORPERAZINE EDISYLATE 10 MG: 5 INJECTION INTRAMUSCULAR; INTRAVENOUS at 22:51

## 2024-05-05 ASSESSMENT — PAIN SCALES - GENERAL
PAINLEVEL_OUTOF10: 0
PAINLEVEL_OUTOF10: 1
PAINLEVEL_OUTOF10: 6
PAINLEVEL_OUTOF10: 6
PAINLEVEL_OUTOF10: 0

## 2024-05-05 ASSESSMENT — PAIN DESCRIPTION - FREQUENCY
FREQUENCY: CONTINUOUS
FREQUENCY: CONTINUOUS

## 2024-05-05 ASSESSMENT — PAIN DESCRIPTION - ONSET
ONSET: ON-GOING
ONSET: ON-GOING

## 2024-05-05 ASSESSMENT — PAIN DESCRIPTION - ORIENTATION
ORIENTATION: LEFT
ORIENTATION: LOWER
ORIENTATION: LEFT

## 2024-05-05 ASSESSMENT — PAIN DESCRIPTION - DESCRIPTORS
DESCRIPTORS: ACHING;BURNING
DESCRIPTORS: ACHING;BURNING
DESCRIPTORS: ACHING

## 2024-05-05 ASSESSMENT — PAIN DESCRIPTION - LOCATION
LOCATION: ABDOMEN

## 2024-05-05 NOTE — PLAN OF CARE
Problem: Discharge Planning  Goal: Discharge to home or other facility with appropriate resources  Outcome: Progressing  Flowsheets (Taken 5/4/2024 1956)  Discharge to home or other facility with appropriate resources:   Identify barriers to discharge with patient and caregiver   Arrange for needed discharge resources and transportation as appropriate   Identify discharge learning needs (meds, wound care, etc)   Refer to discharge planning if patient needs post-hospital services based on physician order or complex needs related to functional status, cognitive ability or social support system     Problem: Chronic Conditions and Co-morbidities  Goal: Patient's chronic conditions and co-morbidity symptoms are monitored and maintained or improved  Outcome: Progressing  Flowsheets (Taken 5/4/2024 1956)  Care Plan - Patient's Chronic Conditions and Co-Morbidity Symptoms are Monitored and Maintained or Improved:   Monitor and assess patient's chronic conditions and comorbid symptoms for stability, deterioration, or improvement   Collaborate with multidisciplinary team to address chronic and comorbid conditions and prevent exacerbation or deterioration   Update acute care plan with appropriate goals if chronic or comorbid symptoms are exacerbated and prevent overall improvement and discharge     Problem: Skin/Tissue Integrity  Goal: Absence of new skin breakdown  Description: 1.  Monitor for areas of redness and/or skin breakdown  2.  Assess vascular access sites hourly  3.  Every 4-6 hours minimum:  Change oxygen saturation probe site  4.  Every 4-6 hours:  If on nasal continuous positive airway pressure, respiratory therapy assess nares and determine need for appliance change or resting period.  Outcome: Progressing     Problem: Safety - Adult  Goal: Free from fall injury  Outcome: Progressing     Problem: ABCDS Injury Assessment  Goal: Absence of physical injury  Outcome: Progressing     Problem: Pain  Goal:  Verbalizes/displays adequate comfort level or baseline comfort level  Outcome: Progressing  Flowsheets (Taken 5/4/2024 1956)  Verbalizes/displays adequate comfort level or baseline comfort level:   Encourage patient to monitor pain and request assistance   Assess pain using appropriate pain scale   Administer analgesics based on type and severity of pain and evaluate response   Consider cultural and social influences on pain and pain management   Implement non-pharmacological measures as appropriate and evaluate response   Notify Licensed Independent Practitioner if interventions unsuccessful or patient reports new pain     Problem: Nutrition Deficit:  Goal: Optimize nutritional status  Outcome: Progressing     Problem: Neurosensory - Adult  Goal: Achieves stable or improved neurological status  Outcome: Progressing  Flowsheets (Taken 5/4/2024 1956)  Achieves stable or improved neurological status:   Assess for and report changes in neurological status   Initiate measures to prevent increased intracranial pressure   Maintain blood pressure and fluid volume within ordered parameters to optimize cerebral perfusion and minimize risk of hemorrhage  Goal: Absence of seizures  Outcome: Progressing  Goal: Achieves maximal functionality and self care  Outcome: Progressing     Problem: Respiratory - Adult  Goal: Achieves optimal ventilation and oxygenation  Outcome: Progressing     Problem: Cardiovascular - Adult  Goal: Maintains optimal cardiac output and hemodynamic stability  Outcome: Progressing  Goal: Absence of cardiac dysrhythmias or at baseline  Outcome: Progressing     Problem: Skin/Tissue Integrity - Adult  Goal: Skin integrity remains intact  Outcome: Progressing  Flowsheets (Taken 5/4/2024 1956)  Skin Integrity Remains Intact:   Monitor for areas of redness and/or skin breakdown   Assess vascular access sites hourly  Goal: Incisions, wounds, or drain sites healing without S/S of infection  Outcome: Progressing

## 2024-05-05 NOTE — PROGRESS NOTES
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  Recent history of significant dysuria, UA per patient was negative.  Patient not started on any antibiotics.  Her symptoms worsened and patient developed worsening suprapubic/left-sided abdominal pain.  Denies diarrhea, hematochezia, fecal material in the urine, hematuria. CTAP w/o contrast: Wall thickening of bladder, perivesicular stranding, evidence of chronic/recurrent diverticulitis, air seen at anterior superior aspect of bladder and may be outside bladder, air present within bladder, concerning for colovesical fistula. UA here +ve moderate blood, large LE. Repeat CT w/ oral contrast --> did not reach sigmoid colon. General surgery, Dr. Rodriguez, and urology Dr. Valdes, consulted. Patient is leaning towards surgical intervention at this time. 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   AVSS. Discontinued Zosyn 5/5/2024. Monitor clinical status  Started on CL diet; advance gradually as tolerated.    Anemia suspect secondary to procedure: Patient this morning had a hemoglobin of 6.7 which is a significant drop from 8 yesterday 5/3/2024.  Repeat hemoglobin 6.4.  Hypotensive tachycardic.  S/p1 unit PRBC  Hb 7.3 from 6.4 yesterday. No clinically evident bleed. Monitor for now while Holding all anticoagulation and antiplatelets; likely resuming tomorrow    Nonoliguric suspect HERIBERTO on CKD IIIB

## 2024-05-05 NOTE — PROGRESS NOTES
Dr Luz Estrada covering   for Dr. [facility]   General Surgery Daily Progress Note    Pt Name: Kourtney Rankin  Medical Record Number: [id number]  Date of Birth 11/29/1934   Today's Date: 5/5/2024  Chief complaint: lower abd pain  ASSESSMENT   Hospital day # 9 POD# 2 sigmoid colectomy  [place] vesicle fistula  not identified at surgery  Post op leukocytosis trending down   Acute postoperative anemia hgb 7.3 this a.m. after 1 unit.  HERIBERTO Creatinine 1.6.  Trending back down.  Passing flatus no bowel movement yet    has a past medical history of HERIBERTO (acute kidney injury) ([facility]), Anxiety, Arthritis, Bronchitis, CAD (coronary artery disease), CHF (congestive heart failure) ([facility]), Chronic a-fib ([facility]), COVID, Diverticulitis of colon, Hyperkalemia, Hypertension, Medtronic cobalt dual ICD , Neuromuscular dysfunction of bladder, Personal history of COVID-19, Pressure ulcer, and Protein-calorie malnutrition ([facility]).  PLAN   Trial clear liquids  Anemia- trend medicine to manage PRBC transfusion.  Transfused 1 unit yesterday  Up out of bed- PT/OT  Continue to hold blood thinners today  Continue antibiotics with leukocytosis.  Slow trend down.  GI prophylaxis   Medical management from Hospitalist team   SUBJECTIVE   Kourtney is doing fair. She denies any nausea or vomiting, She is passing  flatus however has not had a bowel movement. Taking some ice chips.   CURRENT MEDICATIONS   Scheduled Meds   fluticasone  1 spray Each Nostril Daily    sodium chloride flush  5-40 mL IntraVENous 2 times per day    [Held by provider] heparin (porcine)  5,000 Units SubCUTAneous BID    naloxegol  12.5 mg Oral QAM    [Held by provider] ticagrelor  90 mg Oral BID    [Held by provider] apixaban  2.5 mg Oral BID    carvedilol  3.125 mg Oral BID    cetirizine  5 mg Oral Daily    levothyroxine  62.5 mcg Oral QAM AC    trospium  20 mg Oral Nightly    pantoprazole  40 mg Oral QAM AC    [Held by provider] sacubitril-valsartan  1 tablet Oral BID

## 2024-05-05 NOTE — PLAN OF CARE
Problem: Discharge Planning  Goal: Discharge to home or other facility with appropriate resources  5/5/2024 1149 by Patrica Faulkner, RN  Outcome: Progressing  Flowsheets (Taken 5/4/2024 1956 by Sandeep Turk, RN)  Discharge to home or other facility with appropriate resources:   Identify barriers to discharge with patient and caregiver   Arrange for needed discharge resources and transportation as appropriate   Identify discharge learning needs (meds, wound care, etc)   Refer to discharge planning if patient needs post-hospital services based on physician order or complex needs related to functional status, cognitive ability or social support system  5/5/2024 0327 by Sandeep Turk, RN  Outcome: Progressing  Flowsheets (Taken 5/4/2024 1956)  Discharge to home or other facility with appropriate resources:   Identify barriers to discharge with patient and caregiver   Arrange for needed discharge resources and transportation as appropriate   Identify discharge learning needs (meds, wound care, etc)   Refer to discharge planning if patient needs post-hospital services based on physician order or complex needs related to functional status, cognitive ability or social support system     Problem: Chronic Conditions and Co-morbidities  Goal: Patient's chronic conditions and co-morbidity symptoms are monitored and maintained or improved  5/5/2024 1149 by Patrica Faulkner RN  Outcome: Progressing  Flowsheets (Taken 5/4/2024 1956 by Sandeep Turk, RN)  Care Plan - Patient's Chronic Conditions and Co-Morbidity Symptoms are Monitored and Maintained or Improved:   Monitor and assess patient's chronic conditions and comorbid symptoms for stability, deterioration, or improvement   Collaborate with multidisciplinary team to address chronic and comorbid conditions and prevent exacerbation or deterioration   Update acute care plan with appropriate goals if chronic or comorbid symptoms are exacerbated and prevent overall  improvement and discharge  5/5/2024 0327 by Sandeep Turk RN  Outcome: Progressing  Flowsheets (Taken 5/4/2024 1956)  Care Plan - Patient's Chronic Conditions and Co-Morbidity Symptoms are Monitored and Maintained or Improved:   Monitor and assess patient's chronic conditions and comorbid symptoms for stability, deterioration, or improvement   Collaborate with multidisciplinary team to address chronic and comorbid conditions and prevent exacerbation or deterioration   Update acute care plan with appropriate goals if chronic or comorbid symptoms are exacerbated and prevent overall improvement and discharge     Problem: Skin/Tissue Integrity  Goal: Absence of new skin breakdown  Description: 1.  Monitor for areas of redness and/or skin breakdown  2.  Assess vascular access sites hourly  3.  Every 4-6 hours minimum:  Change oxygen saturation probe site  4.  Every 4-6 hours:  If on nasal continuous positive airway pressure, respiratory therapy assess nares and determine need for appliance change or resting period.  5/5/2024 1149 by Patrica Faulkner RN  Outcome: Progressing  5/5/2024 0327 by Sandeep Turk RN  Outcome: Progressing     Problem: Safety - Adult  Goal: Free from fall injury  5/5/2024 1149 by Patrica Faulkner RN  Outcome: Progressing  Flowsheets (Taken 5/2/2024 2255 by Lyric Baez, RN)  Free From Fall Injury: Instruct family/caregiver on patient safety  5/5/2024 0327 by Sandeep Turk RN  Outcome: Progressing     Problem: ABCDS Injury Assessment  Goal: Absence of physical injury  5/5/2024 1149 by Patrica Faulkner RN  Outcome: Progressing  Flowsheets (Taken 5/2/2024 2255 by Lyric Baez, RN)  Absence of Physical Injury: Implement safety measures based on patient assessment  5/5/2024 0327 by Sandeep uTrk RN  Outcome: Progressing     Problem: Pain  Goal: Verbalizes/displays adequate comfort level or baseline comfort level  5/5/2024 1149 by Patrica Faulkner RN  Outcome: Progressing  Flowsheets

## 2024-05-06 LAB
ANION GAP SERPL CALC-SCNC: 14 MEQ/L (ref 8–16)
BUN SERPL-MCNC: 15 MG/DL (ref 7–22)
CALCIUM SERPL-MCNC: 8.6 MG/DL (ref 8.5–10.5)
CHLORIDE SERPL-SCNC: 111 MEQ/L (ref 98–111)
CO2 SERPL-SCNC: 17 MEQ/L (ref 23–33)
CREAT SERPL-MCNC: 1.2 MG/DL (ref 0.4–1.2)
DEPRECATED RDW RBC AUTO: 61.3 FL (ref 35–45)
ERYTHROCYTE [DISTWIDTH] IN BLOOD BY AUTOMATED COUNT: 15.7 % (ref 11.5–14.5)
GFR SERPL CREATININE-BSD FRML MDRD: 43 ML/MIN/1.73M2
GLUCOSE BLD STRIP.AUTO-MCNC: 104 MG/DL (ref 70–108)
GLUCOSE BLD STRIP.AUTO-MCNC: 106 MG/DL (ref 70–108)
GLUCOSE SERPL-MCNC: 85 MG/DL (ref 70–108)
HCT VFR BLD AUTO: 24.9 % (ref 37–47)
HGB BLD-MCNC: 7.6 GM/DL (ref 12–16)
MCH RBC QN AUTO: 32.8 PG (ref 26–33)
MCHC RBC AUTO-ENTMCNC: 30.5 GM/DL (ref 32.2–35.5)
MCV RBC AUTO: 107.3 FL (ref 81–99)
PLATELET # BLD AUTO: 132 THOU/MM3 (ref 130–400)
PMV BLD AUTO: 11.8 FL (ref 9.4–12.4)
POTASSIUM SERPL-SCNC: 3.4 MEQ/L (ref 3.5–5.2)
RBC # BLD AUTO: 2.32 MILL/MM3 (ref 4.2–5.4)
SODIUM SERPL-SCNC: 142 MEQ/L (ref 135–145)
WBC # BLD AUTO: 9 THOU/MM3 (ref 4.8–10.8)

## 2024-05-06 PROCEDURE — 6370000000 HC RX 637 (ALT 250 FOR IP)

## 2024-05-06 PROCEDURE — 80048 BASIC METABOLIC PNL TOTAL CA: CPT

## 2024-05-06 PROCEDURE — 99233 SBSQ HOSP IP/OBS HIGH 50: CPT | Performed by: HOSPITALIST

## 2024-05-06 PROCEDURE — 36415 COLL VENOUS BLD VENIPUNCTURE: CPT

## 2024-05-06 PROCEDURE — 2580000003 HC RX 258: Performed by: SURGERY

## 2024-05-06 PROCEDURE — 6360000002 HC RX W HCPCS

## 2024-05-06 PROCEDURE — 6370000000 HC RX 637 (ALT 250 FOR IP): Performed by: SURGERY

## 2024-05-06 PROCEDURE — 99024 POSTOP FOLLOW-UP VISIT: CPT | Performed by: SURGERY

## 2024-05-06 PROCEDURE — 85027 COMPLETE CBC AUTOMATED: CPT

## 2024-05-06 PROCEDURE — 97530 THERAPEUTIC ACTIVITIES: CPT

## 2024-05-06 PROCEDURE — 2580000003 HC RX 258: Performed by: HOSPITALIST

## 2024-05-06 PROCEDURE — 97110 THERAPEUTIC EXERCISES: CPT

## 2024-05-06 PROCEDURE — 1200000003 HC TELEMETRY R&B

## 2024-05-06 PROCEDURE — 82948 REAGENT STRIP/BLOOD GLUCOSE: CPT

## 2024-05-06 RX ADMIN — OXYCODONE 5 MG: 5 TABLET ORAL at 14:35

## 2024-05-06 RX ADMIN — PANTOPRAZOLE SODIUM 40 MG: 40 TABLET, DELAYED RELEASE ORAL at 06:11

## 2024-05-06 RX ADMIN — CETIRIZINE HYDROCHLORIDE 5 MG: 5 TABLET ORAL at 07:43

## 2024-05-06 RX ADMIN — POTASSIUM BICARBONATE 40 MEQ: 782 TABLET, EFFERVESCENT ORAL at 09:26

## 2024-05-06 RX ADMIN — CARVEDILOL 3.12 MG: 3.12 TABLET, FILM COATED ORAL at 07:43

## 2024-05-06 RX ADMIN — SACUBITRIL AND VALSARTAN 1 TABLET: 24; 26 TABLET, FILM COATED ORAL at 20:25

## 2024-05-06 RX ADMIN — SODIUM CHLORIDE, PRESERVATIVE FREE 10 ML: 5 INJECTION INTRAVENOUS at 07:44

## 2024-05-06 RX ADMIN — SODIUM CHLORIDE: 9 INJECTION, SOLUTION INTRAVENOUS at 06:11

## 2024-05-06 RX ADMIN — PROCHLORPERAZINE EDISYLATE 10 MG: 5 INJECTION INTRAMUSCULAR; INTRAVENOUS at 07:43

## 2024-05-06 RX ADMIN — FLUTICASONE PROPIONATE 1 SPRAY: 50 SPRAY, METERED NASAL at 07:43

## 2024-05-06 RX ADMIN — LEVOTHYROXINE SODIUM 62.5 MCG: 0.12 TABLET ORAL at 06:11

## 2024-05-06 RX ADMIN — CARVEDILOL 3.12 MG: 3.12 TABLET, FILM COATED ORAL at 20:25

## 2024-05-06 RX ADMIN — APIXABAN 2.5 MG: 2.5 TABLET, FILM COATED ORAL at 20:25

## 2024-05-06 RX ADMIN — NALOXEGOL OXALATE 12.5 MG: 12.5 TABLET, FILM COATED ORAL at 07:43

## 2024-05-06 RX ADMIN — SODIUM CHLORIDE, PRESERVATIVE FREE 10 ML: 5 INJECTION INTRAVENOUS at 20:25

## 2024-05-06 RX ADMIN — TROSPIUM CHLORIDE 20 MG: 20 TABLET, FILM COATED ORAL at 20:25

## 2024-05-06 ASSESSMENT — PAIN DESCRIPTION - DESCRIPTORS: DESCRIPTORS: ACHING;DISCOMFORT

## 2024-05-06 ASSESSMENT — PAIN - FUNCTIONAL ASSESSMENT: PAIN_FUNCTIONAL_ASSESSMENT: ACTIVITIES ARE NOT PREVENTED

## 2024-05-06 ASSESSMENT — PAIN DESCRIPTION - ORIENTATION: ORIENTATION: MID

## 2024-05-06 ASSESSMENT — PAIN SCALES - GENERAL
PAINLEVEL_OUTOF10: 0
PAINLEVEL_OUTOF10: 5
PAINLEVEL_OUTOF10: 0

## 2024-05-06 ASSESSMENT — PAIN SCALES - WONG BAKER: WONGBAKER_NUMERICALRESPONSE: NO HURT

## 2024-05-06 ASSESSMENT — PAIN DESCRIPTION - LOCATION: LOCATION: ABDOMEN

## 2024-05-06 NOTE — PLAN OF CARE
Problem: Discharge Planning  Goal: Discharge to home or other facility with appropriate resources  5/6/2024 1028 by Geraldine Machado RN  Outcome: Progressing  Flowsheets (Taken 5/6/2024 1028)  Discharge to home or other facility with appropriate resources:   Identify barriers to discharge with patient and caregiver   Arrange for needed discharge resources and transportation as appropriate   Identify discharge learning needs (meds, wound care, etc)   Arrange for interpreters to assist at discharge as needed   Refer to discharge planning if patient needs post-hospital services based on physician order or complex needs related to functional status, cognitive ability or social support system     Problem: Chronic Conditions and Co-morbidities  Goal: Patient's chronic conditions and co-morbidity symptoms are monitored and maintained or improved  5/6/2024 1028 by Geraldine Machado RN  Outcome: Progressing  Flowsheets (Taken 5/6/2024 1028)  Care Plan - Patient's Chronic Conditions and Co-Morbidity Symptoms are Monitored and Maintained or Improved:   Monitor and assess patient's chronic conditions and comorbid symptoms for stability, deterioration, or improvement   Collaborate with multidisciplinary team to address chronic and comorbid conditions and prevent exacerbation or deterioration   Update acute care plan with appropriate goals if chronic or comorbid symptoms are exacerbated and prevent overall improvement and discharge     Problem: Safety - Adult  Goal: Free from fall injury  5/6/2024 1028 by Geraldine Machado RN  Outcome: Progressing  Flowsheets (Taken 5/6/2024 1028)  Free From Fall Injury: Instruct family/caregiver on patient safety     Problem: ABCDS Injury Assessment  Goal: Absence of physical injury  5/6/2024 1028 by Geraldine Machado RN  Outcome: Progressing  Flowsheets (Taken 5/6/2024 1028)  Absence of Physical Injury: Implement safety measures based on patient assessment     Problem: Pain  Goal:

## 2024-05-06 NOTE — PROGRESS NOTES
[facility]  INPATIENT PHYSICAL THERAPY  DAILY NOTE  [facility] 4A - 4A-16/016-A             Date: 2024  Patient Name: Kourtney Rankin,  Gender:  female        MRN: [id number]  : 1934  (89 y.o.)     Referring Practitioner: Luz Estrada MD  Diagnosis: diverticulitis of colon  Additional Pertinent Hx: Per chart review: Kourtney Rankin is a 89 y.o. female with past medical history described below who presents to [facility] with complaints of chronic abdominal pain and dysuria.  Patient states she has had this problem for quite a while however the last few weeks its gotten worse.  She states that when she urinates it burns.  She was evaluated by her family doctor and was given a cream.  She says that the cream does not really help.  She states that when her urine was tested they did not find any evidence of infection.  She states her abdominal pain comes and goes and stays mainly around her umbilicus and left lower quadrants.  She has extensive history of diverticulitis.  Upon arrival patient was noted to be afebrile. VSS.  Laboratory workup was largely noncontributory.  Creatinine noted to be elevated above baseline at 1.7.  Chronic macrocytic anemia was appreciated.  Slight leukocytosis with a WBC of 11.9.  LFTs WNL.  UA showed moderate blood with large LE, no bacteria.  CT abdomen pelvis without contrast demonstrated wall thickening the bladder with perivesicular stranding consistent with history of cystitis.  There is evidence of chronic/recurrent diverticulitis.  Air was seen in the anterior superior aspect of the bladder and may be outside of the bladder.  Air present within the bladder.  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  Recommending repeat examination with IV/oral contrast.  Findings may also be secondary to recent instrumentation/emphysematous cystitis.  Patient was given IV fluids as well as IV antibiotics.  She endorses abdominal pain and pain on the sacral

## 2024-05-06 NOTE — CARE COORDINATION
5/6/24, 2:18 PM EDT    DISCHARGE ON GOING EVALUATION    Kourtney HIGH [facility] day: 10  Location: 4A-16/016-A Reason for admit: Diverticulitis of colon [K57.32]  Colovesical fistula [N32.1]     Procedures:   5/2 OPEN SIGMOID COLECTOMY     Imaging since last note: none    Barriers to Discharge: PT/OT, pain and nausea control, full liquids, Movant[employer].    PCP: Jennifer Nathan APRN - CNP  Readmission Risk Score: 21    Patient Goals/Plan/Treatment Preferences: Plan is to return home with daughter, [facility]. SW following.

## 2024-05-06 NOTE — PROGRESS NOTES
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  Recent history of significant dysuria, UA per patient was negative.  Patient not started on any antibiotics.  Her symptoms worsened and patient developed worsening suprapubic/left-sided abdominal pain.  Denies diarrhea, hematochezia, fecal material in the urine, hematuria. CTAP w/o contrast: Wall thickening of bladder, perivesicular stranding, evidence of chronic/recurrent diverticulitis, air seen at anterior superior aspect of bladder and may be outside bladder, air present within bladder, concerning for colovesical fistula. UA here +ve moderate blood, large LE. Repeat CT w/ oral contrast --> did not reach sigmoid colon. General surgery, Dr. Rodriguez, and urology Dr. Valdes, consulted. Patient is leaning towards surgical intervention at this time. 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   AVSS. Discontinued Zosyn 5/5/2024. Monitor clinical status  Started on CL diet; advance gradually as tolerated.   Anemia suspect secondary to procedure, stable: Patient this morning had a hemoglobin of 6.7 which is a significant drop from 8 yesterday 5/3/2024.  Repeat hemoglobin 6.4.  Hypotensive tachycardic.  No clinically evident bleed.  S/p 1 unit PRBC  Will continue to monitor for any sources of bleed.  Restarted Eliquis today.  Restart antiplatelets tomorrow.  Ischemic cardiomyopathy EF 25% on 7/2023, not in  her abdominal pain comes and goes and stays mainly around her umbilicus and left lower quadrants. She has extensive history of diverticulitis. Upon arrival patient was noted to be afebrile. VSS. Laboratory workup was largely noncontributory. Creatinine noted to be elevated above baseline at 1.7. Chronic macrocytic anemia was appreciated. Slight leukocytosis with a WBC of 11.9. LFTs WNL. UA showed moderate blood with large LE, no bacteria. CT abdomen pelvis without contrast demonstrated wall thickening the bladder with perivesicular stranding consistent with history of cystitis. There is evidence of chronic/recurrent diverticulitis. Air was seen in the anterior superior aspect of the bladder and may be outside of the bladder. Air present within the bladder. 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 Recommending repeat examination with IV/oral contrast. Findings may also be secondary to recent instrumentation/emphysematous cystitis. Patient was given IV fluids as well as IV antibiotics. She endorses abdominal pain and pain on the sacral wound. She denies fever, headache, chills, chest pain, shortness of breath. Patient was admitted to hospital service for further evaluation management.     4/27/2024 - CT A/P with rectal contrast colovesicular fistula 2 places general surgery and urology consulted, patient leaning toward surgical intervention. Will require washout and cardiac clearance.      5/1/2024 - patient was not well-prepped overnight.  Complains of bowel prep tonight n.p.o. at midnight heparin drip to stop at midnight.  Plan for surgical procedure tomorrow.     5/2/2024 -patient is scheduled for procedure today.     5/3/2024: Patient's hemoglobin this morning 6.7 from yesterday 8.0.  Repeat 6.4 will give 1 unit PRBC.  If patient's hemoglobin continues to trend down we will scan belly for bleed.    5/6/2024: Hemoglobin stable last 2 days.  Will restart Eliquis today.  Advance diet as tolerated.    Subjective (past 24

## 2024-05-06 NOTE — PLAN OF CARE
Problem: Discharge Planning  Goal: Discharge to home or other facility with appropriate resources  5/5/2024 2124 by Jeff Camp, RN  Outcome: Progressing  5/5/2024 1149 by Patrica Faulkner, RN  Outcome: Progressing  Flowsheets (Taken 5/4/2024 1956 by Sandeep Turk, RN)  Discharge to home or other facility with appropriate resources:   Identify barriers to discharge with patient and caregiver   Arrange for needed discharge resources and transportation as appropriate   Identify discharge learning needs (meds, wound care, etc)   Refer to discharge planning if patient needs post-hospital services based on physician order or complex needs related to functional status, cognitive ability or social support system

## 2024-05-06 NOTE — PROGRESS NOTES
Scar Rodriguez, DO  [facility]   General Surgery Daily Progress Note    Pt Name: Kourtney Rankin  Medical Record Number: [id number]  Date of Birth 11/29/1934   Today's Date: 5/6/2024  Chief complaint: lower abd pain  ASSESSMENT   Hospital day # 10 POD# 3 sigmoid colectomy  [place] vesicle fistula  not identified at surgery  Post op leukocytosis trending down   Acute postoperative anemia   HERIBERTO Creatinine 1.6.  Trending back down.  Passing flatus no bowel movement yet    has a past medical history of HERIBERTO (acute kidney injury) ([facility]), Anxiety, Arthritis, Bronchitis, CAD (coronary artery disease), CHF (congestive heart failure) ([facility]), Chronic a-fib ([facility]), COVID, Diverticulitis of colon, Hyperkalemia, Hypertension, Medtr[employer] cobalt dual ICD , Neuromuscular dysfunction of bladder, Personal history of COVID-19, Pressure ulcer, and Protein-calorie malnutrition ([facility]).  PLAN   Full liquids  Up out of bed- PT/OT  Continue to hold blood thinners today  Continue antibiotics with leukocytosis.  Slow trend down.  GI prophylaxis   Medical management from Hospitalist team   SUBJECTIVE   Kourtney is doing fair. She denies any nausea or vomiting, She is passing  flatus however has not had a bowel movement. Tolerating clears. .   CURRENT MEDICATIONS   Scheduled Meds   fluticasone  1 spray Each Nostril Daily    sodium chloride flush  5-40 mL IntraVENous 2 times per day    naloxegol  12.5 mg Oral QAM    [Held by provider] ticagrelor  90 mg Oral BID    apixaban  2.5 mg Oral BID    carvedilol  3.125 mg Oral BID    cetirizine  5 mg Oral Daily    levothyroxine  62.5 mcg Oral QAM AC    trospium  20 mg Oral Nightly    pantoprazole  40 mg Oral QAM AC    [Held by provider] sacubitril-valsartan  1 tablet Oral BID     Continuous Infusions:   sodium chloride      sodium chloride       PRN Meds:.sodium chloride, HYDROmorphone, acetaminophen, acetaminophen, sodium chloride flush, sodium chloride, oxyCODONE, prochlorperazine  OBJECTIVE   CURRENT

## 2024-05-07 LAB
ANION GAP SERPL CALC-SCNC: 13 MEQ/L (ref 8–16)
BUN SERPL-MCNC: 13 MG/DL (ref 7–22)
CALCIUM SERPL-MCNC: 8.9 MG/DL (ref 8.5–10.5)
CHLORIDE SERPL-SCNC: 111 MEQ/L (ref 98–111)
CO2 SERPL-SCNC: 20 MEQ/L (ref 23–33)
CREAT SERPL-MCNC: 1.1 MG/DL (ref 0.4–1.2)
DEPRECATED RDW RBC AUTO: 58.2 FL (ref 35–45)
ERYTHROCYTE [DISTWIDTH] IN BLOOD BY AUTOMATED COUNT: 15.4 % (ref 11.5–14.5)
GFR SERPL CREATININE-BSD FRML MDRD: 48 ML/MIN/1.73M2
GLUCOSE SERPL-MCNC: 86 MG/DL (ref 70–108)
HCT VFR BLD AUTO: 26.2 % (ref 37–47)
HGB BLD-MCNC: 8.2 GM/DL (ref 12–16)
MCH RBC QN AUTO: 32.7 PG (ref 26–33)
MCHC RBC AUTO-ENTMCNC: 31.3 GM/DL (ref 32.2–35.5)
MCV RBC AUTO: 104.4 FL (ref 81–99)
PLATELET # BLD AUTO: 146 THOU/MM3 (ref 130–400)
PMV BLD AUTO: 11.6 FL (ref 9.4–12.4)
POTASSIUM SERPL-SCNC: 3.5 MEQ/L (ref 3.5–5.2)
RBC # BLD AUTO: 2.51 MILL/MM3 (ref 4.2–5.4)
SODIUM SERPL-SCNC: 144 MEQ/L (ref 135–145)
WBC # BLD AUTO: 6.9 THOU/MM3 (ref 4.8–10.8)

## 2024-05-07 PROCEDURE — 97110 THERAPEUTIC EXERCISES: CPT

## 2024-05-07 PROCEDURE — 85027 COMPLETE CBC AUTOMATED: CPT

## 2024-05-07 PROCEDURE — 99233 SBSQ HOSP IP/OBS HIGH 50: CPT | Performed by: HOSPITALIST

## 2024-05-07 PROCEDURE — 97530 THERAPEUTIC ACTIVITIES: CPT

## 2024-05-07 PROCEDURE — 97535 SELF CARE MNGMENT TRAINING: CPT

## 2024-05-07 PROCEDURE — 1200000003 HC TELEMETRY R&B

## 2024-05-07 PROCEDURE — 6370000000 HC RX 637 (ALT 250 FOR IP): Performed by: SURGERY

## 2024-05-07 PROCEDURE — 97116 GAIT TRAINING THERAPY: CPT

## 2024-05-07 PROCEDURE — 6370000000 HC RX 637 (ALT 250 FOR IP)

## 2024-05-07 PROCEDURE — 36415 COLL VENOUS BLD VENIPUNCTURE: CPT

## 2024-05-07 PROCEDURE — 80048 BASIC METABOLIC PNL TOTAL CA: CPT

## 2024-05-07 PROCEDURE — 2580000003 HC RX 258: Performed by: SURGERY

## 2024-05-07 RX ADMIN — FLUTICASONE PROPIONATE 1 SPRAY: 50 SPRAY, METERED NASAL at 08:02

## 2024-05-07 RX ADMIN — SACUBITRIL AND VALSARTAN 1 TABLET: 24; 26 TABLET, FILM COATED ORAL at 08:01

## 2024-05-07 RX ADMIN — TICAGRELOR 90 MG: 90 TABLET ORAL at 08:01

## 2024-05-07 RX ADMIN — APIXABAN 2.5 MG: 2.5 TABLET, FILM COATED ORAL at 08:01

## 2024-05-07 RX ADMIN — SACUBITRIL AND VALSARTAN 1 TABLET: 24; 26 TABLET, FILM COATED ORAL at 19:49

## 2024-05-07 RX ADMIN — CETIRIZINE HYDROCHLORIDE 5 MG: 5 TABLET ORAL at 08:01

## 2024-05-07 RX ADMIN — OXYCODONE 5 MG: 5 TABLET ORAL at 06:19

## 2024-05-07 RX ADMIN — CARVEDILOL 3.12 MG: 3.12 TABLET, FILM COATED ORAL at 19:49

## 2024-05-07 RX ADMIN — CARVEDILOL 3.12 MG: 3.12 TABLET, FILM COATED ORAL at 08:01

## 2024-05-07 RX ADMIN — SODIUM CHLORIDE, PRESERVATIVE FREE 10 ML: 5 INJECTION INTRAVENOUS at 08:02

## 2024-05-07 RX ADMIN — SODIUM CHLORIDE, PRESERVATIVE FREE 10 ML: 5 INJECTION INTRAVENOUS at 19:50

## 2024-05-07 RX ADMIN — LEVOTHYROXINE SODIUM 62.5 MCG: 0.12 TABLET ORAL at 06:16

## 2024-05-07 RX ADMIN — NALOXEGOL OXALATE 12.5 MG: 12.5 TABLET, FILM COATED ORAL at 08:01

## 2024-05-07 RX ADMIN — PANTOPRAZOLE SODIUM 40 MG: 40 TABLET, DELAYED RELEASE ORAL at 06:16

## 2024-05-07 RX ADMIN — TROSPIUM CHLORIDE 20 MG: 20 TABLET, FILM COATED ORAL at 19:49

## 2024-05-07 RX ADMIN — TICAGRELOR 90 MG: 90 TABLET ORAL at 19:49

## 2024-05-07 RX ADMIN — APIXABAN 2.5 MG: 2.5 TABLET, FILM COATED ORAL at 19:49

## 2024-05-07 RX ADMIN — ACETAMINOPHEN 650 MG: 325 TABLET ORAL at 19:49

## 2024-05-07 ASSESSMENT — PAIN DESCRIPTION - ONSET: ONSET: ON-GOING

## 2024-05-07 ASSESSMENT — PAIN SCALES - GENERAL
PAINLEVEL_OUTOF10: 5
PAINLEVEL_OUTOF10: 6
PAINLEVEL_OUTOF10: 2

## 2024-05-07 ASSESSMENT — PAIN DESCRIPTION - LOCATION
LOCATION: ABDOMEN
LOCATION: ABDOMEN

## 2024-05-07 ASSESSMENT — PAIN DESCRIPTION - DESCRIPTORS: DESCRIPTORS: ACHING;DISCOMFORT

## 2024-05-07 ASSESSMENT — PAIN SCALES - WONG BAKER: WONGBAKER_NUMERICALRESPONSE: NO HURT

## 2024-05-07 ASSESSMENT — PAIN - FUNCTIONAL ASSESSMENT: PAIN_FUNCTIONAL_ASSESSMENT: ACTIVITIES ARE NOT PREVENTED

## 2024-05-07 ASSESSMENT — PAIN DESCRIPTION - FREQUENCY: FREQUENCY: CONTINUOUS

## 2024-05-07 ASSESSMENT — PAIN DESCRIPTION - ORIENTATION
ORIENTATION: MID
ORIENTATION: MID

## 2024-05-07 ASSESSMENT — PAIN DESCRIPTION - PAIN TYPE: TYPE: SURGICAL PAIN

## 2024-05-07 NOTE — PLAN OF CARE
Problem: Discharge Planning  Goal: Discharge to home or other facility with appropriate resources  5/6/2024 2229 by Jocelyne Martínez RN  Outcome: Progressing  Flowsheets (Taken 5/6/2024 2030)  Discharge to home or other facility with appropriate resources: Identify barriers to discharge with patient and caregiver  5/6/2024 1028 by Geraldine Machado RN  Outcome: Progressing  Flowsheets (Taken 5/6/2024 1028)  Discharge to home or other facility with appropriate resources:   Identify barriers to discharge with patient and caregiver   Arrange for needed discharge resources and transportation as appropriate   Identify discharge learning needs (meds, wound care, etc)   Arrange for interpreters to assist at discharge as needed   Refer to discharge planning if patient needs post-hospital services based on physician order or complex needs related to functional status, cognitive ability or social support system     Problem: Chronic Conditions and Co-morbidities  Goal: Patient's chronic conditions and co-morbidity symptoms are monitored and maintained or improved  5/6/2024 2229 by Jocelyne Martínez RN  Outcome: Progressing  Flowsheets (Taken 5/6/2024 2030)  Care Plan - Patient's Chronic Conditions and Co-Morbidity Symptoms are Monitored and Maintained or Improved: Monitor and assess patient's chronic conditions and comorbid symptoms for stability, deterioration, or improvement  5/6/2024 1028 by Geraldine Machado RN  Outcome: Progressing  Flowsheets (Taken 5/6/2024 1028)  Care Plan - Patient's Chronic Conditions and Co-Morbidity Symptoms are Monitored and Maintained or Improved:   Monitor and assess patient's chronic conditions and comorbid symptoms for stability, deterioration, or improvement   Collaborate with multidisciplinary team to address chronic and comorbid conditions and prevent exacerbation or deterioration   Update acute care plan with appropriate goals if chronic or comorbid symptoms are exacerbated and prevent  function:   Assess bowel function   Encourage oral fluids to ensure adequate hydration   Administer ordered medications as needed   Encourage mobilization and activity  5/6/2024 1028 by Geraldine Machado, RN  Outcome: Progressing  Flowsheets (Taken 5/6/2024 1028)  Maintains or returns to baseline bowel function:   Assess bowel function   Encourage oral fluids to ensure adequate hydration   Administer ordered medications as needed   Administer IV fluids as ordered to ensure adequate hydration   Encourage mobilization and activity  Goal: Maintains adequate nutritional intake  5/6/2024 2229 by Jocelyne Martínez RN  Outcome: Progressing  Flowsheets (Taken 5/6/2024 2030)  Maintains adequate nutritional intake: Monitor intake and output, weight and lab values  5/6/2024 1028 by Geraldine Machado, RN  Outcome: Progressing  Flowsheets (Taken 5/6/2024 1028)  Maintains adequate nutritional intake:   Monitor percentage of each meal consumed   Assist with meals as needed

## 2024-05-07 NOTE — PROGRESS NOTES
[facility]  INPATIENT PHYSICAL THERAPY  DAILY NOTE  CATHIE MASONS 4A - 4A-16/016-A    Time In: 0816  Time Out: 0903  Timed Code Treatment Minutes: 47 Minutes  Minutes: 47          Date: 2024  Patient Name: Kourtney Rankin,  Gender:  female        MRN: [id number]  : 1934  (89 y.o.)     Referring Practitioner: Luz Estrada MD  Diagnosis: diverticulitis of colon  Additional Pertinent Hx: Per chart review: Kourtney Rankin is a 89 y.o. female with past medical history described below who presents to [facility] with complaints of chronic abdominal pain and dysuria.  Patient states she has had this problem for quite a while however the last few weeks its gotten worse.  She states that when she urinates it burns.  She was evaluated by her family doctor and was given a cream.  She says that the cream does not really help.  She states that when her urine was tested they did not find any evidence of infection.  She states her abdominal pain comes and goes and stays mainly around her umbilicus and left lower quadrants.  She has extensive history of diverticulitis.  Upon arrival patient was noted to be afebrile. VSS.  Laboratory workup was largely noncontributory.  Creatinine noted to be elevated above baseline at 1.7.  Chronic macrocytic anemia was appreciated.  Slight leukocytosis with a WBC of 11.9.  LFTs WNL.  UA showed moderate blood with large LE, no bacteria.  CT abdomen pelvis without contrast demonstrated wall thickening the bladder with perivesicular stranding consistent with history of cystitis.  There is evidence of chronic/recurrent diverticulitis.  Air was seen in the anterior superior aspect of the bladder and may be outside of the bladder.  Air present within the bladder.  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  Recommending repeat examination with IV/oral contrast.  Findings may also be secondary to recent instrumentation/emphysematous cystitis.  Patient was given IV  Walker  Gait Deviations:  Forward Flexed Posture, Slow Tami, Decreased Step Length Bilaterally, Decreased Gait Speed, Decreased Heel Strike Bilaterally, Decreased Foot Clearance, and Increased Reliance on Rolling Walker    Balance:  Dynamic Standing Balance: Stand By Assistance, Contact Guard Assistance  Pt stood with and without UE support and completed functional tasks, no real LOB    Exercise:  Patient was guided in 1 set(s) 10 reps of exercise to both lower extremities.  Ankle pumps, Glut sets, Quad sets, Heelslides, and Hip abduction/adduction. Extra time to complete  Exercises were completed for increased independence with functional mobility.    Functional Outcome Measures: Completed  AM-PAC Inpatient Mobility Raw Score : 18  AM-PAC Inpatient T-Scale Score : 43.63  Modified Kera Scale:  Not Applicable    ASSESSMENT:  Assessment: Patient progressing toward established goals.  Activity Tolerance:  Patient tolerance of  treatment: good. Pt demonstrates impairments with strength, balance, endurance, activity tolerance, independence, requires hands on assistance for safety with mobility and would benefit from continued skilled PT improve these impairments, to maximize independence, to prevent falls and ensure safety with mobility.       Equipment Recommendations:Equipment Needed: No  Discharge Recommendations: Home with Home Health PT  Plan: Current Treatment Recommendations: Strengthening, Gait training, Stair training, Functional mobility training, Transfer training, Balance training, Endurance training, Neuromuscular re-education, Safety education & training, Patient/Caregiver education & training, Home exercise program  General Plan:  (3-5x/wk GM)    Education  Education:  Learners: Patient  Patient Education: Plan of Care, Bed Mobility, Transfers, Gait, Verbal Exercise Instruction  Pt denies further therapy related questions, needs or concerns for this PTA at end of session.    Goals:  Patient Goals :

## 2024-05-07 NOTE — CARE COORDINATION
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     PCP: Jennifer Nathan APRN - CNP  Readmission Risk Score: 20.3    Patient Goals/Plan/Treatment Preferences: plan is to return home with daughter, resume [facility]. SW following.

## 2024-05-07 NOTE — PROGRESS NOTES
[facility]  [facility] 4A  Occupational Therapy  Daily Note  Time:    Time In: 1426  Time Out: 1505  Timed Code Treatment Minutes: 39 Minutes  Minutes: 39          Date: 2024  Patient Name: Kourtney Rankin,   Gender: female      Room: -16/016-A  MRN: [id number]  : 1934  (89 y.o.)  Referring Practitioner: Prieto Hernandez DO  Diagnosis: colovesical fistula  Additional Pertinent Hx: Kourtney Rankin is a 89 y.o. female with past medical history described below who presents to [facility] with complaints of chronic abdominal pain and dysuria.  Patient states she has had this problem for quite a while however the last few weeks its gotten worse.  She states that when she urinates it burns.  She was evaluated by her family doctor and was given a cream.  She says that the cream does not really help.  She states that when her urine was tested they did not find any evidence of infection.  She states her abdominal pain comes and goes and stays mainly around her umbilicus and left lower quadrants.  She has extensive history of diverticulitis.  Upon arrival patient was noted to be afebrile. VSS.  Laboratory workup was largely noncontributory.  Creatinine noted to be elevated above baseline at 1.7.  Chronic macrocytic anemia was appreciated.  Slight leukocytosis with a WBC of 11.9.  LFTs WNL.  UA showed moderate blood with large LE, no bacteria.  CT abdomen pelvis without contrast demonstrated wall thickening the bladder with perivesicular stranding consistent with history of cystitis.  There is evidence of chronic/recurrent diverticulitis.  Air was seen in the anterior superior aspect of the bladder and may be outside of the bladder.  Air present within the bladder.  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  Findings may also be secondary to recent instrumentation/emphysematous cystitis. s/p:sigmoid colectomy with primary anastomosis on      FUNCTIONAL MOBILITY:  Assistive Device: Rolling Walker  Assist Level:  Contact Guard Assistance.   Distance: To and from bathroom and 80ft in hallway  Ed for walker safety during ADLs & with posture     ADDITIONAL ACTIVITIES:  Educated Pt on gentle retrograde massage & positioning on pillows to decrease edema in LUE (noted moderate edema dorsum of hand to proximal elbow)    AM-Located within [facility] Inpatient Daily Activity Raw Score: 18  AM-PAC Inpatient ADL T-Scale Score : 38.66  ADL Inpatient CMS 0-100% Score: 46.65    Modified Kera Scale:  Not Applicable    ASSESSMENT:     Activity Tolerance:  Patient tolerance of  treatment: Fair treatment tolerance       Discharge Recommendations: Home with assist as needed and Home with Home Health OT  Equipment Recommendations: Other: will continue to assess pending progress  Plan: Times Per Week: 3-5x  Current Treatment Recommendations: Strengthening, Balance training, Functional mobility training, Endurance training, Patient/Caregiver education & training, Equipment evaluation, education, & procurement, Home management training, Self-Care / ADL    Education:  Learners: Patient  See above    Goals  Short Term Goals  Time Frame for Short Term Goals: by discharge  Short Term Goal 1: Pt will complete BADL tasks with MI to increase independence with self care tasks.  Short Term Goal 2: Pt complete light IADL tasks with MI to increase independence with homemakiing tasks.  Short Term Goal 3: Pt will increase activity tolerance for functional mobility househld distances with MI in prep for ADL/IADL tasks.  Long Term Goals  Time Frame for Long Term Goals : not set due to ELOS    Following session, patient left in safe position with all fall risk precautions in place.

## 2024-05-07 NOTE — PROGRESS NOTES
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  Recent history of significant dysuria, UA per patient was negative.  Patient not started on any antibiotics.  Her symptoms worsened and patient developed worsening suprapubic/left-sided abdominal pain.  Denies diarrhea, hematochezia, fecal material in the urine, hematuria. CTAP w/o contrast: Wall thickening of bladder, perivesicular stranding, evidence of chronic/recurrent diverticulitis, air seen at anterior superior aspect of bladder and may be outside bladder, air present within bladder, concerning for colovesical fistula. UA here +ve moderate blood, large LE. Repeat CT w/ oral contrast --> did not reach sigmoid colon. General surgery, Dr. Rodriguez, and urology Dr. Valdes, consulted. Patient is leaning towards surgical intervention at this time. 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   AVSS. Discontinued Zosyn 5/5/2024. Monitor clinical status  Patient is on full liquid diet now.  Advancing diet per surgery.  Anemia suspect secondary to procedure, stable: Patient this morning had a hemoglobin of 6.7 which is a significant drop from 8 yesterday 5/3/2024.  Repeat hemoglobin 6.4.  Hypotensive tachycardic.  No clinically evident bleed.  S/p 1 unit PRBC  Will continue to monitor for any sources of bleed.  Continue with Eliquis  Ischemic cardiomyopathy EF 25% on 7/2023, not in exacerbation: Echo 7/2023  normal insight and thought content.   Capillary Refill: Brisk,< 3 seconds.  Peripheral Pulses: +2 palpable, equal bilaterally.       Labs:   Recent Labs     05/05/24  0535 05/06/24  0732 05/07/24  0657   WBC 18.0* 9.0 6.9   HGB 7.3* 7.6* 8.2*   HCT 23.7* 24.9* 26.2*   * 132 146     Recent Labs     05/05/24  0535 05/06/24  0732 05/07/24  0657    142 144   K 3.9 3.4* 3.5   * 111 111   CO2 16* 17* 20*   BUN 21 15 13   CREATININE 1.6* 1.2 1.1   CALCIUM 8.4* 8.6 8.9     No results for input(s): \"AST\", \"ALT\", \"BILIDIR\", \"BILITOT\", \"ALKPHOS\" in the last 72 hours.  No results for input(s): \"INR\" in the last 72 hours.  No results for input(s): \"TROPONINT\" in the last 72 hours.  No results for input(s): \"PROCAL\" in the last 72 hours.   Lab Results   Component Value Date/Time    NITRU NEGATIVE 04/26/2024 12:07 AM    WBCUA 15-25 04/26/2024 12:07 AM    BACTERIA NONE SEEN 04/26/2024 12:07 AM    RBCUA 3-5 04/26/2024 12:07 AM    BLOODU MODERATE 04/26/2024 12:07 AM    GLUCOSEU NEGATIVE 04/26/2024 12:07 AM       Radiology: No results found.  see assessment and plan for discussion of pertinent imaging.     LDA: []CVC / []PICC / []Midline / []Hendrix / []Drains / []Mediport / [x]None  Antibiotics: No  Steroids: No  Labs (still needed?): [x]Yes / [x]No  IVF (still needed?): []Yes / [x]No  Level of care: [x]Step Down / []Med-Surg  Bed Status: [x]Inpatient / []Observation  Telemetry: [x]Yes / []No   PT/OT: [x]Yes / []No  DVT Prophylaxis: [] Lovenox / [] Heparin / [] SCDs / [x] Already on Systemic Anticoagulation / [] None   Diet: ADULT DIET; Full Liquid  ADULT ORAL NUTRITION SUPPLEMENT; Breakfast, Lunch, Dinner; Clear Liquid Oral Supplement  ADULT ORAL NUTRITION SUPPLEMENT; Breakfast, Lunch, Dinner; Other Oral Supplement; vanilla greek yogurt  Code Status: Full Code      Electronically signed by Errol Frias DO on 5/7/2024 at 1:14 PM    Case was discussed with Attending, Dr. Allen

## 2024-05-07 NOTE — PROGRESS NOTES
Comprehensive Nutrition Assessment    Type and Reason for Visit:  Reassess (po/supplements)    Nutrition Recommendations/Plan:   Recommend MVI.  ONS initiated: patient preferred to trial Ensure Clear TID.  Will provide vanilla greek yogurt TID.  Diet advancement per surgery as appropriate.      Malnutrition Assessment:  Malnutrition Status:  Moderate malnutrition (05/07/24 1215)    Context:  Chronic Illness     Findings of the 6 clinical characteristics of malnutrition:  Energy Intake:  75% or less estimated energy requirements for 1 month or longer (eats several small meals per day related to diverticulitis)  Weight Loss:  No significant weight loss     Body Fat Loss:  Mild body fat loss (moderate orbital and triceps fat losses) Buccal region   Muscle Mass Loss:   (moderate temple and clavicle muscle losses)    Fluid Accumulation:  Unable to assess     Strength:  Not Performed    Nutrition Assessment:     Pt improving from a nutritional standpoint AEB tolerating full liquid diet, improved appetite/intake, and receptive to ONS.  Remains at risk for further nutritional compromise r/t moderate malnutrition, admit with abdominal pain and dysuria, concern for colovesicular fistula, s/p sigmoid colectomy 5/2/24, increased nutrient needs to support wound healing, advanced age, and underlying medical condition (hx:HTN, diverticulitis, arthritis, anxiety, pressure ulcer, CHF, afib, HERIBERTO, malnutrition, hyperkalemia, covid-19, CAD, PEG 11/17/21-removed 7/10/23).       Nutrition Related Findings:    Pt. Report/Treatments/Miscellaneous: Patient seen, reports she just had 2 good bowel movements, still has some abdominal pain but improved, denied any nausea. Ate all of cream of wheat and some broth this morning. Agreeable to ONS and yogurt.    GI Status: BM 5/2/24  Pertinent Labs: 5/6/24: Potassium 3.4  Pertinent Meds: coreg, synthroid, movantik, protonix, entresto, brilinta, prn oxycodone     Wound Type:  (stage II sacrum,

## 2024-05-07 NOTE — PROGRESS NOTES
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  PLAN   Full liquids  Up out of bed- PT/OT  GI prophylaxis   Medical management from Hospitalist team   SUBJECTIVE   Kourtney is doing fairly well. She denies any nausea or vomiting, She is passing  flatus however has not had a bowel movement. Tolerating fulls.    CURRENT MEDICATIONS   Scheduled Meds   fluticasone  1 spray Each Nostril Daily    sodium chloride flush  5-40 mL IntraVENous 2 times per day    naloxegol  12.5 mg Oral QAM    [Held by provider] ticagrelor  90 mg Oral BID    apixaban  2.5 mg Oral BID    carvedilol  3.125 mg Oral BID    cetirizine  5 mg Oral Daily    levothyroxine  62.5 mcg Oral QAM AC    trospium  20 mg Oral Nightly    pantoprazole  40 mg Oral QAM AC    sacubitril-valsartan  1 tablet Oral BID     Continuous Infusions:   sodium chloride      sodium chloride       PRN Meds:.sodium chloride, HYDROmorphone, acetaminophen, acetaminophen, sodium chloride flush, sodium chloride, oxyCODONE, prochlorperazine  OBJECTIVE   CURRENT VITALS:  height is 1.524 m (5') and weight is 54 kg (119 lb 0.8 oz). Her oral temperature is 98.1 °F (36.7 °C). Her blood pressure is 136/63 and her pulse is 105 (abnormal). Her respiration is

## 2024-05-07 NOTE — PLAN OF CARE
Problem: Discharge Planning  Goal: Discharge to home or other facility with appropriate resources  Outcome: Progressing  Discharge to home or other facility with appropriate resources:   Identify barriers to discharge with patient and caregiver   Arrange for needed discharge resources and transportation as appropriate   Identify discharge learning needs (meds, wound care, etc)   Arrange for interpreters to assist at discharge as needed   Refer to discharge planning if patient needs post-hospital services based on physician order or complex needs related to functional status, cognitive ability or social support system     Problem: Chronic Conditions and Co-morbidities  Goal: Patient's chronic conditions and co-morbidity symptoms are monitored and maintained or improved  Outcome: Progressing  Care Plan - Patient's Chronic Conditions and Co-Morbidity Symptoms are Monitored and Maintained or Improved:   Monitor and assess patient's chronic conditions and comorbid symptoms for stability, deterioration, or improvement   Collaborate with multidisciplinary team to address chronic and comorbid conditions and prevent exacerbation or deterioration   Update acute care plan with appropriate goals if chronic or comorbid symptoms are exacerbated and prevent overall improvement and discharge     Problem: Safety - Adult  Goal: Free from fall injury  Outcome: Progressing  Free From Fall Injury: Instruct family/caregiver on patient safety     Problem: ABCDS Injury Assessment  Goal: Absence of physical injury  Outcome: Progressing  Absence of Physical Injury: Implement safety measures based on patient assessment     Problem: Pain  Goal: Verbalizes/displays adequate comfort level or baseline comfort level  Outcome: Progressing  Verbalizes/displays adequate comfort level or baseline comfort level:   Encourage patient to monitor pain and request assistance   Assess pain using appropriate pain scale   Administer analgesics based on type  and severity of pain and evaluate response   Implement non-pharmacological measures as appropriate and evaluate response   Consider cultural and social influences on pain and pain management   Notify Licensed Independent Practitioner if interventions unsuccessful or patient reports new pain     Problem: Nutrition Deficit:  Goal: Optimize nutritional status  Outcome: Progressing  Nutrient intake appropriate for improving, restoring, or maintaining nutritional needs:   Assess nutritional status and recommend course of action   Monitor oral intake, labs, and treatment plans     Problem: Neurosensory - Adult  Goal: Achieves stable or improved neurological status  Outcome: Progressing  Achieves stable or improved neurological status: Assess for and report changes in neurological status     Problem: Neurosensory - Adult  Goal: Absence of seizures  Outcome: Progressing  Absence of seizures: Monitor for seizure activity.  If seizure occurs, document type and location of movements and any associated apnea     Problem: Neurosensory - Adult  Goal: Achieves maximal functionality and self care  Outcome: Progressing  Achieves maximal functionality and self care:   Monitor swallowing and airway patency with patient fatigue and changes in neurological status   Encourage and assist patient to increase activity and self care with guidance from physical therapy/occupational therapy     Problem: Respiratory - Adult  Goal: Achieves optimal ventilation and oxygenation  Outcome: Progressing  Achieves optimal ventilation and oxygenation:   Assess for changes in respiratory status   Assess for changes in mentation and behavior   Position to facilitate oxygenation and minimize respiratory effort     Problem: Cardiovascular - Adult  Goal: Maintains optimal cardiac output and hemodynamic stability  Outcome: Progressing  Maintains optimal cardiac output and hemodynamic stability: Monitor blood pressure and heart rate     Problem: Cardiovascular

## 2024-05-08 LAB
ANION GAP SERPL CALC-SCNC: 16 MEQ/L (ref 8–16)
BUN SERPL-MCNC: 11 MG/DL (ref 7–22)
CA-I BLD ISE-SCNC: 1.1 MMOL/L (ref 1.12–1.32)
CALCIUM SERPL-MCNC: 8.7 MG/DL (ref 8.5–10.5)
CHLORIDE SERPL-SCNC: 106 MEQ/L (ref 98–111)
CO2 SERPL-SCNC: 21 MEQ/L (ref 23–33)
CREAT SERPL-MCNC: 1 MG/DL (ref 0.4–1.2)
DEPRECATED RDW RBC AUTO: 56.2 FL (ref 35–45)
ERYTHROCYTE [DISTWIDTH] IN BLOOD BY AUTOMATED COUNT: 15.2 % (ref 11.5–14.5)
GFR SERPL CREATININE-BSD FRML MDRD: 54 ML/MIN/1.73M2
GLUCOSE SERPL-MCNC: 83 MG/DL (ref 70–108)
HCT VFR BLD AUTO: 25.5 % (ref 37–47)
HGB BLD-MCNC: 8.3 GM/DL (ref 12–16)
LACTATE SERPL-SCNC: 1.5 MMOL/L (ref 0.5–2)
MAGNESIUM SERPL-MCNC: 1.4 MG/DL (ref 1.6–2.4)
MCH RBC QN AUTO: 33.1 PG (ref 26–33)
MCHC RBC AUTO-ENTMCNC: 32.5 GM/DL (ref 32.2–35.5)
MCV RBC AUTO: 101.6 FL (ref 81–99)
PHOSPHATE SERPL-MCNC: 3 MG/DL (ref 2.4–4.7)
PLATELET # BLD AUTO: 169 THOU/MM3 (ref 130–400)
PMV BLD AUTO: 11.3 FL (ref 9.4–12.4)
POTASSIUM SERPL-SCNC: 3.2 MEQ/L (ref 3.5–5.2)
POTASSIUM SERPL-SCNC: 4.1 MEQ/L (ref 3.5–5.2)
RBC # BLD AUTO: 2.51 MILL/MM3 (ref 4.2–5.4)
SODIUM SERPL-SCNC: 143 MEQ/L (ref 135–145)
WBC # BLD AUTO: 5.9 THOU/MM3 (ref 4.8–10.8)

## 2024-05-08 PROCEDURE — 80048 BASIC METABOLIC PNL TOTAL CA: CPT

## 2024-05-08 PROCEDURE — 36415 COLL VENOUS BLD VENIPUNCTURE: CPT

## 2024-05-08 PROCEDURE — 97110 THERAPEUTIC EXERCISES: CPT

## 2024-05-08 PROCEDURE — 6370000000 HC RX 637 (ALT 250 FOR IP)

## 2024-05-08 PROCEDURE — 83735 ASSAY OF MAGNESIUM: CPT

## 2024-05-08 PROCEDURE — 6360000002 HC RX W HCPCS

## 2024-05-08 PROCEDURE — 82330 ASSAY OF CALCIUM: CPT

## 2024-05-08 PROCEDURE — 97530 THERAPEUTIC ACTIVITIES: CPT

## 2024-05-08 PROCEDURE — 6370000000 HC RX 637 (ALT 250 FOR IP): Performed by: SURGERY

## 2024-05-08 PROCEDURE — 85027 COMPLETE CBC AUTOMATED: CPT

## 2024-05-08 PROCEDURE — 99024 POSTOP FOLLOW-UP VISIT: CPT | Performed by: SURGERY

## 2024-05-08 PROCEDURE — 97116 GAIT TRAINING THERAPY: CPT

## 2024-05-08 PROCEDURE — 84132 ASSAY OF SERUM POTASSIUM: CPT

## 2024-05-08 PROCEDURE — 83605 ASSAY OF LACTIC ACID: CPT

## 2024-05-08 PROCEDURE — 84100 ASSAY OF PHOSPHORUS: CPT

## 2024-05-08 PROCEDURE — 93005 ELECTROCARDIOGRAM TRACING: CPT | Performed by: STUDENT IN AN ORGANIZED HEALTH CARE EDUCATION/TRAINING PROGRAM

## 2024-05-08 PROCEDURE — 2580000003 HC RX 258

## 2024-05-08 PROCEDURE — 1200000003 HC TELEMETRY R&B

## 2024-05-08 PROCEDURE — 2580000003 HC RX 258: Performed by: SURGERY

## 2024-05-08 RX ORDER — METOPROLOL TARTRATE 1 MG/ML
5 INJECTION, SOLUTION INTRAVENOUS ONCE
Status: DISCONTINUED | OUTPATIENT
Start: 2024-05-08 | End: 2024-05-08

## 2024-05-08 RX ORDER — POTASSIUM CHLORIDE 20 MEQ/1
40 TABLET, EXTENDED RELEASE ORAL PRN
Status: DISCONTINUED | OUTPATIENT
Start: 2024-05-08 | End: 2024-05-09 | Stop reason: HOSPADM

## 2024-05-08 RX ORDER — POTASSIUM CHLORIDE 7.45 MG/ML
10 INJECTION INTRAVENOUS PRN
Status: DISCONTINUED | OUTPATIENT
Start: 2024-05-08 | End: 2024-05-09 | Stop reason: HOSPADM

## 2024-05-08 RX ORDER — 0.9 % SODIUM CHLORIDE 0.9 %
500 INTRAVENOUS SOLUTION INTRAVENOUS ONCE
Status: COMPLETED | OUTPATIENT
Start: 2024-05-08 | End: 2024-05-08

## 2024-05-08 RX ORDER — MAGNESIUM SULFATE IN WATER 40 MG/ML
2000 INJECTION, SOLUTION INTRAVENOUS
Status: COMPLETED | OUTPATIENT
Start: 2024-05-08 | End: 2024-05-08

## 2024-05-08 RX ORDER — METOPROLOL TARTRATE 1 MG/ML
5 INJECTION, SOLUTION INTRAVENOUS EVERY 6 HOURS PRN
Status: DISCONTINUED | OUTPATIENT
Start: 2024-05-08 | End: 2024-05-09 | Stop reason: HOSPADM

## 2024-05-08 RX ADMIN — APIXABAN 2.5 MG: 2.5 TABLET, FILM COATED ORAL at 08:05

## 2024-05-08 RX ADMIN — TROSPIUM CHLORIDE 20 MG: 20 TABLET, FILM COATED ORAL at 19:44

## 2024-05-08 RX ADMIN — MAGNESIUM SULFATE HEPTAHYDRATE 2000 MG: 40 INJECTION, SOLUTION INTRAVENOUS at 22:42

## 2024-05-08 RX ADMIN — LEVOTHYROXINE SODIUM 62.5 MCG: 0.12 TABLET ORAL at 03:26

## 2024-05-08 RX ADMIN — ACETAMINOPHEN 650 MG: 325 TABLET ORAL at 08:05

## 2024-05-08 RX ADMIN — SACUBITRIL AND VALSARTAN 1 TABLET: 24; 26 TABLET, FILM COATED ORAL at 19:44

## 2024-05-08 RX ADMIN — CARVEDILOL 3.12 MG: 3.12 TABLET, FILM COATED ORAL at 19:44

## 2024-05-08 RX ADMIN — SODIUM CHLORIDE, PRESERVATIVE FREE 10 ML: 5 INJECTION INTRAVENOUS at 08:06

## 2024-05-08 RX ADMIN — SODIUM CHLORIDE, PRESERVATIVE FREE 10 ML: 5 INJECTION INTRAVENOUS at 19:45

## 2024-05-08 RX ADMIN — PROCHLORPERAZINE EDISYLATE 10 MG: 5 INJECTION INTRAMUSCULAR; INTRAVENOUS at 11:50

## 2024-05-08 RX ADMIN — CARVEDILOL 3.12 MG: 3.12 TABLET, FILM COATED ORAL at 08:05

## 2024-05-08 RX ADMIN — APIXABAN 2.5 MG: 2.5 TABLET, FILM COATED ORAL at 19:44

## 2024-05-08 RX ADMIN — PANTOPRAZOLE SODIUM 40 MG: 40 TABLET, DELAYED RELEASE ORAL at 03:26

## 2024-05-08 RX ADMIN — TICAGRELOR 90 MG: 90 TABLET ORAL at 19:44

## 2024-05-08 RX ADMIN — SACUBITRIL AND VALSARTAN 1 TABLET: 24; 26 TABLET, FILM COATED ORAL at 08:05

## 2024-05-08 RX ADMIN — FLUTICASONE PROPIONATE 1 SPRAY: 50 SPRAY, METERED NASAL at 08:05

## 2024-05-08 RX ADMIN — NALOXEGOL OXALATE 12.5 MG: 12.5 TABLET, FILM COATED ORAL at 08:05

## 2024-05-08 RX ADMIN — POTASSIUM CHLORIDE 40 MEQ: 1500 TABLET, EXTENDED RELEASE ORAL at 09:03

## 2024-05-08 RX ADMIN — TICAGRELOR 90 MG: 90 TABLET ORAL at 08:05

## 2024-05-08 RX ADMIN — SODIUM CHLORIDE 500 ML: 9 INJECTION, SOLUTION INTRAVENOUS at 21:27

## 2024-05-08 RX ADMIN — CETIRIZINE HYDROCHLORIDE 5 MG: 5 TABLET ORAL at 08:05

## 2024-05-08 ASSESSMENT — PAIN DESCRIPTION - FREQUENCY: FREQUENCY: INTERMITTENT

## 2024-05-08 NOTE — PROGRESS NOTES
[facility]  INPATIENT PHYSICAL THERAPY  DAILY NOTE  CATHIE MASONS 4A - 4A-16/016-A    Time In: 0756  Time Out: 0836  Timed Code Treatment Minutes: 40 Minutes  Minutes: 40          Date: 2024  Patient Name: Kourtney Rankin,  Gender:  female        MRN: [id number]  : 1934  (89 y.o.)     Referring Practitioner: Luz Estrada MD  Diagnosis: diverticulitis of colon  Additional Pertinent Hx: Per chart review: Kourtney Rankin is a 89 y.o. female with past medical history described below who presents to [facility] with complaints of chronic abdominal pain and dysuria.  Patient states she has had this problem for quite a while however the last few weeks its gotten worse.  She states that when she urinates it burns.  She was evaluated by her family doctor and was given a cream.  She says that the cream does not really help.  She states that when her urine was tested they did not find any evidence of infection.  She states her abdominal pain comes and goes and stays mainly around her umbilicus and left lower quadrants.  She has extensive history of diverticulitis.  Upon arrival patient was noted to be afebrile. VSS.  Laboratory workup was largely noncontributory.  Creatinine noted to be elevated above baseline at 1.7.  Chronic macrocytic anemia was appreciated.  Slight leukocytosis with a WBC of 11.9.  LFTs WNL.  UA showed moderate blood with large LE, no bacteria.  CT abdomen pelvis without contrast demonstrated wall thickening the bladder with perivesicular stranding consistent with history of cystitis.  There is evidence of chronic/recurrent diverticulitis.  Air was seen in the anterior superior aspect of the bladder and may be outside of the bladder.  Air present within the bladder.  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  Recommending repeat examination with IV/oral contrast.  Findings may also be secondary to recent instrumentation/emphysematous cystitis.  Patient was given IV  to Sit:Stand By Assistance  Provided cues for safe alignment to surface and safe use of AD    Ambulation:  Stand By Assistance, Contact Guard Assistance, with verbal cues , with increased time for completion  Distance: 200ft, 50ft  Surface: Level Tile  Device:Rolling Walker  Gait Deviations:  Forward Flexed Posture, Slow Tami, Decreased Step Length Bilaterally, Decreased Gait Speed, Decreased Heel Strike Bilaterally, Decreased Foot Clearance, Increased Reliance on Rolling Walker, and cues for walker safety    Stairs:  Stand By Assistance  Number of Steps: 3  Height: 6\" step with Bilateral Handrails  Step to pattern, increased reliance on HR    Balance:  Pt completed functional tasks at toilet and at bathroom sink with assist for stability and safety. Various functional tasks completed with and without UE support in sitting and standing, pt able to complete tasks without physical assist, No LOBs, extra time to complete, cues for safety with AD    Exercise:  Patient was guided in 1 set(s) 10 reps of exercise to both lower extremities.  Ankle pumps, Glut sets, Quad sets, Heelslides, and Hip abduction/adduction.  Exercises were completed for increased independence with functional mobility.    Functional Outcome Measures: Completed  AM-PAC Inpatient Mobility Raw Score : 18  AM-PAC Inpatient T-Scale Score : 43.63  Modified [place] Scale:  Not Applicable    ASSESSMENT:  Assessment: Patient progressing toward established goals. Pt demonstrates impairments with strength, balance, endurance, activity tolerance, independence and would benefit from continued skilled PT improve these impairments, to maximize independence, to prevent falls and ensure safety with mobility.    Activity Tolerance:  Patient tolerance of  treatment: good.      Equipment Recommendations:Equipment Needed: No  Discharge Recommendations: Home with Home Health PT  Plan: Current Treatment Recommendations: Strengthening, Gait training, Stair training,

## 2024-05-08 NOTE — PROGRESS NOTES
Prayer and encouragement   05/08/24 1459   Encounter Summary   Service Provided For Patient and family together   [facility]   Support System Children   Last Encounter  05/08/24   Complexity of Encounter Low   Begin Time 1340   End Time  1347   Total Time Calculated 7 min   Spiritual/Emotional needs   Type Spiritual Support   Assessment/Intervention/Outcome   Assessment Hopeful   Intervention Empowerment     .

## 2024-05-08 NOTE — PLAN OF CARE
Problem: Discharge Planning  Goal: Discharge to home or other facility with appropriate resources  5/8/2024 1013 by Patrica Faulkner RN  Outcome: Progressing  Flowsheets (Taken 5/6/2024 2030 by Jocelyne Martínez RN)  Discharge to home or other facility with appropriate resources: Identify barriers to discharge with patient and caregiver  5/7/2024 2222 by Yasmine Ramirez RN  Outcome: Progressing  Flowsheets (Taken 5/6/2024 2030 by Jocelyne Martínez RN)  Discharge to home or other facility with appropriate resources: Identify barriers to discharge with patient and caregiver     Problem: Chronic Conditions and Co-morbidities  Goal: Patient's chronic conditions and co-morbidity symptoms are monitored and maintained or improved  Outcome: Progressing  Flowsheets (Taken 5/6/2024 2030 by Jocelyne Martínez RN)  Care Plan - Patient's Chronic Conditions and Co-Morbidity Symptoms are Monitored and Maintained or Improved: Monitor and assess patient's chronic conditions and comorbid symptoms for stability, deterioration, or improvement     Problem: Skin/Tissue Integrity  Goal: Absence of new skin breakdown  Description: 1.  Monitor for areas of redness and/or skin breakdown  2.  Assess vascular access sites hourly  3.  Every 4-6 hours minimum:  Change oxygen saturation probe site  4.  Every 4-6 hours:  If on nasal continuous positive airway pressure, respiratory therapy assess nares and determine need for appliance change or resting period.  5/8/2024 1013 by Patrica Faulkner RN  Outcome: Progressing  5/7/2024 2222 by Yasmine Ramirez RN  Outcome: Progressing  Note: No signs of skin breakdown.  Skin warm, dry, and intact.  Mucous membranes pink and moist.  Assistance with turns/ambulation provided PRN.  Will continue to monitor.       Problem: Safety - Adult  Goal: Free from fall injury  5/8/2024 1013 by Patrica Faulkner RN  Outcome: Progressing  Flowsheets (Taken 5/6/2024 1028 by Geraldine Machado RN)  Free From Fall Injury: Instruct  ROLF Underwood  Outcome: Progressing  Flowsheets (Taken 5/6/2024 2030 by Jocelyne Martínez RN)  Minimal or absence of nausea and vomiting:   Administer ordered antiemetic medications as needed   Advance diet as tolerated, if ordered  Goal: Maintains or returns to baseline bowel function  Outcome: Progressing  Flowsheets (Taken 5/6/2024 2030 by Jocelyne Martínez RN)  Maintains or returns to baseline bowel function:   Assess bowel function   Encourage oral fluids to ensure adequate hydration   Administer ordered medications as needed   Encourage mobilization and activity  Goal: Maintains adequate nutritional intake  Outcome: Progressing  Flowsheets (Taken 5/6/2024 2030 by Jocelyne Martínez RN)  Maintains adequate nutritional intake: Monitor intake and output, weight and lab values.  Care plan reviewed with patient and family.  Patient and family verbalize understanding of the plan of care and contribute to goal setting.

## 2024-05-08 NOTE — CARE COORDINATION
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 POD 5. K+ replacement. Movantik. Protonix.     PCP: Jennifer Nathan, SERGE - CNP  Readmission Risk Score: 20.1    Patient Goals/Plan/Treatment Preferences: Plans home with daughter and current [facility] for nurse, adding therapies. SW also following.

## 2024-05-08 NOTE — PLAN OF CARE
Problem: Discharge Planning  Goal: Discharge to home or other facility with appropriate resources  Outcome: Progressing  Discharge to home or other facility with appropriate resources: Identify barriers to discharge with patient and caregiver     Problem: Skin/Tissue Integrity  Goal: Absence of new skin breakdown  Description: 1.  Monitor for areas of redness and/or skin breakdown  2.  Assess vascular access sites hourly  3.  Every 4-6 hours minimum:  Change oxygen saturation probe site  4.  Every 4-6 hours:  If on nasal continuous positive airway pressure, respiratory therapy assess nares and determine need for appliance change or resting period.  Outcome: Progressing  Note: No signs of skin breakdown.  Skin warm, dry, and intact.  Mucous membranes pink and moist.  Assistance with turns/ambulation provided PRN.  Will continue to monitor.       Problem: Safety - Adult  Goal: Free from fall injury  Outcome: Progressing  Free From Fall Injury: Instruct family/caregiver on patient safety     Problem: Pain  Goal: Verbalizes/displays adequate comfort level or baseline comfort level  Outcome: Progressing   Encourage patient to monitor pain and request assistance   Assess pain using appropriate pain scale   Administer analgesics based on type and severity of pain and evaluate response   Implement non-pharmacological measures as appropriate and evaluate response   Consider cultural and social influences on pain and pain management   Notify Licensed Independent Practitioner if interventions unsuccessful or patient reports new pain     Problem: Skin/Tissue Integrity - Adult  Goal: Skin integrity remains intact  Outcome: Progressing  Skin Integrity Remains Intact: Monitor for areas of redness and/or skin breakdown     Problem: Gastrointestinal - Adult  Goal: Minimal or absence of nausea and vomiting  Outcome: Progressing  Minimal or absence of nausea and vomiting:   Administer ordered antiemetic medications as needed

## 2024-05-08 NOTE — PROGRESS NOTES
Scar Rodriguez, DO  [facility]   General Surgery Daily Progress Note    Pt Name: Kourtney Rankin  Medical Record Number: [id number]  Date of Birth 11/29/1934   Today's Date: 5/8/2024  Chief complaint: lower abd pain  ASSESSMENT   Hospital day # 12 POD# 5 sigmoid colectomy  [place] vesicle fistula  not identified at surgery  Acute postoperative anemia - stable  Hypokalemia   has a past medical history of HERIBERTO (acute kidney injury) ([facility]), Anxiety, Arthritis, Bronchitis, CAD (coronary artery disease), CHF (congestive heart failure) ([facility]), Chronic a-fib ([facility]), COVID, Diverticulitis of colon, Hyperkalemia, Hypertension, [employer] cobalt dual ICD , Neuromuscular dysfunction of bladder, Personal history of COVID-19, Pressure ulcer, and Protein-calorie malnutrition ([facility]).  PLAN   Low residue diet  K replacement  PT/OT  Discharge planning  SUBJECTIVE   Kourtney is doing fairly well. She denies any nausea or vomiting, She is passing  flatus and has had a bowel movement. Tolerating fulls..    CURRENT MEDICATIONS   Scheduled Meds   fluticasone  1 spray Each Nostril Daily    sodium chloride flush  5-40 mL IntraVENous 2 times per day    naloxegol  12.5 mg Oral QAM    ticagrelor  90 mg Oral BID    apixaban  2.5 mg Oral BID    carvedilol  3.125 mg Oral BID    cetirizine  5 mg Oral Daily    levothyroxine  62.5 mcg Oral QAM AC    trospium  20 mg Oral Nightly    pantoprazole  40 mg Oral QAM AC    sacubitril-valsartan  1 tablet Oral BID     Continuous Infusions:   sodium chloride      sodium chloride       PRN Meds:.potassium chloride **OR** potassium alternative oral replacement **OR** potassium chloride, sodium chloride, acetaminophen, acetaminophen, sodium chloride flush, sodium chloride, oxyCODONE, prochlorperazine  OBJECTIVE   CURRENT VITALS:  height is 1.524 m (5') and weight is 50.8 kg (111 lb 14.8 oz). Her oral temperature is 98.1 °F (36.7 °C). Her blood pressure is 135/64 and her pulse is 101 (abnormal). Her respiration is

## 2024-05-08 NOTE — PROGRESS NOTES
Internal Medicine Resident  Progress Note      Patient:  Kourtney Rankin    Unit/Bed:4A-16/016-A  YOB: 1934  MRN: [id number]   Acct: [id number]   PCP: Jennifer Nathan APRN - CNP  Date of Admission: 4/25/2024  Date of Service: Pt seen/examined on 05/08/24      Assessment/Plan:  Abdominal pain S/P post partial colectomy 5/2/2024: Distended, chronic/recurrent diverticulitis.  Recent history of significant dysuria, UA per patient was negative.  Patient not started on any antibiotics.  Her symptoms worsened and patient developed worsening suprapubic/left-sided abdominal pain.  Denies diarrhea, hematochezia, fecal material in the urine, hematuria. CTAP w/o contrast: Wall thickening of bladder, perivesicular stranding, evidence of chronic/recurrent diverticulitis, air seen at anterior superior aspect of bladder and may be outside bladder, air present within bladder, concerning for colovesical fistula. UA here +ve moderate blood, large LE. Repeat CT w/ oral contrast --> did not reach sigmoid colon. General surgery, Dr. Rodriguez, and urology Dr. Valdes, consulted. Patient is leaning towards surgical intervention at this time. 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   AVSS. Discontinued Zosyn 5/5/2024. Monitor clinical status  Started on regular diet today 5/8/2024.  PT OT following and working with patient.  Bowel movement 5/7/2024  Plan for possible discharge tomorrow 5/9/2024 if okay with general surgery  Anemia suspect secondary to procedure, stable: Patient this morning had a hemoglobin of 6.7 which is a significant drop from 8 yesterday 5/3/2024.  Repeat hemoglobin 6.4.  Hypotensive tachycardic.  No clinically evident bleed.  S/p 1 unit PRBC  Will continue to monitor for any sources of bleed.  Continue with  Breakfast, Lunch, Dinner; Other Oral Supplement; vanilla greek yogurt  ADULT DIET; Regular; Low Fiber  Code Status: Full Code      Electronically signed by Errol Frias DO on 5/8/2024 at 9:14 AM    Case was discussed with Attending, Dr. Oropeza

## 2024-05-08 NOTE — CARE COORDINATION
5/8/24, 1:01 PM EDT    DISCHARGE PLANNING EVALUATION    Spoke with Kourtney today regarding discharge plan. She told SW that they are planning for discharge to home with her daughter tomorrow.  She is current with [facility] for RN.  She would like to add PT and OT orders.

## 2024-05-09 VITALS
SYSTOLIC BLOOD PRESSURE: 136 MMHG | TEMPERATURE: 97.6 F | DIASTOLIC BLOOD PRESSURE: 58 MMHG | RESPIRATION RATE: 16 BRPM | WEIGHT: 111.93 LBS | BODY MASS INDEX: 21.97 KG/M2 | HEIGHT: 60 IN | OXYGEN SATURATION: 99 % | HEART RATE: 84 BPM

## 2024-05-09 LAB
ANION GAP SERPL CALC-SCNC: 12 MEQ/L (ref 8–16)
BUN SERPL-MCNC: 12 MG/DL (ref 7–22)
CALCIUM SERPL-MCNC: 8.6 MG/DL (ref 8.5–10.5)
CHLORIDE SERPL-SCNC: 105 MEQ/L (ref 98–111)
CO2 SERPL-SCNC: 23 MEQ/L (ref 23–33)
CREAT SERPL-MCNC: 1.1 MG/DL (ref 0.4–1.2)
DEPRECATED RDW RBC AUTO: 55.2 FL (ref 35–45)
ERYTHROCYTE [DISTWIDTH] IN BLOOD BY AUTOMATED COUNT: 15.2 % (ref 11.5–14.5)
GFR SERPL CREATININE-BSD FRML MDRD: 48 ML/MIN/1.73M2
GLUCOSE SERPL-MCNC: 96 MG/DL (ref 70–108)
HCT VFR BLD AUTO: 24.5 % (ref 37–47)
HGB BLD-MCNC: 7.9 GM/DL (ref 12–16)
MAGNESIUM SERPL-MCNC: 1.7 MG/DL (ref 1.6–2.4)
MCH RBC QN AUTO: 33.2 PG (ref 26–33)
MCHC RBC AUTO-ENTMCNC: 32.2 GM/DL (ref 32.2–35.5)
MCV RBC AUTO: 102.9 FL (ref 81–99)
PLATELET # BLD AUTO: 176 THOU/MM3 (ref 130–400)
PMV BLD AUTO: 11.4 FL (ref 9.4–12.4)
POTASSIUM SERPL-SCNC: 3.7 MEQ/L (ref 3.5–5.2)
RBC # BLD AUTO: 2.38 MILL/MM3 (ref 4.2–5.4)
SODIUM SERPL-SCNC: 140 MEQ/L (ref 135–145)
WBC # BLD AUTO: 6.3 THOU/MM3 (ref 4.8–10.8)

## 2024-05-09 PROCEDURE — 97116 GAIT TRAINING THERAPY: CPT

## 2024-05-09 PROCEDURE — 80048 BASIC METABOLIC PNL TOTAL CA: CPT

## 2024-05-09 PROCEDURE — 6370000000 HC RX 637 (ALT 250 FOR IP)

## 2024-05-09 PROCEDURE — 99024 POSTOP FOLLOW-UP VISIT: CPT | Performed by: SURGERY

## 2024-05-09 PROCEDURE — 36415 COLL VENOUS BLD VENIPUNCTURE: CPT

## 2024-05-09 PROCEDURE — 83735 ASSAY OF MAGNESIUM: CPT

## 2024-05-09 PROCEDURE — 6370000000 HC RX 637 (ALT 250 FOR IP): Performed by: SURGERY

## 2024-05-09 PROCEDURE — 85027 COMPLETE CBC AUTOMATED: CPT

## 2024-05-09 PROCEDURE — 97530 THERAPEUTIC ACTIVITIES: CPT

## 2024-05-09 PROCEDURE — 6370000000 HC RX 637 (ALT 250 FOR IP): Performed by: STUDENT IN AN ORGANIZED HEALTH CARE EDUCATION/TRAINING PROGRAM

## 2024-05-09 PROCEDURE — 2580000003 HC RX 258: Performed by: SURGERY

## 2024-05-09 PROCEDURE — 97110 THERAPEUTIC EXERCISES: CPT

## 2024-05-09 RX ORDER — LOPERAMIDE HYDROCHLORIDE 2 MG/1
2 CAPSULE ORAL 4 TIMES DAILY PRN
Qty: 14 CAPSULE | Refills: 0 | Status: SHIPPED | OUTPATIENT
Start: 2024-05-09 | End: 2024-05-19

## 2024-05-09 RX ORDER — HYDROCODONE BITARTRATE AND ACETAMINOPHEN 5; 325 MG/1; MG/1
1 TABLET ORAL EVERY 6 HOURS PRN
Qty: 20 TABLET | Refills: 0 | Status: SHIPPED | OUTPATIENT
Start: 2024-05-09 | End: 2024-05-14

## 2024-05-09 RX ORDER — MAGNESIUM SULFATE IN WATER 40 MG/ML
2000 INJECTION, SOLUTION INTRAVENOUS ONCE
Status: DISCONTINUED | OUTPATIENT
Start: 2024-05-09 | End: 2024-05-09 | Stop reason: HOSPADM

## 2024-05-09 RX ORDER — CARVEDILOL 6.25 MG/1
6.25 TABLET ORAL 2 TIMES DAILY
Status: DISCONTINUED | OUTPATIENT
Start: 2024-05-09 | End: 2024-05-09 | Stop reason: HOSPADM

## 2024-05-09 RX ORDER — FLUTICASONE PROPIONATE 50 MCG
1 SPRAY, SUSPENSION (ML) NASAL DAILY
Qty: 16 G | Refills: 3 | Status: SHIPPED | OUTPATIENT
Start: 2024-05-10

## 2024-05-09 RX ORDER — POTASSIUM CHLORIDE 20 MEQ/1
40 TABLET, EXTENDED RELEASE ORAL ONCE
Status: COMPLETED | OUTPATIENT
Start: 2024-05-09 | End: 2024-05-09

## 2024-05-09 RX ORDER — POTASSIUM CHLORIDE 750 MG/1
10 TABLET, EXTENDED RELEASE ORAL DAILY
Qty: 14 TABLET | Refills: 0 | Status: SHIPPED | OUTPATIENT
Start: 2024-05-09 | End: 2024-05-23

## 2024-05-09 RX ORDER — CARVEDILOL 6.25 MG/1
6.25 TABLET ORAL 2 TIMES DAILY
Qty: 60 TABLET | Refills: 3 | Status: SHIPPED | OUTPATIENT
Start: 2024-05-09

## 2024-05-09 RX ADMIN — POTASSIUM CHLORIDE 40 MEQ: 1500 TABLET, EXTENDED RELEASE ORAL at 08:59

## 2024-05-09 RX ADMIN — LEVOTHYROXINE SODIUM 62.5 MCG: 0.12 TABLET ORAL at 03:24

## 2024-05-09 RX ADMIN — SACUBITRIL AND VALSARTAN 1 TABLET: 24; 26 TABLET, FILM COATED ORAL at 08:59

## 2024-05-09 RX ADMIN — PANTOPRAZOLE SODIUM 40 MG: 40 TABLET, DELAYED RELEASE ORAL at 03:24

## 2024-05-09 RX ADMIN — FLUTICASONE PROPIONATE 1 SPRAY: 50 SPRAY, METERED NASAL at 09:00

## 2024-05-09 RX ADMIN — ACETAMINOPHEN 650 MG: 325 TABLET ORAL at 08:59

## 2024-05-09 RX ADMIN — TICAGRELOR 90 MG: 90 TABLET ORAL at 08:59

## 2024-05-09 RX ADMIN — CETIRIZINE HYDROCHLORIDE 5 MG: 5 TABLET ORAL at 08:59

## 2024-05-09 RX ADMIN — APIXABAN 2.5 MG: 2.5 TABLET, FILM COATED ORAL at 08:59

## 2024-05-09 RX ADMIN — CARVEDILOL 6.25 MG: 3.12 TABLET, FILM COATED ORAL at 08:59

## 2024-05-09 RX ADMIN — SODIUM CHLORIDE, PRESERVATIVE FREE 10 ML: 5 INJECTION INTRAVENOUS at 09:04

## 2024-05-09 ASSESSMENT — PAIN SCALES - GENERAL: PAINLEVEL_OUTOF10: 4

## 2024-05-09 ASSESSMENT — PAIN DESCRIPTION - DESCRIPTORS: DESCRIPTORS: ACHING;DISCOMFORT

## 2024-05-09 ASSESSMENT — PAIN - FUNCTIONAL ASSESSMENT: PAIN_FUNCTIONAL_ASSESSMENT: ACTIVITIES ARE NOT PREVENTED

## 2024-05-09 ASSESSMENT — PAIN DESCRIPTION - ONSET: ONSET: ON-GOING

## 2024-05-09 ASSESSMENT — PAIN DESCRIPTION - LOCATION: LOCATION: ABDOMEN

## 2024-05-09 ASSESSMENT — PAIN DESCRIPTION - PAIN TYPE: TYPE: SURGICAL PAIN;ACUTE PAIN

## 2024-05-09 ASSESSMENT — PAIN DESCRIPTION - FREQUENCY: FREQUENCY: INTERMITTENT

## 2024-05-09 NOTE — FLOWSHEET NOTE
05/08/24 2202   Treatment Team Notification   Reason for Communication Abnormal vitals   Name of Team Member Notified Dr. Shetty   Treatment Team Role Resident   Method of Communication Secure Message   Response En route   Notification Time 2108     Pt HR in the 170's. EKG confirmed afib RVR. Dr. Shetty and Dr. Aguilar at bedside. 500 ml bolus ordered.

## 2024-05-09 NOTE — PROGRESS NOTES
Discharge teaching and instructions for diagnosis/procedure of colovesical fistula completed with patient using teachback method. AVS reviewed. Printed prescriptions given to patient. Patient voiced understanding regarding prescriptions, follow up appointments, and care of self at home. Discharged in a wheelchair to  home with support per family

## 2024-05-09 NOTE — DISCHARGE SUMMARY
Resident Discharge Summary (Hospitalist)      Patient Identification:   Kourtney Rankin   : 1934  MRN: [id number]   Account: [id number]   Patient's PCP: Jennifer Nathan APRN - CNP    Admit Date: 2024   Discharge Date:   2024    Admitting Physician: No admitting provider for patient encounter.  Discharge Physician: Errol Frias DO       Discharge Diagnoses:  Abdominal pain S/P post partial colectomy 2024: Distended, chronic/recurrent diverticulitis.  Recent history of significant dysuria, UA per patient was negative.  Patient not started on any antibiotics.  Her symptoms worsened and patient developed worsening suprapubic/left-sided abdominal pain.  Denies diarrhea, hematochezia, fecal material in the urine, hematuria. CTAP w/o contrast: Wall thickening of bladder, perivesicular stranding, evidence of chronic/recurrent diverticulitis, air seen at anterior superior aspect of bladder and may be outside bladder, air present within bladder, concerning for colovesical fistula. UA here +ve moderate blood, large LE. Repeat CT w/ oral contrast --> did not reach sigmoid colon. General surgery, Dr. Rodriguez, and urology Dr. Valdes, consulted. Patient is leaning towards surgical intervention at this time. 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  Had a bowel movement 2024  Discharged home with home health, follow-up with surgery in 1 week.  Anemia suspect secondary to procedure, stable: Patient this morning had a hemoglobin of 6.7 which is a significant drop from 8 yesterday 5/3/2024.  Repeat hemoglobin 6.4.  Hypotensive tachycardic.  No clinically evident bleed.  S/p 1 unit PRBC  Continue with Eliquis  Ischemic cardiomyopathy EF 25% on 2023, not in exacerbation: Echo 2023 shows severe global hypokinesis of LV,

## 2024-05-09 NOTE — DISCHARGE INSTRUCTIONS
Follow up with cardiology, we have increased your coreg to 6.25, Take Potassium 10 meq for 2 weeks, get blood work in a week, and follow up with PCP.            DR. LAGUERRE'S DISCHARGE INSTRUCTIONS    Pt Name: Kourtney Rankin  Medical Record Number: [id number]  Today's Date: 5/9/2024  GENERAL ANESTHESIA OR SEDATION   1. Do not drive or operate hazardous machinery for 24 hours.  2. Do not make important business or personal decisions for 24 hours.  3. Do not drink alcoholic beverages or use tobacco for 24 hours.  ACTIVITY INSTRUCTIONS   Do not drive for 3-5 days and avoid heavy lifting, tugging, pullings greater than 10-20 lbs until seen in the office.    DIET INSTRUCTIONS   Low residue diet as tolerated.  MEDICATIONS   Prescription written for Norco. Take as directed.  Do not drink alcohol or drive while taking pain medications. You may experience dizziness or drowsiness with these medications. You may also experience constipation which can be relieved with stool softners or laxatives.  You may resume your daily prescription medication schedule unless otherwise specified.  WOUND & DRESSING INSTRUCTIONS   Always ensure you and your care giver clean hands before and after caring for the wound.  Keep dressing clean and dry for 48 hours. Change when soiled or wet.      Allow steri-strips to fall off on their own.   Ice operative site for 20 minutes 4 times a day.     May wash over incision in shower in 48 hours, but do not soak in a bath.  ABDOMINAL & LAPAROSCOPIC PROCEDURES   1. You are encouraged to get up and move around as this helps with the circulation and speeds up the healing process.  2. Breath deeply and cough from time to time. This helps to clear your lungs and helps prevent pneumonia.  3. Supporting your incision with a pillow or your hand helps to minimize discomfort and pain.  FOLLOW UP CARE, SPECIFICALLY WATCH FOR:    Fever over 101 degrees by mouth   Increased redness, warmth, hardness at operative  site.   Blood soaked dressing (small amounts of oozing may be normal.)   Increased or progressive drainage from the surgical area   Inability to urinate or blood in the urine   Pain not relieved by the medications ordered   Persistent nausea and/or vomiting, unable to retain fluids.    FOLLOW-UP APPOINTMENT in 1 week    Call my office if you have any problem that concerns you, [phone]. After hours, you can reach the answering service via the office phone number. IF YOU NEED IMMEDIATE ATTENTION, GO TO THE EMERGENCY ROOM AND YOUR DOCTOR WILL BE CONTACTED.    [address] [place] [address] [id number]  [place] [address]  Electronically signed by Scar Rodriguez DO on 5/9/2024 at 11:13 AM

## 2024-05-09 NOTE — CARE COORDINATION
5/9/24, 11:58 AM EDT    Patient goals/plan/ treatment preferences discussed by  and .  Patient goals/plan/ treatment preferences reviewed with patient/ family.  Patient/ family verbalize understanding of discharge plan and are in agreement with goal/plan/treatment preferences.  Understanding was demonstrated using the teach back method.  AVS provided by RN at time of discharge, which includes all necessary medical information pertaining to the patients current course of illness, treatment, post-discharge goals of care, and treatment preferences.     Services At/After Discharge: Home Health, Nursing service, OT, and PT       Kourtney will be discharged to home today where she lives with her daughter.  She will resume [facility] for nursing.  SW is adding PT and OT per patient request.  Juliana at [facility] is aware of the discharge today and the new orders.

## 2024-05-09 NOTE — PROGRESS NOTES
[facility]  INPATIENT PHYSICAL THERAPY  DAILY NOTE  CATHIE MASONS 4A - 4A-16/016-A    Time In: 1417  Time Out: 1457  Timed Code Treatment Minutes: 40 Minutes  Minutes: 40          Date: 2024  Patient Name: Kourtney Rankin,  Gender:  female        MRN: [id number]  : 1934  (89 y.o.)     Referring Practitioner: Luz Estrada MD  Diagnosis: diverticulitis of colon  Additional Pertinent Hx: Per chart review: Kourtney Rankin is a 89 y.o. female with past medical history described below who presents to [facility] with complaints of chronic abdominal pain and dysuria.  Patient states she has had this problem for quite a while however the last few weeks its gotten worse.  She states that when she urinates it burns.  She was evaluated by her family doctor and was given a cream.  She says that the cream does not really help.  She states that when her urine was tested they did not find any evidence of infection.  She states her abdominal pain comes and goes and stays mainly around her umbilicus and left lower quadrants.  She has extensive history of diverticulitis.  Upon arrival patient was noted to be afebrile. VSS.  Laboratory workup was largely noncontributory.  Creatinine noted to be elevated above baseline at 1.7.  Chronic macrocytic anemia was appreciated.  Slight leukocytosis with a WBC of 11.9.  LFTs WNL.  UA showed moderate blood with large LE, no bacteria.  CT abdomen pelvis without contrast demonstrated wall thickening the bladder with perivesicular stranding consistent with history of cystitis.  There is evidence of chronic/recurrent diverticulitis.  Air was seen in the anterior superior aspect of the bladder and may be outside of the bladder.  Air present within the bladder.  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  Recommending repeat examination with IV/oral contrast.  Findings may also be secondary to recent instrumentation/emphysematous cystitis.  Patient was given IV

## 2024-05-09 NOTE — SIGNIFICANT EVENT
Called to bedside by nurse for Afib RVR, EKG showing afib rvr ventricular rate 153. Beside tele showing HR >170 SBP  >140.     Patient has had 1 day of diarrhea likely fluid deplete, will give 1 500 cc bolus NS and 5 IV lopressor PRN frp HR >150.    Patient also given 2g mag now.    Labs ordered    If unable to control HR will consider starting on amio drip.

## 2024-05-09 NOTE — DISCHARGE INSTR - COC
/ NO:}  Bladder: {YES / NO:}  Urinary Catheter: {Urinary Catheter:965285647}   Colostomy/Ileostomy/Ileal Conduit: {YES / NO:}       Date of Last BM: ***    Intake/Output Summary (Last 24 hours) at 2024 1648  Last data filed at 2024 1254  Gross per 24 hour   Intake 511.64 ml   Output 1100 ml   Net -588.36 ml     I/O last 3 completed shifts:  In: 886.6 [P.O.:885; IV Piggyback:1.6]  Out: 1850 [Urine:1850]    Safety Concerns:     { ROBBIE Safety Concerns:530519784}    Impairments/Disabilities:      { ROBBIE Impairments/Disabilities:574296577}    Nutrition Therapy:  Current Nutrition Therapy:   { ROBBIE Diet List:924617148}    Routes of Feeding: {[facility] Other Feedings:746350710}  Liquids: {Slp liquid thickness:72203}  Daily Fluid Restriction: {[facility] Yes amt example:921916562}  Last Modified Barium Swallow with Video (Video Swallowing Test): {Done Not Done Date:}    Treatments at the Time of Hospital Discharge:   Respiratory Treatments: ***  Oxygen Therapy:  {Therapy; copd oxygen:49192}  Ventilator:    { CC Vent List:583289353}    Rehab Therapies: {THERAPEUTIC INTERVENTION:9930432259}  Weight Bearing Status/Restrictions: {[facility] Weight Bearin}  Other Medical Equipment (for information only, NOT a DME order):  {EQUIPMENT:098779002}  Other Treatments: ***    Patient's personal belongings (please select all that are sent with patient):  {[facility] Belongings:756078561}    RN SIGNATURE:  {Esignature:142491605}    CASE MANAGEMENT/SOCIAL WORK SECTION    Inpatient Status Date: ***    Readmission Risk Assessment Score:  Readmission Risk              Risk of Unplanned Readmission:  25           Discharging to Facility/ Agency   Name:   Address:  Phone:  Fax:    Dialysis Facility (if applicable)   Name:  Address:  Dialysis Schedule:  Phone:  Fax:    / signature: {Esignature:422072503}    PHYSICIAN SECTION    Prognosis: {Prognosis:4919598698}    Condition at  Discharge: { Patient Condition:938852348}    Rehab Potential (if transferring to Rehab): {Prognosis:2397215758}    Recommended Labs or Other Treatments After Discharge: ***    Physician Certification: I certify the above information and transfer of Kourtney Rankin  is necessary for the continuing treatment of the diagnosis listed and that she requires {Admit to Appropriate Level of Care:06168} for {GREATER/LESS:407939132} 30 days.     Update Admission H&P: {[facility] DME Changes in HandP:194332903}    PHYSICIAN SIGNATURE:  {Esignature:129000185}

## 2024-05-09 NOTE — PROGRESS NOTES
Scar Rodriguez, DO  [facility]   General Surgery Daily Progress Note    Pt Name: Kourtney Rankin  Medical Record Number: [id number]  Date of Birth 11/29/1934   Today's Date: 5/9/2024  Chief complaint: lower abd pain  ASSESSMENT   Hospital day # 13 POD # 6 sigmoid colectomy  [place] vesicle fistula  not identified at surgery  Acute postoperative anemia - stable  Hypokalemia   has a past medical history of HERIBERTO (acute kidney injury) (Formerly [facility]), Anxiety, Arthritis, Bronchitis, CAD (coronary artery disease), CHF (congestive heart failure) (Formerly [facility]), Chronic a-fib (Formerly [facility]), COVID, Diverticulitis of colon, Hyperkalemia, Hypertension, Medtronic cobalt dual ICD , Neuromuscular dysfunction of bladder, Personal history of COVID-19, Pressure ulcer, and Protein-calorie malnutrition (Formerly [facility]).  PLAN   OK for discharge from standpoint  Low residue diet as tolerated  No heavy lifting  Rx Norco  F/U 1 week  SUBJECTIVE   Kourtney is doing fairly well. She denies any nausea or vomiting, She is passing  flatus and has had a bowel movement. Tolerating fulls..    CURRENT MEDICATIONS   Scheduled Meds   carvedilol  6.25 mg Oral BID    fluticasone  1 spray Each Nostril Daily    sodium chloride flush  5-40 mL IntraVENous 2 times per day    [Held by provider] naloxegol  12.5 mg Oral QAM    ticagrelor  90 mg Oral BID    apixaban  2.5 mg Oral BID    cetirizine  5 mg Oral Daily    levothyroxine  62.5 mcg Oral QAM AC    trospium  20 mg Oral Nightly    pantoprazole  40 mg Oral QAM AC    sacubitril-valsartan  1 tablet Oral BID     Continuous Infusions:   sodium chloride      sodium chloride       PRN Meds:.potassium chloride **OR** potassium alternative oral replacement **OR** potassium chloride, metoprolol, sodium chloride, acetaminophen, acetaminophen, sodium chloride flush, sodium chloride, oxyCODONE, prochlorperazine  OBJECTIVE   CURRENT VITALS:  height is 1.524 m (5') and weight is 50.8 kg (111 lb 14.8 oz). Her oral temperature is 98.6 °F (37 °C). Her

## 2024-05-10 LAB
EKG Q-T INTERVAL: 268 MS
EKG QRS DURATION: 136 MS
EKG QTC CALCULATION (BAZETT): 427 MS
EKG R AXIS: -52 DEGREES
EKG T AXIS: 96 DEGREES
EKG VENTRICULAR RATE: 153 BPM

## 2024-05-10 PROCEDURE — 93010 ELECTROCARDIOGRAM REPORT: CPT | Performed by: INTERNAL MEDICINE

## 2024-05-16 ENCOUNTER — TELEPHONE (OUTPATIENT)
Dept: SURGERY | Age: 89
End: 2024-05-16

## 2024-05-16 ENCOUNTER — HOSPITAL ENCOUNTER (EMERGENCY)
Age: 89
Discharge: HOME | DRG: 812 | End: 2024-05-16
Attending: EMERGENCY MEDICINE
Payer: MEDICARE

## 2024-05-16 VITALS
RESPIRATION RATE: 22 BRPM | WEIGHT: 109 LBS | BODY MASS INDEX: 21.4 KG/M2 | OXYGEN SATURATION: 94 % | TEMPERATURE: 97.8 F | SYSTOLIC BLOOD PRESSURE: 124 MMHG | HEIGHT: 60 IN | HEART RATE: 78 BPM | DIASTOLIC BLOOD PRESSURE: 51 MMHG

## 2024-05-16 DIAGNOSIS — E44.0 MODERATE MALNUTRITION (HCC): ICD-10-CM

## 2024-05-16 DIAGNOSIS — D64.9 CHRONIC ANEMIA: ICD-10-CM

## 2024-05-16 DIAGNOSIS — E83.42 HYPOMAGNESEMIA: ICD-10-CM

## 2024-05-16 DIAGNOSIS — D53.9 MACROCYTIC ANEMIA: ICD-10-CM

## 2024-05-16 DIAGNOSIS — E83.42 HYPOMAGNESEMIA: Primary | ICD-10-CM

## 2024-05-16 LAB
ALBUMIN SERPL BCG-MCNC: 3.5 G/DL (ref 3.5–5.1)
ALP SERPL-CCNC: 67 U/L (ref 38–126)
ALT SERPL W/O P-5'-P-CCNC: 7 U/L (ref 11–66)
ANION GAP SERPL CALC-SCNC: 15 MEQ/L (ref 8–16)
ANION GAP SERPL CALC-SCNC: 15 MEQ/L (ref 8–16)
AST SERPL-CCNC: 24 U/L (ref 5–40)
BACTERIA URNS QL MICRO: ABNORMAL /HPF
BASOPHILS ABSOLUTE: 0 THOU/MM3 (ref 0–0.1)
BASOPHILS NFR BLD AUTO: 0.2 %
BILIRUB SERPL-MCNC: 0.3 MG/DL (ref 0.3–1.2)
BILIRUB UR QL STRIP.AUTO: NEGATIVE
BUN SERPL-MCNC: 14 MG/DL (ref 7–22)
BUN SERPL-MCNC: 16 MG/DL (ref 7–22)
CALCIUM SERPL-MCNC: 9 MG/DL (ref 8.5–10.5)
CALCIUM SERPL-MCNC: 9.4 MG/DL (ref 8.5–10.5)
CASTS #/AREA URNS LPF: ABNORMAL /LPF
CASTS 2: ABNORMAL /LPF
CHARACTER UR: CLEAR
CHLORIDE SERPL-SCNC: 102 MEQ/L (ref 98–111)
CHLORIDE SERPL-SCNC: 103 MEQ/L (ref 98–111)
CO2 SERPL-SCNC: 24 MEQ/L (ref 23–33)
CO2 SERPL-SCNC: 24 MEQ/L (ref 23–33)
COLOR: YELLOW
CREAT SERPL-MCNC: 1.1 MG/DL (ref 0.4–1.2)
CREAT SERPL-MCNC: 1.2 MG/DL (ref 0.4–1.2)
CRYSTALS URNS MICRO: ABNORMAL
DEPRECATED RDW RBC AUTO: 59.5 FL (ref 35–45)
DEPRECATED RDW RBC AUTO: 59.7 FL (ref 35–45)
EKG ATRIAL RATE: 84 BPM
EKG P AXIS: 73 DEGREES
EKG P-R INTERVAL: 216 MS
EKG Q-T INTERVAL: 384 MS
EKG QRS DURATION: 122 MS
EKG QTC CALCULATION (BAZETT): 467 MS
EKG R AXIS: -54 DEGREES
EKG T AXIS: -58 DEGREES
EKG VENTRICULAR RATE: 89 BPM
EOSINOPHIL NFR BLD AUTO: 0.4 %
EOSINOPHILS ABSOLUTE: 0 THOU/MM3 (ref 0–0.4)
EPITHELIAL CELLS, UA: ABNORMAL /HPF
ERYTHROCYTE [DISTWIDTH] IN BLOOD BY AUTOMATED COUNT: 15.5 % (ref 11.5–14.5)
ERYTHROCYTE [DISTWIDTH] IN BLOOD BY AUTOMATED COUNT: 15.6 % (ref 11.5–14.5)
GFR SERPL CREATININE-BSD FRML MDRD: 43 ML/MIN/1.73M2
GFR SERPL CREATININE-BSD FRML MDRD: 48 ML/MIN/1.73M2
GLUCOSE SERPL-MCNC: 93 MG/DL (ref 70–108)
GLUCOSE SERPL-MCNC: 97 MG/DL (ref 70–108)
GLUCOSE UR QL STRIP.AUTO: NEGATIVE MG/DL
HCT VFR BLD AUTO: 26.9 % (ref 37–47)
HCT VFR BLD AUTO: 28.2 % (ref 37–47)
HGB BLD-MCNC: 8 GM/DL (ref 12–16)
HGB BLD-MCNC: 8.7 GM/DL (ref 12–16)
HGB UR QL STRIP.AUTO: NEGATIVE
IMM GRANULOCYTES # BLD AUTO: 0.21 THOU/MM3 (ref 0–0.07)
IMM GRANULOCYTES NFR BLD AUTO: 1.9 %
KETONES UR QL STRIP.AUTO: NEGATIVE
LIPASE SERPL-CCNC: 43.5 U/L (ref 5.6–51.3)
LYMPHOCYTES ABSOLUTE: 1 THOU/MM3 (ref 1–4.8)
LYMPHOCYTES NFR BLD AUTO: 9.4 %
MAGNESIUM SERPL-MCNC: 1.3 MG/DL (ref 1.6–2.4)
MAGNESIUM SERPL-MCNC: 1.4 MG/DL (ref 1.6–2.4)
MCH RBC QN AUTO: 31.5 PG (ref 26–33)
MCH RBC QN AUTO: 32.6 PG (ref 26–33)
MCHC RBC AUTO-ENTMCNC: 29.7 GM/DL (ref 32.2–35.5)
MCHC RBC AUTO-ENTMCNC: 30.9 GM/DL (ref 32.2–35.5)
MCV RBC AUTO: 105.6 FL (ref 81–99)
MCV RBC AUTO: 105.9 FL (ref 81–99)
MISCELLANEOUS 2: ABNORMAL
MONOCYTES ABSOLUTE: 1.5 THOU/MM3 (ref 0.4–1.3)
MONOCYTES NFR BLD AUTO: 13.9 %
NEUTROPHILS ABSOLUTE: 8.2 THOU/MM3 (ref 1.8–7.7)
NEUTROPHILS NFR BLD AUTO: 74.2 %
NITRITE UR QL STRIP: NEGATIVE
NRBC BLD AUTO-RTO: 0 /100 WBC
OSMOLALITY SERPL CALC.SUM OF ELEC: 282.4 MOSMOL/KG (ref 275–300)
PH UR STRIP.AUTO: 5.5 [PH] (ref 5–9)
PLATELET # BLD AUTO: 257 THOU/MM3 (ref 130–400)
PLATELET # BLD AUTO: 260 THOU/MM3 (ref 130–400)
PMV BLD AUTO: 12.4 FL (ref 9.4–12.4)
PMV BLD AUTO: 12.7 FL (ref 9.4–12.4)
POTASSIUM SERPL-SCNC: 3.4 MEQ/L (ref 3.5–5.2)
POTASSIUM SERPL-SCNC: 4.2 MEQ/L (ref 3.5–5.2)
PROT SERPL-MCNC: 7.3 G/DL (ref 6.1–8)
PROT UR STRIP.AUTO-MCNC: 30 MG/DL
RBC # BLD AUTO: 2.54 MILL/MM3 (ref 4.2–5.4)
RBC # BLD AUTO: 2.67 MILL/MM3 (ref 4.2–5.4)
RBC URINE: ABNORMAL /HPF
RENAL EPI CELLS #/AREA URNS HPF: ABNORMAL /[HPF]
SODIUM SERPL-SCNC: 141 MEQ/L (ref 135–145)
SODIUM SERPL-SCNC: 142 MEQ/L (ref 135–145)
SP GR UR REFRACT.AUTO: 1.01 (ref 1–1.03)
UROBILINOGEN, URINE: 0.2 EU/DL (ref 0–1)
WBC # BLD AUTO: 11 THOU/MM3 (ref 4.8–10.8)
WBC # BLD AUTO: 12.1 THOU/MM3 (ref 4.8–10.8)
WBC #/AREA URNS HPF: ABNORMAL /HPF
WBC #/AREA URNS HPF: NEGATIVE /[HPF]
YEAST LIKE FUNGI URNS QL MICRO: ABNORMAL

## 2024-05-16 PROCEDURE — 36415 COLL VENOUS BLD VENIPUNCTURE: CPT

## 2024-05-16 PROCEDURE — 96365 THER/PROPH/DIAG IV INF INIT: CPT

## 2024-05-16 PROCEDURE — 93005 ELECTROCARDIOGRAM TRACING: CPT | Performed by: EMERGENCY MEDICINE

## 2024-05-16 PROCEDURE — 6360000002 HC RX W HCPCS: Performed by: EMERGENCY MEDICINE

## 2024-05-16 PROCEDURE — 81001 URINALYSIS AUTO W/SCOPE: CPT

## 2024-05-16 PROCEDURE — 96366 THER/PROPH/DIAG IV INF ADDON: CPT

## 2024-05-16 PROCEDURE — 83735 ASSAY OF MAGNESIUM: CPT

## 2024-05-16 PROCEDURE — 99284 EMERGENCY DEPT VISIT MOD MDM: CPT

## 2024-05-16 PROCEDURE — 85025 COMPLETE CBC W/AUTO DIFF WBC: CPT

## 2024-05-16 PROCEDURE — 80053 COMPREHEN METABOLIC PANEL: CPT

## 2024-05-16 PROCEDURE — 83690 ASSAY OF LIPASE: CPT

## 2024-05-16 PROCEDURE — 93010 ELECTROCARDIOGRAM REPORT: CPT | Performed by: INTERNAL MEDICINE

## 2024-05-16 RX ORDER — MAGNESIUM SULFATE IN WATER 40 MG/ML
2000 INJECTION, SOLUTION INTRAVENOUS ONCE
Status: COMPLETED | OUTPATIENT
Start: 2024-05-16 | End: 2024-05-16

## 2024-05-16 RX ORDER — MAGNESIUM OXIDE 400 MG/1
400 TABLET ORAL 2 TIMES DAILY
Qty: 10 TABLET | Refills: 0 | Status: SHIPPED | OUTPATIENT
Start: 2024-05-16 | End: 2024-05-21

## 2024-05-16 RX ADMIN — MAGNESIUM SULFATE HEPTAHYDRATE 2000 MG: 40 INJECTION, SOLUTION INTRAVENOUS at 20:51

## 2024-05-16 ASSESSMENT — PAIN DESCRIPTION - LOCATION: LOCATION: ABDOMEN

## 2024-05-16 ASSESSMENT — PAIN SCALES - GENERAL: PAINLEVEL_OUTOF10: 8

## 2024-05-16 NOTE — ED TRIAGE NOTES
Patient presents to the Emergency Department via self with daughter. Patient presents with a complaint of low magnesium. Patient states that she had a bowel resection one month ago due to diverticulitis. Pt also states she is having groin pain and constipation due to what she believes is from opoid use.Pt had bowel movement prior to entering room stating pain improved significantly.  EKG obtained and IV inserted. Patient is alert and oriented x4, patient does not appear in acute distress upon triage. Patient respirations are regular and unlabored with even rise and fall of chest. Patient family at the bedside. Call light within reach.

## 2024-05-16 NOTE — TELEPHONE ENCOUNTER
Pt daughter called stating pt is constipated at this time.  Pt had been taking pain meds without taking a stool softener. dimitris Ventura, is going to give her a stool stool softener see if they can get her to have a BM.  Advised Audrey to call if any questions. Pt did stop taking pain meds and is only using Tylenol

## 2024-05-16 NOTE — ED PROVIDER NOTES
[facility] EMERGENCY DEPT      CHIEF COMPLAINT     No chief complaint on file.      Nurses Notes reviewed and I agree except as noted in the HPI.      HISTORY OF PRESENT ILLNESS    Kourtney Rankin is a 89 y.o. female who presents with complaint of low magnesium, patient is status post colectomy due to diverticulitis.  Patient has chronic hide poor glycemia, currently on magnesium supplementation at home.  Patient states that she had follow-up blood work today and was told that her magnesium was low otherwise she is asymptomatic, no palpitations, lightheadedness, dizziness, muscular weakness presyncope/syncope.  Onset: Acute on chronic  Duration: Unknown  Timing:   Location of Pain: No pain  Intesity/severity: Magnesium of 1.3  Modifying Factors:   Relieved by;  Previous Episodes;  Tx Before arrival: None    PAST MEDICAL HISTORY    has a past medical history of HERIBERTO (acute kidney injury) (Formerly [facility]), Anxiety, Arthritis, Bronchitis, CAD (coronary artery disease), CHF (congestive heart failure) (Formerly [facility]), Chronic a-fib ([facility]), COVID, Diverticulitis of colon, Hyperkalemia, Hypertension, Medtronic cobalt dual ICD , Neuromuscular dysfunction of bladder, Personal history of COVID-19, Pressure ulcer, and Protein-calorie malnutrition (Formerly [facility]).    SURGICAL HISTORY      has a past surgical history that includes Appendectomy; Cholecystectomy; Gastrostomy tube placement (N/A, 11/17/2021); Pressure ulcer debridement (N/A, 12/23/2021); tracheostomy; Gastrostomy tube placement (N/A, 07/10/2023); Abdomen surgery; pacemaker placement; and Sigmoid Colectomy (N/A, 5/2/2024).    CURRENT MEDICATIONS       Discharge Medication List as of 5/16/2024 10:49 PM        CONTINUE these medications which have NOT CHANGED    Details   carvedilol (COREG) 6.25 MG tablet Take 1 tablet by mouth 2 times daily, Disp-60 tablet, R-3Normal      fluticasone (FLONASE) 50 MCG/ACT nasal spray 1 spray by Each Nostril route daily, Disp-16 g, R-3Normal      potassium chloride

## 2024-05-17 ENCOUNTER — HOSPITAL ENCOUNTER (INPATIENT)
Age: 89
LOS: 2 days | Discharge: HOME HEALTH | DRG: 812 | End: 2024-05-20
Attending: EMERGENCY MEDICINE
Payer: MEDICARE

## 2024-05-17 ENCOUNTER — TELEPHONE (OUTPATIENT)
Dept: CARDIOLOGY CLINIC | Age: 89
End: 2024-05-17

## 2024-05-17 DIAGNOSIS — K92.2 GASTROINTESTINAL HEMORRHAGE, UNSPECIFIED GASTROINTESTINAL HEMORRHAGE TYPE: ICD-10-CM

## 2024-05-17 DIAGNOSIS — D64.9 ANEMIA, UNSPECIFIED TYPE: Primary | ICD-10-CM

## 2024-05-17 DIAGNOSIS — D64.9 ACUTE ON CHRONIC ANEMIA: ICD-10-CM

## 2024-05-17 LAB
BASOPHILS ABSOLUTE: 0 THOU/MM3 (ref 0–0.1)
BASOPHILS NFR BLD AUTO: 0.3 %
DEPRECATED RDW RBC AUTO: 59.5 FL (ref 35–45)
EOSINOPHIL NFR BLD AUTO: 0.9 %
EOSINOPHILS ABSOLUTE: 0.1 THOU/MM3 (ref 0–0.4)
ERYTHROCYTE [DISTWIDTH] IN BLOOD BY AUTOMATED COUNT: 15.5 % (ref 11.5–14.5)
HCT VFR BLD AUTO: 24.6 % (ref 37–47)
HGB BLD-MCNC: 7.5 GM/DL (ref 12–16)
IMM GRANULOCYTES # BLD AUTO: 0.25 THOU/MM3 (ref 0–0.07)
IMM GRANULOCYTES NFR BLD AUTO: 2.7 %
LYMPHOCYTES ABSOLUTE: 1 THOU/MM3 (ref 1–4.8)
LYMPHOCYTES NFR BLD AUTO: 11.2 %
MAGNESIUM SERPL-MCNC: 1.8 MG/DL (ref 1.6–2.4)
MCH RBC QN AUTO: 32.1 PG (ref 26–33)
MCHC RBC AUTO-ENTMCNC: 30.5 GM/DL (ref 32.2–35.5)
MCV RBC AUTO: 105.1 FL (ref 81–99)
MONOCYTES ABSOLUTE: 1.3 THOU/MM3 (ref 0.4–1.3)
MONOCYTES NFR BLD AUTO: 14.4 %
NEUTROPHILS ABSOLUTE: 6.5 THOU/MM3 (ref 1.8–7.7)
NEUTROPHILS NFR BLD AUTO: 70.5 %
NRBC BLD AUTO-RTO: 0 /100 WBC
PLATELET # BLD AUTO: 257 THOU/MM3 (ref 130–400)
PMV BLD AUTO: 12 FL (ref 9.4–12.4)
RBC # BLD AUTO: 2.34 MILL/MM3 (ref 4.2–5.4)
WBC # BLD AUTO: 9.2 THOU/MM3 (ref 4.8–10.8)

## 2024-05-17 PROCEDURE — 99285 EMERGENCY DEPT VISIT HI MDM: CPT

## 2024-05-17 PROCEDURE — 83735 ASSAY OF MAGNESIUM: CPT

## 2024-05-17 PROCEDURE — 85025 COMPLETE CBC W/AUTO DIFF WBC: CPT

## 2024-05-17 NOTE — TELEPHONE ENCOUNTER
Daughter called, patient had recent bowel surgery, and had went into an arrhythmia, had low mag levels was called into ER for IV mag replacement last night.      Wanted to make you aware of this.

## 2024-05-17 NOTE — ED NOTES
Patient in bed and updated on current plan of care. Patient states that she hopes every thing come back good. Patient provided w/ blanket and update to plan of care. No acute distress noted at this time. Patient respirs are even and unlabored at this time. Patient call light is within reach.

## 2024-05-18 ENCOUNTER — APPOINTMENT (OUTPATIENT)
Dept: GENERAL RADIOLOGY | Age: 89
DRG: 812 | End: 2024-05-18
Payer: MEDICARE

## 2024-05-18 PROBLEM — D62 ACUTE BLOOD LOSS ANEMIA: Status: ACTIVE | Noted: 2024-05-18

## 2024-05-18 PROBLEM — D64.9 ACUTE ON CHRONIC ANEMIA: Status: ACTIVE | Noted: 2024-05-18

## 2024-05-18 LAB
ABO: NORMAL
ANION GAP SERPL CALC-SCNC: 12 MEQ/L (ref 8–16)
ANTIBODY SCREEN: NORMAL
BUN SERPL-MCNC: 14 MG/DL (ref 7–22)
CALCIUM SERPL-MCNC: 8.8 MG/DL (ref 8.5–10.5)
CHLORIDE SERPL-SCNC: 109 MEQ/L (ref 98–111)
CO2 SERPL-SCNC: 25 MEQ/L (ref 23–33)
CREAT SERPL-MCNC: 1.1 MG/DL (ref 0.4–1.2)
DEPRECATED RDW RBC AUTO: 57.3 FL (ref 35–45)
EKG ATRIAL RATE: 82 BPM
EKG P AXIS: 52 DEGREES
EKG P-R INTERVAL: 210 MS
EKG Q-T INTERVAL: 430 MS
EKG QRS DURATION: 148 MS
EKG QTC CALCULATION (BAZETT): 502 MS
EKG R AXIS: -41 DEGREES
EKG T AXIS: 3 DEGREES
EKG VENTRICULAR RATE: 82 BPM
ERYTHROCYTE [DISTWIDTH] IN BLOOD BY AUTOMATED COUNT: 15.2 % (ref 11.5–14.5)
FERRITIN SERPL IA-MCNC: 864 NG/ML (ref 10–291)
FOLATE SERPL-MCNC: 11 NG/ML (ref 4.8–24.2)
GFR SERPL CREATININE-BSD FRML MDRD: 48 ML/MIN/1.73M2
GLUCOSE SERPL-MCNC: 98 MG/DL (ref 70–108)
HCT VFR BLD AUTO: 25.3 % (ref 37–47)
HCT VFR BLD AUTO: 29.3 % (ref 37–47)
HEMOCCULT STL QL: POSITIVE
HGB BLD-MCNC: 7.8 GM/DL (ref 12–16)
HGB BLD-MCNC: 9 GM/DL (ref 12–16)
HGB RETIC QN AUTO: 21 PG (ref 28.2–35.7)
IMM RETICS NFR: 23.8 % (ref 3–15.9)
IRON SATN MFR SERPL: 11 % (ref 20–50)
IRON SERPL-MCNC: 16 UG/DL (ref 50–170)
MCH RBC QN AUTO: 32.4 PG (ref 26–33)
MCHC RBC AUTO-ENTMCNC: 30.8 GM/DL (ref 32.2–35.5)
MCV RBC AUTO: 105 FL (ref 81–99)
OSMOLALITY SERPL CALC.SUM OF ELEC: 291 MOSMOL/KG (ref 275–300)
PLATELET # BLD AUTO: 259 THOU/MM3 (ref 130–400)
PMV BLD AUTO: 11.6 FL (ref 9.4–12.4)
POTASSIUM SERPL-SCNC: 4 MEQ/L (ref 3.5–5.2)
RBC # BLD AUTO: 2.41 MILL/MM3 (ref 4.2–5.4)
RETICS # AUTO: 60 THOU/MM3 (ref 20–115)
RETICS/RBC NFR AUTO: 2.5 % (ref 0.5–2)
RH FACTOR: NORMAL
SODIUM SERPL-SCNC: 146 MEQ/L (ref 135–145)
TIBC SERPL-MCNC: 149 UG/DL (ref 171–450)
VIT B12 SERPL-MCNC: 923 PG/ML (ref 211–911)
WBC # BLD AUTO: 9.5 THOU/MM3 (ref 4.8–10.8)

## 2024-05-18 PROCEDURE — 86850 RBC ANTIBODY SCREEN: CPT

## 2024-05-18 PROCEDURE — 80048 BASIC METABOLIC PNL TOTAL CA: CPT

## 2024-05-18 PROCEDURE — P9016 RBC LEUKOCYTES REDUCED: HCPCS

## 2024-05-18 PROCEDURE — 36415 COLL VENOUS BLD VENIPUNCTURE: CPT

## 2024-05-18 PROCEDURE — C9113 INJ PANTOPRAZOLE SODIUM, VIA: HCPCS

## 2024-05-18 PROCEDURE — 82746 ASSAY OF FOLIC ACID SERUM: CPT

## 2024-05-18 PROCEDURE — 85018 HEMOGLOBIN: CPT

## 2024-05-18 PROCEDURE — 86900 BLOOD TYPING SEROLOGIC ABO: CPT

## 2024-05-18 PROCEDURE — 86922 COMPATIBILITY TEST ANTIGLOB: CPT

## 2024-05-18 PROCEDURE — 6370000000 HC RX 637 (ALT 250 FOR IP)

## 2024-05-18 PROCEDURE — 1200000003 HC TELEMETRY R&B

## 2024-05-18 PROCEDURE — 74018 RADEX ABDOMEN 1 VIEW: CPT

## 2024-05-18 PROCEDURE — 99223 1ST HOSP IP/OBS HIGH 75: CPT | Performed by: STUDENT IN AN ORGANIZED HEALTH CARE EDUCATION/TRAINING PROGRAM

## 2024-05-18 PROCEDURE — 30233N1 TRANSFUSION OF NONAUTOLOGOUS RED BLOOD CELLS INTO PERIPHERAL VEIN, PERCUTANEOUS APPROACH: ICD-10-PCS | Performed by: STUDENT IN AN ORGANIZED HEALTH CARE EDUCATION/TRAINING PROGRAM

## 2024-05-18 PROCEDURE — 82728 ASSAY OF FERRITIN: CPT

## 2024-05-18 PROCEDURE — 86905 BLOOD TYPING RBC ANTIGENS: CPT

## 2024-05-18 PROCEDURE — 85046 RETICYTE/HGB CONCENTRATE: CPT

## 2024-05-18 PROCEDURE — 82607 VITAMIN B-12: CPT

## 2024-05-18 PROCEDURE — 6360000002 HC RX W HCPCS

## 2024-05-18 PROCEDURE — 85014 HEMATOCRIT: CPT

## 2024-05-18 PROCEDURE — 93005 ELECTROCARDIOGRAM TRACING: CPT

## 2024-05-18 PROCEDURE — 86901 BLOOD TYPING SEROLOGIC RH(D): CPT

## 2024-05-18 PROCEDURE — 83540 ASSAY OF IRON: CPT

## 2024-05-18 PROCEDURE — 85027 COMPLETE CBC AUTOMATED: CPT

## 2024-05-18 PROCEDURE — 36430 TRANSFUSION BLD/BLD COMPNT: CPT

## 2024-05-18 PROCEDURE — 83550 IRON BINDING TEST: CPT

## 2024-05-18 PROCEDURE — 2580000003 HC RX 258

## 2024-05-18 PROCEDURE — 93010 ELECTROCARDIOGRAM REPORT: CPT | Performed by: INTERNAL MEDICINE

## 2024-05-18 PROCEDURE — 82272 OCCULT BLD FECES 1-3 TESTS: CPT

## 2024-05-18 RX ORDER — PANTOPRAZOLE SODIUM 40 MG/10ML
40 INJECTION, POWDER, LYOPHILIZED, FOR SOLUTION INTRAVENOUS DAILY
Status: DISCONTINUED | OUTPATIENT
Start: 2024-05-19 | End: 2024-05-19 | Stop reason: ALTCHOICE

## 2024-05-18 RX ORDER — ACETAMINOPHEN 325 MG/1
650 TABLET ORAL EVERY 6 HOURS PRN
Status: DISCONTINUED | OUTPATIENT
Start: 2024-05-18 | End: 2024-05-20 | Stop reason: HOSPADM

## 2024-05-18 RX ORDER — SODIUM CHLORIDE 0.9 % (FLUSH) 0.9 %
5-40 SYRINGE (ML) INJECTION PRN
Status: DISCONTINUED | OUTPATIENT
Start: 2024-05-18 | End: 2024-05-20 | Stop reason: HOSPADM

## 2024-05-18 RX ORDER — ACETAMINOPHEN 650 MG/1
650 SUPPOSITORY RECTAL EVERY 6 HOURS PRN
Status: DISCONTINUED | OUTPATIENT
Start: 2024-05-18 | End: 2024-05-20 | Stop reason: HOSPADM

## 2024-05-18 RX ORDER — ONDANSETRON 4 MG/1
4 TABLET, ORALLY DISINTEGRATING ORAL EVERY 8 HOURS PRN
Status: DISCONTINUED | OUTPATIENT
Start: 2024-05-18 | End: 2024-05-20 | Stop reason: HOSPADM

## 2024-05-18 RX ORDER — SODIUM CHLORIDE 9 MG/ML
INJECTION, SOLUTION INTRAVENOUS PRN
Status: DISCONTINUED | OUTPATIENT
Start: 2024-05-18 | End: 2024-05-20 | Stop reason: HOSPADM

## 2024-05-18 RX ORDER — PANTOPRAZOLE SODIUM 40 MG/10ML
40 INJECTION, POWDER, LYOPHILIZED, FOR SOLUTION INTRAVENOUS 2 TIMES DAILY
Status: DISCONTINUED | OUTPATIENT
Start: 2024-05-18 | End: 2024-05-18

## 2024-05-18 RX ORDER — LEVOTHYROXINE SODIUM 0.12 MG/1
62.5 TABLET ORAL DAILY
Status: DISCONTINUED | OUTPATIENT
Start: 2024-05-18 | End: 2024-05-20 | Stop reason: HOSPADM

## 2024-05-18 RX ORDER — SODIUM CHLORIDE 0.9 % (FLUSH) 0.9 %
5-40 SYRINGE (ML) INJECTION EVERY 12 HOURS SCHEDULED
Status: DISCONTINUED | OUTPATIENT
Start: 2024-05-18 | End: 2024-05-20 | Stop reason: HOSPADM

## 2024-05-18 RX ORDER — FLUTICASONE PROPIONATE 50 MCG
1 SPRAY, SUSPENSION (ML) NASAL DAILY
Status: DISCONTINUED | OUTPATIENT
Start: 2024-05-18 | End: 2024-05-20 | Stop reason: HOSPADM

## 2024-05-18 RX ORDER — TROSPIUM CHLORIDE 20 MG/1
20 TABLET, FILM COATED ORAL NIGHTLY
Status: DISCONTINUED | OUTPATIENT
Start: 2024-05-18 | End: 2024-05-20 | Stop reason: HOSPADM

## 2024-05-18 RX ORDER — ONDANSETRON 2 MG/ML
4 INJECTION INTRAMUSCULAR; INTRAVENOUS EVERY 6 HOURS PRN
Status: DISCONTINUED | OUTPATIENT
Start: 2024-05-18 | End: 2024-05-20 | Stop reason: HOSPADM

## 2024-05-18 RX ORDER — CARVEDILOL 6.25 MG/1
6.25 TABLET ORAL 2 TIMES DAILY
Status: DISCONTINUED | OUTPATIENT
Start: 2024-05-18 | End: 2024-05-20 | Stop reason: HOSPADM

## 2024-05-18 RX ORDER — CETIRIZINE HYDROCHLORIDE 5 MG/1
5 TABLET ORAL DAILY
Status: DISCONTINUED | OUTPATIENT
Start: 2024-05-18 | End: 2024-05-20 | Stop reason: HOSPADM

## 2024-05-18 RX ORDER — SENNOSIDES A AND B 8.6 MG/1
2 TABLET, FILM COATED ORAL 2 TIMES DAILY
Status: DISCONTINUED | OUTPATIENT
Start: 2024-05-18 | End: 2024-05-20 | Stop reason: HOSPADM

## 2024-05-18 RX ORDER — POLYETHYLENE GLYCOL 3350 17 G/17G
17 POWDER, FOR SOLUTION ORAL DAILY PRN
Status: DISCONTINUED | OUTPATIENT
Start: 2024-05-18 | End: 2024-05-20 | Stop reason: HOSPADM

## 2024-05-18 RX ADMIN — CARVEDILOL 6.25 MG: 6.25 TABLET, FILM COATED ORAL at 13:04

## 2024-05-18 RX ADMIN — CARVEDILOL 6.25 MG: 6.25 TABLET, FILM COATED ORAL at 20:36

## 2024-05-18 RX ADMIN — SODIUM CHLORIDE, PRESERVATIVE FREE 10 ML: 5 INJECTION INTRAVENOUS at 20:37

## 2024-05-18 RX ADMIN — ACETAMINOPHEN 650 MG: 325 TABLET ORAL at 22:50

## 2024-05-18 RX ADMIN — TROSPIUM CHLORIDE 20 MG: 20 TABLET, FILM COATED ORAL at 20:36

## 2024-05-18 RX ADMIN — SODIUM CHLORIDE, PRESERVATIVE FREE 10 ML: 5 INJECTION INTRAVENOUS at 13:10

## 2024-05-18 RX ADMIN — ACETAMINOPHEN 650 MG: 325 TABLET ORAL at 03:35

## 2024-05-18 RX ADMIN — SENNOSIDES 17.2 MG: 8.6 TABLET, FILM COATED ORAL at 20:36

## 2024-05-18 RX ADMIN — CETIRIZINE HYDROCHLORIDE 5 MG: 5 TABLET ORAL at 13:05

## 2024-05-18 RX ADMIN — ACETAMINOPHEN 650 MG: 325 TABLET ORAL at 13:05

## 2024-05-18 RX ADMIN — PANTOPRAZOLE SODIUM 40 MG: 40 INJECTION, POWDER, FOR SOLUTION INTRAVENOUS at 13:10

## 2024-05-18 RX ADMIN — LEVOTHYROXINE SODIUM 62.5 MCG: 0.12 TABLET ORAL at 13:02

## 2024-05-18 ASSESSMENT — PAIN - FUNCTIONAL ASSESSMENT
PAIN_FUNCTIONAL_ASSESSMENT: ACTIVITIES ARE NOT PREVENTED
PAIN_FUNCTIONAL_ASSESSMENT: NONE - DENIES PAIN
PAIN_FUNCTIONAL_ASSESSMENT: ACTIVITIES ARE NOT PREVENTED

## 2024-05-18 ASSESSMENT — PAIN SCALES - GENERAL
PAINLEVEL_OUTOF10: 8
PAINLEVEL_OUTOF10: 2
PAINLEVEL_OUTOF10: 3

## 2024-05-18 ASSESSMENT — PAIN DESCRIPTION - LOCATION
LOCATION: GENERALIZED
LOCATION: ABDOMEN;COCCYX

## 2024-05-18 ASSESSMENT — PAIN DESCRIPTION - DESCRIPTORS
DESCRIPTORS: ACHING
DESCRIPTORS: SORE;ACHING;DISCOMFORT

## 2024-05-18 NOTE — ED NOTES
ED to inpatient nurses report      Chief Complaint:  Chief Complaint   Patient presents with    Low Hgb - 7.7 per PCP     Present to ED from: home    MOA:     LOC: alert and orientated to name, place, date  Mobility: Requires assistance * 1  Oxygen Baseline: room air    Current needs required: room air     Code Status:   Full Code    What abnormal results were found and what did you give/do to treat them? Hgb - 7.5, positive occult stool  Any procedures or intervention occur? Type and screen drawn - in process at this time. Recent bowel surgery    Mental Status:  Level of Consciousness: Alert (0)    Psych Assessment:        Vitals:  Patient Vitals for the past 24 hrs:   BP Temp Temp src Pulse Resp SpO2   05/18/24 0125 (!) 123/58 -- -- 87 18 97 %   05/18/24 0102 (!) 123/58 -- -- 83 21 95 %   05/17/24 2359 (!) 140/63 -- -- 83 21 97 %   05/17/24 2316 -- -- -- 72 20 96 %   05/17/24 2152 (!) 135/55 97.7 °F (36.5 °C) Oral 89 22 99 %        LDAs:   Peripheral IV 05/17/24 Right Antecubital (Active)   Site Assessment Clean, dry & intact 05/17/24 2203       Ambulatory Status:  No data recorded    Diagnosis:  DISPOSITION Admitted 05/18/2024 01:30:06 AM   Final diagnoses:   None        Consults:  None     Pain Score:       C-SSRS:    negative    Sepsis Screening:  Sepsis Risk Score: 1.41    [place] Fall Risk:   High risk    Swallow Screening    pass    Preferred Language:   English      ALLERGIES     Sulfa antibiotics and Metronidazole    SURGICAL HISTORY       Past Surgical History:   Procedure Laterality Date    ABDOMEN SURGERY      APPENDECTOMY      CHOLECYSTECTOMY      GASTROSTOMY TUBE PLACEMENT N/A 11/17/2021    EGD PEG TUBE PLACEMENT performed by Marvin Carbajal MD at [facility]    GASTROSTOMY TUBE PLACEMENT N/A 07/10/2023    PEG REMOVAL performed by Marvin Carbajal MD at [facility]    PACEMAKER PLACEMENT      PRESSURE ULCER DEBRIDEMENT N/A 12/23/2021    DECUBITUS ULCER DEBRIDEMENT REPAIR performed by Claudio RODRIGUEZ

## 2024-05-18 NOTE — ED PROVIDER NOTES
0
(None if blank)  Medications - No data to display    PROCEDURES: (None if blank)  Procedures:     CRITICAL CARE: (None if blank)    DISCHARGE PRESCRIPTIONS: (None if blank)  New Prescriptions    No medications on file         FINAL IMPRESSION     Final diagnoses:   Anemia, unspecified type   Gastrointestinal hemorrhage, unspecified gastrointestinal hemorrhage type     1. Anemia, unspecified type    2. Gastrointestinal hemorrhage, unspecified gastrointestinal hemorrhage type        DISPOSITION / PLAN   DISPOSITION  admitted to hospitalist team      OUTPATIENT FOLLOW UP THE PATIENT:  No follow-up provider specified.      This transcription was electronically signed. Parts of this transcriptions may have been dictated by use of voice recognition software and electronically transcribed, and parts may have been transcribed with the assistance of an ED scribe. The transcription may contain errors not detected in proofreading. Please refer to my supervising physician's documentation if my documentation differs.    Electronically Signed: Juan Luis Mcknight MD, 05/17/24, 10:00 PM      
Name band;

## 2024-05-18 NOTE — ED NOTES
Brief changed. Patient provided with warm blankets. Patient resting in bed. Respirations easy and unlabored. No distress noted. Call light within reach.

## 2024-05-18 NOTE — ED NOTES
Patient resting in bed. Laying on left side. Respirations easy and unlabored. No distress noted. Call light within reach.

## 2024-05-18 NOTE — PROCEDURES
PROCEDURE NOTE  Date: 5/18/2024   Name: Kourtney HIGH Chucho  YOB: 1934    Procedures  EKG completed

## 2024-05-18 NOTE — PLAN OF CARE
This is a 89-year-old lady with PMH chronic/recurrent diverticulitis status post partial colectomy on 5/2, chronic macrocytic anemia, HFrEF, PAF on Eliquis, history of PCI on Brilinta, hypothyroidism, CKD stage IIIb who is admitted for acute on chronic anemia with hemoglobin of 7.8.  Based on the patient's history there are no signs or symptoms of active bleeding including hematemesis, hemoptysis, coffee-ground emesis, melanotic stools, and or bright red blood.  She has been on pain medication post surgery for pain management and reports severe constipation and straining.  Although her FOBT was positive I do not think she has a active GI bleed at this time.  Her anemia workup seems more consistent with anemia of inflammation likely in the setting of recent surgery.  No signs or symptoms of infection at this time.  Given her symptoms and recent surgery and elevated ferritin it is reasonable to transfuse 1 unit PRBCs.  Will hold off on GI evaluation since no active bleeding.  DC n.p.o. and start diet.    Regarding her cardiac history, patient has HFrEF with a EF of 25% per echo done in 7/2023.  So transfuse PRBCs over 4 hours instead of 2-3.  If any signs or symptoms of respiratory distress please notify a physician and hold blood transfusion.  Will resume Eliquis starting tomorrow a.m.    Patient also has a history of PCI, done in June/2022 and is on Brilinta at home.  Follows with Dr. Sosa.  Continue to hold Brilinta for now and will reach out to Dr. Sosa while she is send needed in the future thank you.  Whether this should be continued.    Constipation-cont senna and obtain AXR.Nutrition consult for dec PO intake and malnourished/cachexia

## 2024-05-18 NOTE — FLOWSHEET NOTE
Kourtney is current with [facility] PT/OT, and nursing.     OP Wound care orders added to nursing communication. Kourtney's daughter Audrey and  nurse Ludivina do QOD dressing changes as outlined in order. Audrey can help or do dressing change, next due 5/19. Opti-foam removed for wound assessment today, then re-dressed as it was PTA.

## 2024-05-18 NOTE — ED NOTES
Patient resting in bed. Pt and daughter updated on admission. Respirations easy and unlabored. No distress noted. Call light within reach.

## 2024-05-18 NOTE — ED TRIAGE NOTES
Patient to ED via intake due to low hgb. Patient was seen here last night due to low mag. Daughter states she had routine blood work today and hgb was 7.7. Pt recently had abd surgery and is also complaining of pain. Patient daughter states she was constipated due to pain medicine post op but today pt states her bm are regular, with only a \"small speck of blood.\"

## 2024-05-18 NOTE — ED NOTES
Pt in stable condition. Pt transferred to [facility]. Floor staff notified, spoke with Dacia.

## 2024-05-18 NOTE — H&P
Internal Medicine Resident History and Physical          Name: Kourtney Rankin, female, : 1934, MRN: [id number]    PCP: Jennifer Nathan, SERGE - CNP    Date of Admission: 2024  Date of Service: Pt seen/examined on 24  and Admitted to Inpatient with expected LOS greater than two midnights due to medical therapy.         Assessment and Plan:  Acute on chronic macrocytic anemia, likely secondary to recent partial colectomy 2024: Patient's baseline hemoglobin 8.3-11.6, presenting with hemoglobin 7.5.  Patient states her only symptom is weakness that she has been experiencing, also states that she worked with physical therapy today could be contributing to weakness.  Denies any shortness of breath, chest pain or palpitations, hemoptysis, bleeding from her surgical site, jovanni blood in the stool, melena.  Patient currently on Brilinta for CAD as well as Eliquis for PAF.  NICHOLAS and stool occult positive for blood.  Hemodynamically stable at time of evaluation pulse 83, blood pressure 123/58, respirations 21.  Hold Brilinta and Eliquis, will hold anticoagulation at this time, place patient on SCDs for DVT prophylaxis recommend GI clearance prior to starting blood thinners  Trend CBC  Anemia panel  Nursing to document stool color and consistency  Start patient on 40 Protonix IV twice daily for possible GI bleed  If patient's hemoglobin does not improve or stabilize may consider consulting GI for possible GI bleed  Ischemic cardiomyopathy, not in acute exacerbation: Last EF 25% in 2023, echo also shows severe global hypokinesis of left ventricle, systolic function severely reduced, LAD.  Continue Coreg and Entresto  History of CAD status post PCI to LM/LAD status post Impella supported complex PCI 2022  Currently takes Brilinta at home, hold for now  Continue Coreg  Continue Entresto  Paroxysmal A-fib: [id number] 5.  Has bled >3. on Eliquis at home.  Rate controlled with Coreg.  Continue

## 2024-05-19 PROCEDURE — 6370000000 HC RX 637 (ALT 250 FOR IP)

## 2024-05-19 PROCEDURE — 6370000000 HC RX 637 (ALT 250 FOR IP): Performed by: STUDENT IN AN ORGANIZED HEALTH CARE EDUCATION/TRAINING PROGRAM

## 2024-05-19 PROCEDURE — 6360000002 HC RX W HCPCS: Performed by: STUDENT IN AN ORGANIZED HEALTH CARE EDUCATION/TRAINING PROGRAM

## 2024-05-19 PROCEDURE — C9113 INJ PANTOPRAZOLE SODIUM, VIA: HCPCS | Performed by: STUDENT IN AN ORGANIZED HEALTH CARE EDUCATION/TRAINING PROGRAM

## 2024-05-19 PROCEDURE — 1200000003 HC TELEMETRY R&B

## 2024-05-19 PROCEDURE — 99231 SBSQ HOSP IP/OBS SF/LOW 25: CPT | Performed by: STUDENT IN AN ORGANIZED HEALTH CARE EDUCATION/TRAINING PROGRAM

## 2024-05-19 PROCEDURE — 2580000003 HC RX 258

## 2024-05-19 RX ORDER — BISACODYL 10 MG
10 SUPPOSITORY, RECTAL RECTAL ONCE
Status: COMPLETED | OUTPATIENT
Start: 2024-05-19 | End: 2024-05-19

## 2024-05-19 RX ORDER — MINERAL OIL 100 G/100G
1 OIL RECTAL ONCE
Status: DISCONTINUED | OUTPATIENT
Start: 2024-05-19 | End: 2024-05-20 | Stop reason: HOSPADM

## 2024-05-19 RX ORDER — PANTOPRAZOLE SODIUM 40 MG/1
40 TABLET, DELAYED RELEASE ORAL
Status: DISCONTINUED | OUTPATIENT
Start: 2024-05-20 | End: 2024-05-20 | Stop reason: HOSPADM

## 2024-05-19 RX ADMIN — LEVOTHYROXINE SODIUM 62.5 MCG: 0.12 TABLET ORAL at 05:50

## 2024-05-19 RX ADMIN — SENNOSIDES 17.2 MG: 8.6 TABLET, FILM COATED ORAL at 22:00

## 2024-05-19 RX ADMIN — CETIRIZINE HYDROCHLORIDE 5 MG: 5 TABLET ORAL at 09:46

## 2024-05-19 RX ADMIN — CARVEDILOL 6.25 MG: 6.25 TABLET, FILM COATED ORAL at 09:45

## 2024-05-19 RX ADMIN — TROSPIUM CHLORIDE 20 MG: 20 TABLET, FILM COATED ORAL at 22:00

## 2024-05-19 RX ADMIN — FLUTICASONE PROPIONATE 1 SPRAY: 50 SPRAY, METERED NASAL at 09:46

## 2024-05-19 RX ADMIN — ACETAMINOPHEN 650 MG: 325 TABLET ORAL at 18:19

## 2024-05-19 RX ADMIN — SODIUM CHLORIDE, PRESERVATIVE FREE 10 ML: 5 INJECTION INTRAVENOUS at 09:46

## 2024-05-19 RX ADMIN — BISACODYL 10 MG: 10 SUPPOSITORY RECTAL at 13:46

## 2024-05-19 RX ADMIN — TICAGRELOR 90 MG: 90 TABLET ORAL at 22:01

## 2024-05-19 RX ADMIN — SODIUM CHLORIDE, PRESERVATIVE FREE 10 ML: 5 INJECTION INTRAVENOUS at 22:01

## 2024-05-19 RX ADMIN — SENNOSIDES 17.2 MG: 8.6 TABLET, FILM COATED ORAL at 13:46

## 2024-05-19 RX ADMIN — SACUBITRIL AND VALSARTAN 1 TABLET: 24; 26 TABLET, FILM COATED ORAL at 22:00

## 2024-05-19 RX ADMIN — ACETAMINOPHEN 650 MG: 325 TABLET ORAL at 05:55

## 2024-05-19 RX ADMIN — PANTOPRAZOLE SODIUM 40 MG: 40 INJECTION, POWDER, FOR SOLUTION INTRAVENOUS at 09:44

## 2024-05-19 RX ADMIN — SACUBITRIL AND VALSARTAN 1 TABLET: 24; 26 TABLET, FILM COATED ORAL at 09:45

## 2024-05-19 RX ADMIN — APIXABAN 2.5 MG: 2.5 TABLET, FILM COATED ORAL at 22:00

## 2024-05-19 RX ADMIN — CARVEDILOL 6.25 MG: 6.25 TABLET, FILM COATED ORAL at 22:00

## 2024-05-19 ASSESSMENT — PAIN SCALES - GENERAL
PAINLEVEL_OUTOF10: 3
PAINLEVEL_OUTOF10: 5

## 2024-05-19 ASSESSMENT — PAIN - FUNCTIONAL ASSESSMENT: PAIN_FUNCTIONAL_ASSESSMENT: ACTIVITIES ARE NOT PREVENTED

## 2024-05-19 ASSESSMENT — PAIN DESCRIPTION - DESCRIPTORS: DESCRIPTORS: ACHING

## 2024-05-19 ASSESSMENT — PAIN DESCRIPTION - LOCATION: LOCATION: GENERALIZED

## 2024-05-19 NOTE — CARE COORDINATION
05/19/24 1145   Service Assessment   Patient Orientation Alert and Oriented   Cognition Alert   History Provided By Patient   Primary Caregiver Self   Support Systems Children   Patient's Healthcare Decision Maker is: Named in Scanned ACP Document   PCP Verified by CM Yes   Last Visit to PCP Within last 3 months   Prior Functional Level Independent in ADLs/IADLs   Current Functional Level Assistance with the following:;Mobility   Can patient return to prior living arrangement Yes   Ability to make needs known: Good   Family able to assist with home care needs: Yes   Would you like for me to discuss the discharge plan with any other family members/significant others, and if so, who? No   Financial Resources Medicare   Community Resources ECF/Home Care  ([facility])   CM/SW Referral Other (see comment)  (resume [facility])   Social/Functional History   Home Equipment Walker - Rolling   Active  No   Patient's  Info family   Discharge Planning   Type of Residence House   Living Arrangements Children   Current Services Prior To Admission Home Care  ([facility])   Potential Assistance Needed N/A   DME Ordered? No   Potential Assistance Purchasing Medications No   Type of Home Care Services Nursing Services;OT;PT   Patient expects to be discharged to: House   Services At/After Discharge   Confirm Follow Up Transport Self   Condition of Participation: Discharge Planning   The Plan for Transition of Care is related to the following treatment goals: get my blood right     Patient Goals/Plan/Treatment Preferences: Met with Kourtney. She currently lives at home with her daughter. She has a walker and is current with [facility]. Plan is to return home at discharge and resume . SW consult in place.     If patient is discharged prior to next notation, then this note serves as note for discharge by case management.

## 2024-05-19 NOTE — CARE COORDINATION
05/19/24 1149   Readmission Assessment   Number of Days since last admission? 8-30 days   Previous Disposition Home with Home Health   Who is being Interviewed Patient   What was the patient's/caregiver's perception as to why they think they needed to return back to the hospital? Other (Comment)  (My bloodwork is off)   Did you visit your Primary Care Physician after you left the hospital, before you returned this time? Yes   Did you see a specialist, such as Cardiac, Pulmonary, Orthopedic Physician, etc. after you left the hospital? No   Who advised the patient to return to the hospital? Physician's Nurse/Office staff   Does the patient report anything that got in the way of taking their medications? No   In our efforts to provide the best possible care to you and others like you, can you think of anything that we could have done to help you after you left the hospital the first time, so that you might not have needed to return so soon? Other (Comment)  (I was ready to go)

## 2024-05-19 NOTE — PLAN OF CARE
Problem: Pain  Goal: Verbalizes/displays adequate comfort level or baseline comfort level  Outcome: Progressing  Flowsheets (Taken 5/19/2024 0134)  Verbalizes/displays adequate comfort level or baseline comfort level:   Encourage patient to monitor pain and request assistance   Administer analgesics based on type and severity of pain and evaluate response   Assess pain using appropriate pain scale   Implement non-pharmacological measures as appropriate and evaluate response     Problem: Chronic Conditions and Co-morbidities  Goal: Patient's chronic conditions and co-morbidity symptoms are monitored and maintained or improved  Outcome: Progressing  Flowsheets (Taken 5/19/2024 0134)  Care Plan - Patient's Chronic Conditions and Co-Morbidity Symptoms are Monitored and Maintained or Improved: Monitor and assess patient's chronic conditions and comorbid symptoms for stability, deterioration, or improvement     Problem: Skin/Tissue Integrity  Goal: Absence of new skin breakdown  Description: 1.  Monitor for areas of redness and/or skin breakdown  2.  Assess vascular access sites hourly  3.  Every 4-6 hours minimum:  Change oxygen saturation probe site  4.  Every 4-6 hours:  If on nasal continuous positive airway pressure, respiratory therapy assess nares and determine need for appliance change or resting period.  Outcome: Progressing

## 2024-05-20 VITALS
SYSTOLIC BLOOD PRESSURE: 130 MMHG | OXYGEN SATURATION: 99 % | RESPIRATION RATE: 16 BRPM | HEIGHT: 60 IN | DIASTOLIC BLOOD PRESSURE: 51 MMHG | HEART RATE: 78 BPM | BODY MASS INDEX: 21.29 KG/M2 | TEMPERATURE: 97.7 F

## 2024-05-20 LAB
ANION GAP SERPL CALC-SCNC: 11 MEQ/L (ref 8–16)
BASOPHILS ABSOLUTE: 0 THOU/MM3 (ref 0–0.1)
BASOPHILS NFR BLD AUTO: 0.3 %
BUN SERPL-MCNC: 13 MG/DL (ref 7–22)
CALCIUM SERPL-MCNC: 9.3 MG/DL (ref 8.5–10.5)
CHLORIDE SERPL-SCNC: 108 MEQ/L (ref 98–111)
CO2 SERPL-SCNC: 23 MEQ/L (ref 23–33)
CREAT SERPL-MCNC: 1 MG/DL (ref 0.4–1.2)
DEPRECATED RDW RBC AUTO: 61.5 FL (ref 35–45)
EOSINOPHIL NFR BLD AUTO: 0.7 %
EOSINOPHILS ABSOLUTE: 0.1 THOU/MM3 (ref 0–0.4)
ERYTHROCYTE [DISTWIDTH] IN BLOOD BY AUTOMATED COUNT: 16.6 % (ref 11.5–14.5)
GFR SERPL CREATININE-BSD FRML MDRD: 54 ML/MIN/1.73M2
GLUCOSE SERPL-MCNC: 91 MG/DL (ref 70–108)
HCT VFR BLD AUTO: 30.6 % (ref 37–47)
HGB BLD-MCNC: 9.7 GM/DL (ref 12–16)
IMM GRANULOCYTES # BLD AUTO: 0.46 THOU/MM3 (ref 0–0.07)
IMM GRANULOCYTES NFR BLD AUTO: 5.3 %
LYMPHOCYTES ABSOLUTE: 1.2 THOU/MM3 (ref 1–4.8)
LYMPHOCYTES NFR BLD AUTO: 13.5 %
MAGNESIUM SERPL-MCNC: 1.7 MG/DL (ref 1.6–2.4)
MCH RBC QN AUTO: 32.1 PG (ref 26–33)
MCHC RBC AUTO-ENTMCNC: 31.7 GM/DL (ref 32.2–35.5)
MCV RBC AUTO: 101.3 FL (ref 81–99)
MONOCYTES ABSOLUTE: 0.9 THOU/MM3 (ref 0.4–1.3)
MONOCYTES NFR BLD AUTO: 10.5 %
NEUTROPHILS ABSOLUTE: 6.1 THOU/MM3 (ref 1.8–7.7)
NEUTROPHILS NFR BLD AUTO: 69.7 %
NRBC BLD AUTO-RTO: 0 /100 WBC
PLATELET # BLD AUTO: 272 THOU/MM3 (ref 130–400)
PLATELET BLD QL SMEAR: ADEQUATE
PMV BLD AUTO: 11.6 FL (ref 9.4–12.4)
POTASSIUM SERPL-SCNC: 3.6 MEQ/L (ref 3.5–5.2)
RBC # BLD AUTO: 3.02 MILL/MM3 (ref 4.2–5.4)
SCAN OF BLOOD SMEAR: NORMAL
SODIUM SERPL-SCNC: 142 MEQ/L (ref 135–145)
WBC # BLD AUTO: 8.8 THOU/MM3 (ref 4.8–10.8)

## 2024-05-20 PROCEDURE — 36415 COLL VENOUS BLD VENIPUNCTURE: CPT

## 2024-05-20 PROCEDURE — 6370000000 HC RX 637 (ALT 250 FOR IP): Performed by: STUDENT IN AN ORGANIZED HEALTH CARE EDUCATION/TRAINING PROGRAM

## 2024-05-20 PROCEDURE — 97530 THERAPEUTIC ACTIVITIES: CPT

## 2024-05-20 PROCEDURE — 97166 OT EVAL MOD COMPLEX 45 MIN: CPT

## 2024-05-20 PROCEDURE — 97535 SELF CARE MNGMENT TRAINING: CPT

## 2024-05-20 PROCEDURE — 6370000000 HC RX 637 (ALT 250 FOR IP)

## 2024-05-20 PROCEDURE — 97162 PT EVAL MOD COMPLEX 30 MIN: CPT

## 2024-05-20 PROCEDURE — 2580000003 HC RX 258

## 2024-05-20 PROCEDURE — 80048 BASIC METABOLIC PNL TOTAL CA: CPT

## 2024-05-20 PROCEDURE — 85025 COMPLETE CBC W/AUTO DIFF WBC: CPT

## 2024-05-20 PROCEDURE — 83735 ASSAY OF MAGNESIUM: CPT

## 2024-05-20 RX ORDER — SENNOSIDES A AND B 8.6 MG/1
2 TABLET, FILM COATED ORAL 2 TIMES DAILY
Qty: 120 TABLET | Refills: 0 | Status: SHIPPED | OUTPATIENT
Start: 2024-05-20 | End: 2024-06-19

## 2024-05-20 RX ORDER — POLYETHYLENE GLYCOL 3350 17 G/17G
17 POWDER, FOR SOLUTION ORAL DAILY PRN
Qty: 527 G | Refills: 1 | Status: SHIPPED | OUTPATIENT
Start: 2024-05-20 | End: 2024-07-21

## 2024-05-20 RX ADMIN — LEVOTHYROXINE SODIUM 62.5 MCG: 0.12 TABLET ORAL at 06:08

## 2024-05-20 RX ADMIN — FLUTICASONE PROPIONATE 1 SPRAY: 50 SPRAY, METERED NASAL at 09:11

## 2024-05-20 RX ADMIN — CETIRIZINE HYDROCHLORIDE 5 MG: 5 TABLET ORAL at 09:11

## 2024-05-20 RX ADMIN — SACUBITRIL AND VALSARTAN 1 TABLET: 24; 26 TABLET, FILM COATED ORAL at 09:11

## 2024-05-20 RX ADMIN — SODIUM CHLORIDE, PRESERVATIVE FREE 10 ML: 5 INJECTION INTRAVENOUS at 09:12

## 2024-05-20 RX ADMIN — CARVEDILOL 6.25 MG: 6.25 TABLET, FILM COATED ORAL at 09:11

## 2024-05-20 RX ADMIN — PANTOPRAZOLE SODIUM 40 MG: 40 TABLET, DELAYED RELEASE ORAL at 06:08

## 2024-05-20 NOTE — PLAN OF CARE
Problem: Pain  Goal: Verbalizes/displays adequate comfort level or baseline comfort level  Outcome: Progressing     Problem: Chronic Conditions and Co-morbidities  Goal: Patient's chronic conditions and co-morbidity symptoms are monitored and maintained or improved  Outcome: Progressing     Problem: Skin/Tissue Integrity  Goal: Absence of new skin breakdown  Outcome: Progressing     Problem: Safety - Adult  Goal: Free from fall injury  Outcome: Progressing     Problem: Gastrointestinal - Adult  Goal: Maintains or returns to baseline bowel function  Outcome: Progressing   Care plan reviewed with patient and verbalize understanding of the plan of care and contribute to goal setting.

## 2024-05-20 NOTE — CARE COORDINATION
5/20/24, 2:02 PM EDT    Patient goals/plan/ treatment preferences discussed by  and .  Patient goals/plan/ treatment preferences reviewed with patient/ family.  Patient/ family verbalize understanding of discharge plan and are in agreement with goal/plan/treatment preferences.  Understanding was demonstrated using the teach back method.  AVS provided by RN at time of discharge, which includes all necessary medical information pertaining to the patients current course of illness, treatment, post-discharge goals of care, and treatment preferences.     Services At/After Discharge: Community Resources and Home Health Stephanie Ortega    [facility] spoke with Sakina, updated on discharge today. Call to Rhonda with [facility], left a voicemail updating on discharge today.

## 2024-05-20 NOTE — PLAN OF CARE
Problem: Discharge Planning  Goal: Discharge to home or other facility with appropriate resources  Outcome: Progressing   SW consult received. See SW note 5/20/24.

## 2024-05-20 NOTE — CARE COORDINATION
DISCHARGE PLANNING EVALUATION  5/20/24, 9:31 AM EDT    Reason for Referral: from home w   Decision Maker: self with family support  Current Services: [facility]-nursing, PT, OT  New Services Requested: none  Family/ Social/ Home environment: Kourtney lives at home with her daughter Audrey, pt reports Audrey work 2 days week from 4-8. Pt reports she has 5 children in total. Pt reports she does go to adult day care at [facility] some days. Pt reports daughter transport to her appointments. Daughter assists with laundry and cooking as well.  Payment Source:Medicare and [employer]  Transportation at Discharge: daughter        Teach Back Method used with Kourtney regarding care plan and needs  Patient verbalized understanding of the plan of care and contribute to goal setting.       Patient preferences and discharge plan: Return home daughter Audrey and with [facility] (RN, PT, OT).     SW did receive a call from Rhonda with [employer] [phone], pt has an emergency response button and go to adult day care, however more recently has not been doing this due to how she has been feeling.     Electronically signed by BELEM Colon on 5/20/2024 at 9:31 AM

## 2024-05-20 NOTE — DISCHARGE SUMMARY
Ref Range    Hemoglobin 9.0 (L) 12.0 - 16.0 gm/dl    Hematocrit 29.3 (L) 37.0 - 47.0 %   Basic Metabolic Panel    Collection Time: 05/20/24  5:11 AM   Result Value Ref Range    Sodium 142 135 - 145 meq/L    Potassium 3.6 3.5 - 5.2 meq/L    Chloride 108 98 - 111 meq/L    CO2 23 23 - 33 meq/L    Glucose 91 70 - 108 mg/dL    BUN 13 7 - 22 mg/dL    Creatinine 1.0 0.4 - 1.2 mg/dL    Calcium 9.3 8.5 - 10.5 mg/dL   CBC with Auto Differential    Collection Time: 05/20/24  5:11 AM   Result Value Ref Range    WBC 8.8 4.8 - 10.8 thou/mm3    RBC 3.02 (L) 4.20 - 5.40 mill/mm3    Hemoglobin 9.7 (L) 12.0 - 16.0 gm/dl    Hematocrit 30.6 (L) 37.0 - 47.0 %    .3 (H) 81.0 - 99.0 fL    MCH 32.1 26.0 - 33.0 pg    MCHC 31.7 (L) 32.2 - 35.5 gm/dl    RDW-CV 16.6 (H) 11.5 - 14.5 %    RDW-SD 61.5 (H) 35.0 - 45.0 fL    Platelets 272 130 - 400 thou/mm3    MPV 11.6 9.4 - 12.4 fL    Seg Neutrophils 69.7 %    Lymphocytes 13.5 %    Monocytes % 10.5 %    Eosinophils 0.7 %    Basophils 0.3 %    Immature Granulocytes % 5.3 %    Platelet Estimate ADEQUATE Adequate    Neutrophils Absolute 6.1 1.8 - 7.7 thou/mm3    Lymphocytes Absolute 1.2 1.0 - 4.8 thou/mm3    Monocytes Absolute 0.9 0.4 - 1.3 thou/mm3    Eosinophils Absolute 0.1 0.0 - 0.4 thou/mm3    Basophils Absolute 0.0 0.0 - 0.1 thou/mm3    Immature Grans (Abs) 0.46 (H) 0.00 - 0.07 thou/mm3    nRBC 0 /100 wbc   Magnesium    Collection Time: 05/20/24  5:11 AM   Result Value Ref Range    Magnesium 1.7 1.6 - 2.4 mg/dL   Anion Gap    Collection Time: 05/20/24  5:11 AM   Result Value Ref Range    Anion Gap 11.0 8.0 - 16.0 meq/L   Glomerular Filtration Rate, Estimated    Collection Time: 05/20/24  5:11 AM   Result Value Ref Range    Est, Glom Filt Rate 54 (A) >60 ml/min/1.73m2   Scan of Blood Smear    Collection Time: 05/20/24  5:11 AM   Result Value Ref Range    SCAN OF BLOOD SMEAR see below      Discharge condition: Stable  Disposition: Home w home health   Time spent on discharge: 20

## 2024-05-20 NOTE — PROGRESS NOTES
[facility] Nurse  Progress Note       Kourtney Rankin  AGE: 89 y.o.   GENDER: female  : 1934  UNIT: 5K-15/015-A  TODAY'S DATE:  2024  ADMISSION DATE: 2024  9:39 PM    Subjective   Reason for [facility] Evaluation and Assessment:     wound on buttock daughter request wound care see while here         Kourtney Rankin is a 89 y.o. female referred by:   [] Physician/PA/APRN  [x] Nursing  [] Other:     Wound Identification:  Wound Type:pressure  Wound Location: coccyx  Modifying factors:chronic pressure and decreased mobility    Objective     Darío Risk Score: Darío Scale Score: 20      Assessment     Encounter: Present to patient room. Patient in chair upon arrival. Assessment and photo to follow.  Patient stated she is concerned she may have gotten poop in her wound when she used the bathroom last but her daughter did change the dressing. Patient stood with walker so we could assess wound. Cleansed wound with normal saline and gauze. Pat dry with clean gauze. Applied moistened collagen to wound and triad around wound to irritated skin and covered with sacral foam dressing. Patient has current wound care orders from [facility]. Patient needed to go to bathroom and said she would pull the call light when she was finished, notified Primary RN. Will continue to follow and assess wound. Call with concerns and for wound evolution.      24    Wound type: healing stage 4 - coccyx  Wound size: 0.5 cm x 0.5 cm x 0.3 cm  Undermining or Tunneling: none  Wound assessment/color: pink/red  Drainage amount: small  Drainage description: serosanguineous  Odor: none  Margins: attached  Nida wound: dry, blanchable redness  Exposed structure: none     Plan     Treatment Recommendations:   Coccyx - Cleanse wound with normal saline or wound cleanser and gauze. Pat dry with clean gauze. Place cut piece of collagen to wound (moisten with a few drops of saline)Apply Triad barrier cream to 
Comprehensive Nutrition Assessment    Type and Reason for Visit:  Initial, Positive Nutrition Screen, Wound    Nutrition Recommendations/Plan:   Recommend diet as tolerated.  Continue Ensure Clear TID and Amrit BID - drinks pta.  Recommend MVI.  Encouraged po - small, frequent meals; appropriate use of ONS.     Malnutrition Assessment:  Malnutrition Status:  At risk for malnutrition (Comment) (05/20/24 1510)    Context:  Acute Illness     Findings of the 6 clinical characteristics of malnutrition:  Energy Intake:  Mild decrease in energy intake (Comment)  Weight Loss:   (pt denies)     Body Fat Loss:  Mild body fat loss Orbital, Buccal region (? d/t advanced age)   Muscle Mass Loss:  Unable to assess (dressed, ready for discharge)    Fluid Accumulation:  Unable to assess     Strength:  Not Performed    Nutrition Assessment:     Pt. nutritionally compromised AEB wounds.  At risk for further nutrition compromise r/t increased nutrient needs for wound healing, advanced age, admit w/ anemia (s/p colectomy 5/2) and underlying medical condition (hx CAD, CKD, CHF, COVID, diverticulosis, HTN, wound, malnutrition).       Nutrition Related Findings:      Wound Type: Stage II (sacrum, abdominal incision (5/2/24 colectomy))     Pt. Report/Treatments/Miscellaneous: pt. Seen w/ family; reports good appetite and intake - states consuming Ensure Clear TID and Amrit BID; requesting coupons for ONS; lives w/ daughter; denies any weight loss; states had issue w/ constipation and pain meds post-op; can only take Tylenol; reports bowels are moving now; dressed for discharge today  GI Status: 6 BMs noted past 24 hours  Pertinent Labs: 5/20: Glucose 91, BUN 13, Cr 1.0  Pertinent Meds: PPI, Glycolax, Senokot, Zofran      Current Nutrition Intake & Therapies:    Average Meal Intake: 51-75%  Average Supplements Intake:  (states acceptance)  ADULT DIET; Regular; Low Sodium (2 gm); High Fiber    Anthropometric Measures:  Height: 152.4 cm 
[facility]  INPATIENT PHYSICAL THERAPY  EVALUATION  [facility] ONC MED 5K - 5K-15/015-A    Time In: 1210  Time Out: 1233  Timed Code Treatment Minutes: 8 Minutes  Minutes: 23        Date: 2024  Patient Name: Kourtney Rankin,  Gender:  female        MRN: [id number]  : 1934  (89 y.o.)      Referring Practitioner: Rj Shetty MD  Diagnosis: Acute Blood Loss  Additional Pertinent Hx: Per EMR: Kourtney Rankin is a 89 y.o. female with PMHx of Chronic/recurrent diverticulitis status post partial colectomy 2024, chronic macrocytic anemia, ischemic cardiomyopathy, PAF on Eliquis, hypothyroidism, CKD stage IIIb who presents to [facility] 5[facility] for follow-up from PCP after having abnormal lab values hemoglobin 7.5.  Hemoglobin from  8.7, 8.0 concerns for bleeding as patient is on blood thinners and has had recent surgery.  Patient states she has had no hematemesis, hematochezia, bleeding from surgical site, lightheadedness, dizziness, shortness of breath, chest pain, palpitations. Pt received 1 unit PRBC. Pt with h/o abdominal surgery in .     Restrictions/Precautions:  Restrictions/Precautions: Fall Risk, Contact Precautions  Position Activity Restriction  Other position/activity restrictions: monitor Hg    Subjective:  Chart Reviewed: Yes  Patient assessed for rehabilitation services?: Yes  Family / Caregiver Present: No  Subjective: RN approved session, patient sitting up in chair and agreeable to session. Pt incontinent of very liquidy stools, needing assist in bathroom to get cleaned up. Patient had 3 bouts of liquid stools during 23 minute session. RN aware and reported patient was just very constipated and now has received lots of bowel medications and had prune juice this AM. Resident in during session and stated patient was okay to discharge this date. Patient concerned about the liquid stools but stated, \"I'll just have to deal with 
overall strength/endurance to facilitate return to PLOF.     Performance deficits / Impairments: Decreased functional mobility , Decreased ADL status, Decreased strength, Decreased endurance, Decreased balance, Decreased high-level IADLs  Prognosis: Fair  REQUIRES OT FOLLOW-UP: Yes  Decision Making: Medium Complexity    Treatment Initiated: Treatment and education initiated within context of evaluation.  Evaluation time included review of current medical information, gathering information related to past medical, social and functional history, completion of standardized testing, formal and informal observation of tasks, assessment of data and development of plan of care and goals.  Treatment time included skilled education and facilitation of tasks to increase safety and independence with ADL's for improved functional independence and quality of life.    Discharge Recommendations:  Home with assist PRN, Home with Home health OT    Patient Education:          Patient Education  Education Given To: Patient  Education Provided: Role of Therapy;Plan of Care (importance of increasing activity; walking in halls with staff 3x/day, using bathroom rather than relying on external alaniz)  Education Method: Demonstration;Verbal  Barriers to Learning: None  Education Outcome: Verbalized understanding;Demonstrated understanding    Equipment Recommendations:  Equipment Needed: No    Plan:  Times Per Week: 3-5x  Current Treatment Recommendations: Strengthening, Balance training, Functional mobility training, Endurance training, Patient/Caregiver education & training, Equipment evaluation, education, & procurement, Home management training, Self-Care / ADL.  See long-term goal time frame for expected duration of plan of care.  If no long-term goals established, a short length of stay is anticipated.    Goals:     Short Term Goals  Time Frame for Short Term Goals: by dishcarge  Short Term Goal 1: Pt will increase activity tolerance 
08:54 PM    WBCUA 0-2 05/16/2024 08:54 PM    BACTERIA NONE SEEN 05/16/2024 08:54 PM    RBCUA 0-2 05/16/2024 08:54 PM    BLOODU NEGATIVE 05/16/2024 08:54 PM    GLUCOSEU NEGATIVE 05/16/2024 08:54 PM       Radiology:  XR ABDOMEN (KUB) (SINGLE AP VIEW)   Final Result      Nonobstructive bowel gas pattern.              XR ABDOMEN (KUB) (SINGLE AP VIEW)    Result Date: 5/18/2024  PROCEDURE: XR ABDOMEN (KUB) (SINGLE AP VIEW) CLINICAL INFORMATION: Constipation TECHNIQUE: 2 AP supine abdominal radiographs COMPARISON: Abdomen 11/21/2021 FINDINGS: Bowel gas pattern is nonobstructive. There are surgical clips in the pelvis. Degenerative changes in the thoracolumbar spine are incompletely visualized.     Nonobstructive bowel gas pattern.       Electronically signed by Cecilia Contreras MD on 5/19/2024 at 5:04 PM

## 2024-05-20 NOTE — PLAN OF CARE
Problem: Pain  Goal: Verbalizes/displays adequate comfort level or baseline comfort level  5/20/2024 1347 by Alisha Jaffe RN  Outcome: Adequate for Discharge  5/20/2024 0013 by Danilo Franco RN  Outcome: Progressing  Flowsheets (Taken 5/20/2024 0013)  Verbalizes/displays adequate comfort level or baseline comfort level:   Encourage patient to monitor pain and request assistance   Assess pain using appropriate pain scale     Problem: Chronic Conditions and Co-morbidities  Goal: Patient's chronic conditions and co-morbidity symptoms are monitored and maintained or improved  5/20/2024 1347 by Alisha Jaffe RN  Outcome: Adequate for Discharge  5/20/2024 0013 by Danilo Franco RN  Outcome: Progressing  Flowsheets (Taken 5/20/2024 0013)  Care Plan - Patient's Chronic Conditions and Co-Morbidity Symptoms are Monitored and Maintained or Improved: Monitor and assess patient's chronic conditions and comorbid symptoms for stability, deterioration, or improvement     Problem: Skin/Tissue Integrity  Goal: Absence of new skin breakdown  Description: 1.  Monitor for areas of redness and/or skin breakdown  2.  Assess vascular access sites hourly  3.  Every 4-6 hours minimum:  Change oxygen saturation probe site  4.  Every 4-6 hours:  If on nasal continuous positive airway pressure, respiratory therapy assess nares and determine need for appliance change or resting period.  5/20/2024 1347 by Alisha Jaffe RN  Outcome: Adequate for Discharge  5/20/2024 0013 by Danilo Franco RN  Outcome: Progressing     Problem: Safety - Adult  Goal: Free from fall injury  5/20/2024 1347 by Alisha Jaffe RN  Outcome: Adequate for Discharge  Flowsheets (Taken 5/20/2024 0015 by Danilo Franco RN)  Free From Fall Injury: Instruct family/caregiver on patient safety  5/20/2024 0013 by Danilo Franco RN  Outcome: Progressing  Flowsheets (Taken 5/20/2024 0013)  Free From Fall Injury: Instruct

## 2024-05-24 ENCOUNTER — OFFICE VISIT (OUTPATIENT)
Dept: SURGERY | Age: 89
End: 2024-05-24

## 2024-05-24 VITALS
HEART RATE: 77 BPM | TEMPERATURE: 97.3 F | DIASTOLIC BLOOD PRESSURE: 52 MMHG | RESPIRATION RATE: 18 BRPM | OXYGEN SATURATION: 98 % | HEIGHT: 60 IN | BODY MASS INDEX: 21.14 KG/M2 | SYSTOLIC BLOOD PRESSURE: 126 MMHG | WEIGHT: 107.7 LBS

## 2024-05-24 DIAGNOSIS — K57.32 DIVERTICULITIS OF LARGE INTESTINE WITHOUT BLEEDING, UNSPECIFIED COMPLICATION STATUS: Primary | ICD-10-CM

## 2024-05-24 PROCEDURE — 99024 POSTOP FOLLOW-UP VISIT: CPT | Performed by: SURGERY

## 2024-05-24 NOTE — PROGRESS NOTES
LUIS FELIPE Pope  [facility] GENERAL SURGERY  [address]  [place] [address]  [phone]  Post Procedure Evaluation in Office    Pt Name: Kourtney Rankin  Date of Birth 11/29/1934   Today's Date: 5/24/2024  Medical Record Number: [id number]  Referring Provider: No ref. provider found  Primary Care Provider: Jennifer Nathan, SERGE - CNP  Chief Complaint   Patient presents with    Post-Op Check     S/P- Sigmoid colectomy with primary anastomosis 5/02/24-In ER 5/16/24-Admitted 5/17/24-5/20/24      ASSESSMENT       Diagnosis Orders   1. Diverticulitis of large intestine without bleeding, unspecified complication status      S/p sigmoid colectomy 5/2/24      Incision is clean, dry and intact or healing as expected   PLANS   Assessment & Plan   Pathology reviewed with the patient who understands. All questions were answered.  Patient Instructions   No lifting, pushing or pulling more than 30 lbs for 1 week, then 50 lbs for 2 weeks, then 80 lbs for 2 weeks. No restrictions after 5 weeks.   Follow up: Return in about 2 weeks (around 6/7/2024).      JUSTO Oconnell is seen today for post-op follow-up. She is S/p sigmoid colectomy 5/2/24. She is tolerating a regular diet, having regular bowel movements. Symptoms and activity have gradually improved compared to preoperative. The surgical site is clean and has no drainage. Pain is controlled without any narcotic pain medications. She has compliant with postoperative instructions.  Past Medical History   has a past medical history of HERIBERTO (acute kidney injury) ([facility]), Anxiety, Arthritis, Bronchitis, CAD (coronary artery disease), CHF (congestive heart failure) ([facility]), Chronic a-fib ([facility]), COVID, Diverticulitis of colon, Hyperkalemia, Hypertension, Medtronic cobalt dual ICD , Neuromuscular dysfunction of bladder, Personal history of COVID-19, Pressure ulcer, and Protein-calorie malnutrition ([facility]).  Past Surgical History   has a past

## 2024-05-24 NOTE — PATIENT INSTRUCTIONS
No lifting, pushing or pulling more than 30 lbs for 1 week, then 50 lbs for 2 weeks, then 80 lbs for 2 weeks. No restrictions after 5 weeks.

## 2024-05-29 ENCOUNTER — NURSE ONLY (OUTPATIENT)
Dept: LAB | Age: 89
End: 2024-05-29

## 2024-05-29 DIAGNOSIS — D64.9 ACUTE ON CHRONIC ANEMIA: ICD-10-CM

## 2024-05-29 LAB
DEPRECATED RDW RBC AUTO: 59.6 FL (ref 35–45)
ERYTHROCYTE [DISTWIDTH] IN BLOOD BY AUTOMATED COUNT: 15.9 % (ref 11.5–14.5)
HCT VFR BLD AUTO: 31.6 % (ref 37–47)
HGB BLD-MCNC: 9.7 GM/DL (ref 12–16)
MCH RBC QN AUTO: 32.2 PG (ref 26–33)
MCHC RBC AUTO-ENTMCNC: 30.7 GM/DL (ref 32.2–35.5)
MCV RBC AUTO: 105 FL (ref 81–99)
PLATELET # BLD AUTO: 171 THOU/MM3 (ref 130–400)
PMV BLD AUTO: 12.7 FL (ref 9.4–12.4)
RBC # BLD AUTO: 3.01 MILL/MM3 (ref 4.2–5.4)
WBC # BLD AUTO: 6.5 THOU/MM3 (ref 4.8–10.8)

## 2024-06-05 ENCOUNTER — PROCEDURE VISIT (OUTPATIENT)
Dept: CARDIOLOGY CLINIC | Age: 89
End: 2024-06-05

## 2024-06-05 ENCOUNTER — TELEPHONE (OUTPATIENT)
Dept: CARDIOLOGY CLINIC | Age: 89
End: 2024-06-05

## 2024-06-05 DIAGNOSIS — Z95.810 ICD (IMPLANTABLE CARDIOVERTER-DEFIBRILLATOR) IN PLACE: Primary | ICD-10-CM

## 2024-06-05 NOTE — PROGRESS NOTES
Carelink Medtronic Dual ICD   Pt of [employer]    Battery 11 years     Presenting rhythm AS VS    A Impedance 361  RV Impedance 323    RV shock 54    P wave sensing 1.9  R wave sensing 1.8    A Threshold 1 @ 0.4  A Amplitude 1.5 @ 0.4  RV Thresholds 0.75 @ 0.4  RV Amplitude 2 @ 0.4    A Paced 4.6%  V Paced <0.1%    Programmed Mode AAIR <=> DDDR     Afib [place] <0.1% - known AF on Eliquis    Episodes  AF 6-2-24    Optivol elevated -will send to CHF

## 2024-06-11 ENCOUNTER — OFFICE VISIT (OUTPATIENT)
Dept: SURGERY | Age: 89
End: 2024-06-11

## 2024-06-11 VITALS
SYSTOLIC BLOOD PRESSURE: 128 MMHG | TEMPERATURE: 97.2 F | RESPIRATION RATE: 18 BRPM | WEIGHT: 109.5 LBS | HEART RATE: 89 BPM | DIASTOLIC BLOOD PRESSURE: 64 MMHG | HEIGHT: 60 IN | BODY MASS INDEX: 21.5 KG/M2 | OXYGEN SATURATION: 97 %

## 2024-06-11 DIAGNOSIS — K57.32 DIVERTICULITIS OF LARGE INTESTINE WITHOUT BLEEDING, UNSPECIFIED COMPLICATION STATUS: Primary | ICD-10-CM

## 2024-06-11 PROCEDURE — 99024 POSTOP FOLLOW-UP VISIT: CPT | Performed by: SURGERY

## 2024-06-11 NOTE — PROGRESS NOTES
LUIS FELIPE Pope  [facility] GENERAL SURGERY  [address]  [place] [address]  [phone]  Post Procedure Evaluation in Office    Pt Name: Kourtney Rankin  Date of Birth 11/29/1934   Today's Date: 6/13/2024  Medical Record Number: [id number]  Referring Provider: No ref. provider found  Primary Care Provider: Jennifer Nathan, SERGE - CNP  Chief Complaint   Patient presents with    Post-Op Check     S/P Sigmoid colectomy with primary anastomosis 5/2/24       ASSESSMENT       Diagnosis Orders   1. Diverticulitis of large intestine without bleeding, unspecified complication status      S/p sigmoid colectomy 5/2/24      Incision is clean, dry and intact or healing as expected   PLANS   Assessment & Plan   Pathology reviewed with the patient who understands. All questions were answered.  Patient Instructions   No lifting, pushing or pulling more than 50 lbs for 2 weeks, then 80 lbs for 2 weeks. No restrictions after 4 weeks.   Follow up: Return for As needed. Instructed to call if any concerns.      JUSTO Oconnell is seen today for post-op follow-up. She is S/p sigmoid colectomy 5/2/24. She is tolerating a regular diet, having regular bowel movements. Symptoms and activity have gradually improved compared to preoperative. The surgical site is clean and has no drainage. Pain is controlled without any narcotic pain medications. She has compliant with postoperative instructions.  Past Medical History   has a past medical history of HERIBERTO (acute kidney injury) ([facility]), Anxiety, Arthritis, Bronchitis, CAD (coronary artery disease), CHF (congestive heart failure) ([facility]), Chronic a-fib ([facility]), COVID, Diverticulitis of colon, Hyperkalemia, Hypertension, Medtronic cobalt dual ICD , Neuromuscular dysfunction of bladder, Personal history of COVID-19, Pressure ulcer, and Protein-calorie malnutrition ([facility]).  Past Surgical History   has a past surgical history that includes Appendectomy;

## 2024-06-11 NOTE — PATIENT INSTRUCTIONS
No lifting, pushing or pulling more than 50 lbs for 2 weeks, then 80 lbs for 2 weeks. No restrictions after 4 weeks.

## 2024-06-12 RX ORDER — TICAGRELOR 90 MG/1
90 TABLET ORAL 2 TIMES DAILY
Qty: 180 TABLET | Refills: 0 | Status: SHIPPED | OUTPATIENT
Start: 2024-06-12

## 2024-06-15 NOTE — FLOWSHEET NOTE
10/15/21 1050   Provider Notification   Reason for Communication Evaluate; Review case   Provider Name Johanna Figueroa   Provider Notification Physician   Method of Communication Secure Message   Response Waiting for response   Notification Time [phone]   contacted provider for consult from mushtaq, for RI with CHF No

## 2024-06-28 ENCOUNTER — OFFICE VISIT (OUTPATIENT)
Dept: CARDIOLOGY CLINIC | Age: 89
End: 2024-06-28
Payer: MEDICARE

## 2024-06-28 VITALS
SYSTOLIC BLOOD PRESSURE: 144 MMHG | BODY MASS INDEX: 21.01 KG/M2 | HEIGHT: 60 IN | DIASTOLIC BLOOD PRESSURE: 65 MMHG | HEART RATE: 74 BPM | WEIGHT: 107 LBS

## 2024-06-28 DIAGNOSIS — I25.10 CORONARY ARTERY DISEASE INVOLVING NATIVE CORONARY ARTERY OF NATIVE HEART WITHOUT ANGINA PECTORIS: Primary | ICD-10-CM

## 2024-06-28 DIAGNOSIS — E78.01 FAMILIAL HYPERCHOLESTEROLEMIA: ICD-10-CM

## 2024-06-28 DIAGNOSIS — I10 PRIMARY HYPERTENSION: ICD-10-CM

## 2024-06-28 PROCEDURE — G8420 CALC BMI NORM PARAMETERS: HCPCS | Performed by: NUCLEAR MEDICINE

## 2024-06-28 PROCEDURE — 1090F PRES/ABSN URINE INCON ASSESS: CPT | Performed by: NUCLEAR MEDICINE

## 2024-06-28 PROCEDURE — G8427 DOCREV CUR MEDS BY ELIG CLIN: HCPCS | Performed by: NUCLEAR MEDICINE

## 2024-06-28 PROCEDURE — 99213 OFFICE O/P EST LOW 20 MIN: CPT | Performed by: NUCLEAR MEDICINE

## 2024-06-28 PROCEDURE — 1036F TOBACCO NON-USER: CPT | Performed by: NUCLEAR MEDICINE

## 2024-06-28 PROCEDURE — 1123F ACP DISCUSS/DSCN MKR DOCD: CPT | Performed by: NUCLEAR MEDICINE

## 2024-06-28 RX ORDER — ASPIRIN 81 MG/1
81 TABLET, CHEWABLE ORAL DAILY
COMMUNITY

## 2024-06-28 NOTE — PROGRESS NOTES
8 month follow up.  Patient reports shortness of breath, as well as chest pain, palpitations, and swelling when she gets mad.  Denies edema.

## 2024-06-28 NOTE — PROGRESS NOTES
[facility] [place] [facility]  [facility] CARDIOLOGY  [address]  [address]  [place] [address]  Dept: [phone]  Dept Fax: [phone]  Loc: [phone]    Visit Date: 6/28/2024    Kourtney Rankin is a 89 y.o. female who presents todayfor:  Chief Complaint   Patient presents with    Follow-up     8 month follow up.    Hypertension    Coronary Artery Disease    Hyperlipidemia   Know CAd and LM stent  GI bleed lately and colon surgery   Also had infection   And severe bruises  No recent bleed  Very frail lady   Almost 89 yo  No chest pain  No changes in breathing  BP is stable       HPI:  HPI  Past Medical History:   Diagnosis Date    HERIBERTO (acute kidney injury) (HCC)     Anxiety     Arthritis     Bronchitis     CAD (coronary artery disease)     CHF (congestive heart failure) (HCC)     Chronic a-fib (HCC)     COVID     October 2021    Diverticulitis of colon     Hyperkalemia     Hypertension     Medtronic cobalt dual ICD  02/15/2023    Neuromuscular dysfunction of bladder     Personal history of COVID-19     Pressure ulcer     Protein-calorie malnutrition (HCC)       Past Surgical History:   Procedure Laterality Date    ABDOMEN SURGERY      APPENDECTOMY      CHOLECYSTECTOMY      GASTROSTOMY TUBE PLACEMENT N/A 11/17/2021    EGD PEG TUBE PLACEMENT performed by Marvin Carbajal MD at [facility]    GASTROSTOMY TUBE PLACEMENT N/A 07/10/2023    PEG REMOVAL performed by Marvin Carbajal MD at [facility]    PACEMAKER PLACEMENT      PRESSURE ULCER DEBRIDEMENT N/A 12/23/2021    DECUBITUS ULCER DEBRIDEMENT REPAIR performed by Claudio Stout MD at [facility]    SIGMOID COLECTOMY N/A 5/2/2024    OPEN SIGMOID COLECTOMY   Repair of bladder fistula with dr reed performed by Scar Rodriguez DO at [facility]    TRACHEOSTOMY       No family history on file.  Social History     Tobacco Use    Smoking status: Former     Current packs/day: 0.00     Average packs/day: 1 pack/day for 15.0 years

## 2024-07-09 PROCEDURE — 93297 REM INTERROG DEV EVAL ICPMS: CPT | Performed by: NUCLEAR MEDICINE

## 2024-07-10 ENCOUNTER — PROCEDURE VISIT (OUTPATIENT)
Dept: CARDIOLOGY CLINIC | Age: 89
End: 2024-07-10
Payer: COMMERCIAL

## 2024-07-10 DIAGNOSIS — I50.22 CHF (CONGESTIVE HEART FAILURE), NYHA CLASS II, CHRONIC, SYSTOLIC (HCC): Primary | ICD-10-CM

## 2024-07-10 NOTE — PROGRESS NOTES
Carelink Medtronic Dual ICD Optivol  Pt of [employer]    Battery 10.9 years    Optivol WNL    Episodes  AF - known AF on Eliquis

## 2024-08-09 ENCOUNTER — TELEPHONE (OUTPATIENT)
Dept: CARDIOLOGY CLINIC | Age: 89
End: 2024-08-09

## 2024-08-09 NOTE — TELEPHONE ENCOUNTER
Pt last seen by Dr. Sosa 6-28-24.  Pt is needing clearance for dental cleaning/extractions with [employer].  Pt is on ASA and Eliquis.    Fax [phone]

## 2024-08-14 ENCOUNTER — PROCEDURE VISIT (OUTPATIENT)
Dept: CARDIOLOGY CLINIC | Age: 89
End: 2024-08-14

## 2024-08-14 DIAGNOSIS — I50.22 CHF (CONGESTIVE HEART FAILURE), NYHA CLASS II, CHRONIC, SYSTOLIC (HCC): Primary | ICD-10-CM

## 2024-08-14 NOTE — PROGRESS NOTES
Dr vanegas pt   Medtronic carelink optivol remote   Battery 10.8 yrs remaining  Optivol wnl    4 high vent rate episodes

## 2024-09-03 NOTE — PROGRESS NOTES
[facility] [place] [facility]  [facility] UROLOGY  [address]  [address]  [facility] [address]  Dept: [phone]  Loc: [phone]    Visit Date: 9/5/2024        HPI:     Kourtney Rankin is a 89 y.o. female who presents today for:  Chief Complaint   Patient presents with    Follow-up     Bladder wall thickening     Incontinence       HPI  Patient presents to urology clinic for follow-up.      Kourtney reports symptoms controlled on Myrbetriq, varies when taking diuretic. Urine without infection present.   Denies flank pain, suprapubic pressure, gross hematuria, dysuria, fever or chills.      Mild improvement with oxybutynin 10mg XL in past.       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 No bladder involvement per Dr. Francois.     8/2023  Bladder wall thickening on ct scan, denies hematuria.   Urge incontinence, with nocturnal enuresis - PPD 5-6, very bothersome.      Cystoscopy 8/2023  Vagina: normal appearing vagina with normal color and discharge, no lesions  Residual Urine: none  Urethra: normal appearing urethra with no masses, tenderness or lesions  Bladder: No tumors or CIS noted.  No bladder diverticulum.  none trabeculation noted.  Ureters: Clear efflux from both ureters.  Orifices with normal configuration and location.    Current Outpatient Medications   Medication Sig Dispense Refill    MYRBETRIQ 50 MG TB24 Take 50 mg by mouth daily 90 tablet 3    aspirin 81 MG chewable tablet Take 1 tablet by mouth daily      carvedilol (COREG) 6.25 MG tablet Take 1 tablet by mouth 2 times daily 60 tablet 3    fluticasone (FLONASE) 50 MCG/ACT nasal spray 1 spray by Each Nostril route daily 16 g 3    potassium chloride (KLOR-CON M) 10 MEQ extended release tablet Take 1 tablet by mouth daily for 14 days (Patient not taking: Reported on 6/28/2024) 14 tablet 0    esomeprazole Magnesium (NEXIUM) 20 MG PACK Take 1 packet by mouth daily      sacubitril-valsartan (ENTRESTO) 24-26

## 2024-09-05 ENCOUNTER — OFFICE VISIT (OUTPATIENT)
Dept: UROLOGY | Age: 89
End: 2024-09-05
Payer: MEDICARE

## 2024-09-05 VITALS — WEIGHT: 107 LBS | BODY MASS INDEX: 21.01 KG/M2 | RESPIRATION RATE: 18 BRPM | HEIGHT: 60 IN

## 2024-09-05 DIAGNOSIS — N32.89 BLADDER WALL THICKENING: ICD-10-CM

## 2024-09-05 DIAGNOSIS — N39.41 URGE INCONTINENCE: Primary | ICD-10-CM

## 2024-09-05 LAB
BILIRUBIN, URINE: NEGATIVE
BLOOD URINE, POC: ABNORMAL
CHARACTER, URINE: CLEAR
COLOR, UA: YELLOW
GLUCOSE URINE: NEGATIVE MG/DL
KETONES, URINE: NEGATIVE
LEUKOCYTE CLUMPS, URINE: NEGATIVE
NITRITE, URINE: NEGATIVE
PH, URINE: 5.5 (ref 5–9)
PROTEIN, URINE: 30 MG/DL
SPECIFIC GRAVITY UA: 1.01 (ref 1–1.03)
UROBILINOGEN, URINE: 0.2 EU/DL (ref 0–1)

## 2024-09-05 PROCEDURE — 99213 OFFICE O/P EST LOW 20 MIN: CPT | Performed by: NURSE PRACTITIONER

## 2024-09-05 PROCEDURE — 1090F PRES/ABSN URINE INCON ASSESS: CPT | Performed by: NURSE PRACTITIONER

## 2024-09-05 PROCEDURE — 81003 URINALYSIS AUTO W/O SCOPE: CPT | Performed by: NURSE PRACTITIONER

## 2024-09-05 PROCEDURE — 1123F ACP DISCUSS/DSCN MKR DOCD: CPT | Performed by: NURSE PRACTITIONER

## 2024-09-05 PROCEDURE — G8427 DOCREV CUR MEDS BY ELIG CLIN: HCPCS | Performed by: NURSE PRACTITIONER

## 2024-09-05 PROCEDURE — 0509F URINE INCON PLAN DOCD: CPT | Performed by: NURSE PRACTITIONER

## 2024-09-05 PROCEDURE — G8420 CALC BMI NORM PARAMETERS: HCPCS | Performed by: NURSE PRACTITIONER

## 2024-09-05 PROCEDURE — 1036F TOBACCO NON-USER: CPT | Performed by: NURSE PRACTITIONER

## 2024-09-05 RX ORDER — MIRABEGRON 50 MG/1
50 TABLET, FILM COATED, EXTENDED RELEASE ORAL DAILY
Qty: 90 TABLET | Refills: 3 | Status: SHIPPED | OUTPATIENT
Start: 2024-09-05 | End: 2024-12-04

## 2024-09-05 NOTE — PROGRESS NOTES
Medication list not available to review. Per patient medication list is the same as previous reviewed.

## 2024-09-16 RX ORDER — SACUBITRIL AND VALSARTAN 24; 26 MG/1; MG/1
1 TABLET, FILM COATED ORAL 2 TIMES DAILY
Qty: 180 TABLET | Refills: 3 | Status: SHIPPED | OUTPATIENT
Start: 2024-09-16

## 2024-10-09 ENCOUNTER — TELEPHONE (OUTPATIENT)
Dept: CARDIOLOGY CLINIC | Age: 89
End: 2024-10-09

## 2024-10-09 PROCEDURE — 93295 DEV INTERROG REMOTE 1/2/MLT: CPT | Performed by: NUCLEAR MEDICINE

## 2024-10-09 PROCEDURE — 93296 REM INTERROG EVL PM/IDS: CPT | Performed by: NUCLEAR MEDICINE

## 2024-12-03 RX ORDER — CARVEDILOL 6.25 MG/1
6.25 TABLET ORAL 2 TIMES DAILY
Qty: 60 TABLET | Refills: 0 | OUTPATIENT
Start: 2024-12-03

## 2025-02-11 PROCEDURE — 93297 REM INTERROG DEV EVAL ICPMS: CPT | Performed by: NUCLEAR MEDICINE

## 2025-02-27 ENCOUNTER — LAB (OUTPATIENT)
Dept: LAB | Age: 89
End: 2025-02-27

## 2025-02-27 ENCOUNTER — OFFICE VISIT (OUTPATIENT)
Dept: CARDIOLOGY CLINIC | Age: 89
End: 2025-02-27

## 2025-02-27 ENCOUNTER — TELEPHONE (OUTPATIENT)
Dept: CARDIOLOGY CLINIC | Age: 89
End: 2025-02-27

## 2025-02-27 ENCOUNTER — NURSE ONLY (OUTPATIENT)
Dept: CARDIOLOGY CLINIC | Age: 89
End: 2025-02-27

## 2025-02-27 VITALS
WEIGHT: 111.38 LBS | HEIGHT: 60 IN | OXYGEN SATURATION: 98 % | HEART RATE: 81 BPM | DIASTOLIC BLOOD PRESSURE: 60 MMHG | BODY MASS INDEX: 21.87 KG/M2 | SYSTOLIC BLOOD PRESSURE: 118 MMHG

## 2025-02-27 DIAGNOSIS — I25.5 ISCHEMIC CARDIOMYOPATHY: ICD-10-CM

## 2025-02-27 DIAGNOSIS — I50.22 CHF (CONGESTIVE HEART FAILURE), NYHA CLASS II, CHRONIC, SYSTOLIC (HCC): ICD-10-CM

## 2025-02-27 DIAGNOSIS — I50.22 CHF (CONGESTIVE HEART FAILURE), NYHA CLASS II, CHRONIC, SYSTOLIC (HCC): Primary | ICD-10-CM

## 2025-02-27 DIAGNOSIS — I25.10 CORONARY ARTERY DISEASE INVOLVING NATIVE CORONARY ARTERY OF NATIVE HEART WITHOUT ANGINA PECTORIS: ICD-10-CM

## 2025-02-27 DIAGNOSIS — Z95.810 ICD (IMPLANTABLE CARDIOVERTER-DEFIBRILLATOR) IN PLACE: Primary | ICD-10-CM

## 2025-02-27 LAB
ANION GAP SERPL CALC-SCNC: 11 MEQ/L (ref 8–16)
BUN SERPL-MCNC: 59 MG/DL (ref 8–23)
CALCIUM SERPL-MCNC: 9.2 MG/DL (ref 8.2–9.6)
CHLORIDE SERPL-SCNC: 104 MEQ/L (ref 98–111)
CO2 SERPL-SCNC: 25 MEQ/L (ref 22–29)
CREAT SERPL-MCNC: 1.7 MG/DL (ref 0.5–0.9)
DEPRECATED RDW RBC AUTO: 55.9 FL (ref 35–45)
ERYTHROCYTE [DISTWIDTH] IN BLOOD BY AUTOMATED COUNT: 14.9 % (ref 11.5–14.5)
GFR SERPL CREATININE-BSD FRML MDRD: 28 ML/MIN/1.73M2
GLUCOSE SERPL-MCNC: 101 MG/DL (ref 74–109)
HCT VFR BLD AUTO: 31.7 % (ref 37–47)
HGB BLD-MCNC: 9.9 GM/DL (ref 12–16)
MCH RBC QN AUTO: 32 PG (ref 26–33)
MCHC RBC AUTO-ENTMCNC: 31.2 GM/DL (ref 32.2–35.5)
MCV RBC AUTO: 102.6 FL (ref 81–99)
PLATELET # BLD AUTO: 101 THOU/MM3 (ref 130–400)
PMV BLD AUTO: 14.2 FL (ref 9.4–12.4)
POTASSIUM SERPL-SCNC: 5 MEQ/L (ref 3.5–5.2)
RBC # BLD AUTO: 3.09 MILL/MM3 (ref 4.2–5.4)
SODIUM SERPL-SCNC: 140 MEQ/L (ref 135–145)
WBC # BLD AUTO: 5 THOU/MM3 (ref 4.8–10.8)

## 2025-02-27 RX ORDER — CARVEDILOL 6.25 MG/1
6.25 TABLET ORAL 2 TIMES DAILY
Qty: 180 TABLET | Refills: 3 | Status: SHIPPED | OUTPATIENT
Start: 2025-02-27

## 2025-02-27 RX ORDER — MIRABEGRON 50 MG/1
50 TABLET, FILM COATED, EXTENDED RELEASE ORAL DAILY
COMMUNITY

## 2025-02-27 RX ORDER — ONDANSETRON 4 MG/1
4 TABLET, ORALLY DISINTEGRATING ORAL EVERY 8 HOURS PRN
COMMUNITY

## 2025-02-27 RX ORDER — FUROSEMIDE 20 MG/1
20 TABLET ORAL DAILY PRN
Qty: 15 TABLET | Refills: 0 | Status: SHIPPED | OUTPATIENT
Start: 2025-02-27

## 2025-02-27 ASSESSMENT — ENCOUNTER SYMPTOMS
ABDOMINAL DISTENTION: 0
COUGH: 0
SHORTNESS OF BREATH: 0

## 2025-02-27 NOTE — TELEPHONE ENCOUNTER
Pt was seen in the clinic today  She has a dual icd    SHE IS RV OVERSENSING. HER RV SENSITITY IS PROGRAMMED AT 0.3  HER RV THRESHOLD WAS 0.75 @ 0.4    CAN WE PROGRAM HER RV SENSITIVITY TO 0.45 TO PREVENT THE RV OVERSENSING SO SHE DOES NOT GET SHOCKED??      DEVICE INTERROGATION IN [facility]

## 2025-02-27 NOTE — PROGRESS NOTES
Heart Failure Clinic       Visit Date: 2/27/2025  Cardiologist:  Dr. Sosa  Primary Care Physician: Dr. Nathan, Jennifer JESSICA, APRN - CNP    Kourtney Rankin is a 89 y.o. female who presents today for:  Chief Complaint   Patient presents with    Congestive Heart Failure       HPI:   Kourtney Rankin is a 89 y.o. female who presents to the office for a follow up patient visit in the heart failure clinic.  Accompanied by dtr, Audrey  Lives w/ dtr Audrey    TYPE HF: HFrEF (30-35%) since 10/2021   Cause: ICM/NICM  Device: Dual ICD (11/2022, [employer])  HX: HTN, Afib (Eliquis), Covid (trach/PEG - extensive admission - 10/2021-2/2022), Trach removed 5/2022.  CAD s/p PCI LM, Impella support (6/2022), Colectomy, bladder fistula repair (5/2024)    Dry Wt:  110    Hospitalization:  6/2022 - admitted CHF. Worsening SOB.  [facility] showing Left main disease - opted for PCI (Impella support).      OV 7/2022 - 110# Home w/ dtr - OT signed off yesterday  PT still following  Nursing comes twice weekly  \"Feeling a lot better\" over past month - dtr agrees.  Dtr states no complaints since been home prior to was always c/o not breathing  OV 1/2023 - 117#  Walked from parking lot - did well.  Walks outside about 0.5/mile when able.   No fluid on exam  Notes urinates a lot at night.  Inquiring about decreasing diuretic.   Also inquiring about PEG removal - will need anticoags held.   Following w/ ID for sacral wound    9/2023 - Labs reviewed - little improvement creat 1.3/51 (was 1.6/50 - Lasix changed to PRN)  Check taking Lasix PRN, Encourage drink enough fluids t/o day  10/2023 - 110#  Nervous w/ PRN Lasix - has taken once since change - needs little reassurance, education on use  No fluid on exam today   BP little elevated - walked from entrance.    Walks every day - approx 1/4 mile  Some bladder issues/tickening - seeing urology = some UTIs  Overall feels same over past year      2/2024 - 112#  Doing well - only needed Lasix 2x past 6 mths  BPs

## 2025-02-27 NOTE — PATIENT INSTRUCTIONS
You may receive a survey regarding the care you received during your visit.  Your input is valuable to us.  We encourage you to complete and return your survey.  We hope you will choose us in the future for your healthcare needs.    Your nurses today were Macarena Cabral and Alyx.  Office hours:   Mon-Thurs 8-4:30  Friday 8-12  Phone: [phone]    Continue:  Continue current medications  Daily weights and record  Fluid restriction of 2 Liters per day  Limit sodium in diet to around 3598-3052 mg/day  Monitor BP    Call the Heart Failure Clinic for any of the following symptoms:   Weight gain of 3 pounds in 1 day or 5 pounds in 1 week  Increased shortness of breath  Shortness of breath while laying down  Chest pain  Swelling in feet, ankles or legs  Bloating in abdomen  Fatigue

## 2025-02-28 ENCOUNTER — TELEPHONE (OUTPATIENT)
Dept: CARDIOLOGY CLINIC | Age: 89
End: 2025-02-28

## 2025-02-28 NOTE — TELEPHONE ENCOUNTER
Labs reviewed from yesterday - kidney function worsening.  Creat 1.7/BUN59 - baseline wnl  Rarely taking Lasix  Have her hold Entresto x 5 days and then decrease to HALF tab AM and PM   Ensure drinking enough t/o day.   Repeat BMP in 2 weeks

## 2025-03-05 NOTE — TELEPHONE ENCOUNTER
Lmom for someone to call our office back so we can get this pt an appt in the pacer clinic so she does not get shocked d/t the rv oversensing

## 2025-03-06 ENCOUNTER — NURSE ONLY (OUTPATIENT)
Dept: CARDIOLOGY CLINIC | Age: 89
End: 2025-03-06

## 2025-03-06 NOTE — PROGRESS NOTES
Pt came to the office today for reprogramming    Rv sensing today is 0.8  Rv sensitivity programed from 0.30 to 0.45 per dr garcia orders for rv oversensing   Reran rv sensing and was 0.9 with rv sensing at 0.45    Please agree dr damico     Download scanned in under the media tab with changes

## 2025-04-09 ENCOUNTER — LAB (OUTPATIENT)
Dept: LAB | Age: 89
End: 2025-04-09

## 2025-04-09 DIAGNOSIS — I50.22 CHF (CONGESTIVE HEART FAILURE), NYHA CLASS II, CHRONIC, SYSTOLIC (HCC): ICD-10-CM

## 2025-04-09 LAB
ANION GAP SERPL CALC-SCNC: 12 MEQ/L (ref 8–16)
BUN SERPL-MCNC: 50 MG/DL (ref 8–23)
CALCIUM SERPL-MCNC: 9.8 MG/DL (ref 8.2–9.6)
CHLORIDE SERPL-SCNC: 104 MEQ/L (ref 98–111)
CO2 SERPL-SCNC: 24 MEQ/L (ref 22–29)
CREAT SERPL-MCNC: 1.4 MG/DL (ref 0.5–0.9)
GFR SERPL CREATININE-BSD FRML MDRD: 36 ML/MIN/1.73M2
GLUCOSE SERPL-MCNC: 84 MG/DL (ref 74–109)
POTASSIUM SERPL-SCNC: 4.4 MEQ/L (ref 3.5–5.2)
SODIUM SERPL-SCNC: 140 MEQ/L (ref 135–145)

## 2025-04-10 ENCOUNTER — RESULTS FOLLOW-UP (OUTPATIENT)
Dept: CARDIOLOGY CLINIC | Age: 89
End: 2025-04-10

## 2025-04-14 ENCOUNTER — TELEPHONE (OUTPATIENT)
Dept: CARDIOLOGY CLINIC | Age: 89
End: 2025-04-14

## 2025-04-14 PROCEDURE — 93297 REM INTERROG DEV EVAL ICPMS: CPT | Performed by: NUCLEAR MEDICINE

## 2025-04-14 NOTE — TELEPHONE ENCOUNTER
Episodes of NSVT on device interrogation from 4-2-25; most recent BMP from 4-9-25 in Epic. Anything needed?

## 2025-04-14 NOTE — TELEPHONE ENCOUNTER
Patient's daughter Audrey (on HIPAA) returned call and stated she will notfiy pt and ask her if she would be ok to start Amiodarone. Audrye will call us back to let us know either way.    Pharmacy - [facility] [address]

## 2025-04-18 ENCOUNTER — APPOINTMENT (OUTPATIENT)
Dept: CT IMAGING | Age: 89
DRG: 689 | End: 2025-04-18
Payer: MEDICARE

## 2025-04-18 ENCOUNTER — HOSPITAL ENCOUNTER (INPATIENT)
Age: 89
LOS: 3 days | Discharge: HOME HEALTH | DRG: 689 | End: 2025-04-21
Attending: EMERGENCY MEDICINE | Admitting: STUDENT IN AN ORGANIZED HEALTH CARE EDUCATION/TRAINING PROGRAM
Payer: MEDICARE

## 2025-04-18 ENCOUNTER — APPOINTMENT (OUTPATIENT)
Dept: GENERAL RADIOLOGY | Age: 89
DRG: 689 | End: 2025-04-18
Payer: MEDICARE

## 2025-04-18 DIAGNOSIS — N39.0 URINARY TRACT INFECTION WITH HEMATURIA, SITE UNSPECIFIED: Primary | ICD-10-CM

## 2025-04-18 DIAGNOSIS — A41.9 SEPSIS WITHOUT ACUTE ORGAN DYSFUNCTION, DUE TO UNSPECIFIED ORGANISM (HCC): ICD-10-CM

## 2025-04-18 DIAGNOSIS — R31.9 URINARY TRACT INFECTION WITH HEMATURIA, SITE UNSPECIFIED: Primary | ICD-10-CM

## 2025-04-18 LAB
ALBUMIN SERPL BCG-MCNC: 4 G/DL (ref 3.4–4.9)
ALP SERPL-CCNC: 49 U/L (ref 38–126)
ALT SERPL W/O P-5'-P-CCNC: 7 U/L (ref 10–35)
ANION GAP SERPL CALC-SCNC: 13 MEQ/L (ref 8–16)
AST SERPL-CCNC: 26 U/L (ref 10–35)
BACTERIA: ABNORMAL
BASOPHILS ABSOLUTE: 0 THOU/MM3 (ref 0–0.1)
BASOPHILS NFR BLD AUTO: 0.2 %
BILIRUB CONJ SERPL-MCNC: 0.2 MG/DL (ref 0–0.2)
BILIRUB SERPL-MCNC: 0.5 MG/DL (ref 0.3–1.2)
BILIRUB UR QL STRIP: NEGATIVE
BUN SERPL-MCNC: 37 MG/DL (ref 8–23)
CALCIUM SERPL-MCNC: 9.7 MG/DL (ref 8.2–9.6)
CASTS #/AREA URNS LPF: ABNORMAL /LPF
CASTS #/AREA URNS LPF: ABNORMAL /LPF
CHARACTER UR: CLEAR
CHARCOAL URNS QL MICRO: ABNORMAL
CHLORIDE SERPL-SCNC: 105 MEQ/L (ref 98–111)
CO2 SERPL-SCNC: 22 MEQ/L (ref 22–29)
COLOR UR: YELLOW
CREAT SERPL-MCNC: 1.2 MG/DL (ref 0.5–0.9)
CRYSTALS URNS QL MICRO: ABNORMAL
DEPRECATED RDW RBC AUTO: 53.6 FL (ref 35–45)
EKG ATRIAL RATE: 96 BPM
EKG P AXIS: 92 DEGREES
EKG P-R INTERVAL: 210 MS
EKG Q-T INTERVAL: 380 MS
EKG QRS DURATION: 150 MS
EKG QTC CALCULATION (BAZETT): 480 MS
EKG R AXIS: -44 DEGREES
EKG T AXIS: 58 DEGREES
EKG VENTRICULAR RATE: 96 BPM
EOSINOPHIL NFR BLD AUTO: 0.2 %
EOSINOPHILS ABSOLUTE: 0 THOU/MM3 (ref 0–0.4)
EPITHELIAL CELLS, UA: ABNORMAL /HPF
ERYTHROCYTE [DISTWIDTH] IN BLOOD BY AUTOMATED COUNT: 14.6 % (ref 11.5–14.5)
GFR SERPL CREATININE-BSD FRML MDRD: 43 ML/MIN/1.73M2
GLUCOSE SERPL-MCNC: 105 MG/DL (ref 74–109)
GLUCOSE UR QL STRIP.AUTO: NEGATIVE MG/DL
HCT VFR BLD AUTO: 32.9 % (ref 37–47)
HGB BLD-MCNC: 10.5 GM/DL (ref 12–16)
HGB UR QL STRIP.AUTO: ABNORMAL
IMM GRANULOCYTES # BLD AUTO: 0.07 THOU/MM3 (ref 0–0.07)
IMM GRANULOCYTES NFR BLD AUTO: 0.8 %
INR PPP: 1.35 (ref 0.85–1.13)
KETONES UR QL STRIP.AUTO: NEGATIVE
LACTIC ACID, SEPSIS: 0.8 MMOL/L (ref 0.5–1.9)
LACTIC ACID, SEPSIS: 0.9 MMOL/L (ref 0.5–1.9)
LEUKOCYTE ESTERASE UR QL STRIP.AUTO: ABNORMAL
LIPASE SERPL-CCNC: 67 U/L (ref 13–60)
LYMPHOCYTES ABSOLUTE: 0.6 THOU/MM3 (ref 1–4.8)
LYMPHOCYTES NFR BLD AUTO: 6.8 %
MAGNESIUM SERPL-MCNC: 2 MG/DL (ref 1.6–2.6)
MCH RBC QN AUTO: 31.9 PG (ref 26–33)
MCHC RBC AUTO-ENTMCNC: 31.9 GM/DL (ref 32.2–35.5)
MCV RBC AUTO: 100 FL (ref 81–99)
MONOCYTES ABSOLUTE: 2 THOU/MM3 (ref 0.4–1.3)
MONOCYTES NFR BLD AUTO: 21.4 %
NEUTROPHILS ABSOLUTE: 6.5 THOU/MM3 (ref 1.8–7.7)
NEUTROPHILS NFR BLD AUTO: 70.6 %
NITRITE UR QL STRIP.AUTO: NEGATIVE
NRBC BLD AUTO-RTO: 0 /100 WBC
NT-PROBNP SERPL IA-MCNC: 3633 PG/ML (ref 0–449)
OSMOLALITY SERPL CALC.SUM OF ELEC: 288.4 MOSMOL/KG (ref 275–300)
PH UR STRIP.AUTO: 5.5 [PH] (ref 5–9)
PLATELET # BLD AUTO: 104 THOU/MM3 (ref 130–400)
PMV BLD AUTO: 12.8 FL (ref 9.4–12.4)
POTASSIUM SERPL-SCNC: 4.8 MEQ/L (ref 3.5–5.2)
PROCALCITONIN SERPL IA-MCNC: 0.33 NG/ML (ref 0.01–0.09)
PROT SERPL-MCNC: 7.6 G/DL (ref 6.4–8.3)
PROT UR STRIP.AUTO-MCNC: 100 MG/DL
RBC # BLD AUTO: 3.29 MILL/MM3 (ref 4.2–5.4)
RBC #/AREA URNS HPF: ABNORMAL /HPF
RENAL EPI CELLS #/AREA URNS HPF: ABNORMAL /[HPF]
SODIUM SERPL-SCNC: 140 MEQ/L (ref 135–145)
SPECIFIC GRAVITY UA: 1.01 (ref 1–1.03)
TROPONIN, HIGH SENSITIVITY: 19 NG/L (ref 0–12)
TSH SERPL DL<=0.05 MIU/L-ACNC: 2.11 UIU/ML (ref 0.27–4.2)
UROBILINOGEN, URINE: 0.2 EU/DL (ref 0–1)
WBC # BLD AUTO: 9.2 THOU/MM3 (ref 4.8–10.8)
WBC #/AREA URNS HPF: ABNORMAL /HPF
YEAST LIKE FUNGI URNS QL MICRO: ABNORMAL

## 2025-04-18 PROCEDURE — 84484 ASSAY OF TROPONIN QUANT: CPT

## 2025-04-18 PROCEDURE — 6370000000 HC RX 637 (ALT 250 FOR IP)

## 2025-04-18 PROCEDURE — 96375 TX/PRO/DX INJ NEW DRUG ADDON: CPT

## 2025-04-18 PROCEDURE — 82248 BILIRUBIN DIRECT: CPT

## 2025-04-18 PROCEDURE — 84443 ASSAY THYROID STIM HORMONE: CPT

## 2025-04-18 PROCEDURE — 1200000003 HC TELEMETRY R&B

## 2025-04-18 PROCEDURE — 74177 CT ABD & PELVIS W/CONTRAST: CPT

## 2025-04-18 PROCEDURE — 81001 URINALYSIS AUTO W/SCOPE: CPT

## 2025-04-18 PROCEDURE — 99223 1ST HOSP IP/OBS HIGH 75: CPT | Performed by: PHYSICIAN ASSISTANT

## 2025-04-18 PROCEDURE — 84145 PROCALCITONIN (PCT): CPT

## 2025-04-18 PROCEDURE — 96365 THER/PROPH/DIAG IV INF INIT: CPT

## 2025-04-18 PROCEDURE — 87147 CULTURE TYPE IMMUNOLOGIC: CPT

## 2025-04-18 PROCEDURE — 93005 ELECTROCARDIOGRAM TRACING: CPT | Performed by: EMERGENCY MEDICINE

## 2025-04-18 PROCEDURE — 83880 ASSAY OF NATRIURETIC PEPTIDE: CPT

## 2025-04-18 PROCEDURE — 83605 ASSAY OF LACTIC ACID: CPT

## 2025-04-18 PROCEDURE — 71046 X-RAY EXAM CHEST 2 VIEWS: CPT

## 2025-04-18 PROCEDURE — 2580000003 HC RX 258

## 2025-04-18 PROCEDURE — 87040 BLOOD CULTURE FOR BACTERIA: CPT

## 2025-04-18 PROCEDURE — 6360000004 HC RX CONTRAST MEDICATION

## 2025-04-18 PROCEDURE — 2500000003 HC RX 250 WO HCPCS: Performed by: PHYSICIAN ASSISTANT

## 2025-04-18 PROCEDURE — 6360000002 HC RX W HCPCS

## 2025-04-18 PROCEDURE — 83735 ASSAY OF MAGNESIUM: CPT

## 2025-04-18 PROCEDURE — 36415 COLL VENOUS BLD VENIPUNCTURE: CPT

## 2025-04-18 PROCEDURE — 80053 COMPREHEN METABOLIC PANEL: CPT

## 2025-04-18 PROCEDURE — 87801 DETECT AGNT MULT DNA AMPLI: CPT

## 2025-04-18 PROCEDURE — 85025 COMPLETE CBC W/AUTO DIFF WBC: CPT

## 2025-04-18 PROCEDURE — 83690 ASSAY OF LIPASE: CPT

## 2025-04-18 PROCEDURE — 85610 PROTHROMBIN TIME: CPT

## 2025-04-18 PROCEDURE — 99285 EMERGENCY DEPT VISIT HI MDM: CPT

## 2025-04-18 RX ORDER — ONDANSETRON 2 MG/ML
4 INJECTION INTRAMUSCULAR; INTRAVENOUS EVERY 6 HOURS PRN
Status: DISCONTINUED | OUTPATIENT
Start: 2025-04-18 | End: 2025-04-18

## 2025-04-18 RX ORDER — ACETAMINOPHEN 500 MG
1000 TABLET ORAL ONCE
Status: COMPLETED | OUTPATIENT
Start: 2025-04-18 | End: 2025-04-18

## 2025-04-18 RX ORDER — IOPAMIDOL 755 MG/ML
80 INJECTION, SOLUTION INTRAVASCULAR
Status: COMPLETED | OUTPATIENT
Start: 2025-04-18 | End: 2025-04-18

## 2025-04-18 RX ORDER — SODIUM CHLORIDE 9 MG/ML
INJECTION, SOLUTION INTRAVENOUS PRN
Status: DISCONTINUED | OUTPATIENT
Start: 2025-04-18 | End: 2025-04-21 | Stop reason: HOSPADM

## 2025-04-18 RX ORDER — SODIUM CHLORIDE 0.9 % (FLUSH) 0.9 %
10 SYRINGE (ML) INJECTION PRN
Status: DISCONTINUED | OUTPATIENT
Start: 2025-04-18 | End: 2025-04-21 | Stop reason: HOSPADM

## 2025-04-18 RX ORDER — PROMETHAZINE HYDROCHLORIDE 25 MG/1
12.5 TABLET ORAL EVERY 6 HOURS PRN
Status: DISCONTINUED | OUTPATIENT
Start: 2025-04-18 | End: 2025-04-21 | Stop reason: HOSPADM

## 2025-04-18 RX ORDER — 0.9 % SODIUM CHLORIDE 0.9 %
500 INTRAVENOUS SOLUTION INTRAVENOUS ONCE
Status: COMPLETED | OUTPATIENT
Start: 2025-04-18 | End: 2025-04-18

## 2025-04-18 RX ORDER — POLYETHYLENE GLYCOL 3350 17 G/17G
17 POWDER, FOR SOLUTION ORAL DAILY PRN
Status: DISCONTINUED | OUTPATIENT
Start: 2025-04-18 | End: 2025-04-21 | Stop reason: HOSPADM

## 2025-04-18 RX ORDER — PROCHLORPERAZINE EDISYLATE 5 MG/ML
10 INJECTION INTRAMUSCULAR; INTRAVENOUS ONCE
Status: COMPLETED | OUTPATIENT
Start: 2025-04-18 | End: 2025-04-18

## 2025-04-18 RX ORDER — ACETAMINOPHEN 650 MG/1
650 SUPPOSITORY RECTAL EVERY 6 HOURS PRN
Status: DISCONTINUED | OUTPATIENT
Start: 2025-04-18 | End: 2025-04-21 | Stop reason: HOSPADM

## 2025-04-18 RX ORDER — ACETAMINOPHEN 325 MG/1
650 TABLET ORAL EVERY 6 HOURS PRN
Status: DISCONTINUED | OUTPATIENT
Start: 2025-04-18 | End: 2025-04-21 | Stop reason: HOSPADM

## 2025-04-18 RX ORDER — SODIUM CHLORIDE 0.9 % (FLUSH) 0.9 %
10 SYRINGE (ML) INJECTION EVERY 12 HOURS SCHEDULED
Status: DISCONTINUED | OUTPATIENT
Start: 2025-04-18 | End: 2025-04-21 | Stop reason: HOSPADM

## 2025-04-18 RX ADMIN — SODIUM CHLORIDE 500 ML: 0.9 INJECTION, SOLUTION INTRAVENOUS at 19:51

## 2025-04-18 RX ADMIN — SODIUM CHLORIDE, PRESERVATIVE FREE 10 ML: 5 INJECTION INTRAVENOUS at 22:44

## 2025-04-18 RX ADMIN — PIPERACILLIN AND TAZOBACTAM 4500 MG: 4; .5 INJECTION, POWDER, FOR SOLUTION INTRAVENOUS at 20:03

## 2025-04-18 RX ADMIN — PROCHLORPERAZINE EDISYLATE 10 MG: 5 INJECTION INTRAMUSCULAR; INTRAVENOUS at 19:51

## 2025-04-18 RX ADMIN — ACETAMINOPHEN 1000 MG: 500 TABLET ORAL at 19:38

## 2025-04-18 RX ADMIN — IOPAMIDOL 80 ML: 755 INJECTION, SOLUTION INTRAVENOUS at 20:15

## 2025-04-18 RX ADMIN — Medication 1250 MG: at 20:47

## 2025-04-18 ASSESSMENT — PAIN DESCRIPTION - LOCATION: LOCATION: GENERALIZED

## 2025-04-18 ASSESSMENT — PAIN - FUNCTIONAL ASSESSMENT: PAIN_FUNCTIONAL_ASSESSMENT: ACTIVITIES ARE NOT PREVENTED

## 2025-04-18 ASSESSMENT — PAIN SCALES - GENERAL: PAINLEVEL_OUTOF10: 5

## 2025-04-18 ASSESSMENT — PAIN DESCRIPTION - DESCRIPTORS: DESCRIPTORS: SORE

## 2025-04-18 ASSESSMENT — PAIN DESCRIPTION - PAIN TYPE: TYPE: ACUTE PAIN

## 2025-04-18 ASSESSMENT — LIFESTYLE VARIABLES: HOW OFTEN DO YOU HAVE A DRINK CONTAINING ALCOHOL: NEVER

## 2025-04-18 NOTE — ED TRIAGE NOTES
Patient presents to ED with chief complaint of Hematuria and coughing up blood. Symptoms started a few days ago, and family states she has needed blood transfusions in the past. Patient resting in bed. Respirations easy and unlabored. No distress noted. Call light within reach.

## 2025-04-18 NOTE — ED PROVIDER NOTES
[facility] EMERGENCY DEPARTMENT  EMERGENCY DEPARTMENT ENCOUNTER          Pt Name: Kourtney Rankin  MRN: [id number]  Birthdate 11/29/1934  Date of evaluation: 4/18/2025  Physician: Jaquelin Long MD  Supervising Attending Physician: Mack Pond DO       CHIEF COMPLAINT       Chief Complaint   Patient presents with    Hematuria         HISTORY OF PRESENT ILLNESS    The history is provided by the patient and a relative.     Kourtney Rankin is a 89 y.o. female who presents to the emergency department from home, as a walk in to the ED lobby for evaluation of hematuria and generalized fatigue.  Patient notes that over the last week she has been feeling generally weak, but noted that she has been having burning and blood in her urine since yesterday.  She also notes that she occasionally has a cough and after 1 episode had some blood but that has not repeated since.  Patient is a poor historian and jumps from topic to topic without answering questions clearly.  Patient's daughter notes that she has been generally fatigued and has seemed that there was some blood in her urine.  They deny any recent loss of consciousness, fever at home, patient denies any chest pain, shortness of breath, patient notes she is nauseous but has not thrown up.    The patient has no other acute complaints at this time.      REVIEW OF SYSTEMS   Review of Systems      PAST MEDICAL AND SURGICAL HISTORY     Past Medical History:   Diagnosis Date    HERIBERTO (acute kidney injury)     Anxiety     Arthritis     Bronchitis     CAD (coronary artery disease)     CHF (congestive heart failure) (HCC)     Chronic a-fib (HCC)     COVID     October 2021    Diverticulitis of colon     Hyperkalemia     Hypertension     Medtronic cobalt dual ICD  02/15/2023    Neuromuscular dysfunction of bladder     Personal history of COVID-19     Pressure ulcer     Protein-calorie malnutrition      Past Surgical History:   Procedure Laterality

## 2025-04-19 PROBLEM — N30.01 ACUTE CYSTITIS WITH HEMATURIA: Status: ACTIVE | Noted: 2025-04-19

## 2025-04-19 PROBLEM — R31.9 HEMATURIA: Status: RESOLVED | Noted: 2025-04-18 | Resolved: 2025-04-19

## 2025-04-19 LAB
ACB COMPLEX DNA BLD POS QL NAA+NON-PROBE: NOT DETECTED
ANION GAP SERPL CALC-SCNC: 12 MEQ/L (ref 8–16)
B FRAGILIS DNA BLD POS QL NAA+NON-PROBE: NOT DETECTED
BASOPHILS ABSOLUTE: 0 THOU/MM3 (ref 0–0.1)
BASOPHILS NFR BLD AUTO: 0.4 %
BLACTX-M ISLT/SPM QL: ABNORMAL
BLAIMP ISLT/SPM QL: ABNORMAL
BLAKPC ISLT/SPM QL: ABNORMAL
BLAOXA-48-LIKE ISLT/SPM QL: ABNORMAL
BLAVIM ISLT/SPM QL: ABNORMAL
BOTTLE TYPE: ABNORMAL
BUN SERPL-MCNC: 29 MG/DL (ref 8–23)
C ALBICANS DNA BLD POS QL NAA+NON-PROBE: NOT DETECTED
C AURIS DNA BLD POS QL NAA+NON-PROBE: NOT DETECTED
C GATTII+NEOFOR DNA BLD POS QL NAA+N-PRB: NOT DETECTED
C GLABRATA DNA BLD POS QL NAA+NON-PROBE: NOT DETECTED
C KRUSEI DNA BLD POS QL NAA+NON-PROBE: NOT DETECTED
C PARAP DNA BLD POS QL NAA+NON-PROBE: NOT DETECTED
C TROPICLS DNA BLD POS QL NAA+NON-PROBE: NOT DETECTED
CALCIUM SERPL-MCNC: 9.3 MG/DL (ref 8.2–9.6)
CHLORIDE SERPL-SCNC: 109 MEQ/L (ref 98–111)
CO2 SERPL-SCNC: 21 MEQ/L (ref 22–29)
COAG NEG STAPH DNA BLD QL NAA+PROBE: DETECTED
COLISTIN RES MCR-1 ISLT/SPM QL: ABNORMAL
CREAT SERPL-MCNC: 1.2 MG/DL (ref 0.5–0.9)
DEPRECATED RDW RBC AUTO: 54 FL (ref 35–45)
E CLOAC COMP DNA BLD POS NAA+NON-PROBE: NOT DETECTED
E COLI DNA BLD POS QL NAA+NON-PROBE: NOT DETECTED
E FAECALIS DNA BLD POS QL NAA+NON-PROBE: NOT DETECTED
E FAECIUM DNA BLD POS QL NAA+NON-PROBE: NOT DETECTED
ENTEROBACTERALES DNA BLD POS NAA+N-PRB: NOT DETECTED
EOSINOPHIL NFR BLD AUTO: 0.2 %
EOSINOPHILS ABSOLUTE: 0 THOU/MM3 (ref 0–0.4)
ERYTHROCYTE [DISTWIDTH] IN BLOOD BY AUTOMATED COUNT: 14.7 % (ref 11.5–14.5)
GFR SERPL CREATININE-BSD FRML MDRD: 43 ML/MIN/1.73M2
GLUCOSE SERPL-MCNC: 91 MG/DL (ref 74–109)
GP B STREP DNA SPEC QL NAA+PROBE: NOT DETECTED
GP B STREP DNA SPEC QL NAA+PROBE: NOT DETECTED
HAEM INFLU DNA BLD POS QL NAA+NON-PROBE: NOT DETECTED
HCT VFR BLD AUTO: 33.7 % (ref 37–47)
HGB BLD-MCNC: 10.9 GM/DL (ref 12–16)
IMM GRANULOCYTES # BLD AUTO: 0.24 THOU/MM3 (ref 0–0.07)
IMM GRANULOCYTES NFR BLD AUTO: 2.8 %
K OXYTOCA DNA BLD POS QL NAA+NON-PROBE: NOT DETECTED
K OXYTOCA DNA BLD POS QL NAA+NON-PROBE: NOT DETECTED
KLEBSIELLA SP DNA BLD POS QL NAA+NON-PRB: NOT DETECTED
L MONOCYTOG DNA BLD POS QL NAA+NON-PROBE: NOT DETECTED
LIPASE SERPL-CCNC: 53 U/L (ref 13–60)
LYMPHOCYTES ABSOLUTE: 0.5 THOU/MM3 (ref 1–4.8)
LYMPHOCYTES NFR BLD AUTO: 6.2 %
MCH RBC QN AUTO: 32.3 PG (ref 26–33)
MCHC RBC AUTO-ENTMCNC: 32.3 GM/DL (ref 32.2–35.5)
MCV RBC AUTO: 100 FL (ref 81–99)
MECA ISLT/SPM QL: ABNORMAL
MECA+MECC+MREJ ISLT/SPM QL: ABNORMAL
MONOCYTES ABSOLUTE: 2.3 THOU/MM3 (ref 0.4–1.3)
MONOCYTES NFR BLD AUTO: 27.5 %
N MEN DNA BLD POS QL NAA+NON-PROBE: NOT DETECTED
NDM: ABNORMAL
NEUTROPHILS ABSOLUTE: 5.3 THOU/MM3 (ref 1.8–7.7)
NEUTROPHILS NFR BLD AUTO: 62.9 %
NRBC BLD AUTO-RTO: 0 /100 WBC
P AERUGINOSA DNA BLD POS NAA+NON-PROBE: NOT DETECTED
PHOSPHATE SERPL-MCNC: 3.1 MG/DL (ref 2.5–4.5)
PLATELET # BLD AUTO: 91 THOU/MM3 (ref 130–400)
PLATELET BLD QL SMEAR: ABNORMAL
PMV BLD AUTO: 12.5 FL (ref 9.4–12.4)
POTASSIUM SERPL-SCNC: 4.1 MEQ/L (ref 3.5–5.2)
PROTEUS SPP: NOT DETECTED
RBC # BLD AUTO: 3.37 MILL/MM3 (ref 4.2–5.4)
S AUREUS DNA BLD POS QL NAA+NON-PROBE: NOT DETECTED
S EPIDERMIDIS DNA BLD POS QL NAA+NON-PRB: NOT DETECTED
S LUGDUNENSIS DNA BLD POS QL NAA+NON-PRB: NOT DETECTED
S MALTOPHILIA DNA BLD POS QL NAA+NON-PRB: NOT DETECTED
S MARCESCENS DNA BLD POS NAA+NON-PROBE: NOT DETECTED
S PYO DNA THROAT QL NAA+PROBE: NOT DETECTED
SALMONELLA DNA BLD POS QL NAA+NON-PROBE: NOT DETECTED
SCAN OF BLOOD SMEAR: NORMAL
SODIUM SERPL-SCNC: 142 MEQ/L (ref 135–145)
SOURCE OF BLOOD CULTURE: ABNORMAL
STREPTOCOCCUS DNA BLD QL NAA+PROBE: NOT DETECTED
VANA+VANB ISLT/SPM QL: ABNORMAL
WBC # BLD AUTO: 8.4 THOU/MM3 (ref 4.8–10.8)

## 2025-04-19 PROCEDURE — 85025 COMPLETE CBC W/AUTO DIFF WBC: CPT

## 2025-04-19 PROCEDURE — 6360000002 HC RX W HCPCS: Performed by: PHYSICIAN ASSISTANT

## 2025-04-19 PROCEDURE — 36415 COLL VENOUS BLD VENIPUNCTURE: CPT

## 2025-04-19 PROCEDURE — 6370000000 HC RX 637 (ALT 250 FOR IP): Performed by: INTERNAL MEDICINE

## 2025-04-19 PROCEDURE — 1200000003 HC TELEMETRY R&B

## 2025-04-19 PROCEDURE — 84100 ASSAY OF PHOSPHORUS: CPT

## 2025-04-19 PROCEDURE — 6370000000 HC RX 637 (ALT 250 FOR IP): Performed by: PHYSICIAN ASSISTANT

## 2025-04-19 PROCEDURE — 2500000003 HC RX 250 WO HCPCS: Performed by: PHYSICIAN ASSISTANT

## 2025-04-19 PROCEDURE — 80048 BASIC METABOLIC PNL TOTAL CA: CPT

## 2025-04-19 PROCEDURE — 99232 SBSQ HOSP IP/OBS MODERATE 35: CPT | Performed by: OPHTHALMOLOGY

## 2025-04-19 PROCEDURE — 83690 ASSAY OF LIPASE: CPT

## 2025-04-19 RX ORDER — TROSPIUM CHLORIDE 20 MG/1
20 TABLET, FILM COATED ORAL NIGHTLY
Status: DISCONTINUED | OUTPATIENT
Start: 2025-04-19 | End: 2025-04-21 | Stop reason: HOSPADM

## 2025-04-19 RX ORDER — NITROGLYCERIN 0.4 MG/1
0.4 TABLET SUBLINGUAL EVERY 5 MIN PRN
Status: DISCONTINUED | OUTPATIENT
Start: 2025-04-19 | End: 2025-04-21 | Stop reason: HOSPADM

## 2025-04-19 RX ORDER — ASPIRIN 81 MG/1
81 TABLET, CHEWABLE ORAL DAILY
Status: DISCONTINUED | OUTPATIENT
Start: 2025-04-19 | End: 2025-04-21 | Stop reason: HOSPADM

## 2025-04-19 RX ORDER — FUROSEMIDE 20 MG/1
20 TABLET ORAL DAILY PRN
Status: DISCONTINUED | OUTPATIENT
Start: 2025-04-19 | End: 2025-04-21 | Stop reason: HOSPADM

## 2025-04-19 RX ORDER — FLUTICASONE PROPIONATE 50 MCG
1 SPRAY, SUSPENSION (ML) NASAL DAILY PRN
Status: DISCONTINUED | OUTPATIENT
Start: 2025-04-19 | End: 2025-04-21 | Stop reason: HOSPADM

## 2025-04-19 RX ORDER — CETIRIZINE HYDROCHLORIDE 5 MG/1
5 TABLET ORAL DAILY
Status: DISCONTINUED | OUTPATIENT
Start: 2025-04-19 | End: 2025-04-21 | Stop reason: HOSPADM

## 2025-04-19 RX ORDER — PROCHLORPERAZINE EDISYLATE 5 MG/ML
10 INJECTION INTRAMUSCULAR; INTRAVENOUS EVERY 6 HOURS PRN
Status: DISCONTINUED | OUTPATIENT
Start: 2025-04-19 | End: 2025-04-21 | Stop reason: HOSPADM

## 2025-04-19 RX ORDER — CARVEDILOL 6.25 MG/1
6.25 TABLET ORAL 2 TIMES DAILY WITH MEALS
Status: DISCONTINUED | OUTPATIENT
Start: 2025-04-19 | End: 2025-04-21 | Stop reason: HOSPADM

## 2025-04-19 RX ORDER — LEVOTHYROXINE SODIUM 125 UG/1
62.5 TABLET ORAL DAILY
Status: DISCONTINUED | OUTPATIENT
Start: 2025-04-19 | End: 2025-04-21 | Stop reason: HOSPADM

## 2025-04-19 RX ADMIN — SODIUM CHLORIDE, PRESERVATIVE FREE 10 ML: 5 INJECTION INTRAVENOUS at 20:51

## 2025-04-19 RX ADMIN — SALINE NASAL SPRAY 1 SPRAY: 1.5 SOLUTION NASAL at 20:55

## 2025-04-19 RX ADMIN — APIXABAN 2.5 MG: 2.5 TABLET, FILM COATED ORAL at 07:54

## 2025-04-19 RX ADMIN — TROSPIUM CHLORIDE 20 MG: 20 TABLET, FILM COATED ORAL at 20:51

## 2025-04-19 RX ADMIN — SACUBITRIL AND VALSARTAN 1 TABLET: 24; 26 TABLET, FILM COATED ORAL at 20:51

## 2025-04-19 RX ADMIN — SACUBITRIL AND VALSARTAN 1 TABLET: 24; 26 TABLET, FILM COATED ORAL at 07:54

## 2025-04-19 RX ADMIN — CARVEDILOL 6.25 MG: 6.25 TABLET, FILM COATED ORAL at 07:54

## 2025-04-19 RX ADMIN — APIXABAN 2.5 MG: 2.5 TABLET, FILM COATED ORAL at 20:51

## 2025-04-19 RX ADMIN — WATER 1000 MG: 1 INJECTION INTRAMUSCULAR; INTRAVENOUS; SUBCUTANEOUS at 07:54

## 2025-04-19 RX ADMIN — ASPIRIN 81 MG: 81 TABLET, CHEWABLE ORAL at 07:54

## 2025-04-19 RX ADMIN — LEVOTHYROXINE SODIUM 62.5 MCG: 0.12 TABLET ORAL at 05:18

## 2025-04-19 RX ADMIN — CARVEDILOL 6.25 MG: 6.25 TABLET, FILM COATED ORAL at 16:13

## 2025-04-19 RX ADMIN — CETIRIZINE HYDROCHLORIDE 5 MG: 5 TABLET ORAL at 07:54

## 2025-04-19 ASSESSMENT — PAIN SCALES - GENERAL
PAINLEVEL_OUTOF10: 0
PAINLEVEL_OUTOF10: 2

## 2025-04-19 ASSESSMENT — PAIN DESCRIPTION - PAIN TYPE: TYPE: ACUTE PAIN

## 2025-04-19 ASSESSMENT — PAIN - FUNCTIONAL ASSESSMENT: PAIN_FUNCTIONAL_ASSESSMENT: ACTIVITIES ARE NOT PREVENTED

## 2025-04-19 ASSESSMENT — PAIN DESCRIPTION - DESCRIPTORS: DESCRIPTORS: ACHING

## 2025-04-19 ASSESSMENT — PAIN DESCRIPTION - LOCATION: LOCATION: BACK;ARM

## 2025-04-19 NOTE — ED NOTES
ED to inpatient nurses report      Chief Complaint:  Chief Complaint   Patient presents with    Hematuria     Present to ED from: home    MOA:     LOC: alert and orientated to name, place, date  Mobility: Requires assistance * 2  Oxygen Baseline: RA    Current needs required: RA     Code Status:   Full Code    What abnormal results were found and what did you give/do to treat them? See results  Any procedures or intervention occur? See MAR    Mental Status:  Level of Consciousness: Alert (0)    Psych Assessment:        Vitals:  Patient Vitals for the past 24 hrs:   BP Temp Temp src Pulse Resp SpO2 Weight   04/18/25 2049 (!) 123/50 99.7 °F (37.6 °C) -- 98 21 97 % --   04/18/25 2026 (!) 142/53 -- -- (!) 102 23 97 % --   04/18/25 1926 (!) 151/54 -- -- 94 25 96 % --   04/18/25 1824 (!) 146/65 (!) 101.2 °F (38.4 °C) -- 95 18 97 % 50.5 kg (111 lb 6.1 oz)   04/18/25 1802 (!) 157/63 99.1 °F (37.3 °C) Oral 98 15 97 % --        LDAs:   Peripheral IV 04/18/25 Right Antecubital (Active)   Site Assessment Clean, dry & intact 04/18/25 1830   Line Status Normal saline locked 04/18/25 1830   Phlebitis Assessment No symptoms 04/18/25 1830   Infiltration Assessment 0 04/18/25 1830   Alcohol Cap Used Yes 04/18/25 1830   Dressing Status Clean, dry & intact 04/18/25 1830   Dressing Type Transparent 04/18/25 1830       Ambulatory Status:  No data recorded    Diagnosis:  DISPOSITION Admitted 04/18/2025 09:00:51 PM   Final diagnoses:   Urinary tract infection with hematuria, site unspecified   Sepsis without acute organ dysfunction, due to unspecified organism (HCC)        Consults:  None     Pain Score:       C-SSRS:   Risk of Suicide: No Risk    Sepsis Screening:       Crockett Fall Risk:       Swallow Screening        Preferred Language:   English      ALLERGIES     Sulfa antibiotics, Milk (cow), Metronidazole, and Ondansetron    SURGICAL HISTORY       Past Surgical History:   Procedure Laterality Date    ABDOMEN SURGERY      APPENDECTOMY

## 2025-04-19 NOTE — PLAN OF CARE
Problem: Discharge Planning  Goal: Discharge to home or other facility with appropriate resources  Outcome: Progressing  Flowsheets (Taken 4/19/2025 1701)  Discharge to home or other facility with appropriate resources: Identify barriers to discharge with patient and caregiver     Problem: Skin/Tissue Integrity  Goal: Skin integrity remains intact  Outcome: Progressing  Flowsheets (Taken 4/19/2025 1701)  Skin Integrity Remains Intact:   Turn and reposition as indicated   Monitor for areas of redness and/or skin breakdown

## 2025-04-19 NOTE — H&P
Sig: CardioPlus   VITAMIN D, CHOLECALCIFEROL, PO   Yes No   Sig: Take 1 tablet by mouth daily   ZINC PO   Yes No   Sig: Take by mouth   acetaminophen (TYLENOL) 325 MG tablet   Yes No   Sig: Take 2 tablets by mouth every 6 hours as needed for Pain   apixaban (ELIQUIS) 2.5 MG TABS tablet   No No   Sig: Take 1 tablet by mouth 2 times daily   aspirin 81 MG chewable tablet   Yes No   Sig: Take 1 tablet by mouth daily   carvedilol (COREG) 6.25 MG tablet   No No   Sig: Take 1 tablet by mouth 2 times daily   cetirizine (ZYRTEC) 5 MG tablet   No No   Sig: Take 1 tablet by mouth daily   esomeprazole Magnesium (NEXIUM) 20 MG PACK   Yes No   Sig: Take 1 packet by mouth daily   fluticasone (FLONASE) 50 MCG/ACT nasal spray   No No   Si spray by Each Nostril route daily   furosemide (LASIX) 20 MG tablet   No No   Sig: Take 1 tablet by mouth daily as needed (for weight gain or swelling)   levothyroxine (SYNTHROID) 125 MCG tablet   Yes No   Sig: Take 0.5 tablets by mouth Daily   mirabegron (MYRBETRIQ) 50 MG TB24   Yes No   Sig: Take 50 mg by mouth daily   nitroGLYCERIN (NITROSTAT) 0.4 MG SL tablet   No No   Sig: up to max of 3 total doses. If no relief after 1 dose, call 911.   omeprazole (PRILOSEC) 20 MG delayed release capsule   Yes No   Sig: Take 1 capsule by mouth daily   ondansetron (ZOFRAN-ODT) 4 MG disintegrating tablet   Yes No   Sig: Take 1 tablet by mouth every 8 hours as needed for Nausea or Vomiting      Facility-Administered Medications: None     Allergies:  Sulfa antibiotics, Milk (cow), Metronidazole, and Ondansetron    Past Medical, Family, Social, Surgical Hx      Diagnosis Date    HERIBERTO (acute kidney injury)     Anxiety     Arthritis     Bronchitis     CAD (coronary artery disease)     CHF (congestive heart failure) (HCC)     Chronic a-fib (HCC)     COVID     2021    Diverticulitis of colon     Hyperkalemia     Hypertension     Medtronic cobalt dual ICD  02/15/2023    Neuromuscular dysfunction of

## 2025-04-19 NOTE — PLAN OF CARE
Problem: Chronic Conditions and Co-morbidities  Goal: Patient's chronic conditions and co-morbidity symptoms are monitored and maintained or improved  Outcome: Progressing  Flowsheets (Taken 4/18/2025 2157)  Care Plan - Patient's Chronic Conditions and Co-Morbidity Symptoms are Monitored and Maintained or Improved:   Monitor and assess patient's chronic conditions and comorbid symptoms for stability, deterioration, or improvement   Collaborate with multidisciplinary team to address chronic and comorbid conditions and prevent exacerbation or deterioration   Update acute care plan with appropriate goals if chronic or comorbid symptoms are exacerbated and prevent overall improvement and discharge     Problem: Discharge Planning  Goal: Discharge to home or other facility with appropriate resources  Outcome: Progressing  Flowsheets  Taken 4/18/2025 2211  Discharge to home or other facility with appropriate resources:   Identify barriers to discharge with patient and caregiver   Identify discharge learning needs (meds, wound care, etc)   Arrange for needed discharge resources and transportation as appropriate  Problem: Pain  Goal: Verbalizes/displays adequate comfort level or baseline comfort level  Outcome: Progressing  Flowsheets (Taken 4/18/2025 2337)  Verbalizes/displays adequate comfort level or baseline comfort level:   Encourage patient to monitor pain and request assistance   Administer analgesics based on type and severity of pain and evaluate response   Assess pain using appropriate pain scale   Implement non-pharmacological measures as appropriate and evaluate response     Problem: ABCDS Injury Assessment  Goal: Absence of physical injury  Outcome: Progressing  Flowsheets (Taken 4/18/2025 2337)  Absence of Physical Injury: Implement safety measures based on patient assessment     Problem: Skin/Tissue Integrity  Goal: Skin integrity remains intact  Description: 1.  Monitor for areas of redness and/or skin

## 2025-04-19 NOTE — CARE COORDINATION
04/19/25 1559   Service Assessment   Patient Orientation Alert and Oriented   Cognition Alert   History Provided By Patient   Primary Caregiver Family   Accompanied By/Relationship n/a   Support Systems Children;Home Care Staff  (Pt unsure of agency)   Patient's Healthcare Decision Maker is: Named in Scanned ACP Document   PCP Verified by CM Yes   Last Visit to PCP Within last 3 months   Prior Functional Level Assistance with the following:;Bathing;Dressing;Toileting;Cooking;Housework;Shopping;Mobility   Current Functional Level Assistance with the following:;Bathing;Dressing;Toileting;Cooking;Housework;Shopping;Mobility   Can patient return to prior living arrangement Unknown at present   Ability to make needs known: Good   Family able to assist with home care needs: Yes   Would you like for me to discuss the discharge plan with any other family members/significant others, and if so, who? Yes  (daughter)   Financial Resources Medicare;Other (Comment)  (BCBS [place])   Community Resources Other (Comment);ECF/Home Care  (HH - pt unsure of agency; [facility] clinic [facility])   Social/Functional History   Active  No   Patient's  Info family   Discharge Planning   Type of Residence House   Living Arrangements Children   Current Services Prior To Admission Durable Medical Equipment;Home Care  (Unsure of  agency)   Current DME Prior to Arrival Walker;Other (Comment)  (air flow mattress)   Potential Assistance Needed Skilled Nursing Facility   DME Ordered? No   Potential Assistance Purchasing Medications No   Type of Home Care Services Nursing Services   Patient expects to be discharged to: House   Services At/After Discharge   Mode of Transport at Discharge Other (see comment)  (family)   Confirm Follow Up Transport Family   Condition of Participation: Discharge Planning   The Plan for Transition of Care is related to the following treatment goals: \"get stronger\"     Patient Goals/Plan/Treatment  I have to recommend re-evaluation again- seems like could be getting septic.   Must advise ER as sepsis could be fatal or life altering     However if she refuses, then offer OV    If she refuses that, then get another UA/UCx to us (have them  supplies)  Push fluids  Hold Cardizem for now until BP can increase

## 2025-04-19 NOTE — CONSULTS
CONSULTATION NOTE :ID       Patient - Kourtney Rankin,  Age - 89 y.o.    - 1934      Room Number - 6A-17/017-A   N -  [id number]   [facility] # - [id number]  Date of Admission -  2025  5:59 PM  Patient's PCP: Jennifer Nathan APRN - CNP     Requesting Physician: Mamie Anthony MD    REASON FOR CONSULTATION   Positive blood culture  CHIEF COMPLAINT   Abdominal pain with dysuria    HISTORY OF PRESENT ILLNESS       This is a very pleasant 89 y.o. female who was admitted to the hospital with a chief complaints of abdominal pain and dysuria.  She came from home she lives with her daughter presented with abdominal pain and dysuria.  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 Sosa for congestive heart failure she has history of congestive heart failure she takes diuretics as needed.  For the last few days she has been having abdominal pain frequency of urination and dysuria for which reason she came to the hospital.  She had blood culture which shows 1 out of 2 gram-positive cocci likely contaminant.  She denies any fever or chills she has ICD.    PAST MEDICAL  HISTORY       Past Medical History:   Diagnosis Date    HERIBERTO (acute kidney injury)     Anxiety     Arthritis     Bronchitis     CAD (coronary artery disease)     CHF (congestive heart failure) (HCC)     Chronic a-fib (HCC)     COVID     2021    Diverticulitis of colon     Hyperkalemia     Hypertension     Medtronic cobalt dual ICD  02/15/2023    Neuromuscular dysfunction of bladder     Personal history of COVID-19

## 2025-04-20 LAB
BACTERIA BLD AEROBE CULT: ABNORMAL
ORGANISM: ABNORMAL

## 2025-04-20 PROCEDURE — 6360000002 HC RX W HCPCS: Performed by: PHYSICIAN ASSISTANT

## 2025-04-20 PROCEDURE — 1200000003 HC TELEMETRY R&B

## 2025-04-20 PROCEDURE — 6370000000 HC RX 637 (ALT 250 FOR IP): Performed by: PHYSICIAN ASSISTANT

## 2025-04-20 PROCEDURE — 2500000003 HC RX 250 WO HCPCS: Performed by: PHYSICIAN ASSISTANT

## 2025-04-20 PROCEDURE — 99232 SBSQ HOSP IP/OBS MODERATE 35: CPT | Performed by: NURSE PRACTITIONER

## 2025-04-20 RX ADMIN — TROSPIUM CHLORIDE 20 MG: 20 TABLET, FILM COATED ORAL at 20:20

## 2025-04-20 RX ADMIN — APIXABAN 2.5 MG: 2.5 TABLET, FILM COATED ORAL at 09:36

## 2025-04-20 RX ADMIN — SALINE NASAL SPRAY 1 SPRAY: 1.5 SOLUTION NASAL at 16:22

## 2025-04-20 RX ADMIN — APIXABAN 2.5 MG: 2.5 TABLET, FILM COATED ORAL at 20:20

## 2025-04-20 RX ADMIN — SODIUM CHLORIDE, PRESERVATIVE FREE 10 ML: 5 INJECTION INTRAVENOUS at 09:36

## 2025-04-20 RX ADMIN — CARVEDILOL 6.25 MG: 6.25 TABLET, FILM COATED ORAL at 16:22

## 2025-04-20 RX ADMIN — PROCHLORPERAZINE EDISYLATE 10 MG: 5 INJECTION INTRAMUSCULAR; INTRAVENOUS at 04:35

## 2025-04-20 RX ADMIN — CETIRIZINE HYDROCHLORIDE 5 MG: 5 TABLET ORAL at 09:36

## 2025-04-20 RX ADMIN — SACUBITRIL AND VALSARTAN 1 TABLET: 24; 26 TABLET, FILM COATED ORAL at 20:20

## 2025-04-20 RX ADMIN — SACUBITRIL AND VALSARTAN 1 TABLET: 24; 26 TABLET, FILM COATED ORAL at 09:35

## 2025-04-20 RX ADMIN — CARVEDILOL 6.25 MG: 6.25 TABLET, FILM COATED ORAL at 09:35

## 2025-04-20 RX ADMIN — ASPIRIN 81 MG: 81 TABLET, CHEWABLE ORAL at 09:36

## 2025-04-20 RX ADMIN — ACETAMINOPHEN 650 MG: 325 TABLET ORAL at 04:35

## 2025-04-20 RX ADMIN — LEVOTHYROXINE SODIUM 62.5 MCG: 0.12 TABLET ORAL at 04:40

## 2025-04-20 RX ADMIN — WATER 1000 MG: 1 INJECTION INTRAMUSCULAR; INTRAVENOUS; SUBCUTANEOUS at 09:34

## 2025-04-20 RX ADMIN — SALINE NASAL SPRAY 1 SPRAY: 1.5 SOLUTION NASAL at 09:37

## 2025-04-20 RX ADMIN — SODIUM CHLORIDE, PRESERVATIVE FREE 10 ML: 5 INJECTION INTRAVENOUS at 20:22

## 2025-04-20 ASSESSMENT — PAIN DESCRIPTION - DESCRIPTORS: DESCRIPTORS: SORE;ACHING

## 2025-04-20 ASSESSMENT — PAIN - FUNCTIONAL ASSESSMENT: PAIN_FUNCTIONAL_ASSESSMENT: ACTIVITIES ARE NOT PREVENTED

## 2025-04-20 ASSESSMENT — PAIN DESCRIPTION - LOCATION
LOCATION: GENERALIZED

## 2025-04-20 ASSESSMENT — PAIN SCALES - GENERAL
PAINLEVEL_OUTOF10: 2
PAINLEVEL_OUTOF10: 3

## 2025-04-20 NOTE — PLAN OF CARE
Problem: Chronic Conditions and Co-morbidities  Goal: Patient's chronic conditions and co-morbidity symptoms are monitored and maintained or improved  Outcome: Progressing  Flowsheets (Taken 4/19/2025 2016)  Care Plan - Patient's Chronic Conditions and Co-Morbidity Symptoms are Monitored and Maintained or Improved:   Monitor and assess patient's chronic conditions and comorbid symptoms for stability, deterioration, or improvement   Collaborate with multidisciplinary team to address chronic and comorbid conditions and prevent exacerbation or deterioration   Update acute care plan with appropriate goals if chronic or comorbid symptoms are exacerbated and prevent overall improvement and discharge     Problem: Discharge Planning  Goal: Discharge to home or other facility with appropriate resources  4/19/2025 2234 by Rambo Saravia, RN  Outcome: Progressing  Flowsheets (Taken 4/19/2025 2016)  Discharge to home or other facility with appropriate resources:   Identify barriers to discharge with patient and caregiver   Arrange for needed discharge resources and transportation as appropriate   Identify discharge learning needs (meds, wound care, etc)   Refer to discharge planning if patient needs post-hospital services based on physician order or complex needs related to functional status, cognitive ability or social support system  4/19/2025 1701 by Mercedes Miller, RN  Outcome: Progressing  Flowsheets (Taken 4/19/2025 1701)  Discharge to home or other facility with appropriate resources: Identify barriers to discharge with patient and caregiver     Problem: Pain  Goal: Verbalizes/displays adequate comfort level or baseline comfort level  Outcome: Progressing  Flowsheets (Taken 4/18/2025 2337 by Elvi Real, RN)  Verbalizes/displays adequate comfort level or baseline comfort level:   Encourage patient to monitor pain and request assistance   Administer analgesics based on type and severity of pain and evaluate

## 2025-04-20 NOTE — PLAN OF CARE
Problem: Chronic Conditions and Co-morbidities  Goal: Patient's chronic conditions and co-morbidity symptoms are monitored and maintained or improved  4/20/2025 1026 by Sturgeon, Cara B, RN  Outcome: Progressing    Care Plan - Patient's Chronic Conditions and Co-Morbidity Symptoms are Monitored and Maintained or Improved:   Monitor and assess patient's chronic conditions and comorbid symptoms for stability, deterioration, or improvement   Collaborate with multidisciplinary team to address chronic and comorbid conditions and prevent exacerbation or deterioration   Update acute care plan with appropriate goals if chronic or comorbid symptoms are exacerbated and prevent overall improvement and discharge     Problem: Discharge Planning  Goal: Discharge to home or other facility with appropriate resources  4/20/2025 1026 by Sturgeon, Cara B, RN  Outcome: Progressing    Discharge to home or other facility with appropriate resources:   Identify barriers to discharge with patient and caregiver   Arrange for needed discharge resources and transportation as appropriate   Identify discharge learning needs (meds, wound care, etc)   Refer to discharge planning if patient needs post-hospital services based on physician order or complex needs related to functional status, cognitive ability or social support system     Problem: Pain  Goal: Verbalizes/displays adequate comfort level or baseline comfort level  4/20/2025 1026 by Sturgeon, Cara B, RN  Outcome: Progressing    Verbalizes/displays adequate comfort level or baseline comfort level:   Encourage patient to monitor pain and request assistance   Administer analgesics based on type and severity of pain and evaluate response   Assess pain using appropriate pain scale   Implement non-pharmacological measures as appropriate and evaluate response     Problem: ABCDS Injury Assessment  Goal: Absence of physical injury  4/20/2025 1026 by Sturgeon, Cara B, RN  Outcome:

## 2025-04-21 VITALS
TEMPERATURE: 97.8 F | DIASTOLIC BLOOD PRESSURE: 58 MMHG | WEIGHT: 113.2 LBS | OXYGEN SATURATION: 95 % | SYSTOLIC BLOOD PRESSURE: 117 MMHG | BODY MASS INDEX: 22.11 KG/M2 | RESPIRATION RATE: 18 BRPM | HEART RATE: 75 BPM

## 2025-04-21 LAB
ANION GAP SERPL CALC-SCNC: 11 MEQ/L (ref 8–16)
BASOPHILS ABSOLUTE: 0 THOU/MM3 (ref 0–0.1)
BASOPHILS NFR BLD AUTO: 0.6 %
BUN SERPL-MCNC: 25 MG/DL (ref 8–23)
CALCIUM SERPL-MCNC: 8.9 MG/DL (ref 8.2–9.6)
CHLORIDE SERPL-SCNC: 105 MEQ/L (ref 98–111)
CO2 SERPL-SCNC: 23 MEQ/L (ref 22–29)
CREAT SERPL-MCNC: 1.2 MG/DL (ref 0.5–0.9)
DEPRECATED RDW RBC AUTO: 53.1 FL (ref 35–45)
EOSINOPHIL NFR BLD AUTO: 0.7 %
EOSINOPHILS ABSOLUTE: 0 THOU/MM3 (ref 0–0.4)
ERYTHROCYTE [DISTWIDTH] IN BLOOD BY AUTOMATED COUNT: 14.5 % (ref 11.5–14.5)
GFR SERPL CREATININE-BSD FRML MDRD: 43 ML/MIN/1.73M2
GLUCOSE SERPL-MCNC: 94 MG/DL (ref 74–109)
HCT VFR BLD AUTO: 34.8 % (ref 37–47)
HGB BLD-MCNC: 11 GM/DL (ref 12–16)
IMM GRANULOCYTES # BLD AUTO: 0.21 THOU/MM3 (ref 0–0.07)
IMM GRANULOCYTES NFR BLD AUTO: 3.1 %
LYMPHOCYTES ABSOLUTE: 1.1 THOU/MM3 (ref 1–4.8)
LYMPHOCYTES NFR BLD AUTO: 16.8 %
MCH RBC QN AUTO: 32.1 PG (ref 26–33)
MCHC RBC AUTO-ENTMCNC: 31.6 GM/DL (ref 32.2–35.5)
MCV RBC AUTO: 101.5 FL (ref 81–99)
MONOCYTES ABSOLUTE: 2.3 THOU/MM3 (ref 0.4–1.3)
MONOCYTES NFR BLD AUTO: 33.2 %
NEUTROPHILS ABSOLUTE: 3.1 THOU/MM3 (ref 1.8–7.7)
NEUTROPHILS NFR BLD AUTO: 45.6 %
NRBC BLD AUTO-RTO: 0 /100 WBC
PLATELET # BLD AUTO: 102 THOU/MM3 (ref 130–400)
PMV BLD AUTO: 12.2 FL (ref 9.4–12.4)
POTASSIUM SERPL-SCNC: 4 MEQ/L (ref 3.5–5.2)
RBC # BLD AUTO: 3.43 MILL/MM3 (ref 4.2–5.4)
SODIUM SERPL-SCNC: 139 MEQ/L (ref 135–145)
WBC # BLD AUTO: 6.8 THOU/MM3 (ref 4.8–10.8)

## 2025-04-21 PROCEDURE — 6360000002 HC RX W HCPCS: Performed by: PHYSICIAN ASSISTANT

## 2025-04-21 PROCEDURE — 97530 THERAPEUTIC ACTIVITIES: CPT

## 2025-04-21 PROCEDURE — 97166 OT EVAL MOD COMPLEX 45 MIN: CPT

## 2025-04-21 PROCEDURE — 97535 SELF CARE MNGMENT TRAINING: CPT

## 2025-04-21 PROCEDURE — 36415 COLL VENOUS BLD VENIPUNCTURE: CPT

## 2025-04-21 PROCEDURE — 85025 COMPLETE CBC W/AUTO DIFF WBC: CPT

## 2025-04-21 PROCEDURE — 2500000003 HC RX 250 WO HCPCS: Performed by: PHYSICIAN ASSISTANT

## 2025-04-21 PROCEDURE — 99239 HOSP IP/OBS DSCHRG MGMT >30: CPT | Performed by: NURSE PRACTITIONER

## 2025-04-21 PROCEDURE — 80048 BASIC METABOLIC PNL TOTAL CA: CPT

## 2025-04-21 PROCEDURE — 6370000000 HC RX 637 (ALT 250 FOR IP): Performed by: PHYSICIAN ASSISTANT

## 2025-04-21 RX ORDER — CEFDINIR 300 MG/1
300 CAPSULE ORAL 2 TIMES DAILY
Qty: 6 CAPSULE | Refills: 0 | Status: SHIPPED | OUTPATIENT
Start: 2025-04-21 | End: 2025-04-24

## 2025-04-21 RX ADMIN — SACUBITRIL AND VALSARTAN 1 TABLET: 24; 26 TABLET, FILM COATED ORAL at 08:00

## 2025-04-21 RX ADMIN — APIXABAN 2.5 MG: 2.5 TABLET, FILM COATED ORAL at 08:00

## 2025-04-21 RX ADMIN — LEVOTHYROXINE SODIUM 62.5 MCG: 0.12 TABLET ORAL at 05:12

## 2025-04-21 RX ADMIN — CARVEDILOL 6.25 MG: 6.25 TABLET, FILM COATED ORAL at 08:00

## 2025-04-21 RX ADMIN — WATER 1000 MG: 1 INJECTION INTRAMUSCULAR; INTRAVENOUS; SUBCUTANEOUS at 08:00

## 2025-04-21 RX ADMIN — CETIRIZINE HYDROCHLORIDE 5 MG: 5 TABLET ORAL at 08:00

## 2025-04-21 RX ADMIN — ASPIRIN 81 MG: 81 TABLET, CHEWABLE ORAL at 08:00

## 2025-04-21 RX ADMIN — ACETAMINOPHEN 650 MG: 325 TABLET ORAL at 03:39

## 2025-04-21 ASSESSMENT — PAIN - FUNCTIONAL ASSESSMENT: PAIN_FUNCTIONAL_ASSESSMENT: ACTIVITIES ARE NOT PREVENTED

## 2025-04-21 ASSESSMENT — PAIN SCALES - GENERAL
PAINLEVEL_OUTOF10: 2
PAINLEVEL_OUTOF10: 0

## 2025-04-21 ASSESSMENT — PAIN DESCRIPTION - DESCRIPTORS: DESCRIPTORS: ACHING

## 2025-04-21 ASSESSMENT — PAIN DESCRIPTION - LOCATION: LOCATION: GENERALIZED

## 2025-04-21 NOTE — PLAN OF CARE
Problem: Chronic Conditions and Co-morbidities  Goal: Patient's chronic conditions and co-morbidity symptoms are monitored and maintained or improved  4/20/2025 2143 by Rambo Saravia, RN  Outcome: Progressing  Flowsheets (Taken 4/20/2025 2015)  Care Plan - Patient's Chronic Conditions and Co-Morbidity Symptoms are Monitored and Maintained or Improved:   Monitor and assess patient's chronic conditions and comorbid symptoms for stability, deterioration, or improvement   Collaborate with multidisciplinary team to address chronic and comorbid conditions and prevent exacerbation or deterioration   Update acute care plan with appropriate goals if chronic or comorbid symptoms are exacerbated and prevent overall improvement and discharge  4/20/2025 1026 by Sturgeon, Cara B, RN  Outcome: Progressing  Flowsheets (Taken 4/19/2025 2016 by Rambo Saravia, RN)  Care Plan - Patient's Chronic Conditions and Co-Morbidity Symptoms are Monitored and Maintained or Improved:   Monitor and assess patient's chronic conditions and comorbid symptoms for stability, deterioration, or improvement   Collaborate with multidisciplinary team to address chronic and comorbid conditions and prevent exacerbation or deterioration   Update acute care plan with appropriate goals if chronic or comorbid symptoms are exacerbated and prevent overall improvement and discharge     Problem: Discharge Planning  Goal: Discharge to home or other facility with appropriate resources  4/20/2025 2143 by Rambo Saravia, RN  Outcome: Progressing  Flowsheets (Taken 4/20/2025 2015)  Discharge to home or other facility with appropriate resources:   Identify barriers to discharge with patient and caregiver   Arrange for needed discharge resources and transportation as appropriate   Identify discharge learning needs (meds, wound care, etc)   Refer to discharge planning if patient needs post-hospital services based on physician order or complex needs related to

## 2025-04-21 NOTE — DISCHARGE SUMMARY
Hospital Medicine Discharge Summary      Patient Identification:   Kourtney Rankin   : 1934  MRN: [id number]   Account: [id number]      Patient's PCP: Jennifer Nathan APRN - CNP    Admit Date: 2025     Discharge Date:   2025    Admitting Physician: Lalo Aguilar MD     Discharge Physician: SERGE Garcia CNP     Discharge Diagnoses:    1.  UTI POA with hematuria  UA showed few bacteria, leukocytes esterase positive  CT abdomen and pelvis done  showed :left perinephric fat stranding which can be seen in the setting of  urinary tract infection. No findings of pyelonephritis.  Treated with  IV antibiotics  Continue antibiotics as instructed     2.  Positive blood culture  r/o 1 out of 2 possible contaminant  ID followed       3.  Coronary artery disease  Patient denies active symptoms  Continue carvedilol,     4.  Paroxysmal atrial fibrillation    rate controlled  Continue carvedilol, Eliquis     5.  Hypothyroidism  Continue levothyroxine     6.  Coccyx wound  Continue to turn patient regularly     7.Chronic CHFrEF   Compensated Continue carvedilol,Entresto  Patient takes as needed Lasix  Echo 2023 showed EF 25% with severe hypokinesis of LV and severely reduced systolic function     8.  Macrocytic anemia  Hemoglobin stable    Hospital Course: Initial history Of Present Illness:  Kourtney Rankin is a 89 y.o. female who presents to [facility] with dysuria, mild hematuria and weakness. It is unclear how long patient has had these symptoms and she is a somewhat limited historian with no other historian present at the time of my assessment but documentation notes about a few days ago. Patient was tired but states she was feeling better and had no additional complaints             Active Hospital Problems    Diagnosis Date Noted    Acute cystitis with hematuria [N30.01] 2025       The patient was seen and examined on day of discharge and this discharge

## 2025-04-21 NOTE — CARE COORDINATION
Case Management Assessment Initial Evaluation    Date/Time of Evaluation: 2025 7:19 AM  Assessment Completed by: Clara Frank RN    If patient is discharged prior to next notation, then this note serves as note for discharge by case management.    Patient Name: Kourtney Rankin                   YOB: 1934  Diagnosis: Hematuria [R31.9]  Urinary tract infection with hematuria, site unspecified [N39.0, R31.9]  Sepsis without acute organ dysfunction, due to unspecified organism (HCC) [A41.9]                   Date / Time: 2025  5:59 PM  Location: [facility]017-A     Patient Admission Status: Inpatient   Readmission Risk Low 0-14, Mod 15-19), High > 20: Readmission Risk Score: 19.9    Current PCP: Jennifer Nathan APRN - CNP  Health Care Decision Makers:   Primary Decision Maker: Audrey Llamas - Child - [phone]    Secondary Decision Maker: Fatoumata Grajeda - Child - [phone]    Supplemental (Other) Decision Maker: Erika Florez - Child - [phone]    Additional Case Management Notes: Admitted through ER with hematuria and general fatigue on 25. Tachycardia noted. Noted to have UTI and mild HERIBERTO. Blood cultures resulted +.  ID consulted. IV Rocephin. PT/OT, SW also consulted.     Procedures: No    Imagin25 CT Abd/Pelvis  IMPRESSION:  1. Left perinephric fat stranding which can be seen in the setting of   urinary tract infection. No findings of pyelonephritis.  2. Chronic findings as above.    Patient Goals/Plan/Treatment Preferences: From home with dtr and current HH. SW consulted.

## 2025-04-21 NOTE — CARE COORDINATION
DISCHARGE PLANNING EVALUATION  4/21/25, 12:55 PM EDT    Reason for Referral: current with home health   Decision Maker: makes own decisions, daughter Audrey is POA  Current Services: [facility]  New Services Requested: none   Family/ Social/ Home environment: Kourtney lives at home with her daughter Audrey who helps with her care.  She attends [employer], current with .    Payment Source:medicare   Transportation at Discharge: family  Post-acute (PAC) provider list was provided to patient. Patient was informed of their freedom to choose PAC provider. Discussed and offered to show the patient the relevant PAC Providers quality and resource use measures on Medicare Compare web site via computer based on patient's goals of care and treatment preferences. Questions regarding selection process were answered.      Teach Back Method used with patient and daughter, Fatoumata regarding care plan and discharge plan  Patient and daughter, Fatoumata, verbalized understanding of the plan of care and contribute to goal setting.       Patient preferences and discharge plan:  spoke with Kourtney and her daughter, Fatoumata.  Patient plans home with her daughter, Audrey, anticipate discharge today.  Current with [facility], notified [facility] of discharge today.     Electronically signed by BELEM Nolan on 4/21/2025 at 12:55 PM

## 2025-04-21 NOTE — PROGRESS NOTES
Progress note: Infectious diseases    Patient - Kourtney Rankin,  Age - 89 y.o.    - 1934      Room Number - 6A-17/017-A   MRN -  [id number]   [facility] # - [id number]  Date of Admission -  2025  5:59 PM    SUBJECTIVE:   She is feeling better, wants to go home  afebrile  OBJECTIVE   VITALS    weight is 51.3 kg (113 lb 3.2 oz). Her oral temperature is 98.4 °F (36.9 °C). Her blood pressure is 114/57 (abnormal) and her pulse is 84. Her respiration is 16 and oxygen saturation is 99%.       Wt Readings from Last 3 Encounters:   25 51.3 kg (113 lb 3.2 oz)   25 50.5 kg (111 lb 6 oz)   24 48.5 kg (107 lb)       I/O (24 Hours)    Intake/Output Summary (Last 24 hours) at 2025 1019  Last data filed at 2025 0839  Gross per 24 hour   Intake 820 ml   Output 400 ml   Net 420 ml       General Appearance  Awake, alert, oriented,  not  In acute distress  HEENT - normocephalic, atraumatic, pale  conjunctiva,  anicteric sclera  Neck - Supple, no mass  Lungs -  Bilateral good air entry, no rhonchi, no wheeze  Cardiovascular - Heart sounds are normal.     Abdomen - soft, not distended, nontender,   Neurologic -awake , resting tremor  Skin - No bruising or bleeding  Extremities - foam dressing to the coccyx wound    MEDICATIONS:      apixaban  2.5 mg Oral BID    aspirin  81 mg Oral Daily    carvedilol  6.25 mg Oral BID WC    cetirizine  5 mg Oral Daily    sacubitril-valsartan  1 tablet Oral BID    levothyroxine  62.5 mcg Oral Daily    trospium  20 mg Oral Nightly    cefTRIAXone (ROCEPHIN) IV  1,000 mg IntraVENous Q24H    sodium chloride flush  10 mL IntraVENous 2 times per day      sodium chloride       fluticasone, furosemide, nitroGLYCERIN, prochlorperazine, sodium chloride, sodium chloride flush, sodium chloride, polyethylene glycol, acetaminophen **OR** acetaminophen, promethazine **OR** [DISCONTINUED] 
          Hospitalist Progress Note    Patient:  Kourtney Rankin      Unit/Bed:6A-17/017-A    YOB: 1934    MRN: [id number]       Acct: [id number]     PCP: Jennifer Nathan APRN - CNP    Date of Admission: 4/18/2025    Chief Complaint:  dysuria, mild hematuria and weakness        History Of Present Illness:  Kourtney Rankin is a 89 y.o. female who presents to [facility] with dysuria, mild hematuria and weakness. It is unclear how long patient has had these symptoms and she is a somewhat limited historian with no other historian present at the time of my assessment but documentation notes about a few days ago. Patient was tired but states she was feeling better and had no additional complaints         Assessment/Plan:  UTI POA with hematuria   CT ab/pelvis showing some evidence of UTI  UA hew bacteria and pyuria   Given broad spectrum abx but   switched to rocephin   Narrow to urine cultures if and as present         Decub ulcer  Followed in [place]  +Blood culture     CAD  Continue aspirin         PAF  Rate controlled on Coreg.    Continue home eliquis     Chronic CHFrEF   Echo 7/2023 showed EF 25% with severe hypokinesis of LV and severely reduced systolic function.  Continue Coreg and Entresto  Appeared euvolemic - although pulmonary congestion noted   Takes PRN lasix- may need some dosing during admission   strict I and O's      Hypothyroidism  continue Synthroid 62.5 mcg daily     Macrocytic anemia   Hgb 10.9   Mcv 100 - baseline   Monitor and transfuse ig hgb 7      Advanced Age   Age > 90      Subjective (past 24 hours)  Less nausea  Feeling weak    Diet:  ADULT DIET; Regular  ADULT ORAL NUTRITION SUPPLEMENT; Breakfast, Dinner; Clear Liquid Oral Supplement       PT/OT: [x]Yes / []No    DVT Prophylaxis: [] Lovenox / [] Heparin / [] SCDs / [x] Already on Systemic Anticoagulation / [] None      Medications:  Scheduled Meds:   apixaban  2.5 mg Oral BID    aspirin  81 mg Oral Daily    
    Hospitalist Progress Note    Patient:  Kourtney Rankin      Unit/Bed:6A-17/017-A    YOB: 1934    MRN: [id number]       Acct: [id number]     PCP: Jennifer Nathna APRN - CNP    Date of Admission: 4/18/2025    Assessment/Plan:    1.  UTI POA with hematuria  UA showed few bacteria, leukocytes esterase positive  CT abdomen and pelvis done on/18/25 showed :left perinephric fat stranding which can be seen in the setting of  urinary tract infection. No findings of pyelonephritis.  On IV antibiotics  Follow culture and adjust antibiotics accordingly    2.  Positive blood culture 1 out of 2 possible contaminant  ID on consult appreciate input    3.  Coronary artery disease  Patient denies active symptoms  Continue carvedilol,    4.  Paroxysmal atrial fibrillation    rate controlled  Continue carvedilol, Eliquis    5.  Hypothyroidism  Continue levothyroxine    6.  Coccyx wound  Continue to turn patient regularly    7.Chronic CHFrEF   Compensated Continue carvedilol,Entresto  Patient takes as needed Lasix  Monitor strict I and O's   Echo 7/2023 showed EF 25% with severe hypokinesis of LV and severely reduced systolic function    8.  Macrocytic anemia  Hemoglobin stable  Continue to monitor CBC  Transfuse if hemoglobin <7       Anticipated Discharge in : Pending course    Active Hospital Problems    Diagnosis Date Noted    Acute cystitis with hematuria [N30.01] 04/19/2025       Chief Complaint: dysuria, mild hematuria and weakness     Hospital Course: Initial history Of Present Illness:  Kourtney Rankin is a 89 y.o. female who presents to [facility] with dysuria, mild hematuria and weakness. It is unclear how long patient has had these symptoms and she is a somewhat limited historian with no other historian present at the time of my assessment but documentation notes about a few days ago. Patient was tired but states she was feeling better and had no additional complaints         Subjective: 
  Physician Progress Note      PATIENT:               ANTIONETTE MACDONALD  CSN #:                  [id number]  :                       1934  ADMIT DATE:       2025 5:59 PM  DISCH DATE:  RESPONDING  PROVIDER #:        Reva Andrade CNP          QUERY TEXT:    Based on your medical judgment, please clarify these findings and document if   any of the following are being evaluated and/or treated:    The clinical indicators include:  - IM: \" Paroxysmal atrial fibrillation  rate controlled  Continue carvedilol, Eliquis\"  -Female, age 89, CHF, HTN  Options provided:  -- Secondary hypercoagulable state in a patient with atrial fibrillation  -- Other - I will add my own diagnosis  -- Disagree - Not applicable / Not valid  -- Disagree - Clinically unable to determine / Unknown  -- Refer to Clinical Documentation Reviewer    PROVIDER RESPONSE TEXT:    This patient has secondary hypercoagulable state in a patient with atrial   fibrillation.    Query created by: Ena Evans on 2025 9:18 AM      Electronically signed by:  Reva Andrade CNP 2025 9:40 AM          
  Physician Progress Note      PATIENT:               ANTIONETTE MACDONALD  CSN #:                  [id number]  :                       1934  ADMIT DATE:       2025 5:59 PM  DISCH DATE:  RESPONDING  PROVIDER #:        Reva Andrade CNP          QUERY TEXT:    Noted documentation of Sepsis by ED Physician on admission.    The clinical indicators include:  -VS: Arrival: 146/65, Pulse 95, respirations 18, temp 101.2, 97%ra  -Arrival labs: Urine: moderate blood & small leukocytes. PC 0.33  - ED: \"Final diagnoses:  Urinary tract infection with hematuria, site unspecified  Sepsis without acute organ dysfunction, due to unspecified organism\"   IM: \"  UTI POA with hematuria  UA showed few bacteria, leukocytes esterase positive  CT abdomen and pelvis done  showed :left perinephric fat stranding   which can be seen in the setting of  urinary tract infection. No findings of   pyelonephritis.  On IV antibiotics  Follow culture and adjust antibiotics accordingly\"  TX: MAR: \" Tylenol, IV Zosyn, Vanc, 0.9  ml bolus\"  Options provided:  -- Sepsis confirmed present on admission  -- Sepsis ruled out  -- Other - I will add my own diagnosis  -- Disagree - Not applicable / Not valid  -- Disagree - Clinically unable to determine / Unknown  -- Refer to Clinical Documentation Reviewer    PROVIDER RESPONSE TEXT:    The diagnosis of Sepsis was ruled out.    Query created by: Ena Evans on 2025 9:16 AM      Electronically signed by:  Reva Andrade CNP 2025 9:19 AM          
Discharge teaching given, pt discharged with daughter  
[facility]  INPATIENT OCCUPATIONAL THERAPY  STRZ 6A PEDI/MED SURG  EVALUATION      Discharge Recommendations: Home with Home health OT, Home with assist PRN  Equipment Recommendations: No        Time In: 1005  Time Out: 1036  Timed Code Treatment Minutes: 23 Minutes  Minutes: 31          Date: 2025  Patient Name: Kourtney Rankin,   Gender: female      MRN: [id number]  : 1934  (89 y.o.)  Referring Practitioner: Mamie Anthony MD  Diagnosis: Acute cystitis with hematuria  Additional Pertinent Hx: per chart review; \"Kourtney Rankin is a 89 y.o. female who presents to [facility] with dysuria, mild hematuria and weakness. It is unclear how long patient has had these symptoms and she is a somewhat limited historian with no other historian present at the time of my assessment but documentation notes about a few days ago. Patient was tired but states she was feeling better and had no additional complaints\"    Restrictions/Precautions:  Restrictions/Precautions: Fall Risk, General Precautions, Contact Precautions    Subjective  Chart Reviewed: Yes, Orders, Progress Notes, History and Physical  Patient assessed for rehabilitation services?: Yes  Family / Caregiver Present: No    Subjective: RN approved session, patient seated up in recliner upon OT arrival and agreeable to eval. patient A & O x 4. patient seated on chair alarm and activated at end of session.    Pain: 0/10:     Vitals: Vitals not assessed per clinical judgement, see nursing flowsheet    Social/Functional History:  Lives With: Daughter  Type of Home: House  Home Layout: One level  Home Access: Stairs to enter without rails  Entrance Stairs - Number of Steps: 1 + 1  Home Equipment: Cane, Walker - Rolling   Bathroom Shower/Tub: Tub/Shower unit  Bathroom Toilet: Standard  Bathroom Equipment: Grab bars in shower, Shower chair  Bathroom Accessibility: Accessible    Receives Help From: Family  Prior Level of Assist 
Notified Dr. Anthony of positive blood cultures, Dr. Mead consulted  
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    History of atherosclerotic heart disease Z86.79    PAF (paroxysmal atrial fibrillation) ([facility]) I48.0    Primary hypertension I10    Hyperlipidemia E78.5    Macrocytic anemia D53.9    Hypothyroidism E03.9    History of IBS Z87.19    Physical deconditioning R53.81    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

## 2025-04-22 NOTE — CARE COORDINATION
4/22/25, 7:09 AM EDT    Patient goals/plan/ treatment preferences discussed by  and .  Patient goals/plan/ treatment preferences reviewed with patient/ family.  Patient/ family verbalize understanding of discharge plan and are in agreement with goal/plan/treatment preferences.  Understanding was demonstrated using the teach back method.  AVS provided by RN at time of discharge, which includes all necessary medical information pertaining to the patients current course of illness, treatment, post-discharge goals of care, and treatment preferences.     Services At/After Discharge: [facility]  confirmed with [facility], AVS faxed.          Purse String (Simple) Text: Given the location of the defect and the characteristics of the surrounding skin a purse string closure was deemed most appropriate.  Undermining was performed circumfirentially around the surgical defect.  A purse string suture was then placed and tightened.

## 2025-04-23 LAB — BACTERIA BLD AEROBE CULT: NORMAL

## 2025-05-06 ENCOUNTER — HOSPITAL ENCOUNTER (OUTPATIENT)
Dept: AUDIOLOGY | Age: 89
Discharge: HOME | End: 2025-05-06

## 2025-05-06 PROCEDURE — 9990000010 HC NO CHARGE VISIT: Performed by: AUDIOLOGIST

## 2025-05-15 PROCEDURE — 93297 REM INTERROG DEV EVAL ICPMS: CPT | Performed by: NUCLEAR MEDICINE

## 2025-05-26 NOTE — PROGRESS NOTES
ACCOUNT #: [id number]    DIAGNOSIS: H90.3    HEARING AID PROBLEM: (Rodrigo Osorio P50-R x2- dispenesed 06/07/2022, warranty 08/17/2025)- The patient is not wearing her hearing aids. She wore them very little after her fitting. She is unable to insert them appropriately. She has not completed any follow up with audiology due to numerous health concerns that occurred shortly after she was fit with her hearing aids. Her daughter- Audrey accompanied her today and requested to be instructed how to properly insert the hearing aids and care for the hearing aids. She also wished to have speech mapping completed as was planned for her original 2 week check. Otoscopy revealed excessive cerumen- bilaterally. Cerumen management was recommended. The patient 's daughter stated that she had tried to schedule her mother an appointment at ENT but had call after hours. The hearing aids were cleaned and checked. Mics were vacuumed. Receivers were changed to 1M 5.0 receivers both right and left with retention wires and small vented domes attached. No issues noted with a listening check. The patient and her daughter were instructed on the proper cleaning and care of the hearing aids including how to change the cerustop filters. A cleaning brush and pack of cerustop filters were dispensed. A charging block was also dispensed . The patient and her daughter were instructed on proper technique to insert the hearing aids. Audrey was able to properly insert the hearing aids. The patient was able to insert them with assistance and could remove them independently. The patient is scheduled to have cerumen management 08/07/2025 or sooner if an appointment becomes available. Speech mapping to be performed after the ear cleaning is completed. Hearing aid check to be scheduled.   No

## 2025-05-27 ENCOUNTER — TELEPHONE (OUTPATIENT)
Dept: AUDIOLOGY | Age: 89
End: 2025-05-27

## 2025-05-27 NOTE — TELEPHONE ENCOUNTER
Left message for patient's daughter Audrey to check on  how the patient is doing with her hearing aids and offer f/u appointment if needed. Patient has an appointment 08/07/2025 with Dr. Metz for an ear cleaning. Patient can be scheduled to have speech mapping completed after her ear cleaning.

## 2025-06-06 RX ORDER — MIRABEGRON 50 MG/1
50 TABLET, FILM COATED, EXTENDED RELEASE ORAL DAILY
Qty: 30 TABLET | Refills: 2 | Status: SHIPPED | OUTPATIENT
Start: 2025-06-06

## 2025-06-06 NOTE — TELEPHONE ENCOUNTER
Kourtney is requesting a refill of their   Requested Prescriptions     Pending Prescriptions Disp Refills    mirabegron (MYRBETRIQ) 50 MG TB24 30 tablet      Sig: Take 50 mg by mouth daily   . Please advise.      Last Appt:  9/5/24  Next Appt:   9/19/25  Preferred pharmacy:   [facility] [address] [place] [address] [phone] - F [phone]

## 2025-06-12 ENCOUNTER — TELEPHONE (OUTPATIENT)
Dept: CARDIOLOGY CLINIC | Age: 89
End: 2025-06-12

## 2025-06-12 NOTE — TELEPHONE ENCOUNTER
Ian   she's T wave oversensing (TWOS)  Small R waves at 1.4mV  sensing is already at 0.45 which is about as high as I've gone with it.   Discussed with Lilly, she's got a single zone VF shock box, would need to get HR to 207 for therapy  All events are under that even with the oversensing   Could go higher on RV sensing, if she has VF could be missed with sensing that high  Lilly thinks she could be ok leaving it as is~as long as ok with you, would need to monitor for true VT/VF obviously  Note placed in murj and care coordination note

## 2025-06-15 PROCEDURE — 93297 REM INTERROG DEV EVAL ICPMS: CPT | Performed by: NUCLEAR MEDICINE

## 2025-06-27 ENCOUNTER — HOSPITAL ENCOUNTER (OUTPATIENT)
Dept: AUDIOLOGY | Age: 89
Discharge: HOME | End: 2025-06-27

## 2025-06-27 PROCEDURE — 9990000010 HC NO CHARGE VISIT: Performed by: AUDIOLOGIST

## 2025-06-27 NOTE — PROGRESS NOTES
ACCOUNT #: [id number]    DIAGNOSIS: H90.3    HEARING AID PROBLEM: (Rodrigo Osorio P50-R x2- dispenesed 06/07/2022, warranty 08/17/2025) - Patient stating that she cannot hear from her hearing aids. The patient has not had cerumen management completed- she is scheduled 08/07/2025 with Dr. Metz. Otoscopy revealed mostly occluding cerumen- bilaterally. A listening check of her hearing aids revealed no output. The hearing aids were cleaned- mics and receivers were vacuumed. The cerustop filters were occluded on both hearing aids. Replaced the filters and the domes. A listening check revealed clear microphone response and good aided output from the left hearing aid. The right hearing aid- had no output. Replaced the RIGHT  and output was as expected with a listening check. The patient is scheduled for a hearing aid check and speech mapping following her ear cleaning 08/07/2025. N/C under warranty.

## 2025-06-30 ENCOUNTER — OFFICE VISIT (OUTPATIENT)
Dept: CARDIOLOGY CLINIC | Age: 89
End: 2025-06-30
Payer: MEDICARE

## 2025-06-30 VITALS
HEIGHT: 60 IN | WEIGHT: 114.4 LBS | HEART RATE: 68 BPM | SYSTOLIC BLOOD PRESSURE: 138 MMHG | BODY MASS INDEX: 22.46 KG/M2 | DIASTOLIC BLOOD PRESSURE: 50 MMHG

## 2025-06-30 DIAGNOSIS — E78.01 FAMILIAL HYPERCHOLESTEROLEMIA: ICD-10-CM

## 2025-06-30 DIAGNOSIS — I25.10 CORONARY ARTERY DISEASE INVOLVING NATIVE CORONARY ARTERY OF NATIVE HEART WITHOUT ANGINA PECTORIS: ICD-10-CM

## 2025-06-30 DIAGNOSIS — I48.0 PAROXYSMAL ATRIAL FIBRILLATION (HCC): Primary | ICD-10-CM

## 2025-06-30 DIAGNOSIS — I10 PRIMARY HYPERTENSION: ICD-10-CM

## 2025-06-30 PROCEDURE — 1036F TOBACCO NON-USER: CPT | Performed by: NUCLEAR MEDICINE

## 2025-06-30 PROCEDURE — G8427 DOCREV CUR MEDS BY ELIG CLIN: HCPCS | Performed by: NUCLEAR MEDICINE

## 2025-06-30 PROCEDURE — 1123F ACP DISCUSS/DSCN MKR DOCD: CPT | Performed by: NUCLEAR MEDICINE

## 2025-06-30 PROCEDURE — G8420 CALC BMI NORM PARAMETERS: HCPCS | Performed by: NUCLEAR MEDICINE

## 2025-06-30 PROCEDURE — 1090F PRES/ABSN URINE INCON ASSESS: CPT | Performed by: NUCLEAR MEDICINE

## 2025-06-30 PROCEDURE — 99214 OFFICE O/P EST MOD 30 MIN: CPT | Performed by: NUCLEAR MEDICINE

## 2025-06-30 PROCEDURE — 1159F MED LIST DOCD IN RCRD: CPT | Performed by: NUCLEAR MEDICINE

## 2025-06-30 RX ORDER — SENNOSIDES 8.6 MG/1
1 TABLET ORAL DAILY
COMMUNITY

## 2025-06-30 NOTE — PROGRESS NOTES
issues  Extremities:   No edema, no obvious claudication       Objective:  General:   Well developed, well nourished  Lungs:   Clear to auscultation  Heart:    Normal S1 S2, Slight murmur. no rubs, no gallops  Abdomen:   Soft, non tender, no organomegalies, positive bowel sounds  Extremities:   No edema, no cyanosis, good peripheral pulses  Neurological:   Awake, alert, oriented. No obvious focal deficits  Musculoskelatal:  No obvious deformities   BP (!) 138/50   Pulse 68   Ht 1.524 m (5')   Wt 51.9 kg (114 lb 6.4 oz)   BMI 22.34 kg/m²     Assessment:  Assessment & Plan    Diagnosis Orders   1. Paroxysmal atrial fibrillation (HCC)        2. Coronary artery disease involving native coronary artery of native heart without angina pectoris        3. Familial hypercholesterolemia        4. Primary hypertension        As above  Watching for a fib off of amiodarone  Seems stable for now  BP is stable     Plan:  No follow-ups on file.  As above  Watch the rhythm   Continue risk factor modification and medical management  Thank you for allowing me to participate in the care of your patient. Please don't hesitate to contact me regarding any further issues related to the patient care    Orders Placed:  No orders of the defined types were placed in this encounter.      Prescribed:  No orders of the defined types were placed in this encounter.         Discussed use, benefit, and side effects of prescribed medications. All patient questions answered. Pt voicedunderstanding. Instructed to continue current medications, diet and exercise. Continue risk factor modification and medical management. Patient agreed with treatment plan. Follow up as directed.    Electronically signedby Thom Loera MD on 6/30/2025 at 11:54 AM

## 2025-07-02 ENCOUNTER — TELEPHONE (OUTPATIENT)
Dept: CARDIOLOGY CLINIC | Age: 89
End: 2025-07-02

## 2025-07-02 NOTE — TELEPHONE ENCOUNTER
Pts daughter Audrey SOPHIA on nurse line.  Asking about Entresto dose-states pt currently taking 1/2 tablet BID.  Just saw Ian this week.  Prev saw eRkha.    Call back to Audrey, had to SOPHIA.

## 2025-07-03 NOTE — TELEPHONE ENCOUNTER
Audrey, pt's daughter, calling.  She states she has been taking Entresto 24/26 mg 1/2 tab BID per Rekha's direction.  They are asking if she can go back to taking the whole tablet BID?

## 2025-07-16 PROCEDURE — 93297 REM INTERROG DEV EVAL ICPMS: CPT | Performed by: NUCLEAR MEDICINE

## 2025-07-16 NOTE — PROGRESS NOTES
[facility] [place] [facility]  [facility], NOSE AND THRO[facility]  [address]  [address]  [facility] [address]  Dept: [phone]  Dept Fax: [phone]  Loc: [phone]    Kourtney Rankin is a 89 y.o. female who was referred by No ref. provider found and I reviewed their note for:  Chief Complaint   Patient presents with    New Patient     New patient.   Bilateral Ear cleaning per Cande from Audiology. She said Dr. Molina said she has very small ear canals.  She also complains of tinnitus.     .     HPI:     Kourtney Rankin is a 89 y.o. female presenting with bilateral cerumen impaction from audio. Pt has a hx of SNHL and wears hearing aids.  Patient is accompanied by daughter who supplements HPI today.  Patient states that her hearing has been gradually worsening and that her chronic tinnitus seems worse as well.  She was seen by audiology who noted that she had bilateral cerumen impactions.  Prior to seeing us she was seen by PCP who recommended Debrox and they attempted to flush her ears, but patient was able to tolerate this.  Patient states that she has not used Debrox in a couple of weeks.  She denies any ear infections or drainage.     History:     Allergies   Allergen Reactions    Sulfa Antibiotics     Milk (Cow) Nausea Only     Other Reaction(s): \"irritates bowels\", irritates bowels, irritates bowels, Milk, Nausea (Moderate), Diarrhea / Diarrheal disorder (Moderate)    Metronidazole Rash and Hives    Ondansetron Rash     Current Outpatient Medications   Medication Sig Dispense Refill    senna (SENOKOT) 8.6 MG tablet Take 1 tablet by mouth daily      mirabegron (MYRBETRIQ) 50 MG TB24 Take 50 mg by mouth daily 30 tablet 2    ondansetron (ZOFRAN-ODT) 4 MG disintegrating tablet Take 1 tablet by mouth every 8 hours as needed for Nausea or Vomiting      Ascorbic Acid (VITAMIN C PO) Take by mouth      ZINC PO Take by mouth      MAGNESIUM PO Take by mouth      carvedilol (COREG) 6.25 MG

## 2025-07-17 ENCOUNTER — OFFICE VISIT (OUTPATIENT)
Dept: ENT CLINIC | Age: 89
End: 2025-07-17
Payer: MEDICARE

## 2025-07-17 VITALS
DIASTOLIC BLOOD PRESSURE: 60 MMHG | TEMPERATURE: 97.3 F | HEART RATE: 63 BPM | WEIGHT: 113.4 LBS | OXYGEN SATURATION: 96 % | HEIGHT: 60 IN | SYSTOLIC BLOOD PRESSURE: 106 MMHG | BODY MASS INDEX: 22.26 KG/M2 | RESPIRATION RATE: 18 BRPM

## 2025-07-17 DIAGNOSIS — Z97.4 WEARS HEARING AID IN BOTH EARS: ICD-10-CM

## 2025-07-17 DIAGNOSIS — H90.3 SENSORINEURAL HEARING LOSS (SNHL) OF BOTH EARS: ICD-10-CM

## 2025-07-17 DIAGNOSIS — H61.23 BILATERAL HEARING LOSS DUE TO CERUMEN IMPACTION: Primary | ICD-10-CM

## 2025-07-17 PROCEDURE — 1160F RVW MEDS BY RX/DR IN RCRD: CPT

## 2025-07-17 PROCEDURE — G8420 CALC BMI NORM PARAMETERS: HCPCS

## 2025-07-17 PROCEDURE — 1159F MED LIST DOCD IN RCRD: CPT

## 2025-07-17 PROCEDURE — G8427 DOCREV CUR MEDS BY ELIG CLIN: HCPCS

## 2025-07-17 PROCEDURE — 1036F TOBACCO NON-USER: CPT

## 2025-07-17 PROCEDURE — 1090F PRES/ABSN URINE INCON ASSESS: CPT

## 2025-07-17 PROCEDURE — 1123F ACP DISCUSS/DSCN MKR DOCD: CPT

## 2025-07-17 PROCEDURE — 99203 OFFICE O/P NEW LOW 30 MIN: CPT

## 2025-07-17 PROCEDURE — 69210 REMOVE IMPACTED EAR WAX UNI: CPT

## 2025-07-28 ENCOUNTER — APPOINTMENT (OUTPATIENT)
Dept: GENERAL RADIOLOGY | Age: 89
DRG: 689 | End: 2025-07-28
Payer: MEDICARE

## 2025-07-28 ENCOUNTER — APPOINTMENT (OUTPATIENT)
Dept: CT IMAGING | Age: 89
DRG: 689 | End: 2025-07-28
Payer: MEDICARE

## 2025-07-28 ENCOUNTER — HOSPITAL ENCOUNTER (INPATIENT)
Age: 89
LOS: 6 days | Discharge: HOME HEALTH | DRG: 689 | End: 2025-08-03
Attending: EMERGENCY MEDICINE | Admitting: STUDENT IN AN ORGANIZED HEALTH CARE EDUCATION/TRAINING PROGRAM
Payer: MEDICARE

## 2025-07-28 DIAGNOSIS — R10.9 INTRACTABLE ABDOMINAL PAIN: ICD-10-CM

## 2025-07-28 DIAGNOSIS — N30.00 ACUTE CYSTITIS WITHOUT HEMATURIA: Primary | ICD-10-CM

## 2025-07-28 DIAGNOSIS — R10.13 ABDOMINAL PAIN, EPIGASTRIC: ICD-10-CM

## 2025-07-28 DIAGNOSIS — E86.0 DEHYDRATION: ICD-10-CM

## 2025-07-28 DIAGNOSIS — K85.90 ACUTE PANCREATITIS WITHOUT INFECTION OR NECROSIS, UNSPECIFIED PANCREATITIS TYPE: ICD-10-CM

## 2025-07-28 LAB
ALBUMIN SERPL BCG-MCNC: 4.3 G/DL (ref 3.4–4.9)
ALP SERPL-CCNC: 42 U/L (ref 38–126)
ALT SERPL W/O P-5'-P-CCNC: 7 U/L (ref 10–35)
ANION GAP SERPL CALC-SCNC: 14 MEQ/L (ref 8–16)
AST SERPL-CCNC: 26 U/L (ref 10–35)
BACTERIA URNS QL MICRO: ABNORMAL /HPF
BASOPHILS ABSOLUTE: 0 THOU/MM3 (ref 0–0.1)
BASOPHILS NFR BLD AUTO: 0.3 %
BILIRUB CONJ SERPL-MCNC: 0.2 MG/DL (ref 0–0.2)
BILIRUB SERPL-MCNC: 0.4 MG/DL (ref 0.3–1.2)
BILIRUB UR QL STRIP.AUTO: NEGATIVE
BUN SERPL-MCNC: 44 MG/DL (ref 8–23)
CALCIUM SERPL-MCNC: 9.7 MG/DL (ref 8.2–9.6)
CASTS #/AREA URNS LPF: ABNORMAL /LPF
CASTS 2: ABNORMAL /LPF
CHARACTER UR: CLEAR
CHLORIDE SERPL-SCNC: 95 MEQ/L (ref 98–111)
CO2 SERPL-SCNC: 21 MEQ/L (ref 22–29)
COLOR, UA: YELLOW
CREAT SERPL-MCNC: 1.5 MG/DL (ref 0.5–0.9)
CRYSTALS URNS MICRO: ABNORMAL
DEPRECATED RDW RBC AUTO: 51.6 FL (ref 35–45)
EKG ATRIAL RATE: 62 BPM
EKG P AXIS: 66 DEGREES
EKG P-R INTERVAL: 230 MS
EKG Q-T INTERVAL: 436 MS
EKG QRS DURATION: 162 MS
EKG QTC CALCULATION (BAZETT): 442 MS
EKG R AXIS: -42 DEGREES
EKG T AXIS: 52 DEGREES
EKG VENTRICULAR RATE: 62 BPM
EOSINOPHIL NFR BLD AUTO: 0.5 %
EOSINOPHILS ABSOLUTE: 0.1 THOU/MM3 (ref 0–0.4)
EPITHELIAL CELLS, UA: ABNORMAL /HPF
ERYTHROCYTE [DISTWIDTH] IN BLOOD BY AUTOMATED COUNT: 14.3 % (ref 11.5–14.5)
FLUAV RNA RESP QL NAA+PROBE: NOT DETECTED
FLUBV RNA RESP QL NAA+PROBE: NOT DETECTED
GFR SERPL CREATININE-BSD FRML MDRD: 33 ML/MIN/1.73M2
GLUCOSE SERPL-MCNC: 89 MG/DL (ref 74–109)
GLUCOSE UR QL STRIP.AUTO: NEGATIVE MG/DL
HCT VFR BLD AUTO: 34 % (ref 37–47)
HGB BLD-MCNC: 11.2 GM/DL (ref 12–16)
HGB UR QL STRIP.AUTO: ABNORMAL
IMM GRANULOCYTES # BLD AUTO: 0.43 THOU/MM3 (ref 0–0.07)
IMM GRANULOCYTES NFR BLD AUTO: 3.4 %
KETONES UR QL STRIP.AUTO: NEGATIVE
LIPASE SERPL-CCNC: 148 U/L (ref 13–60)
LYMPHOCYTES ABSOLUTE: 1.9 THOU/MM3 (ref 1–4.8)
LYMPHOCYTES NFR BLD AUTO: 14.5 %
MCH RBC QN AUTO: 32.3 PG (ref 26–33)
MCHC RBC AUTO-ENTMCNC: 32.9 GM/DL (ref 32.2–35.5)
MCV RBC AUTO: 98 FL (ref 81–99)
MISCELLANEOUS 2: ABNORMAL
MONOCYTES ABSOLUTE: 1.4 THOU/MM3 (ref 0.4–1.3)
MONOCYTES NFR BLD AUTO: 11 %
NEUTROPHILS ABSOLUTE: 9 THOU/MM3 (ref 1.8–7.7)
NEUTROPHILS NFR BLD AUTO: 70.3 %
NITRITE UR QL STRIP: NEGATIVE
NRBC BLD AUTO-RTO: 0 /100 WBC
NT-PROBNP SERPL IA-MCNC: 1450 PG/ML (ref 0–449)
OSMOLALITY SERPL CALC.SUM OF ELEC: 271.5 MOSMOL/KG (ref 275–300)
PH UR STRIP.AUTO: 5.5 [PH] (ref 5–9)
PLATELET # BLD AUTO: 105 THOU/MM3 (ref 130–400)
PMV BLD AUTO: 13 FL (ref 9.4–12.4)
POTASSIUM SERPL-SCNC: 4.8 MEQ/L (ref 3.5–5.2)
PROCALCITONIN SERPL IA-MCNC: 0.12 NG/ML (ref 0.01–0.09)
PROT SERPL-MCNC: 7.6 G/DL (ref 6.4–8.3)
PROT UR STRIP.AUTO-MCNC: ABNORMAL MG/DL
RBC # BLD AUTO: 3.47 MILL/MM3 (ref 4.2–5.4)
RBC URINE: ABNORMAL /HPF
RENAL EPI CELLS #/AREA URNS HPF: ABNORMAL /[HPF]
SARS-COV-2 RNA RESP QL NAA+PROBE: NOT DETECTED
SODIUM SERPL-SCNC: 130 MEQ/L (ref 135–145)
SP GR UR REFRACT.AUTO: 1 (ref 1–1.03)
TROPONIN, HIGH SENSITIVITY: 19 NG/L (ref 0–12)
UROBILINOGEN, URINE: 0.2 EU/DL (ref 0–1)
WBC # BLD AUTO: 12.8 THOU/MM3 (ref 4.8–10.8)
WBC #/AREA URNS HPF: ABNORMAL /HPF
WBC #/AREA URNS HPF: ABNORMAL /[HPF]
YEAST LIKE FUNGI URNS QL MICRO: ABNORMAL

## 2025-07-28 PROCEDURE — 80076 HEPATIC FUNCTION PANEL: CPT

## 2025-07-28 PROCEDURE — 6370000000 HC RX 637 (ALT 250 FOR IP): Performed by: EMERGENCY MEDICINE

## 2025-07-28 PROCEDURE — 36415 COLL VENOUS BLD VENIPUNCTURE: CPT

## 2025-07-28 PROCEDURE — 83690 ASSAY OF LIPASE: CPT

## 2025-07-28 PROCEDURE — 6360000002 HC RX W HCPCS: Performed by: STUDENT IN AN ORGANIZED HEALTH CARE EDUCATION/TRAINING PROGRAM

## 2025-07-28 PROCEDURE — 2500000003 HC RX 250 WO HCPCS: Performed by: STUDENT IN AN ORGANIZED HEALTH CARE EDUCATION/TRAINING PROGRAM

## 2025-07-28 PROCEDURE — 84145 PROCALCITONIN (PCT): CPT

## 2025-07-28 PROCEDURE — 87086 URINE CULTURE/COLONY COUNT: CPT

## 2025-07-28 PROCEDURE — 96374 THER/PROPH/DIAG INJ IV PUSH: CPT

## 2025-07-28 PROCEDURE — 2580000003 HC RX 258: Performed by: EMERGENCY MEDICINE

## 2025-07-28 PROCEDURE — 84484 ASSAY OF TROPONIN QUANT: CPT

## 2025-07-28 PROCEDURE — 71046 X-RAY EXAM CHEST 2 VIEWS: CPT

## 2025-07-28 PROCEDURE — 2500000003 HC RX 250 WO HCPCS: Performed by: EMERGENCY MEDICINE

## 2025-07-28 PROCEDURE — 93005 ELECTROCARDIOGRAM TRACING: CPT | Performed by: EMERGENCY MEDICINE

## 2025-07-28 PROCEDURE — 96361 HYDRATE IV INFUSION ADD-ON: CPT

## 2025-07-28 PROCEDURE — 96375 TX/PRO/DX INJ NEW DRUG ADDON: CPT

## 2025-07-28 PROCEDURE — 6370000000 HC RX 637 (ALT 250 FOR IP): Performed by: STUDENT IN AN ORGANIZED HEALTH CARE EDUCATION/TRAINING PROGRAM

## 2025-07-28 PROCEDURE — 87636 SARSCOV2 & INF A&B AMP PRB: CPT

## 2025-07-28 PROCEDURE — 93010 ELECTROCARDIOGRAM REPORT: CPT | Performed by: INTERNAL MEDICINE

## 2025-07-28 PROCEDURE — 1200000003 HC TELEMETRY R&B

## 2025-07-28 PROCEDURE — 83880 ASSAY OF NATRIURETIC PEPTIDE: CPT

## 2025-07-28 PROCEDURE — 85025 COMPLETE CBC W/AUTO DIFF WBC: CPT

## 2025-07-28 PROCEDURE — 81001 URINALYSIS AUTO W/SCOPE: CPT

## 2025-07-28 PROCEDURE — 2580000003 HC RX 258: Performed by: STUDENT IN AN ORGANIZED HEALTH CARE EDUCATION/TRAINING PROGRAM

## 2025-07-28 PROCEDURE — 96376 TX/PRO/DX INJ SAME DRUG ADON: CPT

## 2025-07-28 PROCEDURE — 6360000002 HC RX W HCPCS: Performed by: EMERGENCY MEDICINE

## 2025-07-28 PROCEDURE — 1200000000 HC SEMI PRIVATE

## 2025-07-28 PROCEDURE — 6360000002 HC RX W HCPCS

## 2025-07-28 PROCEDURE — 80048 BASIC METABOLIC PNL TOTAL CA: CPT

## 2025-07-28 PROCEDURE — 99285 EMERGENCY DEPT VISIT HI MDM: CPT

## 2025-07-28 PROCEDURE — 71045 X-RAY EXAM CHEST 1 VIEW: CPT

## 2025-07-28 PROCEDURE — 74176 CT ABD & PELVIS W/O CONTRAST: CPT

## 2025-07-28 PROCEDURE — 99223 1ST HOSP IP/OBS HIGH 75: CPT | Performed by: STUDENT IN AN ORGANIZED HEALTH CARE EDUCATION/TRAINING PROGRAM

## 2025-07-28 RX ORDER — PROMETHAZINE HYDROCHLORIDE 25 MG/1
12.5 TABLET ORAL EVERY 6 HOURS PRN
Status: DISCONTINUED | OUTPATIENT
Start: 2025-07-28 | End: 2025-08-03 | Stop reason: HOSPADM

## 2025-07-28 RX ORDER — ACETAMINOPHEN 650 MG/1
650 SUPPOSITORY RECTAL EVERY 6 HOURS PRN
Status: DISCONTINUED | OUTPATIENT
Start: 2025-07-28 | End: 2025-08-03 | Stop reason: HOSPADM

## 2025-07-28 RX ORDER — SODIUM CHLORIDE 9 MG/ML
INJECTION, SOLUTION INTRAVENOUS CONTINUOUS
Status: ACTIVE | OUTPATIENT
Start: 2025-07-28 | End: 2025-07-29

## 2025-07-28 RX ORDER — SODIUM CHLORIDE 0.9 % (FLUSH) 0.9 %
5-40 SYRINGE (ML) INJECTION EVERY 12 HOURS SCHEDULED
Status: DISCONTINUED | OUTPATIENT
Start: 2025-07-28 | End: 2025-08-03 | Stop reason: HOSPADM

## 2025-07-28 RX ORDER — SODIUM CHLORIDE 0.9 % (FLUSH) 0.9 %
10 SYRINGE (ML) INJECTION PRN
Status: DISCONTINUED | OUTPATIENT
Start: 2025-07-28 | End: 2025-08-03 | Stop reason: HOSPADM

## 2025-07-28 RX ORDER — MAGNESIUM SULFATE IN WATER 40 MG/ML
2000 INJECTION, SOLUTION INTRAVENOUS PRN
Status: DISCONTINUED | OUTPATIENT
Start: 2025-07-28 | End: 2025-07-28

## 2025-07-28 RX ORDER — TROSPIUM CHLORIDE 20 MG/1
20 TABLET, FILM COATED ORAL NIGHTLY
Status: DISCONTINUED | OUTPATIENT
Start: 2025-07-28 | End: 2025-08-03 | Stop reason: HOSPADM

## 2025-07-28 RX ORDER — MAGNESIUM HYDROXIDE/ALUMINUM HYDROXICE/SIMETHICONE 120; 1200; 1200 MG/30ML; MG/30ML; MG/30ML
30 SUSPENSION ORAL ONCE
Status: COMPLETED | OUTPATIENT
Start: 2025-07-28 | End: 2025-07-28

## 2025-07-28 RX ORDER — POTASSIUM CHLORIDE 7.45 MG/ML
10 INJECTION INTRAVENOUS PRN
Status: DISCONTINUED | OUTPATIENT
Start: 2025-07-28 | End: 2025-07-28

## 2025-07-28 RX ORDER — 0.9 % SODIUM CHLORIDE 0.9 %
1000 INTRAVENOUS SOLUTION INTRAVENOUS ONCE
Status: DISCONTINUED | OUTPATIENT
Start: 2025-07-28 | End: 2025-07-28

## 2025-07-28 RX ORDER — POLYETHYLENE GLYCOL 3350 17 G/17G
17 POWDER, FOR SOLUTION ORAL DAILY PRN
Status: DISCONTINUED | OUTPATIENT
Start: 2025-07-28 | End: 2025-08-03 | Stop reason: HOSPADM

## 2025-07-28 RX ORDER — PANTOPRAZOLE SODIUM 40 MG/1
40 TABLET, DELAYED RELEASE ORAL
Status: DISCONTINUED | OUTPATIENT
Start: 2025-07-29 | End: 2025-07-28

## 2025-07-28 RX ORDER — CARVEDILOL 6.25 MG/1
6.25 TABLET ORAL 2 TIMES DAILY
Status: DISCONTINUED | OUTPATIENT
Start: 2025-07-28 | End: 2025-08-03 | Stop reason: HOSPADM

## 2025-07-28 RX ORDER — LEVOTHYROXINE SODIUM 125 UG/1
62.5 TABLET ORAL DAILY
Status: DISCONTINUED | OUTPATIENT
Start: 2025-07-28 | End: 2025-08-03 | Stop reason: HOSPADM

## 2025-07-28 RX ORDER — SACUBITRIL AND VALSARTAN 24; 26 MG/1; MG/1
1 TABLET ORAL 2 TIMES DAILY
Status: DISCONTINUED | OUTPATIENT
Start: 2025-07-28 | End: 2025-08-03 | Stop reason: HOSPADM

## 2025-07-28 RX ORDER — SODIUM CHLORIDE 9 MG/ML
INJECTION, SOLUTION INTRAVENOUS PRN
Status: DISCONTINUED | OUTPATIENT
Start: 2025-07-28 | End: 2025-08-03 | Stop reason: HOSPADM

## 2025-07-28 RX ORDER — MORPHINE SULFATE 4 MG/ML
4 INJECTION, SOLUTION INTRAMUSCULAR; INTRAVENOUS ONCE
Status: COMPLETED | OUTPATIENT
Start: 2025-07-28 | End: 2025-07-28

## 2025-07-28 RX ORDER — GLUCAGON 1 MG/ML
1 KIT INJECTION PRN
Status: DISCONTINUED | OUTPATIENT
Start: 2025-07-28 | End: 2025-08-03 | Stop reason: HOSPADM

## 2025-07-28 RX ORDER — ACETAMINOPHEN 325 MG/1
650 TABLET ORAL EVERY 6 HOURS PRN
Status: DISCONTINUED | OUTPATIENT
Start: 2025-07-28 | End: 2025-08-03 | Stop reason: HOSPADM

## 2025-07-28 RX ORDER — ONDANSETRON 2 MG/ML
4 INJECTION INTRAMUSCULAR; INTRAVENOUS EVERY 6 HOURS PRN
Status: DISCONTINUED | OUTPATIENT
Start: 2025-07-28 | End: 2025-08-03 | Stop reason: HOSPADM

## 2025-07-28 RX ORDER — 0.9 % SODIUM CHLORIDE 0.9 %
500 INTRAVENOUS SOLUTION INTRAVENOUS ONCE
Status: COMPLETED | OUTPATIENT
Start: 2025-07-28 | End: 2025-07-28

## 2025-07-28 RX ORDER — POTASSIUM CHLORIDE 1500 MG/1
40 TABLET, EXTENDED RELEASE ORAL PRN
Status: DISCONTINUED | OUTPATIENT
Start: 2025-07-28 | End: 2025-07-28

## 2025-07-28 RX ORDER — DEXTROSE MONOHYDRATE 100 MG/ML
INJECTION, SOLUTION INTRAVENOUS CONTINUOUS PRN
Status: DISCONTINUED | OUTPATIENT
Start: 2025-07-28 | End: 2025-08-03 | Stop reason: HOSPADM

## 2025-07-28 RX ORDER — LOPERAMIDE HYDROCHLORIDE 2 MG/1
4 CAPSULE ORAL ONCE
Status: COMPLETED | OUTPATIENT
Start: 2025-07-28 | End: 2025-07-28

## 2025-07-28 RX ORDER — ASPIRIN 81 MG/1
81 TABLET, CHEWABLE ORAL DAILY
Status: DISCONTINUED | OUTPATIENT
Start: 2025-07-28 | End: 2025-08-03 | Stop reason: HOSPADM

## 2025-07-28 RX ORDER — PANTOPRAZOLE SODIUM 40 MG/10ML
40 INJECTION, POWDER, LYOPHILIZED, FOR SOLUTION INTRAVENOUS 2 TIMES DAILY
Status: DISCONTINUED | OUTPATIENT
Start: 2025-07-28 | End: 2025-08-02

## 2025-07-28 RX ADMIN — MORPHINE SULFATE 4 MG: 4 INJECTION, SOLUTION INTRAMUSCULAR; INTRAVENOUS at 11:03

## 2025-07-28 RX ADMIN — TROSPIUM CHLORIDE 20 MG: 20 TABLET, FILM COATED ORAL at 23:11

## 2025-07-28 RX ADMIN — APIXABAN 2.5 MG: 2.5 TABLET, FILM COATED ORAL at 23:11

## 2025-07-28 RX ADMIN — CARVEDILOL 6.25 MG: 6.25 TABLET, FILM COATED ORAL at 23:11

## 2025-07-28 RX ADMIN — LOPERAMIDE HYDROCHLORIDE 4 MG: 2 CAPSULE ORAL at 07:44

## 2025-07-28 RX ADMIN — HYDROMORPHONE HYDROCHLORIDE 0.5 MG: 1 INJECTION, SOLUTION INTRAMUSCULAR; INTRAVENOUS; SUBCUTANEOUS at 23:01

## 2025-07-28 RX ADMIN — SODIUM CHLORIDE, PRESERVATIVE FREE 10 ML: 5 INJECTION INTRAVENOUS at 23:09

## 2025-07-28 RX ADMIN — WATER 1000 MG: 1 INJECTION INTRAMUSCULAR; INTRAVENOUS; SUBCUTANEOUS at 14:19

## 2025-07-28 RX ADMIN — FAMOTIDINE 20 MG: 10 INJECTION, SOLUTION INTRAVENOUS at 07:45

## 2025-07-28 RX ADMIN — SODIUM CHLORIDE: 0.9 INJECTION, SOLUTION INTRAVENOUS at 13:50

## 2025-07-28 RX ADMIN — PANTOPRAZOLE SODIUM 40 MG: 40 INJECTION, POWDER, FOR SOLUTION INTRAVENOUS at 23:01

## 2025-07-28 RX ADMIN — ACETAMINOPHEN 650 MG: 325 TABLET ORAL at 23:02

## 2025-07-28 RX ADMIN — MORPHINE SULFATE 4 MG: 4 INJECTION, SOLUTION INTRAMUSCULAR; INTRAVENOUS at 07:45

## 2025-07-28 RX ADMIN — HYDROMORPHONE HYDROCHLORIDE 0.5 MG: 1 INJECTION, SOLUTION INTRAMUSCULAR; INTRAVENOUS; SUBCUTANEOUS at 15:36

## 2025-07-28 RX ADMIN — ALUMINUM HYDROXIDE, MAGNESIUM HYDROXIDE, AND SIMETHICONE 30 ML: 200; 200; 20 SUSPENSION ORAL at 07:45

## 2025-07-28 RX ADMIN — SODIUM CHLORIDE 500 ML: 9 INJECTION, SOLUTION INTRAVENOUS at 07:39

## 2025-07-28 RX ADMIN — ONDANSETRON 4 MG: 2 INJECTION, SOLUTION INTRAMUSCULAR; INTRAVENOUS at 15:48

## 2025-07-28 RX ADMIN — ONDANSETRON 4 MG: 2 INJECTION, SOLUTION INTRAMUSCULAR; INTRAVENOUS at 23:01

## 2025-07-28 RX ADMIN — SACUBITRIL AND VALSARTAN 1 TABLET: 24; 26 TABLET, FILM COATED ORAL at 23:11

## 2025-07-28 RX ADMIN — ASPIRIN 81 MG: 81 TABLET, CHEWABLE ORAL at 23:15

## 2025-07-28 ASSESSMENT — PAIN SCALES - GENERAL
PAINLEVEL_OUTOF10: 2
PAINLEVEL_OUTOF10: 8
PAINLEVEL_OUTOF10: 9
PAINLEVEL_OUTOF10: 7
PAINLEVEL_OUTOF10: 5
PAINLEVEL_OUTOF10: 2
PAINLEVEL_OUTOF10: 2

## 2025-07-28 ASSESSMENT — PAIN - FUNCTIONAL ASSESSMENT: PAIN_FUNCTIONAL_ASSESSMENT: 0-10

## 2025-07-28 ASSESSMENT — PAIN DESCRIPTION - DESCRIPTORS: DESCRIPTORS: SHARP

## 2025-07-28 ASSESSMENT — PAIN DESCRIPTION - LOCATION
LOCATION: ABDOMEN;CHEST;GENERALIZED
LOCATION: ABDOMEN

## 2025-07-29 LAB
ANION GAP SERPL CALC-SCNC: 11 MEQ/L (ref 8–16)
BACTERIA UR CULT: ABNORMAL
BUN SERPL-MCNC: 31 MG/DL (ref 8–23)
CALCIUM SERPL-MCNC: 8.6 MG/DL (ref 8.2–9.6)
CHLORIDE SERPL-SCNC: 108 MEQ/L (ref 98–111)
CO2 SERPL-SCNC: 21 MEQ/L (ref 22–29)
CREAT SERPL-MCNC: 1.4 MG/DL (ref 0.5–0.9)
DEPRECATED RDW RBC AUTO: 54.1 FL (ref 35–45)
ERYTHROCYTE [DISTWIDTH] IN BLOOD BY AUTOMATED COUNT: 14.4 % (ref 11.5–14.5)
GFR SERPL CREATININE-BSD FRML MDRD: 36 ML/MIN/1.73M2
GLUCOSE SERPL-MCNC: 75 MG/DL (ref 74–109)
HCT VFR BLD AUTO: 34.8 % (ref 37–47)
HGB BLD-MCNC: 10.9 GM/DL (ref 12–16)
LACTATE SERPL-SCNC: 0.6 MMOL/L (ref 0.5–2.2)
LIPASE SERPL-CCNC: 38 U/L (ref 13–60)
MCH RBC QN AUTO: 32.1 PG (ref 26–33)
MCHC RBC AUTO-ENTMCNC: 31.3 GM/DL (ref 32.2–35.5)
MCV RBC AUTO: 102.4 FL (ref 81–99)
ORGANISM: ABNORMAL
PLATELET # BLD AUTO: 95 THOU/MM3 (ref 130–400)
PMV BLD AUTO: 12.9 FL (ref 9.4–12.4)
POTASSIUM SERPL-SCNC: 5.3 MEQ/L (ref 3.5–5.2)
RBC # BLD AUTO: 3.4 MILL/MM3 (ref 4.2–5.4)
SCAN OF BLOOD SMEAR: NORMAL
SODIUM SERPL-SCNC: 140 MEQ/L (ref 135–145)
WBC # BLD AUTO: 6 THOU/MM3 (ref 4.8–10.8)

## 2025-07-29 PROCEDURE — 6370000000 HC RX 637 (ALT 250 FOR IP): Performed by: STUDENT IN AN ORGANIZED HEALTH CARE EDUCATION/TRAINING PROGRAM

## 2025-07-29 PROCEDURE — 85027 COMPLETE CBC AUTOMATED: CPT

## 2025-07-29 PROCEDURE — 1200000003 HC TELEMETRY R&B

## 2025-07-29 PROCEDURE — 36415 COLL VENOUS BLD VENIPUNCTURE: CPT

## 2025-07-29 PROCEDURE — 83605 ASSAY OF LACTIC ACID: CPT

## 2025-07-29 PROCEDURE — 80048 BASIC METABOLIC PNL TOTAL CA: CPT

## 2025-07-29 PROCEDURE — 2500000003 HC RX 250 WO HCPCS: Performed by: STUDENT IN AN ORGANIZED HEALTH CARE EDUCATION/TRAINING PROGRAM

## 2025-07-29 PROCEDURE — 83690 ASSAY OF LIPASE: CPT

## 2025-07-29 PROCEDURE — 6360000002 HC RX W HCPCS: Performed by: STUDENT IN AN ORGANIZED HEALTH CARE EDUCATION/TRAINING PROGRAM

## 2025-07-29 PROCEDURE — 6370000000 HC RX 637 (ALT 250 FOR IP)

## 2025-07-29 PROCEDURE — 1200000000 HC SEMI PRIVATE

## 2025-07-29 PROCEDURE — 99233 SBSQ HOSP IP/OBS HIGH 50: CPT | Performed by: STUDENT IN AN ORGANIZED HEALTH CARE EDUCATION/TRAINING PROGRAM

## 2025-07-29 RX ORDER — SUCRALFATE 1 G/1
1 TABLET ORAL
Status: DISCONTINUED | OUTPATIENT
Start: 2025-07-29 | End: 2025-08-03 | Stop reason: HOSPADM

## 2025-07-29 RX ORDER — GUAIFENESIN/DEXTROMETHORPHAN 100-10MG/5
5 SYRUP ORAL EVERY 4 HOURS PRN
Status: DISCONTINUED | OUTPATIENT
Start: 2025-07-29 | End: 2025-08-03 | Stop reason: HOSPADM

## 2025-07-29 RX ORDER — FLUTICASONE PROPIONATE 50 MCG
1 SPRAY, SUSPENSION (ML) NASAL DAILY PRN
Status: DISCONTINUED | OUTPATIENT
Start: 2025-07-29 | End: 2025-08-03 | Stop reason: HOSPADM

## 2025-07-29 RX ADMIN — APIXABAN 2.5 MG: 2.5 TABLET, FILM COATED ORAL at 21:30

## 2025-07-29 RX ADMIN — APIXABAN 2.5 MG: 2.5 TABLET, FILM COATED ORAL at 08:53

## 2025-07-29 RX ADMIN — CARVEDILOL 6.25 MG: 6.25 TABLET, FILM COATED ORAL at 21:30

## 2025-07-29 RX ADMIN — HYDROMORPHONE HYDROCHLORIDE 0.5 MG: 1 INJECTION, SOLUTION INTRAMUSCULAR; INTRAVENOUS; SUBCUTANEOUS at 09:17

## 2025-07-29 RX ADMIN — PANTOPRAZOLE SODIUM 40 MG: 40 INJECTION, POWDER, FOR SOLUTION INTRAVENOUS at 21:30

## 2025-07-29 RX ADMIN — SUCRALFATE 1 G: 1 TABLET ORAL at 17:49

## 2025-07-29 RX ADMIN — SODIUM CHLORIDE, PRESERVATIVE FREE 10 ML: 5 INJECTION INTRAVENOUS at 08:53

## 2025-07-29 RX ADMIN — TROSPIUM CHLORIDE 20 MG: 20 TABLET, FILM COATED ORAL at 21:30

## 2025-07-29 RX ADMIN — SACUBITRIL AND VALSARTAN 1 TABLET: 24; 26 TABLET, FILM COATED ORAL at 21:30

## 2025-07-29 RX ADMIN — ASPIRIN 81 MG: 81 TABLET, CHEWABLE ORAL at 08:53

## 2025-07-29 RX ADMIN — HYDROMORPHONE HYDROCHLORIDE 0.5 MG: 1 INJECTION, SOLUTION INTRAMUSCULAR; INTRAVENOUS; SUBCUTANEOUS at 04:13

## 2025-07-29 RX ADMIN — SODIUM CHLORIDE, PRESERVATIVE FREE 10 ML: 5 INJECTION INTRAVENOUS at 21:31

## 2025-07-29 RX ADMIN — ACETAMINOPHEN 650 MG: 325 TABLET ORAL at 23:30

## 2025-07-29 RX ADMIN — WATER 1000 MG: 1 INJECTION INTRAMUSCULAR; INTRAVENOUS; SUBCUTANEOUS at 13:56

## 2025-07-29 RX ADMIN — SACUBITRIL AND VALSARTAN 1 TABLET: 24; 26 TABLET, FILM COATED ORAL at 08:52

## 2025-07-29 RX ADMIN — PANTOPRAZOLE SODIUM 40 MG: 40 INJECTION, POWDER, FOR SOLUTION INTRAVENOUS at 09:17

## 2025-07-29 RX ADMIN — PROMETHAZINE HYDROCHLORIDE 12.5 MG: 25 TABLET ORAL at 04:12

## 2025-07-29 RX ADMIN — CARVEDILOL 6.25 MG: 6.25 TABLET, FILM COATED ORAL at 08:52

## 2025-07-29 RX ADMIN — SUCRALFATE 1 G: 1 TABLET ORAL at 21:30

## 2025-07-29 RX ADMIN — HYDROMORPHONE HYDROCHLORIDE 0.5 MG: 1 INJECTION, SOLUTION INTRAMUSCULAR; INTRAVENOUS; SUBCUTANEOUS at 16:19

## 2025-07-29 RX ADMIN — LEVOTHYROXINE SODIUM 62.5 MCG: 0.12 TABLET ORAL at 05:00

## 2025-07-29 ASSESSMENT — PAIN DESCRIPTION - DESCRIPTORS
DESCRIPTORS: ACHING
DESCRIPTORS: ACHING;SHOOTING

## 2025-07-29 ASSESSMENT — PAIN DESCRIPTION - ORIENTATION
ORIENTATION: MID
ORIENTATION: MID

## 2025-07-29 ASSESSMENT — PAIN SCALES - GENERAL
PAINLEVEL_OUTOF10: 3
PAINLEVEL_OUTOF10: 5
PAINLEVEL_OUTOF10: 3
PAINLEVEL_OUTOF10: 7
PAINLEVEL_OUTOF10: 5
PAINLEVEL_OUTOF10: 2
PAINLEVEL_OUTOF10: 7

## 2025-07-29 ASSESSMENT — PAIN DESCRIPTION - ONSET
ONSET: ON-GOING
ONSET: ON-GOING

## 2025-07-29 ASSESSMENT — PAIN - FUNCTIONAL ASSESSMENT
PAIN_FUNCTIONAL_ASSESSMENT: ACTIVITIES ARE NOT PREVENTED
PAIN_FUNCTIONAL_ASSESSMENT: PREVENTS OR INTERFERES SOME ACTIVE ACTIVITIES AND ADLS

## 2025-07-29 ASSESSMENT — PAIN DESCRIPTION - PAIN TYPE
TYPE: ACUTE PAIN
TYPE: CHRONIC PAIN

## 2025-07-29 ASSESSMENT — PAIN DESCRIPTION - FREQUENCY
FREQUENCY: CONTINUOUS
FREQUENCY: INTERMITTENT

## 2025-07-29 ASSESSMENT — PAIN DESCRIPTION - LOCATION
LOCATION: OTHER (COMMENT)
LOCATION: ABDOMEN

## 2025-07-30 ENCOUNTER — APPOINTMENT (OUTPATIENT)
Dept: CT IMAGING | Age: 89
DRG: 689 | End: 2025-07-30
Payer: MEDICARE

## 2025-07-30 LAB
ANION GAP SERPL CALC-SCNC: 12 MEQ/L (ref 8–16)
BUN SERPL-MCNC: 29 MG/DL (ref 8–23)
CA-I BLD ISE-SCNC: 1.22 MMOL/L (ref 1.12–1.32)
CALCIUM SERPL-MCNC: 9.1 MG/DL (ref 8.2–9.6)
CHLORIDE SERPL-SCNC: 106 MEQ/L (ref 98–111)
CO2 SERPL-SCNC: 21 MEQ/L (ref 22–29)
CREAT SERPL-MCNC: 1.5 MG/DL (ref 0.5–0.9)
DEPRECATED RDW RBC AUTO: 53.6 FL (ref 35–45)
ERYTHROCYTE [DISTWIDTH] IN BLOOD BY AUTOMATED COUNT: 14.5 % (ref 11.5–14.5)
GFR SERPL CREATININE-BSD FRML MDRD: 33 ML/MIN/1.73M2
GLUCOSE SERPL-MCNC: 89 MG/DL (ref 74–109)
HCT VFR BLD AUTO: 35.1 % (ref 37–47)
HGB BLD-MCNC: 11.3 GM/DL (ref 12–16)
MCH RBC QN AUTO: 32.7 PG (ref 26–33)
MCHC RBC AUTO-ENTMCNC: 32.2 GM/DL (ref 32.2–35.5)
MCV RBC AUTO: 101.4 FL (ref 81–99)
PLATELET # BLD AUTO: 116 THOU/MM3 (ref 130–400)
PMV BLD AUTO: 12.6 FL (ref 9.4–12.4)
POTASSIUM SERPL-SCNC: 5.1 MEQ/L (ref 3.5–5.2)
RBC # BLD AUTO: 3.46 MILL/MM3 (ref 4.2–5.4)
SODIUM SERPL-SCNC: 139 MEQ/L (ref 135–145)
WBC # BLD AUTO: 7.3 THOU/MM3 (ref 4.8–10.8)

## 2025-07-30 PROCEDURE — 85027 COMPLETE CBC AUTOMATED: CPT

## 2025-07-30 PROCEDURE — 72125 CT NECK SPINE W/O DYE: CPT

## 2025-07-30 PROCEDURE — 80048 BASIC METABOLIC PNL TOTAL CA: CPT

## 2025-07-30 PROCEDURE — 6370000000 HC RX 637 (ALT 250 FOR IP)

## 2025-07-30 PROCEDURE — 1200000003 HC TELEMETRY R&B

## 2025-07-30 PROCEDURE — 72131 CT LUMBAR SPINE W/O DYE: CPT

## 2025-07-30 PROCEDURE — 36415 COLL VENOUS BLD VENIPUNCTURE: CPT

## 2025-07-30 PROCEDURE — 82330 ASSAY OF CALCIUM: CPT

## 2025-07-30 PROCEDURE — 6360000002 HC RX W HCPCS: Performed by: STUDENT IN AN ORGANIZED HEALTH CARE EDUCATION/TRAINING PROGRAM

## 2025-07-30 PROCEDURE — 6370000000 HC RX 637 (ALT 250 FOR IP): Performed by: STUDENT IN AN ORGANIZED HEALTH CARE EDUCATION/TRAINING PROGRAM

## 2025-07-30 PROCEDURE — 72128 CT CHEST SPINE W/O DYE: CPT

## 2025-07-30 PROCEDURE — 1200000000 HC SEMI PRIVATE

## 2025-07-30 PROCEDURE — 2500000003 HC RX 250 WO HCPCS: Performed by: STUDENT IN AN ORGANIZED HEALTH CARE EDUCATION/TRAINING PROGRAM

## 2025-07-30 PROCEDURE — 99233 SBSQ HOSP IP/OBS HIGH 50: CPT | Performed by: STUDENT IN AN ORGANIZED HEALTH CARE EDUCATION/TRAINING PROGRAM

## 2025-07-30 RX ORDER — HYDROCODONE BITARTRATE AND ACETAMINOPHEN 5; 325 MG/1; MG/1
1 TABLET ORAL EVERY 6 HOURS PRN
Status: DISCONTINUED | OUTPATIENT
Start: 2025-07-30 | End: 2025-08-03 | Stop reason: HOSPADM

## 2025-07-30 RX ORDER — HYDROCODONE BITARTRATE AND ACETAMINOPHEN 5; 325 MG/1; MG/1
2 TABLET ORAL EVERY 6 HOURS PRN
Status: DISCONTINUED | OUTPATIENT
Start: 2025-07-30 | End: 2025-08-03 | Stop reason: HOSPADM

## 2025-07-30 RX ORDER — GABAPENTIN 100 MG/1
100 CAPSULE ORAL 3 TIMES DAILY
Status: DISCONTINUED | OUTPATIENT
Start: 2025-07-30 | End: 2025-08-03 | Stop reason: HOSPADM

## 2025-07-30 RX ADMIN — SACUBITRIL AND VALSARTAN 1 TABLET: 24; 26 TABLET, FILM COATED ORAL at 09:00

## 2025-07-30 RX ADMIN — SUCRALFATE 1 G: 1 TABLET ORAL at 06:14

## 2025-07-30 RX ADMIN — APIXABAN 2.5 MG: 2.5 TABLET, FILM COATED ORAL at 22:30

## 2025-07-30 RX ADMIN — SACUBITRIL AND VALSARTAN 1 TABLET: 24; 26 TABLET, FILM COATED ORAL at 22:29

## 2025-07-30 RX ADMIN — APIXABAN 2.5 MG: 2.5 TABLET, FILM COATED ORAL at 09:00

## 2025-07-30 RX ADMIN — SODIUM CHLORIDE, PRESERVATIVE FREE 10 ML: 5 INJECTION INTRAVENOUS at 09:01

## 2025-07-30 RX ADMIN — PANTOPRAZOLE SODIUM 40 MG: 40 INJECTION, POWDER, FOR SOLUTION INTRAVENOUS at 09:25

## 2025-07-30 RX ADMIN — SUCRALFATE 1 G: 1 TABLET ORAL at 17:03

## 2025-07-30 RX ADMIN — PANTOPRAZOLE SODIUM 40 MG: 40 INJECTION, POWDER, FOR SOLUTION INTRAVENOUS at 22:31

## 2025-07-30 RX ADMIN — SUCRALFATE 1 G: 1 TABLET ORAL at 22:30

## 2025-07-30 RX ADMIN — WATER 1000 MG: 1 INJECTION INTRAMUSCULAR; INTRAVENOUS; SUBCUTANEOUS at 14:47

## 2025-07-30 RX ADMIN — SODIUM CHLORIDE, PRESERVATIVE FREE 10 ML: 5 INJECTION INTRAVENOUS at 22:37

## 2025-07-30 RX ADMIN — GABAPENTIN 100 MG: 100 CAPSULE ORAL at 22:29

## 2025-07-30 RX ADMIN — CARVEDILOL 6.25 MG: 6.25 TABLET, FILM COATED ORAL at 09:23

## 2025-07-30 RX ADMIN — CARVEDILOL 6.25 MG: 6.25 TABLET, FILM COATED ORAL at 22:30

## 2025-07-30 RX ADMIN — ASPIRIN 81 MG: 81 TABLET, CHEWABLE ORAL at 09:00

## 2025-07-30 RX ADMIN — GABAPENTIN 100 MG: 100 CAPSULE ORAL at 14:54

## 2025-07-30 RX ADMIN — SUCRALFATE 1 G: 1 TABLET ORAL at 09:00

## 2025-07-30 RX ADMIN — LEVOTHYROXINE SODIUM 62.5 MCG: 0.12 TABLET ORAL at 06:14

## 2025-07-30 RX ADMIN — HYDROMORPHONE HYDROCHLORIDE 0.5 MG: 1 INJECTION, SOLUTION INTRAMUSCULAR; INTRAVENOUS; SUBCUTANEOUS at 11:04

## 2025-07-30 RX ADMIN — TROSPIUM CHLORIDE 20 MG: 20 TABLET, FILM COATED ORAL at 22:30

## 2025-07-30 ASSESSMENT — PAIN DESCRIPTION - LOCATION
LOCATION: BACK
LOCATION: ABDOMEN

## 2025-07-30 ASSESSMENT — PAIN DESCRIPTION - DESCRIPTORS
DESCRIPTORS: ACHING;DISCOMFORT
DESCRIPTORS: ACHING

## 2025-07-30 ASSESSMENT — PAIN SCALES - GENERAL
PAINLEVEL_OUTOF10: 5
PAINLEVEL_OUTOF10: 10

## 2025-07-30 ASSESSMENT — PAIN DESCRIPTION - ORIENTATION: ORIENTATION: LOWER

## 2025-07-31 LAB
ANION GAP SERPL CALC-SCNC: 14 MEQ/L (ref 8–16)
BUN SERPL-MCNC: 34 MG/DL (ref 8–23)
CALCIUM SERPL-MCNC: 9.1 MG/DL (ref 8.2–9.6)
CHLORIDE SERPL-SCNC: 104 MEQ/L (ref 98–111)
CO2 SERPL-SCNC: 22 MEQ/L (ref 22–29)
CREAT SERPL-MCNC: 1.4 MG/DL (ref 0.5–0.9)
DEPRECATED RDW RBC AUTO: 52.4 FL (ref 35–45)
ERYTHROCYTE [DISTWIDTH] IN BLOOD BY AUTOMATED COUNT: 14.3 % (ref 11.5–14.5)
GFR SERPL CREATININE-BSD FRML MDRD: 36 ML/MIN/1.73M2
GLUCOSE SERPL-MCNC: 93 MG/DL (ref 74–109)
HCT VFR BLD AUTO: 35.2 % (ref 37–47)
HGB BLD-MCNC: 11.3 GM/DL (ref 12–16)
MCH RBC QN AUTO: 32.5 PG (ref 26–33)
MCHC RBC AUTO-ENTMCNC: 32.1 GM/DL (ref 32.2–35.5)
MCV RBC AUTO: 101.1 FL (ref 81–99)
PLATELET # BLD AUTO: 117 THOU/MM3 (ref 130–400)
PMV BLD AUTO: 12.7 FL (ref 9.4–12.4)
POTASSIUM SERPL-SCNC: 4.6 MEQ/L (ref 3.5–5.2)
RBC # BLD AUTO: 3.48 MILL/MM3 (ref 4.2–5.4)
SODIUM SERPL-SCNC: 140 MEQ/L (ref 135–145)
WBC # BLD AUTO: 5.5 THOU/MM3 (ref 4.8–10.8)

## 2025-07-31 PROCEDURE — 6370000000 HC RX 637 (ALT 250 FOR IP): Performed by: STUDENT IN AN ORGANIZED HEALTH CARE EDUCATION/TRAINING PROGRAM

## 2025-07-31 PROCEDURE — 36415 COLL VENOUS BLD VENIPUNCTURE: CPT

## 2025-07-31 PROCEDURE — 6360000002 HC RX W HCPCS: Performed by: STUDENT IN AN ORGANIZED HEALTH CARE EDUCATION/TRAINING PROGRAM

## 2025-07-31 PROCEDURE — 99233 SBSQ HOSP IP/OBS HIGH 50: CPT | Performed by: STUDENT IN AN ORGANIZED HEALTH CARE EDUCATION/TRAINING PROGRAM

## 2025-07-31 PROCEDURE — 1200000000 HC SEMI PRIVATE

## 2025-07-31 PROCEDURE — 80048 BASIC METABOLIC PNL TOTAL CA: CPT

## 2025-07-31 PROCEDURE — 6370000000 HC RX 637 (ALT 250 FOR IP)

## 2025-07-31 PROCEDURE — 2500000003 HC RX 250 WO HCPCS: Performed by: STUDENT IN AN ORGANIZED HEALTH CARE EDUCATION/TRAINING PROGRAM

## 2025-07-31 PROCEDURE — 85027 COMPLETE CBC AUTOMATED: CPT

## 2025-07-31 RX ADMIN — SACUBITRIL AND VALSARTAN 1 TABLET: 24; 26 TABLET, FILM COATED ORAL at 21:08

## 2025-07-31 RX ADMIN — SUCRALFATE 1 G: 1 TABLET ORAL at 21:07

## 2025-07-31 RX ADMIN — ASPIRIN 81 MG: 81 TABLET, CHEWABLE ORAL at 09:41

## 2025-07-31 RX ADMIN — CARVEDILOL 6.25 MG: 6.25 TABLET, FILM COATED ORAL at 21:07

## 2025-07-31 RX ADMIN — LEVOTHYROXINE SODIUM 62.5 MCG: 0.12 TABLET ORAL at 06:09

## 2025-07-31 RX ADMIN — PANTOPRAZOLE SODIUM 40 MG: 40 INJECTION, POWDER, FOR SOLUTION INTRAVENOUS at 10:54

## 2025-07-31 RX ADMIN — APIXABAN 2.5 MG: 2.5 TABLET, FILM COATED ORAL at 21:08

## 2025-07-31 RX ADMIN — SODIUM CHLORIDE, PRESERVATIVE FREE 10 ML: 5 INJECTION INTRAVENOUS at 09:46

## 2025-07-31 RX ADMIN — POLYETHYLENE GLYCOL 3350 17 G: 17 POWDER, FOR SOLUTION ORAL at 11:03

## 2025-07-31 RX ADMIN — WATER 1000 MG: 1 INJECTION INTRAMUSCULAR; INTRAVENOUS; SUBCUTANEOUS at 14:36

## 2025-07-31 RX ADMIN — GABAPENTIN 100 MG: 100 CAPSULE ORAL at 13:55

## 2025-07-31 RX ADMIN — ACETAMINOPHEN 650 MG: 325 TABLET ORAL at 07:44

## 2025-07-31 RX ADMIN — GABAPENTIN 100 MG: 100 CAPSULE ORAL at 21:08

## 2025-07-31 RX ADMIN — PANTOPRAZOLE SODIUM 40 MG: 40 INJECTION, POWDER, FOR SOLUTION INTRAVENOUS at 22:06

## 2025-07-31 RX ADMIN — GABAPENTIN 100 MG: 100 CAPSULE ORAL at 09:40

## 2025-07-31 RX ADMIN — SUCRALFATE 1 G: 1 TABLET ORAL at 09:41

## 2025-07-31 RX ADMIN — APIXABAN 2.5 MG: 2.5 TABLET, FILM COATED ORAL at 09:41

## 2025-07-31 RX ADMIN — SUCRALFATE 1 G: 1 TABLET ORAL at 16:37

## 2025-07-31 RX ADMIN — SODIUM CHLORIDE, PRESERVATIVE FREE 20 ML: 5 INJECTION INTRAVENOUS at 22:06

## 2025-07-31 RX ADMIN — SUCRALFATE 1 G: 1 TABLET ORAL at 06:09

## 2025-07-31 RX ADMIN — ACETAMINOPHEN 650 MG: 325 TABLET ORAL at 22:06

## 2025-07-31 RX ADMIN — TROSPIUM CHLORIDE 20 MG: 20 TABLET, FILM COATED ORAL at 21:07

## 2025-07-31 ASSESSMENT — PAIN DESCRIPTION - DESCRIPTORS
DESCRIPTORS: ACHING

## 2025-07-31 ASSESSMENT — PAIN SCALES - GENERAL
PAINLEVEL_OUTOF10: 9
PAINLEVEL_OUTOF10: 4
PAINLEVEL_OUTOF10: 5
PAINLEVEL_OUTOF10: 0
PAINLEVEL_OUTOF10: 8
PAINLEVEL_OUTOF10: 6

## 2025-07-31 ASSESSMENT — PAIN DESCRIPTION - ORIENTATION: ORIENTATION: ANTERIOR

## 2025-07-31 ASSESSMENT — PAIN DESCRIPTION - LOCATION: LOCATION: HEAD

## 2025-08-01 ENCOUNTER — APPOINTMENT (OUTPATIENT)
Dept: MRI IMAGING | Age: 89
DRG: 689 | End: 2025-08-01
Payer: MEDICARE

## 2025-08-01 PROCEDURE — 99233 SBSQ HOSP IP/OBS HIGH 50: CPT | Performed by: STUDENT IN AN ORGANIZED HEALTH CARE EDUCATION/TRAINING PROGRAM

## 2025-08-01 PROCEDURE — 6360000004 HC RX CONTRAST MEDICATION: Performed by: PHYSICIAN ASSISTANT

## 2025-08-01 PROCEDURE — 6370000000 HC RX 637 (ALT 250 FOR IP)

## 2025-08-01 PROCEDURE — 72158 MRI LUMBAR SPINE W/O & W/DYE: CPT

## 2025-08-01 PROCEDURE — 1200000000 HC SEMI PRIVATE

## 2025-08-01 PROCEDURE — A9579 GAD-BASE MR CONTRAST NOS,1ML: HCPCS | Performed by: PHYSICIAN ASSISTANT

## 2025-08-01 PROCEDURE — 6360000002 HC RX W HCPCS: Performed by: STUDENT IN AN ORGANIZED HEALTH CARE EDUCATION/TRAINING PROGRAM

## 2025-08-01 PROCEDURE — 2500000003 HC RX 250 WO HCPCS: Performed by: STUDENT IN AN ORGANIZED HEALTH CARE EDUCATION/TRAINING PROGRAM

## 2025-08-01 PROCEDURE — 6370000000 HC RX 637 (ALT 250 FOR IP): Performed by: STUDENT IN AN ORGANIZED HEALTH CARE EDUCATION/TRAINING PROGRAM

## 2025-08-01 RX ORDER — GADOTERIDOL 279.3 MG/ML
10 INJECTION INTRAVENOUS
Status: COMPLETED | OUTPATIENT
Start: 2025-08-01 | End: 2025-08-01

## 2025-08-01 RX ORDER — DOCUSATE SODIUM 100 MG/1
100 CAPSULE, LIQUID FILLED ORAL DAILY
Status: DISCONTINUED | OUTPATIENT
Start: 2025-08-01 | End: 2025-08-03 | Stop reason: HOSPADM

## 2025-08-01 RX ORDER — SENNOSIDES 8.6 MG/1
1 TABLET ORAL NIGHTLY
Status: DISCONTINUED | OUTPATIENT
Start: 2025-08-01 | End: 2025-08-03 | Stop reason: HOSPADM

## 2025-08-01 RX ADMIN — SODIUM CHLORIDE, PRESERVATIVE FREE 10 ML: 5 INJECTION INTRAVENOUS at 22:22

## 2025-08-01 RX ADMIN — SUCRALFATE 1 G: 1 TABLET ORAL at 22:22

## 2025-08-01 RX ADMIN — SUCRALFATE 1 G: 1 TABLET ORAL at 12:20

## 2025-08-01 RX ADMIN — POLYETHYLENE GLYCOL 3350 17 G: 17 POWDER, FOR SOLUTION ORAL at 08:27

## 2025-08-01 RX ADMIN — SODIUM CHLORIDE, PRESERVATIVE FREE 10 ML: 5 INJECTION INTRAVENOUS at 08:34

## 2025-08-01 RX ADMIN — SUCRALFATE 1 G: 1 TABLET ORAL at 15:55

## 2025-08-01 RX ADMIN — APIXABAN 2.5 MG: 2.5 TABLET, FILM COATED ORAL at 08:28

## 2025-08-01 RX ADMIN — ACETAMINOPHEN 650 MG: 325 TABLET ORAL at 22:21

## 2025-08-01 RX ADMIN — GABAPENTIN 100 MG: 100 CAPSULE ORAL at 22:21

## 2025-08-01 RX ADMIN — GABAPENTIN 100 MG: 100 CAPSULE ORAL at 13:48

## 2025-08-01 RX ADMIN — PANTOPRAZOLE SODIUM 40 MG: 40 INJECTION, POWDER, FOR SOLUTION INTRAVENOUS at 22:22

## 2025-08-01 RX ADMIN — CARVEDILOL 6.25 MG: 6.25 TABLET, FILM COATED ORAL at 08:28

## 2025-08-01 RX ADMIN — PANTOPRAZOLE SODIUM 40 MG: 40 INJECTION, POWDER, FOR SOLUTION INTRAVENOUS at 08:34

## 2025-08-01 RX ADMIN — FLUTICASONE PROPIONATE 1 SPRAY: 50 SPRAY, METERED NASAL at 19:50

## 2025-08-01 RX ADMIN — CARVEDILOL 6.25 MG: 6.25 TABLET, FILM COATED ORAL at 22:21

## 2025-08-01 RX ADMIN — DOCUSATE SODIUM 100 MG: 100 CAPSULE, LIQUID FILLED ORAL at 22:21

## 2025-08-01 RX ADMIN — ACETAMINOPHEN 650 MG: 325 TABLET ORAL at 04:45

## 2025-08-01 RX ADMIN — TROSPIUM CHLORIDE 20 MG: 20 TABLET, FILM COATED ORAL at 22:21

## 2025-08-01 RX ADMIN — LEVOTHYROXINE SODIUM 62.5 MCG: 0.12 TABLET ORAL at 04:45

## 2025-08-01 RX ADMIN — GABAPENTIN 100 MG: 100 CAPSULE ORAL at 08:28

## 2025-08-01 RX ADMIN — SUCRALFATE 1 G: 1 TABLET ORAL at 04:46

## 2025-08-01 RX ADMIN — APIXABAN 2.5 MG: 2.5 TABLET, FILM COATED ORAL at 22:21

## 2025-08-01 RX ADMIN — SODIUM CHLORIDE, PRESERVATIVE FREE 10 ML: 5 INJECTION INTRAVENOUS at 08:29

## 2025-08-01 RX ADMIN — SACUBITRIL AND VALSARTAN 1 TABLET: 24; 26 TABLET, FILM COATED ORAL at 22:21

## 2025-08-01 RX ADMIN — GADOTERIDOL 10 ML: 279.3 INJECTION, SOLUTION INTRAVENOUS at 15:18

## 2025-08-01 RX ADMIN — WATER 1000 MG: 1 INJECTION INTRAMUSCULAR; INTRAVENOUS; SUBCUTANEOUS at 15:48

## 2025-08-01 RX ADMIN — ASPIRIN 81 MG: 81 TABLET, CHEWABLE ORAL at 08:27

## 2025-08-01 RX ADMIN — SENNOSIDES 8.6 MG: 8.6 TABLET, FILM COATED ORAL at 22:21

## 2025-08-01 ASSESSMENT — PAIN SCALES - GENERAL
PAINLEVEL_OUTOF10: 8
PAINLEVEL_OUTOF10: 0
PAINLEVEL_OUTOF10: 3
PAINLEVEL_OUTOF10: 3

## 2025-08-01 ASSESSMENT — PAIN - FUNCTIONAL ASSESSMENT: PAIN_FUNCTIONAL_ASSESSMENT: PREVENTS OR INTERFERES SOME ACTIVE ACTIVITIES AND ADLS

## 2025-08-01 ASSESSMENT — PAIN DESCRIPTION - DESCRIPTORS
DESCRIPTORS: ACHING
DESCRIPTORS: ACHING

## 2025-08-01 ASSESSMENT — PAIN DESCRIPTION - ORIENTATION: ORIENTATION: POSTERIOR

## 2025-08-01 ASSESSMENT — PAIN DESCRIPTION - LOCATION
LOCATION: NECK;HEAD
LOCATION: GENERALIZED

## 2025-08-02 LAB
C CAYETANENSIS DNA SPEC QL NAA+PROBE: NOT DETECTED
CAMPY SP DNA.DIARRHEA STL QL NAA+PROBE: NOT DETECTED
CRYPTOSP DNA SPEC QL NAA+PROBE: NOT DETECTED
E COLI O157H7 DNA SPEC QL NAA+PROBE: NORMAL
E HISTOLYT DNA SPEC QL NAA+PROBE: NOT DETECTED
EAEC PAA PLAS AGGR+AATA ST NAA+NON-PRB: NOT DETECTED
EC STX1+STX2 + H7 FLIC SPEC NAA+PROBE: NOT DETECTED
EPEC EAE GENE STL QL NAA+NON-PROBE: NOT DETECTED
ETEC LTA+ST1A+ST1B TOX ST NAA+NON-PROBE: NOT DETECTED
G LAMBLIA DNA SPEC QL NAA+PROBE: NOT DETECTED
HADV DNA SPEC QL NAA+PROBE: NOT DETECTED
HASTV RNA SPEC QL NAA+PROBE: NOT DETECTED
NOROVIRUS GI + GII RNA STL NAA+PROBE: NOT DETECTED
P SHIGELLOIDES DNA STL QL NAA+PROBE: NOT DETECTED
RV RNA SPEC QL NAA+PROBE: NOT DETECTED
SALMONELLA DNA SPEC QL NAA+PROBE: NOT DETECTED
SAPOVIRUS RNA SPEC QL NAA+PROBE: NOT DETECTED
SHIGELLA SP+EIEC IPAH ST NAA+NON-PROBE: NOT DETECTED
V CHOLERAE DNA SPEC QL NAA+PROBE: NOT DETECTED
VIBRIO DNA SPEC NAA+PROBE: NOT DETECTED
Y ENTERO RECN STL QL NAA+PROBE: NOT DETECTED

## 2025-08-02 PROCEDURE — 6370000000 HC RX 637 (ALT 250 FOR IP)

## 2025-08-02 PROCEDURE — 2500000003 HC RX 250 WO HCPCS: Performed by: STUDENT IN AN ORGANIZED HEALTH CARE EDUCATION/TRAINING PROGRAM

## 2025-08-02 PROCEDURE — 87507 IADNA-DNA/RNA PROBE TQ 12-25: CPT

## 2025-08-02 PROCEDURE — 83520 IMMUNOASSAY QUANT NOS NONAB: CPT

## 2025-08-02 PROCEDURE — 6360000002 HC RX W HCPCS: Performed by: STUDENT IN AN ORGANIZED HEALTH CARE EDUCATION/TRAINING PROGRAM

## 2025-08-02 PROCEDURE — 82705 FATS/LIPIDS FECES QUAL: CPT

## 2025-08-02 PROCEDURE — 83993 ASSAY FOR CALPROTECTIN FECAL: CPT

## 2025-08-02 PROCEDURE — 1200000000 HC SEMI PRIVATE

## 2025-08-02 PROCEDURE — 6370000000 HC RX 637 (ALT 250 FOR IP): Performed by: STUDENT IN AN ORGANIZED HEALTH CARE EDUCATION/TRAINING PROGRAM

## 2025-08-02 PROCEDURE — 99233 SBSQ HOSP IP/OBS HIGH 50: CPT | Performed by: STUDENT IN AN ORGANIZED HEALTH CARE EDUCATION/TRAINING PROGRAM

## 2025-08-02 RX ORDER — PANTOPRAZOLE SODIUM 40 MG/1
40 TABLET, DELAYED RELEASE ORAL
Status: DISCONTINUED | OUTPATIENT
Start: 2025-08-02 | End: 2025-08-03 | Stop reason: HOSPADM

## 2025-08-02 RX ADMIN — SACUBITRIL AND VALSARTAN 1 TABLET: 24; 26 TABLET, FILM COATED ORAL at 08:43

## 2025-08-02 RX ADMIN — WATER 1000 MG: 1 INJECTION INTRAMUSCULAR; INTRAVENOUS; SUBCUTANEOUS at 14:04

## 2025-08-02 RX ADMIN — TROSPIUM CHLORIDE 20 MG: 20 TABLET, FILM COATED ORAL at 20:58

## 2025-08-02 RX ADMIN — SODIUM CHLORIDE, PRESERVATIVE FREE 10 ML: 5 INJECTION INTRAVENOUS at 20:57

## 2025-08-02 RX ADMIN — ASPIRIN 81 MG: 81 TABLET, CHEWABLE ORAL at 08:43

## 2025-08-02 RX ADMIN — CARVEDILOL 6.25 MG: 6.25 TABLET, FILM COATED ORAL at 20:57

## 2025-08-02 RX ADMIN — SUCRALFATE 1 G: 1 TABLET ORAL at 20:58

## 2025-08-02 RX ADMIN — DOCUSATE SODIUM 100 MG: 100 CAPSULE, LIQUID FILLED ORAL at 08:43

## 2025-08-02 RX ADMIN — GABAPENTIN 100 MG: 100 CAPSULE ORAL at 20:57

## 2025-08-02 RX ADMIN — SACUBITRIL AND VALSARTAN 1 TABLET: 24; 26 TABLET, FILM COATED ORAL at 20:56

## 2025-08-02 RX ADMIN — PANTOPRAZOLE SODIUM 40 MG: 40 TABLET, DELAYED RELEASE ORAL at 16:34

## 2025-08-02 RX ADMIN — SUCRALFATE 1 G: 1 TABLET ORAL at 12:23

## 2025-08-02 RX ADMIN — FLUTICASONE PROPIONATE 1 SPRAY: 50 SPRAY, METERED NASAL at 14:59

## 2025-08-02 RX ADMIN — GABAPENTIN 100 MG: 100 CAPSULE ORAL at 08:43

## 2025-08-02 RX ADMIN — SUCRALFATE 1 G: 1 TABLET ORAL at 06:23

## 2025-08-02 RX ADMIN — SENNOSIDES 8.6 MG: 8.6 TABLET, FILM COATED ORAL at 20:56

## 2025-08-02 RX ADMIN — SODIUM CHLORIDE, PRESERVATIVE FREE 10 ML: 5 INJECTION INTRAVENOUS at 08:43

## 2025-08-02 RX ADMIN — CARVEDILOL 6.25 MG: 6.25 TABLET, FILM COATED ORAL at 08:43

## 2025-08-02 RX ADMIN — LEVOTHYROXINE SODIUM 62.5 MCG: 0.12 TABLET ORAL at 06:23

## 2025-08-02 RX ADMIN — ACETAMINOPHEN 650 MG: 325 TABLET ORAL at 14:57

## 2025-08-02 RX ADMIN — PANTOPRAZOLE SODIUM 40 MG: 40 TABLET, DELAYED RELEASE ORAL at 08:43

## 2025-08-02 RX ADMIN — SUCRALFATE 1 G: 1 TABLET ORAL at 16:34

## 2025-08-02 RX ADMIN — APIXABAN 2.5 MG: 2.5 TABLET, FILM COATED ORAL at 08:43

## 2025-08-02 RX ADMIN — GABAPENTIN 100 MG: 100 CAPSULE ORAL at 14:02

## 2025-08-02 RX ADMIN — APIXABAN 2.5 MG: 2.5 TABLET, FILM COATED ORAL at 20:57

## 2025-08-02 ASSESSMENT — PAIN DESCRIPTION - LOCATION
LOCATION: GENERALIZED
LOCATION: GENERALIZED

## 2025-08-02 ASSESSMENT — PAIN DESCRIPTION - DESCRIPTORS: DESCRIPTORS: ACHING

## 2025-08-02 ASSESSMENT — PAIN SCALES - GENERAL
PAINLEVEL_OUTOF10: 7
PAINLEVEL_OUTOF10: 6
PAINLEVEL_OUTOF10: 0

## 2025-08-03 VITALS
BODY MASS INDEX: 23.42 KG/M2 | SYSTOLIC BLOOD PRESSURE: 102 MMHG | HEART RATE: 59 BPM | OXYGEN SATURATION: 97 % | RESPIRATION RATE: 18 BRPM | WEIGHT: 119.27 LBS | DIASTOLIC BLOOD PRESSURE: 50 MMHG | HEIGHT: 60 IN | TEMPERATURE: 97 F

## 2025-08-03 PROCEDURE — 6370000000 HC RX 637 (ALT 250 FOR IP)

## 2025-08-03 PROCEDURE — 6360000002 HC RX W HCPCS: Performed by: STUDENT IN AN ORGANIZED HEALTH CARE EDUCATION/TRAINING PROGRAM

## 2025-08-03 PROCEDURE — 99239 HOSP IP/OBS DSCHRG MGMT >30: CPT | Performed by: STUDENT IN AN ORGANIZED HEALTH CARE EDUCATION/TRAINING PROGRAM

## 2025-08-03 PROCEDURE — 6370000000 HC RX 637 (ALT 250 FOR IP): Performed by: STUDENT IN AN ORGANIZED HEALTH CARE EDUCATION/TRAINING PROGRAM

## 2025-08-03 PROCEDURE — 2500000003 HC RX 250 WO HCPCS: Performed by: STUDENT IN AN ORGANIZED HEALTH CARE EDUCATION/TRAINING PROGRAM

## 2025-08-03 RX ORDER — HYDROCODONE BITARTRATE AND ACETAMINOPHEN 5; 325 MG/1; MG/1
1 TABLET ORAL EVERY 4 HOURS PRN
Qty: 18 TABLET | Refills: 0 | Status: SHIPPED | OUTPATIENT
Start: 2025-08-03 | End: 2025-08-06

## 2025-08-03 RX ORDER — GABAPENTIN 100 MG/1
100 CAPSULE ORAL 3 TIMES DAILY
Qty: 90 CAPSULE | Refills: 0 | Status: SHIPPED | OUTPATIENT
Start: 2025-08-03 | End: 2025-09-02

## 2025-08-03 RX ADMIN — PANTOPRAZOLE SODIUM 40 MG: 40 TABLET, DELAYED RELEASE ORAL at 06:15

## 2025-08-03 RX ADMIN — SUCRALFATE 1 G: 1 TABLET ORAL at 12:41

## 2025-08-03 RX ADMIN — LEVOTHYROXINE SODIUM 62.5 MCG: 0.12 TABLET ORAL at 06:15

## 2025-08-03 RX ADMIN — SODIUM CHLORIDE, PRESERVATIVE FREE 10 ML: 5 INJECTION INTRAVENOUS at 09:08

## 2025-08-03 RX ADMIN — GABAPENTIN 100 MG: 100 CAPSULE ORAL at 09:08

## 2025-08-03 RX ADMIN — GABAPENTIN 100 MG: 100 CAPSULE ORAL at 14:26

## 2025-08-03 RX ADMIN — CARVEDILOL 6.25 MG: 6.25 TABLET, FILM COATED ORAL at 09:08

## 2025-08-03 RX ADMIN — APIXABAN 2.5 MG: 2.5 TABLET, FILM COATED ORAL at 09:08

## 2025-08-03 RX ADMIN — ASPIRIN 81 MG: 81 TABLET, CHEWABLE ORAL at 09:11

## 2025-08-03 RX ADMIN — DOCUSATE SODIUM 100 MG: 100 CAPSULE, LIQUID FILLED ORAL at 09:11

## 2025-08-03 RX ADMIN — WATER 1000 MG: 1 INJECTION INTRAMUSCULAR; INTRAVENOUS; SUBCUTANEOUS at 14:25

## 2025-08-03 RX ADMIN — SUCRALFATE 1 G: 1 TABLET ORAL at 06:15

## 2025-08-03 ASSESSMENT — PAIN SCALES - GENERAL: PAINLEVEL_OUTOF10: 0

## 2025-08-05 LAB — FECAL FAT, QUALITATIVE: NORMAL

## 2025-08-06 LAB
CALPROTECTIN STL-MCNT: 101 UG/G
ELASTASE PANC STL-MCNT: 129 UG/G

## 2025-08-07 ENCOUNTER — HOSPITAL ENCOUNTER (OUTPATIENT)
Dept: AUDIOLOGY | Age: 89
Discharge: HOME | End: 2025-08-07

## 2025-08-07 PROCEDURE — 9990000010 HC NO CHARGE VISIT: Performed by: AUDIOLOGIST

## 2025-08-11 ENCOUNTER — TELEPHONE (OUTPATIENT)
Dept: AUDIOLOGY | Age: 89
End: 2025-08-11

## 2025-08-16 PROCEDURE — 93297 REM INTERROG DEV EVAL ICPMS: CPT | Performed by: NUCLEAR MEDICINE

## (undated) DEVICE — GLOVE ORANGE PI 7 1/2   MSG9075

## (undated) DEVICE — PENCIL SMK EVAC 15FT BLADE ELECTRD ROCKER F/TELSCP

## (undated) DEVICE — RELOAD STPL H4.1X2MM DIA60MM THCK TISS GRN 6 ROW PWR GST B

## (undated) DEVICE — Z DISCONTINUED BY MEDLINE USE 2711682 TRAY SKIN PREP DRY W/ PREM GLV

## (undated) DEVICE — PREMIUM DRY TRAY LF: Brand: MEDLINE INDUSTRIES, INC.

## (undated) DEVICE — TOWEL,OR,DSP,ST,WHITE,DLX,XR,4/PK,20PK/C: Brand: MEDLINE

## (undated) DEVICE — SUTURE VICRYL + SZ 0 L18IN ABSRB TIE VCP106G

## (undated) DEVICE — SUTURE ABSORBABLE MONOFILAMENT 0 CTX 60 IN VIO PDS + PDP990G

## (undated) DEVICE — SOLUTION SCRB 4OZ 7.5% POVIDONE IOD ANTIMIC BTL

## (undated) DEVICE — APPLIER CLP M L11IN TI MULT RNG HNDL 30 CLP STR LIGACLP

## (undated) DEVICE — BASIC SINGLE BASIN BTC-LF: Brand: MEDLINE INDUSTRIES, INC.

## (undated) DEVICE — PENCIL SMK EVAC ALL IN 1 DSGN ENH VISIBILITY IMPROVED AIR

## (undated) DEVICE — SOLUTION PREP PAINT POV IOD FOR SKIN MUCOUS MEM

## (undated) DEVICE — BREAST HERNIA: Brand: MEDLINE INDUSTRIES, INC.

## (undated) DEVICE — SUTURE VICRYL + SZ 3-0 L27IN ABSRB UD L26MM SH 1/2 CIR VCP416H

## (undated) DEVICE — SOLUTION SURG PREP POV IOD 7.5% 4 OZ

## (undated) DEVICE — SPONGE LAP W18XL18IN WHT COT 4 PLY FLD STRUNG RADPQ DISP ST

## (undated) DEVICE — STAPLER INT 75MM CUT LN L73MM STPL LN L77MM LNAR B-FORM

## (undated) DEVICE — SEALER ENDOSCP NANO COAT OPN DIV CRV L JAW LIGASURE IMPACT

## (undated) DEVICE — PACK,LAPAROSCOPY,PK II,SIRUS: Brand: MEDLINE

## (undated) DEVICE — SUTURE VICRYL + SZ 2-0 L27IN ABSRB WHT SH 1/2 CIR TAPERCUT VCP417H

## (undated) DEVICE — GLOVE ORANGE PI 8   MSG9080

## (undated) DEVICE — STAPLER 60MM POWERED ECHELON 3000  SHORT 340MM

## (undated) DEVICE — SHEET, T, LAPAROTOMY, STERILE: Brand: MEDLINE

## (undated) DEVICE — PREP SOL PVP IODINE 4%  4 OZ/BTL

## (undated) DEVICE — SUTURE VICRYL + SZ 2-0 L27IN ABSRB CLR CT-1 1/2 CIR TAPERCUT VCP259H

## (undated) DEVICE — SPONGE GZ W4XL4IN COT 12 PLY TYP VII WVN C FLD DSGN

## (undated) DEVICE — SYRINGE MED 10ML LUERLOCK TIP W/O SFTY DISP

## (undated) DEVICE — SPONGE GZ W4XL4IN COT 12 PLY TYP VII WVN C FLD DSGN STERILE

## (undated) DEVICE — SUTURE VICRYL + SZ 0 L27IN ABSRB VLT L36MM CT-1 1/2 CIR VCPB260H

## (undated) DEVICE — FAN SPRAY KIT: Brand: PULSAVAC®